# Patient Record
Sex: FEMALE | Race: WHITE | NOT HISPANIC OR LATINO | Employment: OTHER | URBAN - METROPOLITAN AREA
[De-identification: names, ages, dates, MRNs, and addresses within clinical notes are randomized per-mention and may not be internally consistent; named-entity substitution may affect disease eponyms.]

---

## 2017-02-15 ENCOUNTER — HOSPITAL ENCOUNTER (OUTPATIENT)
Dept: RADIOLOGY | Facility: HOSPITAL | Age: 62
Discharge: HOME | End: 2017-02-15
Attending: NURSE PRACTITIONER
Payer: MEDICARE

## 2017-02-15 ENCOUNTER — OFFICE VISIT (OUTPATIENT)
Dept: SURGERY | Facility: CLINIC | Age: 62
End: 2017-02-15
Payer: MEDICARE

## 2017-02-15 VITALS
DIASTOLIC BLOOD PRESSURE: 78 MMHG | TEMPERATURE: 98 F | WEIGHT: 172 LBS | HEART RATE: 85 BPM | HEIGHT: 60 IN | SYSTOLIC BLOOD PRESSURE: 134 MMHG | BODY MASS INDEX: 33.77 KG/M2

## 2017-02-15 DIAGNOSIS — Z85.3 PERSONAL HISTORY OF BREAST CANCER: Primary | ICD-10-CM

## 2017-02-15 DIAGNOSIS — Z85.3 PERSONAL HISTORY OF BREAST CANCER: ICD-10-CM

## 2017-02-15 PROCEDURE — 99213 OFFICE O/P EST LOW 20 MIN: CPT | Mod: S$GLB,,, | Performed by: NURSE PRACTITIONER

## 2017-02-15 PROCEDURE — 99999 PR PBB SHADOW E&M-EST. PATIENT-LVL III: CPT | Mod: PBBFAC,,, | Performed by: NURSE PRACTITIONER

## 2017-02-15 PROCEDURE — 77062 BREAST TOMOSYNTHESIS BI: CPT | Mod: 26,,, | Performed by: RADIOLOGY

## 2017-02-15 PROCEDURE — 99213 OFFICE O/P EST LOW 20 MIN: CPT | Mod: PBBFAC,PO | Performed by: NURSE PRACTITIONER

## 2017-02-15 PROCEDURE — 77066 DX MAMMO INCL CAD BI: CPT | Mod: 26,,, | Performed by: RADIOLOGY

## 2017-02-15 RX ORDER — LEVOCETIRIZINE DIHYDROCHLORIDE 5 MG/1
5 TABLET, FILM COATED ORAL NIGHTLY
Refills: 3 | COMMUNITY
Start: 2017-01-30 | End: 2018-08-07 | Stop reason: SDUPTHER

## 2017-02-15 NOTE — PROGRESS NOTES
Subjective:      Patient ID: Jeanne Rodgers is a 62 y.o. female.    Chief Complaint: Breast Cancer Screening (CBE/Hx of Right Breast Cancer)      HPI: (PF, EPF - 1-3) (Detailed, Comp, - 4) returning patient presents for breast cancer surveillance.  Denies breast pain, nipple discharge, redness, increased warmth, unexplained weight loss, new onset bone pain, cough, SOB. She reports vaginal dryness, irritation and urinary incontinence, she is planning on seeing a provider for vaginal laser treatment.         Right lumpectomy at [facility] June 24, 2014 with 2.4cm IDC and DCIS, negative margins, 0/6 SN, Stage II, ER/ME+, HER-2 negative. Adjuvant XRT and continues with hormonal therapy with arimidex.       Review of Systems   Constitutional: Negative for appetite change and fatigue.   Respiratory: Negative for cough and shortness of breath.    Cardiovascular: Negative for chest pain.   Musculoskeletal: Negative for back pain.     Objective:   Physical Exam   Pulmonary/Chest: Right breast exhibits tenderness. Right breast exhibits no inverted nipple and no mass. Left breast exhibits no inverted nipple, no mass, no nipple discharge, no skin change and no tenderness. There is no breast swelling.   Lumpectomy scar right breast at 10 o'clock near areolar border with associated tenderness, no mass noted. Generalized skin thickening from previous XRT. No upper extremity lymphedema. Breathing non-labored    Lymphadenopathy:     She has no cervical adenopathy.     She has no axillary adenopathy.        Right: No supraclavicular adenopathy present.        Left: No supraclavicular adenopathy present.     Assessment:       1. Personal history of breast cancer        Plan:       mmg today, no changes or abnormality reported, clinically ADONIS  Return in one year with bilat diag mmg  She will continue with hormonal therapy and will see medical oncology during the interval  Call for any interval palpable breast mass, pain, nipple  discharge, skin changes or other breast related concerns

## 2017-12-20 ENCOUNTER — LAB VISIT (OUTPATIENT)
Dept: LAB | Facility: OTHER | Age: 62
End: 2017-12-20
Payer: MEDICARE

## 2017-12-20 DIAGNOSIS — G93.32 CHRONIC FATIGUE SYNDROME: ICD-10-CM

## 2017-12-20 DIAGNOSIS — M79.641 RIGHT HAND PAIN: Primary | ICD-10-CM

## 2017-12-20 DIAGNOSIS — M79.642 LEFT HAND PAIN: ICD-10-CM

## 2017-12-20 LAB
BASOPHILS # BLD AUTO: 0.01 K/UL
BASOPHILS NFR BLD: 0.2 %
CCP AB SER IA-ACNC: 1.1 U/ML
CRP SERPL-MCNC: 5.3 MG/L
DIFFERENTIAL METHOD: ABNORMAL
EOSINOPHIL # BLD AUTO: 0.1 K/UL
EOSINOPHIL NFR BLD: 2 %
ERYTHROCYTE [DISTWIDTH] IN BLOOD BY AUTOMATED COUNT: 12.4 %
ERYTHROCYTE [SEDIMENTATION RATE] IN BLOOD BY WESTERGREN METHOD: 19 MM/HR
HCT VFR BLD AUTO: 40.8 %
HGB BLD-MCNC: 13.6 G/DL
LYMPHOCYTES # BLD AUTO: 1.7 K/UL
LYMPHOCYTES NFR BLD: 31.2 %
MCH RBC QN AUTO: 31.6 PG
MCHC RBC AUTO-ENTMCNC: 33.3 G/DL
MCV RBC AUTO: 95 FL
MONOCYTES # BLD AUTO: 0.4 K/UL
MONOCYTES NFR BLD: 7.7 %
NEUTROPHILS # BLD AUTO: 3.3 K/UL
NEUTROPHILS NFR BLD: 58.7 %
PLATELET # BLD AUTO: 254 K/UL
PMV BLD AUTO: 9.3 FL
RBC # BLD AUTO: 4.3 M/UL
RHEUMATOID FACT SERPL-ACNC: <10 IU/ML
WBC # BLD AUTO: 5.57 K/UL

## 2017-12-20 PROCEDURE — 86140 C-REACTIVE PROTEIN: CPT

## 2017-12-20 PROCEDURE — 86235 NUCLEAR ANTIGEN ANTIBODY: CPT | Mod: 59

## 2017-12-20 PROCEDURE — 36415 COLL VENOUS BLD VENIPUNCTURE: CPT

## 2017-12-20 PROCEDURE — 85025 COMPLETE CBC W/AUTO DIFF WBC: CPT

## 2017-12-20 PROCEDURE — 85651 RBC SED RATE NONAUTOMATED: CPT

## 2017-12-20 PROCEDURE — 86039 ANTINUCLEAR ANTIBODIES (ANA): CPT

## 2017-12-20 PROCEDURE — 86200 CCP ANTIBODY: CPT

## 2017-12-20 PROCEDURE — 86431 RHEUMATOID FACTOR QUANT: CPT

## 2017-12-20 PROCEDURE — 86038 ANTINUCLEAR ANTIBODIES: CPT

## 2017-12-21 LAB
ANA SER QL IF: POSITIVE
ANA TITR SER IF: NORMAL {TITER}

## 2017-12-26 ENCOUNTER — LAB VISIT (OUTPATIENT)
Dept: LAB | Facility: HOSPITAL | Age: 62
End: 2017-12-26
Attending: FAMILY MEDICINE
Payer: MEDICARE

## 2017-12-26 ENCOUNTER — OFFICE VISIT (OUTPATIENT)
Dept: FAMILY MEDICINE | Facility: CLINIC | Age: 62
End: 2017-12-26
Payer: MEDICARE

## 2017-12-26 VITALS
HEIGHT: 60 IN | SYSTOLIC BLOOD PRESSURE: 138 MMHG | WEIGHT: 178.81 LBS | RESPIRATION RATE: 14 BRPM | OXYGEN SATURATION: 97 % | HEART RATE: 97 BPM | TEMPERATURE: 98 F | BODY MASS INDEX: 35.11 KG/M2 | DIASTOLIC BLOOD PRESSURE: 84 MMHG

## 2017-12-26 DIAGNOSIS — M25.40 JOINT SWELLING: ICD-10-CM

## 2017-12-26 DIAGNOSIS — Z13.6 SCREENING FOR CARDIOVASCULAR CONDITION: ICD-10-CM

## 2017-12-26 DIAGNOSIS — M51.36 DDD (DEGENERATIVE DISC DISEASE), LUMBAR: ICD-10-CM

## 2017-12-26 DIAGNOSIS — M50.30 DDD (DEGENERATIVE DISC DISEASE), CERVICAL: ICD-10-CM

## 2017-12-26 DIAGNOSIS — Z23 NEEDS FLU SHOT: Primary | ICD-10-CM

## 2017-12-26 DIAGNOSIS — G47.00 INSOMNIA, UNSPECIFIED TYPE: ICD-10-CM

## 2017-12-26 DIAGNOSIS — Z11.59 NEED FOR HEPATITIS C SCREENING TEST: ICD-10-CM

## 2017-12-26 DIAGNOSIS — R53.83 FATIGUE, UNSPECIFIED TYPE: ICD-10-CM

## 2017-12-26 DIAGNOSIS — E04.9 ENLARGED THYROID: ICD-10-CM

## 2017-12-26 DIAGNOSIS — F32.A DEPRESSION, UNSPECIFIED DEPRESSION TYPE: ICD-10-CM

## 2017-12-26 LAB
ALBUMIN SERPL BCP-MCNC: 3.8 G/DL
ALP SERPL-CCNC: 79 U/L
ALT SERPL W/O P-5'-P-CCNC: 28 U/L
ANION GAP SERPL CALC-SCNC: 9 MMOL/L
ANTI SM ANTIBODY: 15.21 EU
ANTI SM/RNP ANTIBODY: 0.14 EU
ANTI-SM INTERPRETATION: NEGATIVE
ANTI-SM/RNP INTERPRETATION: NEGATIVE
ANTI-SSA ANTIBODY: 4.6 EU
ANTI-SSA INTERPRETATION: NEGATIVE
ANTI-SSB ANTIBODY: 0.22 EU
ANTI-SSB INTERPRETATION: NEGATIVE
AST SERPL-CCNC: 25 U/L
BILIRUB SERPL-MCNC: 0.3 MG/DL
BUN SERPL-MCNC: 13 MG/DL
CALCIUM SERPL-MCNC: 9.5 MG/DL
CHLORIDE SERPL-SCNC: 106 MMOL/L
CHOLEST SERPL-MCNC: 214 MG/DL
CHOLEST/HDLC SERPL: 5 {RATIO}
CO2 SERPL-SCNC: 27 MMOL/L
CREAT SERPL-MCNC: 0.7 MG/DL
DSDNA AB SER-ACNC: NORMAL [IU]/ML
EST. GFR  (AFRICAN AMERICAN): >60 ML/MIN/1.73 M^2
EST. GFR  (NON AFRICAN AMERICAN): >60 ML/MIN/1.73 M^2
GLUCOSE SERPL-MCNC: 83 MG/DL
HDLC SERPL-MCNC: 43 MG/DL
HDLC SERPL: 20.1 %
LDLC SERPL CALC-MCNC: 99.8 MG/DL
NONHDLC SERPL-MCNC: 171 MG/DL
POTASSIUM SERPL-SCNC: 4.4 MMOL/L
PROT SERPL-MCNC: 7.2 G/DL
SODIUM SERPL-SCNC: 142 MMOL/L
T4 FREE SERPL-MCNC: 0.75 NG/DL
THYROPEROXIDASE IGG SERPL-ACNC: <6 IU/ML
TRIGL SERPL-MCNC: 356 MG/DL
TSH SERPL DL<=0.005 MIU/L-ACNC: 0.98 UIU/ML
URATE SERPL-MCNC: 5.4 MG/DL

## 2017-12-26 PROCEDURE — G0008 ADMIN INFLUENZA VIRUS VAC: HCPCS | Mod: PBBFAC,PO

## 2017-12-26 PROCEDURE — 36415 COLL VENOUS BLD VENIPUNCTURE: CPT | Mod: PO

## 2017-12-26 PROCEDURE — 99999 PR PBB SHADOW E&M-EST. PATIENT-LVL IV: CPT | Mod: PBBFAC,,, | Performed by: FAMILY MEDICINE

## 2017-12-26 PROCEDURE — 84439 ASSAY OF FREE THYROXINE: CPT

## 2017-12-26 PROCEDURE — 86376 MICROSOMAL ANTIBODY EACH: CPT

## 2017-12-26 PROCEDURE — 84443 ASSAY THYROID STIM HORMONE: CPT

## 2017-12-26 PROCEDURE — 84432 ASSAY OF THYROGLOBULIN: CPT

## 2017-12-26 PROCEDURE — 86803 HEPATITIS C AB TEST: CPT

## 2017-12-26 PROCEDURE — 80061 LIPID PANEL: CPT

## 2017-12-26 PROCEDURE — 84550 ASSAY OF BLOOD/URIC ACID: CPT

## 2017-12-26 PROCEDURE — 99205 OFFICE O/P NEW HI 60 MIN: CPT | Mod: S$PBB,,, | Performed by: FAMILY MEDICINE

## 2017-12-26 PROCEDURE — 99214 OFFICE O/P EST MOD 30 MIN: CPT | Mod: PBBFAC,PO | Performed by: FAMILY MEDICINE

## 2017-12-26 PROCEDURE — 80053 COMPREHEN METABOLIC PANEL: CPT

## 2017-12-26 RX ORDER — PREDNISONE 10 MG/1
TABLET ORAL
Qty: 6 TABLET | Refills: 1 | Status: SHIPPED | OUTPATIENT
Start: 2017-12-26 | End: 2018-01-25 | Stop reason: ALTCHOICE

## 2017-12-26 RX ORDER — CITALOPRAM 20 MG/1
20 TABLET, FILM COATED ORAL DAILY
Qty: 30 TABLET | Refills: 11 | Status: SHIPPED | OUTPATIENT
Start: 2017-12-26 | End: 2018-06-14

## 2017-12-26 RX ORDER — ALPRAZOLAM 0.25 MG/1
0.25 TABLET ORAL NIGHTLY PRN
Qty: 30 TABLET | Refills: 0 | Status: SHIPPED | OUTPATIENT
Start: 2017-12-26 | End: 2018-08-07 | Stop reason: SDUPTHER

## 2017-12-26 NOTE — PROGRESS NOTES
Chief Complaint   Patient presents with    Establish Care     tingling and sweating       HPI  Jeanne Rodgers is a 62 y.o. female with medical diagnoses as listed in the medical history and problem list that presents to establish care.      Breast CA - 2014 s/p xrt/procedures, states now with L hand pain, seen by Ortho hand.    Hx of global pruritis prior to breast CA diagnosis. Dx with hives by PCP and Allergy, currently on xyzal for relief.      Pt states multiple referrals placed, has question about DM2 dx, most recent labs show low glucose.     Joint pain global - states 2 year hx, B hand swelling.     Insomnia - states 2/2 pain.     Dental procedure - states +edema after procedure this year, caused edema, given steroid therapy/obs.     Fam hx - DM2, pancreatic CA multiple family members,     Diet - no special diet at this time. +seafood, chicken baked.     Increased stressors - increased stressors 2/2 mother dx with dementia and multiple other med diagnoses.     Cervical/Lumbar DDD - multiple year hx,     PAST MEDICAL HISTORY:  Past Medical History:   Diagnosis Date    Cancer        PAST SURGICAL HISTORY:  Past Surgical History:   Procedure Laterality Date    BREAST SURGERY Right 2014    lumpectomy        SOCIAL HISTORY:  Social History     Social History    Marital status:      Spouse name: N/A    Number of children: N/A    Years of education: N/A     Occupational History    Not on file.     Social History Main Topics    Smoking status: Never Smoker    Smokeless tobacco: Never Used    Alcohol use Not on file    Drug use: Unknown    Sexual activity: Not on file     Other Topics Concern    Not on file     Social History Narrative    No narrative on file       FAMILY HISTORY:  Family History   Problem Relation Age of Onset    Cancer Mother 85     pancreatic cancer    Cancer Father 85     pancreatic    Cancer Maternal Aunt 60     pancreatic    Breast cancer Neg Hx     Ovarian  cancer Neg Hx        ALLERGIES AND MEDICATIONS: updated and reviewed.  Review of patient's allergies indicates:  No Known Allergies  Current Outpatient Prescriptions   Medication Sig Dispense Refill    anastrozole (ARIMIDEX) 1 mg Tab Take 1 mg by mouth once daily.      levocetirizine (XYZAL) 5 MG tablet Take 5 mg by mouth every evening.  3    ALPRAZolam (XANAX) 0.25 MG tablet Take 1 tablet (0.25 mg total) by mouth nightly as needed for Anxiety. 30 tablet 0    citalopram (CELEXA) 20 MG tablet Take 1 tablet (20 mg total) by mouth once daily. 30 tablet 11    predniSONE (DELTASONE) 10 mg tablet pack 2 tablets for 2 days, then 1 tablet for 2 days. 6 tablet 1     No current facility-administered medications for this visit.        ROS  Review of Systems   Constitutional: Positive for fatigue. Negative for activity change, appetite change and fever.   HENT: Negative for congestion and sore throat.    Eyes: Negative for visual disturbance.   Respiratory: Negative for cough and shortness of breath.    Cardiovascular: Negative for chest pain.   Gastrointestinal: Negative for abdominal pain, diarrhea, nausea and vomiting.   Endocrine: Negative.    Genitourinary: Negative for dysuria.   Musculoskeletal: Positive for arthralgias, back pain and myalgias.   Skin: Negative for rash.   Allergic/Immunologic: Negative.    Neurological: Negative for dizziness, weakness and headaches.   Hematological: Negative.    Psychiatric/Behavioral: Positive for dysphoric mood. Negative for agitation and confusion. The patient is nervous/anxious.        Physical Exam  Vitals:    12/26/17 1038   BP: 138/84   Pulse: 97   Resp: 14   Temp: 98.2 °F (36.8 °C)    Body mass index is 34.92 kg/m².  Weight: 81.1 kg (178 lb 12.7 oz)   Height: 5' (152.4 cm)     Physical Exam   Constitutional: She is oriented to person, place, and time. She appears well-developed and well-nourished.   HENT:   Head: Normocephalic and atraumatic.   Eyes: Conjunctivae and EOM  are normal. Pupils are equal, round, and reactive to light.   Neck: Normal range of motion. Neck supple.   Cardiovascular: Normal rate, regular rhythm and normal heart sounds.    Pulmonary/Chest: Effort normal and breath sounds normal.   Abdominal: Soft. Bowel sounds are normal.   Musculoskeletal: Normal range of motion.   Multiple joints - global TTP, dec ROM mild  B hands - +non pitting edema, dec ROM global  B LE - 1+ non pitting edema, TTP   Neurological: She is alert and oriented to person, place, and time. She has normal reflexes.   Skin: Skin is warm and dry.   Psychiatric: She has a normal mood and affect. Her behavior is normal. Judgment and thought content normal.       Health Maintenance       Date Due Completion Date    Hepatitis C Screening 1955 ---    Lipid Panel 1955 ---    TETANUS VACCINE 01/27/1971 ---    Pap Smear with HPV Cotest 01/27/1974 ---    Colonoscopy 01/27/2003 ---    Zoster Vaccine 01/27/2013 ---    Influenza Vaccine 08/01/2017 ---    Mammogram 02/15/2019 2/15/2017          Assessment & Plan    Needs flu shot  -     Influenza - Quadrivalent (3 years & older) (PF)    Need for hepatitis C screening test  -     Hepatitis C antibody; Future; Expected date: 12/26/2017    Screening for cardiovascular condition  -     Lipid panel; Future; Expected date: 12/26/2017  - Assess lipid panel    DDD (degenerative disc disease), lumbar  -     Ambulatory referral to Pain Clinic  -     predniSONE (DELTASONE) 10 mg tablet pack; 2 tablets for 2 days, then 1 tablet for 2 days.  Dispense: 6 tablet; Refill: 1  - Will provide therapy at this time, explore treatment options with Pain Management.     DDD (degenerative disc disease), cervical  -     Ambulatory referral to Pain Clinic  -     predniSONE (DELTASONE) 10 mg tablet pack; 2 tablets for 2 days, then 1 tablet for 2 days.  Dispense: 6 tablet; Refill: 1    Fatigue, unspecified type  -     TSH; Future; Expected date: 12/26/2017  -     T4, free;  Future; Expected date: 12/26/2017  -     Comprehensive metabolic panel; Future; Expected date: 12/26/2017  -     Thyroglobulin; Future; Expected date: 12/26/2017  -     THYROID PEROXIDASE ANTIBODY; Future; Expected date: 12/26/2017  - Assess full labs, strong family hx    Joint swelling  -     Uric acid; Future; Expected date: 12/26/2017  -     predniSONE (DELTASONE) 10 mg tablet pack; 2 tablets for 2 days, then 1 tablet for 2 days.  Dispense: 6 tablet; Refill: 1  - Assess full labs at this time    Enlarged thyroid  -     US Thyroid; Future; Expected date: 12/26/2017    Depression, unspecified depression type  -     citalopram (CELEXA) 20 MG tablet; Take 1 tablet (20 mg total) by mouth once daily.  Dispense: 30 tablet; Refill: 11  -     ALPRAZolam (XANAX) 0.25 MG tablet; Take 1 tablet (0.25 mg total) by mouth nightly as needed for Anxiety.  Dispense: 30 tablet; Refill: 0  - PRN benzo only, low dose at this time, discussed SE profile and addiction potential.     Insomnia, unspecified type  -     ALPRAZolam (XANAX) 0.25 MG tablet; Take 1 tablet (0.25 mg total) by mouth nightly as needed for Anxiety.  Dispense: 30 tablet; Refill: 0        Return in about 4 weeks (around 1/23/2018), or if symptoms worsen or fail to improve.

## 2017-12-27 LAB — HCV AB SERPL QL IA: NEGATIVE

## 2017-12-28 LAB
THRYOGLOBULIN INTERPRETATION: ABNORMAL
THYROGLOB AB SERPL-ACNC: <1.8 IU/ML
THYROGLOB SERPL-MCNC: 5 NG/ML

## 2018-01-04 ENCOUNTER — HOSPITAL ENCOUNTER (OUTPATIENT)
Dept: RADIOLOGY | Facility: HOSPITAL | Age: 63
Discharge: HOME | End: 2018-01-04
Attending: FAMILY MEDICINE
Payer: MEDICARE

## 2018-01-04 DIAGNOSIS — E04.9 ENLARGED THYROID: ICD-10-CM

## 2018-01-04 PROCEDURE — 76536 US EXAM OF HEAD AND NECK: CPT | Mod: TC

## 2018-01-04 PROCEDURE — 76536 US EXAM OF HEAD AND NECK: CPT | Mod: 26,,, | Performed by: RADIOLOGY

## 2018-01-12 ENCOUNTER — TELEPHONE (OUTPATIENT)
Dept: ADMINISTRATIVE | Facility: HOSPITAL | Age: 63
End: 2018-01-12

## 2018-01-12 NOTE — TELEPHONE ENCOUNTER
Patient states she wants to see Rheumatology first. She is also doing hand therapy with Dr Walker. If she feels she needs the referral after, she will call to schedule.

## 2018-01-25 ENCOUNTER — OFFICE VISIT (OUTPATIENT)
Dept: RHEUMATOLOGY | Facility: CLINIC | Age: 63
End: 2018-01-25
Payer: MEDICARE

## 2018-01-25 VITALS
HEART RATE: 68 BPM | SYSTOLIC BLOOD PRESSURE: 129 MMHG | WEIGHT: 176 LBS | BODY MASS INDEX: 34.37 KG/M2 | DIASTOLIC BLOOD PRESSURE: 76 MMHG

## 2018-01-25 DIAGNOSIS — Z85.3 HX OF BREAST CANCER: ICD-10-CM

## 2018-01-25 DIAGNOSIS — M15.9 PRIMARY OSTEOARTHRITIS INVOLVING MULTIPLE JOINTS: Primary | ICD-10-CM

## 2018-01-25 DIAGNOSIS — R76.8 POSITIVE ANA (ANTINUCLEAR ANTIBODY): ICD-10-CM

## 2018-01-25 DIAGNOSIS — L50.9 URTICARIA: ICD-10-CM

## 2018-01-25 DIAGNOSIS — Z79.811 LONG TERM (CURRENT) USE OF AROMATASE INHIBITORS: ICD-10-CM

## 2018-01-25 PROCEDURE — 99213 OFFICE O/P EST LOW 20 MIN: CPT | Mod: PBBFAC | Performed by: INTERNAL MEDICINE

## 2018-01-25 PROCEDURE — 99999 PR PBB SHADOW E&M-EST. PATIENT-LVL III: CPT | Mod: PBBFAC,,, | Performed by: INTERNAL MEDICINE

## 2018-01-25 PROCEDURE — 99205 OFFICE O/P NEW HI 60 MIN: CPT | Mod: S$PBB,,, | Performed by: INTERNAL MEDICINE

## 2018-01-25 RX ORDER — BETAMETHASONE DIPROPIONATE 0.5 MG/G
OINTMENT TOPICAL DAILY
COMMUNITY
Start: 2017-11-06 | End: 2019-02-13

## 2018-01-25 RX ORDER — GABAPENTIN 600 MG/1
600 TABLET ORAL 3 TIMES DAILY
Qty: 90 TABLET | Refills: 11 | Status: SHIPPED | OUTPATIENT
Start: 2018-01-25 | End: 2018-05-03

## 2018-01-25 NOTE — LETTER
January 29, 2018      Dinorah Ferrell MD  [address]  [address]  [facility]  [facility] [address]           [facility] - Rheumatology  [address]  [place] [address]  Phone: [phone]  Fax: [phone]          Patient: Jeanne Rodgers   MR Number: [id number]   YOB: 1955   Date of Visit: 1/25/2018       Dear Dr. Dinorah Ferrell:    Thank you for referring Jeanne Rodgers to me for evaluation. Attached you will find relevant portions of my assessment and plan of care.    If you have questions, please do not hesitate to call me. I look forward to following Jeanne Rodgers along with you.    Sincerely,    Rasheed Farooq MD    Enclosure  CC:  No Recipients    If you would like to receive this communication electronically, please contact [email or web address] or [phone] to request more information on [facility] [facility] access.    For providers and/or their staff who would like to refer a patient to [facility], please contact us through our one-stop-shop provider referral line, [facility], at [phone].    If you feel you have received this communication in error or would no longer like to receive these types of communications, please e-mail [email or web address]

## 2018-01-29 NOTE — PROGRESS NOTES
History of present illness: 63-year-old female has a history of breast cancer diagnosed in 2014.  She was treated with mastectomy and radiation therapy.  She has been on Arimidex since then.  In about the same time she developed pain in her hands, feet, and knees.  The pain was of gradual onset.  The pain involves the entire hand.  The pain is bad in the morning.  She has some problems with using her hands.  It does wake her up at night.  She complains of swelling in the entire finger, not just the joints.  She has had no problems with the wrist.  She has had occasional pain in the elbow.  Shoulders have been unremarkable.  She has had some pain in the neck and lower back.  She has had no problems with the hips.  The knees have been bothering her for the past year.  She had had some swelling in the knee in the past.  Her feet bother her when she first gets up in the morning.    She had had prior carpal tunnel surgery for paresthesias in her hands.  She had an EMG which only showed carpal tunnel, no peripheral neuropathy.  She has been taking ibuprofen 800 mg twice daily with some relief.  Physical therapy helped.  Heat helps.  Topical medications did not help.  She was placed on prednisone for urticaria but this did not help her pain.  She had previously been evaluated by rheumatology at [facility].  Immunologic studies including FANNY were negative.  They diagnosed her as having osteoarthritis and fibromyalgia.  She had been on gabapentin previously for the paresthesias.  She had been on 300 mg 3 times daily without any response.  She was able to tolerate the medication.    She has had no unexplained fevers.  She complains of bitemporal headaches.  She has a history of urticaria.  She had been evaluated by ALLERGY in the past.  She has been taking Xyzal with some relief.  She has no conjunctivitis, oral ulcers, dry eye or mouth, Raynaud's phenomena, pleurisy, vaginal discharge or ulcers, chronic or bloody diarrhea.  Her  mother had some type of arthritis.    Systems review:  Gen.: Weight has been stable  GI: Has a history of gastro-para cyst.  Has been told she has a cyst in the liver.  : No kidney or bladder problems.  She does complain of chronic pelvic pain.    Physical examination:  Skin: She has several areas of urticaria  ENT: Decreased tears and saliva.  No conjunctival injection or oral ulcers.  Musculoskeletal: She has decreased range of motion of the cervical spine.  She has pain on range of motion the shoulders bilaterally, worse on the left than on the right.  Elbows and wrist are unremarkable.  She has bony hypertrophy of the first CMC, PIPs, and DIPs.  She has negative Tinel sign at the wrist.  She has no synovitis.  She has pain on her oral bending of the lumbar spine.  Straight leg raising is negative.  She has no tender area to palpation.  Have good range of motion without pain on range of motion.  Knees are tender to palpation but there is no effusion.  She has good range of motion of the knees.  She has tenderness of the ankles and top of the foot.  She has no tenderness on the plantar aspect.  She has full range of motion the ankles.  She has no soft tissue swelling.  Laboratory: FANNY is +1:160, speckled pattern with a negative FANNY panel.  She has a negative rheumatoid factor and CCP antibody.  She has normal sedimentation rate and CRP.    Assessment:  1.  Clinically she only has evidence of osteoarthritis.  She has no evidence to suggest an inflammatory arthritis.  Aromatase inhibitors are known to increase osteoarthritic pain.  2.  Positive FANNY, low titer.  It had been -2 years ago.  I do not think the positive FANNY is related to her joint problems.  It could be related to her dry eye and dry mouth.  It could be related to the urticaria.  It could be a false positive test.    Plans:  1.  Increased ibuprofen to 3 times daily  2.  Resume gabapentin but increased the dose to 600 mg 3 times daily  3.  Return to see  me in 4 months

## 2018-02-19 ENCOUNTER — TELEPHONE (OUTPATIENT)
Dept: OBSTETRICS AND GYNECOLOGY | Facility: CLINIC | Age: 63
End: 2018-02-19

## 2018-02-19 ENCOUNTER — OFFICE VISIT (OUTPATIENT)
Dept: SURGERY | Facility: CLINIC | Age: 63
End: 2018-02-19
Payer: MEDICARE

## 2018-02-19 ENCOUNTER — HOSPITAL ENCOUNTER (OUTPATIENT)
Dept: RADIOLOGY | Facility: HOSPITAL | Age: 63
Discharge: HOME | End: 2018-02-19
Attending: NURSE PRACTITIONER
Payer: MEDICARE

## 2018-02-19 VITALS
TEMPERATURE: 98 F | BODY MASS INDEX: 35.34 KG/M2 | SYSTOLIC BLOOD PRESSURE: 141 MMHG | DIASTOLIC BLOOD PRESSURE: 76 MMHG | HEIGHT: 60 IN | WEIGHT: 180 LBS | HEART RATE: 91 BPM

## 2018-02-19 DIAGNOSIS — Z85.3 PERSONAL HISTORY OF BREAST CANCER: Primary | ICD-10-CM

## 2018-02-19 DIAGNOSIS — Z85.3 HISTORY OF BREAST CANCER: Primary | ICD-10-CM

## 2018-02-19 DIAGNOSIS — Z85.3 PERSONAL HISTORY OF BREAST CANCER: ICD-10-CM

## 2018-02-19 PROCEDURE — 99999 PR PBB SHADOW E&M-EST. PATIENT-LVL III: CPT | Mod: PBBFAC,,, | Performed by: NURSE PRACTITIONER

## 2018-02-19 PROCEDURE — 77062 BREAST TOMOSYNTHESIS BI: CPT | Mod: TC,PO

## 2018-02-19 PROCEDURE — 77066 DX MAMMO INCL CAD BI: CPT | Mod: 26,,, | Performed by: RADIOLOGY

## 2018-02-19 PROCEDURE — 99213 OFFICE O/P EST LOW 20 MIN: CPT | Mod: S$PBB,,, | Performed by: NURSE PRACTITIONER

## 2018-02-19 PROCEDURE — 99213 OFFICE O/P EST LOW 20 MIN: CPT | Mod: PBBFAC,PO | Performed by: NURSE PRACTITIONER

## 2018-02-19 PROCEDURE — 77062 BREAST TOMOSYNTHESIS BI: CPT | Mod: 26,,, | Performed by: RADIOLOGY

## 2018-02-19 RX ORDER — KETOROLAC TROMETHAMINE 10 MG/1
10 TABLET, FILM COATED ORAL
COMMUNITY
Start: 2018-02-09 | End: 2018-05-03

## 2018-02-19 RX ORDER — IBUPROFEN 800 MG/1
800 TABLET ORAL 2 TIMES DAILY PRN
COMMUNITY
Start: 2018-02-02 | End: 2018-05-08 | Stop reason: ALTCHOICE

## 2018-02-19 RX ORDER — CYCLOBENZAPRINE HCL 5 MG
TABLET ORAL
Refills: 0 | COMMUNITY
Start: 2018-02-09 | End: 2018-05-08 | Stop reason: ALTCHOICE

## 2018-02-19 NOTE — TELEPHONE ENCOUNTER
----- Message from Milena Brown RN sent at 2/19/2018  2:33 PM CST -----  Regarding: referral for survivorship  Carlos morales,     This patient is a referral from Sameera Gustafson NP for survivorship s/p breast cancer treatment. Thanks!

## 2018-02-19 NOTE — TELEPHONE ENCOUNTER
Called Jeanne Rodgers  to schedule an initial survivorship consult / evaluation with Dr. Kam, as suggested by DAMIÁN Gustafson NP at her recent appointment.     Explained to Jeanne that Dr. Kam now specializes in the delicate issues that often accompany cancer treatment & survivorship.  This does not take the place of her current physician /treatment, but in fact compliments it.      Patient is agreeable to an appointment on March 8th at 2pm.   Verbal directions to the office given, reminder letter printed, and sent via mail.   Pt verbalized understanding.

## 2018-02-19 NOTE — PROGRESS NOTES
"Subjective:      Patient ID: Jeanne Rodgers is a 63 y.o. female.    Chief Complaint: Breast Cancer Screening (CBE/Hx of Right Breast Cancer)      HPI: (PF, EPF - 1-3) (Detailed, Comp, - 4) returning patient presents for breast cancer surveillance.  Denies breast pain, nipple discharge, redness, increased warmth, unexplained weight loss,  cough, SOB.  Patient is extremely upset regarding "crippling pain in my hands" as well as joint pain in her feet and knees, she reports this was with onset of AI and states "my oncologist wont take me off and said its not because of arimidex". She reports being seen by rheumatology and "they said I do not have an autoimmune disorder". She also reports vaginal pain, dryness, and "atrophy"     9-2-2017 left diag mmg and US was ordered by another provider with what appears to be hematoma with reports of overlying bruise  2- last bilat mmg with no abnormality reported      Right lumpectomy at [facility] June 24, 2014 with 2.4cm IDC and DCIS, negative margins, 0/6 SN, Stage II, ER/SD+, HER-2 negative. Adjuvant XRT and continues with hormonal therapy with arimidex.       Review of Systems  Objective:   Physical Exam   Pulmonary/Chest: Right breast exhibits no inverted nipple, no mass, no nipple discharge, no skin change and no tenderness. Left breast exhibits no inverted nipple, no mass, no nipple discharge, no skin change and no tenderness. There is no breast swelling.   Lymphadenopathy:     She has no cervical adenopathy.     She has no axillary adenopathy.        Right: No supraclavicular adenopathy present.        Left: No supraclavicular adenopathy present.     Assessment:       1. Personal history of breast cancer        Plan:       mmg today, no changes or abnormality reported, clinically ADONIS  Return in one year with diag mmg    Refer to Dr Kam, reports vaginal dryness and atrophy    Refer to medical oncology, patient desires second opinion secondary to sever joint pain " "with AI and reports "my oncologist says its not because of arimidex"     Call for any interval palpable breast mass, pain, nipple discharge, skin changes or other breast related concerns       "

## 2018-02-20 ENCOUNTER — TELEPHONE (OUTPATIENT)
Dept: HEMATOLOGY/ONCOLOGY | Facility: CLINIC | Age: 63
End: 2018-02-20

## 2018-02-23 ENCOUNTER — TELEPHONE (OUTPATIENT)
Dept: HEMATOLOGY/ONCOLOGY | Facility: CLINIC | Age: 63
End: 2018-02-23

## 2018-03-07 ENCOUNTER — TELEPHONE (OUTPATIENT)
Dept: HEMATOLOGY/ONCOLOGY | Facility: CLINIC | Age: 63
End: 2018-03-07

## 2018-03-08 ENCOUNTER — OFFICE VISIT (OUTPATIENT)
Dept: OBSTETRICS AND GYNECOLOGY | Facility: CLINIC | Age: 63
End: 2018-03-08
Attending: OBSTETRICS & GYNECOLOGY
Payer: MEDICARE

## 2018-03-08 ENCOUNTER — PATIENT MESSAGE (OUTPATIENT)
Dept: OBSTETRICS AND GYNECOLOGY | Facility: CLINIC | Age: 63
End: 2018-03-08

## 2018-03-08 ENCOUNTER — LAB VISIT (OUTPATIENT)
Dept: LAB | Facility: OTHER | Age: 63
End: 2018-03-08
Attending: OBSTETRICS & GYNECOLOGY
Payer: MEDICARE

## 2018-03-08 VITALS
SYSTOLIC BLOOD PRESSURE: 138 MMHG | BODY MASS INDEX: 35.56 KG/M2 | DIASTOLIC BLOOD PRESSURE: 80 MMHG | WEIGHT: 182.13 LBS

## 2018-03-08 DIAGNOSIS — L90.0 LICHEN SCLEROSUS: ICD-10-CM

## 2018-03-08 DIAGNOSIS — Z17.0 MALIGNANT NEOPLASM OF BREAST IN FEMALE, ESTROGEN RECEPTOR POSITIVE, UNSPECIFIED LATERALITY, UNSPECIFIED SITE OF BREAST: ICD-10-CM

## 2018-03-08 DIAGNOSIS — L50.9 URTICARIA: Primary | ICD-10-CM

## 2018-03-08 DIAGNOSIS — L50.9 URTICARIA: ICD-10-CM

## 2018-03-08 DIAGNOSIS — N95.2 VAGINAL ATROPHY: ICD-10-CM

## 2018-03-08 DIAGNOSIS — C50.919 MALIGNANT NEOPLASM OF BREAST IN FEMALE, ESTROGEN RECEPTOR POSITIVE, UNSPECIFIED LATERALITY, UNSPECIFIED SITE OF BREAST: ICD-10-CM

## 2018-03-08 DIAGNOSIS — M25.50 ARTHRALGIA, UNSPECIFIED JOINT: ICD-10-CM

## 2018-03-08 PROCEDURE — 36415 COLL VENOUS BLD VENIPUNCTURE: CPT

## 2018-03-08 PROCEDURE — 99203 OFFICE O/P NEW LOW 30 MIN: CPT | Mod: S$GLB,,, | Performed by: OBSTETRICS & GYNECOLOGY

## 2018-03-08 PROCEDURE — 86038 ANTINUCLEAR ANTIBODIES: CPT

## 2018-03-08 PROCEDURE — 86039 ANTINUCLEAR ANTIBODIES (ANA): CPT

## 2018-03-08 PROCEDURE — 86235 NUCLEAR ANTIGEN ANTIBODY: CPT | Mod: 59

## 2018-03-08 RX ORDER — CLOBETASOL PROPIONATE 0.5 MG/G
OINTMENT TOPICAL
Qty: 60 G | Refills: 2 | Status: SHIPPED | OUTPATIENT
Start: 2018-03-08 | End: 2018-11-13 | Stop reason: SDUPTHER

## 2018-03-08 NOTE — LETTER
March 8, 2018      Sameera Gustafson, TIARA-ANGIE  [address]  [facility] [address]           [facility]  [address], [address]  [facility] [address]  Phone: [phone]  Fax: [phone]          Patient: Jeanne Rodgers   MR Number: [id number]   YOB: 1955   Date of Visit: 3/8/2018       Dear Sameera Gustafson:    Thank you for referring Jeanne Rodgers to me for evaluation. Attached you will find relevant portions of my assessment and plan of care.    If you have questions, please do not hesitate to call me. I look forward to following Jeanne Rodgers along with you.    Sincerely,    Jaqueline Garcia RN    Enclosure  CC:  No Recipients    If you would like to receive this communication electronically, please contact [email or web address] or [phone] to request more information on [facility] [facility] access.    For providers and/or their staff who would like to refer a patient to [facility], please contact us through our one-stop-shop provider referral line, [facility], at [phone].    If you feel you have received this communication in error or would no longer like to receive these types of communications, please e-mail [email or web address]

## 2018-03-09 LAB
ANA SER QL IF: POSITIVE
ANA TITR SER IF: NORMAL {TITER}

## 2018-03-09 NOTE — PROGRESS NOTES
"SUBJECTIVE:   63 y.o. female No obstetric history on file presents today for survivorship and to discuss her vaginal dryness and other issues.  No LMP recorded (lmp unknown). Patient is postmenopausal..   Breast cancer history- Right lumpectomy at [facility] June 24, 2014 with 2.4cm IDC and DCIS, negative margins, 0/6 SN, Stage II, ER/KS+, HER-2 negative. Adjuvant XRT and continues with hormonal therapy with arimidex.    She reports having "severe vaginal atrophy"  She also reports burning, itching and "feeling  Like vaginal area is on fire." She reports being seen by [facility] secondary to bladder leakage and was put on steroid cream for "vaginal dryness." .She has not been using the steroid cream. She also reports living in constant pain- she reports that she has told her Oncologist that her hands are "crippled" She reports being told that this is unrelated to Arimidex.. Se reports having pain in her hands and having shooting pains in her legs when she gets up.  Patient also reports "itching all over my body." She reports this started in 2014 prior to her cancer diagnosis. She complains of itching on her palms, head, chest, stomach and feet- she reports not sleeping well because of the itching.. She reports Xyzal helps.  She has +FANNY and was seen by Rheumatology- told she does not have autoimmune condition. 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          Past Medical History:   Diagnosis Date    Breast cancer     Cancer     Mental disorder      Past Surgical History:   Procedure Laterality Date    APPENDECTOMY      BREAST BIOPSY      BREAST LUMPECTOMY Right 2014    CARPAL TUNNEL RELEASE      CHOLECYSTECTOMY      TUBAL LIGATION       Social History "     Social History    Marital status: Single     Spouse name: N/A    Number of children: N/A    Years of education: N/A     Occupational History    Not on file.     Social History Main Topics    Smoking status: Never Smoker    Smokeless tobacco: Never Used    Alcohol use Not on file    Drug use: Unknown    Sexual activity: Not on file     Other Topics Concern    Not on file     Social History Narrative    No narrative on file     Family History   Problem Relation Age of Onset    Cancer Mother 85     pancreatic cancer    Cancer Father 85     pancreatic    Cancer Maternal Aunt 60     pancreatic    Breast cancer Neg Hx     Ovarian cancer Neg Hx      OB History    Para Term  AB Living       0         SAB TAB Ectopic Multiple Live Births                             Current Outpatient Prescriptions   Medication Sig Dispense Refill    anastrozole (ARIMIDEX) 1 mg Tab Take 1 mg by mouth once daily.      betamethasone dipropionate (DIPROLENE) 0.05 % ointment       cyclobenzaprine (FLEXERIL) 5 MG tablet TAKE 1 TABLET BY MOUTH EVERY 8 HOURS AS NEEDED FOR spasms  0    ibuprofen (ADVIL,MOTRIN) 800 MG tablet       levocetirizine (XYZAL) 5 MG tablet Take 5 mg by mouth every evening.  3    ALPRAZolam (XANAX) 0.25 MG tablet Take 1 tablet (0.25 mg total) by mouth nightly as needed for Anxiety. 30 tablet 0    citalopram (CELEXA) 20 MG tablet Take 1 tablet (20 mg total) by mouth once daily. 30 tablet 11    clobetasol 0.05% (TEMOVATE) 0.05 % Oint Use twice daily for 1 month, then daily for 1 month, then 3 times per week 60 g 2    gabapentin (NEURONTIN) 600 MG tablet Take 1 tablet (600 mg total) by mouth 3 (three) times daily. 90 tablet 11    ketorolac (TORADOL) 10 mg tablet        No current facility-administered medications for this visit.      Allergies: Patient has no known allergies.           ROS:  Constitutional: no weight loss, weight gain, fever,+ fatigue  Eyes:  No vision changes,  glasses/contacts  ENT/Mouth: No ulcers, sinus problems, ears ringing, headache  Cardiovascular: No inability to lie flat, chest pain, exercise intolerance, swelling, heart palpitations  Respiratory: No wheezing, coughing blood, shortness of breath, or cough  Gastrointestinal: No diarrhea, bloody stool, nausea/vomiting, constipation, gas, hemorrhoids  Genitourinary: No blood in urine, painful urination, urgency of urination, frequency of urination, incomplete emptying,+ incontinence, abnormal bleeding, painful periods, heavy periods, vaginal discharge, vaginal odor, painful intercourse, sexual problems, bleeding after intercourse, +vaginal dryness, +vaginal itching  Musculoskeletal: No muscle weakness  Skin/Breast: No painful breasts, nipple discharge, masses, rash, ulcers, +itching  Neurological: No passing out, seizures, numbness, headache  Endocrine: No diabetes, hypothyroid, hyperthyroid, hot flashes, hair loss, abnormal hair growth, acne  Psychiatric: No depression, crying  Hematologic: No bruises, bleeding, swollen lymph nodes, anemia.      Physical Exam  General- well developed, well nourished  Vulva- no masses, no lesions, +white discoloration consistent with lichen sclerosus  Vagina-  no masses, no lesions, +extreme atrophy  Cervix- no Cervical motion tenderness, no lesions  Uterus- normal size, nontender  Adnexa- nontender, no masses      ASSESSMENT:   History of breast cancer  Vaginal atrophy  Lichen sclerosus  Urticaria  Joint pain    PLAN:  Face to face time with aptient 1 hour- 90% spent in counseling and arranging follow up  Patient to establish care with oncologist at [facility]  Counseled her on risks/benefits of Intrarosa- gave her 14 day sample- will need to discuss further with her Oncologist when she is established  Counseled patient on her untreated Lichen sclerosus- recommend twice daily Clobetasole for 1 month, daily for 1 month and then 3 times per week  Repeat FANNY  Referral to [facility]-  community nutrition classes  Follow up in 2-3 months

## 2018-03-12 LAB
ANTI SM ANTIBODY: 16.5 EU
ANTI SM/RNP ANTIBODY: 0.98 EU
ANTI-SM INTERPRETATION: NEGATIVE
ANTI-SM/RNP INTERPRETATION: NEGATIVE
ANTI-SSA ANTIBODY: 2.76 EU
ANTI-SSA INTERPRETATION: NEGATIVE
ANTI-SSB ANTIBODY: 1.3 EU
ANTI-SSB INTERPRETATION: NEGATIVE
DSDNA AB SER-ACNC: NORMAL [IU]/ML

## 2018-04-18 ENCOUNTER — TELEPHONE (OUTPATIENT)
Dept: OBSTETRICS AND GYNECOLOGY | Facility: CLINIC | Age: 63
End: 2018-04-18

## 2018-04-18 DIAGNOSIS — Z85.3 PERSONAL HISTORY OF BREAST CANCER: Primary | ICD-10-CM

## 2018-04-18 NOTE — TELEPHONE ENCOUNTER
----- Message from Jayla Bañuelos sent at 4/18/2018  3:29 PM CDT -----  Contact: self  Pt needing a call back, regarding the oncologist she referred to this pt, pt states she spoke to them and she needs a referral to Dr Nano Ford.  She can be reached at [phone].

## 2018-04-19 ENCOUNTER — TELEPHONE (OUTPATIENT)
Dept: HEMATOLOGY/ONCOLOGY | Facility: CLINIC | Age: 63
End: 2018-04-19

## 2018-04-25 ENCOUNTER — HOSPITAL ENCOUNTER (OUTPATIENT)
Dept: RADIOLOGY | Facility: HOSPITAL | Age: 63
Discharge: HOME | End: 2018-04-25
Attending: FAMILY MEDICINE
Payer: MEDICARE

## 2018-04-25 ENCOUNTER — OFFICE VISIT (OUTPATIENT)
Dept: FAMILY MEDICINE | Facility: CLINIC | Age: 63
End: 2018-04-25
Payer: MEDICARE

## 2018-04-25 VITALS
TEMPERATURE: 98 F | WEIGHT: 177.5 LBS | DIASTOLIC BLOOD PRESSURE: 90 MMHG | OXYGEN SATURATION: 98 % | RESPIRATION RATE: 16 BRPM | BODY MASS INDEX: 34.85 KG/M2 | HEIGHT: 60 IN | HEART RATE: 89 BPM | SYSTOLIC BLOOD PRESSURE: 144 MMHG

## 2018-04-25 DIAGNOSIS — G89.29 CHRONIC MIDLINE LOW BACK PAIN WITHOUT SCIATICA: ICD-10-CM

## 2018-04-25 DIAGNOSIS — N39.0 URINARY TRACT INFECTION WITHOUT HEMATURIA, SITE UNSPECIFIED: ICD-10-CM

## 2018-04-25 DIAGNOSIS — M54.50 CHRONIC MIDLINE LOW BACK PAIN WITHOUT SCIATICA: ICD-10-CM

## 2018-04-25 DIAGNOSIS — M25.559 CHRONIC HIP PAIN, UNSPECIFIED LATERALITY: ICD-10-CM

## 2018-04-25 DIAGNOSIS — G89.29 CHRONIC HIP PAIN, UNSPECIFIED LATERALITY: ICD-10-CM

## 2018-04-25 DIAGNOSIS — M25.559 CHRONIC HIP PAIN, UNSPECIFIED LATERALITY: Primary | ICD-10-CM

## 2018-04-25 DIAGNOSIS — G89.29 CHRONIC HIP PAIN, UNSPECIFIED LATERALITY: Primary | ICD-10-CM

## 2018-04-25 LAB
BILIRUB SERPL-MCNC: NEGATIVE MG/DL
BLOOD URINE, POC: NEGATIVE
COLOR, POC UA: YELLOW
GLUCOSE UR QL STRIP: NEGATIVE
KETONES UR QL STRIP: NEGATIVE
LEUKOCYTE ESTERASE URINE, POC: NORMAL
NITRITE, POC UA: NEGATIVE
PH, POC UA: 5
PROTEIN, POC: NORMAL
SPECIFIC GRAVITY, POC UA: 1.02
UROBILINOGEN, POC UA: NEGATIVE

## 2018-04-25 PROCEDURE — 99214 OFFICE O/P EST MOD 30 MIN: CPT | Mod: PBBFAC,25,PO | Performed by: FAMILY MEDICINE

## 2018-04-25 PROCEDURE — 99999 PR PBB SHADOW E&M-EST. PATIENT-LVL IV: CPT | Mod: PBBFAC,,, | Performed by: FAMILY MEDICINE

## 2018-04-25 PROCEDURE — 81001 URINALYSIS AUTO W/SCOPE: CPT | Mod: PBBFAC,PO | Performed by: FAMILY MEDICINE

## 2018-04-25 PROCEDURE — 72100 X-RAY EXAM L-S SPINE 2/3 VWS: CPT | Mod: TC,FY,PO

## 2018-04-25 PROCEDURE — 99214 OFFICE O/P EST MOD 30 MIN: CPT | Mod: S$PBB,,, | Performed by: FAMILY MEDICINE

## 2018-04-25 PROCEDURE — 72100 X-RAY EXAM L-S SPINE 2/3 VWS: CPT | Mod: 26,,, | Performed by: RADIOLOGY

## 2018-04-25 PROCEDURE — 87086 URINE CULTURE/COLONY COUNT: CPT

## 2018-04-25 PROCEDURE — 73521 X-RAY EXAM HIPS BI 2 VIEWS: CPT | Mod: 26,,, | Performed by: RADIOLOGY

## 2018-04-25 PROCEDURE — 73521 X-RAY EXAM HIPS BI 2 VIEWS: CPT | Mod: TC,FY,PO

## 2018-04-25 RX ORDER — FAMOTIDINE 20 MG/1
20 TABLET, FILM COATED ORAL DAILY
COMMUNITY
Start: 2018-03-24 | End: 2018-04-25 | Stop reason: SDUPTHER

## 2018-04-25 RX ORDER — CIPROFLOXACIN 500 MG/1
500 TABLET ORAL 2 TIMES DAILY
Qty: 14 TABLET | Refills: 0 | Status: SHIPPED | OUTPATIENT
Start: 2018-04-25 | End: 2018-05-02

## 2018-04-25 RX ORDER — BUPROPION HYDROCHLORIDE 75 MG/1
75 TABLET ORAL 2 TIMES DAILY
Qty: 60 TABLET | Refills: 11 | Status: SHIPPED | OUTPATIENT
Start: 2018-04-25 | End: 2019-02-13

## 2018-04-25 RX ORDER — FAMOTIDINE 20 MG/1
20 TABLET, FILM COATED ORAL DAILY
Qty: 90 TABLET | Refills: 1 | Status: SHIPPED | OUTPATIENT
Start: 2018-04-25 | End: 2018-07-24 | Stop reason: SDUPTHER

## 2018-04-25 NOTE — PROGRESS NOTES
"Chief Complaint   Patient presents with    Back Pain     lower back pain for past month off and on     Abdominal Pain     lower abdominal radiate to groin area right side - constant pain ; dx with lichen sclerosus by jared [facility]  Jeanne Rodgers is a 63 y.o. female with multiple medical diagnoses as listed in the medical history and problem list that presents for follow up.    Chronic back pain - lower back pain, states rad to pelvic bilaterally. Pt states overall feels like pain is "in her bones." Lumbar MRI 2015, no spinal stenosis present. Also completed CT abd/pelvis showing no abnormalities.     R knee pain - also states fall, with further medial knee pain, states eval completed with Ortho, dx with knee sprain.     Chronic neck pain - associated with numbness, paresthesias and weakness, dx with C spine stenosis and had     Pt is known to me and was last seen by me on 12/26/2017.    PAST MEDICAL HISTORY:  Past Medical History:   Diagnosis Date    Breast cancer     Cancer     Mental disorder        PAST SURGICAL HISTORY:  Past Surgical History:   Procedure Laterality Date    APPENDECTOMY      BREAST BIOPSY      BREAST LUMPECTOMY Right 2014    CARPAL TUNNEL RELEASE      CHOLECYSTECTOMY      TUBAL LIGATION         SOCIAL HISTORY:  Social History     Social History    Marital status: Single     Spouse name: N/A    Number of children: N/A    Years of education: N/A     Occupational History    Not on file.     Social History Main Topics    Smoking status: Never Smoker    Smokeless tobacco: Never Used    Alcohol use Not on file    Drug use: Unknown    Sexual activity: Not on file     Other Topics Concern    Not on file     Social History Narrative    No narrative on file       FAMILY HISTORY:  Family History   Problem Relation Age of Onset    Cancer Mother 85     pancreatic cancer    Cancer Father 85     pancreatic    Cancer Maternal Aunt 60     pancreatic    Breast cancer Neg Hx  "    Ovarian cancer Neg Hx        ALLERGIES AND MEDICATIONS: updated and reviewed.  Review of patient's allergies indicates:  No Known Allergies  Current Outpatient Prescriptions   Medication Sig Dispense Refill    ALPRAZolam (XANAX) 0.25 MG tablet Take 1 tablet (0.25 mg total) by mouth nightly as needed for Anxiety. 30 tablet 0    anastrozole (ARIMIDEX) 1 mg Tab Take 1 mg by mouth once daily.      clobetasol 0.05% (TEMOVATE) 0.05 % Oint Use twice daily for 1 month, then daily for 1 month, then 3 times per week 60 g 2    cyclobenzaprine (FLEXERIL) 5 MG tablet TAKE 1 TABLET BY MOUTH EVERY 8 HOURS AS NEEDED FOR spasms  0    famotidine (PEPCID) 20 MG tablet Take 1 tablet (20 mg total) by mouth once daily. 90 tablet 1    ibuprofen (ADVIL,MOTRIN) 800 MG tablet Take 800 mg by mouth 2 (two) times daily as needed.       levocetirizine (XYZAL) 5 MG tablet Take 5 mg by mouth every evening.  3    betamethasone dipropionate (DIPROLENE) 0.05 % ointment Apply topically once daily.       buPROPion (WELLBUTRIN) 75 MG tablet Take 1 tablet (75 mg total) by mouth 2 (two) times daily. 60 tablet 11    ciprofloxacin HCl (CIPRO) 500 MG tablet Take 1 tablet (500 mg total) by mouth 2 (two) times daily. 14 tablet 0    citalopram (CELEXA) 20 MG tablet Take 1 tablet (20 mg total) by mouth once daily. 30 tablet 11    gabapentin (NEURONTIN) 600 MG tablet Take 1 tablet (600 mg total) by mouth 3 (three) times daily. 90 tablet 11    ketorolac (TORADOL) 10 mg tablet Take 10 mg by mouth.        No current facility-administered medications for this visit.        ROS  Review of Systems   Constitutional: Negative for activity change, appetite change and fever.   HENT: Negative for congestion and sore throat.    Eyes: Negative for visual disturbance.   Respiratory: Negative for cough and shortness of breath.    Cardiovascular: Negative for chest pain.   Gastrointestinal: Negative for abdominal pain, diarrhea, nausea and vomiting.   Endocrine:  Negative.    Genitourinary: Negative for dysuria.   Musculoskeletal: Positive for myalgias. Negative for arthralgias and back pain.   Skin: Negative for rash.   Allergic/Immunologic: Negative.    Neurological: Negative for dizziness, weakness and headaches.   Hematological: Negative.    Psychiatric/Behavioral: Positive for dysphoric mood. Negative for agitation and confusion. The patient is nervous/anxious.        Physical Exam  Vitals:    04/25/18 1320   BP: (!) 144/90   Pulse: 89   Resp: 16   Temp: 97.8 °F (36.6 °C)    Body mass index is 34.66 kg/m².  Weight: 80.5 kg (177 lb 7.5 oz)   Height: 5' (152.4 cm)     Physical Exam   Constitutional: She is oriented to person, place, and time. She appears well-developed and well-nourished.   HENT:   Head: Normocephalic and atraumatic.   Eyes: Conjunctivae and EOM are normal. Pupils are equal, round, and reactive to light.   Neck: Normal range of motion. Neck supple.   Cardiovascular: Normal rate, regular rhythm and normal heart sounds.    Pulmonary/Chest: Effort normal and breath sounds normal.   Abdominal: Soft. Bowel sounds are normal.   Musculoskeletal: Normal range of motion.   Multiple joints - global TTP, dec ROM mild  B hips - mild dec ROM, pain with full int/ext rotation   Neurological: She is alert and oriented to person, place, and time. She has normal reflexes.   Skin: Skin is warm and dry.   Psychiatric: She has a normal mood and affect. Her behavior is normal. Judgment and thought content normal.       Health Maintenance       Date Due Completion Date    DEXA SCAN 01/27/1993 ---    Colonoscopy 01/27/2003 ---    Zoster Vaccine 01/27/2013 ---    Pneumococcal (65+) (1 of 2 - PCV13) 01/27/2018 9/18/2015    Mammogram 02/19/2020 2/19/2018    Lipid Panel 12/26/2022 12/26/2017    TETANUS VACCINE 09/18/2025 9/18/2015          Assessment & Plan    Chronic hip pain, unspecified laterality  -     X-Ray Hips Bilateral 2 View Incl AP Pelvis; Future; Expected date:  04/25/2018  -     famotidine (PEPCID) 20 MG tablet; Take 1 tablet (20 mg total) by mouth once daily.  Dispense: 90 tablet; Refill: 1  - Assess imaging today    Chronic midline low back pain without sciatica  -     POCT urinalysis, dipstick or tablet reag  -     X-Ray Lumbar Spine AP And Lateral; Future; Expected date: 04/25/2018  -     X-Ray Hips Bilateral 2 View Incl AP Pelvis; Future; Expected date: 04/25/2018  -     MRI Lumbar Spine Without Contrast; Future; Expected date: 04/25/2018  -     Ambulatory Referral to [facility]  -     famotidine (PEPCID) 20 MG tablet; Take 1 tablet (20 mg total) by mouth once daily.  Dispense: 90 tablet; Refill: 1    Urinary tract infection without hematuria, site unspecified  -     ciprofloxacin HCl (CIPRO) 500 MG tablet; Take 1 tablet (500 mg total) by mouth 2 (two) times daily.  Dispense: 14 tablet; Refill: 0  -     Urine culture  - Treat at this time    BMI 34.0-34.9,adult  -     buPROPion (WELLBUTRIN) 75 MG tablet; Take 1 tablet (75 mg total) by mouth 2 (two) times daily.  Dispense: 60 tablet; Refill: 11        Follow-up in about 4 weeks (around 5/23/2018), or if symptoms worsen or fail to improve.

## 2018-04-26 ENCOUNTER — HOSPITAL ENCOUNTER (OUTPATIENT)
Dept: RADIOLOGY | Facility: OTHER | Age: 63
Discharge: HOME | End: 2018-04-26
Attending: FAMILY MEDICINE
Payer: MEDICARE

## 2018-04-26 DIAGNOSIS — M54.50 CHRONIC MIDLINE LOW BACK PAIN WITHOUT SCIATICA: ICD-10-CM

## 2018-04-26 DIAGNOSIS — G89.29 CHRONIC MIDLINE LOW BACK PAIN WITHOUT SCIATICA: ICD-10-CM

## 2018-04-26 LAB — BACTERIA UR CULT: NO GROWTH

## 2018-04-26 PROCEDURE — 72148 MRI LUMBAR SPINE W/O DYE: CPT | Mod: 26,,, | Performed by: RADIOLOGY

## 2018-04-26 PROCEDURE — 72148 MRI LUMBAR SPINE W/O DYE: CPT | Mod: TC

## 2018-04-27 ENCOUNTER — TELEPHONE (OUTPATIENT)
Dept: FAMILY MEDICINE | Facility: CLINIC | Age: 63
End: 2018-04-27

## 2018-04-27 NOTE — TELEPHONE ENCOUNTER
----- Message from Irais Galaviz sent at 4/27/2018 10:49 AM CDT -----  Contact: Self   Patient need her MRI results. Please call patient at [phone]

## 2018-05-03 ENCOUNTER — OFFICE VISIT (OUTPATIENT)
Dept: FAMILY MEDICINE | Facility: CLINIC | Age: 63
End: 2018-05-03
Payer: MEDICARE

## 2018-05-03 VITALS
DIASTOLIC BLOOD PRESSURE: 76 MMHG | HEART RATE: 85 BPM | RESPIRATION RATE: 20 BRPM | SYSTOLIC BLOOD PRESSURE: 120 MMHG | HEIGHT: 60 IN | TEMPERATURE: 98 F | OXYGEN SATURATION: 97 % | BODY MASS INDEX: 34.8 KG/M2 | WEIGHT: 177.25 LBS

## 2018-05-03 DIAGNOSIS — R10.84 GENERALIZED ABDOMINAL PAIN: ICD-10-CM

## 2018-05-03 DIAGNOSIS — F41.9 ANXIETY: ICD-10-CM

## 2018-05-03 DIAGNOSIS — G47.00 INSOMNIA, UNSPECIFIED TYPE: Primary | ICD-10-CM

## 2018-05-03 PROCEDURE — 99214 OFFICE O/P EST MOD 30 MIN: CPT | Mod: S$PBB,,, | Performed by: FAMILY MEDICINE

## 2018-05-03 PROCEDURE — 99999 PR PBB SHADOW E&M-EST. PATIENT-LVL III: CPT | Mod: PBBFAC,,, | Performed by: FAMILY MEDICINE

## 2018-05-03 PROCEDURE — 99213 OFFICE O/P EST LOW 20 MIN: CPT | Mod: PBBFAC,PO | Performed by: FAMILY MEDICINE

## 2018-05-03 NOTE — PROGRESS NOTES
Chief Complaint   Patient presents with    Back Pain    Follow-up       HPI  Jeanne Rodgers is a 63 y.o. female with multiple medical diagnoses as listed in the medical history and problem list that presents for follow up.    Lower back pain - recently dx with UTI, treated and improved.     RUQ rad to R groin, down to BLE - few month hx, prog worsening. Colonoscopy completed 2016. +dx of liver cyst, +fam hx of nephrolithiasis, diff sleeping, no changes with appetite or activity.  6/10 constant.     Pt is known to me and was last seen by me on 4/25/2018.    PAST MEDICAL HISTORY:  Past Medical History:   Diagnosis Date    Breast cancer     Cancer     Mental disorder        PAST SURGICAL HISTORY:  Past Surgical History:   Procedure Laterality Date    APPENDECTOMY      BREAST BIOPSY      BREAST LUMPECTOMY Right 2014    CARPAL TUNNEL RELEASE      CHOLECYSTECTOMY      TUBAL LIGATION         SOCIAL HISTORY:  Social History     Social History    Marital status: Single     Spouse name: N/A    Number of children: N/A    Years of education: N/A     Occupational History    Not on file.     Social History Main Topics    Smoking status: Never Smoker    Smokeless tobacco: Never Used    Alcohol use Not on file    Drug use: Unknown    Sexual activity: Not on file     Other Topics Concern    Not on file     Social History Narrative    No narrative on file       FAMILY HISTORY:  Family History   Problem Relation Age of Onset    Cancer Mother 85        pancreatic cancer    Cancer Father 85        pancreatic    Cancer Maternal Aunt 60        pancreatic    Breast cancer Neg Hx     Ovarian cancer Neg Hx        ALLERGIES AND MEDICATIONS: updated and reviewed.  Review of patient's allergies indicates:  No Known Allergies  Current Outpatient Prescriptions   Medication Sig Dispense Refill    anastrozole (ARIMIDEX) 1 mg Tab Take 1 mg by mouth once daily.      betamethasone dipropionate (DIPROLENE) 0.05 %  ointment Apply topically once daily.       buPROPion (WELLBUTRIN) 75 MG tablet Take 1 tablet (75 mg total) by mouth 2 (two) times daily. 60 tablet 11    clobetasol 0.05% (TEMOVATE) 0.05 % Oint Use twice daily for 1 month, then daily for 1 month, then 3 times per week 60 g 2    famotidine (PEPCID) 20 MG tablet Take 1 tablet (20 mg total) by mouth once daily. 90 tablet 1    levocetirizine (XYZAL) 5 MG tablet Take 5 mg by mouth every evening.  3    ALPRAZolam (XANAX) 0.25 MG tablet Take 1 tablet (0.25 mg total) by mouth nightly as needed for Anxiety. 30 tablet 0    citalopram (CELEXA) 20 MG tablet Take 1 tablet (20 mg total) by mouth once daily. 30 tablet 11    diclofenac (VOLTAREN) 75 MG EC tablet Take 1 tablet (75 mg total) by mouth 2 (two) times daily as needed. 60 tablet 2    tiZANidine (ZANAFLEX) 4 MG tablet Take 1 tablet (4 mg total) by mouth every 8 (eight) hours as needed. 90 tablet 1     No current facility-administered medications for this visit.        ROS  Review of Systems   Constitutional: Negative for activity change, appetite change and fever.   HENT: Negative for congestion and sore throat.    Eyes: Negative for visual disturbance.   Respiratory: Negative for cough and shortness of breath.    Cardiovascular: Negative for chest pain.   Gastrointestinal: Negative for abdominal pain, diarrhea, nausea and vomiting.   Endocrine: Negative.    Genitourinary: Negative for dysuria.   Musculoskeletal: Positive for myalgias. Negative for arthralgias and back pain.   Skin: Negative for rash.   Allergic/Immunologic: Negative.    Neurological: Negative for dizziness, weakness and headaches.   Hematological: Negative.    Psychiatric/Behavioral: Positive for dysphoric mood. Negative for agitation and confusion. The patient is nervous/anxious.        Physical Exam  Vitals:    05/03/18 1427   BP: 120/76   Pulse: 85   Resp: 20   Temp: 98 °F (36.7 °C)    Body mass index is 34.62 kg/m².  Weight: 80.4 kg (177 lb 4  oz)   Height: 5' (152.4 cm)     Physical Exam   Constitutional: She is oriented to person, place, and time. She appears well-developed and well-nourished.   HENT:   Head: Normocephalic and atraumatic.   Eyes: Conjunctivae and EOM are normal. Pupils are equal, round, and reactive to light.   Neck: Normal range of motion. Neck supple.   Cardiovascular: Normal rate, regular rhythm and normal heart sounds.    Pulmonary/Chest: Effort normal and breath sounds normal.   Abdominal: Soft. Bowel sounds are normal.   RUQ - TTP, no masses   Musculoskeletal: Normal range of motion.   Multiple joints - global TTP, dec ROM mild     Neurological: She is alert and oriented to person, place, and time. She has normal reflexes.   Skin: Skin is warm and dry.   Psychiatric: She has a normal mood and affect. Her behavior is normal. Judgment and thought content normal.       Health Maintenance       Date Due Completion Date    DEXA SCAN 01/27/1993 ---    Zoster Vaccine 01/27/2013 ---    Pneumococcal (65+) (1 of 2 - PCV13) 01/27/2018 9/18/2015    Influenza Vaccine 08/01/2018 12/26/2017    Mammogram 02/19/2020 2/19/2018    Lipid Panel 12/26/2022 12/26/2017    TETANUS VACCINE 09/18/2025 9/18/2015    Colonoscopy 01/15/2026 1/15/2016 (Done)    Override on 1/15/2016: Done ([facility] records sent to scanning)          Assessment & Plan    Insomnia, unspecified type  - Continue current medication regimen as prescribed    Generalized abdominal pain  -     CT Abdomen Pelvis  Without Contrast; Future; Expected date: 05/03/2018  -     Comprehensive metabolic panel; Future; Expected date: 05/03/2018  -     Sedimentation rate, manual; Future; Expected date: 05/03/2018  - Will assess labs and imaging today    Anxiety  - Continue current medication regimen as prescribed    Back and spine referral pending also.       Follow-up in about 4 weeks (around 5/31/2018).

## 2018-05-07 ENCOUNTER — TELEPHONE (OUTPATIENT)
Dept: SPINE | Facility: CLINIC | Age: 63
End: 2018-05-07

## 2018-05-07 DIAGNOSIS — M54.5 LOW BACK PAIN, UNSPECIFIED BACK PAIN LATERALITY, UNSPECIFIED CHRONICITY, WITH SCIATICA PRESENCE UNSPECIFIED: Primary | ICD-10-CM

## 2018-05-08 ENCOUNTER — OFFICE VISIT (OUTPATIENT)
Dept: SPINE | Facility: CLINIC | Age: 63
End: 2018-05-08
Payer: MEDICARE

## 2018-05-08 ENCOUNTER — HOSPITAL ENCOUNTER (OUTPATIENT)
Dept: RADIOLOGY | Facility: OTHER | Age: 63
Discharge: HOME | End: 2018-05-08
Attending: PHYSICIAN ASSISTANT
Payer: MEDICARE

## 2018-05-08 VITALS
SYSTOLIC BLOOD PRESSURE: 118 MMHG | WEIGHT: 177 LBS | HEART RATE: 84 BPM | BODY MASS INDEX: 34.75 KG/M2 | HEIGHT: 60 IN | DIASTOLIC BLOOD PRESSURE: 76 MMHG

## 2018-05-08 DIAGNOSIS — M47.819 SPONDYLOSIS WITHOUT MYELOPATHY: ICD-10-CM

## 2018-05-08 DIAGNOSIS — M54.14 THORACIC AND LUMBOSACRAL NEURITIS: ICD-10-CM

## 2018-05-08 DIAGNOSIS — M54.5 LOW BACK PAIN, UNSPECIFIED BACK PAIN LATERALITY, UNSPECIFIED CHRONICITY, WITH SCIATICA PRESENCE UNSPECIFIED: ICD-10-CM

## 2018-05-08 DIAGNOSIS — M54.17 THORACIC AND LUMBOSACRAL NEURITIS: ICD-10-CM

## 2018-05-08 DIAGNOSIS — M54.41 ACUTE BILATERAL LOW BACK PAIN WITH RIGHT-SIDED SCIATICA: ICD-10-CM

## 2018-05-08 DIAGNOSIS — M51.37 DDD (DEGENERATIVE DISC DISEASE), LUMBOSACRAL: ICD-10-CM

## 2018-05-08 PROCEDURE — 72100 X-RAY EXAM L-S SPINE 2/3 VWS: CPT | Mod: 26,,, | Performed by: RADIOLOGY

## 2018-05-08 PROCEDURE — 99999 PR PBB SHADOW E&M-EST. PATIENT-LVL IV: CPT | Mod: PBBFAC,,, | Performed by: PHYSICIAN ASSISTANT

## 2018-05-08 PROCEDURE — 99214 OFFICE O/P EST MOD 30 MIN: CPT | Mod: PBBFAC,25 | Performed by: PHYSICIAN ASSISTANT

## 2018-05-08 PROCEDURE — 72100 X-RAY EXAM L-S SPINE 2/3 VWS: CPT | Mod: TC,FY

## 2018-05-08 PROCEDURE — 99204 OFFICE O/P NEW MOD 45 MIN: CPT | Mod: S$PBB,,, | Performed by: PHYSICIAN ASSISTANT

## 2018-05-08 RX ORDER — TIZANIDINE 4 MG/1
4 TABLET ORAL EVERY 8 HOURS PRN
Qty: 90 TABLET | Refills: 1 | Status: SHIPPED | OUTPATIENT
Start: 2018-05-08 | End: 2018-06-14

## 2018-05-08 RX ORDER — DICLOFENAC SODIUM 75 MG/1
75 TABLET, DELAYED RELEASE ORAL 2 TIMES DAILY PRN
Qty: 60 TABLET | Refills: 2 | Status: SHIPPED | OUTPATIENT
Start: 2018-05-08 | End: 2018-06-14

## 2018-05-08 NOTE — PROGRESS NOTES
Subjective:     Patient ID:  Jeanne Rodgers is a 63 y.o. female.    Patient referred by Dr. Andersen    Chief Complaint:  Back and right going and leg pain    HPI    Jeanne Rodgers is a 63 y.o. female who presents with the above CC.  Symptoms started three months ago.  Back pain is across the low back rated 9/10 and is constant and worse when going from sitting to standing and better with walking.  Pain radiates to the right groin and the whole private area with pain and numbness in the right anterior thigh to the knee and pain in the right anterior shin to the ankle.  Pain in the right leg rated 8/10 and comes and goes mainly more with standing.  Right groin and private area pain comes and goes.  She also has feet spasms.  No leg groin or leg pain.    She had a recent UTI which helped the back pain some.  She also is now having pain on urination again.    The back and right groin/privated area pain bother her the most at this time.    Patient has not had PT.  No ESIs for the lumbar spine.  No spine surgery.  Patient is currently taking ibuprofen which doesn't help.  She stopped taking neurontin because it was making her nauseated.    Patient denies any recent accidents or trauma, no saddle anesthesias, and no bowel or bladder incontinence.      Review of Systems:  Please refer to page three of the spine center intake form for a complete review of systems.    Past Medical History:   Diagnosis Date    Breast cancer     Cancer     Mental disorder      Past Surgical History:   Procedure Laterality Date    APPENDECTOMY      BREAST BIOPSY      BREAST LUMPECTOMY Right 2014    CARPAL TUNNEL RELEASE      CHOLECYSTECTOMY      TUBAL LIGATION       Current Outpatient Prescriptions on File Prior to Visit   Medication Sig Dispense Refill    ALPRAZolam (XANAX) 0.25 MG tablet Take 1 tablet (0.25 mg total) by mouth nightly as needed for Anxiety. 30 tablet 0    anastrozole (ARIMIDEX) 1 mg Tab Take 1 mg by  mouth once daily.      betamethasone dipropionate (DIPROLENE) 0.05 % ointment Apply topically once daily.       buPROPion (WELLBUTRIN) 75 MG tablet Take 1 tablet (75 mg total) by mouth 2 (two) times daily. 60 tablet 11    clobetasol 0.05% (TEMOVATE) 0.05 % Oint Use twice daily for 1 month, then daily for 1 month, then 3 times per week 60 g 2    famotidine (PEPCID) 20 MG tablet Take 1 tablet (20 mg total) by mouth once daily. 90 tablet 1    levocetirizine (XYZAL) 5 MG tablet Take 5 mg by mouth every evening.  3    [DISCONTINUED] ibuprofen (ADVIL,MOTRIN) 800 MG tablet Take 800 mg by mouth 2 (two) times daily as needed.       citalopram (CELEXA) 20 MG tablet Take 1 tablet (20 mg total) by mouth once daily. 30 tablet 11    [DISCONTINUED] cyclobenzaprine (FLEXERIL) 5 MG tablet TAKE 1 TABLET BY MOUTH EVERY 8 HOURS AS NEEDED FOR spasms  0     No current facility-administered medications on file prior to visit.      Review of patient's allergies indicates:  No Known Allergies  Social History     Social History    Marital status: Single     Spouse name: N/A    Number of children: N/A    Years of education: N/A     Occupational History    Not on file.     Social History Main Topics    Smoking status: Never Smoker    Smokeless tobacco: Never Used    Alcohol use Not on file    Drug use: Unknown    Sexual activity: Not on file     Other Topics Concern    Not on file     Social History Narrative    No narrative on file     Family History   Problem Relation Age of Onset    Cancer Mother 85        pancreatic cancer    Cancer Father 85        pancreatic    Cancer Maternal Aunt 60        pancreatic    Breast cancer Neg Hx     Ovarian cancer Neg Hx        Objective:      Vitals:    05/08/18 1352   BP: 118/76   Pulse: 84   Weight: 80.3 kg (177 lb)   Height: 5' (1.524 m)   PainSc:   6   PainLoc: Back         Physical Exam:    General:  Jeanne Rodgers is well-developed, well-nourished, appears stated age,  in no acute distress, alert and oriented to person, place, and time.    Pulmonary/Chest:  Respiratory effort normal  Abdominal: Exhibits no distension  Psychiatric:  Normal mood and affect.  Behavior is normal.  Judgement and thought content normal    Musculoskeletal:    Patient arises from a sitting to standing position without difficulty.  Patient walks to the door without evidence of limp, pain, or abnormality of gait. Patient is able to walk on heels and toes without difficulty.    Lumbar ROM:   Pain in lumbar flexion, extension, right lateral bending, and left lateral bending.  Worse in right lateral bending.    Lumbar Spine Inspection:  Normal with no surgical scars and no visible rashes.    Lumbar Spine Palpation:  No tenderness to low back palpation.    SI Joint Palpation:  No tenderness to SI Joint palpation.    Straight Leg Raise:  Positive right and negative left SLR.    Positive right CVA tenderness.    No pain on adduction or abduction of the left hip.  Pain on internal rotation.  No pain on external rotation.    Neurological: Alert and oriented to person, place, and time    Muscle strength against resistance:     Right Left   Hip flexion  5 / 5 5 / 5   Hip extension 5 / 5 5 / 5   Hip abduction 5 / 5 5 / 5   Hip adduction  5 / 5 5 / 5   Knee extension  5 / 5 5 / 5   Knee flexion 5 / 5 5 / 5   Dorsiflexion  5 / 5 5 / 5   EHL  5 / 5 5 / 5   Plantar flexion  5 / 5 5 / 5   Inversion of the feet 5 / 5 5 / 5   Eversion of the feet  5 / 5 5 / 5     Reflexes:     Right Left   Patellar 1+ 2+   Achilles 2+ 2+     Clonus:  Negative bilaterally    On gross examination of the bilateral upper extremities, patient has full painfree ROM with no signs of clubbing, cyanosis, edema, or weakness.     XRAY/MRI Interpretation:     Lumbar spine ap/lateral/flexion/extension xrays were personally reviewed today.  No fractures.  No movement on flexion and extension.    Lumbar spine MRI was personally reviewed today.  Lumbarlized  sacrum.  Small disc bulge at L5-S1 and moderate right lateral recess stenosis.  Small right L4-5 facet synovial cyst.  No HNP or foraminal stenosis at L1-2 from the sagittal images.      Assessment:          1. Spondylosis without myelopathy    2. DDD (degenerative disc disease), lumbosacral    3. Thoracic and lumbosacral neuritis    4. Acute bilateral low back pain with right-sided sciatica            Plan:          Orders Placed This Encounter    Ambulatory referral to Physical Therapy - Lumbar    diclofenac (VOLTAREN) 75 MG EC tablet    tiZANidine (ZANAFLEX) 4 MG tablet       L5-S1 small HNP and right lateral recess stenosis.  Small right L4-5 facet synovial cyst    -PT through [facility]  -DC ibuprofen  -Diclofenac PRN with food  -Zanaflex PRN  -I do not think her right groin and private area pain is coming from her back.  I will review the results of the CT abdomen when it is done.  -Could consider EMG/NCS if needed  -She was instructed to call her PCP about her burning on urination and she verbalized understanding    Follow-Up:  Follow-up in 3 months (on 8/8/2018). If there are any questions prior to this, the patient was instructed to contact the office.       MILY Kingston, PAKaciC  Neurosurgery  [facility]  [facility]    Addendum:  05/11/18    I reviewed CT abdomen/pelvis and the radiologist mentions a fatty liver.    May need to fu with her OBGYN for the groin and private area pain.    Do PT and meds for the back.  May need EMG/NCS in the future.    MILY Kingston, PA-C  Neurosurgery  [facility]  [facility]

## 2018-05-08 NOTE — LETTER
May 8, 2018      Jac Andersen MD  [address] [place] [address]           [facility]  [address], [address]  [facility] [address]  Phone: [phone]  Fax: [phone]          Patient: Jeanne Rodgers   MR Number: [id number]   YOB: 1955   Date of Visit: 5/8/2018       Dear Dr. Jac Andersen:    Thank you for referring Jeanne Rodgers to me for evaluation. Attached you will find relevant portions of my assessment and plan of care.    If you have questions, please do not hesitate to call me. I look forward to following Jeanne Rodgers along with you.    Sincerely,    Migdalia Baugh PA-C    Enclosure  CC:  No Recipients    If you would like to receive this communication electronically, please contact [email or web address] or [phone] to request more information on [employer] Link access.    For providers and/or their staff who would like to refer a patient to [facility], please contact us through our one-stop-shop provider referral line, [facility], at [phone].    If you feel you have received this communication in error or would no longer like to receive these types of communications, please e-mail [email or web address]

## 2018-05-10 ENCOUNTER — HOSPITAL ENCOUNTER (OUTPATIENT)
Dept: RADIOLOGY | Facility: OTHER | Age: 63
Discharge: HOME | End: 2018-05-10
Attending: FAMILY MEDICINE
Payer: MEDICARE

## 2018-05-10 ENCOUNTER — TELEPHONE (OUTPATIENT)
Dept: FAMILY MEDICINE | Facility: CLINIC | Age: 63
End: 2018-05-10

## 2018-05-10 DIAGNOSIS — R10.84 GENERALIZED ABDOMINAL PAIN: ICD-10-CM

## 2018-05-10 PROCEDURE — 74176 CT ABD & PELVIS W/O CONTRAST: CPT | Mod: TC

## 2018-05-10 PROCEDURE — 74176 CT ABD & PELVIS W/O CONTRAST: CPT | Mod: 26,,, | Performed by: RADIOLOGY

## 2018-05-10 NOTE — TELEPHONE ENCOUNTER
Patient requesting results of CT completed today, patient notified results are not ready yet, will send to provider

## 2018-05-10 NOTE — TELEPHONE ENCOUNTER
----- Message from Barbara Jean Baptiste sent at 5/10/2018  2:00 PM CDT -----  Contact: Self  Called to notify  that she had ultrasound done today.Pt can be reached @ [phone].

## 2018-05-11 NOTE — TELEPHONE ENCOUNTER
----- Message from Victor Manuel Wing sent at 5/11/2018 11:58 AM CDT -----  Contact: [phone]/FJ  Returning call to Jaqueline regarding results

## 2018-05-11 NOTE — TELEPHONE ENCOUNTER
----- Message from Maryam Jerome sent at 5/11/2018  9:35 AM CDT -----  Contact: [phone]  Pt is requesting a test results. Test was done on 5-10-18. Pls call pt [phone]. Thanks.......Katie

## 2018-05-15 ENCOUNTER — TELEPHONE (OUTPATIENT)
Dept: SPINE | Facility: CLINIC | Age: 63
End: 2018-05-15

## 2018-05-15 NOTE — TELEPHONE ENCOUNTER
Please call patient and tell her that we received a letter stating that tizanidine and pepcid are not safe together.    She should stop the tizanidine if taking pepcid.    MILY Kingston, PA-C  Neurosurgery  [facility]  [facility]

## 2018-05-21 ENCOUNTER — CLINICAL SUPPORT (OUTPATIENT)
Dept: REHABILITATION | Facility: HOSPITAL | Age: 63
End: 2018-05-21
Attending: PHYSICIAN ASSISTANT
Payer: MEDICARE

## 2018-05-21 DIAGNOSIS — Z78.9 ALTERATION IN MOBILITY ASSOCIATED WITH PAIN: ICD-10-CM

## 2018-05-21 DIAGNOSIS — R52 ALTERATION IN MOBILITY ASSOCIATED WITH PAIN: ICD-10-CM

## 2018-05-21 DIAGNOSIS — R29.898 WEAKNESS OF BACK: ICD-10-CM

## 2018-05-21 DIAGNOSIS — M47.819 SPONDYLOSIS WITHOUT MYELOPATHY: Primary | ICD-10-CM

## 2018-05-21 DIAGNOSIS — M53.80 BACK TIGHTNESS: ICD-10-CM

## 2018-05-21 DIAGNOSIS — M62.9 HAMSTRING TIGHTNESS OF BOTH LOWER EXTREMITIES: ICD-10-CM

## 2018-05-21 DIAGNOSIS — M99.03 SEGMENTAL AND SOMATIC DYSFUNCTION OF LUMBAR REGION: ICD-10-CM

## 2018-05-21 PROCEDURE — G8979 MOBILITY GOAL STATUS: HCPCS | Mod: CK,PN | Performed by: PHYSICAL THERAPIST

## 2018-05-21 PROCEDURE — G8978 MOBILITY CURRENT STATUS: HCPCS | Mod: CL,PN | Performed by: PHYSICAL THERAPIST

## 2018-05-21 PROCEDURE — 97161 PT EVAL LOW COMPLEX 20 MIN: CPT | Mod: PN | Performed by: PHYSICAL THERAPIST

## 2018-05-21 NOTE — PLAN OF CARE
Physical Therapy Initial Evaluation     Name: Jeanne Rodgers  Clinic Number: [id number]    Diagnosis:   Encounter Diagnosis   Name Primary?    Spondylosis without myelopathy Yes     Physician: Migdalia Baugh, *  Treatment Orders: PT Eval and Treat  Past Medical History:   Diagnosis Date    Breast cancer     Cancer     Mental disorder      Current Outpatient Prescriptions   Medication Sig    ALPRAZolam (XANAX) 0.25 MG tablet Take 1 tablet (0.25 mg total) by mouth nightly as needed for Anxiety.    anastrozole (ARIMIDEX) 1 mg Tab Take 1 mg by mouth once daily.    betamethasone dipropionate (DIPROLENE) 0.05 % ointment Apply topically once daily.     buPROPion (WELLBUTRIN) 75 MG tablet Take 1 tablet (75 mg total) by mouth 2 (two) times daily.    citalopram (CELEXA) 20 MG tablet Take 1 tablet (20 mg total) by mouth once daily.    clobetasol 0.05% (TEMOVATE) 0.05 % Oint Use twice daily for 1 month, then daily for 1 month, then 3 times per week    diclofenac (VOLTAREN) 75 MG EC tablet Take 1 tablet (75 mg total) by mouth 2 (two) times daily as needed.    famotidine (PEPCID) 20 MG tablet Take 1 tablet (20 mg total) by mouth once daily.    levocetirizine (XYZAL) 5 MG tablet Take 5 mg by mouth every evening.    tiZANidine (ZANAFLEX) 4 MG tablet Take 1 tablet (4 mg total) by mouth every 8 (eight) hours as needed.     No current facility-administered medications for this visit.      Review of patient's allergies indicates:  No Known Allergies    Start Time:  1515  Stop Time:  1615  Total Timed Minutes:  60          Subjective     Patient states:  Low back pain that is constant and  located on both sides of the back and radiates into the groin on the right and occasionally on the left, and down the front of the right thigh to the medial side of the knee and into the front of the leg. The right leg pain is intermittent   Onset: gradual about six months ago.  Currently unchanged.   Radicular symptoms:  Yes as described.   Aggravating factors:   Rising to stand from sitting, laying in bed and trying to get out of the bed, out of a car. Walking   Easing factors:  sitting  Pain Scale: Patient rates pain on a scale of 0-10 to be  6 currently   9 at worst ;   5 at best .  Sleep - disturbed  Coughing, sneezing, straining - denies pain   Prior Therapy: no   Home Environment (Steps/Adaptations): no stairs indoors and about three outdoors  Functional Deficits Leading to Referral:   Personal  - bending over to try to dress and tie her shoes  Domestic - no limitations   Community - no limitations   Occupation - NA  Recreational / Fitness / Sports - no limitations   Prior functional status: she says prior li six months ago she says she still had difficulty with personal events.   DME owned/used: no   Occupation:  Disabled      Social: single and lives in a single family home by herself.                    Pts goals:  To alleviate the pain   Past History: Says ankles are stiff and swollen in the AM, righspt knee is painful. She says she fell two months ago and struck the right knee. Evaluated by MD and diagnosed with a sprain. Improved. Degenerative spondylosis of the Cervical spine  Imaging :X-Rays - lumbar -FINDINGS:  No subluxation with extension or flexion views.  Exam otherwise unchanged.  X-Rays - hips - FINDINGS:  Right: No fracture or dislocation.  Mild loss of hip cartilage space noted.  Left: No fracture or dislocation.  Mild loss of hip cartilage space noted.  Pelvis: No lytic or blastic lesion.  Surgical clip seen in the right lower quadrant    MRI  - L2-L3: There is no focal disc herniation. No significant central canal or neural foraminal narrowing.Slight hypertrophic changes of facets  L3-L4: There is no focal disc herniation. No significant central canal or neural foraminal narrowing.Mild broad-based bulging of disc material combined with hypertrophic changes of  facets result in mild narrowing of central canal dimensions.  Minimal LEFT neural foraminal encroachment..  L4-L5: There is no focal disc herniation. Broad-based bulging of disc material with hypertrophic changes of facets result in mild narrowing of central canal dimensions.  Minimal bilateral neural foraminal encroachment..  L5-S1: There is no focal disc herniation. No significant central canal or neural foraminal narrowing.Mild RIGHT-sided facet edema.      Objective     Posture Alignment: forward head, increased Dowagers, increased thoracic kyphosis and reduced lumbar lordosis.   Sensation: Light Touch: Intact  Palpation:  Bilateral tightness in the lumbar PSM, pain L4, L5 spinous processes and the inter-discal spaces of L4-5, L5-S1 as well as bilateral sacral sulcus, iliolumbar areas, superior gluteal region and over the top of the iliac crest, all bilaterally.       Lumbar/Thoracic AROM: Pain/Dysfunction with Movement:   Flexion          55/30 = 25 DP - bilateral legs    Extension                20/10= 10 DP - low abdomen    Right side bending  30 P- low abdomen / lateral ilium right    Left side bending  20 P - low abdomen    Right rotation DN   Left rotation DN       LOWER EXTREMITY STRENGTH:   Left  5/5 Right  5/5         DTR's:   Left Right   Patella Tendon 2+ 2+   Achilles Tendon 2+ 2+           Selective Functional Movement Assessment:  FN: functional, non-painful  FP: functional, painful  DP: dysfunctional, painful  DN: dysfunctional, non-painful    Multi-Segmental Flexion: see above   Multi-Segmental Extension: see above   Multi-Segmental Rotation:   Right:see above   Left:see above     FUNCTIONAL MOVEMENT BREAKOUTS:  Long sitting  DP groin and low abdomen   Knees to chest DN  Hip rotation R- ER=DP, IR=FN ; L FN ER / IR = passive   Press up DP low back and low abd  Hip extension DN A/P       FLEXIBILITY  Hamstrings R  60/70p - LB   L  60/65p - LB   Hip flexors ( psoas)   / quads (rectus femoris) R =   moderate quads  L =  Moderate= quads ; minimal to moderate bilat hip flexors  IT band = major bilaterally   Back extensors = major       SEGMENTAL MOBILITY: hypomobility lumbar segments   Special Tests   Left Right   SLR Pain low back @70 Pain post thigh @ 70      Lasegue Pain low back @60 Pain post thigh and leg R @ 70   Linnette Pain hip groin L Pain groin R        GAIT: ambulates with no assistive device with independently.     GAIT DEVIATIONS: no significant deviations identified.     Pt/family was provided educational information, including: role of PT, goals for PT, scheduling - pt verbalized understanding.     TREATMENT     PT Evaluation Completed? Yes  Discussed Plan of Care with patient: Yes      Assessment     Patient is referred with low back and leg pain R>L. Clinical findings demonstrate limited trunk mobility and right hip external rotation mobility. There is limited trunk mobility that is likely a  joint mobility and tissue extensibility dysfunction of the lumbar spine in flexion and extension along with a hip flexion tissue extensibility dysfunction bilaterally and a hip external rotation joint mobility or tissue extensibility dysfunction seated.   Pt presents with signs and symptoms consistent with referring diagnosis. Evaluation has determined a decrease in functional status and subjective and objective deficits that can be addressed by physical therapy intervention. Pt demonstrates pain limiting functional activities. Decreased flexibility and strength limiting normal movement patterns. Decreased segmental motion of the lumbar spine.  Decreased postural strength and awareness. Positive special testing for mobility limitations associated with intra-articular mobility and extra-articular tissue restrictions  Subjective and objective measures are addressed by goals in the plan of care. Patient/family are involved in the development of these goals. Patient/family are educated about current injury and  treatment.    Pt prognosis is Good.  Pt will benefit from skilled outpatient physical therapy to address the above stated deficits, provide pt/family education and to maximize pt's level of independence. Pt's spiritual, cultural and educational needs considered and pt agreeable to plan of care and goals as stated below and currently has no barriers to learning              History  Co-morbidities and personal factors that may impact the plan of care Examination  Body Structures and Functions, activity limitations and participation restrictions that may impact the plan of care Clinical Presentation   Decision Making/ Complexity Score   Co-morbidities:  overweight, right knee pain, right hip pain.              Personal Factors:   None identified.  Body Regions: back / hips     Body Systems: musculoskeletal          Activity limitations: Rising to stand from sitting, laying in bed and trying to get out of the bed, out of a car. Walking       Participation Restrictions:   Personal  - bending over to try to dress and tie her shoes  Domestic - no limitations   Community - no limitations   Occupation - NA  Recreational / Fitness / Sports - no limitations   Prior functional status: she says prior li six months ago she says she still had difficulty with personal events.            Stable and uncomplicated        Pain  6 currently   9 at worst ;   5 at best .     Complexity: Low                 Short Term GOALS: 6 weeks. Pt agrees with goals set.  1. Decreased pain 20-40%   2. Demonstrate spinal stability with core activation during transitional movements.   3. Improved sleeping with 50% reduced interruption  4. Patient demonstrates independence with HEP.   5. Patient demonstrates independence with Postural Awareness.   6. Patient demonstrates improved LE passive mobility by 5 to 10 degrees  with increased hamstring /IT-band / quad flexibility     Long Term GOALS: 12 weeks. Pt agrees with goals set.  1. Patient demonstrates  increased LE flexibility to improve tolerance to functional activities.   2. Patient demonstrates increased standing and walking tolerance with 50% reduced pain or greater to improve tolerance to functional activities.   3. Patient demonstrates improved overall function and return demonstration to functional ADL and recreational lifestyle      CMS Impairment/Limitation/Restriction for F[employer] Lumbar Spine Survey  Status Limitation G-Code CMS Severity Modifier  Intake 32% 68% Current Status CL - At least 60 percent but less than 80 percent  Predicted 49% 51% Goal Status+ CK - At least 40 percent but less than 60 percent  D/C Status CL **only report if this is a one time visit  +Based on FOTO predicted change score  * Mean, Risk Adjusted, Intake Composite FS measures from FOTO aggregate database.  ** As indicated by the ICF assignments to the survey items in the FOTO survey used.  [facility] [place]  INTAKE FUNCTIONAL STATUS SUMMARY (5/21/2018)  Patient: YON MCKEON ([id number]) Primary Body Part: Lumbar Spine Initial DOS: 5/21/2018  Produced and © [id number] by  [employer]    PLAN     Certification Period: 5/21/2018 to 8/21/2018  Recommended Treatment Plan: 2 times per week for 12 weeks: Manual Therapy, Patient Education and Therapeutic Exercise  Other Recommendations:   . Pt may be seen by PTA as part of the rehabilitation team.       Therapist: Nigel Skinner, PT

## 2018-05-21 NOTE — PROGRESS NOTES
Physical Therapy Initial Evaluation     Name: Jeanne Rodgers  Clinic Number: [id number]    Diagnosis:   Encounter Diagnosis   Name Primary?    Spondylosis without myelopathy Yes     Physician: Migdalia Baugh, *  Treatment Orders: PT Eval and Treat  Past Medical History:   Diagnosis Date    Breast cancer     Cancer     Mental disorder      Current Outpatient Prescriptions   Medication Sig    ALPRAZolam (XANAX) 0.25 MG tablet Take 1 tablet (0.25 mg total) by mouth nightly as needed for Anxiety.    anastrozole (ARIMIDEX) 1 mg Tab Take 1 mg by mouth once daily.    betamethasone dipropionate (DIPROLENE) 0.05 % ointment Apply topically once daily.     buPROPion (WELLBUTRIN) 75 MG tablet Take 1 tablet (75 mg total) by mouth 2 (two) times daily.    citalopram (CELEXA) 20 MG tablet Take 1 tablet (20 mg total) by mouth once daily.    clobetasol 0.05% (TEMOVATE) 0.05 % Oint Use twice daily for 1 month, then daily for 1 month, then 3 times per week    diclofenac (VOLTAREN) 75 MG EC tablet Take 1 tablet (75 mg total) by mouth 2 (two) times daily as needed.    famotidine (PEPCID) 20 MG tablet Take 1 tablet (20 mg total) by mouth once daily.    levocetirizine (XYZAL) 5 MG tablet Take 5 mg by mouth every evening.    tiZANidine (ZANAFLEX) 4 MG tablet Take 1 tablet (4 mg total) by mouth every 8 (eight) hours as needed.     No current facility-administered medications for this visit.      Review of patient's allergies indicates:  No Known Allergies    Start Time:  1515  Stop Time:  1615  Total Timed Minutes:  60          Subjective     Patient states:  Low back pain that is constant and  located on both sides of the back and radiates into the groin on the right and occasionally on the left, and down the front of the right thigh to the medial side of the knee and into the front of the leg. The right leg pain is intermittent   Onset: gradual about six months ago.  Currently unchanged.   Radicular symptoms:  Yes as described.   Aggravating factors:   Rising to stand from sitting, laying in bed and trying to get out of the bed, out of a car. Walking   Easing factors:  sitting  Pain Scale: Patient rates pain on a scale of 0-10 to be  6 currently   9 at worst ;   5 at best .  Sleep - disturbed  Coughing, sneezing, straining - denies pain   Prior Therapy: no   Home Environment (Steps/Adaptations): no stairs indoors and about three outdoors  Functional Deficits Leading to Referral:   Personal  - bending over to try to dress and tie her shoes  Domestic - no limitations   Community - no limitations   Occupation - NA  Recreational / Fitness / Sports - no limitations   Prior functional status: she says prior li six months ago she says she still had difficulty with personal events.   DME owned/used: no   Occupation:  Disabled      Social: single and lives in a single family home by herself.                    Pts goals:  To alleviate the pain   Past History: Says ankles are stiff and swollen in the AM, righspt knee is painful. She says she fell two months ago and struck the right knee. Evaluated by MD and diagnosed with a sprain. Improved. Degenerative spondylosis of the Cervical spine  Imaging :X-Rays - lumbar -FINDINGS:  No subluxation with extension or flexion views.  Exam otherwise unchanged.  X-Rays - hips - FINDINGS:  Right: No fracture or dislocation.  Mild loss of hip cartilage space noted.  Left: No fracture or dislocation.  Mild loss of hip cartilage space noted.  Pelvis: No lytic or blastic lesion.  Surgical clip seen in the right lower quadrant    MRI  - L2-L3: There is no focal disc herniation. No significant central canal or neural foraminal narrowing.Slight hypertrophic changes of facets  L3-L4: There is no focal disc herniation. No significant central canal or neural foraminal narrowing.Mild broad-based bulging of disc material combined with hypertrophic changes of  facets result in mild narrowing of central canal dimensions.  Minimal LEFT neural foraminal encroachment..  L4-L5: There is no focal disc herniation. Broad-based bulging of disc material with hypertrophic changes of facets result in mild narrowing of central canal dimensions.  Minimal bilateral neural foraminal encroachment..  L5-S1: There is no focal disc herniation. No significant central canal or neural foraminal narrowing.Mild RIGHT-sided facet edema.      Objective     Posture Alignment: forward head, increased Dowagers, increased thoracic kyphosis and reduced lumbar lordosis.   Sensation: Light Touch: Intact  Palpation:  Bilateral tightness in the lumbar PSM, pain L4, L5 spinous processes and the inter-discal spaces of L4-5, L5-S1 as well as bilateral sacral sulcus, iliolumbar areas, superior gluteal region and over the top of the iliac crest, all bilaterally.       Lumbar/Thoracic AROM: Pain/Dysfunction with Movement:   Flexion          55/30 = 25 DP - bilateral legs    Extension                20/10= 10 DP - low abdomen    Right side bending  30 P- low abdomen / lateral ilium right    Left side bending  20 P - low abdomen    Right rotation DN   Left rotation DN       LOWER EXTREMITY STRENGTH:   Left  5/5 Right  5/5         DTR's:   Left Right   Patella Tendon 2+ 2+   Achilles Tendon 2+ 2+           Selective Functional Movement Assessment:  FN: functional, non-painful  FP: functional, painful  DP: dysfunctional, painful  DN: dysfunctional, non-painful    Multi-Segmental Flexion: see above   Multi-Segmental Extension: see above   Multi-Segmental Rotation:   Right:see above   Left:see above     FUNCTIONAL MOVEMENT BREAKOUTS:  Long sitting  DP groin and low abdomen   Knees to chest DN  Hip rotation R- ER=DP, IR=FN ; L FN ER / IR = passive   Press up DP low back and low abd  Hip extension DN A/P       FLEXIBILITY  Hamstrings R  60/70p - LB   L  60/65p - LB   Hip flexors ( psoas)   / quads (rectus femoris) R =   moderate quads  L =  Moderate= quads ; minimal to moderate bilat hip flexors  IT band = major bilaterally   Back extensors = major       SEGMENTAL MOBILITY: hypomobility lumbar segments   Special Tests   Left Right   SLR Pain low back @70 Pain post thigh @ 70      Lasegue Pain low back @60 Pain post thigh and leg R @ 70   Linnette Pain hip groin L Pain groin R        GAIT: ambulates with no assistive device with independently.     GAIT DEVIATIONS: no significant deviations identified.     Pt/family was provided educational information, including: role of PT, goals for PT, scheduling - pt verbalized understanding.     TREATMENT     PT Evaluation Completed? Yes  Discussed Plan of Care with patient: Yes      Assessment     Patient is referred with low back and leg pain R>L. Clinical findings demonstrate limited trunk mobility and right hip external rotation mobility. There is limited trunk mobility that is likely a  joint mobility and tissue extensibility dysfunction of the lumbar spine in flexion and extension along with a hip flexion tissue extensibility dysfunction bilaterally and a hip external rotation joint mobility or tissue extensibility dysfunction seated.   Pt presents with signs and symptoms consistent with referring diagnosis. Evaluation has determined a decrease in functional status and subjective and objective deficits that can be addressed by physical therapy intervention. Pt demonstrates pain limiting functional activities. Decreased flexibility and strength limiting normal movement patterns. Decreased segmental motion of the lumbar spine.  Decreased postural strength and awareness. Positive special testing for mobility limitations associated with intra-articular mobility and extra-articular tissue restrictions  Subjective and objective measures are addressed by goals in the plan of care. Patient/family are involved in the development of these goals. Patient/family are educated about current injury and  treatment.    Pt prognosis is Good.  Pt will benefit from skilled outpatient physical therapy to address the above stated deficits, provide pt/family education and to maximize pt's level of independence. Pt's spiritual, cultural and educational needs considered and pt agreeable to plan of care and goals as stated below and currently has no barriers to learning              History  Co-morbidities and personal factors that may impact the plan of care Examination  Body Structures and Functions, activity limitations and participation restrictions that may impact the plan of care Clinical Presentation   Decision Making/ Complexity Score   Co-morbidities:  overweight, right knee pain, right hip pain.              Personal Factors:   None identified.  Body Regions: back / hips     Body Systems: musculoskeletal          Activity limitations: Rising to stand from sitting, laying in bed and trying to get out of the bed, out of a car. Walking       Participation Restrictions:   Personal  - bending over to try to dress and tie her shoes  Domestic - no limitations   Community - no limitations   Occupation - NA  Recreational / Fitness / Sports - no limitations   Prior functional status: she says prior li six months ago she says she still had difficulty with personal events.            Stable and uncomplicated        Pain  6 currently   9 at worst ;   5 at best .     Complexity: Low                 Short Term GOALS: 6 weeks. Pt agrees with goals set.  1. Decreased pain 20-40%   2. Demonstrate spinal stability with core activation during transitional movements.   3. Improved sleeping with 50% reduced interruption  4. Patient demonstrates independence with HEP.   5. Patient demonstrates independence with Postural Awareness.   6. Patient demonstrates improved LE passive mobility by 5 to 10 degrees  with increased hamstring /IT-band / quad flexibility     Long Term GOALS: 12 weeks. Pt agrees with goals set.  1. Patient demonstrates  increased LE flexibility to improve tolerance to functional activities.   2. Patient demonstrates increased standing and walking tolerance with 50% reduced pain or greater to improve tolerance to functional activities.   3. Patient demonstrates improved overall function and return demonstration to functional ADL and recreational lifestyle      CMS Impairment/Limitation/Restriction for FOTO Lumbar Spine Survey  Status Limitation G-Code CMS Severity Modifier  Intake 32% 68% Current Status CL - At least 60 percent but less than 80 percent  Predicted 49% 51% Goal Status+ CK - At least 40 percent but less than 60 percent  D/C Status CL **only report if this is a one time visit  +Based on FOTO predicted change score  * Mean, Risk Adjusted, Intake Composite FS measures from FOTO aggregate database.  ** As indicated by the ICF assignments to the survey items in the FOTO survey used.  [facility] [place]  INTAKE FUNCTIONAL STATUS SUMMARY (5/21/2018)  Patient: YON MCKENO ([id number]) Primary Body Part: Lumbar Spine Initial DOS: 5/21/2018  Produced and © [id number] by  [employer]    PLAN     Certification Period: 5/21/2018 to 8/21/2018  Recommended Treatment Plan: 2 times per week for 12 weeks: Manual Therapy, Patient Education and Therapeutic Exercise  Other Recommendations:   . Pt may be seen by PTA as part of the rehabilitation team.       Therapist: Nigel Skinner, PT

## 2018-05-25 ENCOUNTER — CLINICAL SUPPORT (OUTPATIENT)
Dept: REHABILITATION | Facility: HOSPITAL | Age: 63
End: 2018-05-25
Attending: PHYSICIAN ASSISTANT
Payer: MEDICARE

## 2018-05-25 DIAGNOSIS — Z78.9 ALTERATION IN MOBILITY ASSOCIATED WITH PAIN: ICD-10-CM

## 2018-05-25 DIAGNOSIS — M53.80 BACK TIGHTNESS: ICD-10-CM

## 2018-05-25 DIAGNOSIS — M99.03 SEGMENTAL AND SOMATIC DYSFUNCTION OF LUMBAR REGION: ICD-10-CM

## 2018-05-25 DIAGNOSIS — M47.819 SPONDYLOSIS WITHOUT MYELOPATHY: Primary | ICD-10-CM

## 2018-05-25 DIAGNOSIS — M62.9 HAMSTRING TIGHTNESS OF BOTH LOWER EXTREMITIES: ICD-10-CM

## 2018-05-25 DIAGNOSIS — R29.898 WEAKNESS OF BACK: ICD-10-CM

## 2018-05-25 DIAGNOSIS — R52 ALTERATION IN MOBILITY ASSOCIATED WITH PAIN: ICD-10-CM

## 2018-05-25 PROCEDURE — 97110 THERAPEUTIC EXERCISES: CPT | Mod: PN | Performed by: PHYSICAL THERAPIST

## 2018-05-25 NOTE — PROGRESS NOTES
"                                          Physical Therapy Daily Note           Name: Jeanne Rodgers  Clinic Number: [id number]  Date of Treatment: 05/25/2018   Diagnosis:   Encounter Diagnoses   Name Primary?    Spondylosis without myelopathy Yes    Back tightness     Hamstring tightness of both lower extremities     Weakness of back     Segmental and somatic dysfunction of lumbar region     Alteration in mobility associated with pain        Time in: 1210  Time Out: 1220  Total Treatment Time: 70    VISITS / HERRING: 2/20 - 12/31/2018  BOLD=INDICATES ACTIVITIES PERFORMED.      Subjective  Patient states "the back is doing ok. There is some pain today. Walking at nite for 20 min walking slow and progressing.   Pain level - 6/10 in the groin area.     Objective    Treatment: Patient  was instructed in and performed therapeutic exercises to develop strength, ROM, flexibility and core stabilization. Patient performed therapeutic exercises consisting of the following      Leg press   Recumbent bike  Upright bike   UE ergometer  Treadmill   Elliptical       THERAPEUTIC EXERCISE  SUPINE  -HS stretches 30" x 4   -hip flexor stretches 1' x 2   -T ball knees to chest   -t ball LTR  x20 ea.  -bridges   -TA activation 5" x 12   -Knee flops x12   -adductor stretch 30" x 4        SIDELYING  -open book    PRONE  -sustained on elbows 3'  -multifidus activation hold 5" x 12  -bilat arm and leg lifts x6     STANDING.    SITTING  -back stretches   -HS stretches        MANUAL THERAPY: SL ABISAI of the IT bands BLE   MODALITY  DIRECT EDUCATION:  Core activation and role it plays in back / spine stabilization. Reviewed.  Direct education 1-1 with PT spent with the patient  15 min  minutes as to her groin pain  with emphasis on  importance of core activation  and management of her condition.    Pt demo good understanding of the education provided. Patient demonstrated good return demonstration of activities      Assessment    The " patient completed the routine without exacerbation of the pain. She maintained pain with hip flexion performing DKC and SKC. Noted significant tightness in the hip flexors and hamstrings. She also demonstrated the ability to initiate multifidus activation. Good response to adductor stretching and activation of the TA.   Medical Necessity is demonstrated by: challenged/ difficulty  to participate in daily activities, Pain limits function of effected part for some activities, Requires skilled supervision to complete and progress treatment interventions and HEP.  Pt demo good understanding of the education provided. Patient demonstrated good return demonstration of activities.Patient's tolerance to treatment was good. Patient will continue to benefit from skilled PTintervention.Patient is making progress towards established goals.  New/Revised goals: no  Continue with established Plan of Care towards PT goals.       PLAN     Certification Period: 5/21/2018 to 8/21/2018  Recommended Treatment Plan: 2 times per week for 12 weeks: Manual Therapy, Patient Education and Therapeutic Exercise  Other Recommendations:   . Pt may be seen by PTA as part of the rehabilitation team.            Therapist: Nigel Skinner, PT

## 2018-05-29 ENCOUNTER — CLINICAL SUPPORT (OUTPATIENT)
Dept: REHABILITATION | Facility: HOSPITAL | Age: 63
End: 2018-05-29
Attending: PHYSICIAN ASSISTANT
Payer: MEDICARE

## 2018-05-29 DIAGNOSIS — R29.898 WEAKNESS OF BACK: ICD-10-CM

## 2018-05-29 DIAGNOSIS — Z78.9 ALTERATION IN MOBILITY ASSOCIATED WITH PAIN: ICD-10-CM

## 2018-05-29 DIAGNOSIS — M47.819 SPONDYLOSIS WITHOUT MYELOPATHY: Primary | ICD-10-CM

## 2018-05-29 DIAGNOSIS — M99.03 SEGMENTAL AND SOMATIC DYSFUNCTION OF LUMBAR REGION: ICD-10-CM

## 2018-05-29 DIAGNOSIS — M62.9 HAMSTRING TIGHTNESS OF BOTH LOWER EXTREMITIES: ICD-10-CM

## 2018-05-29 DIAGNOSIS — R52 ALTERATION IN MOBILITY ASSOCIATED WITH PAIN: ICD-10-CM

## 2018-05-29 DIAGNOSIS — M53.80 BACK TIGHTNESS: ICD-10-CM

## 2018-05-29 PROCEDURE — 97110 THERAPEUTIC EXERCISES: CPT | Mod: PN | Performed by: PHYSICAL THERAPIST

## 2018-05-29 NOTE — PROGRESS NOTES
"                                          Physical Therapy Daily Note           Name: Jeanne Rodgers  Clinic Number: [id number]  Date of Treatment: 05/29/2018   Diagnosis:   Encounter Diagnoses   Name Primary?    Spondylosis without myelopathy Yes    Back tightness     Hamstring tightness of both lower extremities     Weakness of back     Segmental and somatic dysfunction of lumbar region     Alteration in mobility associated with pain        Time in: 1610  Time Out: 1710  Total Treatment Time: 60    VISITS / HERRING: 3/20 - 12/31/2018  BOLD=INDICATES ACTIVITIES PERFORMED.      Subjective  Patient states she is hurting today. Says she walked last nite and experienced discomfort in both hips    Pain level - 6/10 in the lower back at present     Objective    Treatment: Patient  was instructed in and performed therapeutic exercises to develop strength, ROM, flexibility and core stabilization. Patient performed therapeutic exercises consisting of the following      Leg press   Recumbent bike  Upright bike   UE ergometer  Treadmill   Elliptical       THERAPEUTIC EXERCISE  1-1 PT = 30'  SUPINE  -HS stretches 30" x 4   -hip flexor stretches 1' x 2   -T ball knees to chest x20  -t ball LTR  x20 ea.  -bridges   -TA activation 5" x 12   -Knee flops x12   -adductor stretch 30" x 4        SIDELYING  -open book    PRONE  -sustained on elbows 3'  -multifidus activation hold 5" x 12  -bilat arm and leg lifts x6     STANDING.    SITTING  -back stretches   -HS stretches        MANUAL THERAPY: SL ABISAI of the IT bands BLE   MODALITY  DIRECT EDUCATION:      Assessment    The patient completed her routine w/o exacerbation. Did not report low back pain at the end of the session. She was not limited with any exercises performed. Will continue to progress with activities for strengthening.   Medical Necessity is demonstrated by: challenged/ difficulty  to participate in daily activities, Pain limits function of effected part for some " activities, Requires skilled supervision to complete and progress treatment interventions and HEP.  Pt demo good understanding of the education provided. Patient demonstrated good return demonstration of activities.Patient's tolerance to treatment was good. Patient will continue to benefit from skilled PTintervention.Patient is making progress towards established goals.  New/Revised goals: no  Continue with established Plan of Care towards PT goals.       PLAN     Certification Period: 5/21/2018 to 8/21/2018  Recommended Treatment Plan: 2 times per week for 12 weeks: Manual Therapy, Patient Education and Therapeutic Exercise  Other Recommendations:   . Pt may be seen by PTA as part of the rehabilitation team.            Therapist: Nigel Skinner, PT

## 2018-06-01 ENCOUNTER — CLINICAL SUPPORT (OUTPATIENT)
Dept: REHABILITATION | Facility: HOSPITAL | Age: 63
End: 2018-06-01
Attending: PHYSICIAN ASSISTANT
Payer: MEDICARE

## 2018-06-01 DIAGNOSIS — R29.898 WEAKNESS OF BACK: ICD-10-CM

## 2018-06-01 DIAGNOSIS — M62.9 HAMSTRING TIGHTNESS OF BOTH LOWER EXTREMITIES: ICD-10-CM

## 2018-06-01 DIAGNOSIS — Z78.9 ALTERATION IN MOBILITY ASSOCIATED WITH PAIN: ICD-10-CM

## 2018-06-01 DIAGNOSIS — M53.80 BACK TIGHTNESS: ICD-10-CM

## 2018-06-01 DIAGNOSIS — M47.819 SPONDYLOSIS WITHOUT MYELOPATHY: Primary | ICD-10-CM

## 2018-06-01 DIAGNOSIS — M99.03 SEGMENTAL AND SOMATIC DYSFUNCTION OF LUMBAR REGION: ICD-10-CM

## 2018-06-01 DIAGNOSIS — R52 ALTERATION IN MOBILITY ASSOCIATED WITH PAIN: ICD-10-CM

## 2018-06-01 PROCEDURE — 97110 THERAPEUTIC EXERCISES: CPT | Mod: PN | Performed by: PHYSICAL THERAPIST

## 2018-06-01 NOTE — PROGRESS NOTES
"                                          Physical Therapy Daily Note           Name: Jeanne Rodgers  Clinic Number: [id number]  Date of Treatment: 06/01/2018   Diagnosis:   Encounter Diagnoses   Name Primary?    Spondylosis without myelopathy Yes    Hamstring tightness of both lower extremities     Back tightness     Segmental and somatic dysfunction of lumbar region     Weakness of back     Alteration in mobility associated with pain        Time in: 1210  Time Out::1310  Total Treatment Time: 60    VISITS / HERRING: 4/20 - 12/31/2018  BOLD=INDICATES ACTIVITIES PERFORMED.      Subjective  Patient states she was hurting on the right side and into the groin, but seems worse today and began when I got out of the car. Visited mom in [place], [place] and had trouble getting out of the car one week ago. Felt pretty good after the last session.   Pain level - 6/10     Objective    Treatment: Patient  was instructed in and performed therapeutic exercises to develop strength, ROM, flexibility and core stabilization. Patient performed therapeutic exercises consisting of the following      Leg press   Recumbent bike  Upright bike   UE ergometer  Treadmill   Elliptical       THERAPEUTIC EXERCISE  1-1 PT = 30'  SUPINE  -HS stretches 30" x 4   -hip flexor stretches 1' x 2   -T ball knees to chest x20  -t ball LTR  x20 ea.  -bridges   -TA activation 5" x 12   -Knee flops x12   -adductor stretch 30" x 4        SIDELYING  -open book    PRONE  -sustained on elbows 3'  -multifidus activation hold 5" x 12  -bilat arm and leg lifts x6     STANDING.    SITTING  -back stretches   -HS stretches        MANUAL THERAPY: SL ABISAI of the psoas and multifidus along with gapping. Prone for cephalad and caudal oscillations as well as left sidelying.   MODALITY  DIRECT EDUCATION:      Assessment    The patient reported no pain following mobs. She performed all activities without difficulty or limitations. Progress with trunk strengthening. "   Medical Necessity is demonstrated by: challenged/ difficulty  to participate in daily activities, Pain limits function of effected part for some activities, Requires skilled supervision to complete and progress treatment interventions and HEP.  Pt demo good understanding of the education provided. Patient demonstrated good return demonstration of activities.Patient's tolerance to treatment was good. Patient will continue to benefit from skilled PTintervention.Patient is making progress towards established goals.  New/Revised goals: no  Continue with established Plan of Care towards PT goals.       PLAN     Certification Period: 5/21/2018 to 8/21/2018  Recommended Treatment Plan: 2 times per week for 12 weeks: Manual Therapy, Patient Education and Therapeutic Exercise  Other Recommendations:   . Pt may be seen by PTA as part of the rehabilitation team.            Therapist: Nigel Skinner, PT

## 2018-06-05 ENCOUNTER — CLINICAL SUPPORT (OUTPATIENT)
Dept: REHABILITATION | Facility: HOSPITAL | Age: 63
End: 2018-06-05
Attending: PHYSICIAN ASSISTANT
Payer: MEDICARE

## 2018-06-05 DIAGNOSIS — M47.819 SPONDYLOSIS WITHOUT MYELOPATHY: Primary | ICD-10-CM

## 2018-06-05 DIAGNOSIS — M99.03 SEGMENTAL AND SOMATIC DYSFUNCTION OF LUMBAR REGION: ICD-10-CM

## 2018-06-05 DIAGNOSIS — M53.80 BACK TIGHTNESS: ICD-10-CM

## 2018-06-05 DIAGNOSIS — R29.898 WEAKNESS OF BACK: ICD-10-CM

## 2018-06-05 DIAGNOSIS — R52 ALTERATION IN MOBILITY ASSOCIATED WITH PAIN: ICD-10-CM

## 2018-06-05 DIAGNOSIS — Z78.9 ALTERATION IN MOBILITY ASSOCIATED WITH PAIN: ICD-10-CM

## 2018-06-05 DIAGNOSIS — M62.9 HAMSTRING TIGHTNESS OF BOTH LOWER EXTREMITIES: ICD-10-CM

## 2018-06-05 PROCEDURE — 97110 THERAPEUTIC EXERCISES: CPT | Mod: PN | Performed by: PHYSICAL THERAPIST

## 2018-06-05 NOTE — PROGRESS NOTES
"                                          Physical Therapy Daily Note           Name: Jeanne Rodgers  Clinic Number: [id number]  Date of Treatment: 06/05/2018   Diagnosis:   Encounter Diagnoses   Name Primary?    Spondylosis without myelopathy Yes    Hamstring tightness of both lower extremities     Back tightness     Segmental and somatic dysfunction of lumbar region     Weakness of back     Alteration in mobility associated with pain        Time in: 1410  Time Out::1310  Total Treatment Time: 60    VISITS / HERRING: 5/20 - 12/31/2018  BOLD=INDICATES ACTIVITIES PERFORMED.      Subjective  Patient states the back is feeling all right today but still with the pelvic pain. . She says there is a shooting pain that is intermittent in the pelvis area. It is not any less intense or frequent.   Pain level - 4/10     Objective    Treatment: Patient  was instructed in and performed therapeutic exercises to develop strength, ROM, flexibility and core stabilization. Patient performed therapeutic exercises consisting of the following      Leg press   Recumbent bike  Upright bike   UE ergometer  Treadmill   Elliptical       THERAPEUTIC EXERCISE  1-1 PT = 30'  SUPINE  -HS stretches 30" x 4   -hip flexor stretches 1' x 2   -knees to chest x20  - ball LTR  x20 ea.  -bridges   -TA activation 5" x 12   -Knee flops x12   -adductor stretch 30" x 4        SIDELYING  -open book x15    PRONE  -sustained on elbows 3'  -multifidus activation hold 5" x 12  -leg lifts x15  -donkey kicks x15   -trunk raises x15      STANDING.  +wall squats facing wall - arms OH     SITTING  -back stretches   -HS stretches   +trunk rotation        MANUAL THERAPY: SL ABISAI of the psoas and multifidus along with gapping. Prone for cephalad and caudal oscillations as well as left sidelying. NP   MODALITY  DIRECT EDUCATION:      Assessment  Completed all activities as noted. She responded with no pain to report at the end of the session. There is minimal " degenerative spondylosis in the lumbar spine. Activities designed to facilitate mobility in the segments and to enhance core activation and trunk muscle strength.   Medical Necessity is demonstrated by: challenged/ difficulty  to participate in daily activities, Pain limits function of effected part for some activities, Requires skilled supervision to complete and progress treatment interventions and HEP.  Pt demo good understanding of the education provided. Patient demonstrated good return demonstration of activities.Patient's tolerance to treatment was good. Patient will continue to benefit from skilled PTintervention.Patient is making progress towards established goals.  New/Revised goals: no  Continue with established Plan of Care towards PT goals.     FOTO VISIT #  ** next session     PLAN     Certification Period: 5/21/2018 to 8/21/2018  Recommended Treatment Plan: 2 times per week for 12 weeks: Manual Therapy, Patient Education and Therapeutic Exercise  Other Recommendations:   . Pt may be seen by PTA as part of the rehabilitation team.            Therapist: Nigel Skinner, PT

## 2018-06-07 ENCOUNTER — CLINICAL SUPPORT (OUTPATIENT)
Dept: REHABILITATION | Facility: HOSPITAL | Age: 63
End: 2018-06-07
Attending: PHYSICIAN ASSISTANT
Payer: MEDICARE

## 2018-06-07 DIAGNOSIS — M47.819 SPONDYLOSIS WITHOUT MYELOPATHY: Primary | ICD-10-CM

## 2018-06-07 DIAGNOSIS — M53.80 BACK TIGHTNESS: ICD-10-CM

## 2018-06-07 DIAGNOSIS — M62.9 HAMSTRING TIGHTNESS OF BOTH LOWER EXTREMITIES: ICD-10-CM

## 2018-06-07 DIAGNOSIS — Z78.9 ALTERATION IN MOBILITY ASSOCIATED WITH PAIN: ICD-10-CM

## 2018-06-07 DIAGNOSIS — R29.898 WEAKNESS OF BACK: ICD-10-CM

## 2018-06-07 DIAGNOSIS — M99.03 SEGMENTAL AND SOMATIC DYSFUNCTION OF LUMBAR REGION: ICD-10-CM

## 2018-06-07 DIAGNOSIS — R52 ALTERATION IN MOBILITY ASSOCIATED WITH PAIN: ICD-10-CM

## 2018-06-07 PROCEDURE — G8979 MOBILITY GOAL STATUS: HCPCS | Mod: CK,PN | Performed by: PHYSICAL THERAPIST

## 2018-06-07 PROCEDURE — G8978 MOBILITY CURRENT STATUS: HCPCS | Mod: CK,PN | Performed by: PHYSICAL THERAPIST

## 2018-06-07 PROCEDURE — 97110 THERAPEUTIC EXERCISES: CPT | Mod: PN | Performed by: PHYSICAL THERAPIST

## 2018-06-07 NOTE — PROGRESS NOTES
"                                          Physical Therapy Daily Note           Name: Jeanne Rodgers  Clinic Number: [id number]  Date of Treatment: 06/07/2018   Diagnosis:   Encounter Diagnoses   Name Primary?    Spondylosis without myelopathy Yes    Hamstring tightness of both lower extremities     Back tightness     Weakness of back     Alteration in mobility associated with pain     Segmental and somatic dysfunction of lumbar region        Time in: 1410  Time Out::1510  Total Treatment Time: 60    VISITS / HERRING: 6/20 - 12/31/2018  BOLD=INDICATES ACTIVITIES PERFORMED.      Subjective  Patient states the back is feeling fine today and she is not having any pain. There is no groin pain to report as well.   Pain level - 4/10     Objective    Treatment: Patient  was instructed in and performed therapeutic exercises to develop strength, ROM, flexibility and core stabilization. Patient performed therapeutic exercises consisting of the following      Leg press   Recumbent bike  Upright bike   UE ergometer  Treadmill   Elliptical       THERAPEUTIC EXERCISE  1-1 PT = 60'  SUPINE  -HS stretches 30" x 4   -hip flexor stretches 1' x 2   -knees to chest x20  - LTR  x20 ea.  -bridges   -TA activation 5" x 12   -Knee flops x12   -adductor stretch 30" x 4        SIDELYING  -open book x15    PRONE  -sustained on elbows 3'  -multifidus activation hold 5" x 12  -leg lifts x15  -donkey kicks x15   -trunk raises x15      STANDING.  +wall squats facing wall - arms OH     SITTING  -back stretches   -HS stretches   +trunk rotation        MANUAL THERAPY: SL ABISAI of the psoas and multifidus along with gapping. Prone for cephalad and caudal oscillations as well as left sidelying oscillations.    MODALITY  DIRECT EDUCATION:   Provide patient with HEP at next session as per highlighted activities.     Assessment  Routine completed w/o return of groin pain. She responded to the activities well w/o incorporating manual interventions. " Provide with HEP at next session.   Medical Necessity is demonstrated by: challenged/ difficulty  to participate in daily activities, Pain limits function of effected part for some activities, Requires skilled supervision to complete and progress treatment interventions and HEP.  Pt demo good understanding of the education provided. Patient demonstrated good return demonstration of activities.Patient's tolerance to treatment was good. Patient will continue to benefit from skilled PTintervention.Patient is making progress towards established goals.  New/Revised goals: no  Continue with established Plan of Care towards PT goals.     FOTO VISIT # 6    CMS Impairment/Limitation/Restriction for FOTO Lumbar Spine Survey  Status Limitation G-Code CMS Severity Modifier  Intake 32% 68%  Predicted 49% 51% Goal Status+ CK - At least 40 percent but less than 60 percent  6/7/2018 60% 40% Current Status CK - At least 40 percent but less than 60 percent  D/C Status CK **only report if this is discharge survey        PLAN     Certification Period: 5/21/2018 to 8/21/2018  Recommended Treatment Plan: 2 times per week for 12 weeks: Manual Therapy, Patient Education and Therapeutic Exercise  Other Recommendations:   . Pt may be seen by PTA as part of the rehabilitation team.            Therapist: Nigel Skinner, PT

## 2018-06-14 ENCOUNTER — INITIAL CONSULT (OUTPATIENT)
Dept: HEMATOLOGY/ONCOLOGY | Facility: CLINIC | Age: 63
End: 2018-06-14
Payer: MEDICARE

## 2018-06-14 ENCOUNTER — CLINICAL SUPPORT (OUTPATIENT)
Dept: REHABILITATION | Facility: HOSPITAL | Age: 63
End: 2018-06-14
Attending: PHYSICIAN ASSISTANT
Payer: MEDICARE

## 2018-06-14 VITALS
OXYGEN SATURATION: 93 % | BODY MASS INDEX: 34.54 KG/M2 | HEIGHT: 60 IN | WEIGHT: 175.94 LBS | TEMPERATURE: 98 F | SYSTOLIC BLOOD PRESSURE: 120 MMHG | RESPIRATION RATE: 20 BRPM | HEART RATE: 80 BPM | DIASTOLIC BLOOD PRESSURE: 68 MMHG

## 2018-06-14 DIAGNOSIS — M99.03 SEGMENTAL AND SOMATIC DYSFUNCTION OF LUMBAR REGION: ICD-10-CM

## 2018-06-14 DIAGNOSIS — M47.819 SPONDYLOSIS WITHOUT MYELOPATHY: Primary | ICD-10-CM

## 2018-06-14 DIAGNOSIS — R52 ALTERATION IN MOBILITY ASSOCIATED WITH PAIN: ICD-10-CM

## 2018-06-14 DIAGNOSIS — M94.9 DISORDER OF CARTILAGE: ICD-10-CM

## 2018-06-14 DIAGNOSIS — R29.898 WEAKNESS OF BACK: ICD-10-CM

## 2018-06-14 DIAGNOSIS — M62.9 HAMSTRING TIGHTNESS OF BOTH LOWER EXTREMITIES: ICD-10-CM

## 2018-06-14 DIAGNOSIS — Z78.9 ALTERATION IN MOBILITY ASSOCIATED WITH PAIN: ICD-10-CM

## 2018-06-14 DIAGNOSIS — M53.80 BACK TIGHTNESS: ICD-10-CM

## 2018-06-14 DIAGNOSIS — Z85.3 HX OF BREAST CANCER: Primary | ICD-10-CM

## 2018-06-14 PROCEDURE — 99999 PR PBB SHADOW E&M-EST. PATIENT-LVL III: CPT | Mod: PBBFAC,,, | Performed by: INTERNAL MEDICINE

## 2018-06-14 PROCEDURE — 99204 OFFICE O/P NEW MOD 45 MIN: CPT | Mod: S$PBB,,, | Performed by: INTERNAL MEDICINE

## 2018-06-14 PROCEDURE — 99213 OFFICE O/P EST LOW 20 MIN: CPT | Mod: PBBFAC | Performed by: INTERNAL MEDICINE

## 2018-06-14 PROCEDURE — 97110 THERAPEUTIC EXERCISES: CPT | Mod: PN

## 2018-06-14 NOTE — PROGRESS NOTES
Subjective:       Patient ID: Jeanne Rodgers is a 63 y.o. female.    Chief Complaint: Consult and Breast Cancer    HPI     This is a 62 yo female with a history of right breast cancer  - Had been getting her mammograms at [facility] as she was uninsured at the time  - reports small mass seen in 2007 and sent to [facility]- where she was told nothing was seen on review  - Repeat situation of above the next year in 2008  - she noted right nipple inversion 2012- went to [facility]- told not likely cancer, no further testing done  - In 2014 it was recommended that [facility] see her for imaging  - Abnormal mammogram and biopsy recommended  Reports biopsy experience was awful and so she researched other sites to be cared  - She sought out Dr. 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 Keller at [facility]  Oncotype done ? 9- chemotherapy not recommended  She was started on Arimidex    Reports Arimidex has been a nightmare-   Joint pains and reported swelling and loss of function, weight gain, vaginal atrophy. Hot flashes  Notes terrible pain in pelvic area  Has had below imaging to assess    MRI 4/26/2018:  FINDINGS:  Lumbar spine alignment is within normal limits. The vertebral body heights are well maintained, with no fracture.  No marrow signal abnormality suspicious for an infiltrative process.  The conus is normal in appearance, and terminates at the  level.  The adjacent soft tissue structures show no significant abnormalities.  There are findings of multi-level  lumbar spondylosis, as below.  L1-L2: There is no focal disc herniation. No significant central canal or neural foraminal narrowing.  L2-L3: There is no focal disc herniation. No significant central canal or neural foraminal narrowing.Slight  hypertrophic changes of facets  L3-L4: There is no focal disc herniation. No significant central canal or neural foraminal narrowing.Mild broad-based bulging of disc material combined with hypertrophic changes of facets result in mild narrowing of central canal dimensions.  Minimal LEFT neural foraminal encroachment..  L4-L5: There is no focal disc herniation. Broad-based bulging of disc material with hypertrophic changes of facets result in mild narrowing of central canal dimensions.  Minimal bilateral neural foraminal encroachment..  L5-S1: There is no focal disc herniation. No significant central canal or neural foraminal narrowing.Mild RIGHT-sided facet edema.    CT scans A/P 5/10/18:  FINDINGS:  The visualized portion of the base of the lungs, visualized portion of the heart, stomach, spleen, pancreas, and adrenal glands are unremarkable.  The gallbladder is absent.  There is diffuse fatty infiltration of the liver which is enlarged.  The kidneys are unremarkable.  There is no hydronephrosis or nephrolithiasis.  The bladder is unremarkable.  The uterus has a normal contour.  The bowel has a normal appearance.  The appendix is not visualized.  The osseous structures demonstrate mild degenerative change.    She has been seen by Rheumatology for the joint issues +FANNY found ? of autoimmune   Also has a history of hives- no cause found despite multtiple dermatologic evaluations    Has had genetic testing  - 18 benign mammogram    Prior BMD 2017- osteopenia (done at [facility])    SH:  Worked at the [employer] as a  > 30 years    FH:  Father  of pancreatic cancer at age 85  Mother living with pancreatic cancer- currently age 94  Maternal uncle- lung cancer  Maternal aunt  of pancreatic cancer at age 62      Review of Systems   Constitutional: Positive for fatigue. Negative for activity change, appetite change, chills, fever and unexpected weight change.   HENT: Negative for congestion, dental problem,  ear pain, rhinorrhea, tinnitus and trouble swallowing.    Eyes: Negative for visual disturbance.   Respiratory: Negative for cough, chest tightness, shortness of breath and wheezing.    Cardiovascular: Negative for chest pain, palpitations and leg swelling.   Gastrointestinal: Negative for abdominal distention, abdominal pain, blood in stool, constipation, diarrhea, nausea and vomiting.   Genitourinary: Negative for decreased urine volume, difficulty urinating, dysuria, frequency and hematuria.   Musculoskeletal: Positive for arthralgias, back pain and joint swelling. Negative for gait problem and neck pain.   Skin: Negative for pallor and rash.   Neurological: Negative for dizziness, weakness, light-headedness, numbness and headaches.   Hematological: Negative for adenopathy. Does not bruise/bleed easily.   Psychiatric/Behavioral: Negative for confusion, dysphoric mood and sleep disturbance.       Objective:      Physical Exam   Constitutional: She is oriented to person, place, and time. She appears well-developed and well-nourished. No distress.   Presents alone   HENT:   Head: Normocephalic and atraumatic.   Mouth/Throat: Oropharynx is clear and moist. No oropharyngeal exudate.   Eyes: Conjunctivae and EOM are normal. Pupils are equal, round, and reactive to light. Right eye exhibits no discharge. Left eye exhibits no discharge. No scleral icterus. Right pupil is round and reactive. Left pupil is round and reactive.   Neck: Normal range of motion. Neck supple. No tracheal deviation present. No thyromegaly present.   Cardiovascular: Normal rate, regular rhythm, normal heart sounds and intact distal pulses.  Exam reveals no gallop and no friction rub.    No murmur heard.  Pulmonary/Chest: Effort normal and breath sounds normal. No respiratory distress. She has no wheezes. She has no rales. She exhibits no tenderness.   Abdominal: Soft. Bowel sounds are normal. She exhibits no distension and no mass. There is no  hepatosplenomegaly. There is no tenderness. There is no rebound and no guarding.   Musculoskeletal: Normal range of motion. She exhibits edema, tenderness and deformity.   Lymphadenopathy:        Head (right side): No submandibular adenopathy present.        Head (left side): No submandibular adenopathy present.     She has no cervical adenopathy.        Right cervical: No superficial cervical, no deep cervical and no posterior cervical adenopathy present.       Left cervical: No superficial cervical, no deep cervical and no posterior cervical adenopathy present.        Right: No inguinal and no supraclavicular adenopathy present.        Left: No inguinal and no supraclavicular adenopathy present.   Neurological: She is alert and oriented to person, place, and time. She has normal strength and normal reflexes. No cranial nerve deficit or sensory deficit. Coordination normal.   Skin: Skin is warm and dry. No bruising, no lesion, no petechiae and no rash noted. She is not diaphoretic. No erythema. No pallor.   Psychiatric: She has a normal mood and affect. Her behavior is normal. Judgment and thought content normal. Her mood appears not anxious. She does not exhibit a depressed mood.   Nursing note and vitals reviewed.    Labs- reviewed  Assessment:       1. Hx of breast cancer        Plan:     1. We discussed stopping Arimidex with symptoms and reassessing off  She is due for a BMD and Vit D level  RTC 8-10 weeks post to re-assess  We discussed Uro Gyn referral

## 2018-06-14 NOTE — PROGRESS NOTES
"                                          Physical Therapy Daily Note           Name: Jeanne Rodgers  Clinic Number: [id number]  Date of Treatment: 06/14/2018   Diagnosis:   Encounter Diagnoses   Name Primary?    Spondylosis without myelopathy Yes    Hamstring tightness of both lower extremities     Back tightness     Weakness of back     Alteration in mobility associated with pain     Segmental and somatic dysfunction of lumbar region        Time in: 1430  Time Out: 1517  Total Treatment Time: 47 mins (1:1 with PTA for 40 mins)    VISITS / HERRING: 7 of 20 - 12/31/2018  BOLD=INDICATES ACTIVITIES PERFORMED.      Subjective    Patient states that she feels ok today. No new complaints.  Pain level - 4 out of 10, currently.     Objective    Treatment: Patient  was instructed in and performed therapeutic exercises to develop strength, ROM, flexibility and core stabilization. Patient performed therapeutic exercises consisting of the following:      Leg press   Recumbent Bike: 7 minutes  Upright bike   UE ergometer  Treadmill   Elliptical       THERAPEUTIC EXERCISE    SUPINE  HS stretches 30" x 4 ea.  Hip flexor stretches 1' x 2   Knees to chest x 20  LTR x 20   Bridges x 20  TA activation 5" x 12   Knee flops x 12   Adductor stretch 30" x 4      SIDELYING  Open book x 15    PRONE  Sustained on elbows x 3 minutes  Multifidus activation hold 5" x 12  Leg lifts x 15 ea.  Donkey kicks x 15 ea. - reports of spasms in back with exercise  Trunk raises x 15      STANDING.  Wall squats facing wall - arms OH     SITTING  Back stretches   HS stretches   Trunk rotation     MANUAL THERAPY: SL ABISAI of the psoas and multifidus along with gapping. Prone for cephalad and caudal oscillations as well as left sidelying oscillations.    MODALITY  DIRECT EDUCATION:   Provide patient with HEP at next session as per highlighted activities.     Assessment    Patient tolerated treatment session fairly well today. Good tolerance to exercises " performed with no exacerbation of pain. Patient reported muscle spasms in her back with prone donkey kicks, however was able to perform prone hip extension with no difficulty.   Medical Necessity is demonstrated by: challenged/ difficulty  to participate in daily activities, Pain limits function of effected part for some activities, Requires skilled supervision to complete and progress treatment interventions and HEP.  Pt demo good understanding of the education provided. Patient demonstrated good return demonstration of activities.Patient's tolerance to treatment was good. Patient will continue to benefit from skilled PTintervention.Patient is making progress towards established goals.  New/Revised goals: no  Continue with established Plan of Care towards PT goals.     FOTO VISIT # 6    CMS Impairment/Limitation/Restriction for FOTO Lumbar Spine Survey  Status Limitation G-Code CMS Severity Modifier  Intake 32% 68%  Predicted 49% 51% Goal Status+ CK - At least 40 percent but less than 60 percent  6/7/2018 60% 40% Current Status CK - At least 40 percent but less than 60 percent  D/C Status CK **only report if this is discharge survey      PLAN     Certification Period: 5/21/2018 to 8/21/2018  Recommended Treatment Plan: 2 times per week for 12 weeks: Manual Therapy, Patient Education and Therapeutic Exercise  Other Recommendations: Pt may be seen by PTA as part of the rehabilitation team.        Therapist: Latisha Hansen PTA  06/14/2018

## 2018-06-19 ENCOUNTER — CLINICAL SUPPORT (OUTPATIENT)
Dept: REHABILITATION | Facility: HOSPITAL | Age: 63
End: 2018-06-19
Attending: PHYSICIAN ASSISTANT
Payer: MEDICARE

## 2018-06-19 DIAGNOSIS — M53.80 BACK TIGHTNESS: ICD-10-CM

## 2018-06-19 DIAGNOSIS — Z78.9 ALTERATION IN MOBILITY ASSOCIATED WITH PAIN: ICD-10-CM

## 2018-06-19 DIAGNOSIS — M62.9 HAMSTRING TIGHTNESS OF BOTH LOWER EXTREMITIES: ICD-10-CM

## 2018-06-19 DIAGNOSIS — M47.819 SPONDYLOSIS WITHOUT MYELOPATHY: Primary | ICD-10-CM

## 2018-06-19 DIAGNOSIS — R29.898 WEAKNESS OF BACK: ICD-10-CM

## 2018-06-19 DIAGNOSIS — M99.03 SEGMENTAL AND SOMATIC DYSFUNCTION OF LUMBAR REGION: ICD-10-CM

## 2018-06-19 DIAGNOSIS — R52 ALTERATION IN MOBILITY ASSOCIATED WITH PAIN: ICD-10-CM

## 2018-06-19 PROCEDURE — 97110 THERAPEUTIC EXERCISES: CPT | Mod: PN | Performed by: PHYSICAL THERAPIST

## 2018-06-19 NOTE — PROGRESS NOTES
"                                          Physical Therapy Daily Note           Name: Jeanne Rodgers  Clinic Number: [id number]  Date of Treatment: 06/19/2018   Diagnosis:   Encounter Diagnoses   Name Primary?    Spondylosis without myelopathy Yes    Hamstring tightness of both lower extremities     Back tightness     Weakness of back     Alteration in mobility associated with pain     Segmental and somatic dysfunction of lumbar region        Time in: 1435  Time Out:: 1505  Total Treatment Time: 30'    VISITS / HERRING: 8/20 - 12/31/2018  BOLD=INDICATES ACTIVITIES PERFORMED.      Subjective  Patient states she is having pain in the groin on both sides.     Pain level - 3/10     Objective    Treatment: Patient  was instructed in and performed therapeutic exercises to develop strength, ROM, flexibility and core stabilization. Patient performed therapeutic exercises consisting of the following      Leg press   Recumbent bike  Upright bike   UE ergometer  Treadmill   Elliptical       THERAPEUTIC EXERCISE  1-1 PT = 25'  SUPINE  -HS stretches 30" x 4   -hip flexor stretches 1' x 2   -knees to chest x20  - LTR  x20 ea.  -bridges x15   -TA / multifidus activation 5" x 12   -Knee flops x12   -adductor stretch 30" x 4        SIDELYING  -open book x15    PRONE  -sustained on elbows 3'  -multifidus activation hold 5" x 12  -leg lifts x15  -donkey kicks x15   -trunk raises x15      STANDING.  +wall squats facing wall - arms OH     SITTING  -back stretches   -HS stretches   +trunk rotation        MANUAL THERAPY: SL ABISAI of the psoas and multifidus along with gapping. Prone for cephalad and caudal oscillations as well as left sidelying oscillations.    MODALITY  DIRECT EDUCATION:   Provide patient with HEP at next session as per highlighted activities.     Assessment  Performed activities as noted. . Due to time of arrival session was abbreviated. She was especially painful to palpation along the sides of the back due to " chemo drug. Did manage to perform activities w/o limitation and she reported no pain at the end of the session.   Medical Necessity is demonstrated by: challenged/ difficulty  to participate in daily activities, Pain limits function of effected part for some activities, Requires skilled supervision to complete and progress treatment interventions and HEP.  Pt demo good understanding of the education provided. Patient demonstrated good return demonstration of activities.Patient's tolerance to treatment was good. Patient will continue to benefit from skilled PTintervention.Patient is making progress towards established goals.  New/Revised goals: no  Continue with established Plan of Care towards PT goals.     FOTO VISIT # 6  CMS Impairment/Limitation/Restriction for FOTO Lumbar Spine Survey  Status Limitation G-Code CMS Severity Modifier  Intake 32% 68%  Predicted 49% 51% Goal Status+ CK - At least 40 percent but less than 60 percent  6/7/2018 60% 40% Current Status CK - At least 40 percent but less than 60 percent  D/C Status CK **only report if this is discharge survey      PLAN     Certification Period: 5/21/2018 to 8/21/2018  Recommended Treatment Plan: 2 times per week for 12 weeks: Manual Therapy, Patient Education and Therapeutic Exercise  Other Recommendations:   . Pt may be seen by PTA as part of the rehabilitation team.            Therapist: Nigel Skinner, PT

## 2018-06-21 ENCOUNTER — CLINICAL SUPPORT (OUTPATIENT)
Dept: REHABILITATION | Facility: HOSPITAL | Age: 63
End: 2018-06-21
Attending: PHYSICIAN ASSISTANT
Payer: MEDICARE

## 2018-06-21 ENCOUNTER — OFFICE VISIT (OUTPATIENT)
Dept: OBSTETRICS AND GYNECOLOGY | Facility: CLINIC | Age: 63
End: 2018-06-21
Attending: OBSTETRICS & GYNECOLOGY
Payer: MEDICARE

## 2018-06-21 VITALS
DIASTOLIC BLOOD PRESSURE: 72 MMHG | BODY MASS INDEX: 34.49 KG/M2 | HEIGHT: 60 IN | SYSTOLIC BLOOD PRESSURE: 132 MMHG | WEIGHT: 175.69 LBS

## 2018-06-21 DIAGNOSIS — M47.819 SPONDYLOSIS WITHOUT MYELOPATHY: Primary | ICD-10-CM

## 2018-06-21 DIAGNOSIS — M62.9 HAMSTRING TIGHTNESS OF BOTH LOWER EXTREMITIES: ICD-10-CM

## 2018-06-21 DIAGNOSIS — Z12.4 SCREENING FOR MALIGNANT NEOPLASM OF CERVIX: Primary | ICD-10-CM

## 2018-06-21 DIAGNOSIS — R29.898 WEAKNESS OF BACK: ICD-10-CM

## 2018-06-21 DIAGNOSIS — M99.03 SEGMENTAL AND SOMATIC DYSFUNCTION OF LUMBAR REGION: ICD-10-CM

## 2018-06-21 DIAGNOSIS — Z01.419 ENCOUNTER FOR GYNECOLOGICAL EXAMINATION WITHOUT ABNORMAL FINDING: ICD-10-CM

## 2018-06-21 DIAGNOSIS — Z78.9 ALTERATION IN MOBILITY ASSOCIATED WITH PAIN: ICD-10-CM

## 2018-06-21 DIAGNOSIS — R52 ALTERATION IN MOBILITY ASSOCIATED WITH PAIN: ICD-10-CM

## 2018-06-21 DIAGNOSIS — M53.80 BACK TIGHTNESS: ICD-10-CM

## 2018-06-21 PROCEDURE — 97110 THERAPEUTIC EXERCISES: CPT | Mod: PN | Performed by: PHYSICAL THERAPIST

## 2018-06-21 PROCEDURE — G0101 CA SCREEN;PELVIC/BREAST EXAM: HCPCS | Mod: ,,, | Performed by: OBSTETRICS & GYNECOLOGY

## 2018-06-21 NOTE — PROGRESS NOTES
"SUBJECTIVE:   63 y.o. female presents for follow up for cancer survivorship.  . No LMP recorded (lmp unknown). Patient is postmenopausal..  She reports that she saw Dr. Ford and that she is taking her off of Arimidex for 1 month. She reports her LFTS and her glucose are elevated. She reports that she did the [employer] class and "liked it" . She reports having pain in her inner thighs.    She reports vaginal symptoms are improved on Clobetasol . She had a UTI since she last saw me and took Cipro. She stopped Arimidex last week. She reports noticing inner thigh and groin pain - she is doing PT to help with this. She gets a shooting pain when she tried to get up    Past Medical History:   Diagnosis Date    Allergy     Breast cancer     Cancer     Mental disorder      Past Surgical History:   Procedure Laterality Date    APPENDECTOMY      BREAST BIOPSY Right 2014    BREAST LUMPECTOMY Right 2014    CARPAL TUNNEL RELEASE      CHOLECYSTECTOMY      TUBAL LIGATION       Social History     Social History    Marital status: Single     Spouse name: N/A    Number of children: N/A    Years of education: N/A     Occupational History    Not on file.     Social History Main Topics    Smoking status: Never Smoker    Smokeless tobacco: Never Used    Alcohol use No    Drug use: No    Sexual activity: No     Other Topics Concern    Not on file     Social History Narrative    No narrative on file     Family History   Problem Relation Age of Onset    Cancer Mother 85        pancreatic cancer    Hypertension Mother     Arthritis Mother     Cancer Father 85        pancreatic    Cancer Maternal Aunt 60        pancreatic    No Known Problems Sister     Hypertension Brother     Lung cancer Paternal Aunt     Colon cancer Paternal Uncle     Heart attack Paternal Grandmother     Heart attack Paternal Grandfather     Hypertension Brother     Hashimoto's thyroiditis Sister     Breast cancer Neg Hx     " Ovarian cancer Neg Hx      OB History    Para Term  AB Living       0         SAB TAB Ectopic Multiple Live Births                             Current Outpatient Prescriptions   Medication Sig Dispense Refill    ALPRAZolam (XANAX) 0.25 MG tablet Take 1 tablet (0.25 mg total) by mouth nightly as needed for Anxiety. 30 tablet 0    anastrozole (ARIMIDEX) 1 mg Tab Take 1 mg by mouth once daily.      betamethasone dipropionate (DIPROLENE) 0.05 % ointment Apply topically once daily.       buPROPion (WELLBUTRIN) 75 MG tablet Take 1 tablet (75 mg total) by mouth 2 (two) times daily. 60 tablet 11    clobetasol 0.05% (TEMOVATE) 0.05 % Oint Use twice daily for 1 month, then daily for 1 month, then 3 times per week 60 g 2    famotidine (PEPCID) 20 MG tablet Take 1 tablet (20 mg total) by mouth once daily. 90 tablet 1    levocetirizine (XYZAL) 5 MG tablet Take 5 mg by mouth every evening.  3     No current facility-administered medications for this visit.      Allergies: Patient has no known allergies.     The 10-year ASCVD risk score (Luis MONIE Jr., et al., 2013) is: 6.8%    Values used to calculate the score:      Age: 63 years      Sex: Female      Is Non- : No      Diabetic: No      Tobacco smoker: No      Systolic Blood Pressure: 132 mmHg      Is BP treated: No      HDL Cholesterol: 43 mg/dL      Total Cholesterol: 214 mg/dL      ROS:  Constitutional: no weight loss, weight gain, fever, fatigue  Eyes:  No vision changes, glasses/contacts  ENT/Mouth: No ulcers, sinus problems, ears ringing, headache  Cardiovascular: No inability to lie flat, chest pain, exercise intolerance, swelling, heart palpitations  Respiratory: No wheezing, coughing blood, shortness of breath, or cough  Gastrointestinal: No diarrhea, bloody stool, nausea/vomiting, constipation, gas, hemorrhoids  Genitourinary: No blood in urine, painful urination, urgency of urination, frequency of urination, incomplete  emptying, incontinence, abnormal bleeding, painful periods, heavy periods, vaginal discharge, vaginal odor, painful intercourse, sexual problems, bleeding after intercourse.  Musculoskeletal: No muscle , +groin pain  Skin/Breast: No painful breasts, nipple discharge, masses, rash, ulcers, right breast cancer  Neurological: No passing out, seizures, numbness, headache  Endocrine: No diabetes, hypothyroid, hyperthyroid, hot flashes, hair loss, abnormal hair growth, acne  Psychiatric: No depression, crying  Hematologic: No bruises, bleeding, swollen lymph nodes, anemia.      Physical Exam:   Constitutional: She is oriented to person, place, and time. She appears well-developed and well-nourished.      Neck: Normal range of motion. No tracheal deviation present. No thyromegaly present.    Cardiovascular: Exam reveals no edema.     Pulmonary/Chest: Effort normal. She exhibits no mass, no tenderness, no deformity and no retraction. Right breast exhibits no inverted nipple, no mass, no nipple discharge, no skin change, no tenderness, presence, no bleeding and no swelling. Left breast exhibits no inverted nipple, no mass, no nipple discharge, no skin change, no tenderness, presence, no bleeding and no swelling. Breasts are symmetrical.        Abdominal: Soft. She exhibits no distension and no mass. There is no tenderness. There is no rebound and no guarding. No hernia. Hernia confirmed negative in the left inguinal area.     Genitourinary: Vagina normal and uterus normal. Rectal exam shows no external hemorrhoid. There is no rash, tenderness or lesion on the right labia. There is no rash, tenderness or lesion on the left labia. Uterus is not deviated. Cervix is normal. No no adexnal prolapse. Right adnexum displays no mass, no tenderness and no fullness. Left adnexum displays no mass, no tenderness and no fullness. No tenderness, bleeding, rectocele, cystocele or unspecified prolapse of vaginal walls in the vagina. No  vaginal discharge found. Cervix exhibits no motion tenderness, no discharge and no friability.           Musculoskeletal: Normal range of motion and moves all extremeties. She exhibits no edema.      Lymphadenopathy:        Right: No inguinal adenopathy present.        Left: No inguinal adenopathy present.    Neurological: She is alert and oriented to person, place, and time.    Skin: No rash noted. No erythema. No pallor.    Psychiatric: She has a normal mood and affect. Her behavior is normal. Judgment and thought content normal.     Right lumpectomy    ASSESSMENT:   JAG  Breast cancer    PLAN:   pap smear  return annually or prn

## 2018-06-21 NOTE — PROGRESS NOTES
"                                          Physical Therapy Daily Note           Name: Jeanne Rodgers  Clinic Number: [id number]  Date of Treatment: 06/21/2018   Diagnosis:   Encounter Diagnoses   Name Primary?    Spondylosis without myelopathy Yes    Hamstring tightness of both lower extremities     Back tightness     Segmental and somatic dysfunction of lumbar region     Alteration in mobility associated with pain     Weakness of back        Time in: 1110  Time Out:: 1200  Total Treatment Time: 50  VISITS / HERRING: 9/20 - 12/31/2018  BOLD=INDICATES ACTIVITIES PERFORMED.      Subjective  Patient states she is feeling better and the pain is less than last session. The pain is  Located in the groin area bilaterally R>L.   Pain level - 3/10     Objective    Treatment: Patient  was instructed in and performed therapeutic exercises to develop strength, ROM, flexibility and core stabilization. Patient performed therapeutic exercises consisting of the following      Leg press   Recumbent bike  Upright bike   UE ergometer  Treadmill   Elliptical       THERAPEUTIC EXERCISE  1-1 PT = 30'  SUPINE  -HS stretches 30" x 4   -hip flexor stretches 1' x 2   -knees to chest x20  - LTR  x20 ea.  -bridges x15   -TA / multifidus activation 5" x 12   -Knee flops x12   -adductor stretch 30" x 4        SIDELYING  -open book x15    PRONE  -sustained on elbows 3'  -multifidus activation hold 5" x 12  -leg lifts x15  -donkey kicks x15   -trunk raises x15      STANDING.  +wall squats facing wall - arms OH     SITTING  -back stretches   -HS stretches   +trunk rotation        MANUAL THERAPY: SL ABISAI of the psoas and multifidus along with gapping. Prone for cephalad and caudal oscillations as well as left sidelying oscillations.    MODALITY  DIRECT EDUCATION:   Provide patient with HEP at next session as per highlighted activities.     Assessment  FOTO NEXT SESSION  The patient completed all activities as noted. She did not encounter " difficulty. Demonstrated good mobility. There were no signs of intensifying pain.   Medical Necessity is demonstrated by: challenged/ difficulty  to participate in daily activities, Pain limits function of effected part for some activities, Requires skilled supervision to complete and progress treatment interventions and HEP.  Pt demo good understanding of the education provided. Patient demonstrated good return demonstration of activities.Patient's tolerance to treatment was good. Patient will continue to benefit from skilled PTintervention.Patient is making progress towards established goals.  New/Revised goals: no  Continue with established Plan of Care towards PT goals.     FOTO VISIT # 6  CMS Impairment/Limitation/Restriction for FOTO Lumbar Spine Survey  Status Limitation G-Code CMS Severity Modifier  Intake 32% 68%  Predicted 49% 51% Goal Status+ CK - At least 40 percent but less than 60 percent  6/7/2018 60% 40% Current Status CK - At least 40 percent but less than 60 percent  D/C Status CK **only report if this is discharge survey      PLAN     Certification Period: 5/21/2018 to 8/21/2018  Recommended Treatment Plan: 2 times per week for 12 weeks: Manual Therapy, Patient Education and Therapeutic Exercise  Other Recommendations:   . Pt may be seen by PTA as part of the rehabilitation team.            Therapist: Nigel Skinner, PT

## 2018-06-22 PROCEDURE — 88175 CYTOPATH C/V AUTO FLUID REDO: CPT

## 2018-06-26 ENCOUNTER — CLINICAL SUPPORT (OUTPATIENT)
Dept: REHABILITATION | Facility: HOSPITAL | Age: 63
End: 2018-06-26
Attending: PHYSICIAN ASSISTANT
Payer: MEDICARE

## 2018-06-26 DIAGNOSIS — R52 ALTERATION IN MOBILITY ASSOCIATED WITH PAIN: ICD-10-CM

## 2018-06-26 DIAGNOSIS — M62.9 HAMSTRING TIGHTNESS OF BOTH LOWER EXTREMITIES: ICD-10-CM

## 2018-06-26 DIAGNOSIS — M53.80 BACK TIGHTNESS: ICD-10-CM

## 2018-06-26 DIAGNOSIS — R29.898 WEAKNESS OF BACK: ICD-10-CM

## 2018-06-26 DIAGNOSIS — Z78.9 ALTERATION IN MOBILITY ASSOCIATED WITH PAIN: ICD-10-CM

## 2018-06-26 DIAGNOSIS — M99.03 SEGMENTAL AND SOMATIC DYSFUNCTION OF LUMBAR REGION: ICD-10-CM

## 2018-06-26 DIAGNOSIS — M47.819 SPONDYLOSIS WITHOUT MYELOPATHY: Primary | ICD-10-CM

## 2018-06-26 PROCEDURE — G8979 MOBILITY GOAL STATUS: HCPCS | Mod: CK,PN | Performed by: PHYSICAL THERAPIST

## 2018-06-26 PROCEDURE — G8978 MOBILITY CURRENT STATUS: HCPCS | Mod: CI,PN | Performed by: PHYSICAL THERAPIST

## 2018-06-26 PROCEDURE — 97110 THERAPEUTIC EXERCISES: CPT | Mod: PN | Performed by: PHYSICAL THERAPIST

## 2018-06-26 NOTE — PROGRESS NOTES
"                                          Physical Therapy Daily Note           Name: Jeanne Rodgers  Clinic Number: [id number]  Date of Treatment: 06/26/2018   Diagnosis:   Encounter Diagnoses   Name Primary?    Spondylosis without myelopathy Yes    Hamstring tightness of both lower extremities     Back tightness     Weakness of back     Alteration in mobility associated with pain     Segmental and somatic dysfunction of lumbar region        Time in: 1415  Time Out:: 1525  Total Treatment Time: 70  VISITS / HERRING: 10/20 - 12/31/2018  BOLD=INDICATES ACTIVITIES PERFORMED.      Subjective  Patient states she is not having any pain at this time. She says she experience a pain episode over the weekend that was about a 3/10. Awoke this am with no pain.   Pain level - 0/10     Objective    Treatment: Patient  was instructed in and performed therapeutic exercises to develop strength, ROM, flexibility and core stabilization. Patient performed therapeutic exercises consisting of the following    Nu Step 8'   Leg press   Recumbent bike  Upright bike   UE ergometer  Treadmill   Elliptical       THERAPEUTIC EXERCISE  1-1 PT = 70'  SUPINE  -HS stretches thigh on chest 5" x 20   -hip flexor stretches 1' x 2   -knees to chest x20 SNAGS   - LTR  x20 ea.  -bridges x15   -TA / multifidus activation 5" x 12   -Knee flops x12   -adductor stretch 30" x 4        SIDELYING  -open book x15    PRONE  -sustained on elbows 3'  -multifidus activation hold 5" x 12  -leg lifts x15  -donkey kicks x15   -trunk raises x15      STANDING.  +wall squats facing wall - arms OH     SITTING  -back stretches   -HS stretches   +trunk rotation        MANUAL THERAPY: SL ABISAI of the psoas and multifidus along with gapping. Prone for cephalad and caudal oscillations as well as left sidelying oscillations. NP   MODALITY  DIRECT EDUCATION:   Provide patient with HEP at next session as per highlighted activities.      Assessment    The patient completed all " activities as noted above. She did not encounter any groin pain or was limited with her exercises. She appears to be progressing well at this time. Progress with trunk mobilizer strengthening.   Medical Necessity is demonstrated by: challenged/ difficulty  to participate in daily activities, Pain limits function of effected part for some activities, Requires skilled supervision to complete and progress treatment interventions and HEP.  Pt demo good understanding of the education provided. Patient demonstrated good return demonstration of activities.Patient's tolerance to treatment was good. Patient will continue to benefit from skilled PTintervention.Patient is making progress towards established goals.  New/Revised goals: no  Continue with established Plan of Care towards PT goals.     FOTO VISIT # 10  CMS Impairment/Limitation/Restriction for FOTO Lumbar Spine Survey  Status Limitation G-Code CMS Severity Modifier  Intake 32% 68%  Predicted 49% 51% Goal Status+ CK - At least 40 percent but less than 60 percent  6/7/2018 60% 40%  6/26/2018 94% 6% Current Status CI - At least 1 percent but less than 20 percent  D/C Status CI **only report if this is discharge survey  +Based on FOTO predicted change score    PLAN     Certification Period: 5/21/2018 to 8/21/2018  Recommended Treatment Plan: 2 times per week for 12 weeks: Manual Therapy, Patient Education and Therapeutic Exercise  Other Recommendations:   . Pt may be seen by PTA as part of the rehabilitation team.            Therapist: Nigel Skinner, PT

## 2018-06-28 ENCOUNTER — CLINICAL SUPPORT (OUTPATIENT)
Dept: REHABILITATION | Facility: HOSPITAL | Age: 63
End: 2018-06-28
Attending: PHYSICIAN ASSISTANT
Payer: MEDICARE

## 2018-06-28 DIAGNOSIS — R52 ALTERATION IN MOBILITY ASSOCIATED WITH PAIN: ICD-10-CM

## 2018-06-28 DIAGNOSIS — M47.819 SPONDYLOSIS WITHOUT MYELOPATHY: Primary | ICD-10-CM

## 2018-06-28 DIAGNOSIS — M99.03 SEGMENTAL AND SOMATIC DYSFUNCTION OF LUMBAR REGION: ICD-10-CM

## 2018-06-28 DIAGNOSIS — Z78.9 ALTERATION IN MOBILITY ASSOCIATED WITH PAIN: ICD-10-CM

## 2018-06-28 DIAGNOSIS — R29.898 WEAKNESS OF BACK: ICD-10-CM

## 2018-06-28 DIAGNOSIS — M62.9 HAMSTRING TIGHTNESS OF BOTH LOWER EXTREMITIES: ICD-10-CM

## 2018-06-28 DIAGNOSIS — M53.80 BACK TIGHTNESS: ICD-10-CM

## 2018-06-28 PROCEDURE — 97110 THERAPEUTIC EXERCISES: CPT | Mod: PN | Performed by: PHYSICAL THERAPIST

## 2018-06-28 NOTE — PROGRESS NOTES
"                                          Physical Therapy Daily Note           Name: Jeanne Rodgers  Clinic Number: [id number]  Date of Treatment: 06/28/2018   Diagnosis:   Encounter Diagnoses   Name Primary?    Spondylosis without myelopathy Yes    Hamstring tightness of both lower extremities     Back tightness     Weakness of back     Alteration in mobility associated with pain     Segmental and somatic dysfunction of lumbar region        Time in: 1410  Time Out:: 1510  Total Treatment Time: 60  VISITS / HERRING: 11/20 - 12/31/2018  BOLD=INDICATES ACTIVITIES PERFORMED.      Subjective  Patient states she is doing well and is not having any pain. There is no groin pain.   Pain level - 0/10     Objective    Treatment: Patient  was instructed in and performed therapeutic exercises to develop strength, ROM, flexibility and core stabilization. Patient performed therapeutic exercises consisting of the following    Nu Step 8'   Leg press   Recumbent bike  Upright bike   UE ergometer  Treadmill 12'   Elliptical       THERAPEUTIC EXERCISE  1-1 PT = 30'  SUPINE  -HS stretches thigh on chest 5" x 20   -hip flexor stretches 1' x 2   -knees to chest x20 SNAGS   - LTR  x20 ea.  -bridges x15   -TA / multifidus activation 5" x 12   -Knee flops x12   -adductor stretch 30" x 4        SIDELYING  -open book x15    PRONE  -sustained on elbows 3'  -multifidus activation hold 5" x 12  -leg lifts x15  -donkey kicks x15   -trunk raises x15      STANDING.  +wall squats facing wall - arms OH     SITTING  -back stretches   -HS stretches   +trunk rotation        MANUAL THERAPY: SL ABISAI of the psoas and multifidus along with gapping. Prone for cephalad and caudal oscillations as well as left sidelying oscillations. NP   MODALITY  DIRECT EDUCATION:   Provide patient with HEP at next session as per highlighted activities.      Assessment  Routine completed and performed w/o limitations. She is not encountering pain or is limited. Exhibits " good recall of activities.   Medical Necessity is demonstrated by: challenged/ difficulty  to participate in daily activities, Pain limits function of effected part for some activities, Requires skilled supervision to complete and progress treatment interventions and HEP.  Pt demo good understanding of the education provided. Patient demonstrated good return demonstration of activities.Patient's tolerance to treatment was good. Patient will continue to benefit from skilled PTintervention.Patient is making progress towards established goals.  New/Revised goals: no  Continue with established Plan of Care towards PT goals.     FOTO VISIT # 10  CMS Impairment/Limitation/Restriction for FOTO Lumbar Spine Survey  Status Limitation G-Code CMS Severity Modifier  Intake 32% 68%  Predicted 49% 51% Goal Status+ CK - At least 40 percent but less than 60 percent  6/7/2018 60% 40%  6/26/2018 94% 6% Current Status CI - At least 1 percent but less than 20 percent  D/C Status CI **only report if this is discharge survey  +Based on FOTO predicted change score    PLAN     Certification Period: 5/21/2018 to 8/21/2018  Recommended Treatment Plan: 2 times per week for 12 weeks: Manual Therapy, Patient Education and Therapeutic Exercise  Other Recommendations:   . Pt may be seen by PTA as part of the rehabilitation team.            Therapist: Nigel Skinner, PT

## 2018-07-24 DIAGNOSIS — G89.29 CHRONIC MIDLINE LOW BACK PAIN WITHOUT SCIATICA: ICD-10-CM

## 2018-07-24 DIAGNOSIS — M54.50 CHRONIC MIDLINE LOW BACK PAIN WITHOUT SCIATICA: ICD-10-CM

## 2018-07-24 DIAGNOSIS — M25.559 CHRONIC HIP PAIN, UNSPECIFIED LATERALITY: ICD-10-CM

## 2018-07-24 DIAGNOSIS — G89.29 CHRONIC HIP PAIN, UNSPECIFIED LATERALITY: ICD-10-CM

## 2018-07-25 RX ORDER — FAMOTIDINE 20 MG/1
20 TABLET, FILM COATED ORAL DAILY
Qty: 90 TABLET | Refills: 0 | Status: SHIPPED | OUTPATIENT
Start: 2018-07-25 | End: 2018-11-07

## 2018-08-07 ENCOUNTER — OFFICE VISIT (OUTPATIENT)
Dept: FAMILY MEDICINE | Facility: CLINIC | Age: 63
End: 2018-08-07
Payer: MEDICARE

## 2018-08-07 ENCOUNTER — TELEPHONE (OUTPATIENT)
Dept: FAMILY MEDICINE | Facility: CLINIC | Age: 63
End: 2018-08-07

## 2018-08-07 VITALS
TEMPERATURE: 98 F | WEIGHT: 174.19 LBS | SYSTOLIC BLOOD PRESSURE: 124 MMHG | DIASTOLIC BLOOD PRESSURE: 78 MMHG | BODY MASS INDEX: 34.2 KG/M2 | RESPIRATION RATE: 12 BRPM | HEIGHT: 60 IN | OXYGEN SATURATION: 97 % | HEART RATE: 71 BPM

## 2018-08-07 DIAGNOSIS — J30.9 ALLERGIC RHINITIS, UNSPECIFIED SEASONALITY, UNSPECIFIED TRIGGER: Primary | ICD-10-CM

## 2018-08-07 DIAGNOSIS — H92.02 EAR PAIN, LEFT: ICD-10-CM

## 2018-08-07 DIAGNOSIS — F32.A DEPRESSION, UNSPECIFIED DEPRESSION TYPE: ICD-10-CM

## 2018-08-07 DIAGNOSIS — M53.80 BACK TIGHTNESS: ICD-10-CM

## 2018-08-07 DIAGNOSIS — N39.0 URINARY TRACT INFECTION WITHOUT HEMATURIA, SITE UNSPECIFIED: ICD-10-CM

## 2018-08-07 DIAGNOSIS — R10.32 LEFT INGUINAL PAIN: ICD-10-CM

## 2018-08-07 DIAGNOSIS — R10.30 INGUINAL PAIN, UNSPECIFIED LATERALITY: ICD-10-CM

## 2018-08-07 DIAGNOSIS — M99.03 SEGMENTAL AND SOMATIC DYSFUNCTION OF LUMBAR REGION: ICD-10-CM

## 2018-08-07 DIAGNOSIS — G47.00 INSOMNIA, UNSPECIFIED TYPE: ICD-10-CM

## 2018-08-07 LAB
BILIRUB SERPL-MCNC: ABNORMAL MG/DL
BLOOD URINE, POC: ABNORMAL
COLOR, POC UA: YELLOW
GLUCOSE UR QL STRIP: ABNORMAL
KETONES UR QL STRIP: ABNORMAL
LEUKOCYTE ESTERASE URINE, POC: ABNORMAL
NITRITE, POC UA: ABNORMAL
PH, POC UA: 7
PROTEIN, POC: ABNORMAL
SPECIFIC GRAVITY, POC UA: 1.01
UROBILINOGEN, POC UA: ABNORMAL

## 2018-08-07 PROCEDURE — 99214 OFFICE O/P EST MOD 30 MIN: CPT | Mod: PBBFAC,PO | Performed by: FAMILY MEDICINE

## 2018-08-07 PROCEDURE — 99999 PR PBB SHADOW E&M-EST. PATIENT-LVL IV: CPT | Mod: PBBFAC,,, | Performed by: FAMILY MEDICINE

## 2018-08-07 PROCEDURE — 81001 URINALYSIS AUTO W/SCOPE: CPT | Mod: PBBFAC,PO | Performed by: FAMILY MEDICINE

## 2018-08-07 PROCEDURE — 99214 OFFICE O/P EST MOD 30 MIN: CPT | Mod: S$PBB,,, | Performed by: FAMILY MEDICINE

## 2018-08-07 PROCEDURE — 87086 URINE CULTURE/COLONY COUNT: CPT

## 2018-08-07 RX ORDER — LEVOCETIRIZINE DIHYDROCHLORIDE 5 MG/1
5 TABLET, FILM COATED ORAL NIGHTLY
Qty: 30 TABLET | Refills: 3 | Status: SHIPPED | OUTPATIENT
Start: 2018-08-07 | End: 2019-07-30 | Stop reason: SDUPTHER

## 2018-08-07 RX ORDER — ALPRAZOLAM 0.25 MG/1
0.25 TABLET ORAL NIGHTLY PRN
Qty: 30 TABLET | Refills: 0 | Status: SHIPPED | OUTPATIENT
Start: 2018-08-07 | End: 2019-04-23

## 2018-08-07 RX ORDER — AMOXICILLIN AND CLAVULANATE POTASSIUM 500; 125 MG/1; MG/1
1 TABLET, FILM COATED ORAL 3 TIMES DAILY
Qty: 14 TABLET | Refills: 0 | Status: SHIPPED | OUTPATIENT
Start: 2018-08-07 | End: 2018-11-07

## 2018-08-07 RX ORDER — CIPROFLOXACIN AND DEXAMETHASONE 3; 1 MG/ML; MG/ML
4 SUSPENSION/ DROPS AURICULAR (OTIC) 2 TIMES DAILY
Qty: 7.5 ML | Refills: 0 | Status: SHIPPED | OUTPATIENT
Start: 2018-08-07 | End: 2018-11-07

## 2018-08-07 NOTE — TELEPHONE ENCOUNTER
----- Message from [facility] sent at 8/7/2018  1:30 PM CDT -----  Contact: Self/[phone]  Patient called to get her lab results. Patient also stated she missing her prescription for Xanax.    Thank you

## 2018-08-07 NOTE — PROGRESS NOTES
"Chief Complaint   Patient presents with    Abdominal Pain     R lower pain no better    Jaw Pain     tender on L side     Otalgia     L ear. feels like something is crawling in her ear       HPI  Jeanne Rodgers is a 63 y.o. female with multiple medical diagnoses as listed in the medical history and problem list that presents for otalgia, abdominal pain.      L otalgia - states feels "crawling sensation", now associated with L jaw pain.    RLQ/inguinal pain - completed pap, currently in PT, hx over 6 months, states worse with movements or RLE or standing up, no change with BM/urinary or meals, during PT undergoing treatment and states no improvement. 5/`0 constant, worsens with movement. R LE numbness/tingling as well.     Hx of Breast CA - followed by Dr. Ford.     Pt is known to me and was last seen by me on 5/3/2018.    PAST MEDICAL HISTORY:  Past Medical History:   Diagnosis Date    Allergy     Breast cancer     Cancer     Mental disorder        PAST SURGICAL HISTORY:  Past Surgical History:   Procedure Laterality Date    APPENDECTOMY      BREAST BIOPSY Right 2014    BREAST LUMPECTOMY Right 2014    CARPAL TUNNEL RELEASE      CHOLECYSTECTOMY      TUBAL LIGATION         SOCIAL HISTORY:  Social History     Social History    Marital status: Single     Spouse name: N/A    Number of children: N/A    Years of education: N/A     Occupational History    Not on file.     Social History Main Topics    Smoking status: Never Smoker    Smokeless tobacco: Never Used    Alcohol use No    Drug use: No    Sexual activity: No     Other Topics Concern    Not on file     Social History Narrative    No narrative on file       FAMILY HISTORY:  Family History   Problem Relation Age of Onset    Cancer Mother 85        pancreatic cancer    Hypertension Mother     Arthritis Mother     Cancer Father 85        pancreatic    Cancer Maternal Aunt 60        pancreatic    No Known Problems Sister     " Hypertension Brother     Lung cancer Paternal Aunt     Colon cancer Paternal Uncle     Heart attack Paternal Grandmother     Heart attack Paternal Grandfather     Hypertension Brother     Hashimoto's thyroiditis Sister     Breast cancer Neg Hx     Ovarian cancer Neg Hx        ALLERGIES AND MEDICATIONS: updated and reviewed.  Review of patient's allergies indicates:  No Known Allergies  Current Outpatient Prescriptions   Medication Sig Dispense Refill    betamethasone dipropionate (DIPROLENE) 0.05 % ointment Apply topically once daily.       clobetasol 0.05% (TEMOVATE) 0.05 % Oint Use twice daily for 1 month, then daily for 1 month, then 3 times per week 60 g 2    levocetirizine (XYZAL) 5 MG tablet Take 1 tablet (5 mg total) by mouth every evening. 30 tablet 3    ALPRAZolam (XANAX) 0.25 MG tablet Take 1 tablet (0.25 mg total) by mouth nightly as needed for Anxiety. 30 tablet 0    amoxicillin-clavulanate 500-125mg (AUGMENTIN) 500-125 mg Tab Take 1 tablet (500 mg total) by mouth 3 (three) times daily. 14 tablet 0    anastrozole (ARIMIDEX) 1 mg Tab Take 1 mg by mouth once daily.      buPROPion (WELLBUTRIN) 75 MG tablet Take 1 tablet (75 mg total) by mouth 2 (two) times daily. 60 tablet 11    ciprofloxacin-dexamethasone 0.3-0.1% (CIPRODEX) 0.3-0.1 % DrpS Place 4 drops into the left ear 2 (two) times daily. 7.5 mL 0    famotidine (PEPCID) 20 MG tablet Take 1 tablet (20 mg total) by mouth once daily. 90 tablet 0     No current facility-administered medications for this visit.        ROS  Review of Systems   Constitutional: Negative for activity change, appetite change and fever.   HENT: Negative for congestion and sore throat.    Eyes: Negative for visual disturbance.   Respiratory: Negative for cough and shortness of breath.    Cardiovascular: Negative for chest pain.   Gastrointestinal: Negative for abdominal pain, diarrhea, nausea and vomiting.   Endocrine: Negative.    Genitourinary: Negative for  dysuria.   Musculoskeletal: Positive for arthralgias and myalgias. Negative for back pain.   Skin: Negative for rash.   Allergic/Immunologic: Negative.    Neurological: Negative for dizziness, weakness and headaches.   Hematological: Negative.    Psychiatric/Behavioral: Positive for dysphoric mood. Negative for agitation and confusion. The patient is nervous/anxious.        Physical Exam  Vitals:    08/07/18 0916   BP: 124/78   Pulse: 71   Resp: 12   Temp: 98 °F (36.7 °C)    Body mass index is 34.01 kg/m².  Weight: 79 kg (174 lb 2.6 oz)   Height: 5' (152.4 cm)     Physical Exam   Constitutional: She is oriented to person, place, and time. She appears well-developed and well-nourished.   HENT:   Head: Normocephalic and atraumatic.   Eyes: Conjunctivae and EOM are normal. Pupils are equal, round, and reactive to light.   Neck: Normal range of motion. Neck supple.   Cardiovascular: Normal rate, regular rhythm and normal heart sounds.    Pulmonary/Chest: Effort normal and breath sounds normal.   Abdominal: Soft. Bowel sounds are normal.   RUQ - TTP, no masses   Musculoskeletal: Normal range of motion.   Multiple joints - global TTP, dec ROM mild     Neurological: She is alert and oriented to person, place, and time. She has normal reflexes.   Skin: Skin is warm and dry.   Psychiatric: She has a normal mood and affect. Her behavior is normal. Judgment and thought content normal.       Health Maintenance       Date Due Completion Date    DEXA SCAN 01/27/1993 ---    Zoster Vaccine 01/27/2013 ---    Pneumococcal (65+) (1 of 2 - PCV13) 01/27/2018 9/18/2015    Influenza Vaccine 08/01/2018 12/26/2017    Mammogram 02/19/2020 2/19/2018    Lipid Panel 12/26/2022 12/26/2017    TETANUS VACCINE 09/18/2025 9/18/2015    Colonoscopy 01/15/2026 1/15/2016 (Done)    Override on 1/15/2016: Done ([facility] records sent to scanning)          Assessment & Plan    Allergic rhinitis, unspecified seasonality, unspecified trigger  -      levocetirizine (XYZAL) 5 MG tablet; Take 1 tablet (5 mg total) by mouth every evening.  Dispense: 30 tablet; Refill: 3    Depression, unspecified depression type  -     ALPRAZolam (XANAX) 0.25 MG tablet; Take 1 tablet (0.25 mg total) by mouth nightly as needed for Anxiety.  Dispense: 30 tablet; Refill: 0  - Continue current medication regimen as prescribed    Insomnia, unspecified type  -     ALPRAZolam (XANAX) 0.25 MG tablet; Take 1 tablet (0.25 mg total) by mouth nightly as needed for Anxiety.  Dispense: 30 tablet; Refill: 0    Inguinal pain, unspecified laterality  -     POCT URINE DIPSTICK WITH MICROSCOPE, AUTOMATED    Back tightness, Segmental and somatic dysfunction of lumbar region, Left inguinal pain  - Will follow up with Back/Spine    Urinary tract infection without hematuria, site unspecified  -     amoxicillin-clavulanate 500-125mg (AUGMENTIN) 500-125 mg Tab; Take 1 tablet (500 mg total) by mouth 3 (three) times daily.  Dispense: 14 tablet; Refill: 0  -     Urine culture  - Will treat at this time    Ear pain, left  -     ciprofloxacin-dexamethasone 0.3-0.1% (CIPRODEX) 0.3-0.1 % DrpS; Place 4 drops into the left ear 2 (two) times daily.  Dispense: 7.5 mL; Refill: 0        Follow-up in about 4 weeks (around 9/4/2018).

## 2018-08-08 LAB — BACTERIA UR CULT: NO GROWTH

## 2018-08-13 ENCOUNTER — HOSPITAL ENCOUNTER (OUTPATIENT)
Dept: RADIOLOGY | Facility: CLINIC | Age: 63
Discharge: HOME | End: 2018-08-13
Attending: INTERNAL MEDICINE
Payer: MEDICARE

## 2018-08-13 DIAGNOSIS — M94.9 DISORDER OF CARTILAGE: ICD-10-CM

## 2018-08-13 DIAGNOSIS — Z85.3 HX OF BREAST CANCER: ICD-10-CM

## 2018-08-13 PROCEDURE — 77080 DXA BONE DENSITY AXIAL: CPT | Mod: 26,,, | Performed by: INTERNAL MEDICINE

## 2018-08-13 PROCEDURE — 77080 DXA BONE DENSITY AXIAL: CPT | Mod: TC

## 2018-08-16 ENCOUNTER — OFFICE VISIT (OUTPATIENT)
Dept: HEMATOLOGY/ONCOLOGY | Facility: CLINIC | Age: 63
End: 2018-08-16
Payer: MEDICARE

## 2018-08-16 VITALS
TEMPERATURE: 98 F | RESPIRATION RATE: 20 BRPM | SYSTOLIC BLOOD PRESSURE: 139 MMHG | WEIGHT: 172.63 LBS | HEIGHT: 60 IN | BODY MASS INDEX: 33.89 KG/M2 | OXYGEN SATURATION: 97 % | DIASTOLIC BLOOD PRESSURE: 69 MMHG | HEART RATE: 77 BPM

## 2018-08-16 DIAGNOSIS — N39.0 RECURRENT UTI: ICD-10-CM

## 2018-08-16 DIAGNOSIS — R10.32 LEFT INGUINAL PAIN: ICD-10-CM

## 2018-08-16 DIAGNOSIS — M85.80 OSTEOPENIA, UNSPECIFIED LOCATION: ICD-10-CM

## 2018-08-16 DIAGNOSIS — Z85.3 HX OF BREAST CANCER: Primary | ICD-10-CM

## 2018-08-16 PROCEDURE — 99214 OFFICE O/P EST MOD 30 MIN: CPT | Mod: S$PBB,,, | Performed by: INTERNAL MEDICINE

## 2018-08-16 PROCEDURE — 99213 OFFICE O/P EST LOW 20 MIN: CPT | Mod: PBBFAC | Performed by: INTERNAL MEDICINE

## 2018-08-16 PROCEDURE — 99999 PR PBB SHADOW E&M-EST. PATIENT-LVL III: CPT | Mod: PBBFAC,,, | Performed by: INTERNAL MEDICINE

## 2018-08-16 NOTE — PROGRESS NOTES
Subjective:       Patient ID: Jeanne Rodgers is a 63 y.o. female.    Chief Complaint: Follow-up    HPI   Returns for follow-up  She had a BMD done which revealed osteopenia at hip  She was on Arimidex for 4 years until stopping (see below)  Reports recurrent UTIs - she has also noted a lower pelvic pain   She had a CT scan done by her PCP without findings  We discussed seeing urology    This is a 62 yo female with a history of right breast cancer  - Had been getting her mammograms at [facility] as she was uninsured at the time  - reports small mass seen in 2007 and sent to [facility]- where she was told nothing was seen on review  - Repeat situation of above the next year in 2008  - she noted right nipple inversion 2012- went to [facility]- told not likely cancer, no further testing done  - In 2014 it was recommended that [facility] see her for imaging  - Abnormal mammogram and biopsy recommended  Reports biopsy experience was awful and so she researched other sites to be cared  - She sought out Dr. Gallo Omalley at [facility] for her breast surgery  She opted for a lumpectomy performed 6/24/14  Pathology revealed 2.4 cm IDC and DCIS, margins negative  Negative LNs (0/6), ER+, WA+, Her 2 michelle negative  - Referred for radiation therapy at [facility]  - Saw Dr. Keller at [facility]  Oncotype done ? 9- chemotherapy not recommended  She was started on Arimidex    Prior imaging:  MRI 4/26/2018:  FINDINGS:  Lumbar spine alignment is within normal limits. The vertebral body heights are well maintained, with no fracture.  No marrow signal abnormality suspicious for an infiltrative process.  The conus is normal in appearance, and terminates at the  level.  The adjacent soft tissue structures show no significant abnormalities.  There are findings of multi-level  lumbar spondylosis, as below.  L1-L2: There is no focal disc herniation. No significant central canal or neural foraminal narrowing.  L2-L3: There is no focal disc  herniation. No significant central canal or neural foraminal narrowing.Slight hypertrophic changes of facets  L3-L4: There is no focal disc herniation. No significant central canal or neural foraminal narrowing.Mild broad-based bulging of disc material combined with hypertrophic changes of facets result in mild narrowing of central canal dimensions.  Minimal LEFT neural foraminal encroachment..  L4-L5: There is no focal disc herniation. Broad-based bulging of disc material with hypertrophic changes of facets result in mild narrowing of central canal dimensions.  Minimal bilateral neural foraminal encroachment..  L5-S1: There is no focal disc herniation. No significant central canal or neural foraminal narrowing.Mild RIGHT-sided facet edema.     CT scans A/P 5/10/18:  FINDINGS:  The visualized portion of the base of the lungs, visualized portion of the heart, stomach, spleen, pancreas, and adrenal glands are unremarkable.  The gallbladder is absent.  There is diffuse fatty infiltration of the liver which is enlarged.  The kidneys are unremarkable.  There is no hydronephrosis or nephrolithiasis.  The bladder is unremarkable.  The uterus has a normal contour.  The bowel has a normal appearance.  The appendix is not visualized.  The osseous structures demonstrate mild degenerative change.     She has been seen by Rheumatology for the joint issues +FANNY found ? of autoimmune   Also has a history of hives- no cause found despite multtiple dermatologic evaluations     Has had genetic testing  - 18 benign mammogram     Prior BMD 2017- osteopenia (done at [facility])     SH:  Worked at the [employer] as a  > 30 years     FH:  Father  of pancreatic cancer at age 85  Mother living with pancreatic cancer- currently age 94  Maternal uncle- lung cancer  Maternal aunt  of pancreatic cancer at age 62    Review of Systems   Constitutional: Positive for fatigue. Negative for activity change, appetite change, chills,  fever and unexpected weight change.   HENT: Negative for congestion, dental problem, ear pain, rhinorrhea, tinnitus and trouble swallowing.    Eyes: Negative for visual disturbance.   Respiratory: Negative for cough, chest tightness, shortness of breath and wheezing.    Cardiovascular: Negative for chest pain, palpitations and leg swelling.   Gastrointestinal: Negative for abdominal distention, abdominal pain, blood in stool, constipation, diarrhea, nausea and vomiting.   Genitourinary: Negative for decreased urine volume, difficulty urinating, dysuria, frequency and hematuria.   Musculoskeletal: Positive for arthralgias, back pain and joint swelling. Negative for gait problem and neck pain.   Skin: Negative for pallor and rash.   Neurological: Negative for dizziness, weakness, light-headedness, numbness and headaches.   Hematological: Negative for adenopathy. Does not bruise/bleed easily.   Psychiatric/Behavioral: Negative for confusion, dysphoric mood and sleep disturbance.       Objective:      Physical Exam   Constitutional: She is oriented to person, place, and time. She appears well-developed and well-nourished. No distress.   Presents alone   HENT:   Head: Normocephalic and atraumatic.   Mouth/Throat: Oropharynx is clear and moist. No oropharyngeal exudate.   Eyes: Conjunctivae and EOM are normal. Pupils are equal, round, and reactive to light. Right eye exhibits no discharge. Left eye exhibits no discharge. No scleral icterus. Right pupil is round and reactive. Left pupil is round and reactive.   Neck: Normal range of motion. Neck supple. No tracheal deviation present. No thyromegaly present.   Cardiovascular: Normal rate, regular rhythm, normal heart sounds and intact distal pulses. Exam reveals no gallop and no friction rub.   No murmur heard.  Pulmonary/Chest: Effort normal and breath sounds normal. No respiratory distress. She has no wheezes. She has no rales. She exhibits no tenderness.   Abdominal:  Soft. Bowel sounds are normal. She exhibits no distension and no mass. There is no hepatosplenomegaly. There is no tenderness. There is no rebound and no guarding.   Musculoskeletal: Normal range of motion. She exhibits edema, tenderness and deformity.   Lymphadenopathy:        Head (right side): No submandibular adenopathy present.        Head (left side): No submandibular adenopathy present.     She has no cervical adenopathy.        Right cervical: No superficial cervical, no deep cervical and no posterior cervical adenopathy present.       Left cervical: No superficial cervical, no deep cervical and no posterior cervical adenopathy present.        Right: No inguinal and no supraclavicular adenopathy present.        Left: No inguinal and no supraclavicular adenopathy present.   Neurological: She is alert and oriented to person, place, and time. She has normal strength and normal reflexes. No cranial nerve deficit or sensory deficit. Coordination normal.   Skin: Skin is warm and dry. No bruising, no lesion, no petechiae and no rash noted. She is not diaphoretic. No erythema. No pallor.   Psychiatric: She has a normal mood and affect. Her behavior is normal. Judgment and thought content normal. Her mood appears not anxious. She does not exhibit a depressed mood.   Nursing note and vitals reviewed.      Assessment:       1. Hx of breast cancer    2. Left inguinal pain    3. Recurrent UTI    4. Osteopenia, unspecified location        Plan:     1. Discussed any role for further endocrine therapy   Her option would be Tamoxifen which could be helpful with bones but not clear would add much benefit at this point  mammo due in 2/19  2-3. UroGyn referral placed  4. See above text  Monitor again in 1 year  RTC 6 months

## 2018-08-21 ENCOUNTER — LAB VISIT (OUTPATIENT)
Dept: LAB | Facility: HOSPITAL | Age: 63
End: 2018-08-21
Attending: INTERNAL MEDICINE
Payer: MEDICARE

## 2018-08-21 DIAGNOSIS — Z85.3 HX OF BREAST CANCER: ICD-10-CM

## 2018-08-21 DIAGNOSIS — M85.80 OSTEOPENIA, UNSPECIFIED LOCATION: ICD-10-CM

## 2018-08-21 LAB
25(OH)D3+25(OH)D2 SERPL-MCNC: 35 NG/ML
ALBUMIN SERPL BCP-MCNC: 3.9 G/DL
ALP SERPL-CCNC: 102 U/L
ALT SERPL W/O P-5'-P-CCNC: 38 U/L
ANION GAP SERPL CALC-SCNC: 8 MMOL/L
AST SERPL-CCNC: 42 U/L
BASOPHILS # BLD AUTO: 0.03 K/UL
BASOPHILS NFR BLD: 0.5 %
BILIRUB SERPL-MCNC: 0.5 MG/DL
BUN SERPL-MCNC: 10 MG/DL
CALCIUM SERPL-MCNC: 9.5 MG/DL
CHLORIDE SERPL-SCNC: 107 MMOL/L
CO2 SERPL-SCNC: 26 MMOL/L
CREAT SERPL-MCNC: 0.8 MG/DL
DIFFERENTIAL METHOD: ABNORMAL
EOSINOPHIL # BLD AUTO: 0.2 K/UL
EOSINOPHIL NFR BLD: 2.6 %
ERYTHROCYTE [DISTWIDTH] IN BLOOD BY AUTOMATED COUNT: 12 %
EST. GFR  (AFRICAN AMERICAN): >60 ML/MIN/1.73 M^2
EST. GFR  (NON AFRICAN AMERICAN): >60 ML/MIN/1.73 M^2
GLUCOSE SERPL-MCNC: 93 MG/DL
HCT VFR BLD AUTO: 41.8 %
HGB BLD-MCNC: 13.8 G/DL
IMM GRANULOCYTES # BLD AUTO: 0.02 K/UL
IMM GRANULOCYTES NFR BLD AUTO: 0.3 %
LYMPHOCYTES # BLD AUTO: 1.9 K/UL
LYMPHOCYTES NFR BLD: 29.1 %
MCH RBC QN AUTO: 32.4 PG
MCHC RBC AUTO-ENTMCNC: 33 G/DL
MCV RBC AUTO: 98 FL
MONOCYTES # BLD AUTO: 0.6 K/UL
MONOCYTES NFR BLD: 8.4 %
NEUTROPHILS # BLD AUTO: 3.9 K/UL
NEUTROPHILS NFR BLD: 59.1 %
NRBC BLD-RTO: 0 /100 WBC
PLATELET # BLD AUTO: 232 K/UL
PMV BLD AUTO: 9.7 FL
POTASSIUM SERPL-SCNC: 4.4 MMOL/L
PROT SERPL-MCNC: 7.1 G/DL
RBC # BLD AUTO: 4.26 M/UL
SODIUM SERPL-SCNC: 141 MMOL/L
WBC # BLD AUTO: 6.57 K/UL

## 2018-08-21 PROCEDURE — 85025 COMPLETE CBC W/AUTO DIFF WBC: CPT

## 2018-08-21 PROCEDURE — 36415 COLL VENOUS BLD VENIPUNCTURE: CPT | Mod: PO

## 2018-08-21 PROCEDURE — 82306 VITAMIN D 25 HYDROXY: CPT

## 2018-08-21 PROCEDURE — 80053 COMPREHEN METABOLIC PANEL: CPT

## 2018-08-24 ENCOUNTER — TELEPHONE (OUTPATIENT)
Dept: SPINE | Facility: CLINIC | Age: 63
End: 2018-08-24

## 2018-09-04 ENCOUNTER — OFFICE VISIT (OUTPATIENT)
Dept: SPINE | Facility: CLINIC | Age: 63
End: 2018-09-04
Payer: MEDICARE

## 2018-09-04 VITALS
HEIGHT: 60 IN | SYSTOLIC BLOOD PRESSURE: 132 MMHG | HEART RATE: 88 BPM | DIASTOLIC BLOOD PRESSURE: 74 MMHG | WEIGHT: 172 LBS | BODY MASS INDEX: 33.77 KG/M2

## 2018-09-04 DIAGNOSIS — M47.26 OTHER SPONDYLOSIS WITH RADICULOPATHY, LUMBAR REGION: Primary | ICD-10-CM

## 2018-09-04 DIAGNOSIS — G89.29 CHRONIC BILATERAL LOW BACK PAIN WITH RIGHT-SIDED SCIATICA: ICD-10-CM

## 2018-09-04 DIAGNOSIS — M54.41 CHRONIC BILATERAL LOW BACK PAIN WITH RIGHT-SIDED SCIATICA: ICD-10-CM

## 2018-09-04 DIAGNOSIS — M51.36 DDD (DEGENERATIVE DISC DISEASE), LUMBAR: ICD-10-CM

## 2018-09-04 PROCEDURE — 99214 OFFICE O/P EST MOD 30 MIN: CPT | Mod: S$PBB,,, | Performed by: PHYSICIAN ASSISTANT

## 2018-09-04 PROCEDURE — 99999 PR PBB SHADOW E&M-EST. PATIENT-LVL III: CPT | Mod: PBBFAC,,, | Performed by: PHYSICIAN ASSISTANT

## 2018-09-04 PROCEDURE — 99213 OFFICE O/P EST LOW 20 MIN: CPT | Mod: PBBFAC | Performed by: PHYSICIAN ASSISTANT

## 2018-09-04 NOTE — PROGRESS NOTES
Subjective:     Patient ID:  Jeanne Rodgers is a 63 y.o. female.    [facility]    Chief Complaint: Low back and right leg pain    HPI    Jeanne Rodgers is a 63 y.o. female who presents for follow up.  Physical therapy did not help.  Diclofenac and zanaflex has not helped.  She continues to have pain in the right low back with radiation to the right groin an dlower abdomen region and in the pelvic area.  She also gets pain and numbness in the bilateral anterior and lateral legs to the knees mainly when standing.      She is scheduled to see UroGyn this Thursday.    Patient denies any recent accidents or trauma, no saddle anesthesias, and no bowel or bladder incontinence.      Review of Systems:  Constitution: Negative for chills, fever, night sweats and weight loss.   Musculoskeletal: Negative for falls.   Gastrointestinal: Negative for bowel incontinence, nausea and vomiting.   Genitourinary: Negative for bladder incontinence.   Neurological: Negative for disturbances in coordination and loss of balance.      Objective:      Vitals:    09/04/18 1320   BP: 132/74   Pulse: 88   Weight: 78 kg (172 lb)   Height: 5' (1.524 m)   PainSc:   6   PainLoc: Back         Physical Exam:    General:  Jeanne Rodgers is well-developed, well-nourished, appears stated age, in no acute distress, alert and oriented to person, place, and time.    Patient sits comfortably in the exam room and answers questions appropriately. Grossly patient is able to move bilateral lower extremities without difficulty.     XRAY/MRI Interpretation:      Lumbar spine ap/lateral/flexion/extension xrays were personally reviewed today.  No fractures.  No movement on flexion and extension.     Lumbar spine MRI was personally reviewed today.  Lumbarlized sacrum.  Small disc bulge at L5-S1 and moderate right lateral recess stenosis.  Small right L4-5 facet synovial cyst.  L4-5 small central hnp.  No HNP or foraminal stenosis at L1-2 from the  sagittal images.    Assessment:          1. Other spondylosis with radiculopathy, lumbar region    2. DDD (degenerative disc disease), lumbar    3. Chronic bilateral low back pain with right-sided sciatica            Plan:             Small central disc bulge L4-5 with small right facet cyst on the right at L4-5.  Bilateral facet joint degeneration at L4-5 and L5-S1    -This can be a source of back pain and bilateral leg pain and numbness  -Recommend L5-S1 IL BRYCE through pain clinic and continue with Neurontin    Groin, abdomen, and pelvic pain not coming from the spine    She will [facility] message back if she decides to do the spine injection      Follow-Up:  Follow-up if symptoms worsen or fail to improve. If there are any questions prior to this, the patient was instructed to contact the office.       MILY Kingston, PA-C  Neurosurgery  [facility]  [facility]

## 2018-09-06 ENCOUNTER — TELEPHONE (OUTPATIENT)
Dept: OBSTETRICS AND GYNECOLOGY | Facility: CLINIC | Age: 63
End: 2018-09-06

## 2018-09-06 ENCOUNTER — OFFICE VISIT (OUTPATIENT)
Dept: OBSTETRICS AND GYNECOLOGY | Facility: CLINIC | Age: 63
End: 2018-09-06
Payer: MEDICARE

## 2018-09-06 ENCOUNTER — TELEPHONE (OUTPATIENT)
Dept: INTERNAL MEDICINE | Facility: CLINIC | Age: 63
End: 2018-09-06

## 2018-09-06 ENCOUNTER — TELEPHONE (OUTPATIENT)
Dept: HEMATOLOGY/ONCOLOGY | Facility: CLINIC | Age: 63
End: 2018-09-06

## 2018-09-06 ENCOUNTER — INITIAL CONSULT (OUTPATIENT)
Dept: UROGYNECOLOGY | Facility: CLINIC | Age: 63
End: 2018-09-06
Payer: MEDICARE

## 2018-09-06 ENCOUNTER — TELEPHONE (OUTPATIENT)
Dept: UROGYNECOLOGY | Facility: CLINIC | Age: 63
End: 2018-09-06

## 2018-09-06 VITALS
HEIGHT: 60 IN | WEIGHT: 176.13 LBS | DIASTOLIC BLOOD PRESSURE: 82 MMHG | SYSTOLIC BLOOD PRESSURE: 124 MMHG | BODY MASS INDEX: 34.58 KG/M2

## 2018-09-06 VITALS
SYSTOLIC BLOOD PRESSURE: 118 MMHG | WEIGHT: 176.81 LBS | BODY MASS INDEX: 34.71 KG/M2 | HEIGHT: 60 IN | DIASTOLIC BLOOD PRESSURE: 74 MMHG

## 2018-09-06 DIAGNOSIS — M79.18 MYALGIA OF PELVIC FLOOR: ICD-10-CM

## 2018-09-06 DIAGNOSIS — L90.0 LICHEN SCLEROSUS ET ATROPHICUS: ICD-10-CM

## 2018-09-06 DIAGNOSIS — R10.2 PELVIC PAIN IN FEMALE: ICD-10-CM

## 2018-09-06 DIAGNOSIS — R31.9 HEMATURIA, UNSPECIFIED TYPE: Primary | ICD-10-CM

## 2018-09-06 DIAGNOSIS — R19.5 LOOSE STOOLS: ICD-10-CM

## 2018-09-06 DIAGNOSIS — R35.1 NOCTURIA MORE THAN TWICE PER NIGHT: ICD-10-CM

## 2018-09-06 DIAGNOSIS — R19.8 IRREGULAR BOWEL HABITS: ICD-10-CM

## 2018-09-06 DIAGNOSIS — R35.0 INCREASED FREQUENCY OF URINATION: Primary | ICD-10-CM

## 2018-09-06 LAB
BACTERIA #/AREA URNS AUTO: ABNORMAL /HPF
BILIRUB UR QL STRIP: NEGATIVE
CLARITY UR REFRACT.AUTO: ABNORMAL
COLOR UR AUTO: YELLOW
GLUCOSE UR QL STRIP: NEGATIVE
HGB UR QL STRIP: ABNORMAL
KETONES UR QL STRIP: NEGATIVE
LEUKOCYTE ESTERASE UR QL STRIP: ABNORMAL
MICROSCOPIC COMMENT: ABNORMAL
NITRITE UR QL STRIP: NEGATIVE
PH UR STRIP: 5 [PH] (ref 5–8)
PROT UR QL STRIP: NEGATIVE
RBC #/AREA URNS AUTO: 8 /HPF (ref 0–4)
SP GR UR STRIP: 1.02 (ref 1–1.03)
SQUAMOUS #/AREA URNS AUTO: 21 /HPF
URN SPEC COLLECT METH UR: ABNORMAL
UROBILINOGEN UR STRIP-ACNC: NEGATIVE EU/DL
WBC #/AREA URNS AUTO: >100 /HPF (ref 0–5)
WBC CLUMPS UR QL AUTO: ABNORMAL

## 2018-09-06 PROCEDURE — 87077 CULTURE AEROBIC IDENTIFY: CPT | Mod: 59

## 2018-09-06 PROCEDURE — 87186 SC STD MICRODIL/AGAR DIL: CPT | Mod: 59

## 2018-09-06 PROCEDURE — 87088 URINE BACTERIA CULTURE: CPT | Mod: 59

## 2018-09-06 PROCEDURE — 87086 URINE CULTURE/COLONY COUNT: CPT | Mod: 59

## 2018-09-06 PROCEDURE — 87086 URINE CULTURE/COLONY COUNT: CPT

## 2018-09-06 PROCEDURE — 87184 SC STD DISK METHOD PER PLATE: CPT

## 2018-09-06 PROCEDURE — 99999 PR PBB SHADOW E&M-EST. PATIENT-LVL III: CPT | Mod: PBBFAC,,, | Performed by: OBSTETRICS & GYNECOLOGY

## 2018-09-06 PROCEDURE — 99213 OFFICE O/P EST LOW 20 MIN: CPT | Mod: PBBFAC,27,25 | Performed by: OBSTETRICS & GYNECOLOGY

## 2018-09-06 PROCEDURE — 99213 OFFICE O/P EST LOW 20 MIN: CPT | Mod: PBBFAC,25 | Performed by: NURSE PRACTITIONER

## 2018-09-06 PROCEDURE — 87077 CULTURE AEROBIC IDENTIFY: CPT

## 2018-09-06 PROCEDURE — 87088 URINE BACTERIA CULTURE: CPT

## 2018-09-06 PROCEDURE — 51701 INSERT BLADDER CATHETER: CPT | Mod: S$PBB,,, | Performed by: OBSTETRICS & GYNECOLOGY

## 2018-09-06 PROCEDURE — 81001 URINALYSIS AUTO W/SCOPE: CPT

## 2018-09-06 PROCEDURE — 99215 OFFICE O/P EST HI 40 MIN: CPT | Mod: 25,S$PBB,, | Performed by: OBSTETRICS & GYNECOLOGY

## 2018-09-06 PROCEDURE — 87186 SC STD MICRODIL/AGAR DIL: CPT

## 2018-09-06 PROCEDURE — 99999 PR PBB SHADOW E&M-EST. PATIENT-LVL III: CPT | Mod: PBBFAC,,, | Performed by: NURSE PRACTITIONER

## 2018-09-06 PROCEDURE — 51701 INSERT BLADDER CATHETER: CPT | Mod: PBBFAC | Performed by: OBSTETRICS & GYNECOLOGY

## 2018-09-06 PROCEDURE — 99499 UNLISTED E&M SERVICE: CPT | Mod: S$PBB,,, | Performed by: NURSE PRACTITIONER

## 2018-09-06 RX ORDER — OXYBUTYNIN CHLORIDE 10 MG/1
10 TABLET, EXTENDED RELEASE ORAL DAILY
Qty: 30 TABLET | Refills: 12 | Status: SHIPPED | OUTPATIENT
Start: 2018-09-06 | End: 2018-10-06

## 2018-09-06 NOTE — PATIENT INSTRUCTIONS
Bladder Irritants  Certain foods and drinks have been associated with worsening symptoms of urinary frequency, urgency, urge incontinence, or bladder pain. If you suffer from any of these conditions, you may wish to try eliminating one or more of these foods from your diet and see if your symptoms improve. If bladder symptoms are related to dietary factors, strict adherence to a diet thateliminates the food should bring marked relief in 10 days. Once you are feeling better, you can begin to add foods back into your diet, one at a time. If symptoms return, you will be able to identify the irritant. As you add foods back to your diet it is very important that you drink significant amounts of water.    -----------------------------------------------------------------------------------------------  List of Common Bladder Irritants*  Alcoholic beverages  Apples and apple juice  Cantaloupe  Carbonated beverages  Chili and spicy foods  Chocolate  Citrus fruit  Coffee (including decaffeinated)  Cranberries and cranberry juice  Grapes  Guava  Milk Products: milk, cheese, cottage cheese, yogurt, ice cream  Peaches  Pineapple  Plums  Strawberries  Sugar especially artificial sweeteners, saccharin, aspartame, corn sweeteners, honey, fructose, sucrose, lactose  Tea  Tomatoes and tomato juice  Vitamin B complex  Vinegar  *Most people are not sensitive to ALL of these products; your goal is to find the foods that make YOUR symptoms worse.  ---------------------------------------------------------------------------------------------------    Low-acid fruit substitutions include apricots, papaya, pears and watermelon. Coffee drinkers can drink Kava or other lowacid instant drinks. Tea drinkers can substitute non-citrus herbal and sun brewed teas. Calcium carbonate co-buffered with calcium ascorbate can be substituted for Vitamin C. Prelief is a dietary supplement that works as an acid blocker for the bladder.    Where to get more  information:        Overcoming Bladder Disorders by Nae Wolf and Ramana Rodas, 1990        You Dont Have to Live with Cystitis! By Zandra Tejeda, 1988  · http://www.urologymanagement.org/oab    ----------------------------------------------------------------------------    1)  R-sided, suprapubic, pelvic pain:    A)  Is this related to levator/obturator muscle pain?  --on exam, pain triggered by this  --start pelvic floor PT.  GOAL to learn to relax sore muscles and then strengthen PF/core/low back muscles to better support.  Call in a few days to make appt. Erika Mast ([employer]): (p) [phone].  (f) [phone].  If not covered, let me know ASA so can send you elsewhere.     B)  Is this related to bladder spasms?    --urine C&S  --Empty bladder every 3 hours.  Empty well: wait a minute, lean forward on toilet.    --Avoid dietary irritants (see sheet).  Keep diary x 3-5 days to determine your irritants.  --start pelvic floor PT  --URGE: start oxybutynin 10 xl daily.  Takes 2-4 weeks to see if will have effect.  For dry mouth: get sour, sugar free lozenge or gum.      --Nocturia (nighttime urination): stop fluids 2 hours before bed/no water by bed.  If have leg swelling:  Elevate feet above chest x 1 hour before bed to get excess fluid off.  Can also use support hose (knee highs).      C)  ? Uterine tenderness on exam:  --recent pap 2018 normal  --no h/o STI  --get pelvic US    D)  Treat lichen sclerosis:  --EVERY PM apply steroid cream.  Apply dime-sized amount with finger to vaginal opening, inner lips, and all external areas (including around anus) that are irritated. Also apply small amount inside vagina with finger (insert to knuckle).    E)  Work on improving loose stool/erratic BMs:  --look at diet for irritants and avoid if noted  --work on bulking stool.  Start daily fiber.  Take 1 tsp of fiber powder (psyllium or other  sugar-free powder).  Mix in 8 oz of water.  Take x 3-5 days.  Then, increase fiber by 1 tsp every 3-5 days until stool is easy to pass, more well-formed, and less diarrhea.  Stop and continue at that dose.   Do not exceed 6 tsps/day.    2)  RTC 2 months.

## 2018-09-06 NOTE — TELEPHONE ENCOUNTER
"Returned call to pt.   Pt stated that she saw Dr. Ford on 8/16 and Dr. Ford was going to refer to uro/gyn and had recommended two doctors.   Pt stated that she doesn't know "how scheduling got screwed up but she saw a gynecologist and was the wrong doctor."   Informed pt that referral was correct- amb consult to uro/gyn but would check with MD and .  Pt stated she saw Dr. Rose and they did a urine sample which showed blood in the urine and she is in "pain" and "waited awhile for this appt and it was the wrong doctor."   Apologized to pt and informed would check into.   Pt verbalized understanding.       Message fwd.         ----- Message from Teresa Mendoza sent at 9/6/2018  9:16 AM CDT -----  Contact: pt  Pt called to speak with nurse has some questions  Callback#[phone]  Thank You  RONDA Mendoza    "

## 2018-09-06 NOTE — PROGRESS NOTES
"Pt incorrectly put on my schedule today with c/o severe RLQ pain, UI, blood in urine for "weeks" with negative Urine Culture and NL CT. States "supposed to see UroGynecology". Unable to get pt in to see either Urology or Uro-Gyn today, pt refuses ED visit. Appt in  across the street given. Email sent to Dr Ford. Riya Rose NP  "

## 2018-09-06 NOTE — TELEPHONE ENCOUNTER
----- Message from Nano Ford MD sent at 9/6/2018 11:54 AM CDT -----  I just reviewed my order and it was correctly placed so I will look into asap  THanks  Nano    ----- Message -----  From: LUZ Rose NP  Sent: 9/6/2018   9:27 AM  To: Nano Ford MD    This pt was scheduled for me instead of Uro-gynecology or Urology. I was unable to get pt in to see Urologist or Urogynecologist today and pt reports severe RLQ pain, blood in urine and refuses ED visit. UC appointment schedule for across the street. I know this is not what she really needs,but she is asking for something for pain. Maybe you can get her in with Urology or Uro-Gynecology? Thanks, Riya Rose NP

## 2018-09-06 NOTE — LETTER
September 6, 2018      Nano Ford MD  [address]  [facility] [address]           [facility] 5th Floor  [address]  [facility] [address]  Phone: [phone]          Patient: Jeanne Rodgers   MR Number: [id number]   YOB: 1955   Date of Visit: 9/6/2018       Dear Dr. Nano Ford:    Thank you for referring Jeanne Rodgers to me for evaluation. Attached you will find relevant portions of my assessment and plan of care.    If you have questions, please do not hesitate to call me. I look forward to following Jeanne Rodgers along with you.    Sincerely,    LUZ Rose NP    Enclosure  CC:  No Recipients    If you would like to receive this communication electronically, please contact [email or web address] or [phone] to request more information on [employer] access.    For providers and/or their staff who would like to refer a patient to [facility], please contact us through our one-stop-shop provider referral line, [facility], at [phone].    If you feel you have received this communication in error or would no longer like to receive these types of communications, please e-mail [email or web address]

## 2018-09-06 NOTE — TELEPHONE ENCOUNTER
"Called pt to inform her that she can be seen sooner than scheduled appt after reviewing her chart. Pt stated that she was on her way to the OB/GYN clinic on [address] for her 11:30AM appt, informed pt that based on her chart she is to come here at [facility] on [address]. Pt stated that she was "confused and will contact 's office to fix everything because she does not want to be seen here in the wellness building only OB/GYN, we will only give pain meds"    Pt requested to keep scheduled appt with NP at [facility] for now, she will call back   "

## 2018-09-06 NOTE — TELEPHONE ENCOUNTER
Returned call to pt.   Reiterated that message was sent high alert to  and will try our best to get her seen by uro-gyn today is possible.   Pt verbalized understanding.             ----- Message from Alcira Gayle sent at 9/6/2018  9:57 AM CDT -----  Contact: Pt  Pt says she need to speak with nurse regarding a oncology visit says she need to get in today    Pt is requesting a callback at [phone]    Thanks

## 2018-09-06 NOTE — TELEPHONE ENCOUNTER
Spoke to pt, pt called to get scheduled for consult, first available was offered with Dr. Casarez on 10/31 or Dr. Nj at 10/5. Pt refused stating those appointment days/times are to far out and that she is in a lot of pain and wants to be seen today, she also states that it's not fair to her that her appointments got screwed and she should be seen today in UROGYN, as she already waited a month to be seen in OB/GYN and it was the wrong dept. Pt requested to speak to the Manager of our dept.    Dr. Casarez spoke to pt and offered her an appointment today at 2p at the [facility] [address], pt consents to appointment.

## 2018-09-06 NOTE — TELEPHONE ENCOUNTER
----- Message from Julissa Quintanilla sent at 9/6/2018  9:18 AM CDT -----  Contact: Jeanne   Name of Who is Calling: Jeanne       What is the request in detail: Patient is requesting to schedule a appointment she was referred CYDNEY Man pt is having frequent UTI and lower right pelvic pain there were no appointments available       Can the clinic reply by [facility]: yes      What Number to Call Back [facility]: [phone]

## 2018-09-06 NOTE — PROGRESS NOTES
"[facility] [address] [place] [address]    eJanne Rodgers  [id number]  1955 September 8, 2018    Consulting Physician: Self, Aaareferral   GYN: none  Primary M.D.: Jac Andersen MD    Chief Complaint   Patient presents with    Consult     Recurrent UTI's    Pelvic Pain       HPI:     1)  UI:  (+) NIDIA (while bending with full bladder) = (+) UUI,, since pain started.   (+) pads (cotton liners):4/day, usually minimum wetness.  None at night--wears panties at night to "air out."  Does have to change panties 1st AM.  Daytime frequency: Q 2 hours. Increased urgency.  Nocturia: Yes: 4/night.   (+) dysuria,  (--) hematuria,  (--) frequent UTIs.  (--) complete bladder emptying. DV with small 2nd volume.      2)  POP:  Absent.  (--) vaginal bleeding. (--) vaginal discharge. (--) sexually active.  (+) h/o dyspareunia. Only with 1 partner, started in 50s.  Had some vaginal dryness.   (+)  Vaginal dryness.  (--) vaginal estrogen use. +h/o BRCA.     3)  BM:  (--) constipation/straining.  (+) chronic diarrhea.  Has some fecal urgency.  (--) hematochezia.  (--) fecal incontinence.  (+) fecal smearing/urgency.  (--) complete evacuation.  Does not splint.     4)  R flank/upper back/pelvic pain:  --started a few months ago, gradual onset; sharp; starts R SP area; was intermittent in beginning, now more constant; is associated with urge to urinate at times  --has had increase in daytime frequency and nocturia since pain started  --constant but worse with movement  --makes rising from chair difficult  --can't lay on R side/bend, makes more painful  --saw GYN summer 2018: was Dx with lichen sclerosis; using clobetasol 1-2 times/week--burning/discomfort resolved  --had UTI recently--was Tx with ABX; had recurrent symptoms a few weeks later; typical symptoms: vaginal burning/itching/feels like the RLQ is "hot" when she has a UTI  --was seen by neuro--didn't think pain was back/spine " related  --has tried ibuprofen/Aleve w/o relief  --before was Dx with BRCA started having itching throughout body    --5/18 CT A/P: NORMAL.  The visualized portion of the base of the lungs, visualized portion of the heart, stomach, spleen, pancreas, and adrenal glands are unremarkable.  The gallbladder is absent.  There is diffuse fatty infiltration of the liver which is enlarged.  The kidneys are unremarkable.  There is no hydronephrosis or nephrolithiasis.  The bladder is unremarkable.  The uterus has a normal contour.  The bowel has a normal appearance.  The appendix is not visualized.  The osseous structures demonstrate mild degenerative change.  --4/2018 MRI: Degenerative changes as above.  No focal protrusion or extrusion of disc material. 5/18 MRI reviewed by back/spine PA:  Lumbarlized sacrum.  Small disc bulge at L5-S1 and moderate right lateral recess stenosis.  Small right L4-5 facet synovial cyst.  No HNP or foraminal stenosis at L1-2 from the sagittal images.    --1/2018 rheumatology consult ([facility]):  1.  Clinically she only has evidence of osteoarthritis.  She has no evidence to suggest an inflammatory arthritis.  Aromatase inhibitors are known to increase osteoarthritic pain.  2.  Positive FANNY, low titer.  It had been -2 years ago.  I do not think the positive FANNY is related to her joint problems.  It could be related to her dry eye and dry mouth.  It could be related to the urticaria.  It could be a false positive test.   Plans:  1.  Increased ibuprofen to 3 times daily  2.  Resume gabapentin but increased the dose to 600 mg 3 times daily  3.  Return to see me in 4 months    Past Medical History  Past Medical History:   Diagnosis Date    Allergy     Anxiety     Breast cancer 2014    Chronic back pain     Lichen sclerosus     Mental disorder    Anxiety/depression: No SI/HI. Feels controlled currently.  No current meds.  Has been on meds in past.      Past Surgical History  Past Surgical History:    Procedure Laterality Date    APPENDECTOMY      BREAST BIOPSY Right 2014    BREAST LUMPECTOMY Right 2014    [facility] & DCIS, neg margins, 0/6 SN, Stage II, ER/DE+, HER-2 neg Adjuvant XRT and hormonal therapy     CARPAL TUNNEL RELEASE      CHOLECYSTECTOMY      TUBAL LIGATION     RLQ incision appy  LSC niki  LSC BTL    Hysterectomy: No    Past Ob History  G0    Gynecologic History  LMP: No LMP recorded (lmp unknown). Patient is postmenopausal.  Age of menarche: 9 yo  Age of menopause: early 50s.   Menstrual history: h/o normal  Pap test: 6/18, normal.  History of abnormal paps: No.  History of STIs:  No  Mammogram: Date of last: 2/18.  Result: Normal.  S/p BRCA.  S/p arimidex x 3.5 years.  Followed by Delfina.   Col[facility]: [facility]: [facility]  Result:  Benign polyps.  Repeat due:  Due 2018-19.    DEXA:  Date of last: 8/18.  Osteopenia.  FRAX calculation does not support treatment.  RECOMMENDATIONS of [facility]:  1.  taking Ca/D  2.  Recommended therapy No additional pharmacologic therapy recommended at this time.  3.  Repeat BMD in 2 - 4 years    Family History  Family History   Problem Relation Age of Onset    Cancer Mother 85        pancreatic cancer    Hypertension Mother     Arthritis Mother     Cancer Father 85        pancreatic    Cancer Maternal Aunt 60        pancreatic    No Known Problems Sister     Hypertension Brother     Lung cancer Paternal Aunt     Colon cancer Paternal Uncle     Heart attack Paternal Grandmother     Heart attack Paternal Grandfather     Hypertension Brother     Hashimoto's thyroiditis Sister     Breast cancer Neg Hx     Ovarian cancer Neg Hx       Colon CA: Yes - PU  Breast CA: No  GYN CA: No   CA: No    Social History  Social History     Tobacco Use   Smoking Status Never Smoker   Smokeless Tobacco Never Used     Social History     Substance and Sexual Activity   Alcohol Use No     Alcohol/week: 0.0 oz   .    Social History     Substance and Sexual Activity   Drug Use No     The patient is single.  Resides in [place] [address].  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     Allergies  Review of patient's allergies indicates:  No Known Allergies    Medications  Current Outpatient Medications on File Prior to Visit   Medication Sig Dispense Refill    ALPRAZolam (XANAX) 0.25 MG tablet Take 1 tablet (0.25 mg total) by mouth nightly as needed for Anxiety. 30 tablet 0    amoxicillin-clavulanate 500-125mg (AUGMENTIN) 500-125 mg Tab Take 1 tablet (500 mg total) by mouth 3 (three) times daily. 14 tablet 0    betamethasone dipropionate (DIPROLENE) 0.05 % ointment Apply topically once daily.       buPROPion (WELLBUTRIN) 75 MG tablet Take 1 tablet (75 mg total) by mouth 2 (two) times daily. 60 tablet 11    ciprofloxacin-dexamethasone 0.3-0.1% (CIPRODEX) 0.3-0.1 % DrpS Place 4 drops into the left ear 2 (two) times daily. 7.5 mL 0    clobetasol 0.05% (TEMOVATE) 0.05 % Oint Use twice daily for 1 month, then daily for 1 month, then 3 times per week 60 g 2    famotidine (PEPCID) 20 MG tablet Take 1 tablet (20 mg total) by mouth once daily. 90 tablet 0    levocetirizine (XYZAL) 5 MG tablet Take 1 tablet (5 mg total) by mouth every evening. 30 tablet 3     No current facility-administered medications on file prior to visit.        Review of Systems A 14 point ROS was reviewed with pertinent positives as noted above in the history of present illness.      Constitutional: negative  Eyes: negative  Endocrine: negative  Gastrointestinal: negative  Cardiovascular: negative  Respiratory: negative  Allergic/Immunologic: negative  Integumentary: negative  Psychiatric: negative  Musculoskeletal: negative   Ear/Nose/Throat: negative  Neurologic: negative  Genitourinary: SEE HPI  Hematologic/Lymphatic: negative   Breast: negative    Urogynecologic Exam  /74 (BP Location: Right arm,  Patient Position: Sitting, BP Method: Large (Manual))   Ht 5' (1.524 m)   Wt 80.2 kg (176 lb 12.9 oz)   LMP  (LMP Unknown) Comment: age 53  BMI 34.53 kg/m²     GENERAL APPEARANCE:  The patient is well-developed, well-nourished.  Neck:  Supple with no thyromegaly, no carotid bruits.  Heart:  Regular rate and rhythm, no murmurs, rubs or gallops.  Lungs:  Clear.  No CVA tenderness.  Abdomen:  Soft, nontender, nondistended, no hepatosplenomegaly.  Incisions:  LSC and sup vertical midline (from niki) well-healed    PELVIC:    External genitalia:  Normal Bartholins, Skenes and labia bilaterally.  +lichen changes at introitus, regression of minora, inner labia beefy/red with hypopigmentation c/w lichen process.   Urethra:  No caruncle, diverticulum or masses.  (+) hypermobility.    Vagina:  Atrophy (+) , no discharge.  +mild TTP At bladder.  Most of TTP LV/OI, R>L--this feels like pain.    Cervix:  normal appearance  Uterus: normal size, contour, position, consistency, mobility, non-tender.  Exam inhibited by habitus--? TTP vs LV/OI vs bladder.   Adnexa: Not palpable.    POP-Q:    Deferred.  No obvious POP present with valsalva.     NEUROLOGIC:  Cranial nerves 2 through 12 intact.  Strength 5/5.  DTRs 2+ lower extremities.  S2 through 4 normal.  Sacral reflexes intact.    EXT: MARTIN, 2+ pulses bilaterally, no C/C/E    COUGH STRESS TEST:  negative  KEGEL: 1 /5    RECTAL:    External:  Normal, (--) hemorrhoids, (--) dovetailing.   Internal:  deferred    PVR: 20 mL    Impression    1. Increased frequency of urination    2. Pelvic pain in female    3. Myalgia of pelvic floor    4. Nocturia more than twice per night    5. Lichen sclerosus et atrophicus    6. Irregular bowel habits    7. Loose stools        Initial Plan  The patient was counseled regarding these issues. The patient was given a summary sheet containing each of these issues with possible options for evaluation and management. When appropriate, we also reviewed  computer-generated diagrams specific to their diagnoses..  All questions were addressed to the patient's satisfaction.    1)  R-sided, suprapubic, pelvic pain:    A)  Is this related to levator/obturator muscle pain?  --on exam, pain triggered by this  --start pelvic floor PT.  GOAL to learn to relax sore muscles and then strengthen PF/core/low back muscles to better support.  Call in a few days to make appt. Erika Mast ([employer]): (p) [phone].  (f) [phone].  If not covered, let me know ASAP so can send you elsewhere.     B)  Is this related to bladder spasms?    --urine C&S  --Empty bladder every 3 hours.  Empty well: wait a minute, lean forward on toilet.    --Avoid dietary irritants (see sheet).  Keep diary x 3-5 days to determine your irritants.  --start pelvic floor PT  --URGE: start oxybutynin 10 xl daily.  Takes 2-4 weeks to see if will have effect.  For dry mouth: get sour, sugar free lozenge or gum.      --Nocturia (nighttime urination): stop fluids 2 hours before bed/no water by bed.  If have leg swelling:  Elevate feet above chest x 1 hour before bed to get excess fluid off.  Can also use support hose (knee highs).      C)  ? Uterine tenderness on exam:  --recent pap 2018 normal  --no h/o STI  --get pelvic US    D)  Treat lichen sclerosis:  --EVERY PM apply steroid cream.  Apply dime-sized amount with finger to vaginal opening, inner lips, and all external areas (including around anus) that are irritated. Also apply small amount inside vagina with finger (insert to knuckle).    E)  Work on improving loose stool/erratic BMs:  --look at diet for irritants and avoid if noted  --work on bulking stool.  Start daily fiber.  Take 1 tsp of fiber powder (psyllium or other sugar-free powder).  Mix in 8 oz of water.  Take x 3-5 days.  Then, increase fiber by 1 tsp every 3-5 days until stool is easy to pass, more well-formed, and less diarrhea.  Stop and continue at that dose.    Do not exceed 6 tsps/day.    2)  RTC 2 months.     Approximately 60 min were spent in consult, 90 % in discussion.     Thank you for requesting consultation of your patient.  I look forward to participating in their care.    oCrwin Casarez  Female Pelvic Medicine and Reconstructive Surgery  [facility] [place], [place]

## 2018-09-06 NOTE — LETTER
September 8, 2018        DULCE STEWART STAFF             [facility] [address] [place] [address]  Phone: [phone]   Patient: Jeanne Rodgers   MR Number: [id number]   YOB: 1955   Date of Visit: 9/6/2018       Dear  Staff:    Thank you for referring Jeanne Rodgers to me for evaluation. Below are the relevant portions of my assessment and plan of care.            If you have questions, please do not hesitate to call me. I look forward to following Jeanne along with you.    Sincerely,      Corwin Casarez MD           CC  No Recipients

## 2018-09-08 PROBLEM — R35.1 NOCTURIA MORE THAN TWICE PER NIGHT: Status: ACTIVE | Noted: 2018-09-08

## 2018-09-08 PROBLEM — R35.0 INCREASED FREQUENCY OF URINATION: Status: ACTIVE | Noted: 2018-09-08

## 2018-09-08 PROBLEM — R19.8 IRREGULAR BOWEL HABITS: Status: ACTIVE | Noted: 2018-09-08

## 2018-09-08 PROBLEM — R10.2 PELVIC PAIN IN FEMALE: Status: ACTIVE | Noted: 2018-09-08

## 2018-09-08 PROBLEM — L90.0 LICHEN SCLEROSUS ET ATROPHICUS: Status: ACTIVE | Noted: 2018-09-08

## 2018-09-08 PROBLEM — M79.18 MYALGIA OF PELVIC FLOOR: Status: ACTIVE | Noted: 2018-09-08

## 2018-09-08 PROBLEM — R19.5 LOOSE STOOLS: Status: ACTIVE | Noted: 2018-09-08

## 2018-09-10 ENCOUNTER — TELEPHONE (OUTPATIENT)
Dept: UROGYNECOLOGY | Facility: HOSPITAL | Age: 63
End: 2018-09-10

## 2018-09-10 LAB — BACTERIA UR CULT: NORMAL

## 2018-09-10 RX ORDER — CIPROFLOXACIN 500 MG/1
500 TABLET ORAL 2 TIMES DAILY
Qty: 20 TABLET | Refills: 0 | Status: SHIPPED | OUTPATIENT
Start: 2018-09-10 | End: 2018-09-20

## 2018-09-10 RX ORDER — PHENAZOPYRIDINE HYDROCHLORIDE 200 MG/1
200 TABLET, FILM COATED ORAL 3 TIMES DAILY PRN
Qty: 20 TABLET | Refills: 0 | Status: SHIPPED | OUTPATIENT
Start: 2018-09-10 | End: 2019-02-13

## 2018-09-10 NOTE — TELEPHONE ENCOUNTER
Called patient to let her know +UTI.  Left VM.    Can you please call her to let her know, too?  I can't eRx to her pharmacy.  Can you please verify pharmacy, and call in the following:    Bactrim DS  #10  Si tab PO BID x 5 days  Refills: 0    This may improve some of her bladder symptoms, but I still need her to follow everything we discussed at her last visit. Thanks!

## 2018-09-11 LAB — BACTERIA UR CULT: NORMAL

## 2018-09-11 NOTE — TELEPHONE ENCOUNTER
Left voice message for pt to give the office a call back at [phone]. Called to relay positive UTI results and to verify pt pharmacy.

## 2018-09-12 ENCOUNTER — HOSPITAL ENCOUNTER (OUTPATIENT)
Dept: RADIOLOGY | Facility: OTHER | Age: 63
Discharge: HOME | End: 2018-09-12
Attending: OBSTETRICS & GYNECOLOGY
Payer: MEDICARE

## 2018-09-12 DIAGNOSIS — R10.2 PELVIC PAIN IN FEMALE: ICD-10-CM

## 2018-09-12 PROCEDURE — 76856 US EXAM PELVIC COMPLETE: CPT | Mod: TC

## 2018-09-12 PROCEDURE — 76830 TRANSVAGINAL US NON-OB: CPT | Mod: 26,,, | Performed by: RADIOLOGY

## 2018-09-12 PROCEDURE — 76856 US EXAM PELVIC COMPLETE: CPT | Mod: 26,,, | Performed by: RADIOLOGY

## 2018-09-13 ENCOUNTER — TELEPHONE (OUTPATIENT)
Dept: UROGYNECOLOGY | Facility: CLINIC | Age: 63
End: 2018-09-13

## 2018-09-13 NOTE — TELEPHONE ENCOUNTER
----- Message from Corwin Casarez MD sent at 9/12/2018  9:24 PM CDT -----  Please let patient know that her pelvic US was normal. Thanks!

## 2018-09-13 NOTE — TELEPHONE ENCOUNTER
Spoke to pt, she was informed that her US was normal. Pt verbalizes understanding.    FYI: pt states she starts PT on Monday 9/16/2018

## 2018-11-07 ENCOUNTER — OFFICE VISIT (OUTPATIENT)
Dept: UROGYNECOLOGY | Facility: CLINIC | Age: 63
End: 2018-11-07
Payer: MEDICARE

## 2018-11-07 VITALS
HEIGHT: 60 IN | SYSTOLIC BLOOD PRESSURE: 100 MMHG | DIASTOLIC BLOOD PRESSURE: 60 MMHG | WEIGHT: 175.5 LBS | BODY MASS INDEX: 34.46 KG/M2

## 2018-11-07 DIAGNOSIS — R10.2 PELVIC PAIN IN FEMALE: ICD-10-CM

## 2018-11-07 DIAGNOSIS — L90.0 LICHEN SCLEROSUS ET ATROPHICUS: ICD-10-CM

## 2018-11-07 DIAGNOSIS — Z87.440 HISTORY OF UTI: Primary | ICD-10-CM

## 2018-11-07 DIAGNOSIS — R35.1 NOCTURIA MORE THAN TWICE PER NIGHT: ICD-10-CM

## 2018-11-07 DIAGNOSIS — M79.18 MYALGIA OF PELVIC FLOOR: ICD-10-CM

## 2018-11-07 DIAGNOSIS — R10.32 LEFT INGUINAL PAIN: ICD-10-CM

## 2018-11-07 DIAGNOSIS — R35.0 INCREASED FREQUENCY OF URINATION: ICD-10-CM

## 2018-11-07 DIAGNOSIS — R52 PAIN: ICD-10-CM

## 2018-11-07 DIAGNOSIS — N39.0 RECURRENT UTI: ICD-10-CM

## 2018-11-07 DIAGNOSIS — R19.8 IRREGULAR BOWEL HABITS: ICD-10-CM

## 2018-11-07 PROCEDURE — 87086 URINE CULTURE/COLONY COUNT: CPT

## 2018-11-07 PROCEDURE — 99213 OFFICE O/P EST LOW 20 MIN: CPT | Mod: PBBFAC | Performed by: OBSTETRICS & GYNECOLOGY

## 2018-11-07 PROCEDURE — 99213 OFFICE O/P EST LOW 20 MIN: CPT | Mod: S$PBB,,, | Performed by: OBSTETRICS & GYNECOLOGY

## 2018-11-07 PROCEDURE — 99999 PR PBB SHADOW E&M-EST. PATIENT-LVL III: CPT | Mod: PBBFAC,,, | Performed by: OBSTETRICS & GYNECOLOGY

## 2018-11-07 RX ORDER — OXYBUTYNIN CHLORIDE 10 MG/1
TABLET, EXTENDED RELEASE ORAL
COMMUNITY
Start: 2018-11-05 | End: 2019-02-13

## 2018-11-07 NOTE — PATIENT INSTRUCTIONS
1)  R-sided, suprapubic, pelvic pain:  --improved    A)  Is this related to levator/obturator muscle pain?  --PT thinks hip/adductor/quad  --on exam, pain triggered by this  --continue PT with Erika    B)  Is this related to bladder spasms?    --urine C&S for EUGENE  --Empty bladder every 3 hours.  Empty well: wait a minute, lean forward on toilet.    --Avoid dietary irritants (see sheet).  Keep diary x 3-5 days to determine your irritants.  --start pelvic floor PT  --URGE: stop oxybutynin 10 xl daily.  If symptoms worsen, can restart.     --Nocturia (nighttime urination): stop fluids 2 hours before bed/no water by bed.  If have leg swelling:  Elevate feet above chest x 1 hour before bed to get excess fluid off.  Can also use support hose (knee highs).      C)  ? Uterine tenderness on exam:  --recent pap 2018 normal  --no h/o STI  --pelvic US 9/18 normal    D)  Treat lichen sclerosis:  --continue organic cotton pads, ivory and drift  --TWICE A WEEK apply steroid cream.  Apply dime-sized amount with finger to vaginal opening, inner lips, and all external areas (including around anus) that are irritated. Also apply small amount inside vagina with finger (insert to knuckle).    E)  Work on improving loose stool/erratic BMs:  --look at diet for irritants and avoid if noted  --work on bulking stool.  Continue daily fiber.   --continue high fiber diet    2)  RTC 3-4 months.

## 2018-11-07 NOTE — PROGRESS NOTES
"Urogyn follow up  11/07/2018    [facility] [address] [place] [address]    Jeanne Rodgers  [id number]  1955    Jeanne Rodgers is a 63 y.o.  here for a urogyn follow up.    1)  UI:  (+) NIDIA (while bending with full bladder) = (+) UUI,, since pain started.   (+) pads (cotton liners):4/day, usually minimum wetness.  None at night--wears panties at night to "air out."  Does have to change panties 1st AM.  Daytime frequency: Q 2 hours. Increased urgency.  Nocturia: Yes: 4/night.   (+) dysuria,  (--) hematuria,  (--) frequent UTIs.  (--) complete bladder emptying. DV with small 2nd volume.      2)  POP:  Absent.  (--) vaginal bleeding. (--) vaginal discharge. (--) sexually active.  (+) h/o dyspareunia. Only with 1 partner, started in 50s.  Had some vaginal dryness.   (+)  Vaginal dryness.  (--) vaginal estrogen use. +h/o BRCA.     3)  BM:  (--) constipation/straining.  (+) chronic diarrhea.  Has some fecal urgency.  (--) hematochezia.  (--) fecal incontinence.  (+) fecal smearing/urgency.  (--) complete evacuation.  Does not splint.     4)  R flank/upper back/pelvic pain:  --started a few months ago, gradual onset; sharp; starts R SP area; was intermittent in beginning, now more constant; is associated with urge to urinate at times  --has had increase in daytime frequency and nocturia since pain started  --constant but worse with movement  --makes rising from chair difficult  --can't lay on R side/bend, makes more painful  --saw GYN summer 2018: was Dx with lichen sclerosis; using clobetasol 1-2 times/week--burning/discomfort resolved  --had UTI recently--was Tx with ABX; had recurrent symptoms a few weeks later; typical symptoms: vaginal burning/itching/feels like the RLQ is "hot" when she has a UTI  --was seen by neuro--didn't think pain was back/spine related  --has tried ibuprofen/Aleve w/o relief  --before was Dx with BRCA started having itching throughout " body    --5/18 CT A/P: NORMAL.  The visualized portion of the base of the lungs, visualized portion of the heart, stomach, spleen, pancreas, and adrenal glands are unremarkable.  The gallbladder is absent.  There is diffuse fatty infiltration of the liver which is enlarged.  The kidneys are unremarkable.  There is no hydronephrosis or nephrolithiasis.  The bladder is unremarkable.  The uterus has a normal contour.  The bowel has a normal appearance.  The appendix is not visualized.  The osseous structures demonstrate mild degenerative change.  --4/2018 MRI: Degenerative changes as above.  No focal protrusion or extrusion of disc material. 5/18 MRI reviewed by back/spine PA:  Lumbarlized sacrum.  Small disc bulge at L5-S1 and moderate right lateral recess stenosis.  Small right L4-5 facet synovial cyst.  No HNP or foraminal stenosis at L1-2 from the sagittal images.    --1/2018 rheumatology consult ([facility]):  1.  Clinically she only has evidence of osteoarthritis.  She has no evidence to suggest an inflammatory arthritis.  Aromatase inhibitors are known to increase osteoarthritic pain.  2.  Positive FANNY, low titer.  It had been -2 years ago.  I do not think the positive FANNY is related to her joint problems.  It could be related to her dry eye and dry mouth.  It could be related to the urticaria.  It could be a false positive test.   Plans:  1.  Increased ibuprofen to 3 times daily  2.  Resume gabapentin but increased the dose to 600 mg 3 times daily  3.  Return to see me in 4 months    Initial exam:  PELVIC:    External genitalia:  Normal Bartholins, Skenes and labia bilaterally.  +lichen changes at introitus, regression of minora, inner labia beefy/red with hypopigmentation c/w lichen process.   Urethra:  No caruncle, diverticulum or masses.  (+) hypermobility.    Vagina:  Atrophy (+) , no discharge.  +mild TTP At bladder.  Most of TTP LV/OI, R>L--this feels like pain.    Cervix:  normal appearance  Uterus: normal  size, contour, position, consistency, mobility, non-tender.  Exam inhibited by habitus--? TTP vs LV/OI vs bladder.   Adnexa: Not palpable.  POP-Q:    Deferred.  No obvious POP present with valsalva.     Past Medical History  Past Medical History:   Diagnosis Date    Allergy     Anxiety     Breast cancer 2014    Chronic back pain     Lichen sclerosus     Mental disorder    Anxiety/depression: No SI/HI. Feels controlled currently.  No current meds.  Has been on meds in past.      Past Surgical History  Past Surgical History:   Procedure Laterality Date    APPENDECTOMY      BREAST BIOPSY Right 2014    BREAST LUMPECTOMY Right 2014    R lumpectomy [facility] w 2.4cm IDC & DCIS, neg margins, 0/6 SN, Stage II, ER/VA+, HER-2 neg Adjuvant XRT and hormonal therapy     CARPAL TUNNEL RELEASE      CHOLECYSTECTOMY      TUBAL LIGATION     RLQ incision appy  LSC niki  LSC BTL    Hysterectomy: No    Past Ob History  G0    Gynecologic History  LMP: No LMP recorded (lmp unknown). Patient is postmenopausal.  Age of menarche: 9 yo  Age of menopause: early 50s.   Menstrual history: h/o normal  Pap test: 6/18, normal.  History of abnormal paps: No.  History of STIs:  No  Mammogram: Date of last: 2/18.  Result: Normal.  S/p BRCA.  S/p arimidex x 3.5 years.  Followed by Delfina.   Colonoscopy: Date of last: ~2015 ([facility]).  Result:  Benign polyps.  Repeat due:  Due 2018-19.    DEXA:  Date of last: 8/18.  Osteopenia.  FRAX calculation does not support treatment.  RECOMMENDATIONS of [facility]:  1.  taking Ca/D  2.  Recommended therapy No additional pharmacologic therapy recommended at this time.  3.  Repeat BMD in 2 - 4 years    Issues include:  Patient Active Problem List   Diagnosis    Hx of breast cancer    Spondylosis without myelopathy    Hamstring tightness of both lower extremities    Segmental and somatic dysfunction of lumbar region    Alteration in mobility associated with pain     Left inguinal pain    Recurrent UTI    Increased frequency of urination    Pelvic pain in female    Myalgia of pelvic floor    Nocturia more than twice per night    Lichen sclerosus et atrophicus    Irregular bowel habits    Loose stools       History since last visit:     1)  R-sided, suprapubic, pelvic pain:    A)  Is this related to levator/obturator muscle pain?  --has been seeing PT--working on muscle strengthening--things more hip/adductor/quads  --feels like this is much better    B)  Is this related to bladder spasms?    --urine C&S last visit +; s/p Tx  --organic, cotton pads help  --switched to ivory soap and drift  --added fiber to diet; avoiding irritants  --URGE: started oxybutynin 10 xl daily--not sure helping    --Nocturia (nighttime urination): improved; only 1-2 times    C)  ? Uterine tenderness on exam:  --recent pap 2018 normal  --no h/o STI  --pelvic US 9/18 normal    D)  Treat lichen sclerosis:  --much improved  --organic, cotton pads help  --switched to ivory soap and drift  --applying steroid cream.  Apply dime-sized amount with finger to vaginal opening, inner lips, and all external areas (including around anus) that are irritated. Also apply small amount inside vagina with finger (insert to knuckle).      E)  Work on improving loose stool/erratic BMs:  --improved  --is eating high fiber diet  --taking 1 scoop metamucil a day    Medications:    Current Outpatient Medications:     betamethasone dipropionate (DIPROLENE) 0.05 % ointment, Apply topically once daily. , Disp: , Rfl:     buPROPion (WELLBUTRIN) 75 MG tablet, Take 1 tablet (75 mg total) by mouth 2 (two) times daily., Disp: 60 tablet, Rfl: 11    clobetasol 0.05% (TEMOVATE) 0.05 % Oint, Use twice daily for 1 month, then daily for 1 month, then 3 times per week, Disp: 60 g, Rfl: 2    levocetirizine (XYZAL) 5 MG tablet, Take 1 tablet (5 mg total) by mouth every evening., Disp: 30 tablet, Rfl: 3    oxybutynin (DITROPAN-XL)  10 MG 24 hr tablet, , Disp: , Rfl:     phenazopyridine (PYRIDIUM) 200 MG tablet, Take 1 tablet (200 mg total) by mouth 3 (three) times daily as needed for Pain., Disp: 20 tablet, Rfl: 0    ALPRAZolam (XANAX) 0.25 MG tablet, Take 1 tablet (0.25 mg total) by mouth nightly as needed for Anxiety., Disp: 30 tablet, Rfl: 0    ROS:  As per HPI.      Exam  /60   Ht 5' (1.524 m)   Wt 79.6 kg (175 lb 7.8 oz)   LMP  (LMP Unknown) Comment: age 53  BMI 34.27 kg/m²   General: alert and oriented, no acute distress  Respiratory: normal respiratory effort  Abd: soft, non-tender, non-distended    PELVIC:    External genitalia:  Normal Bartholins, Skenes and labia bilaterally.  +lichen changes at introitus, regression of minora, inner labia beefy/red with hypopigmentation c/w lichen process. Much improved since last visit.   Urethra:  No caruncle, diverticulum or masses.  (+) hypermobility.    Internal deferred.   POP-Q:    Deferred.  No obvious POP present with valsalva.     Impression  1. History of UTI  Urine culture   2. Pain   Urine culture   3. Lichen sclerosus et atrophicus     4. Increased frequency of urination     5. Myalgia of pelvic floor     6. Nocturia more than twice per night     7. Recurrent UTI     8. Irregular bowel habits     9. Left inguinal pain     10. Pelvic pain in female       We reviewed the above issues and discussed options for short-term versus long-term management of her problems.   Plan:   1)  R-sided, suprapubic, pelvic pain:  --improved    A)  Is this related to levator/obturator muscle pain?  --PT thinks hip/adductor/quad  --on exam, pain triggered by this  --continue PT with Erika    B)  Is this related to bladder spasms?    --urine C&S for EUGENE  --Empty bladder every 3 hours.  Empty well: wait a minute, lean forward on toilet.    --Avoid dietary irritants (see sheet).  Keep diary x 3-5 days to determine your irritants.  --start pelvic floor PT  --URGE: stop oxybutynin 10 xl daily.  If  symptoms worsen, can restart.     --Nocturia (nighttime urination): stop fluids 2 hours before bed/no water by bed.  If have leg swelling:  Elevate feet above chest x 1 hour before bed to get excess fluid off.  Can also use support hose (knee highs).      C)  ? Uterine tenderness on exam:  --recent pap 2018 normal  --no h/o STI  --pelvic US 9/18 normal    D)  Treat lichen sclerosis:  --continue organic cotton pads, ivory and drift  --TWICE A WEEK apply steroid cream.  Apply dime-sized amount with finger to vaginal opening, inner lips, and all external areas (including around anus) that are irritated. Also apply small amount inside vagina with finger (insert to knuckle).    E)  Work on improving loose stool/erratic BMs:  --look at diet for irritants and avoid if noted  --work on bulking stool.  Continue daily fiber.   --continue high fiber diet    2)  RTC 3-4 months.     30 minutes were spent in face to face time with this patient  90 % of this time was spent in counseling and/or coordination of care     Corwin Casarez MD  [facility]  Division of Female Pelvic Medicine and Reconstructive Surgery  Department of Obstetrics & Gynecology

## 2018-11-10 LAB — BACTERIA UR CULT: NORMAL

## 2018-11-14 RX ORDER — CLOBETASOL PROPIONATE 0.5 MG/G
OINTMENT TOPICAL
Qty: 60 G | Refills: 2 | Status: SHIPPED | OUTPATIENT
Start: 2018-11-14 | End: 2020-04-08 | Stop reason: SDUPTHER

## 2019-01-09 ENCOUNTER — PATIENT MESSAGE (OUTPATIENT)
Dept: HEMATOLOGY/ONCOLOGY | Facility: CLINIC | Age: 64
End: 2019-01-09

## 2019-01-09 ENCOUNTER — TELEPHONE (OUTPATIENT)
Dept: HEMATOLOGY/ONCOLOGY | Facility: CLINIC | Age: 64
End: 2019-01-09

## 2019-01-09 DIAGNOSIS — Z85.3 HX OF BREAST CANCER: Primary | ICD-10-CM

## 2019-01-09 NOTE — TELEPHONE ENCOUNTER
Returned call to pt, no answer. Voicemail left to let her know we are putting in labs and the  should be reaching out to her to schedule her 6 month f/u appointment.    ----- Message from Roberto Guillory sent at 1/9/2019  9:34 AM CST -----  Contact: Pt  Scheduling an appt    Who called: Pt  Message: Calling to schedule a 6 month f/u, pt would also like to know if she needs labs done for the appt due to her last blood work being in August.    Contact information: [phone]  Additional Information: N/A

## 2019-02-12 ENCOUNTER — TELEPHONE (OUTPATIENT)
Dept: ADMINISTRATIVE | Facility: HOSPITAL | Age: 64
End: 2019-02-12

## 2019-02-12 NOTE — TELEPHONE ENCOUNTER
Sadaf Murcia,     This patient is scheduled for an upcoming appointment in a specialty clinic participating in the [facility]. Upon reviewing the screening/pathology report for colon cancer screening, there is a finding that requires a review of the current modifier. Please review with the patients PCP if the modifier for colon cancer screening should be changed based on that finding.    Thank you,     Jessi Roberts  Panel Coordinator   [facility]

## 2019-02-13 ENCOUNTER — OFFICE VISIT (OUTPATIENT)
Dept: UROGYNECOLOGY | Facility: CLINIC | Age: 64
End: 2019-02-13
Payer: MEDICARE

## 2019-02-13 VITALS
BODY MASS INDEX: 32.34 KG/M2 | WEIGHT: 171.31 LBS | DIASTOLIC BLOOD PRESSURE: 70 MMHG | HEIGHT: 61 IN | SYSTOLIC BLOOD PRESSURE: 110 MMHG

## 2019-02-13 DIAGNOSIS — L90.0 LICHEN SCLEROSUS ET ATROPHICUS: ICD-10-CM

## 2019-02-13 DIAGNOSIS — L90.0 LICHEN SCLEROSUS: ICD-10-CM

## 2019-02-13 DIAGNOSIS — R10.2 PELVIC PAIN IN FEMALE: ICD-10-CM

## 2019-02-13 DIAGNOSIS — R19.8 IRREGULAR BOWEL HABITS: ICD-10-CM

## 2019-02-13 DIAGNOSIS — Z85.3 HX OF BREAST CANCER: ICD-10-CM

## 2019-02-13 DIAGNOSIS — M25.551 HIP PAIN, RIGHT: Primary | ICD-10-CM

## 2019-02-13 DIAGNOSIS — R19.5 LOOSE STOOLS: ICD-10-CM

## 2019-02-13 DIAGNOSIS — R35.0 INCREASED FREQUENCY OF URINATION: ICD-10-CM

## 2019-02-13 DIAGNOSIS — M79.18 MYALGIA OF PELVIC FLOOR: ICD-10-CM

## 2019-02-13 DIAGNOSIS — N39.0 RECURRENT UTI: ICD-10-CM

## 2019-02-13 DIAGNOSIS — R35.1 NOCTURIA MORE THAN TWICE PER NIGHT: ICD-10-CM

## 2019-02-13 PROBLEM — R10.32 LEFT INGUINAL PAIN: Status: RESOLVED | Noted: 2018-08-07 | Resolved: 2019-02-13

## 2019-02-13 PROCEDURE — 99215 OFFICE O/P EST HI 40 MIN: CPT | Mod: S$PBB,,, | Performed by: OBSTETRICS & GYNECOLOGY

## 2019-02-13 PROCEDURE — 99213 OFFICE O/P EST LOW 20 MIN: CPT | Mod: PBBFAC | Performed by: OBSTETRICS & GYNECOLOGY

## 2019-02-13 PROCEDURE — 99999 PR PBB SHADOW E&M-EST. PATIENT-LVL III: ICD-10-PCS | Mod: PBBFAC,,, | Performed by: OBSTETRICS & GYNECOLOGY

## 2019-02-13 PROCEDURE — 99215 PR OFFICE/OUTPT VISIT, EST, LEVL V, 40-54 MIN: ICD-10-PCS | Mod: S$PBB,,, | Performed by: OBSTETRICS & GYNECOLOGY

## 2019-02-13 PROCEDURE — 99999 PR PBB SHADOW E&M-EST. PATIENT-LVL III: CPT | Mod: PBBFAC,,, | Performed by: OBSTETRICS & GYNECOLOGY

## 2019-02-13 RX ORDER — DICLOFENAC SODIUM 75 MG/1
75 TABLET, DELAYED RELEASE ORAL 2 TIMES DAILY PRN
Refills: 2 | COMMUNITY
Start: 2018-11-13 | End: 2019-02-13

## 2019-02-13 RX ORDER — TIZANIDINE 4 MG/1
4 TABLET ORAL EVERY 8 HOURS PRN
Refills: 1 | COMMUNITY
Start: 2018-11-13 | End: 2019-02-13

## 2019-02-13 RX ORDER — SILVER SULFADIAZINE 10 G/1000G
CREAM TOPICAL EVERY MORNING
Qty: 50 G | Refills: 11 | Status: SHIPPED | OUTPATIENT
Start: 2019-02-13 | End: 2021-08-26

## 2019-02-13 RX ORDER — FAMOTIDINE 20 MG/1
20 TABLET, FILM COATED ORAL DAILY
Refills: 1 | COMMUNITY
Start: 2018-11-13 | End: 2019-07-23 | Stop reason: ALTCHOICE

## 2019-02-13 NOTE — PATIENT INSTRUCTIONS
1)  R-sided, suprapubic, pelvic pain:  --not improved with pelvic floor PT  --seems related to hip/groin  --ortho consult    A)  Is this related to levator/obturator muscle pain?  --PT thinks hip/adductor/quad  --on exam, pain triggered by this  --continue home PT exercises    B)  Is this related to bladder spasms?    --urine C&S for EUGENE  --Empty bladder every 3 hours.  Empty well: wait a minute, lean forward on toilet.    --Avoid dietary irritants (see sheet).  Keep diary x 3-5 days to determine your irritants.  -- oxybutynin 10 xl daily didn't help    --Nocturia (nighttime urination): stop fluids 2 hours before bed/no water by bed.  If have leg swelling:  Elevate feet above chest x 1 hour before bed to get excess fluid off.  Can also use support hose (knee highs).      C)  ? Uterine tenderness on exam:  --resolved  --recent pap 2018 normal  --no h/o STI  --pelvic US 9/18 normal    D)  Treat lichen sclerosis:  --this is not fungal  --continue organic cotton pads, ivory and drift  --EVERY NIGHT apply steroid cream.  Apply dime-sized amount with finger inside vagina to knuckle, around vaginal opening/inner lips, and all external areas (including around anus) that are irritated and red/pink or have white outline.  Also apply small amount inside vagina with finger (insert to knuckle).  --EVERY AM and up to 2x/day:  Apply silvadene cream to same areas.   --areas may be contributing to vaginal spotting    E)  Work on improving loose stool/erratic BMs:  --look at diet for irritants and avoid if noted  --work on bulking stool.  Continue daily fiber.   --continue high fiber diet    2)  PCP: See Ayala, Siva Menezes ([place]), Mike ([place]), Carlota Mast ([place]).  RTC 3-4 months.     3)  Vaginal spotting:  --seems related to lichen--increase treatment as above  --if still present at next visit, consider repeat pelvic US (no VB on SSE today), UA micro, rectal    4)  RTC 2 months.

## 2019-02-13 NOTE — PROGRESS NOTES
"Urogyn follow up  02/13/2019    [facility]  [address] [address]    Jeanne Rodgers  [id number]  1955    Jeanne Rodgers is a 64 y.o.  here for a urogyn follow up.    1)  UI:  (+) NIDIA (while bending with full bladder) = (+) UUI,, since pain started.   (+) pads (cotton liners):4/day, usually minimum wetness.  None at night--wears panties at night to "air out."  Does have to change panties 1st AM.  Daytime frequency: Q 2 hours. Increased urgency.  Nocturia: Yes: 4/night.   (+) dysuria,  (--) hematuria,  (--) frequent UTIs.  (--) complete bladder emptying. DV with small 2nd volume.      2)  POP:  Absent.  (--) vaginal bleeding. (--) vaginal discharge. (--) sexually active.  (+) h/o dyspareunia. Only with 1 partner, started in 50s.  Had some vaginal dryness.   (+)  Vaginal dryness.  (--) vaginal estrogen use. +h/o BRCA.     3)  BM:  (--) constipation/straining.  (+) chronic diarrhea.  Has some fecal urgency.  (--) hematochezia.  (--) fecal incontinence.  (+) fecal smearing/urgency.  (--) complete evacuation.  Does not splint.     4)  R flank/upper back/pelvic pain:  --started a few months ago, gradual onset; sharp; starts R SP area; was intermittent in beginning, now more constant; is associated with urge to urinate at times  --has had increase in daytime frequency and nocturia since pain started  --constant but worse with movement  --makes rising from chair difficult  --can't lay on R side/bend, makes more painful  --saw GYN summer 2018: was Dx with lichen sclerosis; using clobetasol 1-2 times/week--burning/discomfort resolved  --had UTI recently--was Tx with ABX; had recurrent symptoms a few weeks later; typical symptoms: vaginal burning/itching/feels like the RLQ is "hot" when she has a UTI  --was seen by neuro--didn't think pain was back/spine related  --has tried ibuprofen/Aleve w/o relief  --before was Dx with BRCA started having itching " throughout body    --5/18 CT A/P: NORMAL.  The visualized portion of the base of the lungs, visualized portion of the heart, stomach, spleen, pancreas, and adrenal glands are unremarkable.  The gallbladder is absent.  There is diffuse fatty infiltration of the liver which is enlarged.  The kidneys are unremarkable.  There is no hydronephrosis or nephrolithiasis.  The bladder is unremarkable.  The uterus has a normal contour.  The bowel has a normal appearance.  The appendix is not visualized.  The osseous structures demonstrate mild degenerative change.  --4/2018 MRI: Degenerative changes as above.  No focal protrusion or extrusion of disc material. 5/18 MRI reviewed by back/spine PA:  Lumbarlized sacrum.  Small disc bulge at L5-S1 and moderate right lateral recess stenosis.  Small right L4-5 facet synovial cyst.  No HNP or foraminal stenosis at L1-2 from the sagittal images.    --1/2018 rheumatology consult ([facility]):  1.  Clinically she only has evidence of osteoarthritis.  She has no evidence to suggest an inflammatory arthritis.  Aromatase inhibitors are known to increase osteoarthritic pain.  2.  Positive FANNY, low titer.  It had been -2 years ago.  I do not think the positive FANNY is related to her joint problems.  It could be related to her dry eye and dry mouth.  It could be related to the urticaria.  It could be a false positive test.   Plans:  1.  Increased ibuprofen to 3 times daily  2.  Resume gabapentin but increased the dose to 600 mg 3 times daily  3.  Return to see me in 4 months    Initial exam:  PELVIC:    External genitalia:  Normal Bartholins, Skenes and labia bilaterally.  +lichen changes at introitus, regression of minora, inner labia beefy/red with hypopigmentation c/w lichen process.   Urethra:  No caruncle, diverticulum or masses.  (+) hypermobility.    Vagina:  Atrophy (+) , no discharge.  +mild TTP At bladder.  Most of TTP LV/OI, R>L--this feels like pain.    Cervix:  normal  appearance  Uterus: normal size, contour, position, consistency, mobility, non-tender.  Exam inhibited by habitus--? TTP vs LV/OI vs bladder.   Adnexa: Not palpable.  POP-Q:    Deferred.  No obvious POP present with valsalva.     Past Medical History  Past Medical History:   Diagnosis Date    Allergy     Anxiety     Breast cancer 2014    Chronic back pain     Lichen sclerosus     Mental disorder    Anxiety/depression: No SI/HI. Feels controlled currently.  No current meds.  Has been on meds in past.      Past Surgical History  Past Surgical History:   Procedure Laterality Date    APPENDECTOMY      BREAST BIOPSY Right 2014    BREAST LUMPECTOMY Right 2014    R lumpectomy [facility] w 2.4cm IDC & DCIS, neg margins, 0/6 SN, Stage II, ER/RI+, HER-2 neg Adjuvant XRT and hormonal therapy     CARPAL TUNNEL RELEASE      CHOLECYSTECTOMY      TUBAL LIGATION     RLQ incision appy  LSC niki  LSC BTL    Hysterectomy: No    Past Ob History  G0    Gynecologic History  LMP: No LMP recorded (lmp unknown). Patient is postmenopausal.  Age of menarche: 11 yo  Age of menopause: early 50s.   Menstrual history: h/o normal  Pap test: 6/18, normal.  History of abnormal paps: No.  History of STIs:  No  Mammogram: Date of last: 2/18.  Result: Normal.  S/p BRCA.  S/p arimidex x 3.5 years.  Followed by Delfina.   Colonoscopy: Date of last: ~2015 ([facility]).  Result:  Benign polyps.  Repeat due:  Due 2018-19.    DEXA:  Date of last: 8/18.  Osteopenia.  FRAX calculation does not support treatment.  RECOMMENDATIONS of [facility]:  1.  taking Ca/D  2.  Recommended therapy No additional pharmacologic therapy recommended at this time.  3.  Repeat BMD in 2 - 4 years    Issues include:  Patient Active Problem List   Diagnosis    Hx of breast cancer    Spondylosis without myelopathy    Hamstring tightness of both lower extremities    Segmental and somatic dysfunction of lumbar region    Alteration in  mobility associated with pain    Recurrent UTI    Increased frequency of urination    Pelvic pain in female    Myalgia of pelvic floor    Nocturia more than twice per night    Lichen sclerosus et atrophicus    Irregular bowel habits    Loose stools    Hip pain, right       History since last visit:     1)  R-sided, suprapubic, pelvic pain:    A)  Is this related to levator/obturator muscle pain?  --s/p PT--worked on muscle strengthening--thinks more hip/adductor/quads  --still having some mild groin pain R; has some trouble walking; like a sharp/nagging pain in bones  --hasn't seen ortho recently; saw rebeccadennis (rheum) in past     B)  Is this related to bladder spasms?    --urine C&S last visit +; s/p Tx  --organic, cotton pads help  --switched to ivory soap and drift  --added fiber to diet; avoiding irritants  --URGE: stopped oxybutynin 10 xl daily--not sure helping    --Nocturia (nighttime urination): improved; only 1-2 times    C)  ? Uterine tenderness on exam:  --recent pap 2018 normal  --no h/o STI  --pelvic US 9/18 normal    D)  Treat lichen sclerosis:  --much improved; has occasional flares  --organic, cotton pads help  --switched to ivory soap and drift  --applying steroid cream 2x/week.     E)  Work on improving loose stool/erratic BMs:  --improved  --is eating high fiber diet  --taking 1 scoop metamucil a day    2)  Overall body itching:  --taking ibuprofen    3)  Brown streaks on pad:  --3-4 times/week x 2 weeks  --in same area that urine would be  --recent pap 2018 normal  --no h/o STI  --pelvic US 9/18 normal    Medications:    Current Outpatient Medications:     clobetasol 0.05% (TEMOVATE) 0.05 % Oint, APPLY TO AFFECTED AREA(S) TWICE A DAY FOR 1 MONTH(S) then EVERY DAY FOR 1 MONTH(S) then 3 TIMES A WEEK, Disp: 60 g, Rfl: 2    famotidine (PEPCID) 20 MG tablet, Take 20 mg by mouth once daily., Disp: , Rfl: 1    levocetirizine (XYZAL) 5 MG tablet, Take 1 tablet (5 mg total) by mouth every  "evening., Disp: 30 tablet, Rfl: 3    ALPRAZolam (XANAX) 0.25 MG tablet, Take 1 tablet (0.25 mg total) by mouth nightly as needed for Anxiety., Disp: 30 tablet, Rfl: 0    silver sulfADIAZINE 1% (SILVADENE) 1 % cream, Apply topically every morning., Disp: 50 g, Rfl: 11    ROS:  As per HPI.      Exam  /70   Ht 5' 1" (1.549 m)   Wt 77.7 kg (171 lb 4.8 oz)   LMP  (LMP Unknown) Comment: age 53  BMI 32.37 kg/m²   General: alert and oriented, no acute distress  Respiratory: normal respiratory effort  Abd: soft, non-tender, non-distended  TTP at R hip/groin, trevor with movement    PELVIC:    External genitalia:  Normal Bartholins, Skenes and labia bilaterally.  +lichen changes at introitus, regression of minora, inner labia beefy/red with hypopigmentation c/w lichen process. Looks worse than last visit. --this area was shown to patient with mirror.   Urethra:  No caruncle, diverticulum or masses.  (+) hypermobility.    Internal deferred.   POP-Q:    Deferred.  No obvious POP present with valsalva.     Impression  1. Hip pain, right  Ambulatory consult to Orthopedics   2. Lichen sclerosus  silver sulfADIAZINE 1% (SILVADENE) 1 % cream   3. Lichen sclerosus et atrophicus     4. Increased frequency of urination     5. Myalgia of pelvic floor     6. Nocturia more than twice per night     7. Recurrent UTI     8. Hx of breast cancer     9. Irregular bowel habits     10. Loose stools     11. Pelvic pain in female       We reviewed the above issues and discussed options for short-term versus long-term management of her problems.   Plan:   1)  R-sided, suprapubic, pelvic pain:  --not improved with pelvic floor PT  --seems related to hip/groin  --ortho consult    A)  Is this related to levator/obturator muscle pain?  --PT thinks hip/adductor/quad  --on exam, pain triggered by this  --continue home PT exercises    B)  Is this related to bladder spasms?    --urine C&S for EUGENE  --Empty bladder every 3 hours.  Empty well: wait a " minute, lean forward on toilet.    --Avoid dietary irritants (see sheet).  Keep diary x 3-5 days to determine your irritants.  -- oxybutynin 10 xl daily didn't help    --Nocturia (nighttime urination): stop fluids 2 hours before bed/no water by bed.  If have leg swelling:  Elevate feet above chest x 1 hour before bed to get excess fluid off.  Can also use support hose (knee highs).      C)  ? Uterine tenderness on exam:  --resolved  --recent pap 2018 normal  --no h/o STI  --pelvic US 9/18 normal    D)  Treat lichen sclerosis:  --this is not fungal  --continue organic cotton pads, ivory and drift  --EVERY NIGHT apply steroid cream.  Apply dime-sized amount with finger inside vagina to knuckle, around vaginal opening/inner lips, and all external areas (including around anus) that are irritated and red/pink or have white outline.  Also apply small amount inside vagina with finger (insert to knuckle).  --EVERY AM and up to 2x/day:  Apply silvadene cream to same areas.   --areas may be contributing to vaginal spotting    E)  Work on improving loose stool/erratic BMs:  --look at diet for irritants and avoid if noted  --work on bulking stool.  Continue daily fiber.   --continue high fiber diet    2)  PCP: Iris Gupta Mollere ([place]), Mike ([place]), Carlota Mast ([place]).  RTC 3-4 months.     3)  Vaginal spotting:  --seems related to lichen--increase treatment as above  --if still present at next visit, consider repeat pelvic US (no VB on SSE today), UA micro, rectal    4)  RTC 2 months.     45 minutes were spent in face to face time with this patient  90 % of this time was spent in counseling and/or coordination of care     Corwin Casarez MD  [facility]  Division of Female Pelvic Medicine and Reconstructive Surgery  Department of Obstetrics & Gynecology

## 2019-02-14 ENCOUNTER — HOSPITAL ENCOUNTER (OUTPATIENT)
Dept: RADIOLOGY | Facility: HOSPITAL | Age: 64
Discharge: HOME | End: 2019-02-14
Attending: PHYSICIAN ASSISTANT
Payer: MEDICARE

## 2019-02-14 ENCOUNTER — OFFICE VISIT (OUTPATIENT)
Dept: ORTHOPEDICS | Facility: CLINIC | Age: 64
End: 2019-02-14
Payer: MEDICARE

## 2019-02-14 VITALS
DIASTOLIC BLOOD PRESSURE: 82 MMHG | WEIGHT: 169.88 LBS | BODY MASS INDEX: 33.35 KG/M2 | SYSTOLIC BLOOD PRESSURE: 127 MMHG | HEART RATE: 78 BPM | HEIGHT: 60 IN

## 2019-02-14 DIAGNOSIS — R52 PAIN: ICD-10-CM

## 2019-02-14 DIAGNOSIS — R10.31 BILATERAL GROIN PAIN: Primary | ICD-10-CM

## 2019-02-14 DIAGNOSIS — R10.32 BILATERAL GROIN PAIN: Primary | ICD-10-CM

## 2019-02-14 PROCEDURE — 99213 OFFICE O/P EST LOW 20 MIN: CPT | Mod: S$PBB,,, | Performed by: PHYSICIAN ASSISTANT

## 2019-02-14 PROCEDURE — 99999 PR PBB SHADOW E&M-EST. PATIENT-LVL III: ICD-10-PCS | Mod: PBBFAC,,, | Performed by: PHYSICIAN ASSISTANT

## 2019-02-14 PROCEDURE — 99213 PR OFFICE/OUTPT VISIT, EST, LEVL III, 20-29 MIN: ICD-10-PCS | Mod: S$PBB,,, | Performed by: PHYSICIAN ASSISTANT

## 2019-02-14 PROCEDURE — 73521 X-RAY EXAM HIPS BI 2 VIEWS: CPT | Mod: 26,,, | Performed by: RADIOLOGY

## 2019-02-14 PROCEDURE — 73521 XR HIPS BILATERAL 2 VIEW INCL AP PELVIS: ICD-10-PCS | Mod: 26,,, | Performed by: RADIOLOGY

## 2019-02-14 PROCEDURE — 99999 PR PBB SHADOW E&M-EST. PATIENT-LVL III: CPT | Mod: PBBFAC,,, | Performed by: PHYSICIAN ASSISTANT

## 2019-02-14 PROCEDURE — 73521 X-RAY EXAM HIPS BI 2 VIEWS: CPT | Mod: TC

## 2019-02-14 PROCEDURE — 99213 OFFICE O/P EST LOW 20 MIN: CPT | Mod: PBBFAC,25 | Performed by: PHYSICIAN ASSISTANT

## 2019-02-14 NOTE — LETTER
February 14, 2019      Corwin Casarez MD  [address]  [facility] [address]           [facility] - Orthopedics  [address]  [facility] [address]  Phone: [phone]          Patient: Jeanne Rodgers   MR Number: [id number]   YOB: 1955   Date of Visit: 2/14/2019       Dear Dr. Corwin Casarez:    Thank you for referring Jeanne Rodgers to me for evaluation. Attached you will find relevant portions of my assessment and plan of care.    If you have questions, please do not hesitate to call me. I look forward to following Jeanne Rodgers along with you.    Sincerely,    Rima Mike PA-C    Enclosure  CC:  No Recipients    If you would like to receive this communication electronically, please contact [email or web address] or [phone] to request more information on [employer] Link access.    For providers and/or their staff who would like to refer a patient to [facility], please contact us through our one-stop-shop provider referral line, [facility] [place], at [phone].    If you feel you have received this communication in error or would no longer like to receive these types of communications, please e-mail [email or web address]

## 2019-02-15 NOTE — PROGRESS NOTES
Subjective:      Patient ID: Jeanne Rodgers is a 64 y.o. female.    Chief Complaint: Pain of the Right Hip and Pain of the Left Hip    HPI  64 year old female presents with chief complaint of bilateral groin pain R>L x 8 months. She denies trauma. When she gets up from driving or sitting, she reports a shooting pain at the right groin that goes across her pelvis to the left groin. She also has some pain with walking. Ibuprofen gives some relief. She denies mechanical symptoms at the groin/hip. She did 2 rounds of PT with no improvement. She reports LBP. She has seen back/spine clinic who did not feel her groin pain was related to her back. She has also seen urogyn who did not find the pain to be related to urogyn in nature.   Review of Systems   Constitution: Negative for chills, fever and night sweats.   Cardiovascular: Negative for chest pain.   Respiratory: Negative for cough and shortness of breath.    Hematologic/Lymphatic: Does not bruise/bleed easily.   Skin: Negative for color change.   Gastrointestinal: Negative for heartburn.   Genitourinary: Negative for dysuria.   Neurological: Negative for numbness and paresthesias.   Psychiatric/Behavioral: Negative for altered mental status.   Allergic/Immunologic: Negative for persistent infections.         Objective:            General    Vitals reviewed.  Constitutional: She is oriented to person, place, and time. She appears well-developed and well-nourished.   Cardiovascular: Normal rate.    Neurological: She is alert and oriented to person, place, and time.             Right Hip Exam     Range of Motion   The patient has normal right hip ROM.    Tests   Pain w/ forced internal rotation (DOROTHEA): present  Pain w/ forced external rotation (FADIR): absent  Stinchfield test: negative  Log Roll: negative    Other   Sensation: normal  Left Hip Exam     Range of Motion   The patient has normal left hip ROM.    Tests   Pain w/ forced internal rotation (DOROTHEA):  absent  Pain w/ forced external rotation (FADIR): absent  Stinchfield test: negative  Log Roll: negative    Other   Sensation: normal          Vascular Exam     Right Pulses  Dorsalis Pedis:      2+          Left Pulses  Dorsalis Pedis:      2+              X-ray: ordered and reviewed by myself. Increased sclerosis about the symphysis.  Small osteophyte superolateral aspect of the acetabulum.  SI joints are fairly well maintained.  No fracture.        Assessment:       Encounter Diagnosis   Name Primary?    Bilateral groin pain Yes          Plan:       Discussed treatment options with patient. Hip exam was pretty unremarkable. Recommend trying diagnostic/therapeutic hip joint cortisone injection to see if this helps her groin pain. She wants to see Dr. Yu first and discuss options with him. Consult was scheduled. RTC prn.

## 2019-02-19 NOTE — PROGRESS NOTES
"Subjective:       Patient ID: Jeanne Rodgers is a 64 y.o. female.    Chief Complaint: Follow-up    HPI   Returns for follow-up  She was on Arimidex for 4 years until stopping (see below).    Reported recurrent UTIs - she has also noted a lower pelvic pain. She had a CT scan done by her PCP without findings. She is now seeing urogyn- see previous note. PT    Today, she has many complaints and concerns.   Pain in right breast yesterday- sudden onset, sharp/shooting. No unusual activity/fall.  Pain in abdomen, mainly right groin when sitting in certain positions and when stands.   Low back pain as usual- no worse. Seeing Dr. Yu tomorrow.   Couldn't control bladder again yesterday. Underwent PT for pelvic muscle strengthening, didn't help. Seeing UroGyn.   Numbness and tingling in feet x years.   Feels like there is something going on with her body. Feels she has inflammation everywhere.   Concerned about "Osteitis pubis" from recent hip xray.   Saw Rheumatology in past but would like reeval and second opinion.   No CP/SOB/palpitations/nausea/vomiting/diarrhea/constipation.   No fever/chills.   No urinary burning or difficulty.   No there complaints except rare headache.         BMD done 8/13/18 which revealed osteopenia at hip.     This is a 65 yo female with a history of right breast cancer  - Had been getting her mammograms at [facility] as she was uninsured at the time  - reports small mass seen in 2007 and sent to [facility]- where she was told nothing was seen on review  - Repeat situation of above the next year in 2008  - she noted right nipple inversion 2012- went to [facility]- told not likely cancer, no further testing done  - In 2014 it was recommended that [facility] see her for imaging  - Abnormal mammogram and biopsy recommended  Reports biopsy experience was awful and so she researched other sites to be cared  - She sought out Dr. 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 Keller at [facility]  Oncotype done ? 9- chemotherapy not recommended  She was started on Arimidex    Prior imaging:  MRI 4/26/2018:  FINDINGS:  Lumbar spine alignment is within normal limits. The vertebral body heights are well maintained, with no fracture.  No marrow signal abnormality suspicious for an infiltrative process.  The conus is normal in appearance, and terminates at the  level.  The adjacent soft tissue structures show no significant abnormalities.  There are findings of multi-level  lumbar spondylosis, as below.  L1-L2: There is no focal disc herniation. No significant central canal or neural foraminal narrowing.  L2-L3: There is no focal disc herniation. No significant central canal or neural foraminal narrowing.Slight hypertrophic changes of facets  L3-L4: There is no focal disc herniation. No significant central canal or neural foraminal narrowing.Mild broad-based bulging of disc material combined with hypertrophic changes of facets result in mild narrowing of central canal dimensions.  Minimal LEFT neural foraminal encroachment..  L4-L5: There is no focal disc herniation. Broad-based bulging of disc material with hypertrophic changes of facets result in mild narrowing of central canal dimensions.  Minimal bilateral neural foraminal encroachment..  L5-S1: There is no focal disc herniation. No significant central canal or neural foraminal narrowing.Mild RIGHT-sided facet edema.     CT scans A/P 5/10/18:  FINDINGS:  The visualized portion of the base of the lungs, visualized portion of the heart, stomach, spleen, pancreas, and adrenal glands are unremarkable.  The gallbladder is absent.  There is diffuse fatty infiltration of the liver which is enlarged.  The kidneys are unremarkable.  There is no hydronephrosis or  nephrolithiasis.  The bladder is unremarkable.  The uterus has a normal contour.  The bowel has a normal appearance.  The appendix is not visualized.  The osseous structures demonstrate mild degenerative change.     She has been seen by Rheumatology for the joint issues +FANNY found ? of autoimmune   Also has a history of hives- no cause found despite multtiple dermatologic evaluations     Has had genetic testing  - 18 benign mammogram     Prior BMD 2017- osteopenia (done at [facility])     SH:  Worked at the [employer] as a  > 30 years     FH:  Father  of pancreatic cancer at age 85  Mother living with pancreatic cancer- currently age 94  Maternal uncle- lung cancer  Maternal aunt  of pancreatic cancer at age 62    Review of Systems   Constitutional: Positive for fatigue. Negative for activity change, appetite change, chills, fever and unexpected weight change.   HENT: Negative for congestion, dental problem, ear pain, rhinorrhea, tinnitus and trouble swallowing.    Eyes: Negative for visual disturbance.   Respiratory: Negative for cough, chest tightness, shortness of breath and wheezing.    Cardiovascular: Negative for chest pain, palpitations and leg swelling.   Gastrointestinal: Positive for abdominal pain. Negative for abdominal distention, blood in stool, constipation, diarrhea, nausea and vomiting.   Genitourinary: Negative for decreased urine volume, difficulty urinating, dysuria, frequency and hematuria.   Musculoskeletal: Positive for arthralgias, back pain and joint swelling. Negative for gait problem and neck pain.   Skin: Negative for pallor and rash.   Neurological: Negative for dizziness, weakness, light-headedness, numbness and headaches.   Hematological: Negative for adenopathy. Does not bruise/bleed easily.   Psychiatric/Behavioral: Negative for confusion, dysphoric mood and sleep disturbance.       Objective:      Physical Exam   Constitutional: She is oriented to person, place, and  time. She appears well-developed and well-nourished. No distress.   Presents alone.    HENT:   Head: Normocephalic and atraumatic.   Mouth/Throat: Oropharynx is clear and moist. No oropharyngeal exudate.   Eyes: Conjunctivae and EOM are normal. Pupils are equal, round, and reactive to light. Right eye exhibits no discharge. Left eye exhibits no discharge. No scleral icterus. Right pupil is round and reactive. Left pupil is round and reactive.   Neck: Normal range of motion. Neck supple. No tracheal deviation present. No thyromegaly present.   Cardiovascular: Normal rate, regular rhythm, normal heart sounds and intact distal pulses. Exam reveals no gallop and no friction rub.   No murmur heard.  Pulmonary/Chest: Effort normal and breath sounds normal. No respiratory distress. She has no wheezes. She has no rales. She exhibits no tenderness.   Abdominal: Soft. Bowel sounds are normal. She exhibits no distension and no mass. There is no hepatosplenomegaly. There is no tenderness. There is no rebound and no guarding.   Musculoskeletal: Normal range of motion. She exhibits no edema, tenderness or deformity.   Lymphadenopathy:        Head (right side): No submandibular adenopathy present.        Head (left side): No submandibular adenopathy present.     She has no cervical adenopathy.        Right cervical: No superficial cervical, no deep cervical and no posterior cervical adenopathy present.       Left cervical: No superficial cervical, no deep cervical and no posterior cervical adenopathy present.        Right: No inguinal and no supraclavicular adenopathy present.        Left: No inguinal and no supraclavicular adenopathy present.   Neurological: She is alert and oriented to person, place, and time. She has normal strength and normal reflexes. No cranial nerve deficit or sensory deficit. Coordination normal.   Skin: Skin is warm and dry. No bruising, no lesion, no petechiae and no rash noted. She is not diaphoretic.  No erythema. No pallor.   Psychiatric: She has a normal mood and affect. Her behavior is normal. Judgment and thought content normal. Her mood appears not anxious. She does not exhibit a depressed mood.   Nursing note and vitals reviewed.      WBC   Date Value Ref Range Status   02/20/2019 6.04 3.90 - 12.70 K/uL Final     Hemoglobin   Date Value Ref Range Status   02/20/2019 13.9 12.0 - 16.0 g/dL Final     Hematocrit   Date Value Ref Range Status   02/20/2019 41.9 37.0 - 48.5 % Final     Platelets   Date Value Ref Range Status   02/20/2019 220 150 - 350 K/uL Final     Gran # (ANC)   Date Value Ref Range Status   02/20/2019 3.4 1.8 - 7.7 K/uL Final     Comment:     The ANC is based on a white cell differential from an   automated cell counter. It has not been microscopically   reviewed for the presence of abnormal cells. Clinical   correlation is required.         Chemistry        Component Value Date/Time     02/20/2019 0826    K 4.0 02/20/2019 0826     02/20/2019 0826    CO2 24 02/20/2019 0826    BUN 11 02/20/2019 0826    CREATININE 0.8 02/20/2019 0826     (H) 02/20/2019 0826        Component Value Date/Time    CALCIUM 9.4 02/20/2019 0826    ALKPHOS 87 02/20/2019 0826    AST 25 02/20/2019 0826    ALT 25 02/20/2019 0826    BILITOT 0.5 02/20/2019 0826    ESTGFRAFRICA >60.0 02/20/2019 0826    EGFRNONAA >60.0 02/20/2019 0826            Assessment:       1. Hx of breast cancer    2. Breast pain, right    3. Chronic low back pain with sciatica, sciatica laterality unspecified, unspecified back pain laterality    4. Urinary incontinence, unspecified type    5. Lower abdominal pain    6. Positive FANNY (antinuclear antibody)    7. Generalized pain    8. Osteopenia, unspecified location    9. Hyperglycemia    10. History of colon polyps        Plan:       1. Clinically  ADONIS.   2. Episode yesterday- No pain today. Mammogram due now and is scheduled for tomorrow.   3. Keep follow up with Dr. Yu  tomorrow. Will discuss injections.   4. Urinalysis today. Follow up with UroGyn as scheduled.   5. Mostly in RLQ/right inguinal area. ?referred pain from back. CT abd/pelvis from 5/2018 done for abdominal pain revealed fatty liver.  Will refer to GI for evaluation and colonoscopy. Of note, she has a family h/o pancreatic cancer. She also has h/o colon polyps - last colonoscopy 3 years ago.   6,7. Refer to Rheumatology - patient saw Dr. Hunt in past but is requesting a second opinion.   8. Reviewed BMD 8/2018 encouraged adequate Calcium and Vit D intake. Next Vit D level 8/2019.   9. Glucose elevated - encouraged to make an appt with her PCP for Diabetes workup.   10. Due for colonoscopy      Discussed PET but she does not meet criteria at this time.     RTC 2 months to see Dr. Ford with CBC, CMP.         Addendum: urinalysis normal.     Patient is in agreement with the proposed treatment plan. All questions were answered to the patient's satisfaction. Pt knows to call clinic for any new or worsening symptoms and if anything is needed before the next clinic visit. Discussed case with Dr. Ford who agrees with above.       ANGIE Dior-ALEXANDRA  Hematology & Oncology  [facility] [place] [address]  ph. [phone]  Fax. [phone]     I spent 45 minutes (face to face) with the patient, more than 50% was in counseling and coordination of care as detailed above.       Distress Screening Results: Psychosocial Distress screening score of Distress Score: 0 noted and reviewed. No intervention indicated.

## 2019-02-20 ENCOUNTER — TELEPHONE (OUTPATIENT)
Dept: PAIN MEDICINE | Facility: CLINIC | Age: 64
End: 2019-02-20

## 2019-02-20 ENCOUNTER — OFFICE VISIT (OUTPATIENT)
Dept: HEMATOLOGY/ONCOLOGY | Facility: CLINIC | Age: 64
End: 2019-02-20
Payer: MEDICARE

## 2019-02-20 ENCOUNTER — LAB VISIT (OUTPATIENT)
Dept: LAB | Facility: HOSPITAL | Age: 64
End: 2019-02-20
Payer: MEDICARE

## 2019-02-20 VITALS
HEIGHT: 60 IN | TEMPERATURE: 98 F | DIASTOLIC BLOOD PRESSURE: 68 MMHG | SYSTOLIC BLOOD PRESSURE: 153 MMHG | HEART RATE: 73 BPM | WEIGHT: 172.63 LBS | RESPIRATION RATE: 20 BRPM | OXYGEN SATURATION: 98 % | BODY MASS INDEX: 33.89 KG/M2

## 2019-02-20 DIAGNOSIS — G89.29 CHRONIC LOW BACK PAIN WITH SCIATICA, SCIATICA LATERALITY UNSPECIFIED, UNSPECIFIED BACK PAIN LATERALITY: ICD-10-CM

## 2019-02-20 DIAGNOSIS — R73.9 HYPERGLYCEMIA: ICD-10-CM

## 2019-02-20 DIAGNOSIS — R52 GENERALIZED PAIN: ICD-10-CM

## 2019-02-20 DIAGNOSIS — M54.40 CHRONIC LOW BACK PAIN WITH SCIATICA, SCIATICA LATERALITY UNSPECIFIED, UNSPECIFIED BACK PAIN LATERALITY: ICD-10-CM

## 2019-02-20 DIAGNOSIS — R32 URINARY INCONTINENCE, UNSPECIFIED TYPE: ICD-10-CM

## 2019-02-20 DIAGNOSIS — N64.4 BREAST PAIN, RIGHT: ICD-10-CM

## 2019-02-20 DIAGNOSIS — Z85.3 HX OF BREAST CANCER: Primary | ICD-10-CM

## 2019-02-20 DIAGNOSIS — Z85.3 HX OF BREAST CANCER: ICD-10-CM

## 2019-02-20 DIAGNOSIS — Z86.010 HISTORY OF COLON POLYPS: ICD-10-CM

## 2019-02-20 DIAGNOSIS — R10.30 LOWER ABDOMINAL PAIN: ICD-10-CM

## 2019-02-20 DIAGNOSIS — M85.80 OSTEOPENIA, UNSPECIFIED LOCATION: ICD-10-CM

## 2019-02-20 DIAGNOSIS — R76.8 POSITIVE ANA (ANTINUCLEAR ANTIBODY): ICD-10-CM

## 2019-02-20 PROBLEM — M54.50 CHRONIC LOWER BACK PAIN: Status: ACTIVE | Noted: 2019-02-20

## 2019-02-20 LAB
ALBUMIN SERPL BCP-MCNC: 3.6 G/DL
ALP SERPL-CCNC: 87 U/L
ALT SERPL W/O P-5'-P-CCNC: 25 U/L
ANION GAP SERPL CALC-SCNC: 9 MMOL/L
AST SERPL-CCNC: 25 U/L
BILIRUB SERPL-MCNC: 0.5 MG/DL
BUN SERPL-MCNC: 11 MG/DL
CALCIUM SERPL-MCNC: 9.4 MG/DL
CHLORIDE SERPL-SCNC: 107 MMOL/L
CO2 SERPL-SCNC: 24 MMOL/L
CREAT SERPL-MCNC: 0.8 MG/DL
ERYTHROCYTE [DISTWIDTH] IN BLOOD BY AUTOMATED COUNT: 11.8 %
EST. GFR  (AFRICAN AMERICAN): >60 ML/MIN/1.73 M^2
EST. GFR  (NON AFRICAN AMERICAN): >60 ML/MIN/1.73 M^2
GLUCOSE SERPL-MCNC: 146 MG/DL
HCT VFR BLD AUTO: 41.9 %
HGB BLD-MCNC: 13.9 G/DL
IMM GRANULOCYTES # BLD AUTO: 0.02 K/UL
MCH RBC QN AUTO: 32.6 PG
MCHC RBC AUTO-ENTMCNC: 33.2 G/DL
MCV RBC AUTO: 98 FL
NEUTROPHILS # BLD AUTO: 3.4 K/UL
PLATELET # BLD AUTO: 220 K/UL
PMV BLD AUTO: 9 FL
POTASSIUM SERPL-SCNC: 4 MMOL/L
PROT SERPL-MCNC: 6.5 G/DL
RBC # BLD AUTO: 4.26 M/UL
SODIUM SERPL-SCNC: 140 MMOL/L
WBC # BLD AUTO: 6.04 K/UL

## 2019-02-20 PROCEDURE — 99215 OFFICE O/P EST HI 40 MIN: CPT | Mod: S$PBB,,, | Performed by: NURSE PRACTITIONER

## 2019-02-20 PROCEDURE — 80053 COMPREHEN METABOLIC PANEL: CPT

## 2019-02-20 PROCEDURE — 85027 COMPLETE CBC AUTOMATED: CPT

## 2019-02-20 PROCEDURE — 99999 PR PBB SHADOW E&M-EST. PATIENT-LVL IV: ICD-10-PCS | Mod: PBBFAC,,, | Performed by: NURSE PRACTITIONER

## 2019-02-20 PROCEDURE — 99214 OFFICE O/P EST MOD 30 MIN: CPT | Mod: PBBFAC | Performed by: NURSE PRACTITIONER

## 2019-02-20 PROCEDURE — 36415 COLL VENOUS BLD VENIPUNCTURE: CPT

## 2019-02-20 PROCEDURE — 99215 PR OFFICE/OUTPT VISIT, EST, LEVL V, 40-54 MIN: ICD-10-PCS | Mod: S$PBB,,, | Performed by: NURSE PRACTITIONER

## 2019-02-20 PROCEDURE — 99999 PR PBB SHADOW E&M-EST. PATIENT-LVL IV: CPT | Mod: PBBFAC,,, | Performed by: NURSE PRACTITIONER

## 2019-02-20 NOTE — Clinical Note
1. Refer to Rheumatology - patient saw Dr. Hunt in past but is requesting a second opinion- positive FANNY, generalized pain.2. Refer to Gastroenterology- abdominal pain, h/o polyps. 3. RTC 2 months to see Dr. Ford (needs MD) with CBC, CMP.

## 2019-02-20 NOTE — TELEPHONE ENCOUNTER
Reminded patient of Pain Management appointment scheduled for tomorrow at 9.15a with Dr. Yu- verbal confirmation received.  Location information also provided.

## 2019-02-21 ENCOUNTER — OFFICE VISIT (OUTPATIENT)
Dept: PAIN MEDICINE | Facility: CLINIC | Age: 64
End: 2019-02-21
Payer: MEDICARE

## 2019-02-21 ENCOUNTER — TELEPHONE (OUTPATIENT)
Dept: HEMATOLOGY/ONCOLOGY | Facility: CLINIC | Age: 64
End: 2019-02-21

## 2019-02-21 ENCOUNTER — HOSPITAL ENCOUNTER (OUTPATIENT)
Dept: RADIOLOGY | Facility: HOSPITAL | Age: 64
Discharge: HOME | End: 2019-02-21
Attending: NURSE PRACTITIONER
Payer: MEDICARE

## 2019-02-21 VITALS
OXYGEN SATURATION: 95 % | RESPIRATION RATE: 18 BRPM | SYSTOLIC BLOOD PRESSURE: 122 MMHG | WEIGHT: 170.88 LBS | BODY MASS INDEX: 33.55 KG/M2 | DIASTOLIC BLOOD PRESSURE: 66 MMHG | HEIGHT: 60 IN | HEART RATE: 78 BPM

## 2019-02-21 DIAGNOSIS — Z85.3 PERSONAL HISTORY OF BREAST CANCER: ICD-10-CM

## 2019-02-21 DIAGNOSIS — M79.2 NEUROPATHIC PAIN: Primary | ICD-10-CM

## 2019-02-21 DIAGNOSIS — M47.819 SPONDYLOSIS WITHOUT MYELOPATHY: ICD-10-CM

## 2019-02-21 PROCEDURE — 77062 MAMMO DIGITAL DIAGNOSTIC BILAT WITH TOMOSYNTHESIS_CAD: ICD-10-PCS | Mod: 26,,, | Performed by: RADIOLOGY

## 2019-02-21 PROCEDURE — 77066 DX MAMMO INCL CAD BI: CPT | Mod: 26,,, | Performed by: RADIOLOGY

## 2019-02-21 PROCEDURE — 77062 BREAST TOMOSYNTHESIS BI: CPT | Mod: 26,,, | Performed by: RADIOLOGY

## 2019-02-21 PROCEDURE — 77066 MAMMO DIGITAL DIAGNOSTIC BILAT WITH TOMOSYNTHESIS_CAD: ICD-10-PCS | Mod: 26,,, | Performed by: RADIOLOGY

## 2019-02-21 PROCEDURE — 99999 PR PBB SHADOW E&M-EST. PATIENT-LVL III: ICD-10-PCS | Mod: PBBFAC,,, | Performed by: PAIN MEDICINE

## 2019-02-21 PROCEDURE — 99999 PR PBB SHADOW E&M-EST. PATIENT-LVL III: CPT | Mod: PBBFAC,,, | Performed by: PAIN MEDICINE

## 2019-02-21 PROCEDURE — 77062 BREAST TOMOSYNTHESIS BI: CPT | Mod: TC,PO

## 2019-02-21 PROCEDURE — 99213 OFFICE O/P EST LOW 20 MIN: CPT | Mod: PBBFAC,PN | Performed by: PAIN MEDICINE

## 2019-02-21 PROCEDURE — 99204 PR OFFICE/OUTPT VISIT, NEW, LEVL IV, 45-59 MIN: ICD-10-PCS | Mod: S$PBB,,, | Performed by: PAIN MEDICINE

## 2019-02-21 PROCEDURE — 99204 OFFICE O/P NEW MOD 45 MIN: CPT | Mod: S$PBB,,, | Performed by: PAIN MEDICINE

## 2019-02-21 RX ORDER — GABAPENTIN 300 MG/1
600 CAPSULE ORAL 3 TIMES DAILY
Qty: 180 CAPSULE | Refills: 11 | Status: SHIPPED | OUTPATIENT
Start: 2019-02-21 | End: 2019-06-26

## 2019-02-21 NOTE — PROGRESS NOTES
Subjective:     Patient ID: Jeanne Rodgers is a 64 y.o. female    Chief Complaint: Hip Pain (patient experiences bilateral hip and groin pain- patient experiences sharp and shooting pain radiates to lower extremities-patient has a previous hx of breast cancer w/ chemo treatment that she believes started the continued pain -treatment w/ medication, PT ( not effective))      Referred by: Rima Mike PA-C      HPI:    Initial Encounter (2/21/19):  Jeanne Rodgers is a 64 y.o. female who presents today with chronic bilateral abdominal/hip/thigh pain. This pain has been present for over a year.  No specific inciting event or injury noted. Pain originates in the bilateral flank regions radiates to the bilateral groins to the medial thighs.  She describes the pain as a sharp stabbing pain that is felt deep.  She denies any associated numbness, tingling.  She does have some history of low back pain but does not feel as though these 2 complaints are related.  The pain is worst after sitting for prolonged periods of time and standing.  The pain improves/subsides after continued standing or walking.  She also experiences intermittent shooting/shocking pains throughout her body.  She does have a history of breast cancer treated with Arimidex.  This medication did cause a lot of bone pain and patient is no longer taking it.  She has also been noted to have positive FANNY in the past.  She does not follow with rheumatology but is planning to see him soon.  This pain is described in detail below.    Physical Therapy:  Yes.    Non-pharmacologic Treatment:  Nothing really helps         · TENS?  No    Pain Medications:         · Currently taking:  Nothing    · Has tried in the past:  NSAIDs    · Has not tried:  Opioids, Tylenol, Muscle relaxants, TCAs, SNRIs, anticonvulsants, topical creams    Blood thinners:  None    Interventional Therapies:  None    Relevant Surgeries:  None    Affecting sleep?  Yes    Affecting  daily activities? yes    Depressive symptoms? yes          · SI/HI? No    Work status: Retired    Pain Scores:    Best:       4/10  Worst:     8/10  Usually:   5/10  Today:    5/10    Review of Systems   Constitutional: Negative for activity change, appetite change, chills, fatigue, fever and unexpected weight change.   HENT: Negative for hearing loss.    Eyes: Negative for visual disturbance.   Respiratory: Negative for chest tightness and shortness of breath.    Cardiovascular: Negative for chest pain.   Gastrointestinal: Negative for abdominal pain, constipation, diarrhea, nausea and vomiting.   Genitourinary: Negative for difficulty urinating.   Musculoskeletal: Positive for arthralgias, back pain, gait problem and myalgias. Negative for neck pain.   Skin: Negative for rash.   Neurological: Positive for weakness. Negative for dizziness, light-headedness, numbness and headaches.   Psychiatric/Behavioral: Positive for sleep disturbance. Negative for hallucinations and suicidal ideas. The patient is not nervous/anxious.        Past Medical History:   Diagnosis Date    Allergy     Anxiety     Breast cancer 2014    Chronic back pain     Lichen sclerosus     Mental disorder        Past Surgical History:   Procedure Laterality Date    APPENDECTOMY      BREAST BIOPSY Right 2014    BREAST LUMPECTOMY Right 2014    R lumpectomy [facility] w 2.4cm IDC & DCIS, neg margins, 0/6 SN, Stage II, ER/TN+, HER-2 neg Adjuvant XRT and hormonal therapy     CARPAL TUNNEL RELEASE      CHOLECYSTECTOMY      TUBAL LIGATION         Social History     Socioeconomic History    Marital status: Single     Spouse name: Not on file    Number of children: Not on file    Years of education: Not on file    Highest education level: Not on file   Social Needs    Financial resource strain: Not on file    Food insecurity - worry: Not on file    Food insecurity - inability: Not on file    Transportation needs - medical: Not on file     Transportation needs - non-medical: Not on file   Occupational History    Not on file   Tobacco Use    Smoking status: Never Smoker    Smokeless tobacco: Never Used   Substance and Sexual Activity    Alcohol use: No     Alcohol/week: 0.0 oz    Drug use: No    Sexual activity: No   Other Topics Concern    Not on file   Social History Narrative    Not on file       Review of patient's allergies indicates:  No Known Allergies    Current Outpatient Medications on File Prior to Visit   Medication Sig Dispense Refill    clobetasol 0.05% (TEMOVATE) 0.05 % Oint APPLY TO AFFECTED AREA(S) TWICE A DAY FOR 1 MONTH(S) then EVERY DAY FOR 1 MONTH(S) then 3 TIMES A WEEK 60 g 2    famotidine (PEPCID) 20 MG tablet Take 20 mg by mouth once daily.  1    levocetirizine (XYZAL) 5 MG tablet Take 1 tablet (5 mg total) by mouth every evening. 30 tablet 3    silver sulfADIAZINE 1% (SILVADENE) 1 % cream Apply topically every morning. 50 g 11    ALPRAZolam (XANAX) 0.25 MG tablet Take 1 tablet (0.25 mg total) by mouth nightly as needed for Anxiety. 30 tablet 0     No current facility-administered medications on file prior to visit.        Objective:      /66   Pulse 78   Resp 18   Ht 5' (1.524 m)   Wt 77.5 kg (170 lb 14.4 oz)   LMP  (LMP Unknown) Comment: age 53  SpO2 95%   BMI 33.38 kg/m²     Exam:  GEN:  Well developed, well nourished.  No acute distress.  Normal pain behavior.  HEENT:  No trauma.  Mucous membranes moist.  Nares patent bilaterally.  PSYCH: Normal affect. Thought content appropriate.  CHEST:  Breathing symmetric.  No audible wheezing.  ABD: Soft, non-distended.  SKIN:  Warm, pink, dry.  No rash on exposed areas.    EXT:  No cyanosis, clubbing, or edema.  No color change or changes in nail or hair growth.  NEURO/MUSCULOSKELETAL:  Fully alert, oriented, and appropriate. Speech normal rodriguez. No cranial nerve deficits.   Gait:  Slightly antalgic.  No trendelenburg sign bilaterally.   Motor Strength: 5/5  motor strength throughout lower extremities.   Sensory:  No sensory deficit in the lower extremities.   Reflexes:  2 + and symmetric throughout.  Downgoing Babinski's bilaterally.  No clonus or spasticity.  L-Spine:  Limited ROM with pain on flexion.  Negative facet loading bilaterally.  Negative SLR bilaterally.    SI Joint/Hip:  Negative DOROTHEA bilaterally.  Negative FADIR bilaterally.  Normal internal and external rotation of bilateral hips without pain. No pain with active bilateral hip flexion against resistance.  No TTP over lumbar paraspinals, bilateral SI joints, hips, piriformis muscles, or GTB.          Imaging:  Narrative     EXAMINATION:  MRI LUMBAR SPINE WITHOUT CONTRAST    CLINICAL HISTORY:  Low back pain, >6wks conservative tx, persistent-progressive sx, surgical candidate; Low back pain    TECHNIQUE:  Multiplanar, multisequence MR images were acquired from the thoracolumbar junction to the sacrum without the administration of contrast.    COMPARISON:  None.    FINDINGS:  Lumbar spine alignment is within normal limits. The vertebral body heights are well maintained, with no fracture.  No marrow signal abnormality suspicious for an infiltrative process.    The conus is normal in appearance, and terminates at the  level.  The adjacent soft tissue structures show no significant abnormalities.    There are findings of multi-level  lumbar spondylosis, as below.    L1-L2: There is no focal disc herniation. No significant central canal or neural foraminal narrowing.    L2-L3: There is no focal disc herniation. No significant central canal or neural foraminal narrowing.Slight hypertrophic changes of facets    L3-L4: There is no focal disc herniation. No significant central canal or neural foraminal narrowing.Mild broad-based bulging of disc material combined with hypertrophic changes of facets result in mild narrowing of central canal dimensions.  Minimal LEFT neural foraminal encroachment..    L4-L5: There is  no focal disc herniation. Broad-based bulging of disc material with hypertrophic changes of facets result in mild narrowing of central canal dimensions.  Minimal bilateral neural foraminal encroachment..    L5-S1: There is no focal disc herniation. No significant central canal or neural foraminal narrowing.Mild RIGHT-sided facet edema.   Impression       Degenerative changes as above.  No focal protrusion or extrusion of disc material.      Electronically signed by: Delta Erickson MD  Date: 04/26/2018  Time: 17:36         Assessment:       Encounter Diagnoses   Name Primary?    Neuropathic pain Yes    Spondylosis without myelopathy          Plan:       Jeanne was seen today for hip pain.    Diagnoses and all orders for this visit:    Neuropathic pain  -     gabapentin (NEURONTIN) 300 MG capsule; Take 2 capsules (600 mg total) by mouth 3 (three) times daily.    Spondylosis without myelopathy  -     gabapentin (NEURONTIN) 300 MG capsule; Take 2 capsules (600 mg total) by mouth 3 (three) times daily.        Jeanne Rodgers is a 64 y.o. female with chronic pain of the bilateral flanks/hips/thighs.  Exact etiology is elusive at this time. Possibly related to Arimidex. May also be related to.  Possible underlying rheumatologic condition. Some suspicion for tendinopathy given osteitis pubis noted on x-rays and improvement of pain with activity.  May also have some degree of neuropathic pain.    1.  Pertinent imaging studies reviewed by me. Imaging results were discussed with patient.  2.  Start gabapentin 300 mg q.h.s..  Gradually increase to 600 mg t.i.d. as needed/tolerated.  This medication will hopefully help with her symptoms but may also give us some idea how much of her pain is neurogenic in etiology.  3.  I agree that rheumatologic workup is warranted.  4.  Return to clinic in 6 weeks.  At that time we will discuss efficacy of gabapentin.  We may consider further titration of gabapentin or the addition of  Amitriptyline or an NSAID.

## 2019-02-21 NOTE — TELEPHONE ENCOUNTER
Called patient to schedule gastro appt. They had appointment available tomorrow she can not do tomorrow. She will call back to schedule.  Scheduled rheumatology for 4/23 they added her to the wait list also. Mailed appt slip.      ----- Message from Parris Jansen NP sent at 2/20/2019  4:36 PM CST -----  1. Refer to Rheumatology - patient saw Dr. Hunt in past but is requesting a second opinion- positive FANNY, generalized pain.  2. Refer to Gastroenterology- abdominal pain, h/o polyps.   3. RTC 2 months to see Dr. Ford (michael YOUNGER) with CBC, CMP.

## 2019-02-21 NOTE — LETTER
February 21, 2019      Rima Mike PA-C  [address]  [facility] [address]           [facility] [id number] [facility]  [place] [address]  Phone: [phone]  Fax: [phone]          Patient: Jeanne Rodgers   MR Number: [id number]   YOB: 1955   Date of Visit: 2/21/2019       Dear Rima Mike:    Thank you for referring Jeanne Rodgers to me for evaluation. Attached you will find relevant portions of my assessment and plan of care.    If you have questions, please do not hesitate to call me. I look forward to following Jeanne Rodgers along with you.    Sincerely,    Hussein Yu Jr., MD    Enclosure  CC:  No Recipients    If you would like to receive this communication electronically, please contact [email or web address] or [phone] to request more information on [employer] access.    For providers and/or their staff who would like to refer a patient to [facility], please contact us through our one-stop-shop provider referral line, [facility], at [phone].    If you feel you have received this communication in error or would no longer like to receive these types of communications, please e-mail [email or web address]

## 2019-02-22 ENCOUNTER — TELEPHONE (OUTPATIENT)
Dept: SURGERY | Facility: CLINIC | Age: 64
End: 2019-02-22

## 2019-02-22 NOTE — TELEPHONE ENCOUNTER
----- Message from ANGIE Melissa, AGN sent at 2/21/2019  5:36 PM CST -----  Patient needs to be seen by another provider for f/u at the breast center

## 2019-04-23 ENCOUNTER — OFFICE VISIT (OUTPATIENT)
Dept: RHEUMATOLOGY | Facility: CLINIC | Age: 64
End: 2019-04-23
Payer: MEDICARE

## 2019-04-23 VITALS
HEIGHT: 60 IN | BODY MASS INDEX: 35.31 KG/M2 | HEART RATE: 90 BPM | SYSTOLIC BLOOD PRESSURE: 146 MMHG | WEIGHT: 179.88 LBS | DIASTOLIC BLOOD PRESSURE: 84 MMHG

## 2019-04-23 DIAGNOSIS — R10.11 RIGHT UPPER QUADRANT ABDOMINAL PAIN: Primary | ICD-10-CM

## 2019-04-23 DIAGNOSIS — M79.7 FIBROMYALGIA: ICD-10-CM

## 2019-04-23 PROCEDURE — 99214 OFFICE O/P EST MOD 30 MIN: CPT | Mod: S$PBB,,, | Performed by: INTERNAL MEDICINE

## 2019-04-23 PROCEDURE — 99999 PR PBB SHADOW E&M-EST. PATIENT-LVL III: ICD-10-PCS | Mod: PBBFAC,,, | Performed by: INTERNAL MEDICINE

## 2019-04-23 PROCEDURE — 99214 PR OFFICE/OUTPT VISIT, EST, LEVL IV, 30-39 MIN: ICD-10-PCS | Mod: S$PBB,,, | Performed by: INTERNAL MEDICINE

## 2019-04-23 PROCEDURE — 99999 PR PBB SHADOW E&M-EST. PATIENT-LVL III: CPT | Mod: PBBFAC,,, | Performed by: INTERNAL MEDICINE

## 2019-04-23 PROCEDURE — 99213 OFFICE O/P EST LOW 20 MIN: CPT | Mod: PBBFAC | Performed by: INTERNAL MEDICINE

## 2019-04-23 RX ORDER — IBUPROFEN 800 MG/1
800 TABLET ORAL 3 TIMES DAILY
Status: ON HOLD | COMMUNITY
End: 2019-08-15 | Stop reason: HOSPADM

## 2019-04-23 ASSESSMENT — ROUTINE ASSESSMENT OF PATIENT INDEX DATA (RAPID3)
FATIGUE SCORE: 10
TOTAL RAPID3 SCORE: 4.67
PATIENT GLOBAL ASSESSMENT SCORE: 6.5
PAIN SCORE: 5.5
PSYCHOLOGICAL DISTRESS SCORE: 4.4
MDHAQ FUNCTION SCORE: .6
AM STIFFNESS SCORE: 1, YES

## 2019-04-23 NOTE — PROGRESS NOTES
Chief Complaint   Patient presents with    Follow-up     osteoarthritis follow up       Patient of  with osteoarthritis for a follow up    History of presenting illness    64 year old female with breast cancer 2014 s/p mastectomy and radiation therapy  She has been on Arimidex since then. In about the same time she developed pain in her hands, feet, and knees.  The pain was of gradual onset.  The pain involves the entire hand.  The pain is bad in the morning.  She has some problems with using her hands.  It does wake her up at night.  She complains of swelling in the entire finger, not just the joints.  She has had no problems with the wrist.  She has had occasional pain in the elbow.  Shoulders have been unremarkable.  She has had some pain in the neck and lower back.  She has had no problems with the hips.  The knees have been bothering her for the past year.  She had had some swelling in the knee in the past.  Her feet bother her when she first gets up in the morning.    She had had prior carpal tunnel surgery for paresthesias in her hands.  She had an EMG which only showed carpal tunnel, no peripheral neuropathy.  She has been taking ibuprofen 800 mg twice to thrice daily with some relief.  Physical therapy helped.  Heat helps.  Topical medications did not help.  She was placed on prednisone for urticaria but this did not help her pain.  She had previously been evaluated by [facility]ology [facility].  Immunologic studies including FANNY were negative.  They diagnosed her as having osteoarthritis and fibromyalgia.     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  She complains of bitemporal headaches.  She has a history of urticaria.  She had been evaluated by [facility] in the past.  She has been taking Xyzal with some relief.  She has no conjunctivitis, oral ulcers, dry eye or mouth, Raynaud's phenomena, pleurisy, vaginal discharge or ulcers, chronic or bloody diarrhea.  Her mother had some type of arthritis.    Laboratory: FANNY is +1:160, speckled pattern with a negative FANNY panel.  She has a negative rheumatoid factor and CCP antibody.  She has normal sedimentation rate and CRP.      Past history    Breast cancer  Lichen  Sclerosis  LS spine arthritis /spinal stenosis      Family history  Sister has hashimotos    Social history    Not a smoker or alcoholic      Review of Systems   Constitutional: Negative for activity change, appetite change, chills, diaphoresis, fatigue, fever and unexpected weight change.   HENT: Negative for congestion, dental problem, drooling, ear discharge, ear pain, facial swelling, hearing loss, mouth sores, nosebleeds, postnasal drip, rhinorrhea, sinus pressure, sinus pain, sneezing, sore throat, tinnitus, trouble swallowing and voice change.    Eyes: Negative for photophobia, pain, discharge, redness, itching and visual disturbance.   Respiratory: Negative for apnea, cough, choking, chest tightness, shortness of breath, wheezing and stridor.    Cardiovascular: Negative for chest pain, palpitations and leg swelling.   Gastrointestinal: Negative for abdominal distention, abdominal pain, anal bleeding, blood in stool, constipation, diarrhea, nausea, rectal pain and vomiting.   Endocrine: Negative for cold intolerance, heat intolerance, polydipsia, polyphagia and polyuria.   Genitourinary: Negative for decreased urine volume, difficulty  urinating, dysuria, enuresis, flank pain, frequency, genital sores, hematuria and urgency.   Musculoskeletal: Positive for arthralgias. Negative for back pain, gait problem, joint swelling, myalgias, neck pain and neck stiffness.   Skin: Negative for color change, pallor, rash and wound.   Allergic/Immunologic: Negative for environmental allergies, food allergies and immunocompromised state.   Neurological: Negative for dizziness, tremors, seizures, syncope, facial asymmetry, speech difficulty, weakness, light-headedness, numbness and headaches.   Hematological: Negative for adenopathy. Does not bruise/bleed easily.   Psychiatric/Behavioral: Negative for agitation, behavioral problems, confusion, decreased concentration, dysphoric mood, hallucinations, self-injury, sleep disturbance and suicidal ideas. The patient is not nervous/anxious and is not hyperactive.         Physical Exam     Musculoskeletal: She has decreased range of motion of the cervical spine.  She has pain on range of motion the shoulders bilaterally, worse on the left than on the right.  Elbows and wrist are unremarkable.  She has bony hypertrophy of the first CMC, PIPs, and DIPs.  She has negative Tinel sign at the wrist.  She has no synovitis.  She has pain on her oral bending of the lumbar spine.  Straight leg raising is negative.  She has no tender area to palpation.  Have good range of motion without pain on range of motion.  Knees are tender to palpation but there is no effusion.  She has good range of motion of the knees.  She has tenderness of the ankles and top of the foot.  She has no tenderness on the plantar aspect.  She has full range of motion the ankles.  She has no soft tissue swelling.    Physical Exam   Constitutional: She is oriented to person, place, and time and well-developed, well-nourished, and in no distress. No distress.   HENT:   Head: Normocephalic.   Mouth/Throat: Oropharynx is clear and moist.   Eyes: Conjunctivae are  normal. Pupils are equal, round, and reactive to light. Right eye exhibits no discharge. Left eye exhibits no discharge. No scleral icterus.   Neck: Normal range of motion. No thyromegaly present.   Cardiovascular: Normal rate, regular rhythm, normal heart sounds and intact distal pulses.    Pulmonary/Chest: Effort normal and breath sounds normal. No stridor.   Abdominal: Soft. Bowel sounds are normal.   Lymphadenopathy:     She has no cervical adenopathy.   Neurological: She is alert and oriented to person, place, and time.   Skin: Skin is warm. No rash noted. She is not diaphoretic.     Psychiatric: Affect and judgment normal.   Musculoskeletal: Normal range of motion.          Severe right lower rib margin tenderness  Tenderness right upper abdominal region      Assessment       64 year old female we are seeing for :     FANNY pos  Profile neg  But no symptoms of systemic lupus    Fibromyalgia  Fatigue  Osteoarthritis    1. Right upper quadrant abdominal pain    2. Fibromyalgia          Pelvic pain is the main concern today  Diffuse right sided abdominal pain with no abnormalities in the ultrasound and CT scans    F/u    Plan    asap CT chest abdomen and pelvis with and without contrast    See  and find out if there is anything in the abdomen imaging    Ask Gyn and urology and find out about prolapse being a cause of her symptoms    Continue gabapentin 300 mg tid and ibuprofen tid   Cymbalta doesn't work for her    Jeanne was seen today for follow-up.    Diagnoses and all orders for this visit:    Right upper quadrant abdominal pain    Fibromyalgia

## 2019-04-30 ENCOUNTER — HOSPITAL ENCOUNTER (OUTPATIENT)
Dept: RADIOLOGY | Facility: HOSPITAL | Age: 64
Discharge: HOME | End: 2019-04-30
Attending: INTERNAL MEDICINE
Payer: MEDICARE

## 2019-04-30 DIAGNOSIS — R10.11 RIGHT UPPER QUADRANT ABDOMINAL PAIN: ICD-10-CM

## 2019-04-30 PROCEDURE — 71260 CT CHEST ABDOMEN PELVIS WITH CONTRAST (XPD): ICD-10-PCS | Mod: 26,,, | Performed by: RADIOLOGY

## 2019-04-30 PROCEDURE — 74177 CT CHEST ABDOMEN PELVIS WITH CONTRAST (XPD): ICD-10-PCS | Mod: 26,,, | Performed by: RADIOLOGY

## 2019-04-30 PROCEDURE — 74177 CT ABD & PELVIS W/CONTRAST: CPT | Mod: TC

## 2019-04-30 PROCEDURE — 71260 CT THORAX DX C+: CPT | Mod: 26,,, | Performed by: RADIOLOGY

## 2019-04-30 PROCEDURE — 25500020 PHARM REV CODE 255: Performed by: INTERNAL MEDICINE

## 2019-04-30 PROCEDURE — 74177 CT ABD & PELVIS W/CONTRAST: CPT | Mod: 26,,, | Performed by: RADIOLOGY

## 2019-04-30 RX ADMIN — IOHEXOL 15 ML: 300 INJECTION, SOLUTION INTRAVENOUS at 02:04

## 2019-04-30 RX ADMIN — IOHEXOL 80 ML: 350 INJECTION, SOLUTION INTRAVENOUS at 02:04

## 2019-05-03 ENCOUNTER — TELEPHONE (OUTPATIENT)
Dept: PAIN MEDICINE | Facility: CLINIC | Age: 64
End: 2019-05-03

## 2019-05-03 NOTE — TELEPHONE ENCOUNTER
Informed Ms. Jeanne that the appointment scheduled on 5/15/19 will have to be rescheduled as Dr. Yu will be in procedures that day.  She said she will call back or reschedule via the portal.

## 2019-05-06 ENCOUNTER — TELEPHONE (OUTPATIENT)
Dept: HEMATOLOGY/ONCOLOGY | Facility: CLINIC | Age: 64
End: 2019-05-06

## 2019-05-06 NOTE — TELEPHONE ENCOUNTER
Called and spoke with patient to see if she would mind changing her apt time on 5/9 to accommodate another patient, she agreed to the change and to come at 830 for labs and  at 930

## 2019-05-09 ENCOUNTER — OFFICE VISIT (OUTPATIENT)
Dept: HEMATOLOGY/ONCOLOGY | Facility: CLINIC | Age: 64
End: 2019-05-09
Attending: INTERNAL MEDICINE
Payer: MEDICARE

## 2019-05-09 ENCOUNTER — LAB VISIT (OUTPATIENT)
Dept: LAB | Facility: HOSPITAL | Age: 64
End: 2019-05-09
Payer: MEDICARE

## 2019-05-09 VITALS
TEMPERATURE: 98 F | WEIGHT: 176.56 LBS | BODY MASS INDEX: 34.66 KG/M2 | RESPIRATION RATE: 20 BRPM | OXYGEN SATURATION: 97 % | HEIGHT: 60 IN | SYSTOLIC BLOOD PRESSURE: 140 MMHG | DIASTOLIC BLOOD PRESSURE: 58 MMHG | HEART RATE: 79 BPM

## 2019-05-09 DIAGNOSIS — Z85.3 HX OF BREAST CANCER: Primary | ICD-10-CM

## 2019-05-09 DIAGNOSIS — M54.40 CHRONIC LOW BACK PAIN WITH SCIATICA, SCIATICA LATERALITY UNSPECIFIED, UNSPECIFIED BACK PAIN LATERALITY: ICD-10-CM

## 2019-05-09 DIAGNOSIS — R10.11 RIGHT UPPER QUADRANT ABDOMINAL PAIN: ICD-10-CM

## 2019-05-09 DIAGNOSIS — Z85.3 HX OF BREAST CANCER: ICD-10-CM

## 2019-05-09 DIAGNOSIS — G89.29 CHRONIC LOW BACK PAIN WITH SCIATICA, SCIATICA LATERALITY UNSPECIFIED, UNSPECIFIED BACK PAIN LATERALITY: ICD-10-CM

## 2019-05-09 DIAGNOSIS — N64.4 BREAST PAIN, RIGHT: ICD-10-CM

## 2019-05-09 PROBLEM — R10.9 ABDOMINAL PAIN: Status: ACTIVE | Noted: 2019-05-09

## 2019-05-09 LAB
ALBUMIN SERPL BCP-MCNC: 3.5 G/DL (ref 3.5–5.2)
ALP SERPL-CCNC: 82 U/L (ref 55–135)
ALT SERPL W/O P-5'-P-CCNC: 24 U/L (ref 10–44)
ANION GAP SERPL CALC-SCNC: 8 MMOL/L (ref 8–16)
AST SERPL-CCNC: 30 U/L (ref 10–40)
BILIRUB SERPL-MCNC: 0.7 MG/DL (ref 0.1–1)
BUN SERPL-MCNC: 8 MG/DL (ref 8–23)
CALCIUM SERPL-MCNC: 9 MG/DL (ref 8.7–10.5)
CHLORIDE SERPL-SCNC: 107 MMOL/L (ref 95–110)
CO2 SERPL-SCNC: 26 MMOL/L (ref 23–29)
CREAT SERPL-MCNC: 0.7 MG/DL (ref 0.5–1.4)
ERYTHROCYTE [DISTWIDTH] IN BLOOD BY AUTOMATED COUNT: 12.3 % (ref 11.5–14.5)
EST. GFR  (AFRICAN AMERICAN): >60 ML/MIN/1.73 M^2
EST. GFR  (NON AFRICAN AMERICAN): >60 ML/MIN/1.73 M^2
GLUCOSE SERPL-MCNC: 108 MG/DL (ref 70–110)
HCT VFR BLD AUTO: 39.8 % (ref 37–48.5)
HGB BLD-MCNC: 13.1 G/DL (ref 12–16)
IMM GRANULOCYTES # BLD AUTO: 0.02 K/UL (ref 0–0.04)
MCH RBC QN AUTO: 31.7 PG (ref 27–31)
MCHC RBC AUTO-ENTMCNC: 32.9 G/DL (ref 32–36)
MCV RBC AUTO: 96 FL (ref 82–98)
NEUTROPHILS # BLD AUTO: 3.4 K/UL (ref 1.8–7.7)
PLATELET # BLD AUTO: 224 K/UL (ref 150–350)
PMV BLD AUTO: 9.2 FL (ref 9.2–12.9)
POTASSIUM SERPL-SCNC: 4.3 MMOL/L (ref 3.5–5.1)
PROT SERPL-MCNC: 6.4 G/DL (ref 6–8.4)
RBC # BLD AUTO: 4.13 M/UL (ref 4–5.4)
SODIUM SERPL-SCNC: 141 MMOL/L (ref 136–145)
WBC # BLD AUTO: 5.55 K/UL (ref 3.9–12.7)

## 2019-05-09 PROCEDURE — 80053 COMPREHEN METABOLIC PANEL: CPT

## 2019-05-09 PROCEDURE — 99213 PR OFFICE/OUTPT VISIT, EST, LEVL III, 20-29 MIN: ICD-10-PCS | Mod: S$PBB,,, | Performed by: INTERNAL MEDICINE

## 2019-05-09 PROCEDURE — 99999 PR PBB SHADOW E&M-EST. PATIENT-LVL III: CPT | Mod: PBBFAC,,, | Performed by: INTERNAL MEDICINE

## 2019-05-09 PROCEDURE — 99213 OFFICE O/P EST LOW 20 MIN: CPT | Mod: S$PBB,,, | Performed by: INTERNAL MEDICINE

## 2019-05-09 PROCEDURE — 36415 COLL VENOUS BLD VENIPUNCTURE: CPT

## 2019-05-09 PROCEDURE — 99213 OFFICE O/P EST LOW 20 MIN: CPT | Mod: PBBFAC | Performed by: INTERNAL MEDICINE

## 2019-05-09 PROCEDURE — 99999 PR PBB SHADOW E&M-EST. PATIENT-LVL III: ICD-10-PCS | Mod: PBBFAC,,, | Performed by: INTERNAL MEDICINE

## 2019-05-09 PROCEDURE — 85027 COMPLETE CBC AUTOMATED: CPT

## 2019-05-09 RX ORDER — PENICILLIN V POTASSIUM 500 MG/1
500 TABLET, FILM COATED ORAL
COMMUNITY
Start: 2019-05-01 | End: 2019-05-11

## 2019-05-09 NOTE — PROGRESS NOTES
Subjective:       Patient ID: Jeanne Rodgers is a 64 y.o. female.    Chief Complaint: Results (labs)    HPI     Returns for follow-up after CT scans ordered by rheumatology  Reports completed pelvic floor PT   Urine control better  She is still having right sided abdominal pain that is off and on    Rheumatology ordered  CT scan - results below:  4/30/19 CT scans  FINDINGS:  CT chest: The thyroid gland is grossly unremarkable.  No mediastinal, hilar, or axillary lymphadenopathy.  Thoracic aorta is normal caliber.  No pericardial or pleural effusion.  Mild biapical pleuroparenchymal scarring/thickening.  The tracheobronchial tree is clear.  There is a calcified nodule along the right minor fissure measuring 4 mm.  No concerning lung nodules identified.  No atelectasis.  No lung consolidation.  CT abdomen pelvis: No fluid collections.  The uterus, adnexa, and bladder are grossly unremarkable.  No pericolonic fat stranding/inflammatory changes.  Terminal ileum is unremarkable.  Appendix is not identified.  No dilated loops of bowel.  No mesenteric or periaortic lymphadenopathy.  Mild scattered calcified atherosclerotic disease.  Mild hepatomegaly with fatty change.  Prior cholecystectomy.  There is a small left hepatic lobe cyst.  Main portal veins are patent.  Mild pancreatic atrophy.  No pancreatic or biliary ductal dilatation.  Splenic size within normal limits.  Adrenal glands are unremarkable.  No renal masses or hydronephrosis.  No marrow replacement process.  Lower lumbar facet arthropathy noted.  Impression:  Prominent fatty change in the liver.  No rib fractures.  Prior cholecystectomy.    * patient concerned about pancreatic finding on CT in light of FH    Reports pre- diabetic  concerned though as fasting labs always elevated  Was previously on metformin    Current stressors- Mom is currently in Hospice    Oncology History:  This is a 63 yo female with a history of right breast cancer  - Had been  getting her mammograms at [facility] as she was uninsured at the time  - reports small mass seen in 2007 and sent to [facility]- where she was told nothing was seen on review  - Repeat situation of above the next year in 2008  - she noted right nipple inversion 2012- went to [facility]- told not likely cancer, no further testing done  - In 2014 it was recommended that [facility] see her for imaging  - Abnormal mammogram and biopsy recommended  Reports biopsy experience was awful and so she researched other sites to be cared  - She sought out Dr. 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 Keller at [facility]  Oncotype done ? 9- chemotherapy not recommended  She was started on Arimidex  -She was on Arimidex for 4 years until stopping (see below).         BMD done 8/13/18 which revealed osteopenia at hip.     Prior imaging:  MRI 4/26/2018:  FINDINGS:  Lumbar spine alignment is within normal limits. The vertebral body heights are well maintained, with no fracture.  No marrow signal abnormality suspicious for an infiltrative process.  The conus is normal in appearance, and terminates at the  level.  The adjacent soft tissue structures show no significant abnormalities.  There are findings of multi-level  lumbar spondylosis, as below.  L1-L2: There is no focal disc herniation. No significant central canal or neural foraminal narrowing.  L2-L3: There is no focal disc herniation. No significant central canal or neural foraminal narrowing.Slight hypertrophic changes of facets  L3-L4: There is no focal disc herniation. No significant central canal or neural foraminal narrowing.Mild broad-based bulging of disc material combined with hypertrophic changes of facets result in mild narrowing of central canal dimensions.   Minimal LEFT neural foraminal encroachment..  L4-L5: There is no focal disc herniation. Broad-based bulging of disc material with hypertrophic changes of facets result in mild narrowing of central canal dimensions.  Minimal bilateral neural foraminal encroachment..  L5-S1: There is no focal disc herniation. No significant central canal or neural foraminal narrowing.Mild RIGHT-sided facet edema.     CT scans A/P 5/10/18:  FINDINGS:  The visualized portion of the base of the lungs, visualized portion of the heart, stomach, spleen, pancreas, and adrenal glands are unremarkable.  The gallbladder is absent.  There is diffuse fatty infiltration of the liver which is enlarged.  The kidneys are unremarkable.  There is no hydronephrosis or nephrolithiasis.  The bladder is unremarkable.  The uterus has a normal contour.  The bowel has a normal appearance.  The appendix is not visualized.  The osseous structures demonstrate mild degenerative change.     She has been seen by Rheumatology for the joint issues +FANNY found ? of autoimmune   Also has a history of hives- no cause found despite multtiple dermatologic evaluations     Has had genetic testing- negative     SH:  Worked at the [employer] as a  > 30 years     FH:  Father  of pancreatic cancer at age 85  Mother living with pancreatic cancer- currently age 94  Maternal uncle- lung cancer  Maternal aunt  of pancreatic cancer at age 62    Review of Systems   Constitutional: Positive for fatigue. Negative for activity change, appetite change, chills, fever and unexpected weight change.   HENT: Negative for congestion, dental problem, ear pain, rhinorrhea, tinnitus and trouble swallowing.    Eyes: Negative for visual disturbance.   Respiratory: Negative for cough, chest tightness, shortness of breath and wheezing.    Cardiovascular: Negative for chest pain, palpitations and leg swelling.   Gastrointestinal: Positive for abdominal pain. Negative for abdominal  distention, blood in stool, constipation, diarrhea, nausea and vomiting.   Genitourinary: Positive for frequency. Negative for decreased urine volume, difficulty urinating, dysuria and hematuria.   Musculoskeletal: Positive for arthralgias, back pain and joint swelling. Negative for gait problem and neck pain.   Skin: Negative for pallor and rash.   Neurological: Negative for dizziness, weakness, light-headedness, numbness and headaches.   Hematological: Negative for adenopathy. Does not bruise/bleed easily.   Psychiatric/Behavioral: Negative for dysphoric mood and sleep disturbance.       Objective:      Physical Exam   Abdominal: Soft. Bowel sounds are normal. She exhibits no distension and no mass. There is no tenderness. There is no rebound and no guarding.   No organomegaly       Assessment:       1. Hx of breast cancer    2. Right upper quadrant abdominal pain        Plan:     1. ADONIS- see above  Mammogram due 2/20  2. We discussed prior scans including most recent  She does have a fatty liver and pancreatic atrophy which may relate to obesity  Chronic pancreatitis can also cause  With pain and findings will have her see GI    15 minute total discussion

## 2019-05-15 ENCOUNTER — TELEPHONE (OUTPATIENT)
Dept: HEPATOLOGY | Facility: CLINIC | Age: 64
End: 2019-05-15

## 2019-05-15 NOTE — TELEPHONE ENCOUNTER
----- Message from Hue Murphy sent at 5/15/2019  9:58 AM CDT -----  Contact: Patient  Needs Advice    Reason for call: Called to  her appt time on 6/5 back. She's afraid she wont make it in time with being scheduled at [facility] 50 minutes prior.         Communication Preference: [phone]    Additional Information: n/a

## 2019-06-05 ENCOUNTER — LAB VISIT (OUTPATIENT)
Dept: LAB | Facility: OTHER | Age: 64
End: 2019-06-05
Attending: OBSTETRICS & GYNECOLOGY
Payer: MEDICARE

## 2019-06-05 ENCOUNTER — OFFICE VISIT (OUTPATIENT)
Dept: UROGYNECOLOGY | Facility: CLINIC | Age: 64
End: 2019-06-05
Payer: MEDICARE

## 2019-06-05 VITALS
HEIGHT: 60 IN | BODY MASS INDEX: 34.75 KG/M2 | WEIGHT: 177 LBS | DIASTOLIC BLOOD PRESSURE: 70 MMHG | SYSTOLIC BLOOD PRESSURE: 122 MMHG

## 2019-06-05 DIAGNOSIS — R73.03 PRE-DIABETES: ICD-10-CM

## 2019-06-05 DIAGNOSIS — R10.30 LOWER ABDOMINAL PAIN: ICD-10-CM

## 2019-06-05 DIAGNOSIS — N89.8 VAGINAL IRRITATION: ICD-10-CM

## 2019-06-05 DIAGNOSIS — N39.0 RECURRENT UTI: ICD-10-CM

## 2019-06-05 DIAGNOSIS — R19.8 IRREGULAR BOWEL HABITS: ICD-10-CM

## 2019-06-05 DIAGNOSIS — R35.1 NOCTURIA MORE THAN TWICE PER NIGHT: ICD-10-CM

## 2019-06-05 DIAGNOSIS — R10.2 PELVIC PAIN IN FEMALE: ICD-10-CM

## 2019-06-05 DIAGNOSIS — R19.5 LOOSE STOOLS: ICD-10-CM

## 2019-06-05 DIAGNOSIS — M79.18 MYALGIA OF PELVIC FLOOR: ICD-10-CM

## 2019-06-05 DIAGNOSIS — R35.0 INCREASED FREQUENCY OF URINATION: ICD-10-CM

## 2019-06-05 DIAGNOSIS — N39.41 URINARY INCONTINENCE, URGE: Primary | ICD-10-CM

## 2019-06-05 DIAGNOSIS — L90.0 LICHEN SCLEROSUS ET ATROPHICUS: ICD-10-CM

## 2019-06-05 LAB
ESTIMATED AVG GLUCOSE: 114 MG/DL (ref 68–131)
HBA1C MFR BLD HPLC: 5.6 % (ref 4–5.6)

## 2019-06-05 PROCEDURE — 87086 URINE CULTURE/COLONY COUNT: CPT

## 2019-06-05 PROCEDURE — 99214 OFFICE O/P EST MOD 30 MIN: CPT | Mod: S$PBB,,, | Performed by: OBSTETRICS & GYNECOLOGY

## 2019-06-05 PROCEDURE — 99999 PR PBB SHADOW E&M-EST. PATIENT-LVL III: ICD-10-PCS | Mod: PBBFAC,,, | Performed by: OBSTETRICS & GYNECOLOGY

## 2019-06-05 PROCEDURE — 83036 HEMOGLOBIN GLYCOSYLATED A1C: CPT

## 2019-06-05 PROCEDURE — 87510 GARDNER VAG DNA DIR PROBE: CPT

## 2019-06-05 PROCEDURE — 99214 PR OFFICE/OUTPT VISIT, EST, LEVL IV, 30-39 MIN: ICD-10-PCS | Mod: S$PBB,,, | Performed by: OBSTETRICS & GYNECOLOGY

## 2019-06-05 PROCEDURE — 36415 COLL VENOUS BLD VENIPUNCTURE: CPT

## 2019-06-05 PROCEDURE — 87480 CANDIDA DNA DIR PROBE: CPT

## 2019-06-05 PROCEDURE — 99999 PR PBB SHADOW E&M-EST. PATIENT-LVL III: CPT | Mod: PBBFAC,,, | Performed by: OBSTETRICS & GYNECOLOGY

## 2019-06-05 PROCEDURE — 99213 OFFICE O/P EST LOW 20 MIN: CPT | Mod: PBBFAC | Performed by: OBSTETRICS & GYNECOLOGY

## 2019-06-05 RX ORDER — ACETAMINOPHEN AND CODEINE PHOSPHATE 300; 15 MG/1; MG/1
1 TABLET ORAL EVERY 4 HOURS PRN
COMMUNITY
End: 2019-07-23 | Stop reason: ALTCHOICE

## 2019-06-05 RX ORDER — CHLORHEXIDINE GLUCONATE ORAL RINSE 1.2 MG/ML
SOLUTION DENTAL
Refills: 0 | COMMUNITY
Start: 2019-05-29 | End: 2019-09-16

## 2019-06-05 NOTE — PATIENT INSTRUCTIONS
1)  R-sided, suprapubic, pelvic pain:  --recent Dx with fibromyalgia  --not improved with pelvic floor PT  --seems related to hip/groin  --ortho consult 2/19: mai oliver    A)  Is this related to levator/obturator muscle pain?  --PT thinks hip/adductor/quad  --on exam, pain triggered by this  --continue home PT exercises    B)  Is this related to bladder spasms?    --urine C&S for EUGENE  --Empty bladder every 3 hours.  Empty well: wait a minute, lean forward on toilet.    --Avoid dietary irritants (see sheet).  Keep diary x 3-5 days to determine your irritants.  -- oxybutynin 10 xl daily didn't help    --Nocturia (nighttime urination): stop fluids 2 hours before bed/no water by bed.  If have leg swelling:  Elevate feet above chest x 1 hour before bed to get excess fluid off.  Can also use support hose (knee highs).      C)  ? Uterine tenderness on exam:  --resolved  --recent pap 2018 normal  --no h/o STI  --pelvic US 9/18 normal    D)  Treat lichen sclerosis:  --this is not fungal  --continue organic cotton pads, ivory and drift  --2x/week apply steroid cream (clobetasol).  Apply dime-sized amount with finger inside vagina to knuckle, around vaginal opening/inner lips, and all external areas (including around anus) that are irritated and red/pink or have white outline.  Also apply small amount inside vagina with finger (insert to knuckle).  --EVERY AM and up to 2x/day:  Apply silvadene cream to same areas. USE silvadene when irritated, too.   --use replens/rephresh as needed  **KEEP AREAS MOISTURIZED!!!  --areas may be contributing to vaginal spotting    --affirm collected: if +, consider intermittent nystatin cream with steroid  --check HbA1c (was preDM and has strong FH)    E)  Work on improving loose stool/erratic BMs:  --look at diet for irritants and avoid if noted  --work on bulking stool.  Continue daily fiber.   --continue high fiber diet    2)  PCP: Iris Gupta Mollere ([place]), Mike  ([place]), Carlota Mast ([place]).  RTC 3-4 months.     3)  Vaginal spotting:  --seems related to lichen--increase treatment as above  --if returns, consider repeat pelvic US (no VB on SSE today), UA micro, rectal  --use barrier wipes    4) New UUI:  --urine C&S    5)  RTC 6 months.

## 2019-06-05 NOTE — PROGRESS NOTES
"Urogyn follow up  06/08/2019    [facility]   [address] [id number]    Jeanne Rodgers  [id number]  1955    Jeanne Rodgers is a 64 y.o.  here for a urogyn follow up.    1)  UI:  (+) NIDIA (while bending with full bladder) = (+) UUI,, since pain started.   (+) pads (cotton liners):4/day, usually minimum wetness.  None at night--wears panties at night to "air out."  Does have to change panties 1st AM.  Daytime frequency: Q 2 hours. Increased urgency.  Nocturia: Yes: 4/night.   (+) dysuria,  (--) hematuria,  (--) frequent UTIs.  (--) complete bladder emptying. DV with small 2nd volume.      2)  POP:  Absent.  (--) vaginal bleeding. (--) vaginal discharge. (--) sexually active.  (+) h/o dyspareunia. Only with 1 partner, started in 50s.  Had some vaginal dryness.   (+)  Vaginal dryness.  (--) vaginal estrogen use. +h/o BRCA.     3)  BM:  (--) constipation/straining.  (+) chronic diarrhea.  Has some fecal urgency.  (--) hematochezia.  (--) fecal incontinence.  (+) fecal smearing/urgency.  (--) complete evacuation.  Does not splint.     4)  R flank/upper back/pelvic pain:  --started a few months ago, gradual onset; sharp; starts R SP area; was intermittent in beginning, now more constant; is associated with urge to urinate at times  --has had increase in daytime frequency and nocturia since pain started  --constant but worse with movement  --makes rising from chair difficult  --can't lay on R side/bend, makes more painful  --saw GYN summer 2018: was Dx with lichen sclerosis; using clobetasol 1-2 times/week--burning/discomfort resolved  --had UTI recently--was Tx with ABX; had recurrent symptoms a few weeks later; typical symptoms: vaginal burning/itching/feels like the RLQ is "hot" when she has a UTI  --was seen by neuro--didn't think pain was back/spine related  --has tried ibuprofen/Aleve w/o relief  --before was Dx with BRCA started having itching throughout " body    --5/18 CT A/P: NORMAL.  The visualized portion of the base of the lungs, visualized portion of the heart, stomach, spleen, pancreas, and adrenal glands are unremarkable.  The gallbladder is absent.  There is diffuse fatty infiltration of the liver which is enlarged.  The kidneys are unremarkable.  There is no hydronephrosis or nephrolithiasis.  The bladder is unremarkable.  The uterus has a normal contour.  The bowel has a normal appearance.  The appendix is not visualized.  The osseous structures demonstrate mild degenerative change.  --4/2018 MRI: Degenerative changes as above.  No focal protrusion or extrusion of disc material. 5/18 MRI reviewed by back/spine PA:  Lumbarlized sacrum.  Small disc bulge at L5-S1 and moderate right lateral recess stenosis.  Small right L4-5 facet synovial cyst.  No HNP or foraminal stenosis at L1-2 from the sagittal images.    --1/2018 rheumatology consult ([facility]):  1.  Clinically she only has evidence of osteoarthritis.  She has no evidence to suggest an inflammatory arthritis.  Aromatase inhibitors are known to increase osteoarthritic pain.  2.  Positive FANNY, low titer.  It had been -2 years ago.  I do not think the positive FANNY is related to her joint problems.  It could be related to her dry eye and dry mouth.  It could be related to the urticaria.  It could be a false positive test.   Plans:  1.  Increased ibuprofen to 3 times daily  2.  Resume gabapentin but increased the dose to 600 mg 3 times daily  3.  Return to see me in 4 months    Initial exam:  PELVIC:    External genitalia:  Normal Bartholins, Skenes and labia bilaterally.  +lichen changes at introitus, regression of minora, inner labia beefy/red with hypopigmentation c/w lichen process.   Urethra:  No caruncle, diverticulum or masses.  (+) hypermobility.    Vagina:  Atrophy (+) , no discharge.  +mild TTP At bladder.  Most of TTP LV/OI, R>L--this feels like pain.    Cervix:  normal appearance  Uterus: normal  size, contour, position, consistency, mobility, non-tender.  Exam inhibited by habitus--? TTP vs LV/OI vs bladder.   Adnexa: Not palpable.  POP-Q:    Deferred.  No obvious POP present with valsalva.     Past Medical History  Past Medical History:   Diagnosis Date    Allergy     Anxiety     Breast cancer 2014    Chronic back pain     Lichen sclerosus     Mental disorder    Anxiety/depression: No SI/HI. Feels controlled currently.  No current meds.  Has been on meds in past.      Past Surgical History  Past Surgical History:   Procedure Laterality Date    APPENDECTOMY      BREAST BIOPSY Right 2014    BREAST LUMPECTOMY Right 2014    R lumpectomy [facility] w 2.4cm IDC & DCIS, neg margins, 0/6 SN, Stage II, ER/MI+, HER-2 neg Adjuvant XRT and hormonal therapy     CARPAL TUNNEL RELEASE      CHOLECYSTECTOMY      TUBAL LIGATION     RLQ incision appy  LSC niki  LSC BTL    Hysterectomy: No    Past Ob History  G0    Gynecologic History  LMP: No LMP recorded (lmp unknown). Patient is postmenopausal.  Age of menarche: 9 yo  Age of menopause: early 50s.   Menstrual history: h/o normal  Pap test: 6/18, normal.  History of abnormal paps: No.  History of STIs:  No  Mammogram: Date of last: 2/18.  Result: Normal.  S/p BRCA.  S/p arimidex x 3.5 years.  Followed by Delfina.   Colonoscopy: Date of last: ~2015 ([facility]).  Result:  Benign polyps.  Repeat due:  Due 2018-19.    DEXA:  Date of last: 8/18.  Osteopenia.  FRAX calculation does not support treatment.  RECOMMENDATIONS of [facility]:  1.  taking Ca/D  2.  Recommended therapy No additional pharmacologic therapy recommended at this time.  3.  Repeat BMD in 2 - 4 years    Issues include:  Patient Active Problem List   Diagnosis    Hx of breast cancer    Spondylosis without myelopathy    Hamstring tightness of both lower extremities    Segmental and somatic dysfunction of lumbar region    Alteration in mobility associated with pain     Recurrent UTI    Increased frequency of urination    Pelvic pain in female    Myalgia of pelvic floor    Nocturia more than twice per night    Lichen sclerosus et atrophicus    Irregular bowel habits    Loose stools    Hip pain, right    Chronic lower back pain    Lower abdominal pain    Abdominal pain    Urinary incontinence, urge    Pre-diabetes    Vaginal irritation       History since last visit:     1)  R-sided, suprapubic, pelvic pain:  --intermittent  --was Dx with fibromyalgia recently--started gabapentin    A)  Is this related to levator/obturator muscle pain?  --s/p PT--worked on muscle strengthening--thinks more hip/adductor/quads  --still having some mild groin pain R; has some trouble walking; like a sharp/nagging pain in bones  --hasn't seen ortho recently; saw oumar (rheum) in past   --ortho consult 2/19: neg melody    B)  Is this related to bladder spasms?    --urine C&S last visit +; s/p Tx  --organic, cotton pads help  --switched to ivory soap and drift  --added fiber to diet; avoiding irritants  --URGE: stopped oxybutynin 10 xl daily--not sure helping    --Nocturia (nighttime urination): improved; 0x/PM    C)  ? Uterine tenderness on exam:  --recent pap 2018 normal  --no h/o STI  --pelvic US 9/18 normal    D)  Treat lichen sclerosis:  --using clobetasol daily + silvadene 2x/week  --much improved; has occasional flares; doesn't think it will ever go away  --organic, cotton pads help  --switched to ivory soap and drift  --applying steroid cream 2x/week.   --using barrier wipes for cleansing    E)  Work on improving loose stool/erratic BMs:  --improved  --is eating high fiber diet  --taking 1 scoop metamucil a day    2)  Overall body itching:  --taking ibuprofen  --has appt to eval liver    3)  Brown streaks on pad:  --resolved with Shield cleansing pads (barrier cream + cleanser)  --3-4 times/week x 2 weeks  --in same area that urine would be  --recent pap 2018 normal  --no h/o  STI  --pelvic US 9/18 normal    4)  New UUI:  --x 2 months; occasional leakage    Medications:    Current Outpatient Medications:     acetaminophen-codeine 300-15mg (TYLENOL #2) 300-15 mg per tablet, Take 1 tablet by mouth every 4 (four) hours as needed for Pain., Disp: , Rfl:     chlorhexidine (PERIDEX) 0.12 % solution, SWISH AND SPIT 15MLS BY MOUTH 2 TIMES DAILY FOR 7 DAYS., Disp: , Rfl: 0    clobetasol 0.05% (TEMOVATE) 0.05 % Oint, APPLY TO AFFECTED AREA(S) TWICE A DAY FOR 1 MONTH(S) then EVERY DAY FOR 1 MONTH(S) then 3 TIMES A WEEK, Disp: 60 g, Rfl: 2    famotidine (PEPCID) 20 MG tablet, Take 20 mg by mouth once daily., Disp: , Rfl: 1    gabapentin (NEURONTIN) 300 MG capsule, Take 2 capsules (600 mg total) by mouth 3 (three) times daily., Disp: 180 capsule, Rfl: 11    ibuprofen (ADVIL,MOTRIN) 800 MG tablet, Take 800 mg by mouth 3 (three) times daily., Disp: , Rfl:     levocetirizine (XYZAL) 5 MG tablet, Take 1 tablet (5 mg total) by mouth every evening., Disp: 30 tablet, Rfl: 3    nystatin (MYCOSTATIN) cream, Apply topically every Mon, Wed, Fri., Disp: 30 g, Rfl: 11    silver sulfADIAZINE 1% (SILVADENE) 1 % cream, Apply topically every morning., Disp: 50 g, Rfl: 11    ROS:  As per HPI.      Exam  /70 (BP Location: Left arm, Patient Position: Sitting, BP Method: Large (Manual))   Ht 5' (1.524 m)   Wt 80.3 kg (177 lb 0.5 oz)   LMP  (LMP Unknown) Comment: age 53  BMI 34.57 kg/m²   General: alert and oriented, no acute distress  Respiratory: normal respiratory effort  Abd: soft, non-tender, non-distended  TTP at R hip/groin, trevor with movement    PELVIC:    External genitalia:  Normal Bartholins, Skenes and labia bilaterally.  +lichen changes at introitus, regression of minora, outer labia pink--improved; hypopigmentation--better;  c/w lichen process. Looks improved vs  last visit.   Urethra:  No caruncle, diverticulum or masses.  (+) hypermobility.    Affirm collected.  POP-Q:    Deferred.  No  obvious POP present with valsalva.     Impression  1. Urinary incontinence, urge  Urine culture   2. Vaginal irritation  Vaginosis Screen by DNA Probe   3. Pre-diabetes  Hemoglobin A1c   4. Lichen sclerosus et atrophicus     5. Increased frequency of urination     6. Myalgia of pelvic floor     7. Nocturia more than twice per night     8. Recurrent UTI     9. Irregular bowel habits     10. Loose stools     11. Lower abdominal pain     12. Pelvic pain in female       We reviewed the above issues and discussed options for short-term versus long-term management of her problems.   Plan:   1)  R-sided, suprapubic, pelvic pain:  --recent Dx with fibromyalgia  --not improved with pelvic floor PT  --seems related to hip/groin  --ortho consult 2/19: neg melody    A)  Is this related to levator/obturator muscle pain?  --PT thinks hip/adductor/quad  --on exam, pain triggered by this  --continue home PT exercises    B)  Is this related to bladder spasms?    --urine C&S for EUGENE  --Empty bladder every 3 hours.  Empty well: wait a minute, lean forward on toilet.    --Avoid dietary irritants (see sheet).  Keep diary x 3-5 days to determine your irritants.  -- oxybutynin 10 xl daily didn't help    --Nocturia (nighttime urination): stop fluids 2 hours before bed/no water by bed.  If have leg swelling:  Elevate feet above chest x 1 hour before bed to get excess fluid off.  Can also use support hose (knee highs).      C)  ? Uterine tenderness on exam:  --resolved  --recent pap 2018 normal  --no h/o STI  --pelvic US 9/18 normal    D)  Treat lichen sclerosis:  --this is not fungal  --continue organic cotton pads, ivory and drift  --2x/week apply steroid cream (clobetasol).  Apply dime-sized amount with finger inside vagina to knuckle, around vaginal opening/inner lips, and all external areas (including around anus) that are irritated and red/pink or have white outline.  Also apply small amount inside vagina with finger (insert to  eneida).  --EVERY AM and up to 2x/day:  Apply silvadene cream to same areas. USE silvadene when irritated, too.   --use replens/rephresh as needed  **KEEP AREAS MOISTURIZED!!!  --areas may be contributing to vaginal spotting    --affirm collected: if +, consider intermittent nystatin cream with steroid  --check HbA1c (was preDM and has strong FH)    E)  Work on improving loose stool/erratic BMs:  --look at diet for irritants and avoid if noted  --work on bulking stool.  Continue daily fiber.   --continue high fiber diet    2)  PCP: See Ayala, Iris Walls, Siva ([facility] Salazar), Mike ([facility] Godinez), Carlota Mast ([place]).  RTC 3-4 months.     3)  Vaginal spotting:  --seems related to lichen--increase treatment as above  --if returns, consider repeat pelvic US (no VB on SSE today), UA micro, rectal  --use barrier wipes    4) New UUI:  --urine C&S    5)  RTC 6 months.     45 minutes were spent in face to face time with this patient  90 % of this time was spent in counseling and/or coordination of care     Corwin Casarez MD  [facility]  Division of Female Pelvic Medicine and Reconstructive Surgery  Department of Obstetrics & Gynecology

## 2019-06-06 ENCOUNTER — TELEPHONE (OUTPATIENT)
Dept: UROGYNECOLOGY | Facility: CLINIC | Age: 64
End: 2019-06-06

## 2019-06-06 DIAGNOSIS — B37.31 VAGINAL YEAST INFECTION: Primary | ICD-10-CM

## 2019-06-06 LAB
BACTERIAL VAGINOSIS DNA: NEGATIVE
CANDIDA GLABRATA DNA: NEGATIVE
CANDIDA KRUSEI DNA: NEGATIVE
CANDIDA RRNA VAG QL PROBE: POSITIVE
T VAGINALIS RRNA GENITAL QL PROBE: NEGATIVE

## 2019-06-06 RX ORDER — NYSTATIN 100000 U/G
CREAM TOPICAL
Qty: 30 G | Refills: 11 | Status: SHIPPED | OUTPATIENT
Start: 2019-06-07 | End: 2020-01-20 | Stop reason: SDUPTHER

## 2019-06-06 NOTE — TELEPHONE ENCOUNTER
Please let patient know she has a yeast infection.  I think this might be causing some of the irritation.  So, I sent in nystatin (yeast cream) to her [facility].  Please let her know to complete the following regimen to keep the  Yeast a bay:    --Mon, Wed, Fri (3x/week) apply nystatin cream at night:   Apply dime-sized amount with finger inside vagina to knuckle, around vaginal opening/inner lips, and all external areas (including around anus) that are irritated and red/pink or have white outline.  Also apply small amount inside vagina with finger (insert to knuckle).    --Tues and Thurs (2x/week) apply steroid ointment (clobetasol) at night.  Apply dime-sized amount with finger inside vagina to knuckle, around vaginal opening/inner lips, and all external areas (including around anus) that are irritated and red/pink or have white outline.  Also apply small amount inside vagina with finger (insert to knuckle).    --use silvadene on sensitive areas as needed.     --RTC 6 months.     Thanks!

## 2019-06-07 ENCOUNTER — TELEPHONE (OUTPATIENT)
Dept: UROGYNECOLOGY | Facility: CLINIC | Age: 64
End: 2019-06-07

## 2019-06-07 LAB — BACTERIA UR CULT: NO GROWTH

## 2019-06-07 NOTE — TELEPHONE ENCOUNTER
----- Message from Maddy Moya sent at 6/7/2019 10:07 AM CDT -----  Contact: Pt  Type:  Patient Returning Call    Who Called: YON MCKEON [[id number]]  Who Left Message for Patient: Catalina    Does the patient know what this is regarding?: Test results    Best Call Back Number: [phone]    Additional Information: No

## 2019-06-08 ENCOUNTER — PATIENT MESSAGE (OUTPATIENT)
Dept: UROGYNECOLOGY | Facility: CLINIC | Age: 64
End: 2019-06-08

## 2019-06-08 PROBLEM — N39.41 URINARY INCONTINENCE, URGE: Status: ACTIVE | Noted: 2019-06-08

## 2019-06-08 PROBLEM — R73.03 PRE-DIABETES: Status: ACTIVE | Noted: 2019-06-08

## 2019-06-08 PROBLEM — N89.8 VAGINAL IRRITATION: Status: ACTIVE | Noted: 2019-06-08

## 2019-06-10 ENCOUNTER — TELEPHONE (OUTPATIENT)
Dept: UROGYNECOLOGY | Facility: CLINIC | Age: 64
End: 2019-06-10

## 2019-06-10 NOTE — TELEPHONE ENCOUNTER
----- Message from Corwin Casarez MD sent at 6/8/2019  5:11 PM CDT -----  Regarding: please call patient and give her the following instructions  I also sent to patient through [employer]--can you please make sure she got the following message?  Thanks!  ------------------------------------  Please let patient know she has a yeast infection.  I think this might be causing some of the irritation.  So, I sent in nystatin (yeast cream) to her [facility].  Please let her know to complete the following regimen to keep the  Yeast a bay:     --Mon, Wed, Fri (3x/week) apply nystatin cream at night:   Apply dime-sized amount with finger inside vagina to knuckle, around vaginal opening/inner lips, and all external areas (including around anus) that are irritated and red/pink or have white outline.  Also apply small amount inside vagina with finger (insert to knuckle).     --Tues and Thurs (2x/week) apply steroid ointment (clobetasol) at night.  Apply dime-sized amount with finger inside vagina to knuckle, around vaginal opening/inner lips, and all external areas (including around anus) that are irritated and red/pink or have white outline.  Also apply small amount inside vagina with finger (insert to knuckle).     --use silvadene on sensitive areas as needed.      --RTC 6 months.      Thanks!

## 2019-06-10 NOTE — TELEPHONE ENCOUNTER
Left voice message for pt to give the office a call back at [phone]. Called to relay the following: Please let patient know she has a yeast infection.  I think this might be causing some of the irritation.  So, I sent in nystatin (yeast cream) to her [facility].  Please let her know to complete the following regimen to keep the  Yeast a bay:       --Mon, Wed, Fri (3x/week) apply nystatin cream at night:   Apply dime-sized amount with finger inside vagina to knuckle, around vaginal opening/inner lips, and all external areas (including around anus) that are irritated and red/pink or have white outline.  Also apply small amount inside vagina with finger (insert to knuckle).       --Tues and Thurs (2x/week) apply steroid ointment (clobetasol) at night.  Apply dime-sized amount with finger inside vagina to knuckle, around vaginal opening/inner lips, and all external areas (including around anus) that are irritated and red/pink or have white outline.  Also apply small amount inside vagina with finger (insert to knuckle).       --use silvadene on sensitive areas as needed.       --RTC 6 months.       Thanks!

## 2019-06-10 NOTE — TELEPHONE ENCOUNTER
Spoke to pt, she was informed that  she has a yeast infection. That may be causing some of the irritation. Dr. Casarez sent nystatin (yeast cream) to her [facility].  Pt was informed of the following regimen to keep the  Yeast a bay:       --Mon, Wed, Fri (3x/week) apply nystatin cream at night:   Apply dime-sized amount with finger inside vagina to knuckle, around vaginal opening/inner lips, and all external areas (including around anus) that are irritated and red/pink or have white outline.  Also apply small amount inside vagina with finger (insert to knuckle).       --Tues and Thurs (2x/week) apply steroid ointment (clobetasol) at night.  Apply dime-sized amount with finger inside vagina to knuckle, around vaginal opening/inner lips, and all external areas (including around anus) that are irritated and red/pink or have white outline.  Also apply small amount inside vagina with finger (insert to knuckle).       --use silvadene on sensitive areas as needed.       --RTC 6 months.   Pt was also informed this message was sent via [facility], pt is aware and verbalizes understanding.

## 2019-06-10 NOTE — TELEPHONE ENCOUNTER
----- Message from Corwin Casarez MD sent at 6/8/2019  5:11 PM CDT -----  Regarding: please call patient and give her the following instructions  I also sent to patient through [employer]--can you please make sure she got the following message?  Thanks!  ------------------------------------  Please let patient know she has a yeast infection.  I think this might be causing some of the irritation.  So, I sent in nystatin (yeast cream) to her [facility].  Please let her know to complete the following regimen to keep the  Yeast a bay:     --Mon, Wed, Fri (3x/week) apply nystatin cream at night:   Apply dime-sized amount with finger inside vagina to knuckle, around vaginal opening/inner lips, and all external areas (including around anus) that are irritated and red/pink or have white outline.  Also apply small amount inside vagina with finger (insert to knuckle).     --Tues and Thurs (2x/week) apply steroid ointment (clobetasol) at night.  Apply dime-sized amount with finger inside vagina to knuckle, around vaginal opening/inner lips, and all external areas (including around anus) that are irritated and red/pink or have white outline.  Also apply small amount inside vagina with finger (insert to knuckle).     --use silvadene on sensitive areas as needed.      --RTC 6 months.      Thanks!

## 2019-06-25 NOTE — PROGRESS NOTES
HEPATOLOGY CLINIC VISIT NOTE     REFERRING PROVIDER: Dr. Nano Ford    REASON FOR VISIT: fatty liver     HISTORY: This is a 64 y.o. White female here for evaluation fatty liver, referred by hematology. Recent history of RUQ abdominal pain, noted on CT. H/o fatty liver on multiple imaging studies. H/o hepatic cyst noted on CT in 2010 at [facility], not noted on recent studies. No h/o fibrosis staging. No FH of liver disease. Denies drug or alcohol use.     Review of most recent labs shows normal liver enzymes and synthetic liver function. Negative HCV screening    H/o stage 2 breast cancer, followed by oncology.     Abdominal pain is RUQ pain radiating to RLQ. She has a h/o colon polyps reports she is due for colonoscopy. Pt hasn't had EGD.     Pt denies signs of hepatic decompensation including: jaundice, dark urine, abdominal distention, hematemesis, melena, slowed mentation.    Liver staging:  No formal staging     Past Medical History:   Diagnosis Date    Allergy     Anxiety     Breast cancer 2014    Chronic back pain     Fibromyalgia     Lichen sclerosus     Mental disorder      Past Surgical History:   Procedure Laterality Date    APPENDECTOMY      BREAST BIOPSY Right 2014    BREAST LUMPECTOMY Right 2014    R lumpectomy [facility] w 2.4cm IDC & DCIS, neg margins, 0/6 SN, Stage II, ER/VA+, HER-2 neg Adjuvant XRT and hormonal therapy     CARPAL TUNNEL RELEASE      CHOLECYSTECTOMY      TUBAL LIGATION       FAMILY HISTORY: Negative for liver disease    SOCIAL HISTORY:   Retired, [employer]    Social History     Tobacco Use   Smoking Status Never Smoker   Smokeless Tobacco Never Used     Social History     Substance and Sexual Activity   Alcohol Use No    Alcohol/week: 0.0 oz   denies     Social History     Substance and Sexual Activity   Drug Use No     ROS:   No fever, chills  No chest pain, dyspnea, cough  (+) abdominal pain,  nausea, vomiting  No headaches, visual changes  No lower extremity  edema  No depression or anxiety      PHYSICAL EXAM:  Friendly White female, in no acute distress; alert and oriented to person, place and time  VITALS: reviewed  HEENT: Sclerae anicteric.   NECK: Supple  CVS: Regular rate and rhythm. No murmurs  LUNGS: Normal respiratory effort. Clear bilaterally  ABDOMEN: Flat, soft, nontender. No organomegaly or masses. No ascites or hernias  SKIN: Warm and dry. No jaundice, No obvious rashes.   EXTREMITIES: No lower extremity edema  NEURO/PSYCH: Normal gate. Memory intact. Thought and speech pattern appropriate. Behavior normal. No depression or anxiety noted.    RECENT LABS:  Lab Results   Component Value Date    WBC 5.55 05/09/2019    HGB 13.1 05/09/2019     05/09/2019     No results found for: INR  Lab Results   Component Value Date    AST 30 05/09/2019    ALT 24 05/09/2019    BILITOT 0.7 05/09/2019    ALBUMIN 3.5 05/09/2019    ALKPHOS 82 05/09/2019    CREATININE 0.7 05/09/2019    BUN 8 05/09/2019     05/09/2019    K 4.3 05/09/2019     RECENT IMAGING:  CT Chest Abdoment Pelvis With Contrast   Order: [id number]   Status:  Final result   Visible to patient:  Yes (Patient Portal) Next appt:  08/05/2019 at 01:40 PM in Rheumatology (Joshua Franco MD) Dx:  Right upper quadrant abdominal pain   Details     Reading Physician Reading Date Result Priority   Luiz Benedict MD 4/30/2019       Narrative     EXAMINATION:  CT CHEST ABDOMEN PELVIS WITH CONTRAST (XPD)    CLINICAL HISTORY:  severe tenderness low right rib margin and right upper quadrant abdomen; Right upper quadrant pain    TECHNIQUE:  Low dose axial images, sagittal and coronal reformations were obtained from the thoracic inlet to the pubic symphysis following the IV administration of 80 mL of Omnipaque 350    COMPARISON:  05/10/2018    FINDINGS:  CT chest: The thyroid gland is grossly unremarkable.  No mediastinal, hilar, or axillary lymphadenopathy.  Thoracic aorta is normal caliber.  No pericardial  or pleural effusion.  Mild biapical pleuroparenchymal scarring/thickening.  The tracheobronchial tree is clear.  There is a calcified nodule along the right minor fissure measuring 4 mm.  No concerning lung nodules identified.  No atelectasis.  No lung consolidation.    CT abdomen pelvis: No fluid collections.  The uterus, adnexa, and bladder are grossly unremarkable.  No pericolonic fat stranding/inflammatory changes.  Terminal ileum is unremarkable.  Appendix is not identified.  No dilated loops of bowel.  No mesenteric or periaortic lymphadenopathy.  Mild scattered calcified atherosclerotic disease.    Mild hepatomegaly with fatty change.  Prior cholecystectomy.  There is a small left hepatic lobe cyst.  Main portal veins are patent.  Mild pancreatic atrophy.  No pancreatic or biliary ductal dilatation.  Splenic size within normal limits.  Adrenal glands are unremarkable.  No renal masses or hydronephrosis.    No marrow replacement process.  Lower lumbar facet arthropathy noted.      Impression       Prominent fatty change in the liver.    No rib fractures.    Prior cholecystectomy.             ASSESSMENT  64 y.o. White female with:  1. HEPATIC STEATOSIS  -- suspect NAFLD/PORTER  -- stage fibrosis with fibroscan  -- discussed diet, exercise and weight loss    2. RUQ PAIN  -- etiology unclear, does have mild hepatomegaly  -- will refer for GI to rule out other etiologies as has radiation to RLQ  -- if from hepatomegaly due to steatosis, will only be improved with weight loss     EDUCATION:  We discussed the manifestations of NAFLD. At this time there is no FDA approved therapy for NAFLD.   The patient and I discussed the importance of diet, exercise, and weight loss for management of NAFLD. We discussed a low fat, low carb/low sugar, high fiber diet, and a goal of 30 minutes of exercise 5 times per week.   Pt is aware that fatty liver can progress to steatohepatitis and possibly to cirrhosis, putting one at  increased risk for liver cancer, liver failure, and death.       PLAN:  1. Fibroscan today  2. F/u TBD pending fibroscan    Thank you for allowing me to participate in the care of Jeanne Rodriguez PA-C

## 2019-06-26 ENCOUNTER — OFFICE VISIT (OUTPATIENT)
Dept: HEPATOLOGY | Facility: CLINIC | Age: 64
End: 2019-06-26
Attending: INTERNAL MEDICINE
Payer: MEDICARE

## 2019-06-26 ENCOUNTER — PROCEDURE VISIT (OUTPATIENT)
Dept: HEPATOLOGY | Facility: CLINIC | Age: 64
End: 2019-06-26
Attending: PHYSICIAN ASSISTANT
Payer: MEDICARE

## 2019-06-26 VITALS
HEIGHT: 60 IN | SYSTOLIC BLOOD PRESSURE: 142 MMHG | WEIGHT: 176.56 LBS | OXYGEN SATURATION: 97 % | BODY MASS INDEX: 34.66 KG/M2 | HEART RATE: 77 BPM | DIASTOLIC BLOOD PRESSURE: 63 MMHG

## 2019-06-26 DIAGNOSIS — K76.0 HEPATIC STEATOSIS: Primary | ICD-10-CM

## 2019-06-26 DIAGNOSIS — R10.11 RUQ PAIN: ICD-10-CM

## 2019-06-26 DIAGNOSIS — K76.0 HEPATIC STEATOSIS: ICD-10-CM

## 2019-06-26 PROCEDURE — 91200 PR LIVER ELASTOGRAPHY W/OUT IMAG W/INTERP & REPORT: ICD-10-PCS | Mod: 26,S$PBB,, | Performed by: PHYSICIAN ASSISTANT

## 2019-06-26 PROCEDURE — 99214 OFFICE O/P EST MOD 30 MIN: CPT | Mod: S$PBB,,, | Performed by: PHYSICIAN ASSISTANT

## 2019-06-26 PROCEDURE — 99999 PR PBB SHADOW E&M-EST. PATIENT-LVL IV: ICD-10-PCS | Mod: PBBFAC,,, | Performed by: PHYSICIAN ASSISTANT

## 2019-06-26 PROCEDURE — 91200 LIVER ELASTOGRAPHY: CPT | Mod: PBBFAC | Performed by: PHYSICIAN ASSISTANT

## 2019-06-26 PROCEDURE — 99214 PR OFFICE/OUTPT VISIT, EST, LEVL IV, 30-39 MIN: ICD-10-PCS | Mod: S$PBB,,, | Performed by: PHYSICIAN ASSISTANT

## 2019-06-26 PROCEDURE — 99999 PR PBB SHADOW E&M-EST. PATIENT-LVL IV: CPT | Mod: PBBFAC,,, | Performed by: PHYSICIAN ASSISTANT

## 2019-06-26 PROCEDURE — 99214 OFFICE O/P EST MOD 30 MIN: CPT | Mod: PBBFAC,25 | Performed by: PHYSICIAN ASSISTANT

## 2019-06-26 PROCEDURE — 91200 LIVER ELASTOGRAPHY: CPT | Mod: 26,S$PBB,, | Performed by: PHYSICIAN ASSISTANT

## 2019-06-26 NOTE — LETTER
June 26, 2019      Nano Ford MD  [address]  [facility] [address]           [facility]  [address]  [facility] [address]  Phone: [phone]  Fax: [phone]          Patient: Jeanne Rodgers   MR Number: [id number]   YOB: 1955   Date of Visit: 6/26/2019       Dear Dr. Nano Ford:    Thank you for referring Jeanne Rodgers to me for evaluation. Attached you will find relevant portions of my assessment and plan of care.    If you have questions, please do not hesitate to call me. I look forward to following Jeanne Rodgers along with you.    Sincerely,    Iam Rodriguez PA-C    Enclosure  CC:  No Recipients    If you would like to receive this communication electronically, please contact [email or web address] or [phone] to request more information on [facility] access.    For providers and/or their staff who would like to refer a patient to [facility], please contact us through our one-stop-shop provider referral line, [facility], at [phone].    If you feel you have received this communication in error or would no longer like to receive these types of communications, please e-mail [email or web address]

## 2019-06-26 NOTE — PROGRESS NOTES
I have personally performed a face to face diagnostic evaluation on this patient. I have reviewed and agree with today's findings and the care plan outlined by Iam Rodriguez PA-C with following comments:     Jeanne Rodgers is a pleasant 64 y.o. female who was referred for sonographic finding of hepatic steatosis.     The patient's risk factors for NAFLD include:    · Obesity/overweight                     Yes Body mass index is 34.49 kg/m².  · Dyslipidemia                                yes  · Insulin resistance                        Yes   Hemoglobin A1C   Date Value Ref Range Status   06/05/2019 5.6 4.0 - 5.6 % Final     Comment:     ADA Screening Guidelines:  5.7-6.4%  Consistent with prediabetes  >or=6.5%  Consistent with diabetes  High levels of fetal hemoglobin interfere with the HbA1C  assay. Heterozygous hemoglobin variants (HbS, HgC, etc)do  not significantly interfere with this assay.   However, presence of multiple variants may affect accuracy.     ·   · Family history of diabetes           no    Agree with obtaining VCTE for hepatic fibrosis/steatosis assessment.    We have counseled the patient regarding the life-style modifications: weight loss, low calorie/low fat diet and exercise.

## 2019-06-27 ENCOUNTER — PATIENT MESSAGE (OUTPATIENT)
Dept: HEPATOLOGY | Facility: CLINIC | Age: 64
End: 2019-06-27

## 2019-06-27 DIAGNOSIS — K74.00 LIVER FIBROSIS: Primary | ICD-10-CM

## 2019-06-28 NOTE — PROCEDURES
Procedures   Vibration-controlled Transient Elastography Procedure (Fibroscan)    Name: Jeanne Rodgers  Date of Procedure : 2019   :: Iam Rodriguez PA-C  Diagnosis: NAFLD    Probe: XL    Findings  Median liver stiffness score: 15.5 KPa  CAP readin dB/m    IQR/med: 364 %    Interpretation  Fibrosis interpretation is based on medial liver stiffness - Kilopascal (kPa).     Fibrosis stage: F4     Steatosis interpretation is based on controlled attenuation parameter - (dB/m).    Steatosis grade: >S3       Iam Rodriguez PA-C  Hepatology/HCV  [facility]

## 2019-07-01 ENCOUNTER — TELEPHONE (OUTPATIENT)
Dept: UROGYNECOLOGY | Facility: CLINIC | Age: 64
End: 2019-07-01

## 2019-07-01 NOTE — TELEPHONE ENCOUNTER
----- Message from Eleanor Navarro sent at 7/1/2019 10:13 AM CDT -----  Contact: Patient   Patient is experiencing shooting pain in her right side and irregular bleeding the color of a orange-etelvina red . The patient would like to be seen for this and can be reached at  [phone].

## 2019-07-01 NOTE — TELEPHONE ENCOUNTER
Spoke to pt, she states she started bleeding on Thursday 6/27/2019. She reports the bleeding is moderate in flow, intermittent and was orange-red in color on Thursday. Friday until today the blood is red in color, still intermittent and moderate in flow she reports pain on the RLQ since Thursday. Please advise

## 2019-07-02 ENCOUNTER — TELEPHONE (OUTPATIENT)
Dept: UROGYNECOLOGY | Facility: CLINIC | Age: 64
End: 2019-07-02

## 2019-07-02 ENCOUNTER — PATIENT MESSAGE (OUTPATIENT)
Dept: UROGYNECOLOGY | Facility: CLINIC | Age: 64
End: 2019-07-02

## 2019-07-02 NOTE — TELEPHONE ENCOUNTER
----- Message from Ruiz Hsu sent at 7/2/2019  9:51 AM CDT -----  PLEASE CALL PT SHE IS VERY UPSET THAT NO ONE CALL HER BACK [phone]

## 2019-07-02 NOTE — TELEPHONE ENCOUNTER
Spoke with patient.  Has been having intermittent bleeding x 1 week.  Was orangey, then BRB.  Has a h/o R sided pain, which these feels like.  PT thought it was hip related.  She denies dysuria, hematuria, increased UI/U/F.  She is worried about renal stone.  She denies gross hematuria but has had blood in her pad intermittently.  No N/V/F/C.  She has appt to see [facility] 7/11 advised to keep that. Patient was given ED precautions for sooner, acute issues.  Otherwise, advised to keep appt as scheduled. She is ok with this plan.

## 2019-07-02 NOTE — TELEPHONE ENCOUNTER
Spoke to pt, appointment scheduled 7/11/2019 at 3:30p with Rose at the [facility] location. Pt is aware and verbalizes understanding.

## 2019-07-03 ENCOUNTER — PATIENT MESSAGE (OUTPATIENT)
Dept: HEPATOLOGY | Facility: CLINIC | Age: 64
End: 2019-07-03

## 2019-07-08 ENCOUNTER — HOSPITAL ENCOUNTER (OUTPATIENT)
Dept: RADIOLOGY | Facility: HOSPITAL | Age: 64
Discharge: HOME | End: 2019-07-08
Attending: PHYSICIAN ASSISTANT
Payer: MEDICARE

## 2019-07-08 DIAGNOSIS — K74.00 LIVER FIBROSIS: ICD-10-CM

## 2019-07-08 PROCEDURE — 76391 MR ELASTOGRAPHY: CPT | Mod: 26,,, | Performed by: RADIOLOGY

## 2019-07-08 PROCEDURE — 76391 MR ELASTOGRAPHY: CPT | Mod: TC

## 2019-07-08 PROCEDURE — 76391 MRI ABDOMEN ELASTOGRAPHY: ICD-10-PCS | Mod: 26,,, | Performed by: RADIOLOGY

## 2019-07-11 ENCOUNTER — TELEPHONE (OUTPATIENT)
Dept: HEPATOLOGY | Facility: CLINIC | Age: 64
End: 2019-07-11

## 2019-07-11 NOTE — TELEPHONE ENCOUNTER
----- Message from Iam Rodriguez PA-C sent at 7/11/2019  8:28 AM CDT -----  F0 fibrosis on MRI but severe steatosis. Please recall for f/u in 1 year

## 2019-07-12 ENCOUNTER — OFFICE VISIT (OUTPATIENT)
Dept: UROGYNECOLOGY | Facility: CLINIC | Age: 64
End: 2019-07-12
Payer: MEDICARE

## 2019-07-12 VITALS
WEIGHT: 177 LBS | BODY MASS INDEX: 34.75 KG/M2 | HEIGHT: 60 IN | SYSTOLIC BLOOD PRESSURE: 120 MMHG | DIASTOLIC BLOOD PRESSURE: 80 MMHG

## 2019-07-12 DIAGNOSIS — N95.0 POST-MENOPAUSAL BLEEDING: Primary | ICD-10-CM

## 2019-07-12 DIAGNOSIS — N39.41 URINARY INCONTINENCE, URGE: ICD-10-CM

## 2019-07-12 DIAGNOSIS — N39.41 URGE INCONTINENCE OF URINE: ICD-10-CM

## 2019-07-12 DIAGNOSIS — L90.0 LICHEN SCLEROSUS ET ATROPHICUS: ICD-10-CM

## 2019-07-12 DIAGNOSIS — N95.2 VAGINAL ATROPHY: ICD-10-CM

## 2019-07-12 LAB
BILIRUB SERPL-MCNC: NORMAL MG/DL
BLOOD URINE, POC: NORMAL
COLOR, POC UA: NORMAL
GLUCOSE UR QL STRIP: NORMAL
KETONES UR QL STRIP: NORMAL
LEUKOCYTE ESTERASE URINE, POC: NORMAL
NITRITE, POC UA: NORMAL
PH, POC UA: 5
PROTEIN, POC: NORMAL
SPECIFIC GRAVITY, POC UA: 1
UROBILINOGEN, POC UA: NORMAL

## 2019-07-12 PROCEDURE — 99214 OFFICE O/P EST MOD 30 MIN: CPT | Mod: S$PBB,,, | Performed by: NURSE PRACTITIONER

## 2019-07-12 PROCEDURE — 99999 PR PBB SHADOW E&M-EST. PATIENT-LVL III: CPT | Mod: PBBFAC,,, | Performed by: NURSE PRACTITIONER

## 2019-07-12 PROCEDURE — 99213 OFFICE O/P EST LOW 20 MIN: CPT | Mod: PBBFAC | Performed by: NURSE PRACTITIONER

## 2019-07-12 PROCEDURE — 99999 PR PBB SHADOW E&M-EST. PATIENT-LVL III: ICD-10-PCS | Mod: PBBFAC,,, | Performed by: NURSE PRACTITIONER

## 2019-07-12 PROCEDURE — 81002 URINALYSIS NONAUTO W/O SCOPE: CPT | Mod: PBBFAC | Performed by: NURSE PRACTITIONER

## 2019-07-12 PROCEDURE — 99214 PR OFFICE/OUTPT VISIT, EST, LEVL IV, 30-39 MIN: ICD-10-PCS | Mod: S$PBB,,, | Performed by: NURSE PRACTITIONER

## 2019-07-12 NOTE — PATIENT INSTRUCTIONS
1)  R-sided, suprapubic, pelvic pain:  --recent Dx with fibromyalgia  --not improved with pelvic floor PT  --seems related to hip/groin  --ortho consult 2/19: mai oliver     2.Treat lichen sclerosis:  --this is not fungal  --continue organic cotton pads, ivory and drift  --clobetasol twice daily x 2 weeks then twice weekly.Use a mirror to apply.    --use A&D ointment 15 minutes after applying betamethasone to protect skin.     3.  Work on improving loose stool/erratic BMs:  --look at diet for irritants and avoid if noted  --work on bulking stool.  Continue daily fiber.   --continue high fiber diet     4.  Vaginal spotting/uterine tenderness  --pelvic ultrasound ordered    5. Mixed urinary incontinence, urge > stress:    --Empty bladder every 3 hours.  Empty well: wait a minute, lean forward on toilet.    --Avoid dietary irritants (see sheet).  Keep diary x 3-5 days to determine your irritants.  --KEGELS: do 10 in AM and 10 in PM, holding each x 10 seconds.  When you feel urge to go, STOP, KEGEL, and when urge has passed, then go to bathroom.  Consider PT in future.    --URGE: consider anticholinergic.  Takes 2-4 weeks to see if will have effect.  For dry mouth: get sour, sugar free lozenge or gum.    --STRESS:  Pessary vs. Sling.      6.RTC 2 weeks.

## 2019-07-12 NOTE — PROGRESS NOTES
"Urogyn follow up  07/12/2019    [facility]   [address] [address]    Jeanne Rodgers  [id number]  1955      Jeanne Rodgers is a 64 y.o. here for a urogyn follow up.    Initial HPI from 09/06/2019  1)  UI:  (+) NIDIA (while bending with full bladder) = (+) UUI,, since pain started.   (+) pads (cotton liners):4/day, usually minimum wetness.  None at night--wears panties at night to "air out."  Does have to change panties 1st AM.  Daytime frequency: Q 2 hours. Increased urgency.  Nocturia: Yes: 4/night.   (+) dysuria,  (--) hematuria,  (--) frequent UTIs.  (--) complete bladder emptying. DV with small 2nd volume.       2)  POP:  Absent.  (--) vaginal bleeding. (--) vaginal discharge. (--) sexually active.  (+) h/o dyspareunia. Only with 1 partner, started in 50s.  Had some vaginal dryness.   (+)  Vaginal dryness.  (--) vaginal estrogen use. +h/o BRCA.      3)  BM:  (--) constipation/straining.  (+) chronic diarrhea.  Has some fecal urgency.  (--) hematochezia.  (--) fecal incontinence.  (+) fecal smearing/urgency.  (--) complete evacuation.  Does not splint.      4)  R flank/upper back/pelvic pain:  --started a few months ago, gradual onset; sharp; starts R SP area; was intermittent in beginning, now more constant; is associated with urge to urinate at times  --has had increase in daytime frequency and nocturia since pain started  --constant but worse with movement  --makes rising from chair difficult  --can't lay on R side/bend, makes more painful  --saw GYN summer 2018: was Dx with lichen sclerosis; using clobetasol 1-2 times/week--burning/discomfort resolved  --had UTI recently--was Tx with ABX; had recurrent symptoms a few weeks later; typical symptoms: vaginal burning/itching/feels like the RLQ is "hot" when she has a UTI  --was seen by neuro--didn't think pain was back/spine related  --has tried ibuprofen/Aleve w/o relief  --before was Dx with BRCA " started having itching throughout body     --5/18 CT A/P: NORMAL.  The visualized portion of the base of the lungs, visualized portion of the heart, stomach, spleen, pancreas, and adrenal glands are unremarkable.  The gallbladder is absent.  There is diffuse fatty infiltration of the liver which is enlarged.  The kidneys are unremarkable.  There is no hydronephrosis or nephrolithiasis.  The bladder is unremarkable.  The uterus has a normal contour.  The bowel has a normal appearance.  The appendix is not visualized.  The osseous structures demonstrate mild degenerative change.  --4/2018 MRI: Degenerative changes as above.  No focal protrusion or extrusion of disc material. 5/18 MRI reviewed by back/spine PA:  Lumbarlized sacrum.  Small disc bulge at L5-S1 and moderate right lateral recess stenosis.  Small right L4-5 facet synovial cyst.  No HNP or foraminal stenosis at L1-2 from the sagittal images.     --1/2018 rheumatology consult ([facility]):  1.  Clinically she only has evidence of osteoarthritis.  She has no evidence to suggest an inflammatory arthritis.  Aromatase inhibitors are known to increase osteoarthritic pain.  2.  Positive FANNY, low titer.  It had been -2 years ago.  I do not think the positive FANNY is related to her joint problems.  It could be related to her dry eye and dry mouth.  It could be related to the urticaria.  It could be a false positive test.   Plans:  1.  Increased ibuprofen to 3 times daily  2.  Resume gabapentin but increased the dose to 600 mg 3 times daily  3.  Return to see me in 4 months     Initial exam:  PELVIC:    External genitalia:  Normal Bartholins, Skenes and labia bilaterally.  +lichen changes at introitus, regression of minora, inner labia beefy/red with hypopigmentation c/w lichen process.   Urethra:  No caruncle, diverticulum or masses.  (+) hypermobility.    Vagina:  Atrophy (+) , no discharge.  +mild TTP At bladder.  Most of TTP LV/OI, R>L--this feels like pain.    Cervix:   normal appearance  Uterus: normal size, contour, position, consistency, mobility, non-tender.  Exam inhibited by habitus--? TTP vs LV/OI vs bladder.   Adnexa: Not palpable.  POP-Q:    Deferred.  No obvious POP present with valsalva.   Changes from last visit:  1. R-sided, suprapubic, pelvic pain:  --still has it on and off.   --not improved with pelvic floor PT  --seems related to hip/groin  --ortho consult 2/19: neg eval     2. Vaginal bleeding  --one day large amount of blood followed by orange tinged spotting for 2 weeks.     3. Treat lichen sclerosis:  --silvadene twice weekly  --clobetasol twice weekly      4. Irregular bowel habits  --one scoop daily  --has some fecal urgency  --denies fecal smearing     5. urge incontinence  --failed oxybutynin xl 10 mg.    --using 3 pad/ day.  Minimun-moderate leakage.   --voiding every 4-5 hours  --denies nocturia         Past Medical History:   Diagnosis Date    Allergy     Anxiety     Breast cancer 2014    Chronic back pain     Fibromyalgia     Lichen sclerosus     Mental disorder        Past Surgical History:   Procedure Laterality Date    APPENDECTOMY      BREAST BIOPSY Right 2014    BREAST LUMPECTOMY Right 2014    R lumpectomy [facility] w 2.4cm IDC & DCIS, neg margins, 0/6 SN, Stage II, ER/MT+, HER-2 neg Adjuvant XRT and hormonal therapy     CARPAL TUNNEL RELEASE      CHOLECYSTECTOMY      TUBAL LIGATION         Current Outpatient Medications   Medication Sig    acetaminophen-codeine 300-15mg (TYLENOL #2) 300-15 mg per tablet Take 1 tablet by mouth every 4 (four) hours as needed for Pain.    chlorhexidine (PERIDEX) 0.12 % solution SWISH AND SPIT 15MLS BY MOUTH 2 TIMES DAILY FOR 7 DAYS.    clobetasol 0.05% (TEMOVATE) 0.05 % Oint APPLY TO AFFECTED AREA(S) TWICE A DAY FOR 1 MONTH(S) then EVERY DAY FOR 1 MONTH(S) then 3 TIMES A WEEK    famotidine (PEPCID) 20 MG tablet Take 20 mg by mouth once daily.    ibuprofen (ADVIL,MOTRIN) 800 MG tablet Take 800 mg by mouth  3 (three) times daily.    levocetirizine (XYZAL) 5 MG tablet Take 1 tablet (5 mg total) by mouth every evening.    nystatin (MYCOSTATIN) cream Apply topically every Mon, Wed, Fri.    silver sulfADIAZINE 1% (SILVADENE) 1 % cream Apply topically every morning.     No current facility-administered medications for this visit.        Well woman:  Pap test: 6/18, normal.  History of abnormal paps: No.  History of STIs:  No  Mammogram: Date of last: 2/18.  Result: Normal.  S/p BRCA.  S/p arimidex x 3.5 years.  Followed by Delfina.   Colonoscopy: Date of last: ~2015 ([facility]).  Result:  Benign polyps.  Repeat due:  Due 2018-19.    DEXA:  Date of last: 8/18.  Osteopenia.  FRAX calculation does not support treatment.  RECOMMENDATIONS of [facility]:  1.  taking Ca/D  2.  Recommended therapy No additional pharmacologic therapy recommended at this time.  3.  Repeat BMD in 2 - 4 years    ROS:  As per HPI.      Exam  /80 (BP Location: Left arm, Patient Position: Sitting, BP Method: Large (Manual))   Ht 5' (1.524 m)   Wt 80.3 kg (177 lb 0.5 oz)   LMP  (LMP Unknown) Comment: age 53  BMI 34.57 kg/m²   General: alert and oriented, no acute distress  Respiratory: normal respiratory effort  Abd: soft, non-tender, non-distended    Pelvic  Ext. Genitalia: labia minor excoriated and irritated  Vagina: + atrophy. Normal vaginal mucosa without lesions. No discharge noted.   Non-tender bladder base without palpable mass.  Cervix: no lesions  Uterus:  uterus is normal size, shape, consistency and nontender   Urethra: no masses or tenderness  Urethral meatus: no lesions, caruncle or prolapse.    Showed patient with mirror where to apply clobetasol    Impression  1. Post-menopausal bleeding  US Pelvis Comp with Transvag NON-OB (xpd   2. Urge incontinence of urine  POCT URINE DIPSTICK WITHOUT MICROSCOPE   3. Urinary incontinence, urge     4. Lichen sclerosus et atrophicus     5. Vaginal atrophy        We reviewed the above issues and discussed options for short-term versus long-term management of her problems.   Plan:     1)  R-sided, suprapubic, pelvic pain:  --recent Dx with fibromyalgia  --not improved with pelvic floor PT  --seems related to hip/groin  --ortho consult 2/19: mai oliver     2.Treat lichen sclerosis:  --this is not fungal  --continue organic cotton pads, ivory and drift  --clobetasol twice daily x 2 weeks then twice weekly.Use a mirror to apply.    --use A&D ointment 15 minutes after applying betamethasone to protect skin.     3.  Work on improving loose stool/erratic BMs:  --look at diet for irritants and avoid if noted  --work on bulking stool.  Continue daily fiber.   --continue high fiber diet     4.  Vaginal spotting/uterine tenderness  --pelvic ultrasound ordered    5. Mixed urinary incontinence, urge > stress:    --Empty bladder every 3 hours.  Empty well: wait a minute, lean forward on toilet.    --Avoid dietary irritants (see sheet).  Keep diary x 3-5 days to determine your irritants.  --KEGELS: do 10 in AM and 10 in PM, holding each x 10 seconds.  When you feel urge to go, STOP, KEGEL, and when urge has passed, then go to bathroom.  Consider PT in future.    --URGE: consider anticholinergic.  Takes 2-4 weeks to see if will have effect.  For dry mouth: get sour, sugar free lozenge or gum.    --STRESS:  Pessary vs. Sling.      6.RTC 2 weeks.  30 minutes were spent in face to face time with this patient  90 % of this time was spent in counseling and/or coordination of care      Rose Gray, [facility]  Division of Female Pelvic Medicine and Reconstructive Surgery  Department of Obstetrics & Gynecology

## 2019-07-15 ENCOUNTER — TELEPHONE (OUTPATIENT)
Dept: RADIOLOGY | Facility: HOSPITAL | Age: 64
End: 2019-07-15

## 2019-07-15 ENCOUNTER — HOSPITAL ENCOUNTER (OUTPATIENT)
Dept: RADIOLOGY | Facility: HOSPITAL | Age: 64
Discharge: HOME | End: 2019-07-15
Attending: NURSE PRACTITIONER
Payer: MEDICARE

## 2019-07-15 DIAGNOSIS — N95.0 POST-MENOPAUSAL BLEEDING: ICD-10-CM

## 2019-07-15 PROCEDURE — 76830 TRANSVAGINAL US NON-OB: CPT | Mod: TC

## 2019-07-15 PROCEDURE — 76830 US PELVIS COMP WITH TRANSVAG NON-OB (XPD): ICD-10-PCS | Mod: 26,,, | Performed by: RADIOLOGY

## 2019-07-15 PROCEDURE — 76830 TRANSVAGINAL US NON-OB: CPT | Mod: 26,,, | Performed by: RADIOLOGY

## 2019-07-15 PROCEDURE — 76856 US EXAM PELVIC COMPLETE: CPT | Mod: 26,,, | Performed by: RADIOLOGY

## 2019-07-15 PROCEDURE — 76856 US PELVIS COMP WITH TRANSVAG NON-OB (XPD): ICD-10-PCS | Mod: 26,,, | Performed by: RADIOLOGY

## 2019-07-18 ENCOUNTER — TELEPHONE (OUTPATIENT)
Dept: UROGYNECOLOGY | Facility: CLINIC | Age: 64
End: 2019-07-18

## 2019-07-18 NOTE — TELEPHONE ENCOUNTER
Pt is requesting a call back to discuss US results. Informed pt I'll relay this message to CHHAYA Gray, pt voiced understanding and call ended.

## 2019-07-18 NOTE — TELEPHONE ENCOUNTER
Returned pt call no answer, Left voice message for pt to give the office a call back at [phone].

## 2019-07-18 NOTE — TELEPHONE ENCOUNTER
Reviewed ultrasound results with kemal.  She does not think she will be able to tolerate embx in office.  She will establish care with new pcp as her former one is not longer practicing here.  Will plan on hysteroscopy D&C.  ALISHA Nuno

## 2019-07-19 ENCOUNTER — TELEPHONE (OUTPATIENT)
Dept: UROGYNECOLOGY | Facility: CLINIC | Age: 64
End: 2019-07-19

## 2019-07-19 NOTE — TELEPHONE ENCOUNTER
Pt stated she's scheduled with her PCP Dr Iris Walls [phone]  on Tuesday 7/23/19 at 2 pm. Informed pt I'll relay this message to CHHAYA Gray, pt voiced understanding and call ended.

## 2019-07-22 ENCOUNTER — TELEPHONE (OUTPATIENT)
Dept: FAMILY MEDICINE | Facility: CLINIC | Age: 64
End: 2019-07-22

## 2019-07-22 ENCOUNTER — TELEPHONE (OUTPATIENT)
Dept: OBSTETRICS AND GYNECOLOGY | Facility: CLINIC | Age: 64
End: 2019-07-22

## 2019-07-22 ENCOUNTER — OFFICE VISIT (OUTPATIENT)
Dept: OBSTETRICS AND GYNECOLOGY | Facility: CLINIC | Age: 64
End: 2019-07-22
Attending: OBSTETRICS & GYNECOLOGY
Payer: MEDICARE

## 2019-07-22 VITALS
WEIGHT: 173.06 LBS | SYSTOLIC BLOOD PRESSURE: 132 MMHG | DIASTOLIC BLOOD PRESSURE: 78 MMHG | BODY MASS INDEX: 33.98 KG/M2 | HEIGHT: 60 IN

## 2019-07-22 DIAGNOSIS — Z85.3 HISTORY OF BREAST CANCER: ICD-10-CM

## 2019-07-22 DIAGNOSIS — R93.89 THICKENED ENDOMETRIUM: ICD-10-CM

## 2019-07-22 DIAGNOSIS — N95.0 POSTMENOPAUSAL BLEEDING: Primary | ICD-10-CM

## 2019-07-22 DIAGNOSIS — N95.0 PMB (POSTMENOPAUSAL BLEEDING): Primary | ICD-10-CM

## 2019-07-22 PROCEDURE — 99499 NO LOS: ICD-10-PCS | Mod: S$GLB,,, | Performed by: OBSTETRICS & GYNECOLOGY

## 2019-07-22 PROCEDURE — 99499 UNLISTED E&M SERVICE: CPT | Mod: S$GLB,,, | Performed by: OBSTETRICS & GYNECOLOGY

## 2019-07-22 RX ORDER — ACETAMINOPHEN AND CODEINE PHOSPHATE 300; 30 MG/1; MG/1
1 TABLET ORAL
Status: DISCONTINUED | OUTPATIENT
Start: 2019-07-22 | End: 2019-08-02

## 2019-07-22 RX ORDER — ACETAMINOPHEN AND CODEINE PHOSPHATE 300; 30 MG/1; MG/1
1 TABLET ORAL 2 TIMES DAILY
Qty: 16 TABLET | Refills: 0 | Status: SHIPPED | OUTPATIENT
Start: 2019-07-22 | End: 2019-07-30

## 2019-07-22 NOTE — TELEPHONE ENCOUNTER
----- Message from Елена Kam MD sent at 7/22/2019  2:25 PM CDT -----  Hi! This is a breast cancer patient who has a thickened endometrial lining and postmenopausal bleeding. She needs surgical clearance for a D&C hysteroscopy which I am hoping to do on Wednesday.   Can you see her earlier tomorrow so that she can be cleared for surgery?  Thank you!  Tori Kam MD  GYN [facility]

## 2019-07-22 NOTE — TELEPHONE ENCOUNTER
Patient is having emergency surgery on Wednesday (7/24/2019).Patient called to schedule a surgery clearance appointment with Dr. Walls for earlier on 7/22/2019.  Patient stated that current appointment is too late, per surgery provider.  Per Dr. Walls, patient has to schedule with another provider because her schedule is booked.  Patient scheduled appointment for 7/23/2019 with AYLIN Tamayo

## 2019-07-22 NOTE — H&P (VIEW-ONLY)
History & Physical            OBGYN    C.C Pre-op Exam      HPI : Jeanne Rodgers is a 64 y.o. female  for evaluation and discussion of treatment options for Pre-op Exam   .She has been having bleeding for about 4 weeks. She reports that started off as streaking and then she had heavier bleeding. She reports having occasional sharp shooting pain for approximately 2 months.  Patient reports that she cannot tolerate endometrial biopsy.  She desires a D&C hysteroscopy     CLINICAL HISTORY:  Postmenopausal bleeding    TECHNIQUE:  Transabdominal sonography of the pelvis was performed, followed by transvaginal sonography to better evaluate the uterus and ovaries.    COMPARISON:  None.    FINDINGS:  The uterus measures 8.4 by 3.9 x 3.3 cm.  There is fluid seen within the endometrial canal.  The endometrial stripe measures 2.8 cm.  The right ovary measures 1.9 x 1.5 x 1.4 cm.  The left ovary measures 1.3 x 2.2 by 1.3 cm.      Impression       There is substantial endometrial thickening with the endometrial stripe measuring 2.8 cm.  In addition there is fluid seen within the endometrial canal.  These findings are concerning for possible malignancy.  If not already performed endometrial biopsy is recommended for further evaluation.         Past Medical History:   Diagnosis Date    Allergy     Anxiety     Breast cancer 2014    Chronic back pain     Fibromyalgia     Lichen sclerosus     Mental disorder      Past Surgical History:   Procedure Laterality Date    APPENDECTOMY      BREAST BIOPSY Right     BREAST LUMPECTOMY Right     R lumpectomy [facility] w 2.4cm IDC & DCIS, neg margins, 0/6 SN, Stage II, ER/MD+, HER-2 neg Adjuvant XRT and hormonal therapy     CARPAL TUNNEL RELEASE      CHOLECYSTECTOMY      TUBAL LIGATION       Family History   Problem Relation Age of Onset    Cancer Mother 85        pancreatic cancer    Hypertension Mother     Arthritis Mother     Cancer Father 85         pancreatic    Cancer Maternal Aunt 60        pancreatic    No Known Problems Sister     Hypertension Brother     Lung cancer Paternal Aunt     Colon cancer Paternal Uncle     Heart attack Paternal Grandmother     Heart attack Paternal Grandfather     Hypertension Brother     Hashimoto's thyroiditis Sister     Breast cancer Neg Hx     Ovarian cancer Neg Hx      Social History     Tobacco Use    Smoking status: Never Smoker    Smokeless tobacco: Never Used   Substance Use Topics    Alcohol use: No     Alcohol/week: 0.0 oz    Drug use: No     OB History    Para Term  AB Living   0 0 0 0 0 0   SAB TAB Ectopic Multiple Live Births   0 0 0 0 0       /78   Ht 5' (1.524 m)   Wt 78.5 kg (173 lb 1 oz)   LMP  (LMP Unknown) Comment: age 53  BMI 33.80 kg/m²     ROS:    GENERAL: Denies weight gain or weight loss. Feeling well overall.   SKIN: Denies rash or lesions.   HEAD: Denies head injury or headache.   NODES: Denies enlarged lymph nodes.   CHEST: Denies chest pain or shortness of breath.   CARDIOVASCULAR: Denies palpitations or left sided chest pain.   ABDOMEN: No abdominal pain, constipation, diarrhea, nausea, vomiting or rectal bleeding.   URINARY: No frequency, dysuria, hematuria, or burning on urination.  REPRODUCTIVE: See HPI.   BREASTS: The patient performs breast self-examination and denies pain, lumps, or nipple discharge.   HEMATOLOGIC: No easy bruisability or excessive bleeding with the exception of menstrual cycles.  MUSCULOSKELETAL: Denies joint pain or swelling.   NEUROLOGIC: Denies syncope or weakness.   PSYCHIATRIC: Denies depression, anxiety or mood swings.    PHYSICAL EXAM:    APPEARANCE: Well nourished, well developed, in no acute distress.  AFFECT: WNL, alert and oriented x 3  SKIN: No acne or hirsutism  NECK: Neck symmetric without masses or thyromegaly  NODES: No inguinal, cervical, axillary, or femoral lymph node enlargement  CHEST: Good respiratory  effect  ABDOMEN: Soft.  No tenderness or masses.  No hepatosplenomegaly.  No hernias.  PELVIC: Normal external genitalia without lesions.  Normal hair distribution.  Adequate perineal body, normal urethral meatus.  Vagina atrophic- cannot tolerate speculum exam.  Cervix pink, without lesions, discharge or tenderness.  No significant cystocele or rectocele.  Bimanual exam shows uterus to be normal size, regular, mobile and nontender.  Adnexa without masses or tenderness.    EXTREMITIES: No edema.    ASSESSMENT & PLAN:    Postmenopausal bleeding  History of breast cancer   lichen sclerosis  Patient has preop clearance with primary care tomorrow    I have discussed the risks, benefits, indications, and alternatives of the procedure in detail.  The patient verbalizes her understanding.  All questions answered.  Consents signed.  The patient agrees to proceed to proceed as planned.

## 2019-07-22 NOTE — PROGRESS NOTES
History & Physical            OBGYN    C.C Pre-op Exam      HPI : Jeanne Rodgers is a 64 y.o. female  for evaluation and discussion of treatment options for Pre-op Exam   .She has been having bleeding for about 4 weeks. She reports that started off as streaking and then she had heavier bleeding. She reports having occasional sharp shooting pain for approximately 2 months.  Patient reports that she cannot tolerate endometrial biopsy.  She desires a D&C hysteroscopy     CLINICAL HISTORY:  Postmenopausal bleeding    TECHNIQUE:  Transabdominal sonography of the pelvis was performed, followed by transvaginal sonography to better evaluate the uterus and ovaries.    COMPARISON:  None.    FINDINGS:  The uterus measures 8.4 by 3.9 x 3.3 cm.  There is fluid seen within the endometrial canal.  The endometrial stripe measures 2.8 cm.  The right ovary measures 1.9 x 1.5 x 1.4 cm.  The left ovary measures 1.3 x 2.2 by 1.3 cm.      Impression       There is substantial endometrial thickening with the endometrial stripe measuring 2.8 cm.  In addition there is fluid seen within the endometrial canal.  These findings are concerning for possible malignancy.  If not already performed endometrial biopsy is recommended for further evaluation.         Past Medical History:   Diagnosis Date    Allergy     Anxiety     Breast cancer 2014    Chronic back pain     Fibromyalgia     Lichen sclerosus     Mental disorder      Past Surgical History:   Procedure Laterality Date    APPENDECTOMY      BREAST BIOPSY Right     BREAST LUMPECTOMY Right     R lumpectomy [facility] w 2.4cm IDC & DCIS, neg margins, 0/6 SN, Stage II, ER/PA+, HER-2 neg Adjuvant XRT and hormonal therapy     CARPAL TUNNEL RELEASE      CHOLECYSTECTOMY      TUBAL LIGATION       Family History   Problem Relation Age of Onset    Cancer Mother 85        pancreatic cancer    Hypertension Mother     Arthritis Mother     Cancer Father 85         pancreatic    Cancer Maternal Aunt 60        pancreatic    No Known Problems Sister     Hypertension Brother     Lung cancer Paternal Aunt     Colon cancer Paternal Uncle     Heart attack Paternal Grandmother     Heart attack Paternal Grandfather     Hypertension Brother     Hashimoto's thyroiditis Sister     Breast cancer Neg Hx     Ovarian cancer Neg Hx      Social History     Tobacco Use    Smoking status: Never Smoker    Smokeless tobacco: Never Used   Substance Use Topics    Alcohol use: No     Alcohol/week: 0.0 oz    Drug use: No     OB History    Para Term  AB Living   0 0 0 0 0 0   SAB TAB Ectopic Multiple Live Births   0 0 0 0 0       /78   Ht 5' (1.524 m)   Wt 78.5 kg (173 lb 1 oz)   LMP  (LMP Unknown) Comment: age 53  BMI 33.80 kg/m²     ROS:    GENERAL: Denies weight gain or weight loss. Feeling well overall.   SKIN: Denies rash or lesions.   HEAD: Denies head injury or headache.   NODES: Denies enlarged lymph nodes.   CHEST: Denies chest pain or shortness of breath.   CARDIOVASCULAR: Denies palpitations or left sided chest pain.   ABDOMEN: No abdominal pain, constipation, diarrhea, nausea, vomiting or rectal bleeding.   URINARY: No frequency, dysuria, hematuria, or burning on urination.  REPRODUCTIVE: See HPI.   BREASTS: The patient performs breast self-examination and denies pain, lumps, or nipple discharge.   HEMATOLOGIC: No easy bruisability or excessive bleeding with the exception of menstrual cycles.  MUSCULOSKELETAL: Denies joint pain or swelling.   NEUROLOGIC: Denies syncope or weakness.   PSYCHIATRIC: Denies depression, anxiety or mood swings.    PHYSICAL EXAM:    APPEARANCE: Well nourished, well developed, in no acute distress.  AFFECT: WNL, alert and oriented x 3  SKIN: No acne or hirsutism  NECK: Neck symmetric without masses or thyromegaly  NODES: No inguinal, cervical, axillary, or femoral lymph node enlargement  CHEST: Good respiratory  effect  ABDOMEN: Soft.  No tenderness or masses.  No hepatosplenomegaly.  No hernias.  PELVIC: Normal external genitalia without lesions.  Normal hair distribution.  Adequate perineal body, normal urethral meatus.  Vagina atrophic- cannot tolerate speculum exam.  Cervix pink, without lesions, discharge or tenderness.  No significant cystocele or rectocele.  Bimanual exam shows uterus to be normal size, regular, mobile and nontender.  Adnexa without masses or tenderness.    EXTREMITIES: No edema.    ASSESSMENT & PLAN:    Postmenopausal bleeding  History of breast cancer   lichen sclerosis  Patient has preop clearance with primary care tomorrow    I have discussed the risks, benefits, indications, and alternatives of the procedure in detail.  The patient verbalizes her understanding.  All questions answered.  Consents signed.  The patient agrees to proceed to proceed as planned.

## 2019-07-22 NOTE — TELEPHONE ENCOUNTER
Dr Kam spoke with pt about ultrasound results. Pt scheduled for D&C hyst   Please sign case request

## 2019-07-22 NOTE — TELEPHONE ENCOUNTER
----- Message from Lars Thompson sent at 7/22/2019 11:44 AM CDT -----  Contact: YON MCKEON [[id number]]  Name of Who is Calling: YON MCKEON [[id number]]      What is the request in detail: Would like to speak with staff in regards to vaginal ultrasound results showing malignancy. Please advise       Can the clinic reply by [facility]: yes      What Number to Call Back if not in [facility]: [phone]

## 2019-07-23 ENCOUNTER — ANESTHESIA EVENT (OUTPATIENT)
Dept: SURGERY | Facility: OTHER | Age: 64
End: 2019-07-23
Payer: MEDICARE

## 2019-07-23 ENCOUNTER — HOSPITAL ENCOUNTER (OUTPATIENT)
Dept: PREADMISSION TESTING | Facility: OTHER | Age: 64
Discharge: HOME | End: 2019-07-23
Attending: OBSTETRICS & GYNECOLOGY
Payer: MEDICARE

## 2019-07-23 ENCOUNTER — HOSPITAL ENCOUNTER (OUTPATIENT)
Dept: RADIOLOGY | Facility: HOSPITAL | Age: 64
Discharge: HOME | End: 2019-07-23
Attending: PHYSICIAN ASSISTANT
Payer: MEDICARE

## 2019-07-23 ENCOUNTER — OFFICE VISIT (OUTPATIENT)
Dept: FAMILY MEDICINE | Facility: CLINIC | Age: 64
End: 2019-07-23
Payer: MEDICARE

## 2019-07-23 VITALS
WEIGHT: 173 LBS | HEIGHT: 60 IN | BODY MASS INDEX: 33.96 KG/M2 | HEART RATE: 80 BPM | TEMPERATURE: 98 F | SYSTOLIC BLOOD PRESSURE: 123 MMHG | DIASTOLIC BLOOD PRESSURE: 80 MMHG | OXYGEN SATURATION: 98 %

## 2019-07-23 VITALS
RESPIRATION RATE: 16 BRPM | HEART RATE: 71 BPM | OXYGEN SATURATION: 97 % | BODY MASS INDEX: 34.15 KG/M2 | SYSTOLIC BLOOD PRESSURE: 118 MMHG | DIASTOLIC BLOOD PRESSURE: 78 MMHG | TEMPERATURE: 98 F | HEIGHT: 60 IN | WEIGHT: 173.94 LBS

## 2019-07-23 DIAGNOSIS — Z01.818 PRE-OP EVALUATION: ICD-10-CM

## 2019-07-23 DIAGNOSIS — Z01.818 PRE-OP EVALUATION: Primary | ICD-10-CM

## 2019-07-23 DIAGNOSIS — Z01.818 PRE-OP EXAM: ICD-10-CM

## 2019-07-23 PROCEDURE — 93010 EKG 12-LEAD: ICD-10-PCS | Mod: S$PBB,,, | Performed by: INTERNAL MEDICINE

## 2019-07-23 PROCEDURE — 71046 XR CHEST PA AND LATERAL: ICD-10-PCS | Mod: 26,,, | Performed by: RADIOLOGY

## 2019-07-23 PROCEDURE — 71046 X-RAY EXAM CHEST 2 VIEWS: CPT | Mod: 26,,, | Performed by: RADIOLOGY

## 2019-07-23 PROCEDURE — 99214 OFFICE O/P EST MOD 30 MIN: CPT | Mod: S$PBB,,, | Performed by: PHYSICIAN ASSISTANT

## 2019-07-23 PROCEDURE — 93010 ELECTROCARDIOGRAM REPORT: CPT | Mod: S$PBB,,, | Performed by: INTERNAL MEDICINE

## 2019-07-23 PROCEDURE — 99999 PR PBB SHADOW E&M-EST. PATIENT-LVL IV: CPT | Mod: PBBFAC,,, | Performed by: PHYSICIAN ASSISTANT

## 2019-07-23 PROCEDURE — 93005 ELECTROCARDIOGRAM TRACING: CPT | Mod: PBBFAC,PO | Performed by: INTERNAL MEDICINE

## 2019-07-23 PROCEDURE — 99999 PR PBB SHADOW E&M-EST. PATIENT-LVL IV: ICD-10-PCS | Mod: PBBFAC,,, | Performed by: PHYSICIAN ASSISTANT

## 2019-07-23 PROCEDURE — 99214 PR OFFICE/OUTPT VISIT, EST, LEVL IV, 30-39 MIN: ICD-10-PCS | Mod: S$PBB,,, | Performed by: PHYSICIAN ASSISTANT

## 2019-07-23 PROCEDURE — 99214 OFFICE O/P EST MOD 30 MIN: CPT | Mod: PBBFAC,PO,25 | Performed by: PHYSICIAN ASSISTANT

## 2019-07-23 PROCEDURE — 71046 X-RAY EXAM CHEST 2 VIEWS: CPT | Mod: TC,FY

## 2019-07-23 RX ORDER — ACETAMINOPHEN 500 MG
1000 TABLET ORAL
Status: CANCELLED | OUTPATIENT
Start: 2019-07-23 | End: 2019-07-23

## 2019-07-23 RX ORDER — SODIUM CHLORIDE, SODIUM LACTATE, POTASSIUM CHLORIDE, CALCIUM CHLORIDE 600; 310; 30; 20 MG/100ML; MG/100ML; MG/100ML; MG/100ML
INJECTION, SOLUTION INTRAVENOUS CONTINUOUS
Status: CANCELLED | OUTPATIENT
Start: 2019-07-23

## 2019-07-23 NOTE — PROGRESS NOTES
Subjective:      Jeanne Rodgers is a 64 y.o. female who presents to the office today for a preoperative consultation at the request of surgeon Елена Kam MD who plans on performing HYSTEROSCOPY, WITH DILATION AND CURETTAGE OF UTERUS on July 24. This consultation is requested for the specific conditions prompting preoperative evaluation (i.e. because of potential affect on operative risk): history of breast cancer and hx of prediabetes. Planned anesthesia: general. The patient has the following known anesthesia issues: blood pressure dropped with colonscopy . Patients bleeding risk: no recent abnormal bleeding, no remote history of abnormal bleeding and no use of Ca-channel blockers. Patient does not have objections to receiving blood products if needed.    The following portions of the patient's history were reviewed and updated as appropriate: allergies, current medications, past family history, past medical history, past social history, past surgical history and problem list.    Review of Systems  stomach pain, dysuria       Objective:        /78   Pulse 71   Temp 97.8 °F (36.6 °C) (Oral)   Resp 16   Ht 5' (1.524 m)   Wt 78.9 kg (173 lb 15.1 oz)   LMP  (LMP Unknown) Comment: age 53  SpO2 97%   BMI 33.97 kg/m²     General Appearance:    Alert, cooperative, no distress, appears stated age   Head:    Normocephalic, without obvious abnormality, atraumatic   Eyes:    PERRL, conjunctiva/corneas clear, EOM's intact, both eyes   Ears:    Normal TM's and external ear canals, both ears   Nose:   Nares normal, septum midline, mucosa normal, no drainage    or sinus tenderness   Throat:   Lips, mucosa, and tongue normal; teeth and gums normal   Neck:   Supple, symmetrical, trachea midline, no adenopathy;     thyroid:  no enlargement/tenderness/nodules; no carotid    bruit        Lungs:     Clear to auscultation bilaterally, respirations unlabored        Heart:    Regular rate and rhythm, S1 and  S2 normal, no murmur, rub   or gallop       Abdomen:     Soft, non-tender, bowel sounds active all four quadrants,     no masses, no organomegaly           Extremities:   Extremities normal, atraumatic, no cyanosis or edema       Skin:   Skin color, texture, turgor normal, no rashes or lesions               Predictors of intubation difficulty:   Morbid obesity? no   Anatomically abnormal facies? no   Prominent incisors? no   Receding mandible? no   Short, thick neck? no   Neck range of motion: normal   Mallampati score: I (soft palate, uvula, fauces, and tonsillar pillars visible)   Dentition: left bottom 1 tooth     Cardiographics  ECG: normal sinus rhythm, no blocks or conduction defects, no ischemic changes      Imaging  Chest x-ray: ordered     Lab Review   ordered       Assessment:        64 y.o. female with planned surgery as above.    Known risk factors for perioperative complications: None    Difficulty with intubation is not anticipated.    Cardiac Risk Estimation: low    Current medications which may produce withdrawal symptoms if withheld perioperatively: none       Plan:      1. Preoperative workup as follows chest x-ray, ECG, hemoglobin, hematocrit, electrolytes, creatinine, glucose, liver function studies, urinalysis (urinary tract instrumentation planned).  2. Change in medication regimen before surgery: advised no NSAIDs or ASA.  3. Prophylaxis for cardiac events with perioperative beta-blockers: should be considered, specific regimen per anesthesia.  4. Invasive hemodynamic monitoring perioperatively: at the discretion of anesthesiologist.  5. Deep vein thrombosis prophylaxis postoperatively:regimen to be chosen by surgical team.  6. Surveillance for postoperative MI with ECG immediately postoperatively and on postoperative days 1 and 2 AND troponin levels 24 hours postoperatively and on day 4 or hospital discharge (whichever comes first): at the discretion of anesthesiologist.  7. Other measures:  none      If labs and x-ray normal, cleared.

## 2019-07-23 NOTE — DISCHARGE INSTRUCTIONS
PRE-ADMIT TESTING -  [phone]    [address]  [place] [facility]          Your surgery has been scheduled at [facility]. We are pleased to have the opportunity to serve you. For Further Information please call [phone].    On the day of surgery please report to the Information Desk on the 1st floor.    · CONTACT YOUR PHYSICIAN'S OFFICE THE DAY PRIOR TO YOUR SURGERY TO OBTAIN YOUR ARRIVAL TIME.     · The evening before surgery do not eat anything after 9 p.m. ( this includes hard candy, chewing gum and mints).  You may only have GATORADE, POWERADE AND WATER  from 9 p.m. until you leave your home.   DO NOT DRINK ANY LIQUIDS ON THE WAY TO THE HOSPITAL.      SPECIAL MEDICATION INSTRUCTIONS: TAKE medications checked off by the Anesthesiologist on your Medication List.    Angiogram Patients: Take medications as instructed by your physician, including aspirin.     Surgery Patients:    If you take ASPIRIN - Your PHYSICIAN/SURGEON will need to inform you IF/OR when you need to stop taking aspirin prior to your surgery.     Do Not take any medications containing IBUPROFEN.  Do Not Wear any make-up or dark nail polish   (especially eye make-up) to surgery. If you come to surgery with makeup on you will be required to remove the makeup or nail polish.    Do not shave your surgical area at least 5 days prior to your surgery. The surgical prep will be performed at the hospital according to Infection Control regulations.    Leave all valuables at home.   Do Not wear any jewelry or watches, including any metal in body piercings. Jewelry must be removed prior to coming to the hospital.  There is a possibility that rings that are unable to be removed may be cut off if they are on the surgical extremity.    Contact Lens must be removed before surgery. Either do not wear the contact lens or bring a case and solution for storage.  Please bring a container for eyeglasses or dentures as required.  Bring  any paperwork your physician has provided, such as consent forms,  history and physicals, doctor's orders, etc.   Bring comfortable clothes that are loose fitting to wear upon discharge. Take into consideration the type of surgery being performed.  Maintain your diet as advised per your physician the day prior to surgery.      Adequate rest the night before surgery is advised.   Park in the Parking lot behind the hospital or in the [facility] across the street from the parking lot. Parking is complimentary.  If you will be discharged the same day as your procedure, please arrange for a responsible adult to drive you home or to accompany you if traveling by taxi.   YOU WILL NOT BE PERMITTED TO DRIVE OR TO LEAVE THE HOSPITAL ALONE AFTER SURGERY.   It is strongly recommended that you arrange for someone to remain with you for the first 24 hrs following your surgery.    The Surgeon will speak to your family/visitor after your surgery regarding the outcome of your surgery and post op care.  The Surgeon may speak to you after your surgery, but there is a possibility you may not remember the details.  Please check with your family members regarding the conversation with the Surgeon.      Thank you for your cooperation.  The Staff of [facility].                Bathing Instructions with Hibiclens     Shower the evening before and morning of your procedure with Hibiclens:   Wash your face with water and your regular face wash/soap   Apply Hibiclens directly on your skin or on a wet washcloth and wash gently. When showering: Move away from the shower stream when applying Hibiclens to avoid rinsing off too soon.   Rinse thoroughly with warm water   Do not dilute Hibiclens         Dry off as usual, do not use any deodorant, powder, body lotions, perfume, after shave or cologne.

## 2019-07-23 NOTE — ANESTHESIA PREPROCEDURE EVALUATION
07/23/2019  Jeanne Rodgers is a 64 y.o., female.    Anesthesia Evaluation    I have reviewed the Patient Summary Reports.    I have reviewed the Nursing Notes.   I have reviewed the Medications.     Review of Systems  Anesthesia Hx:  No problems with previous Anesthesia  Denies Family Hx of Anesthesia complications.   Denies Personal Hx of Anesthesia complications.   Social:  Non-Smoker    Hematology/Oncology:  Hematology Normal       -- Cancer in past history:  Breast right no axillary node dissection no lymphedema   EENT/Dental:EENT/Dental Normal   Cardiovascular:  Cardiovascular Normal Exercise tolerance: good     Pulmonary:  Pulmonary Normal    Renal/:  Renal/ Normal     Musculoskeletal:   Pelvic pain Spine Disorders: lumbar Chronic Pain and Degenerative disease    Neurological:  Neurology Normal    Endocrine:  Endocrine Normal    Dermatological:  Skin Normal    Psych:  Psychiatric Normal           Physical Exam  General:  Obesity    Airway/Jaw/Neck:  Airway Findings: Mouth Opening: Normal Tongue: Normal  General Airway Assessment: Adult  Mallampati: I  TM Distance: Normal, at least 6 cm  Jaw/Neck Findings:  Neck ROM: Normal ROM      Dental:  Dental Findings: In tact              Anesthesia Plan  Type of Anesthesia, risks & benefits discussed:  Anesthesia Type:  general  Patient's Preference:   Intra-op Monitoring Plan:   Intra-op Monitoring Plan Comments:   Post Op Pain Control Plan: multimodal analgesia  Post Op Pain Control Plan Comments:   Induction:   IV  Beta Blocker:         Informed Consent: Patient understands risks and agrees with Anesthesia plan.  Questions answered. Anesthesia consent signed with patient.  ASA Score: 2     Day of Surgery Review of History & Physical:    H&P update referred to the surgeon.     Anesthesia Plan Notes: Labs and EKG in [facility]        Ready For Surgery From  Anesthesia Perspective.

## 2019-07-24 ENCOUNTER — ANESTHESIA (OUTPATIENT)
Dept: SURGERY | Facility: OTHER | Age: 64
End: 2019-07-24
Payer: MEDICARE

## 2019-07-24 ENCOUNTER — HOSPITAL ENCOUNTER (OUTPATIENT)
Facility: OTHER | Age: 64
Discharge: HOME | End: 2019-07-24
Attending: OBSTETRICS & GYNECOLOGY | Admitting: OBSTETRICS & GYNECOLOGY
Payer: MEDICARE

## 2019-07-24 VITALS
HEIGHT: 60 IN | OXYGEN SATURATION: 98 % | BODY MASS INDEX: 33.96 KG/M2 | TEMPERATURE: 97 F | RESPIRATION RATE: 16 BRPM | HEART RATE: 52 BPM | SYSTOLIC BLOOD PRESSURE: 136 MMHG | WEIGHT: 173 LBS | DIASTOLIC BLOOD PRESSURE: 72 MMHG

## 2019-07-24 DIAGNOSIS — N95.0 POSTMENOPAUSAL BLEEDING: ICD-10-CM

## 2019-07-24 DIAGNOSIS — Z85.3 HISTORY OF BREAST CANCER: ICD-10-CM

## 2019-07-24 PROCEDURE — 58558 PR HYSTEROSCOPY,W/ENDO BX: ICD-10-PCS | Mod: ,,, | Performed by: OBSTETRICS & GYNECOLOGY

## 2019-07-24 PROCEDURE — 88305 TISSUE EXAM BY PATHOLOGIST: CPT | Mod: 26,,, | Performed by: PATHOLOGY

## 2019-07-24 PROCEDURE — 88342 TISSUE SPECIMEN TO PATHOLOGY - SURGERY: ICD-10-PCS | Mod: 26,,, | Performed by: PATHOLOGY

## 2019-07-24 PROCEDURE — 27201423 OPTIME MED/SURG SUP & DEVICES STERILE SUPPLY: Performed by: OBSTETRICS & GYNECOLOGY

## 2019-07-24 PROCEDURE — 63600175 PHARM REV CODE 636 W HCPCS: Performed by: ANESTHESIOLOGY

## 2019-07-24 PROCEDURE — 25000003 PHARM REV CODE 250: Performed by: ANESTHESIOLOGY

## 2019-07-24 PROCEDURE — 63600175 PHARM REV CODE 636 W HCPCS: Performed by: OBSTETRICS & GYNECOLOGY

## 2019-07-24 PROCEDURE — 88342 IMHCHEM/IMCYTCHM 1ST ANTB: CPT | Mod: 26,,, | Performed by: PATHOLOGY

## 2019-07-24 PROCEDURE — 71000033 HC RECOVERY, INTIAL HOUR: Performed by: OBSTETRICS & GYNECOLOGY

## 2019-07-24 PROCEDURE — 25000003 PHARM REV CODE 250: Performed by: NURSE ANESTHETIST, CERTIFIED REGISTERED

## 2019-07-24 PROCEDURE — 58558 HYSTEROSCOPY BIOPSY: CPT | Mod: ,,, | Performed by: OBSTETRICS & GYNECOLOGY

## 2019-07-24 PROCEDURE — 88341 PR IHC OR ICC EACH ADD'L SINGLE ANTIBODY  STAINPR: ICD-10-PCS | Mod: 26,,, | Performed by: PATHOLOGY

## 2019-07-24 PROCEDURE — 37000009 HC ANESTHESIA EA ADD 15 MINS: Performed by: OBSTETRICS & GYNECOLOGY

## 2019-07-24 PROCEDURE — 36000707: Performed by: OBSTETRICS & GYNECOLOGY

## 2019-07-24 PROCEDURE — 36000706: Performed by: OBSTETRICS & GYNECOLOGY

## 2019-07-24 PROCEDURE — 88305 TISSUE SPECIMEN TO PATHOLOGY - SURGERY: ICD-10-PCS | Mod: 26,,, | Performed by: PATHOLOGY

## 2019-07-24 PROCEDURE — 88341 IMHCHEM/IMCYTCHM EA ADD ANTB: CPT | Mod: 26,,, | Performed by: PATHOLOGY

## 2019-07-24 PROCEDURE — 71000016 HC POSTOP RECOV ADDL HR: Performed by: OBSTETRICS & GYNECOLOGY

## 2019-07-24 PROCEDURE — 37000008 HC ANESTHESIA 1ST 15 MINUTES: Performed by: OBSTETRICS & GYNECOLOGY

## 2019-07-24 PROCEDURE — 88305 TISSUE EXAM BY PATHOLOGIST: CPT | Performed by: PATHOLOGY

## 2019-07-24 PROCEDURE — 71000015 HC POSTOP RECOV 1ST HR: Performed by: OBSTETRICS & GYNECOLOGY

## 2019-07-24 PROCEDURE — 71000039 HC RECOVERY, EACH ADD'L HOUR: Performed by: OBSTETRICS & GYNECOLOGY

## 2019-07-24 PROCEDURE — 63600175 PHARM REV CODE 636 W HCPCS: Performed by: NURSE ANESTHETIST, CERTIFIED REGISTERED

## 2019-07-24 RX ORDER — ENOXAPARIN SODIUM 100 MG/ML
120 INJECTION SUBCUTANEOUS
Status: DISCONTINUED | OUTPATIENT
Start: 2019-07-24 | End: 2019-07-24

## 2019-07-24 RX ORDER — PROPOFOL 10 MG/ML
INJECTION, EMULSION INTRAVENOUS
Status: DISCONTINUED | OUTPATIENT
Start: 2019-07-24 | End: 2019-07-24

## 2019-07-24 RX ORDER — ACETAMINOPHEN 500 MG
1000 TABLET ORAL
Status: COMPLETED | OUTPATIENT
Start: 2019-07-24 | End: 2019-07-24

## 2019-07-24 RX ORDER — IBUPROFEN 600 MG/1
600 TABLET ORAL EVERY 6 HOURS PRN
Status: CANCELLED | OUTPATIENT
Start: 2019-07-24

## 2019-07-24 RX ORDER — HYDROCODONE BITARTRATE AND ACETAMINOPHEN 10; 325 MG/1; MG/1
1 TABLET ORAL EVERY 4 HOURS PRN
Status: CANCELLED | OUTPATIENT
Start: 2019-07-24

## 2019-07-24 RX ORDER — OXYCODONE HYDROCHLORIDE 5 MG/1
5 TABLET ORAL
Status: DISCONTINUED | OUTPATIENT
Start: 2019-07-24 | End: 2019-07-24 | Stop reason: HOSPADM

## 2019-07-24 RX ORDER — SODIUM CHLORIDE, SODIUM LACTATE, POTASSIUM CHLORIDE, CALCIUM CHLORIDE 600; 310; 30; 20 MG/100ML; MG/100ML; MG/100ML; MG/100ML
INJECTION, SOLUTION INTRAVENOUS CONTINUOUS
Status: DISCONTINUED | OUTPATIENT
Start: 2019-07-24 | End: 2019-07-24 | Stop reason: HOSPADM

## 2019-07-24 RX ORDER — ONDANSETRON 2 MG/ML
4 INJECTION INTRAMUSCULAR; INTRAVENOUS DAILY PRN
Status: DISCONTINUED | OUTPATIENT
Start: 2019-07-24 | End: 2019-07-24 | Stop reason: HOSPADM

## 2019-07-24 RX ORDER — HYDROCODONE BITARTRATE AND ACETAMINOPHEN 5; 325 MG/1; MG/1
1 TABLET ORAL EVERY 6 HOURS PRN
Qty: 10 TABLET | Refills: 0 | Status: SHIPPED | OUTPATIENT
Start: 2019-07-24 | End: 2019-07-30

## 2019-07-24 RX ORDER — ONDANSETRON 2 MG/ML
INJECTION INTRAMUSCULAR; INTRAVENOUS
Status: DISCONTINUED | OUTPATIENT
Start: 2019-07-24 | End: 2019-07-24

## 2019-07-24 RX ORDER — DIPHENHYDRAMINE HYDROCHLORIDE 50 MG/ML
12.5 INJECTION INTRAMUSCULAR; INTRAVENOUS EVERY 30 MIN PRN
Status: DISCONTINUED | OUTPATIENT
Start: 2019-07-24 | End: 2019-07-24 | Stop reason: HOSPADM

## 2019-07-24 RX ORDER — FENTANYL CITRATE 50 UG/ML
INJECTION, SOLUTION INTRAMUSCULAR; INTRAVENOUS
Status: DISCONTINUED | OUTPATIENT
Start: 2019-07-24 | End: 2019-07-24

## 2019-07-24 RX ORDER — MEPERIDINE HYDROCHLORIDE 25 MG/ML
12.5 INJECTION INTRAMUSCULAR; INTRAVENOUS; SUBCUTANEOUS ONCE AS NEEDED
Status: DISCONTINUED | OUTPATIENT
Start: 2019-07-24 | End: 2019-07-24 | Stop reason: HOSPADM

## 2019-07-24 RX ORDER — SCOLOPAMINE TRANSDERMAL SYSTEM 1 MG/1
1 PATCH, EXTENDED RELEASE TRANSDERMAL
Status: DISCONTINUED | OUTPATIENT
Start: 2019-07-24 | End: 2019-07-24 | Stop reason: HOSPADM

## 2019-07-24 RX ORDER — HYDROCODONE BITARTRATE AND ACETAMINOPHEN 5; 325 MG/1; MG/1
1 TABLET ORAL EVERY 4 HOURS PRN
Status: CANCELLED | OUTPATIENT
Start: 2019-07-24

## 2019-07-24 RX ORDER — IBUPROFEN 600 MG/1
600 TABLET ORAL 3 TIMES DAILY
Qty: 30 TABLET | Refills: 1 | Status: SHIPPED | OUTPATIENT
Start: 2019-07-24 | End: 2019-07-30

## 2019-07-24 RX ORDER — HYDROMORPHONE HYDROCHLORIDE 2 MG/ML
0.4 INJECTION, SOLUTION INTRAMUSCULAR; INTRAVENOUS; SUBCUTANEOUS EVERY 5 MIN PRN
Status: DISCONTINUED | OUTPATIENT
Start: 2019-07-24 | End: 2019-07-24 | Stop reason: HOSPADM

## 2019-07-24 RX ORDER — KETOROLAC TROMETHAMINE 30 MG/ML
15 INJECTION, SOLUTION INTRAMUSCULAR; INTRAVENOUS ONCE
Status: COMPLETED | OUTPATIENT
Start: 2019-07-24 | End: 2019-07-24

## 2019-07-24 RX ORDER — SODIUM CHLORIDE 0.9 % (FLUSH) 0.9 %
3 SYRINGE (ML) INJECTION
Status: DISCONTINUED | OUTPATIENT
Start: 2019-07-24 | End: 2019-07-24 | Stop reason: HOSPADM

## 2019-07-24 RX ORDER — LIDOCAINE HCL/PF 100 MG/5ML
SYRINGE (ML) INTRAVENOUS
Status: DISCONTINUED | OUTPATIENT
Start: 2019-07-24 | End: 2019-07-24

## 2019-07-24 RX ORDER — ONDANSETRON 8 MG/1
8 TABLET, ORALLY DISINTEGRATING ORAL EVERY 8 HOURS PRN
Status: DISCONTINUED | OUTPATIENT
Start: 2019-07-24 | End: 2019-07-24 | Stop reason: HOSPADM

## 2019-07-24 RX ADMIN — FENTANYL CITRATE 50 MCG: 50 INJECTION, SOLUTION INTRAMUSCULAR; INTRAVENOUS at 12:07

## 2019-07-24 RX ADMIN — HYDROMORPHONE HYDROCHLORIDE 0.4 MG: 2 INJECTION, SOLUTION INTRAMUSCULAR; INTRAVENOUS; SUBCUTANEOUS at 01:07

## 2019-07-24 RX ADMIN — ACETAMINOPHEN 1000 MG: 500 TABLET, FILM COATED ORAL at 10:07

## 2019-07-24 RX ADMIN — PROPOFOL 30 MG: 10 INJECTION, EMULSION INTRAVENOUS at 12:07

## 2019-07-24 RX ADMIN — KETOROLAC TROMETHAMINE 15 MG: 30 INJECTION, SOLUTION INTRAMUSCULAR at 01:07

## 2019-07-24 RX ADMIN — LIDOCAINE HYDROCHLORIDE 50 MG: 20 INJECTION, SOLUTION INTRAVENOUS at 12:07

## 2019-07-24 RX ADMIN — PROMETHAZINE HYDROCHLORIDE 6.25 MG: 25 INJECTION INTRAMUSCULAR; INTRAVENOUS at 03:07

## 2019-07-24 RX ADMIN — CARBOXYMETHYLCELLULOSE SODIUM 2 DROP: 2.5 SOLUTION/ DROPS OPHTHALMIC at 12:07

## 2019-07-24 RX ADMIN — SODIUM CHLORIDE, SODIUM LACTATE, POTASSIUM CHLORIDE, AND CALCIUM CHLORIDE: 600; 310; 30; 20 INJECTION, SOLUTION INTRAVENOUS at 11:07

## 2019-07-24 RX ADMIN — ONDANSETRON 4 MG: 2 INJECTION INTRAMUSCULAR; INTRAVENOUS at 12:07

## 2019-07-24 RX ADMIN — OXYCODONE HYDROCHLORIDE 5 MG: 5 TABLET ORAL at 01:07

## 2019-07-24 RX ADMIN — PROPOFOL 200 MG: 10 INJECTION, EMULSION INTRAVENOUS at 12:07

## 2019-07-24 RX ADMIN — SCOPALAMINE 1 PATCH: 1 PATCH, EXTENDED RELEASE TRANSDERMAL at 11:07

## 2019-07-24 NOTE — BRIEF OP NOTE
[facility]  Brief Operative Note     SUMMARY     Surgery Date: 7/24/2019     Surgeon(s) and Role:     * Елена Kam MD - Primary     * Shelley Bhandari MD - Resident - Assisting    Pre-op Diagnosis:  PMB (postmenopausal bleeding) [N95.0]  Thickened endometrium [R93.89]  History of breast cancer [Z85.3]    Post-op Diagnosis:  Post-Op Diagnosis Codes:     * PMB (postmenopausal bleeding) [N95.0]     * Thickened endometrium [R93.89]     * History of breast cancer [Z85.3]    Procedure(s) (LRB):  HYSTEROSCOPY, WITH DILATION AND CURETTAGE OF UTERUS (N/A)    Anesthesia: General    Description of the findings of the procedure:   EUA shows loss of vulvar architecture consistent with atrophic or lichen pathology. No lesions or ulcers present. Normal sized uterus 6-8 weeks. Cervix smooth and normal in appearance. Noted to be stenotic during dilation, but once in the endocervical canal, easily dilated to accommodate a 6 mm hysteroscope. Hysteroscopically, normal appearing cavity contour. Fluffy endometrium noted in patches with possible polypoid tissue. Bilateral tubal ostia were easily visualized. Sharp curettage performed. Specimen sent to pathology for review. Mild cervical trauma from single toothed tenaculum was repaired with a single interrupted 2-0 chromic stitch.Excellent hemostasis at the conclusion of the procedure.    Findings/Key Components: as above  Fluid in: 1330 cc  Fluid deficit: 300 cc  Estimated Blood Loss: minimal, < 10 cc         Specimens:   Specimen (12h ago, onward)    Start     Ordered    07/24/19 1242  Specimen to Pathology - Surgery  Once     Comments:  Pre-op Diagnosis: PMB (postmenopausal bleeding) [N95.0]Thickened endometrium [R93.89]History of breast cancer [Z85.3]Procedure(s):HYSTEROSCOPY, WITH DILATION AND CURETTAGE OF UTERUS Number of specimens: 1Name of specimens:1/ endometrial currettage     Start Status     07/24/19 1242 Collected (07/24/19 1243) Order  ID: [id number]       07/24/19 1243          Discharge Note    SUMMARY     Admit Date: 7/24/2019    Discharge Date and Time:  07/24/2019 2:48 PM    Hospital Course (synopsis of major diagnoses, care, treatment, and services provided during the course of the hospital stay): Patient presented for scheduled procedure. Patient was passed back to OR for planned procedure. Please see OP note for further details. Tolerated procedure well and patient was taken to recovery in a stable condition. Prior to discharge patient was able to void, ambulate, tolerate PO and pain was well controlled with PO meds. Patient was given routine post-op instructions for which patient voiced understanding. Patient was subsequently discharged home.      Final Diagnosis: Post-Op Diagnosis Codes:     * PMB (postmenopausal bleeding) [N95.0]     * Thickened endometrium [R93.89]     * History of breast cancer [Z85.3]    Disposition: Home or Self Care    Follow Up/Patient Instructions:     Medications:  Reconciled Home Medications:      Medication List      START taking these medications    HYDROcodone-acetaminophen 5-325 mg per tablet  Commonly known as:  NORCO  Take 1 tablet by mouth every 6 (six) hours as needed.        CHANGE how you take these medications    * ibuprofen 800 MG tablet  Commonly known as:  ADVIL,MOTRIN  Take 800 mg by mouth 3 (three) times daily.  What changed:  Another medication with the same name was added. Make sure you understand how and when to take each.     * ibuprofen 600 MG tablet  Commonly known as:  ADVIL,MOTRIN  Take 1 tablet (600 mg total) by mouth 3 (three) times daily.  What changed:  You were already taking a medication with the same name, and this prescription was added. Make sure you understand how and when to take each.         * This list has 2 medication(s) that are the same as other medications prescribed for you. Read the directions carefully, and ask your doctor or other care provider to review them with  you.            CONTINUE taking these medications    acetaminophen-codeine 300-30mg 300-30 mg Tab  Commonly known as:  TYLENOL #3  Take 1 tablet by mouth 2 (two) times daily. for 10 days     chlorhexidine 0.12 % solution  Commonly known as:  PERIDEX  SWISH AND SPIT 15MLS BY MOUTH 2 TIMES DAILY FOR 7 DAYS.     clobetasol 0.05% 0.05 % Oint  Commonly known as:  TEMOVATE  APPLY TO AFFECTED AREA(S) TWICE A DAY FOR 1 MONTH(S) then EVERY DAY FOR 1 MONTH(S) then 3 TIMES A WEEK     levocetirizine 5 MG tablet  Commonly known as:  XYZAL  Take 1 tablet (5 mg total) by mouth every evening.     nystatin cream  Commonly known as:  MYCOSTATIN  Apply topically every Mon, Wed, Fri.     silver sulfADIAZINE 1% 1 % cream  Commonly known as:  SILVADENE  Apply topically every morning.          Discharge Procedure Orders   Diet general     Other restrictions (specify):   Order Comments: Pelvic rest     Call MD for:  extreme fatigue     Call MD for:  persistent dizziness or light-headedness     Call MD for:  hives     Call MD for:  redness, tenderness, or signs of infection (pain, swelling, redness, odor or green/yellow discharge around incision site)     Call MD for:  difficulty breathing, headache or visual disturbances     Call MD for:  severe uncontrolled pain     Call MD for:  persistent nausea and vomiting     Call MD for:  temperature >100.4     Shelley Bhandari MD, PhD  OBGYN, PGY-4

## 2019-07-24 NOTE — OR NURSING
Patient c/o nausea, offered antiemetic. Patient refuses anti-emetic states she already has a patch on and she would rather just wait to see if it gets better. Patient states it feels like something is stuck in her throat. Patient offered a cepacol lozenge, patient refused.

## 2019-07-24 NOTE — TRANSFER OF CARE
Anesthesia Transfer of Care Note    Patient: Jeanne Rogders    Procedure(s) Performed: Procedure(s) (LRB):  HYSTEROSCOPY, WITH DILATION AND CURETTAGE OF UTERUS (N/A)    Patient location: PACU    Anesthesia Type: general    Transport from OR: Transported from OR on 2-3 L/min O2 by NC with adequate spontaneous ventilation    Post pain: adequate analgesia    Post assessment: no apparent anesthetic complications    Post vital signs: stable    Level of consciousness: awake    Nausea/Vomiting: no nausea/vomiting    Complications: none    Transfer of care protocol was followed      Last vitals:   Visit Vitals  /60   Pulse 80   Temp 36.8 °C (98.2 °F)   Resp 16   Ht 5' (1.524 m)   Wt 78.5 kg (173 lb)   LMP  (LMP Unknown)   SpO2 96%   Breastfeeding? No   BMI 33.79 kg/m²

## 2019-07-24 NOTE — ANESTHESIA POSTPROCEDURE EVALUATION
Anesthesia Post Evaluation    Patient: Jeanne Rodgers    Procedure(s) Performed: Procedure(s) (LRB):  HYSTEROSCOPY, WITH DILATION AND CURETTAGE OF UTERUS (N/A)    Final Anesthesia Type: general  Patient location during evaluation: PACU  Patient participation: Yes- Able to Participate  Level of consciousness: awake and alert  Post-procedure vital signs: reviewed and stable  Pain management: adequate  Airway patency: patent  PONV status at discharge: No PONV  Anesthetic complications: no      Cardiovascular status: blood pressure returned to baseline  Respiratory status: unassisted, spontaneous ventilation and room air  Hydration status: euvolemic  Follow-up not needed.          Vitals Value Taken Time   /78 7/24/2019  2:30 PM   Temp 36.3 °C (97.4 °F) 7/24/2019 12:52 PM   Pulse 60 7/24/2019  2:30 PM   Resp 16 7/24/2019  2:30 PM   SpO2 97 % 7/24/2019  2:30 PM         Event Time     Out of Recovery 13:58:19          Pain/Brandin Score: Pain Rating Prior to Med Admin: 7 (7/24/2019  1:28 PM)  Pain Rating Post Med Admin: 0 (7/24/2019  2:00 PM)  Brandin Score: 10 (7/24/2019  2:00 PM)

## 2019-07-24 NOTE — INTERVAL H&P NOTE
The patient has been examined and the H&P has been reviewed:    I concur with the findings and no changes have occurred since H&P was written.    Anesthesia/Surgery risks, benefits and alternative options discussed and understood by patient/family.    Active Hospital Problems    Diagnosis  POA    Postmenopausal bleeding [N95.0]  Yes      Resolved Hospital Problems   No resolved problems to display.     Consents verified  Patient took no medications this AM  Discussed planned procedures.   She has no questions or concerns and wishes to proceed as planned.   To OR for Hscope/D&C     Shelley Bhandari MD, PhD  OBGYN, PGY-4

## 2019-07-24 NOTE — OP NOTE
OPERATIVE NOTE    DATE OF PROCEDURE: 07/24/2019     SURGEON: RONDA Kam MD     ASSISTANT: RONDA Bhandari MD, PhD    PREOPERATIVE DIAGNOSIS: AUB, PMB    POSTOPERATIVE DIAGNOSIS: s/p hysteroscopy, D&C     PROCEDURE: Hysteroscopy, dilation & curettage     ANESTHESIA: GETA    FINDINGS:   EUA shows loss of vulvar architecture consistent with atrophic or lichen pathology. No lesions or ulcers present. Normal sized uterus 6-8 weeks. Cervix smooth and normal in appearance. Noted to be stenotic during dilation, but once in the endocervical canal, easily dilated to accommodate a 6 mm hysteroscope. Hysteroscopically, normal appearing cavity contour. Fluffy endometrium noted in patches with possible polypoid tissue. Bilateral tubal ostia were easily visualized. Sharp curettage performed. Specimen sent to pathology for review. Mild cervical trauma from single toothed tenaculum was repaired with a single interrupted 2-0 chromic stitch.Excellent hemostasis at the conclusion of the procedure.     ESTIMATED BLOOD LOSS: minimal, < 10 cc    URINE OUTPUT: 15 cc    IV FLUIDS: 700 cc  Fluid In: 1330 cc   Fluid Deficit: 300 cc    INDICATIONS: PMB     PROCEDURE IN DETAIL: After proper consents were explained and obtained, the patient was taken to the operating room where anesthesia was obtained without difficulty. She was then placed in dorsal lithotomy position in Cal stirrups. The patient was then prepped and draped in normal sterile fashion. Right angle retractors were placed into the vagina to visualize the cervix, which was grasped at the anterior lip with the single tooth tenaculum. The cervix was noted to be stenotic initially, but once access was gained to the endometrial canal, sounding was unchallenging. The uterus sounded to 9 cm. The cervix was serially dilated to easily accommodate a 6 mm hysteroscope with Mauro dilators. The above hysteroscopic findings were noted. A #1 curette was applied to each surface of the uterine  cavity until they felt uniformly gritty in texture. Polyp forceps were gently inserted to retrieve some additional loose tissue for analysis. The curette was removed and the scrapings were collected and sent to pathology. The tenaculum was removed from the cervix and mild trauma to the anterior lip was repaired with a single interrupted 2-0 chromic stitch. Pressure was held at the puncture sites with a sponge stick until good hemostasis was noted. The patient tolerated the procedure well. All counts were correct x2. The patient was taken to Recovery area in stable condition.    Shelley Bhandari MD, PhD  OBGYN, PGY-4

## 2019-07-24 NOTE — PLAN OF CARE
Jeanne Rodgers has met all discharge criteria from Phase II. Vital Signs are stable, ambulating  without difficulty. Discharge instructions given, patient verbalized understanding. Discharged from facility via wheelchair in stable condition.

## 2019-07-24 NOTE — DISCHARGE INSTRUCTIONS
Discharge Instructions: After Your Surgery  Youve just had surgery. During surgery, you were given medicine called anesthesia to keep you relaxed and free of pain. After surgery, you may have some pain or nausea. This is common. Here are some tips for feeling better and getting well after surgery.  Going home  Your healthcare provider will show you how to take care of yourself when you go home. He or she will also answer your questions. Have an adult family member or friend drive you home. For the first 24 hours after your surgery:  · Do not drive or use heavy equipment.  · Do not make important decisions or sign legal papers.  · Do not drink alcohol.  · Have someone stay with you, if needed. He or she can watch for problems and help keep you safe.  Be sure to go to all follow-up visits with your healthcare provider. And rest after your surgery for as long as your healthcare provider tells you to.  Coping with pain  If you have pain after surgery, pain medicine will help you feel better. Take it as told, before pain becomes severe. Also, ask your healthcare provider or pharmacist about other ways to control pain. This might be with heat, ice, or relaxation. And follow any other instructions your surgeon or nurse gives you.  Tips for taking pain medicine  To get the best relief possible, remember these points:  · Pain medicines can upset your stomach. Taking them with a little food may help.  · Most pain relievers taken by mouth need at least 20 to 30 minutes to start to work.  · Taking medicine on a schedule can help you remember to take it. Try to time your medicine so that you can take it before starting an activity. This might be before you get dressed, go for a walk, or sit down for dinner.  · Constipation is a common side effect of pain medicines. Call your healthcare provider before taking any medicines such as laxatives or stool softeners to help ease constipation. Also ask if you should skip any foods.  Drinking lots of fluids and eating foods such as fruits and vegetables that are high in fiber can also help. Remember, do not take laxatives unless your surgeon has prescribed them.  · Drinking alcohol and taking pain medicine can cause dizziness and slow your breathing. It can even be deadly. Do not drink alcohol while taking pain medicine.  · Pain medicine can make you react more slowly to things. Do not drive or run machinery while taking pain medicine.  Your healthcare provider may tell you to take acetaminophen to help ease your pain. Ask him or her how much you are supposed to take each day. Acetaminophen or other pain relievers may interact with your prescription medicines or other over-the-counter (OTC) medicines. Some prescription medicines have acetaminophen and other ingredients. Using both prescription and OTC acetaminophen for pain can cause you to overdose. Read the labels on your OTC medicines with care. This will help you to clearly know the list of ingredients, how much to take, and any warnings. It may also help you not take too much acetaminophen. If you have questions or do not understand the information, ask your pharmacist or healthcare provider to explain it to you before you take the OTC medicine.  Managing nausea  Some people have an upset stomach after surgery. This is often because of anesthesia, pain, or pain medicine, or the stress of surgery. These tips will help you handle nausea and eat healthy foods as you get better. If you were on a special food plan before surgery, ask your healthcare provider if you should follow it while you get better. These tips may help:  · Do not push yourself to eat. Your body will tell you when to eat and how much.  · Start off with clear liquids and soup. They are easier to digest.  · Next try semi-solid foods, such as mashed potatoes, applesauce, and gelatin, as you feel ready.  · Slowly move to solid foods. Dont eat fatty, rich, or spicy foods at  first.  · Do not force yourself to have 3 large meals a day. Instead eat smaller amounts more often.  · Take pain medicines with a small amount of solid food, such as crackers or toast, to avoid nausea.     Call your surgeon if  · You still have pain an hour after taking medicine. The medicine may not be strong enough.  · You feel too sleepy, dizzy, or groggy. The medicine may be too strong.  · You have side effects like nausea, vomiting, or skin changes, such as rash, itching, or hives.       If you have obstructive sleep apnea  You were given anesthesia medicine during surgery to keep you comfortable and free of pain. After surgery, you may have more apnea spells because of this medicine and other medicines you were given. The spells may last longer than usual.   At home:  · Keep using the continuous positive airway pressure (CPAP) device when you sleep. Unless your healthcare provider tells you not to, use it when you sleep, day or night. CPAP is a common device used to treat obstructive sleep apnea.  · Talk with your provider before taking any pain medicine, muscle relaxants, or sedatives. Your provider will tell you about the possible dangers of taking these medicines.  Date Last Reviewed: 12/1/2016  © 0107-7847 [employer]. [address] [place] [address], [place] [address]. All rights reserved. This information is not intended as a substitute for professional medical care. Always follow your healthcare professional's instructions.      Home Care Instruction D&C Hysteroscopy             ACTIVITY LEVEL:  If you received sedation and/or an anesthetic, you may feel sleepy for several hours. Rest until you feel more  awake. Gradually resume your normal activities.    DIET:  At home, continue with liquids. If there is no nausea, you may eat a soft diet and gradually resume a regular diet.    BATHING:  You may shower  as desired in one day.  You should avoid tub baths, hot tubs and swimming pools until seen by  your physician for a post-op follow up.    CARE:  You can expect watery or bloody vaginal discharge for several days. Do not use tampons, please only use pads. Sexual activity is restricted as advised by your doctor.    MEDICATIONS:  You will receive instructions for any pain and/or antibiotic prescriptions. Pain medication should be taken only if needed and as directed. Antibiotics, if ordered, should be taken as directed until the entire prescription is completed.    ADDITIONAL INFORMATION:  __________________________________________________________________________________________  WHEN TO CALL THE DOCTOR:   For any heavy vaginal bleeding   Fever over 101°F (38.4°C)   Any lower abdominal pain not relieved by the pain mediation  RETURN APPOINTMENT:  __________________________________________________________________________________________  FOR EMERGENCIES:  If any unusual problems or difficulties occur, contact  __________________ or the resident at [phone] (page ) or the Clinic office, [phone].

## 2019-07-27 ENCOUNTER — PATIENT MESSAGE (OUTPATIENT)
Dept: RESEARCH | Facility: HOSPITAL | Age: 64
End: 2019-07-27

## 2019-07-30 ENCOUNTER — OFFICE VISIT (OUTPATIENT)
Dept: FAMILY MEDICINE | Facility: CLINIC | Age: 64
End: 2019-07-30
Payer: MEDICARE

## 2019-07-30 VITALS
HEIGHT: 60 IN | BODY MASS INDEX: 34.02 KG/M2 | RESPIRATION RATE: 16 BRPM | OXYGEN SATURATION: 99 % | DIASTOLIC BLOOD PRESSURE: 88 MMHG | SYSTOLIC BLOOD PRESSURE: 138 MMHG | WEIGHT: 173.31 LBS | TEMPERATURE: 99 F | HEART RATE: 80 BPM

## 2019-07-30 DIAGNOSIS — R07.0 THROAT PAIN: Primary | ICD-10-CM

## 2019-07-30 DIAGNOSIS — J30.9 ALLERGIC RHINITIS, UNSPECIFIED SEASONALITY, UNSPECIFIED TRIGGER: ICD-10-CM

## 2019-07-30 DIAGNOSIS — R35.0 FREQUENCY OF URINATION: ICD-10-CM

## 2019-07-30 DIAGNOSIS — N30.01 ACUTE CYSTITIS WITH HEMATURIA: ICD-10-CM

## 2019-07-30 LAB
AMORPH CRY URNS QL MICRO: ABNORMAL
BACTERIA #/AREA URNS HPF: ABNORMAL /HPF
BILIRUB SERPL-MCNC: NEGATIVE MG/DL
BILIRUB UR QL STRIP: NEGATIVE
BLOOD URINE, POC: 250
CLARITY UR: ABNORMAL
COLOR UR: YELLOW
COLOR, POC UA: YELLOW
GLUCOSE UR QL STRIP: NEGATIVE
GLUCOSE UR QL STRIP: NEGATIVE
HGB UR QL STRIP: ABNORMAL
KETONES UR QL STRIP: NEGATIVE
KETONES UR QL STRIP: NEGATIVE
LEUKOCYTE ESTERASE UR QL STRIP: ABNORMAL
LEUKOCYTE ESTERASE URINE, POC: ABNORMAL
MICROSCOPIC COMMENT: ABNORMAL
NITRITE UR QL STRIP: NEGATIVE
NITRITE, POC UA: POSITIVE
PH UR STRIP: 5 [PH] (ref 5–8)
PH, POC UA: 7
PROT UR QL STRIP: NEGATIVE
PROTEIN, POC: ABNORMAL
RBC #/AREA URNS HPF: 8 /HPF (ref 0–4)
SP GR UR STRIP: 1.01 (ref 1–1.03)
SPECIFIC GRAVITY, POC UA: 1.01
SQUAMOUS #/AREA URNS HPF: 18 /HPF
URN SPEC COLLECT METH UR: ABNORMAL
UROBILINOGEN UR STRIP-ACNC: NEGATIVE EU/DL
UROBILINOGEN, POC UA: NEGATIVE
WBC #/AREA URNS HPF: 11 /HPF (ref 0–5)

## 2019-07-30 PROCEDURE — 99214 PR OFFICE/OUTPT VISIT, EST, LEVL IV, 30-39 MIN: ICD-10-PCS | Mod: S$PBB,,, | Performed by: PHYSICIAN ASSISTANT

## 2019-07-30 PROCEDURE — 99999 PR PBB SHADOW E&M-EST. PATIENT-LVL IV: CPT | Mod: PBBFAC,,, | Performed by: PHYSICIAN ASSISTANT

## 2019-07-30 PROCEDURE — 81001 URINALYSIS AUTO W/SCOPE: CPT | Mod: PBBFAC,PO | Performed by: PHYSICIAN ASSISTANT

## 2019-07-30 PROCEDURE — 81000 URINALYSIS NONAUTO W/SCOPE: CPT

## 2019-07-30 PROCEDURE — 99214 OFFICE O/P EST MOD 30 MIN: CPT | Mod: S$PBB,,, | Performed by: PHYSICIAN ASSISTANT

## 2019-07-30 PROCEDURE — 99214 OFFICE O/P EST MOD 30 MIN: CPT | Mod: PBBFAC,PO | Performed by: PHYSICIAN ASSISTANT

## 2019-07-30 PROCEDURE — 87086 URINE CULTURE/COLONY COUNT: CPT

## 2019-07-30 PROCEDURE — 99999 PR PBB SHADOW E&M-EST. PATIENT-LVL IV: ICD-10-PCS | Mod: PBBFAC,,, | Performed by: PHYSICIAN ASSISTANT

## 2019-07-30 RX ORDER — LEVOCETIRIZINE DIHYDROCHLORIDE 5 MG/1
5 TABLET, FILM COATED ORAL NIGHTLY
Qty: 30 TABLET | Refills: 3 | Status: SHIPPED | OUTPATIENT
Start: 2019-07-30 | End: 2020-10-22

## 2019-07-30 RX ORDER — FLUTICASONE PROPIONATE 50 MCG
1 SPRAY, SUSPENSION (ML) NASAL 2 TIMES DAILY PRN
Qty: 16 G | Refills: 0 | Status: SHIPPED | OUTPATIENT
Start: 2019-07-30 | End: 2019-08-29

## 2019-07-30 RX ORDER — NITROFURANTOIN 25; 75 MG/1; MG/1
100 CAPSULE ORAL 2 TIMES DAILY
Qty: 6 CAPSULE | Refills: 0 | Status: SHIPPED | OUTPATIENT
Start: 2019-07-30 | End: 2019-08-02

## 2019-07-30 NOTE — PROGRESS NOTES
Subjective:       Patient ID: Jeanne Rodgers is a 64 y.o. female with multiple medical diagnoses as listed in the medical history and problem list that presents for Urinary Tract Infection (since wednesday she had D & C and Cervical biopsies) and Sore Throat (since surgery)  .    Chief Complaint: Urinary Tract Infection (since wednesday she had D & C and Cervical biopsies) and Sore Throat (since surgery)      Urinary Tract Infection    This is a new problem. The current episode started in the past 7 days (wednesday after surgery ). The pain is at a severity of 0/10. The patient is experiencing no pain. The maximum temperature recorded prior to her arrival was 101 - 101.9 F. The fever has been present for less than 1 day. Associated symptoms include frequency, nausea and urgency. Pertinent negatives include no chills, discharge, flank pain, hematuria, vomiting, bubble bath use or constipation. She has tried nothing for the symptoms.   Sore Throat    This is a new problem. The current episode started in the past 7 days (wednesday after surgery ). The problem has been gradually improving. The pain is worse on the right side. The maximum temperature recorded prior to her arrival was 101 - 101.9 F (2 days ago --none since then ). The pain is at a severity of 6/10. Associated symptoms include swollen glands and trouble swallowing. Pertinent negatives include no coughing, diarrhea, ear pain (right ), hoarse voice, shortness of breath or vomiting. She has had no exposure to strep or mono. She has tried NSAIDs for the symptoms. The treatment provided mild relief.     Review of Systems   Constitutional: Negative for chills.   HENT: Positive for sore throat and trouble swallowing. Negative for ear pain (right ) and hoarse voice.    Respiratory: Negative for cough, choking, chest tightness, shortness of breath and wheezing.    Gastrointestinal: Positive for nausea. Negative for constipation, diarrhea and vomiting.    Genitourinary: Positive for frequency and urgency. Negative for flank pain and hematuria.         PAST MEDICAL HISTORY:  Past Medical History:   Diagnosis Date    Allergy     Anxiety     Breast cancer 2014    Chronic back pain     Fibromyalgia     Lichen sclerosus     Mental disorder        SOCIAL HISTORY:  Social History     Socioeconomic History    Marital status: Single     Spouse name: Not on file    Number of children: Not on file    Years of education: Not on file    Highest education level: Not on file   Occupational History    Not on file   Social Needs    Financial resource strain: Not on file    Food insecurity:     Worry: Not on file     Inability: Not on file    Transportation needs:     Medical: Not on file     Non-medical: Not on file   Tobacco Use    Smoking status: Never Smoker    Smokeless tobacco: Never Used   Substance and Sexual Activity    Alcohol use: No     Alcohol/week: 0.0 oz    Drug use: No    Sexual activity: Never   Lifestyle    Physical activity:     Days per week: Not on file     Minutes per session: Not on file    Stress: Patient refused   Relationships    Social connections:     Talks on phone: Not on file     Gets together: Not on file     Attends Hinduism service: Not on file     Active member of club or organization: Not on file     Attends meetings of clubs or organizations: Not on file     Relationship status: Not on file   Other Topics Concern    Not on file   Social History Narrative    Not on file       ALLERGIES AND MEDICATIONS: updated and reviewed.  Review of patient's allergies indicates:  No Known Allergies  Current Outpatient Medications   Medication Sig Dispense Refill    chlorhexidine (PERIDEX) 0.12 % solution SWISH AND SPIT 15MLS BY MOUTH 2 TIMES DAILY FOR 7 DAYS.  0    clobetasol 0.05% (TEMOVATE) 0.05 % Oint APPLY TO AFFECTED AREA(S) TWICE A DAY FOR 1 MONTH(S) then EVERY DAY FOR 1 MONTH(S) then 3 TIMES A WEEK 60 g 2    ibuprofen  (ADVIL,MOTRIN) 800 MG tablet Take 800 mg by mouth 3 (three) times daily.      levocetirizine (XYZAL) 5 MG tablet Take 1 tablet (5 mg total) by mouth every evening. 30 tablet 3    nystatin (MYCOSTATIN) cream Apply topically every Mon, Wed, Fri. 30 g 11    silver sulfADIAZINE 1% (SILVADENE) 1 % cream Apply topically every morning. 50 g 11    diphenhydrAMINE-aluminum-magnesium hydroxide-simethicone-lidocaine HCl 2% Swish and spit 5 mLs every 4 (four) hours as needed. 300 mL 0    fluticasone propionate (FLONASE) 50 mcg/actuation nasal spray 1 spray (50 mcg total) by Each Nostril route 2 (two) times daily as needed for Rhinitis. 16 g 0    nitrofurantoin, macrocrystal-monohydrate, (MACROBID) 100 MG capsule Take 1 capsule (100 mg total) by mouth 2 (two) times daily. for 3 days 6 capsule 0     Current Facility-Administered Medications   Medication Dose Route Frequency Provider Last Rate Last Dose    acetaminophen-codeine 300-30mg per tablet 1 tablet  1 tablet Oral 1 time in Clinic/HOD Елена Kam MD             Objective:   /88   Pulse 80   Temp 99 °F (37.2 °C) (Oral)   Resp 16   Ht 5' (1.524 m)   Wt 78.6 kg (173 lb 4.5 oz)   LMP  (LMP Unknown) Comment: age 53  SpO2 99%   BMI 33.84 kg/m²      Physical Exam   Constitutional: She is oriented to person, place, and time. She appears well-developed and well-nourished. No distress.   HENT:   Head: Normocephalic and atraumatic.   Right Ear: External ear and ear canal normal. Tympanic membrane is injected.   Left Ear: External ear and ear canal normal. Tympanic membrane is injected.   Nose: Mucosal edema present. No rhinorrhea. Right sinus exhibits no maxillary sinus tenderness and no frontal sinus tenderness. Left sinus exhibits no maxillary sinus tenderness and no frontal sinus tenderness.   Mouth/Throat: Uvula is midline and mucous membranes are normal. No oropharyngeal exudate or posterior oropharyngeal erythema.   Right side with air fluid levels     Eyes: Conjunctivae and EOM are normal.   Cardiovascular: Normal rate and normal heart sounds.   Pulmonary/Chest: Effort normal and breath sounds normal.   Abdominal: Soft. There is no tenderness. There is no CVA tenderness.   Lymphadenopathy:     She has no cervical adenopathy.   Neurological: She is alert and oriented to person, place, and time.   Skin: Skin is warm and dry. No rash noted.   Psychiatric: She has a normal mood and affect. Her behavior is normal.           Assessment:       1. Throat pain    2. Frequency of urination    3. Acute cystitis with hematuria    4. Allergic rhinitis, unspecified seasonality, unspecified trigger        Plan:       Throat pain  -     diphenhydrAMINE-aluminum-magnesium hydroxide-simethicone-lidocaine HCl 2%; Swish and spit 5 mLs every 4 (four) hours as needed.  Dispense: 300 mL; Refill: 0  -     Ambulatory referral to ENT    Frequency of urination  -     POCT urinalysis, dipstick or tablet reag  -     Urinalysis; Future; Expected date: 07/31/2019  -     Urine culture; Future; Expected date: 07/30/2019    Acute cystitis with hematuria  -     nitrofurantoin, macrocrystal-monohydrate, (MACROBID) 100 MG capsule; Take 1 capsule (100 mg total) by mouth 2 (two) times daily. for 3 days  Dispense: 6 capsule; Refill: 0  -will contact with results     Allergic rhinitis, unspecified seasonality, unspecified trigger  -     fluticasone propionate (FLONASE) 50 mcg/actuation nasal spray; 1 spray (50 mcg total) by Each Nostril route 2 (two) times daily as needed for Rhinitis.  Dispense: 16 g; Refill: 0  -     levocetirizine (XYZAL) 5 MG tablet; Take 1 tablet (5 mg total) by mouth every evening.  Dispense: 30 tablet; Refill: 3            No follow-ups on file.

## 2019-07-31 ENCOUNTER — TELEPHONE (OUTPATIENT)
Dept: OBSTETRICS AND GYNECOLOGY | Facility: CLINIC | Age: 64
End: 2019-07-31

## 2019-07-31 ENCOUNTER — PATIENT MESSAGE (OUTPATIENT)
Dept: OBSTETRICS AND GYNECOLOGY | Facility: CLINIC | Age: 64
End: 2019-07-31

## 2019-07-31 ENCOUNTER — TELEPHONE (OUTPATIENT)
Dept: GYNECOLOGIC ONCOLOGY | Facility: CLINIC | Age: 64
End: 2019-07-31

## 2019-07-31 DIAGNOSIS — C54.1 ENDOMETRIAL ADENOCARCINOMA: Primary | ICD-10-CM

## 2019-07-31 NOTE — TELEPHONE ENCOUNTER
----- Message from Darius Regalado MD sent at 7/31/2019 12:56 PM CDT -----  Happy to.  Thanks! Mal, please get her on the schedule.  Thanks!  ----- Message -----  From: Елена Kam MD  Sent: 7/30/2019   4:08 PM  To: Darius Regalado MD    This is a very sweet 64 year old with a history of breast cancer. I did a D&C/hysteroscopy last week- she has high grade adenocarcinoma of the endometrium.  Can you see her for a consult?  Thank you!  Tori

## 2019-07-31 NOTE — TELEPHONE ENCOUNTER
Dr. Kam conferred with RN Navigator regarding patient's recent D&C/Hysteroscopy results as depicted below:    FINAL PATHOLOGIC DIAGNOSIS  ENDOMETRIUM: HIGH-GRADE ADENOCARCINOMA.  Note: The tumor cells are strongly positive for p53 and focally WT1 positive. ER stain is negative. Positive and  negative controls reacted appropriately. 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  The case was reviewed independently by a second pathologist who concurs with the diagnosis.  ER  Diagnosed by: Emir Ferrell M.D.  (Electronically Signed: 2019-07-30 10:33:54)    MD orders received for referral to GYN Onc, Dr. Darius Regalado for further evaluation and treatment.     RN Navigator phoned patient to provide education and support pertaining to the above pathology as well discuss plan of care, GYN Onc referral to Dr. Regalado (the role of a specialist in surgical gynecologic oncology) and resources. RN explained pathologic process of endometrial cancer, staging and treatment based upon the appropriate staging. RN provided resource education to patient per patient NCCN guidelines. RN discussed genetic testing and recommendations for expanded panel testing. All questions and concerns addressed.     Patient recently lost her mother 2 months ago, to pancreatic cancer. Her significant other, Les, has head and neck cancer, battling to maintain weight at this time. She has her sister Geni who lives in [facility] for additional support.     Patient has a hx of breast cancer, Arimidex therapy. Patient believes she had Genetic Testing via BRCA 1/2 testing only. Patient has not been tested for Martínez, MMR/MSI.     RN communicated referral need to Jessie Arias RN Navigator of GYN Oncology. Patient prefers appt next week as she is recovering from a UTI.     RN Navigator provided her contact information should additional support be needed. EDUARDO Aguirre.

## 2019-08-01 LAB — BACTERIA UR CULT: NORMAL

## 2019-08-02 ENCOUNTER — TELEPHONE (OUTPATIENT)
Dept: GYNECOLOGIC ONCOLOGY | Facility: CLINIC | Age: 64
End: 2019-08-02

## 2019-08-02 ENCOUNTER — INITIAL CONSULT (OUTPATIENT)
Dept: GYNECOLOGIC ONCOLOGY | Facility: CLINIC | Age: 64
End: 2019-08-02
Attending: OBSTETRICS & GYNECOLOGY
Payer: MEDICARE

## 2019-08-02 VITALS
BODY MASS INDEX: 33.98 KG/M2 | DIASTOLIC BLOOD PRESSURE: 66 MMHG | SYSTOLIC BLOOD PRESSURE: 139 MMHG | HEART RATE: 87 BPM | WEIGHT: 174 LBS

## 2019-08-02 DIAGNOSIS — C54.1 ENDOMETRIAL CANCER: Primary | ICD-10-CM

## 2019-08-02 DIAGNOSIS — D49.59 ENDOMETRIAL NEOPLASM: ICD-10-CM

## 2019-08-02 PROCEDURE — 99999 PR PBB SHADOW E&M-EST. PATIENT-LVL III: ICD-10-PCS | Mod: PBBFAC,,, | Performed by: OBSTETRICS & GYNECOLOGY

## 2019-08-02 PROCEDURE — 99213 OFFICE O/P EST LOW 20 MIN: CPT | Mod: PBBFAC | Performed by: OBSTETRICS & GYNECOLOGY

## 2019-08-02 PROCEDURE — 99205 OFFICE O/P NEW HI 60 MIN: CPT | Mod: S$PBB,,, | Performed by: OBSTETRICS & GYNECOLOGY

## 2019-08-02 PROCEDURE — 99999 PR PBB SHADOW E&M-EST. PATIENT-LVL III: CPT | Mod: PBBFAC,,, | Performed by: OBSTETRICS & GYNECOLOGY

## 2019-08-02 PROCEDURE — 99205 PR OFFICE/OUTPT VISIT, NEW, LEVL V, 60-74 MIN: ICD-10-PCS | Mod: S$PBB,,, | Performed by: OBSTETRICS & GYNECOLOGY

## 2019-08-02 NOTE — H&P (VIEW-ONLY)
Subjective:      Patient ID: Jeanne Rodgers is a 64 y.o. female.    Chief Complaint: Endometrial Cancer (Dr. Kam)      HPI  Here today at the request of Dr. Kam due to recently discovered UPSC.  Was seen by her in July with 4 week h/o PMB.  TVUS was done and revealed 8 cm uterus with normal ovaries and 2.9 cm stripe.  HSC D&C on 7/24/19 confirmed uterine size and showed high grade cancer.  She did have a CT done in April that didn't show metastatic disease.  PSH is lap niki, BTL, and open appy at age 10.  LMP in her 50's.  FH significant for father with pancreatic cancer.  She has a PH of breast cancer and has had genetic testing and is negative.  Review of Systems   Constitutional: Negative for activity change, appetite change, chills, fatigue and fever.   HENT: Negative for hearing loss, mouth sores, nosebleeds, sore throat and tinnitus.    Eyes: Negative for visual disturbance.   Respiratory: Negative for cough, chest tightness, shortness of breath and wheezing.    Cardiovascular: Negative for chest pain and leg swelling.   Gastrointestinal: Negative for abdominal distention, abdominal pain, blood in stool, constipation, diarrhea, nausea and vomiting.   Genitourinary: Negative for dysuria, flank pain, frequency, hematuria, pelvic pain, vaginal bleeding, vaginal discharge and vaginal pain.   Musculoskeletal: Negative for arthralgias and back pain.   Skin: Negative for rash.   Neurological: Negative for dizziness, seizures, syncope, weakness and numbness.   Hematological: Does not bruise/bleed easily.   Psychiatric/Behavioral: Negative for confusion and sleep disturbance. The patient is not nervous/anxious.        Past Medical History:   Diagnosis Date    Allergy     Anxiety     Breast cancer 2014    Chronic back pain     Endometrial cancer 07/30/2019    Fibromyalgia     Lichen sclerosus     Mental disorder      Past Surgical History:   Procedure Laterality Date    APPENDECTOMY      BREAST  BIOPSY Right 2014    BREAST LUMPECTOMY Right 2014    R lumpectomy [facility] w 2.4cm IDC & DCIS, neg margins, 0/6 SN, Stage II, ER/AZ+, HER-2 neg Adjuvant XRT and hormonal therapy     CARPAL TUNNEL RELEASE      CHOLECYSTECTOMY      COLONOSCOPY      HYSTEROSCOPY, WITH DILATION AND CURETTAGE OF UTERUS N/A 7/24/2019    [facility]лен[facility] OR    TONSILLECTOMY      TUBAL LIGATION       Family History   Problem Relation Age of Onset    Cancer Mother 85        pancreatic cancer, passed at 95    Hypertension Mother     Arthritis Mother     Cancer Father 84        pancreatic, passed at 85    Cancer Maternal Aunt 60        pancreatic, passed at 62    No Known Problems Sister     Hypertension Brother     Lung cancer Paternal Aunt     Colon cancer Paternal Uncle     Heart attack Paternal Grandmother     Heart attack Paternal Grandfather     Hypertension Brother     Hashimoto's thyroiditis Sister     Breast cancer Neg Hx     Ovarian cancer Neg Hx      Social History     Socioeconomic History    Marital status: Single     Spouse name: Not on file    Number of children: Not on file    Years of education: Not on file    Highest education level: Not on file   Occupational History    Not on file   Social Needs    Financial resource strain: Not on file    Food insecurity:     Worry: Not on file     Inability: Not on file    Transportation needs:     Medical: Not on file     Non-medical: Not on file   Tobacco Use    Smoking status: Never Smoker    Smokeless tobacco: Never Used   Substance and Sexual Activity    Alcohol use: No     Alcohol/week: 0.0 oz    Drug use: No    Sexual activity: Never   Lifestyle    Physical activity:     Days per week: Not on file     Minutes per session: Not on file    Stress: Patient refused   Relationships    Social connections:     Talks on phone: Not on file     Gets together: Not on file     Attends Yarsani service: Not on file     Active member  of club or organization: Not on file     Attends meetings of clubs or organizations: Not on file     Relationship status: Not on file   Other Topics Concern    Not on file   Social History Narrative    Not on file     Current Outpatient Medications   Medication Sig    chlorhexidine (PERIDEX) 0.12 % solution SWISH AND SPIT 15MLS BY MOUTH 2 TIMES DAILY FOR 7 DAYS.    clobetasol 0.05% (TEMOVATE) 0.05 % Oint APPLY TO AFFECTED AREA(S) TWICE A DAY FOR 1 MONTH(S) then EVERY DAY FOR 1 MONTH(S) then 3 TIMES A WEEK    diphenhydrAMINE-aluminum-magnesium hydroxide-simethicone-lidocaine HCl 2% Swish and spit 5 mLs every 4 (four) hours as needed.    fluticasone propionate (FLONASE) 50 mcg/actuation nasal spray 1 spray (50 mcg total) by Each Nostril route 2 (two) times daily as needed for Rhinitis.    ibuprofen (ADVIL,MOTRIN) 800 MG tablet Take 800 mg by mouth 3 (three) times daily.    levocetirizine (XYZAL) 5 MG tablet Take 1 tablet (5 mg total) by mouth every evening.    nitrofurantoin, macrocrystal-monohydrate, (MACROBID) 100 MG capsule Take 1 capsule (100 mg total) by mouth 2 (two) times daily. for 3 days    nystatin (MYCOSTATIN) cream Apply topically every Mon, Wed, Fri.    silver sulfADIAZINE 1% (SILVADENE) 1 % cream Apply topically every morning.     No current facility-administered medications for this visit.      Review of patient's allergies indicates:  No Known Allergies    Objective:   Physical Exam:   Constitutional: She is oriented to person, place, and time. She appears well-developed and well-nourished. No distress.    HENT:   Head: Normocephalic and atraumatic.    Eyes: No scleral icterus.    Neck: Normal range of motion. Neck supple.    Cardiovascular: Normal rate and intact distal pulses.  Exam reveals no cyanosis and no edema.     Pulmonary/Chest: Effort normal. No respiratory distress. She exhibits no tenderness.        Abdominal: Soft. Normal appearance. She exhibits no distension, no fluid wave,  no ascites and no mass. There is no tenderness. There is no rigidity, no rebound and no guarding. No hernia.     Genitourinary: Rectum normal, vagina normal and uterus normal. Pelvic exam was performed with patient supine. There is no rash, tenderness or lesion on the right labia. There is no rash, tenderness or lesion on the left labia. Uterus is not deviated, not fixed, not tender, not hosting fibroids and not experiencing uterine prolapse. Cervix is normal. Right adnexum displays no mass, no tenderness and no fullness. Left adnexum displays no mass, no tenderness and no fullness. No bleeding in the vagina. No vaginal discharge found. Labial bartholins normal.Cervix exhibits no motion tenderness, no discharge and no friability.        Uterus Size: 8 cm   Musculoskeletal: Normal range of motion and moves all extremeties. She exhibits no edema.      Lymphadenopathy:     She has no cervical adenopathy.        Right: No inguinal adenopathy present.        Left: No inguinal adenopathy present.    Neurological: She is alert and oriented to person, place, and time.    Skin: Skin is warm. No rash noted. No cyanosis or erythema.    Psychiatric: She has a normal mood and affect. Thought content normal.       Assessment:     1. Endometrial cancer    2. Endometrial neoplasm        Plan:       Comprehensive discussion with the patient and her family regarding her path.  Will stage with CT initially, then proceed with RALH/BSO/Nodes.  The risks, benefits, and indications of the procedure were discussed with the patient and her family members.  These included bleeding, infection, damage to surrounding tissues, conversion to laparotomy, and the possibility of major complications including death.  She voiced understanding, all questions were answered and consents were signed.  Plan on 8/15.

## 2019-08-02 NOTE — LETTER
August 2, 2019      Елена Kam MD  [address]  [address]  [place] [facility] [address]           [facility] [address] [facility] [address]  [address], [address]  [place] [address]  Phone: [phone]  Fax: [phone]          Patient: Jeanne Rodgers   MR Number: [id number]   YOB: 1955   Date of Visit: 8/2/2019       Dear Dr. Елена Kam:    Thank you for referring Jeanne Rodgers to me for evaluation. Attached you will find relevant portions of my assessment and plan of care.    If you have questions, please do not hesitate to call me. I look forward to following Jeanne Rodgers along with you.    Sincerely,    Darius Regalado MD    Enclosure  CC:  No Recipients    If you would like to receive this communication electronically, please contact [email or web address] or [phone] to request more information on [email or web address] Link access.    For providers and/or their staff who would like to refer a patient to [facility], please contact us through our one-stop-shop provider referral line, [facility], at [phone].    If you feel you have received this communication in error or would no longer like to receive these types of communications, please e-mail [email or web address]

## 2019-08-02 NOTE — PROGRESS NOTES
Subjective:      Patient ID: Jeanne Rodgers is a 64 y.o. female.    Chief Complaint: Endometrial Cancer (Dr. Kam)      HPI  Here today at the request of Dr. Kam due to recently discovered UPSC.  Was seen by her in July with 4 week h/o PMB.  TVUS was done and revealed 8 cm uterus with normal ovaries and 2.9 cm stripe.  HSC D&C on 7/24/19 confirmed uterine size and showed high grade cancer.  She did have a CT done in April that didn't show metastatic disease.  PSH is lap niki, BTL, and open appy at age 10.  LMP in her 50's.  FH significant for father with pancreatic cancer.  She has a PH of breast cancer and has had genetic testing and is negative.  Review of Systems   Constitutional: Negative for activity change, appetite change, chills, fatigue and fever.   HENT: Negative for hearing loss, mouth sores, nosebleeds, sore throat and tinnitus.    Eyes: Negative for visual disturbance.   Respiratory: Negative for cough, chest tightness, shortness of breath and wheezing.    Cardiovascular: Negative for chest pain and leg swelling.   Gastrointestinal: Negative for abdominal distention, abdominal pain, blood in stool, constipation, diarrhea, nausea and vomiting.   Genitourinary: Negative for dysuria, flank pain, frequency, hematuria, pelvic pain, vaginal bleeding, vaginal discharge and vaginal pain.   Musculoskeletal: Negative for arthralgias and back pain.   Skin: Negative for rash.   Neurological: Negative for dizziness, seizures, syncope, weakness and numbness.   Hematological: Does not bruise/bleed easily.   Psychiatric/Behavioral: Negative for confusion and sleep disturbance. The patient is not nervous/anxious.        Past Medical History:   Diagnosis Date    Allergy     Anxiety     Breast cancer 2014    Chronic back pain     Endometrial cancer 07/30/2019    Fibromyalgia     Lichen sclerosus     Mental disorder      Past Surgical History:   Procedure Laterality Date    APPENDECTOMY      BREAST  BIOPSY Right 2014    BREAST LUMPECTOMY Right 2014    R lumpectomy [facility] w 2.4cm IDC & DCIS, neg margins, 0/6 SN, Stage II, ER/MD+, HER-2 neg Adjuvant XRT and hormonal therapy     CARPAL TUNNEL RELEASE      CHOLECYSTECTOMY      COLONOSCOPY      HYSTEROSCOPY, WITH DILATION AND CURETTAGE OF UTERUS N/A 7/24/2019    Performed by [facility]лена Kam [facility] at [facility] OR    TONSILLECTOMY      TUBAL LIGATION       Family History   Problem Relation Age of Onset    Cancer Mother 85        pancreatic cancer, passed at 95    Hypertension Mother     Arthritis Mother     Cancer Father 84        pancreatic, passed at 85    Cancer Maternal Aunt 60        pancreatic, passed at 62    No Known Problems Sister     Hypertension Brother     Lung cancer Paternal Aunt     Colon cancer Paternal Uncle     Heart attack Paternal Grandmother     Heart attack Paternal Grandfather     Hypertension Brother     Hashimoto's thyroiditis Sister     Breast cancer Neg Hx     Ovarian cancer Neg Hx      Social History     Socioeconomic History    Marital status: Single     Spouse name: Not on file    Number of children: Not on file    Years of education: Not on file    Highest education level: Not on file   Occupational History    Not on file   Social Needs    Financial resource strain: Not on file    Food insecurity:     Worry: Not on file     Inability: Not on file    Transportation needs:     Medical: Not on file     Non-medical: Not on file   Tobacco Use    Smoking status: Never Smoker    Smokeless tobacco: Never Used   Substance and Sexual Activity    Alcohol use: No     Alcohol/week: 0.0 oz    Drug use: No    Sexual activity: Never   Lifestyle    Physical activity:     Days per week: Not on file     Minutes per session: Not on file    Stress: Patient refused   Relationships    Social connections:     Talks on phone: Not on file     Gets together: Not on file     Attends Restorationist service: Not on file     Active member  of club or organization: Not on file     Attends meetings of clubs or organizations: Not on file     Relationship status: Not on file   Other Topics Concern    Not on file   Social History Narrative    Not on file     Current Outpatient Medications   Medication Sig    chlorhexidine (PERIDEX) 0.12 % solution SWISH AND SPIT 15MLS BY MOUTH 2 TIMES DAILY FOR 7 DAYS.    clobetasol 0.05% (TEMOVATE) 0.05 % Oint APPLY TO AFFECTED AREA(S) TWICE A DAY FOR 1 MONTH(S) then EVERY DAY FOR 1 MONTH(S) then 3 TIMES A WEEK    diphenhydrAMINE-aluminum-magnesium hydroxide-simethicone-lidocaine HCl 2% Swish and spit 5 mLs every 4 (four) hours as needed.    fluticasone propionate (FLONASE) 50 mcg/actuation nasal spray 1 spray (50 mcg total) by Each Nostril route 2 (two) times daily as needed for Rhinitis.    ibuprofen (ADVIL,MOTRIN) 800 MG tablet Take 800 mg by mouth 3 (three) times daily.    levocetirizine (XYZAL) 5 MG tablet Take 1 tablet (5 mg total) by mouth every evening.    nitrofurantoin, macrocrystal-monohydrate, (MACROBID) 100 MG capsule Take 1 capsule (100 mg total) by mouth 2 (two) times daily. for 3 days    nystatin (MYCOSTATIN) cream Apply topically every Mon, Wed, Fri.    silver sulfADIAZINE 1% (SILVADENE) 1 % cream Apply topically every morning.     No current facility-administered medications for this visit.      Review of patient's allergies indicates:  No Known Allergies    Objective:   Physical Exam:   Constitutional: She is oriented to person, place, and time. She appears well-developed and well-nourished. No distress.    HENT:   Head: Normocephalic and atraumatic.    Eyes: No scleral icterus.    Neck: Normal range of motion. Neck supple.    Cardiovascular: Normal rate and intact distal pulses.  Exam reveals no cyanosis and no edema.     Pulmonary/Chest: Effort normal. No respiratory distress. She exhibits no tenderness.        Abdominal: Soft. Normal appearance. She exhibits no distension, no fluid wave,  no ascites and no mass. There is no tenderness. There is no rigidity, no rebound and no guarding. No hernia.     Genitourinary: Rectum normal, vagina normal and uterus normal. Pelvic exam was performed with patient supine. There is no rash, tenderness or lesion on the right labia. There is no rash, tenderness or lesion on the left labia. Uterus is not deviated, not fixed, not tender, not hosting fibroids and not experiencing uterine prolapse. Cervix is normal. Right adnexum displays no mass, no tenderness and no fullness. Left adnexum displays no mass, no tenderness and no fullness. No bleeding in the vagina. No vaginal discharge found. Labial bartholins normal.Cervix exhibits no motion tenderness, no discharge and no friability.        Uterus Size: 8 cm   Musculoskeletal: Normal range of motion and moves all extremeties. She exhibits no edema.      Lymphadenopathy:     She has no cervical adenopathy.        Right: No inguinal adenopathy present.        Left: No inguinal adenopathy present.    Neurological: She is alert and oriented to person, place, and time.    Skin: Skin is warm. No rash noted. No cyanosis or erythema.    Psychiatric: She has a normal mood and affect. Thought content normal.       Assessment:     1. Endometrial cancer    2. Endometrial neoplasm        Plan:       Comprehensive discussion with the patient and her family regarding her path.  Will stage with CT initially, then proceed with RALH/BSO/Nodes.  The risks, benefits, and indications of the procedure were discussed with the patient and her family members.  These included bleeding, infection, damage to surrounding tissues, conversion to laparotomy, and the possibility of major complications including death.  She voiced understanding, all questions were answered and consents were signed.  Plan on 8/15.

## 2019-08-06 ENCOUNTER — HOSPITAL ENCOUNTER (OUTPATIENT)
Dept: RADIOLOGY | Facility: HOSPITAL | Age: 64
Discharge: HOME | End: 2019-08-06
Attending: OBSTETRICS & GYNECOLOGY
Payer: MEDICARE

## 2019-08-06 ENCOUNTER — PATIENT MESSAGE (OUTPATIENT)
Dept: HEMATOLOGY/ONCOLOGY | Facility: CLINIC | Age: 64
End: 2019-08-06

## 2019-08-06 DIAGNOSIS — D49.59 ENDOMETRIAL NEOPLASM: ICD-10-CM

## 2019-08-06 PROCEDURE — 25500020 PHARM REV CODE 255: Performed by: OBSTETRICS & GYNECOLOGY

## 2019-08-06 PROCEDURE — 74177 CT ABD & PELVIS W/CONTRAST: CPT | Mod: TC

## 2019-08-06 PROCEDURE — 71260 CT THORAX DX C+: CPT | Mod: 26,,, | Performed by: RADIOLOGY

## 2019-08-06 PROCEDURE — 71260 CT THORAX DX C+: CPT | Mod: TC

## 2019-08-06 PROCEDURE — 74177 CT ABD & PELVIS W/CONTRAST: CPT | Mod: 26,,, | Performed by: RADIOLOGY

## 2019-08-06 PROCEDURE — 74177 CT ABDOMEN PELVIS WITH CONTRAST: ICD-10-PCS | Mod: 26,,, | Performed by: RADIOLOGY

## 2019-08-06 PROCEDURE — 71260 CT CHEST WITH CONTRAST: ICD-10-PCS | Mod: 26,,, | Performed by: RADIOLOGY

## 2019-08-06 RX ADMIN — IOHEXOL 15 ML: 300 INJECTION, SOLUTION INTRAVENOUS at 03:08

## 2019-08-06 RX ADMIN — IOHEXOL 75 ML: 350 INJECTION, SOLUTION INTRAVENOUS at 03:08

## 2019-08-07 ENCOUNTER — PATIENT MESSAGE (OUTPATIENT)
Dept: SURGERY | Facility: OTHER | Age: 64
End: 2019-08-07

## 2019-08-08 ENCOUNTER — HOSPITAL ENCOUNTER (OUTPATIENT)
Dept: PREADMISSION TESTING | Facility: OTHER | Age: 64
Discharge: HOME | End: 2019-08-08
Attending: OBSTETRICS & GYNECOLOGY
Payer: MEDICARE

## 2019-08-08 ENCOUNTER — ANESTHESIA EVENT (OUTPATIENT)
Dept: SURGERY | Facility: OTHER | Age: 64
DRG: 740 | End: 2019-08-08
Payer: MEDICARE

## 2019-08-08 ENCOUNTER — TELEPHONE (OUTPATIENT)
Dept: GYNECOLOGIC ONCOLOGY | Facility: CLINIC | Age: 64
End: 2019-08-08

## 2019-08-08 VITALS
SYSTOLIC BLOOD PRESSURE: 124 MMHG | WEIGHT: 171 LBS | HEART RATE: 75 BPM | RESPIRATION RATE: 16 BRPM | HEIGHT: 60 IN | TEMPERATURE: 99 F | DIASTOLIC BLOOD PRESSURE: 64 MMHG | OXYGEN SATURATION: 97 % | BODY MASS INDEX: 33.57 KG/M2

## 2019-08-08 DIAGNOSIS — C41.4 MALIGNANT NEOPLASM OF PELVIC BONES, SACRUM AND COCCYX: ICD-10-CM

## 2019-08-08 DIAGNOSIS — D49.59 UTERINE NEOPLASM: ICD-10-CM

## 2019-08-08 RX ORDER — PREGABALIN 75 MG/1
75 CAPSULE ORAL
Status: CANCELLED | OUTPATIENT
Start: 2019-08-08 | End: 2019-08-08

## 2019-08-08 RX ORDER — SODIUM CHLORIDE, SODIUM LACTATE, POTASSIUM CHLORIDE, CALCIUM CHLORIDE 600; 310; 30; 20 MG/100ML; MG/100ML; MG/100ML; MG/100ML
INJECTION, SOLUTION INTRAVENOUS CONTINUOUS
Status: CANCELLED | OUTPATIENT
Start: 2019-08-08

## 2019-08-08 RX ORDER — SCOLOPAMINE TRANSDERMAL SYSTEM 1 MG/1
1 PATCH, EXTENDED RELEASE TRANSDERMAL
Status: CANCELLED | OUTPATIENT
Start: 2019-08-08

## 2019-08-08 RX ORDER — ACETAMINOPHEN 500 MG
1000 TABLET ORAL
Status: CANCELLED | OUTPATIENT
Start: 2019-08-08 | End: 2019-08-08

## 2019-08-08 NOTE — TELEPHONE ENCOUNTER
Reviewed CT with patient in detail. I have placed an order for PET for further evaluation of metastatic disease given CT findings. Will route message to Dr. Regalado staff for scheduling.     Aware surgery is scheduled for next week and we will review with Dr. Regalado to make the best personalized treatment plan for her. Answered all of her questions.

## 2019-08-08 NOTE — DISCHARGE INSTRUCTIONS
PRE-ADMIT TESTING -  [phone]    [address]  [place] [facility]          Your surgery has been scheduled at [facility]. We are pleased to have the opportunity to serve you. For Further Information please call [phone].    On the day of surgery please report to the Information Desk on the 1st floor.    · CONTACT YOUR PHYSICIAN'S OFFICE THE DAY PRIOR TO YOUR SURGERY TO OBTAIN YOUR ARRIVAL TIME.     · The evening before surgery do not eat anything after 9 p.m. ( this includes hard candy, chewing gum and mints).  You may only have GATORADE, POWERADE AND WATER  from 9 p.m. until you leave your home.   DO NOT DRINK ANY LIQUIDS ON THE WAY TO THE HOSPITAL.      SPECIAL MEDICATION INSTRUCTIONS: TAKE medications checked off by the Anesthesiologist on your Medication List.    Angiogram Patients: Take medications as instructed by your physician, including aspirin.     Surgery Patients:    If you take ASPIRIN - Your PHYSICIAN/SURGEON will need to inform you IF/OR when you need to stop taking aspirin prior to your surgery.     Do Not take any medications containing IBUPROFEN.  Do Not Wear any make-up or dark nail polish   (especially eye make-up) to surgery. If you come to surgery with makeup on you will be required to remove the makeup or nail polish.    Do not shave your surgical area at least 5 days prior to your surgery. The surgical prep will be performed at the hospital according to Infection Control regulations.    Leave all valuables at home.   Do Not wear any jewelry or watches, including any metal in body piercings. Jewelry must be removed prior to coming to the hospital.  There is a possibility that rings that are unable to be removed may be cut off if they are on the surgical extremity.    Contact Lens must be removed before surgery. Either do not wear the contact lens or bring a case and solution for storage.  Please bring a container for eyeglasses or dentures as required.  Bring  any paperwork your physician has provided, such as consent forms,  history and physicals, doctor's orders, etc.   Bring comfortable clothes that are loose fitting to wear upon discharge. Take into consideration the type of surgery being performed.  Maintain your diet as advised per your physician the day prior to surgery.      Adequate rest the night before surgery is advised.   Park in the Parking lot behind the hospital or in the [facility] across the street from the parking lot. Parking is complimentary.  If you will be discharged the same day as your procedure, please arrange for a responsible adult to drive you home or to accompany you if traveling by taxi.   YOU WILL NOT BE PERMITTED TO DRIVE OR TO LEAVE THE HOSPITAL ALONE AFTER SURGERY.   It is strongly recommended that you arrange for someone to remain with you for the first 24 hrs following your surgery.    The Surgeon will speak to your family/visitor after your surgery regarding the outcome of your surgery and post op care.  The Surgeon may speak to you after your surgery, but there is a possibility you may not remember the details.  Please check with your family members regarding the conversation with the Surgeon.      Thank you for your cooperation.  The Staff of [facility].                Bathing Instructions with Hibiclens     Shower the evening before and morning of your procedure with Hibiclens:   Wash your face with water and your regular face wash/soap   Apply Hibiclens directly on your skin or on a wet washcloth and wash gently. When showering: Move away from the shower stream when applying Hibiclens to avoid rinsing off too soon.   Rinse thoroughly with warm water   Do not dilute Hibiclens         Dry off as usual, do not use any deodorant, powder, body lotions, perfume, after shave or cologne.

## 2019-08-08 NOTE — ANESTHESIA PREPROCEDURE EVALUATION
08/08/2019  Jeanne Rodgers is a 64 y.o., female.    Pre-op Assessment    I have reviewed the Patient Summary Reports.     I have reviewed the Nursing Notes.   I have reviewed the Medications.     Review of Systems  Anesthesia Hx:  No problems with previous Anesthesia  Denies Family Hx of Anesthesia complications.   Denies Personal Hx of Anesthesia complications.   Social:  Non-Smoker    Hematology/Oncology:  Hematology Normal       -- Cancer in past history:  Breast right no axillary node dissection no lymphedema   EENT/Dental:EENT/Dental Normal   Cardiovascular:  Cardiovascular Normal Exercise tolerance: good     Pulmonary:  Pulmonary Normal    Renal/:  Renal/ Normal     Musculoskeletal:   Pelvic pain Spine Disorders: lumbar Chronic Pain and Degenerative disease    Neurological:  Neurology Normal    Endocrine:  Endocrine Normal    Dermatological:  Skin Normal    Psych:  Psychiatric Normal           Physical Exam  General:  Obesity    Airway/Jaw/Neck:  Airway Findings: Mouth Opening: Normal Tongue: Normal  General Airway Assessment: Adult  Mallampati: I  TM Distance: Normal, at least 6 cm  Jaw/Neck Findings:  Neck ROM: Normal ROM      Dental:  Dental Findings: In tact              Anesthesia Plan  Type of Anesthesia, risks & benefits discussed:  Anesthesia Type:  general  Patient's Preference:   Intra-op Monitoring Plan:   Intra-op Monitoring Plan Comments:   Post Op Pain Control Plan: multimodal analgesia  Post Op Pain Control Plan Comments:   Induction:   IV  Beta Blocker:         Informed Consent: Patient understands risks and agrees with Anesthesia plan.  Questions answered. Anesthesia consent signed with patient.  ASA Score: 2     Day of Surgery Review of History & Physical:    H&P update referred to the surgeon.     Anesthesia Plan Notes: D and C in July. Used LMA # 4 and patient said had  severe sore throat after.        Ready For Surgery From Anesthesia Perspective.

## 2019-08-09 ENCOUNTER — TELEPHONE (OUTPATIENT)
Dept: GYNECOLOGIC ONCOLOGY | Facility: CLINIC | Age: 64
End: 2019-08-09

## 2019-08-09 NOTE — TELEPHONE ENCOUNTER
----- Message from Eric Charles MA sent at 8/9/2019 10:06 AM CDT -----  Please gerardo pt a PET scan next avail.    Eric

## 2019-08-12 ENCOUNTER — TELEPHONE (OUTPATIENT)
Dept: GYNECOLOGIC ONCOLOGY | Facility: CLINIC | Age: 64
End: 2019-08-12

## 2019-08-12 NOTE — TELEPHONE ENCOUNTER
Pt requesting to know how soon she will receive PET scan results after yesterday. Pt informed due to scan time it will probably be Wednesday before provider receives results. Pt expressed verbal understanding to follow any instructions provided by anesthesia.     ----- Message from Olivia Linares sent at 8/12/2019  3:44 PM CDT -----  Contact: YON MCKEON [[id number]]    Name of Who is Calling: YON MCKEON [[id number]]       What is the request in detail: Patient is requesting a call from staff in regards to how soon will she get her results from her test  on tomorrow.....Please contact to further discuss and advise.     Can the clinic reply by [facility]: No     What [facility] to [facility] Back [facility]  [phone]

## 2019-08-13 ENCOUNTER — HOSPITAL ENCOUNTER (OUTPATIENT)
Dept: RADIOLOGY | Facility: HOSPITAL | Age: 64
Discharge: HOME | DRG: 740 | End: 2019-08-13
Attending: OBSTETRICS & GYNECOLOGY
Payer: MEDICARE

## 2019-08-13 DIAGNOSIS — D49.59 UTERINE NEOPLASM: ICD-10-CM

## 2019-08-13 DIAGNOSIS — C41.4 MALIGNANT NEOPLASM OF PELVIC BONES, SACRUM AND COCCYX: ICD-10-CM

## 2019-08-13 LAB — POCT GLUCOSE: 70 MG/DL (ref 70–110)

## 2019-08-13 PROCEDURE — 78815 PET IMAGE W/CT SKULL-THIGH: CPT | Mod: 26,PS,, | Performed by: RADIOLOGY

## 2019-08-13 PROCEDURE — 78815 NM PET CT ROUTINE: ICD-10-PCS | Mod: 26,PS,, | Performed by: RADIOLOGY

## 2019-08-13 PROCEDURE — A9552 F18 FDG: HCPCS

## 2019-08-13 PROCEDURE — 78815 PET IMAGE W/CT SKULL-THIGH: CPT | Mod: TC

## 2019-08-14 ENCOUNTER — TELEPHONE (OUTPATIENT)
Dept: ADMINISTRATIVE | Facility: HOSPITAL | Age: 64
End: 2019-08-14

## 2019-08-14 ENCOUNTER — TELEPHONE (OUTPATIENT)
Dept: GYNECOLOGIC ONCOLOGY | Facility: CLINIC | Age: 64
End: 2019-08-14

## 2019-08-14 NOTE — TELEPHONE ENCOUNTER
----- Message from Latisha Lind sent at 8/14/2019  9:42 AM CDT -----  Contact: YON MCKEON   Name of Who is Calling: YON MCKEON       What is the request in detail: Patient is requesting a call back. She states that she is suppose to have a procedure tomorrow and is not sure if it will happen. She states that she had an PET scan on yesterday and has not received a call regarding the results.        Can the clinic reply by [facility]: no      What Number to [facility] Back [facility]:  [phone]

## 2019-08-14 NOTE — TELEPHONE ENCOUNTER
Patient does not want referral at this time. She found out yesterday she has been diagnosed with uterine cancer that has spread to her bones.

## 2019-08-15 ENCOUNTER — HOSPITAL ENCOUNTER (INPATIENT)
Facility: OTHER | Age: 64
LOS: 1 days | Discharge: HOME | DRG: 740 | End: 2019-08-16
Attending: OBSTETRICS & GYNECOLOGY | Admitting: OBSTETRICS & GYNECOLOGY
Payer: MEDICARE

## 2019-08-15 ENCOUNTER — ANESTHESIA (OUTPATIENT)
Dept: SURGERY | Facility: OTHER | Age: 64
DRG: 740 | End: 2019-08-15
Payer: MEDICARE

## 2019-08-15 DIAGNOSIS — Z90.710 HISTORY OF ROBOT-ASSISTED LAPAROSCOPIC HYSTERECTOMY: Primary | ICD-10-CM

## 2019-08-15 DIAGNOSIS — C54.1 ENDOMETRIAL CANCER: ICD-10-CM

## 2019-08-15 LAB — POCT GLUCOSE: 130 MG/DL (ref 70–110)

## 2019-08-15 PROCEDURE — 37000009 HC ANESTHESIA EA ADD 15 MINS: Performed by: OBSTETRICS & GYNECOLOGY

## 2019-08-15 PROCEDURE — 88341 IMHCHEM/IMCYTCHM EA ADD ANTB: CPT | Mod: 26,59,, | Performed by: PATHOLOGY

## 2019-08-15 PROCEDURE — 88309 TISSUE EXAM BY PATHOLOGIST: CPT | Mod: 26,,, | Performed by: PATHOLOGY

## 2019-08-15 PROCEDURE — 63600175 PHARM REV CODE 636 W HCPCS: Performed by: STUDENT IN AN ORGANIZED HEALTH CARE EDUCATION/TRAINING PROGRAM

## 2019-08-15 PROCEDURE — 88341 IMHCHEM/IMCYTCHM EA ADD ANTB: CPT | Performed by: PATHOLOGY

## 2019-08-15 PROCEDURE — 38571 LAPAROSCOPY LYMPHADENECTOMY: CPT | Mod: AS,22,52,51 | Performed by: NURSE PRACTITIONER

## 2019-08-15 PROCEDURE — 88360 TUMOR IMMUNOHISTOCHEM/MANUAL: CPT | Mod: 26,,, | Performed by: PATHOLOGY

## 2019-08-15 PROCEDURE — 71000033 HC RECOVERY, INTIAL HOUR: Performed by: OBSTETRICS & GYNECOLOGY

## 2019-08-15 PROCEDURE — 38571 PR LAP,PELVIC LYMPHADENECTOMY: ICD-10-PCS | Mod: 22,52,51,GC | Performed by: OBSTETRICS & GYNECOLOGY

## 2019-08-15 PROCEDURE — 88341 TISSUE SPECIMEN TO PATHOLOGY - SURGERY: ICD-10-PCS | Mod: 26,59,, | Performed by: PATHOLOGY

## 2019-08-15 PROCEDURE — 25000003 PHARM REV CODE 250: Performed by: ANESTHESIOLOGY

## 2019-08-15 PROCEDURE — 63600175 PHARM REV CODE 636 W HCPCS: Performed by: ANESTHESIOLOGY

## 2019-08-15 PROCEDURE — 88342 IMHCHEM/IMCYTCHM 1ST ANTB: CPT | Mod: 26,59,, | Performed by: PATHOLOGY

## 2019-08-15 PROCEDURE — 71000039 HC RECOVERY, EACH ADD'L HOUR: Performed by: OBSTETRICS & GYNECOLOGY

## 2019-08-15 PROCEDURE — 25000003 PHARM REV CODE 250: Performed by: NURSE ANESTHETIST, CERTIFIED REGISTERED

## 2019-08-15 PROCEDURE — 11000001 HC ACUTE MED/SURG PRIVATE ROOM

## 2019-08-15 PROCEDURE — 27201423 OPTIME MED/SURG SUP & DEVICES STERILE SUPPLY: Performed by: OBSTETRICS & GYNECOLOGY

## 2019-08-15 PROCEDURE — 88309 TISSUE SPECIMEN TO PATHOLOGY - SURGERY: ICD-10-PCS | Mod: 26,,, | Performed by: PATHOLOGY

## 2019-08-15 PROCEDURE — 82962 GLUCOSE BLOOD TEST: CPT | Performed by: OBSTETRICS & GYNECOLOGY

## 2019-08-15 PROCEDURE — 58571 PR LAPAROSCOPY W TOT HYSTERECTUTERUS <=250 GRAM  W TUBE/OVARY: ICD-10-PCS | Mod: GC,,, | Performed by: OBSTETRICS & GYNECOLOGY

## 2019-08-15 PROCEDURE — P9045 ALBUMIN (HUMAN), 5%, 250 ML: HCPCS | Mod: JG | Performed by: NURSE ANESTHETIST, CERTIFIED REGISTERED

## 2019-08-15 PROCEDURE — 58571 TLH W/T/O 250 G OR LESS: CPT | Mod: GC,,, | Performed by: OBSTETRICS & GYNECOLOGY

## 2019-08-15 PROCEDURE — 63600175 PHARM REV CODE 636 W HCPCS: Performed by: NURSE ANESTHETIST, CERTIFIED REGISTERED

## 2019-08-15 PROCEDURE — 88342 TISSUE SPECIMEN TO PATHOLOGY - SURGERY: ICD-10-PCS | Mod: 26,59,, | Performed by: PATHOLOGY

## 2019-08-15 PROCEDURE — 38571 PR LAP,PELVIC LYMPHADENECTOMY: ICD-10-PCS | Mod: AS,22,52,51 | Performed by: NURSE PRACTITIONER

## 2019-08-15 PROCEDURE — 37000008 HC ANESTHESIA 1ST 15 MINUTES: Performed by: OBSTETRICS & GYNECOLOGY

## 2019-08-15 PROCEDURE — 36000712 HC OR TIME LEV V 1ST 15 MIN: Performed by: OBSTETRICS & GYNECOLOGY

## 2019-08-15 PROCEDURE — 36000713 HC OR TIME LEV V EA ADD 15 MIN: Performed by: OBSTETRICS & GYNECOLOGY

## 2019-08-15 PROCEDURE — 25000003 PHARM REV CODE 250: Performed by: STUDENT IN AN ORGANIZED HEALTH CARE EDUCATION/TRAINING PROGRAM

## 2019-08-15 PROCEDURE — 38571 LAPAROSCOPY LYMPHADENECTOMY: CPT | Mod: 22,52,51,GC | Performed by: OBSTETRICS & GYNECOLOGY

## 2019-08-15 PROCEDURE — 88360 TISSUE SPECIMEN TO PATHOLOGY - SURGERY: ICD-10-PCS | Mod: 26,,, | Performed by: PATHOLOGY

## 2019-08-15 PROCEDURE — 88342 IMHCHEM/IMCYTCHM 1ST ANTB: CPT | Performed by: PATHOLOGY

## 2019-08-15 PROCEDURE — 88307 TISSUE EXAM BY PATHOLOGIST: CPT | Mod: 26,,, | Performed by: PATHOLOGY

## 2019-08-15 PROCEDURE — 58571 TLH W/T/O 250 G OR LESS: CPT | Mod: AS,GC,, | Performed by: NURSE PRACTITIONER

## 2019-08-15 PROCEDURE — 88307 TISSUE SPECIMEN TO PATHOLOGY - SURGERY: ICD-10-PCS | Mod: 26,,, | Performed by: PATHOLOGY

## 2019-08-15 PROCEDURE — 58571 PR LAPAROSCOPY W TOT HYSTERECTUTERUS <=250 GRAM  W TUBE/OVARY: ICD-10-PCS | Mod: AS,GC,, | Performed by: NURSE PRACTITIONER

## 2019-08-15 RX ORDER — OXYCODONE HYDROCHLORIDE 5 MG/1
5 TABLET ORAL
Qty: 20 TABLET | Refills: 0 | Status: SHIPPED | OUTPATIENT
Start: 2019-08-15 | End: 2019-08-16

## 2019-08-15 RX ORDER — OXYCODONE HYDROCHLORIDE 5 MG/1
5 TABLET ORAL
Status: DISCONTINUED | OUTPATIENT
Start: 2019-08-15 | End: 2019-08-15 | Stop reason: HOSPADM

## 2019-08-15 RX ORDER — CEFAZOLIN SODIUM 1 G/3ML
2 INJECTION, POWDER, FOR SOLUTION INTRAMUSCULAR; INTRAVENOUS
Status: COMPLETED | OUTPATIENT
Start: 2019-08-15 | End: 2019-08-15

## 2019-08-15 RX ORDER — AMOXICILLIN 250 MG
1 CAPSULE ORAL 2 TIMES DAILY
Status: DISCONTINUED | OUTPATIENT
Start: 2019-08-15 | End: 2019-08-16 | Stop reason: HOSPADM

## 2019-08-15 RX ORDER — SODIUM CHLORIDE, SODIUM LACTATE, POTASSIUM CHLORIDE, CALCIUM CHLORIDE 600; 310; 30; 20 MG/100ML; MG/100ML; MG/100ML; MG/100ML
INJECTION, SOLUTION INTRAVENOUS CONTINUOUS
Status: DISCONTINUED | OUTPATIENT
Start: 2019-08-15 | End: 2019-08-15

## 2019-08-15 RX ORDER — IBUPROFEN 600 MG/1
600 TABLET ORAL EVERY 6 HOURS PRN
Qty: 60 TABLET | Refills: 0 | Status: ON HOLD | OUTPATIENT
Start: 2019-08-15 | End: 2020-06-12 | Stop reason: HOSPADM

## 2019-08-15 RX ORDER — LABETALOL HYDROCHLORIDE 5 MG/ML
INJECTION, SOLUTION INTRAVENOUS
Status: DISCONTINUED | OUTPATIENT
Start: 2019-08-15 | End: 2019-08-15

## 2019-08-15 RX ORDER — BISACODYL 10 MG
10 SUPPOSITORY, RECTAL RECTAL DAILY PRN
Status: DISCONTINUED | OUTPATIENT
Start: 2019-08-15 | End: 2019-08-16 | Stop reason: HOSPADM

## 2019-08-15 RX ORDER — DIPHENHYDRAMINE HYDROCHLORIDE 50 MG/ML
25 INJECTION INTRAMUSCULAR; INTRAVENOUS EVERY 6 HOURS PRN
Status: DISCONTINUED | OUTPATIENT
Start: 2019-08-15 | End: 2019-08-15 | Stop reason: HOSPADM

## 2019-08-15 RX ORDER — SODIUM CHLORIDE 0.9 % (FLUSH) 0.9 %
3 SYRINGE (ML) INJECTION
Status: DISCONTINUED | OUTPATIENT
Start: 2019-08-15 | End: 2019-08-15

## 2019-08-15 RX ORDER — LIDOCAINE HCL/PF 100 MG/5ML
SYRINGE (ML) INTRAVENOUS
Status: DISCONTINUED | OUTPATIENT
Start: 2019-08-15 | End: 2019-08-15

## 2019-08-15 RX ORDER — PREGABALIN 75 MG/1
75 CAPSULE ORAL
Status: COMPLETED | OUTPATIENT
Start: 2019-08-15 | End: 2019-08-15

## 2019-08-15 RX ORDER — SODIUM CHLORIDE, SODIUM LACTATE, POTASSIUM CHLORIDE, CALCIUM CHLORIDE 600; 310; 30; 20 MG/100ML; MG/100ML; MG/100ML; MG/100ML
INJECTION, SOLUTION INTRAVENOUS CONTINUOUS
Status: DISCONTINUED | OUTPATIENT
Start: 2019-08-15 | End: 2019-08-16 | Stop reason: HOSPADM

## 2019-08-15 RX ORDER — DIPHENHYDRAMINE HYDROCHLORIDE 50 MG/ML
25 INJECTION INTRAMUSCULAR; INTRAVENOUS EVERY 4 HOURS PRN
Status: DISCONTINUED | OUTPATIENT
Start: 2019-08-15 | End: 2019-08-16 | Stop reason: HOSPADM

## 2019-08-15 RX ORDER — ONDANSETRON HYDROCHLORIDE 2 MG/ML
INJECTION, SOLUTION INTRAMUSCULAR; INTRAVENOUS
Status: DISCONTINUED | OUTPATIENT
Start: 2019-08-15 | End: 2019-08-15

## 2019-08-15 RX ORDER — OXYCODONE AND ACETAMINOPHEN 5; 325 MG/1; MG/1
1 TABLET ORAL EVERY 4 HOURS PRN
Status: DISCONTINUED | OUTPATIENT
Start: 2019-08-15 | End: 2019-08-16 | Stop reason: HOSPADM

## 2019-08-15 RX ORDER — SCOLOPAMINE TRANSDERMAL SYSTEM 1 MG/1
1 PATCH, EXTENDED RELEASE TRANSDERMAL
Status: DISCONTINUED | OUTPATIENT
Start: 2019-08-15 | End: 2019-08-15 | Stop reason: HOSPADM

## 2019-08-15 RX ORDER — ROCURONIUM BROMIDE 10 MG/ML
INJECTION, SOLUTION INTRAVENOUS
Status: DISCONTINUED | OUTPATIENT
Start: 2019-08-15 | End: 2019-08-15

## 2019-08-15 RX ORDER — ACETAMINOPHEN 500 MG
1000 TABLET ORAL
Status: COMPLETED | OUTPATIENT
Start: 2019-08-15 | End: 2019-08-15

## 2019-08-15 RX ORDER — GLYCOPYRROLATE 0.2 MG/ML
INJECTION INTRAMUSCULAR; INTRAVENOUS
Status: DISCONTINUED | OUTPATIENT
Start: 2019-08-15 | End: 2019-08-15

## 2019-08-15 RX ORDER — FENTANYL CITRATE 50 UG/ML
INJECTION, SOLUTION INTRAMUSCULAR; INTRAVENOUS
Status: DISCONTINUED | OUTPATIENT
Start: 2019-08-15 | End: 2019-08-15

## 2019-08-15 RX ORDER — MUPIROCIN 20 MG/G
1 OINTMENT TOPICAL 2 TIMES DAILY
Status: DISCONTINUED | OUTPATIENT
Start: 2019-08-15 | End: 2019-08-16 | Stop reason: HOSPADM

## 2019-08-15 RX ORDER — MEPERIDINE HYDROCHLORIDE 25 MG/ML
12.5 INJECTION INTRAMUSCULAR; INTRAVENOUS; SUBCUTANEOUS ONCE AS NEEDED
Status: DISCONTINUED | OUTPATIENT
Start: 2019-08-15 | End: 2019-08-15 | Stop reason: HOSPADM

## 2019-08-15 RX ORDER — ONDANSETRON 2 MG/ML
4 INJECTION INTRAMUSCULAR; INTRAVENOUS DAILY PRN
Status: DISCONTINUED | OUTPATIENT
Start: 2019-08-15 | End: 2019-08-15 | Stop reason: HOSPADM

## 2019-08-15 RX ORDER — KETOROLAC TROMETHAMINE 30 MG/ML
INJECTION, SOLUTION INTRAMUSCULAR; INTRAVENOUS
Status: DISCONTINUED | OUTPATIENT
Start: 2019-08-15 | End: 2019-08-15

## 2019-08-15 RX ORDER — ALBUMIN HUMAN 50 G/1000ML
SOLUTION INTRAVENOUS CONTINUOUS PRN
Status: DISCONTINUED | OUTPATIENT
Start: 2019-08-15 | End: 2019-08-15

## 2019-08-15 RX ORDER — MIDAZOLAM HYDROCHLORIDE 1 MG/ML
INJECTION, SOLUTION INTRAMUSCULAR; INTRAVENOUS
Status: DISCONTINUED | OUTPATIENT
Start: 2019-08-15 | End: 2019-08-15

## 2019-08-15 RX ORDER — DIPHENHYDRAMINE HCL 25 MG
25 CAPSULE ORAL EVERY 4 HOURS PRN
Status: DISCONTINUED | OUTPATIENT
Start: 2019-08-15 | End: 2019-08-16 | Stop reason: HOSPADM

## 2019-08-15 RX ORDER — HYDROCODONE BITARTRATE AND ACETAMINOPHEN 7.5; 325 MG/1; MG/1
1 TABLET ORAL EVERY 4 HOURS PRN
Status: DISCONTINUED | OUTPATIENT
Start: 2019-08-15 | End: 2019-08-16 | Stop reason: HOSPADM

## 2019-08-15 RX ORDER — DEXAMETHASONE SODIUM PHOSPHATE 4 MG/ML
INJECTION, SOLUTION INTRA-ARTICULAR; INTRALESIONAL; INTRAMUSCULAR; INTRAVENOUS; SOFT TISSUE
Status: DISCONTINUED | OUTPATIENT
Start: 2019-08-15 | End: 2019-08-15

## 2019-08-15 RX ORDER — ONDANSETRON 8 MG/1
8 TABLET, ORALLY DISINTEGRATING ORAL EVERY 8 HOURS PRN
Status: DISCONTINUED | OUTPATIENT
Start: 2019-08-15 | End: 2019-08-16 | Stop reason: HOSPADM

## 2019-08-15 RX ORDER — IBUPROFEN 600 MG/1
600 TABLET ORAL EVERY 6 HOURS
Status: DISCONTINUED | OUTPATIENT
Start: 2019-08-16 | End: 2019-08-16 | Stop reason: HOSPADM

## 2019-08-15 RX ORDER — NEOSTIGMINE METHYLSULFATE 1 MG/ML
INJECTION, SOLUTION INTRAVENOUS
Status: DISCONTINUED | OUTPATIENT
Start: 2019-08-15 | End: 2019-08-15

## 2019-08-15 RX ORDER — HYDROMORPHONE HYDROCHLORIDE 2 MG/ML
0.4 INJECTION, SOLUTION INTRAMUSCULAR; INTRAVENOUS; SUBCUTANEOUS EVERY 5 MIN PRN
Status: DISCONTINUED | OUTPATIENT
Start: 2019-08-15 | End: 2019-08-15 | Stop reason: HOSPADM

## 2019-08-15 RX ORDER — PROPOFOL 10 MG/ML
VIAL (ML) INTRAVENOUS
Status: DISCONTINUED | OUTPATIENT
Start: 2019-08-15 | End: 2019-08-15

## 2019-08-15 RX ADMIN — KETOROLAC TROMETHAMINE 30 MG: 30 INJECTION, SOLUTION INTRAMUSCULAR; INTRAVENOUS at 03:08

## 2019-08-15 RX ADMIN — SODIUM CHLORIDE, SODIUM LACTATE, POTASSIUM CHLORIDE, AND CALCIUM CHLORIDE: 600; 310; 30; 20 INJECTION, SOLUTION INTRAVENOUS at 05:08

## 2019-08-15 RX ADMIN — ONDANSETRON 4 MG: 2 INJECTION, SOLUTION INTRAMUSCULAR; INTRAVENOUS at 01:08

## 2019-08-15 RX ADMIN — MUPIROCIN 1 G: 20 OINTMENT TOPICAL at 08:08

## 2019-08-15 RX ADMIN — ROCURONIUM BROMIDE 40 MG: 10 INJECTION INTRAVENOUS at 01:08

## 2019-08-15 RX ADMIN — PROPOFOL 50 MG: 10 INJECTION, EMULSION INTRAVENOUS at 01:08

## 2019-08-15 RX ADMIN — SENNOSIDES,DOCUSATE SODIUM 1 TABLET: 8.6; 5 TABLET, FILM COATED ORAL at 08:08

## 2019-08-15 RX ADMIN — ONDANSETRON 4 MG: 2 INJECTION, SOLUTION INTRAMUSCULAR; INTRAVENOUS at 03:08

## 2019-08-15 RX ADMIN — ACETAMINOPHEN 1000 MG: 500 TABLET, FILM COATED ORAL at 10:08

## 2019-08-15 RX ADMIN — HYDROMORPHONE HYDROCHLORIDE 0.4 MG: 2 INJECTION, SOLUTION INTRAMUSCULAR; INTRAVENOUS; SUBCUTANEOUS at 04:08

## 2019-08-15 RX ADMIN — FENTANYL CITRATE 50 MCG: 50 INJECTION, SOLUTION INTRAMUSCULAR; INTRAVENOUS at 02:08

## 2019-08-15 RX ADMIN — SCOPALAMINE 1 PATCH: 1 PATCH, EXTENDED RELEASE TRANSDERMAL at 10:08

## 2019-08-15 RX ADMIN — OXYCODONE HYDROCHLORIDE AND ACETAMINOPHEN 1 TABLET: 5; 325 TABLET ORAL at 08:08

## 2019-08-15 RX ADMIN — SODIUM CHLORIDE, SODIUM LACTATE, POTASSIUM CHLORIDE, AND CALCIUM CHLORIDE: 600; 310; 30; 20 INJECTION, SOLUTION INTRAVENOUS at 12:08

## 2019-08-15 RX ADMIN — NEOSTIGMINE METHYLSULFATE 5 MG: 1 INJECTION INTRAVENOUS at 03:08

## 2019-08-15 RX ADMIN — LIDOCAINE HYDROCHLORIDE 50 MG: 20 INJECTION, SOLUTION INTRAVENOUS at 01:08

## 2019-08-15 RX ADMIN — ALBUMIN (HUMAN): 2.5 SOLUTION INTRAVENOUS at 01:08

## 2019-08-15 RX ADMIN — DEXAMETHASONE SODIUM PHOSPHATE 8 MG: 4 INJECTION, SOLUTION INTRAMUSCULAR; INTRAVENOUS at 01:08

## 2019-08-15 RX ADMIN — PROPOFOL 150 MG: 10 INJECTION, EMULSION INTRAVENOUS at 01:08

## 2019-08-15 RX ADMIN — CEFAZOLIN 2 G: 330 INJECTION, POWDER, FOR SOLUTION INTRAMUSCULAR; INTRAVENOUS at 01:08

## 2019-08-15 RX ADMIN — PREGABALIN 75 MG: 75 CAPSULE ORAL at 10:08

## 2019-08-15 RX ADMIN — FENTANYL CITRATE 50 MCG: 50 INJECTION, SOLUTION INTRAMUSCULAR; INTRAVENOUS at 03:08

## 2019-08-15 RX ADMIN — FENTANYL CITRATE 50 MCG: 50 INJECTION, SOLUTION INTRAMUSCULAR; INTRAVENOUS at 01:08

## 2019-08-15 RX ADMIN — FENTANYL CITRATE 100 MCG: 50 INJECTION, SOLUTION INTRAMUSCULAR; INTRAVENOUS at 01:08

## 2019-08-15 RX ADMIN — ROCURONIUM BROMIDE 10 MG: 10 INJECTION INTRAVENOUS at 01:08

## 2019-08-15 RX ADMIN — SODIUM CHLORIDE, SODIUM LACTATE, POTASSIUM CHLORIDE, AND CALCIUM CHLORIDE: 600; 310; 30; 20 INJECTION, SOLUTION INTRAVENOUS at 02:08

## 2019-08-15 RX ADMIN — LABETALOL HYDROCHLORIDE 15 MG: 5 INJECTION, SOLUTION INTRAVENOUS at 01:08

## 2019-08-15 RX ADMIN — GLYCOPYRROLATE 0.8 MG: 0.2 INJECTION, SOLUTION INTRAMUSCULAR; INTRAVENOUS at 03:08

## 2019-08-15 RX ADMIN — MIDAZOLAM 2 MG: 1 INJECTION INTRAMUSCULAR; INTRAVENOUS at 01:08

## 2019-08-15 RX ADMIN — ROCURONIUM BROMIDE 10 MG: 10 INJECTION INTRAVENOUS at 02:08

## 2019-08-15 NOTE — INTERVAL H&P NOTE
The patient has been examined and the H&P has been reviewed:    I concur with the findings and no changes have occurred since H&P was written.    Anesthesia/Surgery risks, benefits and alternative options discussed and understood by patient/family.    Surgical plan reviewed  Questions and concerns addressed  To OR for planned procedures.      Active Hospital Problems    Diagnosis  POA    Endometrial cancer [C54.1]  Yes      Resolved Hospital Problems   No resolved problems to display.     Shelley Bhandari MD, PhD  OBGYN, PGY-4

## 2019-08-15 NOTE — NURSING
Pt arrived to floor via stretcher with EDUARDO Almonte and transferred to bed. IVF started, SCDs applied, oriented to room, call light placed within reach, bed low and locked, and family at bedside. Pt complains of pain 3/10. Holliday noted draining  yellow urine to gravity. Incisions noted to abd with scant drainage. No acute distress noted at this time. Will continue to monitor.

## 2019-08-15 NOTE — OPERATIVE NOTE ADDENDUM
Certification of Assistant at Surgery       Surgery Date: 8/15/2019     Participating Surgeons:  Surgeon(s) and Role:     * Darius Regalado MD - Primary    Procedures:  Procedure(s) (LRB):  XI ROBOTIC HYSTERECTOMY (N/A)  XI ROBOTIC SALPINGO-OOPHORECTOMY (Bilateral)  ROBOTIC LYMPHADENECTOMY (Left)    Assistant Surgeon's Certification of Necessity:  I understand that section 1842 (b) (6) (d) of the Social Security Act generally prohibits Medicare Part B reasonable charge payment for the services of assistants at surgery in teaching hospitals when qualified residents are available to furnish such services. I certify that the services for which payment is claimed were medically necessary, and that no qualified resident was available to perform the services. I further understand that these services are subject to post-payment review by the Medicare carrier.      Aydee Chakraborty NP    08/15/2019  4:03 PM

## 2019-08-15 NOTE — TRANSFER OF CARE
Anesthesia Transfer of Care Note    Patient: Jeanne Rodgers    Procedure(s) Performed: Procedure(s) (LRB):  XI ROBOTIC HYSTERECTOMY (N/A)  XI ROBOTIC SALPINGO-OOPHORECTOMY (Bilateral)  ROBOTIC LYMPHADENECTOMY (Left)    Patient location: PACU    Anesthesia Type: general    Transport from OR: Transported from OR on 2-3 L/min O2 by NC with adequate spontaneous ventilation    Post pain: adequate analgesia    Post assessment: no apparent anesthetic complications    Post vital signs: stable    Level of consciousness: awake, alert and oriented    Nausea/Vomiting: no nausea/vomiting    Complications: none    Transfer of care protocol was followed      Last vitals:   Visit Vitals  /67 (BP Location: Left arm, Patient Position: Lying)   Pulse 76   Temp 36.4 °C (97.6 °F) (Oral)   Resp 16   Ht 5' (1.524 m)   Wt 77.6 kg (171 lb)   LMP  (LMP Unknown)   SpO2 98%   Breastfeeding? No   BMI 33.40 kg/m²

## 2019-08-15 NOTE — ANESTHESIA POSTPROCEDURE EVALUATION
Anesthesia Post Evaluation    Patient: Jeanne oRdgers    Procedure(s) Performed: Procedure(s) (LRB):  XI ROBOTIC HYSTERECTOMY (N/A)  XI ROBOTIC SALPINGO-OOPHORECTOMY (Bilateral)  ROBOTIC LYMPHADENECTOMY (Left)    Final Anesthesia Type: general  Patient location during evaluation: PACU  Patient participation: Yes- Able to Participate  Level of consciousness: awake and alert  Post-procedure vital signs: reviewed and stable  Pain management: adequate  Airway patency: patent  PONV status at discharge: No PONV  Anesthetic complications: no      Cardiovascular status: hemodynamically stable  Respiratory status: unassisted  Hydration status: euvolemic  Follow-up not needed.          Vitals Value Taken Time   /57 8/15/2019  4:30 PM   Temp 36.4 °C (97.6 °F) 8/15/2019 10:45 AM   Pulse 71 8/15/2019  4:39 PM   Resp 16 8/15/2019  4:30 PM   SpO2 95 % 8/15/2019  4:39 PM   Vitals shown include unvalidated device data.      No case tracking events are documented in the log.      Pain/Brandin Score: Pain Rating Prior to Med Admin: 8 (8/15/2019  4:31 PM)  Brandin Score: 8 (8/15/2019  3:55 PM)

## 2019-08-16 VITALS
RESPIRATION RATE: 16 BRPM | SYSTOLIC BLOOD PRESSURE: 113 MMHG | TEMPERATURE: 98 F | BODY MASS INDEX: 33.58 KG/M2 | HEART RATE: 55 BPM | HEIGHT: 60 IN | DIASTOLIC BLOOD PRESSURE: 61 MMHG | OXYGEN SATURATION: 92 % | WEIGHT: 171.06 LBS

## 2019-08-16 DIAGNOSIS — R11.2 NAUSEA AND VOMITING, INTRACTABILITY OF VOMITING NOT SPECIFIED, UNSPECIFIED VOMITING TYPE: Primary | ICD-10-CM

## 2019-08-16 PROCEDURE — 25000003 PHARM REV CODE 250: Performed by: STUDENT IN AN ORGANIZED HEALTH CARE EDUCATION/TRAINING PROGRAM

## 2019-08-16 PROCEDURE — 63600175 PHARM REV CODE 636 W HCPCS: Performed by: STUDENT IN AN ORGANIZED HEALTH CARE EDUCATION/TRAINING PROGRAM

## 2019-08-16 RX ORDER — ONDANSETRON HYDROCHLORIDE 8 MG/1
8 TABLET, FILM COATED ORAL EVERY 12 HOURS PRN
Qty: 30 TABLET | Refills: 2 | Status: SHIPPED | OUTPATIENT
Start: 2019-08-16 | End: 2020-04-08 | Stop reason: SDUPTHER

## 2019-08-16 RX ORDER — OXYCODONE HYDROCHLORIDE 5 MG/1
5 TABLET ORAL EVERY 4 HOURS PRN
Qty: 30 TABLET | Refills: 0 | Status: SHIPPED | OUTPATIENT
Start: 2019-08-16 | End: 2019-09-05

## 2019-08-16 RX ADMIN — MUPIROCIN 1 G: 20 OINTMENT TOPICAL at 08:08

## 2019-08-16 RX ADMIN — OXYCODONE HYDROCHLORIDE AND ACETAMINOPHEN 1 TABLET: 5; 325 TABLET ORAL at 08:08

## 2019-08-16 RX ADMIN — SODIUM CHLORIDE, SODIUM LACTATE, POTASSIUM CHLORIDE, AND CALCIUM CHLORIDE 500 ML: .6; .31; .03; .02 INJECTION, SOLUTION INTRAVENOUS at 04:08

## 2019-08-16 RX ADMIN — SENNOSIDES,DOCUSATE SODIUM 1 TABLET: 8.6; 5 TABLET, FILM COATED ORAL at 08:08

## 2019-08-16 RX ADMIN — OXYCODONE HYDROCHLORIDE AND ACETAMINOPHEN 1 TABLET: 5; 325 TABLET ORAL at 12:08

## 2019-08-16 NOTE — TELEPHONE ENCOUNTER
----- Message from Dread Suazo sent at 8/16/2019  3:20 PM CDT -----  Contact: YON MCKEON [[id number]]  Can the clinic reply in [facility]:Y    Please refill the medication(s) listed below.     Please call the patient when the prescription(s) is ready for  at the phone number   [phone]    Medication #1:oxyCODONE (ROXICODONE) 5 MG immediate release tablet    Medication #2:    Preferred Pharmacy:  [facility]

## 2019-08-16 NOTE — NURSING
New orders for discharge noted, patient is agreeable. PIV removed, catheter tip intact, dressing applied. Discharge teaching done at bedside, verbalized understanding. Medications reviewed, appointments given. Will d/c home

## 2019-08-16 NOTE — DISCHARGE SUMMARY
[facility]  Obstetrics & Gynecology  Discharge Summary    Patient Name: Jeanne Rodgers  MRN: [id number]  Admission Date: 8/15/2019  Hospital Length of Stay: 1 days  Discharge Date and Time:  08/16/2019 9:26 AM  Attending Physician: Darius Regalado MD   Discharging Provider: Dori Badillo MD  Primary Care Provider: Iris Walls MD      Hospital Course: Patient presented for scheduled procedure. Patient was passed back to OR for RATLH/BSO/Staging. Please see OP note for further details. Tolerated procedure well and patient was taken to recovery in a stable condition. Prior to discharge patient was able to void, ambulate, tolerate PO and pain was well controlled with PO meds. Patient was given routine post-op instructions for which patient voiced understanding. Patient was subsequently discharged home.      Procedure(s) (LRB):  XI ROBOTIC HYSTERECTOMY (N/A)  XI ROBOTIC SALPINGO-OOPHORECTOMY (Bilateral)  ROBOTIC LYMPHADENECTOMY (Left)         Pending Diagnostic Studies:     None        Final Active Diagnoses:    Diagnosis Date Noted POA    PRINCIPAL PROBLEM:  History of robot-assisted laparoscopic hysterectomy [Z90.710] 08/15/2019 Not Applicable    Endometrial cancer [C54.1] 08/02/2019 Yes      Problems Resolved During this Admission:        Discharged Condition: good    Disposition: Home or Self Care    Follow Up:  Follow-up Information     Darius Regalado MD. Go in 6 weeks.    Specialties:  Gynecologic Oncology, Gynecology, Oncology  Why:  Post-Op  Contact information:  [address] [place] [id number]  [phone]                 Patient Instructions:      Lifting restrictions   Scheduling Instructions: Do not lift anything heavier than a gallon of milk for 2 weeks.  No heavy lifting until your post-operative visit in 4-6 weeks.     No driving until:   Scheduling Instructions: Pain is well controlled without narcotic medication.     Notify your health care provider if you  experience any of the following:  temperature >100.4     Notify your health care provider if you experience any of the following:  severe uncontrolled pain     Notify your health care provider if you experience any of the following:  redness, tenderness, or signs of infection (pain, swelling, redness, odor or green/yellow discharge around incision site)     Notify your health care provider if you experience any of the following:   Scheduling Instructions: Heavy vaginal bleeding, like a period, soaking a pad an hour.     No dressing needed   Scheduling Instructions: Wash with warm soapy water in the shower and pat dry daily.   Leave white surgical strips in place until 5 days postoperatively.  Then take them off.  Do not soak in a tub for 2 weeks after surgery.     Activity as tolerated   Scheduling Instructions: Nothing in the vagina for 6 weeks - No douching, tampons, or sex.     Medications:  Reconciled Home Medications:      Medication List      START taking these medications    oxyCODONE 5 MG immediate release tablet  Commonly known as:  ROXICODONE  Take 1 tablet (5 mg total) by mouth every 3 (three) hours as needed.        CHANGE how you take these medications    ibuprofen 600 MG tablet  Commonly known as:  ADVIL,MOTRIN  Take 1 tablet (600 mg total) by mouth every 6 (six) hours as needed for Pain.  What changed:    · medication strength  · how much to take  · when to take this  · reasons to take this        CONTINUE taking these medications    chlorhexidine 0.12 % solution  Commonly known as:  PERIDEX  SWISH AND SPIT 15MLS BY MOUTH 2 TIMES DAILY FOR 7 DAYS.     clobetasol 0.05% 0.05 % Oint  Commonly known as:  TEMOVATE  APPLY TO AFFECTED AREA(S) TWICE A DAY FOR 1 MONTH(S) then EVERY DAY FOR 1 MONTH(S) then 3 TIMES A WEEK     diphenhydrAMINE-aluminum-magnesium hydroxide-simethicone-lidocaine HCl 2%  Swish and spit 5 mLs every 4 (four) hours as needed.     fluticasone propionate 50 mcg/actuation nasal  spray  Commonly known as:  FLONASE  1 spray (50 mcg total) by Each Nostril route 2 (two) times daily as needed for Rhinitis.     levocetirizine 5 MG tablet  Commonly known as:  XYZAL  Take 1 tablet (5 mg total) by mouth every evening.     nystatin cream  Commonly known as:  MYCOSTATIN  Apply topically every Mon, Wed, Fri.     silver sulfADIAZINE 1% 1 % cream  Commonly known as:  SILVADENE  Apply topically every morning.            Dori Badillo MD  Obstetrics & Gynecology  [facility]

## 2019-08-16 NOTE — PROGRESS NOTES
[facility]  Gynecology Oncology  Progress Note    Patient Name: Jeanne Rodgers  MRN: [id number]  Admission Date: 8/15/2019  Primary Care Provider: Iris Walls MD  Principal Problem: History of robot-assisted laparoscopic hysterectomy    Subjective:     Interval History: POD#1 s/p RA-TLH/BSO/LND.   Patient is doing well this morning. She reports mild abdominal pain that is relieved by scheduled PO pain medications. She denies vaginal bleeding. She has not ambulated yet. She voided via Holliday catheter overnight. Catheter was just removed, and she is awaiting spontaneous void. She has not passed flatus, and has not had BM. She is tolerating clear liquids without nausea or vomiting and has an appetite for breakfast this morning.     Scheduled Meds:   ibuprofen  600 mg Oral Q6H    lactated ringers  500 mL Intravenous Once    mupirocin  1 g Nasal BID    senna-docusate 8.6-50 mg  1 tablet Oral BID     Continuous Infusions:   lactated ringers 125 mL/hr at 08/15/19 1700     PRN Meds:bisacodyl, diphenhydrAMINE, diphenhydrAMINE, HYDROcodone-acetaminophen, ondansetron, oxyCODONE-acetaminophen, promethazine (PHENERGAN) IVPB    Review of patient's allergies indicates:  No Known Allergies    Objective:     Vital Signs (Most Recent):  Temp: 97.7 °F (36.5 °C) (08/16/19 0330)  Pulse: (!) 54 (08/16/19 0330)  Resp: 16 (08/16/19 0330)  BP: 110/67 (08/16/19 0347)  SpO2: (!) 94 % (08/16/19 0347) Vital Signs (24h Range):  Temp:  [97.3 °F (36.3 °C)-97.8 °F (36.6 °C)] 97.7 °F (36.5 °C)  Pulse:  [54-76] 54  Resp:  [16] 16  SpO2:  [92 %-99 %] 94 %  BP: ()/(52-67) 110/67     Weight: 77.6 kg (171 lb)  Body mass index is 33.4 kg/m².  No LMP recorded (lmp unknown). Patient is postmenopausal.    I&O (Last 24H):    Intake/Output Summary (Last 24 hours) at 8/16/2019 0623  Last data filed at 8/16/2019 0500  Gross per 24 hour   Intake 3200 ml   Output 905 ml   Net 2295 ml     Physical Exam:   Constitutional: She  is oriented to person, place, and time. She appears well-developed.    Cardiovascular: Normal rate, regular rhythm, normal heart sounds.  Pulmonary/Chest: Effort normal and breath sounds normal. No respiratory distress. No wheezes.      Abdominal: Laparoscopic incisions clean, dry, and intact, with steri-strips in place. Abdomen soft. Non-distended. There is mild/appripriate post-op tenderness  Genitourinary: Exam deferred  Neurological: She is alert and oriented to person, place, and time.    Skin: Nails show no clubbing.    Psychiatric: She has a normal mood and affect.     Laboratory:  None    Assessment/Plan:     Active Diagnoses:    Diagnosis Date Noted POA    PRINCIPAL PROBLEM:  History of robot-assisted laparoscopic hysterectomy [Z90.710] 08/15/2019 Not Applicable    Endometrial cancer [C54.1] 08/02/2019 Yes      Problems Resolved During this Admission:     POD#1 s/p RA TLH/BSO/LND  - routine post op advanced  - Advance diet to regular  - Continue PO hydration, d/c IVF  - Continue IS  - Removed Holliday catheter this morning. (UOP adequate overnight at 0.94 mL/Kg/hr)  - Encourage ambulation  - SCDs for DVT PPX  - Continue PO pain medication    Anticipate discharge later today after void trial.    Dane Colbert MD PGY3  Gynecology Oncology  [facility]

## 2019-08-16 NOTE — DISCHARGE SUMMARY
Discharge Summary  Gynecology      Admit Date: 8/15/2019    Discharge Date and Time: 2019     Attending Physician: Darius Regalado MD    Principal Diagnoses:   History of robot-assisted laparoscopic hysterectomy    Active Hospital Problems    Diagnosis  POA    *History of robot-assisted laparoscopic hysterectomy [Z90.710]  Not Applicable    Endometrial cancer [C54.1]  Yes      Resolved Hospital Problems   No resolved problems to display.     Procedures:   Procedure(s) (LRB):  XI ROBOTIC HYSTERECTOMY (N/A)  XI ROBOTIC SALPINGO-OOPHORECTOMY (Bilateral)  ROBOTIC LYMPHADENECTOMY (Left)    Discharged Condition: good    Hospital Course:   Jeanne Rodgers is a 64 y.o. y.o.  female who presented on 8/15/2019 for the above-listed procedures for the treatment of uterine papillary serous carcinoma. PMH is significant for h/o breast cancer, fibromyalgia, chronic back pain, and lichen sclerosis. Patient tolerated the procedure well and was admitted for post-operative care. Post-operative course was uncomplicated.  On day of discharge (POD#1), patient was in stable condition, having met all post-operative milestones. She was urinating spontaneously, ambulating, and tolerating a regular diet without nausea/vomiting. Pain was well-controlled on oral medication. She was discharged with medications and follow up as listed below.     Consults: None    Significant Diagnostic Studies:  No results for input(s): WBC, HGB, HCT, MCV, PLT in the last 168 hours.     Treatments:   1. Surgery as above    Disposition: Home or Self Care    Patient Instructions:   Current Discharge Medication List      START taking these medications    Details   oxyCODONE (ROXICODONE) 5 MG immediate release tablet Take 1 tablet (5 mg total) by mouth every 3 (three) hours as needed.  Qty: 20 tablet, Refills: 0         CONTINUE these medications which have CHANGED    Details   ibuprofen (ADVIL,MOTRIN) 600 MG tablet Take 1 tablet (600 mg total) by  mouth every 6 (six) hours as needed for Pain.  Qty: 60 tablet, Refills: 0         CONTINUE these medications which have NOT CHANGED    Details   chlorhexidine (PERIDEX) 0.12 % solution SWISH AND SPIT 15MLS BY MOUTH 2 TIMES DAILY FOR 7 DAYS.  Refills: 0      clobetasol 0.05% (TEMOVATE) 0.05 % Oint APPLY TO AFFECTED AREA(S) TWICE A DAY FOR 1 MONTH(S) then EVERY DAY FOR 1 MONTH(S) then 3 TIMES A WEEK  Qty: 60 g, Refills: 2      diphenhydrAMINE-aluminum-magnesium hydroxide-simethicone-lidocaine HCl 2% Swish and spit 5 mLs every 4 (four) hours as needed.  Qty: 300 mL, Refills: 0    Associated Diagnoses: Throat pain      fluticasone propionate (FLONASE) 50 mcg/actuation nasal spray 1 spray (50 mcg total) by Each Nostril route 2 (two) times daily as needed for Rhinitis.  Qty: 16 g, Refills: 0    Associated Diagnoses: Allergic rhinitis, unspecified seasonality, unspecified trigger      levocetirizine (XYZAL) 5 MG tablet Take 1 tablet (5 mg total) by mouth every evening.  Qty: 30 tablet, Refills: 3    Associated Diagnoses: Allergic rhinitis, unspecified seasonality, unspecified trigger      nystatin (MYCOSTATIN) cream Apply topically every Mon, Wed, Fri.  Qty: 30 g, Refills: 11    Associated Diagnoses: Vaginal yeast infection      silver sulfADIAZINE 1% (SILVADENE) 1 % cream Apply topically every morning.  Qty: 50 g, Refills: 11    Associated Diagnoses: Lichen sclerosus             Discharge Procedure Orders   Lifting restrictions   Scheduling Instructions: Do not lift anything heavier than a gallon of milk for 2 weeks.  No heavy lifting until your post-operative visit in 4-6 weeks.     No driving until:   Scheduling Instructions: Pain is well controlled without narcotic medication.     Notify your health care provider if you experience any of the following:  temperature >100.4     Notify your health care provider if you experience any of the following:  severe uncontrolled pain     Notify your health care provider if you  experience any of the following:  redness, tenderness, or signs of infection (pain, swelling, redness, odor or green/yellow discharge around incision site)     Notify your health care provider if you experience any of the following:   Scheduling Instructions: Heavy vaginal bleeding, like a period, soaking a pad an hour.     No dressing needed   Scheduling Instructions: Wash with warm soapy water in the shower and pat dry daily.   Leave white surgical strips in place until 5 days postoperatively.  Then take them off.  Do not soak in a tub for 2 weeks after surgery.     Activity as tolerated   Scheduling Instructions: Nothing in the vagina for 6 weeks - No douching, tampons, or sex.       Follow-up Information     Darius Regalado MD. Go in 6 weeks.    Specialties:  Gynecologic Oncology, Gynecology, Oncology  Why:  Post-Op  Contact information:  [address]  [facility] [id number] [phone]               Dane Colbert M.D.  Obstetrics & Gynecology  PGY-3

## 2019-08-16 NOTE — PLAN OF CARE
Discharge Planning:  Patient admitted on 8-15-19  LOS-day 1  Chart reviewed  Discussed care plan with treatment team,  attending Dr Regalado  Consults following are: case mgt.,  PCP updated in Epic: yes  Pharmacy: [facility] in [place]  Insurance: medicare a/b  DME at home: n/a  Current dispo:  Home today   Ms Newton reports no needs upon discharge   Transportation: sister will be here to transport home  Case management to follow as needed       08/16/19 1023   Discharge Assessment   Assessment Type Discharge Planning Assessment   Confirmed/corrected address and phone number on facesheet? Yes   Assessment information obtained from? Patient;Caregiver;Medical Record   Communicated expected length of stay with patient/caregiver yes   Prior to hospitilization cognitive status: Alert/Oriented   Prior to hospitalization functional status: Independent   Current cognitive status: Alert/Oriented   Current Functional Status: Independent   Able to Return to Prior Arrangements yes   Is patient able to care for self after discharge? Yes   Patient currently being followed by outpatient case management? No   Equipment Currently Used at Home none   Do you have any problems affording any of your prescribed medications? No   Is the patient taking medications as prescribed? yes   Does the patient have transportation home? Yes   Transportation Anticipated family or friend will provide   Discharge Plan A Home   Patient/Family in Agreement with Plan yes

## 2019-08-16 NOTE — NURSING
Spontaneous voiding trial done. Patient voided 300cc yellow urine. Bladder scan showed less than 5cc. Will page Gyn resident per orders.

## 2019-08-16 NOTE — PLAN OF CARE
Problem: Adult Inpatient Plan of Care  Goal: Plan of Care Review  Outcome: Outcome(s) achieved Date Met: 08/15/19  AAOx4.  NAD noted.  Pt remained free from injury or falls.  Pt remains free from skin breakdowns. Vitals signs stable throughout shift on RA.  Positions self independently.  Voiding inadequate amount of magi urine (120 ML), MD notified.  LR bolus initiated per MD orders. Pain managed with PO medication.  Pt remained afebrile this shift. Lap sites x5, dressed with steristrips, CDI.  Pt able to voice needs and concerns.  Purposeful rounding done.  All needs met.  Bed locked in lowest position, call bell in reach.  Will continue to monitor.

## 2019-08-16 NOTE — PLAN OF CARE
08/16/19 1026   Final Note   Assessment Type Final Discharge Note   Anticipated Discharge Disposition Home   Hospital Follow Up  Appt(s) scheduled? Yes   Discharge plans and expectations educations in teach back method with documentation complete? Yes   Right Care Referral Info   Post Acute Recommendation Other   Referral Type see AVS

## 2019-08-16 NOTE — NURSING
Spoke to Dr West regarding the pt only voiding 120 ML of magi colored urine from [id number].  MD verbalized understanding and stated to administer a 500 ML bolus of LR.  MD order initiated.

## 2019-08-16 NOTE — TELEPHONE ENCOUNTER
rec'd pc from pt stating [facility] do no accept escript for controlled meds. Rx needs to be a hard copy. Dr. Bhandari ( GYN/ONC resident) will walk  a rx down to [facility] pharm for pt. Pt advised to call pharm in a little while to check status. EDMUND/MA

## 2019-08-16 NOTE — NURSING
Spoke to Dr. West regarding pt's pulse rate at 54 and urine output of 300cc of magi colored urine [id number]. Dr. West stated to administer another 500ML bolus of LR. Order initiated.

## 2019-08-16 NOTE — TELEPHONE ENCOUNTER
----- Message from Latisha Lind sent at 8/16/2019 10:27 AM CDT -----  Contact: YON MCKEON   Name of Who is Calling: YON MCKEON       What is the request in detail: Patient is requesting a call back. She states that she was given a few prescriptions and called [facility] and they were not sent over. She would like to speak with someone regarding her medication.       Can the clinic reply by [facility]: No      What Number to Call Back [facility]:  [phone]

## 2019-08-19 NOTE — OP NOTE
DATE OF PROCEDURE:  8/15/19     SURGEON: Darius Regalado        ASSISTANTS: Dane Colbert MD; Aydee Chakraborty NP who served as first assist     PREOPERATIVE DIAGNOSES: Endometrial cancer  PET positive left pelvic and paraaortic lymphadenopathy     POSTOPERATIVE DIAGNOSES: Same     PROCEDURES:  Robotic-assisted laparoscopic hysterectomy and bilateral   salpingo-oophorectomy, debulking of left pelvic lymphadenopathy     COMPLICATIONS: None     ESTIMATED BLOOD LOSS: 50 cc     ANESTHESIA: GETA     INTRAOPERATIVE FINDINGS:  8 cm uterus with no apparent spread of disease outside of the uterus.  She had normal bilateral tubes and ovaries.       PROCEDURE IN DETAIL: Informed consent was obtained and the patient was taken to   the Operating Suite.  General anesthesia was administered.  Once felt to be   adequate, she was placed in dorsal lithotomy position with her arms tucked.  The   abdomen and pelvis were prepped and draped in the usual fashion and a speculum   was placed in the vagina.  The cervix was visualized, grasped with a   single-tooth tenaculum and the uterus sounded to approximately 8 cm.    Serial dilation of cervix was performed and a small VCare manipulator was   placed without difficulty.  Holliday catheter was placed to gravity drainage and   attention was turned to the abdominal portion of the procedure. A Veress needle was placed through the umbilicus an opening pressure was 1.  Pneumoperitoneum was obtained with carbon dioxide and once this was felt to be adequate, an 8 mm   incision was made above the umbilicus and a robotic trocar was placed through this.  Intraperitoneal  placement was confirmed with the camera.  Lateral robotic trocars x 3 were   placed through 8 mm incisions.  A right upper quadrant 8 mm AirSeal accessory   port was placed.  The patient was placed in steep Trendelenburg.  The robot was   docked and instruments were passed in the operative field.  Attention was first turned to the left  side   where the left round ligament was transected after sacrificing with bipolar   cautery.  The anterior and posterior leaflets of the broad ligament were opened.  The pararectal space was developed.  The ureter was identified and a window   was made beneath the infundibulopelvic ligament.  This was sacrificed with   bipolar cautery and transected.  The medial fold of the peritoneum was then   taken to the uterosacrals.  Attention was turned to the right side, which was   taken down in an identical manner.  Anteriorly, the vesicouterine peritoneum was   incised and the bladder was mobilized inferiorly over the ring.  A 10 o'clock   to 2 o'clock colpotomy was performed.  Posteriorly, a 4 o'clock to 8 o'clock   colpotomy was performed and bilateral cardinal ligaments and uterine vessels   skeletonized and their peritoneal attachments sacrificed with bipolar cautery   and transected.  Circumferential colpotomy was completed and the uterus, cervix,   bilateral tubes and ovaries were removed.   Attention was then turned to the left side, where the pararectal and paravesical spaces were difficult to develop due to this large node overlying the iliac vein.  Ultimately, the obturator nerve was seen.  Careful dissection laterally over the psoas muscle allowed the iliac artery to be identified.  The node was then elevated, and a combination of sharp an monopolar dissection was used to further free the node from her vital structures.  Approximately 90% of the node could be removed.  Hemoblast was applied. The right diaz packet could not be removed due to her body habitus and pelvic adhesive disease. The cuff was then closed with a 0 PDS suture tied in the midline.  The pelvis was irrigated and noted to be   hemostatic.  Once hemostasis was confirmed, the instruments were removed, the robot was undocked and the pneumoperitoneum was evacuated.  The patient was flattened.  All ports were removed and port sites   were  inspected and made hemostatic with electrocautery and closed with   subcuticular 4-0 Monocryl suture.  Steri-Strips and pressure dressing were   applied and the patient was awoken and taken to Recovery Room in stable   condition.  Of note, I was present for and performed all key aspects of   procedure.  Aydee Charkaborty's expertise was needed as there was no qualified   resident available.

## 2019-08-23 ENCOUNTER — TELEPHONE (OUTPATIENT)
Dept: GYNECOLOGIC ONCOLOGY | Facility: CLINIC | Age: 64
End: 2019-08-23

## 2019-08-23 ENCOUNTER — OFFICE VISIT (OUTPATIENT)
Dept: FAMILY MEDICINE | Facility: CLINIC | Age: 64
End: 2019-08-23
Payer: MEDICARE

## 2019-08-23 VITALS
TEMPERATURE: 98 F | RESPIRATION RATE: 20 BRPM | WEIGHT: 171.75 LBS | HEART RATE: 97 BPM | DIASTOLIC BLOOD PRESSURE: 78 MMHG | OXYGEN SATURATION: 96 % | HEIGHT: 60 IN | SYSTOLIC BLOOD PRESSURE: 130 MMHG | BODY MASS INDEX: 33.72 KG/M2

## 2019-08-23 DIAGNOSIS — R33.9 URINARY RETENTION: ICD-10-CM

## 2019-08-23 DIAGNOSIS — S39.012A BACK STRAIN, INITIAL ENCOUNTER: ICD-10-CM

## 2019-08-23 DIAGNOSIS — N30.01 ACUTE CYSTITIS WITH HEMATURIA: Primary | ICD-10-CM

## 2019-08-23 LAB
BILIRUB SERPL-MCNC: ABNORMAL MG/DL
BLOOD URINE, POC: ABNORMAL
COLOR, POC UA: ABNORMAL
GLUCOSE UR QL STRIP: NORMAL
KETONES UR QL STRIP: ABNORMAL
LEUKOCYTE ESTERASE URINE, POC: ABNORMAL
NITRITE, POC UA: ABNORMAL
PH, POC UA: 5
PROTEIN, POC: ABNORMAL
SPECIFIC GRAVITY, POC UA: 1.02
UROBILINOGEN, POC UA: NORMAL

## 2019-08-23 PROCEDURE — 99999 PR PBB SHADOW E&M-EST. PATIENT-LVL IV: ICD-10-PCS | Mod: PBBFAC,,, | Performed by: PHYSICIAN ASSISTANT

## 2019-08-23 PROCEDURE — 99214 OFFICE O/P EST MOD 30 MIN: CPT | Mod: PBBFAC,PO | Performed by: PHYSICIAN ASSISTANT

## 2019-08-23 PROCEDURE — 99999 PR PBB SHADOW E&M-EST. PATIENT-LVL IV: CPT | Mod: PBBFAC,,, | Performed by: PHYSICIAN ASSISTANT

## 2019-08-23 PROCEDURE — 99214 PR OFFICE/OUTPT VISIT, EST, LEVL IV, 30-39 MIN: ICD-10-PCS | Mod: S$PBB,,, | Performed by: PHYSICIAN ASSISTANT

## 2019-08-23 PROCEDURE — 81002 URINALYSIS NONAUTO W/O SCOPE: CPT | Mod: PBBFAC,PO | Performed by: PHYSICIAN ASSISTANT

## 2019-08-23 PROCEDURE — 99214 OFFICE O/P EST MOD 30 MIN: CPT | Mod: S$PBB,,, | Performed by: PHYSICIAN ASSISTANT

## 2019-08-23 PROCEDURE — 87086 URINE CULTURE/COLONY COUNT: CPT

## 2019-08-23 RX ORDER — NITROFURANTOIN 25; 75 MG/1; MG/1
100 CAPSULE ORAL 2 TIMES DAILY
Qty: 14 CAPSULE | Refills: 0 | Status: SHIPPED | OUTPATIENT
Start: 2019-08-23 | End: 2019-08-30

## 2019-08-23 RX ORDER — TIZANIDINE 4 MG/1
4 TABLET ORAL 2 TIMES DAILY PRN
Qty: 30 TABLET | Refills: 0 | Status: SHIPPED | OUTPATIENT
Start: 2019-08-23 | End: 2019-09-02

## 2019-08-23 NOTE — TELEPHONE ENCOUNTER
----- Message from Julissa Quintanilla sent at 8/23/2019  2:39 PM CDT -----  Contact: YON MCKEON   Name of Who is Calling: no      What is the request in detail: patient states she had lower back spasms and blood in her urine she was given medication for a possible uti and no fever she wanted to advise the doctor of what happened today       Can the clinic reply by [facility]: no      What Number to Call Back if not in [facility]: [phone]

## 2019-08-23 NOTE — PROGRESS NOTES
Subjective:       Patient ID: Jeanne Rodgers is a 64 y.o. female.    Chief Complaint: Back Pain    Back Pain   This is a new problem. The current episode started in the past 7 days. The problem occurs intermittently. The pain is present in the lumbar spine. The quality of the pain is described as aching. The pain does not radiate. The pain is moderate. The symptoms are aggravated by bending and twisting. Pertinent negatives include no bladder incontinence, bowel incontinence, dysuria or paresthesias. Risk factors include history of cancer. She has tried analgesics and NSAIDs for the symptoms. The treatment provided mild relief.     Social History     Socioeconomic History    Marital status: Single     Spouse name: Not on file    Number of children: Not on file    Years of education: Not on file    Highest education level: Not on file   Occupational History    Not on file   Social Needs    Financial resource strain: Not on file    Food insecurity:     Worry: Not on file     Inability: Not on file    Transportation needs:     Medical: Not on file     Non-medical: Not on file   Tobacco Use    Smoking status: Never Smoker    Smokeless tobacco: Never Used   Substance and Sexual Activity    Alcohol use: No     Alcohol/week: 0.0 oz    Drug use: No    Sexual activity: Never   Lifestyle    Physical activity:     Days per week: Not on file     Minutes per session: Not on file    Stress: Patient refused   Relationships    Social connections:     Talks on phone: Not on file     Gets together: Not on file     Attends Church service: Not on file     Active member of club or organization: Not on file     Attends meetings of clubs or organizations: Not on file     Relationship status: Not on file   Other Topics Concern    Not on file   Social History Narrative    Not on file       Review of Systems   Gastrointestinal: Negative for bowel incontinence.   Genitourinary: Positive for difficulty urinating.  Negative for bladder incontinence, dysuria and frequency.   Musculoskeletal: Positive for back pain.   Neurological: Negative for paresthesias.       Objective:      Physical Exam   Constitutional: She appears well-developed and well-nourished.   HENT:   Head: Normocephalic.   Abdominal: Soft. Bowel sounds are normal. She exhibits no distension. There is no tenderness. There is no CVA tenderness.   Musculoskeletal:        Back:    Vitals reviewed.      Assessment:       1. Acute cystitis with hematuria    2. Back strain, initial encounter    3. Urinary retention        Plan:         Jeanne was seen today for back pain.    Diagnoses and all orders for this visit:    Acute cystitis with hematuria  -     Urine culture  -     nitrofurantoin, macrocrystal-monohydrate, (MACROBID) 100 MG capsule; Take 1 capsule (100 mg total) by mouth 2 (two) times daily. for 7 days  -     Increase fluids, if retention worsens advised ED    Back strain, initial encounter  -     tiZANidine (ZANAFLEX) 4 MG tablet; Take 1 tablet (4 mg total) by mouth 2 (two) times daily as needed (muscle pain).  -     Pt already taking NSAIDs, advised to continue; will monitor closely; likely due to having to sleep on her back s/p surgery which pt isnt used to    Urinary retention  -     POCT URINE DIPSTICK WITHOUT MICROSCOPE  -     Urine culture

## 2019-08-23 NOTE — TELEPHONE ENCOUNTER
"rec'd pc from pt with sever "muscle spasms and pelvic pain" Pt states she was given medicine earlier today for a UTI. Advised pt proceed with taking the meds. If s/sx worsen throughout the night go to ER. Pt verbalized understanding. EDMUND/MA   "

## 2019-08-25 LAB — BACTERIA UR CULT: NORMAL

## 2019-08-26 ENCOUNTER — TELEPHONE (OUTPATIENT)
Dept: FAMILY MEDICINE | Facility: CLINIC | Age: 64
End: 2019-08-26

## 2019-08-26 NOTE — TELEPHONE ENCOUNTER
----- Message from Nicci Langley PA-C sent at 8/26/2019  7:59 AM CDT -----  Culture was negative for a UTI, verify if pt is still experiencing symptoms, if so recommend she f/u with her OBGYN

## 2019-08-27 ENCOUNTER — TELEPHONE (OUTPATIENT)
Dept: GYNECOLOGIC ONCOLOGY | Facility: CLINIC | Age: 64
End: 2019-08-27

## 2019-08-27 ENCOUNTER — PATIENT MESSAGE (OUTPATIENT)
Dept: GYNECOLOGIC ONCOLOGY | Facility: CLINIC | Age: 64
End: 2019-08-27

## 2019-08-27 NOTE — TELEPHONE ENCOUNTER
----- Message from Jessie Arias RN sent at 8/27/2019  2:50 PM CDT -----  She never had post op appt scheduled. Please get her in 4 wks from surgery to see Dr. Regalado please.    Jessie

## 2019-08-28 NOTE — PHYSICIAN QUERY
PT Name: Jeanne Rodgers  MR #: [id number]    Physician Query Form - Pathology Findings Clarification     CDS/: JENNIE Hanna,RNC-MNN         Contact information:[email or web address]  This form is a permanent document in the medical record.     Query Date: August 28, 2019      By submitting this query, we are merely seeking further clarification of documentation.  Please utilize your independent clinical judgment when addressing the question(s) below.      The medical record contains the following:     Findings Supporting Clinical Information Location in Medical Record   FINAL PATHOLOGIC DIAGNOSIS  1. UTERUS, CERVIX, BILATERAL FALLOPIAN TUBES AND OVARIES:  Uterine serous adenocarcinoma  Tumor involving cervical stromal tissue  Margins all negative  Adenomyosis present  Bilateral fallopian tubes negative for malignancy  Bilateral ovaries tubes negative for malignancy  2. LYMPH NODES, LEFT EXTRENAL ILIAC, EXCISION:  Six of eight lymph nodes POSITIVE for metastatic adenocarcinoma (6/8)                             INTRAOPERATIVE FINDINGS:  8 cm uterus with no apparent spread of disease outside of the uterus.  She had normal bilateral tubes and ovaries.    PREOPERATIVE DIAGNOSES: Endometrial cancer  PET positive left pelvic and paraaortic lymphadenopathy     POSTOPERATIVE DIAGNOSES: Same     PROCEDURES:  Robotic-assisted laparoscopic hysterectomy and bilateral   salpingo-oophorectomy, debulking of left pelvic lymphadenopathy Pathology report 8/15                          Op note 8/16     Please document the clinical significance of the Pathologists findings of  1. UTERUS, CERVIX, BILATERAL FALLOPIAN TUBES AND OVARIES:  Uterine serous adenocarcinoma  Tumor involving cervical stromal tissue  Margins all negative  Adenomyosis present  Bilateral fallopian tubes negative for malignancy  Bilateral ovaries tubes negative for malignancy  2. LYMPH NODES, LEFT EXTRENAL ILIAC, EXCISION:  Six of eight lymph  nodes POSITIVE for metastatic adenocarcinoma (6/8) .    [ x  ] I agree with the Pathology Findings   [   ] I do not agree with the Pathology Findings   [   ] Other/Clarification of Findings:   [   ] Clinically Insignificant   [  ] Clinically Undetermined       Please document in your progress notes daily for the duration of treatment until resolved and include in your discharge summary.

## 2019-08-30 ENCOUNTER — PATIENT MESSAGE (OUTPATIENT)
Dept: SURGERY | Facility: OTHER | Age: 64
End: 2019-08-30

## 2019-08-30 ENCOUNTER — TELEPHONE (OUTPATIENT)
Dept: GYNECOLOGIC ONCOLOGY | Facility: CLINIC | Age: 64
End: 2019-08-30

## 2019-08-30 ENCOUNTER — PATIENT MESSAGE (OUTPATIENT)
Dept: GYNECOLOGIC ONCOLOGY | Facility: CLINIC | Age: 64
End: 2019-08-30

## 2019-08-30 ENCOUNTER — PATIENT MESSAGE (OUTPATIENT)
Dept: OBSTETRICS AND GYNECOLOGY | Facility: CLINIC | Age: 64
End: 2019-08-30

## 2019-08-30 NOTE — TELEPHONE ENCOUNTER
Called to talk to her about her path.  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  I spoke with her about combination sandwich therapy with chemo and radiation.  She voiced understanding and was in agreement.  Healing well and no other complaints.

## 2019-09-05 ENCOUNTER — TELEPHONE (OUTPATIENT)
Dept: ENDOSCOPY | Facility: HOSPITAL | Age: 64
End: 2019-09-05

## 2019-09-05 ENCOUNTER — OFFICE VISIT (OUTPATIENT)
Dept: GASTROENTEROLOGY | Facility: CLINIC | Age: 64
End: 2019-09-05
Payer: MEDICARE

## 2019-09-05 VITALS
BODY MASS INDEX: 33.33 KG/M2 | HEIGHT: 60 IN | WEIGHT: 169.75 LBS | HEART RATE: 67 BPM | SYSTOLIC BLOOD PRESSURE: 126 MMHG | DIASTOLIC BLOOD PRESSURE: 76 MMHG

## 2019-09-05 DIAGNOSIS — Z86.010 HX OF ADENOMATOUS POLYP OF COLON: ICD-10-CM

## 2019-09-05 DIAGNOSIS — Z12.11 SPECIAL SCREENING FOR MALIGNANT NEOPLASMS, COLON: Primary | ICD-10-CM

## 2019-09-05 DIAGNOSIS — R10.11 RUQ ABDOMINAL PAIN: Primary | ICD-10-CM

## 2019-09-05 PROCEDURE — 99214 PR OFFICE/OUTPT VISIT, EST, LEVL IV, 30-39 MIN: ICD-10-PCS | Mod: S$PBB,ICN,, | Performed by: NURSE PRACTITIONER

## 2019-09-05 PROCEDURE — 99213 OFFICE O/P EST LOW 20 MIN: CPT | Mod: PBBFAC | Performed by: NURSE PRACTITIONER

## 2019-09-05 PROCEDURE — 99214 OFFICE O/P EST MOD 30 MIN: CPT | Mod: S$PBB,ICN,, | Performed by: NURSE PRACTITIONER

## 2019-09-05 PROCEDURE — 99999 PR PBB SHADOW E&M-EST. PATIENT-LVL III: ICD-10-PCS | Mod: PBBFAC,,, | Performed by: NURSE PRACTITIONER

## 2019-09-05 PROCEDURE — 99999 PR PBB SHADOW E&M-EST. PATIENT-LVL III: CPT | Mod: PBBFAC,,, | Performed by: NURSE PRACTITIONER

## 2019-09-05 RX ORDER — MULTIVIT WITH MINERALS/HERBS
1 TABLET ORAL DAILY
COMMUNITY
End: 2020-08-21

## 2019-09-05 RX ORDER — POLYETHYLENE GLYCOL 3350, SODIUM SULFATE ANHYDROUS, SODIUM BICARBONATE, SODIUM CHLORIDE, POTASSIUM CHLORIDE 236; 22.74; 6.74; 5.86; 2.97 G/4L; G/4L; G/4L; G/4L; G/4L
4 POWDER, FOR SOLUTION ORAL ONCE
Qty: 4000 ML | Refills: 0 | Status: SHIPPED | OUTPATIENT
Start: 2019-09-05 | End: 2019-09-05

## 2019-09-05 NOTE — LETTER
September 6, 2019      Iam Rodriguez PA-C  [address] [facility] [address]           [facility]astroenter[facility]  [address]  [facility] [address]  Phone: [phone]  Fax: [phone]          Patient: Jeanne Rodgers   MR Number: [id number]   YOB: 1955   Date of Visit: 9/5/2019       Dear Iam Rodriguez:    Thank you for referring Jeanne Rodgers to me for evaluation. Attached you will find relevant portions of my assessment and plan of care.    If you have questions, please do not hesitate to call me. I look forward to following Jeanne Rodgers along with you.    Sincerely,    Leatha Ayon, NP    Enclosure  CC:  No Recipients    If you would like to receive this communication electronically, please contact [email or web address] or [phone] to request more information on [employer] Link access.    For providers and/or their staff who would like to refer a patient to [facility], please contact us through our one-stop-shop provider referral line, [facility], at [phone].    If you feel you have received this communication in error or would no longer like to receive these types of communications, please e-mail [email or web address]

## 2019-09-05 NOTE — PATIENT INSTRUCTIONS
Women need 25 grams of fiber per day, and men need 38 grams per day, according to the [place] [employer].    If you're not meeting your recommended daily dose of fiber via food, fiber supplementation is beneficial in helping meet those daily recommendations. Be sure to drink plenty of water while taking fiber supplementation. Make sure to read fiber supplementation drug label directions regarding when and how to take the supplementation.    Dietary fiber content of frequently consumed foods  Food Fiber, g/serving   Fruits   Apple (with skin) 3.5/1 medium-sized apple   Apricot (fresh) 1.8/3 apricots   Banana 2.5/1 banana   Cantaloupe 2.7/half edible portion   Dates 13.5/1 cup (chopped)   Grapefruit 1.6/half edible portion   Grapes 2.6/10 grapes   Oranges 2.6/1 orange   Peach (with skin) 2.1/1 peach   Pear (with skin) 4.6/1 pear   Pineapple 2.2/1 cup (diced)   Prunes 11.9/11 dried prunes   Raisins 2.2/packet   Strawberries 3.0/1 cup   Juices   Apple 0.74/1 cup   Grapefruit 1.0/1 cup   Grape 1.3/1 cup   Orange 1.0/1 cup   Vegetables   Cooked   Asparagus 1.5/7 young   Beans, string, green 3.4/1 cup   Broccoli 5.0/1 stalk   [employer] sprouts 4.6/7-8 sprouts   Cabbage 2.9/1 cup (cooked)   Carrots 4.6/1 cup   Cauliflower 2.1/1 cup   Peas 7.2/1 cup (cooked)   Potato (with skin) 2.3/1 boiled   Spinach 4.1/1 cup (raw)   Squash, summer 3.4/1 cup (cooked, diced)   Sweet potatoes 2.7/1 baked   Zucchini 4.2/1 cup (cooked, diced)   Raw   Cucumber 0.2/6-8 slices with skin   Lettuce 2.0/1 wedge iceberg   Mushrooms 0.8/half cup (sliced)   Onions 1.3/1 cup   Peppers, green 1.0/1 pod   Tomato 1.8/1 tomato   Spinach 8.0/1 cup (chopped)   Legumes   Baked beans 18.6/1 cup   Dried peas 4.7/half cup (cooked)   Kidney beans 7.4/half cup (cooked)   Lima beans 2.6/half cup (cooked)   Lentils 1.9/half cup (cooked)   Breads, pastas, and flours   Bagels 1.1/half bagel   Bran muffins 6.3/muffin   Cracked wheat 4.1/slice   Oatmeal 5.3/1 cup    Pumpernickel bread 1.0/slice   White bread 0.55/slice   Whole-wheat bread 1.66/slice   Pasta and rice cooked   Macaroni 1.0/1 cup (cooked)   Rice, brown 2.4/1 cup (cooked)   Rice, polished 0.6/1 cup (cooked)   Spaghetti (regular) 1.0/1 cup (cooked)   Flours and grains   Bran, oat 8.3/oz   Bran, wheat 12.4/oz   Rolled oats 13.7/1 cup (cooked)   Nuts   Almonds 3.6/half cup (slivered)   Peanuts 11.7/1 cup

## 2019-09-07 NOTE — PROGRESS NOTES
[facility] Consultation Note    Reason for Consult:  The primary encounter diagnosis was RUQ abdominal pain. A diagnosis of Hx of adenomatous polyp of colon was also pertinent to this visit.    PCP:   Iris Walls   [address] [facility] [address]    Referring MD:  Iam Rodriguez Pa-c  [address] [place] [address]    HPI:  This is a 64 y.o. female here for evaluation of right upper quadrant abdominal pain. She is a new patient.    She reports her pain started in April.  It is not worse with meals.  Is not every day.  She reports it is worse when she is laying on her right side.  It lasts a couple minutes.  She denies nausea or vomiting.  Denies reflux and dysphagia.  She has diarrhea but that has been for many years she has not tried a fiber supplement she reports when she eats a lot of being is helps from her stool.  She denies overt signs of GI bleeding.  She takes ibuprofen about once a week.  She is concerned today because she states she recently had her uterus removed for endometrial cancer in August.  She states this surgeon told her she had diverticulitis.  There is no mention of this in his note.  She was not given antibiotics for treatment of diverticulitis.      Martínez screening- Family Hx of:  Colon cancer-  Uterine cancer-  Small bowel cancer-  Ureter cancer-  Renal pelvis cancer-    ROS:  Constitutional: No fevers, chills, No weight loss  ENT: No allergies  CV: No chest pain  Pulm: No cough, No shortness of breath  Ophtho: No vision changes  GI: see HPI  Derm: No rash  Heme: No lymphadenopathy, No bruising  MSK: + arthritis  : No dysuria, No hematuria  Psych: + anxiety, No depression    Medical History:  has a past medical history of Allergy, Anxiety, Breast cancer (2014), Chronic back pain, Endometrial cancer (07/30/2019), Fibromyalgia, Hives, radiation therapy, Itching, Lichen sclerosus, Mental disorder, PONV (postoperative nausea and vomiting),  Sore throat, and UTI (urinary tract infection).    Surgical History:  has a past surgical history that includes Appendectomy; Tubal ligation; Carpal tunnel release; Cholecystectomy; Breast biopsy (Right, 2014); Tonsillectomy; Colonoscopy; Hysteroscopy with dilation and curettage of uterus (N/A, 7/24/2019); Mastectomy (Right); Breast lumpectomy (Right, 2014); Robot-assisted laparoscopic abdominal hysterectomy using da Catrina Xi (N/A, 8/15/2019); Robot-assisted laparoscopic salpingo-oophorectomy using da Catrina Xi (Bilateral, 8/15/2019); Robot-assisted lymphadenectomy (Left, 8/15/2019); and anal fissure surgery.    Family History: family history includes Arthritis in her mother; Cancer (age of onset: 60) in her maternal aunt; Cancer (age of onset: 84) in her father; Cancer (age of onset: 85) in her mother; Colon cancer in her paternal uncle; Hashimoto's thyroiditis in her sister; Heart attack in her paternal grandfather and paternal grandmother; Hypertension in her brother, brother, and mother; Lung cancer in her paternal aunt; No Known Problems in her sister..     Social History:  reports that she has never smoked. She has never used smokeless tobacco. She reports that she does not drink alcohol or use drugs.    Review of patient's allergies indicates:  No Known Allergies    Current Outpatient Medications on File Prior to Visit   Medication Sig Dispense Refill    b complex vitamins tablet Take 1 tablet by mouth once daily.      chlorhexidine (PERIDEX) 0.12 % solution SWISH AND SPIT 15MLS BY MOUTH 2 TIMES DAILY FOR 7 DAYS.  0    clobetasol 0.05% (TEMOVATE) 0.05 % Oint APPLY TO AFFECTED AREA(S) TWICE A DAY FOR 1 MONTH(S) then EVERY DAY FOR 1 MONTH(S) then 3 TIMES A WEEK 60 g 2    diphenhydrAMINE-aluminum-magnesium hydroxide-simethicone-lidocaine HCl 2% Swish and spit 5 mLs every 4 (four) hours as needed. 300 mL 0    ibuprofen (ADVIL,MOTRIN) 600 MG tablet Take 1 tablet (600 mg total) by mouth every 6 (six) hours as  needed for Pain. 60 tablet 0    levocetirizine (XYZAL) 5 MG tablet Take 1 tablet (5 mg total) by mouth every evening. 30 tablet 3    MILK THISTLE ORAL Take by mouth.      nystatin (MYCOSTATIN) cream Apply topically every Mon, Wed, Fri. 30 g 11    ondansetron (ZOFRAN) 8 MG tablet Take 1 tablet (8 mg total) by mouth every 12 (twelve) hours as needed for Nausea. 30 tablet 2    silver sulfADIAZINE 1% (SILVADENE) 1 % cream Apply topically every morning. 50 g 11     No current facility-administered medications on file prior to visit.          Objective Findings:    Vital Signs:  /76   Pulse 67   Ht 5' (1.524 m)   Wt 77 kg (169 lb 12.1 oz)   LMP  (LMP Unknown) Comment: age 53  BMI 33.15 kg/m²   Body mass index is 33.15 kg/m².    Physical Exam:  General Appearance: Well appearing in no acute distress  Head:   Normocephalic, without obvious abnormality  Eyes:    No scleral icterus  ENT: Neck supple  Lungs: CTA bilaterally in anterior and posterior fields, no wheezes, no crackles.  Heart:  Regular rate and rhythm, S1, S2 normal, no murmurs heard  Extremities: No edema  Skin: No rash  Neurologic: AAO x 3      Labs Reviewed:  Lab Results   Component Value Date    WBC 6.12 07/23/2019    HGB 14.0 07/23/2019    HCT 43.0 07/23/2019     07/23/2019    CRP 5.3 12/20/2017    CHOL 214 (H) 12/26/2017    TRIG 356 (H) 12/26/2017    HDL 43 12/26/2017    ALT 29 07/23/2019    AST 33 07/23/2019     07/23/2019    K 4.6 07/23/2019     07/23/2019    CREATININE 0.8 07/23/2019    BUN 11 07/23/2019    CO2 27 07/23/2019    TSH 0.976 12/26/2017    HGBA1C 5.6 06/05/2019       Imaging:  CT abd pelvis 8/2019 reviewed. Unremarkable    Endoscopy:     Colonoscopy at [facility]. Fair Prep. Polyps removed. Was told repeat in 3 years.    Assessment:    Ms. Rodgers is a 64 y.o. WF with:    1. RUQ abdominal pain    2. Hx of adenomatous polyp of colon      CT unremarkable for sources of RUQ pain.  Will r/o H. Pylori. She is due for  screening colonoscopy, will order EGD to evaluate for PUD or other sources of abd pain.     Recommendations:  1. Labs  2. EGD and colonsocopy.  3. Fiber diet as discussed and printed in AVS    No follow-ups on file.      Order summary:  Orders Placed This Encounter    H.Pylori Antibody IgG    Case request GI: EGD (ESOPHAGOGASTRODUODENOSCOPY), COLONOSCOPY         Thank you so much for allowing me to participate in the care of Jeanne Ayon, ALINAP-C

## 2019-09-10 ENCOUNTER — OFFICE VISIT (OUTPATIENT)
Dept: FAMILY MEDICINE | Facility: CLINIC | Age: 64
End: 2019-09-10
Payer: MEDICARE

## 2019-09-10 ENCOUNTER — HOSPITAL ENCOUNTER (OUTPATIENT)
Dept: RADIOLOGY | Facility: HOSPITAL | Age: 64
Discharge: HOME | End: 2019-09-10
Attending: PHYSICIAN ASSISTANT
Payer: MEDICARE

## 2019-09-10 VITALS
OXYGEN SATURATION: 98 % | WEIGHT: 166.44 LBS | BODY MASS INDEX: 32.68 KG/M2 | HEIGHT: 60 IN | TEMPERATURE: 98 F | SYSTOLIC BLOOD PRESSURE: 126 MMHG | RESPIRATION RATE: 16 BRPM | DIASTOLIC BLOOD PRESSURE: 72 MMHG | HEART RATE: 70 BPM

## 2019-09-10 DIAGNOSIS — K59.00 CONSTIPATION, UNSPECIFIED CONSTIPATION TYPE: Primary | ICD-10-CM

## 2019-09-10 DIAGNOSIS — C54.1 ENDOMETRIAL CANCER: ICD-10-CM

## 2019-09-10 DIAGNOSIS — Z23 NEED FOR VACCINATION WITH 13-POLYVALENT PNEUMOCOCCAL CONJUGATE VACCINE: ICD-10-CM

## 2019-09-10 DIAGNOSIS — K59.00 CONSTIPATION, UNSPECIFIED CONSTIPATION TYPE: ICD-10-CM

## 2019-09-10 DIAGNOSIS — K64.9 HEMORRHOIDS, UNSPECIFIED HEMORRHOID TYPE: ICD-10-CM

## 2019-09-10 PROCEDURE — 99214 PR OFFICE/OUTPT VISIT, EST, LEVL IV, 30-39 MIN: ICD-10-PCS | Mod: S$PBB,,, | Performed by: PHYSICIAN ASSISTANT

## 2019-09-10 PROCEDURE — 74019 XR ABDOMEN FLAT AND ERECT: ICD-10-PCS | Mod: 26,,, | Performed by: RADIOLOGY

## 2019-09-10 PROCEDURE — 99215 OFFICE O/P EST HI 40 MIN: CPT | Mod: PBBFAC,25,PO | Performed by: PHYSICIAN ASSISTANT

## 2019-09-10 PROCEDURE — G0009 ADMIN PNEUMOCOCCAL VACCINE: HCPCS | Mod: PBBFAC,PO

## 2019-09-10 PROCEDURE — 74019 RADEX ABDOMEN 2 VIEWS: CPT | Mod: TC,FY

## 2019-09-10 PROCEDURE — 74019 RADEX ABDOMEN 2 VIEWS: CPT | Mod: 26,,, | Performed by: RADIOLOGY

## 2019-09-10 PROCEDURE — 99214 OFFICE O/P EST MOD 30 MIN: CPT | Mod: S$PBB,,, | Performed by: PHYSICIAN ASSISTANT

## 2019-09-10 PROCEDURE — 99999 PR PBB SHADOW E&M-EST. PATIENT-LVL V: CPT | Mod: PBBFAC,,, | Performed by: PHYSICIAN ASSISTANT

## 2019-09-10 PROCEDURE — 99999 PR PBB SHADOW E&M-EST. PATIENT-LVL V: ICD-10-PCS | Mod: PBBFAC,,, | Performed by: PHYSICIAN ASSISTANT

## 2019-09-10 RX ORDER — POLYETHYLENE GLYCOL 3350, SODIUM SULFATE ANHYDROUS, SODIUM BICARBONATE, SODIUM CHLORIDE, POTASSIUM CHLORIDE 236; 22.74; 6.74; 5.86; 2.97 G/4L; G/4L; G/4L; G/4L; G/4L
POWDER, FOR SOLUTION ORAL
COMMUNITY
Start: 2019-09-05 | End: 2020-11-17 | Stop reason: CLARIF

## 2019-09-10 RX ORDER — HYDROCORTISONE 25 MG/G
CREAM TOPICAL 2 TIMES DAILY
Qty: 30 G | Refills: 1 | Status: SHIPPED | OUTPATIENT
Start: 2019-09-10 | End: 2020-11-17 | Stop reason: CLARIF

## 2019-09-10 NOTE — PROGRESS NOTES
Subjective:       Patient ID: Jeanne Rodgers is a 64 y.o. female with multiple medical diagnoses as listed in the medical history and problem list that presents for Constipation; Abdominal Pain (Since friday, ); and Hemorrhoids  .    Chief Complaint: Constipation; Abdominal Pain (Since friday, ); and Hemorrhoids      Constipation   This is a new problem. The current episode started in the past 7 days (still having BMs but small pellets and fulls bloated and uncomfortable ). The problem has been gradually worsening since onset. The stool is described as pellet like. The patient is not on a high fiber diet (was drinking fiber but stopped ). She does not exercise regularly. There has been adequate water intake (32 oz she states ). Associated symptoms include abdominal pain, bloating and nausea. Pertinent negatives include no anorexia, diarrhea, difficulty urinating, fecal incontinence, fever, flatus, hemorrhoids, melena or vomiting. Risk factors include obesity and stress (recently abdomnial surgery ). She has tried laxatives, enemas, fiber, mineral oil and stool softeners for the symptoms. The treatment provided no relief.   Abdominal Pain   Associated symptoms include constipation and nausea. Pertinent negatives include no anorexia, diarrhea, fever, flatus, melena or vomiting.        Review of Systems   Constitutional: Negative for fever.   Gastrointestinal: Positive for abdominal pain, bloating, constipation and nausea. Negative for anorexia, diarrhea, flatus, hemorrhoids, melena and vomiting.   Genitourinary: Negative for difficulty urinating.         PAST MEDICAL HISTORY:  Past Medical History:   Diagnosis Date    Allergy     Anxiety     Breast cancer 2014    Chronic back pain     Endometrial cancer 07/30/2019    Fibromyalgia     Hives     Hx of radiation therapy     Itching     Lichen sclerosus     Mental disorder     PONV (postoperative nausea and vomiting)     Sore throat     Uterine cancer  08/05/2019    UTI (urinary tract infection)        SOCIAL HISTORY:  Social History     Socioeconomic History    Marital status: Single     Spouse name: Not on file    Number of children: Not on file    Years of education: Not on file    Highest education level: Not on file   Occupational History    Not on file   Social Needs    Financial resource strain: Not on file    Food insecurity:     Worry: Not on file     Inability: Not on file    Transportation needs:     Medical: Not on file     Non-medical: Not on file   Tobacco Use    Smoking status: Never Smoker    Smokeless tobacco: Never Used   Substance and Sexual Activity    Alcohol use: No     Alcohol/week: 0.0 oz    Drug use: No    Sexual activity: Never   Lifestyle    Physical activity:     Days per week: Not on file     Minutes per session: Not on file    Stress: Patient refused   Relationships    Social connections:     Talks on phone: Not on file     Gets together: Not on file     Attends [employer] service: Not on file     Active member of club or organization: Not on file     Attends meetings of clubs or organizations: Not on file     Relationship status: Not on file   Other Topics Concern    Not on file   Social History Narrative    Not on file       ALLERGIES AND MEDICATIONS: updated and reviewed.  Review of patient's allergies indicates:  No Known Allergies  Current Outpatient Medications   Medication Sig Dispense Refill    b complex vitamins tablet Take 1 tablet by mouth once daily.      chlorhexidine (PERIDEX) 0.12 % solution SWISH AND SPIT 15MLS BY MOUTH 2 TIMES DAILY FOR 7 DAYS.  0    clobetasol 0.05% (TEMOVATE) 0.05 % Oint APPLY TO AFFECTED AREA(S) TWICE A DAY FOR 1 MONTH(S) then EVERY DAY FOR 1 MONTH(S) then 3 TIMES A WEEK 60 g 2    ibuprofen (ADVIL,MOTRIN) 600 MG tablet Take 1 tablet (600 mg total) by mouth every 6 (six) hours as needed for Pain. 60 tablet 0    levocetirizine (XYZAL) 5 MG tablet Take 1 tablet (5 mg total) by  mouth every evening. 30 tablet 3    MILK THISTLE ORAL Take by mouth.      nystatin (MYCOSTATIN) cream Apply topically every Mon, Wed, Fri. 30 g 11    silver sulfADIAZINE 1% (SILVADENE) 1 % cream Apply topically every morning. 50 g 11    diphenhydrAMINE-aluminum-magnesium hydroxide-simethicone-lidocaine HCl 2% Swish and spit 5 mLs every 4 (four) hours as needed. 300 mL 0    hydrocortisone (ANUSOL-HC) 2.5 % rectal cream Place rectally 2 (two) times daily. 30 g 1    linaCLOtide (LINZESS) 145 mcg Cap capsule Take 1 capsule (145 mcg total) by mouth once daily. 30 capsule 2    ondansetron (ZOFRAN) 8 MG tablet Take 1 tablet (8 mg total) by mouth every 12 (twelve) hours as needed for Nausea. 30 tablet 2    polyethylene glycol (GOLYTELY,NULYTELY) 236-22.74-6.74 -5.86 gram suspension        No current facility-administered medications for this visit.          Objective:   /72   Pulse 70   Temp 97.6 °F (36.4 °C) (Oral)   Resp 16   Ht 5' (1.524 m)   Wt 75.5 kg (166 lb 7.2 oz)   LMP  (LMP Unknown) Comment: age 53  SpO2 98%   BMI 32.51 kg/m²      Physical Exam   Constitutional: She is oriented to person, place, and time. No distress.   HENT:   Head: Normocephalic and atraumatic.   Eyes: Conjunctivae and EOM are normal.   Cardiovascular: Normal rate and regular rhythm.   Pulmonary/Chest: Effort normal and breath sounds normal.   Abdominal: Normal appearance. She exhibits no distension and no mass. Bowel sounds are increased. There is no tenderness. No hernia.   Scars healing well    Neurological: She is alert and oriented to person, place, and time.   Skin: Skin is warm. No rash noted. No erythema.           Assessment:       1. Constipation, unspecified constipation type    2. Need for 23-polyvalent pneumococcal polysaccharide vaccine    3. Hemorrhoids, unspecified hemorrhoid type    4. Need for vaccination with 13-polyvalent pneumococcal conjugate vaccine    5. Endometrial cancer        Plan:        Constipation, unspecified constipation type  -     linaCLOtide (LINZESS) 145 mcg Cap capsule; Take 1 capsule (145 mcg total) by mouth once daily.  Dispense: 30 capsule; Refill: 2  -     X-Ray Abdomen Flat And Erect; Future; Expected date: 09/10/2019  Milk of magnesia.   Increase to 6 cups of water and add fiber mix back in.   Wait for me to call to r/u obstruction.   -will contact with results     Hemorrhoids, unspecified hemorrhoid type  -     hydrocortisone (ANUSOL-HC) 2.5 % rectal cream; Place rectally 2 (two) times daily.  Dispense: 30 g; Refill: 1    Need for vaccination with 13-polyvalent pneumococcal conjugate vaccine  -     (In Office Administered) Pneumococcal Conjugate Vaccine (13 Valent) (IM)    Endometrial cancer  Continue to follow up with specialist            No follow-ups on file.

## 2019-09-11 ENCOUNTER — LAB VISIT (OUTPATIENT)
Dept: LAB | Facility: HOSPITAL | Age: 64
End: 2019-09-11
Attending: NURSE PRACTITIONER
Payer: MEDICARE

## 2019-09-11 DIAGNOSIS — R10.11 RUQ ABDOMINAL PAIN: ICD-10-CM

## 2019-09-11 PROCEDURE — 86677 HELICOBACTER PYLORI ANTIBODY: CPT

## 2019-09-11 PROCEDURE — 36415 COLL VENOUS BLD VENIPUNCTURE: CPT | Mod: PO

## 2019-09-12 ENCOUNTER — TELEPHONE (OUTPATIENT)
Dept: GASTROENTEROLOGY | Facility: CLINIC | Age: 64
End: 2019-09-12

## 2019-09-12 LAB — H PYLORI IGG SERPL QL IA: NEGATIVE

## 2019-09-12 NOTE — TELEPHONE ENCOUNTER
MA contacted pt to give test results per Leatha. Pt verbalized understanding .         ----- Message from Leatha Ayon NP sent at 9/12/2019  1:04 PM CDT -----  Patient does not have H pylori.

## 2019-09-13 ENCOUNTER — TELEPHONE (OUTPATIENT)
Dept: GYNECOLOGIC ONCOLOGY | Facility: CLINIC | Age: 64
End: 2019-09-13

## 2019-09-16 ENCOUNTER — OFFICE VISIT (OUTPATIENT)
Dept: GYNECOLOGIC ONCOLOGY | Facility: CLINIC | Age: 64
End: 2019-09-16
Payer: MEDICARE

## 2019-09-16 ENCOUNTER — LAB VISIT (OUTPATIENT)
Dept: LAB | Facility: OTHER | Age: 64
End: 2019-09-16
Payer: MEDICARE

## 2019-09-16 VITALS
HEART RATE: 72 BPM | WEIGHT: 164.88 LBS | HEIGHT: 60 IN | SYSTOLIC BLOOD PRESSURE: 136 MMHG | DIASTOLIC BLOOD PRESSURE: 63 MMHG | BODY MASS INDEX: 32.37 KG/M2

## 2019-09-16 DIAGNOSIS — C54.1 ENDOMETRIAL CANCER: ICD-10-CM

## 2019-09-16 DIAGNOSIS — C54.1 ENDOMETRIAL CANCER: Primary | ICD-10-CM

## 2019-09-16 DIAGNOSIS — Z51.11 ENCOUNTER FOR ANTINEOPLASTIC CHEMOTHERAPY: ICD-10-CM

## 2019-09-16 LAB
ALBUMIN SERPL BCP-MCNC: 4.3 G/DL (ref 3.5–5.2)
ALP SERPL-CCNC: 80 U/L (ref 55–135)
ALT SERPL W/O P-5'-P-CCNC: 33 U/L (ref 10–44)
ANION GAP SERPL CALC-SCNC: 10 MMOL/L (ref 8–16)
AST SERPL-CCNC: 40 U/L (ref 10–40)
BASOPHILS # BLD AUTO: 0.03 K/UL (ref 0–0.2)
BASOPHILS NFR BLD: 0.5 % (ref 0–1.9)
BILIRUB SERPL-MCNC: 0.4 MG/DL (ref 0.1–1)
BUN SERPL-MCNC: 9 MG/DL (ref 8–23)
CALCIUM SERPL-MCNC: 10 MG/DL (ref 8.7–10.5)
CANCER AG125 SERPL-ACNC: 17 U/ML (ref 0–30)
CHLORIDE SERPL-SCNC: 107 MMOL/L (ref 95–110)
CO2 SERPL-SCNC: 26 MMOL/L (ref 23–29)
CREAT SERPL-MCNC: 0.8 MG/DL (ref 0.5–1.4)
DIFFERENTIAL METHOD: ABNORMAL
EOSINOPHIL # BLD AUTO: 0.2 K/UL (ref 0–0.5)
EOSINOPHIL NFR BLD: 3.7 % (ref 0–8)
ERYTHROCYTE [DISTWIDTH] IN BLOOD BY AUTOMATED COUNT: 11.9 % (ref 11.5–14.5)
EST. GFR  (AFRICAN AMERICAN): >60 ML/MIN/1.73 M^2
EST. GFR  (NON AFRICAN AMERICAN): >60 ML/MIN/1.73 M^2
GLUCOSE SERPL-MCNC: 88 MG/DL (ref 70–110)
HCT VFR BLD AUTO: 42.5 % (ref 37–48.5)
HGB BLD-MCNC: 14.3 G/DL (ref 12–16)
IMM GRANULOCYTES # BLD AUTO: 0.01 K/UL (ref 0–0.04)
IMM GRANULOCYTES NFR BLD AUTO: 0.2 % (ref 0–0.5)
LYMPHOCYTES # BLD AUTO: 1.7 K/UL (ref 1–4.8)
LYMPHOCYTES NFR BLD: 27.9 % (ref 18–48)
MCH RBC QN AUTO: 31.5 PG (ref 27–31)
MCHC RBC AUTO-ENTMCNC: 33.6 G/DL (ref 32–36)
MCV RBC AUTO: 94 FL (ref 82–98)
MONOCYTES # BLD AUTO: 0.4 K/UL (ref 0.3–1)
MONOCYTES NFR BLD: 7.1 % (ref 4–15)
NEUTROPHILS # BLD AUTO: 3.7 K/UL (ref 1.8–7.7)
NEUTROPHILS NFR BLD: 60.6 % (ref 38–73)
NRBC BLD-RTO: 0 /100 WBC
PLATELET # BLD AUTO: 208 K/UL (ref 150–350)
PMV BLD AUTO: 10 FL (ref 9.2–12.9)
POTASSIUM SERPL-SCNC: 4.8 MMOL/L (ref 3.5–5.1)
PROT SERPL-MCNC: 7.5 G/DL (ref 6–8.4)
RBC # BLD AUTO: 4.54 M/UL (ref 4–5.4)
SODIUM SERPL-SCNC: 143 MMOL/L (ref 136–145)
WBC # BLD AUTO: 6.17 K/UL (ref 3.9–12.7)

## 2019-09-16 PROCEDURE — 85025 COMPLETE CBC W/AUTO DIFF WBC: CPT

## 2019-09-16 PROCEDURE — 99999 PR PBB SHADOW E&M-EST. PATIENT-LVL III: CPT | Mod: PBBFAC,,, | Performed by: OBSTETRICS & GYNECOLOGY

## 2019-09-16 PROCEDURE — 99214 PR OFFICE/OUTPT VISIT, EST, LEVL IV, 30-39 MIN: ICD-10-PCS | Mod: 24,S$PBB,, | Performed by: OBSTETRICS & GYNECOLOGY

## 2019-09-16 PROCEDURE — 86304 IMMUNOASSAY TUMOR CA 125: CPT

## 2019-09-16 PROCEDURE — 99214 OFFICE O/P EST MOD 30 MIN: CPT | Mod: 24,S$PBB,, | Performed by: OBSTETRICS & GYNECOLOGY

## 2019-09-16 PROCEDURE — 99213 OFFICE O/P EST LOW 20 MIN: CPT | Mod: PBBFAC | Performed by: OBSTETRICS & GYNECOLOGY

## 2019-09-16 PROCEDURE — 99999 PR PBB SHADOW E&M-EST. PATIENT-LVL III: ICD-10-PCS | Mod: PBBFAC,,, | Performed by: OBSTETRICS & GYNECOLOGY

## 2019-09-16 PROCEDURE — 80053 COMPREHEN METABOLIC PANEL: CPT

## 2019-09-16 PROCEDURE — 36415 COLL VENOUS BLD VENIPUNCTURE: CPT

## 2019-09-16 NOTE — PROGRESS NOTES
Subjective:      Patient ID: Jeanne Rodgers is a 64 y.o. female.    Chief Complaint: Endometrial Cancer    Treatment History  Stage IIIC2 UPSC  Preop PET revealing positive pelvic and PA nodes  RALH/BSO/Debulking of left pelvic nodes (6/8 positive) on 8/15/19    HPI  Here today for post-op check and treatment planning.  Underwent the above procedure and tolerated well.  Denies VB, F/C, N/V.  Has had normal return of bladder and bowel function.  Sister is with her today and they brought in multiple questions.  Review of Systems   Constitutional: Negative for activity change, appetite change, chills, fatigue and fever.   HENT: Negative for hearing loss, mouth sores, nosebleeds, sore throat and tinnitus.    Eyes: Negative for visual disturbance.   Respiratory: Negative for cough, chest tightness, shortness of breath and wheezing.    Cardiovascular: Negative for chest pain and leg swelling.   Gastrointestinal: Negative for abdominal distention, abdominal pain, blood in stool, constipation, diarrhea, nausea and vomiting.   Genitourinary: Negative for dysuria, flank pain, frequency, hematuria, pelvic pain, vaginal bleeding, vaginal discharge and vaginal pain.   Musculoskeletal: Negative for arthralgias and back pain.   Skin: Negative for rash.   Neurological: Negative for dizziness, seizures, syncope, weakness and numbness.   Hematological: Does not bruise/bleed easily.   Psychiatric/Behavioral: Negative for confusion and sleep disturbance. The patient is not nervous/anxious.        Objective:   Physical Exam:   Constitutional: She appears well-developed and well-nourished. No distress.    HENT:   Head: Normocephalic and atraumatic.    Eyes: No scleral icterus.    Neck: Normal range of motion. Neck supple.    Cardiovascular: Normal rate and intact distal pulses.  Exam reveals no cyanosis and no edema.     Pulmonary/Chest: Effort normal. No respiratory distress. She exhibits no tenderness.        Abdominal: Soft.  She exhibits no distension (incisions healing well), no fluid wave, no ascites and no mass. There is no tenderness. There is no rebound and no guarding. No hernia.     Genitourinary: Rectum normal and vagina normal. Pelvic exam was performed with patient supine. There is no rash, tenderness or lesion on the right labia. There is no rash, tenderness or lesion on the left labia. Uterus is absent. There is an absent adnexa. Right adnexum displays no mass, no tenderness and no fullness. Left adnexum displays no mass, no tenderness and no fullness. No bleeding (cuff healing well) or unspecified prolapse of vaginal walls in the vagina. No vaginal discharge found. Vaginal cuff normal.Labial bartholins normal.Cervix exhibits absence.              Lymphadenopathy:     She has no cervical adenopathy.        Right: No inguinal adenopathy present.        Left: No inguinal adenopathy present.     Skin: No cyanosis.        Assessment:     1. Endometrial cancer    2. Encounter for antineoplastic chemotherapy        Plan:       Has healed well post-op.  Went through her histology, preop PET findings, and final pathology.  I have recommended we proceed with combination treatment with chemotherapy and radiation in a sandwich fashion.  Counseled her on Taxol/Carbo. The R/B/I of chemotherapy were explained to the patient including alopecia, N/V, BM suppression, fatigue, need for transfusion and peripheral neuropathy.  The patient voiced understanding, all questions were answered and consents were signed.  [facility] forms were provided.  Will also refer to cancer dietician and draw labs.  Start next week.

## 2019-09-18 ENCOUNTER — TELEPHONE (OUTPATIENT)
Dept: GYNECOLOGIC ONCOLOGY | Facility: CLINIC | Age: 64
End: 2019-09-18

## 2019-09-18 NOTE — TELEPHONE ENCOUNTER
----- Message from Sabas Ramirez sent at 9/18/2019  2:13 PM CDT -----  Contact: YON MCKEON   Name of Who is Calling: YON MCKEON       What is the request in detail: Patient is requesting a call back for her chemo therapy appointment information. Patient states she has a port. Please contact to further advise.      Can the clinic reply by [facility]:NO       What [facility]: [phone]

## 2019-09-19 ENCOUNTER — PATIENT MESSAGE (OUTPATIENT)
Dept: GYNECOLOGIC ONCOLOGY | Facility: CLINIC | Age: 64
End: 2019-09-19

## 2019-09-20 ENCOUNTER — CLINICAL SUPPORT (OUTPATIENT)
Dept: HEMATOLOGY/ONCOLOGY | Facility: CLINIC | Age: 64
End: 2019-09-20
Payer: MEDICARE

## 2019-09-20 ENCOUNTER — TELEPHONE (OUTPATIENT)
Dept: GYNECOLOGIC ONCOLOGY | Facility: CLINIC | Age: 64
End: 2019-09-20

## 2019-09-20 DIAGNOSIS — C54.1 ENDOMETRIAL CANCER: Primary | ICD-10-CM

## 2019-09-20 NOTE — TELEPHONE ENCOUNTER
----- Message from Sabas Ramirez sent at 9/20/2019  9:07 AM CDT -----  Contact: YON MCKEON   Name of Who is Calling: YON MCKEON       What is the request in detail: Patient is requesting a call back regarding her port. Please contact to further advise.      Can the clinic reply by [facility]: NO      What Number to Call Back if not in [facility]: [phone]

## 2019-09-20 NOTE — PROGRESS NOTES
Pt arrived to chemotherapy class with sister. Pt allowed opportunity to ask specific questions regarding her treatment plan after class. Pt aware that we are waiting for IR to contact her regarding port placement scheduling.

## 2019-09-23 ENCOUNTER — TELEPHONE (OUTPATIENT)
Dept: GYNECOLOGIC ONCOLOGY | Facility: CLINIC | Age: 64
End: 2019-09-23

## 2019-09-24 ENCOUNTER — CLINICAL SUPPORT (OUTPATIENT)
Dept: FAMILY MEDICINE | Facility: CLINIC | Age: 64
End: 2019-09-24
Payer: MEDICARE

## 2019-09-24 DIAGNOSIS — Z23 NEED FOR INFLUENZA VACCINATION: Primary | ICD-10-CM

## 2019-09-24 PROCEDURE — 90662 IIV NO PRSV INCREASED AG IM: CPT | Mod: PBBFAC,PO

## 2019-09-25 ENCOUNTER — LAB VISIT (OUTPATIENT)
Dept: LAB | Facility: HOSPITAL | Age: 64
End: 2019-09-25
Attending: OBSTETRICS & GYNECOLOGY
Payer: MEDICARE

## 2019-09-25 DIAGNOSIS — C54.1 ENDOMETRIAL CANCER: ICD-10-CM

## 2019-09-25 LAB
ALBUMIN SERPL BCP-MCNC: 4 G/DL (ref 3.5–5.2)
ALP SERPL-CCNC: 79 U/L (ref 55–135)
ALT SERPL W/O P-5'-P-CCNC: 24 U/L (ref 10–44)
ANION GAP SERPL CALC-SCNC: 9 MMOL/L (ref 8–16)
AST SERPL-CCNC: 26 U/L (ref 10–40)
BASOPHILS # BLD AUTO: 0.03 K/UL (ref 0–0.2)
BASOPHILS NFR BLD: 0.7 % (ref 0–1.9)
BILIRUB SERPL-MCNC: 0.7 MG/DL (ref 0.1–1)
BUN SERPL-MCNC: 7 MG/DL (ref 8–23)
CALCIUM SERPL-MCNC: 9.1 MG/DL (ref 8.7–10.5)
CANCER AG125 SERPL-ACNC: 14 U/ML (ref 0–30)
CHLORIDE SERPL-SCNC: 108 MMOL/L (ref 95–110)
CO2 SERPL-SCNC: 23 MMOL/L (ref 23–29)
CREAT SERPL-MCNC: 0.8 MG/DL (ref 0.5–1.4)
DIFFERENTIAL METHOD: ABNORMAL
EOSINOPHIL # BLD AUTO: 0.2 K/UL (ref 0–0.5)
EOSINOPHIL NFR BLD: 5.3 % (ref 0–8)
ERYTHROCYTE [DISTWIDTH] IN BLOOD BY AUTOMATED COUNT: 11.9 % (ref 11.5–14.5)
EST. GFR  (AFRICAN AMERICAN): >60 ML/MIN/1.73 M^2
EST. GFR  (NON AFRICAN AMERICAN): >60 ML/MIN/1.73 M^2
GLUCOSE SERPL-MCNC: 148 MG/DL (ref 70–110)
HCT VFR BLD AUTO: 43 % (ref 37–48.5)
HGB BLD-MCNC: 13.7 G/DL (ref 12–16)
IMM GRANULOCYTES # BLD AUTO: 0.01 K/UL (ref 0–0.04)
IMM GRANULOCYTES NFR BLD AUTO: 0.2 % (ref 0–0.5)
LYMPHOCYTES # BLD AUTO: 0.6 K/UL (ref 1–4.8)
LYMPHOCYTES NFR BLD: 12.8 % (ref 18–48)
MCH RBC QN AUTO: 31.4 PG (ref 27–31)
MCHC RBC AUTO-ENTMCNC: 31.9 G/DL (ref 32–36)
MCV RBC AUTO: 98 FL (ref 82–98)
MONOCYTES # BLD AUTO: 0.3 K/UL (ref 0.3–1)
MONOCYTES NFR BLD: 6.7 % (ref 4–15)
NEUTROPHILS # BLD AUTO: 3.2 K/UL (ref 1.8–7.7)
NEUTROPHILS NFR BLD: 74.3 % (ref 38–73)
NRBC BLD-RTO: 0 /100 WBC
PLATELET # BLD AUTO: 207 K/UL (ref 150–350)
PMV BLD AUTO: 10.3 FL (ref 9.2–12.9)
POTASSIUM SERPL-SCNC: 3.9 MMOL/L (ref 3.5–5.1)
PROT SERPL-MCNC: 7.2 G/DL (ref 6–8.4)
RBC # BLD AUTO: 4.37 M/UL (ref 4–5.4)
SODIUM SERPL-SCNC: 140 MMOL/L (ref 136–145)
WBC # BLD AUTO: 4.31 K/UL (ref 3.9–12.7)

## 2019-09-25 PROCEDURE — 36415 COLL VENOUS BLD VENIPUNCTURE: CPT | Mod: PO

## 2019-09-25 PROCEDURE — 80053 COMPREHEN METABOLIC PANEL: CPT

## 2019-09-25 PROCEDURE — 86304 IMMUNOASSAY TUMOR CA 125: CPT

## 2019-09-25 PROCEDURE — 85025 COMPLETE CBC W/AUTO DIFF WBC: CPT

## 2019-09-25 RX ORDER — SODIUM CHLORIDE 0.9 % (FLUSH) 0.9 %
10 SYRINGE (ML) INJECTION
Status: CANCELLED | OUTPATIENT
Start: 2019-09-25

## 2019-09-25 RX ORDER — FAMOTIDINE 10 MG/ML
20 INJECTION INTRAVENOUS
Status: CANCELLED | OUTPATIENT
Start: 2019-09-25

## 2019-09-25 RX ORDER — DIPHENHYDRAMINE HYDROCHLORIDE 50 MG/ML
50 INJECTION INTRAMUSCULAR; INTRAVENOUS ONCE AS NEEDED
Status: CANCELLED | OUTPATIENT
Start: 2019-09-25

## 2019-09-25 RX ORDER — EPINEPHRINE 0.3 MG/.3ML
0.3 INJECTION SUBCUTANEOUS ONCE AS NEEDED
Status: CANCELLED | OUTPATIENT
Start: 2019-09-25

## 2019-09-25 RX ORDER — HEPARIN 100 UNIT/ML
500 SYRINGE INTRAVENOUS
Status: CANCELLED | OUTPATIENT
Start: 2019-09-25

## 2019-09-26 ENCOUNTER — HOSPITAL ENCOUNTER (OUTPATIENT)
Facility: OTHER | Age: 64
Discharge: HOME | End: 2019-09-26
Attending: RADIOLOGY | Admitting: RADIOLOGY
Payer: MEDICARE

## 2019-09-26 VITALS
HEART RATE: 70 BPM | TEMPERATURE: 98 F | OXYGEN SATURATION: 98 % | SYSTOLIC BLOOD PRESSURE: 132 MMHG | HEIGHT: 60 IN | WEIGHT: 165 LBS | DIASTOLIC BLOOD PRESSURE: 64 MMHG | BODY MASS INDEX: 32.39 KG/M2 | RESPIRATION RATE: 18 BRPM

## 2019-09-26 DIAGNOSIS — C54.1 ENDOMETRIAL CANCER: ICD-10-CM

## 2019-09-26 LAB
APTT BLDCRRT: 21.2 SEC (ref 21–32)
INR PPP: 0.9 (ref 0.8–1.2)
PROTHROMBIN TIME: 10.5 SEC (ref 9–12.5)

## 2019-09-26 PROCEDURE — C1894 INTRO/SHEATH, NON-LASER: HCPCS | Performed by: RADIOLOGY

## 2019-09-26 PROCEDURE — 85730 THROMBOPLASTIN TIME PARTIAL: CPT

## 2019-09-26 PROCEDURE — C1788 PORT, INDWELLING, IMP: HCPCS | Performed by: RADIOLOGY

## 2019-09-26 PROCEDURE — 36415 COLL VENOUS BLD VENIPUNCTURE: CPT

## 2019-09-26 PROCEDURE — 99152 MOD SED SAME PHYS/QHP 5/>YRS: CPT | Performed by: RADIOLOGY

## 2019-09-26 PROCEDURE — 99153 MOD SED SAME PHYS/QHP EA: CPT | Performed by: RADIOLOGY

## 2019-09-26 PROCEDURE — 25000003 PHARM REV CODE 250: Performed by: RADIOLOGY

## 2019-09-26 PROCEDURE — 85610 PROTHROMBIN TIME: CPT

## 2019-09-26 PROCEDURE — 25500020 PHARM REV CODE 255: Performed by: RADIOLOGY

## 2019-09-26 PROCEDURE — C1769 GUIDE WIRE: HCPCS | Performed by: RADIOLOGY

## 2019-09-26 PROCEDURE — 63600175 PHARM REV CODE 636 W HCPCS: Performed by: RADIOLOGY

## 2019-09-26 DEVICE — POWERPORT CLEARVUE IMPLANTABLE PORT WITH ATTACHABLE 8F POLYURETHANE OPEN-ENDED SINGLE-LUMEN VENOUS CATHETER INTERMEDIATE KIT
Type: IMPLANTABLE DEVICE | Site: CHEST | Status: FUNCTIONAL
Brand: POWERPORT CLEARVUE

## 2019-09-26 RX ORDER — HEPARIN SODIUM 1000 [USP'U]/ML
INJECTION, SOLUTION INTRAVENOUS; SUBCUTANEOUS
Status: DISCONTINUED | OUTPATIENT
Start: 2019-09-26 | End: 2019-09-26 | Stop reason: HOSPADM

## 2019-09-26 RX ORDER — CEFAZOLIN SODIUM 1 G/50ML
SOLUTION INTRAVENOUS
Status: DISCONTINUED | OUTPATIENT
Start: 2019-09-26 | End: 2019-09-26 | Stop reason: HOSPADM

## 2019-09-26 RX ORDER — HYDROCODONE BITARTRATE AND ACETAMINOPHEN 5; 325 MG/1; MG/1
1 TABLET ORAL EVERY 4 HOURS PRN
Status: DISCONTINUED | OUTPATIENT
Start: 2019-09-26 | End: 2019-09-26 | Stop reason: HOSPADM

## 2019-09-26 RX ORDER — FENTANYL CITRATE 50 UG/ML
INJECTION, SOLUTION INTRAMUSCULAR; INTRAVENOUS
Status: DISCONTINUED | OUTPATIENT
Start: 2019-09-26 | End: 2019-09-26 | Stop reason: HOSPADM

## 2019-09-26 RX ORDER — LIDOCAINE HYDROCHLORIDE 10 MG/ML
INJECTION INFILTRATION; PERINEURAL
Status: DISCONTINUED | OUTPATIENT
Start: 2019-09-26 | End: 2019-09-26 | Stop reason: HOSPADM

## 2019-09-26 RX ORDER — MIDAZOLAM HYDROCHLORIDE 1 MG/ML
INJECTION, SOLUTION INTRAMUSCULAR; INTRAVENOUS
Status: DISCONTINUED | OUTPATIENT
Start: 2019-09-26 | End: 2019-09-26 | Stop reason: HOSPADM

## 2019-09-26 RX ORDER — ONDANSETRON 8 MG/1
8 TABLET, ORALLY DISINTEGRATING ORAL ONCE
Status: COMPLETED | OUTPATIENT
Start: 2019-09-26 | End: 2019-09-26

## 2019-09-26 RX ADMIN — ONDANSETRON 8 MG: 8 TABLET, ORALLY DISINTEGRATING ORAL at 03:09

## 2019-09-26 NOTE — H&P
[facility] Cath Lab [facility] 1  History & Physical - Short Stay  Interventional Radiology    SUBJECTIVE:     Chief Complaint/Reason for Admission: Endometrial Carcinoma, h/o right breast CA    Informant(s):  self and Electronic Health Record    History of Present Illness:  Jeanne Rodgers is a 64 y.o. female with a history of endometrial carcinoma.      Patient presents for port-a-cath placement.    Scheduled Meds:   Continuous Infusions:   PRN Meds:     Review of patient's allergies indicates:  No Known Allergies    Past Medical History:   Diagnosis Date    Allergy     Anxiety     Breast cancer 2014    Chronic back pain     Endometrial cancer 07/30/2019    Fibromyalgia     Hives     Hx of radiation therapy     Itching     Lichen sclerosus     Mental disorder     PONV (postoperative nausea and vomiting)     Sore throat     Uterine cancer 08/05/2019    UTI (urinary tract infection)      Past Surgical History:   Procedure Laterality Date    anal fissure surgery      APPENDECTOMY      BREAST BIOPSY Right 2014    BREAST LUMPECTOMY Right 2014    R lumpectomy [facility] w 2.4cm IDC & DCIS, neg margins, 0/6 SN, Stage II, ER/DC+, HER-2 neg Adjuvant XRT and hormonal therapy     CARPAL TUNNEL RELEASE      CHOLECYSTECTOMY      COLONOSCOPY      HYSTERECTOMY  08/15/2019    HYSTEROSCOPY WITH DILATION AND CURETTAGE OF UTERUS N/A 7/24/2019    Procedure: HYSTEROSCOPY, WITH DILATION AND CURETTAGE OF UTERUS;  Surgeon: Елена Kam MD;  Location: [facility];  Service: OB/GYN;  Laterality: N/A;    MASTECTOMY Right     2014    ROBOT-ASSISTED LAPAROSCOPIC ABDOMINAL HYSTERECTOMY USING DA RAMU XI N/A 8/15/2019    Procedure: XI ROBOTIC HYSTERECTOMY;  Surgeon: Darius Regalado MD;  Location: [facility];  Service: OB/GYN;  Laterality: N/A;    ROBOT-ASSISTED LAPAROSCOPIC SALPINGO-OOPHORECTOMY USING DA RAMU XI Bilateral 8/15/2019    Procedure: XI ROBOTIC SALPINGO-OOPHORECTOMY;  Surgeon: Darius Regalado MD;  Location:  [facility] OR;  Service: OB/GYN;  Laterality: Bilateral;    ROBOT-ASSISTED LYMPHADENECTOMY Left 8/15/2019    Procedure: ROBOTIC LYMPHADENECTOMY;  Surgeon: Darius Regalado MD;  Location: [facility] OR;  Service: OB/GYN;  Laterality: Left;    SALPINGOOPHORECTOMY Bilateral 08/15/2019    TONSILLECTOMY      TUBAL LIGATION       Family History   Problem Relation Age of Onset    Cancer Mother 85        pancreatic cancer, passed at 95    Hypertension Mother     Arthritis Mother     Cancer Father 84        pancreatic, passed at 85    Cancer Maternal Aunt 60        pancreatic, passed at 62    No Known Problems Sister     Hypertension Brother     Lung cancer Paternal Aunt     Colon cancer Paternal Uncle     Heart attack Paternal Grandmother     Heart attack Paternal Grandfather     Hypertension Brother     Hashimoto's thyroiditis Sister     Breast cancer Neg Hx     Ovarian cancer Neg Hx      Social History     Tobacco Use    Smoking status: Never Smoker    Smokeless tobacco: Never Used   Substance Use Topics    Alcohol use: No     Alcohol/week: 0.0 standard drinks    Drug use: No        Review of Systems:  ROS not obtained.    OBJECTIVE:     Vital Signs (Most Recent):  Temp: (P) 98 °F (36.7 °C) (09/26/19 1207)  Pulse: (P) 65 (09/26/19 1207)  Resp: (P) 16 (09/26/19 1207)  BP: (P) 127/74 (09/26/19 1207)  SpO2: (P) 96 % (09/26/19 1207)    Physical Exam:  Lungs: No respiratory distress  Cardiac: regular rate and rhythm  alert and oriented    Laboratory  CBC:   Lab Results   Component Value Date/Time    WBC 4.31 09/25/2019 09:32 AM    RBC 4.37 09/25/2019 09:32 AM    HGB 13.7 09/25/2019 09:32 AM    HCT 43.0 09/25/2019 09:32 AM     09/25/2019 09:32 AM    MCV 98 09/25/2019 09:32 AM    MCH 31.4 (H) 09/25/2019 09:32 AM    MCHC 31.9 (L) 09/25/2019 09:32 AM     Coagulation:   Lab Results   Component Value Date/Time    INR 0.9 09/26/2019 11:39 AM    APTT 21.2 09/26/2019 11:39 AM           ASSESSMENT/PLAN:     Endometrial  carcinoma.    Patient will undergo port-a-cath placement.    Sedation/Anesthesia Assessment:  ASA Classification: III = Severe systemic disease limiting activity  Mallampati Score: III (soft and hard palate and base of uvula visible)    Sedation History: No problems    Sedation Plan: Conscious sedation

## 2019-09-26 NOTE — OP NOTE
[facility] Cath Lab [facility] 1  Interventional Radiology  High Risk Procedure - Inpatient    Date: 09/26/2019 Time: 2:29 PM    Pre-Op Diagnosis: Endometrial Carcinoma    Post-Op Diagnosis: same    Procedure Performed by: Moris Varghese MD    Assistant: none    Procedure: Placement of left chest port-a-cath    Specimen/Tissue Removed: No    Estimated Blood Loss: Less than 20 mL    Procedure Note/Findings: 8 [place] [employer] Powerport placed via left IJ approach. Catheter in good position although occasionally difficult to aspirate likely due to close approximation to wall of SVC. Contrast injection demonstrates catheter in good position. Port pocket closed with absorbable suture and glue. No immediate post-procedure complications noted.            Please refer to dictated report for additional details.

## 2019-09-26 NOTE — DISCHARGE SUMMARY
Radiology Discharge Summary      Admit date: 9/26/2019 10:48 AM  Discharge date: September 26, 2019    Instructions Given to patient: YesVerbal    Diet: Regular    Activity:Restriction as listed: No strenuous activities for 48 hours.    Medications on discharge (List): Refer to Discharge Medication List    Hospital Course: Port placement left IJ/chest    Description of Condition on Discharge: stable    Discharge Disposition: Home    Discharge Diagnosis: Endometrial Carcinoma    Follow up with Dr. Regalado as scheduled.

## 2019-09-26 NOTE — DISCHARGE INSTRUCTIONS
Anesthesia: Monitored Anesthesia Care (MAC)    Anesthesia Safety  · Have an adult family member or friend drive you home after the procedure.  · For the first 24 hours after your surgery:  ¨ Do not drive or use heavy equipment.  ¨ Do not make important decisions or sign documents.  ¨ Avoid alcohol.  ¨ Have someone stay with you, if possible. They can watch for problems and help keep you safe.    PLEASE FOLLOW ANY OTHER INSTRUCTIONS PROVIDED TO YOU BY DR. WHITTEN!

## 2019-09-27 ENCOUNTER — TELEPHONE (OUTPATIENT)
Dept: GYNECOLOGIC ONCOLOGY | Facility: CLINIC | Age: 64
End: 2019-09-27

## 2019-09-27 ENCOUNTER — INFUSION (OUTPATIENT)
Dept: INFUSION THERAPY | Facility: OTHER | Age: 64
End: 2019-09-27
Attending: INTERNAL MEDICINE
Payer: MEDICARE

## 2019-09-27 VITALS
TEMPERATURE: 98 F | BODY MASS INDEX: 32.28 KG/M2 | WEIGHT: 164.44 LBS | RESPIRATION RATE: 20 BRPM | HEART RATE: 64 BPM | OXYGEN SATURATION: 97 % | HEIGHT: 60 IN | SYSTOLIC BLOOD PRESSURE: 129 MMHG | DIASTOLIC BLOOD PRESSURE: 63 MMHG

## 2019-09-27 DIAGNOSIS — C54.1 ENDOMETRIAL CANCER: Primary | ICD-10-CM

## 2019-09-27 PROCEDURE — 96413 CHEMO IV INFUSION 1 HR: CPT

## 2019-09-27 PROCEDURE — S0028 INJECTION, FAMOTIDINE, 20 MG: HCPCS | Performed by: OBSTETRICS & GYNECOLOGY

## 2019-09-27 PROCEDURE — 25000003 PHARM REV CODE 250: Performed by: OBSTETRICS & GYNECOLOGY

## 2019-09-27 PROCEDURE — 96375 TX/PRO/DX INJ NEW DRUG ADDON: CPT

## 2019-09-27 PROCEDURE — 63600175 PHARM REV CODE 636 W HCPCS: Performed by: OBSTETRICS & GYNECOLOGY

## 2019-09-27 PROCEDURE — 96361 HYDRATE IV INFUSION ADD-ON: CPT

## 2019-09-27 PROCEDURE — 96415 CHEMO IV INFUSION ADDL HR: CPT

## 2019-09-27 PROCEDURE — 96367 TX/PROPH/DG ADDL SEQ IV INF: CPT

## 2019-09-27 PROCEDURE — 96417 CHEMO IV INFUS EACH ADDL SEQ: CPT

## 2019-09-27 RX ORDER — DIPHENHYDRAMINE HYDROCHLORIDE 50 MG/ML
50 INJECTION INTRAMUSCULAR; INTRAVENOUS ONCE AS NEEDED
Status: DISCONTINUED | OUTPATIENT
Start: 2019-09-27 | End: 2019-09-27 | Stop reason: HOSPADM

## 2019-09-27 RX ORDER — SODIUM CHLORIDE 0.9 % (FLUSH) 0.9 %
10 SYRINGE (ML) INJECTION
Status: DISCONTINUED | OUTPATIENT
Start: 2019-09-27 | End: 2019-09-27 | Stop reason: HOSPADM

## 2019-09-27 RX ORDER — HEPARIN 100 UNIT/ML
500 SYRINGE INTRAVENOUS
Status: DISCONTINUED | OUTPATIENT
Start: 2019-09-27 | End: 2019-09-27 | Stop reason: HOSPADM

## 2019-09-27 RX ORDER — HEPARIN 100 UNIT/ML
500 SYRINGE INTRAVENOUS
Status: CANCELLED | OUTPATIENT
Start: 2019-10-17

## 2019-09-27 RX ORDER — SODIUM CHLORIDE 0.9 % (FLUSH) 0.9 %
10 SYRINGE (ML) INJECTION
Status: CANCELLED | OUTPATIENT
Start: 2019-10-17

## 2019-09-27 RX ORDER — EPINEPHRINE 0.3 MG/.3ML
0.3 INJECTION SUBCUTANEOUS ONCE AS NEEDED
Status: DISCONTINUED | OUTPATIENT
Start: 2019-09-27 | End: 2019-09-27 | Stop reason: HOSPADM

## 2019-09-27 RX ORDER — FAMOTIDINE 10 MG/ML
20 INJECTION INTRAVENOUS
Status: COMPLETED | OUTPATIENT
Start: 2019-09-27 | End: 2019-09-27

## 2019-09-27 RX ADMIN — SODIUM CHLORIDE 500 ML: 0.9 INJECTION, SOLUTION INTRAVENOUS at 02:09

## 2019-09-27 RX ADMIN — FAMOTIDINE 20 MG: 10 INJECTION, SOLUTION INTRAVENOUS at 10:09

## 2019-09-27 RX ADMIN — HEPARIN 500 UNITS: 100 SYRINGE at 10:09

## 2019-09-27 RX ADMIN — Medication 50 MG: at 10:09

## 2019-09-27 RX ADMIN — PACLITAXEL 312 MG: 6 INJECTION, SOLUTION INTRAVENOUS at 11:09

## 2019-09-27 RX ADMIN — CARBOPLATIN 640 MG: 10 INJECTION, SOLUTION INTRAVENOUS at 02:09

## 2019-09-27 RX ADMIN — PALONOSETRON HYDROCHLORIDE: 0.25 INJECTION INTRAVENOUS at 10:09

## 2019-09-27 NOTE — TELEPHONE ENCOUNTER
----- Message from Carey Fernandez RN sent at 9/27/2019 11:27 AM CDT -----  Pt is requesting nausea medication and Emla cream for her port throughout chemo. Would Dr Regalado be able to send these to her pharmacy ([facility]- [place]).  Thanks!

## 2019-09-27 NOTE — PLAN OF CARE
New start taxol/carboplatin administered, patient tolerated well. VSS, NAD. Education provided. D/C'd home with self.

## 2019-09-30 ENCOUNTER — TELEPHONE (OUTPATIENT)
Dept: GYNECOLOGIC ONCOLOGY | Facility: CLINIC | Age: 64
End: 2019-09-30

## 2019-09-30 NOTE — TELEPHONE ENCOUNTER
----- Message from Iwona Cano sent at 9/30/2019 10:21 AM CDT -----  Contact: YON MCKEON [[id number]]  Name of Who is Calling: YON MCKEON [[id number]]      What is the request in detail: Pt is requesting to speak with clinical staff.Pt states she have a lot of bone pain from Chemo. Please contact to further discuss and advise.       Can the clinic reply by [facility]: Yes       What Number to Call Back if not in [facility]: [phone]

## 2019-09-30 NOTE — TELEPHONE ENCOUNTER
Pt requested labs be changed to lab appt vs labs from port in chemo at same time/date. Pt reports joint aches after treatment and pt encouraged to continue Ibupforen and add Tylenol. Pt expressed verbal understanding to contact office for additional questions/concerns.

## 2019-10-01 ENCOUNTER — PATIENT MESSAGE (OUTPATIENT)
Dept: GYNECOLOGIC ONCOLOGY | Facility: CLINIC | Age: 64
End: 2019-10-01

## 2019-10-01 ENCOUNTER — NURSE TRIAGE (OUTPATIENT)
Dept: ADMINISTRATIVE | Facility: CLINIC | Age: 64
End: 2019-10-01

## 2019-10-01 ENCOUNTER — TELEPHONE (OUTPATIENT)
Dept: GYNECOLOGIC ONCOLOGY | Facility: CLINIC | Age: 64
End: 2019-10-01

## 2019-10-01 NOTE — TELEPHONE ENCOUNTER
"    Reason for Disposition   [1] Small red area or streak AND [2] no fever    Additional Information   Negative: [1] Major abdominal surgical incision AND [2] wound gaping open AND [3] visible internal organs   Negative: Sounds like a life-threatening emergency to the triager   Negative: [1] Bleeding from incision AND [2] won't stop after 10 minutes of direct pressure   Negative: [1] Widespread rash AND [2] bright red, sunburn-like   Negative: Severe pain in the incision   Negative: [1] Suture came out early AND [2] wound gaping AND [3] < 48 hours since sutures placed   Negative: [1] Incision gaping open AND [2] length of opening > 2 inches (5 cm)   Negative: Patient sounds very sick or weak to the triager   Negative: Sounds like a serious complication to the triager   Negative: Fever > 100.5 F (38.1 C)   Negative: [1] Incision looks infected (spreading redness, pain) AND [2] fever > 99.5 F (37.5 C)   Negative: [1] Incision looks infected (spreading redness, pain) AND [2] large red area (> 2 in. or 5 cm)   Negative: [1] Incision looks infected (spreading redness, pain) AND [2] face wound   Negative: [1] Red streak runs from the incision AND [2] longer than 1 inch (2.5 cm)   Negative: [1] Pus or bad-smelling fluid draining from incision AND [2] no fever   Negative: [1] Post-op pain AND [2] not controlled with pain medications   Negative: Dressing soaked with blood or body fluid (e.g., drainage)   Negative: [1] Caller has URGENT question AND [2] triager unable to answer question    Protocols used: POST-OP INCISION SYMPTOMS-A-AH    This patient would like to schedule to be seen tomorrow regarding her port incision. She states it is gaping about 1/4 inch". She is concerned that there doesn't appear to be any glue under the steri strips as she was told.  "

## 2019-10-01 NOTE — TELEPHONE ENCOUNTER
"Pt reported "1/4 inch area near port, opened up." Pt denies exposed port, bleeding, drainage, foul smell, chills or aches. Pt stated she is seeing Dr. Van tomorrow to be evaluated for port and requested pt provide update after appt tomorrow. Pt expressed understanding to try Claritin for bone/joint pain. Pt reports she already began taking Neurontin that she previously had.  "

## 2019-10-02 ENCOUNTER — TELEPHONE (OUTPATIENT)
Dept: GYNECOLOGIC ONCOLOGY | Facility: CLINIC | Age: 64
End: 2019-10-02

## 2019-10-02 NOTE — TELEPHONE ENCOUNTER
RN returned patient call in regards to concern of wound healing around port site.  Patient denies redness, swelling ,drainage, fevers.  RN advised to keep area clean and dry. Okay to place mupirocin around site but patient aware that it is not necessary.  Patient verbalized understanding.

## 2019-10-03 DIAGNOSIS — C54.1 ENDOMETRIAL CANCER: Primary | ICD-10-CM

## 2019-10-15 ENCOUNTER — OFFICE VISIT (OUTPATIENT)
Dept: FAMILY MEDICINE | Facility: CLINIC | Age: 64
End: 2019-10-15
Payer: MEDICARE

## 2019-10-15 VITALS
DIASTOLIC BLOOD PRESSURE: 72 MMHG | TEMPERATURE: 98 F | WEIGHT: 165.69 LBS | HEART RATE: 80 BPM | OXYGEN SATURATION: 97 % | SYSTOLIC BLOOD PRESSURE: 126 MMHG | BODY MASS INDEX: 32.53 KG/M2 | HEIGHT: 60 IN

## 2019-10-15 DIAGNOSIS — C54.1 ENDOMETRIAL CANCER: ICD-10-CM

## 2019-10-15 DIAGNOSIS — L03.313 CELLULITIS OF CHEST WALL: Primary | ICD-10-CM

## 2019-10-15 PROCEDURE — 99999 PR PBB SHADOW E&M-EST. PATIENT-LVL III: CPT | Mod: PBBFAC,,, | Performed by: INTERNAL MEDICINE

## 2019-10-15 PROCEDURE — 99213 OFFICE O/P EST LOW 20 MIN: CPT | Mod: PBBFAC,PO | Performed by: INTERNAL MEDICINE

## 2019-10-15 PROCEDURE — 99214 PR OFFICE/OUTPT VISIT, EST, LEVL IV, 30-39 MIN: ICD-10-PCS | Mod: S$PBB,,, | Performed by: INTERNAL MEDICINE

## 2019-10-15 PROCEDURE — 99999 PR PBB SHADOW E&M-EST. PATIENT-LVL III: ICD-10-PCS | Mod: PBBFAC,,, | Performed by: INTERNAL MEDICINE

## 2019-10-15 PROCEDURE — 99214 OFFICE O/P EST MOD 30 MIN: CPT | Mod: S$PBB,,, | Performed by: INTERNAL MEDICINE

## 2019-10-15 RX ORDER — DOXYCYCLINE HYCLATE 100 MG
100 TABLET ORAL 2 TIMES DAILY
Qty: 14 TABLET | Refills: 0 | Status: SHIPPED | OUTPATIENT
Start: 2019-10-15 | End: 2019-10-22

## 2019-10-15 NOTE — PATIENT INSTRUCTIONS
It was a pleasure meeting you today. I will contact Dr Regalado regarding your visit today. Take DOXYCYCLINE twice daily for 7 days as prescribed.    Cellulitis    Cellulitis is an infection of the deep layers of skin. A break in the skin, such as a cut or scratch, can let bacteria under the skin. If the bacteria get to deep layers of the skin, it can be serious. If not treated, cellulitis can get into the bloodstream and lymph nodes. The infection can then spread throughout the body. This causes serious illness.  Cellulitis causes the affected skin to become red, swollen, warm, and sore. The reddened areas have a visible border. An open sore may leak fluid (pus). You may have a fever, chills, and pain.  Cellulitis is treated with antibiotics taken for 7 to 10 days. An open sore may be cleaned and covered with cool wet gauze. Symptoms should get better 1 to 2 days after treatment is started. Make sure to take all the antibiotics for the full number of days until they are gone. Keep taking the medicine even if your symptoms go away.    Home care  Follow these tips:  · Limit the use of the part of your body with cellulitis.   · If the infection is on your leg, keep your leg raised while sitting. This will help to reduce swelling.  · Take all of the antibiotic medicine exactly as directed until it is gone. Do not miss any doses, especially during the first 7 days. Dont stop taking the medicine when your symptoms get better.  · Keep the affected area clean and dry.  · Wash your hands with soap and warm water before and after touching your skin. Anyone else who touches your skin should also wash his or her hands. Don't share towels.  ·   Follow-up care  Follow up with your healthcare provider, or as advised. If your infection does not go away on the first antibiotic, your healthcare provider will prescribe a different one.    When to seek medical advice  Call your healthcare provider right away if any of these  occur:  · Red areas that spread  · Swelling or pain that gets worse  · Fluid leaking from the skin (pus)  · Fever higher of 100.4º F (38.0º C) or higher after 2 days on antibiotics  ·   Date Last Reviewed: 9/1/2016  © 1330-1367 [employer]. [address] [place] [address]. All rights reserved. This information is not intended as a substitute for professional medical care. Always follow your healthcare professional's instructions.

## 2019-10-15 NOTE — PROGRESS NOTES
Subjective:     Chief Complaint  Chief Complaint   Patient presents with    infected port       HPI  Jeanne Rodgers is a 64 y.o. female with medical diagnoses as listed in the medical history and problem list that presents for the above complaint. Also establishing care with new PCP today    Patient with a history of endometrial cancer currently undergoing chemo through a left Port-a-Cath taht was placed on 9/26/19. She is concerned about her port site draining fluid, causing pain and 'not healing properly.' Last received chemo less than 3 weeks prior through the port - she notes it needed to be flushed frequently before it was used. She was utilizing mupirocin ointment for a few days as well as Dial antibacterial soap to help with redness and drainage. She is experiencing alopecia in the setting of chemotherapy and some mucosal irritation. No nausea/fevers/chills/sweats    She is scheduled for follow-up Oncology appointment with Dr Regalado in a few days      Patient Care Team:  Iris Walls MD as PCP - General (Family Medicine)  Iris Walls MD as PCP - MSSP Attributed      PAST MEDICAL HISTORY:  Past Medical History:   Diagnosis Date    Allergy     Anxiety     Breast cancer 2014    Chronic back pain     Endometrial cancer 07/30/2019    Fibromyalgia     Hives     Hx of radiation therapy     Itching     Lichen sclerosus     Mental disorder     PONV (postoperative nausea and vomiting)     Sore throat     Uterine cancer 08/05/2019    UTI (urinary tract infection)        PAST SURGICAL HISTORY:  Past Surgical History:   Procedure Laterality Date    anal fissure surgery      APPENDECTOMY      BREAST BIOPSY Right 2014    BREAST LUMPECTOMY Right 2014    [place] [facility] [facility] [place] & DCIS, 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, 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, Stage II, ER/PA+, HER-2 neg Adjuvant XRT and hormonal therapy     CARPAL TUNNEL RELEASE      CHOLECYSTECTOMY      COLONOSCOPY      HYSTERECTOMY  08/15/2019    HYSTEROSCOPY  WITH DILATION AND CURETTAGE OF UTERUS N/A 7/24/2019    Procedure: HYSTEROSCOPY, WITH DILATION AND CURETTAGE OF UTERUS;  Surgeon: Елена Kam MD;  Location: [facility] OR;  Service: OB/GYN;  Laterality: N/A;    INSERTION OF TUNNELED CENTRAL VENOUS CATHETER (CVC) WITH SUBCUTANEOUS PORT N/A 9/26/2019    Procedure: INSERTION, PORT-A-CATH;  Surgeon: Moris Varghese MD;  Location: [facility] CATH LAB;  Service: Radiology;  Laterality: N/A;    MASTECTOMY Right     2014    ROBOT-ASSISTED LAPAROSCOPIC ABDOMINAL HYSTERECTOMY USING DA RAMU XI N/A 8/15/2019    Procedure: XI ROBOTIC HYSTERECTOMY;  Surgeon: Darius Regalado MD;  Location: [facility] OR;  Service: OB/GYN;  Laterality: N/A;    ROBOT-ASSISTED LAPAROSCOPIC SALPINGO-OOPHORECTOMY USING DA RAMU XI Bilateral 8/15/2019    Procedure: XI ROBOTIC SALPINGO-OOPHORECTOMY;  Surgeon: Darius Regalado MD;  Location: [facility] OR;  Service: OB/GYN;  Laterality: Bilateral;    ROBOT-ASSISTED LYMPHADENECTOMY Left 8/15/2019    Procedure: ROBOTIC LYMPHADENECTOMY;  Surgeon: Darius Regalado MD;  Location: [facility] OR;  Service: OB/GYN;  Laterality: Left;    SALPINGOOPHORECTOMY Bilateral 08/15/2019    TONSILLECTOMY      TUBAL LIGATION         SOCIAL HISTORY:  Social History     Socioeconomic History    Marital status: Single     Spouse name: Not on file    Number of children: Not on file    Years of education: Not on file    Highest education level: Not on file   Occupational History    Not on file   Social Needs    Financial resource strain: Not on file    Food insecurity:     Worry: Not on file     Inability: Not on file    Transportation needs:     Medical: Not on file     Non-medical: Not on file   Tobacco Use    Smoking status: Never Smoker    Smokeless tobacco: Never Used   Substance and Sexual Activity    Alcohol use: No     Alcohol/week: 0.0 standard drinks    Drug use: No    Sexual activity: Never   Lifestyle    Physical activity:     Days per week: Not on file     Minutes per  session: Not on file    Stress: Patient refused   Relationships    Social connections:     Talks on phone: Not on file     Gets together: Not on file     Attends [employer] service: Not on file     Active member of club or organization: Not on file     Attends meetings of clubs or organizations: Not on file     Relationship status: Not on file   Other Topics Concern    Not on file   Social History Narrative    Not on file       FAMILY HISTORY:  Family History   Problem Relation Age of Onset    Cancer Mother 85        pancreatic cancer, passed at 95    Hypertension Mother     Arthritis Mother     Cancer Father 84        pancreatic, passed at 85    Cancer Maternal Aunt 60        pancreatic, passed at 62    No Known Problems Sister     Hypertension Brother     Lung cancer Paternal Aunt     Colon cancer Paternal Uncle     Heart attack Paternal Grandmother     Heart attack Paternal Grandfather     Hypertension Brother     Hashimoto's thyroiditis Sister     Breast cancer Neg Hx     Ovarian cancer Neg Hx        ALLERGIES AND MEDICATIONS: updated and reviewed.  Review of patient's allergies indicates:  No Known Allergies  Current Outpatient Medications   Medication Sig Dispense Refill    b complex vitamins tablet Take 1 tablet by mouth once daily.      clobetasol 0.05% (TEMOVATE) 0.05 % Oint APPLY TO AFFECTED AREA(S) TWICE A DAY FOR 1 MONTH(S) then EVERY DAY FOR 1 MONTH(S) then 3 TIMES A WEEK 60 g 2    hydrocortisone (ANUSOL-HC) 2.5 % rectal cream Place rectally 2 (two) times daily. 30 g 1    ibuprofen (ADVIL,MOTRIN) 600 MG tablet Take 1 tablet (600 mg total) by mouth every 6 (six) hours as needed for Pain. 60 tablet 0    levocetirizine (XYZAL) 5 MG tablet Take 1 tablet (5 mg total) by mouth every evening. 30 tablet 3    MILK THISTLE ORAL Take by mouth.      nystatin (MYCOSTATIN) cream Apply topically every Mon, Wed, Fri. 30 g 11    ondansetron (ZOFRAN) 8 MG tablet Take 1 tablet (8 mg total) by  mouth every 12 (twelve) hours as needed for Nausea. 30 tablet 2    polyethylene glycol (GOLYTELY,NULYTELY) 236-22.74-6.74 -5.86 gram suspension       silver sulfADIAZINE 1% (SILVADENE) 1 % cream Apply topically every morning. 50 g 11     No current facility-administered medications for this visit.          ROS:  Review of Systems   Constitutional: Negative for appetite change, chills, fever and unexpected weight change.   Respiratory: Negative for cough and shortness of breath.    Cardiovascular: Negative for chest pain.   Gastrointestinal: Negative for abdominal pain, diarrhea and vomiting.   Musculoskeletal: Negative.    Skin: Positive for rash.   Neurological: Negative for dizziness, light-headedness and headaches.   Psychiatric/Behavioral: Negative for dysphoric mood. The patient is not nervous/anxious.      Objective:       Physical Exam  Vitals:    10/15/19 1110   BP: 126/72   BP Location: Left arm   Patient Position: Sitting   BP Method: Medium (Manual)   Pulse: 80   SpO2: 97%   Weight: 75.1 kg (165 lb 10.8 oz)   Height: 5' (1.524 m)    Body mass index is 32.36 kg/m².  Weight: 75.1 kg (165 lb 10.8 oz)   Height: 5' (152.4 cm)   Physical Exam   Constitutional: She appears well-developed. No distress.   HENT:   Head: Normocephalic and atraumatic.   Mouth/Throat: Oropharynx is clear and moist.   Eyes: Pupils are equal, round, and reactive to light. Conjunctivae and EOM are normal.   Neck: Normal range of motion. Neck supple. No thyromegaly present.   Cardiovascular: Normal rate, normal heart sounds and intact distal pulses.   No murmur heard.  Pulmonary/Chest: Effort normal and breath sounds normal.   Musculoskeletal: She exhibits no edema or deformity.   Lymphadenopathy:     She has no cervical adenopathy.   Neurological: She is alert. No cranial nerve deficit.   Skin: Skin is warm and dry.   Significant erythema/tenderness with crusted yellow drainage present at left Port-a-cath site   Psychiatric: She has a  normal mood and affect. Her behavior is normal.   Vitals reviewed.          Assessment:     1. Cellulitis of chest wall    2. Endometrial cancer      Plan:     Jeanne was seen today for infected port.    Diagnoses and all orders for this visit:    Cellulitis of chest wall  Discussed etiology of skin/soft tissue infection.will trial doxy  -     doxycycline (VIBRA-TABS) 100 MG tablet; Take 1 tablet (100 mg total) by mouth 2 (two) times daily. for 7 days    Endometrial cancer  On carboplatin/taxol  Following with Gyn/Onc (Dr Regalado)         Health Maintenance       Date Due Completion Date    Shingles Vaccine (1 of 2) 01/27/2003 ---    Mammogram 02/21/2020 2/21/2019    Pneumococcal Vaccine (65+ High/Highest Risk) (2 of 2 - PPSV23) 09/18/2020 9/10/2019    DEXA SCAN 08/13/2021 8/13/2018    Lipid Panel 12/26/2022 12/26/2017    TETANUS VACCINE 09/18/2025 9/18/2015    Colonoscopy 01/15/2026 1/15/2016            Health Maintenance reviewed and addressed as per orders    No follow-ups on file.    The patient expressed understanding and no barriers to adherence were identified.     1. The patient indicates understanding of these issues and agrees with the plan. Brief care plan is updated and reviewed with the patient as applicable.     2. The patient is given an After Visit Summary that lists all medications with directions, allergies, orders placed during this encounter and follow-up instructions.     3. I have reviewed the patient's medical information including past medical, family, and social history sections including the medications and allergies.     4. We discussed the patient's current medications. I reconciled the patient's medication list and prepared and supplied needed refills.       Gary Gonzalez MD  Internal Medicine-Pediatrics

## 2019-10-16 ENCOUNTER — PATIENT MESSAGE (OUTPATIENT)
Dept: GYNECOLOGIC ONCOLOGY | Facility: CLINIC | Age: 64
End: 2019-10-16

## 2019-10-18 ENCOUNTER — OFFICE VISIT (OUTPATIENT)
Dept: GYNECOLOGIC ONCOLOGY | Facility: CLINIC | Age: 64
End: 2019-10-18
Payer: MEDICARE

## 2019-10-18 VITALS
SYSTOLIC BLOOD PRESSURE: 142 MMHG | BODY MASS INDEX: 32.22 KG/M2 | HEART RATE: 78 BPM | DIASTOLIC BLOOD PRESSURE: 62 MMHG | WEIGHT: 165 LBS

## 2019-10-18 DIAGNOSIS — Z51.11 ENCOUNTER FOR ANTINEOPLASTIC CHEMOTHERAPY: ICD-10-CM

## 2019-10-18 DIAGNOSIS — N39.0 RECURRENT UTI: ICD-10-CM

## 2019-10-18 DIAGNOSIS — R73.03 PRE-DIABETES: ICD-10-CM

## 2019-10-18 DIAGNOSIS — C54.1 ENDOMETRIAL CANCER: Primary | ICD-10-CM

## 2019-10-18 PROCEDURE — 99213 OFFICE O/P EST LOW 20 MIN: CPT | Mod: PBBFAC | Performed by: OBSTETRICS & GYNECOLOGY

## 2019-10-18 PROCEDURE — 99214 PR OFFICE/OUTPT VISIT, EST, LEVL IV, 30-39 MIN: ICD-10-PCS | Mod: 24,S$PBB,, | Performed by: OBSTETRICS & GYNECOLOGY

## 2019-10-18 PROCEDURE — 99999 PR PBB SHADOW E&M-EST. PATIENT-LVL III: CPT | Mod: PBBFAC,,, | Performed by: OBSTETRICS & GYNECOLOGY

## 2019-10-18 PROCEDURE — 99999 PR PBB SHADOW E&M-EST. PATIENT-LVL III: ICD-10-PCS | Mod: PBBFAC,,, | Performed by: OBSTETRICS & GYNECOLOGY

## 2019-10-18 PROCEDURE — 99214 OFFICE O/P EST MOD 30 MIN: CPT | Mod: 24,S$PBB,, | Performed by: OBSTETRICS & GYNECOLOGY

## 2019-10-18 NOTE — PROGRESS NOTES
Subjective:      Patient ID: Jeanne Rodgers is a 64 y.o. female.    Chief Complaint: Chemotherapy (carbo/Taxol cycle 2)    Treatment History  Stage IIIC2 UPSC  Preop PET revealing positive pelvic and PA nodes  RALH/BSO/Debulking of left pelvic nodes (6/8 positive) on 8/15/19  Port placed 9/26/19  T/C started 9/27/19    HPI  Here today for consideration of C2 of chemotherapy.  Labs reviewed and ok for treatment.  Was seen by her primary MD for what was felt to be port associated cellulitis.  Is on Doxy.  Review of Systems   Constitutional: Negative for activity change, appetite change, chills, fatigue and fever.   HENT: Negative for hearing loss, mouth sores, nosebleeds, sore throat and tinnitus.    Eyes: Negative for visual disturbance.   Respiratory: Negative for cough, chest tightness, shortness of breath and wheezing.    Cardiovascular: Negative for chest pain and leg swelling.   Gastrointestinal: Negative for abdominal distention, abdominal pain, blood in stool, constipation, diarrhea, nausea and vomiting.   Genitourinary: Negative for dysuria, flank pain, frequency, hematuria, pelvic pain, vaginal bleeding, vaginal discharge and vaginal pain.   Musculoskeletal: Negative for arthralgias and back pain.   Skin: Negative for rash.   Neurological: Negative for dizziness, seizures, syncope, weakness and numbness.   Hematological: Does not bruise/bleed easily.   Psychiatric/Behavioral: Negative for confusion and sleep disturbance. The patient is not nervous/anxious.        Objective:   Physical Exam:   Constitutional: She appears well-developed and well-nourished. No distress.    HENT:   Head: Normocephalic and atraumatic.    Eyes: No scleral icterus.    Neck: Normal range of motion. Neck supple.    Cardiovascular: Normal rate and intact distal pulses.  Exam reveals no cyanosis and no edema.     Pulmonary/Chest: Effort normal. No respiratory distress. She exhibits no tenderness.        Abdominal: Soft. She  exhibits no distension (incisions healing well), no fluid wave, no ascites and no mass. There is no tenderness. There is no rebound and no guarding. No hernia.                Lymphadenopathy:     She has no cervical adenopathy.     Skin: No cyanosis.   Port sie with incision separation and scab.  No obvious cellultis, no pus.        Assessment:     1. Endometrial cancer    2. Encounter for antineoplastic chemotherapy    3. Pre-diabetes    4. Recurrent UTI        Plan:       Wound separation at port site.  No obvious cellulitis but they say looks better since abx.  Will delay 1 week as she does not tolerate PIV stick. RTC 1 week

## 2019-10-24 ENCOUNTER — LAB VISIT (OUTPATIENT)
Dept: LAB | Facility: HOSPITAL | Age: 64
End: 2019-10-24
Attending: OBSTETRICS & GYNECOLOGY
Payer: MEDICARE

## 2019-10-24 DIAGNOSIS — C54.1 ENDOMETRIAL CANCER: ICD-10-CM

## 2019-10-24 LAB
BASOPHILS # BLD AUTO: 0.01 K/UL (ref 0–0.2)
BASOPHILS NFR BLD: 0.3 % (ref 0–1.9)
DIFFERENTIAL METHOD: ABNORMAL
EOSINOPHIL # BLD AUTO: 0.1 K/UL (ref 0–0.5)
EOSINOPHIL NFR BLD: 3.1 % (ref 0–8)
ERYTHROCYTE [DISTWIDTH] IN BLOOD BY AUTOMATED COUNT: 14 % (ref 11.5–14.5)
HCT VFR BLD AUTO: 37.1 % (ref 37–48.5)
HGB BLD-MCNC: 11.9 G/DL (ref 12–16)
IMM GRANULOCYTES # BLD AUTO: 0.01 K/UL (ref 0–0.04)
IMM GRANULOCYTES NFR BLD AUTO: 0.3 % (ref 0–0.5)
LYMPHOCYTES # BLD AUTO: 1.3 K/UL (ref 1–4.8)
LYMPHOCYTES NFR BLD: 34.2 % (ref 18–48)
MCH RBC QN AUTO: 31.7 PG (ref 27–31)
MCHC RBC AUTO-ENTMCNC: 32.1 G/DL (ref 32–36)
MCV RBC AUTO: 99 FL (ref 82–98)
MONOCYTES # BLD AUTO: 0.4 K/UL (ref 0.3–1)
MONOCYTES NFR BLD: 9 % (ref 4–15)
NEUTROPHILS # BLD AUTO: 2.1 K/UL (ref 1.8–7.7)
NEUTROPHILS NFR BLD: 53.1 % (ref 38–73)
NRBC BLD-RTO: 0 /100 WBC
PLATELET # BLD AUTO: 279 K/UL (ref 150–350)
PMV BLD AUTO: 9.6 FL (ref 9.2–12.9)
RBC # BLD AUTO: 3.75 M/UL (ref 4–5.4)
WBC # BLD AUTO: 3.89 K/UL (ref 3.9–12.7)

## 2019-10-24 PROCEDURE — 85025 COMPLETE CBC W/AUTO DIFF WBC: CPT

## 2019-10-24 PROCEDURE — 36415 COLL VENOUS BLD VENIPUNCTURE: CPT | Mod: PO

## 2019-10-25 ENCOUNTER — TELEPHONE (OUTPATIENT)
Dept: GYNECOLOGIC ONCOLOGY | Facility: CLINIC | Age: 64
End: 2019-10-25

## 2019-10-25 ENCOUNTER — INFUSION (OUTPATIENT)
Dept: INFUSION THERAPY | Facility: OTHER | Age: 64
End: 2019-10-25
Attending: INTERNAL MEDICINE
Payer: MEDICARE

## 2019-10-25 VITALS
TEMPERATURE: 99 F | DIASTOLIC BLOOD PRESSURE: 69 MMHG | RESPIRATION RATE: 18 BRPM | OXYGEN SATURATION: 95 % | HEART RATE: 71 BPM | HEIGHT: 60 IN | BODY MASS INDEX: 32.2 KG/M2 | SYSTOLIC BLOOD PRESSURE: 132 MMHG | WEIGHT: 164 LBS

## 2019-10-25 DIAGNOSIS — C54.1 ENDOMETRIAL CANCER: Primary | ICD-10-CM

## 2019-10-25 DIAGNOSIS — Z51.11 ENCOUNTER FOR ANTINEOPLASTIC CHEMOTHERAPY: Primary | ICD-10-CM

## 2019-10-25 PROCEDURE — 96361 HYDRATE IV INFUSION ADD-ON: CPT

## 2019-10-25 PROCEDURE — 96417 CHEMO IV INFUS EACH ADDL SEQ: CPT

## 2019-10-25 PROCEDURE — 96375 TX/PRO/DX INJ NEW DRUG ADDON: CPT

## 2019-10-25 PROCEDURE — 63600175 PHARM REV CODE 636 W HCPCS: Performed by: OBSTETRICS & GYNECOLOGY

## 2019-10-25 PROCEDURE — 96415 CHEMO IV INFUSION ADDL HR: CPT

## 2019-10-25 PROCEDURE — 96413 CHEMO IV INFUSION 1 HR: CPT

## 2019-10-25 PROCEDURE — 96367 TX/PROPH/DG ADDL SEQ IV INF: CPT

## 2019-10-25 PROCEDURE — S0028 INJECTION, FAMOTIDINE, 20 MG: HCPCS | Performed by: OBSTETRICS & GYNECOLOGY

## 2019-10-25 PROCEDURE — 25000003 PHARM REV CODE 250: Performed by: OBSTETRICS & GYNECOLOGY

## 2019-10-25 RX ORDER — SODIUM CHLORIDE 0.9 % (FLUSH) 0.9 %
10 SYRINGE (ML) INJECTION
Status: DISCONTINUED | OUTPATIENT
Start: 2019-10-25 | End: 2019-10-25 | Stop reason: HOSPADM

## 2019-10-25 RX ORDER — EPINEPHRINE 0.3 MG/.3ML
0.3 INJECTION SUBCUTANEOUS ONCE AS NEEDED
Status: CANCELLED | OUTPATIENT
Start: 2019-10-25

## 2019-10-25 RX ORDER — EPINEPHRINE 0.3 MG/.3ML
0.3 INJECTION SUBCUTANEOUS ONCE AS NEEDED
Status: DISCONTINUED | OUTPATIENT
Start: 2019-10-25 | End: 2019-10-25 | Stop reason: HOSPADM

## 2019-10-25 RX ORDER — FAMOTIDINE 10 MG/ML
20 INJECTION INTRAVENOUS
Status: COMPLETED | OUTPATIENT
Start: 2019-10-25 | End: 2019-10-25

## 2019-10-25 RX ORDER — DIPHENHYDRAMINE HYDROCHLORIDE 50 MG/ML
50 INJECTION INTRAMUSCULAR; INTRAVENOUS ONCE AS NEEDED
Status: CANCELLED | OUTPATIENT
Start: 2019-10-25

## 2019-10-25 RX ORDER — FAMOTIDINE 10 MG/ML
20 INJECTION INTRAVENOUS
Status: CANCELLED | OUTPATIENT
Start: 2019-10-25

## 2019-10-25 RX ORDER — DIPHENHYDRAMINE HYDROCHLORIDE 50 MG/ML
50 INJECTION INTRAMUSCULAR; INTRAVENOUS ONCE AS NEEDED
Status: DISCONTINUED | OUTPATIENT
Start: 2019-10-25 | End: 2019-10-25 | Stop reason: HOSPADM

## 2019-10-25 RX ORDER — HEPARIN 100 UNIT/ML
500 SYRINGE INTRAVENOUS
Status: DISCONTINUED | OUTPATIENT
Start: 2019-10-25 | End: 2019-10-25 | Stop reason: HOSPADM

## 2019-10-25 RX ORDER — SODIUM CHLORIDE 0.9 % (FLUSH) 0.9 %
10 SYRINGE (ML) INJECTION
Status: CANCELLED | OUTPATIENT
Start: 2019-10-25

## 2019-10-25 RX ORDER — HEPARIN 100 UNIT/ML
500 SYRINGE INTRAVENOUS
Status: CANCELLED | OUTPATIENT
Start: 2019-10-25

## 2019-10-25 RX ADMIN — DIPHENHYDRAMINE HYDROCHLORIDE 50 MG: 50 INJECTION, SOLUTION INTRAMUSCULAR; INTRAVENOUS at 12:10

## 2019-10-25 RX ADMIN — PACLITAXEL 312 MG: 6 INJECTION, SOLUTION INTRAVENOUS at 12:10

## 2019-10-25 RX ADMIN — SODIUM CHLORIDE 500 ML: 0.9 INJECTION, SOLUTION INTRAVENOUS at 03:10

## 2019-10-25 RX ADMIN — CARBOPLATIN 710 MG: 10 INJECTION, SOLUTION INTRAVENOUS at 03:10

## 2019-10-25 RX ADMIN — FAMOTIDINE 20 MG: 10 INJECTION, SOLUTION INTRAVENOUS at 11:10

## 2019-10-25 RX ADMIN — HYDROCORTISONE SODIUM SUCCINATE 100 MG: 100 INJECTION, POWDER, FOR SOLUTION INTRAMUSCULAR; INTRAVENOUS at 12:10

## 2019-10-25 RX ADMIN — DEXAMETHASONE SODIUM PHOSPHATE: 4 INJECTION, SOLUTION INTRA-ARTICULAR; INTRALESIONAL; INTRAMUSCULAR; INTRAVENOUS; SOFT TISSUE at 11:10

## 2019-10-25 NOTE — PLAN OF CARE
C2 Taxol/Carboplatin administered, infusion reaction noted but tolerated re-trial (please see nursing note). VSS, NAD. D/C'd home with self.

## 2019-10-25 NOTE — NURSING
13 minutes into Taxol infusion, patient reported facial flushing, sweating, and 7/10 lowe back pain. Taxol immediately stopped, NS opened to bolus. VSS (see flowsheet). Solumedrol given. IVF administered, for 15 minutes. Resolution of all symptoms. Restarted Taxol at 100ml/hr for 15 minutes, patient tolerated well. Taxol continued as ordered and patient finished without incident.

## 2019-10-31 ENCOUNTER — NUTRITION (OUTPATIENT)
Dept: NUTRITION | Facility: CLINIC | Age: 64
End: 2019-10-31
Payer: MEDICARE

## 2019-10-31 VITALS — HEIGHT: 60 IN | WEIGHT: 165.56 LBS | BODY MASS INDEX: 32.51 KG/M2

## 2019-10-31 DIAGNOSIS — E66.9 OBESITY (BMI 30-39.9): ICD-10-CM

## 2019-10-31 DIAGNOSIS — C54.1 ENDOMETRIAL CANCER: Primary | ICD-10-CM

## 2019-10-31 DIAGNOSIS — Z71.3 DIETARY COUNSELING: ICD-10-CM

## 2019-10-31 PROCEDURE — 99213 OFFICE O/P EST LOW 20 MIN: CPT | Mod: PBBFAC

## 2019-10-31 PROCEDURE — 99999 PR PBB SHADOW E&M-EST. PATIENT-LVL III: ICD-10-PCS | Mod: PBBFAC,,,

## 2019-10-31 PROCEDURE — 99999 PR PBB SHADOW E&M-EST. PATIENT-LVL III: CPT | Mod: PBBFAC,,,

## 2019-10-31 PROCEDURE — 97802 MEDICAL NUTRITION INDIV IN: CPT | Mod: PBBFAC | Performed by: DIETITIAN, REGISTERED

## 2019-10-31 NOTE — PROGRESS NOTES
Referring Physician:Darius Regalado MD     Reason for visit:  Chief Complaint   Patient presents with    Endometrial Cancer    Obesity    Nutrition Counseling      Initial Visit    :1955     Allergies Reviewed  Meds Reviewed    Anthropometrics  Weight:75.1 kg (165 lb 9.1 oz)  Height:5' (1.524 m)  BMI:Body mass index is 32.33 kg/m².   IBW:   45.5 kg  +/-10%    Meds:  Outpatient Medications Prior to Visit   Medication Sig Dispense Refill    b complex vitamins tablet Take 1 tablet by mouth once daily.      clobetasol 0.05% (TEMOVATE) 0.05 % Oint APPLY TO AFFECTED AREA(S) TWICE A DAY FOR 1 MONTH(S) then EVERY DAY FOR 1 MONTH(S) then 3 TIMES A WEEK 60 g 2    hydrocortisone (ANUSOL-HC) 2.5 % rectal cream Place rectally 2 (two) times daily. 30 g 1    ibuprofen (ADVIL,MOTRIN) 600 MG tablet Take 1 tablet (600 mg total) by mouth every 6 (six) hours as needed for Pain. 60 tablet 0    levocetirizine (XYZAL) 5 MG tablet Take 1 tablet (5 mg total) by mouth every evening. 30 tablet 3    MILK THISTLE ORAL Take by mouth.      nystatin (MYCOSTATIN) cream Apply topically every Mon, Wed, Fri. 30 g 11    ondansetron (ZOFRAN) 8 MG tablet Take 1 tablet (8 mg total) by mouth every 12 (twelve) hours as needed for Nausea. 30 tablet 2    polyethylene glycol (GOLYTELY,NULYTELY) 236-22.74-6.74 -5.86 gram suspension       silver sulfADIAZINE 1% (SILVADENE) 1 % cream Apply topically every morning. 50 g 11    TURMERIC ORAL Take by mouth.       No facility-administered medications prior to visit.        Food/Drug Interactions Noted:  n/a    Vitamins/Supplements/Herbs:    b-complex; milk thistle; turmeric occasionally    Labs:   10/25/19 CMP wnl     Nutrition Prescription:   1875 Kcals/day( 25 kcal/kg),  75 g protein( 1.0 g/kg)     Support System:    Pt lives alone.  States she has strong family/friend support system for encouragement.  Family/friends (one of whom is a ) bring food over.    Diet Hx:   Pt states she feels  nauseated today; is undergoing chemo.  States she has no desire; also repeated throughout the visit that she is often unsure of what she wants to eat.  States family/friends keep giving her advice ie avoid all sugar as it feeds cancer cells and pt voiced frustration with everything she is dealing with at this time.  States she tries to follow healthy diet regimen:  [employer] soups, water, water with lemon, no soft drinks, high protein foods, organic foods.  Pt reports her weight is stable at this time.    Breakfast:   Egg, toast, coffee, cup of fruit, fat free greek yogurt  Lunch:   Caesar salad with walnuts, parmesan cheese, lemon juice and small amount of caesar dressing.  Dinner:   Last night had a small pizza (which she stated contributed to her nausea-as most bread items do at present).  Sometimes will oven roast tomatoes with olive oil and mix with pasta and parmesan cheese.  Baked chicken.  Has black-eyed peas, black beans and red beans in her freezer.  Or will eat a [employer] meal    Current activity level and/or physical limitations:    Undergoing chemo; normal activity    Motivation to make changes/anticipated barriers and/or expected adherence:    Pt seems conflicted about including any sugars/sweets in her diet regimen because of pressure from her friends/family and repeated several times she just doesn't know what to eat (when probed, pt seems to be overwhelmed with current situation)    Nutrition-Focus Physical Findings:    Pt wearing surgical mask; became a little tearful at times; seems well nourished      Assessment:   Pt attentive and asked relevant questions about foods/beverages recommended & to avoid while undergoing chemo/radiation (although she seems familiar with restrictions from chemo class information and prior experience with breast cancer).  Very focused on discussing her intent to reduce sugars in her diet while also voicing concern with what she can eat.  When asked if  "information provided today was helpful, pt stated "not really".  Suggestion was made to list her favorite foods when she is feeling food/having a good day so she can have favorite go-to foods available for those times when she doesn't know what she feels like eating.      Nutrition Diagnosis:   Food- and nutrition-related knowledge deficit RT lack of prior exposure to information AEB dx endometrial cancer      Recommendations:   Heart healthy diet as tolerated  Handouts provided & reviewed:  General, Healthy Mediterranean Diet Nutrition Therapy; Food Safety Nutrition Therapy    Strategies Implemented:    Encouraged po intake of preferred foods/beverages    Consultation Time:40 minutes.    Communicated with referring healthcare provider:  Consult note available in pt's Epic chart per MD discretion    Follow Up:  Pt provided with dietitian contact number and encouraged to call with questions or make future appointment if further intervention needed.        "

## 2019-11-01 ENCOUNTER — TELEPHONE (OUTPATIENT)
Dept: GYNECOLOGIC ONCOLOGY | Facility: CLINIC | Age: 64
End: 2019-11-01

## 2019-11-01 NOTE — TELEPHONE ENCOUNTER
----- Message from Julissa Quintanilla sent at 11/1/2019  2:14 PM CDT -----  Contact: YON MCKEON   Name of Who is Calling: YON MCKEON       What is the request in detail: Patient is requesting her appointment on 11/13/2019 with Dr.Estes alatorre at the [facility] location on [facility]       Can the clinic reply by [facility]: no    What Number to Call Back if not in [facility]: [phone]

## 2019-11-12 ENCOUNTER — LAB VISIT (OUTPATIENT)
Dept: LAB | Facility: HOSPITAL | Age: 64
End: 2019-11-12
Attending: OBSTETRICS & GYNECOLOGY
Payer: MEDICARE

## 2019-11-12 DIAGNOSIS — C54.1 ENDOMETRIAL CANCER: ICD-10-CM

## 2019-11-12 LAB
ALBUMIN SERPL BCP-MCNC: 3.7 G/DL (ref 3.5–5.2)
ALP SERPL-CCNC: 77 U/L (ref 55–135)
ALT SERPL W/O P-5'-P-CCNC: 30 U/L (ref 10–44)
ANION GAP SERPL CALC-SCNC: 10 MMOL/L (ref 8–16)
AST SERPL-CCNC: 35 U/L (ref 10–40)
BASOPHILS # BLD AUTO: 0.02 K/UL (ref 0–0.2)
BASOPHILS NFR BLD: 0.6 % (ref 0–1.9)
BILIRUB SERPL-MCNC: 0.4 MG/DL (ref 0.1–1)
BUN SERPL-MCNC: 8 MG/DL (ref 8–23)
CALCIUM SERPL-MCNC: 9.1 MG/DL (ref 8.7–10.5)
CANCER AG125 SERPL-ACNC: 13 U/ML (ref 0–30)
CHLORIDE SERPL-SCNC: 107 MMOL/L (ref 95–110)
CO2 SERPL-SCNC: 23 MMOL/L (ref 23–29)
CREAT SERPL-MCNC: 0.8 MG/DL (ref 0.5–1.4)
DIFFERENTIAL METHOD: ABNORMAL
EOSINOPHIL # BLD AUTO: 0 K/UL (ref 0–0.5)
EOSINOPHIL NFR BLD: 0.9 % (ref 0–8)
ERYTHROCYTE [DISTWIDTH] IN BLOOD BY AUTOMATED COUNT: 13.9 % (ref 11.5–14.5)
EST. GFR  (AFRICAN AMERICAN): >60 ML/MIN/1.73 M^2
EST. GFR  (NON AFRICAN AMERICAN): >60 ML/MIN/1.73 M^2
GLUCOSE SERPL-MCNC: 153 MG/DL (ref 70–110)
HCT VFR BLD AUTO: 34.4 % (ref 37–48.5)
HGB BLD-MCNC: 11.7 G/DL (ref 12–16)
IMM GRANULOCYTES # BLD AUTO: 0.03 K/UL (ref 0–0.04)
IMM GRANULOCYTES NFR BLD AUTO: 0.9 % (ref 0–0.5)
LYMPHOCYTES # BLD AUTO: 1.1 K/UL (ref 1–4.8)
LYMPHOCYTES NFR BLD: 33.9 % (ref 18–48)
MCH RBC QN AUTO: 33.1 PG (ref 27–31)
MCHC RBC AUTO-ENTMCNC: 34 G/DL (ref 32–36)
MCV RBC AUTO: 97 FL (ref 82–98)
MONOCYTES # BLD AUTO: 0.4 K/UL (ref 0.3–1)
MONOCYTES NFR BLD: 11.9 % (ref 4–15)
NEUTROPHILS # BLD AUTO: 1.7 K/UL (ref 1.8–7.7)
NEUTROPHILS NFR BLD: 51.8 % (ref 38–73)
NRBC BLD-RTO: 0 /100 WBC
PLATELET # BLD AUTO: 89 K/UL (ref 150–350)
PLATELET BLD QL SMEAR: ABNORMAL
PMV BLD AUTO: 10.7 FL (ref 9.2–12.9)
POTASSIUM SERPL-SCNC: 4.1 MMOL/L (ref 3.5–5.1)
PROT SERPL-MCNC: 6.9 G/DL (ref 6–8.4)
RBC # BLD AUTO: 3.54 M/UL (ref 4–5.4)
SODIUM SERPL-SCNC: 140 MMOL/L (ref 136–145)
WBC # BLD AUTO: 3.19 K/UL (ref 3.9–12.7)

## 2019-11-12 PROCEDURE — 80053 COMPREHEN METABOLIC PANEL: CPT

## 2019-11-12 PROCEDURE — 86304 IMMUNOASSAY TUMOR CA 125: CPT

## 2019-11-12 PROCEDURE — 85025 COMPLETE CBC W/AUTO DIFF WBC: CPT

## 2019-11-12 PROCEDURE — 36415 COLL VENOUS BLD VENIPUNCTURE: CPT | Mod: PO

## 2019-11-13 ENCOUNTER — OFFICE VISIT (OUTPATIENT)
Dept: GYNECOLOGIC ONCOLOGY | Facility: CLINIC | Age: 64
End: 2019-11-13
Payer: MEDICARE

## 2019-11-13 VITALS
HEART RATE: 91 BPM | SYSTOLIC BLOOD PRESSURE: 121 MMHG | DIASTOLIC BLOOD PRESSURE: 59 MMHG | BODY MASS INDEX: 32.22 KG/M2 | WEIGHT: 165 LBS

## 2019-11-13 DIAGNOSIS — C54.1 ENDOMETRIAL CANCER: Primary | ICD-10-CM

## 2019-11-13 DIAGNOSIS — Z51.11 ENCOUNTER FOR ANTINEOPLASTIC CHEMOTHERAPY: ICD-10-CM

## 2019-11-13 PROCEDURE — 99213 OFFICE O/P EST LOW 20 MIN: CPT | Mod: PBBFAC | Performed by: OBSTETRICS & GYNECOLOGY

## 2019-11-13 PROCEDURE — 99214 PR OFFICE/OUTPT VISIT, EST, LEVL IV, 30-39 MIN: ICD-10-PCS | Mod: 24,S$PBB,, | Performed by: OBSTETRICS & GYNECOLOGY

## 2019-11-13 PROCEDURE — 99999 PR PBB SHADOW E&M-EST. PATIENT-LVL III: CPT | Mod: PBBFAC,,, | Performed by: OBSTETRICS & GYNECOLOGY

## 2019-11-13 PROCEDURE — 99999 PR PBB SHADOW E&M-EST. PATIENT-LVL III: ICD-10-PCS | Mod: PBBFAC,,, | Performed by: OBSTETRICS & GYNECOLOGY

## 2019-11-13 PROCEDURE — 99214 OFFICE O/P EST MOD 30 MIN: CPT | Mod: 24,S$PBB,, | Performed by: OBSTETRICS & GYNECOLOGY

## 2019-11-13 RX ORDER — HEPARIN 100 UNIT/ML
500 SYRINGE INTRAVENOUS
Status: CANCELLED | OUTPATIENT
Start: 2019-11-13

## 2019-11-13 RX ORDER — FAMOTIDINE 10 MG/ML
20 INJECTION INTRAVENOUS
Status: CANCELLED | OUTPATIENT
Start: 2019-11-13

## 2019-11-13 RX ORDER — SODIUM CHLORIDE 0.9 % (FLUSH) 0.9 %
10 SYRINGE (ML) INJECTION
Status: CANCELLED | OUTPATIENT
Start: 2019-11-13

## 2019-11-13 RX ORDER — EPINEPHRINE 0.3 MG/.3ML
0.3 INJECTION SUBCUTANEOUS ONCE AS NEEDED
Status: CANCELLED | OUTPATIENT
Start: 2019-11-13

## 2019-11-13 RX ORDER — DIPHENHYDRAMINE HYDROCHLORIDE 50 MG/ML
50 INJECTION INTRAMUSCULAR; INTRAVENOUS ONCE AS NEEDED
Status: CANCELLED | OUTPATIENT
Start: 2019-11-13

## 2019-11-13 NOTE — PROGRESS NOTES
Subjective:      Patient ID: Jeanne Rodgers is a 64 y.o. female.    Chief Complaint: Chemotherapy (c3 carbo/taxol)    Treatment History  Stage IIIC2 UPSC  Preop PET revealing positive pelvic and PA nodes  RALH/BSO/Debulking of left pelvic nodes (6/8 positive) on 8/15/19  Port placed 9/26/19  T/C started 9/27/19    HPI  Here today for consideration of C3 of chemotherapy.  Labs reviewed and ok for treatment.  Had mild Taxol reaction last treatment.  Review of Systems   Constitutional: Negative for activity change, appetite change, chills, fatigue and fever.   HENT: Negative for hearing loss, mouth sores, nosebleeds, sore throat and tinnitus.    Eyes: Negative for visual disturbance.   Respiratory: Negative for cough, chest tightness, shortness of breath and wheezing.    Cardiovascular: Negative for chest pain and leg swelling.   Gastrointestinal: Negative for abdominal distention, abdominal pain, blood in stool, constipation, diarrhea, nausea and vomiting.   Genitourinary: Negative for dysuria, flank pain, frequency, hematuria, pelvic pain, vaginal bleeding, vaginal discharge and vaginal pain.   Musculoskeletal: Negative for arthralgias and back pain.   Skin: Negative for rash.   Neurological: Negative for dizziness, seizures, syncope, weakness and numbness.   Hematological: Does not bruise/bleed easily.   Psychiatric/Behavioral: Negative for confusion and sleep disturbance. The patient is not nervous/anxious.        Objective:   Physical Exam:   Constitutional: She appears well-developed and well-nourished. No distress.    HENT:   Head: Normocephalic and atraumatic.    Eyes: No scleral icterus.    Neck: Normal range of motion. Neck supple.    Cardiovascular: Normal rate and intact distal pulses.  Exam reveals no cyanosis and no edema.     Pulmonary/Chest: Effort normal. No respiratory distress. She exhibits no tenderness.        Abdominal: Soft. She exhibits no distension (incisions healing well), no fluid  wave, no ascites and no mass. There is no tenderness. There is no rebound and no guarding. No hernia.                Lymphadenopathy:     She has no cervical adenopathy.     Skin: No cyanosis.   Port sie with incision separation and scab.  No obvious cellultis, no pus.        Assessment:     1. Endometrial cancer    2. Encounter for antineoplastic chemotherapy        Plan:       Port site looks better.  I am ok with using it as there is no active infection.  Will give C3, then send to radiation for WPRT.  RTC after radiation.

## 2019-11-13 NOTE — Clinical Note
Mini:Can you please see her for WPRT?  Has had 3 cycles of chemo and would like to sandwich her.  Thanks.  ARASH

## 2019-11-15 ENCOUNTER — INFUSION (OUTPATIENT)
Dept: INFUSION THERAPY | Facility: OTHER | Age: 64
End: 2019-11-15
Attending: OBSTETRICS & GYNECOLOGY
Payer: MEDICARE

## 2019-11-15 VITALS
RESPIRATION RATE: 18 BRPM | HEIGHT: 60 IN | OXYGEN SATURATION: 99 % | DIASTOLIC BLOOD PRESSURE: 70 MMHG | TEMPERATURE: 98 F | BODY MASS INDEX: 32.73 KG/M2 | WEIGHT: 166.69 LBS | HEART RATE: 73 BPM | SYSTOLIC BLOOD PRESSURE: 128 MMHG

## 2019-11-15 DIAGNOSIS — C54.1 ENDOMETRIAL CANCER: Primary | ICD-10-CM

## 2019-11-15 PROCEDURE — 63600175 PHARM REV CODE 636 W HCPCS: Performed by: OBSTETRICS & GYNECOLOGY

## 2019-11-15 PROCEDURE — 96415 CHEMO IV INFUSION ADDL HR: CPT

## 2019-11-15 PROCEDURE — 96361 HYDRATE IV INFUSION ADD-ON: CPT

## 2019-11-15 PROCEDURE — 96413 CHEMO IV INFUSION 1 HR: CPT

## 2019-11-15 PROCEDURE — 96417 CHEMO IV INFUS EACH ADDL SEQ: CPT

## 2019-11-15 PROCEDURE — S0028 INJECTION, FAMOTIDINE, 20 MG: HCPCS | Performed by: OBSTETRICS & GYNECOLOGY

## 2019-11-15 PROCEDURE — 96375 TX/PRO/DX INJ NEW DRUG ADDON: CPT

## 2019-11-15 PROCEDURE — 96367 TX/PROPH/DG ADDL SEQ IV INF: CPT

## 2019-11-15 PROCEDURE — 25000003 PHARM REV CODE 250: Performed by: OBSTETRICS & GYNECOLOGY

## 2019-11-15 RX ORDER — FAMOTIDINE 10 MG/ML
20 INJECTION INTRAVENOUS
Status: COMPLETED | OUTPATIENT
Start: 2019-11-15 | End: 2019-11-15

## 2019-11-15 RX ORDER — SODIUM CHLORIDE 0.9 % (FLUSH) 0.9 %
10 SYRINGE (ML) INJECTION
Status: DISCONTINUED | OUTPATIENT
Start: 2019-11-15 | End: 2019-11-15 | Stop reason: HOSPADM

## 2019-11-15 RX ORDER — HEPARIN 100 UNIT/ML
500 SYRINGE INTRAVENOUS
Status: DISCONTINUED | OUTPATIENT
Start: 2019-11-15 | End: 2019-11-15 | Stop reason: HOSPADM

## 2019-11-15 RX ORDER — EPINEPHRINE 0.3 MG/.3ML
0.3 INJECTION SUBCUTANEOUS ONCE AS NEEDED
Status: DISCONTINUED | OUTPATIENT
Start: 2019-11-15 | End: 2019-11-15 | Stop reason: HOSPADM

## 2019-11-15 RX ORDER — DIPHENHYDRAMINE HYDROCHLORIDE 50 MG/ML
50 INJECTION INTRAMUSCULAR; INTRAVENOUS ONCE AS NEEDED
Status: DISCONTINUED | OUTPATIENT
Start: 2019-11-15 | End: 2019-11-15 | Stop reason: HOSPADM

## 2019-11-15 RX ADMIN — CARBOPLATIN 640 MG: 10 INJECTION, SOLUTION INTRAVENOUS at 12:11

## 2019-11-15 RX ADMIN — FAMOTIDINE 20 MG: 10 INJECTION, SOLUTION INTRAVENOUS at 09:11

## 2019-11-15 RX ADMIN — PACLITAXEL 312 MG: 6 INJECTION, SOLUTION INTRAVENOUS at 10:11

## 2019-11-15 RX ADMIN — PALONOSETRON HYDROCHLORIDE: 0.25 INJECTION, SOLUTION INTRAVENOUS at 09:11

## 2019-11-15 RX ADMIN — SODIUM CHLORIDE 500 ML: 0.9 INJECTION, SOLUTION INTRAVENOUS at 01:11

## 2019-11-15 RX ADMIN — DIPHENHYDRAMINE HYDROCHLORIDE 50 MG: 50 INJECTION, SOLUTION INTRAMUSCULAR; INTRAVENOUS at 09:11

## 2019-11-15 RX ADMIN — HEPARIN SODIUM (PORCINE) LOCK FLUSH IV SOLN 100 UNIT/ML 500 UNITS: 100 SOLUTION at 02:11

## 2019-11-22 ENCOUNTER — INITIAL CONSULT (OUTPATIENT)
Dept: RADIATION ONCOLOGY | Facility: CLINIC | Age: 64
End: 2019-11-22
Payer: MEDICARE

## 2019-11-22 VITALS
HEIGHT: 60 IN | DIASTOLIC BLOOD PRESSURE: 72 MMHG | HEART RATE: 91 BPM | SYSTOLIC BLOOD PRESSURE: 119 MMHG | BODY MASS INDEX: 32.24 KG/M2 | RESPIRATION RATE: 18 BRPM | WEIGHT: 164.19 LBS | TEMPERATURE: 98 F

## 2019-11-22 DIAGNOSIS — C54.1 MALIGNANT NEOPLASM OF ENDOMETRIUM: ICD-10-CM

## 2019-11-22 DIAGNOSIS — D49.59 ENDOMETRIAL NEOPLASM: ICD-10-CM

## 2019-11-22 DIAGNOSIS — C54.1 ENDOMETRIAL CANCER: Primary | ICD-10-CM

## 2019-11-22 PROCEDURE — 1125F PR PAIN SEVERITY QUANTIFIED, PAIN PRESENT: ICD-10-PCS | Mod: ,,, | Performed by: RADIOLOGY

## 2019-11-22 PROCEDURE — 99204 OFFICE O/P NEW MOD 45 MIN: CPT | Mod: S$PBB,,, | Performed by: RADIOLOGY

## 2019-11-22 PROCEDURE — 1159F PR MEDICATION LIST DOCUMENTED IN MEDICAL RECORD: ICD-10-PCS | Mod: ,,, | Performed by: RADIOLOGY

## 2019-11-22 PROCEDURE — 99214 OFFICE O/P EST MOD 30 MIN: CPT | Mod: PBBFAC | Performed by: RADIOLOGY

## 2019-11-22 PROCEDURE — 1125F AMNT PAIN NOTED PAIN PRSNT: CPT | Mod: ,,, | Performed by: RADIOLOGY

## 2019-11-22 PROCEDURE — 1159F MED LIST DOCD IN RCRD: CPT | Mod: ,,, | Performed by: RADIOLOGY

## 2019-11-22 PROCEDURE — 99999 PR PBB SHADOW E&M-EST. PATIENT-LVL IV: ICD-10-PCS | Mod: PBBFAC,,, | Performed by: RADIOLOGY

## 2019-11-22 PROCEDURE — 99204 PR OFFICE/OUTPT VISIT, NEW, LEVL IV, 45-59 MIN: ICD-10-PCS | Mod: S$PBB,,, | Performed by: RADIOLOGY

## 2019-11-22 PROCEDURE — 99999 PR PBB SHADOW E&M-EST. PATIENT-LVL IV: CPT | Mod: PBBFAC,,, | Performed by: RADIOLOGY

## 2019-11-22 NOTE — LETTER
November 22, 2019      Darius Regalado MD  [address]  [facility] [address]           [facility]  [address]  [facility] [address]  Phone: [phone]          Patient: Jeanne Rodgers   MR Number: [id number]   YOB: 1955   Date of Visit: 11/22/2019       Dear Dr. Darius Regalado:    Thank you for referring Jeanne Rodgers to me for evaluation. Attached you will find relevant portions of my assessment and plan of care.    If you have questions, please do not hesitate to call me. I look forward to following Jeanne Rodgers along with you.    Sincerely,    Marley Younger MD    Enclosure  CC:  No Recipients    If you would like to receive this communication electronically, please contact [email or web address] or [phone] to request more information on [employer] Link access.    For providers and/or their staff who would like to refer a patient to [facility], please contact us through our one-stop-shop provider referral line, [facility], at [phone].    If you feel you have received this communication in error or would no longer like to receive these types of communications, please e-mail [email or web address]

## 2019-11-22 NOTE — PATIENT INSTRUCTIONS
Return for followup and ct/sim after PET scan.    Radiation Therapy Treatment  Radiation therapy can help you in your fight against cancer. It begins with a session to discuss treatment with your doctor. If you and your doctor decide on radiation, you will return for a simulation. The simulation is a planning session that helps the doctor target your cancer. He or she will design a radiation plan to protect normal tissues. When the simulation and plan are completed, you will begin your daily treatments. Treatment is usually once daily Monday to Friday. It takes less than a half an hour. Sometimes you may need radiation twice a day, with usually 6 hours between treatments. After the course of radiation is complete, you will be scheduled for follow-up appointments. This is to make sure the cancer is under control. The follow-ups will also make sure that any side effects from the treatment are taken care of.  Radiation therapy uses high-energy X-rays to kill cancer cells.   Your treatment planning visit: The simulation  Your radiation therapy team uses a special machine called a simulator to map out your treatment. The simulator is usually an X-ray machine (fluoroscopy), CT scanner, MRI scanner, or PET-CT scanner machine. Laser lights act as guides to help position your body accurately. During this visit:  · The team figures out the best position for your body. They make notes in your chart so youll be placed the same way each time.  · You may use special devices to keep your body correctly positioned and still during treatment. These may include molds, masks, rests, and blocks.  · The team makes ink marks on your skin. These will help you get in the same position for each treatment. Tiny permanent tattoos may also be used.   · Markers such as metal balls or wires may be put on or in your body. Sometime these are taped to the skin to help with the imaging process. These work with the X-rays to position your body.  The markers are removed when the visit is over.  After the team has the imaging and data, the information is sent into the computer planning system. Your doctor and the team of physicists and dosimetrists design a treatment field. The field will best target your cancer and how it might spread. It will also help limit radiation to nearby normal tissues.  Your treatments  Each treatment usually takes 10 to 30 minutes. You may need to change into a hospital gown. The radiation therapist puts you in the correct position on the treatment table, then leaves the room. Sometimes you may need more imaging before each treatment. The machine may take digital X-rays or a CT scan to help make sure you are lined up correctly. During treatment, lie as still as you can and breathe normally. You will hear noises coming from the machine. You can talk to the radiation therapist, who watches you from the control room on a TV monitor. After treatment, the therapist will help you off the table. You can then get dressed and go back to your normal activities.  Date Last Reviewed: 1/14/2016 © 2000-2017 [employer]. [address] [place] [address]. All rights reserved. This information is not intended as a substitute for professional medical care. Always follow your healthcare professional's instructions.        Discharge Instructions for Radiation Therapy  Radiation therapy uses high-energy X-rays to kill cancer cells and help you in your fight against cancer. Radiation destroys cancer cells gradually, over time. The goal of therapy is to focus on and kill as many cancer cells as possible. Radiation can also damage or kill some of the normal cells that are closest to the tumor. Damaged normal cells can repair themselves, often within a few days.  Caring for your skin  You should ask your healthcare provider for specific products that he or she recommends for washing and bathing. In general, use a mild nondetergent  "soap and warm (not hot) water to clean the area receiving radiation. Pat the region dry rather than rubbing.  Your healthcare provider may give you products to moisturize the skin and prevent infection. The goal is to prevent cracks or breaks in the skin that may be sensitive from treatment:   · Dont be surprised if your treatment causes skin redness, and a type of "sunburn" over time. Some radiation treatments can cause this.   · Ask your therapy team what lotion to use. Also ask for directions about when and how to apply it.  · Avoid prolonged or direct sunlight on the treated area. Ask your therapy team about using a sunscreen. You do not have to avoid going outside altogether, but must take appropriate precautions.  · Dont remove ink marks unless your radiation therapist says its OK. Dont scrub or use soap on the marks when you wash. Let water run over them and pat them dry.  · Protect your skin from heat or cold. Avoid hot tubs, saunas, heating pads, or ice packs.  · Avoid clothing that causes friction or rubbing on the skin.  Fighting fatigue  Radiation therapy may cause you to feel tired. Your body is working hard to heal and repair itself. To feel better, try these things:  · Do light exercise each day. Take short walks.  · Plan tasks for the times when you tend to have the most energy. Ask for help when you need it.  · Relax before you go to bed. This will help you sleep better. Try reading or listening to soothing music.  Coping with appetite changes  Here are ways to cope:  · Tell your therapy team if you find it hard to eat or you have no appetite. You may be referred to a nutritionist to help you with meal planning.  · Radiation to certain internal sites can cause nausea, depending on the location of treatment. This can affect your appetite. Think of healthy eating as part of your treatment. Try these tips:  ¨ Eat slowly.  ¨ Eat small meals several times a day.  ¨ Eat more food when youre feeling " better.  ¨ Ask others to keep you company when you eat.  ¨ Stock up on easy-to-prepare foods.  ¨ Eat foods high in protein and calories. Your healthcare provider may recommend liquid meal supplements.  ¨ Drink plenty of water and other fluids.  ¨ Ask your healthcare provider before taking any vitamins or over-the-counter supplements. Such products are not regulated by the FDA and can sometimes interfere with your treatments.   Dealing with other side effects  Here are suggestions to deal with other side effects:   · Be prepared for hair loss in the area being treated. The hair loss may be permanent. Be sure to discuss this with your healthcare provider.  · Sip cool water if your mouth or throat becomes dry or sore. Ice chips may also help.  · Tell your healthcare provider if you have diarrhea or constipation. You may be given a special diet.  · If you have trouble swallowing liquids, tell your healthcare provider.  Follow-up  Make a follow-up appointment as directed by your healthcare provider.     When to call your healthcare provider  Call your healthcare provider right away if you have any of the following:  · Unexpected headaches  · Trouble concentrating  · Ongoing fatigue  · Wheezing, shortness of breath, or trouble breathing  · Pain that doesnt go away, especially if its always in the same place  · New or unusual lumps, bumps, or swelling  · Dizziness or lightheadedness  · Unusual rashes, bruises, or bleeding  · Fever of 100.4°F (38°C) or higher, or chills  · Nausea and vomiting  · Diarrhea that doesnt improve with time  · Skin breakdown; significant pain due to skin irritation   Date Last Reviewed: 1/13/2016  © 1477-3886 [employer]. [address] [place] [address], [place] [address]. All rights reserved. This information is not intended as a substitute for professional medical care. Always follow your healthcare professional's instructions.        Radiation Therapy: Managing Short-Term Side Effects      Take short walks daily.   Radiation therapy uses high-energy X-rays or particles to kill cancer cells. Some normal cells can also be affected. This causes side effects such as dry skin, tiredness (fatigue), or changes in your appetite. Most side effects go away when your radiation therapy is over.  Having side effects of radiation therapy does not mean that your cancer is getting worse or that therapy isnt working.   Caring for your skin  Skin problems may happen where your body gets radiation. Your skin may become dry, itchy, red, and peeling. It may darken in that spot, like a tan. To care for your skin:  · Dont scrub on the treatment area. Clean that area of the skin every day. Use warm water and mild soap, or as your healthcare provider advises. Pat the skin afterward or let it air dry.  · Ask your therapy team what lotion to use and when to use it.  · Keep the treated area out of the sun. Ask your team about using a sunscreen.  · Don't remove ink marks unless your radiation therapist says you can. Dont scrub the marks when you wash. Let water run over them and pat them dry.  · Protect your skin from heat or cold. Avoid hot tubs, saunas, hot pads, and ice packs.  · Wear soft, loose clothing to avoid rubbing skin.  Fighting tiredness  The cancer itself or the radiation therapy may cause you to feel tired. Your body is working hard to heal and repair itself. To feel better:  · Try light exercise each day. Take short walks.  · Plan tasks for the times when you tend to have the most energy. Ask for help when you need it.  · Relax before you go to bed to sleep better. Try reading or listening to soothing music.  · Be sure to let your cancer care team know if you continue to have fatigue that is not getting better. They may be able to offer ways to help.   Coping with appetite changes  Tell your therapy team if you find it hard to eat or have no appetite. You may need to see a nutritionist. This is a healthcare  provider with special training in meal planning. To keep your strength up, you need to eat well and maintain your weight. Think of healthy eating as part of your treatment. Try these tips:  · Eat slowly.  · Eat small meals several times a day.  · Eat more food when youre feeling better, even if it is not mealtime.  · Ask others to keep you company when you eat.  · Stock up on easy-to-prepare foods.  · Eat foods high in protein and calories.  · Drink plenty of water and other fluids.  · Ask your healthcare provider before taking any vitamins.  Site-specific side effects  These side effects include the following:   · You may lose hair in the area being treated. The hair often grows back after treatment.  · Your mouth or throat can become dry or sore if your head or neck is being treated. Sip cool water to help ease discomfort.  · Nausea and bowel changes can happen with radiation to the pelvic region. Tell your healthcare provider if you have nausea, diarrhea, or constipation. You may need to take medicine or follow a special diet.  Talk with your healthcare team  Radiation therapy can also have other side effects, including some that might not show up until years later. Be sure to talk with your healthcare team about what to expect with the type of radiation therapy you are getting, including when you should call them with concerns.   Date Last Reviewed: 5/1/2016  © 5272-4206 [employer]. [address] [place] [address], [place] [address]. All rights reserved. This information is not intended as a substitute for professional medical care. Always follow your healthcare professional's instructions.

## 2019-11-22 NOTE — PROGRESS NOTES
REFERRING PHYSICIAN:   Darius Regalado M.D.    DIAGNOSIS:  pT2 N2a Mx, grade 3, FIGO stage III C2 Endometrial cancer    HISTORY OF PRESENT ILLNESS:   Ms. Rodgers is a 64-year-old female with history of right breast cancer in 2014 status post lumpectomy radiation and endocrine therapy who was recently diagnosed with endometrial cancer after evaluation for postmenopausal bleeding.  An endometrial biopsy on July 24, 2019 revealed high-grade adenocarcinoma.  A CT of the chest, abdomen, and pelvis on August 6, 2019 revealed an ill-defined blastic osseous lesion in the left greater than right symphysis pubis highly concerning for hematogenous spread of known high grade endometrial carcinoma.  There was a 5 mm right upper lobe pulmonary nodule and a 1.4 cm hypoattenuating hepatic segment 4A lesion noted.   A PET scan on August 13, 2019 revealed hypermetabolic area within the endometrial cavity with multiple normal size bilateral periaortic and pelvic nodes with hypermetabolic activity consistent with metastatic disease.  There is uptake noted in the left pubic symphysis with SUV max of 3.9 corresponding to the osseous lesion in the CT scan.  On August 15, 2019, she underwent TAHBSO.  Pathology revealed the uterus with serous adenocarcinoma, grade 3, measuring 4 cm, with invasion into 1 cm of myometrium out of a total depth of 1.3 cm.  There was cervical stromal involvement.  No lymphovascular invasion is identified.  6/8 external iliac lymph nodes were positive for metastatic adenocarcinoma.  Adjuvantly, she received 3 cycles of Taxol and carboplatin with the last cycle on November 15, 2019.  She is recommended to undergo adjuvant radiation followed by 3 more cycles of chemotherapy.  She is here today for discussion regarding radiation.    At present, patient reports diffuse abdominal and pelvic pain since her last chemotherapy approximately a week ago.  She also reports epigastric discomfort and constipation as well as  nausea controlled with medication.  She denies vaginal bleeding or discharge.  She has neuropathy secondary to chemotherapy.  She has history of lichen sclerosis involving the vagina being treated with topical steroid.  Of note, her partner was recently diagnosed with lung cancer and is currently undergoing workup.    REVIEW OF SYSTEMS:  As above.  In addition, patient denies headaches, visual problems, dizziness, chest pain, shortness of breath, cough, vomiting, diarrhea, or any new bony pains. Patient also denies easy bruising, skin rashes, or numbness or tingling.    ECO-1    PAST MEDICAL HISTORY:  Past Medical History:   Diagnosis Date    Allergy     Anxiety     Breast cancer     Chronic back pain     Endometrial cancer 2019    Fibromyalgia     Hives     Hx of radiation therapy     Itching     Lichen sclerosus     Mental disorder     PONV (postoperative nausea and vomiting)     Sore throat     Uterine cancer 2019    UTI (urinary tract infection)        PAST SURGICAL HISTORY:  Past Surgical History:   Procedure Laterality Date    anal fissure surgery      APPENDECTOMY      BREAST BIOPSY Right     BREAST LUMPECTOMY Right     R lumpectomy [facility] w 2.4cm IDC & DCIS, neg margins, 0/6 SN, Stage II, ER/LA+, HER-2 neg Adjuvant XRT and hormonal therapy     CARPAL TUNNEL RELEASE      CHOLECYSTECTOMY      COLONOSCOPY      HYSTERECTOMY  08/15/2019    HYSTEROSCOPY WITH DILATION AND CURETTAGE OF UTERUS N/A 2019    Procedure: HYSTEROSCOPY, WITH DILATION AND CURETTAGE OF UTERUS;  Surgeon: Елена Kam MD;  Location: [facility] OR;  Service: OB/GYN;  Laterality: N/A;    INSERTION OF TUNNELED CENTRAL VENOUS CATHETER (CVC) WITH SUBCUTANEOUS PORT N/A 2019    Procedure: INSERTION, PORT-A-CATH;  Surgeon: Moris Varghese MD;  Location: [facility] CATH LAB;  Service: Radiology;  Laterality: N/A;    MASTECTOMY Right         ROBOT-ASSISTED LAPAROSCOPIC ABDOMINAL  HYSTERECTOMY USING DA RAMU XI N/A 8/15/2019    Procedure: XI ROBOTIC HYSTERECTOMY;  Surgeon: Darius Regalado MD;  Location: [facility] OR;  Service: OB/GYN;  Laterality: N/A;    ROBOT-ASSISTED LAPAROSCOPIC SALPINGO-OOPHORECTOMY USING DA RAMU XI Bilateral 8/15/2019    Procedure: XI ROBOTIC SALPINGO-OOPHORECTOMY;  Surgeon: Darius Regalado MD;  Location: [facility] OR;  Service: OB/GYN;  Laterality: Bilateral;    ROBOT-ASSISTED LYMPHADENECTOMY Left 8/15/2019    Procedure: ROBOTIC LYMPHADENECTOMY;  Surgeon: Darius Regalado MD;  Location: [facility] OR;  Service: OB/GYN;  Laterality: Left;    SALPINGOOPHORECTOMY Bilateral 08/15/2019    TONSILLECTOMY      TUBAL LIGATION         ALLERGIES:   Review of patient's allergies indicates:  No Known Allergies    MEDICATIONS:  Current Outpatient Medications   Medication Sig    b complex vitamins tablet Take 1 tablet by mouth once daily.    clobetasol 0.05% (TEMOVATE) 0.05 % Oint APPLY TO AFFECTED AREA(S) TWICE A DAY FOR 1 MONTH(S) then EVERY DAY FOR 1 MONTH(S) then 3 TIMES A WEEK    hydrocortisone (ANUSOL-HC) 2.5 % rectal cream Place rectally 2 (two) times daily.    ibuprofen (ADVIL,MOTRIN) 600 MG tablet Take 1 tablet (600 mg total) by mouth every 6 (six) hours as needed for Pain.    levocetirizine (XYZAL) 5 MG tablet Take 1 tablet (5 mg total) by mouth every evening.    MILK THISTLE ORAL Take by mouth.    nystatin (MYCOSTATIN) cream Apply topically every Mon, Wed, Fri.    ondansetron (ZOFRAN) 8 MG tablet Take 1 tablet (8 mg total) by mouth every 12 (twelve) hours as needed for Nausea.    TURMERIC ORAL Take by mouth.    polyethylene glycol (GOLYTELY,NULYTELY) 236-22.74-6.74 -5.86 gram suspension     silver sulfADIAZINE 1% (SILVADENE) 1 % cream Apply topically every morning. (Patient not taking: Reported on 11/22/2019)     No current facility-administered medications for this visit.        SOCIAL HISTORY:  Social History     Socioeconomic History    Marital status: Single     Spouse  name: Not on file    Number of children: Not on file    Years of education: Not on file    Highest education level: Not on file   Occupational History    Not on file   Social Needs    Financial resource strain: Not on file    Food insecurity:     Worry: Not on file     Inability: Not on file    Transportation needs:     Medical: Not on file     Non-medical: Not on file   Tobacco Use    Smoking status: Never Smoker    Smokeless tobacco: Never Used   Substance and Sexual Activity    Alcohol use: No     Alcohol/week: 0.0 standard drinks    Drug use: No    Sexual activity: Never   Lifestyle    Physical activity:     Days per week: Not on file     Minutes per session: Not on file    Stress: Patient refused   Relationships    Social connections:     Talks on phone: Not on file     Gets together: Not on file     Attends Protestant service: Not on file     Active member of club or organization: Not on file     Attends meetings of clubs or organizations: Not on file     Relationship status: Not on file   Other Topics Concern    Not on file   Social History Narrative    Not on file       FAMILY HISTORY:  Family History   Problem Relation Age of Onset    Cancer Mother 85        pancreatic cancer, passed at 95    Hypertension Mother     Arthritis Mother     Cancer Father 84        pancreatic, passed at 85    Cancer Maternal Aunt 60        pancreatic, passed at 62    No Known Problems Sister     Hypertension Brother     Lung cancer Paternal Aunt     Colon cancer Paternal Uncle     Heart attack Paternal Grandmother     Heart attack Paternal Grandfather     Hypertension Brother     Hashimoto's thyroiditis Sister     Breast cancer Neg Hx     Ovarian cancer Neg Hx          PHYSICAL EXAMINATION:  Vitals:    11/22/19 0907   BP: 119/72   Pulse: 91   Resp: 18   Temp: 98.1 °F (36.7 °C)   TempSrc: Oral   Weight: 74.5 kg (164 lb 3.2 oz)   Height: 5' (1.524 m)   Body mass index is 32.07 kg/m².  GENERAL:  Patient is alert and oriented, in no acute distress.  HEENT:Extraocular muscles are intact.  Oropharynx is clear without lesions.  There is no cervical or supraclavicular lymphadenopathy palpated.  No thyromegaly noted.  HEART: Regular rate and rhythm.  LUNGS: Clear to auscultation bilaterally.  BREAST EXAM:   Deferred  ABDOMEN:Soft, nontender, nondistended, without hepatosplenomegaly.  Normoactive bowel sounds.  PELVIC EXAM:   There is tenderness to palpation of the pubic symphysis. A speculum examination reveals intact vaginal cuff with no suspicious lesions.  EXTREMITIES: No clubbing, cyanosis, or edema.  NEUROLOGICAL: Cranial nerve II through XII grossly intact.  Sensation is intact.  Strength is 5 out of 5 in the upper and lower extremities bilaterally.     ASSESSMENT:   This is a 64-year-old female with FIGO stage IIIC 2, grade 3, serous endometrial cancer with involvement of the pelvic and periaortic lymph nodes, who underwent TAHBSO and debulking of the left internal iliac lymphadenopathy on August 15, 2019 with invasion into greater than 50% of the myometrium and cervical stromal involvement as well as 6/8 left external iliac pelvic lymph node positive for involvement.    PLAN:   After review of the pathology and images of the radiological studies, Ms. Rodgers is noted to have locoregionally advanced endometrial cancer with involvement of the periaortic and pelvic lymph nodes.  There was also uptake noted in the pubic symphysis based on PET scan prior to start of treatment which is suspicious, especially given the tenderness to palpation in that area.   Therefore, I recommend a repeat PET scan to assess status of her disease after 3 cycles of chemotherapy thus far.  This is ordered today.  If this reveals good response to chemotherapy thus far, I recommend adjuvant radiation to the pelvis and periaortic region with vaginal cuff brachytherapy.  I plan to follow up with the results of the PET scan prior to  making a final recommendation regarding the dose of radiation.  Most likely, she will need 4500 cGy to the pelvis and periaortic region with or without a boost to hypermetabolic lymph diaz area as well as high-dose rate vaginal cuff brachytherapy delivering 1500 cGy in 3 fractions.  After that, she will be evaluated for further chemotherapy per Dr. Regalado.  She is hesitant to undergo further chemotherapy.  This was discussed in detail with the patient and all of her questions were answered.   I plan to see her back after the PET scan results are available for follow-up and treatment planning CT.    Psychosocial Distress screening score of Distress Score: 4 noted and reviewed. A referral to  Oncology Social Worker initiated.    I spent approximately 60 minutes reviewing the available records and evaluating the patient, out of which over 50% of the time was spent face to face with the patient in counseling and coordinating this patient's care.

## 2019-11-26 ENCOUNTER — PATIENT MESSAGE (OUTPATIENT)
Dept: GYNECOLOGIC ONCOLOGY | Facility: CLINIC | Age: 64
End: 2019-11-26

## 2019-11-27 ENCOUNTER — TELEPHONE (OUTPATIENT)
Dept: RADIATION ONCOLOGY | Facility: CLINIC | Age: 64
End: 2019-11-27

## 2019-11-27 ENCOUNTER — HOSPITAL ENCOUNTER (OUTPATIENT)
Dept: RADIOLOGY | Facility: HOSPITAL | Age: 64
Discharge: HOME | End: 2019-11-27
Attending: RADIOLOGY
Payer: MEDICARE

## 2019-11-27 DIAGNOSIS — C54.1 MALIGNANT NEOPLASM OF ENDOMETRIUM: ICD-10-CM

## 2019-11-27 DIAGNOSIS — D49.59 ENDOMETRIAL NEOPLASM: ICD-10-CM

## 2019-11-27 LAB — POCT GLUCOSE: 100 MG/DL (ref 70–110)

## 2019-11-27 PROCEDURE — A9552 F18 FDG: HCPCS

## 2019-11-27 PROCEDURE — 78815 PET IMAGE W/CT SKULL-THIGH: CPT | Mod: 26,PS,, | Performed by: RADIOLOGY

## 2019-11-27 PROCEDURE — 78815 NM PET CT ROUTINE: ICD-10-PCS | Mod: 26,PS,, | Performed by: RADIOLOGY

## 2019-11-27 PROCEDURE — 78815 PET IMAGE W/CT SKULL-THIGH: CPT | Mod: TC

## 2019-11-27 NOTE — TELEPHONE ENCOUNTER
"Pt. Informed of MD message "Pet looks good"  Will see you at f/u appt. Next week.  Pt. Agreed to same.  "

## 2019-12-01 ENCOUNTER — PATIENT MESSAGE (OUTPATIENT)
Dept: GYNECOLOGIC ONCOLOGY | Facility: CLINIC | Age: 64
End: 2019-12-01

## 2019-12-01 ENCOUNTER — PATIENT MESSAGE (OUTPATIENT)
Dept: RADIATION ONCOLOGY | Facility: CLINIC | Age: 64
End: 2019-12-01

## 2019-12-03 ENCOUNTER — LAB VISIT (OUTPATIENT)
Dept: LAB | Facility: HOSPITAL | Age: 64
End: 2019-12-03
Attending: OBSTETRICS & GYNECOLOGY
Payer: MEDICARE

## 2019-12-03 ENCOUNTER — TELEPHONE (OUTPATIENT)
Dept: GYNECOLOGIC ONCOLOGY | Facility: CLINIC | Age: 64
End: 2019-12-03

## 2019-12-03 DIAGNOSIS — C54.1 ENDOMETRIAL CANCER: ICD-10-CM

## 2019-12-03 LAB
ALBUMIN SERPL BCP-MCNC: 3.7 G/DL (ref 3.5–5.2)
ALP SERPL-CCNC: 79 U/L (ref 55–135)
ALT SERPL W/O P-5'-P-CCNC: 27 U/L (ref 10–44)
ANION GAP SERPL CALC-SCNC: 9 MMOL/L (ref 8–16)
AST SERPL-CCNC: 31 U/L (ref 10–40)
BASOPHILS # BLD AUTO: 0.01 K/UL (ref 0–0.2)
BASOPHILS NFR BLD: 0.4 % (ref 0–1.9)
BILIRUB SERPL-MCNC: 0.4 MG/DL (ref 0.1–1)
BUN SERPL-MCNC: 7 MG/DL (ref 8–23)
CALCIUM SERPL-MCNC: 9.2 MG/DL (ref 8.7–10.5)
CHLORIDE SERPL-SCNC: 108 MMOL/L (ref 95–110)
CO2 SERPL-SCNC: 22 MMOL/L (ref 23–29)
CREAT SERPL-MCNC: 0.7 MG/DL (ref 0.5–1.4)
DIFFERENTIAL METHOD: ABNORMAL
EOSINOPHIL # BLD AUTO: 0 K/UL (ref 0–0.5)
EOSINOPHIL NFR BLD: 0.8 % (ref 0–8)
ERYTHROCYTE [DISTWIDTH] IN BLOOD BY AUTOMATED COUNT: 16.6 % (ref 11.5–14.5)
EST. GFR  (AFRICAN AMERICAN): >60 ML/MIN/1.73 M^2
EST. GFR  (NON AFRICAN AMERICAN): >60 ML/MIN/1.73 M^2
GLUCOSE SERPL-MCNC: 110 MG/DL (ref 70–110)
HCT VFR BLD AUTO: 28.8 % (ref 37–48.5)
HGB BLD-MCNC: 9.4 G/DL (ref 12–16)
IMM GRANULOCYTES # BLD AUTO: 0.01 K/UL (ref 0–0.04)
IMM GRANULOCYTES NFR BLD AUTO: 0.4 % (ref 0–0.5)
LYMPHOCYTES # BLD AUTO: 1.1 K/UL (ref 1–4.8)
LYMPHOCYTES NFR BLD: 41.8 % (ref 18–48)
MCH RBC QN AUTO: 33.1 PG (ref 27–31)
MCHC RBC AUTO-ENTMCNC: 32.6 G/DL (ref 32–36)
MCV RBC AUTO: 101 FL (ref 82–98)
MONOCYTES # BLD AUTO: 0.4 K/UL (ref 0.3–1)
MONOCYTES NFR BLD: 15.9 % (ref 4–15)
NEUTROPHILS # BLD AUTO: 1 K/UL (ref 1.8–7.7)
NEUTROPHILS NFR BLD: 40.7 % (ref 38–73)
NRBC BLD-RTO: 0 /100 WBC
PLATELET # BLD AUTO: 105 K/UL (ref 150–350)
PMV BLD AUTO: 9.9 FL (ref 9.2–12.9)
POTASSIUM SERPL-SCNC: 4 MMOL/L (ref 3.5–5.1)
PROT SERPL-MCNC: 6.8 G/DL (ref 6–8.4)
RBC # BLD AUTO: 2.84 M/UL (ref 4–5.4)
SODIUM SERPL-SCNC: 139 MMOL/L (ref 136–145)
WBC # BLD AUTO: 2.51 K/UL (ref 3.9–12.7)

## 2019-12-03 PROCEDURE — 85025 COMPLETE CBC W/AUTO DIFF WBC: CPT

## 2019-12-03 PROCEDURE — 36415 COLL VENOUS BLD VENIPUNCTURE: CPT | Mod: PO

## 2019-12-03 PROCEDURE — 80053 COMPREHEN METABOLIC PANEL: CPT

## 2019-12-04 ENCOUNTER — OFFICE VISIT (OUTPATIENT)
Dept: RADIATION ONCOLOGY | Facility: CLINIC | Age: 64
End: 2019-12-04
Payer: MEDICARE

## 2019-12-04 ENCOUNTER — HOSPITAL ENCOUNTER (OUTPATIENT)
Dept: RADIATION THERAPY | Facility: HOSPITAL | Age: 64
Discharge: HOME | End: 2019-12-04
Attending: RADIOLOGY
Payer: MEDICARE

## 2019-12-04 ENCOUNTER — PATIENT MESSAGE (OUTPATIENT)
Dept: GYNECOLOGIC ONCOLOGY | Facility: CLINIC | Age: 64
End: 2019-12-04

## 2019-12-04 VITALS
HEART RATE: 89 BPM | RESPIRATION RATE: 18 BRPM | BODY MASS INDEX: 32.59 KG/M2 | TEMPERATURE: 98 F | DIASTOLIC BLOOD PRESSURE: 63 MMHG | HEIGHT: 60 IN | SYSTOLIC BLOOD PRESSURE: 132 MMHG | WEIGHT: 166 LBS

## 2019-12-04 DIAGNOSIS — C54.1 ENDOMETRIAL CANCER: Primary | ICD-10-CM

## 2019-12-04 PROCEDURE — 77263 PR  RADIATION THERAPY PLAN COMPLEX: ICD-10-PCS | Mod: ,,, | Performed by: RADIOLOGY

## 2019-12-04 PROCEDURE — 1126F AMNT PAIN NOTED NONE PRSNT: CPT | Mod: ,,, | Performed by: RADIOLOGY

## 2019-12-04 PROCEDURE — 77334 RADIATION TREATMENT AID(S): CPT | Mod: TC | Performed by: RADIOLOGY

## 2019-12-04 PROCEDURE — 77334 PR  RADN TREATMENT AID(S) COMPLX: ICD-10-PCS | Mod: 26,,, | Performed by: RADIOLOGY

## 2019-12-04 PROCEDURE — 99999 PR PBB SHADOW E&M-EST. PATIENT-LVL IV: CPT | Mod: PBBFAC,,, | Performed by: RADIOLOGY

## 2019-12-04 PROCEDURE — 99214 OFFICE O/P EST MOD 30 MIN: CPT | Mod: S$PBB,25,, | Performed by: RADIOLOGY

## 2019-12-04 PROCEDURE — 77263 THER RADIOLOGY TX PLNG CPLX: CPT | Mod: ,,, | Performed by: RADIOLOGY

## 2019-12-04 PROCEDURE — 1159F MED LIST DOCD IN RCRD: CPT | Mod: ,,, | Performed by: RADIOLOGY

## 2019-12-04 PROCEDURE — 77290 THER RAD SIMULAJ FIELD CPLX: CPT | Mod: TC | Performed by: RADIOLOGY

## 2019-12-04 PROCEDURE — 77334 RADIATION TREATMENT AID(S): CPT | Mod: 26,,, | Performed by: RADIOLOGY

## 2019-12-04 PROCEDURE — 1159F PR MEDICATION LIST DOCUMENTED IN MEDICAL RECORD: ICD-10-PCS | Mod: ,,, | Performed by: RADIOLOGY

## 2019-12-04 PROCEDURE — 1126F PR PAIN SEVERITY QUANTIFIED, NO PAIN PRESENT: ICD-10-PCS | Mod: ,,, | Performed by: RADIOLOGY

## 2019-12-04 PROCEDURE — 77290 PR  SET RADN THERAPY FIELD COMPLEX: ICD-10-PCS | Mod: 26,,, | Performed by: RADIOLOGY

## 2019-12-04 PROCEDURE — 99999 PR PBB SHADOW E&M-EST. PATIENT-LVL IV: ICD-10-PCS | Mod: PBBFAC,,, | Performed by: RADIOLOGY

## 2019-12-04 PROCEDURE — 77014 HC CT GUIDANCE RADIATION THERAPY FLDS PLACEMENT: CPT | Mod: TC | Performed by: RADIOLOGY

## 2019-12-04 PROCEDURE — 77290 THER RAD SIMULAJ FIELD CPLX: CPT | Mod: 26,,, | Performed by: RADIOLOGY

## 2019-12-04 PROCEDURE — 99214 OFFICE O/P EST MOD 30 MIN: CPT | Mod: PBBFAC,25 | Performed by: RADIOLOGY

## 2019-12-04 PROCEDURE — 99214 PR OFFICE/OUTPT VISIT, EST, LEVL IV, 30-39 MIN: ICD-10-PCS | Mod: S$PBB,25,, | Performed by: RADIOLOGY

## 2019-12-04 NOTE — PROGRESS NOTES
REFERRING PHYSICIAN: Darius Regalado M.D.    DIAGNOSIS: pT2 N2a Mx, grade 3, FIGO stage III C2 Endometrial cancer    INTERVAL HISTORY:  Ms. Rodgers is a 64-year-old female with history of right breast cancer in 2014 status post lumpectomy radiation and endocrine therapy who was recently diagnosed with endometrial cancer after evaluation for postmenopausal bleeding. An endometrial biopsy on July 24, 2019 revealed high-grade adenocarcinoma. A CT of the chest, abdomen, and pelvis on August 6, 2019 revealed an ill-defined blastic osseous lesion in the left greater than right symphysis pubis highly concerning for hematogenous spread of known high grade endometrial carcinoma. There was a 5 mm right upper lobe pulmonary nodule and a 1.4 cm hypoattenuating hepatic segment 4A lesion noted. A PET scan on August 13, 2019 revealed hypermetabolic area within the endometrial cavity with multiple normal size bilateral periaortic and pelvic nodes with hypermetabolic activity consistent with metastatic disease. There is uptake noted in the left pubic symphysis with SUV max of 3.9 corresponding to the osseous lesion in the CT scan. On August 15, 2019, she underwent TAHBSO. Pathology revealed the uterus with serous adenocarcinoma, grade 3, measuring 4 cm, with invasion into 1 cm of myometrium out of a total depth of 1.3 cm. There was cervical stromal involvement. No lymphovascular invasion is identified. 6/8 external iliac lymph nodes were positive for metastatic adenocarcinoma. Adjuvantly, she received 3 cycles of Taxol and carboplatin with the last cycle on November 15, 2019. She is recommended to undergo adjuvant radiation followed by 3 more cycles of chemotherapy.     Since I saw her last, she underwent a repeat PET scan on November 27, 2019 which reveals resolution of the uptake noted in the left iliac lymph node as well as the right periaortic lymph node.  No new areas of abnormal activity is identified.  There is no uptake  noted in the pubic symphysis as before.    At present, she reports intermittent shortness of breath since her last chemotherapy.  She also reports epigastric discomfort and constipation as well as nausea controlled with medical marijuana. She denies vaginal bleeding or discharge. She has neuropathy secondary to chemotherapy. She has history of lichen sclerosis involving the vagina being treated with topical steroid. Of note, her partner was recently diagnosed with lung cancer and is currently undergoing workup.      PHYSICAL EXAMINATION:  VITAL SINGS: /63   Pulse 89   Temp 98 °F (36.7 °C) (Oral)   Resp 18   Ht 5' (1.524 m)   Wt 75.3 kg (166 lb)   LMP  (LMP Unknown) Comment: age 53  BMI 32.42 kg/m²   GENERAL: Patient is alert and oriented, in no acute distress.  HEENT: Extraocular muscles are intact.  Oropharynx is clear without lesions.  There is no cervical or supraclavicular adenopathy palpated.    CHEST: Breath sounds clear bilaterally.  No rales.  No rhonchi.  Unlabored respirations.  CARDIOVASCULAR: Normal S1, S2.  Normal rate.  Regular rhythm.  ABDOMEN: Bowel sounds normal.  No tenderness.  No abdominal distention.  No hepatomegaly.  No splenomegaly.  PELVIC EXAM:  Deferred  EXTREMITIES: No clubbing, cyanosis, edema  NEUROLOGIC: Cranial nerves II through XII are grossly intact.  Sensation is intact.  Strength is 5 out of 5 in the upper and lower extremities bilaterally.    ASSESSMENT:   This is a 64-year-old female with FIGO stage IIIC 2, grade 3, serous endometrial cancer with involvement of the pelvic and periaortic lymph nodes, who underwent TAHBSO and debulking of the left internal iliac lymphadenopathy on August 15, 2019 with invasion into greater than 50% of the myometrium and cervical stromal involvement as well as 6/8 left external iliac pelvic lymph node positive for involvement.    PLAN:   Ms. Rodgers is noted to have good response to treatment thus far based on the PET scan images.   The previously noted uptake involving the left iliac lymph node, right periaortic lymph node, and the left pubic symphysis have resolved after review of the images of the PET scan with Radiology.  Therefore, she is recommended to undergo adjuvant radiation to the pelvis and periaortic regions (involved regions) followed by high-dose rate vaginal cuff brachytherapy.  I plan to deliver approximately 4500 cGy using external beam followed by 1500 cGy in 3 fractions using brachytherapy.  The risks, benefits, and side effects of treatment were again explained in detail to the patient.  All of her questions were answered an informed consent was signed.  I plan to obtain a treatment planning CT today in anticipation of start of radiation soon after.  She is recommended to monitor shortness of breath and contact our office or Dr. Regalado if it worsens.

## 2019-12-12 PROCEDURE — 77301 RADIOTHERAPY DOSE PLAN IMRT: CPT | Mod: TC | Performed by: RADIOLOGY

## 2019-12-12 PROCEDURE — 77301 RADIOTHERAPY DOSE PLAN IMRT: CPT | Mod: 26,,, | Performed by: RADIOLOGY

## 2019-12-12 PROCEDURE — 77301 PR  INTEN MOD RADIOTHER PLAN W/DOSE VOL HIST: ICD-10-PCS | Mod: 26,,, | Performed by: RADIOLOGY

## 2019-12-13 PROCEDURE — 77300 PR RADIATION THERAPY,DOSIMETRY PLAN: ICD-10-PCS | Mod: 26,,, | Performed by: RADIOLOGY

## 2019-12-13 PROCEDURE — 77338 PR  MLC IMRT DESIGN & CONSTRUCTION PER IMRT PLAN: ICD-10-PCS | Mod: 26,,, | Performed by: RADIOLOGY

## 2019-12-13 PROCEDURE — 77338 DESIGN MLC DEVICE FOR IMRT: CPT | Mod: TC | Performed by: RADIOLOGY

## 2019-12-13 PROCEDURE — 77300 RADIATION THERAPY DOSE PLAN: CPT | Mod: TC | Performed by: RADIOLOGY

## 2019-12-13 PROCEDURE — 77338 DESIGN MLC DEVICE FOR IMRT: CPT | Mod: 26,,, | Performed by: RADIOLOGY

## 2019-12-13 PROCEDURE — 77300 RADIATION THERAPY DOSE PLAN: CPT | Mod: 26,,, | Performed by: RADIOLOGY

## 2019-12-23 ENCOUNTER — DOCUMENTATION ONLY (OUTPATIENT)
Dept: RADIATION ONCOLOGY | Facility: CLINIC | Age: 64
End: 2019-12-23

## 2019-12-23 DIAGNOSIS — C54.1 ENDOMETRIAL CANCER: Primary | ICD-10-CM

## 2019-12-23 RX ORDER — SCOLOPAMINE TRANSDERMAL SYSTEM 1 MG/1
1 PATCH, EXTENDED RELEASE TRANSDERMAL
Qty: 10 PATCH | Refills: 2 | Status: SHIPPED | OUTPATIENT
Start: 2019-12-23 | End: 2020-11-17 | Stop reason: CLARIF

## 2019-12-23 NOTE — PROGRESS NOTES
Spoke to the patient on request from Kyara Melgar RN in Radiation Oncology: The patient inquired about a support group. Provided her with information about the [facility] group. Also provided her with contact information for the [employer] for possible other general support groups. She informed that her long time partner was also going through cancer treatment at present. He was very supportive, but they were  having difficulty coping emotionally with this. Discussed a referral to Onc Psychology services with her for ongoing counseling and she was in agreement. Message sent to Dr Younger and she agreed to make a referral. Also provided the patient with contact information for me and she agreed to call as needed.

## 2019-12-23 NOTE — PLAN OF CARE
Pt. On day 5 of outpt. Xrt to uterus/periaortics.  Pt. C/o nausea. No vomiting.  Using zofran with some relief/of.  Given scopolomine patch for relief of nausea.  Using perineal cream with relief of irritation.

## 2019-12-24 PROCEDURE — 77014 HC CT GUIDANCE RADIATION THERAPY FLDS PLACEMENT: CPT | Mod: TC | Performed by: RADIOLOGY

## 2019-12-24 PROCEDURE — 77386 HC IMRT, COMPLEX: CPT | Performed by: RADIOLOGY

## 2019-12-30 DIAGNOSIS — R11.0 NAUSEA: ICD-10-CM

## 2019-12-30 DIAGNOSIS — C54.1 ENDOMETRIAL CANCER: Primary | ICD-10-CM

## 2019-12-30 RX ORDER — PREDNISONE 5 MG/1
TABLET ORAL
Qty: 30 TABLET | Refills: 0 | Status: SHIPPED | OUTPATIENT
Start: 2019-12-30 | End: 2020-11-17 | Stop reason: CLARIF

## 2019-12-31 PROCEDURE — 77014 HC CT GUIDANCE RADIATION THERAPY FLDS PLACEMENT: CPT | Mod: TC | Performed by: RADIOLOGY

## 2019-12-31 PROCEDURE — 77386 HC IMRT, COMPLEX: CPT | Performed by: RADIOLOGY

## 2020-01-02 ENCOUNTER — APPOINTMENT (OUTPATIENT)
Dept: RADIATION THERAPY | Facility: OTHER | Age: 65
End: 2020-01-02
Attending: RADIOLOGY
Payer: MEDICARE

## 2020-01-02 DIAGNOSIS — R11.0 NAUSEA: ICD-10-CM

## 2020-01-02 DIAGNOSIS — R30.0 DYSURIA: Primary | ICD-10-CM

## 2020-01-02 PROCEDURE — 77014 HC CT GUIDANCE RADIATION THERAPY FLDS PLACEMENT: CPT | Mod: TC | Performed by: RADIOLOGY

## 2020-01-02 PROCEDURE — 77386 HC IMRT, COMPLEX: CPT | Performed by: RADIOLOGY

## 2020-01-02 RX ORDER — PROMETHAZINE HYDROCHLORIDE 25 MG/1
25 TABLET ORAL EVERY 6 HOURS PRN
Qty: 30 TABLET | Refills: 2 | Status: SHIPPED | OUTPATIENT
Start: 2020-01-02 | End: 2020-02-04

## 2020-01-03 ENCOUNTER — PATIENT MESSAGE (OUTPATIENT)
Dept: GYNECOLOGIC ONCOLOGY | Facility: CLINIC | Age: 65
End: 2020-01-03

## 2020-01-03 PROCEDURE — 77014 HC CT GUIDANCE RADIATION THERAPY FLDS PLACEMENT: CPT | Mod: TC | Performed by: RADIOLOGY

## 2020-01-03 PROCEDURE — 77386 HC IMRT, COMPLEX: CPT | Performed by: RADIOLOGY

## 2020-01-06 DIAGNOSIS — C54.1 ENDOMETRIAL CANCER: Primary | ICD-10-CM

## 2020-01-06 PROCEDURE — 77014 HC CT GUIDANCE RADIATION THERAPY FLDS PLACEMENT: CPT | Mod: TC | Performed by: RADIOLOGY

## 2020-01-06 PROCEDURE — 77386 HC IMRT, COMPLEX: CPT | Performed by: RADIOLOGY

## 2020-01-06 RX ORDER — PHENAZOPYRIDINE HYDROCHLORIDE 100 MG/1
100 TABLET, FILM COATED ORAL 3 TIMES DAILY PRN
Qty: 30 TABLET | Refills: 3 | Status: SHIPPED | OUTPATIENT
Start: 2020-01-06 | End: 2020-01-16

## 2020-01-07 ENCOUNTER — TELEPHONE (OUTPATIENT)
Dept: ADMINISTRATIVE | Facility: OTHER | Age: 65
End: 2020-01-07

## 2020-01-07 PROCEDURE — 77386 HC IMRT, COMPLEX: CPT | Performed by: RADIOLOGY

## 2020-01-07 PROCEDURE — 77014 HC CT GUIDANCE RADIATION THERAPY FLDS PLACEMENT: CPT | Mod: TC | Performed by: RADIOLOGY

## 2020-01-08 ENCOUNTER — OFFICE VISIT (OUTPATIENT)
Dept: GYNECOLOGIC ONCOLOGY | Facility: CLINIC | Age: 65
End: 2020-01-08
Payer: MEDICARE

## 2020-01-08 VITALS
SYSTOLIC BLOOD PRESSURE: 111 MMHG | HEART RATE: 91 BPM | BODY MASS INDEX: 31.26 KG/M2 | WEIGHT: 160.06 LBS | DIASTOLIC BLOOD PRESSURE: 57 MMHG

## 2020-01-08 DIAGNOSIS — C54.1 ENDOMETRIAL CANCER: Primary | ICD-10-CM

## 2020-01-08 DIAGNOSIS — Z51.11 ENCOUNTER FOR ANTINEOPLASTIC CHEMOTHERAPY: ICD-10-CM

## 2020-01-08 DIAGNOSIS — R10.30 LOWER ABDOMINAL PAIN: ICD-10-CM

## 2020-01-08 PROCEDURE — 77386 HC IMRT, COMPLEX: CPT | Performed by: RADIOLOGY

## 2020-01-08 PROCEDURE — 1159F MED LIST DOCD IN RCRD: CPT | Mod: ,,, | Performed by: OBSTETRICS & GYNECOLOGY

## 2020-01-08 PROCEDURE — 99999 PR PBB SHADOW E&M-EST. PATIENT-LVL III: CPT | Mod: PBBFAC,,, | Performed by: OBSTETRICS & GYNECOLOGY

## 2020-01-08 PROCEDURE — 99214 OFFICE O/P EST MOD 30 MIN: CPT | Mod: S$PBB,,, | Performed by: OBSTETRICS & GYNECOLOGY

## 2020-01-08 PROCEDURE — 99214 PR OFFICE/OUTPT VISIT, EST, LEVL IV, 30-39 MIN: ICD-10-PCS | Mod: S$PBB,,, | Performed by: OBSTETRICS & GYNECOLOGY

## 2020-01-08 PROCEDURE — 1159F PR MEDICATION LIST DOCUMENTED IN MEDICAL RECORD: ICD-10-PCS | Mod: ,,, | Performed by: OBSTETRICS & GYNECOLOGY

## 2020-01-08 PROCEDURE — 99213 OFFICE O/P EST LOW 20 MIN: CPT | Mod: PBBFAC,25 | Performed by: OBSTETRICS & GYNECOLOGY

## 2020-01-08 PROCEDURE — 1126F PR PAIN SEVERITY QUANTIFIED, NO PAIN PRESENT: ICD-10-PCS | Mod: ,,, | Performed by: OBSTETRICS & GYNECOLOGY

## 2020-01-08 PROCEDURE — 99999 PR PBB SHADOW E&M-EST. PATIENT-LVL III: ICD-10-PCS | Mod: PBBFAC,,, | Performed by: OBSTETRICS & GYNECOLOGY

## 2020-01-08 PROCEDURE — 1126F AMNT PAIN NOTED NONE PRSNT: CPT | Mod: ,,, | Performed by: OBSTETRICS & GYNECOLOGY

## 2020-01-08 PROCEDURE — 77014 HC CT GUIDANCE RADIATION THERAPY FLDS PLACEMENT: CPT | Mod: TC | Performed by: RADIOLOGY

## 2020-01-08 PROCEDURE — 77336 RADIATION PHYSICS CONSULT: CPT | Performed by: RADIOLOGY

## 2020-01-08 RX ORDER — TERCONAZOLE 4 MG/G
1 CREAM VAGINAL NIGHTLY
Qty: 1 TUBE | Refills: 0 | Status: SHIPPED | OUTPATIENT
Start: 2020-01-08 | End: 2020-01-15

## 2020-01-08 RX ORDER — FLUCONAZOLE 150 MG
150 TABLET ORAL ONCE
Qty: 2 TABLET | Refills: 0 | Status: SHIPPED | OUTPATIENT
Start: 2020-01-08 | End: 2020-01-08

## 2020-01-08 NOTE — PROGRESS NOTES
Subjective:      Patient ID: Jeanne Rodgers is a 64 y.o. female.    Chief Complaint: Follow-up    Treatment History  Stage IIIC2 UPSC  Preop PET revealing positive pelvic and PA nodes  RALH/BSO/Debulking of left pelvic nodes (6/8 positive) on 8/15/19  Port placed 9/26/19  T/C started 9/27/19    Follow-up   Pertinent negatives include no abdominal pain, arthralgias, chest pain, chills, coughing, fatigue, fever, nausea, numbness, rash, sore throat, vomiting or weakness.     Here today for f/u.  Has received 14/25 fractions of her radiation.   Having issues with N/V but that is relatively controlled on Phenergan.  Having vaginal irritation despite clobetasol and other topicals.  Review of Systems   Constitutional: Negative for activity change, appetite change, chills, fatigue and fever.   HENT: Negative for hearing loss, mouth sores, nosebleeds, sore throat and tinnitus.    Eyes: Negative for visual disturbance.   Respiratory: Negative for cough, chest tightness, shortness of breath and wheezing.    Cardiovascular: Negative for chest pain and leg swelling.   Gastrointestinal: Negative for abdominal distention, abdominal pain, blood in stool, constipation, diarrhea, nausea and vomiting.   Genitourinary: Negative for dysuria, flank pain, frequency, hematuria, pelvic pain, vaginal bleeding, vaginal discharge and vaginal pain.   Musculoskeletal: Negative for arthralgias and back pain.   Skin: Negative for rash.   Neurological: Negative for dizziness, seizures, syncope, weakness and numbness.   Hematological: Does not bruise/bleed easily.   Psychiatric/Behavioral: Negative for confusion and sleep disturbance. The patient is not nervous/anxious.        Objective:   Physical Exam:   Constitutional: She appears well-developed and well-nourished. No distress.    HENT:   Head: Normocephalic and atraumatic.    Eyes: No scleral icterus.    Neck: Normal range of motion. Neck supple.    Cardiovascular: Normal rate and intact  distal pulses.  Exam reveals no cyanosis and no edema.     Pulmonary/Chest: Effort normal. No respiratory distress. She exhibits no tenderness.        Abdominal: Soft. She exhibits no distension (incisions healing well), no fluid wave, no ascites and no mass. There is no tenderness. There is no rebound and no guarding. No hernia.                Lymphadenopathy:     She has no cervical adenopathy.     Skin: No cyanosis.   Port sie with incision separation and scab.  No obvious cellultis, no pus.        Assessment:     1. Endometrial cancer    2. Encounter for antineoplastic chemotherapy    3. Lower abdominal pain        Plan:       Will treat yeast infection and encouraged use of barrier cream.  RTC as scheduled.  Offered her weekly IVF but she refused at this time.

## 2020-01-09 PROCEDURE — 77386 HC IMRT, COMPLEX: CPT | Performed by: RADIOLOGY

## 2020-01-09 PROCEDURE — 77014 HC CT GUIDANCE RADIATION THERAPY FLDS PLACEMENT: CPT | Mod: TC | Performed by: RADIOLOGY

## 2020-01-10 PROCEDURE — 77386 HC IMRT, COMPLEX: CPT | Performed by: RADIOLOGY

## 2020-01-10 PROCEDURE — 77014 HC CT GUIDANCE RADIATION THERAPY FLDS PLACEMENT: CPT | Mod: TC | Performed by: RADIOLOGY

## 2020-01-13 ENCOUNTER — DOCUMENTATION ONLY (OUTPATIENT)
Dept: RADIATION ONCOLOGY | Facility: CLINIC | Age: 65
End: 2020-01-13

## 2020-01-13 DIAGNOSIS — C54.1 ENDOMETRIAL CANCER: Primary | ICD-10-CM

## 2020-01-13 PROCEDURE — 77014 HC CT GUIDANCE RADIATION THERAPY FLDS PLACEMENT: CPT | Mod: TC | Performed by: RADIOLOGY

## 2020-01-13 PROCEDURE — 77386 HC IMRT, COMPLEX: CPT | Performed by: RADIOLOGY

## 2020-01-13 RX ORDER — DIPHENOXYLATE HYDROCHLORIDE AND ATROPINE SULFATE 2.5; .025 MG/1; MG/1
1 TABLET ORAL 4 TIMES DAILY PRN
Qty: 40 TABLET | Refills: 2 | Status: SHIPPED | OUTPATIENT
Start: 2020-01-13 | End: 2020-01-23

## 2020-01-13 NOTE — PLAN OF CARE
Pt. On day 18 of outpt. Xrt to pelvis.  Pt. C/o diarrhea, script for lomotil given per md.  Still has N/V.

## 2020-01-14 PROCEDURE — 77336 RADIATION PHYSICS CONSULT: CPT | Performed by: RADIOLOGY

## 2020-01-14 PROCEDURE — 77386 HC IMRT, COMPLEX: CPT | Performed by: RADIOLOGY

## 2020-01-14 PROCEDURE — 77014 HC CT GUIDANCE RADIATION THERAPY FLDS PLACEMENT: CPT | Mod: TC | Performed by: RADIOLOGY

## 2020-01-15 PROCEDURE — 77386 HC IMRT, COMPLEX: CPT | Performed by: RADIOLOGY

## 2020-01-15 PROCEDURE — 77014 HC CT GUIDANCE RADIATION THERAPY FLDS PLACEMENT: CPT | Mod: TC | Performed by: RADIOLOGY

## 2020-01-16 PROCEDURE — 77386 HC IMRT, COMPLEX: CPT | Performed by: RADIOLOGY

## 2020-01-16 PROCEDURE — 77014 HC CT GUIDANCE RADIATION THERAPY FLDS PLACEMENT: CPT | Mod: TC | Performed by: RADIOLOGY

## 2020-01-17 PROCEDURE — 77386 HC IMRT, COMPLEX: CPT | Performed by: RADIOLOGY

## 2020-01-17 PROCEDURE — 77014 HC CT GUIDANCE RADIATION THERAPY FLDS PLACEMENT: CPT | Mod: TC | Performed by: RADIOLOGY

## 2020-01-20 DIAGNOSIS — C54.1 ENDOMETRIAL CANCER: Primary | ICD-10-CM

## 2020-01-20 DIAGNOSIS — B37.31 VAGINAL YEAST INFECTION: ICD-10-CM

## 2020-01-20 PROCEDURE — 77014 HC CT GUIDANCE RADIATION THERAPY FLDS PLACEMENT: CPT | Mod: TC | Performed by: RADIOLOGY

## 2020-01-20 PROCEDURE — 77386 HC IMRT, COMPLEX: CPT | Performed by: RADIOLOGY

## 2020-01-20 RX ORDER — OXYCODONE AND ACETAMINOPHEN 10; 325 MG/1; MG/1
1 TABLET ORAL EVERY 4 HOURS PRN
Qty: 30 TABLET | Refills: 0 | Status: SHIPPED | OUTPATIENT
Start: 2020-01-20 | End: 2020-08-21

## 2020-01-20 RX ORDER — NYSTATIN 100000 U/G
CREAM TOPICAL
Qty: 30 G | Refills: 11 | Status: ON HOLD | OUTPATIENT
Start: 2020-01-20 | End: 2023-01-01 | Stop reason: HOSPADM

## 2020-01-20 RX ORDER — NYSTATIN 100000 [USP'U]/G
POWDER TOPICAL 4 TIMES DAILY
Qty: 30 G | Refills: 1 | Status: ON HOLD | OUTPATIENT
Start: 2020-01-20 | End: 2023-01-01 | Stop reason: HOSPADM

## 2020-01-21 PROCEDURE — 77014 HC CT GUIDANCE RADIATION THERAPY FLDS PLACEMENT: CPT | Mod: TC | Performed by: RADIOLOGY

## 2020-01-21 PROCEDURE — 77386 HC IMRT, COMPLEX: CPT | Performed by: RADIOLOGY

## 2020-01-22 PROCEDURE — 77336 RADIATION PHYSICS CONSULT: CPT | Performed by: RADIOLOGY

## 2020-01-22 PROCEDURE — 77386 HC IMRT, COMPLEX: CPT | Performed by: RADIOLOGY

## 2020-01-22 PROCEDURE — 77014 HC CT GUIDANCE RADIATION THERAPY FLDS PLACEMENT: CPT | Mod: TC | Performed by: RADIOLOGY

## 2020-01-30 ENCOUNTER — OFFICE VISIT (OUTPATIENT)
Dept: UROLOGY | Facility: CLINIC | Age: 65
End: 2020-01-30
Payer: MEDICARE

## 2020-01-30 VITALS
WEIGHT: 154 LBS | HEIGHT: 60 IN | BODY MASS INDEX: 30.23 KG/M2 | SYSTOLIC BLOOD PRESSURE: 110 MMHG | DIASTOLIC BLOOD PRESSURE: 70 MMHG

## 2020-01-30 DIAGNOSIS — N39.46 MIXED INCONTINENCE URGE AND STRESS: ICD-10-CM

## 2020-01-30 DIAGNOSIS — R30.0 DYSURIA: Primary | ICD-10-CM

## 2020-01-30 DIAGNOSIS — N30.40 RADIATION CYSTITIS: ICD-10-CM

## 2020-01-30 LAB
BILIRUB SERPL-MCNC: NORMAL MG/DL
BLOOD URINE, POC: POSITIVE
COLOR, POC UA: YELLOW
GLUCOSE UR QL STRIP: NORMAL
KETONES UR QL STRIP: NORMAL
LEUKOCYTE ESTERASE URINE, POC: POSITIVE
NITRITE, POC UA: POSITIVE
PH, POC UA: 6
POC RESIDUAL URINE VOLUME: 51 ML (ref 0–100)
PROTEIN, POC: NORMAL
SPECIFIC GRAVITY, POC UA: 1030
UROBILINOGEN, POC UA: NORMAL

## 2020-01-30 PROCEDURE — 87086 URINE CULTURE/COLONY COUNT: CPT

## 2020-01-30 PROCEDURE — 99204 OFFICE O/P NEW MOD 45 MIN: CPT | Mod: S$PBB,,, | Performed by: UROLOGY

## 2020-01-30 PROCEDURE — 99204 PR OFFICE/OUTPT VISIT, NEW, LEVL IV, 45-59 MIN: ICD-10-PCS | Mod: S$PBB,,, | Performed by: UROLOGY

## 2020-01-30 PROCEDURE — 99213 OFFICE O/P EST LOW 20 MIN: CPT | Mod: PBBFAC,25 | Performed by: UROLOGY

## 2020-01-30 PROCEDURE — 99999 PR PBB SHADOW E&M-EST. PATIENT-LVL III: ICD-10-PCS | Mod: PBBFAC,,, | Performed by: UROLOGY

## 2020-01-30 PROCEDURE — 81002 URINALYSIS NONAUTO W/O SCOPE: CPT | Mod: PBBFAC | Performed by: UROLOGY

## 2020-01-30 PROCEDURE — 51798 US URINE CAPACITY MEASURE: CPT | Mod: PBBFAC | Performed by: UROLOGY

## 2020-01-30 PROCEDURE — 99999 PR PBB SHADOW E&M-EST. PATIENT-LVL III: CPT | Mod: PBBFAC,,, | Performed by: UROLOGY

## 2020-01-30 RX ORDER — ACETAMINOPHEN 500 MG
500 TABLET ORAL
COMMUNITY
End: 2020-11-17 | Stop reason: CLARIF

## 2020-01-30 RX ORDER — FLUCONAZOLE 150 MG/1
TABLET ORAL
COMMUNITY
Start: 2020-01-08 | End: 2020-11-17 | Stop reason: CLARIF

## 2020-01-30 NOTE — PROGRESS NOTES
Subjective:       Jeanne Rodgers is a 65 y.o. female who is a new patient who was self-referred for evaluation of vaginal pain.      She is currently undergoing XRT and chemo for uterine cancer. She reports pain in vaginal area when she voids as well as frequency and low volume urine. +dysuria. +UUI/NIDIA. She has been recommended to use Azo by rad onc - states she was unable to tolerate this (vomiting). She has been treated for yeast infection and lichen sclerosis by gyn onc.     Multiple UCx in [facility] are negative.     PVR (bladder scan) today - 51cc (not true PVR)      The following portions of the patient's history were reviewed and updated as appropriate: allergies, current medications, past family history, past medical history, past social history, past surgical history and problem list.    Review of Systems  Constitutional: no fever or chills  ENT: no nasal congestion or sore throat  Respiratory: no cough or shortness of breath  Cardiovascular: no chest pain or palpitations  Gastrointestinal: no nausea or vomiting, tolerating diet  Genitourinary: as per HPI  Hematologic/Lymphatic: no easy bruising or lymphadenopathy  Musculoskeletal: no arthralgias or myalgias  Skin: no rashes or lesions  Neurological: no seizures or tremors  Behavioral/Psych: no auditory or visual hallucinations        Objective:    Vitals: /70   Ht 5' (1.524 m)   Wt 69.9 kg (154 lb)   LMP  (LMP Unknown) Comment: age 53  BMI 30.08 kg/m²     Physical Exam   General: well developed, well nourished in no acute distress  Head: normocephalic, atraumatic  Neck: supple, trachea midline, no obvious enlargement of thyroid  HEENT: EOMI, mucus membranes moist, sclera anicteric, no hearing impairment  Lungs: symmetric expansion, non-labored breathing  Cardiovascular: regular rate and rhythm, normal pulses  Abdomen: soft, non tender, non distended, no palpable masses, no hepatosplenomegaly, no hernias, no CVA tenderness  Musculoskeletal:  no peripheral edema, normal ROM in bilateral upper and lower extremities  Lymphatics: no cervical or inguinal lymphadenopathy  Skin: no rashes or lesions  Neuro: alert and oriented x 3, no gross deficits  Psych: normal judgment and insight, normal mood/affect and non-anxious  Genitourinary:   patient declined exam      Lab Review   Urine analysis today in clinic shows - pending     Lab Results   Component Value Date    WBC 2.51 (L) 12/03/2019    HGB 9.4 (L) 12/03/2019    HCT 28.8 (L) 12/03/2019     (H) 12/03/2019     (L) 12/03/2019     Lab Results   Component Value Date    CREATININE 0.7 12/03/2019    BUN 7 (L) 12/03/2019         Imaging  NA         Assessment/Plan:      1. Dysuria    - Suspect related to XRT   - No gross hematuria   - Uribel PRN   - Consider ACh/Myrbetriq    - UCx again to ensure no UTI     2. Radiation cystitis    - Uribel     3. Mixed incontinence urge and stress    - UUI worsened since XRT   - Uribel. Consider stronger antispasm medication         Follow up in 6 weeks

## 2020-02-01 LAB — BACTERIA UR CULT: NORMAL

## 2020-02-03 ENCOUNTER — TELEPHONE (OUTPATIENT)
Dept: UROLOGY | Facility: CLINIC | Age: 65
End: 2020-02-03

## 2020-02-03 ENCOUNTER — HOSPITAL ENCOUNTER (OUTPATIENT)
Dept: RADIATION THERAPY | Facility: HOSPITAL | Age: 65
Discharge: HOME | End: 2020-02-03
Attending: RADIOLOGY
Payer: MEDICARE

## 2020-02-04 DIAGNOSIS — R11.0 NAUSEA: ICD-10-CM

## 2020-02-04 RX ORDER — PROMETHAZINE HYDROCHLORIDE 25 MG/1
TABLET ORAL
Qty: 30 TABLET | Refills: 2 | Status: SHIPPED | OUTPATIENT
Start: 2020-02-04 | End: 2020-08-21

## 2020-02-05 ENCOUNTER — PROCEDURE VISIT (OUTPATIENT)
Dept: RADIATION ONCOLOGY | Facility: CLINIC | Age: 65
End: 2020-02-05
Payer: MEDICARE

## 2020-02-05 DIAGNOSIS — C54.1 ENDOMETRIAL CANCER: Primary | ICD-10-CM

## 2020-02-05 PROCEDURE — 77290 THER RAD SIMULAJ FIELD CPLX: CPT | Mod: 26,,, | Performed by: RADIOLOGY

## 2020-02-05 PROCEDURE — 77290 PR  SET RADN THERAPY FIELD COMPLEX: ICD-10-PCS | Mod: 26,,, | Performed by: RADIOLOGY

## 2020-02-05 PROCEDURE — 77014 HC CT GUIDANCE RADIATION THERAPY FLDS PLACEMENT: CPT | Mod: TC | Performed by: RADIOLOGY

## 2020-02-05 PROCEDURE — 77290 THER RAD SIMULAJ FIELD CPLX: CPT | Mod: TC | Performed by: RADIOLOGY

## 2020-02-05 PROCEDURE — 77334 RADIATION TREATMENT AID(S): CPT | Mod: TC | Performed by: RADIOLOGY

## 2020-02-05 PROCEDURE — 77334 PR  RADN TREATMENT AID(S) COMPLX: ICD-10-PCS | Mod: 26,,, | Performed by: RADIOLOGY

## 2020-02-05 PROCEDURE — 77334 RADIATION TREATMENT AID(S): CPT | Mod: 26,,, | Performed by: RADIOLOGY

## 2020-02-05 NOTE — PROCEDURES
Procedures     Ms. Rodgers returns for a treatment planning CT prior to high-dose rate vaginal cuff brachytherapy.  She is placed supine on the CT table.  A 2.5 cm vaginal cylinder was inserted into the vagina and stabilize.  CT  Images were obtained and verified.  The vaginal cylinder was removed without complications.  She will return as scheduled to initiate treatment.

## 2020-02-10 ENCOUNTER — PATIENT MESSAGE (OUTPATIENT)
Dept: GYNECOLOGIC ONCOLOGY | Facility: CLINIC | Age: 65
End: 2020-02-10

## 2020-02-11 ENCOUNTER — TELEPHONE (OUTPATIENT)
Dept: ADMINISTRATIVE | Facility: OTHER | Age: 65
End: 2020-02-11

## 2020-02-11 NOTE — TELEPHONE ENCOUNTER
----- Message from Frederick Frias RN sent at 2/11/2020  8:06 AM CST -----  Contact: pt sent message via portal  Would you see if she'd like to come in tomorrow?  ----- Message -----  From: Soraya Justo  Sent: 2/11/2020   6:59 AM CST  To: Sabine Mccoy Staff    Appointment Request From: Jeanne Rodgers    With Provider: Dairus Regalado MD [[facility] [address]]    Preferred Date Range: Any    Preferred Times: Any time    Reason for visit: severe pain in my port it has not closed up i need to get it removed I can't take it anymore it appears the tissue opening is still black    Comments:  I want to have my port removed severe pain and it hasn't healed

## 2020-02-12 ENCOUNTER — OFFICE VISIT (OUTPATIENT)
Dept: GYNECOLOGIC ONCOLOGY | Facility: CLINIC | Age: 65
End: 2020-02-12
Payer: MEDICARE

## 2020-02-12 VITALS
WEIGHT: 156.31 LBS | HEART RATE: 80 BPM | DIASTOLIC BLOOD PRESSURE: 79 MMHG | SYSTOLIC BLOOD PRESSURE: 124 MMHG | BODY MASS INDEX: 30.53 KG/M2

## 2020-02-12 DIAGNOSIS — C54.1 ENDOMETRIAL CANCER: Primary | ICD-10-CM

## 2020-02-12 DIAGNOSIS — Z51.11 ENCOUNTER FOR ANTINEOPLASTIC CHEMOTHERAPY: ICD-10-CM

## 2020-02-12 PROCEDURE — 99213 PR OFFICE/OUTPT VISIT, EST, LEVL III, 20-29 MIN: ICD-10-PCS | Mod: S$PBB,,, | Performed by: OBSTETRICS & GYNECOLOGY

## 2020-02-12 PROCEDURE — 99999 PR PBB SHADOW E&M-EST. PATIENT-LVL III: ICD-10-PCS | Mod: PBBFAC,,, | Performed by: OBSTETRICS & GYNECOLOGY

## 2020-02-12 PROCEDURE — 99213 OFFICE O/P EST LOW 20 MIN: CPT | Mod: PBBFAC | Performed by: OBSTETRICS & GYNECOLOGY

## 2020-02-12 PROCEDURE — 99999 PR PBB SHADOW E&M-EST. PATIENT-LVL III: CPT | Mod: PBBFAC,,, | Performed by: OBSTETRICS & GYNECOLOGY

## 2020-02-12 PROCEDURE — 99213 OFFICE O/P EST LOW 20 MIN: CPT | Mod: S$PBB,,, | Performed by: OBSTETRICS & GYNECOLOGY

## 2020-02-12 NOTE — PROGRESS NOTES
Subjective:      Patient ID: Jeanne Rodgers is a 65 y.o. female.    Chief Complaint: Follow-up    Treatment History  Stage IIIC2 UPSC  Preop PET revealing positive pelvic and PA nodes  RALH/BSO/Debulking of left pelvic nodes (6/8 positive) on 8/15/19  Port placed 9/26/19  T/C started 9/27/19    Follow-up   Pertinent negatives include no abdominal pain, arthralgias, chest pain, chills, coughing, fatigue, fever, nausea, numbness, rash, sore throat, vomiting or weakness.     Here today for f/u.  Has completed her WPRT portion of treatment and was seen on 2/5 for vaginal cylinder placement and treatment planning.  Was seen by Urology and started on antispasmodic.  Port bothering her badly and wants it out  Review of Systems   Constitutional: Negative for activity change, appetite change, chills, fatigue and fever.   HENT: Negative for hearing loss, mouth sores, nosebleeds, sore throat and tinnitus.    Eyes: Negative for visual disturbance.   Respiratory: Negative for cough, chest tightness, shortness of breath and wheezing.    Cardiovascular: Negative for chest pain and leg swelling.   Gastrointestinal: Negative for abdominal distention, abdominal pain, blood in stool, constipation, diarrhea, nausea and vomiting.   Genitourinary: Negative for dysuria, flank pain, frequency, hematuria, pelvic pain, vaginal bleeding, vaginal discharge and vaginal pain.   Musculoskeletal: Negative for arthralgias and back pain.   Skin: Negative for rash.   Neurological: Negative for dizziness, seizures, syncope, weakness and numbness.   Hematological: Does not bruise/bleed easily.   Psychiatric/Behavioral: Negative for confusion and sleep disturbance. The patient is not nervous/anxious.        Objective:   Physical Exam:   Constitutional: She appears well-developed and well-nourished. No distress.    HENT:   Head: Normocephalic and atraumatic.    Eyes: No scleral icterus.    Neck: Normal range of motion. Neck supple.     Cardiovascular: Normal rate and intact distal pulses.  Exam reveals no cyanosis and no edema.     Pulmonary/Chest: Effort normal. No respiratory distress. She exhibits no tenderness.        Abdominal: Soft. She exhibits no distension, no fluid wave, no ascites and no mass. There is no tenderness. There is no rebound and no guarding. No hernia.                Lymphadenopathy:     She has no cervical adenopathy.     Skin: No cyanosis.        Assessment:     1. Endometrial cancer    2. Encounter for antineoplastic chemotherapy        Plan:       Feeling better.  Will see her back after brachytherapy.  Ok to d/c port.

## 2020-02-13 PROCEDURE — 77470 SPECIAL RADIATION TREATMENT: CPT | Mod: 59,TC | Performed by: RADIOLOGY

## 2020-02-13 PROCEDURE — 77370 RADIATION PHYSICS CONSULT: CPT | Performed by: RADIOLOGY

## 2020-02-13 PROCEDURE — 77295 3-D RADIOTHERAPY PLAN: CPT | Mod: TC | Performed by: RADIOLOGY

## 2020-02-13 PROCEDURE — 77295 PR 3D RADIOTHERAPY PLAN: ICD-10-PCS | Mod: 26,,, | Performed by: RADIOLOGY

## 2020-02-13 PROCEDURE — 77295 3-D RADIOTHERAPY PLAN: CPT | Mod: 26,,, | Performed by: RADIOLOGY

## 2020-02-13 PROCEDURE — 77470 SPECIAL RADIATION TREATMENT: CPT | Mod: 26,59,, | Performed by: RADIOLOGY

## 2020-02-13 PROCEDURE — 77300 RADIATION THERAPY DOSE PLAN: CPT | Mod: 26,,, | Performed by: RADIOLOGY

## 2020-02-13 PROCEDURE — 77470 PR  SPECIAL RADIATION TREATMENT: ICD-10-PCS | Mod: 26,59,, | Performed by: RADIOLOGY

## 2020-02-13 PROCEDURE — 77300 PR RADIATION THERAPY,DOSIMETRY PLAN: ICD-10-PCS | Mod: 26,,, | Performed by: RADIOLOGY

## 2020-02-13 PROCEDURE — 77300 RADIATION THERAPY DOSE PLAN: CPT | Mod: TC | Performed by: RADIOLOGY

## 2020-02-18 ENCOUNTER — PROCEDURE VISIT (OUTPATIENT)
Dept: RADIATION ONCOLOGY | Facility: CLINIC | Age: 65
End: 2020-02-18
Payer: MEDICARE

## 2020-02-18 DIAGNOSIS — C54.1 ENDOMETRIAL CANCER: Primary | ICD-10-CM

## 2020-02-18 PROCEDURE — 77770 HDR RDNCL NTRSTL/ICAV BRCHTX: CPT | Mod: TC | Performed by: RADIOLOGY

## 2020-02-18 PROCEDURE — C1717 BRACHYTX, NON-STR,HDR IR-192: HCPCS | Performed by: RADIOLOGY

## 2020-02-18 PROCEDURE — 77770 PR HDR RDNCL NTRSTL/ICAV BRCHTX 1 CH: ICD-10-PCS | Mod: 26,,, | Performed by: RADIOLOGY

## 2020-02-18 PROCEDURE — 77300 RADIATION THERAPY DOSE PLAN: CPT | Mod: TC | Performed by: RADIOLOGY

## 2020-02-18 PROCEDURE — 57156 PR INSERT VAGINAL RADIATION DEVICE: ICD-10-PCS | Mod: ,,, | Performed by: RADIOLOGY

## 2020-02-18 PROCEDURE — 57156 INS VAG BRACHYTX DEVICE: CPT | Mod: TC | Performed by: RADIOLOGY

## 2020-02-18 PROCEDURE — 57156 INS VAG BRACHYTX DEVICE: CPT | Mod: ,,, | Performed by: RADIOLOGY

## 2020-02-18 PROCEDURE — 77770 HDR RDNCL NTRSTL/ICAV BRCHTX: CPT | Mod: 26,,, | Performed by: RADIOLOGY

## 2020-02-18 NOTE — NURSING
02/18/2020  Nurse: Kyara Melgar    Procedure: Vaginal Cylinder    1025: Patient arrived from Home.  Time out performed.    1028: Positioned on Stretcher in the Frog Leg position. Positioned with Knee Immobilizer by myself.    1029:2.5cm vaginal cylinder inserted.  HDR treatment given with full bladder.  Pt. Tolerated well.

## 2020-02-18 NOTE — PROCEDURES
PROCEDURE NOTE:    REFERRING PHYSICIAN: Darius Regalado M.D.    DIAGNOSIS: pT2 N2a Mx, grade 3, FIGO stage III C2 Endometrial cancer    Ms. Rodgers is a 64-year-old female with history of right breast cancer in 2014 status post lumpectomy radiation and endocrine therapy who was recently diagnosed with endometrial cancer after evaluation for postmenopausal bleeding. An endometrial biopsy on July 24, 2019 revealed high-grade adenocarcinoma. A CT of the chest, abdomen, and pelvis on August 6, 2019 revealed an ill-defined blastic osseous lesion in the left greater than right symphysis pubis highly concerning for hematogenous spread of known high grade endometrial carcinoma. There was a 5 mm right upper lobe pulmonary nodule and a 1.4 cm hypoattenuating hepatic segment 4A lesion noted. A PET scan on August 13, 2019 revealed hypermetabolic area within the endometrial cavity with multiple normal size bilateral periaortic and pelvic nodes with hypermetabolic activity consistent with metastatic disease. There is uptake noted in the left pubic symphysis with SUV max of 3.9 corresponding to the osseous lesion in the CT scan. On August 15, 2019, she underwent TAHBSO. Pathology revealed the uterus with serous adenocarcinoma, grade 3, measuring 4 cm, with invasion into 1 cm of myometrium out of a total depth of 1.3 cm. There was cervical stromal involvement. No lymphovascular invasion is identified. 6/8 external iliac lymph nodes were positive for metastatic adenocarcinoma. Adjuvantly, she received 3 cycles of Taxol and carboplatin with the last cycle on November 15, 2019. A repeat PET scan on November 27, 2019 revealed resolution of the uptake noted in the left iliac lymph node as well as the right periaortic lymph node. No new areas of abnormal activity is identified. There is no uptake noted in the pubic symphysis as before.  She then completed 4500 cGy to the pelvis and periaortic lymph nodes.     She is recommended to undergo  vaginal cuff brachytherapy to reduce her chance of recurrence. She is here today for her first treatment.      DATE OF PROCEDURE: 02/18/2020      PROCEDURE: Intracavitary vaginal HDR #1.      AREA TREATED: Vaginal cuff and upper 5 cm of the vagina      TREATMENT METHOD: Insertion of 2.5 cm vaginal cylinder into vagina      RADIATION: Iridium 192, high-dose-rate      DEPTH OF CALCULATION:  vaginal (cylinder) surface      DOSE:  5.0 gray      Time Out:   Performed by Kyara Melgar RN  Identifiers used: Name and date of birth      She was brought to the HDR delivery room and a 2.5 cm vaginal cylinder was placed into the vagina. The brachytherapy catheter was connected to the cylinder and the treatment was delivered. She received the 1st out of 3 fractions of HDR vaginal brachytherapy as above. She tolerated the treatment without any unexpected side effects. She will return in 1 week to continue her treatment. I was present during the entire procedure.    Of note, she is planned undergo additional 3 more cycles of chemotherapy upon completion of radiation.

## 2020-02-20 ENCOUNTER — PATIENT MESSAGE (OUTPATIENT)
Dept: GYNECOLOGIC ONCOLOGY | Facility: CLINIC | Age: 65
End: 2020-02-20

## 2020-02-26 ENCOUNTER — PROCEDURE VISIT (OUTPATIENT)
Dept: RADIATION ONCOLOGY | Facility: CLINIC | Age: 65
End: 2020-02-26
Payer: MEDICARE

## 2020-02-26 DIAGNOSIS — C54.1 ENDOMETRIAL CANCER: Primary | ICD-10-CM

## 2020-02-26 PROCEDURE — 77770 PR HDR RDNCL NTRSTL/ICAV BRCHTX 1 CH: ICD-10-PCS | Mod: 26,,, | Performed by: RADIOLOGY

## 2020-02-26 PROCEDURE — 57156 PR INSERT VAGINAL RADIATION DEVICE: ICD-10-PCS | Mod: ,,, | Performed by: RADIOLOGY

## 2020-02-26 PROCEDURE — 57156 INS VAG BRACHYTX DEVICE: CPT | Mod: ,,, | Performed by: RADIOLOGY

## 2020-02-26 PROCEDURE — 57156 INS VAG BRACHYTX DEVICE: CPT | Mod: TC | Performed by: RADIOLOGY

## 2020-02-26 PROCEDURE — 77300 RADIATION THERAPY DOSE PLAN: CPT | Mod: TC | Performed by: RADIOLOGY

## 2020-02-26 PROCEDURE — 77770 HDR RDNCL NTRSTL/ICAV BRCHTX: CPT | Mod: TC | Performed by: RADIOLOGY

## 2020-02-26 PROCEDURE — 77770 HDR RDNCL NTRSTL/ICAV BRCHTX: CPT | Mod: 26,,, | Performed by: RADIOLOGY

## 2020-02-26 PROCEDURE — C1717 BRACHYTX, NON-STR,HDR IR-192: HCPCS | Performed by: RADIOLOGY

## 2020-02-26 NOTE — NURSING
02/26/2020  Nurse: Kyara Melgar    Procedure: Vaginal Cylinder    1315: Patient arrived from Home.  Time out performed.    1316: Positioned on Stretcher in the Lithotomy position. Positioned with Knee Immobilizer by myself.    1317: 2.5cm vaginal cylinder inserted.  HDR treatment given with full bladder.  Pt. Tolerated well.

## 2020-02-26 NOTE — PROCEDURES
Procedures     REFERRING PHYSICIAN: Darius Regalado M.D.     DIAGNOSIS: pT2 N2a Mx, grade 3, FIGO stage III C2 Endometrial cancer     Ms. oRdgers is a 64-year-old female with history of right breast cancer in 2014 status post lumpectomy radiation and endocrine therapy who was recently diagnosed with endometrial cancer after evaluation for postmenopausal bleeding. An endometrial biopsy on July 24, 2019 revealed high-grade adenocarcinoma. A CT of the chest, abdomen, and pelvis on August 6, 2019 revealed an ill-defined blastic osseous lesion in the left greater than right symphysis pubis highly concerning for hematogenous spread of known high grade endometrial carcinoma. There was a 5 mm right upper lobe pulmonary nodule and a 1.4 cm hypoattenuating hepatic segment 4A lesion noted. A PET scan on August 13, 2019 revealed hypermetabolic area within the endometrial cavity with multiple normal size bilateral periaortic and pelvic nodes with hypermetabolic activity consistent with metastatic disease. There is uptake noted in the left pubic symphysis with SUV max of 3.9 corresponding to the osseous lesion in the CT scan. On August 15, 2019, she underwent TAHBSO. Pathology revealed the uterus with serous adenocarcinoma, grade 3, measuring 4 cm, with invasion into 1 cm of myometrium out of a total depth of 1.3 cm. There was cervical stromal involvement. No lymphovascular invasion is identified. 6/8 external iliac lymph nodes were positive for metastatic adenocarcinoma. Adjuvantly, she received 3 cycles of Taxol and carboplatin with the last cycle on November 15, 2019. A repeat PET scan on November 27, 2019 revealed resolution of the uptake noted in the left iliac lymph node as well as the right periaortic lymph node. No new areas of abnormal activity is identified. There is no uptake noted in the pubic symphysis as before. She then completed 4500 cGy to the pelvis and periaortic lymph nodes.      She is recommended to undergo  vaginal cuff brachytherapy to reduce her chance of recurrence. She is here today for her 2nd treatment.      DATE OF PROCEDURE: 02/26/2020      PROCEDURE: Intracavitary vaginal HDR #2      AREA TREATED: Vaginal cuff and upper 5 cm of the vagina      TREATMENT METHOD: Insertion of 2.5 cm vaginal cylinder into vagina      RADIATION: Iridium 192, high-dose-rate      DEPTH OF CALCULATION:  vaginal (cylinder) surface      DOSE:  5.0 gray      Time Out:   Performed by Kyara Melgar RN  Identifiers used: Name and date of birth      She was brought to the HDR delivery room and a 2.5 cm vaginal cylinder was placed into the vagina. The brachytherapy catheter was connected to the cylinder and the treatment was delivered. She received the 2nd out of 3 fractions of HDR vaginal brachytherapy as above. She tolerated the treatment without any unexpected side effects. She will return in 1 week to continue her treatment. I was present during the entire procedure.     Of note, she is planned undergo additional 3 more cycles of chemotherapy upon completion of radiation.

## 2020-03-02 ENCOUNTER — HOSPITAL ENCOUNTER (OUTPATIENT)
Dept: RADIATION THERAPY | Facility: HOSPITAL | Age: 65
Discharge: HOME | End: 2020-03-02
Attending: RADIOLOGY
Payer: MEDICARE

## 2020-03-02 ENCOUNTER — PATIENT MESSAGE (OUTPATIENT)
Dept: GYNECOLOGIC ONCOLOGY | Facility: CLINIC | Age: 65
End: 2020-03-02

## 2020-03-03 ENCOUNTER — DOCUMENTATION ONLY (OUTPATIENT)
Dept: RADIATION ONCOLOGY | Facility: CLINIC | Age: 65
End: 2020-03-03

## 2020-03-03 ENCOUNTER — TELEPHONE (OUTPATIENT)
Dept: GYNECOLOGIC ONCOLOGY | Facility: CLINIC | Age: 65
End: 2020-03-03

## 2020-03-03 ENCOUNTER — PROCEDURE VISIT (OUTPATIENT)
Dept: RADIATION ONCOLOGY | Facility: CLINIC | Age: 65
End: 2020-03-03
Payer: MEDICARE

## 2020-03-03 DIAGNOSIS — C54.1 ENDOMETRIAL CANCER: Primary | ICD-10-CM

## 2020-03-03 PROCEDURE — C1717 BRACHYTX, NON-STR,HDR IR-192: HCPCS | Performed by: RADIOLOGY

## 2020-03-03 PROCEDURE — 57156 INS VAG BRACHYTX DEVICE: CPT | Mod: ,,, | Performed by: RADIOLOGY

## 2020-03-03 PROCEDURE — 57156 INS VAG BRACHYTX DEVICE: CPT | Mod: TC | Performed by: RADIOLOGY

## 2020-03-03 PROCEDURE — 77300 RADIATION THERAPY DOSE PLAN: CPT | Mod: TC | Performed by: RADIOLOGY

## 2020-03-03 PROCEDURE — 77770 PR HDR RDNCL NTRSTL/ICAV BRCHTX 1 CH: ICD-10-PCS | Mod: 26,,, | Performed by: RADIOLOGY

## 2020-03-03 PROCEDURE — 77770 HDR RDNCL NTRSTL/ICAV BRCHTX: CPT | Mod: TC | Performed by: RADIOLOGY

## 2020-03-03 PROCEDURE — 77770 HDR RDNCL NTRSTL/ICAV BRCHTX: CPT | Mod: 26,,, | Performed by: RADIOLOGY

## 2020-03-03 PROCEDURE — 57156 PR INSERT VAGINAL RADIATION DEVICE: ICD-10-PCS | Mod: ,,, | Performed by: RADIOLOGY

## 2020-03-03 NOTE — TELEPHONE ENCOUNTER
----- Message from Frederick Frias, RN sent at 3/3/2020 10:40 AM CST -----  Can we get her in next week for a f/u to see Dr. Regalado?  ----- Message -----  From: Darius Regalado MD  Sent: 3/3/2020  10:28 AM CST  To: Marley Younger MD, Kyara Melgar, RN, #    Thanks so much.  J'me can we get her in?  ----- Message -----  From: Marley Younger MD  Sent: 3/3/2020  10:09 AM CST  To: Kyara Melgar, RN, MD Sadaf Corona,    She has completed radiation to the pelvis and periaortic lymph nodes as well as vaginal cuff brachytherapy today.  She is planned for further chemotherapy and needs to follow-up with you.    Thank you,    Marley

## 2020-03-03 NOTE — NURSING
03/03/2020  Nurse: Kyara Melgar    Procedure: Vaginal Cylinder    1005: Patient arrived from Home.  Time out performed.    1006: Positioned on Stretcher in the Frog Leg position. Positioned with Knee Immobilizer by myself.    1009:  2.5 cm vaginal cylinder inserted.  HDR treatment given with full bladder.  Pt. Tolerated well..

## 2020-03-03 NOTE — PROCEDURES
Procedures     REFERRING PHYSICIAN: Darius Regalado M.D.     DIAGNOSIS: pT2 N2a Mx, grade 3, FIGO stage III C2 Endometrial cancer     Ms. Rodgers is a 64-year-old female with history of right breast cancer in 2014 status post lumpectomy radiation and endocrine therapy who was recently diagnosed with endometrial cancer after evaluation for postmenopausal bleeding. An endometrial biopsy on July 24, 2019 revealed high-grade adenocarcinoma. A CT of the chest, abdomen, and pelvis on August 6, 2019 revealed an ill-defined blastic osseous lesion in the left greater than right symphysis pubis highly concerning for hematogenous spread of known high grade endometrial carcinoma. There was a 5 mm right upper lobe pulmonary nodule and a 1.4 cm hypoattenuating hepatic segment 4A lesion noted. A PET scan on August 13, 2019 revealed hypermetabolic area within the endometrial cavity with multiple normal size bilateral periaortic and pelvic nodes with hypermetabolic activity consistent with metastatic disease. There is uptake noted in the left pubic symphysis with SUV max of 3.9 corresponding to the osseous lesion in the CT scan. On August 15, 2019, she underwent TAHBSO. Pathology revealed the uterus with serous adenocarcinoma, grade 3, measuring 4 cm, with invasion into 1 cm of myometrium out of a total depth of 1.3 cm. There was cervical stromal involvement. No lymphovascular invasion is identified. 6/8 external iliac lymph nodes were positive for metastatic adenocarcinoma. Adjuvantly, she received 3 cycles of Taxol and carboplatin with the last cycle on November 15, 2019. A repeat PET scan on November 27, 2019 revealed resolution of the uptake noted in the left iliac lymph node as well as the right periaortic lymph node. No new areas of abnormal activity is identified. There is no uptake noted in the pubic symphysis as before. She then completed 4500 cGy to the pelvis and periaortic lymph nodes.      She is recommended to undergo  vaginal cuff brachytherapy to reduce her chance of recurrence. She is here today for her 3rd treatment.      DATE OF PROCEDURE: 03/03/2020      PROCEDURE: Intracavitary vaginal HDR #3      AREA TREATED: Vaginal cuff and upper 5 cm of the vagina      TREATMENT METHOD: Insertion of 2.5 cm vaginal cylinder into vagina      RADIATION: Iridium 192, high-dose-rate      DEPTH OF CALCULATION:  vaginal (cylinder) surface      DOSE:  5.0 gray      Time Out:   Performed by Kyara Melgar RN  Identifiers used: Name and date of birth      She was brought to the HDR delivery room and a 2.5 cm vaginal cylinder was placed into the vagina. The brachytherapy catheter was connected to the cylinder and the treatment was delivered. She received the 3rd and final fraction of HDR vaginal brachytherapy as above. She tolerated the treatment without any unexpected side effects. I was present during the entire procedure.     Of note, she is planned undergo additional 3 more cycles of chemotherapy upon completion of radiation.    HDR COMPLETION SUMMARY:  DOSE:  1500 cGy in 3 fractions  AREA TREATED:Vaginal cuff and upper 5 cm of the vagina  DURATION:  February 18, 2020 through March 3, 2020    Overall, she tolerated the radiation without any unexpected side effects.  Discharge instructions were given to the patient upon completion.  I plan to see her back in approximately 6 weeks, unless symptoms warrant otherwise.  She will also follow up with Dr. Regalado as planned regarding further chemotherapy.

## 2020-03-05 ENCOUNTER — HOSPITAL ENCOUNTER (OUTPATIENT)
Facility: OTHER | Age: 65
Discharge: HOME | End: 2020-03-05
Attending: RADIOLOGY | Admitting: RADIOLOGY
Payer: MEDICARE

## 2020-03-05 VITALS
DIASTOLIC BLOOD PRESSURE: 65 MMHG | HEART RATE: 58 BPM | HEIGHT: 60 IN | BODY MASS INDEX: 29.64 KG/M2 | WEIGHT: 151 LBS | SYSTOLIC BLOOD PRESSURE: 113 MMHG | TEMPERATURE: 98 F | RESPIRATION RATE: 16 BRPM | OXYGEN SATURATION: 100 %

## 2020-03-05 DIAGNOSIS — T80.212A LOCAL INFECTION ASSOCIATED WITH CENTRAL VENOUS CATHETER, INITIAL ENCOUNTER: Primary | ICD-10-CM

## 2020-03-05 DIAGNOSIS — C54.1 ENDOMETRIAL CANCER: ICD-10-CM

## 2020-03-05 PROCEDURE — 99152 MOD SED SAME PHYS/QHP 5/>YRS: CPT | Performed by: RADIOLOGY

## 2020-03-05 PROCEDURE — 88300 SURGICAL PATH GROSS: CPT | Mod: 26,,, | Performed by: PATHOLOGY

## 2020-03-05 PROCEDURE — 25000003 PHARM REV CODE 250: Performed by: RADIOLOGY

## 2020-03-05 PROCEDURE — 63600175 PHARM REV CODE 636 W HCPCS: Performed by: RADIOLOGY

## 2020-03-05 PROCEDURE — 88300 PR  SURG PATH,GROSS,LEVEL I: ICD-10-PCS | Mod: 26,,, | Performed by: PATHOLOGY

## 2020-03-05 PROCEDURE — 99153 MOD SED SAME PHYS/QHP EA: CPT | Performed by: RADIOLOGY

## 2020-03-05 PROCEDURE — 88300 SURGICAL PATH GROSS: CPT | Performed by: PATHOLOGY

## 2020-03-05 RX ORDER — ONDANSETRON 8 MG/1
8 TABLET, ORALLY DISINTEGRATING ORAL ONCE
Status: COMPLETED | OUTPATIENT
Start: 2020-03-05 | End: 2020-03-05

## 2020-03-05 RX ORDER — MIDAZOLAM HYDROCHLORIDE 1 MG/ML
INJECTION, SOLUTION INTRAMUSCULAR; INTRAVENOUS
Status: DISCONTINUED | OUTPATIENT
Start: 2020-03-05 | End: 2020-03-05 | Stop reason: HOSPADM

## 2020-03-05 RX ORDER — FENTANYL CITRATE 50 UG/ML
INJECTION, SOLUTION INTRAMUSCULAR; INTRAVENOUS
Status: DISCONTINUED | OUTPATIENT
Start: 2020-03-05 | End: 2020-03-05 | Stop reason: HOSPADM

## 2020-03-05 RX ORDER — PREGABALIN 50 MG/1
50 CAPSULE ORAL
Status: COMPLETED | OUTPATIENT
Start: 2020-03-05 | End: 2020-03-05

## 2020-03-05 RX ORDER — LIDOCAINE HYDROCHLORIDE 10 MG/ML
INJECTION, SOLUTION EPIDURAL; INFILTRATION; INTRACAUDAL; PERINEURAL
Status: DISCONTINUED | OUTPATIENT
Start: 2020-03-05 | End: 2020-03-05 | Stop reason: HOSPADM

## 2020-03-05 RX ADMIN — ONDANSETRON 8 MG: 8 TABLET, ORALLY DISINTEGRATING ORAL at 12:03

## 2020-03-05 RX ADMIN — PREGABALIN 50 MG: 50 CAPSULE ORAL at 12:03

## 2020-03-05 NOTE — H&P
[facility], [place], operated by [facility] Cath Lab [facility] 1  History & Physical - Short Stay  Interventional Radiology    SUBJECTIVE:     Chief Complaint/Reason for Admission: Infected Port-a-cath    Informant(s):  self and Electronic Health Record    History of Present Illness:  Jeanne Rodgers is a 65 y.o. female with a history of infected chest port.    Patient presents for port removal.    Scheduled Meds:   Continuous Infusions:   PRN Meds:     Review of patient's allergies indicates:  No Known Allergies    Past Medical History:   Diagnosis Date    Allergy     Anxiety     Breast cancer 2014    Chronic back pain     Endometrial cancer 07/30/2019    Fibromyalgia     Hives     Hx of radiation therapy     Itching     Lichen sclerosus     Mental disorder     PONV (postoperative nausea and vomiting)     Sore throat     Uterine cancer 08/05/2019    UTI (urinary tract infection)      Past Surgical History:   Procedure Laterality Date    anal fissure surgery      APPENDECTOMY      BREAST BIOPSY Right 2014    BRE[facility]Y Right 2014    [facility] & DCIS, neg margins, 0/6 SN, Stage II, ER/RI+, HER-2 neg Adjuvant XRT and hormonal therapy     CARPAL TUNNEL RELEASE      CHOLECYSTECTOMY      COLONOSCOPY      HYSTERECTOMY  08/15/2019    HYSTEROSCOPY WITH DILATION AND CURETTAGE OF UTERUS N/A 7/24/2019    Procedure: HYSTEROSCOPY, WITH DILATION AND CURETTAGE OF UTERUS;  Surgeon: Елена Kam MD;  Location: [facility];  Service: OB/GYN;  Laterality: N/A;    INSERTION OF TUNNELED CENTRAL VENOUS CATHETER (CVC) WITH SUBCUTANEOUS PORT N/A 9/26/2019    Procedure: INSERTION, PORT-A-CATH;  Surgeon: Moris Varghese MD;  Location: [facility] CATH LAB;  Service: Radiology;  Laterality: N/A;    MASTECTOMY Right     2014    ROBOT-ASSISTED LAPAROSCOPIC ABDOMINAL HYSTERECTOMY USING DA RAMU XI N/A 8/15/2019    Procedure: XI ROBOTIC HYSTERECTOMY;  Surgeon: Darius Regalado MD;  Location: [facility] OR;  Service: OB/GYN;   Laterality: N/A;    ROBOT-ASSISTED LAPAROSCOPIC SALPINGO-OOPHORECTOMY USING DA RAMU XI Bilateral 8/15/2019    Procedure: XI ROBOTIC SALPINGO-OOPHORECTOMY;  Surgeon: Darius Regalado MD;  Location: [facility] OR;  Service: OB/GYN;  Laterality: Bilateral;    ROBOT-ASSISTED LYMPHADENECTOMY Left 8/15/2019    Procedure: ROBOTIC LYMPHADENECTOMY;  Surgeon: Darius Regalado MD;  Location: [facility] OR;  Service: OB/GYN;  Laterality: Left;    SALPINGOOPHORECTOMY Bilateral 08/15/2019    TONSILLECTOMY      TUBAL LIGATION       Family History   Problem Relation Age of Onset    Cancer Mother 85        pancreatic cancer, passed at 95    Hypertension Mother     Arthritis Mother     Cancer Father 84        pancreatic, passed at 85    Cancer Maternal Aunt 60        pancreatic, passed at 62    No Known Problems Sister     Hypertension Brother     Lung cancer Paternal Aunt     Colon cancer Paternal Uncle     Heart attack Paternal Grandmother     Heart attack Paternal Grandfather     Hypertension Brother     Hashimoto's thyroiditis Sister     Breast cancer Neg Hx     Ovarian cancer Neg Hx      Social History     Tobacco Use    Smoking status: Never Smoker    Smokeless tobacco: Never Used   Substance Use Topics    Alcohol use: No     Alcohol/week: 0.0 standard drinks    Drug use: No        Review of Systems:  ROS not obtained    OBJECTIVE:     Vital Signs (Most Recent):  Temp: 98.3 °F (36.8 °C) (03/05/20 1216)  Pulse: 78 (03/05/20 1216)  Resp: 16 (03/05/20 1216)  BP: (!) 119/56 (03/05/20 1216)  SpO2: 100 % (03/05/20 1216)    Physical Exam:  Lungs: No respiratory distress  Cardiac: regular rate and rhythm  Alert and oriented    Laboratory  CBC:   Lab Results   Component Value Date/Time    WBC 2.51 (L) 12/03/2019 01:08 PM    RBC 2.84 (L) 12/03/2019 01:08 PM    HGB 9.4 (L) 12/03/2019 01:08 PM    HCT 28.8 (L) 12/03/2019 01:08 PM     (L) 12/03/2019 01:08 PM     (H) 12/03/2019 01:08 PM    MCH 33.1 (H) 12/03/2019 01:08  PM    [facility] 32.6 12/03/2019 01:08 PM     Coagulation:   Lab Results   Component Value Date/Time    INR 0.9 09/26/2019 11:39 AM    APTT 21.2 09/26/2019 11:39 AM         ASSESSMENT/PLAN:     Infected chest port.    Patient will undergo port removal.    Sedation/Anesthesia Assessment:  ASA Classification: II = Mild systemic disease  Mallampati Score: II (hard and soft palate, upper portion of tonsils anduvula visible)    Sedation History: No problems    Sedation Plan: Conscious sedation

## 2020-03-05 NOTE — OP NOTE
[facility] Cath Lab [facility] 1  Interventional Radiology  High Risk Procedure - Outpatient    Date: 03/05/2020 Time: 2:52 PM    Pre-Op Diagnosis: Infected left chest port    Post-Op Diagnosis: same    Procedure Performed by: Moris Varghese MD    Assistant: none    Procedure: Removal of left chest port    Specimen/Tissue Removed: Left chest port    Estimated Blood Loss: Less than 20 mL    Procedure Note/Findings: Left chest port removed due to infection. No immediate post-procedure complications noted.            Please refer to dictated report for additional details.

## 2020-03-05 NOTE — DISCHARGE SUMMARY
Radiology Discharge Summary      Admit date: 3/5/2020 11:34 AM  Discharge date: March 5, 2020    Instructions Given to patient: YesVerbal    Diet: Regular    Activity:NO Restrictions    Medications on discharge (List): Refer to Discharge Medication List    Hospital Course: Removal of left chest port    Description of Condition on Discharge: stable    Discharge Disposition: Home    Discharge Diagnosis: infected left chest port    Follow up with Dr. Regalado as scheduled.

## 2020-03-06 ENCOUNTER — TELEPHONE (OUTPATIENT)
Dept: SURGERY | Facility: OTHER | Age: 65
End: 2020-03-06

## 2020-03-09 ENCOUNTER — OFFICE VISIT (OUTPATIENT)
Dept: GYNECOLOGIC ONCOLOGY | Facility: CLINIC | Age: 65
End: 2020-03-09
Payer: MEDICARE

## 2020-03-09 VITALS — DIASTOLIC BLOOD PRESSURE: 60 MMHG | SYSTOLIC BLOOD PRESSURE: 131 MMHG

## 2020-03-09 DIAGNOSIS — Z51.11 ENCOUNTER FOR ANTINEOPLASTIC CHEMOTHERAPY: ICD-10-CM

## 2020-03-09 DIAGNOSIS — C54.1 ENDOMETRIAL CANCER: Primary | ICD-10-CM

## 2020-03-09 PROCEDURE — 99214 OFFICE O/P EST MOD 30 MIN: CPT | Mod: S$PBB,,, | Performed by: OBSTETRICS & GYNECOLOGY

## 2020-03-09 PROCEDURE — 99999 PR PBB SHADOW E&M-EST. PATIENT-LVL III: CPT | Mod: PBBFAC,,, | Performed by: OBSTETRICS & GYNECOLOGY

## 2020-03-09 PROCEDURE — 99214 PR OFFICE/OUTPT VISIT, EST, LEVL IV, 30-39 MIN: ICD-10-PCS | Mod: S$PBB,,, | Performed by: OBSTETRICS & GYNECOLOGY

## 2020-03-09 PROCEDURE — 99999 PR PBB SHADOW E&M-EST. PATIENT-LVL III: ICD-10-PCS | Mod: PBBFAC,,, | Performed by: OBSTETRICS & GYNECOLOGY

## 2020-03-09 PROCEDURE — 99213 OFFICE O/P EST LOW 20 MIN: CPT | Mod: PBBFAC | Performed by: OBSTETRICS & GYNECOLOGY

## 2020-03-09 NOTE — PROGRESS NOTES
Subjective:      Patient ID: Jeanne Rodgers is a 65 y.o. female.    Chief Complaint: No chief complaint on file.    Treatment History  Stage IIIC2 UPSC  Preop PET revealing positive pelvic and PA nodes  RALH/BSO/Debulking of left pelvic nodes (6/8 positive) on 8/15/19  Port placed 9/26/19  T/C started 9/27/19  WPRT + PA nodes to 45 Gy completed 1/22/20  HDR to 15 Gy in 3 fractions completed 3/3/20  Follow-up   Pertinent negatives include no abdominal pain, arthralgias, chest pain, chills, coughing, fatigue, fever, nausea, numbness, rash, sore throat, vomiting or weakness.     Here today for f/u.  Has completed her radiation therapy.  Recently had port removed as it continued to bother her.  Overall feels ok except for nausea.  Denies VB.  Review of Systems   Constitutional: Negative for activity change, appetite change, chills, fatigue and fever.   HENT: Negative for hearing loss, mouth sores, nosebleeds, sore throat and tinnitus.    Eyes: Negative for visual disturbance.   Respiratory: Negative for cough, chest tightness, shortness of breath and wheezing.    Cardiovascular: Negative for chest pain and leg swelling.   Gastrointestinal: Negative for abdominal distention, abdominal pain, blood in stool, constipation, diarrhea, nausea and vomiting.   Genitourinary: Negative for dysuria, flank pain, frequency, hematuria, pelvic pain, vaginal bleeding, vaginal discharge and vaginal pain.   Musculoskeletal: Negative for arthralgias and back pain.   Skin: Negative for rash.   Neurological: Negative for dizziness, seizures, syncope, weakness and numbness.   Hematological: Does not bruise/bleed easily.   Psychiatric/Behavioral: Negative for confusion and sleep disturbance. The patient is not nervous/anxious.        Objective:   Physical Exam:   Constitutional: She appears well-developed and well-nourished. No distress.    HENT:   Head: Normocephalic and atraumatic.    Eyes: No scleral icterus.    Neck: Normal range of  motion. Neck supple.    Cardiovascular: Normal rate and intact distal pulses.  Exam reveals no cyanosis and no edema.     Pulmonary/Chest: Effort normal. No respiratory distress. She exhibits no tenderness.        Abdominal: Soft. She exhibits no distension, no fluid wave, no ascites and no mass. There is no tenderness. There is no rebound and no guarding. No hernia.                Lymphadenopathy:     She has no cervical adenopathy.     Skin: No cyanosis.        Assessment:     1. Endometrial cancer    2. Encounter for antineoplastic chemotherapy        Plan:       Has recovered from radiation therapy.  Ok to resume chemo.  Will give additional 3 cycles at reduced dose.  Labs and treat next week.

## 2020-03-10 LAB
FINAL PATHOLOGIC DIAGNOSIS: NORMAL
GROSS: NORMAL

## 2020-03-17 ENCOUNTER — PATIENT MESSAGE (OUTPATIENT)
Dept: GYNECOLOGIC ONCOLOGY | Facility: CLINIC | Age: 65
End: 2020-03-17

## 2020-03-19 ENCOUNTER — LAB VISIT (OUTPATIENT)
Dept: LAB | Facility: HOSPITAL | Age: 65
End: 2020-03-19
Attending: OBSTETRICS & GYNECOLOGY
Payer: MEDICARE

## 2020-03-19 DIAGNOSIS — C54.1 ENDOMETRIAL CANCER: ICD-10-CM

## 2020-03-19 LAB
ALBUMIN SERPL BCP-MCNC: 3.9 G/DL (ref 3.5–5.2)
ALP SERPL-CCNC: 74 U/L (ref 55–135)
ALT SERPL W/O P-5'-P-CCNC: 38 U/L (ref 10–44)
ANION GAP SERPL CALC-SCNC: 11 MMOL/L (ref 8–16)
AST SERPL-CCNC: 62 U/L (ref 10–40)
BASOPHILS # BLD AUTO: 0.01 K/UL (ref 0–0.2)
BASOPHILS NFR BLD: 0.4 % (ref 0–1.9)
BILIRUB SERPL-MCNC: 0.6 MG/DL (ref 0.1–1)
BUN SERPL-MCNC: 8 MG/DL (ref 8–23)
CALCIUM SERPL-MCNC: 9.2 MG/DL (ref 8.7–10.5)
CANCER AG125 SERPL-ACNC: 16 U/ML (ref 0–30)
CHLORIDE SERPL-SCNC: 108 MMOL/L (ref 95–110)
CO2 SERPL-SCNC: 23 MMOL/L (ref 23–29)
CREAT SERPL-MCNC: 0.8 MG/DL (ref 0.5–1.4)
DIFFERENTIAL METHOD: ABNORMAL
EOSINOPHIL # BLD AUTO: 0.1 K/UL (ref 0–0.5)
EOSINOPHIL NFR BLD: 3.1 % (ref 0–8)
ERYTHROCYTE [DISTWIDTH] IN BLOOD BY AUTOMATED COUNT: 13.6 % (ref 11.5–14.5)
EST. GFR  (AFRICAN AMERICAN): >60 ML/MIN/1.73 M^2
EST. GFR  (NON AFRICAN AMERICAN): >60 ML/MIN/1.73 M^2
GLUCOSE SERPL-MCNC: 108 MG/DL (ref 70–110)
HCT VFR BLD AUTO: 28.5 % (ref 37–48.5)
HGB BLD-MCNC: 9.1 G/DL (ref 12–16)
IMM GRANULOCYTES # BLD AUTO: 0.01 K/UL (ref 0–0.04)
IMM GRANULOCYTES NFR BLD AUTO: 0.4 % (ref 0–0.5)
LYMPHOCYTES # BLD AUTO: 0.3 K/UL (ref 1–4.8)
LYMPHOCYTES NFR BLD: 13.9 % (ref 18–48)
MCH RBC QN AUTO: 36.7 PG (ref 27–31)
MCHC RBC AUTO-ENTMCNC: 31.9 G/DL (ref 32–36)
MCV RBC AUTO: 115 FL (ref 82–98)
MONOCYTES # BLD AUTO: 0.2 K/UL (ref 0.3–1)
MONOCYTES NFR BLD: 9.9 % (ref 4–15)
NEUTROPHILS # BLD AUTO: 1.6 K/UL (ref 1.8–7.7)
NEUTROPHILS NFR BLD: 72.3 % (ref 38–73)
NRBC BLD-RTO: 0 /100 WBC
PLATELET # BLD AUTO: 116 K/UL (ref 150–350)
PMV BLD AUTO: 10.5 FL (ref 9.2–12.9)
POTASSIUM SERPL-SCNC: 3.8 MMOL/L (ref 3.5–5.1)
PROT SERPL-MCNC: 7.1 G/DL (ref 6–8.4)
RBC # BLD AUTO: 2.48 M/UL (ref 4–5.4)
SODIUM SERPL-SCNC: 142 MMOL/L (ref 136–145)
WBC # BLD AUTO: 2.23 K/UL (ref 3.9–12.7)

## 2020-03-19 PROCEDURE — 85025 COMPLETE CBC W/AUTO DIFF WBC: CPT

## 2020-03-19 PROCEDURE — 86304 IMMUNOASSAY TUMOR CA 125: CPT

## 2020-03-19 PROCEDURE — 36415 COLL VENOUS BLD VENIPUNCTURE: CPT | Mod: PO

## 2020-03-19 PROCEDURE — 80053 COMPREHEN METABOLIC PANEL: CPT

## 2020-03-20 ENCOUNTER — INFUSION (OUTPATIENT)
Dept: INFUSION THERAPY | Facility: OTHER | Age: 65
End: 2020-03-20
Attending: INTERNAL MEDICINE
Payer: MEDICARE

## 2020-03-20 VITALS
BODY MASS INDEX: 29.64 KG/M2 | OXYGEN SATURATION: 100 % | HEART RATE: 64 BPM | DIASTOLIC BLOOD PRESSURE: 58 MMHG | HEIGHT: 60 IN | WEIGHT: 151 LBS | RESPIRATION RATE: 17 BRPM | SYSTOLIC BLOOD PRESSURE: 110 MMHG | TEMPERATURE: 99 F

## 2020-03-20 DIAGNOSIS — C54.1 ENDOMETRIAL CANCER: Primary | ICD-10-CM

## 2020-03-20 PROCEDURE — 96417 CHEMO IV INFUS EACH ADDL SEQ: CPT

## 2020-03-20 PROCEDURE — S0028 INJECTION, FAMOTIDINE, 20 MG: HCPCS | Performed by: OBSTETRICS & GYNECOLOGY

## 2020-03-20 PROCEDURE — 96367 TX/PROPH/DG ADDL SEQ IV INF: CPT

## 2020-03-20 PROCEDURE — 96415 CHEMO IV INFUSION ADDL HR: CPT

## 2020-03-20 PROCEDURE — 25000003 PHARM REV CODE 250: Performed by: OBSTETRICS & GYNECOLOGY

## 2020-03-20 PROCEDURE — 63600175 PHARM REV CODE 636 W HCPCS: Performed by: OBSTETRICS & GYNECOLOGY

## 2020-03-20 PROCEDURE — 96413 CHEMO IV INFUSION 1 HR: CPT

## 2020-03-20 PROCEDURE — 96376 TX/PRO/DX INJ SAME DRUG ADON: CPT

## 2020-03-20 RX ORDER — HEPARIN 100 UNIT/ML
500 SYRINGE INTRAVENOUS
Status: CANCELLED | OUTPATIENT
Start: 2020-03-20

## 2020-03-20 RX ORDER — EPINEPHRINE 0.3 MG/.3ML
0.3 INJECTION SUBCUTANEOUS ONCE AS NEEDED
Status: DISCONTINUED | OUTPATIENT
Start: 2020-03-20 | End: 2020-03-20 | Stop reason: HOSPADM

## 2020-03-20 RX ORDER — SODIUM CHLORIDE 0.9 % (FLUSH) 0.9 %
10 SYRINGE (ML) INJECTION
Status: DISCONTINUED | OUTPATIENT
Start: 2020-03-20 | End: 2020-03-20 | Stop reason: HOSPADM

## 2020-03-20 RX ORDER — FAMOTIDINE 10 MG/ML
20 INJECTION INTRAVENOUS
Status: COMPLETED | OUTPATIENT
Start: 2020-03-20 | End: 2020-03-20

## 2020-03-20 RX ORDER — FAMOTIDINE 10 MG/ML
20 INJECTION INTRAVENOUS
Status: CANCELLED | OUTPATIENT
Start: 2020-03-20

## 2020-03-20 RX ORDER — DIPHENHYDRAMINE HYDROCHLORIDE 50 MG/ML
50 INJECTION INTRAMUSCULAR; INTRAVENOUS ONCE AS NEEDED
Status: CANCELLED | OUTPATIENT
Start: 2020-03-20

## 2020-03-20 RX ORDER — HEPARIN 100 UNIT/ML
500 SYRINGE INTRAVENOUS
Status: DISCONTINUED | OUTPATIENT
Start: 2020-03-20 | End: 2020-03-20 | Stop reason: HOSPADM

## 2020-03-20 RX ORDER — EPINEPHRINE 0.3 MG/.3ML
0.3 INJECTION SUBCUTANEOUS ONCE AS NEEDED
Status: CANCELLED | OUTPATIENT
Start: 2020-03-20

## 2020-03-20 RX ORDER — DIPHENHYDRAMINE HYDROCHLORIDE 50 MG/ML
50 INJECTION INTRAMUSCULAR; INTRAVENOUS ONCE AS NEEDED
Status: DISCONTINUED | OUTPATIENT
Start: 2020-03-20 | End: 2020-03-20 | Stop reason: HOSPADM

## 2020-03-20 RX ORDER — SODIUM CHLORIDE 0.9 % (FLUSH) 0.9 %
10 SYRINGE (ML) INJECTION
Status: CANCELLED | OUTPATIENT
Start: 2020-03-20

## 2020-03-20 RX ADMIN — DIPHENHYDRAMINE HYDROCHLORIDE 50 MG: 50 INJECTION, SOLUTION INTRAMUSCULAR; INTRAVENOUS at 09:03

## 2020-03-20 RX ADMIN — PALONOSETRON HYDROCHLORIDE: 0.25 INJECTION, SOLUTION INTRAVENOUS at 10:03

## 2020-03-20 RX ADMIN — CARBOPLATIN 420 MG: 10 INJECTION, SOLUTION INTRAVENOUS at 01:03

## 2020-03-20 RX ADMIN — SODIUM CHLORIDE 500 ML: 0.9 INJECTION, SOLUTION INTRAVENOUS at 02:03

## 2020-03-20 RX ADMIN — PACLITAXEL 144 MG: 6 INJECTION, SOLUTION INTRAVENOUS at 10:03

## 2020-03-20 RX ADMIN — FAMOTIDINE 20 MG: 10 INJECTION INTRAVENOUS at 09:03

## 2020-03-20 NOTE — PLAN OF CARE
Taxol and Carbo administered, no reaction. Patient tolerated well.  24 gauge IV removed, catheter intact. No apparent distress noted. Discharge instructions given to patient. Patient understands instructions. Follow up appointment scheduled.

## 2020-03-30 ENCOUNTER — PATIENT MESSAGE (OUTPATIENT)
Dept: GYNECOLOGIC ONCOLOGY | Facility: CLINIC | Age: 65
End: 2020-03-30

## 2020-04-01 ENCOUNTER — PATIENT MESSAGE (OUTPATIENT)
Dept: HEMATOLOGY/ONCOLOGY | Facility: CLINIC | Age: 65
End: 2020-04-01

## 2020-04-03 ENCOUNTER — PATIENT MESSAGE (OUTPATIENT)
Dept: UROLOGY | Facility: CLINIC | Age: 65
End: 2020-04-03

## 2020-04-07 ENCOUNTER — DOCUMENTATION ONLY (OUTPATIENT)
Dept: GYNECOLOGIC ONCOLOGY | Facility: CLINIC | Age: 65
End: 2020-04-07

## 2020-04-07 ENCOUNTER — TELEPHONE (OUTPATIENT)
Dept: GYNECOLOGIC ONCOLOGY | Facility: CLINIC | Age: 65
End: 2020-04-07

## 2020-04-07 ENCOUNTER — LAB VISIT (OUTPATIENT)
Dept: LAB | Facility: OTHER | Age: 65
End: 2020-04-07
Attending: OBSTETRICS & GYNECOLOGY
Payer: MEDICARE

## 2020-04-07 DIAGNOSIS — C54.1 ENDOMETRIAL CANCER: ICD-10-CM

## 2020-04-07 LAB
ALBUMIN SERPL BCP-MCNC: 3.5 G/DL (ref 3.5–5.2)
ALP SERPL-CCNC: 62 U/L (ref 55–135)
ALT SERPL W/O P-5'-P-CCNC: 21 U/L (ref 10–44)
ANION GAP SERPL CALC-SCNC: 9 MMOL/L (ref 8–16)
ANISOCYTOSIS BLD QL SMEAR: SLIGHT
AST SERPL-CCNC: 34 U/L (ref 10–40)
BASOPHILS NFR BLD: 1 % (ref 0–1.9)
BILIRUB SERPL-MCNC: 0.4 MG/DL (ref 0.1–1)
BUN SERPL-MCNC: 9 MG/DL (ref 8–23)
CALCIUM SERPL-MCNC: 9 MG/DL (ref 8.7–10.5)
CANCER AG125 SERPL-ACNC: 15 U/ML (ref 0–30)
CHLORIDE SERPL-SCNC: 108 MMOL/L (ref 95–110)
CO2 SERPL-SCNC: 25 MMOL/L (ref 23–29)
CREAT SERPL-MCNC: 0.8 MG/DL (ref 0.5–1.4)
DACRYOCYTES BLD QL SMEAR: ABNORMAL
DIFFERENTIAL METHOD: ABNORMAL
EOSINOPHIL NFR BLD: 1 % (ref 0–8)
ERYTHROCYTE [DISTWIDTH] IN BLOOD BY AUTOMATED COUNT: 13.3 % (ref 11.5–14.5)
EST. GFR  (AFRICAN AMERICAN): >60 ML/MIN/1.73 M^2
EST. GFR  (NON AFRICAN AMERICAN): >60 ML/MIN/1.73 M^2
GLUCOSE SERPL-MCNC: 109 MG/DL (ref 70–110)
HCT VFR BLD AUTO: 21.5 % (ref 37–48.5)
HGB BLD-MCNC: 6.8 G/DL (ref 12–16)
IMM GRANULOCYTES # BLD AUTO: ABNORMAL K/UL (ref 0–0.04)
IMM GRANULOCYTES NFR BLD AUTO: ABNORMAL % (ref 0–0.5)
LYMPHOCYTES NFR BLD: 12 % (ref 18–48)
MCH RBC QN AUTO: 35.1 PG (ref 27–31)
MCHC RBC AUTO-ENTMCNC: 31.6 G/DL (ref 32–36)
MCV RBC AUTO: 111 FL (ref 82–98)
MONOCYTES NFR BLD: 12 % (ref 4–15)
MYELOCYTES NFR BLD MANUAL: 1 %
NEUTROPHILS NFR BLD: 69 % (ref 38–73)
NEUTS BAND NFR BLD MANUAL: 4 %
NRBC BLD-RTO: 0 /100 WBC
PLATELET # BLD AUTO: 54 K/UL (ref 150–350)
PLATELET BLD QL SMEAR: ABNORMAL
PMV BLD AUTO: 10.6 FL (ref 9.2–12.9)
POIKILOCYTOSIS BLD QL SMEAR: SLIGHT
POTASSIUM SERPL-SCNC: 3.7 MMOL/L (ref 3.5–5.1)
PROT SERPL-MCNC: 6.4 G/DL (ref 6–8.4)
RBC # BLD AUTO: 1.94 M/UL (ref 4–5.4)
SODIUM SERPL-SCNC: 142 MMOL/L (ref 136–145)
WBC # BLD AUTO: 1.6 K/UL (ref 3.9–12.7)

## 2020-04-07 PROCEDURE — 86304 IMMUNOASSAY TUMOR CA 125: CPT

## 2020-04-07 PROCEDURE — 36415 COLL VENOUS BLD VENIPUNCTURE: CPT

## 2020-04-07 PROCEDURE — 85027 COMPLETE CBC AUTOMATED: CPT

## 2020-04-07 PROCEDURE — 85007 BL SMEAR W/DIFF WBC COUNT: CPT

## 2020-04-07 PROCEDURE — 80053 COMPREHEN METABOLIC PANEL: CPT

## 2020-04-07 RX ORDER — HYDROCODONE BITARTRATE AND ACETAMINOPHEN 500; 5 MG/1; MG/1
TABLET ORAL ONCE
Status: CANCELLED | OUTPATIENT
Start: 2020-04-07 | End: 2020-04-07

## 2020-04-08 ENCOUNTER — OFFICE VISIT (OUTPATIENT)
Dept: GYNECOLOGIC ONCOLOGY | Facility: CLINIC | Age: 65
End: 2020-04-08
Payer: MEDICARE

## 2020-04-08 ENCOUNTER — LAB VISIT (OUTPATIENT)
Dept: LAB | Facility: OTHER | Age: 65
End: 2020-04-08
Attending: OBSTETRICS & GYNECOLOGY
Payer: MEDICARE

## 2020-04-08 DIAGNOSIS — Z51.11 ENCOUNTER FOR ANTINEOPLASTIC CHEMOTHERAPY: ICD-10-CM

## 2020-04-08 DIAGNOSIS — D69.59 THROMBOCYTOPENIA DUE TO DRUGS: ICD-10-CM

## 2020-04-08 DIAGNOSIS — D70.2 NEUTROPENIA, DRUG-INDUCED: ICD-10-CM

## 2020-04-08 DIAGNOSIS — C54.1 ENDOMETRIAL CANCER: ICD-10-CM

## 2020-04-08 DIAGNOSIS — R11.2 NAUSEA AND VOMITING, INTRACTABILITY OF VOMITING NOT SPECIFIED, UNSPECIFIED VOMITING TYPE: ICD-10-CM

## 2020-04-08 DIAGNOSIS — C54.1 ENDOMETRIAL CANCER: Primary | ICD-10-CM

## 2020-04-08 DIAGNOSIS — T50.905A THROMBOCYTOPENIA DUE TO DRUGS: ICD-10-CM

## 2020-04-08 LAB
ABO + RH BLD: NORMAL
BLD GP AB SCN CELLS X3 SERPL QL: NORMAL

## 2020-04-08 PROCEDURE — 27201040 HC RC 50 FILTER

## 2020-04-08 PROCEDURE — 86901 BLOOD TYPING SEROLOGIC RH(D): CPT

## 2020-04-08 PROCEDURE — 99214 OFFICE O/P EST MOD 30 MIN: CPT | Mod: 95,,, | Performed by: OBSTETRICS & GYNECOLOGY

## 2020-04-08 PROCEDURE — 36415 COLL VENOUS BLD VENIPUNCTURE: CPT

## 2020-04-08 PROCEDURE — 86920 COMPATIBILITY TEST SPIN: CPT | Mod: 59

## 2020-04-08 PROCEDURE — 99214 PR OFFICE/OUTPT VISIT, EST, LEVL IV, 30-39 MIN: ICD-10-PCS | Mod: 95,,, | Performed by: OBSTETRICS & GYNECOLOGY

## 2020-04-08 RX ORDER — ONDANSETRON HYDROCHLORIDE 8 MG/1
8 TABLET, FILM COATED ORAL EVERY 12 HOURS PRN
Qty: 30 TABLET | Refills: 2 | Status: SHIPPED | OUTPATIENT
Start: 2020-04-08 | End: 2020-05-04

## 2020-04-08 RX ORDER — CLOBETASOL PROPIONATE 0.5 MG/G
OINTMENT TOPICAL
Qty: 60 G | Refills: 2 | Status: SHIPPED | OUTPATIENT
Start: 2020-04-08 | End: 2020-09-19 | Stop reason: SDUPTHER

## 2020-04-08 NOTE — PROGRESS NOTES
Subjective:      Patient ID: Jeanne Rodgers is a 65 y.o. female.    Chief Complaint: Endometrial Cancer    The patient location is: home  The chief complaint leading to consultation is: endometrial cancer  Visit type: Virtual visit with synchronous audio and video  Total time spent with patient: 15 min  Each patient to whom he or she provides medical services by telemedicine is:  (1) informed of the relationship between the physician and patient and the respective role of any other health care provider with respect to management of the patient; and (2) notified that he or she may decline to receive medical services by telemedicine and may withdraw from such care at any time.    Notes: see below    Treatment History  Stage IIIC2 UPSC  Preop PET revealing positive pelvic and PA nodes  RALH/BSO/Debulking of left pelvic nodes (6/8 positive) on 8/15/19  Port placed 9/26/19  T/C started 9/27/19  WPRT + PA nodes to 45 Gy completed 1/22/20  HDR to 15 Gy in 3 fractions completed 3/3/20  Restarted T/C on 3/20/2020  Follow-up   Pertinent negatives include no abdominal pain, arthralgias, chest pain, chills, coughing, fatigue, fever, nausea, numbness, rash, sore throat, vomiting or weakness.     Here today for consideration of C5 of treatment.  Denies VB, F/C, N/V.  Having some right sided mild pain and head tenderness.  Labs reviewed and she is pancytopenic.  Saw her HCT yesterday was 21 so she is scheduled to get 2 U Thursday.  Will delay chemo  Review of Systems   Constitutional: Negative for activity change, appetite change, chills, fatigue and fever.   HENT: Negative for hearing loss, mouth sores, nosebleeds, sore throat and tinnitus.    Eyes: Negative for visual disturbance.   Respiratory: Negative for cough, chest tightness, shortness of breath and wheezing.    Cardiovascular: Negative for chest pain and leg swelling.   Gastrointestinal: Negative for abdominal distention, abdominal pain, blood in stool,  constipation, diarrhea, nausea and vomiting.   Genitourinary: Negative for dysuria, flank pain, frequency, hematuria, pelvic pain, vaginal bleeding, vaginal discharge and vaginal pain.   Musculoskeletal: Negative for arthralgias and back pain.   Skin: Negative for rash.   Neurological: Negative for dizziness, seizures, syncope, weakness and numbness.   Hematological: Does not bruise/bleed easily.   Psychiatric/Behavioral: Negative for confusion and sleep disturbance. The patient is not nervous/anxious.        Objective:   Physical Exam:   Constitutional: She appears well-developed and well-nourished. No distress.    HENT:   Head: Normocephalic and atraumatic.    Eyes: No scleral icterus.    Neck: Normal range of motion. Neck supple.    Cardiovascular: Normal rate and intact distal pulses.  Exam reveals no cyanosis and no edema.     Pulmonary/Chest: Effort normal. No respiratory distress. She exhibits no tenderness.        Abdominal: Soft. She exhibits no distension, no fluid wave, no ascites and no mass. There is no tenderness. There is no rebound and no guarding. No hernia.                Lymphadenopathy:     She has no cervical adenopathy.     Skin: No cyanosis.        Assessment:     1. Endometrial cancer    2. Encounter for antineoplastic chemotherapy    3. Thrombocytopenia due to drugs    4. Neutropenia, drug-induced    5. Nausea and vomiting, intractability of vomiting not specified, unspecified vomiting type        Plan:       Delay C5 due to pancytopenia.  Plan transfusion 2U PRBC Thursday.  Repeat labs next week.  RTC as scheduled.  Refilled clobetasol and Zofran

## 2020-04-09 ENCOUNTER — INFUSION (OUTPATIENT)
Dept: INFUSION THERAPY | Facility: OTHER | Age: 65
End: 2020-04-09
Attending: INTERNAL MEDICINE
Payer: MEDICARE

## 2020-04-09 VITALS
OXYGEN SATURATION: 100 % | HEART RATE: 66 BPM | RESPIRATION RATE: 16 BRPM | DIASTOLIC BLOOD PRESSURE: 73 MMHG | TEMPERATURE: 98 F | SYSTOLIC BLOOD PRESSURE: 115 MMHG

## 2020-04-09 DIAGNOSIS — C54.1 ENDOMETRIAL CANCER: ICD-10-CM

## 2020-04-09 LAB
BLD PROD TYP BPU: NORMAL
BLD PROD TYP BPU: NORMAL
BLOOD UNIT EXPIRATION DATE: NORMAL
BLOOD UNIT EXPIRATION DATE: NORMAL
BLOOD UNIT TYPE CODE: 5100
BLOOD UNIT TYPE CODE: 9500
BLOOD UNIT TYPE: NORMAL
BLOOD UNIT TYPE: NORMAL
CODING SYSTEM: NORMAL
CODING SYSTEM: NORMAL
DISPENSE STATUS: NORMAL
DISPENSE STATUS: NORMAL
NUM UNITS TRANS PACKED RBC: NORMAL
NUM UNITS TRANS PACKED RBC: NORMAL

## 2020-04-09 PROCEDURE — P9040 RBC LEUKOREDUCED IRRADIATED: HCPCS

## 2020-04-09 PROCEDURE — 36430 TRANSFUSION BLD/BLD COMPNT: CPT

## 2020-04-09 PROCEDURE — P9011 BLOOD SPLIT UNIT: HCPCS

## 2020-04-09 PROCEDURE — 86985 SPLIT BLOOD OR PRODUCTS: CPT

## 2020-04-09 RX ORDER — HYDROCODONE BITARTRATE AND ACETAMINOPHEN 500; 5 MG/1; MG/1
TABLET ORAL ONCE
Status: DISCONTINUED | OUTPATIENT
Start: 2020-04-09 | End: 2020-04-09 | Stop reason: HOSPADM

## 2020-04-09 NOTE — PLAN OF CARE
2 units PRBC transfusion complete. Pt tolerated well. VSS. NAD. IV to left AC DC'd.  Pt verbalized understanding of discharge instructions before leaving with self.

## 2020-04-16 ENCOUNTER — TELEPHONE (OUTPATIENT)
Dept: GYNECOLOGIC ONCOLOGY | Facility: CLINIC | Age: 65
End: 2020-04-16

## 2020-04-16 ENCOUNTER — LAB VISIT (OUTPATIENT)
Dept: LAB | Facility: OTHER | Age: 65
End: 2020-04-16
Attending: OBSTETRICS & GYNECOLOGY
Payer: MEDICARE

## 2020-04-16 DIAGNOSIS — C54.1 ENDOMETRIAL CANCER: ICD-10-CM

## 2020-04-16 LAB
ALBUMIN SERPL BCP-MCNC: 3.7 G/DL (ref 3.5–5.2)
ALP SERPL-CCNC: 69 U/L (ref 55–135)
ALT SERPL W/O P-5'-P-CCNC: 23 U/L (ref 10–44)
ANION GAP SERPL CALC-SCNC: 9 MMOL/L (ref 8–16)
ANISOCYTOSIS BLD QL SMEAR: SLIGHT
AST SERPL-CCNC: 37 U/L (ref 10–40)
BASOPHILS NFR BLD: 1 % (ref 0–1.9)
BILIRUB SERPL-MCNC: 0.5 MG/DL (ref 0.1–1)
BUN SERPL-MCNC: 10 MG/DL (ref 8–23)
CALCIUM SERPL-MCNC: 9 MG/DL (ref 8.7–10.5)
CHLORIDE SERPL-SCNC: 108 MMOL/L (ref 95–110)
CO2 SERPL-SCNC: 24 MMOL/L (ref 23–29)
CREAT SERPL-MCNC: 0.8 MG/DL (ref 0.5–1.4)
DIFFERENTIAL METHOD: ABNORMAL
EOSINOPHIL NFR BLD: 3 % (ref 0–8)
ERYTHROCYTE [DISTWIDTH] IN BLOOD BY AUTOMATED COUNT: 15.1 % (ref 11.5–14.5)
EST. GFR  (AFRICAN AMERICAN): >60 ML/MIN/1.73 M^2
EST. GFR  (NON AFRICAN AMERICAN): >60 ML/MIN/1.73 M^2
GLUCOSE SERPL-MCNC: 132 MG/DL (ref 70–110)
HCT VFR BLD AUTO: 30.4 % (ref 37–48.5)
HGB BLD-MCNC: 10.4 G/DL (ref 12–16)
IMM GRANULOCYTES # BLD AUTO: ABNORMAL K/UL (ref 0–0.04)
IMM GRANULOCYTES NFR BLD AUTO: ABNORMAL % (ref 0–0.5)
LYMPHOCYTES NFR BLD: 9 % (ref 18–48)
MCH RBC QN AUTO: 34.4 PG (ref 27–31)
MCHC RBC AUTO-ENTMCNC: 34.2 G/DL (ref 32–36)
MCV RBC AUTO: 101 FL (ref 82–98)
METAMYELOCYTES NFR BLD MANUAL: 4 %
MONOCYTES NFR BLD: 5 % (ref 4–15)
NEUTROPHILS NFR BLD: 77 % (ref 38–73)
NEUTS BAND NFR BLD MANUAL: 1 %
NRBC BLD-RTO: 0 /100 WBC
PLATELET # BLD AUTO: 50 K/UL (ref 150–350)
PLATELET BLD QL SMEAR: ABNORMAL
PMV BLD AUTO: 9.9 FL (ref 9.2–12.9)
POLYCHROMASIA BLD QL SMEAR: ABNORMAL
POTASSIUM SERPL-SCNC: 3.9 MMOL/L (ref 3.5–5.1)
PROT SERPL-MCNC: 6.7 G/DL (ref 6–8.4)
RBC # BLD AUTO: 3.02 M/UL (ref 4–5.4)
SODIUM SERPL-SCNC: 141 MMOL/L (ref 136–145)
WBC # BLD AUTO: 1.73 K/UL (ref 3.9–12.7)

## 2020-04-16 PROCEDURE — 80053 COMPREHEN METABOLIC PANEL: CPT

## 2020-04-16 PROCEDURE — 36415 COLL VENOUS BLD VENIPUNCTURE: CPT

## 2020-04-16 PROCEDURE — 85027 COMPLETE CBC AUTOMATED: CPT

## 2020-04-16 PROCEDURE — 85007 BL SMEAR W/DIFF WBC COUNT: CPT

## 2020-04-16 NOTE — TELEPHONE ENCOUNTER
Advised pt of critical WBC. Will delay chemo x 1 week and recheck labs on next Thursday. If labs are within range will plan for chemo on next Friday 4/24. Pt verbalized understanding and given neutropenic precautions. EDMUND/MA

## 2020-04-20 DIAGNOSIS — Z13.9 SCREENING FOR CONDITION: Primary | ICD-10-CM

## 2020-04-22 ENCOUNTER — PATIENT MESSAGE (OUTPATIENT)
Dept: GYNECOLOGIC ONCOLOGY | Facility: CLINIC | Age: 65
End: 2020-04-22

## 2020-04-23 ENCOUNTER — TELEPHONE (OUTPATIENT)
Dept: GYNECOLOGIC ONCOLOGY | Facility: CLINIC | Age: 65
End: 2020-04-23

## 2020-04-23 ENCOUNTER — LAB VISIT (OUTPATIENT)
Dept: INTERNAL MEDICINE | Facility: CLINIC | Age: 65
End: 2020-04-23
Payer: MEDICARE

## 2020-04-23 ENCOUNTER — LAB VISIT (OUTPATIENT)
Dept: LAB | Facility: HOSPITAL | Age: 65
End: 2020-04-23
Attending: OBSTETRICS & GYNECOLOGY
Payer: MEDICARE

## 2020-04-23 DIAGNOSIS — C54.1 ENDOMETRIAL CANCER: ICD-10-CM

## 2020-04-23 DIAGNOSIS — Z13.9 SCREENING FOR CONDITION: ICD-10-CM

## 2020-04-23 LAB
ALBUMIN SERPL BCP-MCNC: 3.6 G/DL (ref 3.5–5.2)
ALP SERPL-CCNC: 75 U/L (ref 55–135)
ALT SERPL W/O P-5'-P-CCNC: 26 U/L (ref 10–44)
ANION GAP SERPL CALC-SCNC: 6 MMOL/L (ref 8–16)
ANISOCYTOSIS BLD QL SMEAR: SLIGHT
AST SERPL-CCNC: 41 U/L (ref 10–40)
BASOPHILS # BLD AUTO: 0.01 K/UL (ref 0–0.2)
BASOPHILS NFR BLD: 0.5 % (ref 0–1.9)
BILIRUB SERPL-MCNC: 0.5 MG/DL (ref 0.1–1)
BUN SERPL-MCNC: 11 MG/DL (ref 8–23)
CALCIUM SERPL-MCNC: 9.3 MG/DL (ref 8.7–10.5)
CHLORIDE SERPL-SCNC: 106 MMOL/L (ref 95–110)
CO2 SERPL-SCNC: 27 MMOL/L (ref 23–29)
CREAT SERPL-MCNC: 0.8 MG/DL (ref 0.5–1.4)
DACRYOCYTES BLD QL SMEAR: ABNORMAL
EOSINOPHIL # BLD AUTO: 0 K/UL (ref 0–0.5)
EOSINOPHIL NFR BLD: 2.1 % (ref 0–8)
ERYTHROCYTE [DISTWIDTH] IN BLOOD BY AUTOMATED COUNT: 16.1 % (ref 11.5–14.5)
EST. GFR  (AFRICAN AMERICAN): >60 ML/MIN/1.73 M^2
EST. GFR  (NON AFRICAN AMERICAN): >60 ML/MIN/1.73 M^2
GLUCOSE SERPL-MCNC: 147 MG/DL (ref 70–110)
HCT VFR BLD AUTO: 28.9 % (ref 37–48.5)
HGB BLD-MCNC: 9.4 G/DL (ref 12–16)
IMM GRANULOCYTES # BLD AUTO: 0.02 K/UL (ref 0–0.04)
IMM GRANULOCYTES NFR BLD AUTO: 1 % (ref 0–0.5)
LYMPHOCYTES # BLD AUTO: 0.3 K/UL (ref 1–4.8)
LYMPHOCYTES NFR BLD: 12.9 % (ref 18–48)
MCH RBC QN AUTO: 34.1 PG (ref 27–31)
MCHC RBC AUTO-ENTMCNC: 32.5 G/DL (ref 32–36)
MCV RBC AUTO: 105 FL (ref 82–98)
MONOCYTES # BLD AUTO: 0.2 K/UL (ref 0.3–1)
MONOCYTES NFR BLD: 11.3 % (ref 4–15)
NEUTROPHILS # BLD AUTO: 1.4 K/UL (ref 1.8–7.7)
NEUTROPHILS NFR BLD: 72.2 % (ref 38–73)
NRBC BLD-RTO: 0 /100 WBC
OVALOCYTES BLD QL SMEAR: ABNORMAL
PLATELET # BLD AUTO: 76 K/UL (ref 150–350)
PLATELET BLD QL SMEAR: ABNORMAL
PMV BLD AUTO: 10.3 FL (ref 9.2–12.9)
POIKILOCYTOSIS BLD QL SMEAR: SLIGHT
POLYCHROMASIA BLD QL SMEAR: ABNORMAL
POTASSIUM SERPL-SCNC: 3.9 MMOL/L (ref 3.5–5.1)
PROT SERPL-MCNC: 6.7 G/DL (ref 6–8.4)
RBC # BLD AUTO: 2.76 M/UL (ref 4–5.4)
SARS-COV-2 RNA RESP QL NAA+PROBE: NOT DETECTED
SODIUM SERPL-SCNC: 139 MMOL/L (ref 136–145)
WBC # BLD AUTO: 1.94 K/UL (ref 3.9–12.7)

## 2020-04-23 PROCEDURE — 36415 COLL VENOUS BLD VENIPUNCTURE: CPT

## 2020-04-23 PROCEDURE — 85007 BL SMEAR W/DIFF WBC COUNT: CPT

## 2020-04-23 PROCEDURE — 85027 COMPLETE CBC AUTOMATED: CPT

## 2020-04-23 PROCEDURE — U0002 COVID-19 LAB TEST NON-CDC: HCPCS

## 2020-04-23 PROCEDURE — 80053 COMPREHEN METABOLIC PANEL: CPT

## 2020-04-23 NOTE — TELEPHONE ENCOUNTER
Pt notified of treatment delay due to labs and need for Neupogen by Eric.  Informed pt Neupogen was approved and she will have appt tomorrow(4/24-9:00) at [facility]. Informed pt Eric will reach out to her tomorrow regarding scheduling 2 additional injections. Pt counseled on neutropenia secondary to antineoplastic agents and educated that dietary supplements are not necessary/warranted. Pt expressed verbal understanding.

## 2020-04-23 NOTE — PROGRESS NOTES
To:  Staff of Dr. Regalado:    Please phone this patient to let her know that her COVID-19 test came back negative and that, based on that result, she can proceed with treatment in the infusion/injection and/or radiation center as indicated by her treatment provider.  If my assistance is needed, don't hesitate to reach out.      Thanks,  Kenton Yoder, DNP, APRN, FNP-BC, AOCNP  [facility], 3rd Floor [facility] [address], [place]  [address]  [phone]

## 2020-04-24 ENCOUNTER — INFUSION (OUTPATIENT)
Dept: INFUSION THERAPY | Facility: OTHER | Age: 65
End: 2020-04-24
Attending: INTERNAL MEDICINE
Payer: MEDICARE

## 2020-04-24 VITALS
SYSTOLIC BLOOD PRESSURE: 120 MMHG | HEIGHT: 60 IN | DIASTOLIC BLOOD PRESSURE: 60 MMHG | TEMPERATURE: 98 F | RESPIRATION RATE: 17 BRPM | OXYGEN SATURATION: 99 % | BODY MASS INDEX: 29.49 KG/M2 | HEART RATE: 83 BPM

## 2020-04-24 DIAGNOSIS — D70.2 NEUTROPENIA, DRUG-INDUCED: Primary | ICD-10-CM

## 2020-04-24 PROCEDURE — 63600175 PHARM REV CODE 636 W HCPCS: Mod: JG | Performed by: OBSTETRICS & GYNECOLOGY

## 2020-04-24 PROCEDURE — 96372 THER/PROPH/DIAG INJ SC/IM: CPT

## 2020-04-24 RX ADMIN — FILGRASTIM 300 MCG: 300 INJECTION, SOLUTION INTRAVENOUS; SUBCUTANEOUS at 09:04

## 2020-04-24 NOTE — PLAN OF CARE
Neupogen subcutaneous injection administered, no reaction. Patient tolerated well. No apparent distress noted. Discharge instructions given to patient. Patient understands instructions. Follow up appointment scheduled.

## 2020-04-25 ENCOUNTER — INFUSION (OUTPATIENT)
Dept: INFUSION THERAPY | Facility: HOSPITAL | Age: 65
End: 2020-04-25
Attending: INTERNAL MEDICINE
Payer: MEDICARE

## 2020-04-25 DIAGNOSIS — D70.2 NEUTROPENIA, DRUG-INDUCED: Primary | ICD-10-CM

## 2020-04-25 PROCEDURE — 96372 THER/PROPH/DIAG INJ SC/IM: CPT

## 2020-04-25 PROCEDURE — 63600175 PHARM REV CODE 636 W HCPCS: Mod: JG | Performed by: OBSTETRICS & GYNECOLOGY

## 2020-04-25 RX ADMIN — FILGRASTIM 300 MCG: 300 INJECTION, SOLUTION INTRAVENOUS; SUBCUTANEOUS at 09:04

## 2020-04-27 ENCOUNTER — INFUSION (OUTPATIENT)
Dept: INFUSION THERAPY | Facility: OTHER | Age: 65
End: 2020-04-27
Attending: INTERNAL MEDICINE
Payer: MEDICARE

## 2020-04-27 VITALS
DIASTOLIC BLOOD PRESSURE: 62 MMHG | TEMPERATURE: 98 F | RESPIRATION RATE: 16 BRPM | OXYGEN SATURATION: 100 % | SYSTOLIC BLOOD PRESSURE: 141 MMHG | HEART RATE: 75 BPM

## 2020-04-27 DIAGNOSIS — D70.2 NEUTROPENIA, DRUG-INDUCED: Primary | ICD-10-CM

## 2020-04-27 PROCEDURE — 63600175 PHARM REV CODE 636 W HCPCS: Mod: JG | Performed by: OBSTETRICS & GYNECOLOGY

## 2020-04-27 PROCEDURE — 96372 THER/PROPH/DIAG INJ SC/IM: CPT

## 2020-04-27 RX ADMIN — FILGRASTIM 300 MCG: 300 INJECTION, SOLUTION INTRAVENOUS; SUBCUTANEOUS at 10:04

## 2020-04-30 ENCOUNTER — PATIENT MESSAGE (OUTPATIENT)
Dept: GYNECOLOGIC ONCOLOGY | Facility: CLINIC | Age: 65
End: 2020-04-30

## 2020-04-30 ENCOUNTER — LAB VISIT (OUTPATIENT)
Dept: LAB | Facility: OTHER | Age: 65
End: 2020-04-30
Attending: OBSTETRICS & GYNECOLOGY
Payer: MEDICARE

## 2020-04-30 DIAGNOSIS — C54.1 ENDOMETRIAL CANCER: ICD-10-CM

## 2020-04-30 LAB
ALBUMIN SERPL BCP-MCNC: 3.7 G/DL (ref 3.5–5.2)
ALP SERPL-CCNC: 78 U/L (ref 55–135)
ALT SERPL W/O P-5'-P-CCNC: 29 U/L (ref 10–44)
ANION GAP SERPL CALC-SCNC: 8 MMOL/L (ref 8–16)
ANISOCYTOSIS BLD QL SMEAR: SLIGHT
AST SERPL-CCNC: 43 U/L (ref 10–40)
BASOPHILS # BLD AUTO: ABNORMAL K/UL (ref 0–0.2)
BASOPHILS NFR BLD: 1 % (ref 0–1.9)
BILIRUB SERPL-MCNC: 0.3 MG/DL (ref 0.1–1)
BUN SERPL-MCNC: 8 MG/DL (ref 8–23)
CALCIUM SERPL-MCNC: 9.2 MG/DL (ref 8.7–10.5)
CHLORIDE SERPL-SCNC: 109 MMOL/L (ref 95–110)
CO2 SERPL-SCNC: 25 MMOL/L (ref 23–29)
CREAT SERPL-MCNC: 0.7 MG/DL (ref 0.5–1.4)
DACRYOCYTES BLD QL SMEAR: ABNORMAL
DIFFERENTIAL METHOD: ABNORMAL
DOHLE BOD BLD QL SMEAR: PRESENT
EOSINOPHIL # BLD AUTO: ABNORMAL K/UL (ref 0–0.5)
EOSINOPHIL NFR BLD: 4 % (ref 0–8)
ERYTHROCYTE [DISTWIDTH] IN BLOOD BY AUTOMATED COUNT: 17 % (ref 11.5–14.5)
EST. GFR  (AFRICAN AMERICAN): >60 ML/MIN/1.73 M^2
EST. GFR  (NON AFRICAN AMERICAN): >60 ML/MIN/1.73 M^2
GLUCOSE SERPL-MCNC: 162 MG/DL (ref 70–110)
HCT VFR BLD AUTO: 30.2 % (ref 37–48.5)
HGB BLD-MCNC: 9.8 G/DL (ref 12–16)
IMM GRANULOCYTES # BLD AUTO: ABNORMAL K/UL (ref 0–0.04)
IMM GRANULOCYTES NFR BLD AUTO: ABNORMAL % (ref 0–0.5)
LYMPHOCYTES # BLD AUTO: ABNORMAL K/UL (ref 1–4.8)
LYMPHOCYTES NFR BLD: 16 % (ref 18–48)
MCH RBC QN AUTO: 33.6 PG (ref 27–31)
MCHC RBC AUTO-ENTMCNC: 32.5 G/DL (ref 32–36)
MCV RBC AUTO: 103 FL (ref 82–98)
METAMYELOCYTES NFR BLD MANUAL: 1 %
MONOCYTES # BLD AUTO: ABNORMAL K/UL (ref 0.3–1)
MONOCYTES NFR BLD: 5 % (ref 4–15)
NEUTROPHILS NFR BLD: 66 % (ref 38–73)
NEUTS BAND NFR BLD MANUAL: 7 %
NRBC BLD-RTO: 0 /100 WBC
OVALOCYTES BLD QL SMEAR: ABNORMAL
PLATELET # BLD AUTO: 83 K/UL (ref 150–350)
PLATELET BLD QL SMEAR: ABNORMAL
PMV BLD AUTO: 9.4 FL (ref 9.2–12.9)
POIKILOCYTOSIS BLD QL SMEAR: SLIGHT
POLYCHROMASIA BLD QL SMEAR: ABNORMAL
POTASSIUM SERPL-SCNC: 4 MMOL/L (ref 3.5–5.1)
PROT SERPL-MCNC: 6.6 G/DL (ref 6–8.4)
RBC # BLD AUTO: 2.92 M/UL (ref 4–5.4)
SODIUM SERPL-SCNC: 142 MMOL/L (ref 136–145)
TOXIC GRANULES BLD QL SMEAR: PRESENT
WBC # BLD AUTO: 2.72 K/UL (ref 3.9–12.7)

## 2020-04-30 PROCEDURE — 85027 COMPLETE CBC AUTOMATED: CPT

## 2020-04-30 PROCEDURE — 36415 COLL VENOUS BLD VENIPUNCTURE: CPT

## 2020-04-30 PROCEDURE — 80053 COMPREHEN METABOLIC PANEL: CPT

## 2020-04-30 PROCEDURE — 85007 BL SMEAR W/DIFF WBC COUNT: CPT

## 2020-05-01 DIAGNOSIS — Z13.9 SCREENING FOR CONDITION: Primary | ICD-10-CM

## 2020-05-01 NOTE — PROGRESS NOTES
05/01/2020      Phoned the patient.      Discussed the following with the patient:  In an effort to protect our immunocompromised patients from potential exposure to COVID-19, [facility] will now require all patients receiving an infusion, an injection, and/or radiation therapy to be tested for COVID-19 prior to their appointment.  All patients currently under treatment will be tested immediately, and patients initiating new treatment cycles or with one-time appointments (injections, transfusions, etc.) must be tested within 72 hours of their appointment.      Symptom Patient's Response   Fever YES/NO: no   Cough YES/NO: no   Shortness of breath  YES/NO: no   Difficulty breathing YES/NO: no   GI symptoms such as diarrhea or nausea YES/NO: no   Loss of taste YES/NO: no   Loss of smell YES/NO: no     Other Screening Patient's Response   Has the patient previously undergone COVID-19 testing? YES/NO: yes     If yes to the question directly above, what was the result? negative   Has the patient been in close contact with someone who has undergone COVID-19 testing? YES/NO: no     If yes to the question directly above, what was the result? not applicable      The patient's 24-hour COVID-19 test was ordered and scheduled.  Reviewed the appointment date, time, and location with the patient.      Advised the patient that she can expect the results to take approximately 24 hours.  Advised the patient that someone will reach out to her regarding her results if she tests positive, as her treatment appointment will be rescheduled if she tests positive for COVID-19.  Also advised the patient that if she does not hear back from our office or if she is told by our office she has tested negative for COVID-19, she can proceed with treatment as originally planned.     Questions were answered to the patient's satisfaction, and the patient verbalized understanding of information and agreement with the plan.       The above was completed  in accordance with instructions and guidelines set forth by [facility].     Signed,        Nita Suresh RN     Date:  05/01/2020

## 2020-05-04 DIAGNOSIS — R11.2 NAUSEA AND VOMITING, INTRACTABILITY OF VOMITING NOT SPECIFIED, UNSPECIFIED VOMITING TYPE: ICD-10-CM

## 2020-05-04 RX ORDER — ONDANSETRON HYDROCHLORIDE 8 MG/1
TABLET, FILM COATED ORAL
Qty: 30 TABLET | Refills: 2 | Status: SHIPPED | OUTPATIENT
Start: 2020-05-04 | End: 2020-11-17 | Stop reason: CLARIF

## 2020-05-07 ENCOUNTER — LAB VISIT (OUTPATIENT)
Dept: LAB | Facility: HOSPITAL | Age: 65
End: 2020-05-07
Attending: OBSTETRICS & GYNECOLOGY
Payer: MEDICARE

## 2020-05-07 ENCOUNTER — LAB VISIT (OUTPATIENT)
Dept: INTERNAL MEDICINE | Facility: CLINIC | Age: 65
End: 2020-05-07
Payer: MEDICARE

## 2020-05-07 ENCOUNTER — TELEPHONE (OUTPATIENT)
Dept: GYNECOLOGIC ONCOLOGY | Facility: CLINIC | Age: 65
End: 2020-05-07

## 2020-05-07 DIAGNOSIS — C54.1 ENDOMETRIAL CANCER: ICD-10-CM

## 2020-05-07 DIAGNOSIS — Z13.9 SCREENING FOR CONDITION: ICD-10-CM

## 2020-05-07 LAB
ALBUMIN SERPL BCP-MCNC: 3.4 G/DL (ref 3.5–5.2)
ALP SERPL-CCNC: 75 U/L (ref 55–135)
ALT SERPL W/O P-5'-P-CCNC: 32 U/L (ref 10–44)
ANION GAP SERPL CALC-SCNC: 7 MMOL/L (ref 8–16)
AST SERPL-CCNC: 46 U/L (ref 10–40)
BASOPHILS # BLD AUTO: 0.01 K/UL (ref 0–0.2)
BASOPHILS NFR BLD: 0.5 % (ref 0–1.9)
BILIRUB SERPL-MCNC: 0.5 MG/DL (ref 0.1–1)
BUN SERPL-MCNC: 10 MG/DL (ref 8–23)
CALCIUM SERPL-MCNC: 8.8 MG/DL (ref 8.7–10.5)
CHLORIDE SERPL-SCNC: 109 MMOL/L (ref 95–110)
CO2 SERPL-SCNC: 25 MMOL/L (ref 23–29)
CREAT SERPL-MCNC: 0.7 MG/DL (ref 0.5–1.4)
DIFFERENTIAL METHOD: ABNORMAL
EOSINOPHIL # BLD AUTO: 0.1 K/UL (ref 0–0.5)
EOSINOPHIL NFR BLD: 3.3 % (ref 0–8)
ERYTHROCYTE [DISTWIDTH] IN BLOOD BY AUTOMATED COUNT: 17 % (ref 11.5–14.5)
EST. GFR  (AFRICAN AMERICAN): >60 ML/MIN/1.73 M^2
EST. GFR  (NON AFRICAN AMERICAN): >60 ML/MIN/1.73 M^2
GLUCOSE SERPL-MCNC: 129 MG/DL (ref 70–110)
HCT VFR BLD AUTO: 26.8 % (ref 37–48.5)
HGB BLD-MCNC: 8.8 G/DL (ref 12–16)
IMM GRANULOCYTES # BLD AUTO: 0.01 K/UL (ref 0–0.04)
IMM GRANULOCYTES NFR BLD AUTO: 0.5 % (ref 0–0.5)
LYMPHOCYTES # BLD AUTO: 0.4 K/UL (ref 1–4.8)
LYMPHOCYTES NFR BLD: 17.1 % (ref 18–48)
MCH RBC QN AUTO: 34.6 PG (ref 27–31)
MCHC RBC AUTO-ENTMCNC: 32.8 G/DL (ref 32–36)
MCV RBC AUTO: 106 FL (ref 82–98)
MONOCYTES # BLD AUTO: 0.2 K/UL (ref 0.3–1)
MONOCYTES NFR BLD: 8.5 % (ref 4–15)
NEUTROPHILS # BLD AUTO: 1.5 K/UL (ref 1.8–7.7)
NEUTROPHILS NFR BLD: 70.1 % (ref 38–73)
NRBC BLD-RTO: 0 /100 WBC
PLATELET # BLD AUTO: 91 K/UL (ref 150–350)
PMV BLD AUTO: 10.5 FL (ref 9.2–12.9)
POTASSIUM SERPL-SCNC: 4.3 MMOL/L (ref 3.5–5.1)
PROT SERPL-MCNC: 6.6 G/DL (ref 6–8.4)
RBC # BLD AUTO: 2.54 M/UL (ref 4–5.4)
SARS-COV-2 RNA RESP QL NAA+PROBE: NOT DETECTED
SODIUM SERPL-SCNC: 141 MMOL/L (ref 136–145)
WBC # BLD AUTO: 2.11 K/UL (ref 3.9–12.7)

## 2020-05-07 PROCEDURE — 80053 COMPREHEN METABOLIC PANEL: CPT

## 2020-05-07 PROCEDURE — 85025 COMPLETE CBC W/AUTO DIFF WBC: CPT

## 2020-05-07 PROCEDURE — 36415 COLL VENOUS BLD VENIPUNCTURE: CPT

## 2020-05-07 PROCEDURE — U0002 COVID-19 LAB TEST NON-CDC: HCPCS

## 2020-05-07 NOTE — TELEPHONE ENCOUNTER
rec'd pc from pt with questions and concerns regarding tx being delayed. Pt schd to see Dr. Regalado tomorrow. TA/MA

## 2020-05-07 NOTE — TELEPHONE ENCOUNTER
----- Message from Corin Fletcher sent at 5/7/2020  3:25 PM CDT -----  Contact: YON MCKEON [[id number]]  Name of Who is Calling: YON MCKEON [[id number]]      What is the request in detail: patient would like to speak with staff regarding test results and appt tomorrow. Please call      Can the clinic reply by [facility]: no      What Number to [facility] Back [facility]: [phone]

## 2020-05-08 ENCOUNTER — INFUSION (OUTPATIENT)
Dept: INFUSION THERAPY | Facility: OTHER | Age: 65
End: 2020-05-08
Attending: INTERNAL MEDICINE
Payer: MEDICARE

## 2020-05-08 ENCOUNTER — OFFICE VISIT (OUTPATIENT)
Dept: GYNECOLOGIC ONCOLOGY | Facility: CLINIC | Age: 65
End: 2020-05-08
Payer: MEDICARE

## 2020-05-08 VITALS
DIASTOLIC BLOOD PRESSURE: 53 MMHG | OXYGEN SATURATION: 100 % | RESPIRATION RATE: 16 BRPM | SYSTOLIC BLOOD PRESSURE: 105 MMHG | HEIGHT: 60 IN | TEMPERATURE: 98 F | BODY MASS INDEX: 29.9 KG/M2 | HEART RATE: 69 BPM | WEIGHT: 152.31 LBS

## 2020-05-08 VITALS
SYSTOLIC BLOOD PRESSURE: 109 MMHG | DIASTOLIC BLOOD PRESSURE: 56 MMHG | WEIGHT: 152.31 LBS | HEART RATE: 80 BPM | BODY MASS INDEX: 29.75 KG/M2

## 2020-05-08 DIAGNOSIS — D70.2 NEUTROPENIA, DRUG-INDUCED: ICD-10-CM

## 2020-05-08 DIAGNOSIS — C57.8 MALIGNANT NEOPLASM OF OVERLAPPING SITES OF FEMALE GENITAL ORGANS: ICD-10-CM

## 2020-05-08 DIAGNOSIS — C54.1 ENDOMETRIAL CANCER: Primary | ICD-10-CM

## 2020-05-08 DIAGNOSIS — D49.59 ENDOMETRIAL NEOPLASM: ICD-10-CM

## 2020-05-08 DIAGNOSIS — T50.905A THROMBOCYTOPENIA DUE TO DRUGS: ICD-10-CM

## 2020-05-08 DIAGNOSIS — Z51.11 ENCOUNTER FOR ANTINEOPLASTIC CHEMOTHERAPY: ICD-10-CM

## 2020-05-08 DIAGNOSIS — D69.59 THROMBOCYTOPENIA DUE TO DRUGS: ICD-10-CM

## 2020-05-08 PROCEDURE — 99214 OFFICE O/P EST MOD 30 MIN: CPT | Mod: S$PBB,,, | Performed by: OBSTETRICS & GYNECOLOGY

## 2020-05-08 PROCEDURE — 99213 OFFICE O/P EST LOW 20 MIN: CPT | Mod: PBBFAC | Performed by: OBSTETRICS & GYNECOLOGY

## 2020-05-08 PROCEDURE — 99999 PR PBB SHADOW E&M-EST. PATIENT-LVL III: ICD-10-PCS | Mod: PBBFAC,,, | Performed by: OBSTETRICS & GYNECOLOGY

## 2020-05-08 PROCEDURE — 96367 TX/PROPH/DG ADDL SEQ IV INF: CPT

## 2020-05-08 PROCEDURE — 96413 CHEMO IV INFUSION 1 HR: CPT

## 2020-05-08 PROCEDURE — 63600175 PHARM REV CODE 636 W HCPCS: Performed by: OBSTETRICS & GYNECOLOGY

## 2020-05-08 PROCEDURE — 96377 APPLICATON ON-BODY INJECTOR: CPT | Mod: 59

## 2020-05-08 PROCEDURE — 96361 HYDRATE IV INFUSION ADD-ON: CPT

## 2020-05-08 PROCEDURE — 25000003 PHARM REV CODE 250: Performed by: OBSTETRICS & GYNECOLOGY

## 2020-05-08 PROCEDURE — 99999 PR PBB SHADOW E&M-EST. PATIENT-LVL III: CPT | Mod: PBBFAC,,, | Performed by: OBSTETRICS & GYNECOLOGY

## 2020-05-08 PROCEDURE — 99214 PR OFFICE/OUTPT VISIT, EST, LEVL IV, 30-39 MIN: ICD-10-PCS | Mod: S$PBB,,, | Performed by: OBSTETRICS & GYNECOLOGY

## 2020-05-08 PROCEDURE — S0028 INJECTION, FAMOTIDINE, 20 MG: HCPCS | Performed by: OBSTETRICS & GYNECOLOGY

## 2020-05-08 PROCEDURE — 96415 CHEMO IV INFUSION ADDL HR: CPT

## 2020-05-08 PROCEDURE — 96375 TX/PRO/DX INJ NEW DRUG ADDON: CPT

## 2020-05-08 PROCEDURE — 96417 CHEMO IV INFUS EACH ADDL SEQ: CPT

## 2020-05-08 RX ORDER — FAMOTIDINE 10 MG/ML
20 INJECTION INTRAVENOUS
Status: CANCELLED | OUTPATIENT
Start: 2020-05-08

## 2020-05-08 RX ORDER — SODIUM CHLORIDE 0.9 % (FLUSH) 0.9 %
10 SYRINGE (ML) INJECTION
Status: CANCELLED | OUTPATIENT
Start: 2020-05-08

## 2020-05-08 RX ORDER — DIPHENHYDRAMINE HYDROCHLORIDE 50 MG/ML
50 INJECTION INTRAMUSCULAR; INTRAVENOUS ONCE AS NEEDED
Status: CANCELLED | OUTPATIENT
Start: 2020-05-08

## 2020-05-08 RX ORDER — DIPHENHYDRAMINE HYDROCHLORIDE 50 MG/ML
50 INJECTION INTRAMUSCULAR; INTRAVENOUS ONCE AS NEEDED
Status: DISCONTINUED | OUTPATIENT
Start: 2020-05-08 | End: 2020-05-08 | Stop reason: HOSPADM

## 2020-05-08 RX ORDER — EPINEPHRINE 0.3 MG/.3ML
0.3 INJECTION SUBCUTANEOUS ONCE AS NEEDED
Status: DISCONTINUED | OUTPATIENT
Start: 2020-05-08 | End: 2020-05-08 | Stop reason: HOSPADM

## 2020-05-08 RX ORDER — SODIUM CHLORIDE 0.9 % (FLUSH) 0.9 %
10 SYRINGE (ML) INJECTION
Status: DISCONTINUED | OUTPATIENT
Start: 2020-05-08 | End: 2020-05-08 | Stop reason: HOSPADM

## 2020-05-08 RX ORDER — EPINEPHRINE 0.3 MG/.3ML
0.3 INJECTION SUBCUTANEOUS ONCE AS NEEDED
Status: CANCELLED | OUTPATIENT
Start: 2020-05-08

## 2020-05-08 RX ORDER — HEPARIN 100 UNIT/ML
500 SYRINGE INTRAVENOUS
Status: DISCONTINUED | OUTPATIENT
Start: 2020-05-08 | End: 2020-05-08 | Stop reason: HOSPADM

## 2020-05-08 RX ORDER — FAMOTIDINE 10 MG/ML
20 INJECTION INTRAVENOUS
Status: COMPLETED | OUTPATIENT
Start: 2020-05-08 | End: 2020-05-08

## 2020-05-08 RX ORDER — HEPARIN 100 UNIT/ML
500 SYRINGE INTRAVENOUS
Status: CANCELLED | OUTPATIENT
Start: 2020-05-08

## 2020-05-08 RX ADMIN — PACLITAXEL 144 MG: 6 INJECTION, SOLUTION INTRAVENOUS at 10:05

## 2020-05-08 RX ADMIN — CARBOPLATIN 470 MG: 10 INJECTION, SOLUTION INTRAVENOUS at 01:05

## 2020-05-08 RX ADMIN — SODIUM CHLORIDE 500 ML: 0.9 INJECTION, SOLUTION INTRAVENOUS at 02:05

## 2020-05-08 RX ADMIN — DEXAMETHASONE SODIUM PHOSPHATE: 4 INJECTION, SOLUTION INTRA-ARTICULAR; INTRALESIONAL; INTRAMUSCULAR; INTRAVENOUS; SOFT TISSUE at 09:05

## 2020-05-08 RX ADMIN — PEGFILGRASTIM 6 MG: KIT SUBCUTANEOUS at 01:05

## 2020-05-08 RX ADMIN — DIPHENHYDRAMINE HYDROCHLORIDE 50 MG: 50 INJECTION, SOLUTION INTRAMUSCULAR; INTRAVENOUS at 09:05

## 2020-05-08 RX ADMIN — FAMOTIDINE 20 MG: 10 INJECTION, SOLUTION INTRAVENOUS at 09:05

## 2020-05-08 NOTE — PLAN OF CARE
C5 Taxol/Carbo administered, tolerated well. VSS, NAD. OBI education provided. D/C'd home with self and OBI in place.

## 2020-05-08 NOTE — PROGRESS NOTES
Subjective:      Patient ID: Jeanne Rodgers is a 65 y.o. female.    Chief Complaint: No chief complaint on file.      Treatment History  Stage IIIC2 UPSC  Preop PET revealing positive pelvic and PA nodes  RALH/BSO/Debulking of left pelvic nodes (6/8 positive) on 8/15/19  Port placed 9/26/19  T/C started 9/27/19  WPRT + PA nodes to 45 Gy completed 1/22/20  HDR to 15 Gy in 3 fractions completed 3/3/20  Restarted T/C on 3/20/2020    HPI  Here today for consideration of C5 of treatment.  Denies VB, F/C, N/V.  We have had to delay due to neutropenia, but responded to neupogen.  Also received 2U PRBC.  Review of Systems   Constitutional: Negative for activity change, appetite change, chills, fatigue and fever.   HENT: Negative for hearing loss, mouth sores, nosebleeds, sore throat and tinnitus.    Eyes: Negative for visual disturbance.   Respiratory: Negative for cough, chest tightness, shortness of breath and wheezing.    Cardiovascular: Negative for chest pain and leg swelling.   Gastrointestinal: Negative for abdominal distention, abdominal pain, blood in stool, constipation, diarrhea, nausea and vomiting.   Genitourinary: Negative for dysuria, flank pain, frequency, hematuria, pelvic pain, vaginal bleeding, vaginal discharge and vaginal pain.   Musculoskeletal: Negative for arthralgias and back pain.   Skin: Negative for rash.   Neurological: Negative for dizziness, seizures, syncope, weakness and numbness.   Hematological: Does not bruise/bleed easily.   Psychiatric/Behavioral: Negative for confusion and sleep disturbance. The patient is not nervous/anxious.        Objective:   Physical Exam:   Constitutional: She appears well-developed and well-nourished. No distress.    HENT:   Head: Normocephalic and atraumatic.    Eyes: No scleral icterus.    Neck: Normal range of motion. Neck supple.    Cardiovascular: Normal rate and intact distal pulses.  Exam reveals no cyanosis and no edema.     Pulmonary/Chest:  Effort normal. No respiratory distress. She exhibits no tenderness.        Abdominal: Soft. She exhibits no distension, no fluid wave, no ascites and no mass. There is no tenderness. There is no rebound and no guarding. No hernia.                Lymphadenopathy:     She has no cervical adenopathy.     Skin: No cyanosis.        Assessment:     1. Endometrial cancer    2. Encounter for antineoplastic chemotherapy    3. Neutropenia, drug-induced    4. Thrombocytopenia due to drugs        Plan:       Will treat with C5 with Neulasta support.  Dose reduce both Taxol and Carbo.  Will stop after 5 and scan.  RTC 4 weeks with scans.

## 2020-05-18 ENCOUNTER — TELEPHONE (OUTPATIENT)
Dept: GYNECOLOGIC ONCOLOGY | Facility: CLINIC | Age: 65
End: 2020-05-18

## 2020-05-18 NOTE — TELEPHONE ENCOUNTER
Return call at number given no answer.   ----- Message from Frederick Frias RN sent at 5/18/2020  2:41 PM CDT -----  Contact: YON MCKEON [[id number]]      ----- Message -----  From: Christina Piña  Sent: 5/18/2020   2:21 PM CDT  To: Sabine Mccoy Staff    Name of Who is Calling: YON MCKEON [[id number]]      What is the request in detail: Pt is calling to speak to staff in regards to rescheduling an felix.... Please call to further assist .       Can the clinic reply by [facility]: N      What Number to [facility] Back [facility] not [facility]: [phone]

## 2020-05-19 ENCOUNTER — PATIENT MESSAGE (OUTPATIENT)
Dept: GYNECOLOGIC ONCOLOGY | Facility: CLINIC | Age: 65
End: 2020-05-19

## 2020-05-20 ENCOUNTER — LAB VISIT (OUTPATIENT)
Dept: LAB | Facility: HOSPITAL | Age: 65
End: 2020-05-20
Attending: INTERNAL MEDICINE
Payer: MEDICARE

## 2020-05-20 DIAGNOSIS — C54.1 ENDOMETRIAL CANCER: ICD-10-CM

## 2020-05-20 LAB
BASOPHILS # BLD AUTO: 0.01 K/UL (ref 0–0.2)
BASOPHILS NFR BLD: 0.3 % (ref 0–1.9)
DIFFERENTIAL METHOD: ABNORMAL
EOSINOPHIL # BLD AUTO: 0.1 K/UL (ref 0–0.5)
EOSINOPHIL NFR BLD: 1.3 % (ref 0–8)
ERYTHROCYTE [DISTWIDTH] IN BLOOD BY AUTOMATED COUNT: 18.6 % (ref 11.5–14.5)
HCT VFR BLD AUTO: 21.6 % (ref 37–48.5)
HGB BLD-MCNC: 7 G/DL (ref 12–16)
IMM GRANULOCYTES # BLD AUTO: 0.04 K/UL (ref 0–0.04)
IMM GRANULOCYTES NFR BLD AUTO: 1.1 % (ref 0–0.5)
LYMPHOCYTES # BLD AUTO: 0.3 K/UL (ref 1–4.8)
LYMPHOCYTES NFR BLD: 8.8 % (ref 18–48)
MCH RBC QN AUTO: 35.2 PG (ref 27–31)
MCHC RBC AUTO-ENTMCNC: 32.4 G/DL (ref 32–36)
MCV RBC AUTO: 109 FL (ref 82–98)
MONOCYTES # BLD AUTO: 0.4 K/UL (ref 0.3–1)
MONOCYTES NFR BLD: 10.1 % (ref 4–15)
NEUTROPHILS # BLD AUTO: 3 K/UL (ref 1.8–7.7)
NEUTROPHILS NFR BLD: 78.4 % (ref 38–73)
NRBC BLD-RTO: 0 /100 WBC
PLATELET # BLD AUTO: 44 K/UL (ref 150–350)
PMV BLD AUTO: 10.9 FL (ref 9.2–12.9)
RBC # BLD AUTO: 1.99 M/UL (ref 4–5.4)
WBC # BLD AUTO: 3.77 K/UL (ref 3.9–12.7)

## 2020-05-20 PROCEDURE — 85025 COMPLETE CBC W/AUTO DIFF WBC: CPT

## 2020-05-20 PROCEDURE — 36415 COLL VENOUS BLD VENIPUNCTURE: CPT | Mod: PO

## 2020-05-21 ENCOUNTER — TELEPHONE (OUTPATIENT)
Dept: GYNECOLOGIC ONCOLOGY | Facility: CLINIC | Age: 65
End: 2020-05-21

## 2020-05-21 ENCOUNTER — LAB VISIT (OUTPATIENT)
Dept: LAB | Facility: OTHER | Age: 65
End: 2020-05-21
Payer: MEDICARE

## 2020-05-21 DIAGNOSIS — Z51.11 ENCOUNTER FOR ANTINEOPLASTIC CHEMOTHERAPY: ICD-10-CM

## 2020-05-21 DIAGNOSIS — C54.1 ENDOMETRIAL CANCER: ICD-10-CM

## 2020-05-21 LAB
ABO + RH BLD: NORMAL
BLD GP AB SCN CELLS X3 SERPL QL: NORMAL

## 2020-05-21 PROCEDURE — 36415 COLL VENOUS BLD VENIPUNCTURE: CPT

## 2020-05-21 PROCEDURE — 86850 RBC ANTIBODY SCREEN: CPT

## 2020-05-21 PROCEDURE — 27201040 HC RC 50 FILTER

## 2020-05-21 RX ORDER — HYDROCODONE BITARTRATE AND ACETAMINOPHEN 500; 5 MG/1; MG/1
TABLET ORAL ONCE
Status: CANCELLED | OUTPATIENT
Start: 2020-05-21 | End: 2020-05-21

## 2020-05-21 NOTE — TELEPHONE ENCOUNTER
----- Message from Merle Lindsey sent at 5/21/2020  9:47 AM CDT -----  Contact: YON MCKEON  Name of Who is Calling: YON MCKEON      What is the request in detail: Would like to speak to staff in regards to wanting to know her lab results that she had done yesterday. Please advise.       Can the clinic reply by [facility]: Yes      What Number to Call Back if not in [facility]: [phone]

## 2020-05-22 ENCOUNTER — INFUSION (OUTPATIENT)
Dept: INFUSION THERAPY | Facility: OTHER | Age: 65
End: 2020-05-22
Attending: OBSTETRICS & GYNECOLOGY
Payer: MEDICARE

## 2020-05-22 VITALS
TEMPERATURE: 98 F | WEIGHT: 151.69 LBS | HEIGHT: 60 IN | DIASTOLIC BLOOD PRESSURE: 51 MMHG | RESPIRATION RATE: 17 BRPM | BODY MASS INDEX: 29.78 KG/M2 | HEART RATE: 74 BPM | OXYGEN SATURATION: 100 % | SYSTOLIC BLOOD PRESSURE: 96 MMHG

## 2020-05-22 DIAGNOSIS — C54.1 ENDOMETRIAL CANCER: ICD-10-CM

## 2020-05-22 DIAGNOSIS — Z51.11 ENCOUNTER FOR ANTINEOPLASTIC CHEMOTHERAPY: ICD-10-CM

## 2020-05-22 LAB
BLD PROD TYP BPU: NORMAL
BLOOD UNIT EXPIRATION DATE: NORMAL
BLOOD UNIT TYPE CODE: 5100
BLOOD UNIT TYPE: NORMAL
CODING SYSTEM: NORMAL
DISPENSE STATUS: NORMAL
NUM UNITS TRANS PACKED RBC: NORMAL

## 2020-05-22 PROCEDURE — 25000003 PHARM REV CODE 250: Performed by: OBSTETRICS & GYNECOLOGY

## 2020-05-22 PROCEDURE — 36430 TRANSFUSION BLD/BLD COMPNT: CPT

## 2020-05-22 PROCEDURE — 86920 COMPATIBILITY TEST SPIN: CPT

## 2020-05-22 PROCEDURE — P9038 RBC IRRADIATED: HCPCS

## 2020-05-22 RX ORDER — HYDROCODONE BITARTRATE AND ACETAMINOPHEN 500; 5 MG/1; MG/1
TABLET ORAL ONCE
Status: COMPLETED | OUTPATIENT
Start: 2020-05-22 | End: 2020-05-22

## 2020-05-22 RX ADMIN — SODIUM CHLORIDE: 0.9 INJECTION, SOLUTION INTRAVENOUS at 09:05

## 2020-06-02 ENCOUNTER — HOSPITAL ENCOUNTER (OUTPATIENT)
Dept: RADIOLOGY | Facility: HOSPITAL | Age: 65
Discharge: HOME | End: 2020-06-02
Attending: OBSTETRICS & GYNECOLOGY
Payer: MEDICARE

## 2020-06-02 ENCOUNTER — TELEPHONE (OUTPATIENT)
Dept: GYNECOLOGIC ONCOLOGY | Facility: CLINIC | Age: 65
End: 2020-06-02

## 2020-06-02 DIAGNOSIS — C57.8 MALIGNANT NEOPLASM OF OVERLAPPING SITES OF FEMALE GENITAL ORGANS: ICD-10-CM

## 2020-06-02 DIAGNOSIS — T50.905A THROMBOCYTOPENIA DUE TO DRUGS: Primary | ICD-10-CM

## 2020-06-02 DIAGNOSIS — D69.59 THROMBOCYTOPENIA DUE TO DRUGS: Primary | ICD-10-CM

## 2020-06-02 DIAGNOSIS — D49.59 ENDOMETRIAL NEOPLASM: ICD-10-CM

## 2020-06-02 LAB — POCT GLUCOSE: 102 MG/DL (ref 70–110)

## 2020-06-02 PROCEDURE — A9552 F18 FDG: HCPCS

## 2020-06-02 PROCEDURE — 78815 PET IMAGE W/CT SKULL-THIGH: CPT | Mod: TC,PS

## 2020-06-02 PROCEDURE — 78815 NM PET CT ROUTINE: ICD-10-PCS | Mod: 26,PS,, | Performed by: RADIOLOGY

## 2020-06-02 PROCEDURE — 78815 PET IMAGE W/CT SKULL-THIGH: CPT | Mod: 26,PS,, | Performed by: RADIOLOGY

## 2020-06-02 NOTE — TELEPHONE ENCOUNTER
----- Message from Darius Regalado MD sent at 6/2/2020 12:54 PM CDT -----  Scans are normal.  Please let her know.

## 2020-06-03 ENCOUNTER — LAB VISIT (OUTPATIENT)
Dept: LAB | Facility: OTHER | Age: 65
End: 2020-06-03
Attending: INTERNAL MEDICINE
Payer: MEDICARE

## 2020-06-03 ENCOUNTER — TELEPHONE (OUTPATIENT)
Dept: GYNECOLOGIC ONCOLOGY | Facility: CLINIC | Age: 65
End: 2020-06-03

## 2020-06-03 DIAGNOSIS — D69.59 THROMBOCYTOPENIA DUE TO DRUGS: ICD-10-CM

## 2020-06-03 DIAGNOSIS — T50.905A THROMBOCYTOPENIA DUE TO DRUGS: ICD-10-CM

## 2020-06-03 DIAGNOSIS — C54.1 ENDOMETRIAL CANCER: ICD-10-CM

## 2020-06-03 LAB
ABO + RH BLD: NORMAL
ANISOCYTOSIS BLD QL SMEAR: ABNORMAL
BASOPHILS # BLD AUTO: 0 K/UL (ref 0–0.2)
BASOPHILS NFR BLD: 0 % (ref 0–1.9)
BLD GP AB SCN CELLS X3 SERPL QL: NORMAL
BURR CELLS BLD QL SMEAR: ABNORMAL
DACRYOCYTES BLD QL SMEAR: ABNORMAL
DIFFERENTIAL METHOD: ABNORMAL
EOSINOPHIL # BLD AUTO: 0.1 K/UL (ref 0–0.5)
EOSINOPHIL NFR BLD: 4.4 % (ref 0–8)
ERYTHROCYTE [DISTWIDTH] IN BLOOD BY AUTOMATED COUNT: 19.8 % (ref 11.5–14.5)
HCT VFR BLD AUTO: 21.2 % (ref 37–48.5)
HGB BLD-MCNC: 7.2 G/DL (ref 12–16)
HYPOCHROMIA BLD QL SMEAR: ABNORMAL
IMM GRANULOCYTES # BLD AUTO: 0.02 K/UL (ref 0–0.04)
IMM GRANULOCYTES NFR BLD AUTO: 0.7 % (ref 0–0.5)
LYMPHOCYTES # BLD AUTO: 0.3 K/UL (ref 1–4.8)
LYMPHOCYTES NFR BLD: 12 % (ref 18–48)
MCH RBC QN AUTO: 35.1 PG (ref 27–31)
MCHC RBC AUTO-ENTMCNC: 34 G/DL (ref 32–36)
MCV RBC AUTO: 103 FL (ref 82–98)
MONOCYTES # BLD AUTO: 0.3 K/UL (ref 0.3–1)
MONOCYTES NFR BLD: 9.1 % (ref 4–15)
NEUTROPHILS # BLD AUTO: 2 K/UL (ref 1.8–7.7)
NEUTROPHILS NFR BLD: 73.8 % (ref 38–73)
NRBC BLD-RTO: 0 /100 WBC
PLATELET # BLD AUTO: 67 K/UL (ref 150–350)
PLATELET BLD QL SMEAR: ABNORMAL
PMV BLD AUTO: 10.3 FL (ref 9.2–12.9)
POIKILOCYTOSIS BLD QL SMEAR: SLIGHT
RBC # BLD AUTO: 2.05 M/UL (ref 4–5.4)
WBC # BLD AUTO: 2.6 K/UL (ref 3.9–12.7)

## 2020-06-03 PROCEDURE — 36415 COLL VENOUS BLD VENIPUNCTURE: CPT

## 2020-06-03 PROCEDURE — 86901 BLOOD TYPING SEROLOGIC RH(D): CPT

## 2020-06-03 PROCEDURE — 85025 COMPLETE CBC W/AUTO DIFF WBC: CPT

## 2020-06-03 RX ORDER — HYDROCODONE BITARTRATE AND ACETAMINOPHEN 500; 5 MG/1; MG/1
TABLET ORAL ONCE
Status: CANCELLED | OUTPATIENT
Start: 2020-06-03 | End: 2020-06-03

## 2020-06-04 ENCOUNTER — INFUSION (OUTPATIENT)
Dept: INFUSION THERAPY | Facility: OTHER | Age: 65
End: 2020-06-04
Attending: INTERNAL MEDICINE
Payer: MEDICARE

## 2020-06-04 ENCOUNTER — CLINICAL SUPPORT (OUTPATIENT)
Dept: HEMATOLOGY/ONCOLOGY | Facility: CLINIC | Age: 65
End: 2020-06-04
Payer: MEDICARE

## 2020-06-04 VITALS
WEIGHT: 150.81 LBS | SYSTOLIC BLOOD PRESSURE: 111 MMHG | DIASTOLIC BLOOD PRESSURE: 56 MMHG | HEART RATE: 72 BPM | OXYGEN SATURATION: 100 % | BODY MASS INDEX: 29.61 KG/M2 | RESPIRATION RATE: 18 BRPM | TEMPERATURE: 98 F | HEIGHT: 60 IN

## 2020-06-04 DIAGNOSIS — C54.1 ENDOMETRIAL CANCER: ICD-10-CM

## 2020-06-04 DIAGNOSIS — Z13.9 SCREENING FOR CONDITION: Primary | ICD-10-CM

## 2020-06-04 LAB
BLD PROD TYP BPU: NORMAL
BLOOD UNIT EXPIRATION DATE: NORMAL
BLOOD UNIT TYPE CODE: 5100
BLOOD UNIT TYPE: NORMAL
CODING SYSTEM: NORMAL
DISPENSE STATUS: NORMAL
NUM UNITS TRANS PACKED RBC: NORMAL
SARS-COV-2 RDRP RESP QL NAA+PROBE: NEGATIVE

## 2020-06-04 PROCEDURE — P9038 RBC IRRADIATED: HCPCS

## 2020-06-04 PROCEDURE — 36430 TRANSFUSION BLD/BLD COMPNT: CPT

## 2020-06-04 PROCEDURE — 27201040 HC RC 50 FILTER

## 2020-06-04 PROCEDURE — 86920 COMPATIBILITY TEST SPIN: CPT

## 2020-06-04 PROCEDURE — 25000003 PHARM REV CODE 250: Performed by: OBSTETRICS & GYNECOLOGY

## 2020-06-04 PROCEDURE — U0002 COVID-19 LAB TEST NON-CDC: HCPCS

## 2020-06-04 RX ORDER — HYDROCODONE BITARTRATE AND ACETAMINOPHEN 500; 5 MG/1; MG/1
TABLET ORAL ONCE
Status: COMPLETED | OUTPATIENT
Start: 2020-06-04 | End: 2020-06-04

## 2020-06-04 RX ADMIN — SODIUM CHLORIDE: 0.9 INJECTION, SOLUTION INTRAVENOUS at 11:06

## 2020-06-04 NOTE — PLAN OF CARE
1 unit PRBC transfusion complete. Pt tolerated well. VSS. NAD. IV to left wrist DC'd.  Pt verbalized understanding of discharge instructions before leaving with self.

## 2020-06-09 ENCOUNTER — LAB VISIT (OUTPATIENT)
Dept: LAB | Facility: OTHER | Age: 65
End: 2020-06-09
Attending: OBSTETRICS & GYNECOLOGY
Payer: MEDICARE

## 2020-06-09 DIAGNOSIS — D69.59 THROMBOCYTOPENIA DUE TO DRUGS: Primary | ICD-10-CM

## 2020-06-09 DIAGNOSIS — T50.905A THROMBOCYTOPENIA DUE TO DRUGS: Primary | ICD-10-CM

## 2020-06-09 DIAGNOSIS — T50.905A THROMBOCYTOPENIA DUE TO DRUGS: ICD-10-CM

## 2020-06-09 DIAGNOSIS — C54.1 ENDOMETRIAL CANCER: ICD-10-CM

## 2020-06-09 DIAGNOSIS — D69.59 THROMBOCYTOPENIA DUE TO DRUGS: ICD-10-CM

## 2020-06-09 LAB
ABO + RH BLD: NORMAL
BASOPHILS # BLD AUTO: 0.01 K/UL (ref 0–0.2)
BASOPHILS NFR BLD: 0.3 % (ref 0–1.9)
BLD GP AB SCN CELLS X3 SERPL QL: NORMAL
DIFFERENTIAL METHOD: ABNORMAL
EOSINOPHIL # BLD AUTO: 0.1 K/UL (ref 0–0.5)
EOSINOPHIL NFR BLD: 3.6 % (ref 0–8)
ERYTHROCYTE [DISTWIDTH] IN BLOOD BY AUTOMATED COUNT: 21.3 % (ref 11.5–14.5)
HCT VFR BLD AUTO: 21 % (ref 37–48.5)
HGB BLD-MCNC: 7.2 G/DL (ref 12–16)
IMM GRANULOCYTES # BLD AUTO: 0.01 K/UL (ref 0–0.04)
IMM GRANULOCYTES NFR BLD AUTO: 0.3 % (ref 0–0.5)
LYMPHOCYTES # BLD AUTO: 0.4 K/UL (ref 1–4.8)
LYMPHOCYTES NFR BLD: 12.2 % (ref 18–48)
MCH RBC QN AUTO: 34.6 PG (ref 27–31)
MCHC RBC AUTO-ENTMCNC: 34.3 G/DL (ref 32–36)
MCV RBC AUTO: 101 FL (ref 82–98)
MONOCYTES # BLD AUTO: 0.3 K/UL (ref 0.3–1)
MONOCYTES NFR BLD: 8 % (ref 4–15)
NEUTROPHILS # BLD AUTO: 2.6 K/UL (ref 1.8–7.7)
NEUTROPHILS NFR BLD: 75.6 % (ref 38–73)
NRBC BLD-RTO: 0 /100 WBC
PLATELET # BLD AUTO: 92 K/UL (ref 150–350)
PLATELET BLD QL SMEAR: ABNORMAL
PMV BLD AUTO: 10.7 FL (ref 9.2–12.9)
RBC # BLD AUTO: 2.08 M/UL (ref 4–5.4)
WBC # BLD AUTO: 3.37 K/UL (ref 3.9–12.7)

## 2020-06-09 PROCEDURE — 85025 COMPLETE CBC W/AUTO DIFF WBC: CPT

## 2020-06-09 PROCEDURE — 36415 COLL VENOUS BLD VENIPUNCTURE: CPT

## 2020-06-09 PROCEDURE — 86850 RBC ANTIBODY SCREEN: CPT

## 2020-06-10 ENCOUNTER — HOSPITAL ENCOUNTER (OUTPATIENT)
Facility: HOSPITAL | Age: 65
LOS: 1 days | Discharge: HOME | End: 2020-06-12
Attending: OBSTETRICS & GYNECOLOGY | Admitting: OBSTETRICS & GYNECOLOGY
Payer: MEDICARE

## 2020-06-10 ENCOUNTER — ANESTHESIA EVENT (OUTPATIENT)
Dept: ENDOSCOPY | Facility: HOSPITAL | Age: 65
End: 2020-06-10
Payer: MEDICARE

## 2020-06-10 ENCOUNTER — OFFICE VISIT (OUTPATIENT)
Dept: GYNECOLOGIC ONCOLOGY | Facility: CLINIC | Age: 65
End: 2020-06-10
Payer: MEDICARE

## 2020-06-10 VITALS
HEART RATE: 97 BPM | SYSTOLIC BLOOD PRESSURE: 123 MMHG | HEIGHT: 60 IN | BODY MASS INDEX: 28.87 KG/M2 | WEIGHT: 147.06 LBS | DIASTOLIC BLOOD PRESSURE: 63 MMHG

## 2020-06-10 DIAGNOSIS — T66.XXXA: ICD-10-CM

## 2020-06-10 DIAGNOSIS — K92.1 MELENA: ICD-10-CM

## 2020-06-10 DIAGNOSIS — D69.59 THROMBOCYTOPENIA DUE TO DRUGS: ICD-10-CM

## 2020-06-10 DIAGNOSIS — D50.0 ANEMIA DUE TO CHRONIC BLOOD LOSS: Primary | ICD-10-CM

## 2020-06-10 DIAGNOSIS — R11.2 NAUSEA AND VOMITING, INTRACTABILITY OF VOMITING NOT SPECIFIED, UNSPECIFIED VOMITING TYPE: ICD-10-CM

## 2020-06-10 DIAGNOSIS — T50.905A THROMBOCYTOPENIA DUE TO DRUGS: ICD-10-CM

## 2020-06-10 DIAGNOSIS — D50.0 ANEMIA DUE TO CHRONIC BLOOD LOSS: ICD-10-CM

## 2020-06-10 DIAGNOSIS — C54.1 ENDOMETRIAL CANCER: Primary | ICD-10-CM

## 2020-06-10 DIAGNOSIS — R19.5 LOOSE STOOLS: ICD-10-CM

## 2020-06-10 LAB
ABO + RH BLD: NORMAL
BLD GP AB SCN CELLS X3 SERPL QL: NORMAL
BLD PROD TYP BPU: NORMAL
BLOOD UNIT EXPIRATION DATE: NORMAL
BLOOD UNIT TYPE CODE: 5100
BLOOD UNIT TYPE: NORMAL
CODING SYSTEM: NORMAL
DISPENSE STATUS: NORMAL
FERRITIN SERPL-MCNC: 1170 NG/ML (ref 20–300)
FOLATE SERPL-MCNC: 10 NG/ML (ref 4–24)
IRON SERPL-MCNC: 114 UG/DL (ref 30–160)
NUM UNITS TRANS PACKED RBC: NORMAL
SARS-COV-2 RDRP RESP QL NAA+PROBE: NEGATIVE
SATURATED IRON: 37 % (ref 20–50)
TOTAL IRON BINDING CAPACITY: 311 UG/DL (ref 250–450)
TRANSFERRIN SERPL-MCNC: 210 MG/DL (ref 200–375)
VIT B12 SERPL-MCNC: 1493 PG/ML (ref 210–950)

## 2020-06-10 PROCEDURE — 96375 TX/PRO/DX INJ NEW DRUG ADDON: CPT

## 2020-06-10 PROCEDURE — 82728 ASSAY OF FERRITIN: CPT

## 2020-06-10 PROCEDURE — 25000003 PHARM REV CODE 250

## 2020-06-10 PROCEDURE — P9040 RBC LEUKOREDUCED IRRADIATED: HCPCS

## 2020-06-10 PROCEDURE — 99213 OFFICE O/P EST LOW 20 MIN: CPT | Mod: PBBFAC,25 | Performed by: OBSTETRICS & GYNECOLOGY

## 2020-06-10 PROCEDURE — 96361 HYDRATE IV INFUSION ADD-ON: CPT

## 2020-06-10 PROCEDURE — 82607 VITAMIN B-12: CPT

## 2020-06-10 PROCEDURE — G0378 HOSPITAL OBSERVATION PER HR: HCPCS

## 2020-06-10 PROCEDURE — C9113 INJ PANTOPRAZOLE SODIUM, VIA: HCPCS | Performed by: STUDENT IN AN ORGANIZED HEALTH CARE EDUCATION/TRAINING PROGRAM

## 2020-06-10 PROCEDURE — 86920 COMPATIBILITY TEST SPIN: CPT

## 2020-06-10 PROCEDURE — 63600175 PHARM REV CODE 636 W HCPCS: Performed by: STUDENT IN AN ORGANIZED HEALTH CARE EDUCATION/TRAINING PROGRAM

## 2020-06-10 PROCEDURE — G0379 DIRECT REFER HOSPITAL OBSERV: HCPCS

## 2020-06-10 PROCEDURE — 99215 OFFICE O/P EST HI 40 MIN: CPT | Mod: GC,,, | Performed by: INTERNAL MEDICINE

## 2020-06-10 PROCEDURE — 99214 OFFICE O/P EST MOD 30 MIN: CPT | Mod: S$PBB,,, | Performed by: OBSTETRICS & GYNECOLOGY

## 2020-06-10 PROCEDURE — 99214 PR OFFICE/OUTPT VISIT, EST, LEVL IV, 30-39 MIN: ICD-10-PCS | Mod: S$PBB,,, | Performed by: OBSTETRICS & GYNECOLOGY

## 2020-06-10 PROCEDURE — U0002 COVID-19 LAB TEST NON-CDC: HCPCS

## 2020-06-10 PROCEDURE — 99999 PR PBB SHADOW E&M-EST. PATIENT-LVL III: ICD-10-PCS | Mod: PBBFAC,,, | Performed by: OBSTETRICS & GYNECOLOGY

## 2020-06-10 PROCEDURE — 25000003 PHARM REV CODE 250: Performed by: STUDENT IN AN ORGANIZED HEALTH CARE EDUCATION/TRAINING PROGRAM

## 2020-06-10 PROCEDURE — 96374 THER/PROPH/DIAG INJ IV PUSH: CPT

## 2020-06-10 PROCEDURE — 99999 PR PBB SHADOW E&M-EST. PATIENT-LVL III: CPT | Mod: PBBFAC,,, | Performed by: OBSTETRICS & GYNECOLOGY

## 2020-06-10 PROCEDURE — 86850 RBC ANTIBODY SCREEN: CPT

## 2020-06-10 PROCEDURE — 36430 TRANSFUSION BLD/BLD COMPNT: CPT

## 2020-06-10 PROCEDURE — 83540 ASSAY OF IRON: CPT

## 2020-06-10 PROCEDURE — 82746 ASSAY OF FOLIC ACID SERUM: CPT

## 2020-06-10 PROCEDURE — 99215 PR OFFICE/OUTPT VISIT, EST, LEVL V, 40-54 MIN: ICD-10-PCS | Mod: GC,,, | Performed by: INTERNAL MEDICINE

## 2020-06-10 RX ORDER — ONDANSETRON 8 MG/1
8 TABLET, ORALLY DISINTEGRATING ORAL EVERY 8 HOURS PRN
Status: DISCONTINUED | OUTPATIENT
Start: 2020-06-10 | End: 2020-06-12 | Stop reason: HOSPADM

## 2020-06-10 RX ORDER — SODIUM CHLORIDE 0.9 % (FLUSH) 0.9 %
10 SYRINGE (ML) INJECTION
Status: DISCONTINUED | OUTPATIENT
Start: 2020-06-10 | End: 2020-06-12 | Stop reason: HOSPADM

## 2020-06-10 RX ORDER — POLYETHYLENE GLYCOL 3350, SODIUM SULFATE ANHYDROUS, SODIUM BICARBONATE, SODIUM CHLORIDE, POTASSIUM CHLORIDE 236; 22.74; 6.74; 5.86; 2.97 G/4L; G/4L; G/4L; G/4L; G/4L
4000 POWDER, FOR SOLUTION ORAL ONCE
Status: COMPLETED | OUTPATIENT
Start: 2020-06-10 | End: 2020-06-10

## 2020-06-10 RX ORDER — DEXTROSE MONOHYDRATE AND SODIUM CHLORIDE 5; .9 G/100ML; G/100ML
INJECTION, SOLUTION INTRAVENOUS CONTINUOUS
Status: DISCONTINUED | OUTPATIENT
Start: 2020-06-10 | End: 2020-06-12 | Stop reason: HOSPADM

## 2020-06-10 RX ORDER — PANTOPRAZOLE SODIUM 40 MG/10ML
80 INJECTION, POWDER, LYOPHILIZED, FOR SOLUTION INTRAVENOUS ONCE
Status: COMPLETED | OUTPATIENT
Start: 2020-06-10 | End: 2020-06-10

## 2020-06-10 RX ORDER — PANTOPRAZOLE SODIUM 40 MG/10ML
40 INJECTION, POWDER, LYOPHILIZED, FOR SOLUTION INTRAVENOUS 2 TIMES DAILY
Status: DISCONTINUED | OUTPATIENT
Start: 2020-06-11 | End: 2020-06-12 | Stop reason: HOSPADM

## 2020-06-10 RX ORDER — OXYCODONE AND ACETAMINOPHEN 10; 325 MG/1; MG/1
1 TABLET ORAL EVERY 4 HOURS PRN
Status: DISCONTINUED | OUTPATIENT
Start: 2020-06-10 | End: 2020-06-12 | Stop reason: HOSPADM

## 2020-06-10 RX ORDER — IBUPROFEN 600 MG/1
600 TABLET ORAL EVERY 6 HOURS PRN
Status: DISCONTINUED | OUTPATIENT
Start: 2020-06-10 | End: 2020-06-10

## 2020-06-10 RX ORDER — HYDROCODONE BITARTRATE AND ACETAMINOPHEN 500; 5 MG/1; MG/1
TABLET ORAL
Status: DISCONTINUED | OUTPATIENT
Start: 2020-06-10 | End: 2020-06-12 | Stop reason: HOSPADM

## 2020-06-10 RX ORDER — SCOLOPAMINE TRANSDERMAL SYSTEM 1 MG/1
1 PATCH, EXTENDED RELEASE TRANSDERMAL ONCE
Status: DISCONTINUED | OUTPATIENT
Start: 2020-06-11 | End: 2020-06-12 | Stop reason: HOSPADM

## 2020-06-10 RX ORDER — OXYCODONE AND ACETAMINOPHEN 5; 325 MG/1; MG/1
1 TABLET ORAL EVERY 4 HOURS PRN
Status: DISCONTINUED | OUTPATIENT
Start: 2020-06-10 | End: 2020-06-12 | Stop reason: HOSPADM

## 2020-06-10 RX ORDER — PREGABALIN 75 MG/1
75 CAPSULE ORAL
Status: CANCELLED | OUTPATIENT
Start: 2020-06-11 | End: 2020-06-11

## 2020-06-10 RX ORDER — PANTOPRAZOLE SODIUM 40 MG/10ML
40 INJECTION, POWDER, LYOPHILIZED, FOR SOLUTION INTRAVENOUS EVERY 8 HOURS
Status: DISCONTINUED | OUTPATIENT
Start: 2020-06-11 | End: 2020-06-10

## 2020-06-10 RX ADMIN — OXYCODONE HYDROCHLORIDE AND ACETAMINOPHEN 1 TABLET: 10; 325 TABLET ORAL at 03:06

## 2020-06-10 RX ADMIN — POLYETHYLENE GLYCOL 3350, SODIUM SULFATE ANHYDROUS, SODIUM BICARBONATE, SODIUM CHLORIDE, POTASSIUM CHLORIDE 4000 ML: 236; 22.74; 6.74; 5.86; 2.97 POWDER, FOR SOLUTION ORAL at 06:06

## 2020-06-10 RX ADMIN — PANTOPRAZOLE SODIUM 80 MG: 40 INJECTION, POWDER, LYOPHILIZED, FOR SOLUTION INTRAVENOUS at 09:06

## 2020-06-10 RX ADMIN — PROMETHAZINE HYDROCHLORIDE 12.5 MG: 25 INJECTION INTRAMUSCULAR; INTRAVENOUS at 08:06

## 2020-06-10 RX ADMIN — ONDANSETRON 8 MG: 8 TABLET, ORALLY DISINTEGRATING ORAL at 03:06

## 2020-06-10 NOTE — H&P
[facility]  Gynecologic Oncology  H&P    Patient Name: Jeanne Rodgers  MRN: [id number]  Admission Date: 6/10/2020  Primary Care Provider: Gary Gonzalez MD   Principal Problem: Radiation therapy complication    Subjective:     Chief Complaint/Reason for Admission: Anemia    History of Present Illness:  Jeanne Rodgers is a 65 y.o. C F with history of papillary serous cancer of the uterus, who is now s/p RALH/BSO as well as chemotherapy and radiation. Treatment history below:     Treatment History  Stage IIIC2 UPSC  Preop PET revealing positive pelvic and PA nodes  RALH/BSO/Debulking of left pelvic nodes (6/8 positive) on 8/15/19  Port placed 9/26/19  T/C started 9/27/19  WPRT + PA nodes to 45 Gy completed 1/22/20  HDR to 15 Gy in 3 fractions completed 3/3/20  Restarted T/C on 3/20/2020  PET on 6/2/2020 did not show any abnormalities.      Today, she presented to Dr. Regalado' clinic for follow up, as she has been treated on an outpatient basis for anemia. She is s/p 3 u pRBC transfused in the outpatient setting. Most recent blood work shows persistence of significant anemia, with H/H 7/21. Patient also reporting dark tarry stools, as well as nausea and vomiting. 2.   She is admitted from clinic for blood transfusion, IVF, ang GI consult due to suspected radiation-induced enteritis or proctitis.    Hospital Course:  06/10/2020 - Admitted from clinic with melenotic stool and anemia. GI has seen the patient and plans for endoscopy and colonoscopy tomorrow. Discussed with the patient, who is on board. To have bowel prep tonight. Initial iron studies drawn per GI. Awaiting peripheral IV placement then will transfuse 1 u pRBC.     Oncology Treatment Plan:   OP GYN PACLITAXEL CARBOPLATIN (AUC 6) Q3W      Past Medical History:   Diagnosis Date    Allergy     Anxiety     Breast cancer 2014    Chronic back pain     Encounter for blood transfusion     Endometrial cancer 07/30/2019     Fibromyalgia     Hives     Hx of radiation therapy     Itching     Lichen sclerosus     Mental disorder     PONV (postoperative nausea and vomiting)     Sore throat     Uterine cancer 08/05/2019    UTI (urinary tract infection)      Past Surgical History:   Procedure Laterality Date    anal fissure surgery      APPENDECTOMY      BREAST BIOPSY Right 2014    BREAST LUMPECTOMY Right 2014    R lumpectomy [facility] w 2.4cm IDC & DCIS, neg margins, 0/6 SN, Stage II, ER/UT+, HER-2 neg Adjuvant XRT and hormonal therapy     CARPAL TUNNEL RELEASE      CHOLECYSTECTOMY      COLONOSCOPY      HYSTERECTOMY  08/15/2019    HYSTEROSCOPY WITH DILATION AND CURETTAGE OF UTERUS N/A 7/24/2019    Procedure: HYSTEROSCOPY, WITH DILATION AND CURETTAGE OF UTERUS;  Surgeon: Елена Kam MD;  Location: [facility];  Service: OB/GYN;  Laterality: N/A;    INSERTION OF TUNNELED CENTRAL VENOUS CATHETER (CVC) WITH SUBCUTANEOUS PORT N/A 9/26/2019    Procedure: INSERTION, PORT-A-CATH;  Surgeon: Moris Varghese MD;  Location: [facility] CATH LAB;  Service: Radiology;  Laterality: N/A;    MASTECTOMY Right     2014    MEDIPORT REMOVAL N/A 3/5/2020    Procedure: REMOVAL, CATHETER, CENTRAL VENOUS, TUNNELED, WITH PORT;  Surgeon: Moris Varghese MD;  Location: [facility] CATH LAB;  Service: Radiology;  Laterality: N/A;    ROBOT-ASSISTED LAPAROSCOPIC ABDOMINAL HYSTERECTOMY USING DA RAMU XI N/A 8/15/2019    Procedure: XI ROBOTIC HYSTERECTOMY;  Surgeon: Darius Regalado MD;  Location: [facility];  Service: OB/GYN;  Laterality: N/A;    ROBOT-ASSISTED LAPAROSCOPIC SALPINGO-OOPHORECTOMY USING DA RAMU XI Bilateral 8/15/2019    Procedure: XI ROBOTIC SALPINGO-OOPHORECTOMY;  Surgeon: Darius Regalado MD;  Location: [facility];  Service: OB/GYN;  Laterality: Bilateral;    ROBOT-ASSISTED LYMPHADENECTOMY Left 8/15/2019    Procedure: ROBOTIC LYMPHADENECTOMY;  Surgeon: Darius Regalado MD;  Location: [facility];  Service: OB/GYN;  Laterality: Left;    SALPINGOOPHORECTOMY  Bilateral 08/15/2019    TONSILLECTOMY      TUBAL LIGATION       Family History     Problem Relation (Age of Onset)    Arthritis Mother    Cancer Mother (85), Father (84), Maternal Aunt (60)    Colon cancer Paternal Uncle    Hashimoto's thyroiditis Sister    Heart attack Paternal Grandmother, Paternal Grandfather    Hypertension Mother, Brother, Brother    Lung cancer Paternal Aunt    No Known Problems Sister        Tobacco Use    Smoking status: Never Smoker    Smokeless tobacco: Never Used   Substance and Sexual Activity    Alcohol use: No     Alcohol/week: 0.0 standard drinks    Drug use: No    Sexual activity: Never       PTA Medications   Medication Sig    levocetirizine (XYZAL) 5 MG tablet Take 1 tablet (5 mg total) by mouth every evening.    nystatin (MYCOSTATIN) cream Apply topically every Mon, Wed, Fri.    nystatin (MYCOSTATIN) powder Apply topically 4 (four) times daily.    acetaminophen (TYLENOL) 500 MG tablet Take 500 mg by mouth.    b complex vitamins tablet Take 1 tablet by mouth once daily.    clobetasol 0.05% (TEMOVATE) 0.05 % Oint APPLY TO AFFECTED AREA(S) TWICE A DAY FOR 1 MONTH(S) then EVERY DAY FOR 1 MONTH(S) then 3 TIMES A WEEK    fluconazole (DIFLUCAN) 150 MG Tab     hydrocortisone (ANUSOL-HC) 2.5 % rectal cream Place rectally 2 (two) times daily.    ibuprofen (ADVIL,MOTRIN) 600 MG tablet Take 1 tablet (600 mg total) by mouth every 6 (six) hours as needed for Pain.    fredrick-m.blue-s.phos-phsal-hyo (URIBEL) 118-10-40.8-36 mg Cap Take 1 capsule by mouth 3 (three) times daily as needed (bladder pain).    MILK THISTLE ORAL Take by mouth.    ondansetron (ZOFRAN) 8 MG tablet TAKE 1 TABLET BY MOUTH EVERY 12 HOURS AS NEEDED FOR NAUSEA (Patient not taking: Reported on 6/10/2020)    oxyCODONE-acetaminophen (PERCOCET)  mg per tablet Take 1 tablet by mouth every 4 (four) hours as needed for Pain.    polyethylene glycol (GOLYTELY,NULYTELY) 236-22.74-6.74 -5.86 gram suspension      predniSONE (DELTASONE) 5 MG tablet Take 3 each morning on odd number days (Patient not taking: Reported on 3/9/2020)    promethazine (PHENERGAN) 25 MG tablet TAKE 1 TABLET BY MOUTH EVERY 6 HOURS AS NEEDED FOR NAUSEA (Patient not taking: Reported on 5/8/2020)    scopolamine (TRANSDERM-SCOP) 1.3-1.5 mg (1 mg over 3 days) Place 1 patch onto the skin every 72 hours. (Patient not taking: Reported on 5/8/2020)    silver sulfADIAZINE 1% (SILVADENE) 1 % cream Apply topically every morning. (Patient not taking: Reported on 3/9/2020)    TURMERIC ORAL Take by mouth.       Review of patient's allergies indicates:  No Known Allergies    Review of Systems   Constitutional: Positive for appetite change and fatigue. Negative for activity change.   HENT: Negative for mouth sores.    Respiratory: Negative for cough and shortness of breath.    Cardiovascular: Negative for chest pain.   Gastrointestinal: Positive for blood in stool (melana), diarrhea, nausea and vomiting. Negative for abdominal pain and constipation.   Genitourinary: Negative for hematuria and vaginal bleeding.   Neurological: Negative for syncope.   Psychiatric/Behavioral: Negative for depression.      Objective:     Vital Signs (Most Recent):  Temp: 98.4 °F (36.9 °C) (06/10/20 1249)  Pulse: 85 (06/10/20 1249)  Resp: 16 (06/10/20 1249)  BP: 127/60 (06/10/20 1249)  SpO2: 100 % (06/10/20 1249) Vital Signs (24h Range):  Temp:  [98.4 °F (36.9 °C)] 98.4 °F (36.9 °C)  Pulse:  [85-97] 85  Resp:  [16] 16  SpO2:  [100 %] 100 %  BP: (123-127)/(60-63) 127/60     Weight: 66.3 kg (146 lb 2.6 oz)  Body mass index is 28.55 kg/m².    Physical Exam:   Constitutional: She is oriented to person, place, and time. She appears well-developed and well-nourished. No distress.    HENT:   Head: Normocephalic and atraumatic.   Mouth/Throat: Oropharynx is clear and moist.    Eyes: Conjunctivae and EOM are normal.    Neck: Normal range of motion.    Cardiovascular: Normal rate, regular rhythm  and intact distal pulses.     Pulmonary/Chest: Effort normal. No respiratory distress.        Abdominal: Soft. Bowel sounds are normal. She exhibits no distension. There is no tenderness.             Musculoskeletal: Normal range of motion and moves all extremeties.       Neurological: She is alert and oriented to person, place, and time.    Skin: Skin is warm and dry. She is not diaphoretic.    Psychiatric: She has a normal mood and affect. Her behavior is normal.       Laboratory:  CBC:   Recent Labs   Lab 06/09/20  1348   WBC 3.37*   HGB 7.2*   HCT 21.0*   PLT 92*    and All pertinent labs from the last 24 hours have been reviewed.    Diagnostic Results:  None    Assessment/Plan:     Anemia due to chronic blood loss  - Suspect radiation-induced proctitis or eneritis due to continued anemia despite multiple transfusions and melanotic stool  - GI consult place  - Iron studies ordered  - Likely plan for endoscopy and colonoscopy tomorrow per GI  - Clear liquid diet for now  - IVF at 125 mL/hour  - 1u pRBC ordered to transfuse now  - Antiemetics and pain medication prn      Dane Colbert MD  Gynecologic Oncology  [facility]

## 2020-06-10 NOTE — PLAN OF CARE
Side rails up x2; call bell in place; bed in lowest, locked position; skid proof socks on; no evidence of skin breakdown; care plan explained to patient; pt remains free of injury.  Pt tolerated clears, ambulates in room, voids, pt with c/o pain and nausea. Percocet and zofran given . Pt with with 1 unit PRBCs infusing VSS, pt instructed to call with any concerns or changes in assessment. Pt to be NPO after MN for EGD and colonoscopy. VSS and febrile pt instructed to call with any concerns or needed.

## 2020-06-10 NOTE — ASSESSMENT & PLAN NOTE
- Suspect radiation-induced proctitis or eneritis due to continued anemia despite multiple transfusions and melanotic stool  - GI consult place  - Iron studies ordered  - Likely plan for endoscopy and colonoscopy tomorrow per GI  - Clear liquid diet for now  - IVF at 125 mL/hour  - 1u pRBC ordered to transfuse now  - Antiemetics and pain medication prn

## 2020-06-10 NOTE — CONSULTS
[facility]  Gastroenterology  Consult Note    Patient Name: Jeanne Rodgers  MRN: [id number]  Admission Date: (Not on file)  Hospital Length of Stay: 0 days  Code Status: Prior   Attending Provider: Darius Regalado MD   Consulting Provider: Alberto Winters MD  Primary Care Physician: Gary Gonzalez MD  Principal Problem:Radiation therapy complication    Inpatient consult to Gastroenterology  Consult performed by: Alberto Winters MD  Consult ordered by: Dane Colbert MD        Subjective:     HPI: Jeanne Rodgers is a 65 y.o. female with history of  Endometrial adenocarcinoma stage III s/p debulking, and numerous cycles of radiation since January 2020 was sent from the Gyn onc clinic to the ED for evaluation of a GI bleed for which we are consulted for.     For the past 7 days patient has noticed black tarry stools, last episode this morning, denies bright red bloody bowel movements, hematemesis, dizziness or syncope. Patient has had chronic nausea since radiation and chemo but for the past 3 days has not been able to keep PO intake down. She has been vomiting everything she eats (bile at times, no blood). She also has right sided and epigastric abdominal pain since her radiation treatment that's worse with eating, relieved with NPO. She reports 1 episode of BRBPR in April but never after that. She has never had EGD before. Colonoscopy performed on 1/15/2016, showed two polyps and internal hemorrhoids, reached TI. She denies NSAID use at home. She also reports strong family history of pancreatic cancer in her father and mother's sister who both passed away. She is s/p cholecystectomy.       She is vitally stable,  hb 7.2, required blood transfusions with Prbc on 4/9, 5/22, and 6/4. She takes Anusol two times daily, prednisone 15 mg every other day.         Past Medical History:   Diagnosis Date    Allergy     Anxiety     Breast cancer 2014    Chronic back pain      Endometrial cancer 07/30/2019    Fibromyalgia     Hives     Hx of radiation therapy     Itching     Lichen sclerosus     Mental disorder     PONV (postoperative nausea and vomiting)     Sore throat     Uterine cancer 08/05/2019    UTI (urinary tract infection)        Past Surgical History:   Procedure Laterality Date    anal fissure surgery      APPENDECTOMY      BREAST BIOPSY Right 2014    BREAST LUMPECTOMY Right 2014    R lumpectomy [facility] w 2.4cm IDC & DCIS, neg margins, 0/6 SN, Stage II, ER/LA+, HER-2 neg Adjuvant XRT and hormonal therapy     CARPAL TUNNEL RELEASE      CHOLECYSTECTOMY      COLONOSCOPY      HYSTERECTOMY  08/15/2019    HYSTEROSCOPY WITH DILATION AND CURETTAGE OF UTERUS N/A 7/24/2019    Procedure: HYSTEROSCOPY, WITH DILATION AND CURETTAGE OF UTERUS;  Surgeon: Елена Kam MD;  Location: [facility];  Service: OB/GYN;  Laterality: N/A;    INSERTION OF TUNNELED CENTRAL VENOUS CATHETER (CVC) WITH SUBCUTANEOUS PORT N/A 9/26/2019    Procedure: INSERTION, PORT-A-CATH;  Surgeon: Moris Varghese MD;  Location: [facility] CATH LAB;  Service: Radiology;  Laterality: N/A;    MASTECTOMY Right     2014    MEDIPORT REMOVAL N/A 3/5/2020    Procedure: REMOVAL, CATHETER, CENTRAL VENOUS, TUNNELED, WITH PORT;  Surgeon: Moris Varghese MD;  Location: [facility] CATH LAB;  Service: Radiology;  Laterality: N/A;    ROBOT-ASSISTED LAPAROSCOPIC ABDOMINAL HYSTERECTOMY USING DA RAMU XI N/A 8/15/2019    Procedure: XI ROBOTIC HYSTERECTOMY;  Surgeon: Darius Regalado MD;  Location: [facility];  Service: OB/GYN;  Laterality: N/A;    ROBOT-ASSISTED LAPAROSCOPIC SALPINGO-OOPHORECTOMY USING DA RAMU XI Bilateral 8/15/2019    Procedure: XI ROBOTIC SALPINGO-OOPHORECTOMY;  Surgeon: Darius Regalado MD;  Location: [facility] OR;  Service: OB/GYN;  Laterality: Bilateral;    ROBOT-ASSISTED LYMPHADENECTOMY Left 8/15/2019    Procedure: ROBOTIC LYMPHADENECTOMY;  Surgeon: Darius Regalado MD;  Location: [facility];  Service: OB/GYN;   Laterality: Left;    SALPINGOOPHORECTOMY Bilateral 08/15/2019    TONSILLECTOMY      TUBAL LIGATION         Family History   Problem Relation Age of Onset    Cancer Mother 85        pancreatic cancer, passed at 95    Hypertension Mother     Arthritis Mother     Cancer Father 84        pancreatic, passed at 85    Cancer Maternal Aunt 60        pancreatic, passed at 62    No Known Problems Sister     Hypertension Brother     Lung cancer Paternal Aunt     Colon cancer Paternal Uncle     Heart attack Paternal Grandmother     Heart attack Paternal Grandfather     Hypertension Brother     Hashimoto's thyroiditis Sister     Breast cancer Neg Hx     Ovarian cancer Neg Hx        Social History     Socioeconomic History    Marital status: Single     Spouse name: Not on file    Number of children: Not on file    Years of education: Not on file    Highest education level: Not on file   Occupational History    Not on file   Social Needs    Financial resource strain: Not on file    Food insecurity:     Worry: Not on file     Inability: Not on file    Transportation needs:     Medical: Not on file     Non-medical: Not on file   Tobacco Use    Smoking status: Never Smoker    Smokeless tobacco: Never Used   Substance and Sexual Activity    Alcohol use: No     Alcohol/week: 0.0 standard drinks    Drug use: No    Sexual activity: Never   Lifestyle    Physical activity:     Days per week: Not on file     Minutes per session: Not on file    Stress: Patient refused   Relationships    Social connections:     Talks on phone: Not on file     Gets together: Not on file     Attends [employer] service: Not on file     Active member of club or organization: Not on file     Attends meetings of clubs or organizations: Not on file     Relationship status: Not on file   Other Topics Concern    Not on file   Social History Narrative    Not on file       No current facility-administered medications on file prior to  encounter.      Current Outpatient Medications on File Prior to Encounter   Medication Sig Dispense Refill    acetaminophen (TYLENOL) 500 MG tablet Take 500 mg by mouth.      b complex vitamins tablet Take 1 tablet by mouth once daily.      clobetasol 0.05% (TEMOVATE) 0.05 % Oint APPLY TO AFFECTED AREA(S) TWICE A DAY FOR 1 MONTH(S) then EVERY DAY FOR 1 MONTH(S) then 3 TIMES A WEEK 60 g 2    fluconazole (DIFLUCAN) 150 MG Tab       hydrocortisone (ANUSOL-HC) 2.5 % rectal cream Place rectally 2 (two) times daily. 30 g 1    ibuprofen (ADVIL,MOTRIN) 600 MG tablet Take 1 tablet (600 mg total) by mouth every 6 (six) hours as needed for Pain. 60 tablet 0    levocetirizine (XYZAL) 5 MG tablet Take 1 tablet (5 mg total) by mouth every evening. 30 tablet 3    methabner-rosalesblue-s.phos-phsal-hyo (URIBEL) 118-10-40.8-36 mg Cap Take 1 capsule by mouth 3 (three) times daily as needed (bladder pain). 40 capsule 11    MILK THISTLE ORAL Take by mouth.      nystatin (MYCOSTATIN) cream Apply topically every Mon, Wed, Fri. 30 g 11    nystatin (MYCOSTATIN) powder Apply topically 4 (four) times daily. 30 g 1    ondansetron (ZOFRAN) 8 MG tablet TAKE 1 TABLET BY MOUTH EVERY 12 HOURS AS NEEDED FOR NAUSEA (Patient not taking: Reported on 6/10/2020) 30 tablet 2    oxyCODONE-acetaminophen (PERCOCET)  mg per tablet Take 1 tablet by mouth every 4 (four) hours as needed for Pain. 30 tablet 0    polyethylene glycol (GOLYTELY,NULYTELY) 236-22.74-6.74 -5.86 gram suspension       predniSONE (DELTASONE) 5 MG tablet Take 3 each morning on odd number days (Patient not taking: Reported on 3/9/2020) 30 tablet 0    promethazine (PHENERGAN) 25 MG tablet TAKE 1 TABLET BY MOUTH EVERY 6 HOURS AS NEEDED FOR NAUSEA (Patient not taking: Reported on 5/8/2020) 30 tablet 2    scopolamine (TRANSDERM-SCOP) 1.3-1.5 mg (1 mg over 3 days) Place 1 patch onto the skin every 72 hours. (Patient not taking: Reported on 5/8/2020) 10 patch 2    silver  sulfADIAZINE 1% (SILVADENE) 1 % cream Apply topically every morning. (Patient not taking: Reported on 3/9/2020) 50 g 11    TURMERIC ORAL Take by mouth.         Review of patient's allergies indicates:  No Known Allergies    Review of Systems   Constitutional: Negative for chills, fever and weight loss.   HENT: Negative for hearing loss and sore throat.    Eyes: Negative for blurred vision and double vision.   Respiratory: Negative for cough and shortness of breath.    Cardiovascular: Negative for chest pain and palpitations.   Gastrointestinal: Positive for abdominal pain, melena, nausea and vomiting.   Genitourinary: Negative for dysuria and urgency.   Musculoskeletal: Negative for back pain, myalgias and neck pain.   Skin: Negative for itching and rash.   Neurological: Negative for dizziness and loss of consciousness.        Objective:     There were no vitals filed for this visit.      Physical Exam   Constitutional: She is oriented to person, place, and time. No distress.   HENT:   Head: Normocephalic and atraumatic.   Mouth/Throat: Oropharynx is clear and moist.   Eyes: Conjunctivae are normal. No scleral icterus.   Neck: Neck supple.   Cardiovascular: Normal rate and regular rhythm.   Pulmonary/Chest: Effort normal.   Abdominal: Soft. Normal appearance and bowel sounds are normal. She exhibits no distension. There is tenderness in the epigastric area. There is no rigidity, no rebound and no guarding.   Musculoskeletal: She exhibits no edema or tenderness.   Lymphadenopathy:     She has no cervical adenopathy.   Neurological: She is alert and oriented to person, place, and time.   Skin: Skin is warm and dry. Capillary refill takes less than 2 seconds. She is not diaphoretic.   Psychiatric: She has a normal mood and affect.   Nursing note and vitals reviewed.       Significant Labs:  Recent Labs   Lab 06/09/20  1348   HGB 7.2*       Lab Results   Component Value Date    WBC 3.37 (L) 06/09/2020    HGB 7.2 (L)  06/09/2020    HCT 21.0 (L) 06/09/2020     (H) 06/09/2020    PLT 92 (L) 06/09/2020       Lab Results   Component Value Date     05/07/2020    K 4.3 05/07/2020     05/07/2020    CO2 25 05/07/2020    BUN 10 05/07/2020    CREATININE 0.7 05/07/2020    CALCIUM 8.8 05/07/2020    ANIONGAP 7 (L) 05/07/2020    ESTGFRAFRICA >60.0 05/07/2020    EGFRNONAA >60.0 05/07/2020       Lab Results   Component Value Date    ALT 32 05/07/2020    AST 46 (H) 05/07/2020    ALKPHOS 75 05/07/2020    BILITOT 0.5 05/07/2020       Lab Results   Component Value Date    INR 0.9 09/26/2019       Significant Imaging:  Reviewed pertinent radiology findings.       Assessment/Plan:     Jeanne Rodgers is a 65 y.o. female with history of Endometrial adenocarcinoma stage III s/p debulking, and numerous cycles of radiation since January 2020 was sent from the Gyn onc clinic to the ED for evaluation of a GI bleed for which we are consulted for.     For the past 7 days patient has noticed black tarry stools, last episode this morning, denies bright red bloody bowel movements, hematemesis, dizziness or syncope. Patient has had chronic nausea since radiation and chemo but for the past 3 days has not been able to keep PO intake down. She has been vomiting everything she eats (bile at times, no blood). She also has right sided and epigastric abdominal pain since her radiation treatment that's worse with eating, relieved with NPO. She reports 1 episode of BRBPR in April but never after that. She has never had EGD before. Colonoscopy performed on 1/15/2016, showed two polyps and internal hemorrhoids, reached TI. She denies NSAID use at home. She also reports strong family history of pancreatic cancer in her father and mother's sister who both passed away. She is s/p cholecystectomy.       She is vitally stable,  hb 7.2, required blood transfusions with Prbc on 4/9, 5/22, and 6/4. She takes Anusol two times daily, prednisone 15 mg every  other day.     Problem List:  1. Melena  2. Hx of radiation to the abdominopelvic region  3. Acute blood loss anemia    Suspect radiation induced GI ulceration, will plan for EGD/colonoscopy tomorrow.     Plan:  -Place patient NPO from midnight, CLD today.   -Place 2 large bore IVs, volume resuscitation per primary  -Transfuse pRBC for Hb < 7 g/dL (Consider a higher Hb target if there is clinical evidence of intravascular volume depletion or comorbidities, such as CAD or if high suspicion of vigorous active ongoing bleeding or an uncorrected coagulopathy exists.).  -Correct coagulopathy (goal plt >50, INR <1.5) if present in patients without absolute contraindications.  -PPI 80 mg IV bolus once, then 8 mg/hour infusion or IV PPI 40 BID  -Avoid nonsteroidal agents, antiplatelet agents and anticoagulants if possible in patients without absolute contraindications. (consult managing provider if required for cardiovascular protection).  -Will plan for Endoscopy/Colonoscopy 6/11. CLD today, colon prep ordered, NPO from midnight.   -recommend iron studies, b 12, folate.   -Please call with any additional questions, concerns or changes in the patient's clinical status.    High-quality evidence per [employer]          Thank you for involving us in the care of Jeanne Rodgers. Please call with any additional questions, concerns or changes in the patient's clinical status.    Alberto Winters MD  Gastroenterology Fellow PGY IV   [facility]

## 2020-06-10 NOTE — SUBJECTIVE & OBJECTIVE
Oncology Treatment Plan:   OP GYN PACLITAXEL CARBOPLATIN (AUC 6) Q3W      Past Medical History:   Diagnosis Date    Allergy     Anxiety     Breast cancer 2014    Chronic back pain     Encounter for blood transfusion     Endometrial cancer 07/30/2019    Fibromyalgia     Hives     Hx of radiation therapy     Itching     Lichen sclerosus     Mental disorder     PONV (postoperative nausea and vomiting)     Sore throat     Uterine cancer 08/05/2019    UTI (urinary tract infection)      Past Surgical History:   Procedure Laterality Date    anal fissure surgery      APPENDECTOMY      BREAST BIOPSY Right 2014    BREAST LUMPECTOMY Right 2014    R lumpectomy [facility] w 2.4cm IDC & DCIS, neg margins, 0/6 SN, Stage II, ER/IA+, HER-2 neg Adjuvant XRT and hormonal therapy     CARPAL TUNNEL RELEASE      CHOLECYSTECTOMY      COLONOSCOPY      HYSTERECTOMY  08/15/2019    HYSTEROSCOPY WITH DILATION AND CURETTAGE OF UTERUS N/A 7/24/2019    Procedure: HYSTEROSCOPY, WITH DILATION AND CURETTAGE OF UTERUS;  Surgeon: Елена Kam MD;  Location: [facility] OR;  Service: OB/GYN;  Laterality: N/A;    INSERTION OF TUNNELED CENTRAL VENOUS CATHETER (CVC) WITH SUBCUTANEOUS PORT N/A 9/26/2019    Procedure: INSERTION, PORT-A-CATH;  Surgeon: Moris Varghese MD;  Location: [facility] CATH LAB;  Service: Radiology;  Laterality: N/A;    MASTECTOMY Right     2014    MEDIPORT REMOVAL N/A 3/5/2020    Procedure: REMOVAL, CATHETER, CENTRAL VENOUS, TUNNELED, WITH PORT;  Surgeon: Moris Varghese MD;  Location: [facility] CATH LAB;  Service: Radiology;  Laterality: N/A;    ROBOT-ASSISTED LAPAROSCOPIC ABDOMINAL HYSTERECTOMY USING DA RAMU XI N/A 8/15/2019    Procedure: XI ROBOTIC HYSTERECTOMY;  Surgeon: Darius Regalado MD;  Location: [facility] OR;  Service: OB/GYN;  Laterality: N/A;    ROBOT-ASSISTED LAPAROSCOPIC SALPINGO-OOPHORECTOMY USING DA RAMU XI Bilateral 8/15/2019    Procedure: XI ROBOTIC SALPINGO-OOPHORECTOMY;  Surgeon: Darius Regalado MD;   Location: [facility] OR;  Service: OB/GYN;  Laterality: Bilateral;    ROBOT-ASSISTED LYMPHADENECTOMY Left 8/15/2019    Procedure: ROBOTIC LYMPHADENECTOMY;  Surgeon: Darius Regalado MD;  Location: [facility] OR;  Service: OB/GYN;  Laterality: Left;    SALPINGOOPHORECTOMY Bilateral 08/15/2019    TONSILLECTOMY      TUBAL LIGATION       Family History     Problem Relation (Age of Onset)    Arthritis Mother    Cancer Mother (85), Father (84), Maternal Aunt (60)    Colon cancer Paternal Uncle    Hashimoto's thyroiditis Sister    Heart attack Paternal Grandmother, Paternal Grandfather    Hypertension Mother, Brother, Brother    Lung cancer Paternal Aunt    No Known Problems Sister        Tobacco Use    Smoking status: Never Smoker    Smokeless tobacco: Never Used   Substance and Sexual Activity    Alcohol use: No     Alcohol/week: 0.0 standard drinks    Drug use: No    Sexual activity: Never       PTA Medications   Medication Sig    levocetirizine (XYZAL) 5 MG tablet Take 1 tablet (5 mg total) by mouth every evening.    nystatin (MYCOSTATIN) cream Apply topically every Mon, Wed, Fri.    nystatin (MYCOSTATIN) powder Apply topically 4 (four) times daily.    acetaminophen (TYLENOL) 500 MG tablet Take 500 mg by mouth.    b complex vitamins tablet Take 1 tablet by mouth once daily.    clobetasol 0.05% (TEMOVATE) 0.05 % Oint APPLY TO AFFECTED AREA(S) TWICE A DAY FOR 1 MONTH(S) then EVERY DAY FOR 1 MONTH(S) then 3 TIMES A WEEK    fluconazole (DIFLUCAN) 150 MG Tab     hydrocortisone (ANUSOL-HC) 2.5 % rectal cream Place rectally 2 (two) times daily.    ibuprofen (ADVIL,MOTRIN) 600 MG tablet Take 1 tablet (600 mg total) by mouth every 6 (six) hours as needed for Pain.    methen-m.blue-s.phos-phsal-hyo (URIBEL) 118-10-40.8-36 mg Cap Take 1 capsule by mouth 3 (three) times daily as needed (bladder pain).    MILK THISTLE ORAL Take by mouth.    ondansetron (ZOFRAN) 8 MG tablet TAKE 1 TABLET BY MOUTH EVERY 12 HOURS AS NEEDED FOR  NAUSEA (Patient not taking: Reported on 6/10/2020)    oxyCODONE-acetaminophen (PERCOCET)  mg per tablet Take 1 tablet by mouth every 4 (four) hours as needed for Pain.    polyethylene glycol (GOLYTELY,NULYTELY) 236-22.74-6.74 -5.86 gram suspension     predniSONE (DELTASONE) 5 MG tablet Take 3 each morning on odd number days (Patient not taking: Reported on 3/9/2020)    promethazine (PHENERGAN) 25 MG tablet TAKE 1 TABLET BY MOUTH EVERY 6 HOURS AS NEEDED FOR NAUSEA (Patient not taking: Reported on 5/8/2020)    scopolamine (TRANSDERM-SCOP) 1.3-1.5 mg (1 mg over 3 days) Place 1 patch onto the skin every 72 hours. (Patient not taking: Reported on 5/8/2020)    silver sulfADIAZINE 1% (SILVADENE) 1 % cream Apply topically every morning. (Patient not taking: Reported on 3/9/2020)    TURMERIC ORAL Take by mouth.       Review of patient's allergies indicates:  No Known Allergies    Review of Systems   Constitutional: Positive for appetite change and fatigue. Negative for activity change.   HENT: Negative for mouth sores.    Respiratory: Negative for cough and shortness of breath.    Cardiovascular: Negative for chest pain.   Gastrointestinal: Positive for blood in stool (melana), diarrhea, nausea and vomiting. Negative for abdominal pain and constipation.   Genitourinary: Negative for hematuria and vaginal bleeding.   Neurological: Negative for syncope.   Psychiatric/Behavioral: Negative for depression.      Objective:     Vital Signs (Most Recent):  Temp: 98.4 °F (36.9 °C) (06/10/20 1249)  Pulse: 85 (06/10/20 1249)  Resp: 16 (06/10/20 1249)  BP: 127/60 (06/10/20 1249)  SpO2: 100 % (06/10/20 1249) Vital Signs (24h Range):  Temp:  [98.4 °F (36.9 °C)] 98.4 °F (36.9 °C)  Pulse:  [85-97] 85  Resp:  [16] 16  SpO2:  [100 %] 100 %  BP: (123-127)/(60-63) 127/60     Weight: 66.3 kg (146 lb 2.6 oz)  Body mass index is 28.55 kg/m².    Physical Exam:   Constitutional: She is oriented to person, place, and time. She appears  well-developed and well-nourished. No distress.    HENT:   Head: Normocephalic and atraumatic.   Mouth/Throat: Oropharynx is clear and moist.    Eyes: Conjunctivae and EOM are normal.    Neck: Normal range of motion.    Cardiovascular: Normal rate, regular rhythm and intact distal pulses.     Pulmonary/Chest: Effort normal. No respiratory distress.        Abdominal: Soft. Bowel sounds are normal. She exhibits no distension. There is no tenderness.             Musculoskeletal: Normal range of motion and moves all extremeties.       Neurological: She is alert and oriented to person, place, and time.    Skin: Skin is warm and dry. She is not diaphoretic.    Psychiatric: She has a normal mood and affect. Her behavior is normal.       Laboratory:  CBC:   Recent Labs   Lab 06/09/20  1348   WBC 3.37*   HGB 7.2*   HCT 21.0*   PLT 92*    and All pertinent labs from the last 24 hours have been reviewed.    Diagnostic Results:  None

## 2020-06-10 NOTE — HOSPITAL COURSE
06/10/2020 - Admitted from clinic with melenotic stool and anemia. GI has seen the patient and plans for endoscopy and colonoscopy tomorrow. Discussed with the patient, who is on board. To have bowel prep tonight. Initial iron studies drawn per GI. Awaiting peripheral IV placement then will transfuse 1 u pRBC.   06/11/2020 - Patient looks well this morning, and VS were stabel and WNL throughout the night. Now s/p transfusion of 1 u pRBCs. Has been able to get through the GoLytely prep and has intermittently clear stools, sometimes still with green/black component. No NSAIDs and added IV PPI per GI. Iron studies but for ferritin are WNL. B12 elevated, folate WNL.   PM of 06/11/2020 - Patient returned from EGD/colonoscopy.  She reports feeling better, and requests crackers and liquids by mouth.  [place] diet ordered Per GI notes, the endoscopy and colonoscopy were within normal limits.  Recommendation for next staff is CT enterography, which is now ordered for tomorrow.  Labs today show H/H 6.8/20.6.  2 units PRBC ordered for transfusion tonight.  Corrected calcium also low, at 8.3.  Will replace by mouth tomorrow morning and patient tolerating diet.   06/12/2020 - Patient slept well, but feeling tired this morning. Reports she ate some mashed potatoes and a little chicken last night without n/v. Was given 2u pRBC overnight; AM CBC pending. Has been NPO since MN for CT enterography this morning.

## 2020-06-10 NOTE — ANESTHESIA PREPROCEDURE EVALUATION
[facility]  Anesthesia Pre-Operative Evaluation         Patient Name: Jeanne Rodgers  YOB: 1955  MRN: [id number]    SUBJECTIVE:     Pre-operative evaluation for Procedure(s) (LRB):  EGD (ESOPHAGOGASTRODUODENOSCOPY) (N/A)  COLONOSCOPY (N/A)     06/10/2020    Jeanne Rodgers is a 65 y.o. female w/ a significant PMHx of Endometrial adenocarcinoma stage III s/p debulking, and numerous cycles of radiation since January 2020 who presents with active GI bleed.    Patient now presents for the above procedure(s).      LDA:        PowerPort A Cath Single Lumen 09/26/19 1356 left internal jugular (Active)   Number of days: 258       Prev airway: Placement Date: 08/15/19; Placement Time: 1055; Method of Intubation: Direct laryngoscopy; Inserted by: CRNA; Airway Device: Endotracheal Tube; Intubated: Postinduction; Style: Cuffed; Cuff Inflation: Minimal occlusive pressure; Placement Verified By: Auscultation, Capnometry; Intubation Findings: Positive EtCO2, Bilateral breath sounds, Atraumatic/Condition of teeth unchanged; Breath Sounds: Equal Bilateral; Removal Date: 08/15/19;  Removal Time: 1546    Drips:    dextrose 5 % and 0.9 % NaCl         Patient Active Problem List   Diagnosis    Hx of breast cancer    Spondylosis without myelopathy    Hamstring tightness of both lower extremities    Segmental and somatic dysfunction of lumbar region    Alteration in mobility associated with pain    Recurrent UTI    Increased frequency of urination    Pelvic pain in female    Myalgia of pelvic floor    Nocturia more than twice per night    Lichen sclerosus et atrophicus    Irregular bowel habits    Loose stools    Hip pain, right    Chronic lower back pain    Lower abdominal pain    Abdominal pain    Urinary incontinence, urge    Pre-diabetes    Vaginal irritation    Postmenopausal bleeding    Endometrial cancer    History of robot-assisted laparoscopic hysterectomy     Encounter for antineoplastic chemotherapy    Thrombocytopenia due to drugs    Neutropenia, drug-induced    Anemia due to chronic blood loss    Radiation therapy complication       Review of patient's allergies indicates:  No Known Allergies    Current Inpatient Medications:   polyethylene glycol  4,000 mL Oral Once       No current facility-administered medications on file prior to encounter.      Current Outpatient Medications on File Prior to Encounter   Medication Sig Dispense Refill    levocetirizine (XYZAL) 5 MG tablet Take 1 tablet (5 mg total) by mouth every evening. 30 tablet 3    nystatin (MYCOSTATIN) cream Apply topically every Mon, Wed, Fri. 30 g 11    nystatin (MYCOSTATIN) powder Apply topically 4 (four) times daily. 30 g 1    acetaminophen (TYLENOL) 500 MG tablet Take 500 mg by mouth.      b complex vitamins tablet Take 1 tablet by mouth once daily.      clobetasol 0.05% (TEMOVATE) 0.05 % Oint APPLY TO AFFECTED AREA(S) TWICE A DAY FOR 1 MONTH(S) then EVERY DAY FOR 1 MONTH(S) then 3 TIMES A WEEK 60 g 2    fluconazole (DIFLUCAN) 150 MG Tab       hydrocortisone (ANUSOL-HC) 2.5 % rectal cream Place rectally 2 (two) times daily. 30 g 1    ibuprofen (ADVIL,MOTRIN) 600 MG tablet Take 1 tablet (600 mg total) by mouth every 6 (six) hours as needed for Pain. 60 tablet 0    methen-m.blue-s.phos-phsal-hyo (URIBEL) 118-10-40.8-36 mg Cap Take 1 capsule by mouth 3 (three) times daily as needed (bladder pain). 40 capsule 11    MILK THISTLE ORAL Take by mouth.      ondansetron (ZOFRAN) 8 MG tablet TAKE 1 TABLET BY MOUTH EVERY 12 HOURS AS NEEDED FOR NAUSEA (Patient not taking: Reported on 6/10/2020) 30 tablet 2    oxyCODONE-acetaminophen (PERCOCET)  mg per tablet Take 1 tablet by mouth every 4 (four) hours as needed for Pain. 30 tablet 0    polyethylene glycol (GOLYTELY,NULYTELY) 236-22.74-6.74 -5.86 gram suspension       predniSONE (DELTASONE) 5 MG tablet Take 3 each morning on odd number days  (Patient not taking: Reported on 3/9/2020) 30 tablet 0    promethazine (PHENERGAN) 25 MG tablet TAKE 1 TABLET BY MOUTH EVERY 6 HOURS AS NEEDED FOR NAUSEA (Patient not taking: Reported on 5/8/2020) 30 tablet 2    scopolamine (TRANSDERM-SCOP) 1.3-1.5 mg (1 mg over 3 days) Place 1 patch onto the skin every 72 hours. (Patient not taking: Reported on 5/8/2020) 10 patch 2    silver sulfADIAZINE 1% (SILVADENE) 1 % cream Apply topically every morning. (Patient not taking: Reported on 3/9/2020) 50 g 11    TURMERIC ORAL Take by mouth.         Past Surgical History:   Procedure Laterality Date    anal fissure surgery      APPENDECTOMY      BREAST BIOPSY Right 2014    BREAST LUMPECTOMY Right 2014    R lumpectomy [facility] w 2.4cm IDC & DCIS, neg margins, 0/6 SN, Stage II, ER/[place]+, HER-2 neg Adjuvant XRT and hormonal therapy     CARPAL TUNNEL RELEASE      CHOLECYSTECTOMY      COLONOSCOPY      HYSTERECTOMY  08/15/2019    HYSTEROSCOPY WITH DILATION AND CURETTAGE OF UTERUS N/A 7/24/2019    Procedure: HYSTEROSCOPY, WITH DILATION AND CURETTAGE OF UTERUS;  Surgeon: Елена Kam MD;  Location: [facility] OR;  Service: OB/GYN;  Laterality: N/A;    INSERTION OF TUNNELED CENTRAL VENOUS CATHETER (CVC) WITH SUBCUTANEOUS PORT N/A 9/26/2019    Procedure: INSERTION, PORT-A-CATH;  Surgeon: Moris Varghese MD;  Location: [facility] CATH LAB;  Service: Radiology;  Laterality: N/A;    MASTECTOMY Right     2014    MEDIPORT REMOVAL N/A 3/5/2020    Procedure: REMOVAL, CATHETER, CENTRAL VENOUS, TUNNELED, WITH PORT;  Surgeon: Moris Varghese MD;  Location: [facility] CATH LAB;  Service: Radiology;  Laterality: N/A;    ROBOT-ASSISTED LAPAROSCOPIC ABDOMINAL HYSTERECTOMY USING DA RAMU XI N/A 8/15/2019    Procedure: XI ROBOTIC HYSTERECTOMY;  Surgeon: Darius Regalado MD;  Location: [facility] OR;  Service: OB/GYN;  Laterality: N/A;    ROBOT-ASSISTED LAPAROSCOPIC SALPINGO-OOPHORECTOMY USING DA RAMU XI Bilateral 8/15/2019    Procedure: XI ROBOTIC  SALPINGO-OOPHORECTOMY;  Surgeon: Darius Regalado MD;  Location: [facility];  Service: OB/GYN;  Laterality: Bilateral;    ROBOT-ASSISTED LYMPHADENECTOMY Left 8/15/2019    Procedure: ROBOTIC LYMPHADENECTOMY;  Surgeon: Darius Regalado MD;  Location: [facility];  Service: OB/GYN;  Laterality: Left;    SALPINGOOPHORECTOMY Bilateral 08/15/2019    TONSILLECTOMY      TUBAL LIGATION         Social History     Socioeconomic History    Marital status: Single     Spouse name: Not on file    Number of children: Not on file    Years of education: Not on file    Highest education level: Not on file   Occupational History    Not on file   Social Needs    Financial resource strain: Not on file    Food insecurity:     Worry: Not on file     Inability: Not on file    Transportation needs:     Medical: Not on file     Non-medical: Not on file   Tobacco Use    Smoking status: Never Smoker    Smokeless tobacco: Never Used   Substance and Sexual Activity    Alcohol use: No     Alcohol/week: 0.0 standard drinks    Drug use: No    Sexual activity: Never   Lifestyle    Physical activity:     Days per week: Not on file     Minutes per session: Not on file    Stress: Patient refused   Relationships    Social connections:     Talks on phone: Not on file     Gets together: Not on file     Attends Christianity service: Not on file     Active member of club or organization: Not on file     Attends meetings of clubs or organizations: Not on file     Relationship status: Not on file   Other Topics Concern    Not on file   Social History Narrative    Not on file       OBJECTIVE:     Vital Signs Range (Last 24H):  Temp:  [36.9 °C (98.4 °F)]   Pulse:  [85-97]   Resp:  [16]   BP: (123-127)/(60-63)   SpO2:  [100 %]       Significant Labs:  Lab Results   Component Value Date    WBC 3.37 (L) 06/09/2020    HGB 7.2 (L) 06/09/2020    HCT 21.0 (L) 06/09/2020    PLT 92 (L) 06/09/2020    CHOL 214 (H) 12/26/2017    TRIG 356 (H) 12/26/2017    HDL 43  12/26/2017    ALT 32 05/07/2020    AST 46 (H) 05/07/2020     05/07/2020    K 4.3 05/07/2020     05/07/2020    CREATININE 0.7 05/07/2020    BUN 10 05/07/2020    CO2 25 05/07/2020    TSH 0.976 12/26/2017    INR 0.9 09/26/2019    HGBA1C 5.6 06/05/2019       Diagnostic Studies: No relevant studies.    EKG:   Results for orders placed or performed in visit on 07/23/19   IN OFFICE EKG 12-LEAD (to [place])    Collection Time: 07/23/19  8:57 AM    Narrative    Test Reason : Z01.818,Z01.818,    Vent. Rate : 072 BPM     Atrial Rate : 072 BPM     P-R Int : 170 ms          QRS Dur : 068 ms      QT Int : 422 ms       P-R-T Axes : 036 038 049 degrees     QTc Int : 462 ms    Normal sinus rhythm  Low voltage QRS  Borderline Abnormal ECG  No previous ECGs available    Referred By: ALEXANDRO ALVA           Confirmed By:        2D ECHO:  TTE:  No results found for this or any previous visit.    PATTI:  No results found for this or any previous visit.    ASSESSMENT/PLAN:                                                                                                                  06/10/2020  Jeanne Rodgers is a 65 y.o., female.    Anesthesia Evaluation    I have reviewed the Patient Summary Reports.    I have reviewed the Nursing Notes.    I have reviewed the Medications.     Review of Systems  Anesthesia Hx:  Hx of Anesthetic complications (PONV)  Denies Family Hx of Anesthesia complications.  Personal Hx of Anesthesia complications, Post-Operative Nausea/Vomiting   Social:  Non-Smoker    Hematology/Oncology:  Hematology Normal       -- Cancer in past history:  Breast right no axillary node dissection no lymphedema   EENT/Dental:EENT/Dental Normal   Cardiovascular:  Cardiovascular Normal Exercise tolerance: good     Pulmonary:  Pulmonary Normal    Renal/:  Renal/ Normal     Musculoskeletal:   Pelvic pain Spine Disorders: lumbar Chronic Pain and Degenerative disease    Neurological:  Neurology Normal     Endocrine:  Endocrine Normal    Dermatological:  Skin Normal    Psych:  Psychiatric Normal           Physical Exam  General:  Obesity    Airway/Jaw/Neck:  Airway Findings: Mouth Opening: Normal Tongue: Normal  General Airway Assessment: Adult  Mallampati: I  TM Distance: Normal, at least 6 cm  Jaw/Neck Findings:  Neck ROM: Normal ROM     Eyes/Ears/Nose:  Eyes/Ears/Nose Findings:    Dental:  Dental Findings: In tact    Chest/Lungs:  Chest/Lungs Findings: Clear to auscultation, Normal Respiratory Rate     Heart/Vascular:  Heart Findings: Rate: Normal  Rhythm: Regular Rhythm  Sounds: Normal  Heart Murmur  Vascular Findings:        Mental Status:  Mental Status Findings:  Cooperative, Alert and Oriented         Anesthesia Plan  Type of Anesthesia, risks & benefits discussed:  Anesthesia Type:  general, MAC  Patient's Preference: General/MAC  Intra-op Monitoring Plan: standard ASA monitors  Intra-op Monitoring Plan Comments:   Post Op Pain Control Plan: multimodal analgesia, IV/PO Opioids PRN and per primary service following discharge from PACU  Post Op Pain Control Plan Comments: IV meds as needed  Induction:   IV  Beta Blocker:  Patient is not currently on a Beta-Blocker (No further documentation required).       Informed Consent: Patient understands risks and agrees with Anesthesia plan.  Questions answered. Anesthesia consent signed with patient.  ASA Score: 3     Day of Surgery Review of History & Physical:    H&P update referred to the surgeon.     Anesthesia Plan Notes: Pt with hx of PONV. Scopolamine patch ordered as well as lyrica 75mg once in pre-op. Pt states the lyrica pre-op last time made the biggest difference in her PONV.        Ready For Surgery From Anesthesia Perspective.

## 2020-06-10 NOTE — HPI
Jeanne Rodgers is a 65 y.o. C F with history of papillary serous cancer of the uterus, who is now s/p RALH/BSO as well as chemotherapy and radiation. Treatment history below:     Treatment History  Stage IIIC2 UPSC  Preop PET revealing positive pelvic and PA nodes  RALH/BSO/Debulking of left pelvic nodes (6/8 positive) on 8/15/19  Port placed 9/26/19  T/C started 9/27/19  WPRT + PA nodes to 45 Gy completed 1/22/20  HDR to 15 Gy in 3 fractions completed 3/3/20  Restarted T/C on 3/20/2020  PET on 6/2/2020 did not show any abnormalities.      Today, she presented to Dr. Regalado' clinic for follow up, as she has been treated on an outpatient basis for anemia. She is s/p 3 u pRBC transfused in the outpatient setting. Most recent blood work shows persistence of significant anemia, with H/H 7/21. Patient also reporting dark tarry stools, as well as nausea and vomiting. 2.   She is admitted from clinic for blood transfusion, IVF, ang GI consult due to suspected radiation-induced enteritis or proctitis.

## 2020-06-11 ENCOUNTER — TELEPHONE (OUTPATIENT)
Dept: GASTROENTEROLOGY | Facility: CLINIC | Age: 65
End: 2020-06-11

## 2020-06-11 ENCOUNTER — ANESTHESIA (OUTPATIENT)
Dept: ENDOSCOPY | Facility: HOSPITAL | Age: 65
End: 2020-06-11
Payer: MEDICARE

## 2020-06-11 DIAGNOSIS — R79.89 LFT ELEVATION: Primary | ICD-10-CM

## 2020-06-11 LAB
ALBUMIN SERPL BCP-MCNC: 2.9 G/DL (ref 3.5–5.2)
ALP SERPL-CCNC: 59 U/L (ref 55–135)
ALT SERPL W/O P-5'-P-CCNC: 24 U/L (ref 10–44)
ANION GAP SERPL CALC-SCNC: 5 MMOL/L (ref 8–16)
ANISOCYTOSIS BLD QL SMEAR: SLIGHT
AST SERPL-CCNC: 43 U/L (ref 10–40)
BASO STIPL BLD QL SMEAR: ABNORMAL
BASOPHILS NFR BLD: 0 % (ref 0–1.9)
BILIRUB SERPL-MCNC: 0.4 MG/DL (ref 0.1–1)
BUN SERPL-MCNC: 7 MG/DL (ref 8–23)
CALCIUM SERPL-MCNC: 7.4 MG/DL (ref 8.7–10.5)
CHLORIDE SERPL-SCNC: 111 MMOL/L (ref 95–110)
CO2 SERPL-SCNC: 23 MMOL/L (ref 23–29)
CREAT SERPL-MCNC: 0.7 MG/DL (ref 0.5–1.4)
DACRYOCYTES BLD QL SMEAR: ABNORMAL
DIFFERENTIAL METHOD: ABNORMAL
EOSINOPHIL NFR BLD: 2 % (ref 0–8)
ERYTHROCYTE [DISTWIDTH] IN BLOOD BY AUTOMATED COUNT: 20.9 % (ref 11.5–14.5)
EST. GFR  (AFRICAN AMERICAN): >60 ML/MIN/1.73 M^2
EST. GFR  (NON AFRICAN AMERICAN): >60 ML/MIN/1.73 M^2
GLUCOSE SERPL-MCNC: 93 MG/DL (ref 70–110)
HCT VFR BLD AUTO: 20.6 % (ref 37–48.5)
HGB BLD-MCNC: 6.8 G/DL (ref 12–16)
IMM GRANULOCYTES # BLD AUTO: ABNORMAL K/UL (ref 0–0.04)
IMM GRANULOCYTES NFR BLD AUTO: ABNORMAL % (ref 0–0.5)
INR PPP: 1.1 (ref 0.8–1.2)
LYMPHOCYTES NFR BLD: 8 % (ref 18–48)
MCH RBC QN AUTO: 34 PG (ref 27–31)
MCHC RBC AUTO-ENTMCNC: 33 G/DL (ref 32–36)
MCV RBC AUTO: 103 FL (ref 82–98)
MONOCYTES NFR BLD: 5 % (ref 4–15)
NEUTROPHILS NFR BLD: 84 % (ref 38–73)
NEUTS BAND NFR BLD MANUAL: 1 %
NRBC BLD-RTO: 0 /100 WBC
OVALOCYTES BLD QL SMEAR: ABNORMAL
PLATELET # BLD AUTO: 61 K/UL (ref 150–350)
PMV BLD AUTO: 10.5 FL (ref 9.2–12.9)
POIKILOCYTOSIS BLD QL SMEAR: SLIGHT
POLYCHROMASIA BLD QL SMEAR: ABNORMAL
POTASSIUM SERPL-SCNC: 3.7 MMOL/L (ref 3.5–5.1)
PROT SERPL-MCNC: 5 G/DL (ref 6–8.4)
PROTHROMBIN TIME: 11.7 SEC (ref 9–12.5)
RBC # BLD AUTO: 2 M/UL (ref 4–5.4)
SODIUM SERPL-SCNC: 139 MMOL/L (ref 136–145)
WBC # BLD AUTO: 1.85 K/UL (ref 3.9–12.7)

## 2020-06-11 PROCEDURE — 25000003 PHARM REV CODE 250: Performed by: NURSE ANESTHETIST, CERTIFIED REGISTERED

## 2020-06-11 PROCEDURE — P9040 RBC LEUKOREDUCED IRRADIATED: HCPCS

## 2020-06-11 PROCEDURE — 43235 EGD DIAGNOSTIC BRUSH WASH: CPT | Performed by: INTERNAL MEDICINE

## 2020-06-11 PROCEDURE — 85007 BL SMEAR W/DIFF WBC COUNT: CPT

## 2020-06-11 PROCEDURE — 63600175 PHARM REV CODE 636 W HCPCS: Performed by: NURSE ANESTHETIST, CERTIFIED REGISTERED

## 2020-06-11 PROCEDURE — 36415 COLL VENOUS BLD VENIPUNCTURE: CPT

## 2020-06-11 PROCEDURE — 25000003 PHARM REV CODE 250: Performed by: STUDENT IN AN ORGANIZED HEALTH CARE EDUCATION/TRAINING PROGRAM

## 2020-06-11 PROCEDURE — D9220A PRA ANESTHESIA: Mod: ANES,,, | Performed by: ANESTHESIOLOGY

## 2020-06-11 PROCEDURE — 85027 COMPLETE CBC AUTOMATED: CPT

## 2020-06-11 PROCEDURE — 85610 PROTHROMBIN TIME: CPT

## 2020-06-11 PROCEDURE — 80053 COMPREHEN METABOLIC PANEL: CPT

## 2020-06-11 PROCEDURE — 43235 PR EGD, FLEX, DIAGNOSTIC: ICD-10-PCS | Mod: 51,,, | Performed by: INTERNAL MEDICINE

## 2020-06-11 PROCEDURE — 94761 N-INVAS EAR/PLS OXIMETRY MLT: CPT

## 2020-06-11 PROCEDURE — 37000008 HC ANESTHESIA 1ST 15 MINUTES: Performed by: INTERNAL MEDICINE

## 2020-06-11 PROCEDURE — G0378 HOSPITAL OBSERVATION PER HR: HCPCS

## 2020-06-11 PROCEDURE — 96361 HYDRATE IV INFUSION ADD-ON: CPT

## 2020-06-11 PROCEDURE — D9220A PRA ANESTHESIA: ICD-10-PCS | Mod: CRNA,,, | Performed by: NURSE ANESTHETIST, CERTIFIED REGISTERED

## 2020-06-11 PROCEDURE — 36430 TRANSFUSION BLD/BLD COMPNT: CPT | Mod: 59

## 2020-06-11 PROCEDURE — 43235 EGD DIAGNOSTIC BRUSH WASH: CPT | Mod: 51,,, | Performed by: INTERNAL MEDICINE

## 2020-06-11 PROCEDURE — D9220A PRA ANESTHESIA: ICD-10-PCS | Mod: ANES,,, | Performed by: ANESTHESIOLOGY

## 2020-06-11 PROCEDURE — 45378 PR COLONOSCOPY,DIAGNOSTIC: ICD-10-PCS | Mod: GC,,, | Performed by: INTERNAL MEDICINE

## 2020-06-11 PROCEDURE — G0378 HOSPITAL OBSERVATION PER HR: HCPCS | Mod: CS

## 2020-06-11 PROCEDURE — D9220A PRA ANESTHESIA: Mod: CRNA,,, | Performed by: NURSE ANESTHETIST, CERTIFIED REGISTERED

## 2020-06-11 PROCEDURE — 45378 DIAGNOSTIC COLONOSCOPY: CPT | Performed by: INTERNAL MEDICINE

## 2020-06-11 PROCEDURE — 37000009 HC ANESTHESIA EA ADD 15 MINS: Performed by: INTERNAL MEDICINE

## 2020-06-11 PROCEDURE — 45378 DIAGNOSTIC COLONOSCOPY: CPT | Mod: GC,,, | Performed by: INTERNAL MEDICINE

## 2020-06-11 PROCEDURE — C9113 INJ PANTOPRAZOLE SODIUM, VIA: HCPCS | Performed by: STUDENT IN AN ORGANIZED HEALTH CARE EDUCATION/TRAINING PROGRAM

## 2020-06-11 PROCEDURE — 63600175 PHARM REV CODE 636 W HCPCS: Performed by: STUDENT IN AN ORGANIZED HEALTH CARE EDUCATION/TRAINING PROGRAM

## 2020-06-11 RX ORDER — PREGABALIN 150 MG/1
150 CAPSULE ORAL
Status: DISCONTINUED | OUTPATIENT
Start: 2020-06-11 | End: 2020-06-11 | Stop reason: HOSPADM

## 2020-06-11 RX ORDER — PROPOFOL 10 MG/ML
VIAL (ML) INTRAVENOUS CONTINUOUS PRN
Status: DISCONTINUED | OUTPATIENT
Start: 2020-06-11 | End: 2020-06-11

## 2020-06-11 RX ORDER — SODIUM CHLORIDE 9 MG/ML
INJECTION, SOLUTION INTRAVENOUS CONTINUOUS PRN
Status: DISCONTINUED | OUTPATIENT
Start: 2020-06-11 | End: 2020-06-11

## 2020-06-11 RX ORDER — GLYCOPYRROLATE 0.2 MG/ML
INJECTION INTRAMUSCULAR; INTRAVENOUS
Status: DISCONTINUED | OUTPATIENT
Start: 2020-06-11 | End: 2020-06-11

## 2020-06-11 RX ORDER — PHENYLEPHRINE HYDROCHLORIDE 10 MG/ML
INJECTION INTRAVENOUS
Status: DISCONTINUED | OUTPATIENT
Start: 2020-06-11 | End: 2020-06-11

## 2020-06-11 RX ORDER — LIDOCAINE HYDROCHLORIDE 20 MG/ML
INJECTION INTRAVENOUS
Status: DISCONTINUED | OUTPATIENT
Start: 2020-06-11 | End: 2020-06-11

## 2020-06-11 RX ORDER — HYDROCODONE BITARTRATE AND ACETAMINOPHEN 500; 5 MG/1; MG/1
TABLET ORAL
Status: DISCONTINUED | OUTPATIENT
Start: 2020-06-11 | End: 2020-06-12 | Stop reason: HOSPADM

## 2020-06-11 RX ORDER — ONDANSETRON 2 MG/ML
4 INJECTION INTRAMUSCULAR; INTRAVENOUS DAILY PRN
Status: DISCONTINUED | OUTPATIENT
Start: 2020-06-11 | End: 2020-06-11 | Stop reason: HOSPADM

## 2020-06-11 RX ORDER — PROPOFOL 10 MG/ML
VIAL (ML) INTRAVENOUS
Status: DISCONTINUED | OUTPATIENT
Start: 2020-06-11 | End: 2020-06-11

## 2020-06-11 RX ADMIN — PANTOPRAZOLE SODIUM 40 MG: 40 INJECTION, POWDER, LYOPHILIZED, FOR SOLUTION INTRAVENOUS at 08:06

## 2020-06-11 RX ADMIN — ONDANSETRON 8 MG: 8 TABLET, ORALLY DISINTEGRATING ORAL at 11:06

## 2020-06-11 RX ADMIN — SODIUM CHLORIDE: 0.9 INJECTION, SOLUTION INTRAVENOUS at 11:06

## 2020-06-11 RX ADMIN — LIDOCAINE HYDROCHLORIDE 100 MG: 20 INJECTION, SOLUTION INTRAVENOUS at 11:06

## 2020-06-11 RX ADMIN — PANTOPRAZOLE SODIUM 40 MG: 40 INJECTION, POWDER, LYOPHILIZED, FOR SOLUTION INTRAVENOUS at 11:06

## 2020-06-11 RX ADMIN — PHENYLEPHRINE HYDROCHLORIDE 100 MCG: 10 INJECTION INTRAVENOUS at 11:06

## 2020-06-11 RX ADMIN — GLYCOPYRROLATE 0.2 MG: 0.2 INJECTION, SOLUTION INTRAMUSCULAR; INTRAVENOUS at 11:06

## 2020-06-11 RX ADMIN — PROPOFOL 100 MG: 10 INJECTION, EMULSION INTRAVENOUS at 11:06

## 2020-06-11 RX ADMIN — DEXTROSE AND SODIUM CHLORIDE: 5; .9 INJECTION, SOLUTION INTRAVENOUS at 04:06

## 2020-06-11 RX ADMIN — ONDANSETRON 8 MG: 8 TABLET, ORALLY DISINTEGRATING ORAL at 12:06

## 2020-06-11 RX ADMIN — SCOPALAMINE 1 PATCH: 1 PATCH, EXTENDED RELEASE TRANSDERMAL at 03:06

## 2020-06-11 RX ADMIN — PROPOFOL 100 MCG/KG/MIN: 10 INJECTION, EMULSION INTRAVENOUS at 11:06

## 2020-06-11 RX ADMIN — PROPOFOL 20 MG: 10 INJECTION, EMULSION INTRAVENOUS at 11:06

## 2020-06-11 NOTE — PROGRESS NOTES
[facility]  Gynecologic Oncology  Progress Note      Patient Name: Jeanne Rodgers  MRN: [id number]  Admission Date: 6/10/2020  Hospital Length of Stay: 0 days  Attending Provider: Darius Regalado MD  Primary Care Provider: Gary Gonzalez MD  Principal Problem: Radiation therapy complication    Follow-up For: Procedure(s) (LRB):  EGD (ESOPHAGOGASTRODUODENOSCOPY) (N/A)  COLONOSCOPY (N/A)  Post-Operative Day: Day of Surgery     Subjective:      History of Present Illness:  Jeanne Rodgers is a 65 y.o. C F with history of papillary serous cancer of the uterus, who is now s/p RALH/BSO as well as chemotherapy and radiation. Treatment history below:     Treatment History  Stage IIIC2 UPSC  Preop PET revealing positive pelvic and PA nodes  RALH/BSO/Debulking of left pelvic nodes (6/8 positive) on 8/15/19  Port placed 9/26/19  T/C started 9/27/19  WPRT + PA nodes to 45 Gy completed 1/22/20  HDR to 15 Gy in 3 fractions completed 3/3/20  Restarted T/C on 3/20/2020  PET on 6/2/2020 did not show any abnormalities.      Today, she presented to Dr. Regalado' clinic for follow up, as she has been treated on an outpatient basis for anemia. She is s/p 3 u pRBC transfused in the outpatient setting. Most recent blood work shows persistence of significant anemia, with H/H 7/21. Patient also reporting dark tarry stools, as well as nausea and vomiting. 2.   She is admitted from clinic for blood transfusion, IVF, ang GI consult due to suspected radiation-induced enteritis or proctitis.    Hospital Course:  06/10/2020 - Admitted from clinic with melenotic stool and anemia. GI has seen the patient and plans for endoscopy and colonoscopy tomorrow. Discussed with the patient, who is on board. To have bowel prep tonight. Initial iron studies drawn per GI. Awaiting peripheral IV placement then will transfuse 1 u pRBC.   06/11/2020 - Patient looks well this morning, and VS were stabel and WNL throughout the night. Now  s/p transfusion of 1 u pRBCs. Has been able to get through the GoLytely prep and has intermittently clear stools, sometimes still with green/black component. No NSAIDs and added IV PPI per GI. Iron studies but for ferritin are WNL. B12 elevated, folate WNL.     Oncology Treatment Plan:   OP GYN PACLITAXEL CARBOPLATIN (AUC 6) Q3W    Past Medical History:   Diagnosis Date    Allergy     Anxiety     Breast cancer 2014    Chronic back pain     Encounter for blood transfusion     Endometrial cancer 07/30/2019    Fibromyalgia     Hives     Hx of radiation therapy     Itching     Lichen sclerosus     Mental disorder     PONV (postoperative nausea and vomiting)     Sore throat     Uterine cancer 08/05/2019    UTI (urinary tract infection)      Past Surgical History:   Procedure Laterality Date    anal fissure surgery      APPENDECTOMY      BREAST BIOPSY Right 2014    BREAST LUMPECTOMY Right 2014    R lumpectomy [facility] w 2.4cm IDC & DCIS, neg margins, 0/6 SN, Stage II, ER/[place]+, HER-2 neg Adjuvant XRT and hormonal therapy     CARPAL TUNNEL RELEASE      CHOLECYSTECTOMY      COLONOSCOPY      HYSTERECTOMY  08/15/2019    HYSTEROSCOPY WITH DILATION AND CURETTAGE OF UTERUS N/A 7/24/2019    Procedure: HYSTEROSCOPY, WITH DILATION AND CURETTAGE OF UTERUS;  Surgeon: Елена Kam MD;  Location: [facility] OR;  Service: OB/GYN;  Laterality: N/A;    INSERTION OF TUNNELED CENTRAL VENOUS CATHETER (CVC) WITH SUBCUTANEOUS PORT N/A 9/26/2019    Procedure: INSERTION, PORT-A-CATH;  Surgeon: Moris Varghese MD;  Location: [facility] CATH LAB;  Service: Radiology;  Laterality: N/A;    MASTECTOMY Right     2014    MEDIPORT REMOVAL N/A 3/5/2020    Procedure: REMOVAL, CATHETER, CENTRAL VENOUS, TUNNELED, WITH PORT;  Surgeon: Moris Varghese MD;  Location: [facility] CATH LAB;  Service: Radiology;  Laterality: N/A;    ROBOT-ASSISTED LAPAROSCOPIC ABDOMINAL HYSTERECTOMY USING DA RAMU XI N/A 8/15/2019    Procedure: XI ROBOTIC  HYSTERECTOMY;  Surgeon: Darius Regalado MD;  Location: [facility];  Service: OB/GYN;  Laterality: N/A;    ROBOT-ASSISTED LAPAROSCOPIC SALPINGO-OOPHORECTOMY USING DA RAMU XI Bilateral 8/15/2019    Procedure: XI ROBOTIC SALPINGO-OOPHORECTOMY;  Surgeon: Darius Regalado MD;  Location: [facility] OR;  Service: OB/GYN;  Laterality: Bilateral;    ROBOT-ASSISTED LYMPHADENECTOMY Left 8/15/2019    Procedure: ROBOTIC LYMPHADENECTOMY;  Surgeon: Darius Regalado MD;  Location: [facility] OR;  Service: OB/GYN;  Laterality: Left;    SALPINGOOPHORECTOMY Bilateral 08/15/2019    TONSILLECTOMY      TUBAL LIGATION       Family History     Problem Relation (Age of Onset)    Arthritis Mother    Cancer Mother (85), Father (84), Maternal Aunt (60)    Colon cancer Paternal Uncle    Hashimoto's thyroiditis Sister    Heart attack Paternal Grandmother, Paternal Grandfather    Hypertension Mother, Brother, Brother    Lung cancer Paternal Aunt    No Known Problems Sister        Tobacco Use    Smoking status: Never Smoker    Smokeless tobacco: Never Used   Substance and Sexual Activity    Alcohol use: No     Alcohol/week: 0.0 standard drinks    Drug use: No    Sexual activity: Never       PTA Medications   Medication Sig    levocetirizine (XYZAL) 5 MG tablet Take 1 tablet (5 mg total) by mouth every evening.    nystatin (MYCOSTATIN) cream Apply topically every Mon, Wed, Fri.    nystatin (MYCOSTATIN) powder Apply topically 4 (four) times daily.    acetaminophen (TYLENOL) 500 MG tablet Take 500 mg by mouth.    b complex vitamins tablet Take 1 tablet by mouth once daily.    clobetasol 0.05% (TEMOVATE) 0.05 % Oint APPLY TO AFFECTED AREA(S) TWICE A DAY FOR 1 MONTH(S) then EVERY DAY FOR 1 MONTH(S) then 3 TIMES A WEEK    fluconazole (DIFLUCAN) 150 MG Tab     hydrocortisone (ANUSOL-HC) 2.5 % rectal cream Place rectally 2 (two) times daily.    ibuprofen (ADVIL,MOTRIN) 600 MG tablet Take 1 tablet (600 mg total) by mouth every 6 (six) hours as needed for  Pain.    methen-m.blueKacis.phos-phsal-hyo (URIBEL) 118-10-40.8-36 mg Cap Take 1 capsule by mouth 3 (three) times daily as needed (bladder pain).    MILK THISTLE ORAL Take by mouth.    ondansetron (ZOFRAN) 8 MG tablet TAKE 1 TABLET BY MOUTH EVERY 12 HOURS AS NEEDED FOR NAUSEA (Patient not taking: Reported on 6/10/2020)    oxyCODONE-acetaminophen (PERCOCET)  mg per tablet Take 1 tablet by mouth every 4 (four) hours as needed for Pain.    polyethylene glycol (GOLYTELY,NULYTELY) 236-22.74-6.74 -5.86 gram suspension     predniSONE (DELTASONE) 5 MG tablet Take 3 each morning on odd number days (Patient not taking: Reported on 3/9/2020)    promethazine (PHENERGAN) 25 MG tablet TAKE 1 TABLET BY MOUTH EVERY 6 HOURS AS NEEDED FOR NAUSEA (Patient not taking: Reported on 5/8/2020)    scopolamine (TRANSDERM-SCOP) 1.3-1.5 mg (1 mg over 3 days) Place 1 patch onto the skin every 72 hours. (Patient not taking: Reported on 5/8/2020)    silver sulfADIAZINE 1% (SILVADENE) 1 % cream Apply topically every morning. (Patient not taking: Reported on 3/9/2020)    TURMERIC ORAL Take by mouth.       Review of patient's allergies indicates:  No Known Allergies    Review of Systems   Constitutional: Positive for appetite change and fatigue. Negative for activity change.   HENT: Negative for mouth sores.    Respiratory: Negative for cough and shortness of breath.    Cardiovascular: Negative for chest pain.   Gastrointestinal: Positive for blood in stool (melana), diarrhea, nausea and vomiting. Negative for abdominal pain and constipation.   Genitourinary: Negative for hematuria and vaginal bleeding.   Neurological: Negative for syncope.   Psychiatric/Behavioral: Negative for depression.      Objective:     Vital Signs (Most Recent):  Temp: 99.1 °F (37.3 °C) (06/11/20 0708)  Pulse: 75 (06/11/20 0708)  Resp: 17 (06/11/20 0708)  BP: (!) 100/50 (06/11/20 0708)  SpO2: 97 % (06/11/20 0708) Vital Signs (24h Range):  Temp:  [97.5 °F (36.4  °C)-99.2 °F (37.3 °C)] 99.1 °F (37.3 °C)  Pulse:  [71-97] 75  Resp:  [14-20] 17  SpO2:  [96 %-100 %] 97 %  BP: ()/(50-69) 100/50     Weight: 66.3 kg (146 lb 2.6 oz)  Body mass index is 28.55 kg/m².    Physical Exam:   Constitutional: She is oriented to person, place, and time. She appears well-developed and well-nourished. No distress.    HENT:   Head: Normocephalic and atraumatic.   Mouth/Throat: Oropharynx is clear and moist.    Eyes: Conjunctivae and EOM are normal.    Neck: Normal range of motion.    Cardiovascular: Normal rate, regular rhythm and intact distal pulses.     Pulmonary/Chest: Effort normal. No respiratory distress.        Abdominal: Soft. Bowel sounds are normal. She exhibits no distension. There is no tenderness.             Musculoskeletal: Normal range of motion and moves all extremeties.       Neurological: She is alert and oriented to person, place, and time.    Skin: Skin is warm and dry. She is not diaphoretic.    Psychiatric: She has a normal mood and affect. Her behavior is normal.       Laboratory:  CBC:   Recent Labs   Lab 06/09/20  1348   WBC 3.37*   HGB 7.2*   HCT 21.0*   PLT 92*    and All pertinent labs from the last 24 hours have been reviewed.    Diagnostic Results:  None    Assessment/Plan:     Anemia due to chronic blood loss  - Suspect radiation-induced proctitis or eneritis due to continued anemia despite multiple transfusions and melanotic stool  - GI consult placed - No NSAIDs, add IV PPI  - Iron studies ordered and WNL but for ferritin which is elevated (3 transfusions in recent past)  - Likely plan for endoscopy and colonoscopy today per GI; patient has now completed colon prep  - NPO until procedure  - IVF at 125 mL/hour  - Antiemetics and pain medication prn      VTE Risk Mitigation (From admission, onward)         Ordered     IP VTE LOW RISK PATIENT  Once      06/10/20 1248     Place sequential compression device  Until discontinued      06/10/20 1248             Dane Colbert MD  Gynecologic Oncology  [facility]

## 2020-06-11 NOTE — PLAN OF CARE
Side rails up x2; call bell in place; bed in lowest, locked position; skid proof socks on; no evidence of skin breakdown; care plan explained to patient; pt remains free of injury.  Pt tolerated clears, ambulates in room, voids, small BM x 1. EGD and colonoscopy completed. New order for CT of abdomen and pelvis. Pt instructed to call with any concerns or changes in assessment. IVF infusing VSS and febrile. Dr Colbert aware Hgb is 6.8.

## 2020-06-11 NOTE — PLAN OF CARE
POC reviewed with patient; understanding verbalized. Pt had no complaints. NPO since midnight for EGD today. GoLytley in process. Pt had a few episodes of emesis after GoLytley prep started. Phenergan given and Dr Sharma notified. Ordered to continue with prep and give more from another bottle if tolerable. Pt had 5 dark tarry liquid BMS so far. Pt with nonskid footwear on, bed in lowest position, and locked with bed rails up x2. Pt instructed to call prior to getting OOB. Pt has call light and personal items within reach. Patient ambulates in room independently. VSS and afebrile this shift. All questions and concerns addressed at this time. Will continue to monitor.

## 2020-06-11 NOTE — TRANSFER OF CARE
Anesthesia Transfer of Care Note    Patient: Jeanne Rodgers    Procedure(s) Performed: Procedure(s) (LRB):  EGD (ESOPHAGOGASTRODUODENOSCOPY) (N/A)  COLONOSCOPY (N/A)    Patient location: PACU    Anesthesia Type: general    Transport from OR: Transported from OR on room air with adequate spontaneous ventilation    Post pain: adequate analgesia    Post assessment: no apparent anesthetic complications and tolerated procedure well    Post vital signs: stable    Level of consciousness: awake and lethargic    Nausea/Vomiting: no nausea/vomiting    Complications: none    Transfer of care protocol was followed      Last vitals:   Visit Vitals  BP (!) 108/49 (BP Location: Left arm, Patient Position: Lying)   Pulse 83   Temp 36.7 °C (98.1 °F) (Temporal)   Resp 17   Ht 5' (1.524 m)   Wt 66.3 kg (146 lb 2.6 oz)   LMP  (LMP Unknown)   SpO2 98%   Breastfeeding? No   BMI 28.55 kg/m²

## 2020-06-11 NOTE — SUBJECTIVE & OBJECTIVE
Oncology Treatment Plan:   OP GYN PACLITAXEL CARBOPLATIN (AUC 6) Q3W    Past Medical History:   Diagnosis Date    Allergy     Anxiety     Breast cancer 2014    Chronic back pain     Encounter for blood transfusion     Endometrial cancer 07/30/2019    Fibromyalgia     Hives     Hx of radiation therapy     Itching     Lichen sclerosus     Mental disorder     PONV (postoperative nausea and vomiting)     Sore throat     Uterine cancer 08/05/2019    UTI (urinary tract infection)      Past Surgical History:   Procedure Laterality Date    anal fissure surgery      APPENDECTOMY      BREAST BIOPSY Right 2014    BREAST LUMPECTOMY Right 2014    R lumpectomy [facility] w 2.4cm IDC & DCIS, neg margins, 0/6 SN, Stage II, ER/MN+, HER-2 neg Adjuvant XRT and hormonal therapy     CARPAL TUNNEL RELEASE      CHOLECYSTECTOMY      COLONOSCOPY      HYSTERECTOMY  08/15/2019    HYSTEROSCOPY WITH DILATION AND CURETTAGE OF UTERUS N/A 7/24/2019    Procedure: HYSTEROSCOPY, WITH DILATION AND CURETTAGE OF UTERUS;  Surgeon: Елена Kam MD;  Location: [facility] OR;  Service: OB/GYN;  Laterality: N/A;    INSERTION OF TUNNELED CENTRAL VENOUS CATHETER (CVC) WITH SUBCUTANEOUS PORT N/A 9/26/2019    Procedure: INSERTION, PORT-A-CATH;  Surgeon: Moris Varghese MD;  Location: [facility] CATH LAB;  Service: Radiology;  Laterality: N/A;    MASTECTOMY Right     2014    MEDIPORT REMOVAL N/A 3/5/2020    Procedure: REMOVAL, CATHETER, CENTRAL VENOUS, TUNNELED, WITH PORT;  Surgeon: Moris Varghese MD;  Location: [facility] CATH LAB;  Service: Radiology;  Laterality: N/A;    ROBOT-ASSISTED LAPAROSCOPIC ABDOMINAL HYSTERECTOMY USING DA RAMU XI N/A 8/15/2019    Procedure: XI ROBOTIC HYSTERECTOMY;  Surgeon: Darius Regalado MD;  Location: [facility] OR;  Service: OB/GYN;  Laterality: N/A;    ROBOT-ASSISTED LAPAROSCOPIC SALPINGO-OOPHORECTOMY USING DA RAMU XI Bilateral 8/15/2019    Procedure: XI ROBOTIC SALPINGO-OOPHORECTOMY;  Surgeon: Darius Regalado MD;   Location: [facility] OR;  Service: OB/GYN;  Laterality: Bilateral;    ROBOT-ASSISTED LYMPHADENECTOMY Left 8/15/2019    Procedure: ROBOTIC LYMPHADENECTOMY;  Surgeon: Darius Regalado MD;  Location: [facility] OR;  Service: OB/GYN;  Laterality: Left;    SALPINGOOPHORECTOMY Bilateral 08/15/2019    TONSILLECTOMY      TUBAL LIGATION       Family History     Problem Relation (Age of Onset)    Arthritis Mother    Cancer Mother (85), Father (84), Maternal Aunt (60)    Colon cancer Paternal Uncle    Hashimoto's thyroiditis Sister    Heart attack Paternal Grandmother, Paternal Grandfather    Hypertension Mother, Brother, Brother    Lung cancer Paternal Aunt    No Known Problems Sister        Tobacco Use    Smoking status: Never Smoker    Smokeless tobacco: Never Used   Substance and Sexual Activity    Alcohol use: No     Alcohol/week: 0.0 standard drinks    Drug use: No    Sexual activity: Never       PTA Medications   Medication Sig    levocetirizine (XYZAL) 5 MG tablet Take 1 tablet (5 mg total) by mouth every evening.    nystatin (MYCOSTATIN) cream Apply topically every Mon, Wed, Fri.    nystatin (MYCOSTATIN) powder Apply topically 4 (four) times daily.    acetaminophen (TYLENOL) 500 MG tablet Take 500 mg by mouth.    b complex vitamins tablet Take 1 tablet by mouth once daily.    clobetasol 0.05% (TEMOVATE) 0.05 % Oint APPLY TO AFFECTED AREA(S) TWICE A DAY FOR 1 MONTH(S) then EVERY DAY FOR 1 MONTH(S) then 3 TIMES A WEEK    fluconazole (DIFLUCAN) 150 MG Tab     hydrocortisone (ANUSOL-HC) 2.5 % rectal cream Place rectally 2 (two) times daily.    ibuprofen (ADVIL,MOTRIN) 600 MG tablet Take 1 tablet (600 mg total) by mouth every 6 (six) hours as needed for Pain.    methen-m.blue-s.phos-phsal-hyo (URIBEL) 118-10-40.8-36 mg Cap Take 1 capsule by mouth 3 (three) times daily as needed (bladder pain).    MILK THISTLE ORAL Take by mouth.    ondansetron (ZOFRAN) 8 MG tablet TAKE 1 TABLET BY MOUTH EVERY 12 HOURS AS NEEDED FOR  NAUSEA (Patient not taking: Reported on 6/10/2020)    oxyCODONE-acetaminophen (PERCOCET)  mg per tablet Take 1 tablet by mouth every 4 (four) hours as needed for Pain.    polyethylene glycol (GOLYTELY,NULYTELY) 236-22.74-6.74 -5.86 gram suspension     predniSONE (DELTASONE) 5 MG tablet Take 3 each morning on odd number days (Patient not taking: Reported on 3/9/2020)    promethazine (PHENERGAN) 25 MG tablet TAKE 1 TABLET BY MOUTH EVERY 6 HOURS AS NEEDED FOR NAUSEA (Patient not taking: Reported on 5/8/2020)    scopolamine (TRANSDERM-SCOP) 1.3-1.5 mg (1 mg over 3 days) Place 1 patch onto the skin every 72 hours. (Patient not taking: Reported on 5/8/2020)    silver sulfADIAZINE 1% (SILVADENE) 1 % cream Apply topically every morning. (Patient not taking: Reported on 3/9/2020)    TURMERIC ORAL Take by mouth.       Review of patient's allergies indicates:  No Known Allergies    Review of Systems   Constitutional: Positive for appetite change and fatigue. Negative for activity change.   HENT: Negative for mouth sores.    Respiratory: Negative for cough and shortness of breath.    Cardiovascular: Negative for chest pain.   Gastrointestinal: Positive for blood in stool (melana), diarrhea, nausea and vomiting. Negative for abdominal pain and constipation.   Genitourinary: Negative for hematuria and vaginal bleeding.   Neurological: Negative for syncope.   Psychiatric/Behavioral: Negative for depression.      Objective:     Vital Signs (Most Recent):  Temp: 99.1 °F (37.3 °C) (06/11/20 0708)  Pulse: 75 (06/11/20 0708)  Resp: 17 (06/11/20 0708)  BP: (!) 100/50 (06/11/20 0708)  SpO2: 97 % (06/11/20 0708) Vital Signs (24h Range):  Temp:  [97.5 °F (36.4 °C)-99.2 °F (37.3 °C)] 99.1 °F (37.3 °C)  Pulse:  [71-97] 75  Resp:  [14-20] 17  SpO2:  [96 %-100 %] 97 %  BP: ()/(50-69) 100/50     Weight: 66.3 kg (146 lb 2.6 oz)  Body mass index is 28.55 kg/m².    Physical Exam:   Constitutional: She is oriented to person,  place, and time. She appears well-developed and well-nourished. No distress.    HENT:   Head: Normocephalic and atraumatic.   Mouth/Throat: Oropharynx is clear and moist.    Eyes: Conjunctivae and EOM are normal.    Neck: Normal range of motion.    Cardiovascular: Normal rate, regular rhythm and intact distal pulses.     Pulmonary/Chest: Effort normal. No respiratory distress.        Abdominal: Soft. Bowel sounds are normal. She exhibits no distension. There is no tenderness.             Musculoskeletal: Normal range of motion and moves all extremeties.       Neurological: She is alert and oriented to person, place, and time.    Skin: Skin is warm and dry. She is not diaphoretic.    Psychiatric: She has a normal mood and affect. Her behavior is normal.       Laboratory:  CBC:   Recent Labs   Lab 06/09/20  1348   WBC 3.37*   HGB 7.2*   HCT 21.0*   PLT 92*    and All pertinent labs from the last 24 hours have been reviewed.    Diagnostic Results:  None

## 2020-06-11 NOTE — ASSESSMENT & PLAN NOTE
- Suspect radiation-induced proctitis or eneritis due to continued anemia despite multiple transfusions and melanotic stool  - GI consult placed - No NSAIDs, add IV PPI  - Iron studies ordered and WNL but for ferritin which is elevated (3 transfusions in recent past)  - Likely plan for endoscopy and colonoscopy today per GI; patient has now completed colon prep  - NPO until procedure  - IVF at 125 mL/hour  - Antiemetics and pain medication prn

## 2020-06-11 NOTE — [employer] PATIENT INSTRUCTIONS
Discharge Summary/Instructions after an Endoscopic Procedure  Patient Name: Jeanne Rodgers  Patient MRN: [id number]  Patient YOB: 1955 Thursday, June 11, 2020  Bobby Marino MD  RESTRICTIONS:  During your procedure today, you received medications for sedation.  These   medications may affect your judgment, balance and coordination.  Therefore,   for 24 hours, you have the following restrictions:   - DO NOT drive a car, operate machinery, make legal/financial decisions,   sign important papers or drink alcohol.    ACTIVITY:  Today: no heavy lifting, straining or running due to procedural   sedation/anesthesia.  The following day: return to full activity including work.  DIET:  Eat and drink normally unless instructed otherwise.     TREATMENT FOR COMMON SIDE EFFECTS:  - Mild abdominal pain, nausea, belching, bloating or excessive gas:  rest,   eat lightly and use a heating pad.  - Sore Throat: treat with throat lozenges and/or gargle with warm salt   water.  - Because air was used during the procedure, expelling large amounts of air   from your rectum or belching is normal.  - If a bowel prep was taken, you may not have a bowel movement for 1-3 days.    This is normal.  SYMPTOMS TO WATCH FOR AND REPORT TO YOUR PHYSICIAN:  1. Abdominal pain or bloating, other than gas cramps.  2. Chest pain.  3. Back pain.  4. Signs of infection such as: chills or fever occurring within 24 hours   after the procedure.  5. Rectal bleeding, which would show as bright red, maroon, or black stools.   (A tablespoon of blood from the rectum is not serious, especially if   hemorrhoids are present.)  6. Vomiting.  7. Weakness or dizziness.  GO DIRECTLY TO THE NEAREST EMERGENCY ROOM IF YOU HAVE ANY OF THE FOLLOWING:      Difficulty breathing              Chills and/or fever over 101 F   Persistent vomiting and/or vomiting blood   Severe abdominal pain   Severe chest pain   Black, tarry stools   Bleeding- more than one  tablespoon   Any other symptom or condition that you feel may need urgent attention  Your doctor recommends these additional instructions:  If any biopsies were taken, your doctors clinic will contact you in 1 to 2   weeks with any results.  - [facility] to [facility] for [facility] care.   - The findings and recommendations were discussed with the patient's primary   physician.   - The findings and recommendations were discussed with the patient.  For questions, problems or results please call your physician - Bobby Marino MD at Work:  [phone].  [facility], EMERGENCY ROOM PHONE NUMBER: [phone]  IF A COMPLICATION OR EMERGENCY SITUATION ARISES AND YOU ARE UNABLE TO REACH   YOUR PHYSICIAN - GO DIRECTLY TO THE EMERGENCY ROOM.  Bobby Marino MD  6/11/2020 12:13:21 PM  This report has been verified and signed electronically.  [employer]

## 2020-06-11 NOTE — TREATMENT PLAN
[facility]  Gastroenterology Treatment Plan        EGD done  Impression:           - Erythematous duodenopathy.                        - Normal stomach.                        - Normal esophagus.                        - Z-line regular, 35 cm from the incisors.                        - No specimens collected.  Recommendation:       - Return patient to [facility] for ongoing care.                        - The findings and recommendations were discussed                         with the patient's primary physician.                        - The findings and recommendations were discussed                         with the patient.  Impression:           - The examined portion of the ileum was normal.                        - The entire examined colon is normal.                        - The distal rectum and anal verge are normal on                         retroflexion view.                        - No specimens collected.                        - No bleeding seen and no old blood seen.  Recommendation:       - Return patient to [facility] for ongoing care.                        - Resume previous diet.                        - Follow up with                        - Continue present medications.                        - Perform a computed tomographic (CT scan)                         enterography at the next available appointment.                        - Repeat colonoscopy in 5 years for surveillance.                        - Return to GI clinic on Thursday June 18th, 2020                         to discuss small bowel patetency capule prior to                         considering real small bowel video caspule after GI                         team has time to rule CT Enterography to exclude                         small bowel lesions or strictures.                        - The findings and recommendations were discussed                         with the patient's primary physician.                         - The findings and recommendations were discussed                         with the patient.  Thank you for involving us in the care of Jeanne Rodgers. Please call with any additional questions, concerns or changes in the patient's clinical status.    Alberto Winters MD  Gastroenterology Fellow PGY IV   [facility]

## 2020-06-11 NOTE — NURSING TRANSFER
Nursing Transfer Note      6/11/2020     Transfer from PACU to 801    Transfer via stretcher    Transfer with n/a    Transported by escort    Medicines sent: n/a    Chart send with patient: yes    Notified: Carlota lopez nurse    Patient reassessed at:   Upon arrival to floor:

## 2020-06-11 NOTE — ANESTHESIA POSTPROCEDURE EVALUATION
Anesthesia Post Evaluation    Patient: Jeanne Rodgers    Procedure(s) Performed: Procedure(s) (LRB):  EGD (ESOPHAGOGASTRODUODENOSCOPY) (N/A)  COLONOSCOPY (N/A)    Final Anesthesia Type: general    Patient location during evaluation: PACU  Patient participation: Yes- Able to Participate  Level of consciousness: awake and alert  Post-procedure vital signs: reviewed and stable  Pain management: adequate  Airway patency: patent    PONV status at discharge: No PONV  Anesthetic complications: no      Cardiovascular status: blood pressure returned to baseline  Respiratory status: unassisted  Hydration status: euvolemic  Follow-up not needed.          Vitals Value Taken Time   BP 96/57 6/11/2020 12:30 PM   Temp 36.4 °C (97.5 °F) 6/11/2020 12:30 PM   Pulse 83 6/11/2020 12:30 PM   Resp 16 6/11/2020 12:30 PM   SpO2 97 % 6/11/2020 12:30 PM         No case tracking events are documented in the log.      Pain/Brandin Score: Pain Rating Prior to Med Admin: 7 (6/10/2020  3:29 PM)  Pain Rating Post Med Admin: 2 (6/10/2020  4:29 PM)  Brandin Score: 10 (6/11/2020 12:14 PM)

## 2020-06-11 NOTE — PROGRESS NOTES
Admit Assessment    Patient Identification  Jeanne Rodgers   :  1955  Admit Date:  6/10/2020  Attending Provider:  Darius Regalado MD              Referral:   Pt was admitted to  with a diagnosis of Radiation therapy complication, and was admitted this hospital stay due to Nausea with vomiting, unspecified [R11.2]  Blood in stool [K92.1]  Radiation therapy complication [T66.XXXA]  Radiation therapy complication [T66.XXXA]  Radiation therapy complication [T66.XXXA]  Radiation therapy complication [T66.XXXA]  Radiation therapy complication [T66.XXXA].   is involved was referred to the Social Work Department via routine referral.  Patient presents as a 65 y.o. year old single female.    Persons interviewed: patient    Living Situation:  Pt. States that she resides at home with her life partner (Les [phone]). Pt. States that she has been fairly independent with all adl's prior to admission. She states that her partner is able to help with needs if necessary.    Resides at [address] [place] [address], phone: [phone] (home).           Current or Past Agencies and Description of Services/Supplies    DME  Agency Name: none  Agency Phone Number: n/a  Equipment Currently Used at Home: none    Home Health  Agency Name: none  Agency Phone Number: n/a  Services: n/a    IV Infusion  Agency Name: none  Agency Phone Number: n/a    Nutrition: oral    Outpatient Pharmacy:     [facility] [address]  [address] [place] [address]  Phone: [phone] Fax: [phone]      Patient Preference of agencies include: none noted    Patient/Caregiver informed of right to choose providers or agencies.  Patient provides permission to release any necessary information to [facility] and to [facility] agencies as needed to facilitate patient care, treatment planning, and patient discharge planning.  Written and verbal resources  provided.      Coping: pt. States that she is doing OK. Feels that she has adequate support from partner and other family.          Adjustment to Diagnosis and Treatment: appropriate      Emotional/Behavioral/Cognitive Issues: none noted            History/Current Symptoms of Anxiety/Depression: No:   History/Current Substance Use:   Social History     Tobacco Use    Smoking status: Never Smoker    Smokeless tobacco: Never Used   Substance and Sexual Activity    Alcohol use: No     Alcohol/week: 0.0 standard drinks    Drug use: Yes     Types: Marijuana     Comment: medical marijuana- 2 dayys ago     Sexual activity: Never       Indications of Abuse/Neglect: No:   Abuse Screen (yes response referral indicated)  Feels Unsafe at Home or Work/School: no    Financial:  Payor/Plan Subscr  Sex Relation Sub. Ins. ID Effective Group Num   1. MEDICARE - [place]* YON MCKEON* 1955 Female  [id number] 16                                    [address]   2. [facility]* YON MCKEON* 1955 Female  [id number]                                    [address]                            Other identified concerns/needs: none noted    Plan: to return home with help from partner/family    Interventions/Referrals: none noted  Patient/caregiver engaged in treatment planning process.     providing psychosocial and supportive counseling, resources, education, assistance and discharge planning as appropriate.  Patient/caregiver state understanding of  available resources,  following, remains available.provided pt. With dao'er phone # and encouraged her to call if needed. Will follow.

## 2020-06-12 ENCOUNTER — TELEPHONE (OUTPATIENT)
Dept: GASTROENTEROLOGY | Facility: HOSPITAL | Age: 65
End: 2020-06-12

## 2020-06-12 VITALS
SYSTOLIC BLOOD PRESSURE: 122 MMHG | OXYGEN SATURATION: 94 % | BODY MASS INDEX: 28.7 KG/M2 | RESPIRATION RATE: 16 BRPM | HEART RATE: 76 BPM | HEIGHT: 60 IN | DIASTOLIC BLOOD PRESSURE: 68 MMHG | TEMPERATURE: 99 F | WEIGHT: 146.19 LBS

## 2020-06-12 DIAGNOSIS — K52.0 RADIATION ENTERITIS: Primary | ICD-10-CM

## 2020-06-12 LAB
BASOPHILS # BLD AUTO: 0 K/UL (ref 0–0.2)
BASOPHILS NFR BLD: 0 % (ref 0–1.9)
BLD PROD TYP BPU: NORMAL
BLD PROD TYP BPU: NORMAL
BLOOD UNIT EXPIRATION DATE: NORMAL
BLOOD UNIT EXPIRATION DATE: NORMAL
BLOOD UNIT TYPE CODE: 5100
BLOOD UNIT TYPE CODE: 5100
BLOOD UNIT TYPE: NORMAL
BLOOD UNIT TYPE: NORMAL
CODING SYSTEM: NORMAL
CODING SYSTEM: NORMAL
DIFFERENTIAL METHOD: ABNORMAL
DISPENSE STATUS: NORMAL
DISPENSE STATUS: NORMAL
EOSINOPHIL # BLD AUTO: 0.1 K/UL (ref 0–0.5)
EOSINOPHIL NFR BLD: 3.4 % (ref 0–8)
ERYTHROCYTE [DISTWIDTH] IN BLOOD BY AUTOMATED COUNT: 18.7 % (ref 11.5–14.5)
HCT VFR BLD AUTO: 32.4 % (ref 37–48.5)
HGB BLD-MCNC: 10.5 G/DL (ref 12–16)
IMM GRANULOCYTES # BLD AUTO: 0.02 K/UL (ref 0–0.04)
IMM GRANULOCYTES NFR BLD AUTO: 0.9 % (ref 0–0.5)
LYMPHOCYTES # BLD AUTO: 0.4 K/UL (ref 1–4.8)
LYMPHOCYTES NFR BLD: 16.7 % (ref 18–48)
MCH RBC QN AUTO: 32.5 PG (ref 27–31)
MCHC RBC AUTO-ENTMCNC: 32.4 G/DL (ref 32–36)
MCV RBC AUTO: 100 FL (ref 82–98)
MONOCYTES # BLD AUTO: 0.2 K/UL (ref 0.3–1)
MONOCYTES NFR BLD: 9 % (ref 4–15)
NEUTROPHILS # BLD AUTO: 1.6 K/UL (ref 1.8–7.7)
NEUTROPHILS NFR BLD: 70 % (ref 38–73)
NRBC BLD-RTO: 0 /100 WBC
NUM UNITS TRANS PACKED RBC: NORMAL
NUM UNITS TRANS PACKED RBC: NORMAL
PLATELET # BLD AUTO: 57 K/UL (ref 150–350)
PMV BLD AUTO: 9.6 FL (ref 9.2–12.9)
RBC # BLD AUTO: 3.23 M/UL (ref 4–5.4)
WBC # BLD AUTO: 2.34 K/UL (ref 3.9–12.7)

## 2020-06-12 PROCEDURE — 36415 COLL VENOUS BLD VENIPUNCTURE: CPT

## 2020-06-12 PROCEDURE — 36430 TRANSFUSION BLD/BLD COMPNT: CPT

## 2020-06-12 PROCEDURE — G0378 HOSPITAL OBSERVATION PER HR: HCPCS | Mod: CS

## 2020-06-12 PROCEDURE — P9040 RBC LEUKOREDUCED IRRADIATED: HCPCS

## 2020-06-12 PROCEDURE — 25500020 PHARM REV CODE 255: Performed by: OBSTETRICS & GYNECOLOGY

## 2020-06-12 PROCEDURE — 25000003 PHARM REV CODE 250: Performed by: STUDENT IN AN ORGANIZED HEALTH CARE EDUCATION/TRAINING PROGRAM

## 2020-06-12 PROCEDURE — 85025 COMPLETE CBC W/AUTO DIFF WBC: CPT

## 2020-06-12 PROCEDURE — C9113 INJ PANTOPRAZOLE SODIUM, VIA: HCPCS | Performed by: STUDENT IN AN ORGANIZED HEALTH CARE EDUCATION/TRAINING PROGRAM

## 2020-06-12 PROCEDURE — A9698 NON-RAD CONTRAST MATERIALNOC: HCPCS | Performed by: OBSTETRICS & GYNECOLOGY

## 2020-06-12 PROCEDURE — 96376 TX/PRO/DX INJ SAME DRUG ADON: CPT

## 2020-06-12 PROCEDURE — 63600175 PHARM REV CODE 636 W HCPCS: Performed by: STUDENT IN AN ORGANIZED HEALTH CARE EDUCATION/TRAINING PROGRAM

## 2020-06-12 RX ORDER — OXYCODONE AND ACETAMINOPHEN 5; 325 MG/1; MG/1
1 TABLET ORAL EVERY 4 HOURS PRN
Qty: 30 TABLET | Refills: 0 | Status: SHIPPED | OUTPATIENT
Start: 2020-06-12 | End: 2020-08-21

## 2020-06-12 RX ORDER — PANTOPRAZOLE SODIUM 20 MG/1
20 TABLET, DELAYED RELEASE ORAL
Qty: 60 TABLET | Refills: 11 | Status: SHIPPED | OUTPATIENT
Start: 2020-06-12 | End: 2020-08-21

## 2020-06-12 RX ADMIN — BARIUM SULFATE 450 ML: 1 SUSPENSION ORAL at 11:06

## 2020-06-12 RX ADMIN — BARIUM SULFATE 225 ML: 1 SUSPENSION ORAL at 11:06

## 2020-06-12 RX ADMIN — IOHEXOL 120 ML: 350 INJECTION, SOLUTION INTRAVENOUS at 11:06

## 2020-06-12 RX ADMIN — ONDANSETRON 8 MG: 8 TABLET, ORALLY DISINTEGRATING ORAL at 08:06

## 2020-06-12 RX ADMIN — PANTOPRAZOLE SODIUM 40 MG: 40 INJECTION, POWDER, LYOPHILIZED, FOR SOLUTION INTRAVENOUS at 08:06

## 2020-06-12 RX ADMIN — OXYCODONE HYDROCHLORIDE AND ACETAMINOPHEN 1 TABLET: 10; 325 TABLET ORAL at 04:06

## 2020-06-12 NOTE — ASSESSMENT & PLAN NOTE
- Melana, nausea, and anemia  - s/p 1u pRBC on admission, and 2 more last night; AM CBC pending  - GI consult placed - No NSAIDs, add IV PPI  - Iron studies ordered and WNL but for ferritin which is elevated (3 transfusions in recent past)  - Now s/p endoscopy/colonoscopy with no abnormal findings  - Recommended CT enterography - ordered. Patient NPO since midnight for imaging  - will restart bland diet after imaging  - IVF at 125 mL/hour until eating again  - Antiemetics and pain medication prn

## 2020-06-12 NOTE — PLAN OF CARE
Side rails up x2; call bell in place; bed in lowest, locked position; skid proof socks on; no evidence of skin breakdown; care plan explained to patient; pt remains free of injury.  Pt tolerated po in small amount, voids, BM x 2, ambulates. Pt with c/o pain pain medication given. Pt with d/c home instructions given. Reviewed appts, meds, instructions for home care. Iv d/cd dressing applied. VSS and afebrile, escort ordered for transportation.

## 2020-06-12 NOTE — SUBJECTIVE & OBJECTIVE
Oncology Treatment Plan:   OP GYN PACLITAXEL CARBOPLATIN (AUC 6) Q3W    Past Medical History:   Diagnosis Date    Allergy     Anxiety     Breast cancer 2014    Chronic back pain     Encounter for blood transfusion     Endometrial cancer 07/30/2019    Fibromyalgia     Hives     Hx of radiation therapy     Itching     Lichen sclerosus     Mental disorder     PONV (postoperative nausea and vomiting)     Sore throat     Uterine cancer 08/05/2019    UTI (urinary tract infection)      Past Surgical History:   Procedure Laterality Date    anal fissure surgery      APPENDECTOMY      BREAST BIOPSY Right 2014    BREAST LUMPECTOMY Right 2014    R lumpectomy [facility] w 2.4cm IDC & DCIS, neg margins, 0/6 SN, Stage II, ER/CT+, HER-2 neg Adjuvant XRT and hormonal therapy     CARPAL TUNNEL RELEASE      CHOLECYSTECTOMY      COLONOSCOPY      HYSTERECTOMY  08/15/2019    HYSTEROSCOPY WITH DILATION AND CURETTAGE OF UTERUS N/A 7/24/2019    Procedure: HYSTEROSCOPY, WITH DILATION AND CURETTAGE OF UTERUS;  Surgeon: Елена Kam MD;  Location: [facility];  Service: OB/GYN;  Laterality: N/A;    INSERTION OF TUNNELED CENTRAL VENOUS CATHETER (CVC) WITH SUBCUTANEOUS PORT N/A 9/26/2019    Procedure: INSERTION, PORT-A-CATH;  Surgeon: Moris Varghese MD;  Location: [facility];  Service: Radiology;  Laterality: N/A;    MASTECTOMY Right     2014    MEDIPORT REMOVAL N/A 3/5/2020    Procedure: REMOVAL, CATHETER, CENTRAL VENOUS, TUNNELED, WITH PORT;  Surgeon: Moris Varghese MD;  Location: [facility];  Service: Radiology;  Laterality: N/A;    ROBOT-ASSISTED LAPAROSCOPIC ABDOMINAL HYSTERECTOMY USING DA RAMU XI N/A 8/15/2019    Procedure: XI ROBOTIC HYSTERECTOMY;  Surgeon: Darius Regalado MD;  Location: [facility];  Service: OB/GYN;  Laterality: N/A;    ROBOT-ASSISTED LAPAROSCOPIC SALPINGO-OOPHORECTOMY USING DA RAMU XI Bilateral 8/15/2019    Procedure: XI ROBOTIC SALPINGO-OOPHORECTOMY;  Surgeon: Darius Regalado MD;   Location: [facility] OR;  Service: OB/GYN;  Laterality: Bilateral;    ROBOT-ASSISTED LYMPHADENECTOMY Left 8/15/2019    Procedure: ROBOTIC LYMPHADENECTOMY;  Surgeon: Darius Regalado MD;  Location: [facility] OR;  Service: OB/GYN;  Laterality: Left;    SALPINGOOPHORECTOMY Bilateral 08/15/2019    TONSILLECTOMY      TUBAL LIGATION       Family History     Problem Relation (Age of Onset)    Arthritis Mother    Cancer Mother (85), Father (84), Maternal Aunt (60)    Colon cancer Paternal Uncle    Hashimoto's thyroiditis Sister    Heart attack Paternal Grandmother, Paternal Grandfather    Hypertension Mother, Brother, Brother    Lung cancer Paternal Aunt    No Known Problems Sister        Tobacco Use    Smoking status: Never Smoker    Smokeless tobacco: Never Used   Substance and Sexual Activity    Alcohol use: No     Alcohol/week: 0.0 standard drinks    Drug use: Yes     Types: Marijuana     Comment: medical marijuana- 2 dayys ago     Sexual activity: Never       PTA Medications   Medication Sig    levocetirizine (XYZAL) 5 MG tablet Take 1 tablet (5 mg total) by mouth every evening.    nystatin (MYCOSTATIN) cream Apply topically every Mon, Wed, Fri.    nystatin (MYCOSTATIN) powder Apply topically 4 (four) times daily.    acetaminophen (TYLENOL) 500 MG tablet Take 500 mg by mouth.    b complex vitamins tablet Take 1 tablet by mouth once daily.    clobetasol 0.05% (TEMOVATE) 0.05 % Oint APPLY TO AFFECTED AREA(S) TWICE A DAY FOR 1 MONTH(S) then EVERY DAY FOR 1 MONTH(S) then 3 TIMES A WEEK    fluconazole (DIFLUCAN) 150 MG Tab     hydrocortisone (ANUSOL-HC) 2.5 % rectal cream Place rectally 2 (two) times daily.    ibuprofen (ADVIL,MOTRIN) 600 MG tablet Take 1 tablet (600 mg total) by mouth every 6 (six) hours as needed for Pain.    methen-m.blue-s.phos-phsal-hyo (URIBEL) 118-10-40.8-36 mg Cap Take 1 capsule by mouth 3 (three) times daily as needed (bladder pain).    MILK THISTLE ORAL Take by mouth.    ondansetron  (ZOFRAN) 8 MG tablet TAKE 1 TABLET BY MOUTH EVERY 12 HOURS AS NEEDED FOR NAUSEA (Patient not taking: Reported on 6/10/2020)    oxyCODONE-acetaminophen (PERCOCET)  mg per tablet Take 1 tablet by mouth every 4 (four) hours as needed for Pain.    polyethylene glycol (GOLYTELY,NULYTELY) 236-22.74-6.74 -5.86 gram suspension     predniSONE (DELTASONE) 5 MG tablet Take 3 each morning on odd number days (Patient not taking: Reported on 3/9/2020)    promethazine (PHENERGAN) 25 MG tablet TAKE 1 TABLET BY MOUTH EVERY 6 HOURS AS NEEDED FOR NAUSEA (Patient not taking: Reported on 5/8/2020)    scopolamine (TRANSDERM-SCOP) 1.3-1.5 mg (1 mg over 3 days) Place 1 patch onto the skin every 72 hours. (Patient not taking: Reported on 5/8/2020)    silver sulfADIAZINE 1% (SILVADENE) 1 % cream Apply topically every morning. (Patient not taking: Reported on 3/9/2020)    TURMERIC ORAL Take by mouth.       Review of patient's allergies indicates:  No Known Allergies    Review of Systems   Constitutional: Positive for appetite change and fatigue. Negative for activity change.   HENT: Negative for mouth sores.    Respiratory: Negative for cough and shortness of breath.    Cardiovascular: Negative for chest pain.   Gastrointestinal: Positive for blood in stool (melana), diarrhea, nausea and vomiting. Negative for abdominal pain and constipation.   Genitourinary: Negative for hematuria and vaginal bleeding.   Neurological: Negative for syncope.   Psychiatric/Behavioral: Negative for depression.      Objective:     Vital Signs (Most Recent):  Temp: 98.6 °F (37 °C) (06/12/20 0400)  Pulse: 71 (06/12/20 0400)  Resp: 16 (06/12/20 0400)  BP: (!) 100/58 (06/12/20 0400)  SpO2: 96 % (06/12/20 0400) Vital Signs (24h Range):  Temp:  [97.5 °F (36.4 °C)-99.1 °F (37.3 °C)] 98.6 °F (37 °C)  Pulse:  [71-94] 71  Resp:  [15-17] 16  SpO2:  [96 %-100 %] 96 %  BP: ()/(49-58) 100/58     Weight: 66.3 kg (146 lb 2.6 oz)  Body mass index is 28.55  kg/m².    Physical Exam:   Constitutional: She is oriented to person, place, and time. She appears well-developed and well-nourished. No distress.    HENT:   Head: Normocephalic and atraumatic.   Mouth/Throat: Oropharynx is clear and moist.    Eyes: Conjunctivae and EOM are normal.    Neck: Normal range of motion.    Cardiovascular: Normal rate, regular rhythm and intact distal pulses.     Pulmonary/Chest: Effort normal. No respiratory distress.        Abdominal: Soft. Bowel sounds are normal. She exhibits no distension. There is no tenderness.             Musculoskeletal: Normal range of motion and moves all extremeties.       Neurological: She is alert and oriented to person, place, and time.    Skin: Skin is warm and dry. She is not diaphoretic.    Psychiatric: She has a normal mood and affect. Her behavior is normal.       Laboratory:  CBC: Pending target organ damage   and All pertinent labs from the last 24 hours have been reviewed.    Diagnostic Results:  None

## 2020-06-12 NOTE — DISCHARGE SUMMARY
[facility]  Gynecologic Oncology  Discharge Summary    Patient Name: Jeanne Rodgers  MRN: [id number]  Admission Date: 6/10/2020  Hospital Length of Stay: 1 days  Discharge Date and Time:  06/12/2020 6:00 PM  Attending Physician: Darius Regalado MD   Discharging Provider: Dane Colbert MD  Primary Care Provider: Gary Gonzalez MD    HPI:   Jeanne Rodgers is a 65 y.o. C F with history of papillary serous cancer of the uterus, who is now s/p RALH/BSO as well as chemotherapy and radiation. Treatment history below:     Treatment History  Stage IIIC2 UPSC  Preop PET revealing positive pelvic and PA nodes  RALH/BSO/Debulking of left pelvic nodes (6/8 positive) on 8/15/19  Port placed 9/26/19  T/C started 9/27/19  WPRT + PA nodes to 45 Gy completed 1/22/20  HDR to 15 Gy in 3 fractions completed 3/3/20  Restarted T/C on 3/20/2020  PET on 6/2/2020 did not show any abnormalities.      Today, she presented to Dr. Regalado' clinic for follow up, as she has been treated on an outpatient basis for anemia. She is s/p 3 u pRBC transfused in the outpatient setting. Most recent blood work shows persistence of significant anemia, with H/H 7/21. Patient also reporting dark tarry stools, as well as nausea and vomiting. 2.   She is admitted from clinic for blood transfusion, IVF, ang GI consult due to suspected radiation-induced enteritis or proctitis.    Hospital Course:  06/10/2020 - Admitted from clinic with melenotic stool and anemia. GI has seen the patient and plans for endoscopy and colonoscopy tomorrow. Discussed with the patient, who is on board. To have bowel prep tonight. Initial iron studies drawn per GI. Awaiting peripheral IV placement then will transfuse 1 u pRBC.   06/11/2020 - Patient looks well this morning, and VS were stabel and WNL throughout the night. Now s/p transfusion of 1 u pRBCs. Has been able to get through the GoLytely prep and has intermittently clear stools, sometimes still  with green/black component. No NSAIDs and added IV PPI per GI. Iron studies but for ferritin are WNL. B12 elevated, folate WNL.   PM of 06/11/2020 - Patient returned from EGD/colonoscopy.  She reports feeling better, and requests crackers and liquids by mouth.  Prentiss diet ordered Per GI notes, the endoscopy and colonoscopy were within normal limits.  Recommendation for next staff is CT enterography, which is now ordered for tomorrow.  Labs today show H/H 6.8/20.6.  2 units PRBC ordered for transfusion tonight.  Corrected calcium also low, at 8.3.  Will replace by mouth tomorrow morning and patient tolerating diet.   06/12/2020 - Patient slept well, but feeling tired this morning. Reports she ate some mashed potatoes and a little chicken last night without n/v. Was given 2u pRBC overnight; AM CBC pending. Has been NPO since MN for CT enterography this morning.   06/12/2020 6:01 PM - CBC with appropriate rise in H/H. CT enterography unrevealing for source of GI bleed. GI plans for outpatient follow up with capsule endoscopy as outpatient. Patient given discharge precautions and to have follow up with Dr. Ghotra and GI in the coming weeks.     Procedure(s) (LRB):  EGD (ESOPHAGOGASTRODUODENOSCOPY) (N/A)  COLONOSCOPY (N/A)     Consults (From admission, onward)        Status Ordering Provider     Inpatient consult to Gastroenterology  Once     Provider:  (Not yet assigned)    FRED Corral     Inpatient consult to Midline team  Once     Provider:  (Not yet assigned)    Completed MACIE GHOTRA          Significant Diagnostic Studies: Labs:   CMP   Recent Labs   Lab 06/11/20  1300      K 3.7   *   CO2 23   GLU 93   BUN 7*   CREATININE 0.7   CALCIUM 7.4*   PROT 5.0*   ALBUMIN 2.9*   BILITOT 0.4   ALKPHOS 59   AST 43*   ALT 24   ANIONGAP 5*   ESTGFRAFRICA >60.0   EGFRNONAA >60.0    and All labs within the past 24 hours have been reviewed    Pending Diagnostic Studies:     None        Final Active  Diagnoses:    Diagnosis Date Noted POA    PRINCIPAL PROBLEM:  Radiation therapy complication [T66.XXXA] 06/10/2020 Unknown    Anemia due to chronic blood loss [D50.0] 06/10/2020 Yes    Endometrial cancer [C54.1] 08/02/2019 Yes      Problems Resolved During this Admission:      Discharged Condition: stable    Disposition: Home or Self Care    Follow Up:  Follow-up Information     Darius Regalado MD. Schedule an appointment as soon as possible for a visit in 2 weeks.    Specialties:  Gynecologic Oncology, Gynecology, Oncology  Why:  for hospital follow up   Contact information:  [address]  [facility] [address]  [phone]             Bobby Marino MD. Schedule an appointment as soon as possible for a visit in 1 week.    Specialty:  Gastroenterology  Why:  for hospital follow up/capsule endoscopy.   Contact information:  [address]  [facility] [address]  [phone]                 Patient Instructions:      No driving until:   Scheduling Instructions: No driving while taking narcotic medications     Notify your health care provider if you experience any of the following:  temperature >100.4     Notify your health care provider if you experience any of the following:  severe uncontrolled pain     Notify your health care provider if you experience any of the following:   Scheduling Instructions: /brisk bright red bleeding per rectum.     Notify your health care provider if you experience any of the following:  persistent dizziness, light-headedness, or visual disturbances     Activity as tolerated     Medications:  Reconciled Home Medications:      Medication List      START taking these medications    pantoprazole 20 MG tablet  Commonly known as:  PROTONIX  Take 1 tablet (20 mg total) by mouth 2 (two) times daily before meals.        CHANGE how you take these medications    * oxyCODONE-acetaminophen  mg per tablet  Commonly known as:  PERCOCET  Take 1 tablet by mouth every 4 (four) hours  as needed for Pain.  What changed:  Another medication with the same name was added. Make sure you understand how and when to take each.     * oxyCODONE-acetaminophen 5-325 mg per tablet  Commonly known as:  PERCOCET  Take 1 tablet by mouth every 4 (four) hours as needed.  What changed:  You were already taking a medication with the same name, and this prescription was added. Make sure you understand how and when to take each.         * This list has 2 medication(s) that are the same as other medications prescribed for you. Read the directions carefully, and ask your doctor or other care provider to review them with you.            CONTINUE taking these medications    acetaminophen 500 MG tablet  Commonly known as:  TYLENOL  Take 500 mg by mouth.     b complex vitamins tablet  Take 1 tablet by mouth once daily.     clobetasol 0.05% 0.05 % Oint  Commonly known as:  TEMOVATE  APPLY TO AFFECTED AREA(S) TWICE A DAY FOR 1 MONTH(S) then EVERY DAY FOR 1 MONTH(S) then 3 TIMES A WEEK     fluconazole 150 MG Tab  Commonly known as:  DIFLUCAN     hydrocortisone 2.5 % rectal cream  Commonly known as:  ANUSOL-HC  Place rectally 2 (two) times daily.     levocetirizine 5 MG tablet  Commonly known as:  XYZAL  Take 1 tablet (5 mg total) by mouth every evening.     methen-m.blue-s.phos-phsal-hyo 118-10-40.8-36 mg Cap  Commonly known as:  URIBEL  Take 1 capsule by mouth 3 (three) times daily as needed (bladder pain).     MILK THISTLE ORAL  Take by mouth.     * nystatin powder  Commonly known as:  MYCOSTATIN  Apply topically 4 (four) times daily.     * nystatin cream  Commonly known as:  MYCOSTATIN  Apply topically every Mon, Wed, Fri.     ondansetron 8 MG tablet  Commonly known as:  ZOFRAN  TAKE 1 TABLET BY MOUTH EVERY 12 HOURS AS NEEDED FOR NAUSEA     polyethylene glycol 236-22.74-6.74 -5.86 gram suspension  Commonly known as:  GoLYTELY,NuLYTELY     predniSONE 5 MG tablet  Commonly known as:  DELTASONE  Take 3 each morning on odd  number days     promethazine 25 MG tablet  Commonly known as:  PHENERGAN  TAKE 1 TABLET BY MOUTH EVERY 6 HOURS AS NEEDED FOR NAUSEA     scopolamine 1.3-1.5 mg (1 mg over 3 days)  Commonly known as:  TRANSDERM-SCOP  Place 1 patch onto the skin every 72 hours.     silver sulfADIAZINE 1% 1 % cream  Commonly known as:  SILVADENE  Apply topically every morning.     TURMERIC ORAL  Take by mouth.         * This list has 2 medication(s) that are the same as other medications prescribed for you. Read the directions carefully, and ask your doctor or other care provider to review them with you.            STOP taking these medications    ibuprofen 600 MG tablet  Commonly known as:  FEDERICA VELÁZQUEZ MD  Gynecologic Oncology  [facility]

## 2020-06-12 NOTE — PLAN OF CARE
Plan of care discussed with patient at start of shift. Free from falls and injuries. Resting with eyes closed. Respirations even, unlabored. Skin warm and dry. Denies pain. Nausea managed with PRN Zofran. 2 units PRBC's transfused this shift. NPO for CT enteroscopy this a.m. Frequent checks for pain and safety maintained. Bed in lowest position, wheels locked, side rails up x's 2. Call light in reach. Instructed to call for assistance as needed, verbalizes understanding. Will continue to monitor.

## 2020-06-12 NOTE — TREATMENT PLAN
[facility]  Gastroenterology Treatment Plan        CTE reviewed. Will schedule clinic visit on 6/18, and plan for outpatient patency capsule for potential video capsule after that.     We will sign off. Thank you for involving us in the care of Jeanne Melchor Ana Maria. Please call with any additional questions, concerns or changes in the patient's clinical status.    Alberto Winetrs MD  Gastroenterology Fellow PGY IV   [facility]

## 2020-06-12 NOTE — PROGRESS NOTES
[facility]  Gynecologic Oncology  Progress Note      Patient Name: Jeanne Rodgers  MRN: [id number]  Admission Date: 6/10/2020  Hospital Length of Stay: 1 days  Attending Provider: Darius Regalado MD  Primary Care Provider: Gary Gonzalez MD  Principal Problem: Radiation therapy complication    Follow-up For: Procedure(s) (LRB):  EGD (ESOPHAGOGASTRODUODENOSCOPY) (N/A)  COLONOSCOPY (N/A)  Post-Operative Day: 1 Day Post-Op  Subjective:      History of Present Illness:  Jeanne Rodgers is a 65 y.o. C F with history of papillary serous cancer of the uterus, who is now s/p RALH/BSO as well as chemotherapy and radiation. Treatment history below:     Treatment History  Stage IIIC2 UPSC  Preop PET revealing positive pelvic and PA nodes  RALH/BSO/Debulking of left pelvic nodes (6/8 positive) on 8/15/19  Port placed 9/26/19  T/C started 9/27/19  WPRT + PA nodes to 45 Gy completed 1/22/20  HDR to 15 Gy in 3 fractions completed 3/3/20  Restarted T/C on 3/20/2020  PET on 6/2/2020 did not show any abnormalities.      Today, she presented to Dr. Regalado' clinic for follow up, as she has been treated on an outpatient basis for anemia. She is s/p 3 u pRBC transfused in the outpatient setting. Most recent blood work shows persistence of significant anemia, with H/H 7/21. Patient also reporting dark tarry stools, as well as nausea and vomiting. 2.   She is admitted from clinic for blood transfusion, IVF, ang GI consult due to suspected radiation-induced enteritis or proctitis.    Hospital Course:  06/10/2020 - Admitted from clinic with melenotic stool and anemia. GI has seen the patient and plans for endoscopy and colonoscopy tomorrow. Discussed with the patient, who is on board. To have bowel prep tonight. Initial iron studies drawn per GI. Awaiting peripheral IV placement then will transfuse 1 u pRBC.   06/11/2020 - Patient looks well this morning, and VS were stabel and WNL throughout the night. Now s/p  transfusion of 1 u pRBCs. Has been able to get through the GoLytely prep and has intermittently clear stools, sometimes still with green/black component. No NSAIDs and added IV PPI per GI. Iron studies but for ferritin are WNL. B12 elevated, folate WNL.   PM of 06/11/2020 - Patient returned from EGD/colonoscopy.  She reports feeling better, and requests crackers and liquids by mouth.  [place] diet ordered Per GI notes, the endoscopy and colonoscopy were within normal limits.  Recommendation for next staff is CT enterography, which is now ordered for tomorrow.  Labs today show H/H 6.8/20.6.  2 units PRBC ordered for transfusion tonight.  Corrected calcium also low, at 8.3.  Will replace by mouth tomorrow morning and patient tolerating diet.   06/12/2020 - Patient slept well, but feeling tired this morning. Reports she ate some mashed potatoes and a little chicken last night without n/v. Was given 2u pRBC overnight; AM CBC pending. Has been NPO since MN for CT enterography this morning.     Oncology Treatment Plan:   OP GYN PACLITAXEL CARBOPLATIN (AUC 6) Q3W    Past Medical History:   Diagnosis Date    Allergy     Anxiety     Breast cancer 2014    Chronic back pain     Encounter for blood transfusion     Endometrial cancer 07/30/2019    Fibromyalgia     Hives     Hx of radiation therapy     Itching     Lichen sclerosus     Mental disorder     PONV (postoperative nausea and vomiting)     Sore throat     Uterine cancer 08/05/2019    UTI (urinary tract infection)      Past Surgical History:   Procedure Laterality Date    anal fissure surgery      APPENDECTOMY      BREAST BIOPSY Right 2014    BREAST LUMPECTOMY Right 2014    R lumpectomy [facility] w 2.4cm IDC & DCIS, neg margins, 0/6 SN, Stage II, ER/MI+, HER-2 neg Adjuvant XRT and hormonal therapy     CARPAL TUNNEL RELEASE      CHOLECYSTECTOMY      COLONOSCOPY      HYSTERECTOMY  08/15/2019    HYSTEROSCOPY WITH DILATION AND CURETTAGE OF UTERUS N/A  7/24/2019    Procedure: HYSTEROSCOPY, WITH DILATION AND CURETTAGE OF UTERUS;  Surgeon: Елена Kam MD;  Location: [facility], [place], operated by [facility] OR;  Service: OB/GYN;  Laterality: N/A;    INSERTION OF TUNNELED CENTRAL VENOUS CATHETER (CVC) WITH SUBCUTANEOUS PORT N/A 9/26/2019    Procedure: INSERTION, PORT-A-CATH;  Surgeon: Moris Varghese MD;  Location: [facility], [place], operated by [facility] CATH LAB;  Service: Radiology;  Laterality: N/A;    MASTECTOMY Right     2014    MEDIPORT REMOVAL N/A 3/5/2020    Procedure: REMOVAL, CATHETER, CENTRAL VENOUS, TUNNELED, WITH PORT;  Surgeon: Moris Varghese MD;  Location: [facility], [place], operated by [facility] CATH LAB;  Service: Radiology;  Laterality: N/A;    ROBOT-ASSISTED LAPAROSCOPIC ABDOMINAL HYSTERECTOMY USING DA RAMU XI N/A 8/15/2019    Procedure: XI ROBOTIC HYSTERECTOMY;  Surgeon: Darius Regalado MD;  Location: [facility];  Service: OB/GYN;  Laterality: N/A;    ROBOT-ASSISTED LAPAROSCOPIC SALPINGO-OOPHORECTOMY USING DA RAMU XI Bilateral 8/15/2019    Procedure: XI ROBOTIC SALPINGO-OOPHORECTOMY;  Surgeon: Darius Regalado MD;  Location: [facility];  Service: OB/GYN;  Laterality: Bilateral;    ROBOT-ASSISTED LYMPHADENECTOMY Left 8/15/2019    Procedure: ROBOTIC LYMPHADENECTOMY;  Surgeon: Darius Regalado MD;  Location: [facility];  Service: OB/GYN;  Laterality: Left;    SALPINGOOPHORECTOMY Bilateral 08/15/2019    TONSILLECTOMY      TUBAL LIGATION       Family History     Problem Relation (Age of Onset)    Arthritis Mother    Cancer Mother (85), Father (84), Maternal Aunt (60)    Colon cancer Paternal Uncle    Hashimoto's thyroiditis Sister    Heart attack Paternal Grandmother, Paternal Grandfather    Hypertension Mother, Brother, Brother    Lung cancer Paternal Aunt    No Known Problems Sister        Tobacco Use    Smoking status: Never Smoker    Smokeless tobacco: Never Used   Substance and Sexual Activity    Alcohol use: No     Alcohol/week: 0.0 standard drinks    Drug use: Yes     Types: Marijuana     Comment: medical marijuana- 2 dayys ago      Sexual activity: Never       PTA Medications   Medication Sig    levocetirizine (XYZAL) 5 MG tablet Take 1 tablet (5 mg total) by mouth every evening.    nystatin (MYCOSTATIN) cream Apply topically every Mon, Wed, Fri.    nystatin (MYCOSTATIN) powder Apply topically 4 (four) times daily.    acetaminophen (TYLENOL) 500 MG tablet Take 500 mg by mouth.    b complex vitamins tablet Take 1 tablet by mouth once daily.    clobetasol 0.05% (TEMOVATE) 0.05 % Oint APPLY TO AFFECTED AREA(S) TWICE A DAY FOR 1 MONTH(S) then EVERY DAY FOR 1 MONTH(S) then 3 TIMES A WEEK    fluconazole (DIFLUCAN) 150 MG Tab     hydrocortisone (ANUSOL-HC) 2.5 % rectal cream Place rectally 2 (two) times daily.    ibuprofen (ADVIL,MOTRIN) 600 MG tablet Take 1 tablet (600 mg total) by mouth every 6 (six) hours as needed for Pain.    methen-m.blue-s.phos-phsal-hyo (URIBEL) 118-10-40.8-36 mg Cap Take 1 capsule by mouth 3 (three) times daily as needed (bladder pain).    MILK THISTLE ORAL Take by mouth.    ondansetron (ZOFRAN) 8 MG tablet TAKE 1 TABLET BY MOUTH EVERY 12 HOURS AS NEEDED FOR NAUSEA (Patient not taking: Reported on 6/10/2020)    oxyCODONE-acetaminophen (PERCOCET)  mg per tablet Take 1 tablet by mouth every 4 (four) hours as needed for Pain.    polyethylene glycol (GOLYTELY,NULYTELY) 236-22.74-6.74 -5.86 gram suspension     predniSONE (DELTASONE) 5 MG tablet Take 3 each morning on odd number days (Patient not taking: Reported on 3/9/2020)    promethazine (PHENERGAN) 25 MG tablet TAKE 1 TABLET BY MOUTH EVERY 6 HOURS AS NEEDED FOR NAUSEA (Patient not taking: Reported on 5/8/2020)    scopolamine (TRANSDERM-SCOP) 1.3-1.5 mg (1 mg over 3 days) Place 1 patch onto the skin every 72 hours. (Patient not taking: Reported on 5/8/2020)    silver sulfADIAZINE 1% (SILVADENE) 1 % cream Apply topically every morning. (Patient not taking: Reported on 3/9/2020)    TURMERIC ORAL Take by mouth.       Review of patient's allergies  indicates:  No Known Allergies    Review of Systems   Constitutional: Positive for appetite change and fatigue. Negative for activity change.   HENT: Negative for mouth sores.    Respiratory: Negative for cough and shortness of breath.    Cardiovascular: Negative for chest pain.   Gastrointestinal: Positive for blood in stool (melana), diarrhea, nausea and vomiting. Negative for abdominal pain and constipation.   Genitourinary: Negative for hematuria and vaginal bleeding.   Neurological: Negative for syncope.   Psychiatric/Behavioral: Negative for depression.      Objective:     Vital Signs (Most Recent):  Temp: 98.6 °F (37 °C) (06/12/20 0400)  Pulse: 71 (06/12/20 0400)  Resp: 16 (06/12/20 0400)  BP: (!) 100/58 (06/12/20 0400)  SpO2: 96 % (06/12/20 0400) Vital Signs (24h Range):  Temp:  [97.5 °F (36.4 °C)-99.1 °F (37.3 °C)] 98.6 °F (37 °C)  Pulse:  [71-94] 71  Resp:  [15-17] 16  SpO2:  [96 %-100 %] 96 %  BP: ()/(49-58) 100/58     Weight: 66.3 kg (146 lb 2.6 oz)  Body mass index is 28.55 kg/m².    Physical Exam:   Constitutional: She is oriented to person, place, and time. She appears well-developed and well-nourished. No distress.    HENT:   Head: Normocephalic and atraumatic.   Mouth/Throat: Oropharynx is clear and moist.    Eyes: Conjunctivae and EOM are normal.    Neck: Normal range of motion.    Cardiovascular: Normal rate, regular rhythm and intact distal pulses.     Pulmonary/Chest: Effort normal. No respiratory distress.        Abdominal: Soft. Bowel sounds are normal. She exhibits no distension. There is no tenderness.             Musculoskeletal: Normal range of motion and moves all extremeties.       Neurological: She is alert and oriented to person, place, and time.    Skin: Skin is warm and dry. She is not diaphoretic.    Psychiatric: She has a normal mood and affect. Her behavior is normal.       Laboratory:  CBC: Pending target organ damage   and All pertinent labs from the last 24 hours have  been reviewed.    Diagnostic Results:  None    Assessment/Plan:     Anemia due to chronic blood loss  - Melana, nausea, and anemia  - s/p 1u pRBC on admission, and 2 more last night; AM CBC pending  - GI consult placed - No NSAIDs, add IV PPI  - Iron studies ordered and WNL but for ferritin which is elevated (3 transfusions in recent past)  - Now s/p endoscopy/colonoscopy with no abnormal findings  - Recommended CT enterography - ordered. Patient NPO since midnight for imaging  - will restart bland diet after imaging  - IVF at 125 mL/hour until eating again  - Antiemetics and pain medication prn      VTE Risk Mitigation (From admission, onward)         Ordered     IP VTE LOW RISK PATIENT  Once      06/10/20 1248     Place sequential compression device  Until discontinued      06/10/20 1248              Dane Colbert MD  Gynecologic Oncology  [facility]

## 2020-06-13 NOTE — PROGRESS NOTES
Pt assisted to stretcher when repositioning to stretcher the pt developed a skin to R posterior elbow area @ 1.5 cm x .3cm. Pt believes she pushed down on the hospital gown where the snaps are located. Area cleaned with chloraprep Bandaid applied. Pt was in no destress.Charge nurse Mack degroot and  Dr Colbert notified latter in evening.

## 2020-06-17 ENCOUNTER — NURSE TRIAGE (OUTPATIENT)
Dept: ADMINISTRATIVE | Facility: CLINIC | Age: 65
End: 2020-06-17

## 2020-06-17 ENCOUNTER — PATIENT OUTREACH (OUTPATIENT)
Dept: ADMINISTRATIVE | Facility: OTHER | Age: 65
End: 2020-06-17

## 2020-06-17 ENCOUNTER — PATIENT MESSAGE (OUTPATIENT)
Dept: ADMINISTRATIVE | Facility: OTHER | Age: 65
End: 2020-06-17

## 2020-06-17 DIAGNOSIS — Z12.31 BREAST CANCER SCREENING BY MAMMOGRAM: Primary | ICD-10-CM

## 2020-06-17 NOTE — TELEPHONE ENCOUNTER
Reason for Disposition   No answer.  First attempt to contact caller.  Follow-up call scheduled within 15 minutes.    Additional Information   Negative: Caller is angry or rude (e.g., hangs up, verbally abusive, yelling)   Negative: Caller hangs up   Negative: Caller has already spoken with the PCP and has no further questions.   Negative: Caller has already spoken with another triager and has no further questions.   Negative: Caller has already spoken with another triager or PCP AND has further questions AND triager able to answer questions.   Negative: Busy signal.  First attempt to contact caller.  Follow-up call scheduled within 15 minutes.    Protocols used: ST NO CONTACT OR DUPLICATE CONTACT CALL-A-AH

## 2020-06-17 NOTE — PROGRESS NOTES
LINKS immunization registry triggered  Care Everywhere updated  Health Maintenance updated  Chart reviewed for overdue [facility] health maintenance testing  Portal message sent to patient with scheduling link for mammogram

## 2020-06-18 ENCOUNTER — OFFICE VISIT (OUTPATIENT)
Dept: GASTROENTEROLOGY | Facility: CLINIC | Age: 65
End: 2020-06-18
Payer: MEDICARE

## 2020-06-18 VITALS
BODY MASS INDEX: 28.09 KG/M2 | HEIGHT: 60 IN | DIASTOLIC BLOOD PRESSURE: 86 MMHG | HEART RATE: 90 BPM | WEIGHT: 143.06 LBS | SYSTOLIC BLOOD PRESSURE: 130 MMHG

## 2020-06-18 DIAGNOSIS — K92.1 MELENA: Primary | ICD-10-CM

## 2020-06-18 DIAGNOSIS — K52.0 RADIATION ENTERITIS: ICD-10-CM

## 2020-06-18 PROCEDURE — 99214 OFFICE O/P EST MOD 30 MIN: CPT | Mod: S$PBB,GC,,

## 2020-06-18 PROCEDURE — 99999 PR PBB SHADOW E&M-EST. PATIENT-LVL IV: ICD-10-PCS | Mod: PBBFAC,GC,,

## 2020-06-18 PROCEDURE — 99214 OFFICE O/P EST MOD 30 MIN: CPT | Mod: PBBFAC

## 2020-06-18 PROCEDURE — 99214 PR OFFICE/OUTPT VISIT, EST, LEVL IV, 30-39 MIN: ICD-10-PCS | Mod: S$PBB,GC,,

## 2020-06-18 PROCEDURE — 99999 PR PBB SHADOW E&M-EST. PATIENT-LVL IV: CPT | Mod: PBBFAC,GC,,

## 2020-06-18 NOTE — TELEPHONE ENCOUNTER
Pt contacted through the Post Procedural Symptom Tracker. No answer. No additional contact today as per post procedure protocol.dy 14    Reason for Disposition   Second attempt to contact family AND no contact made.  Answering service notified.    Protocols used: ST NO CONTACT OR DUPLICATE CONTACT CALL-A-AH

## 2020-06-18 NOTE — PROGRESS NOTES
[facility]    Reason for visit: The primary encounter diagnosis was Melena. 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  Referring Provider/PCP: Gary Gonzalez MD    History of Present Illness:  Jeanne Rodgers is a 65 y.o. female with a history of Endometrial adenocarcinoma stage III s/p debulking, and numerous cycles of radiation since January 2020  who is presenting for post-hospitalization follow-up of Melena.     Patient was seen by me during recent hospitalization on 6/20/2020. Patient presented with melena, and chronic abdominal pain, and anemia requiring periodic transfusions. EGD/colonoscopy was unrevealing, CTE showed no small bowel stricture or inflammation. We had asked patient to come today for evaluation of SB with patency capsule first, and then video capsule afterwards.     She denies further episodes of melena or hematochezia. She has chronic nausea.       PEndoHx:  Colonoscopy 1/15/2016, showed two polyps and internal hemorrhoids, reached TI.  EGD 6/11/20: erythematous duodenostomy, otherwise unremarkable  Colonoscopy 6/11/20: GPTI: unremarkable.       Review of Systems:   Constitutional: no fever, chills or change in weight   Eyes: no visual changes   ENT: no sore throat or dysphagia  Respiratory: no cough or shortness of breath   Cardiovascular: no chest pain or palpitations   Gastrointestinal: as per HPI  Hematologic/Lymphatic: no easy bruising or lymphadenopathy   Musculoskeletal: no arthralgias or myalgias   Neurological: no change in mental status  Behavioral/Psych: no change in mood    Medical History:  Past Medical History:   Diagnosis Date    Allergy     Anxiety     Breast cancer 2014    Chronic back pain     Encounter for blood transfusion     Endometrial cancer 07/30/2019    Fibromyalgia     Hives     Hx of radiation therapy     Itching     Lichen sclerosus     Mental disorder     PONV (postoperative nausea and  vomiting)     Sore throat     Uterine cancer 08/05/2019    UTI (urinary tract infection)        Past Surgical History:   Procedure Laterality Date    anal fissure surgery      APPENDECTOMY      BREAST BIOPSY Right 2014    BREAST LUMPECTOMY Right 2014    R lumpectomy [facility] w 2.4cm IDC & DCIS, neg margins, 0/6 SN, Stage II, ER/RI+, HER-2 neg Adjuvant XRT and hormonal therapy     CARPAL TUNNEL RELEASE      CHOLECYSTECTOMY      COLONOSCOPY      COLONOSCOPY N/A 6/11/2020    Procedure: COLONOSCOPY;  Surgeon: Bobby Marino MD;  [facility]  Service: Endoscopy;  Laterality: N/A;    ESOPHAGOGASTRODUODENOSCOPY N/A 6/11/2020    Procedure: EGD (ESOPHAGOGASTRODUODENOSCOPY);  Surgeon: Bobby Marino MD;  [facility]  Service: Endoscopy;  Laterality: N/A;    HYSTERECTOMY  08/15/2019    HYSTEROSCOPY WITH DILATION AND CURETTAGE OF UTERUS N/A 7/24/2019    Procedure: HYSTEROSCOPY, WITH DILATION AND CURETTAGE OF UTERUS;  Surgeon: Елена Kam MD;  Location: [facility];  Service: OB/GYN;  Laterality: N/A;    INSERTION OF TUNNELED CENTRAL VENOUS CATHETER (CVC) WITH SUBCUTANEOUS PORT N/A 9/26/2019    Procedure: INSERTION, PORT-A-CATH;  Surgeon: Moris Varghese MD;  Location: [facility], [place], operated [facility] CATH LAB;  Service: Radiology;  Laterality: N/A;    MASTECTOMY Right     2014    MEDIPORT REMOVAL N/A 3/5/2020    Procedure: REMOVAL, CATHETER, CENTRAL VENOUS, TUNNELED, WITH PORT;  Surgeon: Moris Varghese MD;  Location: [facility], [place], operated [facility] CATH LAB;  Service: Radiology;  Laterality: N/A;    ROBOT-ASSISTED LAPAROSCOPIC ABDOMINAL HYSTERECTOMY USING DA RAMU XI N/A 8/15/2019    Procedure: XI ROBOTIC HYSTERECTOMY;  Surgeon: Darius Regalado MD;  Location: [facility];  Service: OB/GYN;  Laterality: N/A;    ROBOT-ASSISTED LAPAROSCOPIC SALPINGO-OOPHORECTOMY USING DA RAMU XI Bilateral 8/15/2019    Procedure: XI ROBOTIC SALPINGO-OOPHORECTOMY;  Surgeon: Darius Regalado MD;  Location: [facility];  Service: OB/GYN;   Laterality: Bilateral;    ROBOT-ASSISTED LYMPHADENECTOMY Left 8/15/2019    Procedure: ROBOTIC LYMPHADENECTOMY;  Surgeon: Darius Regalado MD;  Location: [facility];  Service: OB/GYN;  Laterality: Left;    SALPINGOOPHORECTOMY Bilateral 08/15/2019    TONSILLECTOMY      TUBAL LIGATION         Family History   Problem Relation Age of Onset    Cancer Mother 85        pancreatic cancer, passed at 95    Hypertension Mother     Arthritis Mother     Cancer Father 84        pancreatic, passed at 85    Cancer Maternal Aunt 60        pancreatic, passed at 62    No Known Problems Sister     Hypertension Brother     Lung cancer Paternal Aunt     Colon cancer Paternal Uncle     Heart attack Paternal Grandmother     Heart attack Paternal Grandfather     Hypertension Brother     Hashimoto's thyroiditis Sister     Breast cancer Neg Hx     Ovarian cancer Neg Hx        Social History     Socioeconomic History    Marital status: Single     Spouse name: Not on file    Number of children: Not on file    Years of education: Not on file    Highest education level: Not on file   Occupational History    Not on file   Social Needs    Financial resource strain: Not on file    Food insecurity     Worry: Not on file     Inability: Not on file    Transportation needs     Medical: Not on file     Non-medical: Not on file   Tobacco Use    Smoking status: Never Smoker    Smokeless tobacco: Never Used   Substance and Sexual Activity    Alcohol use: No     Alcohol/week: 0.0 standard drinks    Drug use: Yes     Types: Marijuana     Comment: medical marijuana- 2 dayys ago     Sexual activity: Never   Lifestyle    Physical activity     Days per week: Not on file     Minutes per session: Not on file    Stress: Patient refused   Relationships    Social connections     Talks on phone: Not on file     Gets together: Not on file     Attends Rastafari service: Not on file     Active member of club or organization: Not on file      Attends meetings of clubs or organizations: Not on file     Relationship status: Not on file   Other Topics Concern    Not on file   Social History Narrative    Not on file       Current Outpatient Medications on File Prior to Visit   Medication Sig Dispense Refill    acetaminophen (TYLENOL) 500 MG tablet Take 500 mg by mouth.      b complex vitamins tablet Take 1 tablet by mouth once daily.      clobetasol 0.05% (TEMOVATE) 0.05 % Oint APPLY TO AFFECTED AREA(S) TWICE A DAY FOR 1 MONTH(S) then EVERY DAY FOR 1 MONTH(S) then 3 TIMES A WEEK 60 g 2    fluconazole (DIFLUCAN) 150 MG Tab       hydrocortisone (ANUSOL-HC) 2.5 % rectal cream Place rectally 2 (two) times daily. 30 g 1    levocetirizine (XYZAL) 5 MG tablet Take 1 tablet (5 mg total) by mouth every evening. 30 tablet 3    methen-m.blue-s.phos-phsal-hyo (URIBEL) 118-10-40.8-36 mg Cap Take 1 capsule by mouth 3 (three) times daily as needed (bladder pain). 40 capsule 11    MILK THISTLE ORAL Take by mouth.      nystatin (MYCOSTATIN) cream Apply topically every Mon, Wed, Fri. 30 g 11    nystatin (MYCOSTATIN) powder Apply topically 4 (four) times daily. 30 g 1    ondansetron (ZOFRAN) 8 MG tablet TAKE 1 TABLET BY MOUTH EVERY 12 HOURS AS NEEDED FOR NAUSEA 30 tablet 2    oxyCODONE-acetaminophen (PERCOCET)  mg per tablet Take 1 tablet by mouth every 4 (four) hours as needed for Pain. 30 tablet 0    oxyCODONE-acetaminophen (PERCOCET) 5-325 mg per tablet Take 1 tablet by mouth every 4 (four) hours as needed. 30 tablet 0    pantoprazole (PROTONIX) 20 MG tablet Take 1 tablet (20 mg total) by mouth 2 (two) times daily before meals. 60 tablet 11    polyethylene glycol (GOLYTELY,NULYTELY) 236-22.74-6.74 -5.86 gram suspension       predniSONE (DELTASONE) 5 MG tablet Take 3 each morning on odd number days 30 tablet 0    promethazine (PHENERGAN) 25 MG tablet TAKE 1 TABLET BY MOUTH EVERY 6 HOURS AS NEEDED FOR NAUSEA 30 tablet 2    scopolamine  (TRANSDERM-SCOP) 1.3-1.5 mg (1 mg over 3 days) Place 1 patch onto the skin every 72 hours. 10 patch 2    silver sulfADIAZINE 1% (SILVADENE) 1 % cream Apply topically every morning. 50 g 11    TURMERIC ORAL Take by mouth.       No current facility-administered medications on file prior to visit.        Review of patient's allergies indicates:  No Known Allergies    Physical Exam:  General: Alert and Oriented x3, no distress   Vitals:    06/18/20 1222   BP: 130/86   Pulse: 90     HEENT: Normocephalic, Atraumatic. No scleral icterus.  Lymph: No cervical lymphadenopathy  Resp: Good air entry bilaterally, no adventitious sounds.  Cardiac: S1 and S2 normal.  Abdomen: Normoactive bowel sounds. Non-distended. Normal tympany. Soft. Non-tender. No peritoneal signs.  Extremities: No peripheral edema. Normal bilateral pedal and radial pulses.  Neurologic: No gross neurological Deficits  Psych: Calm, cooperative. Normal mood and affect.    Laboratory:  Lab Results   Component Value Date     06/11/2020    K 3.7 06/11/2020     (H) 06/11/2020    CO2 23 06/11/2020    BUN 7 (L) 06/11/2020    CREATININE 0.7 06/11/2020    CALCIUM 7.4 (L) 06/11/2020    ANIONGAP 5 (L) 06/11/2020    ESTGFRAFRICA >60.0 06/11/2020    EGFRNONAA >60.0 06/11/2020       Lab Results   Component Value Date    ALT 24 06/11/2020    AST 43 (H) 06/11/2020    ALKPHOS 59 06/11/2020    BILITOT 0.4 06/11/2020       Lab Results   Component Value Date    WBC 2.34 (L) 06/12/2020    HGB 10.5 (L) 06/12/2020    HCT 32.4 (L) 06/12/2020     (H) 06/12/2020    PLT 57 (L) 06/12/2020       Microbiology:  -Reviewed    Imaging:  -Reviewed    Assessment/Plan:  65 y.o. female with a history of Endometrial adenocarcinoma stage III s/p debulking, and numerous cycles of radiation since January 2020  who is presenting for post-hospitalization follow-up of Melena, anemia. Recent EGD/Colonoscopy is unrevealing. 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 Will plan for patency video capsule at 8 AM ion 6/22, I have ordered and scheduled abdominal and pelvic Xray on 4/23 at 4PM and 4/24 and 8AM. If cleared will plan for VCE afterwards.         Follow up in about 3 months (around 9/18/2020).    Alberto Winters MD  Gastroenterology Fellow  Phone: [phone]    No orders of the defined types were placed in this encounter.

## 2020-06-18 NOTE — PROGRESS NOTES
I was present with Alberto Winters MD GI fellow during the above evaluation, including history and exam.  I discussed the case with the fellow and agree with the findings and plan as documented in the Fellow's note.

## 2020-06-23 ENCOUNTER — HOSPITAL ENCOUNTER (OUTPATIENT)
Dept: RADIOLOGY | Facility: HOSPITAL | Age: 65
Discharge: HOME | End: 2020-06-23
Payer: MEDICARE

## 2020-06-23 DIAGNOSIS — K52.0 RADIATION ENTERITIS: ICD-10-CM

## 2020-06-23 PROCEDURE — 74018 XR ABDOMEN AP 1 VIEW: ICD-10-PCS | Mod: 26,,, | Performed by: RADIOLOGY

## 2020-06-23 PROCEDURE — 74018 RADEX ABDOMEN 1 VIEW: CPT | Mod: 26,,, | Performed by: RADIOLOGY

## 2020-06-23 PROCEDURE — 74018 RADEX ABDOMEN 1 VIEW: CPT | Mod: TC

## 2020-06-24 ENCOUNTER — NURSE TRIAGE (OUTPATIENT)
Dept: ADMINISTRATIVE | Facility: CLINIC | Age: 65
End: 2020-06-24

## 2020-06-24 ENCOUNTER — HOSPITAL ENCOUNTER (OUTPATIENT)
Dept: RADIOLOGY | Facility: HOSPITAL | Age: 65
Discharge: HOME | End: 2020-06-24
Payer: MEDICARE

## 2020-06-24 DIAGNOSIS — K52.0 RADIATION ENTERITIS: ICD-10-CM

## 2020-06-24 PROCEDURE — 74018 XR ABDOMEN AP 1 VIEW: ICD-10-PCS | Mod: 26,,, | Performed by: RADIOLOGY

## 2020-06-24 PROCEDURE — 74018 RADEX ABDOMEN 1 VIEW: CPT | Mod: 26,,, | Performed by: RADIOLOGY

## 2020-06-24 PROCEDURE — 74018 RADEX ABDOMEN 1 VIEW: CPT | Mod: TC

## 2020-06-24 NOTE — TELEPHONE ENCOUNTER
RN contacted pt through the Post Procedural Symptom Tracker for Day 13.  Pt stated that she is asymptomatic and doing well.  No additional action needed at this time per protocol.        Additional Information   Negative: [1] Caller is not with the adult (patient) AND [2] reporting urgent symptoms   Negative: Lab result questions   Negative: Medication questions   Negative: Caller can't be reached by phone   Negative: Caller has already spoken to PCP or another triager   Negative: RN needs further essential information from caller in order to complete triage   Negative: Requesting regular office appointment   Negative: [1] Caller requesting NON-URGENT health information AND [2] PCP's office is the best resource   Negative: Health Information question, no triage required and triager able to answer question   Negative: General information question, no triage required and triager able to answer question   Negative: Question about upcoming scheduled test, no triage required and triager able to answer question   Negative: [1] Caller is not with the adult (patient) AND [2] probable NON-URGENT symptoms   Negative: [1] Follow-up call to recent contact AND [2] information only call, no triage required    Protocols used: INFORMATION ONLY CALL-A-

## 2020-06-25 ENCOUNTER — TELEPHONE (OUTPATIENT)
Dept: GASTROENTEROLOGY | Facility: HOSPITAL | Age: 65
End: 2020-06-25

## 2020-06-25 ENCOUNTER — TELEPHONE (OUTPATIENT)
Dept: GASTROENTEROLOGY | Facility: CLINIC | Age: 65
End: 2020-06-25

## 2020-06-25 DIAGNOSIS — D64.9 ANEMIA, UNSPECIFIED TYPE: Primary | ICD-10-CM

## 2020-06-25 DIAGNOSIS — R19.5 OCCULT GI BLEEDING: ICD-10-CM

## 2020-06-25 RX ORDER — POLYETHYLENE GLYCOL 3350, SODIUM SULFATE ANHYDROUS, SODIUM BICARBONATE, SODIUM CHLORIDE, POTASSIUM CHLORIDE 236; 22.74; 6.74; 5.86; 2.97 G/4L; G/4L; G/4L; G/4L; G/4L
2000 POWDER, FOR SOLUTION ORAL ONCE
Qty: 2000 ML | Refills: 0 | Status: SHIPPED | OUTPATIENT
Start: 2020-06-25 | End: 2020-06-25

## 2020-06-25 NOTE — TELEPHONE ENCOUNTER
----- Message from Alberto Winters MD sent at 6/25/2020  2:31 PM CDT -----  Ok ordered. Thanks  ----- Message -----  From: Cuco Fang MA  Sent: 6/25/2020  10:31 AM CDT  To: Alberto Winters MD    I also need the order to schedule.  ----- Message -----  From: Alberto Winters MD  Sent: 6/25/2020  10:25 AM CDT  To: Cuco Fang MA    Suprep or golytely is fine, half gallon the night before.   ----- Message -----  From: Cuco Fang MA  Sent: 6/25/2020   8:36 AM CDT  To: Alberto Winters MD    Sure. Which prep should she use or are you sending one in?  ----- Message -----  From: Alberto Winters MD  Sent: 6/24/2020   9:08 AM CDT  To: Cuco Fang MA    Can we schedule the patient for outpatient VCE please. Thanks.

## 2020-06-25 NOTE — TELEPHONE ENCOUNTER
Pt was scheduled for VCE procedure on 07/02/2020. Pt was given instructions and verbalized understanding. I also sent instructions via portal per pt request.

## 2020-06-29 ENCOUNTER — DOCUMENTATION ONLY (OUTPATIENT)
Dept: TRANSPLANT | Facility: CLINIC | Age: 65
End: 2020-06-29

## 2020-06-29 NOTE — PROGRESS NOTES
Pt records reviewed.   Pt will be referred to Hepatology.PT IS [facility]. LAST SEEN  6/26/2019   Initial referral received  from the workque.   Referring Provider/diagnosis  Bobby Marino MD      Message to Hepatology Staff.

## 2020-06-29 NOTE — LETTER
June 29, 2020    Jeanne Rodgers  [address] [place] [id number]      Dear Jeanne Rodgers:    Your doctor has referred you to the [facility]. We are sending this letter to remind you to make an appointment with us to complete the referral process.     Please call us at [phone] to schedule an appointment. We look forward to seeing you soon.     If you received a call and have been scheduled, please disregard this letter.       Sincerely,        [facility]   [facility] [place], [place] [id number] [phone]

## 2020-07-01 ENCOUNTER — TELEPHONE (OUTPATIENT)
Dept: GASTROENTEROLOGY | Facility: CLINIC | Age: 65
End: 2020-07-01

## 2020-07-01 NOTE — TELEPHONE ENCOUNTER
Pt had questions regarding the prep for tomorrow and when to take it. Pt was informed to start taking prep at 6PM. Instructions were also sent to pt via portal last week. Pt verbalized understanding.

## 2020-07-01 NOTE — TELEPHONE ENCOUNTER
----- Message from Junior Garcia sent at 7/1/2020 10:00 AM CDT -----  Contact: Patient  Pt called and would like to have a nurse her back    She needs to know when to start her prep    Pt can be reached at [phone]

## 2020-07-02 ENCOUNTER — CLINICAL SUPPORT (OUTPATIENT)
Dept: GASTROENTEROLOGY | Facility: CLINIC | Age: 65
End: 2020-07-02
Payer: MEDICARE

## 2020-07-02 ENCOUNTER — TELEPHONE (OUTPATIENT)
Dept: GASTROENTEROLOGY | Facility: CLINIC | Age: 65
End: 2020-07-02

## 2020-07-02 DIAGNOSIS — R19.5 OCCULT GI BLEEDING: ICD-10-CM

## 2020-07-02 PROCEDURE — 91110 PR GI TRACT CAPSULE ENDOSCOPY: ICD-10-PCS | Mod: 26,S$PBB,GC, | Performed by: INTERNAL MEDICINE

## 2020-07-02 PROCEDURE — 91110 GI TRC IMG INTRAL ESOPH-ILE: CPT | Mod: 26,S$PBB,GC, | Performed by: INTERNAL MEDICINE

## 2020-07-02 NOTE — TELEPHONE ENCOUNTER
VCE swallowed performed at 8:53 am. All instructions review with patient. All question answer to satisfaction and capsule swallowed without accident. Patient stated some nausea, passed small amount of stool today and last meal was at 12 noon on 7/1/2020.     Routed to Dr. Marino and Dr. Winters

## 2020-07-06 ENCOUNTER — OFFICE VISIT (OUTPATIENT)
Dept: GYNECOLOGIC ONCOLOGY | Facility: CLINIC | Age: 65
End: 2020-07-06
Payer: MEDICARE

## 2020-07-06 ENCOUNTER — LAB VISIT (OUTPATIENT)
Dept: LAB | Facility: OTHER | Age: 65
End: 2020-07-06
Payer: MEDICARE

## 2020-07-06 VITALS
WEIGHT: 141.31 LBS | BODY MASS INDEX: 27.6 KG/M2 | SYSTOLIC BLOOD PRESSURE: 113 MMHG | HEART RATE: 85 BPM | DIASTOLIC BLOOD PRESSURE: 69 MMHG

## 2020-07-06 DIAGNOSIS — T66.XXXS RADIATION THERAPY COMPLICATION, SEQUELA: ICD-10-CM

## 2020-07-06 DIAGNOSIS — C54.1 ENDOMETRIAL CANCER: ICD-10-CM

## 2020-07-06 DIAGNOSIS — D70.2 NEUTROPENIA, DRUG-INDUCED: ICD-10-CM

## 2020-07-06 DIAGNOSIS — D50.0 ANEMIA DUE TO CHRONIC BLOOD LOSS: ICD-10-CM

## 2020-07-06 DIAGNOSIS — C54.1 ENDOMETRIAL CANCER: Primary | ICD-10-CM

## 2020-07-06 LAB
BASOPHILS # BLD AUTO: 0.01 K/UL (ref 0–0.2)
BASOPHILS NFR BLD: 0.4 % (ref 0–1.9)
DIFFERENTIAL METHOD: ABNORMAL
EOSINOPHIL # BLD AUTO: 0.3 K/UL (ref 0–0.5)
EOSINOPHIL NFR BLD: 13 % (ref 0–8)
ERYTHROCYTE [DISTWIDTH] IN BLOOD BY AUTOMATED COUNT: 19.1 % (ref 11.5–14.5)
HCT VFR BLD AUTO: 32.1 % (ref 37–48.5)
HGB BLD-MCNC: 10.9 G/DL (ref 12–16)
IMM GRANULOCYTES # BLD AUTO: 0 K/UL (ref 0–0.04)
IMM GRANULOCYTES NFR BLD AUTO: 0 % (ref 0–0.5)
LYMPHOCYTES # BLD AUTO: 0.4 K/UL (ref 1–4.8)
LYMPHOCYTES NFR BLD: 17.8 % (ref 18–48)
MCH RBC QN AUTO: 32.9 PG (ref 27–31)
MCHC RBC AUTO-ENTMCNC: 34 G/DL (ref 32–36)
MCV RBC AUTO: 97 FL (ref 82–98)
MONOCYTES # BLD AUTO: 0.2 K/UL (ref 0.3–1)
MONOCYTES NFR BLD: 8.9 % (ref 4–15)
NEUTROPHILS # BLD AUTO: 1.5 K/UL (ref 1.8–7.7)
NEUTROPHILS NFR BLD: 59.9 % (ref 38–73)
NRBC BLD-RTO: 0 /100 WBC
PLATELET # BLD AUTO: 118 K/UL (ref 150–350)
PMV BLD AUTO: 9.8 FL (ref 9.2–12.9)
RBC # BLD AUTO: 3.31 M/UL (ref 4–5.4)
WBC # BLD AUTO: 2.47 K/UL (ref 3.9–12.7)

## 2020-07-06 PROCEDURE — 99999 PR PBB SHADOW E&M-EST. PATIENT-LVL III: CPT | Mod: PBBFAC,,, | Performed by: OBSTETRICS & GYNECOLOGY

## 2020-07-06 PROCEDURE — 99213 OFFICE O/P EST LOW 20 MIN: CPT | Mod: PBBFAC | Performed by: OBSTETRICS & GYNECOLOGY

## 2020-07-06 PROCEDURE — 85025 COMPLETE CBC W/AUTO DIFF WBC: CPT

## 2020-07-06 PROCEDURE — 99999 PR PBB SHADOW E&M-EST. PATIENT-LVL III: ICD-10-PCS | Mod: PBBFAC,,, | Performed by: OBSTETRICS & GYNECOLOGY

## 2020-07-06 PROCEDURE — 99214 PR OFFICE/OUTPT VISIT, EST, LEVL IV, 30-39 MIN: ICD-10-PCS | Mod: S$PBB,,, | Performed by: OBSTETRICS & GYNECOLOGY

## 2020-07-06 PROCEDURE — 99214 OFFICE O/P EST MOD 30 MIN: CPT | Mod: S$PBB,,, | Performed by: OBSTETRICS & GYNECOLOGY

## 2020-07-06 PROCEDURE — 36415 COLL VENOUS BLD VENIPUNCTURE: CPT

## 2020-07-06 RX ORDER — METOCLOPRAMIDE 10 MG/1
10 TABLET ORAL
Qty: 90 TABLET | Refills: 1 | Status: SHIPPED | OUTPATIENT
Start: 2020-07-06 | End: 2020-08-21

## 2020-07-07 NOTE — TELEPHONE ENCOUNTER
Patient notified of VCe results. Verbalized understanding. She states that is good news but what can I do for the constant nausea. She reports zofran and phenergan cause constipation. She has tried jose, peppermint, etc with no relief. She would like to know if GI provider has any recommendations for what she can do and their thoughts on Reglan and if that might help her.

## 2020-07-07 NOTE — [employer]ATION PATIENT INSTRUCTIONS
Discharge Summary/Instructions for after Video Capsule Endoscopy  Patient Name: Jeanne Rodgers  Patient MRN: [id number]  Patient YOB: 1955 Thursday, July 2, 2020  Bobby Marino MD  This is a 65 year old female.  Refer to note in patient chart for   documentation of history and physical.  1.  Do Not eat or drink anything for 1 hour.  Try sips of water first.  If   tolerated, resume your regular diet or one recommended by your physician.  2.  Do not drive, operate machinery, make critical decisions, or do   activities that require coordination or balance for 24 hours.  3.   You may experience a sore throat for 24 to 48 hours.  You may use   throat lozenges or gargle with warm salt water to relieve the discomfort.  4.  Because air was put into your stomach during the procedure, you may   experience some belching.  5.  Do not use any medication containing aspirin for 10 days.  6.  Go directly to the emergency room if you notice any of the following:   Chills and/or fever over 101 F   Persistent vomiting or vomiting with blood/nasal regurgitation   Severe abdominal pain, other than gas cramps   Severe chest pain   Black, tarry stools     Your doctor recommends these additional instructions:  You are being discharged to home.   You have a contact number available for emergencies.  The signs and symptoms   of potential delayed complications were discussed with you.  You may return   to normal activities tomorrow.  Written discharge instructions were   provided to you.   Resume your regular diet.   Take Protonix (pantoprazole) 40 mg by mouth once a day.   Return to your GI office at appointment to be scheduled.   The findings and recommendations have been discussed with you.  If you have any questions or problems, please call your physician.  EMERGENCY PHONE NUMBER: [phone]  LAB RESULTS: [phone]  Bobby Marino MD  7/6/2020 7:14:06 PM  This report has been verified and signed  electronically.  [employer]

## 2020-07-10 ENCOUNTER — OFFICE VISIT (OUTPATIENT)
Dept: HEMATOLOGY/ONCOLOGY | Facility: CLINIC | Age: 65
End: 2020-07-10
Payer: MEDICARE

## 2020-07-10 VITALS
HEIGHT: 60 IN | DIASTOLIC BLOOD PRESSURE: 58 MMHG | HEART RATE: 78 BPM | TEMPERATURE: 98 F | WEIGHT: 141.13 LBS | RESPIRATION RATE: 18 BRPM | SYSTOLIC BLOOD PRESSURE: 118 MMHG | BODY MASS INDEX: 27.71 KG/M2 | OXYGEN SATURATION: 100 %

## 2020-07-10 DIAGNOSIS — Z85.3 HISTORY OF INVASIVE BREAST CANCER: ICD-10-CM

## 2020-07-10 DIAGNOSIS — T66.XXXS RADIATION THERAPY COMPLICATION, SEQUELA: ICD-10-CM

## 2020-07-10 DIAGNOSIS — D50.0 ANEMIA DUE TO CHRONIC BLOOD LOSS: ICD-10-CM

## 2020-07-10 DIAGNOSIS — Z12.31 ENCOUNTER FOR SCREENING MAMMOGRAM FOR MALIGNANT NEOPLASM OF BREAST: ICD-10-CM

## 2020-07-10 DIAGNOSIS — C54.1 ENDOMETRIAL CANCER: Primary | ICD-10-CM

## 2020-07-10 DIAGNOSIS — R11.2 NAUSEA AND VOMITING, INTRACTABILITY OF VOMITING NOT SPECIFIED, UNSPECIFIED VOMITING TYPE: ICD-10-CM

## 2020-07-10 PROCEDURE — 99999 PR PBB SHADOW E&M-EST. PATIENT-LVL V: ICD-10-PCS | Mod: PBBFAC,,, | Performed by: INTERNAL MEDICINE

## 2020-07-10 PROCEDURE — 99214 OFFICE O/P EST MOD 30 MIN: CPT | Mod: S$PBB,,, | Performed by: INTERNAL MEDICINE

## 2020-07-10 PROCEDURE — 99214 PR OFFICE/OUTPT VISIT, EST, LEVL IV, 30-39 MIN: ICD-10-PCS | Mod: S$PBB,,, | Performed by: INTERNAL MEDICINE

## 2020-07-10 PROCEDURE — 99999 PR PBB SHADOW E&M-EST. PATIENT-LVL V: CPT | Mod: PBBFAC,,, | Performed by: INTERNAL MEDICINE

## 2020-07-10 PROCEDURE — 99215 OFFICE O/P EST HI 40 MIN: CPT | Mod: PBBFAC | Performed by: INTERNAL MEDICINE

## 2020-07-10 RX ORDER — ALPRAZOLAM 0.25 MG/1
0.25 TABLET ORAL 3 TIMES DAILY PRN
Qty: 90 TABLET | Refills: 1 | Status: SHIPPED | OUTPATIENT
Start: 2020-07-10 | End: 2020-09-18

## 2020-07-10 NOTE — PROGRESS NOTES
Subjective:       Patient ID: Jeanne Rodgers is a 65 y.o. female.    Chief Complaint: No chief complaint on file.    HPI     Returns for follow-up  In the interval she was diagnosed with endometrial cancer after presenting with vaginal bleeding  She has completed surgical debulking, chemotherapy and XRT but states that she has developed nausea and vomiting since starting XRT  She was also admitted in June of this year with melena and had a complete GI work up (see below)  Of note during chemotherapy she required several transfusions for symptomatic anemia    Biggest complaint today is N/V  Mild weight loss (initially significant weight loss)     GI Procedures:  7/2/2020 Video Capsule:  Impression:  - mild hypervascularization characterized by                         increase vascular mucosa pattern pre-pyloric antrum.                         - mild to moderate hypervascularization                         characterized by increase vascular mucosa pattern                         duodenum.                         - hypervascularization characterized by increase                         vascular mucosa patternsmall bowel.                         - mild to moderate hypervascularization                         characterized by increase vascular mucosa pattern                         mucosa in the ileum.                         - No active bleeding these above findings are                         consistent with mild/moderate radiation enteritis.     6/11/2020 CT scans:  Findings:        The terminal ileum appeared normal.        The colon (entire examined portion) appeared normal.        The retroflexed view of the distal rectum and anal verge was normal        and showed no anal or rectal abnormalities.        The perianal and digital rectal examinations were normal.   Impression:   - The examined portion of the ileum was normal.                         - The entire examined colon is normal.                          - The distal rectum and anal verge are normal on                         retroflexion view.                         - No specimens collected.                         - No bleeding seen and no old blood seen.   EGD:  Findings:        Patchy mildly erythematous mucosa without active bleeding and with        no stigmata of bleeding was found in the entire duodenum. Appears to        be c/w radiation enteritis or portal HTN duodenopathy. No varices        seen.        The entire examined stomach was normal.        The cardia and gastric fundus were normal on retroflexion.        The examined esophagus was normal. Z-line was sharp. No esophagitis        and no columnar esophagus and no lesions seen with NBI or white        light.        The Z-line was regular and was found 35 cm from the incisors. No        esophageal varices seen and no gastric varices seen.   Impression:           - Erythematous duodenopathy.                         - Normal stomach.                         - Normal esophagus.                         - Z-line regular, 35 cm from the incisors.                         - No specimens collected.    To control nausea she has tried multiple medications (phenergan, zofran, reglan)  Uses medical marijuana   Helped with chemo nausea but not this nausea she associates with XRT  This AM it was all bile  Normally tends to alternate between food products and bile  She also has known PORTER and has follow up with liver specialist per GI; appt pending    Referred to hepatology for PORTER after below CT scan  4/30/19 CT scans  FINDINGS:  CT chest: The thyroid gland is grossly unremarkable.  No mediastinal, hilar, or axillary lymphadenopathy.  Thoracic aorta is normal caliber.  No pericardial or pleural effusion.  Mild biapical pleuroparenchymal scarring/thickening.  The tracheobronchial tree is clear.  There is a calcified nodule along the right minor fissure measuring 4 mm.  No concerning lung nodules identified.  No  atelectasis.  No lung consolidation.  CT abdomen pelvis: No fluid collections.  The uterus, adnexa, and bladder are grossly unremarkable.  No pericolonic fat stranding/inflammatory changes.  Terminal ileum is unremarkable.  Appendix is not identified.  No dilated loops of bowel.  No mesenteric or periaortic lymphadenopathy.  Mild scattered calcified atherosclerotic disease.  Mild hepatomegaly with fatty change.  Prior cholecystectomy.  There is a small left hepatic lobe cyst.  Main portal veins are patent.  Mild pancreatic atrophy.  No pancreatic or biliary ductal dilatation.  Splenic size within normal limits.  Adrenal glands are unremarkable.  No renal masses or hydronephrosis.  No marrow replacement process.  Lower lumbar facet arthropathy noted.  Impression:  Prominent fatty change in the liver.  No rib fractures.  Prior cholecystectomy.     Most recent Imagin2020 PET scan:  COMPARISON:  [facility] PET-CT: 2019 and 2019.  FINDINGS:  Quality of the study: Adequate.  In the head and neck, there are no hypermetabolic lesions worrisome for malignancy. There are no hypermetabolic mucosal lesions, and there are no pathologically enlarged or hypermetabolic lymph nodes.  In the chest, there are no hypermetabolic lesions worrisome for malignancy.  There are no concerning pulmonary nodules or masses, and there are no pathologically enlarged or hypermetabolic lymph nodes.  In the abdomen and pelvis, there is a 1.7 cm hypodense lesion in the left hepatic lobe, most likely represents a simple hepatic cyst (axial series 3, image 77).  Postsurgical changes of cholecystectomy.  There is physiologic tracer distribution within the abdominal organs and excretion into the genitourinary system.  In the bones, there are no hypermetabolic lesions worrisome for malignancy.  Miscellaneous: Postsurgical changes of hysterectomy and bilateral salpingo-oophorectomies in this patient with endometrial adenocarcinoma.  Previously  identified index lesions at the left iliac and right periaortic lymph nodes on exam dated back to 08/13/2019 are no longer seen on today's exam.  Impression:  1. No evidence of hypermetabolic tumor, unchanged since prior study.  2. Other findings as above.    6/11/2020 CT Enterography:  COMPARISON:  [facility] PET-CT 06/02/2020.  FINDINGS:  The heart size within normal limits.  No significant pericardial effusion.  No significant chronic calcifications.  - Lung bases: Minimal bibasilar atelectatic changes and trace bilateral pleural fluid.  - Liver: Normal in size and attenuation.  Left hepatic lobe simple cyst.  No focal enhancing lesions.  Status post cholecystectomy.  No intrahepatic or extrahepatic biliary ductal dilatation.  The pancreas, spleen, kidneys, adrenal glands appear unremarkable.  The bladder is partially distended without focal thickening.  -Retroperitoneum:  No significant adenopathy.  - Vascular: No significant atherosclerosis or aneurysm.  - Abdominal wall:  Unremarkable.  Bowel/Mesentery:  no evidence of bowel obstruction or inflammation.  The ascending colon is decompressed.  Small volume of scattered abdominopelvic free fluid.  Postoperative changes of appendectomy.  Postoperative changes of hysterectomy and bilateral oophorectomy.  No evidence of adnexal masses.  Bones:  No acute osseous abnormality and no suspicious lytic or blastic lesion.  Heterogeneous appearance of the pubic rami likely related to postradiation change.  Impression:  Postoperative changes of hysterectomy and bilateral salpingo-oophorectomy.  Small volume of scattered abdominopelvic free fluid.  No evidence of bowel stricture or inflammation.  Trace bilateral pleural fluid    Breast Oncology History:  This is a 64 yo female with a history of right breast cancer  - Had been getting her mammograms at [facility] as she was uninsured at the time  - reports small mass seen in 2007 and sent to [facility]- where she was told  nothing was seen on review  - Repeat situation of above the next year in   - she noted right nipple inversion - went to [facility]- told not likely cancer, no further testing done  - In  it was recommended that [facility] see her for imaging  - Abnormal mammogram and biopsy recommended  Reports biopsy experience was awful and so she researched other sites to be cared  - She sought out Dr. 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 Keller at [facility]  Oncotype done ? 9- chemotherapy not recommended  She was started on Arimidex  -She was on Arimidex for 4 years until stopping (see below).    Genetic testing negative     BMD done 18 which revealed osteopenia at hip.      She has been seen by Rheumatology for the joint issues +FANNY found ? of autoimmune   Also has a history of hives- no cause found despite multtiple dermatologic evaluations     SH:  Worked at the [employer] as a  > 30 years     FH:  Father  of pancreatic cancer at age 85  Mother  pancreatic cancer at 94  Maternal uncle- lung cancer  Maternal aunt  of pancreatic cancer at age 62    Review of Systems   Constitutional: Positive for activity change, appetite change, fatigue and unexpected weight change. Negative for chills and fever.   HENT: Negative for dental problem, sore throat and trouble swallowing.    Eyes: Negative for visual disturbance.   Respiratory: Negative for cough, shortness of breath and wheezing.    Cardiovascular: Negative for chest pain, palpitations, leg swelling and claudication.   Gastrointestinal: Positive for nausea and vomiting. Negative for abdominal distention, abdominal pain, blood in stool, change in bowel habit, constipation, diarrhea, reflux and change in bowel habit.   Genitourinary:  Negative for decreased urine volume, difficulty urinating, dysuria, frequency and urgency.   Musculoskeletal: Negative for arthralgias, back pain, gait problem and joint deformity.   Neurological: Negative for light-headedness and headaches.   Hematological: Negative for adenopathy. Does not bruise/bleed easily.   Psychiatric/Behavioral: Negative for dysphoric mood. The patient is not nervous/anxious.          Objective:      Physical Exam  Vitals signs and nursing note reviewed.   Constitutional:       General: She is not in acute distress.     Appearance: Normal appearance. She is not ill-appearing or diaphoretic.   HENT:      Head: Normocephalic and atraumatic.   Neck:      Musculoskeletal: Normal range of motion and neck supple.   Cardiovascular:      Rate and Rhythm: Regular rhythm. Tachycardia present.      Heart sounds: Normal heart sounds. No murmur. No friction rub. No gallop.    Pulmonary:      Effort: Pulmonary effort is normal. No respiratory distress.      Breath sounds: Normal breath sounds. No wheezing, rhonchi or rales.   Abdominal:      General: Bowel sounds are normal. There is no distension.      Palpations: Abdomen is soft. There is no mass.      Tenderness: There is no abdominal tenderness. There is no guarding or rebound.      Comments: No organomegaly   Lymphadenopathy:      Cervical: No cervical adenopathy.   Skin:     General: Skin is warm and dry.      Coloration: Skin is not jaundiced or pale.      Findings: No rash.   Neurological:      General: No focal deficit present.      Mental Status: She is alert and oriented to person, place, and time.   Psychiatric:         Mood and Affect: Mood normal.         Behavior: Behavior normal.         Thought Content: Thought content normal.         Judgment: Judgment normal.       Labs- reviewed  Procedures- reviewed  Imaging- reviewed  Assessment:       1. Endometrial cancer    2. History of invasive breast cancer    3. Nausea and vomiting,  intractability of vomiting not specified, unspecified vomiting type    4. Radiation therapy complication, sequela    5. Anemia due to chronic blood loss        Plan:     1. Treated by Dr. Regalado and now on surveillance  2. ADONIS clinically  Mammogram will need to wait until at least 1 month out from video capsule- we will set up  3. Discussed trying Xanax to see if this helps  4. Still healing from enteritis  5. H/H up  Check iron stores after mammogram and RTC to see PRIMO

## 2020-07-13 ENCOUNTER — TELEPHONE (OUTPATIENT)
Dept: HEMATOLOGY/ONCOLOGY | Facility: CLINIC | Age: 65
End: 2020-07-13

## 2020-07-13 NOTE — TELEPHONE ENCOUNTER
----- Message from Kenton Yoder DNP sent at 7/13/2020  1:33 PM CDT -----  Scheduling team-  Please set patient up to see me for genetics consult. Referring provider is Dr. Ford. Visit should be 1 hour long and can be in person or virtual.  Thanks!   ----- Message -----  From: Kenton Yoder DNP  Sent: 7/13/2020   1:31 PM CDT  To: Nano Ford MD, Kenton Yoder DNP    The only info I could locate in her chart about what test she had done indicates that the patient believes she only had BRCA1/2 testing (I wasn't able to locate any results), so in light of that I would recommend additional testing. Will ask the schedulers to call her. Thanks!  ----- Message -----  From: Nano Ford MD  Sent: 7/10/2020   4:13 PM CDT  To: Kenton Yoder DNP    She had genetic testing but has new diagnosis of endometrial cancer and 2 parents with pancreatic cancer  Was her's extensive enough?

## 2020-07-15 ENCOUNTER — TELEPHONE (OUTPATIENT)
Dept: INFUSION THERAPY | Facility: HOSPITAL | Age: 65
End: 2020-07-15

## 2020-07-15 ENCOUNTER — LAB VISIT (OUTPATIENT)
Dept: LAB | Facility: HOSPITAL | Age: 65
End: 2020-07-15
Payer: MEDICARE

## 2020-07-15 ENCOUNTER — OFFICE VISIT (OUTPATIENT)
Dept: HEMATOLOGY/ONCOLOGY | Facility: CLINIC | Age: 65
End: 2020-07-15
Payer: MEDICARE

## 2020-07-15 DIAGNOSIS — Z80.0 FAMILY HISTORY OF PANCREATIC CANCER: ICD-10-CM

## 2020-07-15 DIAGNOSIS — Z85.3 PERSONAL HISTORY OF BREAST CANCER: Primary | ICD-10-CM

## 2020-07-15 DIAGNOSIS — Z85.42 HISTORY OF ENDOMETRIAL CANCER: ICD-10-CM

## 2020-07-15 DIAGNOSIS — Z85.3 PERSONAL HISTORY OF BREAST CANCER: ICD-10-CM

## 2020-07-15 PROCEDURE — 99215 OFFICE O/P EST HI 40 MIN: CPT | Mod: S$PBB,,, | Performed by: NURSE PRACTITIONER

## 2020-07-15 PROCEDURE — 99999 PR PBB SHADOW E&M-EST. PATIENT-LVL IV: CPT | Mod: PBBFAC,,, | Performed by: NURSE PRACTITIONER

## 2020-07-15 PROCEDURE — 99215 PR OFFICE/OUTPT VISIT, EST, LEVL V, 40-54 MIN: ICD-10-PCS | Mod: S$PBB,,, | Performed by: NURSE PRACTITIONER

## 2020-07-15 PROCEDURE — 99214 OFFICE O/P EST MOD 30 MIN: CPT | Mod: PBBFAC | Performed by: NURSE PRACTITIONER

## 2020-07-15 PROCEDURE — 36415 COLL VENOUS BLD VENIPUNCTURE: CPT

## 2020-07-15 PROCEDURE — 99999 PR PBB SHADOW E&M-EST. PATIENT-LVL IV: ICD-10-PCS | Mod: PBBFAC,,, | Performed by: NURSE PRACTITIONER

## 2020-07-15 NOTE — PROGRESS NOTES
Hereditary and High-Risk Clinic  Department of Hematology and Oncology  [facility]  [facility]  [address] [place]  [address]    07/16/2020  Provider:  Kenton Yoder DNP    Patient ID: Jeanne Rodgers is a 65 y.o. female.    Chief Complaint: Genetic Evaluation      Referring Provider:  Nano Ford MD  [address] [place] [address]    SUBJECTIVE:       History of Present Illness (HPI):  Established patient of the Department of Hematology and Oncology presents today for an evaluation as it pertains to hereditary cancer syndromes.    Pedigree      Review of Systems   - See HPI.    - Patient's Distress Score today was 5/10 (with 10 being the worst).  Patient attributes this to chronic nausea that began with XRT; she states that she is being managed for this.  Patient denies experiencing suicidal or homicidal ideations (SI/HI).  Instructed patient to contact 911 immediately with any SI/HI.  Offered patient a referral to Social Work and Psychology, and patient accepted the Psychology referral; placed referral.  - Patient reports chronic whole-body internal and external itching, for which she is taking Xyzal; she reports a family history of diabetes. The history of itching is well documented in the patient's chart and appears to be chronic.     OBJECTIVE:     Physical Exam   Constitutional: She appears well developed and well nourished. No distress.   Pulmonary/Chest: Effort normal.   Neurological: She is alert.   Psychiatric: She has a normal mood and affect. Her speech is normal and behavior is normal. Her thought content is normal.     Personal Genetic Testing Results  [facility] Genetic Test Custom Panel, which was ordered by CHHAYA Gustafson and the sample for which was collected on 02/02/2016 with results reported on 02/26/2016, revealed no mutations.  This was an 8-gene panel.    COUNSELING:     Based on the information provided  "to me by the patient, Jeanne Rodgers, she meets criteria for genetic testing for Breast and Ovarian Cancer Susceptibility Genes and Pancreatic Cancer Susceptibility Genes based on current National Comprehensive Cancer Network (NCCN) Guidelines, and she also meets clinical recommendations for genetic testing based on the American Society of Breast Surgeons' (ASBS's) recent Consensus Guideline on Genetic Testing for Hereditary Breast Cancer.    Germline genetic oncology panel testing consists of testing multiple genes known to be related to hereditary cancer syndromes.  These genes are known as "tumor suppressor genes."  A key role of tumor suppressor genes is to prevent cancer.  When a tumor suppressor gene has a clinically significant mutation, it affects the functioning of the gene, and the carrier may be more likely to develop cancers in certain organs.      Only some cancers are hereditary.  When a gene mutation is not identified, it does not completely rule out the possibility of hereditary cancers but does make them less likely.  Additionally, it is possible to see familial clustering of related cancer amongst family members, and these are sometimes caused by environmental factors and/or lifestyle factors that may be shared amongst family members.  Finally, cancers can also be sporadic.     Results of genetic testing include positive, negative, and variant of uncertain significance (VUS) (i.e. unclear) results.  If positive, the patient's specific cancer risks vary depending upon the tumor suppressor gene(s) in which there is/are a mutation(s).  In some cases, depending upon the result and the patient's clinical history, modified management may be recommended, including measures for risk reduction and/or surveillance, though modified management is not always an option.  Modified management may also be recommended, even with a negative result, based upon risk assessment that incorporates the family " history.  A VUS indicates that the amino acids within a gene are lining up in a way different from usual, but there is not presently enough data for the laboratory to make a determination as to whether the variant is benign or pathogenic; VUSs are not typically acted upon clinically.       If Jeanne tests positive for a mutation inherited in an autosomal-dominant manner, her first-degree relatives would each have a 50% chance of having the same mutation, and other blood-relatives would also be at (a lesser) risk of having the same mutation.       The Genetic Information Nondiscrimination Act (ROGELIO) prohibits most health insurance agencies and employers with 16 or more employees from discriminating against an individual based on the results of genetic testing; however, ROGELIO does not protect individuals from discrimination by other types of policies/entities, including but not limited to life insurance, disability, long-term care insurance,  benefits or insurance, and [facility] benefits).     An outside laboratory would perform the testing after a blood sample is collected at an [facility] laboratory or a saliva sample is collected by the patient at home.  With genetic testing, there is a potential for the patient to incur out-of-pocket costs.  Results can take several weeks.       Offered Jeanne germline genetic oncology testing today, versus deferring testing at this time or declining testing altogether.  Jeanne desires to proceed with germline genetic oncology testing today and has provided informed consent to proceed with the test(s) indicated in the plan as below, after a discussion regarding various genetic testing panels that could be performed as well as associated risks.     Post-test genetic counseling will be conducted once the genetic testing results are available.     Questions were encouraged and answered to the patient's satisfaction, and she verbalized understanding of information and  agreement with the plan.       ASSESSMENT/PLAN:       Based on the information provided to me by the patient, Jeanne Rodgers, she meets criteria for genetic testing for Breast and Ovarian Cancer Susceptibility Genes and Pancreatic Cancer Susceptibility Genes based on current National Comprehensive Cancer Network (NCCN) Guidelines, and she also meets clinical recommendations for genetic testing based on the American Society of Breast Surgeons' (ASBS's) recent Consensus Guideline on Genetic Testing for Hereditary Breast Cancer.  · A blood sample for the [facility] Common Hereditary Cancers Panel was collected by [facility] today, with results expected in approximately 2-3 weeks, at which point post-test genetic counseling is to be conducted either in person or via audiovisual virtual visit.    Personal history of breast cancer  -     Genetic Misc Sendout Test, Blood; Future; Expected date: 07/15/2020  -     Ambulatory referral/consult to Hematology/Oncology/Psychology; Future; Expected date: 07/22/2020  -     Ambulatory referral/consult to Hematology/Oncology/Nutrition; Future; Expected date: 07/22/2020    Family history of pancreatic cancer  -     Genetic Misc Sendout Test, Blood; Future; Expected date: 07/15/2020    History of endometrial cancer  -     Ambulatory referral/consult to Hematology/Oncology/Psychology; Future; Expected date: 07/22/2020  -     Ambulatory referral/consult to Hematology/Oncology/Nutrition; Future; Expected date: 07/22/2020           Follow up in about 18 days (around 8/2/2020) for post-test genetics visit.        During this encounter, I spent approximately 45 minutes face-to-face with this patient, more than half of which was spent counseling the patient and/or coordinating her care.    Kenton Cabezas, DNP, APRN, FNP-BC, AOCNP  Nurse Practitioner, Hereditary and High-Risk Clinic  Department of Hematology and Oncology  [facility]  floor [facility] [address], [place]  [address]  office phone: [phone]    office fax: [phone]     07/16/2020

## 2020-07-15 NOTE — Clinical Note
Hem/Onc schedulers- Please schedule post-test visit with me for 2-3 weeks out, and please get patient scheduled to see Hem/Onc Psychology and Hem/Onc dietitian.

## 2020-07-15 NOTE — LETTER
July 16, 2020      Nano Ford MD  [address]  [facility] [address]           [facility] - Hereditary and High Risk  [address]  [facility] [address]  Phone: [phone]  Fax: [phone]          Patient: Jeanne Rodgers   MR Number: [id number]   YOB: 1955   Date of Visit: 7/15/2020       Dear Dr. Nano Ford:    Thank you for referring Jeanne Rodgers to me for evaluation. Attached you will find relevant portions of my assessment and plan of care.    If you have questions, please do not hesitate to call me. I look forward to following Jeanne Rodgers along with you.    Sincerely,    Kenton Yoder, DNP    Enclosure  CC:  No Recipients    If you would like to receive this communication electronically, please contact [email or web address] or [phone] to request more information on [email or web address] Link access.    For providers and/or their staff who would like to refer a patient to [facility], please contact us through our one-stop-shop provider referral line, [facility], at [phone].    If you feel you have received this communication in error or would no longer like to receive these types of communications, please e-mail [email or web address]

## 2020-07-17 ENCOUNTER — OFFICE VISIT (OUTPATIENT)
Dept: PSYCHIATRY | Facility: CLINIC | Age: 65
End: 2020-07-17
Payer: MEDICARE

## 2020-07-17 DIAGNOSIS — Z71.89 COMPLEX CARE COORDINATION: ICD-10-CM

## 2020-07-17 DIAGNOSIS — F33.1 MAJOR DEPRESSIVE DISORDER, RECURRENT EPISODE, MODERATE WITH ANXIOUS DISTRESS: Primary | ICD-10-CM

## 2020-07-17 DIAGNOSIS — Z85.3 PERSONAL HISTORY OF BREAST CANCER: ICD-10-CM

## 2020-07-17 DIAGNOSIS — Z85.42 HISTORY OF ENDOMETRIAL CANCER: ICD-10-CM

## 2020-07-17 PROCEDURE — 90791 PR PSYCHIATRIC DIAGNOSTIC EVALUATION: ICD-10-PCS | Mod: ,,, | Performed by: PSYCHOLOGIST

## 2020-07-17 PROCEDURE — 99212 OFFICE O/P EST SF 10 MIN: CPT | Mod: PBBFAC | Performed by: PSYCHOLOGIST

## 2020-07-17 PROCEDURE — 99999 PR PBB SHADOW E&M-EST. PATIENT-LVL II: ICD-10-PCS | Mod: PBBFAC,,, | Performed by: PSYCHOLOGIST

## 2020-07-17 PROCEDURE — 90791 PSYCH DIAGNOSTIC EVALUATION: CPT | Mod: ,,, | Performed by: PSYCHOLOGIST

## 2020-07-17 PROCEDURE — 99999 PR PBB SHADOW E&M-EST. PATIENT-LVL II: CPT | Mod: PBBFAC,,, | Performed by: PSYCHOLOGIST
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
## 2020-07-20 ENCOUNTER — PATIENT OUTREACH (OUTPATIENT)
Dept: ADMINISTRATIVE | Facility: OTHER | Age: 65
End: 2020-07-20

## 2020-07-20 NOTE — PROGRESS NOTES
Requested updates within Care Everywhere.  Patient's chart was reviewed for overdue PANCHO topics.  Immunizations reconciled.

## 2020-07-21 ENCOUNTER — OFFICE VISIT (OUTPATIENT)
Dept: HEPATOLOGY | Facility: CLINIC | Age: 65
End: 2020-07-21
Payer: MEDICARE

## 2020-07-21 VITALS
WEIGHT: 140.19 LBS | SYSTOLIC BLOOD PRESSURE: 116 MMHG | BODY MASS INDEX: 27.52 KG/M2 | OXYGEN SATURATION: 100 % | DIASTOLIC BLOOD PRESSURE: 58 MMHG | HEIGHT: 60 IN | HEART RATE: 80 BPM

## 2020-07-21 DIAGNOSIS — K76.0 HEPATIC STEATOSIS: Primary | ICD-10-CM

## 2020-07-21 PROCEDURE — 99999 PR PBB SHADOW E&M-EST. PATIENT-LVL V: ICD-10-PCS | Mod: PBBFAC,,, | Performed by: PHYSICIAN ASSISTANT

## 2020-07-21 PROCEDURE — 99214 PR OFFICE/OUTPT VISIT, EST, LEVL IV, 30-39 MIN: ICD-10-PCS | Mod: S$PBB,,, | Performed by: PHYSICIAN ASSISTANT

## 2020-07-21 PROCEDURE — 99215 OFFICE O/P EST HI 40 MIN: CPT | Mod: PBBFAC | Performed by: PHYSICIAN ASSISTANT

## 2020-07-21 PROCEDURE — 99999 PR PBB SHADOW E&M-EST. PATIENT-LVL V: CPT | Mod: PBBFAC,,, | Performed by: PHYSICIAN ASSISTANT

## 2020-07-21 PROCEDURE — 99214 OFFICE O/P EST MOD 30 MIN: CPT | Mod: S$PBB,,, | Performed by: PHYSICIAN ASSISTANT

## 2020-07-21 NOTE — PROGRESS NOTES
[facility] - Established Patient     Last Clinic Visit: 6/26/19 with Iam Rodriguez PA-C    CHIEF COMPLAINT: Fatty liver     HPI: This is a 65 y.o. White female here for continued evaluation of fatty liver. The patient was previously followed by Iam Rodriguez PA-C; is new to me.     Since last visit, patient was diagnosed with stage III endometrial cancer. The patient has undergone debulking, with radiation and chemotherapy (filgrastim and paclitaxel). Since initiating medications, has had thrombocytopenia. She has had mild elevations in her AST to the 40s since April.     The patient has lost a significant amount of weight; she is 141 lbs down from 176 lbs (-35 lbs).      Lab Results   Component Value Date    ALT 24 06/11/2020    AST 43 (H) 06/11/2020    ALKPHOS 59 06/11/2020    BILITOT 0.4 06/11/2020    ALBUMIN 2.9 (L) 06/11/2020    INR 1.1 06/11/2020     (L) 07/06/2020       Interval history:   She is here today alone. The patient feels well, denies symptoms of hepatic decompensation including jaundice, ascites, cognitive problems to suggest hepatic encephalopathy, or GI bleeding.     Reports chronic itching since 2014; has seen dermatology and allergy for this states it is chronic urticaria of unknown etiology. She also reports nausea since she started radiation. Her last infusion ws 5/8/20 and last radiation prior to this. She has been eating better, however, her diet consists of  potatoes, eggs, salads.     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     Denies ETOH drinking, smoking  Social: lives at home; sister visits; couple of cats     Review of patient's allergies indicates:  No Known Allergies     Current Outpatient Medications on File Prior to Visit   Medication Sig Dispense Refill    acetaminophen (TYLENOL) 500 MG tablet Take 500 mg by mouth.      ALPRAZolam (XANAX) 0.25 MG tablet Take 1 tablet (0.25 mg total) by mouth 3 (three) times daily as needed for  Anxiety. 90 tablet 1    b complex vitamins tablet Take 1 tablet by mouth once daily.      clobetasol 0.05% (TEMOVATE) 0.05 % Oint APPLY TO AFFECTED AREA(S) TWICE A DAY FOR 1 MONTH(S) then EVERY DAY FOR 1 MONTH(S) then 3 TIMES A WEEK 60 g 2    fluconazole (DIFLUCAN) 150 MG Tab       hydrocortisone (ANUSOL-HC) 2.5 % rectal cream Place rectally 2 (two) times daily. 30 g 1    levocetirizine (XYZAL) 5 MG tablet Take 1 tablet (5 mg total) by mouth every evening. 30 tablet 3    fredrick-rosalesblue-s.phos-phsal-hyo (URIBEL) 118-10-40.8-36 mg Cap Take 1 capsule by mouth 3 (three) times daily as needed (bladder pain). 40 capsule 11    metoclopramide HCl (REGLAN) 10 MG tablet Take 1 tablet (10 mg total) by mouth 3 (three) times daily with meals. 90 tablet 1    MILK THISTLE ORAL Take by mouth.      nystatin (MYCOSTATIN) cream Apply topically every Mon, Wed, Fri. 30 g 11    nystatin (MYCOSTATIN) powder Apply topically 4 (four) times daily. 30 g 1    ondansetron (ZOFRAN) 8 MG tablet TAKE 1 TABLET BY MOUTH EVERY 12 HOURS AS NEEDED FOR NAUSEA 30 tablet 2    oxyCODONE-acetaminophen (PERCOCET)  mg per tablet Take 1 tablet by mouth every 4 (four) hours as needed for Pain. 30 tablet 0    oxyCODONE-acetaminophen (PERCOCET) 5-325 mg per tablet Take 1 tablet by mouth every 4 (four) hours as needed. 30 tablet 0    pantoprazole (PROTONIX) 20 MG tablet Take 1 tablet (20 mg total) by mouth 2 (two) times daily before meals. 60 tablet 11    polyethylene glycol (GOLYTELY,NULYTELY) 236-22.74-6.74 -5.86 gram suspension       predniSONE (DELTASONE) 5 MG tablet Take 3 each morning on odd number days 30 tablet 0    promethazine (PHENERGAN) 25 MG tablet TAKE 1 TABLET BY MOUTH EVERY 6 HOURS AS NEEDED FOR NAUSEA 30 tablet 2    scopolamine (TRANSDERM-SCOP) 1.3-1.5 mg (1 mg over 3 days) Place 1 patch onto the skin every 72 hours. 10 patch 2    silver sulfADIAZINE 1% (SILVADENE) 1 % cream Apply topically every morning. 50 g 11     TURMERIC ORAL Take by mouth.       No current facility-administered medications on file prior to visit.        PMHX:  has a past medical history of Allergy, Anxiety, Breast cancer (2014), Chronic back pain, Depression, Encounter for blood transfusion, Endometrial cancer (07/30/2019), Fibromyalgia, Hives, psychiatric care, radiation therapy, Itching, Lichen sclerosus, Mental disorder, PONV (postoperative nausea and vomiting), Psychiatric problem, PTSD (post-traumatic stress disorder), Sleep difficulties, Sore throat, Therapy, Uterine cancer (08/05/2019), and UTI (urinary tract infection).    PSHX:  has a past surgical history that includes Appendectomy; Tubal ligation; Carpal tunnel release; Cholecystectomy; Breast biopsy (Right, 2014); Tonsillectomy; Colonoscopy; Hysteroscopy with dilation and curettage of uterus (N/A, 7/24/2019); Mastectomy (Right); Breast lumpectomy (Right, 2014); Robot-assisted laparoscopic abdominal hysterectomy using da Catrina Xi (N/A, 8/15/2019); Robot-assisted laparoscopic salpingo-oophorectomy using da Catrina Xi (Bilateral, 8/15/2019); Robot-assisted lymphadenectomy (Left, 8/15/2019); anal fissure surgery; Salpingoophorectomy (Bilateral, 08/15/2019); Hysterectomy (08/15/2019); Insertion of tunneled central venous catheter (CVC) with subcutaneous port (N/A, 9/26/2019); Mediport removal (N/A, 3/5/2020); Esophagogastroduodenoscopy (N/A, 6/11/2020); and Colonoscopy (N/A, 6/11/2020).    FAMILY HISTORY:   Family History   Problem Relation Age of Onset    Hypertension Mother     Arthritis Mother     Other Mother 85        pancreatic tumor felt by pt's mother's HCP to be malignant though she survived for 8 yrs after dx    Pancreatic cancer Father 84    Pancreatic cancer Maternal Aunt 62    No Known Problems Sister     Hypertension Brother     Leukemia Paternal Uncle 70    Heart attack Paternal Grandmother     Heart attack Paternal Grandfather     Hypertension Brother     Hashimoto's  thyroiditis Sister     Diabetes Other         strong family history per patient    Lung cancer Maternal Uncle         smoker    Cancer Paternal Aunt         unknown origin    Suicide Cousin     Alcohol abuse Cousin     Breast cancer Neg Hx     Ovarian cancer Neg Hx        SOCIAL HISTORY:   Social History     Tobacco Use   Smoking Status Never Smoker   Smokeless Tobacco Never Used       Social History     Substance and Sexual Activity   Alcohol Use No    Alcohol/week: 0.0 standard drinks       Social History     Substance and Sexual Activity   Drug Use Yes    Types: Marijuana    Comment: medical marijuana- 2 dayys ago        Review of Systems   Constitutional: Negative for appetite change, chills, fatigue, fever and unexpected weight change.   Eyes: Negative for visual disturbance.   Respiratory: Negative for cough and shortness of breath.    Cardiovascular: Negative for leg swelling.   Gastrointestinal: Negative for abdominal distention, abdominal pain, blood in stool, constipation, diarrhea, nausea and vomiting.   Skin: Negative for color change, rash and wound. Denies itching.   Neurological: Negative for dizziness, tremors, speech difficulty, and headaches.   Hematological: Does not bruise/bleed easily.       DATA     Physical Exam   Constitutional: Well-nourished. No distress. Alert and oriented to person, place, and time.  Eyes: No scleral icterus.   Cardiovascular: Normal rate, regular rhythm and normal heart sounds. No murmur heard.  Pulmonary/Chest: Respiratory effort and breath sounds normal. No respiratory distress.   Abdominal: Soft, non-tender.  No distension; no ascites appreciated. There is no palpable hepatosplenomegaly. No hernia or mass.   Musculoskeletal: No edema.   Neurological: No tremor. Coordination and gait normal. No asterixis.    Skin: Skin is warm and dry.  No jaundice. No telangiectasias or palmar erythema noted.  Psychiatric: Normal mood and affect. Speech, behavior, and thought  content normal.     Vitals:    07/21/20 0922   BP: (!) 116/58   Pulse: 80   SpO2: 100%   Weight: 63.6 kg (140 lb 3.4 oz)   Height: 5' (1.524 m)         LMP  (LMP Unknown) Comment: age 53    LABS:  Lab Results   Component Value Date    WBC 2.47 (L) 07/06/2020    HGB 10.9 (L) 07/06/2020    HCT 32.1 (L) 07/06/2020     (L) 07/06/2020     06/11/2020    K 3.7 06/11/2020    CREATININE 0.7 06/11/2020    ALT 24 06/11/2020    AST 43 (H) 06/11/2020    ALKPHOS 59 06/11/2020    BILITOT 0.4 06/11/2020    ALBUMIN 2.9 (L) 06/11/2020    INR 1.1 06/11/2020    ANASCREEN Positive (A) 03/08/2018    FERRITIN 1,170 (H) 06/10/2020    FESATURATED 37 06/10/2020    CHOL 214 (H) 12/26/2017    TRIG 356 (H) 12/26/2017    LDLCALC 99.8 12/26/2017    HGBA1C 5.6 06/05/2019       No results found for: HEPAIGG    No results found for: HEPBSAG  No results found for: HEPBCAB  No results found for: HEPBSAB  Hepatitis C Ab   Date Value Ref Range Status   12/26/2017 Negative  Final     Immunization History   Administered Date(s) Administered    Influenza 12/01/2008, 12/31/2013    Influenza - High Dose - PF (65 years and older) 09/24/2019    Influenza - Quadrivalent - PF (6 months and older) 12/26/2017    Influenza - Trivalent (ADULT) 12/01/2008, 12/31/2013    Pneumococcal Conjugate - 13 Valent 09/10/2019    Pneumococcal Polysaccharide - 23 Valent 09/18/2015    Tdap 09/18/2015          DIAGNOSTIC STUDIES:    MRI - EXAMINATION:  MRI ABDOMEN ELASTOGRAPHY     CLINICAL HISTORY:  fatty liver fibrosis staging;  Hepatic fibrosis     TECHNIQUE:  MRI elastrography and quantitative evaluation performed per routine protocol.     COMPARISON:  None     FINDINGS:  There is a 1.5 cm T2 hyperintense lesion within the left hepatic lobe, incompletely characterized due to the lack of intravenous contrast but favored to represent a cyst.  Gallbladder is surgically absent.  Common bile duct is normal in caliber.  Stomach, duodenum, spleen, pancreas and  adrenal glands are normal.  Kidneys are normal in size and location.  No contour deforming renal masses.  No hydronephrosis or proximal hydroureter.  No ascites.  No pleural or pericardial effusions.  Visualized bowel is normal in caliber.     Liver fat fraction measures 22.4%, consistent with moderate or severe steatosis.     Liver elasticity measures 2.60 kPa consistent with Normal or F0 fibrosis.     Impression:     1. Liver fat fraction measures 22.4%, consistent with moderate or severe steatosis.  2. Liver elasticity measures 2.60 kPa consistent with normal or F0 fibrosis.  REFERENCE RANGES:     Fat fraction analysis:     <5%: Normal.     5 to 15%: Mild steatosis.     >15%: Moderate or severe steatosis.     Elastography:     <2.93 kPa: Normal or F0 fibrosis.     2.93 to 4.15 kPa: F1 or F2 fibrosis.     >4.15 kPa: F3 or F4 fibrosis.     * If steatosis is present, elevated liver stiffness (>2.74 kPa) may be secondary to inflammation (PORTER).        Electronically signed by: Awais Neal MD  Date:                                            07/09/2019  Time:                                           06:26      ASSESSMENT & PLAN     65 y.o. White female with:    1. FATTY LIVER WITHOUT FIBROSIS ON MRI ELASTOGRAPHY  -- Discussed healthy diet, low carb, increase exercise/movement  -- Discussed her weight loss may have actually improved her fatty liver  -- AST elevations are mild, albumin slightly low however s/p chemo and radiation; will continue to monitor with HFP in August  -- Thrombocytopenia likely 2/2 to chemotherapy and not liver disease  -- Recent EGD without evidence of portal HTN; melena reportedly due to radiation enteritis  -- F/u in 1 year with MRI elastography prior     2. PRIOR LIVER CYST SEEN ON CT  -- Obtain ultrasound   -- Reassurance      Orders Placed This Encounter   Procedures    US Abdomen Limited    Hepatic function panel       Obtain ultrasound, will monitor hepatic function panel    Continue healthy diet, low carb and high protein   RTC in 1 year with MRI Elastography prior      Thank you for allowing me to participate in the care of Jeanne Barton PA-C  Hepatology

## 2020-07-21 NOTE — PATIENT INSTRUCTIONS
1. Ultrasound of the liver soon, I will follow up with results   2. Liver labs have been attached to your August 10th labs, will follow up with results   3. Follow up with us in 1 year with an MRI Elastography prior   4. Healthy diet and exercise

## 2020-07-27 NOTE — PROGRESS NOTES
Subjective:       Patient ID: Jeanne Rodgers is a 65 y.o. female.    Chief Complaint: Endometrial Cancer    HPI  Returns for follow-up  She completed her last chemotherapy in May 8, 2020.   Today she still notes residual nausea after treatment for uterine cancer.  She still has some fatigue and now with an increase in hot flashes.   Very anxious about cancer reoccurring, long discussion with patient about healthy habits. She will continue to follow with Dr. Ballesteros.       In the interval she was diagnosed with endometrial cancer after presenting with vaginal bleeding  She has completed surgical debulking, chemotherapy and XRT but states that she has developed nausea and vomiting since starting XRT  She was also admitted in June of this year with melena and had a complete GI work up (see below)  Of note during chemotherapy she required several transfusions for symptomatic anemia           Breast Oncology History:  This is a 64 yo female with a history of right breast cancer  - Had been getting her mammograms at [facility] as she was uninsured at the time  - reports small mass seen in 2007 and sent to [facility]- where she was told nothing was seen on review  - Repeat situation of above the next year in 2008  - she noted right nipple inversion 2012- went to [facility]- told not likely cancer, no further testing done  - In 2014 it was recommended that [facility] see her for imaging  - Abnormal mammogram and biopsy recommended  Reports biopsy experience was awful and so she researched other sites to be cared  - She sought out Dr. 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 Keller at [facility]  Oncotype done ? 9- chemotherapy not recommended  She was started on Arimidex  -She was on Arimidex for 4 years until  stopping (see below).    Genetic testing negative     BMD done 8/13/18 which revealed osteopenia at hip.      She has been seen by Rheumatology for the joint issues +FANNY found ? of autoimmune   Also has a history of hives- no cause found despite multtiple dermatologic evaluations       Review of Systems   Constitutional: Positive for fatigue. Negative for activity change, appetite change, chills, fever and unexpected weight change.   HENT: Negative for dental problem, sore throat and trouble swallowing.    Eyes: Negative for visual disturbance.   Respiratory: Negative for cough, shortness of breath and wheezing.    Cardiovascular: Negative for chest pain, palpitations, leg swelling and claudication.   Gastrointestinal: Positive for nausea. Negative for abdominal distention, abdominal pain, blood in stool, change in bowel habit, constipation, diarrhea, vomiting, reflux and change in bowel habit.   Genitourinary: Negative for decreased urine volume, difficulty urinating, dysuria, frequency and urgency.   Musculoskeletal: Negative for arthralgias, back pain, gait problem and joint deformity.   Neurological: Negative for light-headedness and headaches.   Hematological: Negative for adenopathy. Does not bruise/bleed easily.   Psychiatric/Behavioral: Negative for dysphoric mood. The patient is not nervous/anxious.          Objective:      Physical Exam  Vitals signs and nursing note reviewed.   Constitutional:       General: She is not in acute distress.     Appearance: Normal appearance. She is not ill-appearing or diaphoretic.   HENT:      Head: Normocephalic and atraumatic.   Neck:      Musculoskeletal: Normal range of motion and neck supple.   Cardiovascular:      Rate and Rhythm: Normal rate and regular rhythm.      Heart sounds: Normal heart sounds. No murmur. No friction rub. No gallop.    Pulmonary:      Effort: Pulmonary effort is normal. No respiratory distress.      Breath sounds: Normal breath sounds. No  wheezing, rhonchi or rales.   Abdominal:      General: Bowel sounds are normal. There is no distension.      Palpations: Abdomen is soft. There is no mass.      Tenderness: There is no abdominal tenderness. There is no guarding or rebound.      Comments: No organomegaly   Lymphadenopathy:      Cervical: No cervical adenopathy.   Skin:     General: Skin is warm and dry.      Coloration: Skin is not jaundiced or pale.      Findings: No rash.   Neurological:      General: No focal deficit present.      Mental Status: She is alert and oriented to person, place, and time.   Psychiatric:         Mood and Affect: Mood normal.         Behavior: Behavior normal.         Thought Content: Thought content normal.         Judgment: Judgment normal.       Labs- reviewed  Imaging- reviewed  Assessment:       1. Hx of breast cancer    2. History of invasive breast cancer        Plan:     1. Treated by Dr. Regalado and now on surveillance  2. ADONIS clinically  Mammogram pending      Return to clinic in 4 weeks with PRIMO appointment and labs.     Patient is in agreement with the proposed treatment plan. All questions were answered to the patient's satisfaction. Patient knows to call clinic for any new or worsening symptoms and if anything is needed before the next clinic visit.          Hue Hatch, FNP-C  Hematology & Medical Oncology   [address] [place] [address]  ph. [phone]  Fax. [phone]    Patient dicussed with collaborating physician, Dr. Ford.

## 2020-07-31 ENCOUNTER — CLINICAL SUPPORT (OUTPATIENT)
Dept: HEMATOLOGY/ONCOLOGY | Facility: CLINIC | Age: 65
End: 2020-07-31
Payer: MEDICARE

## 2020-07-31 ENCOUNTER — OFFICE VISIT (OUTPATIENT)
Dept: PSYCHIATRY | Facility: CLINIC | Age: 65
End: 2020-07-31
Payer: MEDICARE

## 2020-07-31 VITALS — BODY MASS INDEX: 27.79 KG/M2 | HEIGHT: 60 IN | WEIGHT: 141.56 LBS

## 2020-07-31 DIAGNOSIS — R63.4 WEIGHT LOSS, UNINTENTIONAL: ICD-10-CM

## 2020-07-31 DIAGNOSIS — Z85.3 PERSONAL HISTORY OF BREAST CANCER: ICD-10-CM

## 2020-07-31 DIAGNOSIS — Z71.3 NUTRITIONAL COUNSELING: Primary | ICD-10-CM

## 2020-07-31 DIAGNOSIS — C54.1 ENDOMETRIAL CANCER: ICD-10-CM

## 2020-07-31 DIAGNOSIS — K52.0 RADIATION ENTERITIS: ICD-10-CM

## 2020-07-31 DIAGNOSIS — Z85.42 HISTORY OF ENDOMETRIAL CANCER: ICD-10-CM

## 2020-07-31 DIAGNOSIS — F33.1 MAJOR DEPRESSIVE DISORDER, RECURRENT EPISODE, MODERATE WITH ANXIOUS DISTRESS: Primary | ICD-10-CM

## 2020-07-31 PROCEDURE — 99211 OFF/OP EST MAY X REQ PHY/QHP: CPT | Mod: PBBFAC,27 | Performed by: PSYCHOLOGIST

## 2020-07-31 PROCEDURE — 97802 MEDICAL NUTRITION INDIV IN: CPT | Mod: PBBFAC | Performed by: DIETITIAN, REGISTERED

## 2020-07-31 PROCEDURE — 90834 PSYTX W PT 45 MINUTES: CPT | Mod: ,,, | Performed by: PSYCHOLOGIST

## 2020-07-31 PROCEDURE — 99211 OFF/OP EST MAY X REQ PHY/QHP: CPT | Mod: PBBFAC | Performed by: DIETITIAN, REGISTERED

## 2020-07-31 PROCEDURE — 90834 PR PSYCHOTHERAPY W/PATIENT, 45 MIN: ICD-10-PCS | Mod: ,,, | Performed by: PSYCHOLOGIST

## 2020-07-31 PROCEDURE — 99999 PR PBB SHADOW E&M-EST. PATIENT-LVL I: CPT | Mod: PBBFAC,,, | Performed by: PSYCHOLOGIST

## 2020-07-31 PROCEDURE — 99999 PR PBB SHADOW E&M-EST. PATIENT-LVL I: CPT | Mod: PBBFAC,,, | Performed by: DIETITIAN, REGISTERED

## 2020-07-31 PROCEDURE — 99999 PR PBB SHADOW E&M-EST. PATIENT-LVL I: ICD-10-PCS | Mod: PBBFAC,,, | Performed by: DIETITIAN, REGISTERED

## 2020-07-31 PROCEDURE — 99999 PR PBB SHADOW E&M-EST. PATIENT-LVL I: ICD-10-PCS | Mod: PBBFAC,,, | Performed by: PSYCHOLOGIST

## 2020-07-31 NOTE — PROGRESS NOTES
INFORMED CONSENT: Jeanne Rodgers   is known to this provider and identity was confirmed via NAME and .  The patient has been informed of the risks and benefits associated with engaging in psychotherapy, the handling of protected health information, the rights of privacy and the limits of confidentiality. The patient has also been informed of the importance of reporting any suicidal or homicidal ideation to this or any provider to ensure safety of all parties, and the Jeanne Rodgers expressed understanding. The patient was agreeable to these terms and freely participates in individual psychotherapy.    PSYCHO-ONCOLOGY NOTE/ Individual Psychotherapy     Date: 2020   [facility]        Therapeutic Intervention: Met with patient.  Outpatient - Behavior modifying psychotherapy 45 min - CPT code 50577      Patient was last seen by me on 2020    Problem list  Patient Active Problem List   Diagnosis    Hx of breast cancer    Spondylosis without myelopathy    Hamstring tightness of both lower extremities    Segmental and somatic dysfunction of lumbar region    Alteration in mobility associated with pain    Recurrent UTI    Increased frequency of urination    Pelvic pain in female    Myalgia of pelvic floor    Nocturia more than twice per night    Lichen sclerosus et atrophicus    Irregular bowel habits    Loose stools    Hip pain, right    Chronic lower back pain    Lower abdominal pain    Abdominal pain    Urinary incontinence, urge    Pre-diabetes    Vaginal irritation    Postmenopausal bleeding    Endometrial cancer    History of robot-assisted laparoscopic hysterectomy    Encounter for antineoplastic chemotherapy    Thrombocytopenia due to drugs    Neutropenia, drug-induced    Anemia due to chronic blood loss    Radiation therapy complication    History of invasive breast cancer    Nausea & vomiting    Major depressive disorder, recurrent episode,  moderate with anxious distress       Chief complaint/reason for encounter: depression and anxiety   Met with patient to evaluate psychosocial adaptation to diagnosis/treatment/survivorship of Breast and Endometrial Cancer    Current Medications  Current Outpatient Medications   Medication    acetaminophen (TYLENOL) 500 MG tablet    ALPRAZolam (XANAX) 0.25 MG tablet    b complex vitamins tablet    clobetasol 0.05% (TEMOVATE) 0.05 % Oint    fluconazole (DIFLUCAN) 150 MG Tab    hydrocortisone (ANUSOL-HC) 2.5 % rectal cream    levocetirizine (XYZAL) 5 MG tablet    methen-mAshleyblue-s.phos-phsal-hyo (URIBEL) 118-10-40.8-36 mg Cap    metoclopramide HCl (REGLAN) 10 MG tablet    MILK THISTLE ORAL    nystatin (MYCOSTATIN) cream    nystatin (MYCOSTATIN) powder    ondansetron (ZOFRAN) 8 MG tablet    oxyCODONE-acetaminophen (PERCOCET)  mg per tablet    oxyCODONE-acetaminophen (PERCOCET) 5-325 mg per tablet    pantoprazole (PROTONIX) 20 MG tablet    polyethylene glycol (GOLYTELY,NULYTELY) 236-22.74-6.74 -5.86 gram suspension    predniSONE (DELTASONE) 5 MG tablet    promethazine (PHENERGAN) 25 MG tablet    scopolamine (TRANSDERM-SCOP) 1.3-1.5 mg (1 mg over 3 days)    silver sulfADIAZINE 1% (SILVADENE) 1 % cream    TURMERIC ORAL     No current facility-administered medications for this visit.        Objective:  Jeanne Rodgers arrived promptly for the session.    Ms. Rodgers was independently ambulatory at the time of session. The patient was fully cooperative throughout the session.  Appearance: age appropriate, appropriately  dressed, adequately  groomed  Behavior/Cooperation: friendly and cooperative  Speech: normal in rate, volume, and tone and appropriate quality, quantity and organization of sentences  Mood: dysthymic  Affect: dysphoric  Thought Process: goal-directed, logical  Thought Content: normal,  No delusions or paranoia; did not appear to be responding to internal stimuli during the  session  Orientation: grossly intact  Memory: Grossly intact  Attention Span/Concentration: Attends to session without distraction; reports no difficulty  Fund of Knowledge: average  Estimate of Intelligence: average from verbal skills and history  Cognition: grossly intact  Insight: patient has awareness of illness; good insight into own behavior and behavior of others  Judgment: the patient's behavior is adequate to circumstances    Interval history and content of current session: Patient reported increased fatigue, will let Onc teams know. Pain on right side of neck impacting sleep.   Discussed impact of anxiety on physical symptoms. Introduced to breathing retraining (Belly and 4-2-4 rbeathing). Reports to be coping with moderate difficulty. Evaluated cognitive response, paying particular attention to negative intrusive thoughts of a persistent and detrimental nature. Thoughts of this type are in evidence with moderate distress. Provided cognitive behavioral therapy to address negative cognitions. Identified and evaluated psychosocial and environmental stressors secondary to diagnosis and treatment.  Examined proactive behaviors that may be implemented to minimize or ameliorate psychosocial stressors secondary to diagnosis and treatment.     Risk parameters:   Patient reports no suicidal ideation  Patient reports no homicidal ideation  Patient reports no self-injurious behavior  Patient reports no violent behavior   Safety needs:  None at this time      Verbal deficits: None     Patient's response to intervention:The patient's response to intervention is accepting.     Progress toward goals and other mental status changes:  The patient's progress toward goals is good.      Progress to date:Progress as Expected      Goals from last visit: Met     Patient reported outcomes:    Distress Thermometer: 5   PHQ-9= 12, no SI   SHEKHAR-7= 11    SHEKHAR-7 Answers       Client Strengths: verbal, intelligent, successful, good  social support, good insight, commitment to wellness, strong santana, strong cultural traditions     Diagnosis:     ICD-10-CM ICD-9-CM   1. Major depressive disorder, recurrent episode, moderate with anxious distress  F33.1 296.32   2. Personal history of breast cancer  Z85.3 V10.3   3. History of endometrial cancer  Z85.42 V10.42       Treatment Plan:individual psychotherapy and consult psychiatrist for medication evaluation  · Target symptoms: depression, anxiety   · Why chosen therapy is appropriate versus another modality: relevant to diagnosis, evidence based practice  · Outcome monitoring methods: self-report, observation, checklist/rating scale  · Therapeutic intervention type: behavior modifying psychotherapy  · Prognosis: Good      Behavioral goals:    Exercise:   Stress management: Practice belly breathing x2 daily   Social engagement:   Nutrition:   Smoking Cessation:   Therapy:    Return to clinic: 2 weeks    Next Session:   Anxious symptoms  PMR     Length of Service (minutes direct face-to-face contact): 60    Lenore Ballesteros, PhD  [place] License #[id number]

## 2020-08-04 ENCOUNTER — TELEPHONE (OUTPATIENT)
Dept: OBSTETRICS AND GYNECOLOGY | Facility: CLINIC | Age: 65
End: 2020-08-04

## 2020-08-04 NOTE — TELEPHONE ENCOUNTER
Patient c/o moderate fatigue, deconditioning, lower extremity neuropathy, bladder cystitis, radiation-induced proctitis and persistent nausea despite multiple anti-emetic regimens. Patient wishes to relieve many of these side effects and regain her ability to thrive in life. Patient is scheduled to see AYLIN Rosa virtually 08/06/20 at 1300 and will follow-up with Dr. Kam in 6 weeks. EDUARDO Aguirre.

## 2020-08-05 ENCOUNTER — PATIENT OUTREACH (OUTPATIENT)
Dept: ADMINISTRATIVE | Facility: OTHER | Age: 65
End: 2020-08-05

## 2020-08-06 ENCOUNTER — OFFICE VISIT (OUTPATIENT)
Dept: OBSTETRICS AND GYNECOLOGY | Facility: CLINIC | Age: 65
End: 2020-08-06
Payer: MEDICARE

## 2020-08-06 DIAGNOSIS — G62.0 PERIPHERAL NEUROPATHY DUE TO CHEMOTHERAPY: Primary | ICD-10-CM

## 2020-08-06 DIAGNOSIS — M54.50 CHRONIC LOW BACK PAIN WITHOUT SCIATICA, UNSPECIFIED BACK PAIN LATERALITY: ICD-10-CM

## 2020-08-06 DIAGNOSIS — T45.1X5A PERIPHERAL NEUROPATHY DUE TO CHEMOTHERAPY: Primary | ICD-10-CM

## 2020-08-06 DIAGNOSIS — R11.0 NAUSEA: ICD-10-CM

## 2020-08-06 DIAGNOSIS — G89.29 CHRONIC LOW BACK PAIN WITHOUT SCIATICA, UNSPECIFIED BACK PAIN LATERALITY: ICD-10-CM

## 2020-08-06 PROCEDURE — 99215 OFFICE O/P EST HI 40 MIN: CPT | Mod: 95,,, | Performed by: PHYSICIAN ASSISTANT

## 2020-08-06 PROCEDURE — 99215 PR OFFICE/OUTPT VISIT, EST, LEVL V, 40-54 MIN: ICD-10-PCS | Mod: 95,,, | Performed by: PHYSICIAN ASSISTANT

## 2020-08-06 NOTE — PROGRESS NOTES
Care Everywhere: updated  Immunization: updated (delay in links system)  Health Maintenance: updated  Media Review:   Legacy Review:   Order placed:   Upcoming appts:mammogram 8/10/2020

## 2020-08-06 NOTE — PROGRESS NOTES
The patient location is: Home  The chief complaint leading to consultation is: Survivorship    Visit type: audiovisual    Face to Face time with patient: 30 minutes  45 minutes of total time spent on the encounter, which includes face to face time and non-face to face time preparing to see the patient (eg, review of tests), Obtaining and/or reviewing separately obtained history, Documenting clinical information in the electronic or other health record, Independently interpreting results (not separately reported) and communicating results to the patient/family/caregiver, or Care coordination (not separately reported).         Each patient to whom he or she provides medical services by telemedicine is:  (1) informed of the relationship between the physician and patient and the respective role of any other health care provider with respect to management of the patient; and (2) notified that he or she may decline to receive medical services by telemedicine and may withdraw from such care at any time.    Notes:     PATIENT: Jeanne Rodgers  MRN: [id number]  DATE: 8/6/2020        Chief Complaint: No chief complaint on file.         Subjective:   Oncology History: Ms. Rodgers is a 65 y.o. female with history of endometrial cancer presents to discuss side effects from treatment. Prior history of right breast cancer in 2014.  Diagnosed with endometrial cancer. She has now completed surgical debulking, chemotherapy and XRT. Did well throughout treatment but developed severe nausea on day one of radiation that has persisted. Saw nutritionist and discussed gluten and dairy free diet. Eating mostly potatoes, eggs and fruit. Getting medical marijuana from outside physician. Helps with joint pain, but not nausea. Neuropathy in her feet since chemo. Also having right sided neck pain that is impacting sleep. Mood is ok and feels well supported. Sister lives next door. Working with Dr. Meneses and working on breathing techniques  with her. Staying active throughout the day, helps with her energy levels. Has had idiopathic pruritis since 2012. Takes Xyzal at night which helps but makes her very tired.       Past Medical History:   Past Medical History:   Diagnosis Date    Allergy     Anxiety     Breast cancer 2014    Chronic back pain     Depression     Encounter for blood transfusion     Endometrial cancer 07/30/2019    Fibromyalgia     Hives     Hx of psychiatric care     Hx of radiation therapy     Itching     Lichen sclerosus     Mental disorder     PONV (postoperative nausea and vomiting)     Psychiatric problem     PTSD (post-traumatic stress disorder)     Sleep difficulties     Sore throat     Therapy     Uterine cancer 08/05/2019    UTI (urinary tract infection)        Past Surgical HIstory:   Past Surgical History:   Procedure Laterality Date    anal fissure surgery      APPENDECTOMY      BREAST BIOPSY Right 2014    BREAST LUMPECTOMY Right 2014    R lumpectomy [facility] w 2.4cm IDC & DCIS, neg margins, 0/6 SN, Stage II, ER/HI+, HER-2 neg Adjuvant XRT and hormonal therapy     CARPAL TUNNEL RELEASE      CHOLECYSTECTOMY      COLONOSCOPY      COLONOSCOPY N/A 6/11/2020    Procedure: COLONOSCOPY;  Surgeon: Bobby Marino MD;  Location: [facility] ([address] [place] [address]);  Service: Endoscopy;  Laterality: N/A;    ESOPHAGOGASTRODUODENOSCOPY N/A 6/11/2020    Procedure: EGD (ESOPHAGOGASTRODUODENOSCOPY);  Surgeon: Bobby Marino MD;  Location: [address]);  Service: Endoscopy;  Laterality: N/A;    HYSTERECTOMY  08/15/2019    HYSTEROSCOPY WITH DILATION AND CURETTAGE OF UTERUS N/A 7/24/2019    Procedure: HYSTEROSCOPY, WITH DILATION AND CURETTAGE OF UTERUS;  Surgeon: Елена Kam MD;  Location: [facility];  Service: OB/GYN;  Laterality: N/A;    INSERTION OF TUNNELED CENTRAL VENOUS CATHETER (CVC) WITH SUBCUTANEOUS PORT N/A 9/26/2019    Procedure: INSERTION, PORT-A-CATH;  Surgeon: Moris Varghese MD;  Location:  [facility] CATH LAB;  Service: Radiology;  Laterality: N/A;    MASTECTOMY Right     2014    MEDIPORT REMOVAL N/A 3/5/2020    Procedure: REMOVAL, CATHETER, CENTRAL VENOUS, TUNNELED, WITH PORT;  Surgeon: Moris Varghese MD;  Location: [facility] CATH LAB;  Service: Radiology;  Laterality: N/A;    ROBOT-ASSISTED LAPAROSCOPIC ABDOMINAL HYSTERECTOMY USING DA RAMU XI N/A 8/15/2019    Procedure: XI ROBOTIC HYSTERECTOMY;  Surgeon: Darius Regalado MD;  Location: [facility] OR;  Service: OB/GYN;  Laterality: N/A;    ROBOT-ASSISTED LAPAROSCOPIC SALPINGO-OOPHORECTOMY USING DA RAMU XI Bilateral 8/15/2019    Procedure: XI ROBOTIC SALPINGO-OOPHORECTOMY;  Surgeon: Darius Regalado MD;  Location: [facility] OR;  Service: OB/GYN;  Laterality: Bilateral;    ROBOT-ASSISTED LYMPHADENECTOMY Left 8/15/2019    Procedure: ROBOTIC LYMPHADENECTOMY;  Surgeon: Darius Regalado MD;  Location: [facility] OR;  Service: OB/GYN;  Laterality: Left;    SALPINGOOPHORECTOMY Bilateral 08/15/2019    TONSILLECTOMY      TUBAL LIGATION         Family History:   Family History   Problem Relation Age of Onset    Hypertension Mother     Arthritis Mother     Other Mother 85        pancreatic tumor felt by pt's mother's HCP to be malignant though she survived for 8 yrs after dx    Pancreatic cancer Father 84    Pancreatic cancer Maternal Aunt 62    No Known Problems Sister     Hypertension Brother     Leukemia Paternal Uncle 70    Heart attack Paternal Grandmother     Heart attack Paternal Grandfather     Hypertension Brother     Hashimoto's thyroiditis Sister     Diabetes Other         strong family history per patient    Lung cancer Maternal Uncle         smoker    Cancer Paternal Aunt         unknown origin    Suicide Cousin     Alcohol abuse Cousin     Breast cancer Neg Hx     Ovarian cancer Neg Hx     Cirrhosis Neg Hx        Social History:  reports that she has never smoked. She has never used smokeless tobacco. She reports current drug use. Drug: Marijuana.  She reports that she does not drink alcohol.    Allergies:  Review of patient's allergies indicates:  No Known Allergies    Medications:  Current Outpatient Medications   Medication Sig Dispense Refill    acetaminophen (TYLENOL) 500 MG tablet Take 500 mg by mouth.      ALPRAZolam (XANAX) 0.25 MG tablet Take 1 tablet (0.25 mg total) by mouth 3 (three) times daily as needed for Anxiety. 90 tablet 1    b complex vitamins tablet Take 1 tablet by mouth once daily.      clobetasol 0.05% (TEMOVATE) 0.05 % Oint APPLY TO AFFECTED AREA(S) TWICE A DAY FOR 1 MONTH(S) then EVERY DAY FOR 1 MONTH(S) then 3 TIMES A WEEK 60 g 2    fluconazole (DIFLUCAN) 150 MG Tab       hydrocortisone (ANUSOL-HC) 2.5 % rectal cream Place rectally 2 (two) times daily. 30 g 1    levocetirizine (XYZAL) 5 MG tablet Take 1 tablet (5 mg total) by mouth every evening. 30 tablet 3    methen-rosalesblue-s.phos-phsal-hyo (URIBEL) 118-10-40.8-36 mg Cap Take 1 capsule by mouth 3 (three) times daily as needed (bladder pain). 40 capsule 11    metoclopramide HCl (REGLAN) 10 MG tablet Take 1 tablet (10 mg total) by mouth 3 (three) times daily with meals. 90 tablet 1    MILK THISTLE ORAL Take by mouth.      nystatin (MYCOSTATIN) cream Apply topically every Mon, Wed, Fri. 30 g 11    nystatin (MYCOSTATIN) powder Apply topically 4 (four) times daily. 30 g 1    ondansetron (ZOFRAN) 8 MG tablet TAKE 1 TABLET BY MOUTH EVERY 12 HOURS AS NEEDED FOR NAUSEA 30 tablet 2    oxyCODONE-acetaminophen (PERCOCET)  mg per tablet Take 1 tablet by mouth every 4 (four) hours as needed for Pain. 30 tablet 0    oxyCODONE-acetaminophen (PERCOCET) 5-325 mg per tablet Take 1 tablet by mouth every 4 (four) hours as needed. 30 tablet 0    pantoprazole (PROTONIX) 20 MG tablet Take 1 tablet (20 mg total) by mouth 2 (two) times daily before meals. 60 tablet 11    polyethylene glycol (GOLYTELY,NULYTELY) 236-22.74-6.74 -5.86 gram suspension       predniSONE (DELTASONE) 5 MG  tablet Take 3 each morning on odd number days 30 tablet 0    promethazine (PHENERGAN) 25 MG tablet TAKE 1 TABLET BY MOUTH EVERY 6 HOURS AS NEEDED FOR NAUSEA 30 tablet 2    scopolamine (TRANSDERM-SCOP) 1.3-1.5 mg (1 mg over 3 days) Place 1 patch onto the skin every 72 hours. 10 patch 2    silver sulfADIAZINE 1% (SILVADENE) 1 % cream Apply topically every morning. 50 g 11    TURMERIC ORAL Take by mouth.       No current facility-administered medications for this visit.        Review of Systems:  General: No fever, chills, or weight loss.  Chest: No chest pain, shortness of breath, or palpitations.  Breast: No pain, masses, or nipple discharge.  Vulva: No pain, lesions, or itching.  Vagina: No relaxation, itching, discharge, or lesions.  Abdomen: No pain, diarrhea, or constipation. +nausea  Urinary: No incontinence, nocturia, frequency, or dysuria.  Extremities:  No leg cramps, edema, or calf pain.  Neurologic: No headaches, dizziness, or visual changes.  Psychiatric: No anxiety, depression, or sleep issues.      Objective:      Vitals: There were no vitals filed for this visit.  BMI: There is no height or weight on file to calculate BMI.    Physical Exam: Deferred exam      Assessment:     1. Peripheral neuropathy due to chemotherapy    2. Chronic low back pain without sciatica, unspecified back pain laterality    3. Nausea           Plan:   Referral to acupuncture for neck/back pain, nausea and peripheral neuropathy.  Encouraged small, frequent meals.  Diet high in wide variety of fruits and vegetables.  Exercise daily as can tolerate, for fatigue. Encouraged at home yoga and will send links to options.  Will send information on [employer] and [employer].   Set up for follow up with Dr. Kam in 6 weeks.

## 2020-08-10 ENCOUNTER — TELEPHONE (OUTPATIENT)
Dept: OBSTETRICS AND GYNECOLOGY | Facility: CLINIC | Age: 65
End: 2020-08-10

## 2020-08-10 ENCOUNTER — TELEPHONE (OUTPATIENT)
Dept: SURGERY | Facility: CLINIC | Age: 65
End: 2020-08-10

## 2020-08-10 ENCOUNTER — DOCUMENTATION ONLY (OUTPATIENT)
Dept: HEMATOLOGY/ONCOLOGY | Facility: CLINIC | Age: 65
End: 2020-08-10

## 2020-08-10 ENCOUNTER — HOSPITAL ENCOUNTER (OUTPATIENT)
Dept: RADIOLOGY | Facility: HOSPITAL | Age: 65
Discharge: HOME | End: 2020-08-10
Attending: INTERNAL MEDICINE
Payer: MEDICARE

## 2020-08-10 ENCOUNTER — OFFICE VISIT (OUTPATIENT)
Dept: HEMATOLOGY/ONCOLOGY | Facility: CLINIC | Age: 65
End: 2020-08-10
Payer: MEDICARE

## 2020-08-10 VITALS
RESPIRATION RATE: 18 BRPM | WEIGHT: 140.88 LBS | OXYGEN SATURATION: 100 % | BODY MASS INDEX: 27.66 KG/M2 | HEIGHT: 60 IN | HEART RATE: 79 BPM | TEMPERATURE: 98 F | SYSTOLIC BLOOD PRESSURE: 119 MMHG | DIASTOLIC BLOOD PRESSURE: 66 MMHG

## 2020-08-10 DIAGNOSIS — D50.0 ANEMIA DUE TO CHRONIC BLOOD LOSS: ICD-10-CM

## 2020-08-10 DIAGNOSIS — Z12.31 ENCOUNTER FOR SCREENING MAMMOGRAM FOR MALIGNANT NEOPLASM OF BREAST: ICD-10-CM

## 2020-08-10 DIAGNOSIS — Z85.3 HX OF BREAST CANCER: Primary | ICD-10-CM

## 2020-08-10 DIAGNOSIS — Z85.3 HISTORY OF INVASIVE BREAST CANCER: ICD-10-CM

## 2020-08-10 PROCEDURE — 99214 OFFICE O/P EST MOD 30 MIN: CPT | Mod: S$PBB,,, | Performed by: NURSE PRACTITIONER

## 2020-08-10 PROCEDURE — 77067 SCR MAMMO BI INCL CAD: CPT | Mod: 26,,, | Performed by: RADIOLOGY

## 2020-08-10 PROCEDURE — 99999 PR PBB SHADOW E&M-EST. PATIENT-LVL V: ICD-10-PCS | Mod: PBBFAC,,, | Performed by: NURSE PRACTITIONER

## 2020-08-10 PROCEDURE — 77063 MAMMO DIGITAL SCREENING BILAT WITH TOMOSYNTHESIS_CAD: ICD-10-PCS | Mod: 26,,, | Performed by: RADIOLOGY

## 2020-08-10 PROCEDURE — 77063 BREAST TOMOSYNTHESIS BI: CPT | Mod: 26,,, | Performed by: RADIOLOGY

## 2020-08-10 PROCEDURE — 77067 MAMMO DIGITAL SCREENING BILAT WITH TOMOSYNTHESIS_CAD: ICD-10-PCS | Mod: 26,,, | Performed by: RADIOLOGY

## 2020-08-10 PROCEDURE — 77067 SCR MAMMO BI INCL CAD: CPT | Mod: TC

## 2020-08-10 PROCEDURE — 99999 PR PBB SHADOW E&M-EST. PATIENT-LVL V: CPT | Mod: PBBFAC,,, | Performed by: NURSE PRACTITIONER

## 2020-08-10 PROCEDURE — 99214 PR OFFICE/OUTPT VISIT, EST, LEVL IV, 30-39 MIN: ICD-10-PCS | Mod: S$PBB,,, | Performed by: NURSE PRACTITIONER

## 2020-08-10 PROCEDURE — 99215 OFFICE O/P EST HI 40 MIN: CPT | Mod: PBBFAC | Performed by: NURSE PRACTITIONER

## 2020-08-10 NOTE — TELEPHONE ENCOUNTER
----- Message from Shelley Maynard PA-C sent at 8/6/2020  3:55 PM CDT -----  She needs a 6 week follow up with DR Kam for survivorship. Thank you!

## 2020-08-10 NOTE — PROGRESS NOTES
Discussed with pt that Melodymoriah has advised me as of this morning that her cancer-genetic test results will not be ready for another 3-4 days. Pt is very understanding. My office will contact her to reschedule.  Distress Score of 4/10 reported to me for pt by MA. Pt states this is her baseline, denies SI/HI, and is established with Oncology Psychology.

## 2020-08-10 NOTE — TELEPHONE ENCOUNTER
Emailed patient wig providers and a few other resources. Patient verbalized understanding.    ----- Message from Shelley Maynard PA-C sent at 8/6/2020  4:15 PM CDT -----  Hi Corinne!    Thanks so much for the help. Jeanne is just finishing up treatment for her endometrial cancer and asking about support groups/help with wigs. Hope you are doing well!    Shelley

## 2020-08-17 ENCOUNTER — HOSPITAL ENCOUNTER (OUTPATIENT)
Dept: RADIOLOGY | Facility: HOSPITAL | Age: 65
Discharge: HOME | End: 2020-08-17
Attending: PHYSICIAN ASSISTANT
Payer: MEDICARE

## 2020-08-17 DIAGNOSIS — K76.0 HEPATIC STEATOSIS: ICD-10-CM

## 2020-08-17 PROCEDURE — 76705 ECHO EXAM OF ABDOMEN: CPT | Mod: 26,,, | Performed by: RADIOLOGY

## 2020-08-17 PROCEDURE — 76705 US ABDOMEN LIMITED: ICD-10-PCS | Mod: 26,,, | Performed by: RADIOLOGY

## 2020-08-17 PROCEDURE — 76705 ECHO EXAM OF ABDOMEN: CPT | Mod: TC

## 2020-08-20 ENCOUNTER — TELEPHONE (OUTPATIENT)
Dept: HEMATOLOGY/ONCOLOGY | Facility: CLINIC | Age: 65
End: 2020-08-20

## 2020-08-20 NOTE — TELEPHONE ENCOUNTER
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 Asked the pt to return my call; left my contact info.

## 2020-08-21 ENCOUNTER — OFFICE VISIT (OUTPATIENT)
Dept: PSYCHIATRY | Facility: CLINIC | Age: 65
End: 2020-08-21
Payer: MEDICARE

## 2020-08-21 VITALS
WEIGHT: 140.19 LBS | HEART RATE: 76 BPM | SYSTOLIC BLOOD PRESSURE: 108 MMHG | DIASTOLIC BLOOD PRESSURE: 67 MMHG | BODY MASS INDEX: 27.38 KG/M2

## 2020-08-21 DIAGNOSIS — F06.4 ANXIETY DISORDER DUE TO MEDICAL CONDITION: Primary | ICD-10-CM

## 2020-08-21 PROCEDURE — 90792 PSYCH DIAG EVAL W/MED SRVCS: CPT | Mod: ,,, | Performed by: SPECIALIST

## 2020-08-21 PROCEDURE — 90792 PR PSYCHIATRIC DIAGNOSTIC EVALUATION W/MEDICAL SERVICES: ICD-10-PCS | Mod: ,,, | Performed by: SPECIALIST

## 2020-08-21 PROCEDURE — 99999 PR PBB SHADOW E&M-EST. PATIENT-LVL II: CPT | Mod: PBBFAC,,, | Performed by: SPECIALIST

## 2020-08-21 PROCEDURE — 99212 OFFICE O/P EST SF 10 MIN: CPT | Mod: PBBFAC | Performed by: SPECIALIST

## 2020-08-21 PROCEDURE — 99999 PR PBB SHADOW E&M-EST. PATIENT-LVL II: ICD-10-PCS | Mod: PBBFAC,,, | Performed by: SPECIALIST
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
## 2020-08-24 ENCOUNTER — TELEPHONE (OUTPATIENT)
Dept: HEMATOLOGY/ONCOLOGY | Facility: CLINIC | Age: 65
End: 2020-08-24

## 2020-08-24 NOTE — TELEPHONE ENCOUNTER
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 Asked the pt to return my call or respond to my [facility] message; left my phone number.    ---    I can see the pt on 8/28 at 10am, just before another appt she has scheduled here. Messaged pt.

## 2020-08-24 NOTE — TELEPHONE ENCOUNTER
Pt returned my call. We discussed her TP53 mutation-positive cancer-genetic test result and potential next steps. Appt scheduled for 8/28 at 10am, in person. Appt date, time, and location reviewed with pt. Pt verbalized understanding of information and agreement with the plan.

## 2020-08-25 PROBLEM — D50.0 ANEMIA DUE TO CHRONIC BLOOD LOSS: Status: RESOLVED | Noted: 2020-06-10 | Resolved: 2020-08-25

## 2020-08-25 NOTE — PROGRESS NOTES
Subjective:       Patient ID: Jeanne Rodgers is a 65 y.o. female.    Chief Complaint: Hx of breast cancer    HPI  Returns for follow-up  She completed her last chemotherapy in May 8, 2020.     Today: She notes that her bones have been hurting her all over. She does have a bone scan in September.  She notes excruciating cramps and then needing to have a bowel movement and the cramps resolve.   Eating and drinking well. Hot flashes better with acupuncture.    Very anxious about cancer reoccurring, long discussion with patient about healthy habits. She will continue to follow with Dr. Marcum.       In the interval she was diagnosed with endometrial cancer after presenting with vaginal bleeding. She has completed surgical debulking, chemotherapy and XRT but states that she has developed nausea and vomiting since starting XRT  She was also admitted in June of this year with melena and had a complete GI work up (see below)Of note during chemotherapy she required several transfusions for symptomatic anemia           Breast Oncology History:  This is a 64 yo female with a history of right breast cancer  - Had been getting her mammograms at [facility] as she was uninsured at the time  - reports small mass seen in 2007 and sent to [facility]- where she was told nothing was seen on review  - Repeat situation of above the next year in 2008  - she noted right nipple inversion 2012- went to [facility]- told not likely cancer, no further testing done  - In 2014 it was recommended that [facility] see her for imaging  - Abnormal mammogram and biopsy recommended  Reports biopsy experience was awful and so she researched other sites to be cared  - She sought out Dr. 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 Keller at   [facility]  Oncotype done ? 9- chemotherapy not recommended  She was started on Arimidex  -She was on Arimidex for 4 years until stopping (see below).    Genetic testing negative     BMD done 8/13/18 which revealed osteopenia at hip.      She has been seen by Rheumatology for the joint issues +FANNY found ? of autoimmune   Also has a history of hives- no cause found despite multtiple dermatologic evaluations       Review of Systems   Constitutional: Positive for fatigue. Negative for activity change, appetite change, chills, fever and unexpected weight change.   HENT: Negative for dental problem, sore throat and trouble swallowing.    Eyes: Negative for visual disturbance.   Respiratory: Negative for cough, shortness of breath and wheezing.    Cardiovascular: Negative for chest pain, palpitations, leg swelling and claudication.   Gastrointestinal: Positive for abdominal pain (cramping prior to bowel movements) and nausea. Negative for abdominal distention, blood in stool, change in bowel habit, constipation, diarrhea, vomiting, reflux and change in bowel habit.   Genitourinary: Negative for decreased urine volume, difficulty urinating, dysuria, frequency and urgency.   Musculoskeletal: Negative for arthralgias, back pain, gait problem and joint deformity.   Neurological: Negative for light-headedness and headaches.   Hematological: Negative for adenopathy. Does not bruise/bleed easily.   Psychiatric/Behavioral: Negative for dysphoric mood. The patient is not nervous/anxious.          Objective:      Physical Exam  Vitals signs and nursing note reviewed.   Constitutional:       General: She is not in acute distress.     Appearance: Normal appearance. She is not ill-appearing or diaphoretic.   HENT:      Head: Normocephalic and atraumatic.   Neck:      Musculoskeletal: Normal range of motion and neck supple.   Cardiovascular:      Rate and Rhythm: Normal rate and regular rhythm.      Heart sounds: Normal heart sounds. No  murmur. No friction rub. No gallop.    Pulmonary:      Effort: Pulmonary effort is normal. No respiratory distress.      Breath sounds: Normal breath sounds. No wheezing, rhonchi or rales.   Abdominal:      General: Bowel sounds are normal. There is no distension.      Palpations: Abdomen is soft. There is no mass.      Tenderness: There is no abdominal tenderness. There is no guarding or rebound.      Comments: No organomegaly   Lymphadenopathy:      Cervical: No cervical adenopathy.   Skin:     General: Skin is warm and dry.      Coloration: Skin is not jaundiced or pale.      Findings: No rash.   Neurological:      General: No focal deficit present.      Mental Status: She is alert and oriented to person, place, and time.   Psychiatric:         Mood and Affect: Mood normal.         Behavior: Behavior normal.         Thought Content: Thought content normal.         Judgment: Judgment normal.       Labs- reviewed  Imaging- reviewed  Assessment:       1. History of invasive breast cancer    2. Endometrial cancer    3. Pancytopenia due to antineoplastic chemotherapy    4. Bone pain        Plan:     1. ADONIS clinically  Mammogram negative for malignancy will repeat in 1 year. Done in August 2020  2. Treated by Dr. Regalado and now on surveillance. Will follow up this month  3. Pancytopenia  4. Will do a bone scan in 3 weeks and follow up with Dr. Ford         Return to clinic in 3 weeks with PRIMO appointment and labs.     Patient is in agreement with the proposed treatment plan. All questions were answered to the patient's satisfaction. Patient knows to call clinic for any new or worsening symptoms and if anything is needed before the next clinic visit.          Hue Hatch, FNP-C  Hematology & Medical Oncology   [facility] [place] [address]  ph. [phone]  Fax. [phone]    Patient dicussed with collaborating physician, Dr. Ford.

## 2020-08-26 LAB
GENETIC COUNSELING?: YES
GENSO SPECIMEN TYPE: NORMAL
MISCELLANEOUS GENETIC TEST NAME: NORMAL
PARTENTAL OR SIBLING TESTING?: NO
REFERENCE LAB: NORMAL
TEST RESULT: NORMAL
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
## 2020-08-28 ENCOUNTER — OFFICE VISIT (OUTPATIENT)
Dept: HEMATOLOGY/ONCOLOGY | Facility: CLINIC | Age: 65
End: 2020-08-28
Payer: MEDICARE

## 2020-08-28 ENCOUNTER — CLINICAL SUPPORT (OUTPATIENT)
Dept: HEMATOLOGY/ONCOLOGY | Facility: CLINIC | Age: 65
End: 2020-08-28
Payer: MEDICARE

## 2020-08-28 DIAGNOSIS — Z15.01 MUTATION IN TP53 GENE: ICD-10-CM

## 2020-08-28 DIAGNOSIS — G62.0 PERIPHERAL NEUROPATHY DUE TO CHEMOTHERAPY: ICD-10-CM

## 2020-08-28 DIAGNOSIS — Z71.83 ENCOUNTER FOR NONPROCREATIVE GENETIC COUNSELING: Primary | ICD-10-CM

## 2020-08-28 DIAGNOSIS — T45.1X5A PERIPHERAL NEUROPATHY DUE TO CHEMOTHERAPY: ICD-10-CM

## 2020-08-28 DIAGNOSIS — Z15.89 MUTATION IN TP53 GENE: ICD-10-CM

## 2020-08-28 DIAGNOSIS — G89.29 CHRONIC LOW BACK PAIN WITHOUT SCIATICA, UNSPECIFIED BACK PAIN LATERALITY: ICD-10-CM

## 2020-08-28 DIAGNOSIS — M54.50 CHRONIC LOW BACK PAIN WITHOUT SCIATICA, UNSPECIFIED BACK PAIN LATERALITY: ICD-10-CM

## 2020-08-28 DIAGNOSIS — Z15.09 MUTATION IN TP53 GENE: ICD-10-CM

## 2020-08-28 DIAGNOSIS — R11.0 NAUSEA: ICD-10-CM

## 2020-08-28 PROCEDURE — 99999 PR PBB SHADOW E&M-EST. PATIENT-LVL II: CPT | Mod: PBBFAC,,, | Performed by: NURSE PRACTITIONER

## 2020-08-28 PROCEDURE — 97814 ACUP 1/> W/ESTIM EA ADDL 15: CPT | Mod: ,,, | Performed by: ACUPUNCTURIST

## 2020-08-28 PROCEDURE — 99999 PR PBB SHADOW E&M-EST. PATIENT-LVL I: CPT | Mod: PBBFAC,,, | Performed by: ACUPUNCTURIST

## 2020-08-28 PROCEDURE — 99211 OFF/OP EST MAY X REQ PHY/QHP: CPT | Mod: PBBFAC | Performed by: ACUPUNCTURIST

## 2020-08-28 PROCEDURE — 99214 PR OFFICE/OUTPT VISIT, EST, LEVL IV, 30-39 MIN: ICD-10-PCS | Mod: S$PBB,,, | Performed by: NURSE PRACTITIONER

## 2020-08-28 PROCEDURE — 97813 ACUP 1/> W/ESTIM 1ST 15 MIN: CPT | Mod: ,,, | Performed by: ACUPUNCTURIST

## 2020-08-28 PROCEDURE — 97813 PR ACUPUNCT W/ ELEC STIMUL 15 MIN: ICD-10-PCS | Mod: ,,, | Performed by: ACUPUNCTURIST

## 2020-08-28 PROCEDURE — 99999 PR PBB SHADOW E&M-EST. PATIENT-LVL I: ICD-10-PCS | Mod: PBBFAC,,, | Performed by: ACUPUNCTURIST

## 2020-08-28 PROCEDURE — 97814 PR ACUPUNCT W/ ELEC STIMUL ADDL 15M: ICD-10-PCS | Mod: ,,, | Performed by: ACUPUNCTURIST

## 2020-08-28 PROCEDURE — 99214 OFFICE O/P EST MOD 30 MIN: CPT | Mod: S$PBB,,, | Performed by: NURSE PRACTITIONER

## 2020-08-28 PROCEDURE — 99999 PR PBB SHADOW E&M-EST. PATIENT-LVL II: ICD-10-PCS | Mod: PBBFAC,,, | Performed by: NURSE PRACTITIONER

## 2020-08-28 PROCEDURE — 99212 OFFICE O/P EST SF 10 MIN: CPT | Mod: PBBFAC,27 | Performed by: NURSE PRACTITIONER

## 2020-08-28 NOTE — LETTER
August 31, 2020      Nano Ford MD  [address]  [facility] [address]           [facility] - Hereditary and High Risk  [address]  [facility] [address]  Phone: [phone]  Fax: [phone]          Patient: Jeanne Rodgers   MR Number: [id number]   YOB: 1955   Date of Visit: 8/28/2020       Dear Dr. Nano Ford:    Thank you for referring Jeanne Rodgers to me for evaluation. Attached you will find relevant portions of my assessment and plan of care.    If you have questions, please do not hesitate to call me. I look forward to following Jeanne Rodgers along with you.    Sincerely,    Kenton Yoder, DNP    Enclosure  CC:  No Recipients    If you would like to receive this communication electronically, please contact [email or web address] or [phone] to request more information on [employer] Link access.    For providers and/or their staff who would like to refer a patient to [facility], please contact us through our one-stop-shop provider referral line, [facility], at [phone].    If you feel you have received this communication in error or would no longer like to receive these types of communications, please e-mail [email or web address]

## 2020-08-28 NOTE — PROGRESS NOTES
Subjective:       Patient ID: Jeanne Rodgers is a 65 y.o. female.    Chief Complaint: Pain (cLBP) and Nausea (since May )    Patient is here to treat chronic low back pain. She also has issues of nausea, hot flashes and bilateral foot neuropathy. Chronic low back pain is in lumbar region and worse in the morning / stiffness.     Review of Systems   Gastrointestinal: Positive for nausea.   Genitourinary: Positive for hot flashes.   Musculoskeletal: Positive for back pain.         Objective:          Assessment:       1. Chronic low back pain without sciatica, unspecified back pain laterality    2. Peripheral neuropathy due to chemotherapy    3. Nausea        Plan:       1x a week for 4-6 weeks then re-evaluate*       NAUSEA:  Pre-Symptom Score: 7  Post-Symptom Score: 7     Pain (cLBP) and Nausea (since May )       Encounter Diagnoses   Name Primary?    Chronic low back pain without sciatica, unspecified back pain laterality     Peripheral neuropathy due to chemotherapy     Nausea        TCM Diagnosis: Toxic Heat / Stomach Heat    Physical Exam   Constitutional:            Acupuncture points used:  Li11, Pc6, St36, St25, Sp6, Ki6, Gb34, REN4, REN6, REN12, ER JEREMIE, FERNANDO MEN, YIN CEVALLOS and 4 CONTRERAS    ADD STIM (PC6/St36)      NEEDLES IN: 21  NEEDLES OUT: 21    NEEDLES AND STIM INSERTED: 11:15 AM  NEEDLES AND STIM REMOVED: 11:45 AM
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
## 2020-08-28 NOTE — Clinical Note
Carlos Wallace, I saw Ms. 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 I'd be happy to forward along any information you want me to pass along to her.  Thanks! -Kenton
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
## 2020-09-02 ENCOUNTER — TELEPHONE (OUTPATIENT)
Dept: HEMATOLOGY/ONCOLOGY | Facility: CLINIC | Age: 65
End: 2020-09-02

## 2020-09-02 NOTE — TELEPHONE ENCOUNTER
Phoned pt  Pain in neck and legs both come and go, not improving though  Also pain now in left thumb and 2nd finger, started today, of note she does report ipsilateral carpal tunnel  Pt has not been experiencing any SOB or chest pain; advised her that those symptoms would warrant ER   Provided pt with 24/7 [facility] On[facility] nurse line, and advised her of its benefit  Pt did state established with Dr. Ballesteros and Dr. Patel and does not desire Social Work referral; denies SI/HI, and knows to call 911 with any SI/HI\  Scheduled pt with Dr. Ford for this Friday 9/4 at 1pm  Pt voiced gratitude and understanding of and agreement with plan

## 2020-09-04 ENCOUNTER — OFFICE VISIT (OUTPATIENT)
Dept: HEMATOLOGY/ONCOLOGY | Facility: CLINIC | Age: 65
End: 2020-09-04
Payer: MEDICARE

## 2020-09-04 ENCOUNTER — CLINICAL SUPPORT (OUTPATIENT)
Dept: HEMATOLOGY/ONCOLOGY | Facility: CLINIC | Age: 65
End: 2020-09-04
Payer: MEDICARE

## 2020-09-04 VITALS
HEIGHT: 60 IN | BODY MASS INDEX: 26.66 KG/M2 | OXYGEN SATURATION: 97 % | RESPIRATION RATE: 16 BRPM | SYSTOLIC BLOOD PRESSURE: 113 MMHG | TEMPERATURE: 98 F | WEIGHT: 135.81 LBS | DIASTOLIC BLOOD PRESSURE: 61 MMHG | HEART RATE: 70 BPM

## 2020-09-04 DIAGNOSIS — R11.0 NAUSEA: ICD-10-CM

## 2020-09-04 DIAGNOSIS — C54.1 ENDOMETRIAL CANCER: ICD-10-CM

## 2020-09-04 DIAGNOSIS — M89.8X9 BONE PAIN: ICD-10-CM

## 2020-09-04 DIAGNOSIS — Z85.3 HX OF BREAST CANCER: Primary | ICD-10-CM

## 2020-09-04 DIAGNOSIS — D61.818 PANCYTOPENIA: ICD-10-CM

## 2020-09-04 DIAGNOSIS — L29.9 ITCH: ICD-10-CM

## 2020-09-04 DIAGNOSIS — M54.50 LOW BACK PAIN OF OVER 3 MONTHS DURATION: Primary | ICD-10-CM

## 2020-09-04 PROCEDURE — 99215 OFFICE O/P EST HI 40 MIN: CPT | Mod: PBBFAC,27 | Performed by: INTERNAL MEDICINE

## 2020-09-04 PROCEDURE — 97813 PR ACUPUNCT W/ ELEC STIMUL 15 MIN: ICD-10-PCS | Mod: ,,, | Performed by: ACUPUNCTURIST

## 2020-09-04 PROCEDURE — 99999 PR PBB SHADOW E&M-EST. PATIENT-LVL I: CPT | Mod: PBBFAC,,, | Performed by: ACUPUNCTURIST

## 2020-09-04 PROCEDURE — 99211 OFF/OP EST MAY X REQ PHY/QHP: CPT | Mod: PBBFAC | Performed by: ACUPUNCTURIST

## 2020-09-04 PROCEDURE — 97813 ACUP 1/> W/ESTIM 1ST 15 MIN: CPT | Mod: ,,, | Performed by: ACUPUNCTURIST

## 2020-09-04 PROCEDURE — 99999 PR PBB SHADOW E&M-EST. PATIENT-LVL V: ICD-10-PCS | Mod: PBBFAC,,, | Performed by: INTERNAL MEDICINE

## 2020-09-04 PROCEDURE — 99214 PR OFFICE/OUTPT VISIT, EST, LEVL IV, 30-39 MIN: ICD-10-PCS | Mod: S$PBB,,, | Performed by: INTERNAL MEDICINE

## 2020-09-04 PROCEDURE — 97814 PR ACUPUNCT W/ ELEC STIMUL ADDL 15M: ICD-10-PCS | Mod: ,,, | Performed by: ACUPUNCTURIST

## 2020-09-04 PROCEDURE — 99999 PR PBB SHADOW E&M-EST. PATIENT-LVL I: ICD-10-PCS | Mod: PBBFAC,,, | Performed by: ACUPUNCTURIST

## 2020-09-04 PROCEDURE — 97814 ACUP 1/> W/ESTIM EA ADDL 15: CPT | Mod: ,,, | Performed by: ACUPUNCTURIST

## 2020-09-04 PROCEDURE — 99999 PR PBB SHADOW E&M-EST. PATIENT-LVL V: CPT | Mod: PBBFAC,,, | Performed by: INTERNAL MEDICINE

## 2020-09-04 PROCEDURE — 99214 OFFICE O/P EST MOD 30 MIN: CPT | Mod: S$PBB,,, | Performed by: INTERNAL MEDICINE

## 2020-09-04 NOTE — PROGRESS NOTES
Subjective:       Patient ID: Jeanne Rodgers is a 65 y.o. female.    Chief Complaint: Nausea (CINV), Cancer (hot flashes), and Pain (cLBP)    Patient did well with first treatment. Nausea symptoms reduced and hot flashes doing better. Score down to a 5 after first treatment. Patient is complaining of itchiness then hives/inflammation following directly after itching - looks similar to dermatographia (presented in clinic with it today). Patient is sent home with ear seeds to treat the hot flashes and low back pain is being treated distally.     Review of Systems      Objective:      Physical Exam  Constitutional:              Assessment:       1. Low back pain of over 3 months duration    2. Nausea        Plan:       1x a week for 5 more weeks then re-evaluate*         Pre-Symptom Score: 7  Post-Symptom Score: 5     Nausea (CINV), Cancer (hot flashes), and Pain (cLBP)       Encounter Diagnoses   Name Primary?    Low back pain of over 3 months duration Yes    Nausea        TCM Diagnosis: Qi and Blood Stagnation w/ Toxic Heat    Physical Exam   Constitutional:            Acupuncture points used:  Li11, St36, St25, Sp9, Sp6, Ki3, Ki6, Gb34, REN4, REN6, REN12, ER JEREMIE, FERNANDO MEN, YIN CEVALLOS and 4 CONTRERAS    STIM USED AT SP6/ST36      NEEDLES IN: 27  NEEDLES OUT: 27    NEEDLES AND STIM STARTED: 11:00 AM  NEEDLES AND STIM REMOVED: 11:30 AM

## 2020-09-04 NOTE — PROGRESS NOTES
Subjective:       Patient ID: Jeanne Rodgers is a 65 y.o. female.    Chief Complaint: No chief complaint on file.    HPI     Presents for follow up to discuss pain that developed in last 2-3 weeks  She mentioned it to Kenton Yoder NP and we asked her to come in today to discuss.    In the interval she also had updated genetic testing  2020 Germline Cancer-Genetic Testing  · Test(s) performed:  [employer] Common Hereditary Cancers Panel  Number of genes analyzed:  47  Laboratory:  [facility]  Ordering provider:  ANGIE Blackman  Sample collection date:  07/15/2020  Results report date:  08/14/2020              Full report to be scanned into Epic under the Labs and/or Media tab(s).  RESULTS:  POSITIVE (as detailed below)  Gene Variant Zygosity Variant Classification   TP53 c.524G>A ([id number]) POSSIBLY MOSAIC POSITIVE   No other variant/mutation reported.            COUNSELING/MEDICAL DECISION-MAKING:        Note below discussion and recommendations provided from genetic counseling visit (copied forward):  TP53 Mutation Implications  Li-Fraumeni Syndrome (LFS) is a hereditary (germline) cancer syndrome diagnosed with the identification of a germline TP53 gene mutation, which is passed from parent to child and is present for the patient's lifespan in essentially every bodily cell (GHR, 2020).  Its associated risks, including breast cancer, brain cancer, sarcoma, adrenocortical carcinoma, and colorectal cancer ([employer], 2020; NCCN, 1.2020).  It is important to note that the patient does not at this time have a diagnosis of LFS, as we do not know yet whether the patient's TP53 mutation is germline.   Somatic (acquired) TP53 mutations do not occur in the germline and occur at some other point in the lifespan (not at conception) (GHR, 2020) in the blood due to any of several factors, including advancing age ([employer], 2020), history of chemotherapy (which applies to patient), hematologic malignancy (NCCN, 1.2020),  environmental factors including ultraviolet (UV) exposure (GHR, 2020), and DNA replication errors during cell division (GHR, 2020).  Somatic mutations that develop in only a single cell early in the embryonic phase can lead to mosaicism (GHR, 2020).  With mosaicism, the gene mutation is not present in the affected individual's egg or sperm cells or in the fertilized egg but rather develops later during the embryonic phase, and, per the process of cell division, cells arising from the cell carrying the mutation will also have the mutation, but other cells will not (GHR, 2020).  Mosaicism's causing health problems is a function of both the specific mutation and the number of cells affected by the mutation (GHR, 2020).  Up to 20% of LFS cases are de fadi ([employer], 2020).  De fadi (i.e. new) mutations can be germline or somatic (GHR, 2020).  Sometimes, the de fadi mutation develops in the individual's germline (egg or sperm cells) but is absent in all of the individual's other cells; other times, the de fadi mutation develops in the fertilized egg shortly post-conception, and, as the fertilized egg divides, each resultant cell also has the mutation (GHR, 2020).    It is important to distinguish germline origin versus somatic origin as the former has the potential to impact the patient's family while the latter does not.    Recommendations  Appropriate next steps include:  1. A complete blood count (CBC) and a peripheral blood smear is warranted to evaluate for hematologic neoplasia (NCCN, 1.2020).  The patient is amenable to this.  I do not suspect hematologic neoplasia as etiology of her TP53 mutation.  2. Testing of parents for identification of the patient's TP53 mutation in either of them; however, the patient's parents are unfortunately unavailable for testing, and testing even all of the patient's siblings, if they all came back negative, would not confirm that the patient's TP53 mutation is not germline in  origin.  Therefore, a fibroblast skin culture is recommended, which breast surgeon Dr. Nicci García has agreed to perform.  The patient is amenable to this.  We (Kenton Yoder NP) will have a post-test genetic counseling session once the above two steps are completed so that we can have an understanding of the nature of Jeanne's TP53 mutation.      Endometrial Cancer History:  Diagnosis of endometrial cancer after presenting with vaginal bleeding  She has completed surgical debulking, chemotherapy and XRT but states that she has developed nausea and vomiting since starting XRT  She was also admitted in June of this year with melena and had a complete GI work up (see below)  Of note during chemotherapy she required several transfusions for symptomatic anemia    Breast Oncology History:  This is a 66 yo female with a history of right breast cancer  - Had been getting her mammograms at [facility] as she was uninsured at the time  - reports small mass seen in 2007 and sent to [facility]- where she was told nothing was seen on review  - Repeat situation of above the next year in 2008  - she noted right nipple inversion 2012- went to [facility]- told not likely cancer, no further testing done  - In 2014 it was recommended that [facility] see her for imaging  - Abnormal mammogram and biopsy recommended  Reports biopsy experience was awful and so she researched other sites to be cared  - She sought out Dr. 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 Keller at [facility]  Oncotype done ? 9- chemotherapy not recommended  She was started on Arimidex  -She was on Arimidex for 4 years until stopping (see below).    Genetic testing negative     BMD done 8/13/18 which revealed osteopenia at hip.      She has been seen by  Rheumatology for the joint issues +FANNY found ? of autoimmune   Also has a history of hives- no cause found despite multtiple dermatologic evaluations    Notes the development of pain x 2-3 weeks  Notes in bilateral shins- states worse at night   states less intense in day, feels like a dull aching pain- gets to 7/10 at its worst  Notes a neck pain she was attributing to known DJD and worsening anxiety- states present all the time  Notes no alleviating or other exacerbating factors  Pulling pain at night in abdominal pain   Neuropathy unchanged  Easy bruising- states noted x 1 week     Notes hives since 2014 aggravated by itching  Work up unrevealing- placed on medications which helps  Normal histamine test  Mostly was evaluated by dermatology    Review of Systems   Constitutional: Positive for fatigue. Negative for activity change, appetite change, chills, fever and unexpected weight change.   HENT: Negative for dental problem.    Eyes: Negative for visual disturbance.   Respiratory: Negative for cough, shortness of breath and wheezing.    Cardiovascular: Negative for chest pain, palpitations and leg swelling.   Gastrointestinal: Negative for abdominal distention, abdominal pain, blood in stool, change in bowel habit, constipation, diarrhea, nausea (reports better), vomiting and change in bowel habit.   Genitourinary: Negative for decreased urine volume, difficulty urinating, dysuria, frequency and urgency.   Musculoskeletal: Positive for arthralgias and neck pain. Negative for back pain, gait problem and joint deformity.   Integumentary:  Positive for rash (itching).   Neurological: Negative for light-headedness and headaches.   Hematological: Negative for adenopathy. Bruises/bleeds easily (easy bruising).   Psychiatric/Behavioral: Negative for dysphoric mood. The patient is not nervous/anxious.      Labs- reviewed from previous visits      Objective:      Physical Exam  Constitutional:       Comments: Presents  with sister  Very pleasant       see recent note from me  Assessment:       1. Hx of breast cancer    2. Endometrial cancer    3. Pancytopenia    4. Itch    5. Bone pain        Plan:     1. ADONIS  2. Under surveillance  3. Post recent chemotherapy  We will recheck and review smear next week  Bmbx if needed  4. Has seen dermatology prior  Will also refer to allergy  I will research any association with recent genetic finding as well  5. Bone scan discussed    25 minutes total

## 2020-09-08 ENCOUNTER — LAB VISIT (OUTPATIENT)
Dept: LAB | Facility: HOSPITAL | Age: 65
End: 2020-09-08
Attending: INTERNAL MEDICINE
Payer: MEDICARE

## 2020-09-08 ENCOUNTER — OFFICE VISIT (OUTPATIENT)
Dept: HEMATOLOGY/ONCOLOGY | Facility: CLINIC | Age: 65
End: 2020-09-08
Payer: MEDICARE

## 2020-09-08 VITALS
SYSTOLIC BLOOD PRESSURE: 114 MMHG | RESPIRATION RATE: 18 BRPM | HEART RATE: 67 BPM | BODY MASS INDEX: 27.09 KG/M2 | HEIGHT: 60 IN | WEIGHT: 138 LBS | DIASTOLIC BLOOD PRESSURE: 56 MMHG | TEMPERATURE: 98 F | OXYGEN SATURATION: 99 %

## 2020-09-08 DIAGNOSIS — T45.1X5A PANCYTOPENIA DUE TO ANTINEOPLASTIC CHEMOTHERAPY: ICD-10-CM

## 2020-09-08 DIAGNOSIS — Z85.3 HX OF BREAST CANCER: ICD-10-CM

## 2020-09-08 DIAGNOSIS — Z85.3 HISTORY OF INVASIVE BREAST CANCER: Primary | ICD-10-CM

## 2020-09-08 DIAGNOSIS — M89.8X9 BONE PAIN: ICD-10-CM

## 2020-09-08 DIAGNOSIS — C54.1 ENDOMETRIAL CANCER: ICD-10-CM

## 2020-09-08 DIAGNOSIS — D61.810 PANCYTOPENIA DUE TO ANTINEOPLASTIC CHEMOTHERAPY: ICD-10-CM

## 2020-09-08 PROCEDURE — 85025 COMPLETE CBC W/AUTO DIFF WBC: CPT

## 2020-09-08 PROCEDURE — 99999 PR PBB SHADOW E&M-EST. PATIENT-LVL IV: ICD-10-PCS | Mod: PBBFAC,,, | Performed by: NURSE PRACTITIONER

## 2020-09-08 PROCEDURE — 99214 PR OFFICE/OUTPT VISIT, EST, LEVL IV, 30-39 MIN: ICD-10-PCS | Mod: S$PBB,,, | Performed by: NURSE PRACTITIONER

## 2020-09-08 PROCEDURE — 36415 COLL VENOUS BLD VENIPUNCTURE: CPT

## 2020-09-08 PROCEDURE — 99214 OFFICE O/P EST MOD 30 MIN: CPT | Mod: PBBFAC | Performed by: NURSE PRACTITIONER

## 2020-09-08 PROCEDURE — 85060 BLOOD SMEAR INTERPRETATION: CPT | Mod: ,,, | Performed by: PATHOLOGY

## 2020-09-08 PROCEDURE — 99214 OFFICE O/P EST MOD 30 MIN: CPT | Mod: S$PBB,,, | Performed by: NURSE PRACTITIONER

## 2020-09-08 PROCEDURE — 99999 PR PBB SHADOW E&M-EST. PATIENT-LVL IV: CPT | Mod: PBBFAC,,, | Performed by: NURSE PRACTITIONER

## 2020-09-08 PROCEDURE — 85027 COMPLETE CBC AUTOMATED: CPT

## 2020-09-08 PROCEDURE — 85060 PATHOLOGIST REVIEW: ICD-10-PCS | Mod: ,,, | Performed by: PATHOLOGY

## 2020-09-11 ENCOUNTER — CLINICAL SUPPORT (OUTPATIENT)
Dept: HEMATOLOGY/ONCOLOGY | Facility: CLINIC | Age: 65
End: 2020-09-11
Payer: MEDICARE

## 2020-09-11 ENCOUNTER — OFFICE VISIT (OUTPATIENT)
Dept: GYNECOLOGIC ONCOLOGY | Facility: CLINIC | Age: 65
End: 2020-09-11
Payer: MEDICARE

## 2020-09-11 ENCOUNTER — LAB VISIT (OUTPATIENT)
Dept: LAB | Facility: OTHER | Age: 65
End: 2020-09-11
Attending: OBSTETRICS & GYNECOLOGY
Payer: MEDICARE

## 2020-09-11 VITALS
BODY MASS INDEX: 26.56 KG/M2 | DIASTOLIC BLOOD PRESSURE: 59 MMHG | WEIGHT: 136 LBS | SYSTOLIC BLOOD PRESSURE: 121 MMHG | HEART RATE: 61 BPM

## 2020-09-11 DIAGNOSIS — N89.8 VAGINAL IRRITATION: ICD-10-CM

## 2020-09-11 DIAGNOSIS — Z85.3 HX OF BREAST CANCER: ICD-10-CM

## 2020-09-11 DIAGNOSIS — C54.1 ENDOMETRIAL CANCER: Primary | ICD-10-CM

## 2020-09-11 DIAGNOSIS — R10.2 PELVIC PAIN IN FEMALE: ICD-10-CM

## 2020-09-11 DIAGNOSIS — M54.50 LOW BACK PAIN OF OVER 3 MONTHS DURATION: Primary | ICD-10-CM

## 2020-09-11 DIAGNOSIS — R11.0 NAUSEA: ICD-10-CM

## 2020-09-11 DIAGNOSIS — D61.818 PANCYTOPENIA: ICD-10-CM

## 2020-09-11 DIAGNOSIS — C54.1 ENDOMETRIAL CANCER: ICD-10-CM

## 2020-09-11 DIAGNOSIS — M89.8X9 BONE PAIN: ICD-10-CM

## 2020-09-11 LAB — CANCER AG125 SERPL-ACNC: 17 U/ML (ref 0–30)

## 2020-09-11 PROCEDURE — 97814 ACUP 1/> W/ESTIM EA ADDL 15: CPT | Mod: ,,, | Performed by: ACUPUNCTURIST

## 2020-09-11 PROCEDURE — 99214 OFFICE O/P EST MOD 30 MIN: CPT | Mod: S$PBB,,, | Performed by: OBSTETRICS & GYNECOLOGY

## 2020-09-11 PROCEDURE — 99999 PR PBB SHADOW E&M-EST. PATIENT-LVL IV: ICD-10-PCS | Mod: PBBFAC,,, | Performed by: OBSTETRICS & GYNECOLOGY

## 2020-09-11 PROCEDURE — 99214 PR OFFICE/OUTPT VISIT, EST, LEVL IV, 30-39 MIN: ICD-10-PCS | Mod: S$PBB,,, | Performed by: OBSTETRICS & GYNECOLOGY

## 2020-09-11 PROCEDURE — 97813 ACUP 1/> W/ESTIM 1ST 15 MIN: CPT | Mod: ,,, | Performed by: ACUPUNCTURIST

## 2020-09-11 PROCEDURE — 86304 IMMUNOASSAY TUMOR CA 125: CPT

## 2020-09-11 PROCEDURE — 99999 PR PBB SHADOW E&M-EST. PATIENT-LVL IV: CPT | Mod: PBBFAC,,, | Performed by: OBSTETRICS & GYNECOLOGY

## 2020-09-11 PROCEDURE — 97813 PR ACUPUNCT W/ ELEC STIMUL 15 MIN: ICD-10-PCS | Mod: ,,, | Performed by: ACUPUNCTURIST

## 2020-09-11 PROCEDURE — 36415 COLL VENOUS BLD VENIPUNCTURE: CPT

## 2020-09-11 PROCEDURE — 97814 PR ACUPUNCT W/ ELEC STIMUL ADDL 15M: ICD-10-PCS | Mod: ,,, | Performed by: ACUPUNCTURIST

## 2020-09-11 PROCEDURE — 99214 OFFICE O/P EST MOD 30 MIN: CPT | Mod: PBBFAC | Performed by: OBSTETRICS & GYNECOLOGY

## 2020-09-11 NOTE — PROGRESS NOTES
Subjective:       Patient ID: Jeanne Rodgers is a 65 y.o. female.    Chief Complaint: Pain (cLBP), Nausea (CINV), and Cancer (Hot Flashes)    Patient is having a reduction in symptoms across the board. Hot flashes and nausea reduced. She is having leg pain (specifically feeling aching and pressure in lower legs/shins). Suggested trying to wear compression socks because she has slight swelling with pitting in legs. Said Dr Ford ordered bone scan to rule out any other issues.     Review of Systems   Gastrointestinal: Positive for nausea.   Genitourinary: Positive for hot flashes.   Musculoskeletal: Positive for leg pain.   All other systems reviewed and are negative.        Objective:          Assessment:       1. Low back pain of over 3 months duration    2. Bone pain    3. Nausea        Plan:       1x a week for 4 more weeks then re-evaluate*         Pre-Symptom Score: 5  Post-Symptom Score: 5     Pain (cLBP), Nausea (CINV), and Cancer (Hot Flashes)       Encounter Diagnoses   Name Primary?    Low back pain of over 3 months duration Yes    Bone pain     Nausea        TCM Diagnosis: Qi and Blood Stag w/ Toxic Heat (possible damp)    Physical Exam   Constitutional:            Acupuncture points used:  Li11, Li4, St36, Sp9, Sp6, Gb34, REN4, REN6, REN12, ER JEREMIE, FERNANDO MEN, YIN CEVALLOS and 4 CONTRERAS    STIM USED @ ST36/SP6      NEEDLES IN: 24  NEEDLES OUT: 24    NEEDLES AND STIM STARTED: 8:50 AM  NEEDLES AND STIM REMOVED: 9:20 AM

## 2020-09-11 NOTE — PROGRESS NOTES
Subjective:      Patient ID: Jeanne Rodgers is a 65 y.o. female.    Chief Complaint: Endometrial Cancer (2mth f/u)      Treatment History  Stage IIIC2 UPSC  Preop PET revealing positive pelvic and PA nodes  RALH/BSO/Debulking of left pelvic nodes (6/8 positive) on 8/15/19  Port placed 9/26/19  T/C started 9/27/19  WPRT + PA nodes to 45 Gy completed 1/22/20  HDR to 15 Gy in 3 fractions completed 3/3/20  Restarted T/C on 3/20/2020    HPI  Here today for f/u.  In interim has seen Dr. Ford and Kenton with genetics.  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  Also being evaluated for back pain.  CA-125 pending.  Due for PET.  Review of Systems   Constitutional: Negative for activity change, appetite change, chills, fatigue and fever.   HENT: Negative for hearing loss, mouth sores, nosebleeds, sore throat and tinnitus.    Eyes: Negative for visual disturbance.   Respiratory: Negative for cough, chest tightness, shortness of breath and wheezing.    Cardiovascular: Negative for chest pain and leg swelling.   Gastrointestinal: Negative for abdominal distention, abdominal pain, blood in stool, constipation, diarrhea, nausea and vomiting.   Genitourinary: Negative for dysuria, flank pain, frequency, hematuria, pelvic pain, vaginal bleeding, vaginal discharge and vaginal pain.   Musculoskeletal: Negative for arthralgias and back pain.   Skin: Negative for rash.   Neurological: Negative for dizziness, seizures, syncope, weakness and numbness.   Hematological: Does not bruise/bleed easily.   Psychiatric/Behavioral: Negative for confusion and sleep disturbance. The patient is not nervous/anxious.        Objective:   Physical Exam:   Constitutional: She appears well-developed and well-nourished. No distress.    HENT:   Head: Normocephalic and atraumatic.    Eyes: No scleral icterus.    Neck: Normal range of motion. Neck supple.    Cardiovascular: Normal rate and intact distal pulses.  Exam reveals no cyanosis and no edema.      Pulmonary/Chest: Effort normal. No respiratory distress. She exhibits no tenderness.        Abdominal: Soft. She exhibits no distension, no fluid wave and no mass. There is no abdominal tenderness. There is no rebound and no guarding. No hernia.                Lymphadenopathy:     She has no cervical adenopathy.     Skin: No cyanosis.        Assessment:     1. Endometrial cancer    2. Hx of breast cancer    3. Vaginal irritation    4. Pancytopenia    5. Pelvic pain in female        Plan:       ADONIS on exam.  Will repeat PET and get CA-125.  Plan to see her back in 2 months.

## 2020-09-14 ENCOUNTER — TELEPHONE (OUTPATIENT)
Dept: GYNECOLOGIC ONCOLOGY | Facility: CLINIC | Age: 65
End: 2020-09-14

## 2020-09-16 ENCOUNTER — PATIENT OUTREACH (OUTPATIENT)
Dept: ADMINISTRATIVE | Facility: OTHER | Age: 65
End: 2020-09-16

## 2020-09-17 ENCOUNTER — OFFICE VISIT (OUTPATIENT)
Dept: OBSTETRICS AND GYNECOLOGY | Facility: CLINIC | Age: 65
End: 2020-09-17
Attending: OBSTETRICS & GYNECOLOGY
Payer: MEDICARE

## 2020-09-17 VITALS
BODY MASS INDEX: 27.01 KG/M2 | SYSTOLIC BLOOD PRESSURE: 112 MMHG | DIASTOLIC BLOOD PRESSURE: 64 MMHG | WEIGHT: 137.56 LBS | HEIGHT: 60 IN

## 2020-09-17 DIAGNOSIS — C54.1 ENDOMETRIAL CANCER: ICD-10-CM

## 2020-09-17 DIAGNOSIS — Z85.3 HISTORY OF BREAST CANCER: Primary | ICD-10-CM

## 2020-09-17 DIAGNOSIS — N95.1 MENOPAUSAL SYMPTOM: ICD-10-CM

## 2020-09-17 DIAGNOSIS — N89.8 VAGINAL IRRITATION: ICD-10-CM

## 2020-09-17 DIAGNOSIS — Z51.11 ENCOUNTER FOR ANTINEOPLASTIC CHEMOTHERAPY: ICD-10-CM

## 2020-09-17 PROCEDURE — 99213 PR OFFICE/OUTPT VISIT, EST, LEVL III, 20-29 MIN: ICD-10-PCS | Mod: S$GLB,,, | Performed by: OBSTETRICS & GYNECOLOGY

## 2020-09-17 PROCEDURE — 99213 OFFICE O/P EST LOW 20 MIN: CPT | Mod: S$GLB,,, | Performed by: OBSTETRICS & GYNECOLOGY

## 2020-09-17 NOTE — PROGRESS NOTES
SUBJECTIVE:   65 y.o. female   Presents today to for follow up for survivorship.  No LMP recorded (lmp unknown). Patient has had a hysterectomy..  She reports that she has had nausea and vomiting since day 1 of radiation.  She also reports low back pain and bone pain in her legs.  She does have neuropathy primarily in her feet and toes.  She is using medical marijuana which she gets from an outside physician at night-it helped with the chemo nausea.   It does help with her pain  She does report having itching over her entire body and occasionally gets hives-she takes Xyzal for this which helps but does make her tired  She has been doing acupuncture regularly and this is helping with, her hot flashes neuropathy, and nausea  She and her sister will be starting to bike regularly.   She uses Clobetasole twice weekly and needs refills on this medication.  Oncology history  Patient had stage IIIC endometrial cancer and is status post robotic assisted laparoscopic hysterectomy and BSO with debulking August 15, 2019- she did have 6 of 8 positive pelvic lymph nodes.  She was treated with carboplatin and paclitaxel from [id number].  She also underwent radiation to the pelvis plus HDR treatment.    She also has a history of breast cancer- ADONIS. Most recent MMG was normal.     Past Medical History:   Diagnosis Date    Allergy     Anxiety     Breast cancer     Chronic back pain     Depression     Encounter for blood transfusion     Endometrial cancer 2019    Fibromyalgia     Hives     Hx of psychiatric care     Hx of radiation therapy     Itching     Lichen sclerosus     Mental disorder     PONV (postoperative nausea and vomiting)     Psychiatric problem     PTSD (post-traumatic stress disorder)     Sleep difficulties     Sore throat     Therapy     Uterine cancer 2019    UTI (urinary tract infection)      Past Surgical History:   Procedure Laterality Date    anal fissure surgery       APPENDECTOMY      BREAST BIOPSY Right 2014    BREAST LUMPECTOMY Right 2014    R lumpectomy [facility] w 2.4cm IDC & DCIS, neg margins, 0/6 SN, Stage II, ER/ME+, HER-2 neg Adjuvant XRT and hormonal therapy     CARPAL TUNNEL RELEASE      CHOLECYSTECTOMY      COLONOSCOPY      COLONOSCOPY N/A 6/11/2020    Procedure: COLONOSCOPY;  Surgeon: Bobby Marino MD;  Location: [facility] (2ND FLR);  Service: Endoscopy;  Laterality: N/A;    ESOPHAGOGASTRODUODENOSCOPY N/A 6/11/2020    Procedure: EGD (ESOPHAGOGASTRODUODENOSCOPY);  Surgeon: Bobby Marino MD;  Location: [facility] ENDO (2ND FLR);  Service: Endoscopy;  Laterality: N/A;    HYSTERECTOMY  08/15/2019    HYSTEROSCOPY WITH DILATION AND CURETTAGE OF UTERUS N/A 7/24/2019    Procedure: HYSTEROSCOPY, WITH DILATION AND CURETTAGE OF UTERUS;  Surgeon: Елена Kam MD;  Location: [facility];  Service: OB/GYN;  Laterality: N/A;    INSERTION OF TUNNELED CENTRAL VENOUS CATHETER (CVC) WITH SUBCUTANEOUS PORT N/A 9/26/2019    Procedure: INSERTION, PORT-A-CATH;  Surgeon: Moris Varghese MD;  Location: [facility] CATH LAB;  Service: Radiology;  Laterality: N/A;    MASTECTOMY Right     2014    MEDIPORT REMOVAL N/A 3/5/2020    Procedure: REMOVAL, CATHETER, CENTRAL VENOUS, TUNNELED, WITH PORT;  Surgeon: Moris Varghese MD;  Location: [facility] CATH LAB;  Service: Radiology;  Laterality: N/A;    ROBOT-ASSISTED LAPAROSCOPIC ABDOMINAL HYSTERECTOMY USING DA RAMU XI N/A 8/15/2019    Procedure: XI ROBOTIC HYSTERECTOMY;  Surgeon: Darius Regalado MD;  Location: [facility];  Service: OB/GYN;  Laterality: N/A;    ROBOT-ASSISTED LAPAROSCOPIC SALPINGO-OOPHORECTOMY USING DA RAMU XI Bilateral 8/15/2019    Procedure: XI ROBOTIC SALPINGO-OOPHORECTOMY;  Surgeon: Darius Regalado MD;  Location: [facility];  Service: OB/GYN;  Laterality: Bilateral;    ROBOT-ASSISTED LYMPHADENECTOMY Left 8/15/2019    Procedure: ROBOTIC LYMPHADENECTOMY;  Surgeon: Darius Regalado MD;  Location: [facility];  Service: OB/GYN;   Laterality: Left;    SALPINGOOPHORECTOMY Bilateral 08/15/2019    TONSILLECTOMY      TUBAL LIGATION       Social History     Socioeconomic History    Marital status: Single     Spouse name: Not on file    Number of children: 0    Years of education: Not on file    Highest education level: Not on file   Occupational History    Not on file   Social Needs    Financial resource strain: Not on file    Food insecurity     Worry: Not on file     Inability: Not on file    Transportation needs     Medical: Not on file     Non-medical: Not on file   Tobacco Use    Smoking status: Never Smoker    Smokeless tobacco: Never Used   Substance and Sexual Activity    Alcohol use: No     Alcohol/week: 0.0 standard drinks    Drug use: Yes     Types: Marijuana     Comment: medical marijuana- 2 dayys ago     Sexual activity: Not Currently     Partners: Male   Lifestyle    Physical activity     Days per week: Not on file     Minutes per session: Not on file    Stress: Patient refused   Relationships    Social connections     Talks on phone: Not on file     Gets together: Not on file     Attends Religion service: Not on file     Active member of club or organization: Not on file     Attends meetings of clubs or organizations: Not on file     Relationship status: Not on file   Other Topics Concern    Patient feels they ought to cut down on drinking/drug use Not Asked    Patient annoyed by others criticizing their drinking/drug use Not Asked    Patient has felt bad or guilty about drinking/drug use Not Asked    Patient has had a drink/used drugs as an eye opener in the AM Not Asked   Social History Narrative    Not on file     Family History   Problem Relation Age of Onset    Hypertension Mother     Arthritis Mother     Other Mother 85        pancreatic tumor felt by pt's mother's HCP to be malignant though she survived for 8 yrs after dx    Pancreatic cancer Father 84    Pancreatic cancer Maternal Aunt 62    No  Known Problems Sister     Hypertension Brother     Leukemia Paternal Uncle 70    Heart attack Paternal Grandmother     Heart attack Paternal Grandfather     Hypertension Brother     Hashimoto's thyroiditis Sister     Diabetes Other         strong family history per patient    Lung cancer Maternal Uncle         smoker    Cancer Paternal Aunt         unknown origin    Suicide Cousin     Alcohol abuse Cousin     Breast cancer Neg Hx     Ovarian cancer Neg Hx     Cirrhosis Neg Hx     Colon cancer Neg Hx      OB History    Para Term  AB Living   0 0 0 0 0 0   SAB TAB Ectopic Multiple Live Births   0 0 0 0 0           Current Outpatient Medications   Medication Sig Dispense Refill    acetaminophen (TYLENOL) 500 MG tablet Take 500 mg by mouth.      clobetasol 0.05% (TEMOVATE) 0.05 % Oint APPLY TO AFFECTED AREA(S) TWICE A DAY FOR 1 MONTH(S) then EVERY DAY FOR 1 MONTH(S) then 3 TIMES A WEEK 60 g 2    levocetirizine (XYZAL) 5 MG tablet Take 1 tablet (5 mg total) by mouth every evening. 30 tablet 3    MILK THISTLE ORAL Take by mouth.      nystatin (MYCOSTATIN) cream Apply topically every Mon, Wed, Fri. 30 g 11    nystatin (MYCOSTATIN) powder Apply topically 4 (four) times daily. 30 g 1    ALPRAZolam (XANAX) 0.25 MG tablet Take 1 tablet (0.25 mg total) by mouth 3 (three) times daily as needed for Anxiety. (Patient not taking: Reported on 2020) 90 tablet 1    fluconazole (DIFLUCAN) 150 MG Tab       hydrocortisone (ANUSOL-HC) 2.5 % rectal cream Place rectally 2 (two) times daily. 30 g 1    methen-m.blue-s.phos-phsal-hyo (URIBEL) 118-10-40.8-36 mg Cap Take 1 capsule by mouth 3 (three) times daily as needed (bladder pain). 40 capsule 11    ondansetron (ZOFRAN) 8 MG tablet TAKE 1 TABLET BY MOUTH EVERY 12 HOURS AS NEEDED FOR NAUSEA 30 tablet 2    polyethylene glycol (GOLYTELY,NULYTELY) 236-22.74-6.74 -5.86 gram suspension       predniSONE (DELTASONE) 5 MG tablet Take 3 each morning on  odd number days 30 tablet 0    scopolamine (TRANSDERM-SCOP) 1.3-1.5 mg (1 mg over 3 days) Place 1 patch onto the skin every 72 hours. 10 patch 2    silver sulfADIAZINE 1% (SILVADENE) 1 % cream Apply topically every morning. 50 g 11    TURMERIC ORAL Take by mouth.       No current facility-administered medications for this visit.      Allergies: Patient has no known allergies.     The 10-year ASCVD risk score (Luis MONIE Jr., et al., 2013) is: 6%    Values used to calculate the score:      Age: 65 years      Sex: Female      Is Non- : No      Diabetic: No      Tobacco smoker: No      Systolic Blood Pressure: 112 mmHg      Is BP treated: No      HDL Cholesterol: 43 mg/dL      Total Cholesterol: 214 mg/dL      ROS:  Constitutional: + weight loss, weight gain, fever, fatigue  Eyes:  No vision changes, glasses/contacts  ENT/Mouth: No ulcers, sinus problems, ears ringing, headache  Cardiovascular: No inability to lie flat, chest pain, exercise intolerance, swelling, heart palpitations  Respiratory: No wheezing, coughing blood, shortness of breath, or cough  Gastrointestinal: No diarrhea, bloody stool, +nausea/vomiting, constipation, gas, hemorrhoids  Genitourinary: No blood in urine, painful urination, urgency of urination, frequency of urination, incomplete emptying, incontinence, abnormal bleeding, painful periods, heavy periods, vaginal discharge, vaginal odor, painful intercourse, sexual problems, bleeding after intercourse.  Musculoskeletal: No muscle weakness  Skin/Breast: No painful breasts, nipple discharge, masses, rash, ulcers  Neurological: No passing out, seizures, numbness, headache  Endocrine: No diabetes, hypothyroid, hyperthyroid, +hot flashes, hair loss, abnormal hair growth, acne  Psychiatric: No depression, crying  Hematologic: No bruises, bleeding, swollen lymph nodes, anemia.      Physical Exam  deferred    ASSESSMENT:   Menopausal symptoms  Endometrial cancer  History of  breast cancer  Vaginal irritation    PLAN:  Face-to-face time 25 min majority spent in counseling and arranging follow-up  Continue acupuncture  Workup for bone pain per Dr. Ford  Continue clobetasol  Supplements reviewed with patient she can continue these as there are no interactions  Counseled patient on diet and exercise

## 2020-09-18 ENCOUNTER — OFFICE VISIT (OUTPATIENT)
Dept: PSYCHIATRY | Facility: CLINIC | Age: 65
End: 2020-09-18
Payer: MEDICARE

## 2020-09-18 ENCOUNTER — TELEPHONE (OUTPATIENT)
Dept: HEMATOLOGY/ONCOLOGY | Facility: CLINIC | Age: 65
End: 2020-09-18

## 2020-09-18 ENCOUNTER — DOCUMENTATION ONLY (OUTPATIENT)
Dept: HEMATOLOGY/ONCOLOGY | Facility: CLINIC | Age: 65
End: 2020-09-18

## 2020-09-18 ENCOUNTER — CLINICAL SUPPORT (OUTPATIENT)
Dept: HEMATOLOGY/ONCOLOGY | Facility: CLINIC | Age: 65
End: 2020-09-18
Payer: MEDICARE

## 2020-09-18 ENCOUNTER — HOSPITAL ENCOUNTER (OUTPATIENT)
Dept: RADIOLOGY | Facility: HOSPITAL | Age: 65
Discharge: HOME | End: 2020-09-18
Attending: INTERNAL MEDICINE
Payer: MEDICARE

## 2020-09-18 ENCOUNTER — PATIENT MESSAGE (OUTPATIENT)
Dept: HEMATOLOGY/ONCOLOGY | Facility: CLINIC | Age: 65
End: 2020-09-18

## 2020-09-18 VITALS
HEART RATE: 66 BPM | DIASTOLIC BLOOD PRESSURE: 55 MMHG | BODY MASS INDEX: 27.04 KG/M2 | WEIGHT: 138.44 LBS | SYSTOLIC BLOOD PRESSURE: 127 MMHG

## 2020-09-18 DIAGNOSIS — L29.9 ITCH: ICD-10-CM

## 2020-09-18 DIAGNOSIS — R11.0 NAUSEA: ICD-10-CM

## 2020-09-18 DIAGNOSIS — Z85.3 HX OF BREAST CANCER: ICD-10-CM

## 2020-09-18 DIAGNOSIS — F06.4 ANXIETY DISORDER DUE TO MEDICAL CONDITION: Primary | ICD-10-CM

## 2020-09-18 DIAGNOSIS — M54.50 LOW BACK PAIN OF OVER 3 MONTHS DURATION: Primary | ICD-10-CM

## 2020-09-18 PROCEDURE — 78306 NM BONE SCAN WHOLE BODY: ICD-10-PCS | Mod: 26,,, | Performed by: RADIOLOGY

## 2020-09-18 PROCEDURE — A9503 TC99M MEDRONATE: HCPCS

## 2020-09-18 PROCEDURE — 99214 PR OFFICE/OUTPT VISIT, EST, LEVL IV, 30-39 MIN: ICD-10-PCS | Mod: S$PBB,,, | Performed by: SPECIALIST

## 2020-09-18 PROCEDURE — 97814 PR ACUPUNCT W/ ELEC STIMUL ADDL 15M: ICD-10-PCS | Mod: ,,, | Performed by: ACUPUNCTURIST

## 2020-09-18 PROCEDURE — 99999 PR PBB SHADOW E&M-EST. PATIENT-LVL II: ICD-10-PCS | Mod: PBBFAC,,, | Performed by: SPECIALIST

## 2020-09-18 PROCEDURE — 99212 OFFICE O/P EST SF 10 MIN: CPT | Mod: PBBFAC,25 | Performed by: SPECIALIST

## 2020-09-18 PROCEDURE — 97813 PR ACUPUNCT W/ ELEC STIMUL 15 MIN: ICD-10-PCS | Mod: ,,, | Performed by: ACUPUNCTURIST

## 2020-09-18 PROCEDURE — 78306 BONE IMAGING WHOLE BODY: CPT | Mod: 26,,, | Performed by: RADIOLOGY

## 2020-09-18 PROCEDURE — 99999 PR PBB SHADOW E&M-EST. PATIENT-LVL II: CPT | Mod: PBBFAC,,, | Performed by: SPECIALIST

## 2020-09-18 PROCEDURE — 97813 ACUP 1/> W/ESTIM 1ST 15 MIN: CPT | Mod: ,,, | Performed by: ACUPUNCTURIST

## 2020-09-18 PROCEDURE — 97814 ACUP 1/> W/ESTIM EA ADDL 15: CPT | Mod: ,,, | Performed by: ACUPUNCTURIST

## 2020-09-18 PROCEDURE — 99214 OFFICE O/P EST MOD 30 MIN: CPT | Mod: S$PBB,,, | Performed by: SPECIALIST

## 2020-09-18 NOTE — TELEPHONE ENCOUNTER
----- Message from Nano Ford MD sent at 9/18/2020  2:29 PM CDT -----  Called and let her know bone scan negative

## 2020-09-18 NOTE — PROGRESS NOTES
Outpatient Psychiatry Follow-Up Visit (MD/NP)    9/18/2020    Clinical Status of Patient:  Outpatient (Ambulatory)    Chief Complaint:  Jeanne Rodgers is a 65 y.o. female who presents today for follow-up of anxiety secondary to medical condition (breast CA ADONIS and uterine CA, s/p CTX with persistent nausea)..  Met with patient.      Interval History and Content of Current Session:  Interim Events/Subjective Report/Content of Current Session: The pt is a 65 year old female, who initially presented with c/o persistent nausea following CTX (uterine CA), she was unable to eat, vominting constantly. She ended up using xanax 0.25-05. Mg and THC to help, but she noticed the real improvement in N/V happened after she began acupuncture treatment. She has been doing acupuncture for 2 months and resports she can now eat, nasuea is much improved, and she no longer uses the xanax.   She has been continuing with the THC (oral, , HS) and THC cream for her bilater knee and lower leg pain three times a day. She has a PET scan to evaluate her pain coming up.   Her sister, who lives next door and has been the patient's primary support and caregiver, is going on a trip with her , and upon her return, will start biking with the mac. The paeitn fels more positive, has more energy and a better out looks now that she feels better.   Other problems ROS  neurophaty in both feel, numbness and pain (d/w patient duloxetine but nausea risk too high at this point. She has tried gabapentin but made her too sleepy).   Sleep- fine  Mood- improved, euthymic  Anxiety- improved, no panic  Pain- bilateral knee pain and lower leg pain, GI cramping from time to time  Motivation - improve  General outlook= more positive  Suicidal thoughts- none  Psychotic thoughts- none  No c/o cognitive difficulies\\    Medicxarions revewed with the pateitn - see chart  She is on no psychotropics    Psychotherapy:  · Target symptoms: anxiety   · Why  chosen therapy is appropriate versus another modality: relevant to diagnosis  · Outcome monitoring methods: self-report  · Therapeutic intervention type: supportive psychotherapy  · Topics discussed/themes: stress related to medical comorbidities  · The patient's response to the intervention is accepting. The patient's progress toward treatment goals is good.   · Duration of intervention: 5 minutes.    Review of Systems   · PSYCHIATRIC: Pertinant items are noted in the narrative.    Past Medical, Family and Social History: The patient's past medical, family and social history have been reviewed and updated as appropriate within the electronic medical record - see encounter notes.    Compliance: yes    Side effects: None    Risk Parameters:  Patient reports no suicidal ideation  Patient reports no homicidal ideation  Patient reports no self-injurious behavior  Patient reports no violent behavior    Exam (detailed: at least 9 elements; comprehensive: all 15 elements)   Constitutional  Vitals:  Most recent vital signs, dated less than 90 days prior to this appointment, were reviewed.   Vitals:    09/18/20 1134   BP: (!) 127/55   Pulse: 66   Weight: 62.8 kg (138 lb 7.2 oz)            MSE  Awake, alert, oriented to person, place, sutation, time, year, day and date  Casually drssed, neatly groomed, wearing headscarf  Behavior- cooperative, good eye contact, no psychomotor agitation or slowing  Affect- euthymic  Mood- euthymic  Speech- clear, coherent, normal in rate/tone/volume  Thoughts content- negative for suicidal , homicidal ideations; auditory visual halluicnations, delusional thinking or confusion  Thought process- linear, goal oriented  Cognitive- immediate, short and long term memory grossly intact, no MMSE or MoCA done today  Intelligence- average  Insight- intact Judgment - intact and clinically appropriate    Assessment and Diagnosis   Status/Progress: Based on the examination today, the patient's problem(s)  is/are improved.  New problems have not been presented today.   Co-morbidities are not complicating management of the primary condition.  There are no active rule-out diagnoses for this patient at this time.     General Impression: 65 year ld female with h/o anxiety due to GMC (euterine CA, ctx and anxiety causing persistent nausea), now resolved with alprazolam, THC and acupuncture. She is off the xanax. Continuing with THC and acupuncture.   She has new leg pain- for ARZATE by ocnology, medical service  She has persistent neuropahty (feet)- THC  Breast ca and unterine CA - monitoring by oncoloyg    Anxiety secondarya to GMC, with nausea, resolved.   Follow up as needed.   Will schedule a 3 month FU in case her medical course has changed.   She is safe for OP traemtent.     Intervention/Counseling/Treatment Plan   · no treatment from me at t his time. FU 3 months.       Return to Clinic: 3 months

## 2020-09-18 NOTE — PROGRESS NOTES
"CLAU Loredo DNP             HI Kenton,   There is nothing for that specific gene mutation. In general, plant-based diets are recommended for the prevention of cancer. The book "Anti-Cancer Living" has a a lot of great information about diet as well as other lifestyle factors that can contribute to cancer and survival.   I usually recommend avoiding processed foods and eating 75-80% plant foods. The rest should be poultry, fish/shellfish, organic eggs, and yogurt.     Hope that helps!    Previous Messages    ----- Message -----   From: Kenton Yoder DNP   Sent: 9/2/2020   3:56 PM CDT   To: CLAU Loredo, I saw Ms. 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 I'd be happy to forward along any information you want me to pass along to her.  Thanks! -Kenton"

## 2020-09-18 NOTE — PROGRESS NOTES
Subjective:       Patient ID: Jeanne Rodgers is a 65 y.o. female.    Chief Complaint: No chief complaint on file.    HPI      Returns for follow up of bone pain  Reviewed negative bone scan  She has a PET pending later today for Gyn Onc    2020:  Bone scan:  Impression:  There is no scintigraphic evidence of metastatic disease.    Oncology History:  Endometrial Cancer History:  Diagnosis of endometrial cancer after presenting with vaginal bleeding  She has completed surgical debulking, chemotherapy and XRT but states that she has developed nausea and vomiting since starting XRT  She was also admitted in  of this year with melena and had a complete GI work up (see below)  Of note during chemotherapy she required several transfusions for symptomatic anemia     Breast Oncology History:  This is a 64 yo female with a history of right breast cancer  - Had been getting her mammograms at [facility] as she was uninsured at the time  - reports small mass seen in  and sent to [facility]- where she was told nothing was seen on review  - Repeat situation of above the next year in   - she noted right nipple inversion - went to [facility]- told not likely cancer, no further testing done  - In  it was recommended that [facility] see her for imaging  - Abnormal mammogram and biopsy recommended  Reports biopsy experience was awful and so she researched other sites to be cared  - She sought out Dr. Gallo Omalley at 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  - Saw Dr. Keller at [facility]  Oncotype done ? 9- chemotherapy not recommended  She was started on Arimidex  -She was on Arimidex for 4 years until stopping (see below).    Genetic testin Germline Cancer-Genetic Testing  · Test(s) performed:  [facility] Common  Hereditary Cancers Panel  Number of genes analyzed:  47  Laboratory:  [facility]  Ordering provider:  ANGIE Blackman  Sample collection date:  07/15/2020  Results report date:  08/14/2020              Full report to be scanned into Epic under the Labs and/or Media tab(s).  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            COUNSELING/MEDICAL DECISION-MAKING:         Note below discussion and recommendations provided from genetic counseling visit (copied forward):  TP53 Mutation Implications  Li-Fraumeni Syndrome (LFS) is a hereditary (germline) cancer syndrome diagnosed with the identification of a germline TP53 gene mutation, which is passed from parent to child and is present for the patient's lifespan in essentially every bodily cell (GHR, 2020).  Its associated risks, including breast cancer, brain cancer, sarcoma, adrenocortical carcinoma, and colorectal cancer ([facility], 2020; NCCN, 1.2020).  It is important to note that the patient does not at this time have a diagnosis of LFS, as we do not know yet whether the patient's TP53 mutation is germline.   Somatic (acquired) TP53 mutations do not occur in the germline and occur at some other point in the lifespan (not at conception) (GHR, 2020) in the blood due to any of several factors, including advancing age ([facility], 2020), history of chemotherapy (which applies to patient), hematologic malignancy (NCCN, 1.2020), environmental factors including ultraviolet (UV) exposure (GHR, 2020), and DNA replication errors during cell division (GHR, 2020).  Somatic mutations that develop in only a single cell early in the embryonic phase can lead to mosaicism (GHR, 2020).  With mosaicism, the gene mutation is not present in the affected individual's egg or sperm cells or in the fertilized egg but rather develops later during the embryonic  phase, and, per the process of cell division, cells arising from the cell carrying the mutation will also have the mutation, but other cells will not (GHR, 2020).  Mosaicism's causing health problems is a function of both the specific mutation and the number of cells affected by the mutation (GHR, 2020).  Up to 20% of LFS cases are de fadi ([facility], 2020).  De fadi (i.e. new) mutations can be germline or somatic (GHR, 2020).  Sometimes, the de fadi mutation develops in the individual's germline (egg or sperm cells) but is absent in all of the individual's other cells; other times, the de fadi mutation develops in the fertilized egg shortly post-conception, and, as the fertilized egg divides, each resultant cell also has the mutation (GHR, 2020).    It is important to distinguish germline origin versus somatic origin as the former has the potential to impact the patient's family while the latter does not.    Recommendations  Appropriate next steps include:  1. A complete blood count (CBC) and a peripheral blood smear is warranted to evaluate for hematologic neoplasia (NCCN, 1.2020).  The patient is amenable to this.  I do not suspect hematologic neoplasia as etiology of her TP53 mutation.  2. Testing of parents for identification of the patient's TP53 mutation in either of them; however, the patient's parents are unfortunately unavailable for testing, and testing even all of the patient's siblings, if they all came back negative, would not confirm that the patient's TP53 mutation is not germline in origin.  Therefore, a fibroblast skin culture is recommended, which breast surgeon Dr. Nicci García has agreed to perform.  The patient is amenable to this.      Review of Systems   Constitutional: Positive for fatigue. Negative for activity change, appetite change, chills, fever and unexpected weight change.   HENT: Negative for dental problem.    Eyes: Negative for visual disturbance.   Respiratory: Negative for cough,  shortness of breath and wheezing.    Cardiovascular: Negative for chest pain, palpitations and leg swelling.   Gastrointestinal: Negative for abdominal distention, abdominal pain, blood in stool, change in bowel habit, constipation, diarrhea, nausea (reports better), vomiting and change in bowel habit.   Genitourinary: Negative for decreased urine volume, difficulty urinating, dysuria, frequency and urgency.   Musculoskeletal: Positive for arthralgias and neck pain. Negative for back pain, gait problem and joint deformity.   Integumentary:  Negative for rash.   Neurological: Negative for light-headedness and headaches.   Hematological: Negative for adenopathy. Bruises/bleeds easily (easy bruising).   Psychiatric/Behavioral: Negative for dysphoric mood. The patient is not nervous/anxious.          Objective:      Physical Exam  Vitals signs and nursing note reviewed.   Constitutional:       Comments: Results visit         Assessment:       1. History of invasive breast cancer    2. Endometrial cancer    3. Bone pain        Plan:     1. ADONIS  Continue mammograms  Next do 8/2021  Has completed endocrine therapy  2. Sees Dr. Regalado  PET today negaive  3. Bone scan and PET scan negative

## 2020-09-19 RX ORDER — CLOBETASOL PROPIONATE 0.5 MG/G
OINTMENT TOPICAL
Qty: 60 G | Refills: 2 | Status: ON HOLD | OUTPATIENT
Start: 2020-09-19 | End: 2022-01-01 | Stop reason: HOSPADM

## 2020-09-21 ENCOUNTER — HOSPITAL ENCOUNTER (OUTPATIENT)
Dept: RADIOLOGY | Facility: HOSPITAL | Age: 65
Discharge: HOME | End: 2020-09-21
Attending: OBSTETRICS & GYNECOLOGY
Payer: MEDICARE

## 2020-09-21 ENCOUNTER — PATIENT MESSAGE (OUTPATIENT)
Dept: HEMATOLOGY/ONCOLOGY | Facility: CLINIC | Age: 65
End: 2020-09-21

## 2020-09-21 ENCOUNTER — OFFICE VISIT (OUTPATIENT)
Dept: HEMATOLOGY/ONCOLOGY | Facility: CLINIC | Age: 65
End: 2020-09-21
Payer: MEDICARE

## 2020-09-21 VITALS
DIASTOLIC BLOOD PRESSURE: 58 MMHG | BODY MASS INDEX: 26.97 KG/M2 | RESPIRATION RATE: 18 BRPM | TEMPERATURE: 98 F | OXYGEN SATURATION: 100 % | HEART RATE: 71 BPM | WEIGHT: 137.38 LBS | SYSTOLIC BLOOD PRESSURE: 129 MMHG | HEIGHT: 60 IN

## 2020-09-21 DIAGNOSIS — C54.1 ENDOMETRIAL CANCER: ICD-10-CM

## 2020-09-21 DIAGNOSIS — Z85.3 HISTORY OF INVASIVE BREAST CANCER: Primary | ICD-10-CM

## 2020-09-21 DIAGNOSIS — M89.8X9 BONE PAIN: ICD-10-CM

## 2020-09-21 LAB — POCT GLUCOSE: 87 MG/DL (ref 70–110)

## 2020-09-21 PROCEDURE — 99213 OFFICE O/P EST LOW 20 MIN: CPT | Mod: S$PBB,,, | Performed by: INTERNAL MEDICINE

## 2020-09-21 PROCEDURE — 78815 PET IMAGE W/CT SKULL-THIGH: CPT | Mod: 26,PS,, | Performed by: RADIOLOGY

## 2020-09-21 PROCEDURE — 99213 PR OFFICE/OUTPT VISIT, EST, LEVL III, 20-29 MIN: ICD-10-PCS | Mod: S$PBB,,, | Performed by: INTERNAL MEDICINE

## 2020-09-21 PROCEDURE — 99999 PR PBB SHADOW E&M-EST. PATIENT-LVL IV: CPT | Mod: PBBFAC,,, | Performed by: INTERNAL MEDICINE

## 2020-09-21 PROCEDURE — A9552 F18 FDG: HCPCS

## 2020-09-21 PROCEDURE — 99999 PR PBB SHADOW E&M-EST. PATIENT-LVL IV: ICD-10-PCS | Mod: PBBFAC,,, | Performed by: INTERNAL MEDICINE

## 2020-09-21 PROCEDURE — 78815 NM PET CT ROUTINE: ICD-10-PCS | Mod: 26,PS,, | Performed by: RADIOLOGY

## 2020-09-21 PROCEDURE — 78815 PET IMAGE W/CT SKULL-THIGH: CPT | Mod: TC,PS

## 2020-09-21 PROCEDURE — 99214 OFFICE O/P EST MOD 30 MIN: CPT | Mod: PBBFAC,25 | Performed by: INTERNAL MEDICINE

## 2020-09-22 ENCOUNTER — TELEPHONE (OUTPATIENT)
Dept: GYNECOLOGIC ONCOLOGY | Facility: CLINIC | Age: 65
End: 2020-09-22

## 2020-09-22 ENCOUNTER — CLINICAL SUPPORT (OUTPATIENT)
Dept: HEMATOLOGY/ONCOLOGY | Facility: CLINIC | Age: 65
End: 2020-09-22
Payer: MEDICARE

## 2020-09-22 DIAGNOSIS — Z15.01 MUTATION IN TP53 GENE: Primary | ICD-10-CM

## 2020-09-22 DIAGNOSIS — M89.8X9 BONE PAIN: ICD-10-CM

## 2020-09-22 DIAGNOSIS — R11.0 NAUSEA: ICD-10-CM

## 2020-09-22 DIAGNOSIS — M54.50 LOW BACK PAIN OF OVER 3 MONTHS DURATION: Primary | ICD-10-CM

## 2020-09-22 DIAGNOSIS — Z15.89 MUTATION IN TP53 GENE: Primary | ICD-10-CM

## 2020-09-22 DIAGNOSIS — Z15.09 MUTATION IN TP53 GENE: Primary | ICD-10-CM

## 2020-09-22 PROCEDURE — 97813 PR ACUPUNCT W/ ELEC STIMUL 15 MIN: ICD-10-PCS | Mod: ,,, | Performed by: ACUPUNCTURIST

## 2020-09-22 PROCEDURE — 97813 ACUP 1/> W/ESTIM 1ST 15 MIN: CPT | Mod: ,,, | Performed by: ACUPUNCTURIST

## 2020-09-22 PROCEDURE — 97814 PR ACUPUNCT W/ ELEC STIMUL ADDL 15M: ICD-10-PCS | Mod: ,,, | Performed by: ACUPUNCTURIST

## 2020-09-22 PROCEDURE — 97814 ACUP 1/> W/ESTIM EA ADDL 15: CPT | Mod: ,,, | Performed by: ACUPUNCTURIST

## 2020-09-22 NOTE — TELEPHONE ENCOUNTER
----- Message from Darius Regalado MD sent at 9/22/2020 12:27 PM CDT -----  Scans are normal.  Please let her know.

## 2020-09-22 NOTE — PROGRESS NOTES
Subjective:       Patient ID: Jeanne Rodgers is a 65 y.o. female.    Chief Complaint: Nausea (CINV) and Cancer (hot flashes)    Overall better symptom management. Patient sitting at an overall score of 4 currently. Will continue with treatments. Patient would like a bigger reduction with both hot flashes and nausea and then we will work on improving her fatigue symptoms.     Review of Systems   Constitutional: Positive for fatigue.   Genitourinary: Positive for hot flashes.   Musculoskeletal: Positive for back pain.         Objective:      Physical Exam  Constitutional:              Assessment:       1. Low back pain of over 3 months duration    2. Nausea    3. Bone pain        Plan:       1x a week for 4 more weeks*         Pre-Symptom Score: 4  Post-Symptom Score: 4     Nausea (CINV) and Cancer (hot flashes)       Encounter Diagnoses   Name Primary?    Low back pain of over 3 months duration Yes    Nausea     Bone pain        TCM Diagnosis: Toxic Heat, Qi and Blood Stagnation    Physical Exam   Constitutional:            Acupuncture points used:  Li11, Pc6, St36, St25, Sp9, Sp6, Gb34, REN4, REN6, REN12, YIN CEVALLOS and 4 CONTRERAS + SEE CHART ABOVE    ADD STIM (Lr3/Sp6)      NEEDLES IN: 23  NEEDLES OUT: 23    NEEDLES AND STIM STARTED: 10:30 AM  NEEDLES AND STIM REMOVED: 11:00 AM

## 2020-09-23 LAB — PATH REV BLD -IMP: NORMAL

## 2020-10-01 ENCOUNTER — TELEPHONE (OUTPATIENT)
Dept: HEMATOLOGY/ONCOLOGY | Facility: CLINIC | Age: 65
End: 2020-10-01

## 2020-10-01 NOTE — TELEPHONE ENCOUNTER
Called Pathology to check on status of peripheral blood smear interpretation from 09/08/2020 [facility]. They stated that they have the specimen on a slide, and Dr. DIANA Casanova advised me that a different order (an add-on) is needed and advised me to call [facility] for assistance with switching it over. Spoke with Client Services, discussed specific need, and [facility] Services advised me that they added on the peripheral blood smear interpretation to the 09/08/2020 [facility] and that no further action is needed on my part. Awaiting results.    Spoke with [facility] genetic specialist Milo, without divulging any PHI, regarding potential plan to have pt's TP53 single-site analysis run via [facility] followed by [facility] reading a fibroblast skin culture for TP53 mutation origin clarification. Milo advised me that, as [facility] reported the pt's TP53 variant allele frequency (VAF) at 15%-25% (per a message sent to me by [facility]), if [facility] also found the VAF to be <30%, [facility] would not process a fibroblast skin culture for TP53 mutation origin clarification as it would not be clinically warranted, as a VAF <30% is not consistent with a germline mutation; rather, it is consistent with a mutation acquired (somatic mutation) secondary to cancer, chemotherapy, age, etc.

## 2020-10-02 ENCOUNTER — CLINICAL SUPPORT (OUTPATIENT)
Dept: HEMATOLOGY/ONCOLOGY | Facility: CLINIC | Age: 65
End: 2020-10-02
Payer: MEDICARE

## 2020-10-02 ENCOUNTER — TELEPHONE (OUTPATIENT)
Dept: HEMATOLOGY/ONCOLOGY | Facility: CLINIC | Age: 65
End: 2020-10-02

## 2020-10-02 ENCOUNTER — PATIENT MESSAGE (OUTPATIENT)
Dept: HEMATOLOGY/ONCOLOGY | Facility: CLINIC | Age: 65
End: 2020-10-02

## 2020-10-02 DIAGNOSIS — M89.8X9 BONE PAIN: ICD-10-CM

## 2020-10-02 DIAGNOSIS — R11.0 NAUSEA: ICD-10-CM

## 2020-10-02 DIAGNOSIS — M54.50 LOW BACK PAIN OF OVER 3 MONTHS DURATION: Primary | ICD-10-CM

## 2020-10-02 LAB
BASOPHILS NFR BLD: 0.4 % (ref 0–1.9)
DIFFERENTIAL METHOD: ABNORMAL
EOSINOPHIL NFR BLD: 3.6 % (ref 0–8)
ERYTHROCYTE [DISTWIDTH] IN BLOOD BY AUTOMATED COUNT: 14.3 % (ref 11.5–14.5)
ERYTHROCYTE [DISTWIDTH] IN BLOOD BY AUTOMATED COUNT: 14.3 % (ref 11.5–14.5)
HCT VFR BLD AUTO: 30.7 % (ref 37–48.5)
HCT VFR BLD AUTO: 30.7 % (ref 37–48.5)
HGB BLD-MCNC: 10.3 G/DL (ref 12–16)
HGB BLD-MCNC: 10.3 G/DL (ref 12–16)
IMM GRANULOCYTES # BLD AUTO: 0.01 K/UL (ref 0–0.04)
IMM GRANULOCYTES # BLD AUTO: 0.01 K/UL (ref 0–0.04)
IMM GRANULOCYTES NFR BLD AUTO: 0.4 % (ref 0–0.5)
LYMPHOCYTES NFR BLD: 14.8 % (ref 18–48)
MCH RBC QN AUTO: 36.7 PG (ref 27–31)
MCH RBC QN AUTO: 36.7 PG (ref 27–31)
MCHC RBC AUTO-ENTMCNC: 33.6 G/DL (ref 32–36)
MCHC RBC AUTO-ENTMCNC: 33.6 G/DL (ref 32–36)
MCV RBC AUTO: 109 FL (ref 82–98)
MCV RBC AUTO: 109 FL (ref 82–98)
MONOCYTES NFR BLD: 9 % (ref 4–15)
NEUTROPHILS # BLD AUTO: 2 K/UL (ref 1.8–7.7)
NEUTROPHILS # BLD AUTO: 2 K/UL (ref 1.8–7.7)
NEUTROPHILS NFR BLD: 71.8 % (ref 38–73)
NRBC BLD-RTO: 0 /100 WBC
PATH REV BLD -IMP: NORMAL
PLATELET # BLD AUTO: 115 K/UL (ref 150–350)
PLATELET # BLD AUTO: 115 K/UL (ref 150–350)
PMV BLD AUTO: 9.4 FL (ref 9.2–12.9)
PMV BLD AUTO: 9.4 FL (ref 9.2–12.9)
RBC # BLD AUTO: 2.81 M/UL (ref 4–5.4)
RBC # BLD AUTO: 2.81 M/UL (ref 4–5.4)
WBC # BLD AUTO: 2.77 K/UL (ref 3.9–12.7)
WBC # BLD AUTO: 2.77 K/UL (ref 3.9–12.7)

## 2020-10-02 PROCEDURE — 97810 ACUP 1/> WO ESTIM 1ST 15 MIN: CPT | Mod: ,,, | Performed by: ACUPUNCTURIST

## 2020-10-02 PROCEDURE — 97811 PR ACUPUNCT W/O ELEC STIMUL ADDL 15M: ICD-10-PCS | Mod: ,,, | Performed by: ACUPUNCTURIST

## 2020-10-02 PROCEDURE — 97811 ACUP 1/> W/O ESTIM EA ADD 15: CPT | Mod: ,,, | Performed by: ACUPUNCTURIST

## 2020-10-02 PROCEDURE — 97810 PR ACUPUNCT W/O ELEC STIMUL 15 MIN: ICD-10-PCS | Mod: ,,, | Performed by: ACUPUNCTURIST

## 2020-10-02 NOTE — TELEPHONE ENCOUNTER
Bro in [facility] (supervisor) advised me that peripheral smear is being interpreted by pathologist now

## 2020-10-02 NOTE — TELEPHONE ENCOUNTER
Phoned Pathology Differential Lab.  Inquired as to status of peripheral blood smear read.  Was advised that add-on order to [facility] 09/08/2020 does not appears to have crossed over to their end.  Pathology staff member is looking into this.  Have asked that she call me back before this afternoon.

## 2020-10-02 NOTE — PROGRESS NOTES
Subjective:       Patient ID: Jeanne Rodgers is a 65 y.o. female.    Chief Complaint: Cancer (CINV, hot flashes) and Pain (bone / joint pain)    Patient is doing well. Digestion and nausea symptoms reduced significantly. Her hot flashes are reduced but bone pain still present. Low back is doing better but not 100% yet.    Review of Systems   Constitutional: Positive for fatigue.   Gastrointestinal: Positive for nausea.   Genitourinary: Positive for hot flashes.   Musculoskeletal: Positive for back pain and leg pain.         Objective:          Assessment:       1. Low back pain of over 3 months duration    2. Nausea    3. Bone pain        Plan:       1x a week for weeks*         Pre-Symptom Score: 4  Post-Symptom Score: 3     Cancer (CINV, hot flashes) and Pain (bone / joint pain)       Encounter Diagnoses   Name Primary?    Low back pain of over 3 months duration Yes    Nausea     Bone pain        TCM Diagnosis: qi and blood stagnation w/ toxic heat / rebellion of ST    Physical Exam   Constitutional:            Acupuncture points used:  Li11, Pc6, St36, St25, Sp6, Gb34, REN4, REN6, REN12, ER JEREMIE, FERNANDO MEN, YIN CEVALLOS and 4 CONTRERAS    NO STIM USED      NEEDLES IN: 21  NEEDLES OUT: 21    NEEDLES STARTED AT: 1:20 PM  NEEDLES REMOVED AT: 1:50 PM

## 2020-10-05 LAB — PATH REV BLD -IMP: NORMAL

## 2020-10-07 ENCOUNTER — TELEPHONE (OUTPATIENT)
Dept: HEMATOLOGY/ONCOLOGY | Facility: CLINIC | Age: 65
End: 2020-10-07

## 2020-10-07 ENCOUNTER — DOCUMENTATION ONLY (OUTPATIENT)
Dept: HEMATOLOGY/ONCOLOGY | Facility: CLINIC | Age: 65
End: 2020-10-07

## 2020-10-07 DIAGNOSIS — Z85.3 HISTORY OF INVASIVE BREAST CANCER: Primary | ICD-10-CM

## 2020-10-07 NOTE — TELEPHONE ENCOUNTER
Message fwd to .       ----- Message from Nano Ford MD sent at 10/7/2020  1:00 PM CDT -----  Yes  See document in chart  I need to see her in 4 weeks with a cbc  Thanks  ----- Message -----  From: Corin Hassan  Sent: 10/6/2020   7:40 AM CDT  To: Nano Ford MD    Reminder to call  ----- Message -----  From: Corin Hassan  Sent: 10/5/2020   3:15 PM CDT  To: MD Corin iWley         Have me call her tomorrow- can you put in your reminders

## 2020-10-07 NOTE — PROGRESS NOTES
Called patient with peripheral smear review by Pathologist  Her cbc has remained low but has had recent chemotherapy  However, she also has a genetic test abnormality making her more at risk for blood disorders  We discussed repeating cbc in 4 weeks and discussing if after further recovery in bone marrow bx felt indicated

## 2020-10-09 ENCOUNTER — CLINICAL SUPPORT (OUTPATIENT)
Dept: HEMATOLOGY/ONCOLOGY | Facility: CLINIC | Age: 65
End: 2020-10-09
Payer: MEDICARE

## 2020-10-09 DIAGNOSIS — M54.50 LOW BACK PAIN OF OVER 3 MONTHS DURATION: ICD-10-CM

## 2020-10-09 DIAGNOSIS — R11.0 NAUSEA: Primary | ICD-10-CM

## 2020-10-09 PROCEDURE — 97810 ACUP 1/> WO ESTIM 1ST 15 MIN: CPT | Mod: ,,, | Performed by: ACUPUNCTURIST

## 2020-10-09 PROCEDURE — 97811 PR ACUPUNCT W/O ELEC STIMUL ADDL 15M: ICD-10-PCS | Mod: ,,, | Performed by: ACUPUNCTURIST

## 2020-10-09 PROCEDURE — 97810 PR ACUPUNCT W/O ELEC STIMUL 15 MIN: ICD-10-PCS | Mod: ,,, | Performed by: ACUPUNCTURIST

## 2020-10-09 PROCEDURE — 97811 ACUP 1/> W/O ESTIM EA ADD 15: CPT | Mod: ,,, | Performed by: ACUPUNCTURIST

## 2020-10-09 NOTE — PROGRESS NOTES
Subjective:       Patient ID: Jeanne Rodgers is a 65 y.o. female.    Chief Complaint: Cancer and Nausea (CINV)    Patient did great with acupuncture and is happy with current results and symptom levels. Graduating from this round of acupuncture and we will see how she does in the next few weeks without treatment.     Review of Systems   All other systems reviewed and are negative.        Objective:          Assessment:       1. Nausea    2. Low back pain of over 3 months duration        Plan:       Graduated acupuncture, follow up as needed*         Pre-Symptom Score: 3  Post-Symptom Score: 3     Cancer and Nausea (CINV)       Encounter Diagnoses   Name Primary?    Nausea Yes    Low back pain of over 3 months duration        TCM Diagnosis: Toxic Heat / ST Rebellion    Physical Exam   Constitutional:            Acupuncture points used:  Li11, Pc6, St36, St25, Sp6, Du20, Gb34, REN12, FERNANDO MEN, YIN CEVALLOS and 4 CONTRERAS    NO STIM USED      NEEDLES IN: 19  NEEDLES OUT: 19    NEEDLES STARTED AT: 9:15 AM  NEEDLES REMOVED AT: 9:45 AM

## 2020-10-21 ENCOUNTER — PATIENT OUTREACH (OUTPATIENT)
Dept: ADMINISTRATIVE | Facility: OTHER | Age: 65
End: 2020-10-21

## 2020-10-21 NOTE — PROGRESS NOTES
Care Everywhere: updated  Immunization: updated(delay in links)   Health Maintenance: updated  Media Review:   Legacy Review:   Order placed:   Upcoming appts:

## 2020-10-22 ENCOUNTER — LAB VISIT (OUTPATIENT)
Dept: LAB | Facility: HOSPITAL | Age: 65
End: 2020-10-22
Attending: STUDENT IN AN ORGANIZED HEALTH CARE EDUCATION/TRAINING PROGRAM
Payer: MEDICARE

## 2020-10-22 ENCOUNTER — OFFICE VISIT (OUTPATIENT)
Dept: ALLERGY | Facility: CLINIC | Age: 65
End: 2020-10-22
Payer: MEDICARE

## 2020-10-22 DIAGNOSIS — D52.1 DRUG-INDUCED FOLATE DEFICIENCY ANEMIA: ICD-10-CM

## 2020-10-22 DIAGNOSIS — Z85.3 HX OF BREAST CANCER: ICD-10-CM

## 2020-10-22 DIAGNOSIS — L50.8 CHRONIC URTICARIA: ICD-10-CM

## 2020-10-22 DIAGNOSIS — L50.8 CHRONIC URTICARIA: Primary | ICD-10-CM

## 2020-10-22 DIAGNOSIS — D75.89 MACROCYTOSIS: ICD-10-CM

## 2020-10-22 DIAGNOSIS — D64.9 ANEMIA, UNSPECIFIED TYPE: ICD-10-CM

## 2020-10-22 DIAGNOSIS — L29.9 ITCH: ICD-10-CM

## 2020-10-22 LAB
BASOPHILS # BLD AUTO: 0.01 K/UL (ref 0–0.2)
BASOPHILS NFR BLD: 0.4 % (ref 0–1.9)
DIFFERENTIAL METHOD: ABNORMAL
EOSINOPHIL # BLD AUTO: 0.1 K/UL (ref 0–0.5)
EOSINOPHIL NFR BLD: 3.2 % (ref 0–8)
ERYTHROCYTE [DISTWIDTH] IN BLOOD BY AUTOMATED COUNT: 12.6 % (ref 11.5–14.5)
HCT VFR BLD AUTO: 32.1 % (ref 37–48.5)
HGB BLD-MCNC: 10.6 G/DL (ref 12–16)
IMM GRANULOCYTES # BLD AUTO: 0 K/UL (ref 0–0.04)
IMM GRANULOCYTES NFR BLD AUTO: 0 % (ref 0–0.5)
LYMPHOCYTES # BLD AUTO: 0.6 K/UL (ref 1–4.8)
LYMPHOCYTES NFR BLD: 20.4 % (ref 18–48)
MCH RBC QN AUTO: 36.3 PG (ref 27–31)
MCHC RBC AUTO-ENTMCNC: 33 G/DL (ref 32–36)
MCV RBC AUTO: 110 FL (ref 82–98)
MONOCYTES # BLD AUTO: 0.3 K/UL (ref 0.3–1)
MONOCYTES NFR BLD: 8.8 % (ref 4–15)
NEUTROPHILS # BLD AUTO: 1.9 K/UL (ref 1.8–7.7)
NEUTROPHILS NFR BLD: 67.2 % (ref 38–73)
NRBC BLD-RTO: 0 /100 WBC
PLATELET # BLD AUTO: 135 K/UL (ref 150–350)
PMV BLD AUTO: 9.7 FL (ref 9.2–12.9)
RBC # BLD AUTO: 2.92 M/UL (ref 4–5.4)
RETICS/RBC NFR AUTO: 2.9 % (ref 0.5–2.5)
WBC # BLD AUTO: 2.84 K/UL (ref 3.9–12.7)

## 2020-10-22 PROCEDURE — 99999 PR PBB SHADOW E&M-EST. PATIENT-LVL III: CPT | Mod: PBBFAC,,, | Performed by: STUDENT IN AN ORGANIZED HEALTH CARE EDUCATION/TRAINING PROGRAM

## 2020-10-22 PROCEDURE — 82746 ASSAY OF FOLIC ACID SERUM: CPT

## 2020-10-22 PROCEDURE — 88185 FLOWCYTOMETRY/TC ADD-ON: CPT

## 2020-10-22 PROCEDURE — 86003 ALLG SPEC IGE CRUDE XTRC EA: CPT | Mod: 59

## 2020-10-22 PROCEDURE — 84443 ASSAY THYROID STIM HORMONE: CPT

## 2020-10-22 PROCEDURE — 85045 AUTOMATED RETICULOCYTE COUNT: CPT

## 2020-10-22 PROCEDURE — 86003 ALLG SPEC IGE CRUDE XTRC EA: CPT

## 2020-10-22 PROCEDURE — 82607 VITAMIN B-12: CPT

## 2020-10-22 PROCEDURE — 99213 OFFICE O/P EST LOW 20 MIN: CPT | Mod: PBBFAC,25 | Performed by: STUDENT IN AN ORGANIZED HEALTH CARE EDUCATION/TRAINING PROGRAM

## 2020-10-22 PROCEDURE — 86800 THYROGLOBULIN ANTIBODY: CPT

## 2020-10-22 PROCEDURE — 84165 PROTEIN E-PHORESIS SERUM: CPT

## 2020-10-22 PROCEDURE — 99204 OFFICE O/P NEW MOD 45 MIN: CPT | Mod: S$PBB,,, | Performed by: STUDENT IN AN ORGANIZED HEALTH CARE EDUCATION/TRAINING PROGRAM

## 2020-10-22 PROCEDURE — 84165 PATHOLOGIST INTERPRETATION SPE: ICD-10-PCS | Mod: 26,,, | Performed by: PATHOLOGY

## 2020-10-22 PROCEDURE — 85025 COMPLETE CBC W/AUTO DIFF WBC: CPT

## 2020-10-22 PROCEDURE — 84165 PROTEIN E-PHORESIS SERUM: CPT | Mod: 26,,, | Performed by: PATHOLOGY

## 2020-10-22 PROCEDURE — 82785 ASSAY OF IGE: CPT

## 2020-10-22 PROCEDURE — 99999 PR PBB SHADOW E&M-EST. PATIENT-LVL III: ICD-10-PCS | Mod: PBBFAC,,, | Performed by: STUDENT IN AN ORGANIZED HEALTH CARE EDUCATION/TRAINING PROGRAM

## 2020-10-22 PROCEDURE — 88184 FLOWCYTOMETRY/ TC 1 MARKER: CPT

## 2020-10-22 PROCEDURE — 80053 COMPREHEN METABOLIC PANEL: CPT

## 2020-10-22 PROCEDURE — 99204 PR OFFICE/OUTPT VISIT, NEW, LEVL IV, 45-59 MIN: ICD-10-PCS | Mod: S$PBB,,, | Performed by: STUDENT IN AN ORGANIZED HEALTH CARE EDUCATION/TRAINING PROGRAM

## 2020-10-22 RX ORDER — HYDROCORTISONE 25 MG/G
CREAM TOPICAL
Qty: 453.6 G | Refills: 1 | Status: ON HOLD | OUTPATIENT
Start: 2020-10-22 | End: 2022-01-01 | Stop reason: HOSPADM

## 2020-10-22 RX ORDER — HYDROXYZINE HYDROCHLORIDE 25 MG/1
TABLET, FILM COATED ORAL
Qty: 240 TABLET | Refills: 3 | Status: SHIPPED | OUTPATIENT
Start: 2020-10-22

## 2020-10-22 RX ORDER — MINERAL OIL
ENEMA (ML) RECTAL
Qty: 240 TABLET | Refills: 5 | Status: SHIPPED | OUTPATIENT
Start: 2020-10-22

## 2020-10-22 NOTE — PATIENT INSTRUCTIONS
Testing  Blood work for allergy and internal disease testing today       Check [facility] in one week for results or call [phone]       Contact me with questions or concerns       I will contact you if anything needs immediate attention.        Treatment    Morning:  Allegra 2 tablets    Bedtime:    Atarax = hydroxizine 1-8 tablets   Causes drowsiness              Start with 1 tablets tonight              Increase or decrease by one tablet nightly until you find the best dose for you.    Extra Allegra 1-2 tablets during the day if needed    HC 2.5% cream on hives as desired.

## 2020-10-22 NOTE — PROGRESS NOTES
Allergy Clinic Note  [facility]    Subjective:      Patient ID: Jeanne Rodgers is a 65 y.o. female.    Chief Complaint: Rash (hive like since 2014 )      Referring Provider: Nano Ford    History of Present Illness: 64 yo female with a history of breast cancer and endometrial cancer referred from oncology for complaints of itching and rash for 6 years.    She has a photograph showing papules and welts on her back.    Patient says that the itching started in 2014, shortly before she was diagnosed with breast cancer.  There was no change in hive frequency, severity, or duration following treatment for her breast cancer.  Her itching is uncontrolled.  It is severely interferes with her sleep and her ability to concentrate.  There are no associated symptoms.  There are no clear precipitants.  At has not been helped by various antihistamines.  She notes that Xyzal causes significant drowsiness.  She has also not been helped by medical marijuana. She she says she has not had any testing for hives.    Additional History:   Past medical history is significant for endometrial cancer and prior history of breast cancer.      Patient Active Problem List   Diagnosis    Hx of breast cancer    Spondylosis without myelopathy    Hamstring tightness of both lower extremities    Segmental and somatic dysfunction of lumbar region    Alteration in mobility associated with pain    Recurrent UTI    Increased frequency of urination    Pelvic pain in female    Myalgia of pelvic floor    Nocturia more than twice per night    Lichen sclerosus et atrophicus    Irregular bowel habits    Loose stools    Hip pain, right    Chronic lower back pain    Lower abdominal pain    Abdominal pain    Urinary incontinence, urge    Pre-diabetes    Vaginal irritation    Postmenopausal bleeding    Endometrial cancer    History of robot-assisted laparoscopic hysterectomy    Encounter for antineoplastic  chemotherapy    Thrombocytopenia due to drugs    Neutropenia, drug-induced    Radiation therapy complication    History of invasive breast cancer    Nausea & vomiting    Major depressive disorder, recurrent episode, moderate with anxious distress    Pancytopenia    Itch    Bone pain     Current Outpatient Medications on File Prior to Visit   Medication Sig Dispense Refill    acetaminophen (TYLENOL) 500 MG tablet Take 500 mg by mouth.      clobetasol 0.05% (TEMOVATE) 0.05 % Oint APPLY TO AFFECTED AREA(S) TWICE A DAY FOR 1 MONTH(S) then EVERY DAY FOR 1 MONTH(S) then 3 TIMES A WEEK 60 g 2    fluconazole (DIFLUCAN) 150 MG Tab       hydrocortisone (ANUSOL-HC) 2.5 % rectal cream Place rectally 2 (two) times daily. 30 g 1    methen-m.blue-s.phos-phsal-hyo (URIBEL) 118-10-40.8-36 mg Cap Take 1 capsule by mouth 3 (three) times daily as needed (bladder pain). 40 capsule 11    MILK THISTLE ORAL Take by mouth.      nystatin (MYCOSTATIN) cream Apply topically every Mon, Wed, Fri. 30 g 11    nystatin (MYCOSTATIN) powder Apply topically 4 (four) times daily. 30 g 1    ondansetron (ZOFRAN) 8 MG tablet TAKE 1 TABLET BY MOUTH EVERY 12 HOURS AS NEEDED FOR NAUSEA 30 tablet 2    polyethylene glycol (GOLYTELY,NULYTELY) 236-22.74-6.74 -5.86 gram suspension       scopolamine (TRANSDERM-SCOP) 1.3-1.5 mg (1 mg over 3 days) Place 1 patch onto the skin every 72 hours. 10 patch 2    silver sulfADIAZINE 1% (SILVADENE) 1 % cream Apply topically every morning. 50 g 11    predniSONE (DELTASONE) 5 MG tablet Take 3 each morning on odd number days 30 tablet 0    TURMERIC ORAL Take by mouth.       No current facility-administered medications on file prior to visit.          Review of Systems   Constitutional: Negative for chills and fever.   HENT: Negative for ear discharge and nosebleeds.    Eyes: Negative for discharge and redness.   Respiratory: Negative for hemoptysis, sputum production, shortness of breath, wheezing and  stridor.    Cardiovascular: Negative for chest pain and palpitations.   Gastrointestinal: Negative for blood in stool, melena and vomiting.   Genitourinary: Negative for dysuria, flank pain and hematuria.   Skin: Positive for itching and rash.   Neurological: Negative for seizures and loss of consciousness.   Endo/Heme/Allergies: Negative for polydipsia. Does not bruise/bleed easily.       Objective:   LMP  (LMP Unknown) Comment: age 53      Physical Exam   Constitutional: She is well-developed, well-nourished, and in no distress.   HENT:   Head: Normocephalic and atraumatic.   Nose: Nose normal.   Eyes: Conjunctivae are normal. Right eye exhibits no discharge. Left eye exhibits no discharge.   Neck: Neck supple.   Cardiovascular: Normal rate, regular rhythm and intact distal pulses.   Pulmonary/Chest: Effort normal. No stridor. No respiratory distress.   Abdominal: She exhibits no distension.   Musculoskeletal:         General: No deformity or edema.   Neurological: She is alert. GCS score is 15.   Skin: No rash noted. No erythema.   Psychiatric: Memory and affect normal.       Data:   Most recent CBC shows mild diminution of all 3 cell lines.  ANC is 1900.    Assessment:     1. Chronic urticaria    2. Hx of breast cancer    3. Itch    4. Anemia, unspecified type    5. Macrocytosis    6. Drug-induced folate deficiency anemia         Plan:     Medical decision making:  Patient is presenting with chronic urticaria of 4 years duration.  Her history is notable for to separate malignancies; however her symptoms have neither worsened nor improved with treatment.  There are no other clues available from the initial history and physical.  We discussed in detail the high likelihood that no cause will be found.  Emphasize that the goal of treatment will be 1st to make sure that her hives are a nuisance rather than a sinus something more serious and 2nd to control her itching.    Jeanne was seen today for rash.    Diagnoses  and all orders for this visit:    Chronic urticaria  -     IgE; Future  -     Dermatophagoides [place]; Future  -     Dermatophagoides Pteronyssinus; Future  -     [place]; Future  -     Jr; Future  -     Leake; Future  -     English Plantain; Future  -     Oak Pecan; Future  -     Ragweed; Future  -     Alternaria; Future  -     Aspergillus; Future  -     Cat; Future  -     Cockroach; Future  -     Dog; Future  -     Milk IgE; Future  -     Egg, white IgE; Future  -     Wheat IgE; Future  -     Soybean IgE; Future  -     Corn IgE; Future  -     Rast Allergen-Latex; Future  -     Sesame seed IgE; Future  -     TSH; Future  -     Anti-thyroglobulin Antibody Level; Future  -     Antimicrosomal Antibody Level; Future  -     Anti-IgE Receptor Antibody Level; Future  -     Serum Tryptase; Future  -     Serum Protein Electrophoresis; Future  -     CMP; Future  -     Allergen, Chick Pea; Future  -     CBC auto differential; Future  -     RETICULOCYTES; Future  -     VITAMIN B12; Future  -     hydrocortisone 2.5 % cream; Apply to affected areas twice a day when eczema is moderate.    Hx of breast cancer  -     Ambulatory referral/consult to Allergy  -     TSH; Future  -     VITAMIN B12; Future    Itch  -     Ambulatory referral/consult to Allergy  -     fexofenadine (ALLEGRA) 180 MG tablet; 2 tablets in the morning.  May take an extra 2 tablets during the day if needed for itching.  -     hydrOXYzine HCL (ATARAX) 25 MG tablet; 1 to 8 tablets at bedtime.  Start with 3 tablets.  Increase or decrease as needed until itching is controlled or a maximum of 8 tablets.    Anemia, with Macrocytosis  -     TSH; Future  -     VITAMIN B12; Future  -     FOLATE; Future                  Patient Instructions   Testing  Blood work for allergy and internal disease testing today       Check [facility] in one week for results or call [phone]       Contact me with questions or concerns       I will contact you if anything needs immediate  attention.        Treatment    Morning:  Allegra 2 tablets    Bedtime:    Atarax = hydroxizine 1-8 tablets   Causes drowsiness              Start with 1 tablets tonight              Increase or decrease by one tablet nightly until you find the best dose for you.    Extra Allegra 1-2 tablets during the day if needed    HC 2.5% cream on hives as desired.          Follow up in about 2 weeks (around 11/5/2020).    Johana Jim MD

## 2020-10-22 NOTE — LETTER
October 23, 2020      Nano Ford MD  [address]  [facility] [address]           [facility]  [address]  [facility] [address]  Phone: [phone]  Fax: [phone]          Patient: Jeanne Rodgers   MR Number: [id number]   YOB: 1955   Date of Visit: 10/22/2020       Dear Dr. Nano Ford:    Thank you for referring Jeanne Rodgers to me for evaluation. Attached you will find relevant portions of my assessment and plan of care.    If you have questions, please do not hesitate to call me. I look forward to following Jeanne Rodgers along with you.    Sincerely,    Johana Jim MD    Enclosure  CC:  No Recipients    If you would like to receive this communication electronically, please contact [email or web address] or [phone] to request more information on [email or web address] access.    For providers and/or their staff who would like to refer a patient to [facility], please contact us through our one-stop-shop provider referral line, [facility], at [phone].    If you feel you have received this communication in error or would no longer like to receive these types of communications, please e-mail [email or web address]

## 2020-10-23 LAB
ALBUMIN SERPL BCP-MCNC: 4.1 G/DL (ref 3.5–5.2)
ALBUMIN SERPL ELPH-MCNC: 4.23 G/DL (ref 3.35–5.55)
ALP SERPL-CCNC: 78 U/L (ref 55–135)
ALPHA1 GLOB SERPL ELPH-MCNC: 0.28 G/DL (ref 0.17–0.41)
ALPHA2 GLOB SERPL ELPH-MCNC: 0.8 G/DL (ref 0.43–0.99)
ALT SERPL W/O P-5'-P-CCNC: 18 U/L (ref 10–44)
ANION GAP SERPL CALC-SCNC: 8 MMOL/L (ref 8–16)
AST SERPL-CCNC: 29 U/L (ref 10–40)
B-GLOBULIN SERPL ELPH-MCNC: 0.7 G/DL (ref 0.5–1.1)
BILIRUB SERPL-MCNC: 0.5 MG/DL (ref 0.1–1)
BUN SERPL-MCNC: 13 MG/DL (ref 8–23)
CALCIUM SERPL-MCNC: 9.3 MG/DL (ref 8.7–10.5)
CHLORIDE SERPL-SCNC: 104 MMOL/L (ref 95–110)
CO2 SERPL-SCNC: 25 MMOL/L (ref 23–29)
CREAT SERPL-MCNC: 0.7 MG/DL (ref 0.5–1.4)
EST. GFR  (AFRICAN AMERICAN): >60 ML/MIN/1.73 M^2
EST. GFR  (NON AFRICAN AMERICAN): >60 ML/MIN/1.73 M^2
FOLATE SERPL-MCNC: 10.1 NG/ML (ref 4–24)
GAMMA GLOB SERPL ELPH-MCNC: 0.89 G/DL (ref 0.67–1.58)
GLUCOSE SERPL-MCNC: 94 MG/DL (ref 70–110)
IGE SERPL-ACNC: 307 IU/ML (ref 0–100)
PATHOLOGIST INTERPRETATION SPE: NORMAL
POTASSIUM SERPL-SCNC: 3.8 MMOL/L (ref 3.5–5.1)
PROT SERPL-MCNC: 6.9 G/DL (ref 6–8.4)
PROT SERPL-MCNC: 7.1 G/DL (ref 6–8.4)
SODIUM SERPL-SCNC: 137 MMOL/L (ref 136–145)
THYROGLOB AB SERPL IA-ACNC: <4 IU/ML (ref 0–3.9)
THYROPEROXIDASE IGG SERPL-ACNC: <6 IU/ML
TSH SERPL DL<=0.005 MIU/L-ACNC: 0.48 UIU/ML (ref 0.4–4)
VIT B12 SERPL-MCNC: 299 PG/ML (ref 210–950)

## 2020-10-27 LAB
A ALTERNATA IGE QN: <0.1 KU/L
A FUMIGATUS IGE QN: 0.12 KU/L
ALLERGEN LATEX IGE: <0.1 KU/L
BERMUDA GRASS IGE QN: 0.13 KU/L
CAT DANDER IGE QN: 2.56 KU/L
CEDAR IGE QN: 0.13 KU/L
CHICKPEA IGE AB [UNITS/VOLUME] IN SERUM: 0.29 KU/L
CORN IGE QN: 0.11 KU/L
COW MILK IGE QN: 0.34 KU/L
D FARINAE IGE QN: 0.74 KU/L
D PTERONYSS IGE QN: 0.88 KU/L
DEPRECATED A ALTERNATA IGE RAST QL: NORMAL
DEPRECATED A FUMIGATUS IGE RAST QL: ABNORMAL
DEPRECATED BERMUDA GRASS IGE RAST QL: ABNORMAL
DEPRECATED CAT DANDER IGE RAST QL: ABNORMAL
DEPRECATED CEDAR IGE RAST QL: ABNORMAL
DEPRECATED CHICK PEA IGE RAST QL: ABNORMAL
DEPRECATED CORN IGE RAST QL: ABNORMAL
DEPRECATED COW MILK IGE RAST QL: ABNORMAL
DEPRECATED D FARINAE IGE RAST QL: ABNORMAL
DEPRECATED D PTERONYSS IGE RAST QL: ABNORMAL
DEPRECATED DOG DANDER IGE RAST QL: ABNORMAL
DEPRECATED EGG WHITE IGE RAST QL: ABNORMAL
DEPRECATED ENGL PLANTAIN IGE RAST QL: ABNORMAL
DEPRECATED ROACH IGE RAST QL: ABNORMAL
DEPRECATED SESAME SEED IGE RAST QL: ABNORMAL
DEPRECATED SOYBEAN IGE RAST QL: ABNORMAL
DEPRECATED TIMOTHY IGE RAST QL: ABNORMAL
DEPRECATED WEST RAGWEED IGE RAST QL: ABNORMAL
DEPRECATED WHEAT IGE RAST QL: ABNORMAL
DEPRECATED WHITE OAK IGE RAST QL: ABNORMAL
DOG DANDER IGE QN: 0.43 KU/L
EGG WHITE IGE QN: 0.84 KU/L
ENGL PLANTAIN IGE QN: 0.14 KU/L
LATEX CLASS: NORMAL
ROACH IGE QN: 0.17 KU/L
SESAME SEED IGE QN: 0.19 KU/L
SOYBEAN IGE QN: 0.2 KU/L
TIMOTHY IGE QN: 0.11 KU/L
TRYPTASE LEVEL: 5.9 NG/ML
WEST RAGWEED IGE QN: 16.3 KU/L
WHEAT IGE QN: 0.15 KU/L
WHITE OAK IGE QN: 0.16 KU/L

## 2020-10-31 ENCOUNTER — EXTERNAL CHRONIC CARE MANAGEMENT (OUTPATIENT)
Dept: PRIMARY CARE CLINIC | Facility: CLINIC | Age: 65
End: 2020-10-31
Payer: MEDICARE

## 2020-10-31 PROCEDURE — 99490 CHRNC CARE MGMT STAFF 1ST 20: CPT | Mod: S$PBB,,, | Performed by: INTERNAL MEDICINE

## 2020-10-31 PROCEDURE — 99490 PR CHRONIC CARE MGMT, 1ST 20 MIN: ICD-10-PCS | Mod: S$PBB,,, | Performed by: INTERNAL MEDICINE

## 2020-10-31 PROCEDURE — 99490 CHRNC CARE MGMT STAFF 1ST 20: CPT | Mod: PBBFAC,PO | Performed by: INTERNAL MEDICINE

## 2020-11-09 LAB
CIU ASSOCIATED BASOPHIL ACTIVATION: 3.8 % (ref 0–12)
IGE RECEPTOR ANTIBODY: NORMAL

## 2020-11-10 ENCOUNTER — OFFICE VISIT (OUTPATIENT)
Dept: HEMATOLOGY/ONCOLOGY | Facility: CLINIC | Age: 65
End: 2020-11-10
Payer: MEDICARE

## 2020-11-10 ENCOUNTER — TELEPHONE (OUTPATIENT)
Dept: HEMATOLOGY/ONCOLOGY | Facility: CLINIC | Age: 65
End: 2020-11-10

## 2020-11-10 ENCOUNTER — IMMUNIZATION (OUTPATIENT)
Dept: INTERNAL MEDICINE | Facility: CLINIC | Age: 65
End: 2020-11-10
Payer: MEDICARE

## 2020-11-10 ENCOUNTER — LAB VISIT (OUTPATIENT)
Dept: LAB | Facility: HOSPITAL | Age: 65
End: 2020-11-10
Payer: MEDICARE

## 2020-11-10 VITALS
BODY MASS INDEX: 27.06 KG/M2 | HEIGHT: 60 IN | DIASTOLIC BLOOD PRESSURE: 57 MMHG | WEIGHT: 137.81 LBS | HEART RATE: 61 BPM | RESPIRATION RATE: 18 BRPM | SYSTOLIC BLOOD PRESSURE: 121 MMHG | OXYGEN SATURATION: 98 % | TEMPERATURE: 98 F

## 2020-11-10 DIAGNOSIS — D61.818 PANCYTOPENIA: ICD-10-CM

## 2020-11-10 DIAGNOSIS — Z85.3 HX OF BREAST CANCER: Primary | ICD-10-CM

## 2020-11-10 DIAGNOSIS — D61.818 PANCYTOPENIA: Primary | ICD-10-CM

## 2020-11-10 DIAGNOSIS — Z85.3 HISTORY OF INVASIVE BREAST CANCER: ICD-10-CM

## 2020-11-10 DIAGNOSIS — E53.8 B12 DEFICIENCY: ICD-10-CM

## 2020-11-10 DIAGNOSIS — C54.1 ENDOMETRIAL CANCER: ICD-10-CM

## 2020-11-10 LAB
BASOPHILS # BLD AUTO: 0.02 K/UL (ref 0–0.2)
BASOPHILS NFR BLD: 0.8 % (ref 0–1.9)
DIFFERENTIAL METHOD: ABNORMAL
EOSINOPHIL # BLD AUTO: 0.1 K/UL (ref 0–0.5)
EOSINOPHIL NFR BLD: 4 % (ref 0–8)
ERYTHROCYTE [DISTWIDTH] IN BLOOD BY AUTOMATED COUNT: 12.2 % (ref 11.5–14.5)
HCT VFR BLD AUTO: 31.6 % (ref 37–48.5)
HGB BLD-MCNC: 10.6 G/DL (ref 12–16)
IMM GRANULOCYTES # BLD AUTO: 0.01 K/UL (ref 0–0.04)
IMM GRANULOCYTES NFR BLD AUTO: 0.4 % (ref 0–0.5)
LYMPHOCYTES # BLD AUTO: 0.5 K/UL (ref 1–4.8)
LYMPHOCYTES NFR BLD: 20.9 % (ref 18–48)
MCH RBC QN AUTO: 36.4 PG (ref 27–31)
MCHC RBC AUTO-ENTMCNC: 33.5 G/DL (ref 32–36)
MCV RBC AUTO: 109 FL (ref 82–98)
MONOCYTES # BLD AUTO: 0.3 K/UL (ref 0.3–1)
MONOCYTES NFR BLD: 11.9 % (ref 4–15)
NEUTROPHILS # BLD AUTO: 1.6 K/UL (ref 1.8–7.7)
NEUTROPHILS NFR BLD: 62 % (ref 38–73)
NRBC BLD-RTO: 0 /100 WBC
PLATELET # BLD AUTO: 107 K/UL (ref 150–350)
PMV BLD AUTO: 9.5 FL (ref 9.2–12.9)
RBC # BLD AUTO: 2.91 M/UL (ref 4–5.4)
WBC # BLD AUTO: 2.53 K/UL (ref 3.9–12.7)

## 2020-11-10 PROCEDURE — 36415 COLL VENOUS BLD VENIPUNCTURE: CPT

## 2020-11-10 PROCEDURE — 99214 OFFICE O/P EST MOD 30 MIN: CPT | Mod: PBBFAC,25 | Performed by: INTERNAL MEDICINE

## 2020-11-10 PROCEDURE — 99999 PR PBB SHADOW E&M-EST. PATIENT-LVL IV: ICD-10-PCS | Mod: PBBFAC,,, | Performed by: INTERNAL MEDICINE

## 2020-11-10 PROCEDURE — 90694 VACC AIIV4 NO PRSRV 0.5ML IM: CPT | Mod: PBBFAC

## 2020-11-10 PROCEDURE — 85025 COMPLETE CBC W/AUTO DIFF WBC: CPT

## 2020-11-10 PROCEDURE — G0008 ADMIN INFLUENZA VIRUS VAC: HCPCS | Mod: PBBFAC

## 2020-11-10 PROCEDURE — 99213 PR OFFICE/OUTPT VISIT, EST, LEVL III, 20-29 MIN: ICD-10-PCS | Mod: S$PBB,,, | Performed by: INTERNAL MEDICINE

## 2020-11-10 PROCEDURE — 99213 OFFICE O/P EST LOW 20 MIN: CPT | Mod: S$PBB,,, | Performed by: INTERNAL MEDICINE

## 2020-11-10 PROCEDURE — 99999 PR PBB SHADOW E&M-EST. PATIENT-LVL IV: CPT | Mod: PBBFAC,,, | Performed by: INTERNAL MEDICINE

## 2020-11-10 RX ORDER — CYANOCOBALAMIN 1000 UG/ML
1000 INJECTION, SOLUTION INTRAMUSCULAR; SUBCUTANEOUS
Qty: 10 ML | Refills: 0 | Status: SHIPPED | OUTPATIENT
Start: 2020-11-10 | End: 2021-08-26

## 2020-11-10 NOTE — PROGRESS NOTES
Subjective:       Patient ID: Jeanne Rodgers is a 65 y.o. female.    Chief Complaint: History of invasive breast cancer    HPI     Returns for follow up of bone pain  Reviewed negative bone scan  She has a PET pending later today for Gyn Onc     9/18/2020:  Bone scan:  Impression:  There is no scintigraphic evidence of metastatic disease.     9/21/2020  PET scan  FINDINGS:  Quality of the study: Adequate.     In the head and neck, there are no hypermetabolic lesions worrisome for malignancy. There are no hypermetabolic mucosal lesions, and there are no pathologically enlarged or hypermetabolic lymph nodes.     In the chest, there are no hypermetabolic lesions worrisome for malignancy.  There are no concerning pulmonary nodules or masses, and there are no pathologically enlarged or hypermetabolic lymph nodes.     In the abdomen and pelvis, there is physiologic tracer distribution within the abdominal organs and excretion into the genitourinary system.     In the bones, remote fracture of posterior aspect of the left 10th rib (axial series 2, image 77).  There are no hypermetabolic lesions worrisome for malignancy.  Bone marrow hyperplasia.     CT findings:     Simple hepatic cyst within the left hepatic lobe, measures 1.1 cm (axial series 2, image 74).  Prior cholecystectomy.  Postoperative changes of hysterectomy with bilateral salpingo-oophorectomies this patient with reported history of endometrial adenocarcinoma.  Degenerative disease of the spine with no evidence of acute fractures or lytic lesions.     Impression:     1. No hypermetabolic lesions are identified in this patient with known case of endometrial adenocarcinoma status post hysterectomy and bilateral salpingo-oophorectomies.  2. Additional CT findings, as above.    Oncology History:  Endometrial Cancer History:  Diagnosis of endometrial cancer after presenting with vaginal bleeding  She has completed surgical debulking, chemotherapy and XRT but  states that she has developed nausea and vomiting since starting XRT  She was also admitted in  of this year with melena and had a complete GI work up (see below)  Of note during chemotherapy she required several transfusions for symptomatic anemia     Breast Oncology History:  This is a 66 yo female with a history of right breast cancer  - Had been getting her mammograms at [facility] as she was uninsured at the time  - reports small mass seen in  and sent to [facility]- where she was told nothing was seen on review  - Repeat situation of above the next year in   - she noted right nipple inversion - went to [facility]- told not likely cancer, no further testing done  - In  it was recommended that [facility] see her for imaging  - Abnormal mammogram and biopsy recommended  Reports biopsy experience was awful and so she researched other sites to be cared  - She sought out Dr. Gallo Omalley at [facility] for her breast surgery  She opted for a lumpectomy performed 14  Pathology revealed 2.4 cm IDC and DCIS, margins negative  Negative LNs (0/6), ER+, OR+, Her 2 michelle negative  - Referred for radiation therapy at [facility]  - Saw Dr. Keller at [facility]  Oncotype done ? 9- chemotherapy not recommended  She was started on Arimidex  -She was on Arimidex for 4 years until stopping (see below).    Genetic testin Germline Cancer-Genetic Testing  · Test(s) performed:  [employer] Common Hereditary Cancers Panel  Number of genes analyzed:  47  Laboratory:  [facility]  Ordering provider:  ANGIE Blackman  Sample collection date:  07/15/2020  Results report date:  2020              Full report to be scanned into Epic under the Labs and/or Media tab(s).  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            COUNSELING/MEDICAL DECISION-MAKING:         Note below discussion and  recommendations provided from genetic counseling visit (copied forward):  TP53 Mutation Implications  Li-Fraumeni Syndrome (LFS) is a hereditary (germline) cancer syndrome diagnosed with the identification of a germline TP53 gene mutation, which is passed from parent to child and is present for the patient's lifespan in essentially every bodily cell (GHR, 2020).  Its associated risks, including breast cancer, brain cancer, sarcoma, adrenocortical carcinoma, and colorectal cancer ([employer], 2020; NCCN, 1.2020).  It is important to note that the patient does not at this time have a diagnosis of LFS, as we do not know yet whether the patient's TP53 mutation is germline.   Somatic (acquired) TP53 mutations do not occur in the germline and occur at some other point in the lifespan (not at conception) (GHR, 2020) in the blood due to any of several factors, including advancing age ([employer], 2020), history of chemotherapy (which applies to patient), hematologic malignancy (NCCN, 1.2020), environmental factors including ultraviolet (UV) exposure (GHR, 2020), and DNA replication errors during cell division (GHR, 2020).  Somatic mutations that develop in only a single cell early in the embryonic phase can lead to mosaicism (GHR, 2020).  With mosaicism, the gene mutation is not present in the affected individual's egg or sperm cells or in the fertilized egg but rather develops later during the embryonic phase, and, per the process of cell division, cells arising from the cell carrying the mutation will also have the mutation, but other cells will not (GHR, 2020).  Mosaicism's causing health problems is a function of both the specific mutation and the number of cells affected by the mutation (GHR, 2020).  Up to 20% of LFS cases are de fadi (Inv[employer]ae, 2020).  De fadi (i.e. new) mutations can be germline or somatic (GHR, 2020).  Sometimes, the de fadi mutation develops in the individual's germline (egg or sperm cells) but is absent  in all of the individual's other cells; other times, the de fadi mutation develops in the fertilized egg shortly post-conception, and, as the fertilized egg divides, each resultant cell also has the mutation (GHR, 2020).    It is important to distinguish germline origin versus somatic origin as the former has the potential to impact the patient's family while the latter does not.    Recommendations  Appropriate next steps include:  1. A complete blood count (CBC) and a peripheral blood smear is warranted to evaluate for hematologic neoplasia (NCCN, 1.2020).  The patient is amenable to this.  I do not suspect hematologic neoplasia as etiology of her TP53 mutation.  2. Testing of parents for identification of the patient's TP53 mutation in either of them; however, the patient's parents are unfortunately unavailable for testing, and testing even all of the patient's siblings, if they all came back negative, would not confirm that the patient's TP53 mutation is not germline in origin.  Therefore, a fibroblast skin culture is recommended, which breast surgeon Dr. Nicci García has agreed to perform.  The patient is amenable to this.      Review of Systems    no changes  Feels well  Objective:      Physical Exam  deferred  Assessment:       1. Hx of breast cancer    2. Endometrial cancer    3. Pancytopenia    4. B12 deficiency        Plan:     1-2. ADONIS  See prior note  3. We discussed parameters   Recommend a bmbx  Discussed procedure  She requests some sedation as for other procedures (breast bx, dental procedures) it failed to work  We will also start weekly b12

## 2020-11-10 NOTE — TELEPHONE ENCOUNTER
TC to IR requesting they schedule pt for Bone Marrow Bx w/ sedation for Dr Nano aleman  They will contact patient and schedule with her directly

## 2020-11-11 ENCOUNTER — PATIENT MESSAGE (OUTPATIENT)
Dept: HEMATOLOGY/ONCOLOGY | Facility: CLINIC | Age: 65
End: 2020-11-11

## 2020-11-12 ENCOUNTER — CLINICAL SUPPORT (OUTPATIENT)
Dept: HEMATOLOGY/ONCOLOGY | Facility: CLINIC | Age: 65
End: 2020-11-12
Payer: MEDICARE

## 2020-11-12 NOTE — PROGRESS NOTES
Pt presented in clinic with her B12 supplies for B12 administration teaching.   Pt provided teaching handouts and was instructed how to administer B12 verbally, hands on, and with readback.   Pt expressed no questions/concerns and administered B12 correctly to self.   Pt has contact information for nurse should she present with any additional questions/concerns.

## 2020-11-13 ENCOUNTER — PATIENT MESSAGE (OUTPATIENT)
Dept: ALLERGY | Facility: CLINIC | Age: 65
End: 2020-11-13

## 2020-11-16 ENCOUNTER — TELEPHONE (OUTPATIENT)
Dept: HEMATOLOGY/ONCOLOGY | Facility: CLINIC | Age: 65
End: 2020-11-16

## 2020-11-16 NOTE — TELEPHONE ENCOUNTER
YON MCKEON   Key: [id number] - PA Case ID: [id number] - Rx #: [id number]  Status: Sent to Plan Today  Drug: Cyanocobalamin 1000MCG/ML solution  Form: [employer] Electronic Prior Authorization Request Form

## 2020-11-17 ENCOUNTER — HOSPITAL ENCOUNTER (OUTPATIENT)
Dept: PREADMISSION TESTING | Facility: HOSPITAL | Age: 65
Discharge: HOME | End: 2020-11-17
Attending: RADIOLOGY
Payer: MEDICARE

## 2020-11-17 VITALS
OXYGEN SATURATION: 100 % | HEIGHT: 60 IN | BODY MASS INDEX: 27.14 KG/M2 | HEART RATE: 64 BPM | RESPIRATION RATE: 18 BRPM | WEIGHT: 138.25 LBS | SYSTOLIC BLOOD PRESSURE: 95 MMHG | DIASTOLIC BLOOD PRESSURE: 54 MMHG

## 2020-11-17 DIAGNOSIS — Z86.2 H/O NEUTROPENIA: ICD-10-CM

## 2020-11-17 DIAGNOSIS — Z01.818 PREOP TESTING: Primary | ICD-10-CM

## 2020-11-17 LAB
ANION GAP SERPL CALC-SCNC: 8 MMOL/L (ref 8–16)
BASOPHILS # BLD AUTO: 0.01 K/UL (ref 0–0.2)
BASOPHILS NFR BLD: 0.3 % (ref 0–1.9)
BUN SERPL-MCNC: 14 MG/DL (ref 8–23)
CALCIUM SERPL-MCNC: 9.4 MG/DL (ref 8.7–10.5)
CHLORIDE SERPL-SCNC: 107 MMOL/L (ref 95–110)
CO2 SERPL-SCNC: 26 MMOL/L (ref 23–29)
CREAT SERPL-MCNC: 0.8 MG/DL (ref 0.5–1.4)
DIFFERENTIAL METHOD: ABNORMAL
EOSINOPHIL # BLD AUTO: 0.1 K/UL (ref 0–0.5)
EOSINOPHIL NFR BLD: 2.8 % (ref 0–8)
ERYTHROCYTE [DISTWIDTH] IN BLOOD BY AUTOMATED COUNT: 12 % (ref 11.5–14.5)
EST. GFR  (AFRICAN AMERICAN): >60 ML/MIN/1.73 M^2
EST. GFR  (NON AFRICAN AMERICAN): >60 ML/MIN/1.73 M^2
GLUCOSE SERPL-MCNC: 94 MG/DL (ref 70–110)
HCT VFR BLD AUTO: 31.1 % (ref 37–48.5)
HGB BLD-MCNC: 10.7 G/DL (ref 12–16)
IMM GRANULOCYTES # BLD AUTO: 0.01 K/UL (ref 0–0.04)
IMM GRANULOCYTES NFR BLD AUTO: 0.3 % (ref 0–0.5)
INR PPP: 1 (ref 0.8–1.2)
LYMPHOCYTES # BLD AUTO: 0.7 K/UL (ref 1–4.8)
LYMPHOCYTES NFR BLD: 25.2 % (ref 18–48)
MCH RBC QN AUTO: 36.4 PG (ref 27–31)
MCHC RBC AUTO-ENTMCNC: 34.4 G/DL (ref 32–36)
MCV RBC AUTO: 106 FL (ref 82–98)
MONOCYTES # BLD AUTO: 0.3 K/UL (ref 0.3–1)
MONOCYTES NFR BLD: 10.5 % (ref 4–15)
NEUTROPHILS # BLD AUTO: 1.7 K/UL (ref 1.8–7.7)
NEUTROPHILS NFR BLD: 60.9 % (ref 38–73)
NRBC BLD-RTO: 0 /100 WBC
PLATELET # BLD AUTO: 112 K/UL (ref 150–350)
PMV BLD AUTO: 9.2 FL (ref 9.2–12.9)
POTASSIUM SERPL-SCNC: 4.6 MMOL/L (ref 3.5–5.1)
PROTHROMBIN TIME: 10.8 SEC (ref 9–12.5)
RBC # BLD AUTO: 2.94 M/UL (ref 4–5.4)
SODIUM SERPL-SCNC: 141 MMOL/L (ref 136–145)
WBC # BLD AUTO: 2.86 K/UL (ref 3.9–12.7)

## 2020-11-17 PROCEDURE — 85610 PROTHROMBIN TIME: CPT

## 2020-11-17 PROCEDURE — 80048 BASIC METABOLIC PNL TOTAL CA: CPT

## 2020-11-17 PROCEDURE — 85025 COMPLETE CBC W/AUTO DIFF WBC: CPT

## 2020-11-17 NOTE — DISCHARGE INSTRUCTIONS
Your procedure  is scheduled for _11/19/2020_, arrive at 8:00 am________.    If your arrival time is earlier than 7:00 am, enter through the Emergency Department on the day of your surgery.    If your arrival time is after 7:00 am, enter at the front entrance of the hospital.  You will be going to the Same Day Surgery Unit on the 2nd floor of the hospital.    Important instructions:   Do not eat or drink after 12 midnight, including water.  It is okay to brush your teeth.  Do not have gum, candy or mints.      STOP taking Aspirin, Ibuprofen,  Advil, Motrin, Mobic(meloxicam), Aleve (naproxen), Fish oil, and Vitamin E for at least 7 days before your surgery.     You may take tylenol If needed which is not a blood thinner.       Please shower the night before and the morning of your surgery.        Use Hibiclens soap as instructed by your pre op nurse.   Please place clean linens on your bed the night before surgery. Please wear fresh clean clothing after each shower.     No shaving of procedural area at least 4-5 days before surgery due to increased risk of skin irritation and/or possible infection.      Contact lenses and removable denture work may not be worn during your procedure.     Do not wear make- up, including mascara.     You may wear deodorant only.      Do not wear powder, body lotion or perfume/cologne.     Do not wear any jewelry or have any metal on your body.     You will be asked to remove any dentures or partials for the procedure.     Please bring any documents given to you by your doctor.     If you are going home on the same day of surgery, you must arrange for a family member or a friend to drive you home.  Public transportation is prohibited.  You will not be able to drive home if you were given anesthesia or sedation.     Wear loose fitting clothes allowing for bandages.     Please leave money and valuables home.       You may bring your cell phone.     Call the doctor if fever  or illness should occur before your surgery.    Call [phone] to contact us here if needed.

## 2020-11-19 ENCOUNTER — HOSPITAL ENCOUNTER (OUTPATIENT)
Dept: INTERVENTIONAL RADIOLOGY/VASCULAR | Facility: HOSPITAL | Age: 65
Discharge: HOME | End: 2020-11-19
Attending: NURSE PRACTITIONER
Payer: MEDICARE

## 2020-11-19 ENCOUNTER — HOSPITAL ENCOUNTER (OUTPATIENT)
Facility: HOSPITAL | Age: 65
Discharge: HOME | End: 2020-11-19
Attending: RADIOLOGY | Admitting: RADIOLOGY
Payer: MEDICARE

## 2020-11-19 VITALS
RESPIRATION RATE: 17 BRPM | SYSTOLIC BLOOD PRESSURE: 109 MMHG | DIASTOLIC BLOOD PRESSURE: 59 MMHG | OXYGEN SATURATION: 100 % | HEART RATE: 68 BPM

## 2020-11-19 VITALS
SYSTOLIC BLOOD PRESSURE: 104 MMHG | TEMPERATURE: 98 F | OXYGEN SATURATION: 100 % | RESPIRATION RATE: 16 BRPM | HEART RATE: 72 BPM | DIASTOLIC BLOOD PRESSURE: 59 MMHG

## 2020-11-19 DIAGNOSIS — M89.8X9 BONE PAIN: ICD-10-CM

## 2020-11-19 DIAGNOSIS — D61.818 PANCYTOPENIA: ICD-10-CM

## 2020-11-19 DIAGNOSIS — D61.818 PANCYTOPENIA: Primary | ICD-10-CM

## 2020-11-19 PROCEDURE — 20225 BONE BIOPSY TROCAR/NDL DEEP: CPT | Mod: ,,, | Performed by: RADIOLOGY

## 2020-11-19 PROCEDURE — 88237 TISSUE CULTURE BONE MARROW: CPT

## 2020-11-19 PROCEDURE — 20225 IR BIOPSY BONE DEEP: ICD-10-PCS | Mod: ,,, | Performed by: RADIOLOGY

## 2020-11-19 PROCEDURE — 88342 IMHCHEM/IMCYTCHM 1ST ANTB: CPT | Performed by: PATHOLOGY

## 2020-11-19 PROCEDURE — 99152 MOD SED SAME PHYS/QHP 5/>YRS: CPT

## 2020-11-19 PROCEDURE — 88185 FLOWCYTOMETRY/TC ADD-ON: CPT | Performed by: PATHOLOGY

## 2020-11-19 PROCEDURE — 88305 TISSUE EXAM BY PATHOLOGIST: CPT | Mod: 59 | Performed by: PATHOLOGY

## 2020-11-19 PROCEDURE — 77012 IR BIOPSY BONE DEEP: ICD-10-PCS | Mod: 26,,, | Performed by: RADIOLOGY

## 2020-11-19 PROCEDURE — 63600175 PHARM REV CODE 636 W HCPCS: Performed by: RADIOLOGY

## 2020-11-19 PROCEDURE — 88264 CHROMOSOME ANALYSIS 20-25: CPT

## 2020-11-19 PROCEDURE — 99152 PR MOD CONSCIOUS SEDATION, SAME PHYS, 5+ YRS, FIRST 15 MIN: ICD-10-PCS | Mod: ,,, | Performed by: RADIOLOGY

## 2020-11-19 PROCEDURE — 88341 IMHCHEM/IMCYTCHM EA ADD ANTB: CPT | Performed by: PATHOLOGY

## 2020-11-19 PROCEDURE — 99152 MOD SED SAME PHYS/QHP 5/>YRS: CPT | Mod: ,,, | Performed by: RADIOLOGY

## 2020-11-19 PROCEDURE — 88311 DECALCIFY TISSUE: CPT | Performed by: PATHOLOGY

## 2020-11-19 PROCEDURE — 99153 MOD SED SAME PHYS/QHP EA: CPT

## 2020-11-19 PROCEDURE — 77012 CT SCAN FOR NEEDLE BIOPSY: CPT | Mod: TC

## 2020-11-19 PROCEDURE — 77012 CT SCAN FOR NEEDLE BIOPSY: CPT | Mod: 26,,, | Performed by: RADIOLOGY

## 2020-11-19 PROCEDURE — 88299 UNLISTED CYTOGENETIC STUDY: CPT

## 2020-11-19 PROCEDURE — 88184 FLOWCYTOMETRY/ TC 1 MARKER: CPT | Performed by: PATHOLOGY

## 2020-11-19 PROCEDURE — 88313 SPECIAL STAINS GROUP 2: CPT | Performed by: PATHOLOGY

## 2020-11-19 RX ORDER — MIDAZOLAM HYDROCHLORIDE 1 MG/ML
INJECTION INTRAMUSCULAR; INTRAVENOUS CODE/TRAUMA/SEDATION MEDICATION
Status: COMPLETED | OUTPATIENT
Start: 2020-11-19 | End: 2020-11-19

## 2020-11-19 RX ORDER — HYDROCODONE BITARTRATE AND ACETAMINOPHEN 5; 325 MG/1; MG/1
1 TABLET ORAL EVERY 4 HOURS PRN
Status: DISCONTINUED | OUTPATIENT
Start: 2020-11-19 | End: 2020-11-19 | Stop reason: HOSPADM

## 2020-11-19 RX ORDER — FENTANYL CITRATE 50 UG/ML
INJECTION, SOLUTION INTRAMUSCULAR; INTRAVENOUS CODE/TRAUMA/SEDATION MEDICATION
Status: COMPLETED | OUTPATIENT
Start: 2020-11-19 | End: 2020-11-19

## 2020-11-19 RX ORDER — HYDROCODONE BITARTRATE AND ACETAMINOPHEN 5; 325 MG/1; MG/1
1 TABLET ORAL EVERY 4 HOURS PRN
Status: CANCELLED | OUTPATIENT
Start: 2020-11-19

## 2020-11-19 RX ORDER — MORPHINE SULFATE 4 MG/ML
2 INJECTION, SOLUTION INTRAMUSCULAR; INTRAVENOUS
Status: DISCONTINUED | OUTPATIENT
Start: 2020-11-19 | End: 2020-11-19 | Stop reason: HOSPADM

## 2020-11-19 RX ORDER — MORPHINE SULFATE 10 MG/ML
2 INJECTION INTRAMUSCULAR; INTRAVENOUS; SUBCUTANEOUS
Status: CANCELLED | OUTPATIENT
Start: 2020-11-19

## 2020-11-19 RX ADMIN — FENTANYL CITRATE 50 MCG: 50 INJECTION, SOLUTION INTRAMUSCULAR; INTRAVENOUS at 09:11

## 2020-11-19 RX ADMIN — MIDAZOLAM HYDROCHLORIDE 1 MG: 1 INJECTION, SOLUTION INTRAMUSCULAR; INTRAVENOUS at 09:11

## 2020-11-19 NOTE — BRIEF OP NOTE
Radiology Post-Procedure Note    Pre Op Diagnosis: Bone pain and pancytopenia  Post Op Diagnosis: Same    Procedure: CT-guided right iliac bone marrow biopsy and aspiration    Procedure performed by: Charli Fisher MD    Written Informed Consent Obtained: Yes  Specimen Removed: YES, 15-cc bone marrow aspirate and 1.0-cc core bone spicule   Estimated Blood Loss: Minimal    Findings:   Successful CT-guided right iliac bone marrow biopsy and aspiration under moderate conscious sedation. Patient tolerated the procedure well. No immediate post-procedural complications noted.     Patient transferred back to [facility] for 2 hours post-op monitoring and bed rest prior to tentative discharge.    Thank you for considering IR for the care of your patient.     Charli Fisher MD  Interventional Radiology

## 2020-11-19 NOTE — H&P
Radiology History & Physical      SUBJECTIVE:     Chief Complaint: Bone pain    History of Present Illness:  Jeanne Rodgers is a 65 y.o. female with pertinent PMHx of right breast cancer and endometrial cancer who complains of 'bone pain' associated with pancytopenia with concerns for hematological neoplasia however, with PET/CT and bone scans negative for suspicious osseous lesions.    Given concerns, a new outpatient IR consult received for CT-guided bone marrow biopsy and aspiration.     Past Medical History:   Diagnosis Date    Allergy     Anxiety     Breast cancer 2014    Chronic back pain     Depression     Encounter for blood transfusion     Endometrial cancer 07/30/2019    Fibromyalgia     Hives     Hx of psychiatric care     Hx of radiation therapy     Itching     Lichen sclerosus     Mental disorder     PONV (postoperative nausea and vomiting)     Psychiatric problem     PTSD (post-traumatic stress disorder)     Sleep difficulties     Sore throat     Therapy     Uterine cancer 08/05/2019    UTI (urinary tract infection)      Past Surgical History:   Procedure Laterality Date    anal fissure surgery      APPENDECTOMY      BREAST BIOPSY Right 2014    BREAST [facility]Y Right 2014    [facility] [place] & DCIS, neg margins, 0/6 SN, Stage II, ER/NE+, HER-2 neg Adjuvant XRT and hormonal therapy     CARPAL TUNNEL RELEASE      CHOLECYSTECTOMY      COLONOSCOPY      COLONOSCOPY N/A 6/11/2020    Procedure: COLONOSCOPY;  Surgeon: Bobby Marino MD;  Location: [facility] ([address] [place] [address]);  Service: Endoscopy;  Laterality: N/A;    ESOPHAGOGASTRODUODENOSCOPY N/A 6/11/2020    Procedure: EGD (ESOPHAGOGASTRODUODENOSCOPY);  Surgeon: Bobby Marino MD;  Location: [facility] ([address] [place] [address]);  Service: Endoscopy;  Laterality: N/A;    HYSTERECTOMY  08/15/2019    HYSTEROSCOPY WITH DILATION AND CURETTAGE OF UTERUS N/A 7/24/2019    Procedure: HYSTEROSCOPY, WITH DILATION AND CURETTAGE OF  UTERUS;  Surgeon: Елена Kam MD;  Location: [facility] OR;  Service: OB/GYN;  Laterality: N/A;    INSERTION OF TUNNELED CENTRAL VENOUS CATHETER (CVC) WITH SUBCUTANEOUS PORT N/A 9/26/2019    Procedure: INSERTION, PORT-A-CATH;  Surgeon: Moris Varghese MD;  Location: [facility] CATH LAB;  Service: Radiology;  Laterality: N/A;    MASTECTOMY Right     2014    MEDIPORT REMOVAL N/A 3/5/2020    Procedure: REMOVAL, CATHETER, CENTRAL VENOUS, TUNNELED, WITH PORT;  Surgeon: Moris Varghese MD;  Location: [facility] CATH LAB;  Service: Radiology;  Laterality: N/A;    ROBOT-ASSISTED LAPAROSCOPIC ABDOMINAL HYSTERECTOMY USING DA RAMU XI N/A 8/15/2019    Procedure: XI ROBOTIC HYSTERECTOMY;  Surgeon: Darius Regalado MD;  Location: [facility];  Service: OB/GYN;  Laterality: N/A;    ROBOT-ASSISTED LAPAROSCOPIC SALPINGO-OOPHORECTOMY USING DA RAMU XI Bilateral 8/15/2019    Procedure: XI ROBOTIC SALPINGO-OOPHORECTOMY;  Surgeon: Darius Regalado MD;  Location: [facility];  Service: OB/GYN;  Laterality: Bilateral;    ROBOT-ASSISTED LYMPHADENECTOMY Left 8/15/2019    Procedure: ROBOTIC LYMPHADENECTOMY;  Surgeon: Darius Regalado MD;  Location: [facility];  Service: OB/GYN;  Laterality: Left;    SALPINGOOPHORECTOMY Bilateral 08/15/2019    TONSILLECTOMY      TUBAL LIGATION       Home Meds:   Prior to Admission medications    Medication Sig Start Date End Date Taking? Authorizing Provider   clobetasol 0.05% (TEMOVATE) 0.05 % Oint APPLY TO AFFECTED AREA(S) TWICE A DAY FOR 1 MONTH(S) then EVERY DAY FOR 1 MONTH(S) then 3 TIMES A WEEK 9/19/20   Елена Kam MD   cyanocobalamin 1,000 mcg/mL injection Inject 1 mL (1,000 mcg total) into the skin every 7 days. 11/10/20   Nano Ford MD   fexofenadine (ALLEGRA) 180 MG tablet 2 tablets in the morning.  May take an extra 2 tablets during the day if needed for itching. 10/22/20   Johana Jim MD   hydrocortisone 2.5 % cream Apply to affected areas twice a day when eczema is moderate. 10/22/20   Johana  "RONDA Jim MD   hydrOXYzine HCL (ATARAX) 25 MG tablet 1 to 8 tablets at bedtime.  Start with 3 tablets.  Increase or decrease as needed until itching is controlled or a maximum of 8 tablets. 10/22/20   MD mary CokerAshleyphos-phsal-hyo (URIBEL) 118-10-40.8-36 mg Cap Take 1 capsule by mouth 3 (three) times daily as needed (bladder pain). 1/30/20   Елена Aguillon MD   MILK THISTLE ORAL Take by mouth.    Historical Provider   needle, disp, 24 gauge 24 gauge x 1 1/4" Ndle 1 application by Misc.(Non-Drug; Combo Route) route every 7 days. 11/10/20   Nano Ford MD   nystatin (MYCOSTATIN) cream Apply topically every Mon, Wed, Fri. 1/20/20   Marley Younger MD   nystatin (MYCOSTATIN) powder Apply topically 4 (four) times daily. 1/20/20   Marley Younger MD   silver sulfADIAZINE 1% (SILVADENE) 1 % cream Apply topically every morning. 2/13/19   Corwin Casarez MD   syringe, disposable, 1 mL Syrg 1 application by Misc.(Non-Drug; Combo Route) route every 7 days. 11/10/20   Nano Ford MD     Anticoagulants/Antiplatelets: no anticoagulation    Allergies: Review of patient's allergies indicates:  No Known Allergies     Sedation History:  no adverse reactions    Review of Systems:   Hematological: no known coagulopathies  Respiratory: no cough, shortness of breath, or wheezing  Cardiovascular: no chest pain or dyspnea on exertion  Gastrointestinal: no abdominal pain, change in bowel habits, or black or bloody stools  Genito-Urinary: no dysuria, trouble voiding, or hematuria  Musculoskeletal: positive for - bone pain  Neurological: no TIA or stroke symptoms      OBJECTIVE:     Vital Signs (Most Recent)     Physical Exam:  ASA: II  Mallampati: III    General: no acute distress  Mental Status: alert and oriented to person, place and time  HEENT: normocephalic, atraumatic  Chest: unlabored breathing  Heart: regular heart rate  Abdomen: nondistended  Extremity: moves all " extremities    Laboratory  Lab Results   Component Value Date    INR 1.0 11/17/2020       Lab Results   Component Value Date    WBC 2.86 (L) 11/17/2020    HGB 10.7 (L) 11/17/2020    HCT 31.1 (L) 11/17/2020     (H) 11/17/2020     (L) 11/17/2020      Lab Results   Component Value Date    GLU 94 11/17/2020     11/17/2020    K 4.6 11/17/2020     11/17/2020    CO2 26 11/17/2020    BUN 14 11/17/2020    CREATININE 0.8 11/17/2020    CALCIUM 9.4 11/17/2020    ALT 18 10/22/2020    AST 29 10/22/2020    ALBUMIN 4.1 10/22/2020    BILITOT 0.5 10/22/2020    BILIDIR 0.2 08/10/2020     ASSESSMENT/PLAN:     65 y.o. female with pertinent PMHx of right breast cancer and endometrial cancer who complains of 'bone pain' associated with pancytopenia with concerns for hematological neoplasia however, with PET/CT and bone scans negative for suspicious osseous lesions.    1. Bone pain - Will attempt CT-guided iliac bone marrow biopsy and aspiration under moderate conscious sedation.    Risks (including, but not limited to, pain, bleeding, infection, damage to nearby structures, failure to obtain sufficient material for a diagnosis, the need for additional procedures, and death), benefits, and alternatives were discussed with the patient. All questions were answered to the best of my abilities. The patient wishes to proceed with the procedure. Written informed consent was obtained.    Thank you for considering IR for the care of your patient.     Charli Fisher MD  Interventional Radiology

## 2020-11-19 NOTE — DISCHARGE SUMMARY
Radiology Discharge Summary      Hospital Course: No complications    Admit Date: 11/19/2020  Discharge Date: 11/19/2020     Instructions Given to Patient: Yes    Diet: Resume prior diet     Activity: activity as tolerated and no driving for today    Description of Condition on Discharge: Stable     Vital Signs (Most Recent): Pulse: 68 (11/19/20 0952)  Resp: 17 (11/19/20 0952)  BP: (!) 109/59 (11/19/20 0952)  SpO2: 100 % (11/19/20 0952)    Discharge Disposition: Home    Discharge Diagnosis:  65 y.o. female with pertinent PMHx of right breast cancer and endometrial cancer who complains of 'bone pain' associated with pancytopenia with concerns for hematological neoplasia s/p successful CT-guided bone marrow biopsy and aspiration under moderate conscious sedation. Patient tolerated the procedure well. No immediate post-procedural complications noted.     Thank you for considering IR for the care of your patient.     Charli Fisher MD  Interventional Radiology

## 2020-11-19 NOTE — SEDATION DOCUMENTATION
Patient transported to CT by IR RN.  Patient states that historically lidocaine is ineffective on her for local anesthesia.  Dr. Fisher made aware and plan to use alternate medication.  Informed consent obtained from patient by Dr. Fisher.  Patient denies problems with narcotics or anesthesia in past surgeries and is a good historian of her medical history.

## 2020-11-19 NOTE — DISCHARGE INSTRUCTIONS
BATHING:  ? You may shower tomorrow.  DRESSING:  ? Remove dressing tomorrow.        ACTIVITY LEVEL: If you have received sedation or an anesthetic, you may feel sleepy for several hours. Rest until you are more awake. Gradually resume your normal activities    Do not drive, drink alcohol, or sign legal documents for 24 hours, or if taking narcotic pain medication.      DIET: You may resume your home diet. If nausea is present, increase your diet gradually with fluids and bland foods.    Medications: Pain medication should be taken only if needed and as directed. If antibiotics are prescribed, the medication should be taken until completed. You will be given an updated list of you medications.  ? No driving, alcoholic beverages or signing legal documents for next 24 hours if you have had sedation, or while taking pain medication    CALL THE DOCTOR:   For any obvious bleeding (some dried blood over the incision is normal).     Redness, swelling, foul smell around incision or fever over 101.  Shortness of breath.  Persistent pain or nausea not relieved by medication.  Call  [phone]     to speak with an Interventional Radiologist    If any unusual problems or difficulties occur contact your doctor. If you cannot contact your doctor but feel your signs and symptoms warrant a physicians attention return to the emergency room.        Bone Marrow Aspiration and Biopsy    Bone marrow is the soft, spongy part inside bones. It makes most of the bodys blood cells. Aspiration and biopsy are procedures done to take a sample of bone marrow out of the body for examination. To perform either procedure, a needle is inserted into one of your bones, usually the back of the hip bone. Then a sample of bone marrow is removed.   If a sample of fluid and cells is taken, it is called bone marrow aspiration. If a solid sample of bone marrow tissue is removed, it is called bone marrow biopsy. In either case, the samples are sent to a lab  and studied. The procedures can be done alone, but are most often done together. This sheet tells you more about what to expect.  Why the procedures are done  The procedures may be done for a number of reasons. They can help diagnose certain blood or bone marrow disorders or infections. They may help find certain cancers, such as leukemia. They can show if cancer in other areas of the body has spread to the bone marrow. They can be used during cancer treatment, such as chemotherapy, to monitor treatment progress. And they may be done before certain treatments, such as a stem cell transplant, which require a bone marrow sample. Your healthcare provider will explain why you need the procedure and answer any questions you have.  Preparing for the procedure  Prepare for the procedure as told. In addition:  · Tell your healthcare provider:  ¨ What medicines you take. This includes blood thinners, such as aspirin. This also includes over-the-counter medicines, herbs, and other supplements. You may need to stop taking some or all of them before the procedure.  ¨ If you are allergic to any medicines. Also mention if you have ever had a reaction to medicines used during other tests or procedures in the past.  ¨ If you have a history of bleeding problems.  ¨ If you are pregnant or may be pregnant.  · Follow any directions youre given for not eating or drinking before the procedure.  The day of the procedure  The procedures can be done at a hospital, clinic, or healthcare providers office. They are performed by a healthcare provider or trained healthcare provider. Whether youre having one or both procedures, plan to be at the facility for 1 to 2 hours. Youll likely go home the same day.  Before the procedure begins  What to expect before the procedure:  · Youll change into a patient gown.  · An IV line may be put into a vein in your arm or hand. This line supplies fluids and medicines.  · Youll be given a sedative to  help you relax, if needed. This medicine is given by pill, injection, or through the IV line.  During the procedure  What to expect during the procedure:  · Youll lie on your side or your stomach.  · The site to be used for the bone marrow samples is marked and cleaned. The most common site is the back of the hip bone. Less common sites include the front of the hip bone or breastbone.  · Numbing medicine (local anesthesia) is injected at the site.  · A small cut is made through the numbed skin.  · One or both procedures are then done. Be sure to lie still for each procedure. It is normal to feel some pressure or pain during each procedure.  ¨ For the bone marrow aspiration, a thin needle is put through the cut and into the bone. A syringe is then attached to the needle and used to remove a sample of bone marrow fluid and cells.  ¨ For the bone marrow biopsy, a different needle is put through the same cut and into the bone. A small amount of bone marrow tissue is then removed.  · The samples are sent to a lab to be evaluated.  · When the procedure is complete, pressure is applied to the site for 10 to 15 minutes to help stop bleeding. The site is then bandaged.  After the procedure  Most people can go home after a short period of observation. If you need it, youll be given medicine to manage pain. If you were given a sedative, you may be taken to a recovery room to rest until the medicine wears off. An adult family member or friend must drive you home afterward.  Recovering at home  Once at home, follow any instructions youre given. Be sure to:  · Take all medicines as directed.  · Care for the procedure site as instructed.  · Check for signs of infection at the procedure site (see below).  · Avoid getting the procedure site wet. Do not bathe or shower until your healthcare providers say it is OK to do so. If you wish, you may wash with a sponge or washcloth.  · Avoid heavy lifting and other strenuous activities  as directed.     Call the healthcare provider  Call your healthcare provider if you have any of the following:  · Fever of 100.4 ºF (38 ºC) or higher, or as directed by your healthcare provider  · Signs of infection at the procedure site, such as increased redness or swelling, warmth, worsening pain, bleeding, or foul-smelling drainage   Follow-up  Your healthcare provider will discuss the results with you when they are ready. This is usually within a week after the procedure.     Risks and possible complications of these procedures  These include:  · Severe bleeding or bruising at the procedure site  · Infection at the procedure site  · Bone fracture  · Bone infection   Date Last Reviewed: 10/8/2015  © 3615-8502 [employer]. [address] [place] [address], [place] [address]. All rights reserved. This information is not intended as a substitute for professional medical care. Always follow your healthcare professional's instructions.      Fall Prevention  Millions of people fall every year and injure themselves. You may have had anesthesia or sedation which may increase your risk of falling. You may have health issues that put you at an increased risk of falling.     Here are ways to reduce your risk of falling.  ·   · Make your home safe by keeping walkways clear of objects you may trip over.  · Use non-slip pads under rugs. Do not use area rugs or small throw rugs.  · Use non-slip mats in bathtubs and showers.  · Install handrails and lights on staircases.  · Do not walk in poorly lit areas.  · Do not stand on chairs or wobbly ladders.  · Use caution when reaching overhead or looking upward. This position can cause a loss of balance.  · Be sure your shoes fit properly, have non-slip bottoms and are in good condition.   · Wear shoes both inside and out. Avoid going barefoot or wearing slippers.  · Be cautious when going up and down stairs, curbs, and when walking on uneven sidewalks.  · If your balance is  poor, consider using a cane or walker.  · If your fall was related to alcohol use, stop or limit alcohol intake.   · If your fall was related to use of sleeping medicines, talk to your doctor about this. You may need to reduce your dosage at bedtime if you awaken during the night to go to the bathroom.    · To reduce the need for nighttime bathroom trips:  ¨ Avoid drinking fluids for several hours before going to bed  ¨ Empty your bladder before going to bed  ¨ Men can keep a urinal at the bedside  · Stay as active as you can. Balance, flexibility, strength, and endurance all come from exercise. They all play a role in preventing falls. Ask your healthcare provider which types of activity are right for you.  · Get your vision checked on a regular basis.  · If you have pets, know where they are before you stand up or walk so you don't trip over them.  · Use night lights.

## 2020-11-23 LAB
DNA/RNA EXTRACT AND HOLD RESULT: NORMAL
DNA/RNA EXTRACTION: NORMAL
EXHR SPECIMEN TYPE: NORMAL

## 2020-11-24 ENCOUNTER — TELEPHONE (OUTPATIENT)
Dept: HEMATOLOGY/ONCOLOGY | Facility: CLINIC | Age: 65
End: 2020-11-24

## 2020-11-24 DIAGNOSIS — Z85.3 HISTORY OF INVASIVE BREAST CANCER: Primary | ICD-10-CM

## 2020-11-24 LAB
BODY SITE - BONE MARROW: NORMAL
CLINICAL DIAGNOSIS - BONE MARROW: NORMAL
FLOW CYTOMETRY ANTIBODIES ANALYZED - BONE MARROW: NORMAL
FLOW CYTOMETRY COMMENT - BONE MARROW: NORMAL
FLOW CYTOMETRY INTERPRETATION - BONE MARROW: NORMAL

## 2020-11-24 NOTE — TELEPHONE ENCOUNTER
Called patient to schedule ct scan she wanted to do it on Friday. Scheduled for 12:15.    ----- Message from Corin Hassan sent at 11/24/2020  2:36 PM CST -----  Please schedule for CT scan  Thanks  ----- Message -----  From: Nano Ford MD  Sent: 11/24/2020   2:33 PM CST  To: Corin Hassan    Called with [facility] results  She has rt sided abdominal pain-- needs CT scan- orders in

## 2020-11-24 NOTE — TELEPHONE ENCOUNTER
Message fwd to .       ----- Message from Nano Ford MD sent at 11/24/2020  2:33 PM CST -----  Called with [facility] results  She has rt sided abdominal pain-- needs CT scan- orders in

## 2020-11-27 ENCOUNTER — TELEPHONE (OUTPATIENT)
Dept: HEMATOLOGY/ONCOLOGY | Facility: CLINIC | Age: 65
End: 2020-11-27

## 2020-11-27 ENCOUNTER — HOSPITAL ENCOUNTER (OUTPATIENT)
Dept: RADIOLOGY | Facility: HOSPITAL | Age: 65
Discharge: HOME | End: 2020-11-27
Attending: INTERNAL MEDICINE
Payer: MEDICARE

## 2020-11-27 DIAGNOSIS — Z85.3 HISTORY OF INVASIVE BREAST CANCER: ICD-10-CM

## 2020-11-27 LAB
CHROM BANDING METHOD: NORMAL
CHROMOSOME ANALYSIS BM ADDITIONAL INFORMATION: NORMAL
CHROMOSOME ANALYSIS BM RELEASED BY: NORMAL
CHROMOSOME ANALYSIS BM RESULT SUMMARY: NORMAL
CLINICAL CYTOGENETICIST REVIEW: NORMAL
KARYOTYP MAR: NORMAL
REASON FOR REFERRAL (NARRATIVE): NORMAL
REF LAB TEST METHOD: NORMAL
SPECIMEN SOURCE: NORMAL
SPECIMEN: NORMAL

## 2020-11-27 PROCEDURE — 25500020 PHARM REV CODE 255: Performed by: INTERNAL MEDICINE

## 2020-11-27 PROCEDURE — 74177 CT ABD & PELVIS W/CONTRAST: CPT | Mod: TC

## 2020-11-27 PROCEDURE — 74177 CT ABD & PELVIS W/CONTRAST: CPT | Mod: 26,,, | Performed by: RADIOLOGY

## 2020-11-27 PROCEDURE — 74177 CT ABDOMEN PELVIS WITH CONTRAST: ICD-10-PCS | Mod: 26,,, | Performed by: RADIOLOGY

## 2020-11-27 RX ADMIN — IOHEXOL 15 ML: 350 INJECTION, SOLUTION INTRAVENOUS at 12:11

## 2020-11-27 RX ADMIN — IOHEXOL 75 ML: 350 INJECTION, SOLUTION INTRAVENOUS at 02:11

## 2020-11-27 RX ADMIN — IOHEXOL 15 ML: 350 INJECTION, SOLUTION INTRAVENOUS at 11:11

## 2020-11-30 ENCOUNTER — EXTERNAL CHRONIC CARE MANAGEMENT (OUTPATIENT)
Dept: PRIMARY CARE CLINIC | Facility: CLINIC | Age: 65
End: 2020-11-30
Payer: MEDICARE

## 2020-11-30 PROBLEM — Z85.3 HISTORY OF INVASIVE BREAST CANCER: Status: RESOLVED | Noted: 2020-07-10 | Resolved: 2020-11-30

## 2020-11-30 LAB
COMMENT: NORMAL
FINAL PATHOLOGIC DIAGNOSIS: NORMAL
GROSS: NORMAL
Lab: NORMAL
MICROSCOPIC EXAM: NORMAL
SUPPLEMENTAL DIAGNOSIS: NORMAL

## 2020-11-30 PROCEDURE — 99490 CHRNC CARE MGMT STAFF 1ST 20: CPT | Mod: S$PBB,,, | Performed by: INTERNAL MEDICINE

## 2020-11-30 PROCEDURE — 99490 PR CHRONIC CARE MGMT, 1ST 20 MIN: ICD-10-PCS | Mod: S$PBB,,, | Performed by: INTERNAL MEDICINE

## 2020-11-30 PROCEDURE — 99490 CHRNC CARE MGMT STAFF 1ST 20: CPT | Mod: PBBFAC,PO | Performed by: INTERNAL MEDICINE

## 2020-11-30 NOTE — PROGRESS NOTES
Subjective:       Patient ID: Jeanne Rodgers is a 65 y.o. female.    Chief Complaint: History of invasive breast cancer    HPI Returns for follow up. Bone marrow biopsy negative for malignancy.  She notes she is feeling well,mentally and physically feeling better.     She has been seen by an allergist for itching and she is on atrax.   Still with some bowel issues from radiation.       Reviewed negative bone scan           Oncology History:  Endometrial Cancer History:  Diagnosis of endometrial cancer after presenting with vaginal bleeding  She has completed surgical debulking, chemotherapy and XRT but states that she has developed nausea and vomiting since starting XRT  She was also admitted in June of this year with melena and had a complete GI work up (see below)  Of note during chemotherapy she required several transfusions for symptomatic anemia     Breast Oncology History:  This is a 64 yo female with a history of right breast cancer  - Had been getting her mammograms at [facility] as she was uninsured at the time  - reports small mass seen in 2007 and sent to [facility]- where she was told nothing was seen on review  - Repeat situation of above the next year in 2008  - she noted right nipple inversion 2012- went to [facility]- told not likely cancer, no further testing done  - In 2014 it was recommended that [facility] see her for imaging  - Abnormal mammogram and biopsy recommended  Reports biopsy experience was awful and so she researched other sites to be cared  - She sought out Dr. Gallo Omalley at 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  - Saw Dr. Keller at [facility]  Oncotype done ? 9- chemotherapy not recommended  She was started on Arimidex  -She was on Arimidex for 4 years until stopping (see below).    Genetic  testin Germline Cancer-Genetic Testing  · Test(s) performed:  [facility] Common Hereditary Cancers Panel  Number of genes analyzed:  47  Laboratory:  [email or web address]  Ordering provider:  ANGIE Blackman  Sample collection date:  07/15/2020  Results report date:  2020              Full report to be scanned into Epic under the Labs and/or Media tab(s).  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            COUNSELING/MEDICAL DECISION-MAKING:         Note below discussion and recommendations provided from genetic counseling visit (copied forward):  TP53 Mutation Implications  Li-Fraumeni Syndrome (LFS) is a hereditary (germline) cancer syndrome diagnosed with the identification of a germline TP53 gene mutation, which is passed from parent to child and is present for the patient's lifespan in essentially every bodily cell (GHR, 2020).  Its associated risks, including breast cancer, brain cancer, sarcoma, adrenocortical carcinoma, and colorectal cancer ([facility], 2020; NCCN, 1.2020).  It is important to note that the patient does not at this time have a diagnosis of LFS, as we do not know yet whether the patient's TP53 mutation is germline.   Somatic (acquired) TP53 mutations do not occur in the germline and occur at some other point in the lifespan (not at conception) (GHR, 2020) in the blood due to any of several factors, including advancing age ([employer], 2020), history of chemotherapy (which applies to patient), hematologic malignancy (NCCN, 1.2020), environmental factors including ultraviolet (UV) exposure (GHR, 2020), and DNA replication errors during cell division (GHR, 2020).  Somatic mutations that develop in only a single cell early in the embryonic phase can lead to mosaicism (GHR, 2020).  With mosaicism, the gene mutation is not present in the affected individual's egg or  sperm cells or in the fertilized egg but rather develops later during the embryonic phase, and, per the process of cell division, cells arising from the cell carrying the mutation will also have the mutation, but other cells will not (GHR, 2020).  Mosaicism's causing health problems is a function of both the specific mutation and the number of cells affected by the mutation (GHR, 2020).  Up to 20% of LFS cases are de fadi ([employer], 2020).  De fadi (i.e. new) mutations can be germline or somatic (GHR, 2020).  Sometimes, the de fadi mutation develops in the individual's germline (egg or sperm cells) but is absent in all of the individual's other cells; other times, the de fadi mutation develops in the fertilized egg shortly post-conception, and, as the fertilized egg divides, each resultant cell also has the mutation (GHR, 2020).    It is important to distinguish germline origin versus somatic origin as the former has the potential to impact the patient's family while the latter does not.    Recommendations  Appropriate next steps include:  1. A complete blood count (CBC) and a peripheral blood smear is warranted to evaluate for hematologic neoplasia (NCCN, 1.2020).  The patient is amenable to this.  I do not suspect hematologic neoplasia as etiology of her TP53 mutation.  2. Testing of parents for identification of the patient's TP53 mutation in either of them; however, the patient's parents are unfortunately unavailable for testing, and testing even all of the patient's siblings, if they all came back negative, would not confirm that the patient's TP53 mutation is not germline in origin.  Therefore, a fibroblast skin culture is recommended, which breast surgeon Dr. Nicci García has agreed to perform.  The patient is amenable to this.      Review of Systems   Constitutional: Negative for activity change, appetite change, chills, diaphoresis, fatigue (on vitamin B 12 shots), fever and unexpected weight change.   HENT:  Negative for mouth sores, nosebleeds, sore throat and trouble swallowing.    Respiratory: Negative for cough and shortness of breath.    Cardiovascular: Negative for chest pain, palpitations and leg swelling.   Gastrointestinal: Positive for diarrhea (from prior radiation) and nausea (mild). Negative for abdominal distention, abdominal pain, blood in stool, constipation and vomiting.   Genitourinary: Negative for hematuria and vaginal bleeding.   Musculoskeletal: Negative for arthralgias, back pain and myalgias.   Integumentary:  Negative for pallor and rash.   Allergic/Immunologic: Negative for immunocompromised state.   Neurological: Negative for dizziness, weakness, light-headedness, numbness and headaches.   Hematological: Negative for adenopathy. Does not bruise/bleed easily.   Psychiatric/Behavioral: Negative for confusion. The patient is not nervous/anxious.          Objective:      Physical Exam  Vitals signs and nursing note reviewed.   Constitutional:       General: She is not in acute distress.     Appearance: Normal appearance. She is well-developed.   HENT:      Head: Normocephalic.      Mouth/Throat:      Pharynx: No oropharyngeal exudate.   Eyes:      Pupils: Pupils are equal, round, and reactive to light.   Neck:      Musculoskeletal: Normal range of motion.   Cardiovascular:      Rate and Rhythm: Normal rate and regular rhythm.      Heart sounds: Normal heart sounds.   Pulmonary:      Effort: Pulmonary effort is normal.      Breath sounds: Normal breath sounds.   Chest:      Breasts:         Right: No mass, nipple discharge or skin change.         Left: No mass, nipple discharge or skin change.   Abdominal:      General: Bowel sounds are normal. There is no distension.      Palpations: Abdomen is soft.      Tenderness: There is no abdominal tenderness.   Lymphadenopathy:      Cervical: No cervical adenopathy.      Upper Body:      Right upper body: No supraclavicular or axillary adenopathy.       Left upper body: No supraclavicular or axillary adenopathy.   Skin:     General: Skin is warm and dry.      Findings: No rash.   Neurological:      Mental Status: She is alert and oriented to person, place, and time.   Psychiatric:         Behavior: Behavior normal.           Assessment:       1. Hx of breast cancer    2. Pancytopenia    3. B12 deficiency        Plan:     1-2. ADONIS  See prior note  3. We discussed parameters     Continue weekly b12 - has noticed a significant difference    Return to clinic in 3 months with MD appointment and labs.     Patient is in agreement with the proposed treatment plan. All questions were answered to the patient's satisfaction. Patient knows to call clinic for any new or worsening symptoms and if anything is needed before the next clinic visit.          Hue Hatch, ALINAP-C  Hematology & Medical Oncology   [facility] [place] [address]  ph. [phone]  Fax. [phone]    Patient dicussed with collaborating physician, Dr. Ford.

## 2020-12-10 ENCOUNTER — DOCUMENTATION ONLY (OUTPATIENT)
Dept: HEMATOLOGY/ONCOLOGY | Facility: CLINIC | Age: 65
End: 2020-12-10

## 2020-12-10 NOTE — Clinical Note
Dr. Ford,  1.  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  Do we need to repeat [facility] because of this?  2.  Since the variant allele frequency (VAF) of Jeanne's TP53 mutation per [facility] genetic testing lab was only 15%-25% which is more consistent with an acquired mutation and not a germline mutation, and [facility] said they would not run a fibroblast skin culture for further workup to determine if it's an acquired vs germline mutation unless [facility] performed germline testing prior and the TP53 VAF was at least 30% on their testing, do you think it's worth repeating the germline testing through [facility], or should we accept [facility]'s reading and presume that the mutation was acquired (and not germline)?

## 2020-12-10 NOTE — PROGRESS NOTES
"Genetics course:    1.  History of breast cancer; endometrial cancer.    2.  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    3.  08/28/2020:  Recommended CBC and peripheral blood smear, which were done as below.  Also at that time recommended punch biopsy for fibroblast skin culture, which has not been done.    4.  09/08/2020:  Abnormal CBC.    5.  10/01/2020:    "Pathologist interpretation of peripheral blood smear:   Red cells appear predominantly normochromic and normocytic with   moderate anisocytosis.  The increased MCV is noted.     White cells show occasional atypical lymphocytes.  Occasional   neutrophils show hypo lobation.   Platelets are mildly decreased in number by automated count with no   significant platelet clumping seen.     If there is no known clinical cause for this patient's pancytopenia,   consider hematology consultation and bone marrow evaluation."    6.  10/05/2020:  [facility] genetic specialist advised me that, as [facility] reported Jeanne's TP53 variant allele frequency (VAF) at only 15%-25%, if [employer] conducted germline testing and also found her TP53 VAF to be <30%, [facility] would not process a fibroblast skin culture for TP53 mutation origin clarification as it would not be clinically warranted, as a VAF <30% is not consistent with a germline mutation; rather, it is consistent with a mutation acquired (somatic mutation) secondary to cancer, chemotherapy, age, etc.    7.  11/19/2020:  Bone marrow biopsy performed; pathology indicated in part:  -- INSUFFICIENT ASPIRATE SMEAR.  -- NORMOCELLULAR MARROW (30%) WITH TRILINEAGE HEMATOPOIESIS AND ATYPICAL  MEGAKARYOCYTES.  -- NO MORPHOLOGIC OR IMMUNOPHENOTYPIC EVIDENCE OF METASTATIC CARCINOMA.  -- ADEQUATE IRON STORAGE.  -- SEE COMMENT.  Comments  ...Overall, the aspirate smear is insufficient for morphologic evaluation " of dysplasia. Biopsy core shows some atypical megakaryocytes. It may represent changes secondary to the underlying disease and treatment. Correlation with other laboratory results is required.  ...  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 No clonal abnormality was apparent.    ---    Consulting with Dr. Ford:  1.  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  Do we need to repeat [facility] because of this?  2.  Since the variant allele frequency (VAF) of Jeanne's TP53 mutation per [facility] genetic testing lab was only 15%-25% which is more consistent with an acquired mutation and not a germline mutation, and [facility] said they would not run a fibroblast skin culture for further workup to determine if it's an acquired vs germline mutation unless [facility] performed germline testing prior and the TP53 VAF was at least 30% on their testing, do you think it's worth repeating the germline testing through [facility], or should we accept [facility]'s reading and presume that the mutation was acquired (and not germline)?    ___  ADDENDUM:    MD Kenton Clark DNP  Thanks Kenton   1. The smear was not adequate but it is the biopsy that was needed and it is adequate and negative   2. I do not think we need to repeat based on the guidelines of > 30%           Previous Messages       ----- Message -----   From: Kenton Yoder DNP   Sent: 12/10/2020   9:38 AM CST   To: MD Dr. Liliana Clark,   1.  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  Do we need to repeat [facility]  because of this?   2.  Since the variant allele frequency (VAF) of Jeanne's TP53 mutation per [facility] genetic testing lab was only 15%-25% which is more consistent with an acquired mutation and not a germline mutation, and [facility] said they would not run a fibroblast skin culture for further workup to determine if it's an acquired vs germline mutation unless [facility] performed germline testing prior and the TP53 VAF was at least 30% on their testing, do you think it's worth repeating the germline testing through [facility], or should we accept [facility]'s reading and presume that the mutation was acquired (and not germline)?

## 2020-12-11 ENCOUNTER — OFFICE VISIT (OUTPATIENT)
Dept: HEMATOLOGY/ONCOLOGY | Facility: CLINIC | Age: 65
End: 2020-12-11
Payer: MEDICARE

## 2020-12-11 VITALS
SYSTOLIC BLOOD PRESSURE: 120 MMHG | TEMPERATURE: 98 F | DIASTOLIC BLOOD PRESSURE: 56 MMHG | RESPIRATION RATE: 18 BRPM | HEART RATE: 67 BPM | HEIGHT: 60 IN | OXYGEN SATURATION: 98 % | WEIGHT: 137.81 LBS | BODY MASS INDEX: 27.06 KG/M2

## 2020-12-11 DIAGNOSIS — Z85.3 HX OF BREAST CANCER: Primary | ICD-10-CM

## 2020-12-11 DIAGNOSIS — E53.8 B12 DEFICIENCY: ICD-10-CM

## 2020-12-11 DIAGNOSIS — D61.818 PANCYTOPENIA: ICD-10-CM

## 2020-12-11 PROCEDURE — 99214 OFFICE O/P EST MOD 30 MIN: CPT | Mod: PBBFAC | Performed by: NURSE PRACTITIONER

## 2020-12-11 PROCEDURE — 99214 PR OFFICE/OUTPT VISIT, EST, LEVL IV, 30-39 MIN: ICD-10-PCS | Mod: S$PBB,,, | Performed by: NURSE PRACTITIONER

## 2020-12-11 PROCEDURE — 99999 PR PBB SHADOW E&M-EST. PATIENT-LVL IV: ICD-10-PCS | Mod: PBBFAC,,, | Performed by: NURSE PRACTITIONER

## 2020-12-11 PROCEDURE — 99999 PR PBB SHADOW E&M-EST. PATIENT-LVL IV: CPT | Mod: PBBFAC,,, | Performed by: NURSE PRACTITIONER

## 2020-12-11 PROCEDURE — 99214 OFFICE O/P EST MOD 30 MIN: CPT | Mod: S$PBB,,, | Performed by: NURSE PRACTITIONER

## 2020-12-31 ENCOUNTER — EXTERNAL CHRONIC CARE MANAGEMENT (OUTPATIENT)
Dept: PRIMARY CARE CLINIC | Facility: CLINIC | Age: 65
End: 2020-12-31
Payer: MEDICARE

## 2020-12-31 PROCEDURE — 99490 CHRNC CARE MGMT STAFF 1ST 20: CPT | Mod: S$PBB,,, | Performed by: INTERNAL MEDICINE

## 2020-12-31 PROCEDURE — 99490 PR CHRONIC CARE MGMT, 1ST 20 MIN: ICD-10-PCS | Mod: S$PBB,,, | Performed by: INTERNAL MEDICINE

## 2020-12-31 PROCEDURE — 99490 CHRNC CARE MGMT STAFF 1ST 20: CPT | Mod: PBBFAC,PO | Performed by: INTERNAL MEDICINE

## 2021-01-04 ENCOUNTER — PATIENT MESSAGE (OUTPATIENT)
Dept: ADMINISTRATIVE | Facility: HOSPITAL | Age: 66
End: 2021-01-04

## 2021-01-31 ENCOUNTER — EXTERNAL CHRONIC CARE MANAGEMENT (OUTPATIENT)
Dept: PRIMARY CARE CLINIC | Facility: CLINIC | Age: 66
End: 2021-01-31
Payer: MEDICARE

## 2021-01-31 PROCEDURE — 99490 PR CHRONIC CARE MGMT, 1ST 20 MIN: ICD-10-PCS | Mod: S$PBB,,, | Performed by: INTERNAL MEDICINE

## 2021-01-31 PROCEDURE — 99490 CHRNC CARE MGMT STAFF 1ST 20: CPT | Mod: S$PBB,,, | Performed by: INTERNAL MEDICINE

## 2021-01-31 PROCEDURE — 99490 CHRNC CARE MGMT STAFF 1ST 20: CPT | Mod: PBBFAC,PO | Performed by: INTERNAL MEDICINE

## 2021-02-28 ENCOUNTER — EXTERNAL CHRONIC CARE MANAGEMENT (OUTPATIENT)
Dept: PRIMARY CARE CLINIC | Facility: CLINIC | Age: 66
End: 2021-02-28
Payer: MEDICARE

## 2021-02-28 PROCEDURE — 99490 CHRNC CARE MGMT STAFF 1ST 20: CPT | Mod: PBBFAC,PO | Performed by: INTERNAL MEDICINE

## 2021-02-28 PROCEDURE — 99490 CHRNC CARE MGMT STAFF 1ST 20: CPT | Mod: S$PBB,,, | Performed by: INTERNAL MEDICINE

## 2021-02-28 PROCEDURE — 99490 PR CHRONIC CARE MGMT, 1ST 20 MIN: ICD-10-PCS | Mod: S$PBB,,, | Performed by: INTERNAL MEDICINE

## 2021-03-01 ENCOUNTER — PATIENT MESSAGE (OUTPATIENT)
Dept: HEMATOLOGY/ONCOLOGY | Facility: CLINIC | Age: 66
End: 2021-03-01

## 2021-03-01 DIAGNOSIS — N39.0 RECURRENT UTI: Primary | ICD-10-CM

## 2021-03-02 ENCOUNTER — LAB VISIT (OUTPATIENT)
Dept: LAB | Facility: HOSPITAL | Age: 66
End: 2021-03-02
Attending: NURSE PRACTITIONER
Payer: MEDICARE

## 2021-03-02 DIAGNOSIS — N39.0 RECURRENT UTI: ICD-10-CM

## 2021-03-02 DIAGNOSIS — N39.0 RECURRENT UTI: Primary | ICD-10-CM

## 2021-03-02 LAB
BILIRUB UR QL STRIP: NEGATIVE
CLARITY UR REFRACT.AUTO: CLEAR
COLOR UR AUTO: ABNORMAL
GLUCOSE UR QL STRIP: NEGATIVE
HGB UR QL STRIP: NEGATIVE
KETONES UR QL STRIP: NEGATIVE
LEUKOCYTE ESTERASE UR QL STRIP: ABNORMAL
MICROSCOPIC COMMENT: NORMAL
NITRITE UR QL STRIP: NEGATIVE
PH UR STRIP: 5 [PH] (ref 5–8)
PROT UR QL STRIP: NEGATIVE
RBC #/AREA URNS AUTO: 0 /HPF (ref 0–4)
SP GR UR STRIP: 1.01 (ref 1–1.03)
SQUAMOUS #/AREA URNS AUTO: 0 /HPF
URN SPEC COLLECT METH UR: ABNORMAL
WBC #/AREA URNS AUTO: 2 /HPF (ref 0–5)

## 2021-03-02 PROCEDURE — 81001 URINALYSIS AUTO W/SCOPE: CPT

## 2021-03-17 ENCOUNTER — LAB VISIT (OUTPATIENT)
Dept: LAB | Facility: HOSPITAL | Age: 66
End: 2021-03-17
Payer: MEDICARE

## 2021-03-17 DIAGNOSIS — E53.8 B12 DEFICIENCY: ICD-10-CM

## 2021-03-17 DIAGNOSIS — Z85.3 HX OF BREAST CANCER: ICD-10-CM

## 2021-03-17 DIAGNOSIS — D61.818 PANCYTOPENIA: ICD-10-CM

## 2021-03-17 LAB
ALBUMIN SERPL BCP-MCNC: 3.7 G/DL (ref 3.5–5.2)
ALP SERPL-CCNC: 86 U/L (ref 55–135)
ALT SERPL W/O P-5'-P-CCNC: 13 U/L (ref 10–44)
ANION GAP SERPL CALC-SCNC: 8 MMOL/L (ref 8–16)
AST SERPL-CCNC: 20 U/L (ref 10–40)
BILIRUB SERPL-MCNC: 0.5 MG/DL (ref 0.1–1)
BUN SERPL-MCNC: 10 MG/DL (ref 8–23)
CALCIUM SERPL-MCNC: 8.7 MG/DL (ref 8.7–10.5)
CHLORIDE SERPL-SCNC: 106 MMOL/L (ref 95–110)
CO2 SERPL-SCNC: 27 MMOL/L (ref 23–29)
CREAT SERPL-MCNC: 0.7 MG/DL (ref 0.5–1.4)
ERYTHROCYTE [DISTWIDTH] IN BLOOD BY AUTOMATED COUNT: 12 % (ref 11.5–14.5)
EST. GFR  (AFRICAN AMERICAN): >60 ML/MIN/1.73 M^2
EST. GFR  (NON AFRICAN AMERICAN): >60 ML/MIN/1.73 M^2
GLUCOSE SERPL-MCNC: 88 MG/DL (ref 70–110)
HCT VFR BLD AUTO: 32.4 % (ref 37–48.5)
HGB BLD-MCNC: 10.9 G/DL (ref 12–16)
IMM GRANULOCYTES # BLD AUTO: 0.01 K/UL (ref 0–0.04)
MCH RBC QN AUTO: 34.7 PG (ref 27–31)
MCHC RBC AUTO-ENTMCNC: 33.6 G/DL (ref 32–36)
MCV RBC AUTO: 103 FL (ref 82–98)
NEUTROPHILS # BLD AUTO: 1.9 K/UL (ref 1.8–7.7)
PLATELET # BLD AUTO: 137 K/UL (ref 150–350)
PMV BLD AUTO: 9.5 FL (ref 9.2–12.9)
POTASSIUM SERPL-SCNC: 4.3 MMOL/L (ref 3.5–5.1)
PROT SERPL-MCNC: 6.6 G/DL (ref 6–8.4)
RBC # BLD AUTO: 3.14 M/UL (ref 4–5.4)
SODIUM SERPL-SCNC: 141 MMOL/L (ref 136–145)
VIT B12 SERPL-MCNC: 337 PG/ML (ref 210–950)
WBC # BLD AUTO: 2.97 K/UL (ref 3.9–12.7)

## 2021-03-17 PROCEDURE — 36415 COLL VENOUS BLD VENIPUNCTURE: CPT | Performed by: NURSE PRACTITIONER

## 2021-03-17 PROCEDURE — 85027 COMPLETE CBC AUTOMATED: CPT | Performed by: NURSE PRACTITIONER

## 2021-03-17 PROCEDURE — 80053 COMPREHEN METABOLIC PANEL: CPT | Performed by: NURSE PRACTITIONER

## 2021-03-17 PROCEDURE — 82607 VITAMIN B-12: CPT | Performed by: NURSE PRACTITIONER

## 2021-03-19 ENCOUNTER — OFFICE VISIT (OUTPATIENT)
Dept: HEMATOLOGY/ONCOLOGY | Facility: CLINIC | Age: 66
End: 2021-03-19
Payer: MEDICARE

## 2021-03-19 VITALS
RESPIRATION RATE: 16 BRPM | BODY MASS INDEX: 26.61 KG/M2 | DIASTOLIC BLOOD PRESSURE: 64 MMHG | HEIGHT: 60 IN | TEMPERATURE: 98 F | HEART RATE: 72 BPM | SYSTOLIC BLOOD PRESSURE: 140 MMHG | WEIGHT: 135.56 LBS | OXYGEN SATURATION: 100 %

## 2021-03-19 DIAGNOSIS — R00.2 PALPITATIONS: Primary | ICD-10-CM

## 2021-03-19 DIAGNOSIS — R00.2 PALPITATIONS: ICD-10-CM

## 2021-03-19 DIAGNOSIS — Z85.3 HX OF BREAST CANCER: Primary | ICD-10-CM

## 2021-03-19 DIAGNOSIS — D61.818 PANCYTOPENIA: ICD-10-CM

## 2021-03-19 DIAGNOSIS — C54.1 ENDOMETRIAL CANCER: ICD-10-CM

## 2021-03-19 PROCEDURE — 99999 PR PBB SHADOW E&M-EST. PATIENT-LVL IV: CPT | Mod: PBBFAC,,, | Performed by: INTERNAL MEDICINE

## 2021-03-19 PROCEDURE — 99999 PR PBB SHADOW E&M-EST. PATIENT-LVL IV: ICD-10-PCS | Mod: PBBFAC,,, | Performed by: INTERNAL MEDICINE

## 2021-03-19 PROCEDURE — 99214 OFFICE O/P EST MOD 30 MIN: CPT | Mod: PBBFAC | Performed by: INTERNAL MEDICINE

## 2021-03-19 PROCEDURE — 99214 OFFICE O/P EST MOD 30 MIN: CPT | Mod: S$PBB,,, | Performed by: INTERNAL MEDICINE

## 2021-03-19 PROCEDURE — 99214 PR OFFICE/OUTPT VISIT, EST, LEVL IV, 30-39 MIN: ICD-10-PCS | Mod: S$PBB,,, | Performed by: INTERNAL MEDICINE

## 2021-03-22 ENCOUNTER — PATIENT MESSAGE (OUTPATIENT)
Dept: GYNECOLOGIC ONCOLOGY | Facility: CLINIC | Age: 66
End: 2021-03-22

## 2021-03-22 ENCOUNTER — TELEPHONE (OUTPATIENT)
Dept: GYNECOLOGIC ONCOLOGY | Facility: CLINIC | Age: 66
End: 2021-03-22

## 2021-03-24 ENCOUNTER — TELEPHONE (OUTPATIENT)
Dept: HEMATOLOGY/ONCOLOGY | Facility: CLINIC | Age: 66
End: 2021-03-24

## 2021-03-31 ENCOUNTER — LAB VISIT (OUTPATIENT)
Dept: LAB | Facility: HOSPITAL | Age: 66
End: 2021-03-31
Payer: MEDICARE

## 2021-03-31 ENCOUNTER — TUMOR BOARD CONFERENCE (OUTPATIENT)
Dept: HEMATOLOGY/ONCOLOGY | Facility: CLINIC | Age: 66
End: 2021-03-31

## 2021-03-31 ENCOUNTER — TELEPHONE (OUTPATIENT)
Dept: GYNECOLOGIC ONCOLOGY | Facility: CLINIC | Age: 66
End: 2021-03-31

## 2021-03-31 ENCOUNTER — EXTERNAL CHRONIC CARE MANAGEMENT (OUTPATIENT)
Dept: PRIMARY CARE CLINIC | Facility: CLINIC | Age: 66
End: 2021-03-31
Payer: MEDICARE

## 2021-03-31 ENCOUNTER — OFFICE VISIT (OUTPATIENT)
Dept: GYNECOLOGIC ONCOLOGY | Facility: CLINIC | Age: 66
End: 2021-03-31
Payer: MEDICARE

## 2021-03-31 VITALS
BODY MASS INDEX: 26.01 KG/M2 | HEART RATE: 69 BPM | DIASTOLIC BLOOD PRESSURE: 59 MMHG | SYSTOLIC BLOOD PRESSURE: 123 MMHG | WEIGHT: 133.19 LBS

## 2021-03-31 DIAGNOSIS — N76.0 RADIATION VAGINITIS: ICD-10-CM

## 2021-03-31 DIAGNOSIS — C54.1 ENDOMETRIAL CANCER: ICD-10-CM

## 2021-03-31 DIAGNOSIS — C54.1 ENDOMETRIAL CANCER: Primary | ICD-10-CM

## 2021-03-31 LAB — CANCER AG125 SERPL-ACNC: 16 U/ML (ref 0–30)

## 2021-03-31 PROCEDURE — 99490 CHRNC CARE MGMT STAFF 1ST 20: CPT | Mod: PBBFAC,PO | Performed by: INTERNAL MEDICINE

## 2021-03-31 PROCEDURE — 99214 PR OFFICE/OUTPT VISIT, EST, LEVL IV, 30-39 MIN: ICD-10-PCS | Mod: S$PBB,,, | Performed by: OBSTETRICS & GYNECOLOGY

## 2021-03-31 PROCEDURE — 99999 PR PBB SHADOW E&M-EST. PATIENT-LVL III: CPT | Mod: PBBFAC,,, | Performed by: OBSTETRICS & GYNECOLOGY

## 2021-03-31 PROCEDURE — 99213 OFFICE O/P EST LOW 20 MIN: CPT | Mod: PBBFAC | Performed by: OBSTETRICS & GYNECOLOGY

## 2021-03-31 PROCEDURE — 99214 OFFICE O/P EST MOD 30 MIN: CPT | Mod: S$PBB,,, | Performed by: OBSTETRICS & GYNECOLOGY

## 2021-03-31 PROCEDURE — 99999 PR PBB SHADOW E&M-EST. PATIENT-LVL III: ICD-10-PCS | Mod: PBBFAC,,, | Performed by: OBSTETRICS & GYNECOLOGY

## 2021-03-31 PROCEDURE — 86304 IMMUNOASSAY TUMOR CA 125: CPT | Performed by: NURSE PRACTITIONER

## 2021-03-31 PROCEDURE — 99490 CHRNC CARE MGMT STAFF 1ST 20: CPT | Mod: S$PBB,,, | Performed by: INTERNAL MEDICINE

## 2021-03-31 PROCEDURE — 99490 PR CHRONIC CARE MGMT, 1ST 20 MIN: ICD-10-PCS | Mod: S$PBB,,, | Performed by: INTERNAL MEDICINE

## 2021-03-31 PROCEDURE — 36415 COLL VENOUS BLD VENIPUNCTURE: CPT | Performed by: NURSE PRACTITIONER

## 2021-04-01 ENCOUNTER — TELEPHONE (OUTPATIENT)
Dept: HEMATOLOGY/ONCOLOGY | Facility: CLINIC | Age: 66
End: 2021-04-01

## 2021-04-05 ENCOUNTER — PATIENT MESSAGE (OUTPATIENT)
Dept: ADMINISTRATIVE | Facility: HOSPITAL | Age: 66
End: 2021-04-05

## 2021-04-13 ENCOUNTER — HOSPITAL ENCOUNTER (OUTPATIENT)
Dept: RADIOLOGY | Facility: HOSPITAL | Age: 66
Discharge: HOME | End: 2021-04-13
Attending: NURSE PRACTITIONER
Payer: MEDICARE

## 2021-04-13 DIAGNOSIS — C54.1 ENDOMETRIAL CANCER: ICD-10-CM

## 2021-04-13 LAB — POCT GLUCOSE: 96 MG/DL (ref 70–110)

## 2021-04-13 PROCEDURE — 78815 PET IMAGE W/CT SKULL-THIGH: CPT | Mod: 26,PS,, | Performed by: RADIOLOGY

## 2021-04-13 PROCEDURE — 78815 NM PET CT ROUTINE: ICD-10-PCS | Mod: 26,PS,, | Performed by: RADIOLOGY

## 2021-04-13 PROCEDURE — 78815 PET IMAGE W/CT SKULL-THIGH: CPT | Mod: TC,PS

## 2021-04-14 ENCOUNTER — PATIENT MESSAGE (OUTPATIENT)
Dept: GYNECOLOGIC ONCOLOGY | Facility: CLINIC | Age: 66
End: 2021-04-14

## 2021-04-15 ENCOUNTER — TELEPHONE (OUTPATIENT)
Dept: GYNECOLOGIC ONCOLOGY | Facility: CLINIC | Age: 66
End: 2021-04-15

## 2021-04-20 ENCOUNTER — HOSPITAL ENCOUNTER (OUTPATIENT)
Dept: RADIOLOGY | Facility: HOSPITAL | Age: 66
Discharge: HOME | End: 2021-04-20
Attending: INTERNAL MEDICINE
Payer: MEDICARE

## 2021-04-20 DIAGNOSIS — R00.2 PALPITATIONS: ICD-10-CM

## 2021-04-20 PROCEDURE — 76536 US EXAM OF HEAD AND NECK: CPT | Mod: 26,,, | Performed by: RADIOLOGY

## 2021-04-20 PROCEDURE — 76536 US SOFT TISSUE HEAD NECK THYROID: ICD-10-PCS | Mod: 26,,, | Performed by: RADIOLOGY

## 2021-04-20 PROCEDURE — 76536 US EXAM OF HEAD AND NECK: CPT | Mod: TC

## 2021-04-30 ENCOUNTER — EXTERNAL CHRONIC CARE MANAGEMENT (OUTPATIENT)
Dept: PRIMARY CARE CLINIC | Facility: CLINIC | Age: 66
End: 2021-04-30
Payer: MEDICARE

## 2021-04-30 PROCEDURE — 99490 PR CHRONIC CARE MGMT, 1ST 20 MIN: ICD-10-PCS | Mod: S$PBB,,, | Performed by: INTERNAL MEDICINE

## 2021-04-30 PROCEDURE — 99490 CHRNC CARE MGMT STAFF 1ST 20: CPT | Mod: S$PBB,,, | Performed by: INTERNAL MEDICINE

## 2021-04-30 PROCEDURE — 99490 CHRNC CARE MGMT STAFF 1ST 20: CPT | Mod: PBBFAC,PO | Performed by: INTERNAL MEDICINE

## 2021-05-25 ENCOUNTER — PATIENT MESSAGE (OUTPATIENT)
Dept: HEMATOLOGY/ONCOLOGY | Facility: CLINIC | Age: 66
End: 2021-05-25

## 2021-05-25 ENCOUNTER — TELEPHONE (OUTPATIENT)
Dept: HEMATOLOGY/ONCOLOGY | Facility: CLINIC | Age: 66
End: 2021-05-25

## 2021-05-25 DIAGNOSIS — Z85.3 HX OF BREAST CANCER: Primary | ICD-10-CM

## 2021-05-25 DIAGNOSIS — N64.4 BREAST PAIN, LEFT: ICD-10-CM

## 2021-05-25 DIAGNOSIS — N64.4 BREAST PAIN, LEFT: Primary | ICD-10-CM

## 2021-05-26 ENCOUNTER — HOSPITAL ENCOUNTER (OUTPATIENT)
Dept: RADIOLOGY | Facility: HOSPITAL | Age: 66
Discharge: HOME | End: 2021-05-26
Attending: NURSE PRACTITIONER
Payer: MEDICARE

## 2021-05-26 VITALS — WEIGHT: 133 LBS | HEIGHT: 60 IN | BODY MASS INDEX: 26.11 KG/M2

## 2021-05-26 DIAGNOSIS — N64.4 BREAST PAIN, LEFT: ICD-10-CM

## 2021-05-26 PROCEDURE — 77065 DX MAMMO INCL CAD UNI: CPT | Mod: 26,LT,, | Performed by: RADIOLOGY

## 2021-05-26 PROCEDURE — 76642 ULTRASOUND BREAST LIMITED: CPT | Mod: TC,LT

## 2021-05-26 PROCEDURE — 77061 BREAST TOMOSYNTHESIS UNI: CPT | Mod: 26,LT,, | Performed by: RADIOLOGY

## 2021-05-26 PROCEDURE — 77061 MAMMO DIGITAL DIAGNOSTIC LEFT WITH TOMO: ICD-10-PCS | Mod: 26,LT,, | Performed by: RADIOLOGY

## 2021-05-26 PROCEDURE — 76642 ULTRASOUND BREAST LIMITED: CPT | Mod: 26,LT,, | Performed by: RADIOLOGY

## 2021-05-26 PROCEDURE — 77061 BREAST TOMOSYNTHESIS UNI: CPT | Mod: TC,LT

## 2021-05-26 PROCEDURE — 76642 US BREAST LEFT LIMITED: ICD-10-PCS | Mod: 26,LT,, | Performed by: RADIOLOGY

## 2021-05-26 PROCEDURE — 77065 MAMMO DIGITAL DIAGNOSTIC LEFT WITH TOMO: ICD-10-PCS | Mod: 26,LT,, | Performed by: RADIOLOGY

## 2021-05-31 ENCOUNTER — PATIENT MESSAGE (OUTPATIENT)
Dept: GYNECOLOGIC ONCOLOGY | Facility: CLINIC | Age: 66
End: 2021-05-31

## 2021-06-01 ENCOUNTER — PES CALL (OUTPATIENT)
Dept: ADMINISTRATIVE | Facility: CLINIC | Age: 66
End: 2021-06-01

## 2021-06-02 ENCOUNTER — PATIENT MESSAGE (OUTPATIENT)
Dept: HEMATOLOGY/ONCOLOGY | Facility: CLINIC | Age: 66
End: 2021-06-02

## 2021-06-02 ENCOUNTER — PATIENT MESSAGE (OUTPATIENT)
Dept: GYNECOLOGIC ONCOLOGY | Facility: CLINIC | Age: 66
End: 2021-06-02

## 2021-06-03 ENCOUNTER — TELEPHONE (OUTPATIENT)
Dept: HEMATOLOGY/ONCOLOGY | Facility: CLINIC | Age: 66
End: 2021-06-03

## 2021-06-03 DIAGNOSIS — N64.4 BREAST PAIN, LEFT: Primary | ICD-10-CM

## 2021-06-03 DIAGNOSIS — Z85.3 HX OF BREAST CANCER: ICD-10-CM

## 2021-06-15 ENCOUNTER — HOSPITAL ENCOUNTER (OUTPATIENT)
Dept: RADIOLOGY | Facility: HOSPITAL | Age: 66
Discharge: HOME | End: 2021-06-15
Attending: INTERNAL MEDICINE
Payer: MEDICARE

## 2021-06-15 DIAGNOSIS — N64.4 BREAST PAIN, LEFT: ICD-10-CM

## 2021-06-15 DIAGNOSIS — Z85.3 HX OF BREAST CANCER: ICD-10-CM

## 2021-06-15 PROCEDURE — 77049 MRI BREAST C-+ W/CAD BI: CPT | Mod: 26,,, | Performed by: RADIOLOGY

## 2021-06-15 PROCEDURE — 25500020 PHARM REV CODE 255: Performed by: INTERNAL MEDICINE

## 2021-06-15 PROCEDURE — C8937 CAD BREAST MRI: HCPCS | Mod: TC

## 2021-06-15 PROCEDURE — 77049 MRI BREAST W/WO CONTRAST, W/CAD, BILATERAL: ICD-10-PCS | Mod: 26,,, | Performed by: RADIOLOGY

## 2021-06-15 PROCEDURE — A9577 INJ MULTIHANCE: HCPCS | Performed by: INTERNAL MEDICINE

## 2021-06-15 RX ADMIN — GADOBENATE DIMEGLUMINE 13 ML: 529 INJECTION, SOLUTION INTRAVENOUS at 04:06

## 2021-06-16 ENCOUNTER — PATIENT MESSAGE (OUTPATIENT)
Dept: HEMATOLOGY/ONCOLOGY | Facility: CLINIC | Age: 66
End: 2021-06-16

## 2021-06-22 ENCOUNTER — LAB VISIT (OUTPATIENT)
Dept: LAB | Facility: HOSPITAL | Age: 66
End: 2021-06-22
Payer: MEDICARE

## 2021-06-22 ENCOUNTER — PATIENT MESSAGE (OUTPATIENT)
Dept: OBSTETRICS AND GYNECOLOGY | Facility: CLINIC | Age: 66
End: 2021-06-22

## 2021-06-22 ENCOUNTER — OFFICE VISIT (OUTPATIENT)
Dept: HEMATOLOGY/ONCOLOGY | Facility: CLINIC | Age: 66
End: 2021-06-22
Payer: MEDICARE

## 2021-06-22 VITALS
HEART RATE: 59 BPM | DIASTOLIC BLOOD PRESSURE: 62 MMHG | TEMPERATURE: 97 F | RESPIRATION RATE: 18 BRPM | HEIGHT: 60 IN | WEIGHT: 132.06 LBS | BODY MASS INDEX: 25.93 KG/M2 | OXYGEN SATURATION: 100 % | SYSTOLIC BLOOD PRESSURE: 129 MMHG

## 2021-06-22 DIAGNOSIS — D70.2 NEUTROPENIA, DRUG-INDUCED: ICD-10-CM

## 2021-06-22 DIAGNOSIS — R00.2 PALPITATIONS: ICD-10-CM

## 2021-06-22 DIAGNOSIS — D61.818 PANCYTOPENIA: ICD-10-CM

## 2021-06-22 DIAGNOSIS — Z85.3 HX OF BREAST CANCER: Primary | ICD-10-CM

## 2021-06-22 DIAGNOSIS — C54.1 ENDOMETRIAL CANCER: ICD-10-CM

## 2021-06-22 DIAGNOSIS — D64.9 ANEMIA, UNSPECIFIED TYPE: ICD-10-CM

## 2021-06-22 DIAGNOSIS — Z85.3 HX OF BREAST CANCER: ICD-10-CM

## 2021-06-22 LAB
BASOPHILS # BLD AUTO: 0.02 K/UL (ref 0–0.2)
BASOPHILS NFR BLD: 0.6 % (ref 0–1.9)
DIFFERENTIAL METHOD: ABNORMAL
EOSINOPHIL # BLD AUTO: 0.1 K/UL (ref 0–0.5)
EOSINOPHIL NFR BLD: 1.8 % (ref 0–8)
ERYTHROCYTE [DISTWIDTH] IN BLOOD BY AUTOMATED COUNT: 12.5 % (ref 11.5–14.5)
HCT VFR BLD AUTO: 32.3 % (ref 37–48.5)
HGB BLD-MCNC: 11 G/DL (ref 12–16)
IMM GRANULOCYTES # BLD AUTO: 0.01 K/UL (ref 0–0.04)
IMM GRANULOCYTES NFR BLD AUTO: 0.3 % (ref 0–0.5)
LYMPHOCYTES # BLD AUTO: 0.8 K/UL (ref 1–4.8)
LYMPHOCYTES NFR BLD: 22.5 % (ref 18–48)
MCH RBC QN AUTO: 35 PG (ref 27–31)
MCHC RBC AUTO-ENTMCNC: 34.1 G/DL (ref 32–36)
MCV RBC AUTO: 103 FL (ref 82–98)
MONOCYTES # BLD AUTO: 0.3 K/UL (ref 0.3–1)
MONOCYTES NFR BLD: 9.6 % (ref 4–15)
NEUTROPHILS # BLD AUTO: 2.2 K/UL (ref 1.8–7.7)
NEUTROPHILS NFR BLD: 65.2 % (ref 38–73)
NRBC BLD-RTO: 0 /100 WBC
PLATELET # BLD AUTO: 151 K/UL (ref 150–450)
PMV BLD AUTO: 9.3 FL (ref 9.2–12.9)
RBC # BLD AUTO: 3.14 M/UL (ref 4–5.4)
WBC # BLD AUTO: 3.33 K/UL (ref 3.9–12.7)

## 2021-06-22 PROCEDURE — 99214 OFFICE O/P EST MOD 30 MIN: CPT | Mod: S$PBB,,, | Performed by: NURSE PRACTITIONER

## 2021-06-22 PROCEDURE — 36415 COLL VENOUS BLD VENIPUNCTURE: CPT | Performed by: INTERNAL MEDICINE

## 2021-06-22 PROCEDURE — 99214 PR OFFICE/OUTPT VISIT, EST, LEVL IV, 30-39 MIN: ICD-10-PCS | Mod: S$PBB,,, | Performed by: NURSE PRACTITIONER

## 2021-06-22 PROCEDURE — 85025 COMPLETE CBC W/AUTO DIFF WBC: CPT | Performed by: INTERNAL MEDICINE

## 2021-06-22 PROCEDURE — 99214 OFFICE O/P EST MOD 30 MIN: CPT | Mod: PBBFAC | Performed by: NURSE PRACTITIONER

## 2021-06-22 PROCEDURE — 99999 PR PBB SHADOW E&M-EST. PATIENT-LVL IV: CPT | Mod: PBBFAC,,, | Performed by: NURSE PRACTITIONER

## 2021-06-22 PROCEDURE — 99999 PR PBB SHADOW E&M-EST. PATIENT-LVL IV: ICD-10-PCS | Mod: PBBFAC,,, | Performed by: NURSE PRACTITIONER

## 2021-06-30 ENCOUNTER — PATIENT OUTREACH (OUTPATIENT)
Dept: ADMINISTRATIVE | Facility: OTHER | Age: 66
End: 2021-06-30

## 2021-07-01 ENCOUNTER — LAB VISIT (OUTPATIENT)
Dept: LAB | Facility: HOSPITAL | Age: 66
End: 2021-07-01
Payer: MEDICARE

## 2021-07-01 ENCOUNTER — OFFICE VISIT (OUTPATIENT)
Dept: OBSTETRICS AND GYNECOLOGY | Facility: CLINIC | Age: 66
End: 2021-07-01
Attending: OBSTETRICS & GYNECOLOGY
Payer: MEDICARE

## 2021-07-01 ENCOUNTER — OFFICE VISIT (OUTPATIENT)
Dept: GYNECOLOGIC ONCOLOGY | Facility: CLINIC | Age: 66
End: 2021-07-01
Payer: MEDICARE

## 2021-07-01 VITALS
DIASTOLIC BLOOD PRESSURE: 51 MMHG | WEIGHT: 132 LBS | HEART RATE: 70 BPM | BODY MASS INDEX: 25.78 KG/M2 | SYSTOLIC BLOOD PRESSURE: 100 MMHG

## 2021-07-01 VITALS
BODY MASS INDEX: 25.95 KG/M2 | HEIGHT: 60 IN | SYSTOLIC BLOOD PRESSURE: 104 MMHG | DIASTOLIC BLOOD PRESSURE: 58 MMHG | WEIGHT: 132.19 LBS

## 2021-07-01 DIAGNOSIS — C54.1 ENDOMETRIAL CANCER: ICD-10-CM

## 2021-07-01 DIAGNOSIS — N89.8 VAGINAL IRRITATION: Primary | ICD-10-CM

## 2021-07-01 DIAGNOSIS — T66.XXXD RADIATION THERAPY COMPLICATION, SUBSEQUENT ENCOUNTER: ICD-10-CM

## 2021-07-01 DIAGNOSIS — C54.1 ENDOMETRIAL CANCER: Primary | ICD-10-CM

## 2021-07-01 LAB — CANCER AG125 SERPL-ACNC: 17 U/ML (ref 0–30)

## 2021-07-01 PROCEDURE — 99999 PR PBB SHADOW E&M-EST. PATIENT-LVL III: CPT | Mod: PBBFAC,,, | Performed by: OBSTETRICS & GYNECOLOGY

## 2021-07-01 PROCEDURE — 99213 OFFICE O/P EST LOW 20 MIN: CPT | Mod: PBBFAC | Performed by: OBSTETRICS & GYNECOLOGY

## 2021-07-01 PROCEDURE — 99214 PR OFFICE/OUTPT VISIT, EST, LEVL IV, 30-39 MIN: ICD-10-PCS | Mod: S$PBB,,, | Performed by: OBSTETRICS & GYNECOLOGY

## 2021-07-01 PROCEDURE — 99214 OFFICE O/P EST MOD 30 MIN: CPT | Mod: S$GLB,,, | Performed by: OBSTETRICS & GYNECOLOGY

## 2021-07-01 PROCEDURE — 99999 PR PBB SHADOW E&M-EST. PATIENT-LVL III: ICD-10-PCS | Mod: PBBFAC,,, | Performed by: OBSTETRICS & GYNECOLOGY

## 2021-07-01 PROCEDURE — 99214 PR OFFICE/OUTPT VISIT, EST, LEVL IV, 30-39 MIN: ICD-10-PCS | Mod: S$GLB,,, | Performed by: OBSTETRICS & GYNECOLOGY

## 2021-07-01 PROCEDURE — 36415 COLL VENOUS BLD VENIPUNCTURE: CPT | Performed by: NURSE PRACTITIONER

## 2021-07-01 PROCEDURE — 86304 IMMUNOASSAY TUMOR CA 125: CPT | Performed by: NURSE PRACTITIONER

## 2021-07-01 PROCEDURE — 99214 OFFICE O/P EST MOD 30 MIN: CPT | Mod: S$PBB,,, | Performed by: OBSTETRICS & GYNECOLOGY

## 2021-07-01 RX ORDER — ESTRADIOL 0.1 MG/G
CREAM VAGINAL
Qty: 42.5 G | Refills: 6 | Status: SHIPPED | OUTPATIENT
Start: 2021-07-01 | End: 2021-08-26

## 2021-07-02 ENCOUNTER — PATIENT MESSAGE (OUTPATIENT)
Dept: GYNECOLOGIC ONCOLOGY | Facility: CLINIC | Age: 66
End: 2021-07-02

## 2021-07-12 ENCOUNTER — TELEPHONE (OUTPATIENT)
Dept: HEPATOLOGY | Facility: CLINIC | Age: 66
End: 2021-07-12

## 2021-07-12 DIAGNOSIS — K76.9 LIVER DISEASE, UNSPECIFIED: ICD-10-CM

## 2021-08-03 ENCOUNTER — PES CALL (OUTPATIENT)
Dept: ADMINISTRATIVE | Facility: CLINIC | Age: 66
End: 2021-08-03

## 2021-08-03 ENCOUNTER — PATIENT MESSAGE (OUTPATIENT)
Dept: ADMINISTRATIVE | Facility: OTHER | Age: 66
End: 2021-08-03

## 2021-08-03 ENCOUNTER — PATIENT OUTREACH (OUTPATIENT)
Dept: ADMINISTRATIVE | Facility: OTHER | Age: 66
End: 2021-08-03

## 2021-08-05 ENCOUNTER — OFFICE VISIT (OUTPATIENT)
Dept: OBSTETRICS AND GYNECOLOGY | Facility: CLINIC | Age: 66
End: 2021-08-05
Attending: OBSTETRICS & GYNECOLOGY
Payer: MEDICARE

## 2021-08-05 VITALS
HEIGHT: 60 IN | DIASTOLIC BLOOD PRESSURE: 62 MMHG | SYSTOLIC BLOOD PRESSURE: 107 MMHG | BODY MASS INDEX: 26.31 KG/M2 | WEIGHT: 134 LBS | HEART RATE: 73 BPM

## 2021-08-05 DIAGNOSIS — N95.2 VAGINAL ATROPHY: ICD-10-CM

## 2021-08-05 DIAGNOSIS — N89.5 VAGINAL STENOSIS: ICD-10-CM

## 2021-08-05 DIAGNOSIS — T66.XXXD RADIATION THERAPY COMPLICATION, SUBSEQUENT ENCOUNTER: Primary | ICD-10-CM

## 2021-08-05 DIAGNOSIS — L90.0 LICHEN SCLEROSUS: ICD-10-CM

## 2021-08-05 PROCEDURE — 99213 PR OFFICE/OUTPT VISIT, EST, LEVL III, 20-29 MIN: ICD-10-PCS | Mod: S$GLB,,, | Performed by: OBSTETRICS & GYNECOLOGY

## 2021-08-05 PROCEDURE — 99213 OFFICE O/P EST LOW 20 MIN: CPT | Mod: S$GLB,,, | Performed by: OBSTETRICS & GYNECOLOGY

## 2021-08-09 ENCOUNTER — TELEPHONE (OUTPATIENT)
Dept: HEPATOLOGY | Facility: CLINIC | Age: 66
End: 2021-08-09

## 2021-08-11 DIAGNOSIS — N76.0 RADIATION VAGINITIS: Primary | ICD-10-CM

## 2021-08-16 ENCOUNTER — HOSPITAL ENCOUNTER (OUTPATIENT)
Dept: RADIOLOGY | Facility: HOSPITAL | Age: 66
Discharge: HOME | End: 2021-08-16
Attending: SURGERY
Payer: MEDICARE

## 2021-08-16 ENCOUNTER — CLINICAL SUPPORT (OUTPATIENT)
Dept: LAB | Facility: HOSPITAL | Age: 66
End: 2021-08-16
Attending: SURGERY
Payer: MEDICARE

## 2021-08-16 ENCOUNTER — HOSPITAL ENCOUNTER (OUTPATIENT)
Dept: WOUND CARE | Facility: HOSPITAL | Age: 66
Discharge: HOME | End: 2021-08-16
Attending: SURGERY
Payer: MEDICARE

## 2021-08-16 ENCOUNTER — PATIENT MESSAGE (OUTPATIENT)
Dept: HEPATOLOGY | Facility: CLINIC | Age: 66
End: 2021-08-16

## 2021-08-16 VITALS
WEIGHT: 134 LBS | DIASTOLIC BLOOD PRESSURE: 52 MMHG | HEIGHT: 60 IN | TEMPERATURE: 98 F | SYSTOLIC BLOOD PRESSURE: 110 MMHG | HEART RATE: 66 BPM | BODY MASS INDEX: 26.31 KG/M2

## 2021-08-16 DIAGNOSIS — T66.XXXA RADIATION THERAPY COMPLICATION, INITIAL ENCOUNTER: ICD-10-CM

## 2021-08-16 DIAGNOSIS — L59.8 SOFT TISSUE RADIONECROSIS: ICD-10-CM

## 2021-08-16 DIAGNOSIS — Z85.42 HISTORY OF ENDOMETRIAL CANCER: ICD-10-CM

## 2021-08-16 DIAGNOSIS — N76.0 RADIATION VAGINITIS: Primary | ICD-10-CM

## 2021-08-16 DIAGNOSIS — N76.0 RADIATION VAGINITIS: ICD-10-CM

## 2021-08-16 DIAGNOSIS — Z85.3 HX OF BREAST CANCER: ICD-10-CM
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
## 2021-08-16 PROCEDURE — 71046 X-RAY EXAM CHEST 2 VIEWS: CPT | Mod: 26,,, | Performed by: RADIOLOGY

## 2021-08-16 PROCEDURE — 93005 ELECTROCARDIOGRAM TRACING: CPT

## 2021-08-16 PROCEDURE — 93010 EKG 12-LEAD: ICD-10-PCS | Mod: ,,, | Performed by: INTERNAL MEDICINE

## 2021-08-16 PROCEDURE — 93010 ELECTROCARDIOGRAM REPORT: CPT | Mod: ,,, | Performed by: INTERNAL MEDICINE

## 2021-08-16 PROCEDURE — 93005 ELECTROCARDIOGRAM TRACING: CPT | Performed by: INTERNAL MEDICINE

## 2021-08-16 PROCEDURE — 99202 OFFICE O/P NEW SF 15 MIN: CPT

## 2021-08-16 PROCEDURE — 71046 XR CHEST PA AND LATERAL: ICD-10-PCS | Mod: 26,,, | Performed by: RADIOLOGY

## 2021-08-16 PROCEDURE — 71046 X-RAY EXAM CHEST 2 VIEWS: CPT | Mod: TC,FY

## 2021-08-17 ENCOUNTER — HOSPITAL ENCOUNTER (OUTPATIENT)
Dept: RADIOLOGY | Facility: HOSPITAL | Age: 66
Discharge: HOME | End: 2021-08-17
Attending: INTERNAL MEDICINE
Payer: MEDICARE

## 2021-08-17 DIAGNOSIS — Z85.3 HX OF BREAST CANCER: ICD-10-CM

## 2021-08-17 DIAGNOSIS — D70.2 NEUTROPENIA, DRUG-INDUCED: ICD-10-CM

## 2021-08-17 PROCEDURE — 77062 BREAST TOMOSYNTHESIS BI: CPT | Mod: 26,,, | Performed by: RADIOLOGY

## 2021-08-17 PROCEDURE — 77066 DX MAMMO INCL CAD BI: CPT | Mod: TC

## 2021-08-17 PROCEDURE — 77066 DX MAMMO INCL CAD BI: CPT | Mod: 26,,, | Performed by: RADIOLOGY

## 2021-08-17 PROCEDURE — 77066 MAMMO DIGITAL DIAGNOSTIC BILAT WITH TOMO: ICD-10-PCS | Mod: 26,,, | Performed by: RADIOLOGY

## 2021-08-17 PROCEDURE — 77062 MAMMO DIGITAL DIAGNOSTIC BILAT WITH TOMO: ICD-10-PCS | Mod: 26,,, | Performed by: RADIOLOGY

## 2021-08-18 ENCOUNTER — TELEPHONE (OUTPATIENT)
Dept: FAMILY MEDICINE | Facility: CLINIC | Age: 66
End: 2021-08-18

## 2021-08-23 ENCOUNTER — TELEPHONE (OUTPATIENT)
Dept: FAMILY MEDICINE | Facility: CLINIC | Age: 66
End: 2021-08-23

## 2021-08-23 ENCOUNTER — HOSPITAL ENCOUNTER (OUTPATIENT)
Dept: RADIOLOGY | Facility: HOSPITAL | Age: 66
Discharge: HOME | End: 2021-08-23
Attending: PHYSICIAN ASSISTANT
Payer: MEDICARE

## 2021-08-23 DIAGNOSIS — R00.1 BRADYCARDIA: ICD-10-CM

## 2021-08-23 DIAGNOSIS — R07.89 INTERMITTENT LEFT-SIDED CHEST PAIN: Primary | ICD-10-CM

## 2021-08-23 DIAGNOSIS — Z79.899 MEDICAL MARIJUANA USE: ICD-10-CM

## 2021-08-23 DIAGNOSIS — K76.9 LIVER DISEASE, UNSPECIFIED: ICD-10-CM

## 2021-08-23 PROCEDURE — 76391 MR ELASTOGRAPHY: CPT | Mod: TC

## 2021-08-23 PROCEDURE — 76391 MR ELASTOGRAPHY: CPT | Mod: 26,,, | Performed by: RADIOLOGY

## 2021-08-23 PROCEDURE — 76391 MR ELASTOGRAPHY: ICD-10-PCS | Mod: 26,,, | Performed by: RADIOLOGY

## 2021-08-24 ENCOUNTER — OFFICE VISIT (OUTPATIENT)
Dept: CARDIOLOGY | Facility: CLINIC | Age: 66
End: 2021-08-24
Payer: MEDICARE

## 2021-08-24 VITALS
WEIGHT: 127.19 LBS | DIASTOLIC BLOOD PRESSURE: 60 MMHG | OXYGEN SATURATION: 99 % | HEIGHT: 60 IN | SYSTOLIC BLOOD PRESSURE: 128 MMHG | BODY MASS INDEX: 24.97 KG/M2 | HEART RATE: 62 BPM

## 2021-08-24 DIAGNOSIS — R00.1 BRADYCARDIA: ICD-10-CM

## 2021-08-24 DIAGNOSIS — R07.89 CHEST PAIN, ATYPICAL: ICD-10-CM

## 2021-08-24 DIAGNOSIS — R06.09 DOE (DYSPNEA ON EXERTION): ICD-10-CM

## 2021-08-24 DIAGNOSIS — Z79.899 MEDICAL MARIJUANA USE: ICD-10-CM

## 2021-08-24 DIAGNOSIS — R07.89 INTERMITTENT LEFT-SIDED CHEST PAIN: ICD-10-CM

## 2021-08-24 PROCEDURE — 99214 OFFICE O/P EST MOD 30 MIN: CPT | Mod: PBBFAC | Performed by: INTERNAL MEDICINE

## 2021-08-24 PROCEDURE — 99204 OFFICE O/P NEW MOD 45 MIN: CPT | Mod: S$PBB,,, | Performed by: INTERNAL MEDICINE

## 2021-08-24 PROCEDURE — 99999 PR PBB SHADOW E&M-EST. PATIENT-LVL IV: ICD-10-PCS | Mod: PBBFAC,,, | Performed by: INTERNAL MEDICINE

## 2021-08-24 PROCEDURE — 99204 PR OFFICE/OUTPT VISIT, NEW, LEVL IV, 45-59 MIN: ICD-10-PCS | Mod: S$PBB,,, | Performed by: INTERNAL MEDICINE

## 2021-08-24 PROCEDURE — 99999 PR PBB SHADOW E&M-EST. PATIENT-LVL IV: CPT | Mod: PBBFAC,,, | Performed by: INTERNAL MEDICINE

## 2021-08-26 ENCOUNTER — OFFICE VISIT (OUTPATIENT)
Dept: HEPATOLOGY | Facility: CLINIC | Age: 66
End: 2021-08-26
Payer: MEDICARE

## 2021-08-26 VITALS
WEIGHT: 131.81 LBS | BODY MASS INDEX: 25.88 KG/M2 | SYSTOLIC BLOOD PRESSURE: 104 MMHG | HEART RATE: 67 BPM | OXYGEN SATURATION: 98 % | TEMPERATURE: 97 F | RESPIRATION RATE: 18 BRPM | DIASTOLIC BLOOD PRESSURE: 52 MMHG | HEIGHT: 60 IN

## 2021-08-26 DIAGNOSIS — D69.59 THROMBOCYTOPENIA DUE TO DRUGS: ICD-10-CM

## 2021-08-26 DIAGNOSIS — T50.905A THROMBOCYTOPENIA DUE TO DRUGS: ICD-10-CM

## 2021-08-26 DIAGNOSIS — K76.0 FATTY LIVER: Primary | ICD-10-CM

## 2021-08-26 DIAGNOSIS — R74.8 ELEVATED LIVER ENZYMES: ICD-10-CM

## 2021-08-26 DIAGNOSIS — K76.89 LIVER CYST: ICD-10-CM

## 2021-08-26 PROCEDURE — 99214 PR OFFICE/OUTPT VISIT, EST, LEVL IV, 30-39 MIN: ICD-10-PCS | Mod: S$PBB,,, | Performed by: PHYSICIAN ASSISTANT

## 2021-08-26 PROCEDURE — 99999 PR PBB SHADOW E&M-EST. PATIENT-LVL IV: ICD-10-PCS | Mod: PBBFAC,,, | Performed by: PHYSICIAN ASSISTANT

## 2021-08-26 PROCEDURE — 99214 OFFICE O/P EST MOD 30 MIN: CPT | Mod: PBBFAC | Performed by: PHYSICIAN ASSISTANT

## 2021-08-26 PROCEDURE — 99999 PR PBB SHADOW E&M-EST. PATIENT-LVL IV: CPT | Mod: PBBFAC,,, | Performed by: PHYSICIAN ASSISTANT

## 2021-08-26 PROCEDURE — 99214 OFFICE O/P EST MOD 30 MIN: CPT | Mod: S$PBB,,, | Performed by: PHYSICIAN ASSISTANT

## 2021-08-27 ENCOUNTER — HOSPITAL ENCOUNTER (OUTPATIENT)
Dept: RADIOLOGY | Facility: HOSPITAL | Age: 66
Discharge: HOME | End: 2021-08-27
Attending: INTERNAL MEDICINE
Payer: MEDICARE

## 2021-08-27 ENCOUNTER — HOSPITAL ENCOUNTER (OUTPATIENT)
Dept: CARDIOLOGY | Facility: HOSPITAL | Age: 66
Discharge: HOME | End: 2021-08-27
Attending: INTERNAL MEDICINE
Payer: MEDICARE

## 2021-08-27 VITALS — HEIGHT: 60 IN | BODY MASS INDEX: 25.72 KG/M2 | WEIGHT: 131 LBS

## 2021-08-27 DIAGNOSIS — R07.89 CHEST PAIN, ATYPICAL: ICD-10-CM

## 2021-08-27 DIAGNOSIS — R06.09 DOE (DYSPNEA ON EXERTION): ICD-10-CM

## 2021-08-27 DIAGNOSIS — Z79.899 MEDICAL MARIJUANA USE: ICD-10-CM

## 2021-08-27 DIAGNOSIS — R07.89 INTERMITTENT LEFT-SIDED CHEST PAIN: ICD-10-CM

## 2021-08-27 DIAGNOSIS — R00.1 BRADYCARDIA: ICD-10-CM

## 2021-08-27 LAB
AORTIC ROOT ANNULUS: 2.64 CM
AORTIC VALVE CUSP SEPERATION: 1.88 CM
ASCENDING AORTA: 2.73 CM
AV INDEX (PROSTH): 1.04
AV MEAN GRADIENT: 4 MMHG
AV PEAK GRADIENT: 7 MMHG
AV VALVE AREA: 3.4 CM2
AV VELOCITY RATIO: 1.13
BSA FOR ECHO PROCEDURE: 1.59 M2
CV ECHO LV RWT: 0.72 CM
CV STRESS BASE HR: 59 BPM
DIASTOLIC BLOOD PRESSURE: 82 MMHG
DOP CALC AO PEAK VEL: 1.29 M/S
DOP CALC AO VTI: 32.21 CM
DOP CALC LVOT AREA: 3.3 CM2
DOP CALC LVOT DIAMETER: 2.04 CM
DOP CALC LVOT PEAK VEL: 1.46 M/S
DOP CALC LVOT STROKE VOLUME: 109.44 CM3
DOP CALCLVOT PEAK VEL VTI: 33.5 CM
E WAVE DECELERATION TIME: 186.34 MSEC
E/A RATIO: 1.19
E/E' RATIO: 12.38 M/S
ECHO LV POSTERIOR WALL: 1.17 CM (ref 0.6–1.1)
EJECTION FRACTION: 60 %
FRACTIONAL SHORTENING: 31 % (ref 28–44)
INTERVENTRICULAR SEPTUM: 1.11 CM (ref 0.6–1.1)
IVRT: 133.79 MSEC
LA MAJOR: 4.81 CM
LA MINOR: 4.5 CM
LA WIDTH: 3.91 CM
LEFT ATRIUM SIZE: 3.01 CM
LEFT ATRIUM VOLUME INDEX: 29.8 ML/M2
LEFT ATRIUM VOLUME: 46.52 CM3
LEFT INTERNAL DIMENSION IN SYSTOLE: 2.27 CM (ref 2.1–4)
LEFT VENTRICLE DIASTOLIC VOLUME INDEX: 27.77 ML/M2
LEFT VENTRICLE DIASTOLIC VOLUME: 43.32 ML
LEFT VENTRICLE MASS INDEX: 73 G/M2
LEFT VENTRICLE SYSTOLIC VOLUME INDEX: 11.3 ML/M2
LEFT VENTRICLE SYSTOLIC VOLUME: 17.62 ML
LEFT VENTRICULAR INTERNAL DIMENSION IN DIASTOLE: 3.27 CM (ref 3.5–6)
LEFT VENTRICULAR MASS: 113.74 G
LV LATERAL E/E' RATIO: 9.9 M/S
LV SEPTAL E/E' RATIO: 16.5 M/S
MV PEAK A VEL: 0.83 M/S
MV PEAK E VEL: 0.99 M/S
NUC STRESS DIASTOLIC VOLUME INDEX: 31
NUC STRESS EJECTION FRACTION: 70 %
NUC STRESS SYSTOLIC VOLUME INDEX: 9
OHS CV CPX 85 PERCENT MAX PREDICTED HEART RATE MALE: 124
OHS CV CPX MAX PREDICTED HEART RATE: 146
OHS CV CPX PATIENT IS FEMALE: 1
OHS CV CPX PATIENT IS MALE: 0
OHS CV CPX PEAK DIASTOLIC BLOOD PRESSURE: 75 MMHG
OHS CV CPX PEAK HEAR RATE: 97 BPM
OHS CV CPX PEAK RATE PRESSURE PRODUCT: NORMAL
OHS CV CPX PEAK SYSTOLIC BLOOD PRESSURE: 127 MMHG
OHS CV CPX PERCENT MAX PREDICTED HEART RATE ACHIEVED: 66
OHS CV CPX RATE PRESSURE PRODUCT PRESENTING: 7257
PISA TR MAX VEL: 2.31 M/S
PULM VEIN S/D RATIO: 1.2
PV PEAK D VEL: 0.46 M/S
PV PEAK S VEL: 0.55 M/S
PV PEAK VELOCITY: 0.83 CM/S
RA MAJOR: 4.33 CM
RA PRESSURE: 3 MMHG
RA WIDTH: 3.16 CM
RIGHT VENTRICULAR END-DIASTOLIC DIMENSION: 3.2 CM
RV TISSUE DOPPLER FREE WALL SYSTOLIC VELOCITY 1 (APICAL 4 CHAMBER VIEW): 13.29 CM/S
SINUS: 2.85 CM
STJ: 2.31 CM
SYSTOLIC BLOOD PRESSURE: 123 MMHG
TDI LATERAL: 0.1 M/S
TDI SEPTAL: 0.06 M/S
TDI: 0.08 M/S
TR MAX PG: 21 MMHG
TRICUSPID ANNULAR PLANE SYSTOLIC EXCURSION: 2.34 CM
TV REST PULMONARY ARTERY PRESSURE: 24 MMHG

## 2021-08-27 PROCEDURE — 93306 TTE W/DOPPLER COMPLETE: CPT | Mod: 26,,, | Performed by: INTERNAL MEDICINE

## 2021-08-27 PROCEDURE — 93306 ECHO (CUPID ONLY): ICD-10-PCS | Mod: 26,,, | Performed by: INTERNAL MEDICINE

## 2021-08-27 PROCEDURE — 78452 STRESS TEST WITH MYOCARDIAL PERFUSION (CUPID ONLY): ICD-10-PCS | Mod: 26,,, | Performed by: INTERNAL MEDICINE

## 2021-08-27 PROCEDURE — 93016 STRESS TEST WITH MYOCARDIAL PERFUSION (CUPID ONLY): ICD-10-PCS | Mod: ,,, | Performed by: INTERNAL MEDICINE

## 2021-08-27 PROCEDURE — 93016 CV STRESS TEST SUPVJ ONLY: CPT | Mod: ,,, | Performed by: INTERNAL MEDICINE

## 2021-08-27 PROCEDURE — 93017 CV STRESS TEST TRACING ONLY: CPT

## 2021-08-27 PROCEDURE — 93306 TTE W/DOPPLER COMPLETE: CPT

## 2021-08-27 PROCEDURE — 93018 STRESS TEST WITH MYOCARDIAL PERFUSION (CUPID ONLY): ICD-10-PCS | Mod: ,,, | Performed by: INTERNAL MEDICINE

## 2021-08-27 PROCEDURE — 78452 HT MUSCLE IMAGE SPECT MULT: CPT

## 2021-08-27 PROCEDURE — 93225 XTRNL ECG REC<48 HRS REC: CPT

## 2021-08-27 PROCEDURE — 93227 XTRNL ECG REC<48 HR R&I: CPT | Mod: ,,, | Performed by: INTERNAL MEDICINE

## 2021-08-27 PROCEDURE — 93018 CV STRESS TEST I&R ONLY: CPT | Mod: ,,, | Performed by: INTERNAL MEDICINE

## 2021-08-27 PROCEDURE — A9502 TC99M TETROFOSMIN: HCPCS

## 2021-08-27 PROCEDURE — 78452 HT MUSCLE IMAGE SPECT MULT: CPT | Mod: 26,,, | Performed by: INTERNAL MEDICINE

## 2021-08-27 PROCEDURE — 93227 HOLTER MONITOR - 24 HOUR (CUPID ONLY): ICD-10-PCS | Mod: ,,, | Performed by: INTERNAL MEDICINE

## 2021-08-27 RX ORDER — REGADENOSON 0.08 MG/ML
0.4 INJECTION, SOLUTION INTRAVENOUS ONCE
Status: DISCONTINUED | OUTPATIENT
Start: 2021-08-27 | End: 2021-08-28 | Stop reason: HOSPADM

## 2021-09-07 LAB
OHS CV EVENT MONITOR DAY: 1
OHS CV HOLTER LENGTH DECIMAL HOURS: 48
OHS CV HOLTER LENGTH HOURS: 24
OHS CV HOLTER LENGTH MINUTES: 0
OHS CV HOLTER SINUS AVERAGE HR: 82
OHS CV HOLTER SINUS MAX HR: 141
OHS CV HOLTER SINUS MIN HR: 46

## 2021-09-13 ENCOUNTER — OFFICE VISIT (OUTPATIENT)
Dept: CARDIOLOGY | Facility: CLINIC | Age: 66
End: 2021-09-13
Payer: MEDICARE

## 2021-09-13 VITALS
SYSTOLIC BLOOD PRESSURE: 124 MMHG | WEIGHT: 125.69 LBS | HEIGHT: 60 IN | BODY MASS INDEX: 24.68 KG/M2 | DIASTOLIC BLOOD PRESSURE: 70 MMHG | HEART RATE: 60 BPM | OXYGEN SATURATION: 99 %

## 2021-09-13 DIAGNOSIS — R07.89 CHEST PAIN, ATYPICAL: ICD-10-CM

## 2021-09-13 DIAGNOSIS — R06.09 DOE (DYSPNEA ON EXERTION): ICD-10-CM

## 2021-09-13 DIAGNOSIS — R00.1 BRADYCARDIA: Primary | ICD-10-CM

## 2021-09-13 PROCEDURE — 99999 PR PBB SHADOW E&M-EST. PATIENT-LVL III: ICD-10-PCS | Mod: PBBFAC,,, | Performed by: INTERNAL MEDICINE

## 2021-09-13 PROCEDURE — 99999 PR PBB SHADOW E&M-EST. PATIENT-LVL III: CPT | Mod: PBBFAC,,, | Performed by: INTERNAL MEDICINE

## 2021-09-13 PROCEDURE — 99213 OFFICE O/P EST LOW 20 MIN: CPT | Mod: PBBFAC | Performed by: INTERNAL MEDICINE

## 2021-09-13 PROCEDURE — 99214 PR OFFICE/OUTPT VISIT, EST, LEVL IV, 30-39 MIN: ICD-10-PCS | Mod: S$PBB,,, | Performed by: INTERNAL MEDICINE

## 2021-09-13 PROCEDURE — 99214 OFFICE O/P EST MOD 30 MIN: CPT | Mod: S$PBB,,, | Performed by: INTERNAL MEDICINE

## 2021-09-16 ENCOUNTER — OFFICE VISIT (OUTPATIENT)
Dept: GYNECOLOGIC ONCOLOGY | Facility: CLINIC | Age: 66
End: 2021-09-16
Payer: MEDICARE

## 2021-09-16 ENCOUNTER — LAB VISIT (OUTPATIENT)
Dept: LAB | Facility: HOSPITAL | Age: 66
End: 2021-09-16
Payer: MEDICARE

## 2021-09-16 VITALS
HEART RATE: 70 BPM | BODY MASS INDEX: 25.62 KG/M2 | DIASTOLIC BLOOD PRESSURE: 60 MMHG | SYSTOLIC BLOOD PRESSURE: 132 MMHG | WEIGHT: 131.19 LBS

## 2021-09-16 DIAGNOSIS — C54.1 ENDOMETRIAL CANCER: ICD-10-CM

## 2021-09-16 DIAGNOSIS — C54.1 ENDOMETRIAL CANCER: Primary | ICD-10-CM

## 2021-09-16 DIAGNOSIS — D70.2 NEUTROPENIA, DRUG-INDUCED: ICD-10-CM

## 2021-09-16 LAB — CANCER AG125 SERPL-ACNC: 15 U/ML (ref 0–30)

## 2021-09-16 PROCEDURE — 86304 IMMUNOASSAY TUMOR CA 125: CPT | Performed by: NURSE PRACTITIONER

## 2021-09-16 PROCEDURE — 99999 PR PBB SHADOW E&M-EST. PATIENT-LVL III: CPT | Mod: PBBFAC,,, | Performed by: OBSTETRICS & GYNECOLOGY

## 2021-09-16 PROCEDURE — 36415 COLL VENOUS BLD VENIPUNCTURE: CPT | Performed by: NURSE PRACTITIONER

## 2021-09-16 PROCEDURE — 99213 OFFICE O/P EST LOW 20 MIN: CPT | Mod: PBBFAC | Performed by: OBSTETRICS & GYNECOLOGY

## 2021-09-16 PROCEDURE — 99999 PR PBB SHADOW E&M-EST. PATIENT-LVL III: ICD-10-PCS | Mod: PBBFAC,,, | Performed by: OBSTETRICS & GYNECOLOGY

## 2021-09-19 ENCOUNTER — HOSPITAL ENCOUNTER (EMERGENCY)
Facility: OTHER | Age: 66
Discharge: HOME | End: 2021-09-19
Attending: EMERGENCY MEDICINE
Payer: MEDICARE

## 2021-09-19 VITALS
BODY MASS INDEX: 25.52 KG/M2 | WEIGHT: 130 LBS | RESPIRATION RATE: 20 BRPM | OXYGEN SATURATION: 98 % | DIASTOLIC BLOOD PRESSURE: 84 MMHG | TEMPERATURE: 98 F | HEIGHT: 60 IN | SYSTOLIC BLOOD PRESSURE: 110 MMHG | HEART RATE: 84 BPM

## 2021-09-19 DIAGNOSIS — N39.0 URINARY TRACT INFECTION WITHOUT HEMATURIA, SITE UNSPECIFIED: Primary | ICD-10-CM

## 2021-09-19 LAB
ALBUMIN SERPL BCP-MCNC: 4.1 G/DL (ref 3.5–5.2)
ALP SERPL-CCNC: 91 U/L (ref 55–135)
ALT SERPL W/O P-5'-P-CCNC: 10 U/L (ref 10–44)
ANION GAP SERPL CALC-SCNC: 11 MMOL/L (ref 8–16)
AST SERPL-CCNC: 19 U/L (ref 10–40)
BACTERIA #/AREA URNS HPF: ABNORMAL /HPF
BASOPHILS # BLD AUTO: 0.01 K/UL (ref 0–0.2)
BASOPHILS NFR BLD: 0.2 % (ref 0–1.9)
BILIRUB SERPL-MCNC: 0.8 MG/DL (ref 0.1–1)
BILIRUB UR QL STRIP: NEGATIVE
BUN SERPL-MCNC: 11 MG/DL (ref 8–23)
CALCIUM SERPL-MCNC: 9.4 MG/DL (ref 8.7–10.5)
CHLORIDE SERPL-SCNC: 103 MMOL/L (ref 95–110)
CLARITY UR: ABNORMAL
CO2 SERPL-SCNC: 22 MMOL/L (ref 23–29)
COLOR UR: ABNORMAL
CREAT SERPL-MCNC: 0.8 MG/DL (ref 0.5–1.4)
DIFFERENTIAL METHOD: ABNORMAL
EOSINOPHIL # BLD AUTO: 0.1 K/UL (ref 0–0.5)
EOSINOPHIL NFR BLD: 0.9 % (ref 0–8)
ERYTHROCYTE [DISTWIDTH] IN BLOOD BY AUTOMATED COUNT: 12.6 % (ref 11.5–14.5)
EST. GFR  (AFRICAN AMERICAN): >60 ML/MIN/1.73 M^2
EST. GFR  (NON AFRICAN AMERICAN): >60 ML/MIN/1.73 M^2
GLUCOSE SERPL-MCNC: 127 MG/DL (ref 70–110)
GLUCOSE UR QL STRIP: NEGATIVE
HCT VFR BLD AUTO: 35.3 % (ref 37–48.5)
HGB BLD-MCNC: 12.1 G/DL (ref 12–16)
HGB UR QL STRIP: ABNORMAL
HYALINE CASTS #/AREA URNS LPF: 0 /LPF
IMM GRANULOCYTES # BLD AUTO: 0.1 K/UL (ref 0–0.04)
IMM GRANULOCYTES NFR BLD AUTO: 1.5 % (ref 0–0.5)
KETONES UR QL STRIP: ABNORMAL
LEUKOCYTE ESTERASE UR QL STRIP: ABNORMAL
LYMPHOCYTES # BLD AUTO: 0.3 K/UL (ref 1–4.8)
LYMPHOCYTES NFR BLD: 3.8 % (ref 18–48)
MCH RBC QN AUTO: 34.5 PG (ref 27–31)
MCHC RBC AUTO-ENTMCNC: 34.3 G/DL (ref 32–36)
MCV RBC AUTO: 101 FL (ref 82–98)
MICROSCOPIC COMMENT: ABNORMAL
MONOCYTES # BLD AUTO: 0.2 K/UL (ref 0.3–1)
MONOCYTES NFR BLD: 2.7 % (ref 4–15)
NEUTROPHILS # BLD AUTO: 6 K/UL (ref 1.8–7.7)
NEUTROPHILS NFR BLD: 90.9 % (ref 38–73)
NITRITE UR QL STRIP: NEGATIVE
NRBC BLD-RTO: 0 /100 WBC
PH UR STRIP: 7 [PH] (ref 5–8)
PLATELET # BLD AUTO: 142 K/UL (ref 150–450)
PMV BLD AUTO: 9.8 FL (ref 9.2–12.9)
POTASSIUM SERPL-SCNC: 3.6 MMOL/L (ref 3.5–5.1)
PROT SERPL-MCNC: 7.6 G/DL (ref 6–8.4)
PROT UR QL STRIP: ABNORMAL
RBC # BLD AUTO: 3.51 M/UL (ref 4–5.4)
RBC #/AREA URNS HPF: 2 /HPF (ref 0–4)
SODIUM SERPL-SCNC: 136 MMOL/L (ref 136–145)
SP GR UR STRIP: >=1.03 (ref 1–1.03)
SQUAMOUS #/AREA URNS HPF: 3 /HPF
URN SPEC COLLECT METH UR: ABNORMAL
UROBILINOGEN UR STRIP-ACNC: NEGATIVE EU/DL
WBC # BLD AUTO: 6.63 K/UL (ref 3.9–12.7)
WBC #/AREA URNS HPF: 19 /HPF (ref 0–5)

## 2021-09-19 PROCEDURE — 99284 EMERGENCY DEPT VISIT MOD MDM: CPT | Mod: 25

## 2021-09-19 PROCEDURE — 80053 COMPREHEN METABOLIC PANEL: CPT | Performed by: EMERGENCY MEDICINE

## 2021-09-19 PROCEDURE — 85025 COMPLETE CBC W/AUTO DIFF WBC: CPT | Performed by: EMERGENCY MEDICINE

## 2021-09-19 PROCEDURE — 96361 HYDRATE IV INFUSION ADD-ON: CPT

## 2021-09-19 PROCEDURE — 87086 URINE CULTURE/COLONY COUNT: CPT | Performed by: EMERGENCY MEDICINE

## 2021-09-19 PROCEDURE — 81000 URINALYSIS NONAUTO W/SCOPE: CPT | Performed by: EMERGENCY MEDICINE

## 2021-09-19 PROCEDURE — 63600175 PHARM REV CODE 636 W HCPCS: Performed by: EMERGENCY MEDICINE

## 2021-09-19 PROCEDURE — 96374 THER/PROPH/DIAG INJ IV PUSH: CPT

## 2021-09-19 RX ORDER — NITROFURANTOIN 25; 75 MG/1; MG/1
100 CAPSULE ORAL 2 TIMES DAILY
Qty: 14 CAPSULE | Refills: 0 | Status: SHIPPED | OUTPATIENT
Start: 2021-09-19 | End: 2021-09-26

## 2021-09-19 RX ORDER — ONDANSETRON 4 MG/1
4 TABLET, FILM COATED ORAL EVERY 6 HOURS PRN
Qty: 12 TABLET | Refills: 0 | Status: SHIPPED | OUTPATIENT
Start: 2021-09-19 | End: 2022-01-01 | Stop reason: SDUPTHER

## 2021-09-19 RX ORDER — CEFTRIAXONE 1 G/1
1 INJECTION, POWDER, FOR SOLUTION INTRAMUSCULAR; INTRAVENOUS
Status: COMPLETED | OUTPATIENT
Start: 2021-09-19 | End: 2021-09-19

## 2021-09-19 RX ADMIN — CEFTRIAXONE 1 G: 1 INJECTION, POWDER, FOR SOLUTION INTRAMUSCULAR; INTRAVENOUS at 04:09

## 2021-09-21 LAB
BACTERIA UR CULT: NORMAL
BACTERIA UR CULT: NORMAL

## 2021-09-24 ENCOUNTER — OFFICE VISIT (OUTPATIENT)
Dept: HEMATOLOGY/ONCOLOGY | Facility: CLINIC | Age: 66
End: 2021-09-24
Payer: MEDICARE

## 2021-09-24 ENCOUNTER — LAB VISIT (OUTPATIENT)
Dept: LAB | Facility: HOSPITAL | Age: 66
End: 2021-09-24
Attending: INTERNAL MEDICINE
Payer: MEDICARE

## 2021-09-24 VITALS
TEMPERATURE: 98 F | HEIGHT: 60 IN | DIASTOLIC BLOOD PRESSURE: 63 MMHG | SYSTOLIC BLOOD PRESSURE: 132 MMHG | RESPIRATION RATE: 16 BRPM | BODY MASS INDEX: 25.14 KG/M2 | OXYGEN SATURATION: 98 % | WEIGHT: 128.06 LBS | HEART RATE: 80 BPM

## 2021-09-24 DIAGNOSIS — Z85.3 HX OF BREAST CANCER: Primary | ICD-10-CM

## 2021-09-24 DIAGNOSIS — Z85.3 HX OF BREAST CANCER: ICD-10-CM

## 2021-09-24 DIAGNOSIS — C54.1 ENDOMETRIAL CANCER: ICD-10-CM

## 2021-09-24 DIAGNOSIS — R31.29 MICROSCOPIC HEMATURIA: ICD-10-CM

## 2021-09-24 DIAGNOSIS — D70.2 NEUTROPENIA, DRUG-INDUCED: ICD-10-CM

## 2021-09-24 LAB
ALBUMIN SERPL BCP-MCNC: 3.9 G/DL (ref 3.5–5.2)
ALP SERPL-CCNC: 77 U/L (ref 55–135)
ALT SERPL W/O P-5'-P-CCNC: 11 U/L (ref 10–44)
ANION GAP SERPL CALC-SCNC: 10 MMOL/L (ref 8–16)
AST SERPL-CCNC: 20 U/L (ref 10–40)
BILIRUB SERPL-MCNC: 0.4 MG/DL (ref 0.1–1)
BUN SERPL-MCNC: 12 MG/DL (ref 8–23)
CALCIUM SERPL-MCNC: 9.3 MG/DL (ref 8.7–10.5)
CHLORIDE SERPL-SCNC: 104 MMOL/L (ref 95–110)
CO2 SERPL-SCNC: 24 MMOL/L (ref 23–29)
CREAT SERPL-MCNC: 0.7 MG/DL (ref 0.5–1.4)
ERYTHROCYTE [DISTWIDTH] IN BLOOD BY AUTOMATED COUNT: 12.6 % (ref 11.5–14.5)
EST. GFR  (AFRICAN AMERICAN): >60 ML/MIN/1.73 M^2
EST. GFR  (NON AFRICAN AMERICAN): >60 ML/MIN/1.73 M^2
GLUCOSE SERPL-MCNC: 144 MG/DL (ref 70–110)
HCT VFR BLD AUTO: 31.7 % (ref 37–48.5)
HGB BLD-MCNC: 10.6 G/DL (ref 12–16)
IMM GRANULOCYTES # BLD AUTO: 0.03 K/UL (ref 0–0.04)
MCH RBC QN AUTO: 34.1 PG (ref 27–31)
MCHC RBC AUTO-ENTMCNC: 33.4 G/DL (ref 32–36)
MCV RBC AUTO: 102 FL (ref 82–98)
NEUTROPHILS # BLD AUTO: 2.3 K/UL (ref 1.8–7.7)
PLATELET # BLD AUTO: 131 K/UL (ref 150–450)
PMV BLD AUTO: 9.8 FL (ref 9.2–12.9)
POTASSIUM SERPL-SCNC: 4 MMOL/L (ref 3.5–5.1)
PROT SERPL-MCNC: 7 G/DL (ref 6–8.4)
RBC # BLD AUTO: 3.11 M/UL (ref 4–5.4)
SODIUM SERPL-SCNC: 138 MMOL/L (ref 136–145)
WBC # BLD AUTO: 3.58 K/UL (ref 3.9–12.7)

## 2021-09-24 PROCEDURE — 99214 PR OFFICE/OUTPT VISIT, EST, LEVL IV, 30-39 MIN: ICD-10-PCS | Mod: S$PBB,,, | Performed by: INTERNAL MEDICINE

## 2021-09-24 PROCEDURE — 99214 OFFICE O/P EST MOD 30 MIN: CPT | Mod: PBBFAC | Performed by: INTERNAL MEDICINE

## 2021-09-24 PROCEDURE — 80053 COMPREHEN METABOLIC PANEL: CPT | Performed by: NURSE PRACTITIONER

## 2021-09-24 PROCEDURE — 99999 PR PBB SHADOW E&M-EST. PATIENT-LVL IV: CPT | Mod: PBBFAC,,, | Performed by: INTERNAL MEDICINE

## 2021-09-24 PROCEDURE — 99999 PR PBB SHADOW E&M-EST. PATIENT-LVL IV: ICD-10-PCS | Mod: PBBFAC,,, | Performed by: INTERNAL MEDICINE

## 2021-09-24 PROCEDURE — 99214 OFFICE O/P EST MOD 30 MIN: CPT | Mod: S$PBB,,, | Performed by: INTERNAL MEDICINE

## 2021-09-24 PROCEDURE — 36415 COLL VENOUS BLD VENIPUNCTURE: CPT | Performed by: NURSE PRACTITIONER

## 2021-09-24 PROCEDURE — 85027 COMPLETE CBC AUTOMATED: CPT | Performed by: NURSE PRACTITIONER

## 2021-10-04 ENCOUNTER — PATIENT MESSAGE (OUTPATIENT)
Dept: GYNECOLOGIC ONCOLOGY | Facility: CLINIC | Age: 66
End: 2021-10-04

## 2021-10-06 ENCOUNTER — PATIENT MESSAGE (OUTPATIENT)
Dept: HEMATOLOGY/ONCOLOGY | Facility: CLINIC | Age: 66
End: 2021-10-06

## 2021-10-06 ENCOUNTER — OFFICE VISIT (OUTPATIENT)
Dept: UROLOGY | Facility: CLINIC | Age: 66
End: 2021-10-06
Payer: MEDICARE

## 2021-10-06 VITALS — HEIGHT: 60 IN | BODY MASS INDEX: 25.32 KG/M2 | WEIGHT: 129 LBS

## 2021-10-06 DIAGNOSIS — R31.9 HEMATURIA OF UNKNOWN CAUSE: ICD-10-CM

## 2021-10-06 DIAGNOSIS — R31.29 MICROSCOPIC HEMATURIA: Primary | ICD-10-CM

## 2021-10-06 PROCEDURE — 99214 OFFICE O/P EST MOD 30 MIN: CPT | Mod: S$PBB,,, | Performed by: STUDENT IN AN ORGANIZED HEALTH CARE EDUCATION/TRAINING PROGRAM

## 2021-10-06 PROCEDURE — 99999 PR PBB SHADOW E&M-EST. PATIENT-LVL IV: ICD-10-PCS | Mod: PBBFAC,,, | Performed by: STUDENT IN AN ORGANIZED HEALTH CARE EDUCATION/TRAINING PROGRAM

## 2021-10-06 PROCEDURE — 99214 PR OFFICE/OUTPT VISIT, EST, LEVL IV, 30-39 MIN: ICD-10-PCS | Mod: S$PBB,,, | Performed by: STUDENT IN AN ORGANIZED HEALTH CARE EDUCATION/TRAINING PROGRAM

## 2021-10-06 PROCEDURE — 99999 PR PBB SHADOW E&M-EST. PATIENT-LVL IV: CPT | Mod: PBBFAC,,, | Performed by: STUDENT IN AN ORGANIZED HEALTH CARE EDUCATION/TRAINING PROGRAM

## 2021-10-06 PROCEDURE — 99214 OFFICE O/P EST MOD 30 MIN: CPT | Mod: PBBFAC | Performed by: STUDENT IN AN ORGANIZED HEALTH CARE EDUCATION/TRAINING PROGRAM

## 2021-10-14 ENCOUNTER — HOSPITAL ENCOUNTER (OUTPATIENT)
Dept: RADIOLOGY | Facility: HOSPITAL | Age: 66
Discharge: HOME | End: 2021-10-14
Attending: STUDENT IN AN ORGANIZED HEALTH CARE EDUCATION/TRAINING PROGRAM
Payer: MEDICARE

## 2021-10-14 DIAGNOSIS — R31.29 MICROSCOPIC HEMATURIA: ICD-10-CM

## 2021-10-14 PROCEDURE — 76770 US EXAM ABDO BACK WALL COMP: CPT | Mod: TC

## 2021-10-14 PROCEDURE — 76770 US EXAM ABDO BACK WALL COMP: CPT | Mod: 26,,, | Performed by: RADIOLOGY

## 2021-10-14 PROCEDURE — 76770 US RETROPERITONEAL COMPLETE: ICD-10-PCS | Mod: 26,,, | Performed by: RADIOLOGY

## 2021-10-18 ENCOUNTER — HOSPITAL ENCOUNTER (OUTPATIENT)
Dept: WOUND CARE | Facility: HOSPITAL | Age: 66
Discharge: HOME | End: 2021-10-18
Attending: SURGERY
Payer: MEDICARE

## 2021-10-18 DIAGNOSIS — L59.9 DISORDER OF THE SKIN, SUBCU RELATED TO RADIATION, UNSP: ICD-10-CM

## 2021-10-18 DIAGNOSIS — N76.0 RADIATION VAGINITIS: ICD-10-CM
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
## 2021-10-18 DIAGNOSIS — L59.8 ALLERGIC CONTACT DERMATITIS DUE TO PHOTOCONTACT OTHER THAN SUNBURN, CURRENT: ICD-10-CM

## 2021-10-18 DIAGNOSIS — T66.XXXA: ICD-10-CM

## 2021-10-18 DIAGNOSIS — L59.8 SOFT TISSUE RADIONECROSIS: Primary | ICD-10-CM

## 2021-10-18 PROCEDURE — G0277 HBOT, FULL BODY CHAMBER, 30M: HCPCS | Mod: CCAT

## 2021-10-19 ENCOUNTER — HOSPITAL ENCOUNTER (OUTPATIENT)
Dept: WOUND CARE | Facility: HOSPITAL | Age: 66
Discharge: HOME | End: 2021-10-19
Attending: SURGERY
Payer: MEDICARE

## 2021-10-19 DIAGNOSIS — L59.8 ALLERGIC CONTACT DERMATITIS DUE TO PHOTOCONTACT OTHER THAN SUNBURN, CURRENT: ICD-10-CM

## 2021-10-19 DIAGNOSIS — L59.9 DISORDER OF THE SKIN, SUBCU RELATED TO RADIATION, UNSP: ICD-10-CM

## 2021-10-19 PROCEDURE — G0277 HBOT, FULL BODY CHAMBER, 30M: HCPCS | Mod: CCAT

## 2021-10-20 ENCOUNTER — HOSPITAL ENCOUNTER (OUTPATIENT)
Dept: WOUND CARE | Facility: HOSPITAL | Age: 66
Discharge: HOME | End: 2021-10-20
Attending: SURGERY
Payer: MEDICARE

## 2021-10-20 DIAGNOSIS — L59.8 ALLERGIC CONTACT DERMATITIS DUE TO PHOTOCONTACT OTHER THAN SUNBURN, CURRENT: ICD-10-CM

## 2021-10-20 DIAGNOSIS — L59.9 DISORDER OF THE SKIN, SUBCU RELATED TO RADIATION, UNSP: ICD-10-CM

## 2021-10-20 PROCEDURE — G0277 HBOT, FULL BODY CHAMBER, 30M: HCPCS | Mod: CCAT

## 2021-10-21 ENCOUNTER — HOSPITAL ENCOUNTER (OUTPATIENT)
Dept: WOUND CARE | Facility: HOSPITAL | Age: 66
Discharge: HOME | End: 2021-10-21
Attending: SURGERY
Payer: MEDICARE
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
## 2021-10-21 DIAGNOSIS — L59.9 DISORDER OF THE SKIN, SUBCU RELATED TO RADIATION, UNSP: ICD-10-CM

## 2021-10-21 DIAGNOSIS — T66.XXXA RADIATION THERAPY COMPLICATION, INITIAL ENCOUNTER: Primary | ICD-10-CM

## 2021-10-21 DIAGNOSIS — L59.8 ALLERGIC CONTACT DERMATITIS DUE TO PHOTOCONTACT OTHER THAN SUNBURN, CURRENT: ICD-10-CM

## 2021-10-21 DIAGNOSIS — L59.8 SOFT TISSUE RADIONECROSIS: ICD-10-CM

## 2021-10-21 DIAGNOSIS — N76.0 RADIATION VAGINITIS: ICD-10-CM

## 2021-10-21 PROCEDURE — G0277 HBOT, FULL BODY CHAMBER, 30M: HCPCS | Mod: CCAT

## 2021-10-22 ENCOUNTER — HOSPITAL ENCOUNTER (OUTPATIENT)
Dept: WOUND CARE | Facility: HOSPITAL | Age: 66
Discharge: HOME | End: 2021-10-22
Attending: PREVENTIVE MEDICINE
Payer: MEDICARE

## 2021-10-22 DIAGNOSIS — L59.8 ALLERGIC CONTACT DERMATITIS DUE TO PHOTOCONTACT OTHER THAN SUNBURN, CURRENT: ICD-10-CM

## 2021-10-22 DIAGNOSIS — L59.8 SOFT TISSUE RADIONECROSIS: Primary | ICD-10-CM
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
## 2021-10-22 DIAGNOSIS — L59.9 DISORDER OF THE SKIN, SUBCU RELATED TO RADIATION, UNSP: ICD-10-CM

## 2021-10-22 PROCEDURE — G0277 HBOT, FULL BODY CHAMBER, 30M: HCPCS | Mod: CCAT

## 2021-10-25 ENCOUNTER — HOSPITAL ENCOUNTER (OUTPATIENT)
Dept: WOUND CARE | Facility: HOSPITAL | Age: 66
Discharge: HOME | End: 2021-10-25
Attending: PREVENTIVE MEDICINE
Payer: MEDICARE

## 2021-10-25 DIAGNOSIS — L59.9 DISORDER OF THE SKIN, SUBCU RELATED TO RADIATION, UNSP: ICD-10-CM

## 2021-10-25 DIAGNOSIS — L59.8 RADIATION NECROSIS OF SKIN AND SUBCUTANEOUS: ICD-10-CM
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
## 2021-10-25 PROCEDURE — G0277 HBOT, FULL BODY CHAMBER, 30M: HCPCS | Mod: CCAT

## 2021-10-26 ENCOUNTER — HOSPITAL ENCOUNTER (OUTPATIENT)
Dept: WOUND CARE | Facility: HOSPITAL | Age: 66
Discharge: HOME | End: 2021-10-26
Attending: SURGERY
Payer: MEDICARE

## 2021-10-26 DIAGNOSIS — L59.9 DISORDER OF THE SKIN, SUBCU RELATED TO RADIATION, UNSP: ICD-10-CM

## 2021-10-26 DIAGNOSIS — L59.8 ALLERGIC CONTACT DERMATITIS DUE TO PHOTOCONTACT OTHER THAN SUNBURN, CURRENT: ICD-10-CM

## 2021-10-26 PROCEDURE — G0277 HBOT, FULL BODY CHAMBER, 30M: HCPCS | Mod: CCAT

## 2021-10-27 ENCOUNTER — HOSPITAL ENCOUNTER (OUTPATIENT)
Dept: WOUND CARE | Facility: HOSPITAL | Age: 66
Discharge: HOME | End: 2021-10-27
Attending: SURGERY
Payer: MEDICARE

## 2021-10-27 DIAGNOSIS — L59.9 DISORDER OF THE SKIN, SUBCU RELATED TO RADIATION, UNSP: ICD-10-CM

## 2021-10-27 DIAGNOSIS — L59.8 ALLERGIC CONTACT DERMATITIS DUE TO PHOTOCONTACT OTHER THAN SUNBURN, CURRENT: ICD-10-CM

## 2021-10-27 PROCEDURE — G0277 HBOT, FULL BODY CHAMBER, 30M: HCPCS | Mod: CCAT

## 2021-10-28 ENCOUNTER — HOSPITAL ENCOUNTER (OUTPATIENT)
Dept: WOUND CARE | Facility: HOSPITAL | Age: 66
Discharge: HOME | End: 2021-10-28
Attending: SURGERY
Payer: MEDICARE

## 2021-10-28 DIAGNOSIS — L59.9 DISORDER OF THE SKIN, SUBCU RELATED TO RADIATION, UNSP: ICD-10-CM

## 2021-10-28 DIAGNOSIS — L59.8 ALLERGIC CONTACT DERMATITIS DUE TO PHOTOCONTACT OTHER THAN SUNBURN, CURRENT: ICD-10-CM

## 2021-10-28 PROCEDURE — G0277 HBOT, FULL BODY CHAMBER, 30M: HCPCS | Mod: CCAT

## 2021-10-29 ENCOUNTER — HOSPITAL ENCOUNTER (OUTPATIENT)
Dept: WOUND CARE | Facility: HOSPITAL | Age: 66
Discharge: HOME | End: 2021-10-29
Attending: SURGERY
Payer: MEDICARE

## 2021-10-29 DIAGNOSIS — L59.8 ALLERGIC CONTACT DERMATITIS DUE TO PHOTOCONTACT OTHER THAN SUNBURN, CURRENT: ICD-10-CM

## 2021-10-29 DIAGNOSIS — L59.9 DISORDER OF THE SKIN, SUBCU RELATED TO RADIATION, UNSP: ICD-10-CM

## 2021-10-29 PROCEDURE — G0277 HBOT, FULL BODY CHAMBER, 30M: HCPCS | Mod: CCAT

## 2021-11-01 ENCOUNTER — HOSPITAL ENCOUNTER (OUTPATIENT)
Dept: WOUND CARE | Facility: HOSPITAL | Age: 66
Discharge: HOME | End: 2021-11-01
Attending: SURGERY
Payer: MEDICARE

## 2021-11-01 DIAGNOSIS — L59.9 DISORDER OF THE SKIN, SUBCU RELATED TO RADIATION, UNSP: ICD-10-CM
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
## 2021-11-01 DIAGNOSIS — L59.8 ALLERGIC CONTACT DERMATITIS DUE TO PHOTOCONTACT OTHER THAN SUNBURN, CURRENT: ICD-10-CM

## 2021-11-01 DIAGNOSIS — N76.0 RADIATION VAGINITIS: ICD-10-CM

## 2021-11-01 DIAGNOSIS — L59.8 SOFT TISSUE RADIONECROSIS: Primary | ICD-10-CM

## 2021-11-01 PROCEDURE — G0277 HBOT, FULL BODY CHAMBER, 30M: HCPCS | Mod: CCAT

## 2021-11-02 ENCOUNTER — HOSPITAL ENCOUNTER (OUTPATIENT)
Dept: WOUND CARE | Facility: HOSPITAL | Age: 66
Discharge: HOME | End: 2021-11-02
Attending: SURGERY
Payer: MEDICARE

## 2021-11-02 DIAGNOSIS — L59.8 ALLERGIC CONTACT DERMATITIS DUE TO PHOTOCONTACT OTHER THAN SUNBURN, CURRENT: ICD-10-CM

## 2021-11-02 DIAGNOSIS — L59.9 DISORDER OF THE SKIN, SUBCU RELATED TO RADIATION, UNSP: ICD-10-CM

## 2021-11-02 PROCEDURE — G0277 HBOT, FULL BODY CHAMBER, 30M: HCPCS | Mod: CCAT

## 2021-11-03 ENCOUNTER — HOSPITAL ENCOUNTER (OUTPATIENT)
Dept: WOUND CARE | Facility: HOSPITAL | Age: 66
Discharge: HOME | End: 2021-11-03
Attending: SURGERY
Payer: MEDICARE

## 2021-11-03 DIAGNOSIS — L59.9 DISORDER OF THE SKIN, SUBCU RELATED TO RADIATION, UNSP: ICD-10-CM

## 2021-11-03 DIAGNOSIS — L59.8 ALLERGIC CONTACT DERMATITIS DUE TO PHOTOCONTACT OTHER THAN SUNBURN, CURRENT: ICD-10-CM

## 2021-11-03 PROCEDURE — G0277 HBOT, FULL BODY CHAMBER, 30M: HCPCS | Mod: CCAT

## 2021-11-04 ENCOUNTER — HOSPITAL ENCOUNTER (OUTPATIENT)
Dept: WOUND CARE | Facility: HOSPITAL | Age: 66
Discharge: HOME | End: 2021-11-04
Attending: SURGERY
Payer: MEDICARE

## 2021-11-04 DIAGNOSIS — L59.9 DISORDER OF THE SKIN, SUBCU RELATED TO RADIATION, UNSP: ICD-10-CM

## 2021-11-04 DIAGNOSIS — L59.8 ALLERGIC CONTACT DERMATITIS DUE TO PHOTOCONTACT OTHER THAN SUNBURN, CURRENT: ICD-10-CM

## 2021-11-04 PROCEDURE — G0277 HBOT, FULL BODY CHAMBER, 30M: HCPCS | Mod: CCAT

## 2021-11-05 ENCOUNTER — HOSPITAL ENCOUNTER (OUTPATIENT)
Dept: WOUND CARE | Facility: HOSPITAL | Age: 66
Discharge: HOME | End: 2021-11-05
Attending: FAMILY MEDICINE
Payer: MEDICARE

## 2021-11-05 VITALS
BODY MASS INDEX: 25.52 KG/M2 | SYSTOLIC BLOOD PRESSURE: 113 MMHG | DIASTOLIC BLOOD PRESSURE: 56 MMHG | HEIGHT: 60 IN | SYSTOLIC BLOOD PRESSURE: 133 MMHG | WEIGHT: 130 LBS | DIASTOLIC BLOOD PRESSURE: 56 MMHG | TEMPERATURE: 98 F

## 2021-11-05 DIAGNOSIS — T66.XXXA RADIATION THERAPY COMPLICATION, INITIAL ENCOUNTER: ICD-10-CM

## 2021-11-05 DIAGNOSIS — T66.XXXA: ICD-10-CM
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
## 2021-11-05 DIAGNOSIS — L59.8 RADIATION NECROSIS OF SKIN AND SUBCUTANEOUS: Primary | ICD-10-CM

## 2021-11-05 DIAGNOSIS — N76.0 RADIATION VAGINITIS: Primary | ICD-10-CM
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
## 2021-11-05 DIAGNOSIS — L59.8 RADIATION NECROSIS OF SKIN AND SUBCUTANEOUS: ICD-10-CM

## 2021-11-05 DIAGNOSIS — L59.9 DISORDER OF THE SKIN AND SUBCUTANEOUS TISSUE RELATED TO RADIATION, UNSPECIFIED: ICD-10-CM

## 2021-11-05 PROCEDURE — 99183 HYPERBARIC OXYGEN THERAPY: CPT | Mod: ,,, | Performed by: FAMILY MEDICINE

## 2021-11-05 PROCEDURE — 99213 PR OFFICE/OUTPT VISIT, EST, LEVL III, 20-29 MIN: ICD-10-PCS | Mod: ,,, | Performed by: FAMILY MEDICINE

## 2021-11-05 PROCEDURE — G0277 HBOT, FULL BODY CHAMBER, 30M: HCPCS | Performed by: FAMILY MEDICINE

## 2021-11-05 PROCEDURE — G0277 HBOT, FULL BODY CHAMBER, 30M: HCPCS

## 2021-11-05 PROCEDURE — 99183 PR HYPERBARIC OXYGEN THERAPY ATTENDANCE/SUPERVISION, PER SESSION: ICD-10-PCS | Mod: ,,, | Performed by: FAMILY MEDICINE

## 2021-11-05 PROCEDURE — 99213 OFFICE O/P EST LOW 20 MIN: CPT | Mod: ,,, | Performed by: FAMILY MEDICINE

## 2021-11-08 ENCOUNTER — HOSPITAL ENCOUNTER (OUTPATIENT)
Dept: WOUND CARE | Facility: HOSPITAL | Age: 66
Discharge: HOME | End: 2021-11-08
Attending: INTERNAL MEDICINE
Payer: MEDICARE

## 2021-11-08 DIAGNOSIS — L59.9 DISORDER OF THE SKIN, SUBCU RELATED TO RADIATION, UNSP: ICD-10-CM

## 2021-11-08 DIAGNOSIS — L59.8 RADIATION NECROSIS OF SKIN AND SUBCUTANEOUS: Primary | ICD-10-CM
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
## 2021-11-08 DIAGNOSIS — L59.8 ALLERGIC CONTACT DERMATITIS DUE TO PHOTOCONTACT OTHER THAN SUNBURN, CURRENT: ICD-10-CM

## 2021-11-08 DIAGNOSIS — L59.9 DISORDER OF THE SKIN AND SUBCUTANEOUS TISSUE RELATED TO RADIATION, UNSPECIFIED: ICD-10-CM

## 2021-11-08 PROCEDURE — 99183 PR HYPERBARIC OXYGEN THERAPY ATTENDANCE/SUPERVISION, PER SESSION: ICD-10-PCS | Mod: ,,, | Performed by: INTERNAL MEDICINE

## 2021-11-08 PROCEDURE — 99183 HYPERBARIC OXYGEN THERAPY: CPT | Mod: ,,, | Performed by: INTERNAL MEDICINE

## 2021-11-08 PROCEDURE — G0277 HBOT, FULL BODY CHAMBER, 30M: HCPCS | Performed by: INTERNAL MEDICINE

## 2021-11-09 ENCOUNTER — HOSPITAL ENCOUNTER (OUTPATIENT)
Dept: WOUND CARE | Facility: HOSPITAL | Age: 66
Discharge: HOME | End: 2021-11-09
Attending: FAMILY MEDICINE
Payer: MEDICARE

## 2021-11-09 DIAGNOSIS — L59.9 DISORDER OF THE SKIN, SUBCU RELATED TO RADIATION, UNSP: ICD-10-CM

## 2021-11-09 DIAGNOSIS — L59.8 RADIATION NECROSIS OF SKIN AND SUBCUTANEOUS: ICD-10-CM

## 2021-11-09 DIAGNOSIS — L59.9 DISORDER OF THE SKIN AND SUBCUTANEOUS TISSUE RELATED TO RADIATION, UNSPECIFIED: Primary | ICD-10-CM
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
## 2021-11-09 DIAGNOSIS — L59.8 ALLERGIC CONTACT DERMATITIS DUE TO PHOTOCONTACT OTHER THAN SUNBURN, CURRENT: ICD-10-CM

## 2021-11-09 PROCEDURE — 99183 HYPERBARIC OXYGEN THERAPY: CPT | Mod: ,,, | Performed by: FAMILY MEDICINE

## 2021-11-09 PROCEDURE — G0277 HBOT, FULL BODY CHAMBER, 30M: HCPCS | Performed by: FAMILY MEDICINE

## 2021-11-09 PROCEDURE — 99183 PR HYPERBARIC OXYGEN THERAPY ATTENDANCE/SUPERVISION, PER SESSION: ICD-10-PCS | Mod: ,,, | Performed by: FAMILY MEDICINE

## 2021-11-10 ENCOUNTER — HOSPITAL ENCOUNTER (OUTPATIENT)
Dept: WOUND CARE | Facility: HOSPITAL | Age: 66
Discharge: HOME | End: 2021-11-10
Attending: FAMILY MEDICINE
Payer: MEDICARE
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
## 2021-11-10 DIAGNOSIS — L59.9 DISORDER OF THE SKIN, SUBCU RELATED TO RADIATION, UNSP: ICD-10-CM

## 2021-11-10 DIAGNOSIS — L59.9 DISORDER OF THE SKIN AND SUBCUTANEOUS TISSUE RELATED TO RADIATION, UNSPECIFIED: Primary | ICD-10-CM

## 2021-11-10 DIAGNOSIS — L59.8 ALLERGIC CONTACT DERMATITIS DUE TO PHOTOCONTACT OTHER THAN SUNBURN, CURRENT: ICD-10-CM

## 2021-11-10 DIAGNOSIS — L59.8 RADIATION NECROSIS OF SKIN AND SUBCUTANEOUS: ICD-10-CM

## 2021-11-10 PROCEDURE — 99183 PR HYPERBARIC OXYGEN THERAPY ATTENDANCE/SUPERVISION, PER SESSION: ICD-10-PCS | Mod: ,,, | Performed by: FAMILY MEDICINE

## 2021-11-10 PROCEDURE — 99183 HYPERBARIC OXYGEN THERAPY: CPT | Mod: ,,, | Performed by: FAMILY MEDICINE

## 2021-11-10 PROCEDURE — G0277 HBOT, FULL BODY CHAMBER, 30M: HCPCS | Performed by: FAMILY MEDICINE

## 2021-11-11 ENCOUNTER — TELEPHONE (OUTPATIENT)
Dept: GYNECOLOGIC ONCOLOGY | Facility: CLINIC | Age: 66
End: 2021-11-11
Payer: MEDICARE

## 2021-11-11 ENCOUNTER — HOSPITAL ENCOUNTER (OUTPATIENT)
Dept: WOUND CARE | Facility: HOSPITAL | Age: 66
Discharge: HOME | End: 2021-11-11
Attending: FAMILY MEDICINE
Payer: MEDICARE

## 2021-11-11 DIAGNOSIS — L59.9 DISORDER OF THE SKIN AND SUBCUTANEOUS TISSUE RELATED TO RADIATION, UNSPECIFIED: ICD-10-CM

## 2021-11-11 DIAGNOSIS — L59.8 RADIATION NECROSIS OF SKIN AND SUBCUTANEOUS: Primary | ICD-10-CM

## 2021-11-11 DIAGNOSIS — L59.8 ALLERGIC CONTACT DERMATITIS DUE TO PHOTOCONTACT OTHER THAN SUNBURN, CURRENT: ICD-10-CM
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
## 2021-11-11 DIAGNOSIS — L59.9 DISORDER OF THE SKIN, SUBCU RELATED TO RADIATION, UNSP: ICD-10-CM

## 2021-11-11 PROCEDURE — 99183 HYPERBARIC OXYGEN THERAPY: CPT | Mod: ,,, | Performed by: FAMILY MEDICINE

## 2021-11-11 PROCEDURE — 99183 PR HYPERBARIC OXYGEN THERAPY ATTENDANCE/SUPERVISION, PER SESSION: ICD-10-PCS | Mod: ,,, | Performed by: FAMILY MEDICINE

## 2021-11-11 PROCEDURE — G0277 HBOT, FULL BODY CHAMBER, 30M: HCPCS | Performed by: FAMILY MEDICINE

## 2021-11-12 ENCOUNTER — TELEPHONE (OUTPATIENT)
Dept: GYNECOLOGIC ONCOLOGY | Facility: CLINIC | Age: 66
End: 2021-11-12
Payer: MEDICARE

## 2021-11-12 ENCOUNTER — HOSPITAL ENCOUNTER (OUTPATIENT)
Dept: WOUND CARE | Facility: HOSPITAL | Age: 66
Discharge: HOME | End: 2021-11-12
Attending: FAMILY MEDICINE
Payer: MEDICARE

## 2021-11-12 DIAGNOSIS — L59.9 DISORDER OF THE SKIN, SUBCU RELATED TO RADIATION, UNSP: ICD-10-CM

## 2021-11-12 DIAGNOSIS — L59.9 DISORDER OF THE SKIN AND SUBCUTANEOUS TISSUE RELATED TO RADIATION, UNSPECIFIED: Primary | ICD-10-CM

## 2021-11-12 DIAGNOSIS — L59.8 ALLERGIC CONTACT DERMATITIS DUE TO PHOTOCONTACT OTHER THAN SUNBURN, CURRENT: ICD-10-CM

## 2021-11-12 DIAGNOSIS — L59.8 RADIATION NECROSIS OF SKIN AND SUBCUTANEOUS: ICD-10-CM
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
## 2021-11-12 PROCEDURE — 99183 HYPERBARIC OXYGEN THERAPY: CPT | Mod: ,,, | Performed by: FAMILY MEDICINE

## 2021-11-12 PROCEDURE — G0277 HBOT, FULL BODY CHAMBER, 30M: HCPCS | Performed by: FAMILY MEDICINE

## 2021-11-12 PROCEDURE — 99183 PR HYPERBARIC OXYGEN THERAPY ATTENDANCE/SUPERVISION, PER SESSION: ICD-10-PCS | Mod: ,,, | Performed by: FAMILY MEDICINE

## 2021-11-15 ENCOUNTER — HOSPITAL ENCOUNTER (OUTPATIENT)
Dept: WOUND CARE | Facility: HOSPITAL | Age: 66
Discharge: HOME | End: 2021-11-15
Attending: INTERNAL MEDICINE
Payer: MEDICARE
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
## 2021-11-15 DIAGNOSIS — L59.8 ALLERGIC CONTACT DERMATITIS DUE TO PHOTOCONTACT OTHER THAN SUNBURN, CURRENT: ICD-10-CM

## 2021-11-15 DIAGNOSIS — L59.8 RADIATION NECROSIS OF SKIN AND SUBCUTANEOUS: ICD-10-CM

## 2021-11-15 DIAGNOSIS — L59.9 DISORDER OF THE SKIN, SUBCU RELATED TO RADIATION, UNSP: ICD-10-CM

## 2021-11-15 DIAGNOSIS — L59.9 DISORDER OF THE SKIN AND SUBCUTANEOUS TISSUE RELATED TO RADIATION, UNSPECIFIED: Primary | ICD-10-CM

## 2021-11-15 PROCEDURE — G0277 HBOT, FULL BODY CHAMBER, 30M: HCPCS | Performed by: INTERNAL MEDICINE

## 2021-11-15 PROCEDURE — 99183 HYPERBARIC OXYGEN THERAPY: CPT | Mod: ,,, | Performed by: INTERNAL MEDICINE

## 2021-11-15 PROCEDURE — 99183 PR HYPERBARIC OXYGEN THERAPY ATTENDANCE/SUPERVISION, PER SESSION: ICD-10-PCS | Mod: ,,, | Performed by: INTERNAL MEDICINE

## 2021-11-16 ENCOUNTER — HOSPITAL ENCOUNTER (OUTPATIENT)
Dept: WOUND CARE | Facility: HOSPITAL | Age: 66
Discharge: HOME | End: 2021-11-16
Attending: FAMILY MEDICINE
Payer: MEDICARE

## 2021-11-16 DIAGNOSIS — L59.8 ALLERGIC CONTACT DERMATITIS DUE TO PHOTOCONTACT OTHER THAN SUNBURN, CURRENT: ICD-10-CM
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
## 2021-11-16 DIAGNOSIS — L59.9 DISORDER OF THE SKIN, SUBCU RELATED TO RADIATION, UNSP: ICD-10-CM

## 2021-11-16 DIAGNOSIS — L59.8 RADIATION NECROSIS OF SKIN AND SUBCUTANEOUS: ICD-10-CM

## 2021-11-16 DIAGNOSIS — L59.9 DISORDER OF THE SKIN AND SUBCUTANEOUS TISSUE RELATED TO RADIATION, UNSPECIFIED: Primary | ICD-10-CM

## 2021-11-16 PROCEDURE — 99183 HYPERBARIC OXYGEN THERAPY: CPT | Mod: ,,, | Performed by: FAMILY MEDICINE

## 2021-11-16 PROCEDURE — 99183 PR HYPERBARIC OXYGEN THERAPY ATTENDANCE/SUPERVISION, PER SESSION: ICD-10-PCS | Mod: ,,, | Performed by: FAMILY MEDICINE

## 2021-11-16 PROCEDURE — G0277 HBOT, FULL BODY CHAMBER, 30M: HCPCS | Performed by: FAMILY MEDICINE

## 2021-11-17 ENCOUNTER — HOSPITAL ENCOUNTER (OUTPATIENT)
Dept: WOUND CARE | Facility: HOSPITAL | Age: 66
Discharge: HOME | End: 2021-11-17
Attending: FAMILY MEDICINE
Payer: MEDICARE

## 2021-11-17 DIAGNOSIS — L59.9 DISORDER OF THE SKIN, SUBCU RELATED TO RADIATION, UNSP: ICD-10-CM
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
## 2021-11-17 DIAGNOSIS — L59.8 ALLERGIC CONTACT DERMATITIS DUE TO PHOTOCONTACT OTHER THAN SUNBURN, CURRENT: ICD-10-CM

## 2021-11-17 DIAGNOSIS — L59.8 RADIATION NECROSIS OF SKIN AND SUBCUTANEOUS: ICD-10-CM

## 2021-11-17 DIAGNOSIS — L59.9 DISORDER OF THE SKIN AND SUBCUTANEOUS TISSUE RELATED TO RADIATION, UNSPECIFIED: Primary | ICD-10-CM

## 2021-11-17 PROCEDURE — 99183 HYPERBARIC OXYGEN THERAPY: CPT | Mod: ,,, | Performed by: FAMILY MEDICINE

## 2021-11-17 PROCEDURE — 99183 PR HYPERBARIC OXYGEN THERAPY ATTENDANCE/SUPERVISION, PER SESSION: ICD-10-PCS | Mod: ,,, | Performed by: FAMILY MEDICINE

## 2021-11-17 PROCEDURE — G0277 HBOT, FULL BODY CHAMBER, 30M: HCPCS | Performed by: FAMILY MEDICINE

## 2021-11-18 ENCOUNTER — HOSPITAL ENCOUNTER (OUTPATIENT)
Dept: WOUND CARE | Facility: HOSPITAL | Age: 66
Discharge: HOME | End: 2021-11-18
Attending: FAMILY MEDICINE
Payer: MEDICARE

## 2021-11-18 DIAGNOSIS — L59.9 DISORDER OF THE SKIN, SUBCU RELATED TO RADIATION, UNSP: ICD-10-CM

## 2021-11-18 DIAGNOSIS — L59.9 DISORDER OF THE SKIN AND SUBCUTANEOUS TISSUE RELATED TO RADIATION, UNSPECIFIED: Primary | ICD-10-CM

## 2021-11-18 DIAGNOSIS — L59.8 ALLERGIC CONTACT DERMATITIS DUE TO PHOTOCONTACT OTHER THAN SUNBURN, CURRENT: ICD-10-CM

## 2021-11-18 DIAGNOSIS — L59.8 RADIATION NECROSIS OF SKIN AND SUBCUTANEOUS: ICD-10-CM
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
## 2021-11-18 PROCEDURE — G0277 HBOT, FULL BODY CHAMBER, 30M: HCPCS | Performed by: FAMILY MEDICINE

## 2021-11-18 PROCEDURE — 99183 PR HYPERBARIC OXYGEN THERAPY ATTENDANCE/SUPERVISION, PER SESSION: ICD-10-PCS | Mod: ,,, | Performed by: FAMILY MEDICINE

## 2021-11-18 PROCEDURE — 99183 HYPERBARIC OXYGEN THERAPY: CPT | Mod: ,,, | Performed by: FAMILY MEDICINE

## 2021-11-19 ENCOUNTER — HOSPITAL ENCOUNTER (OUTPATIENT)
Dept: WOUND CARE | Facility: HOSPITAL | Age: 66
Discharge: HOME | End: 2021-11-19
Attending: INTERNAL MEDICINE
Payer: MEDICARE

## 2021-11-19 DIAGNOSIS — L59.8 RADIATION NECROSIS OF SKIN AND SUBCUTANEOUS: ICD-10-CM

## 2021-11-19 DIAGNOSIS — L59.9 DISORDER OF THE SKIN AND SUBCUTANEOUS TISSUE RELATED TO RADIATION, UNSPECIFIED: Primary | ICD-10-CM
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
## 2021-11-19 DIAGNOSIS — L59.9 DISORDER OF THE SKIN, SUBCU RELATED TO RADIATION, UNSP: ICD-10-CM

## 2021-11-19 DIAGNOSIS — L59.8 ALLERGIC CONTACT DERMATITIS DUE TO PHOTOCONTACT OTHER THAN SUNBURN, CURRENT: ICD-10-CM

## 2021-11-19 PROCEDURE — 99183 HYPERBARIC OXYGEN THERAPY: CPT | Mod: ,,, | Performed by: INTERNAL MEDICINE

## 2021-11-19 PROCEDURE — 99183 PR HYPERBARIC OXYGEN THERAPY ATTENDANCE/SUPERVISION, PER SESSION: ICD-10-PCS | Mod: ,,, | Performed by: INTERNAL MEDICINE

## 2021-11-19 PROCEDURE — G0277 HBOT, FULL BODY CHAMBER, 30M: HCPCS | Performed by: INTERNAL MEDICINE

## 2021-11-22 ENCOUNTER — HOSPITAL ENCOUNTER (OUTPATIENT)
Dept: WOUND CARE | Facility: HOSPITAL | Age: 66
Discharge: HOME | End: 2021-11-22
Attending: INTERNAL MEDICINE
Payer: MEDICARE

## 2021-11-22 DIAGNOSIS — L59.9 DISORDER OF THE SKIN AND SUBCUTANEOUS TISSUE RELATED TO RADIATION, UNSPECIFIED: Primary | ICD-10-CM

## 2021-11-22 DIAGNOSIS — L59.8 ALLERGIC CONTACT DERMATITIS DUE TO PHOTOCONTACT OTHER THAN SUNBURN, CURRENT: ICD-10-CM

## 2021-11-22 DIAGNOSIS — L59.9 DISORDER OF THE SKIN, SUBCU RELATED TO RADIATION, UNSP: ICD-10-CM
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
## 2021-11-22 DIAGNOSIS — L59.8 RADIATION NECROSIS OF SKIN AND SUBCUTANEOUS: ICD-10-CM

## 2021-11-22 PROCEDURE — 99183 PR HYPERBARIC OXYGEN THERAPY ATTENDANCE/SUPERVISION, PER SESSION: ICD-10-PCS | Mod: ,,, | Performed by: INTERNAL MEDICINE

## 2021-11-22 PROCEDURE — G0277 HBOT, FULL BODY CHAMBER, 30M: HCPCS | Performed by: INTERNAL MEDICINE

## 2021-11-22 PROCEDURE — 99183 HYPERBARIC OXYGEN THERAPY: CPT | Mod: ,,, | Performed by: INTERNAL MEDICINE

## 2021-11-23 ENCOUNTER — HOSPITAL ENCOUNTER (OUTPATIENT)
Dept: WOUND CARE | Facility: HOSPITAL | Age: 66
Discharge: HOME | End: 2021-11-23
Attending: FAMILY MEDICINE
Payer: MEDICARE

## 2021-11-23 DIAGNOSIS — L59.9 DISORDER OF THE SKIN AND SUBCUTANEOUS TISSUE RELATED TO RADIATION, UNSPECIFIED: Primary | ICD-10-CM

## 2021-11-23 DIAGNOSIS — L59.8 RADIATION NECROSIS OF SKIN AND SUBCUTANEOUS: ICD-10-CM

## 2021-11-23 DIAGNOSIS — L59.9 DISORDER OF THE SKIN, SUBCU RELATED TO RADIATION, UNSP: ICD-10-CM

## 2021-11-23 DIAGNOSIS — L59.8 ALLERGIC CONTACT DERMATITIS DUE TO PHOTOCONTACT OTHER THAN SUNBURN, CURRENT: ICD-10-CM
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
## 2021-11-23 PROCEDURE — G0277 HBOT, FULL BODY CHAMBER, 30M: HCPCS | Performed by: FAMILY MEDICINE

## 2021-11-23 PROCEDURE — 99183 PR HYPERBARIC OXYGEN THERAPY ATTENDANCE/SUPERVISION, PER SESSION: ICD-10-PCS | Mod: ,,, | Performed by: FAMILY MEDICINE

## 2021-11-23 PROCEDURE — 99183 HYPERBARIC OXYGEN THERAPY: CPT | Mod: ,,, | Performed by: FAMILY MEDICINE

## 2021-11-26 ENCOUNTER — HOSPITAL ENCOUNTER (OUTPATIENT)
Dept: WOUND CARE | Facility: HOSPITAL | Age: 66
Discharge: HOME | End: 2021-11-26
Attending: INTERNAL MEDICINE
Payer: MEDICARE

## 2021-11-26 DIAGNOSIS — L59.8 ALLERGIC CONTACT DERMATITIS DUE TO PHOTOCONTACT OTHER THAN SUNBURN, CURRENT: ICD-10-CM
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
## 2021-11-26 DIAGNOSIS — L59.9 DISORDER OF THE SKIN AND SUBCUTANEOUS TISSUE RELATED TO RADIATION, UNSPECIFIED: Primary | ICD-10-CM

## 2021-11-26 DIAGNOSIS — L59.8 RADIATION NECROSIS OF SKIN AND SUBCUTANEOUS: ICD-10-CM

## 2021-11-26 DIAGNOSIS — L59.9 DISORDER OF THE SKIN, SUBCU RELATED TO RADIATION, UNSP: ICD-10-CM

## 2021-11-26 PROCEDURE — G0277 HBOT, FULL BODY CHAMBER, 30M: HCPCS | Performed by: INTERNAL MEDICINE

## 2021-11-26 PROCEDURE — 99183 HYPERBARIC OXYGEN THERAPY: CPT | Mod: ,,, | Performed by: INTERNAL MEDICINE

## 2021-11-26 PROCEDURE — 99183 PR HYPERBARIC OXYGEN THERAPY ATTENDANCE/SUPERVISION, PER SESSION: ICD-10-PCS | Mod: ,,, | Performed by: INTERNAL MEDICINE

## 2021-11-29 ENCOUNTER — HOSPITAL ENCOUNTER (OUTPATIENT)
Dept: WOUND CARE | Facility: HOSPITAL | Age: 66
Discharge: HOME | End: 2021-11-29
Attending: INTERNAL MEDICINE
Payer: MEDICARE

## 2021-11-29 DIAGNOSIS — L59.8 RADIATION NECROSIS OF SKIN AND SUBCUTANEOUS: ICD-10-CM

## 2021-11-29 DIAGNOSIS — L59.8 ALLERGIC CONTACT DERMATITIS DUE TO PHOTOCONTACT OTHER THAN SUNBURN, CURRENT: ICD-10-CM
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
## 2021-11-29 DIAGNOSIS — L59.9 DISORDER OF THE SKIN, SUBCU RELATED TO RADIATION, UNSP: ICD-10-CM

## 2021-11-29 DIAGNOSIS — L59.9 DISORDER OF THE SKIN AND SUBCUTANEOUS TISSUE RELATED TO RADIATION, UNSPECIFIED: Primary | ICD-10-CM

## 2021-11-29 PROCEDURE — G0277 HBOT, FULL BODY CHAMBER, 30M: HCPCS | Performed by: INTERNAL MEDICINE

## 2021-11-29 PROCEDURE — 99183 PR HYPERBARIC OXYGEN THERAPY ATTENDANCE/SUPERVISION, PER SESSION: ICD-10-PCS | Mod: ,,, | Performed by: INTERNAL MEDICINE

## 2021-11-29 PROCEDURE — 99183 HYPERBARIC OXYGEN THERAPY: CPT | Mod: ,,, | Performed by: INTERNAL MEDICINE

## 2021-11-30 ENCOUNTER — HOSPITAL ENCOUNTER (OUTPATIENT)
Dept: WOUND CARE | Facility: HOSPITAL | Age: 66
Discharge: HOME | End: 2021-11-30
Attending: FAMILY MEDICINE
Payer: MEDICARE

## 2021-11-30 DIAGNOSIS — L59.9 DISORDER OF THE SKIN AND SUBCUTANEOUS TISSUE RELATED TO RADIATION, UNSPECIFIED: Primary | ICD-10-CM

## 2021-11-30 DIAGNOSIS — L59.8 RADIATION NECROSIS OF SKIN AND SUBCUTANEOUS: ICD-10-CM

## 2021-11-30 DIAGNOSIS — L59.9 DISORDER OF THE SKIN, SUBCU RELATED TO RADIATION, UNSP: ICD-10-CM
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
## 2021-11-30 DIAGNOSIS — L59.8 ALLERGIC CONTACT DERMATITIS DUE TO PHOTOCONTACT OTHER THAN SUNBURN, CURRENT: ICD-10-CM

## 2021-11-30 PROCEDURE — G0277 HBOT, FULL BODY CHAMBER, 30M: HCPCS | Performed by: FAMILY MEDICINE

## 2021-11-30 PROCEDURE — 99183 HYPERBARIC OXYGEN THERAPY: CPT | Mod: ,,, | Performed by: FAMILY MEDICINE

## 2021-11-30 PROCEDURE — 99183 PR HYPERBARIC OXYGEN THERAPY ATTENDANCE/SUPERVISION, PER SESSION: ICD-10-PCS | Mod: ,,, | Performed by: FAMILY MEDICINE

## 2021-12-01 ENCOUNTER — TELEPHONE (OUTPATIENT)
Dept: FAMILY MEDICINE | Facility: CLINIC | Age: 66
End: 2021-12-01
Payer: MEDICARE

## 2021-12-01 ENCOUNTER — HOSPITAL ENCOUNTER (OUTPATIENT)
Dept: WOUND CARE | Facility: HOSPITAL | Age: 66
Discharge: HOME | End: 2021-12-01
Attending: FAMILY MEDICINE
Payer: MEDICARE

## 2021-12-01 DIAGNOSIS — L59.9 DISORDER OF THE SKIN, SUBCU RELATED TO RADIATION, UNSP: ICD-10-CM

## 2021-12-01 DIAGNOSIS — L59.8 ALLERGIC CONTACT DERMATITIS DUE TO PHOTOCONTACT OTHER THAN SUNBURN, CURRENT: ICD-10-CM

## 2021-12-01 DIAGNOSIS — L59.9 DISORDER OF THE SKIN AND SUBCUTANEOUS TISSUE RELATED TO RADIATION, UNSPECIFIED: Primary | ICD-10-CM

## 2021-12-01 DIAGNOSIS — L59.8 RADIATION NECROSIS OF SKIN AND SUBCUTANEOUS: ICD-10-CM
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
## 2021-12-01 LAB — POCT GLUCOSE: 174 MG/DL (ref 70–110)

## 2021-12-01 PROCEDURE — 99183 PR HYPERBARIC OXYGEN THERAPY ATTENDANCE/SUPERVISION, PER SESSION: ICD-10-PCS | Mod: ,,, | Performed by: FAMILY MEDICINE

## 2021-12-01 PROCEDURE — G0277 HBOT, FULL BODY CHAMBER, 30M: HCPCS | Performed by: FAMILY MEDICINE

## 2021-12-01 PROCEDURE — 99183 HYPERBARIC OXYGEN THERAPY: CPT | Mod: ,,, | Performed by: FAMILY MEDICINE

## 2021-12-02 ENCOUNTER — HOSPITAL ENCOUNTER (OUTPATIENT)
Dept: WOUND CARE | Facility: HOSPITAL | Age: 66
Discharge: HOME | End: 2021-12-02
Attending: FAMILY MEDICINE
Payer: MEDICARE

## 2021-12-02 DIAGNOSIS — L59.8 ALLERGIC CONTACT DERMATITIS DUE TO PHOTOCONTACT OTHER THAN SUNBURN, CURRENT: ICD-10-CM
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
## 2021-12-02 DIAGNOSIS — L59.8 RADIATION NECROSIS OF SKIN AND SUBCUTANEOUS: ICD-10-CM

## 2021-12-02 DIAGNOSIS — L59.9 DISORDER OF THE SKIN AND SUBCUTANEOUS TISSUE RELATED TO RADIATION, UNSPECIFIED: Primary | ICD-10-CM

## 2021-12-02 DIAGNOSIS — L59.9 DISORDER OF THE SKIN, SUBCU RELATED TO RADIATION, UNSP: ICD-10-CM

## 2021-12-02 PROCEDURE — 99183 HYPERBARIC OXYGEN THERAPY: CPT | Mod: ,,, | Performed by: FAMILY MEDICINE

## 2021-12-02 PROCEDURE — 99183 PR HYPERBARIC OXYGEN THERAPY ATTENDANCE/SUPERVISION, PER SESSION: ICD-10-PCS | Mod: ,,, | Performed by: FAMILY MEDICINE

## 2021-12-02 PROCEDURE — G0277 HBOT, FULL BODY CHAMBER, 30M: HCPCS | Performed by: FAMILY MEDICINE

## 2021-12-03 ENCOUNTER — HOSPITAL ENCOUNTER (OUTPATIENT)
Dept: WOUND CARE | Facility: HOSPITAL | Age: 66
Discharge: HOME | End: 2021-12-03
Attending: FAMILY MEDICINE
Payer: MEDICARE

## 2021-12-03 DIAGNOSIS — L59.8 ALLERGIC CONTACT DERMATITIS DUE TO PHOTOCONTACT OTHER THAN SUNBURN, CURRENT: ICD-10-CM

## 2021-12-03 DIAGNOSIS — L59.8 RADIATION NECROSIS OF SKIN AND SUBCUTANEOUS: ICD-10-CM

## 2021-12-03 DIAGNOSIS — L59.9 DISORDER OF THE SKIN, SUBCU RELATED TO RADIATION, UNSP: ICD-10-CM

## 2021-12-03 DIAGNOSIS — L59.9 DISORDER OF THE SKIN AND SUBCUTANEOUS TISSUE RELATED TO RADIATION, UNSPECIFIED: Primary | ICD-10-CM
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
## 2021-12-03 PROCEDURE — 99183 HYPERBARIC OXYGEN THERAPY: CPT | Mod: ,,, | Performed by: FAMILY MEDICINE

## 2021-12-03 PROCEDURE — 99183 PR HYPERBARIC OXYGEN THERAPY ATTENDANCE/SUPERVISION, PER SESSION: ICD-10-PCS | Mod: ,,, | Performed by: FAMILY MEDICINE

## 2021-12-03 PROCEDURE — G0277 HBOT, FULL BODY CHAMBER, 30M: HCPCS | Performed by: FAMILY MEDICINE

## 2021-12-06 ENCOUNTER — HOSPITAL ENCOUNTER (OUTPATIENT)
Dept: WOUND CARE | Facility: HOSPITAL | Age: 66
Discharge: HOME | End: 2021-12-06
Attending: INTERNAL MEDICINE
Payer: MEDICARE

## 2021-12-06 ENCOUNTER — OFFICE VISIT (OUTPATIENT)
Dept: GYNECOLOGIC ONCOLOGY | Facility: CLINIC | Age: 66
End: 2021-12-06
Payer: MEDICARE

## 2021-12-06 ENCOUNTER — TELEPHONE (OUTPATIENT)
Dept: GYNECOLOGIC ONCOLOGY | Facility: CLINIC | Age: 66
End: 2021-12-06

## 2021-12-06 VITALS
BODY MASS INDEX: 25.95 KG/M2 | WEIGHT: 132.19 LBS | DIASTOLIC BLOOD PRESSURE: 55 MMHG | HEIGHT: 60 IN | HEART RATE: 71 BPM | SYSTOLIC BLOOD PRESSURE: 110 MMHG

## 2021-12-06 DIAGNOSIS — L59.9 DISORDER OF THE SKIN AND SUBCUTANEOUS TISSUE RELATED TO RADIATION, UNSPECIFIED: Primary | ICD-10-CM

## 2021-12-06 DIAGNOSIS — L59.8 RADIATION NECROSIS OF SKIN AND SUBCUTANEOUS: ICD-10-CM
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
## 2021-12-06 DIAGNOSIS — C54.1 ENDOMETRIAL CANCER: Primary | ICD-10-CM

## 2021-12-06 DIAGNOSIS — L59.9 DISORDER OF THE SKIN, SUBCU RELATED TO RADIATION, UNSP: ICD-10-CM

## 2021-12-06 DIAGNOSIS — L59.8 ALLERGIC CONTACT DERMATITIS DUE TO PHOTOCONTACT OTHER THAN SUNBURN, CURRENT: ICD-10-CM

## 2021-12-06 PROCEDURE — 99183 PR HYPERBARIC OXYGEN THERAPY ATTENDANCE/SUPERVISION, PER SESSION: ICD-10-PCS | Mod: ,,, | Performed by: INTERNAL MEDICINE

## 2021-12-06 PROCEDURE — G0277 HBOT, FULL BODY CHAMBER, 30M: HCPCS

## 2021-12-06 PROCEDURE — 99213 OFFICE O/P EST LOW 20 MIN: CPT | Mod: PBBFAC,25 | Performed by: OBSTETRICS & GYNECOLOGY

## 2021-12-06 PROCEDURE — 99999 PR PBB SHADOW E&M-EST. PATIENT-LVL III: CPT | Mod: PBBFAC,,, | Performed by: OBSTETRICS & GYNECOLOGY

## 2021-12-06 PROCEDURE — 99214 OFFICE O/P EST MOD 30 MIN: CPT | Mod: S$PBB,,, | Performed by: OBSTETRICS & GYNECOLOGY

## 2021-12-06 PROCEDURE — 99999 PR PBB SHADOW E&M-EST. PATIENT-LVL III: ICD-10-PCS | Mod: PBBFAC,,, | Performed by: OBSTETRICS & GYNECOLOGY

## 2021-12-06 PROCEDURE — 99183 HYPERBARIC OXYGEN THERAPY: CPT | Mod: ,,, | Performed by: INTERNAL MEDICINE

## 2021-12-06 PROCEDURE — 99214 PR OFFICE/OUTPT VISIT, EST, LEVL IV, 30-39 MIN: ICD-10-PCS | Mod: S$PBB,,, | Performed by: OBSTETRICS & GYNECOLOGY

## 2021-12-07 ENCOUNTER — HOSPITAL ENCOUNTER (OUTPATIENT)
Dept: WOUND CARE | Facility: HOSPITAL | Age: 66
Discharge: HOME | End: 2021-12-07
Attending: FAMILY MEDICINE
Payer: MEDICARE

## 2021-12-07 DIAGNOSIS — L59.8 RADIATION NECROSIS OF SKIN AND SUBCUTANEOUS: ICD-10-CM

## 2021-12-07 DIAGNOSIS — L59.9 DISORDER OF THE SKIN, SUBCU RELATED TO RADIATION, UNSP: ICD-10-CM
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
## 2021-12-07 DIAGNOSIS — L59.9 DISORDER OF THE SKIN AND SUBCUTANEOUS TISSUE RELATED TO RADIATION, UNSPECIFIED: Primary | ICD-10-CM

## 2021-12-07 PROCEDURE — 99183 HYPERBARIC OXYGEN THERAPY: CPT | Mod: ,,, | Performed by: FAMILY MEDICINE

## 2021-12-07 PROCEDURE — 99183 PR HYPERBARIC OXYGEN THERAPY ATTENDANCE/SUPERVISION, PER SESSION: ICD-10-PCS | Mod: ,,, | Performed by: FAMILY MEDICINE

## 2021-12-07 PROCEDURE — G0277 HBOT, FULL BODY CHAMBER, 30M: HCPCS

## 2021-12-08 ENCOUNTER — HOSPITAL ENCOUNTER (OUTPATIENT)
Dept: WOUND CARE | Facility: HOSPITAL | Age: 66
Discharge: HOME | End: 2021-12-08
Attending: FAMILY MEDICINE
Payer: MEDICARE
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
## 2021-12-08 DIAGNOSIS — L59.9 DISORDER OF THE SKIN, SUBCU RELATED TO RADIATION, UNSP: ICD-10-CM

## 2021-12-08 DIAGNOSIS — T66.XXXA: Primary | ICD-10-CM

## 2021-12-08 DIAGNOSIS — L59.8 ALLERGIC CONTACT DERMATITIS DUE TO PHOTOCONTACT OTHER THAN SUNBURN, CURRENT: ICD-10-CM

## 2021-12-08 DIAGNOSIS — L59.9 DISORDER OF THE SKIN AND SUBCUTANEOUS TISSUE RELATED TO RADIATION, UNSPECIFIED: ICD-10-CM

## 2021-12-08 DIAGNOSIS — L59.8 RADIATION NECROSIS OF SKIN AND SUBCUTANEOUS: ICD-10-CM

## 2021-12-08 DIAGNOSIS — T66.XXXA RADIATION THERAPY COMPLICATION, INITIAL ENCOUNTER: ICD-10-CM

## 2021-12-08 PROCEDURE — G0277 HBOT, FULL BODY CHAMBER, 30M: HCPCS | Performed by: FAMILY MEDICINE

## 2021-12-08 PROCEDURE — 99183 PR HYPERBARIC OXYGEN THERAPY ATTENDANCE/SUPERVISION, PER SESSION: ICD-10-PCS | Mod: ,,, | Performed by: FAMILY MEDICINE

## 2021-12-08 PROCEDURE — 99183 HYPERBARIC OXYGEN THERAPY: CPT | Mod: ,,, | Performed by: FAMILY MEDICINE

## 2021-12-09 ENCOUNTER — HOSPITAL ENCOUNTER (OUTPATIENT)
Dept: WOUND CARE | Facility: HOSPITAL | Age: 66
Discharge: HOME | End: 2021-12-09
Attending: FAMILY MEDICINE
Payer: MEDICARE

## 2021-12-09 DIAGNOSIS — L59.9 DISORDER OF THE SKIN, SUBCU RELATED TO RADIATION, UNSP: ICD-10-CM

## 2021-12-09 DIAGNOSIS — L59.9 DISORDER OF THE SKIN AND SUBCUTANEOUS TISSUE RELATED TO RADIATION, UNSPECIFIED: Primary | ICD-10-CM
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
## 2021-12-09 DIAGNOSIS — L59.8 ALLERGIC CONTACT DERMATITIS DUE TO PHOTOCONTACT OTHER THAN SUNBURN, CURRENT: ICD-10-CM

## 2021-12-09 DIAGNOSIS — L59.8 RADIATION NECROSIS OF SKIN AND SUBCUTANEOUS: ICD-10-CM

## 2021-12-09 PROCEDURE — 99183 HYPERBARIC OXYGEN THERAPY: CPT | Mod: ,,, | Performed by: FAMILY MEDICINE

## 2021-12-09 PROCEDURE — G0277 HBOT, FULL BODY CHAMBER, 30M: HCPCS | Performed by: FAMILY MEDICINE

## 2021-12-09 PROCEDURE — 99183 PR HYPERBARIC OXYGEN THERAPY ATTENDANCE/SUPERVISION, PER SESSION: ICD-10-PCS | Mod: ,,, | Performed by: FAMILY MEDICINE

## 2021-12-10 ENCOUNTER — HOSPITAL ENCOUNTER (OUTPATIENT)
Dept: WOUND CARE | Facility: HOSPITAL | Age: 66
Discharge: HOME | End: 2021-12-10
Attending: FAMILY MEDICINE
Payer: MEDICARE
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
## 2021-12-10 DIAGNOSIS — L59.8 ALLERGIC CONTACT DERMATITIS DUE TO PHOTOCONTACT OTHER THAN SUNBURN, CURRENT: ICD-10-CM

## 2021-12-10 DIAGNOSIS — L59.9 DISORDER OF THE SKIN, SUBCU RELATED TO RADIATION, UNSP: ICD-10-CM

## 2021-12-10 DIAGNOSIS — L59.8 RADIATION NECROSIS OF SKIN AND SUBCUTANEOUS: ICD-10-CM

## 2021-12-10 DIAGNOSIS — L59.9 DISORDER OF THE SKIN AND SUBCUTANEOUS TISSUE RELATED TO RADIATION, UNSPECIFIED: Primary | ICD-10-CM

## 2021-12-10 PROCEDURE — 99183 HYPERBARIC OXYGEN THERAPY: CPT | Mod: ,,, | Performed by: FAMILY MEDICINE

## 2021-12-10 PROCEDURE — G0277 HBOT, FULL BODY CHAMBER, 30M: HCPCS | Performed by: FAMILY MEDICINE

## 2021-12-10 PROCEDURE — 99183 PR HYPERBARIC OXYGEN THERAPY ATTENDANCE/SUPERVISION, PER SESSION: ICD-10-PCS | Mod: ,,, | Performed by: FAMILY MEDICINE

## 2021-12-13 ENCOUNTER — HOSPITAL ENCOUNTER (OUTPATIENT)
Dept: WOUND CARE | Facility: HOSPITAL | Age: 66
Discharge: HOME | End: 2021-12-13
Attending: INTERNAL MEDICINE
Payer: MEDICARE

## 2021-12-13 VITALS
SYSTOLIC BLOOD PRESSURE: 129 MMHG | HEART RATE: 88 BPM | DIASTOLIC BLOOD PRESSURE: 62 MMHG | RESPIRATION RATE: 18 BRPM | TEMPERATURE: 98 F

## 2021-12-13 DIAGNOSIS — L59.8 ALLERGIC CONTACT DERMATITIS DUE TO PHOTOCONTACT OTHER THAN SUNBURN, CURRENT: ICD-10-CM
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
## 2021-12-13 DIAGNOSIS — L59.8 RADIATION NECROSIS OF SKIN AND SUBCUTANEOUS: ICD-10-CM

## 2021-12-13 DIAGNOSIS — L59.9 DISORDER OF THE SKIN, SUBCU RELATED TO RADIATION, UNSP: ICD-10-CM

## 2021-12-13 DIAGNOSIS — L59.9 DISORDER OF THE SKIN AND SUBCUTANEOUS TISSUE RELATED TO RADIATION, UNSPECIFIED: Primary | ICD-10-CM

## 2021-12-14 ENCOUNTER — TELEPHONE (OUTPATIENT)
Dept: FAMILY MEDICINE | Facility: CLINIC | Age: 66
End: 2021-12-14
Payer: MEDICARE

## 2021-12-14 ENCOUNTER — PATIENT MESSAGE (OUTPATIENT)
Dept: FAMILY MEDICINE | Facility: CLINIC | Age: 66
End: 2021-12-14
Payer: MEDICARE

## 2021-12-14 ENCOUNTER — PATIENT MESSAGE (OUTPATIENT)
Dept: HEMATOLOGY/ONCOLOGY | Facility: CLINIC | Age: 66
End: 2021-12-14
Payer: MEDICARE

## 2021-12-14 DIAGNOSIS — Z85.3 HX OF BREAST CANCER: Primary | ICD-10-CM

## 2021-12-14 DIAGNOSIS — D64.9 ANEMIA, UNSPECIFIED TYPE: ICD-10-CM

## 2021-12-14 DIAGNOSIS — D64.9 CHRONIC ANEMIA: Primary | ICD-10-CM

## 2021-12-14 DIAGNOSIS — C54.1 ENDOMETRIAL CANCER: ICD-10-CM

## 2021-12-15 ENCOUNTER — HOSPITAL ENCOUNTER (INPATIENT)
Facility: HOSPITAL | Age: 66
LOS: 1 days | Discharge: HOME | DRG: 379 | End: 2021-12-18
Attending: EMERGENCY MEDICINE | Admitting: INTERNAL MEDICINE
Payer: MEDICARE

## 2021-12-15 DIAGNOSIS — Z85.42 HISTORY OF ENDOMETRIAL CANCER: ICD-10-CM

## 2021-12-15 DIAGNOSIS — M47.819 SPONDYLOSIS WITHOUT MYELOPATHY: ICD-10-CM

## 2021-12-15 DIAGNOSIS — D53.9 MACROCYTIC ANEMIA: ICD-10-CM

## 2021-12-15 DIAGNOSIS — R07.9 CHEST PAIN: ICD-10-CM

## 2021-12-15 DIAGNOSIS — I48.91 ATRIAL FIBRILLATION: ICD-10-CM

## 2021-12-15 DIAGNOSIS — Z92.89 HISTORY OF HYPERBARIC OXYGEN THERAPY: ICD-10-CM

## 2021-12-15 DIAGNOSIS — I48.91 ATRIAL FIBRILLATION WITH RVR: ICD-10-CM

## 2021-12-15 DIAGNOSIS — K92.1 HEMATOCHEZIA: Primary | ICD-10-CM

## 2021-12-15 PROBLEM — R19.5 LOOSE STOOLS: Status: RESOLVED | Noted: 2018-09-08 | Resolved: 2021-12-15

## 2021-12-15 PROBLEM — K62.5 BRBPR (BRIGHT RED BLOOD PER RECTUM): Status: ACTIVE | Noted: 2021-12-15

## 2021-12-15 PROBLEM — L29.9 ITCH: Status: RESOLVED | Noted: 2020-09-04 | Resolved: 2021-12-15

## 2021-12-15 PROBLEM — R10.9 ABDOMINAL PAIN: Status: RESOLVED | Noted: 2019-05-09 | Resolved: 2021-12-15

## 2021-12-15 PROBLEM — R10.30 LOWER ABDOMINAL PAIN: Status: RESOLVED | Noted: 2019-02-20 | Resolved: 2021-12-15

## 2021-12-15 PROBLEM — R07.89 CHEST PAIN, ATYPICAL: Status: RESOLVED | Noted: 2021-08-24 | Resolved: 2021-12-15

## 2021-12-15 PROBLEM — R11.2 NAUSEA & VOMITING: Status: RESOLVED | Noted: 2020-07-10 | Resolved: 2021-12-15

## 2021-12-15 PROBLEM — R06.09 DOE (DYSPNEA ON EXERTION): Status: RESOLVED | Noted: 2021-08-24 | Resolved: 2021-12-15

## 2021-12-15 PROBLEM — R00.1 BRADYCARDIA: Status: RESOLVED | Noted: 2021-08-24 | Resolved: 2021-12-15

## 2021-12-15 LAB
ABO + RH BLD: NORMAL
ALBUMIN SERPL BCP-MCNC: 3.5 G/DL (ref 3.5–5.2)
ALP SERPL-CCNC: 81 U/L (ref 55–135)
ALT SERPL W/O P-5'-P-CCNC: 10 U/L (ref 10–44)
ANION GAP SERPL CALC-SCNC: 13 MMOL/L (ref 8–16)
ANISOCYTOSIS BLD QL SMEAR: SLIGHT
APTT BLDCRRT: 22.8 SEC (ref 21–32)
AST SERPL-CCNC: 17 U/L (ref 10–40)
BASOPHILS # BLD AUTO: ABNORMAL K/UL (ref 0–0.2)
BASOPHILS NFR BLD: 1 % (ref 0–1.9)
BILIRUB SERPL-MCNC: 0.6 MG/DL (ref 0.1–1)
BLD GP AB SCN CELLS X3 SERPL QL: NORMAL
BUN SERPL-MCNC: 6 MG/DL (ref 8–23)
CALCIUM SERPL-MCNC: 9.2 MG/DL (ref 8.7–10.5)
CHLORIDE SERPL-SCNC: 101 MMOL/L (ref 95–110)
CO2 SERPL-SCNC: 21 MMOL/L (ref 23–29)
CREAT SERPL-MCNC: 0.8 MG/DL (ref 0.5–1.4)
CTP QC/QA: YES
DACRYOCYTES BLD QL SMEAR: ABNORMAL
DIFFERENTIAL METHOD: ABNORMAL
EOSINOPHIL # BLD AUTO: ABNORMAL K/UL (ref 0–0.5)
EOSINOPHIL NFR BLD: 1 % (ref 0–8)
ERYTHROCYTE [DISTWIDTH] IN BLOOD BY AUTOMATED COUNT: 15.1 % (ref 11.5–14.5)
EST. GFR  (AFRICAN AMERICAN): >60 ML/MIN/1.73 M^2
EST. GFR  (NON AFRICAN AMERICAN): >60 ML/MIN/1.73 M^2
FERRITIN SERPL-MCNC: 802 NG/ML (ref 20–300)
GLUCOSE SERPL-MCNC: 139 MG/DL (ref 70–110)
HCT VFR BLD AUTO: 27.8 % (ref 37–48.5)
HGB BLD-MCNC: 8.9 G/DL (ref 12–16)
HYPOCHROMIA BLD QL SMEAR: ABNORMAL
IMM GRANULOCYTES # BLD AUTO: ABNORMAL K/UL (ref 0–0.04)
IMM GRANULOCYTES NFR BLD AUTO: ABNORMAL % (ref 0–0.5)
INR PPP: 1.1 (ref 0.8–1.2)
IRON SERPL-MCNC: 15 UG/DL (ref 30–160)
LYMPHOCYTES # BLD AUTO: ABNORMAL K/UL (ref 1–4.8)
LYMPHOCYTES NFR BLD: 8 % (ref 18–48)
MCH RBC QN AUTO: 32.6 PG (ref 27–31)
MCHC RBC AUTO-ENTMCNC: 32 G/DL (ref 32–36)
MCV RBC AUTO: 102 FL (ref 82–98)
METAMYELOCYTES NFR BLD MANUAL: 1 %
MONOCYTES # BLD AUTO: ABNORMAL K/UL (ref 0.3–1)
MONOCYTES NFR BLD: 2 % (ref 4–15)
MYELOCYTES NFR BLD MANUAL: 1 %
NEUTROPHILS NFR BLD: 86 % (ref 38–73)
NRBC BLD-RTO: 0 /100 WBC
OVALOCYTES BLD QL SMEAR: ABNORMAL
PLATELET # BLD AUTO: 150 K/UL (ref 150–450)
PLATELET BLD QL SMEAR: ABNORMAL
PMV BLD AUTO: 10 FL (ref 9.2–12.9)
POIKILOCYTOSIS BLD QL SMEAR: SLIGHT
POLYCHROMASIA BLD QL SMEAR: ABNORMAL
POTASSIUM SERPL-SCNC: 3.5 MMOL/L (ref 3.5–5.1)
PROT SERPL-MCNC: 7.4 G/DL (ref 6–8.4)
PROTHROMBIN TIME: 11.7 SEC (ref 9–12.5)
RBC # BLD AUTO: 2.73 M/UL (ref 4–5.4)
SARS-COV-2 RDRP RESP QL NAA+PROBE: NEGATIVE
SATURATED IRON: 5 % (ref 20–50)
SODIUM SERPL-SCNC: 135 MMOL/L (ref 136–145)
TOTAL IRON BINDING CAPACITY: 277 UG/DL (ref 250–450)
TRANSFERRIN SERPL-MCNC: 187 MG/DL (ref 200–375)
TROPONIN I SERPL DL<=0.01 NG/ML-MCNC: 0.03 NG/ML (ref 0–0.03)
WBC # BLD AUTO: 12.07 K/UL (ref 3.9–12.7)

## 2021-12-15 PROCEDURE — 82728 ASSAY OF FERRITIN: CPT | Performed by: PHYSICIAN ASSISTANT

## 2021-12-15 PROCEDURE — 85027 COMPLETE CBC AUTOMATED: CPT | Performed by: PHYSICIAN ASSISTANT

## 2021-12-15 PROCEDURE — 85730 THROMBOPLASTIN TIME PARTIAL: CPT | Performed by: PHYSICIAN ASSISTANT

## 2021-12-15 PROCEDURE — 86900 BLOOD TYPING SEROLOGIC ABO: CPT | Performed by: PHYSICIAN ASSISTANT

## 2021-12-15 PROCEDURE — 93010 ELECTROCARDIOGRAM REPORT: CPT | Mod: ,,, | Performed by: INTERNAL MEDICINE

## 2021-12-15 PROCEDURE — 85007 BL SMEAR W/DIFF WBC COUNT: CPT | Performed by: PHYSICIAN ASSISTANT

## 2021-12-15 PROCEDURE — 99285 PR EMERGENCY DEPT VISIT,LEVEL V: ICD-10-PCS | Mod: CR,CS,, | Performed by: PHYSICIAN ASSISTANT

## 2021-12-15 PROCEDURE — 96375 TX/PRO/DX INJ NEW DRUG ADDON: CPT

## 2021-12-15 PROCEDURE — 99285 EMERGENCY DEPT VISIT HI MDM: CPT | Mod: 25

## 2021-12-15 PROCEDURE — 93010 EKG 12-LEAD: ICD-10-PCS | Mod: ,,, | Performed by: INTERNAL MEDICINE

## 2021-12-15 PROCEDURE — 84484 ASSAY OF TROPONIN QUANT: CPT | Performed by: PHYSICIAN ASSISTANT

## 2021-12-15 PROCEDURE — 80053 COMPREHEN METABOLIC PANEL: CPT | Performed by: PHYSICIAN ASSISTANT

## 2021-12-15 PROCEDURE — G0378 HOSPITAL OBSERVATION PER HR: HCPCS

## 2021-12-15 PROCEDURE — 99220 PR INITIAL OBSERVATION CARE,LEVL III: CPT | Mod: ,,, | Performed by: INTERNAL MEDICINE

## 2021-12-15 PROCEDURE — 63600175 PHARM REV CODE 636 W HCPCS: Performed by: PHYSICIAN ASSISTANT

## 2021-12-15 PROCEDURE — 85610 PROTHROMBIN TIME: CPT | Performed by: PHYSICIAN ASSISTANT

## 2021-12-15 PROCEDURE — 96374 THER/PROPH/DIAG INJ IV PUSH: CPT

## 2021-12-15 PROCEDURE — 99285 EMERGENCY DEPT VISIT HI MDM: CPT | Mod: CR,CS,, | Performed by: PHYSICIAN ASSISTANT

## 2021-12-15 PROCEDURE — 93005 ELECTROCARDIOGRAM TRACING: CPT

## 2021-12-15 PROCEDURE — 84466 ASSAY OF TRANSFERRIN: CPT | Performed by: PHYSICIAN ASSISTANT

## 2021-12-15 PROCEDURE — 25000003 PHARM REV CODE 250: Performed by: PHYSICIAN ASSISTANT

## 2021-12-15 PROCEDURE — 99220 PR INITIAL OBSERVATION CARE,LEVL III: ICD-10-PCS | Mod: ,,, | Performed by: INTERNAL MEDICINE

## 2021-12-15 PROCEDURE — U0002 COVID-19 LAB TEST NON-CDC: HCPCS | Performed by: HOSPITALIST

## 2021-12-15 RX ORDER — NALOXONE HCL 0.4 MG/ML
0.02 VIAL (ML) INJECTION
Status: DISCONTINUED | OUTPATIENT
Start: 2021-12-15 | End: 2021-12-18 | Stop reason: HOSPADM

## 2021-12-15 RX ORDER — TALC
6 POWDER (GRAM) TOPICAL NIGHTLY PRN
Status: DISCONTINUED | OUTPATIENT
Start: 2021-12-15 | End: 2021-12-18 | Stop reason: HOSPADM

## 2021-12-15 RX ORDER — MORPHINE SULFATE 4 MG/ML
4 INJECTION, SOLUTION INTRAMUSCULAR; INTRAVENOUS
Status: COMPLETED | OUTPATIENT
Start: 2021-12-15 | End: 2021-12-15

## 2021-12-15 RX ORDER — LIDOCAINE 50 MG/G
1 PATCH TOPICAL
Status: COMPLETED | OUTPATIENT
Start: 2021-12-15 | End: 2021-12-16

## 2021-12-15 RX ORDER — OXYCODONE HYDROCHLORIDE 5 MG/1
5 TABLET ORAL EVERY 6 HOURS PRN
Status: DISCONTINUED | OUTPATIENT
Start: 2021-12-15 | End: 2021-12-16

## 2021-12-15 RX ORDER — PROCHLORPERAZINE EDISYLATE 5 MG/ML
5 INJECTION INTRAMUSCULAR; INTRAVENOUS EVERY 6 HOURS PRN
Status: DISCONTINUED | OUTPATIENT
Start: 2021-12-15 | End: 2021-12-18 | Stop reason: HOSPADM

## 2021-12-15 RX ORDER — IBUPROFEN 200 MG
16 TABLET ORAL
Status: DISCONTINUED | OUTPATIENT
Start: 2021-12-15 | End: 2021-12-18 | Stop reason: HOSPADM

## 2021-12-15 RX ORDER — SODIUM CHLORIDE 0.9 % (FLUSH) 0.9 %
10 SYRINGE (ML) INJECTION
Status: DISCONTINUED | OUTPATIENT
Start: 2021-12-15 | End: 2021-12-18 | Stop reason: HOSPADM

## 2021-12-15 RX ORDER — ONDANSETRON 2 MG/ML
4 INJECTION INTRAMUSCULAR; INTRAVENOUS EVERY 8 HOURS PRN
Status: DISCONTINUED | OUTPATIENT
Start: 2021-12-15 | End: 2021-12-18 | Stop reason: HOSPADM

## 2021-12-15 RX ORDER — ORPHENADRINE CITRATE 30 MG/ML
30 INJECTION INTRAMUSCULAR; INTRAVENOUS
Status: COMPLETED | OUTPATIENT
Start: 2021-12-15 | End: 2021-12-15

## 2021-12-15 RX ORDER — KETOROLAC TROMETHAMINE 30 MG/ML
10 INJECTION, SOLUTION INTRAMUSCULAR; INTRAVENOUS
Status: COMPLETED | OUTPATIENT
Start: 2021-12-15 | End: 2021-12-15

## 2021-12-15 RX ORDER — ONDANSETRON 2 MG/ML
4 INJECTION INTRAMUSCULAR; INTRAVENOUS
Status: COMPLETED | OUTPATIENT
Start: 2021-12-15 | End: 2021-12-15

## 2021-12-15 RX ORDER — ACETAMINOPHEN 325 MG/1
650 TABLET ORAL EVERY 4 HOURS PRN
Status: DISCONTINUED | OUTPATIENT
Start: 2021-12-15 | End: 2021-12-18 | Stop reason: HOSPADM

## 2021-12-15 RX ORDER — IBUPROFEN 200 MG
24 TABLET ORAL
Status: DISCONTINUED | OUTPATIENT
Start: 2021-12-15 | End: 2021-12-18 | Stop reason: HOSPADM

## 2021-12-15 RX ORDER — IPRATROPIUM BROMIDE AND ALBUTEROL SULFATE 2.5; .5 MG/3ML; MG/3ML
3 SOLUTION RESPIRATORY (INHALATION) EVERY 6 HOURS PRN
Status: DISCONTINUED | OUTPATIENT
Start: 2021-12-15 | End: 2021-12-18 | Stop reason: HOSPADM

## 2021-12-15 RX ADMIN — LIDOCAINE 5% 1 PATCH: 700 PATCH TOPICAL at 06:12

## 2021-12-15 RX ADMIN — SODIUM CHLORIDE 500 ML: 0.9 INJECTION, SOLUTION INTRAVENOUS at 07:12

## 2021-12-15 RX ADMIN — MORPHINE SULFATE 4 MG: 4 INJECTION INTRAVENOUS at 06:12

## 2021-12-15 RX ADMIN — KETOROLAC TROMETHAMINE 10 MG: 30 INJECTION, SOLUTION INTRAMUSCULAR; INTRAVENOUS at 04:12

## 2021-12-15 RX ADMIN — ORPHENADRINE CITRATE 30 MG: 30 INJECTION INTRAMUSCULAR; INTRAVENOUS at 04:12

## 2021-12-15 RX ADMIN — ONDANSETRON 4 MG: 2 INJECTION INTRAMUSCULAR; INTRAVENOUS at 04:12

## 2021-12-16 ENCOUNTER — TELEPHONE (OUTPATIENT)
Dept: NEUROSURGERY | Facility: CLINIC | Age: 66
End: 2021-12-16
Payer: MEDICARE

## 2021-12-16 LAB
ANISOCYTOSIS BLD QL SMEAR: SLIGHT
BASOPHILS NFR BLD: 0 % (ref 0–1.9)
DIFFERENTIAL METHOD: ABNORMAL
EOSINOPHIL NFR BLD: 1 % (ref 0–8)
ERYTHROCYTE [DISTWIDTH] IN BLOOD BY AUTOMATED COUNT: 15.5 % (ref 11.5–14.5)
FOLATE SERPL-MCNC: 8.3 NG/ML (ref 4–24)
HCT VFR BLD AUTO: 25.4 % (ref 37–48.5)
HGB BLD-MCNC: 7.9 G/DL (ref 12–16)
IMM GRANULOCYTES # BLD AUTO: ABNORMAL K/UL (ref 0–0.04)
IMM GRANULOCYTES NFR BLD AUTO: ABNORMAL % (ref 0–0.5)
LYMPHOCYTES NFR BLD: 12 % (ref 18–48)
MCH RBC QN AUTO: 32.4 PG (ref 27–31)
MCHC RBC AUTO-ENTMCNC: 31.1 G/DL (ref 32–36)
MCV RBC AUTO: 104 FL (ref 82–98)
MONOCYTES NFR BLD: 3 % (ref 4–15)
MYELOCYTES NFR BLD MANUAL: 1 %
NEUTROPHILS NFR BLD: 82 % (ref 38–73)
NEUTS BAND NFR BLD MANUAL: 1 %
NRBC BLD-RTO: 0 /100 WBC
PLATELET # BLD AUTO: 132 K/UL (ref 150–450)
PLATELET BLD QL SMEAR: ABNORMAL
PMV BLD AUTO: 10.7 FL (ref 9.2–12.9)
RBC # BLD AUTO: 2.44 M/UL (ref 4–5.4)
TOXIC GRANULES BLD QL SMEAR: PRESENT
VIT B12 SERPL-MCNC: 362 PG/ML (ref 210–950)
WBC # BLD AUTO: 10.22 K/UL (ref 3.9–12.7)

## 2021-12-16 PROCEDURE — 63600175 PHARM REV CODE 636 W HCPCS: Performed by: INTERNAL MEDICINE

## 2021-12-16 PROCEDURE — 82607 VITAMIN B-12: CPT | Performed by: HOSPITALIST

## 2021-12-16 PROCEDURE — 82746 ASSAY OF FOLIC ACID SERUM: CPT | Performed by: HOSPITALIST

## 2021-12-16 PROCEDURE — 85027 COMPLETE CBC AUTOMATED: CPT | Performed by: HOSPITALIST

## 2021-12-16 PROCEDURE — 25000003 PHARM REV CODE 250: Performed by: HOSPITALIST

## 2021-12-16 PROCEDURE — G0378 HOSPITAL OBSERVATION PER HR: HCPCS

## 2021-12-16 PROCEDURE — 99220 PR INITIAL OBSERVATION CARE,LEVL III: CPT | Mod: ,,, | Performed by: INTERNAL MEDICINE

## 2021-12-16 PROCEDURE — 99220 PR INITIAL OBSERVATION CARE,LEVL III: ICD-10-PCS | Mod: ,,, | Performed by: INTERNAL MEDICINE

## 2021-12-16 PROCEDURE — 36415 COLL VENOUS BLD VENIPUNCTURE: CPT | Performed by: HOSPITALIST

## 2021-12-16 PROCEDURE — 85007 BL SMEAR W/DIFF WBC COUNT: CPT | Performed by: HOSPITALIST

## 2021-12-16 RX ORDER — KETOROLAC TROMETHAMINE 15 MG/ML
15 INJECTION, SOLUTION INTRAMUSCULAR; INTRAVENOUS EVERY 8 HOURS
Status: DISCONTINUED | OUTPATIENT
Start: 2021-12-16 | End: 2021-12-17

## 2021-12-16 RX ORDER — MORPHINE SULFATE 2 MG/ML
2 INJECTION, SOLUTION INTRAMUSCULAR; INTRAVENOUS ONCE
Status: COMPLETED | OUTPATIENT
Start: 2021-12-16 | End: 2021-12-16

## 2021-12-16 RX ORDER — HYDROCODONE BITARTRATE AND ACETAMINOPHEN 10; 325 MG/1; MG/1
1 TABLET ORAL EVERY 6 HOURS PRN
Status: DISCONTINUED | OUTPATIENT
Start: 2021-12-16 | End: 2021-12-18 | Stop reason: HOSPADM

## 2021-12-16 RX ORDER — MORPHINE SULFATE 2 MG/ML
2 INJECTION, SOLUTION INTRAMUSCULAR; INTRAVENOUS EVERY 6 HOURS PRN
Status: DISCONTINUED | OUTPATIENT
Start: 2021-12-16 | End: 2021-12-18 | Stop reason: HOSPADM

## 2021-12-16 RX ADMIN — HYDROCODONE BITARTRATE AND ACETAMINOPHEN 1 TABLET: 10; 325 TABLET ORAL at 05:12

## 2021-12-16 RX ADMIN — KETOROLAC TROMETHAMINE 15 MG: 15 INJECTION, SOLUTION INTRAMUSCULAR; INTRAVENOUS at 10:12

## 2021-12-16 RX ADMIN — HYDROCODONE BITARTRATE AND ACETAMINOPHEN 1 TABLET: 10; 325 TABLET ORAL at 12:12

## 2021-12-16 RX ADMIN — MORPHINE SULFATE 2 MG: 2 INJECTION, SOLUTION INTRAMUSCULAR; INTRAVENOUS at 08:12

## 2021-12-16 RX ADMIN — MORPHINE SULFATE 2 MG: 2 INJECTION, SOLUTION INTRAMUSCULAR; INTRAVENOUS at 07:12

## 2021-12-17 PROBLEM — I48.91 ATRIAL FIBRILLATION WITH RVR: Status: ACTIVE | Noted: 2021-12-17

## 2021-12-17 LAB
ANISOCYTOSIS BLD QL SMEAR: SLIGHT
ASCENDING AORTA: 2.7 CM
AV INDEX (PROSTH): 0.8
AV MEAN GRADIENT: 5 MMHG
AV PEAK GRADIENT: 9 MMHG
AV VALVE AREA: 2.51 CM2
AV VELOCITY RATIO: 1
BASO STIPL BLD QL SMEAR: ABNORMAL
BASOPHILS # BLD AUTO: ABNORMAL K/UL (ref 0–0.2)
BASOPHILS NFR BLD: 1 % (ref 0–1.9)
BSA FOR ECHO PROCEDURE: 1.58 M2
CV ECHO LV RWT: 0.34 CM
DACRYOCYTES BLD QL SMEAR: ABNORMAL
DIFFERENTIAL METHOD: ABNORMAL
DOP CALC AO PEAK VEL: 1.47 M/S
DOP CALC AO VTI: 33.69 CM
DOP CALC LVOT AREA: 3.1 CM2
DOP CALC LVOT DIAMETER: 2 CM
DOP CALC LVOT PEAK VEL: 1.47 M/S
DOP CALC LVOT STROKE VOLUME: 84.56 CM3
DOP CALCLVOT PEAK VEL VTI: 26.93 CM
E WAVE DECELERATION TIME: 164 MSEC
E/A RATIO: 1.46
E/E' RATIO: 11.67 M/S
ECHO LV POSTERIOR WALL: 0.71 CM (ref 0.6–1.1)
EJECTION FRACTION: 58 %
EOSINOPHIL # BLD AUTO: ABNORMAL K/UL (ref 0–0.5)
EOSINOPHIL NFR BLD: 3 % (ref 0–8)
ERYTHROCYTE [DISTWIDTH] IN BLOOD BY AUTOMATED COUNT: 15.2 % (ref 11.5–14.5)
FRACTIONAL SHORTENING: 30 % (ref 28–44)
GIANT PLATELETS BLD QL SMEAR: PRESENT
HCT VFR BLD AUTO: 24.9 % (ref 37–48.5)
HGB BLD-MCNC: 8 G/DL (ref 12–16)
IMM GRANULOCYTES # BLD AUTO: ABNORMAL K/UL (ref 0–0.04)
IMM GRANULOCYTES NFR BLD AUTO: ABNORMAL % (ref 0–0.5)
INTERVENTRICULAR SEPTUM: 0.73 CM (ref 0.6–1.1)
LA MAJOR: 5.38 CM
LA MINOR: 5.27 CM
LA WIDTH: 3.88 CM
LEFT ATRIUM SIZE: 2.76 CM
LEFT ATRIUM VOLUME INDEX MOD: 41.8 ML/M2
LEFT ATRIUM VOLUME INDEX: 31.3 ML/M2
LEFT ATRIUM VOLUME MOD: 64.81 CM3
LEFT ATRIUM VOLUME: 48.47 CM3
LEFT INTERNAL DIMENSION IN SYSTOLE: 2.89 CM (ref 2.1–4)
LEFT VENTRICLE DIASTOLIC VOLUME INDEX: 49.27 ML/M2
LEFT VENTRICLE DIASTOLIC VOLUME: 76.37 ML
LEFT VENTRICLE MASS INDEX: 56 G/M2
LEFT VENTRICLE SYSTOLIC VOLUME INDEX: 20.6 ML/M2
LEFT VENTRICLE SYSTOLIC VOLUME: 31.89 ML
LEFT VENTRICULAR INTERNAL DIMENSION IN DIASTOLE: 4.15 CM (ref 3.5–6)
LEFT VENTRICULAR MASS: 86.47 G
LV LATERAL E/E' RATIO: 11.67 M/S
LV SEPTAL E/E' RATIO: 11.67 M/S
LYMPHOCYTES # BLD AUTO: ABNORMAL K/UL (ref 1–4.8)
LYMPHOCYTES NFR BLD: 8 % (ref 18–48)
MCH RBC QN AUTO: 32.7 PG (ref 27–31)
MCHC RBC AUTO-ENTMCNC: 32.1 G/DL (ref 32–36)
MCV RBC AUTO: 102 FL (ref 82–98)
METAMYELOCYTES NFR BLD MANUAL: 4 %
MONOCYTES # BLD AUTO: ABNORMAL K/UL (ref 0.3–1)
MONOCYTES NFR BLD: 5 % (ref 4–15)
MV A" WAVE DURATION": 8.75 MSEC
MV PEAK A VEL: 0.72 M/S
MV PEAK E VEL: 1.05 M/S
MV STENOSIS PRESSURE HALF TIME: 47.56 MS
MV VALVE AREA P 1/2 METHOD: 4.63 CM2
MYELOCYTES NFR BLD MANUAL: 2 %
NEUTROPHILS NFR BLD: 69 % (ref 38–73)
NEUTS BAND NFR BLD MANUAL: 8 %
NRBC BLD-RTO: 0 /100 WBC
OVALOCYTES BLD QL SMEAR: ABNORMAL
PISA MRMAX VEL: 0.05 M/S
PISA TR MAX VEL: 2.29 M/S
PLATELET # BLD AUTO: 136 K/UL (ref 150–450)
PLATELET BLD QL SMEAR: ABNORMAL
PMV BLD AUTO: 10.1 FL (ref 9.2–12.9)
POIKILOCYTOSIS BLD QL SMEAR: SLIGHT
POLYCHROMASIA BLD QL SMEAR: ABNORMAL
PULM VEIN S/D RATIO: 1.11
PV PEAK D VEL: 0.61 M/S
PV PEAK S VEL: 0.68 M/S
RA MAJOR: 4.1 CM
RA PRESSURE: 15 MMHG
RA WIDTH: 2.85 CM
RBC # BLD AUTO: 2.45 M/UL (ref 4–5.4)
RIGHT VENTRICULAR END-DIASTOLIC DIMENSION: 3.05 CM
SINUS: 2.7 CM
STJ: 2.49 CM
TDI LATERAL: 0.09 M/S
TDI SEPTAL: 0.09 M/S
TDI: 0.09 M/S
TR MAX PG: 21 MMHG
TRICUSPID ANNULAR PLANE SYSTOLIC EXCURSION: 2.24 CM
TV REST PULMONARY ARTERY PRESSURE: 36 MMHG
WBC # BLD AUTO: 8.12 K/UL (ref 3.9–12.7)

## 2021-12-17 PROCEDURE — 25000003 PHARM REV CODE 250: Performed by: STUDENT IN AN ORGANIZED HEALTH CARE EDUCATION/TRAINING PROGRAM

## 2021-12-17 PROCEDURE — 85027 COMPLETE CBC AUTOMATED: CPT | Performed by: HOSPITALIST

## 2021-12-17 PROCEDURE — 25000003 PHARM REV CODE 250

## 2021-12-17 PROCEDURE — 36415 COLL VENOUS BLD VENIPUNCTURE: CPT | Performed by: HOSPITALIST

## 2021-12-17 PROCEDURE — 20600001 HC STEP DOWN PRIVATE ROOM

## 2021-12-17 PROCEDURE — 85007 BL SMEAR W/DIFF WBC COUNT: CPT | Performed by: HOSPITALIST

## 2021-12-17 PROCEDURE — 99233 SBSQ HOSP IP/OBS HIGH 50: CPT | Mod: ,,, | Performed by: STUDENT IN AN ORGANIZED HEALTH CARE EDUCATION/TRAINING PROGRAM

## 2021-12-17 PROCEDURE — 25000003 PHARM REV CODE 250: Performed by: HOSPITALIST

## 2021-12-17 PROCEDURE — 99223 1ST HOSP IP/OBS HIGH 75: CPT | Mod: ,,, | Performed by: INTERNAL MEDICINE

## 2021-12-17 PROCEDURE — 99233 PR SUBSEQUENT HOSPITAL CARE,LEVL III: ICD-10-PCS | Mod: ,,, | Performed by: STUDENT IN AN ORGANIZED HEALTH CARE EDUCATION/TRAINING PROGRAM

## 2021-12-17 PROCEDURE — 63600175 PHARM REV CODE 636 W HCPCS: Performed by: HOSPITALIST

## 2021-12-17 PROCEDURE — 99223 PR INITIAL HOSPITAL CARE,LEVL III: ICD-10-PCS | Mod: ,,, | Performed by: INTERNAL MEDICINE

## 2021-12-17 RX ORDER — METOPROLOL TARTRATE 1 MG/ML
5 INJECTION, SOLUTION INTRAVENOUS EVERY 5 MIN PRN
Status: COMPLETED | OUTPATIENT
Start: 2021-12-17 | End: 2021-12-17

## 2021-12-17 RX ORDER — METOPROLOL TARTRATE 25 MG/1
25 TABLET, FILM COATED ORAL 4 TIMES DAILY
Status: DISCONTINUED | OUTPATIENT
Start: 2021-12-17 | End: 2021-12-17

## 2021-12-17 RX ORDER — HYDROXYZINE HYDROCHLORIDE 25 MG/1
25 TABLET, FILM COATED ORAL EVERY 4 HOURS PRN
Status: DISCONTINUED | OUTPATIENT
Start: 2021-12-17 | End: 2021-12-18 | Stop reason: HOSPADM

## 2021-12-17 RX ORDER — METOPROLOL TARTRATE 25 MG/1
12.5 TABLET ORAL 2 TIMES DAILY
Status: DISCONTINUED | OUTPATIENT
Start: 2021-12-17 | End: 2021-12-18

## 2021-12-17 RX ORDER — METOPROLOL TARTRATE 25 MG/1
25 TABLET, FILM COATED ORAL ONCE
Status: DISCONTINUED | OUTPATIENT
Start: 2021-12-17 | End: 2021-12-18 | Stop reason: HOSPADM

## 2021-12-17 RX ADMIN — METOPROLOL TARTRATE 25 MG: 25 TABLET, FILM COATED ORAL at 08:12

## 2021-12-17 RX ADMIN — ONDANSETRON 4 MG: 2 INJECTION INTRAMUSCULAR; INTRAVENOUS at 03:12

## 2021-12-17 RX ADMIN — METOROPROLOL TARTRATE 5 MG: 5 INJECTION, SOLUTION INTRAVENOUS at 03:12

## 2021-12-17 RX ADMIN — METOROPROLOL TARTRATE 5 MG: 5 INJECTION, SOLUTION INTRAVENOUS at 01:12

## 2021-12-17 RX ADMIN — SODIUM CHLORIDE 500 ML: 0.9 INJECTION, SOLUTION INTRAVENOUS at 02:12

## 2021-12-17 RX ADMIN — HYDROXYZINE HYDROCHLORIDE 25 MG: 25 TABLET ORAL at 05:12

## 2021-12-17 RX ADMIN — METOPROLOL TARTRATE 12.5 MG: 25 TABLET, FILM COATED ORAL at 08:12

## 2021-12-17 RX ADMIN — METOPROLOL TARTRATE 25 MG: 25 TABLET, FILM COATED ORAL at 12:12

## 2021-12-17 RX ADMIN — METOPROLOL TARTRATE 25 MG: 25 TABLET, FILM COATED ORAL at 04:12

## 2021-12-18 VITALS
TEMPERATURE: 99 F | SYSTOLIC BLOOD PRESSURE: 130 MMHG | BODY MASS INDEX: 25.52 KG/M2 | WEIGHT: 130 LBS | HEIGHT: 60 IN | RESPIRATION RATE: 18 BRPM | DIASTOLIC BLOOD PRESSURE: 60 MMHG | HEART RATE: 88 BPM | OXYGEN SATURATION: 96 %

## 2021-12-18 LAB
BASOPHILS # BLD AUTO: ABNORMAL K/UL (ref 0–0.2)
BASOPHILS NFR BLD: 1 % (ref 0–1.9)
DIFFERENTIAL METHOD: ABNORMAL
EOSINOPHIL # BLD AUTO: ABNORMAL K/UL (ref 0–0.5)
EOSINOPHIL NFR BLD: 4 % (ref 0–8)
ERYTHROCYTE [DISTWIDTH] IN BLOOD BY AUTOMATED COUNT: 15.2 % (ref 11.5–14.5)
HCT VFR BLD AUTO: 24.7 % (ref 37–48.5)
HGB BLD-MCNC: 7.7 G/DL (ref 12–16)
IMM GRANULOCYTES # BLD AUTO: ABNORMAL K/UL (ref 0–0.04)
IMM GRANULOCYTES NFR BLD AUTO: ABNORMAL % (ref 0–0.5)
LYMPHOCYTES # BLD AUTO: ABNORMAL K/UL (ref 1–4.8)
LYMPHOCYTES NFR BLD: 4 % (ref 18–48)
MCH RBC QN AUTO: 32 PG (ref 27–31)
MCHC RBC AUTO-ENTMCNC: 31.2 G/DL (ref 32–36)
MCV RBC AUTO: 103 FL (ref 82–98)
METAMYELOCYTES NFR BLD MANUAL: 3 %
MONOCYTES # BLD AUTO: ABNORMAL K/UL (ref 0.3–1)
MONOCYTES NFR BLD: 2 % (ref 4–15)
MYELOCYTES NFR BLD MANUAL: 4 %
NEUTROPHILS NFR BLD: 79 % (ref 38–73)
NEUTS BAND NFR BLD MANUAL: 3 %
NRBC BLD-RTO: 0 /100 WBC
PLATELET # BLD AUTO: 142 K/UL (ref 150–450)
PLATELET BLD QL SMEAR: ABNORMAL
PMV BLD AUTO: 10.6 FL (ref 9.2–12.9)
RBC # BLD AUTO: 2.41 M/UL (ref 4–5.4)
WBC # BLD AUTO: 7.53 K/UL (ref 3.9–12.7)

## 2021-12-18 PROCEDURE — 85027 COMPLETE CBC AUTOMATED: CPT | Performed by: HOSPITALIST

## 2021-12-18 PROCEDURE — 99239 HOSP IP/OBS DSCHRG MGMT >30: CPT | Mod: ,,, | Performed by: STUDENT IN AN ORGANIZED HEALTH CARE EDUCATION/TRAINING PROGRAM

## 2021-12-18 PROCEDURE — 85007 BL SMEAR W/DIFF WBC COUNT: CPT | Performed by: HOSPITALIST

## 2021-12-18 PROCEDURE — 36415 COLL VENOUS BLD VENIPUNCTURE: CPT | Performed by: HOSPITALIST

## 2021-12-18 PROCEDURE — 99239 PR HOSPITAL DISCHARGE DAY,>30 MIN: ICD-10-PCS | Mod: ,,, | Performed by: STUDENT IN AN ORGANIZED HEALTH CARE EDUCATION/TRAINING PROGRAM

## 2021-12-18 PROCEDURE — 25000003 PHARM REV CODE 250: Performed by: STUDENT IN AN ORGANIZED HEALTH CARE EDUCATION/TRAINING PROGRAM

## 2021-12-18 PROCEDURE — 25000003 PHARM REV CODE 250

## 2021-12-18 RX ORDER — METOPROLOL TARTRATE 25 MG/1
12.5 TABLET, FILM COATED ORAL 2 TIMES DAILY
Qty: 60 TABLET | Refills: 0 | Status: SHIPPED | OUTPATIENT
Start: 2021-12-18 | End: 2022-01-01

## 2021-12-18 RX ORDER — METOPROLOL TARTRATE 25 MG/1
12.5 TABLET ORAL 2 TIMES DAILY
Status: DISCONTINUED | OUTPATIENT
Start: 2021-12-18 | End: 2021-12-18 | Stop reason: HOSPADM

## 2021-12-18 RX ORDER — METOPROLOL TARTRATE 25 MG/1
12.5 TABLET, FILM COATED ORAL 2 TIMES DAILY
Qty: 60 TABLET | Refills: 0 | Status: SHIPPED | OUTPATIENT
Start: 2021-12-18 | End: 2021-12-18

## 2021-12-18 RX ADMIN — METOPROLOL TARTRATE 12.5 MG: 25 TABLET, FILM COATED ORAL at 09:12

## 2021-12-18 RX ADMIN — METOPROLOL TARTRATE 12.5 MG: 25 TABLET, FILM COATED ORAL at 06:12

## 2021-12-19 ENCOUNTER — TELEPHONE (OUTPATIENT)
Dept: CARDIOLOGY | Facility: CLINIC | Age: 66
End: 2021-12-19
Payer: MEDICARE

## 2021-12-19 ENCOUNTER — NURSE TRIAGE (OUTPATIENT)
Dept: ADMINISTRATIVE | Facility: CLINIC | Age: 66
End: 2021-12-19
Payer: MEDICARE

## 2021-12-20 ENCOUNTER — TELEPHONE (OUTPATIENT)
Dept: HEMATOLOGY/ONCOLOGY | Facility: CLINIC | Age: 66
End: 2021-12-20
Payer: MEDICARE

## 2021-12-20 ENCOUNTER — TELEPHONE (OUTPATIENT)
Dept: CARDIOLOGY | Facility: CLINIC | Age: 66
End: 2021-12-20
Payer: MEDICARE

## 2021-12-20 ENCOUNTER — OFFICE VISIT (OUTPATIENT)
Dept: CARDIOLOGY | Facility: CLINIC | Age: 66
End: 2021-12-20
Payer: MEDICARE

## 2021-12-20 VITALS
BODY MASS INDEX: 25.52 KG/M2 | HEIGHT: 60 IN | WEIGHT: 130 LBS | HEART RATE: 96 BPM | SYSTOLIC BLOOD PRESSURE: 118 MMHG | OXYGEN SATURATION: 100 % | DIASTOLIC BLOOD PRESSURE: 74 MMHG

## 2021-12-20 DIAGNOSIS — R07.9 CHEST PAIN, UNSPECIFIED TYPE: ICD-10-CM

## 2021-12-20 DIAGNOSIS — I48.91 ATRIAL FIBRILLATION WITH RVR: Primary | ICD-10-CM

## 2021-12-20 PROCEDURE — 93010 ELECTROCARDIOGRAM REPORT: CPT | Mod: S$PBB,,, | Performed by: INTERNAL MEDICINE

## 2021-12-20 PROCEDURE — 99214 PR OFFICE/OUTPT VISIT, EST, LEVL IV, 30-39 MIN: ICD-10-PCS | Mod: S$PBB,,, | Performed by: INTERNAL MEDICINE

## 2021-12-20 PROCEDURE — 93010 EKG 12-LEAD: ICD-10-PCS | Mod: S$PBB,,, | Performed by: INTERNAL MEDICINE

## 2021-12-20 PROCEDURE — 99214 OFFICE O/P EST MOD 30 MIN: CPT | Mod: S$PBB,,, | Performed by: INTERNAL MEDICINE

## 2021-12-20 PROCEDURE — 93005 ELECTROCARDIOGRAM TRACING: CPT | Mod: PBBFAC | Performed by: INTERNAL MEDICINE

## 2021-12-20 PROCEDURE — 99999 PR PBB SHADOW E&M-EST. PATIENT-LVL III: CPT | Mod: PBBFAC,,, | Performed by: INTERNAL MEDICINE

## 2021-12-20 PROCEDURE — 99999 PR PBB SHADOW E&M-EST. PATIENT-LVL III: ICD-10-PCS | Mod: PBBFAC,,, | Performed by: INTERNAL MEDICINE

## 2021-12-20 PROCEDURE — 99213 OFFICE O/P EST LOW 20 MIN: CPT | Mod: PBBFAC | Performed by: INTERNAL MEDICINE

## 2021-12-27 ENCOUNTER — OFFICE VISIT (OUTPATIENT)
Dept: GASTROENTEROLOGY | Facility: CLINIC | Age: 66
End: 2021-12-27
Payer: MEDICARE

## 2021-12-27 ENCOUNTER — OFFICE VISIT (OUTPATIENT)
Dept: FAMILY MEDICINE | Facility: CLINIC | Age: 66
End: 2021-12-27
Payer: MEDICARE

## 2021-12-27 ENCOUNTER — LAB VISIT (OUTPATIENT)
Dept: LAB | Facility: HOSPITAL | Age: 66
End: 2021-12-27
Attending: INTERNAL MEDICINE
Payer: MEDICARE

## 2021-12-27 ENCOUNTER — TELEPHONE (OUTPATIENT)
Dept: NEUROSURGERY | Facility: CLINIC | Age: 66
End: 2021-12-27
Payer: MEDICARE

## 2021-12-27 VITALS
SYSTOLIC BLOOD PRESSURE: 108 MMHG | BODY MASS INDEX: 25.45 KG/M2 | DIASTOLIC BLOOD PRESSURE: 66 MMHG | WEIGHT: 129.63 LBS | HEART RATE: 76 BPM | HEIGHT: 60 IN

## 2021-12-27 VITALS
WEIGHT: 130.19 LBS | BODY MASS INDEX: 25.56 KG/M2 | OXYGEN SATURATION: 99 % | HEART RATE: 68 BPM | DIASTOLIC BLOOD PRESSURE: 70 MMHG | TEMPERATURE: 98 F | SYSTOLIC BLOOD PRESSURE: 110 MMHG | HEIGHT: 60 IN

## 2021-12-27 DIAGNOSIS — K62.5 BRBPR (BRIGHT RED BLOOD PER RECTUM): Primary | ICD-10-CM

## 2021-12-27 DIAGNOSIS — F33.1 MAJOR DEPRESSIVE DISORDER, RECURRENT EPISODE, MODERATE WITH ANXIOUS DISTRESS: ICD-10-CM

## 2021-12-27 DIAGNOSIS — C77.5 SECONDARY AND UNSPECIFIED MALIGNANT NEOPLASM OF INTRAPELVIC LYMPH NODES: ICD-10-CM

## 2021-12-27 DIAGNOSIS — D62 ACUTE BLOOD LOSS ANEMIA: ICD-10-CM

## 2021-12-27 DIAGNOSIS — T45.1X5A PERIPHERAL NEUROPATHY DUE TO CHEMOTHERAPY: ICD-10-CM

## 2021-12-27 DIAGNOSIS — G62.0 PERIPHERAL NEUROPATHY DUE TO CHEMOTHERAPY: ICD-10-CM

## 2021-12-27 DIAGNOSIS — D62 ACUTE BLOOD LOSS ANEMIA: Primary | ICD-10-CM

## 2021-12-27 DIAGNOSIS — D69.6 THROMBOCYTOPENIA, UNSPECIFIED: ICD-10-CM

## 2021-12-27 DIAGNOSIS — D64.9 CHRONIC ANEMIA: ICD-10-CM

## 2021-12-27 LAB
BASOPHILS # BLD AUTO: ABNORMAL K/UL (ref 0–0.2)
BASOPHILS NFR BLD: 3 % (ref 0–1.9)
DIFFERENTIAL METHOD: ABNORMAL
EOSINOPHIL # BLD AUTO: ABNORMAL K/UL (ref 0–0.5)
EOSINOPHIL NFR BLD: 2 % (ref 0–8)
ERYTHROCYTE [DISTWIDTH] IN BLOOD BY AUTOMATED COUNT: 15.4 % (ref 11.5–14.5)
FERRITIN SERPL-MCNC: 818 NG/ML (ref 20–300)
HCT VFR BLD AUTO: 31 % (ref 37–48.5)
HGB BLD-MCNC: 9.7 G/DL (ref 12–16)
IMM GRANULOCYTES # BLD AUTO: ABNORMAL K/UL (ref 0–0.04)
IMM GRANULOCYTES NFR BLD AUTO: ABNORMAL % (ref 0–0.5)
LYMPHOCYTES # BLD AUTO: ABNORMAL K/UL (ref 1–4.8)
LYMPHOCYTES NFR BLD: 5 % (ref 18–48)
MCH RBC QN AUTO: 32.4 PG (ref 27–31)
MCHC RBC AUTO-ENTMCNC: 31.3 G/DL (ref 32–36)
MCV RBC AUTO: 104 FL (ref 82–98)
MONOCYTES # BLD AUTO: ABNORMAL K/UL (ref 0.3–1)
MONOCYTES NFR BLD: 0 % (ref 4–15)
MYELOCYTES NFR BLD MANUAL: 11 %
NEUTROPHILS NFR BLD: 72 % (ref 38–73)
NEUTS BAND NFR BLD MANUAL: 7 %
NRBC BLD-RTO: 1 /100 WBC
PLATELET # BLD AUTO: 189 K/UL (ref 150–450)
PLATELET BLD QL SMEAR: ABNORMAL
PMV BLD AUTO: 11 FL (ref 9.2–12.9)
RBC # BLD AUTO: 2.99 M/UL (ref 4–5.4)
WBC # BLD AUTO: 5.99 K/UL (ref 3.9–12.7)

## 2021-12-27 PROCEDURE — 99214 OFFICE O/P EST MOD 30 MIN: CPT | Mod: PBBFAC,27 | Performed by: FAMILY MEDICINE

## 2021-12-27 PROCEDURE — 85027 COMPLETE CBC AUTOMATED: CPT | Performed by: INTERNAL MEDICINE

## 2021-12-27 PROCEDURE — 99214 PR OFFICE/OUTPT VISIT, EST, LEVL IV, 30-39 MIN: ICD-10-PCS | Mod: S$PBB,,, | Performed by: INTERNAL MEDICINE

## 2021-12-27 PROCEDURE — 99213 OFFICE O/P EST LOW 20 MIN: CPT | Mod: S$PBB,,, | Performed by: FAMILY MEDICINE

## 2021-12-27 PROCEDURE — 99999 PR PBB SHADOW E&M-EST. PATIENT-LVL III: ICD-10-PCS | Mod: PBBFAC,,, | Performed by: INTERNAL MEDICINE

## 2021-12-27 PROCEDURE — 36415 COLL VENOUS BLD VENIPUNCTURE: CPT | Mod: PO | Performed by: INTERNAL MEDICINE

## 2021-12-27 PROCEDURE — 99213 OFFICE O/P EST LOW 20 MIN: CPT | Mod: PBBFAC,PO | Performed by: INTERNAL MEDICINE

## 2021-12-27 PROCEDURE — 99999 PR PBB SHADOW E&M-EST. PATIENT-LVL IV: ICD-10-PCS | Mod: PBBFAC,,, | Performed by: FAMILY MEDICINE

## 2021-12-27 PROCEDURE — 85007 BL SMEAR W/DIFF WBC COUNT: CPT | Performed by: INTERNAL MEDICINE

## 2021-12-27 PROCEDURE — 82728 ASSAY OF FERRITIN: CPT | Performed by: INTERNAL MEDICINE

## 2021-12-27 PROCEDURE — 99999 PR PBB SHADOW E&M-EST. PATIENT-LVL III: CPT | Mod: PBBFAC,,, | Performed by: INTERNAL MEDICINE

## 2021-12-27 PROCEDURE — 99214 OFFICE O/P EST MOD 30 MIN: CPT | Mod: S$PBB,,, | Performed by: INTERNAL MEDICINE

## 2021-12-27 PROCEDURE — 99999 PR PBB SHADOW E&M-EST. PATIENT-LVL IV: CPT | Mod: PBBFAC,,, | Performed by: FAMILY MEDICINE

## 2021-12-27 PROCEDURE — 99213 PR OFFICE/OUTPT VISIT, EST, LEVL III, 20-29 MIN: ICD-10-PCS | Mod: S$PBB,,, | Performed by: FAMILY MEDICINE

## 2022-01-01 ENCOUNTER — LAB VISIT (OUTPATIENT)
Dept: LAB | Facility: HOSPITAL | Age: 67
End: 2022-01-01
Attending: INTERNAL MEDICINE
Payer: MEDICARE

## 2022-01-01 ENCOUNTER — HOSPITAL ENCOUNTER (OUTPATIENT)
Dept: RADIOLOGY | Facility: HOSPITAL | Age: 67
Discharge: HOME | End: 2022-08-01
Attending: INTERNAL MEDICINE
Payer: MEDICARE

## 2022-01-01 ENCOUNTER — SPECIALTY PHARMACY (OUTPATIENT)
Dept: PHARMACY | Facility: CLINIC | Age: 67
End: 2022-01-01

## 2022-01-01 ENCOUNTER — HOSPITAL ENCOUNTER (OUTPATIENT)
Dept: RADIOLOGY | Facility: HOSPITAL | Age: 67
Discharge: HOME | End: 2022-08-12
Attending: INTERNAL MEDICINE
Payer: MEDICARE

## 2022-01-01 ENCOUNTER — PATIENT MESSAGE (OUTPATIENT)
Dept: HEMATOLOGY/ONCOLOGY | Facility: CLINIC | Age: 67
End: 2022-01-01
Payer: MEDICARE

## 2022-01-01 ENCOUNTER — TELEPHONE (OUTPATIENT)
Dept: HEMATOLOGY/ONCOLOGY | Facility: CLINIC | Age: 67
End: 2022-01-01
Payer: MEDICARE

## 2022-01-01 ENCOUNTER — TELEPHONE (OUTPATIENT)
Dept: CARDIOLOGY | Facility: CLINIC | Age: 67
End: 2022-01-01
Payer: MEDICARE

## 2022-01-01 ENCOUNTER — TELEPHONE (OUTPATIENT)
Dept: SURGERY | Facility: CLINIC | Age: 67
End: 2022-01-01
Payer: MEDICARE

## 2022-01-01 ENCOUNTER — TELEPHONE (OUTPATIENT)
Dept: FAMILY MEDICINE | Facility: CLINIC | Age: 67
End: 2022-01-01
Payer: MEDICARE

## 2022-01-01 ENCOUNTER — HOSPITAL ENCOUNTER (INPATIENT)
Facility: HOSPITAL | Age: 67
LOS: 4 days | Discharge: HOME | DRG: 378 | End: 2022-06-11
Attending: EMERGENCY MEDICINE | Admitting: STUDENT IN AN ORGANIZED HEALTH CARE EDUCATION/TRAINING PROGRAM
Payer: MEDICARE

## 2022-01-01 ENCOUNTER — DOCUMENTATION ONLY (OUTPATIENT)
Dept: HEMATOLOGY/ONCOLOGY | Facility: CLINIC | Age: 67
End: 2022-01-01
Payer: MEDICARE

## 2022-01-01 ENCOUNTER — OFFICE VISIT (OUTPATIENT)
Dept: PSYCHIATRY | Facility: CLINIC | Age: 67
End: 2022-01-01
Payer: MEDICARE

## 2022-01-01 ENCOUNTER — TELEPHONE (OUTPATIENT)
Dept: PALLIATIVE MEDICINE | Facility: CLINIC | Age: 67
End: 2022-01-01
Payer: MEDICARE

## 2022-01-01 ENCOUNTER — PATIENT MESSAGE (OUTPATIENT)
Dept: FAMILY MEDICINE | Facility: CLINIC | Age: 67
End: 2022-01-01
Payer: MEDICARE

## 2022-01-01 ENCOUNTER — OFFICE VISIT (OUTPATIENT)
Dept: FAMILY MEDICINE | Facility: CLINIC | Age: 67
End: 2022-01-01
Payer: MEDICARE

## 2022-01-01 ENCOUNTER — SPECIALTY PHARMACY (OUTPATIENT)
Dept: PHARMACY | Facility: CLINIC | Age: 67
End: 2022-01-01
Payer: MEDICARE

## 2022-01-01 ENCOUNTER — HOSPITAL ENCOUNTER (INPATIENT)
Facility: HOSPITAL | Age: 67
LOS: 2 days | Discharge: HOME | DRG: 812 | End: 2022-07-03
Attending: EMERGENCY MEDICINE | Admitting: STUDENT IN AN ORGANIZED HEALTH CARE EDUCATION/TRAINING PROGRAM
Payer: MEDICARE

## 2022-01-01 ENCOUNTER — HOSPITAL ENCOUNTER (OUTPATIENT)
Dept: INTERVENTIONAL RADIOLOGY/VASCULAR | Facility: HOSPITAL | Age: 67
Discharge: HOME | End: 2022-06-21
Attending: INTERNAL MEDICINE
Payer: MEDICARE

## 2022-01-01 ENCOUNTER — LAB VISIT (OUTPATIENT)
Dept: LAB | Facility: HOSPITAL | Age: 67
End: 2022-01-01
Payer: MEDICARE

## 2022-01-01 ENCOUNTER — PATIENT MESSAGE (OUTPATIENT)
Dept: FAMILY MEDICINE | Facility: CLINIC | Age: 67
End: 2022-01-01

## 2022-01-01 ENCOUNTER — OFFICE VISIT (OUTPATIENT)
Dept: CARDIOLOGY | Facility: CLINIC | Age: 67
End: 2022-01-01
Payer: MEDICARE

## 2022-01-01 ENCOUNTER — OFFICE VISIT (OUTPATIENT)
Dept: HEMATOLOGY/ONCOLOGY | Facility: CLINIC | Age: 67
End: 2022-01-01
Payer: MEDICARE

## 2022-01-01 ENCOUNTER — CLINICAL SUPPORT (OUTPATIENT)
Dept: HEMATOLOGY/ONCOLOGY | Facility: CLINIC | Age: 67
End: 2022-01-01
Payer: MEDICARE

## 2022-01-01 ENCOUNTER — TELEPHONE (OUTPATIENT)
Dept: INTERVENTIONAL RADIOLOGY/VASCULAR | Facility: HOSPITAL | Age: 67
End: 2022-01-01
Payer: MEDICARE

## 2022-01-01 ENCOUNTER — NURSE TRIAGE (OUTPATIENT)
Dept: ADMINISTRATIVE | Facility: CLINIC | Age: 67
End: 2022-01-01
Payer: MEDICARE

## 2022-01-01 ENCOUNTER — OFFICE VISIT (OUTPATIENT)
Dept: ELECTROPHYSIOLOGY | Facility: CLINIC | Age: 67
End: 2022-01-01
Payer: MEDICARE

## 2022-01-01 ENCOUNTER — ANESTHESIA EVENT (OUTPATIENT)
Dept: ENDOSCOPY | Facility: HOSPITAL | Age: 67
DRG: 378 | End: 2022-01-01
Payer: MEDICARE

## 2022-01-01 ENCOUNTER — PROCEDURE VISIT (OUTPATIENT)
Dept: HEMATOLOGY/ONCOLOGY | Facility: CLINIC | Age: 67
End: 2022-01-01
Payer: MEDICARE

## 2022-01-01 ENCOUNTER — PATIENT MESSAGE (OUTPATIENT)
Dept: GYNECOLOGIC ONCOLOGY | Facility: CLINIC | Age: 67
End: 2022-01-01
Payer: MEDICARE

## 2022-01-01 ENCOUNTER — CLINICAL SUPPORT (OUTPATIENT)
Dept: REHABILITATION | Facility: HOSPITAL | Age: 67
End: 2022-01-01
Attending: OBSTETRICS & GYNECOLOGY
Payer: MEDICARE

## 2022-01-01 ENCOUNTER — LAB VISIT (OUTPATIENT)
Dept: LAB | Facility: HOSPITAL | Age: 67
End: 2022-01-01
Attending: NURSE PRACTITIONER
Payer: MEDICARE

## 2022-01-01 ENCOUNTER — HOSPITAL ENCOUNTER (OUTPATIENT)
Dept: RADIOLOGY | Facility: HOSPITAL | Age: 67
Discharge: HOME | End: 2022-05-24
Attending: INTERNAL MEDICINE
Payer: MEDICARE

## 2022-01-01 ENCOUNTER — PATIENT OUTREACH (OUTPATIENT)
Dept: ADMINISTRATIVE | Facility: OTHER | Age: 67
End: 2022-01-01
Payer: MEDICARE

## 2022-01-01 ENCOUNTER — OFFICE VISIT (OUTPATIENT)
Dept: PAIN MEDICINE | Facility: CLINIC | Age: 67
End: 2022-01-01
Payer: MEDICARE

## 2022-01-01 ENCOUNTER — INFUSION (OUTPATIENT)
Dept: INFUSION THERAPY | Facility: HOSPITAL | Age: 67
End: 2022-01-01
Payer: MEDICARE

## 2022-01-01 ENCOUNTER — OFFICE VISIT (OUTPATIENT)
Dept: INTERVENTIONAL RADIOLOGY/VASCULAR | Facility: CLINIC | Age: 67
DRG: 378 | End: 2022-01-01
Payer: MEDICARE

## 2022-01-01 ENCOUNTER — OFFICE VISIT (OUTPATIENT)
Dept: HEMATOLOGY/ONCOLOGY | Facility: CLINIC | Age: 67
DRG: 602 | End: 2022-01-01
Payer: MEDICARE

## 2022-01-01 ENCOUNTER — OFFICE VISIT (OUTPATIENT)
Dept: FAMILY MEDICINE | Facility: CLINIC | Age: 67
DRG: 812 | End: 2022-01-01
Payer: MEDICARE

## 2022-01-01 ENCOUNTER — TELEPHONE (OUTPATIENT)
Dept: INTERVENTIONAL RADIOLOGY/VASCULAR | Facility: CLINIC | Age: 67
End: 2022-01-01

## 2022-01-01 ENCOUNTER — OFFICE VISIT (OUTPATIENT)
Dept: PALLIATIVE MEDICINE | Facility: CLINIC | Age: 67
End: 2022-01-01
Payer: MEDICARE

## 2022-01-01 ENCOUNTER — PATIENT MESSAGE (OUTPATIENT)
Dept: HEMATOLOGY/ONCOLOGY | Facility: CLINIC | Age: 67
End: 2022-01-01

## 2022-01-01 ENCOUNTER — HOSPITAL ENCOUNTER (OUTPATIENT)
Dept: RADIOLOGY | Facility: OTHER | Age: 67
Discharge: HOME | End: 2022-03-28
Attending: INTERNAL MEDICINE
Payer: MEDICARE

## 2022-01-01 ENCOUNTER — APPOINTMENT (OUTPATIENT)
Dept: RADIOLOGY | Facility: HOSPITAL | Age: 67
End: 2022-01-01
Attending: ORTHOPAEDIC SURGERY
Payer: MEDICARE

## 2022-01-01 ENCOUNTER — TELEPHONE (OUTPATIENT)
Dept: RADIOLOGY | Facility: HOSPITAL | Age: 67
End: 2022-01-01
Payer: MEDICARE

## 2022-01-01 ENCOUNTER — HOSPITAL ENCOUNTER (OUTPATIENT)
Dept: CARDIOLOGY | Facility: HOSPITAL | Age: 67
Discharge: HOME | End: 2022-04-26
Attending: INTERNAL MEDICINE
Payer: MEDICARE

## 2022-01-01 ENCOUNTER — CLINICAL SUPPORT (OUTPATIENT)
Dept: REHABILITATION | Facility: HOSPITAL | Age: 67
End: 2022-01-01
Attending: ORTHOPAEDIC SURGERY
Payer: MEDICARE

## 2022-01-01 ENCOUNTER — HOSPITAL ENCOUNTER (OUTPATIENT)
Dept: RADIOLOGY | Facility: HOSPITAL | Age: 67
Discharge: HOME | End: 2022-05-16
Attending: INTERNAL MEDICINE
Payer: MEDICARE

## 2022-01-01 ENCOUNTER — TELEPHONE (OUTPATIENT)
Dept: INFUSION THERAPY | Facility: HOSPITAL | Age: 67
End: 2022-01-01
Payer: MEDICARE

## 2022-01-01 ENCOUNTER — PATIENT OUTREACH (OUTPATIENT)
Dept: ADMINISTRATIVE | Facility: CLINIC | Age: 67
End: 2022-01-01
Payer: MEDICARE

## 2022-01-01 ENCOUNTER — INFUSION (OUTPATIENT)
Dept: INFUSION THERAPY | Facility: HOSPITAL | Age: 67
End: 2022-01-01
Attending: NURSE PRACTITIONER
Payer: MEDICARE

## 2022-01-01 ENCOUNTER — OFFICE VISIT (OUTPATIENT)
Dept: GYNECOLOGIC ONCOLOGY | Facility: CLINIC | Age: 67
End: 2022-01-01
Payer: MEDICARE

## 2022-01-01 ENCOUNTER — HOSPITAL ENCOUNTER (OUTPATIENT)
Dept: RADIOLOGY | Facility: HOSPITAL | Age: 67
Discharge: HOME | End: 2022-09-09
Attending: NURSE PRACTITIONER
Payer: MEDICARE

## 2022-01-01 ENCOUNTER — HOSPITAL ENCOUNTER (OUTPATIENT)
Dept: RADIOLOGY | Facility: OTHER | Age: 67
Discharge: HOME | End: 2022-06-02
Attending: INTERNAL MEDICINE
Payer: MEDICARE

## 2022-01-01 ENCOUNTER — OFFICE VISIT (OUTPATIENT)
Dept: ORTHOPEDICS | Facility: CLINIC | Age: 67
End: 2022-01-01
Payer: MEDICARE

## 2022-01-01 ENCOUNTER — PATIENT MESSAGE (OUTPATIENT)
Dept: CARDIOLOGY | Facility: CLINIC | Age: 67
End: 2022-01-01
Payer: MEDICARE

## 2022-01-01 ENCOUNTER — INFUSION (OUTPATIENT)
Dept: INFUSION THERAPY | Facility: HOSPITAL | Age: 67
End: 2022-01-01
Attending: INTERNAL MEDICINE
Payer: MEDICARE

## 2022-01-01 ENCOUNTER — TELEPHONE (OUTPATIENT)
Dept: ORTHOPEDICS | Facility: CLINIC | Age: 67
End: 2022-01-01
Payer: MEDICARE

## 2022-01-01 ENCOUNTER — HOSPITAL ENCOUNTER (INPATIENT)
Facility: HOSPITAL | Age: 67
LOS: 1 days | Discharge: HOME | DRG: 602 | End: 2022-11-22
Attending: STUDENT IN AN ORGANIZED HEALTH CARE EDUCATION/TRAINING PROGRAM | Admitting: STUDENT IN AN ORGANIZED HEALTH CARE EDUCATION/TRAINING PROGRAM
Payer: MEDICARE

## 2022-01-01 ENCOUNTER — PATIENT OUTREACH (OUTPATIENT)
Dept: ADMINISTRATIVE | Facility: HOSPITAL | Age: 67
End: 2022-01-01
Payer: MEDICARE

## 2022-01-01 ENCOUNTER — ANESTHESIA (OUTPATIENT)
Dept: ENDOSCOPY | Facility: HOSPITAL | Age: 67
DRG: 378 | End: 2022-01-01
Payer: MEDICARE

## 2022-01-01 ENCOUNTER — TELEPHONE (OUTPATIENT)
Dept: OBSTETRICS AND GYNECOLOGY | Facility: CLINIC | Age: 67
End: 2022-01-01
Payer: MEDICARE

## 2022-01-01 ENCOUNTER — HOSPITAL ENCOUNTER (OUTPATIENT)
Dept: CARDIOLOGY | Facility: CLINIC | Age: 67
Discharge: HOME | End: 2022-06-24
Payer: MEDICARE

## 2022-01-01 ENCOUNTER — TELEPHONE (OUTPATIENT)
Dept: INTERVENTIONAL RADIOLOGY/VASCULAR | Facility: CLINIC | Age: 67
End: 2022-01-01
Payer: MEDICARE

## 2022-01-01 VITALS
HEART RATE: 88 BPM | RESPIRATION RATE: 16 BRPM | DIASTOLIC BLOOD PRESSURE: 58 MMHG | TEMPERATURE: 98 F | HEIGHT: 60 IN | SYSTOLIC BLOOD PRESSURE: 106 MMHG | BODY MASS INDEX: 22.59 KG/M2 | WEIGHT: 115.06 LBS | OXYGEN SATURATION: 100 %

## 2022-01-01 VITALS
BODY MASS INDEX: 24.54 KG/M2 | WEIGHT: 125 LBS | SYSTOLIC BLOOD PRESSURE: 120 MMHG | OXYGEN SATURATION: 99 % | DIASTOLIC BLOOD PRESSURE: 70 MMHG | RESPIRATION RATE: 18 BRPM | HEIGHT: 60 IN | HEART RATE: 85 BPM

## 2022-01-01 VITALS
HEART RATE: 80 BPM | BODY MASS INDEX: 24.37 KG/M2 | RESPIRATION RATE: 18 BRPM | SYSTOLIC BLOOD PRESSURE: 107 MMHG | BODY MASS INDEX: 24.11 KG/M2 | DIASTOLIC BLOOD PRESSURE: 55 MMHG | WEIGHT: 124.13 LBS | OXYGEN SATURATION: 100 % | DIASTOLIC BLOOD PRESSURE: 52 MMHG | HEIGHT: 60 IN | SYSTOLIC BLOOD PRESSURE: 96 MMHG | HEIGHT: 60 IN | WEIGHT: 122.81 LBS

## 2022-01-01 VITALS
DIASTOLIC BLOOD PRESSURE: 50 MMHG | HEART RATE: 73 BPM | RESPIRATION RATE: 19 BRPM | SYSTOLIC BLOOD PRESSURE: 100 MMHG | TEMPERATURE: 98 F | OXYGEN SATURATION: 100 %

## 2022-01-01 VITALS
DIASTOLIC BLOOD PRESSURE: 62 MMHG | RESPIRATION RATE: 18 BRPM | HEIGHT: 60 IN | OXYGEN SATURATION: 98 % | BODY MASS INDEX: 22.19 KG/M2 | WEIGHT: 113 LBS | HEART RATE: 66 BPM | TEMPERATURE: 99 F | SYSTOLIC BLOOD PRESSURE: 129 MMHG

## 2022-01-01 VITALS
WEIGHT: 119.25 LBS | TEMPERATURE: 98 F | HEIGHT: 60 IN | WEIGHT: 119.63 LBS | BODY MASS INDEX: 23.41 KG/M2 | OXYGEN SATURATION: 99 % | BODY MASS INDEX: 23.49 KG/M2 | RESPIRATION RATE: 18 BRPM | DIASTOLIC BLOOD PRESSURE: 54 MMHG | HEIGHT: 60 IN | SYSTOLIC BLOOD PRESSURE: 105 MMHG | HEART RATE: 81 BPM

## 2022-01-01 VITALS
WEIGHT: 117.5 LBS | OXYGEN SATURATION: 100 % | DIASTOLIC BLOOD PRESSURE: 60 MMHG | HEART RATE: 70 BPM | RESPIRATION RATE: 15 BRPM | TEMPERATURE: 98 F | HEIGHT: 61 IN | RESPIRATION RATE: 18 BRPM | BODY MASS INDEX: 21.66 KG/M2 | BODY MASS INDEX: 22.19 KG/M2 | WEIGHT: 114.75 LBS | SYSTOLIC BLOOD PRESSURE: 118 MMHG | HEART RATE: 80 BPM | DIASTOLIC BLOOD PRESSURE: 62 MMHG | HEIGHT: 61 IN | OXYGEN SATURATION: 99 % | SYSTOLIC BLOOD PRESSURE: 127 MMHG

## 2022-01-01 VITALS
BODY MASS INDEX: 24.59 KG/M2 | HEART RATE: 90 BPM | HEIGHT: 60 IN | SYSTOLIC BLOOD PRESSURE: 108 MMHG | TEMPERATURE: 98 F | WEIGHT: 125.25 LBS | OXYGEN SATURATION: 100 % | RESPIRATION RATE: 16 BRPM | DIASTOLIC BLOOD PRESSURE: 51 MMHG

## 2022-01-01 VITALS
RESPIRATION RATE: 18 BRPM | SYSTOLIC BLOOD PRESSURE: 133 MMHG | HEART RATE: 76 BPM | DIASTOLIC BLOOD PRESSURE: 61 MMHG | HEART RATE: 119 BPM | WEIGHT: 115.19 LBS | OXYGEN SATURATION: 86 % | BODY MASS INDEX: 21.75 KG/M2 | SYSTOLIC BLOOD PRESSURE: 115 MMHG | HEIGHT: 61 IN | DIASTOLIC BLOOD PRESSURE: 55 MMHG | TEMPERATURE: 98 F

## 2022-01-01 VITALS
BODY MASS INDEX: 25.02 KG/M2 | HEIGHT: 60 IN | OXYGEN SATURATION: 99 % | HEART RATE: 88 BPM | DIASTOLIC BLOOD PRESSURE: 64 MMHG | RESPIRATION RATE: 17 BRPM | SYSTOLIC BLOOD PRESSURE: 118 MMHG | WEIGHT: 127.44 LBS

## 2022-01-01 VITALS
HEIGHT: 60 IN | DIASTOLIC BLOOD PRESSURE: 57 MMHG | HEART RATE: 81 BPM | RESPIRATION RATE: 16 BRPM | BODY MASS INDEX: 22.78 KG/M2 | WEIGHT: 116.06 LBS | SYSTOLIC BLOOD PRESSURE: 112 MMHG | OXYGEN SATURATION: 99 %

## 2022-01-01 VITALS
OXYGEN SATURATION: 100 % | HEART RATE: 81 BPM | BODY MASS INDEX: 23.77 KG/M2 | WEIGHT: 121.06 LBS | HEIGHT: 60 IN | RESPIRATION RATE: 18 BRPM | SYSTOLIC BLOOD PRESSURE: 112 MMHG | DIASTOLIC BLOOD PRESSURE: 60 MMHG

## 2022-01-01 VITALS
BODY MASS INDEX: 23.01 KG/M2 | WEIGHT: 117.19 LBS | TEMPERATURE: 98 F | HEIGHT: 60 IN | OXYGEN SATURATION: 96 % | HEART RATE: 76 BPM | SYSTOLIC BLOOD PRESSURE: 100 MMHG | DIASTOLIC BLOOD PRESSURE: 60 MMHG

## 2022-01-01 VITALS
WEIGHT: 123.88 LBS | HEIGHT: 60 IN | BODY MASS INDEX: 24.32 KG/M2 | DIASTOLIC BLOOD PRESSURE: 51 MMHG | HEART RATE: 75 BPM | SYSTOLIC BLOOD PRESSURE: 111 MMHG | OXYGEN SATURATION: 100 %

## 2022-01-01 VITALS
SYSTOLIC BLOOD PRESSURE: 125 MMHG | WEIGHT: 113.63 LBS | BODY MASS INDEX: 22.58 KG/M2 | DIASTOLIC BLOOD PRESSURE: 58 MMHG | DIASTOLIC BLOOD PRESSURE: 64 MMHG | SYSTOLIC BLOOD PRESSURE: 132 MMHG | WEIGHT: 115 LBS | TEMPERATURE: 99 F | SYSTOLIC BLOOD PRESSURE: 98 MMHG | RESPIRATION RATE: 16 BRPM | HEART RATE: 61 BPM | OXYGEN SATURATION: 99 % | HEART RATE: 68 BPM | DIASTOLIC BLOOD PRESSURE: 61 MMHG | HEART RATE: 70 BPM | TEMPERATURE: 98 F | BODY MASS INDEX: 22.31 KG/M2 | OXYGEN SATURATION: 100 % | HEIGHT: 60 IN | HEIGHT: 60 IN

## 2022-01-01 VITALS
OXYGEN SATURATION: 97 % | BODY MASS INDEX: 23.72 KG/M2 | DIASTOLIC BLOOD PRESSURE: 60 MMHG | SYSTOLIC BLOOD PRESSURE: 130 MMHG | HEIGHT: 60 IN | TEMPERATURE: 98 F | HEART RATE: 81 BPM | RESPIRATION RATE: 18 BRPM | WEIGHT: 120.81 LBS

## 2022-01-01 VITALS
HEART RATE: 81 BPM | BODY MASS INDEX: 24.94 KG/M2 | TEMPERATURE: 99 F | RESPIRATION RATE: 18 BRPM | WEIGHT: 127 LBS | HEIGHT: 60 IN | SYSTOLIC BLOOD PRESSURE: 112 MMHG | DIASTOLIC BLOOD PRESSURE: 74 MMHG | OXYGEN SATURATION: 92 %

## 2022-01-01 VITALS
BODY MASS INDEX: 23.95 KG/M2 | SYSTOLIC BLOOD PRESSURE: 101 MMHG | OXYGEN SATURATION: 98 % | DIASTOLIC BLOOD PRESSURE: 52 MMHG | TEMPERATURE: 99 F | RESPIRATION RATE: 20 BRPM | HEART RATE: 84 BPM | HEIGHT: 60 IN | WEIGHT: 122 LBS

## 2022-01-01 VITALS
SYSTOLIC BLOOD PRESSURE: 113 MMHG | OXYGEN SATURATION: 100 % | RESPIRATION RATE: 16 BRPM | HEART RATE: 74 BPM | HEIGHT: 60 IN | BODY MASS INDEX: 22.59 KG/M2 | DIASTOLIC BLOOD PRESSURE: 54 MMHG | WEIGHT: 115.06 LBS

## 2022-01-01 VITALS
DIASTOLIC BLOOD PRESSURE: 62 MMHG | WEIGHT: 115.94 LBS | OXYGEN SATURATION: 96 % | BODY MASS INDEX: 22.76 KG/M2 | SYSTOLIC BLOOD PRESSURE: 90 MMHG | HEART RATE: 76 BPM | TEMPERATURE: 98 F | HEIGHT: 60 IN

## 2022-01-01 VITALS
DIASTOLIC BLOOD PRESSURE: 59 MMHG | RESPIRATION RATE: 18 BRPM | WEIGHT: 115.06 LBS | HEIGHT: 61 IN | SYSTOLIC BLOOD PRESSURE: 125 MMHG | OXYGEN SATURATION: 100 % | TEMPERATURE: 98 F | BODY MASS INDEX: 21.72 KG/M2 | HEART RATE: 75 BPM

## 2022-01-01 VITALS
HEART RATE: 60 BPM | BODY MASS INDEX: 22.9 KG/M2 | RESPIRATION RATE: 18 BRPM | TEMPERATURE: 98 F | WEIGHT: 116.63 LBS | DIASTOLIC BLOOD PRESSURE: 58 MMHG | HEIGHT: 60 IN | SYSTOLIC BLOOD PRESSURE: 120 MMHG | OXYGEN SATURATION: 100 %

## 2022-01-01 VITALS
WEIGHT: 116.06 LBS | SYSTOLIC BLOOD PRESSURE: 123 MMHG | HEART RATE: 77 BPM | DIASTOLIC BLOOD PRESSURE: 60 MMHG | BODY MASS INDEX: 22.78 KG/M2 | HEIGHT: 60 IN | HEART RATE: 80 BPM | OXYGEN SATURATION: 100 % | RESPIRATION RATE: 16 BRPM | DIASTOLIC BLOOD PRESSURE: 57 MMHG | SYSTOLIC BLOOD PRESSURE: 118 MMHG

## 2022-01-01 VITALS
RESPIRATION RATE: 16 BRPM | TEMPERATURE: 98 F | HEART RATE: 70 BPM | BODY MASS INDEX: 24.94 KG/M2 | DIASTOLIC BLOOD PRESSURE: 58 MMHG | HEIGHT: 60 IN | OXYGEN SATURATION: 95 % | SYSTOLIC BLOOD PRESSURE: 122 MMHG | WEIGHT: 127 LBS

## 2022-01-01 VITALS
WEIGHT: 114 LBS | BODY MASS INDEX: 22.38 KG/M2 | HEIGHT: 60 IN | HEART RATE: 76 BPM | DIASTOLIC BLOOD PRESSURE: 57 MMHG | SYSTOLIC BLOOD PRESSURE: 120 MMHG

## 2022-01-01 VITALS
DIASTOLIC BLOOD PRESSURE: 56 MMHG | BODY MASS INDEX: 22.21 KG/M2 | OXYGEN SATURATION: 99 % | SYSTOLIC BLOOD PRESSURE: 100 MMHG | HEIGHT: 60 IN | HEART RATE: 70 BPM | WEIGHT: 113.13 LBS | TEMPERATURE: 98 F

## 2022-01-01 VITALS
DIASTOLIC BLOOD PRESSURE: 88 MMHG | OXYGEN SATURATION: 99 % | TEMPERATURE: 99 F | SYSTOLIC BLOOD PRESSURE: 141 MMHG | HEART RATE: 70 BPM | RESPIRATION RATE: 18 BRPM

## 2022-01-01 VITALS
TEMPERATURE: 98 F | DIASTOLIC BLOOD PRESSURE: 48 MMHG | HEART RATE: 70 BPM | SYSTOLIC BLOOD PRESSURE: 94 MMHG | RESPIRATION RATE: 18 BRPM | OXYGEN SATURATION: 95 %

## 2022-01-01 VITALS
SYSTOLIC BLOOD PRESSURE: 103 MMHG | HEART RATE: 99 BPM | BODY MASS INDEX: 23.67 KG/M2 | DIASTOLIC BLOOD PRESSURE: 51 MMHG | HEIGHT: 60 IN | WEIGHT: 120.56 LBS

## 2022-01-01 VITALS
BODY MASS INDEX: 22.48 KG/M2 | SYSTOLIC BLOOD PRESSURE: 134 MMHG | WEIGHT: 115.06 LBS | RESPIRATION RATE: 18 BRPM | HEART RATE: 65 BPM | DIASTOLIC BLOOD PRESSURE: 62 MMHG | TEMPERATURE: 99 F

## 2022-01-01 VITALS
DIASTOLIC BLOOD PRESSURE: 56 MMHG | TEMPERATURE: 98 F | RESPIRATION RATE: 18 BRPM | HEART RATE: 100 BPM | SYSTOLIC BLOOD PRESSURE: 110 MMHG | OXYGEN SATURATION: 100 %

## 2022-01-01 VITALS
SYSTOLIC BLOOD PRESSURE: 110 MMHG | DIASTOLIC BLOOD PRESSURE: 52 MMHG | WEIGHT: 121.69 LBS | TEMPERATURE: 98 F | HEIGHT: 60 IN | BODY MASS INDEX: 23.89 KG/M2 | HEART RATE: 88 BPM | OXYGEN SATURATION: 95 % | RESPIRATION RATE: 18 BRPM

## 2022-01-01 VITALS
HEART RATE: 67 BPM | OXYGEN SATURATION: 100 % | TEMPERATURE: 98 F | SYSTOLIC BLOOD PRESSURE: 116 MMHG | RESPIRATION RATE: 16 BRPM | DIASTOLIC BLOOD PRESSURE: 57 MMHG

## 2022-01-01 VITALS
WEIGHT: 125.56 LBS | DIASTOLIC BLOOD PRESSURE: 53 MMHG | HEIGHT: 60 IN | HEART RATE: 88 BPM | SYSTOLIC BLOOD PRESSURE: 106 MMHG | BODY MASS INDEX: 24.65 KG/M2

## 2022-01-01 VITALS
RESPIRATION RATE: 18 BRPM | SYSTOLIC BLOOD PRESSURE: 108 MMHG | HEART RATE: 82 BPM | DIASTOLIC BLOOD PRESSURE: 62 MMHG | TEMPERATURE: 98 F

## 2022-01-01 DIAGNOSIS — D46.9 MYELODYSPLASIA (MYELODYSPLASTIC SYNDROME): ICD-10-CM

## 2022-01-01 DIAGNOSIS — D61.818 PANCYTOPENIA: ICD-10-CM

## 2022-01-01 DIAGNOSIS — G47.00 INSOMNIA, UNSPECIFIED TYPE: ICD-10-CM

## 2022-01-01 DIAGNOSIS — F33.1 MAJOR DEPRESSIVE DISORDER, RECURRENT EPISODE, MODERATE WITH ANXIOUS DISTRESS: Primary | ICD-10-CM

## 2022-01-01 DIAGNOSIS — M25.571 RIGHT ANKLE PAIN, UNSPECIFIED CHRONICITY: Primary | ICD-10-CM

## 2022-01-01 DIAGNOSIS — Z09 HOSPITAL DISCHARGE FOLLOW-UP: Primary | ICD-10-CM

## 2022-01-01 DIAGNOSIS — C54.1 ENDOMETRIAL CANCER: ICD-10-CM

## 2022-01-01 DIAGNOSIS — I48.91 ATRIAL FIBRILLATION WITH RVR: ICD-10-CM

## 2022-01-01 DIAGNOSIS — D64.9 SYMPTOMATIC ANEMIA: Primary | ICD-10-CM

## 2022-01-01 DIAGNOSIS — R60.0 LEG EDEMA: ICD-10-CM

## 2022-01-01 DIAGNOSIS — M80.08XA AGE-RELATED OSTEOPOROSIS WITH CURRENT PATHOLOGICAL FRACTURE, VERTEBRA(E), INITIAL ENCOUNTER FOR FRACTURE: ICD-10-CM

## 2022-01-01 DIAGNOSIS — I48.0 PAROXYSMAL ATRIAL FIBRILLATION: ICD-10-CM

## 2022-01-01 DIAGNOSIS — N63.22 BREAST LUMP ON LEFT SIDE AT 11 O'CLOCK POSITION: ICD-10-CM

## 2022-01-01 DIAGNOSIS — F43.23 ADJUSTMENT DISORDER WITH MIXED ANXIETY AND DEPRESSED MOOD: ICD-10-CM

## 2022-01-01 DIAGNOSIS — Z85.3 HX OF BREAST CANCER: ICD-10-CM

## 2022-01-01 DIAGNOSIS — R19.7 DIARRHEA, UNSPECIFIED TYPE: ICD-10-CM

## 2022-01-01 DIAGNOSIS — D46.9 MYELODYSPLASTIC SYNDROME, UNSPECIFIED: ICD-10-CM

## 2022-01-01 DIAGNOSIS — M51.36 DDD (DEGENERATIVE DISC DISEASE), LUMBAR: Primary | ICD-10-CM

## 2022-01-01 DIAGNOSIS — Z85.42 HISTORY OF ENDOMETRIAL CANCER: ICD-10-CM

## 2022-01-01 DIAGNOSIS — D50.0 ANEMIA DUE TO CHRONIC BLOOD LOSS: ICD-10-CM

## 2022-01-01 DIAGNOSIS — Z85.3 PERSONAL HISTORY OF BREAST CANCER: ICD-10-CM

## 2022-01-01 DIAGNOSIS — R06.02 SOB (SHORTNESS OF BREATH): ICD-10-CM

## 2022-01-01 DIAGNOSIS — R53.0 NEOPLASTIC (MALIGNANT) RELATED FATIGUE: ICD-10-CM

## 2022-01-01 DIAGNOSIS — D64.9 ANEMIA, UNSPECIFIED TYPE: ICD-10-CM

## 2022-01-01 DIAGNOSIS — F43.29 STRESS AND ADJUSTMENT REACTION: ICD-10-CM

## 2022-01-01 DIAGNOSIS — R11.0 NAUSEA: ICD-10-CM

## 2022-01-01 DIAGNOSIS — D84.9 IMMUNOSUPPRESSION: ICD-10-CM

## 2022-01-01 DIAGNOSIS — D69.6 THROMBOCYTOPENIA: ICD-10-CM

## 2022-01-01 DIAGNOSIS — Z85.3 HISTORY OF BREAST CANCER: Primary | ICD-10-CM

## 2022-01-01 DIAGNOSIS — L03.011 CELLULITIS OF FINGER OF RIGHT HAND: ICD-10-CM

## 2022-01-01 DIAGNOSIS — M89.9 BONE LESION: Primary | ICD-10-CM

## 2022-01-01 DIAGNOSIS — S61.451A CAT BITE OF RIGHT HAND, INITIAL ENCOUNTER: ICD-10-CM

## 2022-01-01 DIAGNOSIS — R63.4 WEIGHT LOSS, UNINTENTIONAL: ICD-10-CM

## 2022-01-01 DIAGNOSIS — R06.09 DOE (DYSPNEA ON EXERTION): ICD-10-CM

## 2022-01-01 DIAGNOSIS — R97.8 OTHER ABNORMAL TUMOR MARKERS: ICD-10-CM

## 2022-01-01 DIAGNOSIS — D70.1 CHEMOTHERAPY-INDUCED NEUTROPENIA: ICD-10-CM

## 2022-01-01 DIAGNOSIS — R07.9 CHEST PAIN, UNSPECIFIED TYPE: ICD-10-CM

## 2022-01-01 DIAGNOSIS — D61.810 PANCYTOPENIA DUE TO CHEMOTHERAPY: Primary | ICD-10-CM

## 2022-01-01 DIAGNOSIS — M70.61 TROCHANTERIC BURSITIS OF RIGHT HIP: ICD-10-CM

## 2022-01-01 DIAGNOSIS — D46.9 MYELODYSPLASTIC SYNDROME: ICD-10-CM

## 2022-01-01 DIAGNOSIS — R10.9 ABDOMINAL DISCOMFORT: ICD-10-CM

## 2022-01-01 DIAGNOSIS — R53.81 PHYSICAL DECONDITIONING: Primary | ICD-10-CM

## 2022-01-01 DIAGNOSIS — D84.9 IMMUNOCOMPROMISED: Primary | ICD-10-CM

## 2022-01-01 DIAGNOSIS — D50.0 ANEMIA DUE TO CHRONIC BLOOD LOSS: Primary | ICD-10-CM

## 2022-01-01 DIAGNOSIS — D46.9 MYELODYSPLASIA (MYELODYSPLASTIC SYNDROME): Primary | ICD-10-CM

## 2022-01-01 DIAGNOSIS — D53.9 NUTRITIONAL ANEMIA, UNSPECIFIED: ICD-10-CM

## 2022-01-01 DIAGNOSIS — T45.1X5A CHEMOTHERAPY-INDUCED NEUTROPENIA: ICD-10-CM

## 2022-01-01 DIAGNOSIS — R05.3 CHRONIC COUGH: ICD-10-CM

## 2022-01-01 DIAGNOSIS — G89.29 CHRONIC PAIN OF RIGHT KNEE: ICD-10-CM

## 2022-01-01 DIAGNOSIS — D72.829 LEUKOCYTOSIS, UNSPECIFIED TYPE: ICD-10-CM

## 2022-01-01 DIAGNOSIS — D64.9 ACUTE ANEMIA: Primary | ICD-10-CM

## 2022-01-01 DIAGNOSIS — Z85.3 HISTORY OF BREAST CANCER: ICD-10-CM

## 2022-01-01 DIAGNOSIS — D64.9 SYMPTOMATIC ANEMIA: ICD-10-CM

## 2022-01-01 DIAGNOSIS — I48.91 ATRIAL FIBRILLATION, UNSPECIFIED TYPE: ICD-10-CM

## 2022-01-01 DIAGNOSIS — M25.551 HIP PAIN, RIGHT: ICD-10-CM

## 2022-01-01 DIAGNOSIS — Z79.899 MEDICAL MARIJUANA USE: ICD-10-CM

## 2022-01-01 DIAGNOSIS — M19.041 PRIMARY OSTEOARTHRITIS OF RIGHT HAND: Primary | ICD-10-CM

## 2022-01-01 DIAGNOSIS — K21.9 GASTROESOPHAGEAL REFLUX DISEASE, UNSPECIFIED WHETHER ESOPHAGITIS PRESENT: ICD-10-CM

## 2022-01-01 DIAGNOSIS — D61.811 DRUG-INDUCED PANCYTOPENIA: ICD-10-CM

## 2022-01-01 DIAGNOSIS — E78.5 DYSLIPIDEMIA: ICD-10-CM

## 2022-01-01 DIAGNOSIS — H92.02 LEFT EAR PAIN: ICD-10-CM

## 2022-01-01 DIAGNOSIS — D64.9 ANEMIA, UNSPECIFIED TYPE: Primary | ICD-10-CM

## 2022-01-01 DIAGNOSIS — R63.0 ANOREXIA: ICD-10-CM

## 2022-01-01 DIAGNOSIS — Z80.9 FAMILY HISTORY OF CANCER: ICD-10-CM

## 2022-01-01 DIAGNOSIS — R51.9 HEADACHE DISORDER: ICD-10-CM

## 2022-01-01 DIAGNOSIS — H60.392 OTHER INFECTIVE ACUTE OTITIS EXTERNA OF LEFT EAR: Primary | ICD-10-CM

## 2022-01-01 DIAGNOSIS — C54.1 ENDOMETRIAL CANCER: Primary | ICD-10-CM

## 2022-01-01 DIAGNOSIS — M79.89 LEG SWELLING: Primary | ICD-10-CM

## 2022-01-01 DIAGNOSIS — L59.8 ALLERGIC CONTACT DERMATITIS DUE TO PHOTOCONTACT OTHER THAN SUNBURN, CURRENT: ICD-10-CM

## 2022-01-01 DIAGNOSIS — K92.2 GASTROINTESTINAL HEMORRHAGE, UNSPECIFIED GASTROINTESTINAL HEMORRHAGE TYPE: Primary | ICD-10-CM

## 2022-01-01 DIAGNOSIS — R93.89 ABNORMAL MRI: ICD-10-CM

## 2022-01-01 DIAGNOSIS — K62.5 RECTAL BLEEDING: ICD-10-CM

## 2022-01-01 DIAGNOSIS — C79.51 BONE METASTASES: ICD-10-CM

## 2022-01-01 DIAGNOSIS — D46.9 MYELODYSPLASTIC SYNDROME: Primary | ICD-10-CM

## 2022-01-01 DIAGNOSIS — M25.561 CHRONIC PAIN OF RIGHT KNEE: ICD-10-CM

## 2022-01-01 DIAGNOSIS — M47.816 LUMBAR SPONDYLOSIS: ICD-10-CM

## 2022-01-01 DIAGNOSIS — M25.571 RIGHT ANKLE PAIN, UNSPECIFIED CHRONICITY: ICD-10-CM
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
## 2022-01-01 DIAGNOSIS — K59.03 DRUG-INDUCED CONSTIPATION: ICD-10-CM

## 2022-01-01 DIAGNOSIS — D49.9 NEOPLASM: ICD-10-CM

## 2022-01-01 DIAGNOSIS — R92.8 ABNORMAL FINDING ON BREAST IMAGING: ICD-10-CM

## 2022-01-01 DIAGNOSIS — M54.16 LUMBAR RADICULOPATHY: ICD-10-CM

## 2022-01-01 DIAGNOSIS — Z51.5 ENCOUNTER FOR PALLIATIVE CARE: Primary | ICD-10-CM

## 2022-01-01 DIAGNOSIS — R10.2 PELVIC AND PERINEAL PAIN: ICD-10-CM

## 2022-01-01 DIAGNOSIS — R00.0 TACHYCARDIA: ICD-10-CM

## 2022-01-01 DIAGNOSIS — K59.00 CONSTIPATION, UNSPECIFIED CONSTIPATION TYPE: ICD-10-CM

## 2022-01-01 DIAGNOSIS — I48.91 ATRIAL FIBRILLATION WITH RVR: Primary | ICD-10-CM

## 2022-01-01 DIAGNOSIS — M53.3 SACRAL LESION: ICD-10-CM

## 2022-01-01 DIAGNOSIS — D69.6 THROMBOCYTOPENIA: Primary | ICD-10-CM

## 2022-01-01 DIAGNOSIS — M25.559 HIP PAIN: Primary | ICD-10-CM

## 2022-01-01 DIAGNOSIS — M51.36 DDD (DEGENERATIVE DISC DISEASE), LUMBAR: ICD-10-CM

## 2022-01-01 DIAGNOSIS — M53.3 SACRAL LESION: Primary | ICD-10-CM
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
## 2022-01-01 DIAGNOSIS — R06.02 SHORTNESS OF BREATH: ICD-10-CM

## 2022-01-01 DIAGNOSIS — L03.113 CELLULITIS OF RIGHT UPPER EXTREMITY: Primary | ICD-10-CM

## 2022-01-01 DIAGNOSIS — Z71.89 ADVANCED CARE PLANNING/COUNSELING DISCUSSION: ICD-10-CM

## 2022-01-01 DIAGNOSIS — H60.392 OTHER INFECTIVE ACUTE OTITIS EXTERNA OF LEFT EAR: ICD-10-CM

## 2022-01-01 DIAGNOSIS — M79.89 LEG SWELLING: ICD-10-CM

## 2022-01-01 DIAGNOSIS — D50.0 BLOOD LOSS ANEMIA: ICD-10-CM

## 2022-01-01 DIAGNOSIS — F39 MOOD DISORDER: ICD-10-CM

## 2022-01-01 DIAGNOSIS — R06.09 DOE (DYSPNEA ON EXERTION): Primary | ICD-10-CM

## 2022-01-01 DIAGNOSIS — I48.91 ATRIAL FIBRILLATION, UNSPECIFIED TYPE: Primary | ICD-10-CM

## 2022-01-01 DIAGNOSIS — M46.1 SACROILIITIS: ICD-10-CM

## 2022-01-01 DIAGNOSIS — Z51.5 ENCOUNTER FOR PALLIATIVE CARE: ICD-10-CM

## 2022-01-01 DIAGNOSIS — M54.10 RADICULOPATHY OF LEG: ICD-10-CM

## 2022-01-01 DIAGNOSIS — M79.604 PAIN OF RIGHT LOWER EXTREMITY: Primary | ICD-10-CM

## 2022-01-01 DIAGNOSIS — C77.5 SECONDARY AND UNSPECIFIED MALIGNANT NEOPLASM OF INTRAPELVIC LYMPH NODES: ICD-10-CM

## 2022-01-01 DIAGNOSIS — N63.20 LEFT BREAST MASS: ICD-10-CM

## 2022-01-01 DIAGNOSIS — R07.9 CHEST PAIN: ICD-10-CM

## 2022-01-01 DIAGNOSIS — R19.8 IRREGULAR BOWEL HABITS: ICD-10-CM

## 2022-01-01 DIAGNOSIS — M47.816 LUMBAR SPONDYLOSIS: Primary | ICD-10-CM

## 2022-01-01 DIAGNOSIS — Z85.3 HX OF BREAST CANCER: Primary | ICD-10-CM
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
## 2022-01-01 DIAGNOSIS — Z71.83 ENCOUNTER FOR NONPROCREATIVE GENETIC COUNSELING: Primary | ICD-10-CM

## 2022-01-01 DIAGNOSIS — H61.22 IMPACTED CERUMEN OF LEFT EAR: ICD-10-CM

## 2022-01-01 DIAGNOSIS — Z85.3 PERSONAL HISTORY OF BREAST CANCER: Primary | ICD-10-CM

## 2022-01-01 DIAGNOSIS — Z71.3 NUTRITIONAL COUNSELING: Primary | ICD-10-CM

## 2022-01-01 DIAGNOSIS — L03.90 CELLULITIS: ICD-10-CM

## 2022-01-01 DIAGNOSIS — R31.29 MICROSCOPIC HEMATURIA: ICD-10-CM

## 2022-01-01 DIAGNOSIS — K52.0 RADIATION ENTERITIS: ICD-10-CM

## 2022-01-01 LAB
ABO + RH BLD: NORMAL
ADEQUACY: NORMAL
ALBUMIN SERPL BCP-MCNC: 2.5 G/DL (ref 3.5–5.2)
ALBUMIN SERPL BCP-MCNC: 2.6 G/DL (ref 3.5–5.2)
ALBUMIN SERPL BCP-MCNC: 2.8 G/DL (ref 3.5–5.2)
ALBUMIN SERPL BCP-MCNC: 2.8 G/DL (ref 3.5–5.2)
ALBUMIN SERPL BCP-MCNC: 2.9 G/DL (ref 3.5–5.2)
ALBUMIN SERPL BCP-MCNC: 3.3 G/DL (ref 3.5–5.2)
ALBUMIN SERPL BCP-MCNC: 3.4 G/DL (ref 3.5–5.2)
ALBUMIN SERPL BCP-MCNC: 3.5 G/DL (ref 3.5–5.2)
ALBUMIN SERPL BCP-MCNC: 3.7 G/DL (ref 3.5–5.2)
ALBUMIN SERPL BCP-MCNC: 3.7 G/DL (ref 3.5–5.2)
ALBUMIN SERPL BCP-MCNC: 3.8 G/DL (ref 3.5–5.2)
ALP SERPL-CCNC: 115 U/L (ref 55–135)
ALP SERPL-CCNC: 119 U/L (ref 55–135)
ALP SERPL-CCNC: 122 U/L (ref 55–135)
ALP SERPL-CCNC: 56 U/L (ref 55–135)
ALP SERPL-CCNC: 61 U/L (ref 55–135)
ALP SERPL-CCNC: 64 U/L (ref 55–135)
ALP SERPL-CCNC: 69 U/L (ref 55–135)
ALP SERPL-CCNC: 70 U/L (ref 55–135)
ALP SERPL-CCNC: 71 U/L (ref 55–135)
ALP SERPL-CCNC: 72 U/L (ref 55–135)
ALP SERPL-CCNC: 72 U/L (ref 55–135)
ALP SERPL-CCNC: 74 U/L (ref 55–135)
ALP SERPL-CCNC: 77 U/L (ref 55–135)
ALP SERPL-CCNC: 98 U/L (ref 55–135)
ALT SERPL W/O P-5'-P-CCNC: 10 U/L (ref 10–44)
ALT SERPL W/O P-5'-P-CCNC: 14 U/L (ref 10–44)
ALT SERPL W/O P-5'-P-CCNC: 16 U/L (ref 10–44)
ALT SERPL W/O P-5'-P-CCNC: 5 U/L (ref 10–44)
ALT SERPL W/O P-5'-P-CCNC: 5 U/L (ref 10–44)
ALT SERPL W/O P-5'-P-CCNC: 6 U/L (ref 10–44)
ALT SERPL W/O P-5'-P-CCNC: 6 U/L (ref 10–44)
ALT SERPL W/O P-5'-P-CCNC: 7 U/L (ref 10–44)
ALT SERPL W/O P-5'-P-CCNC: 8 U/L (ref 10–44)
ALT SERPL W/O P-5'-P-CCNC: 9 U/L (ref 10–44)
ANION GAP SERPL CALC-SCNC: 10 MMOL/L (ref 8–16)
ANION GAP SERPL CALC-SCNC: 11 MMOL/L (ref 8–16)
ANION GAP SERPL CALC-SCNC: 12 MMOL/L (ref 8–16)
ANION GAP SERPL CALC-SCNC: 14 MMOL/L (ref 8–16)
ANION GAP SERPL CALC-SCNC: 3 MMOL/L (ref 8–16)
ANION GAP SERPL CALC-SCNC: 4 MMOL/L (ref 8–16)
ANION GAP SERPL CALC-SCNC: 5 MMOL/L (ref 8–16)
ANION GAP SERPL CALC-SCNC: 6 MMOL/L (ref 8–16)
ANION GAP SERPL CALC-SCNC: 7 MMOL/L (ref 8–16)
ANION GAP SERPL CALC-SCNC: 7 MMOL/L (ref 8–16)
ANION GAP SERPL CALC-SCNC: 8 MMOL/L (ref 8–16)
ANION GAP SERPL CALC-SCNC: 9 MMOL/L (ref 8–16)
ANISOCYTOSIS BLD QL SMEAR: ABNORMAL
ANISOCYTOSIS BLD QL SMEAR: ABNORMAL
ANISOCYTOSIS BLD QL SMEAR: SLIGHT
ANNOTATION COMMENT IMP: NORMAL
AORTIC ROOT ANNULUS: 2.28 CM
AORTIC VALVE CUSP SEPERATION: 1.43 CM
ASCENDING AORTA: 3.02 CM
AST SERPL-CCNC: 12 U/L (ref 10–40)
AST SERPL-CCNC: 12 U/L (ref 10–40)
AST SERPL-CCNC: 13 U/L (ref 10–40)
AST SERPL-CCNC: 15 U/L (ref 10–40)
AST SERPL-CCNC: 16 U/L (ref 10–40)
AST SERPL-CCNC: 17 U/L (ref 10–40)
AST SERPL-CCNC: 18 U/L (ref 10–40)
AST SERPL-CCNC: 19 U/L (ref 10–40)
AST SERPL-CCNC: 22 U/L (ref 10–40)
AST SERPL-CCNC: 22 U/L (ref 10–40)
AST SERPL-CCNC: 9 U/L (ref 10–40)
AST SERPL-CCNC: 9 U/L (ref 10–40)
AV INDEX (PROSTH): 0.93
AV MEAN GRADIENT: 6 MMHG
AV PEAK GRADIENT: 10 MMHG
AV VALVE AREA: 3 CM2
AV VELOCITY RATIO: 0.96
BACTERIA #/AREA URNS AUTO: ABNORMAL /HPF
BACTERIA BLD CULT: NORMAL
BACTERIA SPEC AEROBE CULT: ABNORMAL
BACTERIA SPEC ANAEROBE CULT: NORMAL
BASO STIPL BLD QL SMEAR: ABNORMAL
BASO STIPL BLD QL SMEAR: ABNORMAL
BASOPHILS # BLD AUTO: 0 K/UL (ref 0–0.2)
BASOPHILS # BLD AUTO: 0.01 K/UL (ref 0–0.2)
BASOPHILS # BLD AUTO: 0.03 K/UL (ref 0–0.2)
BASOPHILS # BLD AUTO: ABNORMAL K/UL (ref 0–0.2)
BASOPHILS NFR BLD: 0 % (ref 0–1.9)
BASOPHILS NFR BLD: 1 % (ref 0–1.9)
BASOPHILS NFR BLD: 1.4 % (ref 0–1.9)
BASOPHILS NFR BLD: 2 % (ref 0–1.9)
BASOPHILS NFR BLD: 2 % (ref 0–1.9)
BASOPHILS NFR BLD: 2.5 % (ref 0–1.9)
BASOPHILS NFR BLD: 3 % (ref 0–1.9)
BASOPHILS NFR BLD: 3 % (ref 0–1.9)
BILIRUB SERPL-MCNC: 0.4 MG/DL (ref 0.1–1)
BILIRUB SERPL-MCNC: 0.5 MG/DL (ref 0.1–1)
BILIRUB SERPL-MCNC: 0.6 MG/DL (ref 0.1–1)
BILIRUB SERPL-MCNC: 0.7 MG/DL (ref 0.1–1)
BILIRUB SERPL-MCNC: 0.8 MG/DL (ref 0.1–1)
BILIRUB SERPL-MCNC: 0.8 MG/DL (ref 0.1–1)
BILIRUB SERPL-MCNC: 0.9 MG/DL (ref 0.1–1)
BILIRUB SERPL-MCNC: 1 MG/DL (ref 0.1–1)
BILIRUB SERPL-MCNC: 1 MG/DL (ref 0.1–1)
BILIRUB SERPL-MCNC: 1.1 MG/DL (ref 0.1–1)
BILIRUB SERPL-MCNC: 1.3 MG/DL (ref 0.1–1)
BILIRUB SERPL-MCNC: 1.8 MG/DL (ref 0.1–1)
BILIRUB UR QL STRIP: NEGATIVE
BLD GP AB SCN CELLS X3 SERPL QL: NORMAL
BLD PROD TYP BPU: NORMAL
BLOOD UNIT EXPIRATION DATE: NORMAL
BLOOD UNIT TYPE CODE: 5100
BLOOD UNIT TYPE CODE: 6200
BLOOD UNIT TYPE CODE: 7300
BLOOD UNIT TYPE: NORMAL
BODY SITE - BONE MARROW: NORMAL
BUN SERPL-MCNC: 10 MG/DL (ref 8–23)
BUN SERPL-MCNC: 11 MG/DL (ref 8–23)
BUN SERPL-MCNC: 11 MG/DL (ref 8–23)
BUN SERPL-MCNC: 12 MG/DL (ref 8–23)
BUN SERPL-MCNC: 12 MG/DL (ref 8–23)
BUN SERPL-MCNC: 13 MG/DL (ref 8–23)
BUN SERPL-MCNC: 14 MG/DL (ref 8–23)
BUN SERPL-MCNC: 14 MG/DL (ref 8–23)
BUN SERPL-MCNC: 16 MG/DL (ref 8–23)
BUN SERPL-MCNC: 5 MG/DL (ref 8–23)
BUN SERPL-MCNC: 7 MG/DL (ref 6–30)
BUN SERPL-MCNC: 7 MG/DL (ref 8–23)
BUN SERPL-MCNC: 7 MG/DL (ref 8–23)
BUN SERPL-MCNC: 9 MG/DL (ref 8–23)
BURR CELLS BLD QL SMEAR: ABNORMAL
CALCIUM SERPL-MCNC: 8.7 MG/DL (ref 8.7–10.5)
CALCIUM SERPL-MCNC: 8.8 MG/DL (ref 8.7–10.5)
CALCIUM SERPL-MCNC: 8.9 MG/DL (ref 8.7–10.5)
CALCIUM SERPL-MCNC: 8.9 MG/DL (ref 8.7–10.5)
CALCIUM SERPL-MCNC: 9 MG/DL (ref 8.7–10.5)
CALCIUM SERPL-MCNC: 9.1 MG/DL (ref 8.7–10.5)
CALCIUM SERPL-MCNC: 9.2 MG/DL (ref 8.7–10.5)
CALCIUM SERPL-MCNC: 9.2 MG/DL (ref 8.7–10.5)
CALCIUM SERPL-MCNC: 9.3 MG/DL (ref 8.7–10.5)
CALCIUM SERPL-MCNC: 9.3 MG/DL (ref 8.7–10.5)
CALCIUM SERPL-MCNC: 9.4 MG/DL (ref 8.7–10.5)
CALCIUM SERPL-MCNC: 9.9 MG/DL (ref 8.7–10.5)
CHLORIDE SERPL-SCNC: 101 MMOL/L (ref 95–110)
CHLORIDE SERPL-SCNC: 103 MMOL/L (ref 95–110)
CHLORIDE SERPL-SCNC: 104 MMOL/L (ref 95–110)
CHLORIDE SERPL-SCNC: 104 MMOL/L (ref 95–110)
CHLORIDE SERPL-SCNC: 105 MMOL/L (ref 95–110)
CHLORIDE SERPL-SCNC: 106 MMOL/L (ref 95–110)
CHLORIDE SERPL-SCNC: 106 MMOL/L (ref 95–110)
CHLORIDE SERPL-SCNC: 107 MMOL/L (ref 95–110)
CHLORIDE SERPL-SCNC: 108 MMOL/L (ref 95–110)
CHLORIDE SERPL-SCNC: 108 MMOL/L (ref 95–110)
CHLORIDE SERPL-SCNC: 99 MMOL/L (ref 95–110)
CHROM BANDING METHOD: NORMAL
CHROMOSOME ANALYSIS BM ADDITIONAL INFORMATION: NORMAL
CHROMOSOME ANALYSIS BM RELEASED BY: NORMAL
CHROMOSOME ANALYSIS BM RESULT SUMMARY: NORMAL
CLARITY UR REFRACT.AUTO: CLEAR
CLINICAL CYTOGENETICIST REVIEW: NORMAL
CLINICAL DIAGNOSIS - BONE MARROW: NORMAL
CO2 SERPL-SCNC: 20 MMOL/L (ref 23–29)
CO2 SERPL-SCNC: 21 MMOL/L (ref 23–29)
CO2 SERPL-SCNC: 22 MMOL/L (ref 23–29)
CO2 SERPL-SCNC: 23 MMOL/L (ref 23–29)
CO2 SERPL-SCNC: 24 MMOL/L (ref 23–29)
CO2 SERPL-SCNC: 24 MMOL/L (ref 23–29)
CO2 SERPL-SCNC: 25 MMOL/L (ref 23–29)
CO2 SERPL-SCNC: 25 MMOL/L (ref 23–29)
CO2 SERPL-SCNC: 26 MMOL/L (ref 23–29)
CO2 SERPL-SCNC: 28 MMOL/L (ref 23–29)
CODING SYSTEM: NORMAL
COLOR UR AUTO: YELLOW
COMMENT: NORMAL
COPPER SERPL-MCNC: 1415 UG/L (ref 810–1990)
CREAT SERPL-MCNC: 0.7 MG/DL (ref 0.5–1.4)
CREAT SERPL-MCNC: 0.8 MG/DL (ref 0.5–1.4)
CREAT SERPL-MCNC: 0.9 MG/DL (ref 0.5–1.4)
CRP SERPL-MCNC: 35 MG/L (ref 0–8.2)
CTP QC/QA: YES
CV ECHO LV RWT: 0.29 CM
DACRYOCYTES BLD QL SMEAR: ABNORMAL
DAT IGG-SP REAG RBC-IMP: NORMAL
DIFFERENTIAL METHOD: ABNORMAL
DISPENSE STATUS: NORMAL
DNA/RNA EXTRACT AND HOLD RESULT: NORMAL
DNA/RNA EXTRACTION: NORMAL
DOP CALC AO PEAK VEL: 1.61 M/S
DOP CALC AO VTI: 34.57 CM
DOP CALC LVOT AREA: 3.2 CM2
DOP CALC LVOT DIAMETER: 2.03 CM
DOP CALC LVOT PEAK VEL: 1.54 M/S
DOP CALC LVOT STROKE VOLUME: 103.84 CM3
DOP CALC MV VTI: 36.96 CM
DOP CALCLVOT PEAK VEL VTI: 32.1 CM
DX: NORMAL
E WAVE DECELERATION TIME: 204.4 MSEC
E/A RATIO: 1
E/E' RATIO: 13.73 M/S
ECHO LV POSTERIOR WALL: 0.72 CM (ref 0.6–1.1)
EJECTION FRACTION: 65 %
EOSINOPHIL # BLD AUTO: 0 K/UL (ref 0–0.5)
EOSINOPHIL # BLD AUTO: ABNORMAL K/UL (ref 0–0.5)
EOSINOPHIL NFR BLD: 0 % (ref 0–8)
EOSINOPHIL NFR BLD: 0.5 % (ref 0–8)
EOSINOPHIL NFR BLD: 1 % (ref 0–8)
EOSINOPHIL NFR BLD: 2 % (ref 0–8)
EOSINOPHIL NFR BLD: 3 % (ref 0–8)
EOSINOPHIL NFR BLD: 4 % (ref 0–8)
EOSINOPHIL NFR BLD: 7 % (ref 0–8)
ERYTHROCYTE [DISTWIDTH] IN BLOOD BY AUTOMATED COUNT: 15.5 % (ref 11.5–14.5)
ERYTHROCYTE [DISTWIDTH] IN BLOOD BY AUTOMATED COUNT: 15.7 % (ref 11.5–14.5)
ERYTHROCYTE [DISTWIDTH] IN BLOOD BY AUTOMATED COUNT: 15.9 % (ref 11.5–14.5)
ERYTHROCYTE [DISTWIDTH] IN BLOOD BY AUTOMATED COUNT: 16.8 % (ref 11.5–14.5)
ERYTHROCYTE [DISTWIDTH] IN BLOOD BY AUTOMATED COUNT: 17.4 % (ref 11.5–14.5)
ERYTHROCYTE [DISTWIDTH] IN BLOOD BY AUTOMATED COUNT: 17.5 % (ref 11.5–14.5)
ERYTHROCYTE [DISTWIDTH] IN BLOOD BY AUTOMATED COUNT: 17.6 % (ref 11.5–14.5)
ERYTHROCYTE [DISTWIDTH] IN BLOOD BY AUTOMATED COUNT: 17.6 % (ref 11.5–14.5)
ERYTHROCYTE [DISTWIDTH] IN BLOOD BY AUTOMATED COUNT: 18.3 % (ref 11.5–14.5)
ERYTHROCYTE [DISTWIDTH] IN BLOOD BY AUTOMATED COUNT: 18.7 % (ref 11.5–14.5)
ERYTHROCYTE [DISTWIDTH] IN BLOOD BY AUTOMATED COUNT: 19.1 % (ref 11.5–14.5)
ERYTHROCYTE [DISTWIDTH] IN BLOOD BY AUTOMATED COUNT: 19.2 % (ref 11.5–14.5)
ERYTHROCYTE [DISTWIDTH] IN BLOOD BY AUTOMATED COUNT: 19.5 % (ref 11.5–14.5)
ERYTHROCYTE [DISTWIDTH] IN BLOOD BY AUTOMATED COUNT: 19.6 % (ref 11.5–14.5)
ERYTHROCYTE [DISTWIDTH] IN BLOOD BY AUTOMATED COUNT: 19.6 % (ref 11.5–14.5)
ERYTHROCYTE [DISTWIDTH] IN BLOOD BY AUTOMATED COUNT: 19.7 % (ref 11.5–14.5)
ERYTHROCYTE [DISTWIDTH] IN BLOOD BY AUTOMATED COUNT: 19.9 % (ref 11.5–14.5)
ERYTHROCYTE [DISTWIDTH] IN BLOOD BY AUTOMATED COUNT: 20.1 % (ref 11.5–14.5)
ERYTHROCYTE [DISTWIDTH] IN BLOOD BY AUTOMATED COUNT: 20.3 % (ref 11.5–14.5)
ERYTHROCYTE [DISTWIDTH] IN BLOOD BY AUTOMATED COUNT: 20.4 % (ref 11.5–14.5)
ERYTHROCYTE [DISTWIDTH] IN BLOOD BY AUTOMATED COUNT: 20.5 % (ref 11.5–14.5)
ERYTHROCYTE [DISTWIDTH] IN BLOOD BY AUTOMATED COUNT: 20.6 % (ref 11.5–14.5)
ERYTHROCYTE [DISTWIDTH] IN BLOOD BY AUTOMATED COUNT: 20.8 % (ref 11.5–14.5)
ERYTHROCYTE [DISTWIDTH] IN BLOOD BY AUTOMATED COUNT: 20.8 % (ref 11.5–14.5)
ERYTHROCYTE [DISTWIDTH] IN BLOOD BY AUTOMATED COUNT: 21.2 % (ref 11.5–14.5)
ERYTHROCYTE [DISTWIDTH] IN BLOOD BY AUTOMATED COUNT: 21.3 % (ref 11.5–14.5)
ERYTHROCYTE [DISTWIDTH] IN BLOOD BY AUTOMATED COUNT: 23 % (ref 11.5–14.5)
ERYTHROCYTE [SEDIMENTATION RATE] IN BLOOD BY PHOTOMETRIC METHOD: 54 MM/HR (ref 0–36)
EST. GFR  (AFRICAN AMERICAN): >60 ML/MIN/1.73 M^2
EST. GFR  (NO RACE VARIABLE): >60 ML/MIN/1.73 M^2
EST. GFR  (NON AFRICAN AMERICAN): >60 ML/MIN/1.73 M^2
EXHR SPECIMEN TYPE: NORMAL
FERRITIN SERPL-MCNC: 1990 NG/ML (ref 20–300)
FINAL PATHOLOGIC DIAGNOSIS: NORMAL
FLOW CYTOMETRY ANTIBODIES ANALYZED - BONE MARROW: NORMAL
FLOW CYTOMETRY COMMENT - BONE MARROW: NORMAL
FLOW CYTOMETRY INTERPRETATION - BONE MARROW: NORMAL
FOLATE SERPL-MCNC: 8.1 NG/ML (ref 4–24)
FRACTIONAL SHORTENING: 28 % (ref 28–44)
FUNGUS SPEC CULT: NORMAL
GIANT PLATELETS BLD QL SMEAR: PRESENT
GLUCOSE SERPL-MCNC: 100 MG/DL (ref 70–110)
GLUCOSE SERPL-MCNC: 106 MG/DL (ref 70–110)
GLUCOSE SERPL-MCNC: 107 MG/DL (ref 70–110)
GLUCOSE SERPL-MCNC: 115 MG/DL (ref 70–110)
GLUCOSE SERPL-MCNC: 116 MG/DL (ref 70–110)
GLUCOSE SERPL-MCNC: 116 MG/DL (ref 70–110)
GLUCOSE SERPL-MCNC: 120 MG/DL (ref 70–110)
GLUCOSE SERPL-MCNC: 120 MG/DL (ref 70–110)
GLUCOSE SERPL-MCNC: 124 MG/DL (ref 70–110)
GLUCOSE SERPL-MCNC: 130 MG/DL (ref 70–110)
GLUCOSE SERPL-MCNC: 137 MG/DL (ref 70–110)
GLUCOSE SERPL-MCNC: 140 MG/DL (ref 70–110)
GLUCOSE SERPL-MCNC: 81 MG/DL (ref 70–110)
GLUCOSE SERPL-MCNC: 85 MG/DL (ref 70–110)
GLUCOSE SERPL-MCNC: 88 MG/DL (ref 70–110)
GLUCOSE SERPL-MCNC: 94 MG/DL (ref 70–110)
GLUCOSE SERPL-MCNC: 95 MG/DL (ref 70–110)
GLUCOSE SERPL-MCNC: 97 MG/DL (ref 70–110)
GLUCOSE SERPL-MCNC: 97 MG/DL (ref 70–110)
GLUCOSE UR QL STRIP: NEGATIVE
GRAM STN SPEC: NORMAL
GRAM STN SPEC: NORMAL
GROSS: NORMAL
HAPTOGLOB SERPL-MCNC: 379 MG/DL (ref 30–250)
HCT VFR BLD AUTO: 18.9 % (ref 37–48.5)
HCT VFR BLD AUTO: 18.9 % (ref 37–48.5)
HCT VFR BLD AUTO: 19.7 % (ref 37–48.5)
HCT VFR BLD AUTO: 20.3 % (ref 37–48.5)
HCT VFR BLD AUTO: 20.7 % (ref 37–48.5)
HCT VFR BLD AUTO: 20.8 % (ref 37–48.5)
HCT VFR BLD AUTO: 21.2 % (ref 37–48.5)
HCT VFR BLD AUTO: 21.4 % (ref 37–48.5)
HCT VFR BLD AUTO: 21.9 % (ref 37–48.5)
HCT VFR BLD AUTO: 22.6 % (ref 37–48.5)
HCT VFR BLD AUTO: 22.6 % (ref 37–48.5)
HCT VFR BLD AUTO: 22.7 % (ref 37–48.5)
HCT VFR BLD AUTO: 23 % (ref 37–48.5)
HCT VFR BLD AUTO: 24.3 % (ref 37–48.5)
HCT VFR BLD AUTO: 24.4 % (ref 37–48.5)
HCT VFR BLD AUTO: 24.5 % (ref 37–48.5)
HCT VFR BLD AUTO: 25 % (ref 37–48.5)
HCT VFR BLD AUTO: 25.1 % (ref 37–48.5)
HCT VFR BLD AUTO: 25.2 % (ref 37–48.5)
HCT VFR BLD AUTO: 25.9 % (ref 37–48.5)
HCT VFR BLD AUTO: 26 % (ref 37–48.5)
HCT VFR BLD AUTO: 26.2 % (ref 37–48.5)
HCT VFR BLD AUTO: 26.8 % (ref 37–48.5)
HCT VFR BLD AUTO: 28.3 % (ref 37–48.5)
HCT VFR BLD AUTO: 28.6 % (ref 37–48.5)
HCT VFR BLD AUTO: 32 % (ref 37–48.5)
HCT VFR BLD AUTO: 32.5 % (ref 37–48.5)
HCT VFR BLD CALC: 19 %PCV (ref 36–54)
HGB BLD-MCNC: 10.4 G/DL (ref 12–16)
HGB BLD-MCNC: 5.8 G/DL (ref 12–16)
HGB BLD-MCNC: 5.9 G/DL (ref 12–16)
HGB BLD-MCNC: 6 G/DL (ref 12–16)
HGB BLD-MCNC: 6.2 G/DL (ref 12–16)
HGB BLD-MCNC: 6.3 G/DL (ref 12–16)
HGB BLD-MCNC: 6.3 G/DL (ref 12–16)
HGB BLD-MCNC: 6.6 G/DL (ref 12–16)
HGB BLD-MCNC: 6.7 G/DL (ref 12–16)
HGB BLD-MCNC: 6.8 G/DL (ref 12–16)
HGB BLD-MCNC: 7.1 G/DL (ref 12–16)
HGB BLD-MCNC: 7.2 G/DL (ref 12–16)
HGB BLD-MCNC: 7.5 G/DL (ref 12–16)
HGB BLD-MCNC: 7.6 G/DL (ref 12–16)
HGB BLD-MCNC: 7.7 G/DL (ref 12–16)
HGB BLD-MCNC: 7.7 G/DL (ref 12–16)
HGB BLD-MCNC: 7.8 G/DL (ref 12–16)
HGB BLD-MCNC: 8 G/DL (ref 12–16)
HGB BLD-MCNC: 8.1 G/DL (ref 12–16)
HGB BLD-MCNC: 8.2 G/DL (ref 12–16)
HGB BLD-MCNC: 8.3 G/DL (ref 12–16)
HGB BLD-MCNC: 8.8 G/DL (ref 12–16)
HGB BLD-MCNC: 9 G/DL (ref 12–16)
HGB BLD-MCNC: 9.1 G/DL (ref 12–16)
HGB BLD-MCNC: 9.8 G/DL (ref 12–16)
HGB UR QL STRIP: NEGATIVE
HYPOCHROMIA BLD QL SMEAR: ABNORMAL
IMM GRANULOCYTES # BLD AUTO: 0 K/UL (ref 0–0.04)
IMM GRANULOCYTES # BLD AUTO: 0 K/UL (ref 0–0.04)
IMM GRANULOCYTES # BLD AUTO: 0.01 K/UL (ref 0–0.04)
IMM GRANULOCYTES # BLD AUTO: 0.93 K/UL (ref 0–0.04)
IMM GRANULOCYTES # BLD AUTO: ABNORMAL K/UL (ref 0–0.04)
IMM GRANULOCYTES NFR BLD AUTO: 0 % (ref 0–0.5)
IMM GRANULOCYTES NFR BLD AUTO: 0 % (ref 0–0.5)
IMM GRANULOCYTES NFR BLD AUTO: 3 % (ref 0–0.5)
IMM GRANULOCYTES NFR BLD AUTO: ABNORMAL % (ref 0–0.5)
INFLUENZA A, MOLECULAR: NEGATIVE
INFLUENZA B, MOLECULAR: NEGATIVE
INR PPP: 1.1 (ref 0.8–1.2)
INR PPP: 1.1 (ref 0.8–1.2)
INTERVENTRICULAR SEPTUM: 0.72 CM (ref 0.6–1.1)
IRON SERPL-MCNC: 29 UG/DL (ref 30–160)
IRON SERPL-MCNC: 56 UG/DL (ref 30–160)
IVRT: 131.49 MSEC
KARYOTYP MAR: NORMAL
KETONES UR QL STRIP: ABNORMAL
KETONES UR QL STRIP: ABNORMAL
KETONES UR QL STRIP: NEGATIVE
KETONES UR QL STRIP: NEGATIVE
LA MAJOR: 5.09 CM
LA MINOR: 4.71 CM
LA WIDTH: 3.93 CM
LACTATE SERPL-SCNC: 1.3 MMOL/L (ref 0.5–2.2)
LACTATE SERPL-SCNC: 1.4 MMOL/L (ref 0.5–2.2)
LDH SERPL L TO P-CCNC: 239 U/L (ref 110–260)
LEFT ATRIUM SIZE: 3.35 CM
LEFT ATRIUM VOLUME: 54.75 CM3
LEFT INTERNAL DIMENSION IN SYSTOLE: 3.54 CM (ref 2.1–4)
LEFT VENTRICLE DIASTOLIC VOLUME: 113.27 ML
LEFT VENTRICLE SYSTOLIC VOLUME: 52.32 ML
LEFT VENTRICULAR INTERNAL DIMENSION IN DIASTOLE: 4.91 CM (ref 3.5–6)
LEFT VENTRICULAR MASS: 115.15 G
LEUKOCYTE ESTERASE UR QL STRIP: ABNORMAL
LEUKOCYTE ESTERASE UR QL STRIP: NEGATIVE
LV LATERAL E/E' RATIO: 12.88 M/S
LV SEPTAL E/E' RATIO: 14.71 M/S
LYMPHOCYTES # BLD AUTO: 0.2 K/UL (ref 1–4.8)
LYMPHOCYTES # BLD AUTO: 0.3 K/UL (ref 1–4.8)
LYMPHOCYTES # BLD AUTO: 0.6 K/UL (ref 1–4.8)
LYMPHOCYTES # BLD AUTO: ABNORMAL K/UL (ref 1–4.8)
LYMPHOCYTES NFR BLD: 10 % (ref 18–48)
LYMPHOCYTES NFR BLD: 11 % (ref 18–48)
LYMPHOCYTES NFR BLD: 12 % (ref 18–48)
LYMPHOCYTES NFR BLD: 12 % (ref 18–48)
LYMPHOCYTES NFR BLD: 14 % (ref 18–48)
LYMPHOCYTES NFR BLD: 14 % (ref 18–48)
LYMPHOCYTES NFR BLD: 16 % (ref 18–48)
LYMPHOCYTES NFR BLD: 17 % (ref 18–48)
LYMPHOCYTES NFR BLD: 18 % (ref 18–48)
LYMPHOCYTES NFR BLD: 19 % (ref 18–48)
LYMPHOCYTES NFR BLD: 2 % (ref 18–48)
LYMPHOCYTES NFR BLD: 21 % (ref 18–48)
LYMPHOCYTES NFR BLD: 23 % (ref 18–48)
LYMPHOCYTES NFR BLD: 26.1 % (ref 18–48)
LYMPHOCYTES NFR BLD: 48 % (ref 18–48)
LYMPHOCYTES NFR BLD: 6 % (ref 18–48)
LYMPHOCYTES NFR BLD: 7 % (ref 18–48)
LYMPHOCYTES NFR BLD: 70 % (ref 18–48)
LYMPHOCYTES NFR BLD: 72.5 % (ref 18–48)
LYMPHOCYTES NFR BLD: 72.7 % (ref 18–48)
LYMPHOCYTES NFR BLD: 8 % (ref 18–48)
LYMPHOCYTES NFR BLD: 9 % (ref 18–48)
Lab: NORMAL
MAGNESIUM SERPL-MCNC: 1.7 MG/DL (ref 1.6–2.6)
MAGNESIUM SERPL-MCNC: 1.7 MG/DL (ref 1.6–2.6)
MAGNESIUM SERPL-MCNC: 1.8 MG/DL (ref 1.6–2.6)
MAGNESIUM SERPL-MCNC: 1.8 MG/DL (ref 1.6–2.6)
MAGNESIUM SERPL-MCNC: 1.9 MG/DL (ref 1.6–2.6)
MCH RBC QN AUTO: 27.7 PG (ref 27–31)
MCH RBC QN AUTO: 27.8 PG (ref 27–31)
MCH RBC QN AUTO: 27.9 PG (ref 27–31)
MCH RBC QN AUTO: 28 PG (ref 27–31)
MCH RBC QN AUTO: 28.3 PG (ref 27–31)
MCH RBC QN AUTO: 28.4 PG (ref 27–31)
MCH RBC QN AUTO: 28.6 PG (ref 27–31)
MCH RBC QN AUTO: 28.8 PG (ref 27–31)
MCH RBC QN AUTO: 28.8 PG (ref 27–31)
MCH RBC QN AUTO: 28.9 PG (ref 27–31)
MCH RBC QN AUTO: 29 PG (ref 27–31)
MCH RBC QN AUTO: 29.1 PG (ref 27–31)
MCH RBC QN AUTO: 29.4 PG (ref 27–31)
MCH RBC QN AUTO: 29.8 PG (ref 27–31)
MCHC RBC AUTO-ENTMCNC: 29.6 G/DL (ref 32–36)
MCHC RBC AUTO-ENTMCNC: 29.8 G/DL (ref 32–36)
MCHC RBC AUTO-ENTMCNC: 30.4 G/DL (ref 32–36)
MCHC RBC AUTO-ENTMCNC: 30.5 G/DL (ref 32–36)
MCHC RBC AUTO-ENTMCNC: 30.6 G/DL (ref 32–36)
MCHC RBC AUTO-ENTMCNC: 30.7 G/DL (ref 32–36)
MCHC RBC AUTO-ENTMCNC: 30.8 G/DL (ref 32–36)
MCHC RBC AUTO-ENTMCNC: 30.9 G/DL (ref 32–36)
MCHC RBC AUTO-ENTMCNC: 31 G/DL (ref 32–36)
MCHC RBC AUTO-ENTMCNC: 31 G/DL (ref 32–36)
MCHC RBC AUTO-ENTMCNC: 31.2 G/DL (ref 32–36)
MCHC RBC AUTO-ENTMCNC: 31.3 G/DL (ref 32–36)
MCHC RBC AUTO-ENTMCNC: 31.7 G/DL (ref 32–36)
MCHC RBC AUTO-ENTMCNC: 31.8 G/DL (ref 32–36)
MCHC RBC AUTO-ENTMCNC: 31.8 G/DL (ref 32–36)
MCHC RBC AUTO-ENTMCNC: 31.9 G/DL (ref 32–36)
MCHC RBC AUTO-ENTMCNC: 32 G/DL (ref 32–36)
MCHC RBC AUTO-ENTMCNC: 32.1 G/DL (ref 32–36)
MCHC RBC AUTO-ENTMCNC: 32.5 G/DL (ref 32–36)
MCHC RBC AUTO-ENTMCNC: 32.8 G/DL (ref 32–36)
MCHC RBC AUTO-ENTMCNC: 32.9 G/DL (ref 32–36)
MCV RBC AUTO: 85 FL (ref 82–98)
MCV RBC AUTO: 86 FL (ref 82–98)
MCV RBC AUTO: 88 FL (ref 82–98)
MCV RBC AUTO: 88 FL (ref 82–98)
MCV RBC AUTO: 89 FL (ref 82–98)
MCV RBC AUTO: 90 FL (ref 82–98)
MCV RBC AUTO: 91 FL (ref 82–98)
MCV RBC AUTO: 92 FL (ref 82–98)
MCV RBC AUTO: 93 FL (ref 82–98)
MCV RBC AUTO: 94 FL (ref 82–98)
MCV RBC AUTO: 95 FL (ref 82–98)
MCV RBC AUTO: 95 FL (ref 82–98)
MCV RBC AUTO: 96 FL (ref 82–98)
METAMYELOCYTES NFR BLD MANUAL: 1 %
METAMYELOCYTES NFR BLD MANUAL: 11 %
METAMYELOCYTES NFR BLD MANUAL: 15 %
METAMYELOCYTES NFR BLD MANUAL: 2 %
METAMYELOCYTES NFR BLD MANUAL: 28 %
METAMYELOCYTES NFR BLD MANUAL: 3 %
METAMYELOCYTES NFR BLD MANUAL: 3 %
METAMYELOCYTES NFR BLD MANUAL: 4 %
METAMYELOCYTES NFR BLD MANUAL: 5 %
METAMYELOCYTES NFR BLD MANUAL: 6 %
MICROSCOPIC COMMENT: ABNORMAL
MICROSCOPIC EXAM: NORMAL
MONOCYTES # BLD AUTO: 0 K/UL (ref 0.3–1)
MONOCYTES # BLD AUTO: 0 K/UL (ref 0.3–1)
MONOCYTES # BLD AUTO: 0.5 K/UL (ref 0.3–1)
MONOCYTES # BLD AUTO: ABNORMAL K/UL (ref 0.3–1)
MONOCYTES NFR BLD: 0 % (ref 4–15)
MONOCYTES NFR BLD: 10 % (ref 4–15)
MONOCYTES NFR BLD: 11 % (ref 4–15)
MONOCYTES NFR BLD: 11 % (ref 4–15)
MONOCYTES NFR BLD: 15 % (ref 4–15)
MONOCYTES NFR BLD: 19 % (ref 4–15)
MONOCYTES NFR BLD: 2 % (ref 4–15)
MONOCYTES NFR BLD: 2 % (ref 4–15)
MONOCYTES NFR BLD: 2.5 % (ref 4–15)
MONOCYTES NFR BLD: 20 % (ref 4–15)
MONOCYTES NFR BLD: 21.2 % (ref 4–15)
MONOCYTES NFR BLD: 3 % (ref 4–15)
MONOCYTES NFR BLD: 4 % (ref 4–15)
MONOCYTES NFR BLD: 5 % (ref 4–15)
MONOCYTES NFR BLD: 6 % (ref 4–15)
MONOCYTES NFR BLD: 7 % (ref 4–15)
MONOCYTES NFR BLD: 8 % (ref 4–15)
MONOCYTES NFR BLD: 8 % (ref 4–15)
MONOCYTES NFR BLD: 9.1 % (ref 4–15)
MV MEAN GRADIENT: 1 MMHG
MV PEAK A VEL: 1.03 M/S
MV PEAK E VEL: 1.03 M/S
MV PEAK GRADIENT: 6 MMHG
MV STENOSIS PRESSURE HALF TIME: 59.28 MS
MV VALVE AREA BY CONTINUITY EQUATION: 2.81 CM2
MV VALVE AREA P 1/2 METHOD: 3.71 CM2
MYELOCYTES NFR BLD MANUAL: 11 %
MYELOCYTES NFR BLD MANUAL: 12 %
MYELOCYTES NFR BLD MANUAL: 2 %
MYELOCYTES NFR BLD MANUAL: 3 %
MYELOCYTES NFR BLD MANUAL: 4 %
MYELOCYTES NFR BLD MANUAL: 5 %
MYELOCYTES NFR BLD MANUAL: 6 %
MYELOCYTES NFR BLD MANUAL: 9 %
MYELOCYTES NFR BLD MANUAL: 9 %
NEUTROPHILS # BLD AUTO: 0 K/UL (ref 1.8–7.7)
NEUTROPHILS # BLD AUTO: 0.1 K/UL (ref 1.8–7.7)
NEUTROPHILS # BLD AUTO: 1.1 K/UL (ref 1.8–7.7)
NEUTROPHILS # BLD AUTO: 2.4 K/UL (ref 1.8–7.7)
NEUTROPHILS # BLD AUTO: ABNORMAL K/UL (ref 1.8–7.7)
NEUTROPHILS NFR BLD: 10 % (ref 38–73)
NEUTROPHILS NFR BLD: 12.2 % (ref 38–73)
NEUTROPHILS NFR BLD: 22.5 % (ref 38–73)
NEUTROPHILS NFR BLD: 33 % (ref 38–73)
NEUTROPHILS NFR BLD: 36 % (ref 38–73)
NEUTROPHILS NFR BLD: 40 % (ref 38–73)
NEUTROPHILS NFR BLD: 42 % (ref 38–73)
NEUTROPHILS NFR BLD: 50 % (ref 38–73)
NEUTROPHILS NFR BLD: 50 % (ref 38–73)
NEUTROPHILS NFR BLD: 50.8 % (ref 38–73)
NEUTROPHILS NFR BLD: 52 % (ref 38–73)
NEUTROPHILS NFR BLD: 52 % (ref 38–73)
NEUTROPHILS NFR BLD: 53 % (ref 38–73)
NEUTROPHILS NFR BLD: 53 % (ref 38–73)
NEUTROPHILS NFR BLD: 54 % (ref 38–73)
NEUTROPHILS NFR BLD: 60 % (ref 38–73)
NEUTROPHILS NFR BLD: 62 % (ref 38–73)
NEUTROPHILS NFR BLD: 63 % (ref 38–73)
NEUTROPHILS NFR BLD: 64 % (ref 38–73)
NEUTROPHILS NFR BLD: 69 % (ref 38–73)
NEUTROPHILS NFR BLD: 74 % (ref 38–73)
NEUTROPHILS NFR BLD: 76 % (ref 38–73)
NEUTROPHILS NFR BLD: 77 % (ref 38–73)
NEUTROPHILS NFR BLD: 79 % (ref 38–73)
NEUTROPHILS NFR BLD: 80 % (ref 38–73)
NEUTROPHILS NFR BLD: 80 % (ref 38–73)
NEUTS BAND NFR BLD MANUAL: 10 %
NEUTS BAND NFR BLD MANUAL: 11 %
NEUTS BAND NFR BLD MANUAL: 12 %
NEUTS BAND NFR BLD MANUAL: 13 %
NEUTS BAND NFR BLD MANUAL: 14 %
NEUTS BAND NFR BLD MANUAL: 17 %
NEUTS BAND NFR BLD MANUAL: 2 %
NEUTS BAND NFR BLD MANUAL: 2 %
NEUTS BAND NFR BLD MANUAL: 3 %
NEUTS BAND NFR BLD MANUAL: 4 %
NEUTS BAND NFR BLD MANUAL: 5 %
NEUTS BAND NFR BLD MANUAL: 6 %
NEUTS BAND NFR BLD MANUAL: 6 %
NEUTS BAND NFR BLD MANUAL: 7 %
NEUTS BAND NFR BLD MANUAL: 8 %
NGS CLINCIAL TRIALS: NORMAL
NGS INTERPRETATION: NORMAL
NGS ONCOHEME PANEL GENE LIST: NORMAL
NGS PATHOGENIC MUTATIONS DETECTED: NORMAL
NGS REVIEWED BY:: NORMAL
NGS VARIANTS OF UNKNOWN SIGNIFICANCE: NORMAL
NGSHM RESULT, BLOOD: NORMAL
NITRITE UR QL STRIP: NEGATIVE
NRBC BLD-RTO: 0 /100 WBC
NRBC BLD-RTO: 1 /100 WBC
NRBC BLD-RTO: 2 /100 WBC
NRBC BLD-RTO: 3 /100 WBC
NRBC BLD-RTO: 4 /100 WBC
NRBC BLD-RTO: 5 /100 WBC
NRBC BLD-RTO: 6 /100 WBC
NRBC BLD-RTO: 6 /100 WBC
NRBC BLD-RTO: 7 /100 WBC
NUM UNITS TRANS PACKED RBC: NORMAL
OVALOCYTES BLD QL SMEAR: ABNORMAL
PATH REV BLD -IMP: NORMAL
PH UR STRIP: 5 [PH] (ref 5–8)
PH UR STRIP: 6 [PH] (ref 5–8)
PHOSPHATE SERPL-MCNC: 2.7 MG/DL (ref 2.7–4.5)
PHOSPHATE SERPL-MCNC: 3 MG/DL (ref 2.7–4.5)
PHOSPHATE SERPL-MCNC: 3 MG/DL (ref 2.7–4.5)
PHOSPHATE SERPL-MCNC: 3.3 MG/DL (ref 2.7–4.5)
PHOSPHATE SERPL-MCNC: 4 MG/DL (ref 2.7–4.5)
PISA TR MAX VEL: 1.82 M/S
PLATELET # BLD AUTO: 101 K/UL (ref 150–450)
PLATELET # BLD AUTO: 102 K/UL (ref 150–450)
PLATELET # BLD AUTO: 108 K/UL (ref 150–450)
PLATELET # BLD AUTO: 113 K/UL (ref 150–450)
PLATELET # BLD AUTO: 114 K/UL (ref 150–450)
PLATELET # BLD AUTO: 114 K/UL (ref 150–450)
PLATELET # BLD AUTO: 121 K/UL (ref 150–450)
PLATELET # BLD AUTO: 13 K/UL (ref 150–450)
PLATELET # BLD AUTO: 150 K/UL (ref 150–450)
PLATELET # BLD AUTO: 152 K/UL (ref 150–450)
PLATELET # BLD AUTO: 30 K/UL (ref 150–450)
PLATELET # BLD AUTO: 31 K/UL (ref 150–450)
PLATELET # BLD AUTO: 33 K/UL (ref 150–450)
PLATELET # BLD AUTO: 35 K/UL (ref 150–450)
PLATELET # BLD AUTO: 41 K/UL (ref 150–450)
PLATELET # BLD AUTO: 44 K/UL (ref 150–450)
PLATELET # BLD AUTO: 47 K/UL (ref 150–450)
PLATELET # BLD AUTO: 50 K/UL (ref 150–450)
PLATELET # BLD AUTO: 55 K/UL (ref 150–450)
PLATELET # BLD AUTO: 58 K/UL (ref 150–450)
PLATELET # BLD AUTO: 58 K/UL (ref 150–450)
PLATELET # BLD AUTO: 7 K/UL (ref 150–450)
PLATELET # BLD AUTO: 84 K/UL (ref 150–450)
PLATELET # BLD AUTO: 89 K/UL (ref 150–450)
PLATELET # BLD AUTO: 93 K/UL (ref 150–450)
PLATELET # BLD AUTO: 94 K/UL (ref 150–450)
PLATELET # BLD AUTO: 95 K/UL (ref 150–450)
PLATELET BLD QL SMEAR: ABNORMAL
PMV BLD AUTO: 10.8 FL (ref 9.2–12.9)
PMV BLD AUTO: 10.9 FL (ref 9.2–12.9)
PMV BLD AUTO: 11 FL (ref 9.2–12.9)
PMV BLD AUTO: 11 FL (ref 9.2–12.9)
PMV BLD AUTO: 11.1 FL (ref 9.2–12.9)
PMV BLD AUTO: 11.1 FL (ref 9.2–12.9)
PMV BLD AUTO: 11.4 FL (ref 9.2–12.9)
PMV BLD AUTO: 11.6 FL (ref 9.2–12.9)
PMV BLD AUTO: 12.2 FL (ref 9.2–12.9)
PMV BLD AUTO: 12.2 FL (ref 9.2–12.9)
PMV BLD AUTO: ABNORMAL FL (ref 9.2–12.9)
POC IONIZED CALCIUM: 1.14 MMOL/L (ref 1.06–1.42)
POC TCO2 (MEASURED): 24 MMOL/L (ref 23–29)
POIKILOCYTOSIS BLD QL SMEAR: SLIGHT
POLYCHROMASIA BLD QL SMEAR: ABNORMAL
POTASSIUM BLD-SCNC: 3.8 MMOL/L (ref 3.5–5.1)
POTASSIUM SERPL-SCNC: 3.6 MMOL/L (ref 3.5–5.1)
POTASSIUM SERPL-SCNC: 3.8 MMOL/L (ref 3.5–5.1)
POTASSIUM SERPL-SCNC: 3.9 MMOL/L (ref 3.5–5.1)
POTASSIUM SERPL-SCNC: 3.9 MMOL/L (ref 3.5–5.1)
POTASSIUM SERPL-SCNC: 4 MMOL/L (ref 3.5–5.1)
POTASSIUM SERPL-SCNC: 4.1 MMOL/L (ref 3.5–5.1)
POTASSIUM SERPL-SCNC: 4.1 MMOL/L (ref 3.5–5.1)
POTASSIUM SERPL-SCNC: 4.2 MMOL/L (ref 3.5–5.1)
POTASSIUM SERPL-SCNC: 4.2 MMOL/L (ref 3.5–5.1)
POTASSIUM SERPL-SCNC: 4.3 MMOL/L (ref 3.5–5.1)
POTASSIUM SERPL-SCNC: 4.5 MMOL/L (ref 3.5–5.1)
POTASSIUM SERPL-SCNC: 5.5 MMOL/L (ref 3.5–5.1)
PROCALCITONIN SERPL IA-MCNC: 0.09 NG/ML
PROCALCITONIN SERPL IA-MCNC: 0.36 NG/ML
PROMYELOCYTES NFR BLD MANUAL: 2 %
PROT SERPL-MCNC: 6.6 G/DL (ref 6–8.4)
PROT SERPL-MCNC: 6.6 G/DL (ref 6–8.4)
PROT SERPL-MCNC: 6.7 G/DL (ref 6–8.4)
PROT SERPL-MCNC: 6.8 G/DL (ref 6–8.4)
PROT SERPL-MCNC: 6.8 G/DL (ref 6–8.4)
PROT SERPL-MCNC: 7 G/DL (ref 6–8.4)
PROT SERPL-MCNC: 7 G/DL (ref 6–8.4)
PROT SERPL-MCNC: 7.1 G/DL (ref 6–8.4)
PROT SERPL-MCNC: 7.3 G/DL (ref 6–8.4)
PROT SERPL-MCNC: 7.4 G/DL (ref 6–8.4)
PROT SERPL-MCNC: 7.4 G/DL (ref 6–8.4)
PROT SERPL-MCNC: 7.5 G/DL (ref 6–8.4)
PROT SERPL-MCNC: 7.6 G/DL (ref 6–8.4)
PROT SERPL-MCNC: 7.9 G/DL (ref 6–8.4)
PROT UR QL STRIP: NEGATIVE
PROTHROMBIN TIME: 11.8 SEC (ref 9–12.5)
PROTHROMBIN TIME: 11.9 SEC (ref 9–12.5)
PULM VEIN S/D RATIO: 1.32
PV PEAK D VEL: 0.5 M/S
PV PEAK S VEL: 0.66 M/S
PV PEAK VELOCITY: 1.02 CM/S
RA MAJOR: 4.2 CM
RA PRESSURE: 8 MMHG
RA WIDTH: 2.82 CM
RBC # BLD AUTO: 2.05 M/UL (ref 4–5.4)
RBC # BLD AUTO: 2.06 M/UL (ref 4–5.4)
RBC # BLD AUTO: 2.12 M/UL (ref 4–5.4)
RBC # BLD AUTO: 2.17 M/UL (ref 4–5.4)
RBC # BLD AUTO: 2.23 M/UL (ref 4–5.4)
RBC # BLD AUTO: 2.23 M/UL (ref 4–5.4)
RBC # BLD AUTO: 2.29 M/UL (ref 4–5.4)
RBC # BLD AUTO: 2.31 M/UL (ref 4–5.4)
RBC # BLD AUTO: 2.38 M/UL (ref 4–5.4)
RBC # BLD AUTO: 2.39 M/UL (ref 4–5.4)
RBC # BLD AUTO: 2.5 M/UL (ref 4–5.4)
RBC # BLD AUTO: 2.52 M/UL (ref 4–5.4)
RBC # BLD AUTO: 2.57 M/UL (ref 4–5.4)
RBC # BLD AUTO: 2.66 M/UL (ref 4–5.4)
RBC # BLD AUTO: 2.68 M/UL (ref 4–5.4)
RBC # BLD AUTO: 2.71 M/UL (ref 4–5.4)
RBC # BLD AUTO: 2.72 M/UL (ref 4–5.4)
RBC # BLD AUTO: 2.79 M/UL (ref 4–5.4)
RBC # BLD AUTO: 2.79 M/UL (ref 4–5.4)
RBC # BLD AUTO: 2.88 M/UL (ref 4–5.4)
RBC # BLD AUTO: 2.9 M/UL (ref 4–5.4)
RBC # BLD AUTO: 2.93 M/UL (ref 4–5.4)
RBC # BLD AUTO: 3.14 M/UL (ref 4–5.4)
RBC # BLD AUTO: 3.14 M/UL (ref 4–5.4)
RBC # BLD AUTO: 3.23 M/UL (ref 4–5.4)
RBC # BLD AUTO: 3.52 M/UL (ref 4–5.4)
RBC # BLD AUTO: 3.6 M/UL (ref 4–5.4)
RBC #/AREA URNS AUTO: 1 /HPF (ref 0–4)
REASON FOR REFERRAL (NARRATIVE): NORMAL
REF LAB TEST METHOD: NORMAL
REF LAB TEST METHOD: NORMAL
RETICS/RBC NFR AUTO: 1 % (ref 0.5–2.5)
RIGHT VENTRICULAR END-DIASTOLIC DIMENSION: 3.02 CM
RV TISSUE DOPPLER FREE WALL SYSTOLIC VELOCITY 1 (APICAL 4 CHAMBER VIEW): 13.29 CM/S
SAMPLE: ABNORMAL
SARS-COV-2 RDRP RESP QL NAA+PROBE: NEGATIVE
SATURATED IRON: 11 % (ref 20–50)
SATURATED IRON: 22 % (ref 20–50)
SCHISTOCYTES BLD QL SMEAR: ABNORMAL
SCHISTOCYTES BLD QL SMEAR: PRESENT
SINUS: 2.76 CM
SODIUM BLD-SCNC: 136 MMOL/L (ref 136–145)
SODIUM SERPL-SCNC: 134 MMOL/L (ref 136–145)
SODIUM SERPL-SCNC: 134 MMOL/L (ref 136–145)
SODIUM SERPL-SCNC: 135 MMOL/L (ref 136–145)
SODIUM SERPL-SCNC: 136 MMOL/L (ref 136–145)
SODIUM SERPL-SCNC: 137 MMOL/L (ref 136–145)
SODIUM SERPL-SCNC: 137 MMOL/L (ref 136–145)
SODIUM SERPL-SCNC: 139 MMOL/L (ref 136–145)
SODIUM SERPL-SCNC: 140 MMOL/L (ref 136–145)
SP GR UR STRIP: 1.01 (ref 1–1.03)
SP GR UR STRIP: 1.01 (ref 1–1.03)
SP GR UR STRIP: 1.02 (ref 1–1.03)
SP GR UR STRIP: >1.03 (ref 1–1.03)
SPECIMEN SOURCE: NORMAL
SPECIMEN: NORMAL
SPHEROCYTES BLD QL SMEAR: ABNORMAL
SQUAMOUS #/AREA URNS AUTO: 0 /HPF
STJ: 2.5 CM
SUPPLEMENTAL DIAGNOSIS: NORMAL
TDI LATERAL: 0.08 M/S
TDI SEPTAL: 0.07 M/S
TDI: 0.08 M/S
TEST PERFORMANCE INFO SPEC: NORMAL
TOTAL IRON BINDING CAPACITY: 249 UG/DL (ref 250–450)
TOTAL IRON BINDING CAPACITY: 259 UG/DL (ref 250–450)
TOXIC GRANULES BLD QL SMEAR: PRESENT
TR MAX PG: 13 MMHG
TRANSFERRIN SERPL-MCNC: 168 MG/DL (ref 200–375)
TRANSFERRIN SERPL-MCNC: 175 MG/DL (ref 200–375)
TRICUSPID ANNULAR PLANE SYSTOLIC EXCURSION: 2.18 CM
TROPONIN I SERPL DL<=0.01 NG/ML-MCNC: <0.006 NG/ML (ref 0–0.03)
TV REST PULMONARY ARTERY PRESSURE: 21 MMHG
UNIT NUMBER: NORMAL
UNIT NUMBER: NORMAL
URN SPEC COLLECT METH UR: ABNORMAL
URN SPEC COLLECT METH UR: NORMAL
VIT B12 SERPL-MCNC: 1670 PG/ML (ref 210–950)
WBC # BLD AUTO: 0.33 K/UL (ref 3.9–12.7)
WBC # BLD AUTO: 0.35 K/UL (ref 3.9–12.7)
WBC # BLD AUTO: 0.4 K/UL (ref 3.9–12.7)
WBC # BLD AUTO: 1.19 K/UL (ref 3.9–12.7)
WBC # BLD AUTO: 1.5 K/UL (ref 3.9–12.7)
WBC # BLD AUTO: 10.01 K/UL (ref 3.9–12.7)
WBC # BLD AUTO: 10.75 K/UL (ref 3.9–12.7)
WBC # BLD AUTO: 10.76 K/UL (ref 3.9–12.7)
WBC # BLD AUTO: 10.91 K/UL (ref 3.9–12.7)
WBC # BLD AUTO: 10.93 K/UL (ref 3.9–12.7)
WBC # BLD AUTO: 11.01 K/UL (ref 3.9–12.7)
WBC # BLD AUTO: 11.09 K/UL (ref 3.9–12.7)
WBC # BLD AUTO: 11.74 K/UL (ref 3.9–12.7)
WBC # BLD AUTO: 11.87 K/UL (ref 3.9–12.7)
WBC # BLD AUTO: 13.48 K/UL (ref 3.9–12.7)
WBC # BLD AUTO: 14.26 K/UL (ref 3.9–12.7)
WBC # BLD AUTO: 17.17 K/UL (ref 3.9–12.7)
WBC # BLD AUTO: 2.22 K/UL (ref 3.9–12.7)
WBC # BLD AUTO: 20.04 K/UL (ref 3.9–12.7)
WBC # BLD AUTO: 3.8 K/UL (ref 3.9–12.7)
WBC # BLD AUTO: 4.49 K/UL (ref 3.9–12.7)
WBC # BLD AUTO: 5.11 K/UL (ref 3.9–12.7)
WBC # BLD AUTO: 5.21 K/UL (ref 3.9–12.7)
WBC # BLD AUTO: 5.54 K/UL (ref 3.9–12.7)
WBC # BLD AUTO: 7.77 K/UL (ref 3.9–12.7)
WBC # BLD AUTO: 9.92 K/UL (ref 3.9–12.7)
WBC # BLD AUTO: 9.95 K/UL (ref 3.9–12.7)
WBC #/AREA URNS AUTO: 9 /HPF (ref 0–5)
WBC OTHER NFR BLD MANUAL: 0 %
WBC OTHER NFR BLD MANUAL: 1 %
WBC OTHER NFR BLD MANUAL: 2 %
WBC OTHER NFR BLD MANUAL: 2 %

## 2022-01-01 PROCEDURE — 99214 OFFICE O/P EST MOD 30 MIN: CPT | Mod: PBBFAC | Performed by: INTERNAL MEDICINE

## 2022-01-01 PROCEDURE — 99999 PR PBB SHADOW E&M-EST. PATIENT-LVL I: ICD-10-PCS | Mod: PBBFAC,,, | Performed by: PSYCHOLOGIST

## 2022-01-01 PROCEDURE — 93005 ELECTROCARDIOGRAM TRACING: CPT

## 2022-01-01 PROCEDURE — 99239 HOSP IP/OBS DSCHRG MGMT >30: CPT | Mod: ,,, | Performed by: STUDENT IN AN ORGANIZED HEALTH CARE EDUCATION/TRAINING PROGRAM

## 2022-01-01 PROCEDURE — 99900035 HC TECH TIME PER 15 MIN (STAT)

## 2022-01-01 PROCEDURE — A9585 GADOBUTROL INJECTION: HCPCS | Performed by: INTERNAL MEDICINE

## 2022-01-01 PROCEDURE — 85060 BLOOD SMEAR INTERPRETATION: CPT | Mod: ,,, | Performed by: PATHOLOGY

## 2022-01-01 PROCEDURE — 99232 PR SUBSEQUENT HOSPITAL CARE,LEVL II: ICD-10-PCS | Mod: ,,, | Performed by: STUDENT IN AN ORGANIZED HEALTH CARE EDUCATION/TRAINING PROGRAM

## 2022-01-01 PROCEDURE — 85007 BL SMEAR W/DIFF WBC COUNT: CPT | Performed by: STUDENT IN AN ORGANIZED HEALTH CARE EDUCATION/TRAINING PROGRAM

## 2022-01-01 PROCEDURE — P9040 RBC LEUKOREDUCED IRRADIATED: HCPCS | Performed by: NURSE PRACTITIONER

## 2022-01-01 PROCEDURE — 99999 PR PBB SHADOW E&M-EST. PATIENT-LVL III: ICD-10-PCS | Mod: PBBFAC,,, | Performed by: NURSE PRACTITIONER

## 2022-01-01 PROCEDURE — 85027 COMPLETE CBC AUTOMATED: CPT | Mod: 91 | Performed by: FAMILY MEDICINE

## 2022-01-01 PROCEDURE — 25000003 PHARM REV CODE 250: Performed by: STUDENT IN AN ORGANIZED HEALTH CARE EDUCATION/TRAINING PROGRAM

## 2022-01-01 PROCEDURE — 99215 OFFICE O/P EST HI 40 MIN: CPT | Mod: S$PBB,,, | Performed by: INTERNAL MEDICINE

## 2022-01-01 PROCEDURE — 99999 PR PBB SHADOW E&M-EST. PATIENT-LVL IV: CPT | Mod: PBBFAC,,, | Performed by: NURSE PRACTITIONER

## 2022-01-01 PROCEDURE — 99214 OFFICE O/P EST MOD 30 MIN: CPT | Mod: S$PBB,,, | Performed by: PHYSICIAN ASSISTANT

## 2022-01-01 PROCEDURE — 99223 PR INITIAL HOSPITAL CARE,LEVL III: ICD-10-PCS | Mod: ,,, | Performed by: FAMILY MEDICINE

## 2022-01-01 PROCEDURE — 36415 COLL VENOUS BLD VENIPUNCTURE: CPT | Performed by: INTERNAL MEDICINE

## 2022-01-01 PROCEDURE — 93005 ELECTROCARDIOGRAM TRACING: CPT | Mod: PBBFAC | Performed by: INTERNAL MEDICINE

## 2022-01-01 PROCEDURE — 99999 PR PBB SHADOW E&M-EST. PATIENT-LVL IV: CPT | Mod: PBBFAC,,, | Performed by: PHYSICIAN ASSISTANT

## 2022-01-01 PROCEDURE — 88341 PR IHC OR ICC EACH ADD'L SINGLE ANTIBODY  STAINPR: ICD-10-PCS | Mod: 26,,, | Performed by: PATHOLOGY

## 2022-01-01 PROCEDURE — 99204 OFFICE O/P NEW MOD 45 MIN: CPT | Mod: S$PBB,,, | Performed by: PAIN MEDICINE

## 2022-01-01 PROCEDURE — 36415 COLL VENOUS BLD VENIPUNCTURE: CPT | Performed by: FAMILY MEDICINE

## 2022-01-01 PROCEDURE — 99999 PR PBB SHADOW E&M-EST. PATIENT-LVL I: CPT | Mod: PBBFAC,,, | Performed by: PSYCHOLOGIST

## 2022-01-01 PROCEDURE — 99999 PR PBB SHADOW E&M-EST. PATIENT-LVL III: ICD-10-PCS | Mod: PBBFAC,,, | Performed by: INTERNAL MEDICINE

## 2022-01-01 PROCEDURE — 99999 PR PBB SHADOW E&M-EST. PATIENT-LVL IV: CPT | Mod: PBBFAC,,, | Performed by: INTERNAL MEDICINE

## 2022-01-01 PROCEDURE — 77066 DX MAMMO INCL CAD BI: CPT | Mod: 26,,, | Performed by: RADIOLOGY

## 2022-01-01 PROCEDURE — 85007 BL SMEAR W/DIFF WBC COUNT: CPT | Performed by: INTERNAL MEDICINE

## 2022-01-01 PROCEDURE — 25000003 PHARM REV CODE 250: Performed by: NURSE PRACTITIONER

## 2022-01-01 PROCEDURE — 85027 COMPLETE CBC AUTOMATED: CPT

## 2022-01-01 PROCEDURE — 36430 TRANSFUSION BLD/BLD COMPNT: CPT

## 2022-01-01 PROCEDURE — 45378 DIAGNOSTIC COLONOSCOPY: CPT | Performed by: INTERNAL MEDICINE

## 2022-01-01 PROCEDURE — 99239 PR HOSPITAL DISCHARGE DAY,>30 MIN: ICD-10-PCS | Mod: ,,, | Performed by: STUDENT IN AN ORGANIZED HEALTH CARE EDUCATION/TRAINING PROGRAM

## 2022-01-01 PROCEDURE — 99999 PR PBB SHADOW E&M-EST. PATIENT-LVL III: CPT | Mod: PBBFAC,,, | Performed by: FAMILY MEDICINE

## 2022-01-01 PROCEDURE — 94761 N-INVAS EAR/PLS OXIMETRY MLT: CPT

## 2022-01-01 PROCEDURE — 25000003 PHARM REV CODE 250: Performed by: FAMILY MEDICINE

## 2022-01-01 PROCEDURE — 87502 INFLUENZA DNA AMP PROBE: CPT | Performed by: STUDENT IN AN ORGANIZED HEALTH CARE EDUCATION/TRAINING PROGRAM

## 2022-01-01 PROCEDURE — 84100 ASSAY OF PHOSPHORUS: CPT | Performed by: STUDENT IN AN ORGANIZED HEALTH CARE EDUCATION/TRAINING PROGRAM

## 2022-01-01 PROCEDURE — 99999 PR PBB SHADOW E&M-EST. PATIENT-LVL III: CPT | Mod: PBBFAC,,, | Performed by: INTERNAL MEDICINE

## 2022-01-01 PROCEDURE — 77062 MAMMO DIGITAL DIAGNOSTIC BILAT WITH TOMO: ICD-10-PCS | Mod: 26,,, | Performed by: RADIOLOGY

## 2022-01-01 PROCEDURE — 86920 COMPATIBILITY TEST SPIN: CPT | Performed by: PHYSICIAN ASSISTANT

## 2022-01-01 PROCEDURE — 97535 SELF CARE MNGMENT TRAINING: CPT

## 2022-01-01 PROCEDURE — 77062 BREAST TOMOSYNTHESIS BI: CPT | Mod: TC

## 2022-01-01 PROCEDURE — 70553 MRI BRAIN STEM W/O & W/DYE: CPT | Mod: 26,,, | Performed by: RADIOLOGY

## 2022-01-01 PROCEDURE — 93010 EKG 12-LEAD: ICD-10-PCS | Mod: ,,, | Performed by: INTERNAL MEDICINE

## 2022-01-01 PROCEDURE — 86850 RBC ANTIBODY SCREEN: CPT | Performed by: PHYSICIAN ASSISTANT

## 2022-01-01 PROCEDURE — 82728 ASSAY OF FERRITIN: CPT | Performed by: INTERNAL MEDICINE

## 2022-01-01 PROCEDURE — 97110 THERAPEUTIC EXERCISES: CPT

## 2022-01-01 PROCEDURE — 99204 OFFICE O/P NEW MOD 45 MIN: CPT | Mod: S$PBB,,, | Performed by: ORTHOPAEDIC SURGERY

## 2022-01-01 PROCEDURE — 85027 COMPLETE CBC AUTOMATED: CPT | Performed by: INTERNAL MEDICINE

## 2022-01-01 PROCEDURE — 88305 TISSUE EXAM BY PATHOLOGIST: ICD-10-PCS | Mod: 26,,, | Performed by: PATHOLOGY

## 2022-01-01 PROCEDURE — 73610 X-RAY EXAM OF ANKLE: CPT | Mod: TC,FY,PN,RT

## 2022-01-01 PROCEDURE — 99999 PR PBB SHADOW E&M-EST. PATIENT-LVL V: CPT | Mod: PBBFAC,,, | Performed by: PHYSICIAN ASSISTANT

## 2022-01-01 PROCEDURE — 99213 PR OFFICE/OUTPT VISIT, EST, LEVL III, 20-29 MIN: ICD-10-PCS | Mod: S$PBB,,, | Performed by: PAIN MEDICINE

## 2022-01-01 PROCEDURE — 99999 PR PBB SHADOW E&M-EST. PATIENT-LVL IV: CPT | Mod: PBBFAC,,, | Performed by: ORTHOPAEDIC SURGERY

## 2022-01-01 PROCEDURE — 80048 BASIC METABOLIC PNL TOTAL CA: CPT

## 2022-01-01 PROCEDURE — 63600175 PHARM REV CODE 636 W HCPCS: Performed by: PHYSICIAN ASSISTANT

## 2022-01-01 PROCEDURE — 99214 OFFICE O/P EST MOD 30 MIN: CPT | Mod: PBBFAC,25 | Performed by: NURSE PRACTITIONER

## 2022-01-01 PROCEDURE — 80053 COMPREHEN METABOLIC PANEL: CPT | Performed by: EMERGENCY MEDICINE

## 2022-01-01 PROCEDURE — 25000003 PHARM REV CODE 250: Performed by: INTERNAL MEDICINE

## 2022-01-01 PROCEDURE — 99239 HOSP IP/OBS DSCHRG MGMT >30: CPT | Mod: GC,,, | Performed by: STUDENT IN AN ORGANIZED HEALTH CARE EDUCATION/TRAINING PROGRAM

## 2022-01-01 PROCEDURE — 99215 OFFICE O/P EST HI 40 MIN: CPT | Mod: S$PBB,,, | Performed by: NURSE PRACTITIONER

## 2022-01-01 PROCEDURE — 88341 IMHCHEM/IMCYTCHM EA ADD ANTB: CPT | Performed by: PATHOLOGY

## 2022-01-01 PROCEDURE — 88333 PATH CONSLTJ SURG CYTO XM 1: CPT | Mod: 26,,, | Performed by: PATHOLOGY

## 2022-01-01 PROCEDURE — 99233 SBSQ HOSP IP/OBS HIGH 50: CPT | Mod: ,,, | Performed by: STUDENT IN AN ORGANIZED HEALTH CARE EDUCATION/TRAINING PROGRAM

## 2022-01-01 PROCEDURE — 87102 FUNGUS ISOLATION CULTURE: CPT | Performed by: STUDENT IN AN ORGANIZED HEALTH CARE EDUCATION/TRAINING PROGRAM

## 2022-01-01 PROCEDURE — 93010 ELECTROCARDIOGRAM REPORT: CPT | Mod: S$PBB,,, | Performed by: INTERNAL MEDICINE

## 2022-01-01 PROCEDURE — 25000003 PHARM REV CODE 250: Performed by: NURSE ANESTHETIST, CERTIFIED REGISTERED

## 2022-01-01 PROCEDURE — 99214 OFFICE O/P EST MOD 30 MIN: CPT | Mod: S$PBB,,, | Performed by: INTERNAL MEDICINE

## 2022-01-01 PROCEDURE — 63600175 PHARM REV CODE 636 W HCPCS: Performed by: FAMILY MEDICINE

## 2022-01-01 PROCEDURE — 85610 PROTHROMBIN TIME: CPT | Performed by: STUDENT IN AN ORGANIZED HEALTH CARE EDUCATION/TRAINING PROGRAM

## 2022-01-01 PROCEDURE — 99999 PR PBB SHADOW E&M-EST. PATIENT-LVL IV: ICD-10-PCS | Mod: PBBFAC,,, | Performed by: PAIN MEDICINE

## 2022-01-01 PROCEDURE — 99214 PR OFFICE/OUTPT VISIT, EST, LEVL IV, 30-39 MIN: ICD-10-PCS | Mod: S$PBB,,, | Performed by: INTERNAL MEDICINE

## 2022-01-01 PROCEDURE — 99214 OFFICE O/P EST MOD 30 MIN: CPT | Mod: PBBFAC,PN | Performed by: ORTHOPAEDIC SURGERY

## 2022-01-01 PROCEDURE — 88342 IMHCHEM/IMCYTCHM 1ST ANTB: CPT | Mod: 26,,, | Performed by: PATHOLOGY

## 2022-01-01 PROCEDURE — 88305 TISSUE EXAM BY PATHOLOGIST: CPT | Mod: 26,,, | Performed by: PATHOLOGY

## 2022-01-01 PROCEDURE — C1751 CATH, INF, PER/CENT/MIDLINE: HCPCS

## 2022-01-01 PROCEDURE — 99999 PR PBB SHADOW E&M-EST. PATIENT-LVL IV: ICD-10-PCS | Mod: PBBFAC,,, | Performed by: NURSE PRACTITIONER

## 2022-01-01 PROCEDURE — 93306 ECHO (CUPID ONLY): ICD-10-PCS | Mod: 26,,, | Performed by: INTERNAL MEDICINE

## 2022-01-01 PROCEDURE — 96374 THER/PROPH/DIAG INJ IV PUSH: CPT

## 2022-01-01 PROCEDURE — 80053 COMPREHEN METABOLIC PANEL: CPT | Performed by: INTERNAL MEDICINE

## 2022-01-01 PROCEDURE — 19083 BX BREAST 1ST LESION US IMAG: CPT | Mod: LT,,, | Performed by: RADIOLOGY

## 2022-01-01 PROCEDURE — 99214 PR OFFICE/OUTPT VISIT, EST, LEVL IV, 30-39 MIN: ICD-10-PCS | Mod: S$PBB,,, | Performed by: FAMILY MEDICINE

## 2022-01-01 PROCEDURE — 88305 TISSUE EXAM BY PATHOLOGIST: CPT | Performed by: PATHOLOGY

## 2022-01-01 PROCEDURE — 86901 BLOOD TYPING SEROLOGIC RH(D): CPT | Performed by: EMERGENCY MEDICINE

## 2022-01-01 PROCEDURE — 88313 PR  SPECIAL STAINS,GROUP II: ICD-10-PCS | Mod: 26,,, | Performed by: PATHOLOGY

## 2022-01-01 PROCEDURE — 73723 MRI JOINT LWR EXTR W/O&W/DYE: CPT | Mod: 26,RT,, | Performed by: RADIOLOGY

## 2022-01-01 PROCEDURE — 76937 US GUIDE VASCULAR ACCESS: CPT

## 2022-01-01 PROCEDURE — 70553 MRI BRAIN STEM W/O & W/DYE: CPT | Mod: TC

## 2022-01-01 PROCEDURE — 84466 ASSAY OF TRANSFERRIN: CPT | Performed by: EMERGENCY MEDICINE

## 2022-01-01 PROCEDURE — 99215 OFFICE O/P EST HI 40 MIN: CPT | Mod: S$PBB,,, | Performed by: PHYSICIAN ASSISTANT

## 2022-01-01 PROCEDURE — 90791 PR PSYCHIATRIC DIAGNOSTIC EVALUATION: ICD-10-PCS | Mod: ,,, | Performed by: PSYCHOLOGIST

## 2022-01-01 PROCEDURE — 25000003 PHARM REV CODE 250

## 2022-01-01 PROCEDURE — 19083 US BREAST BIOPSY WITH IMAGING 1ST SITE LEFT: ICD-10-PCS | Mod: LT,,, | Performed by: RADIOLOGY

## 2022-01-01 PROCEDURE — 73610 X-RAY EXAM OF ANKLE: CPT | Mod: 26,RT,, | Performed by: INTERNAL MEDICINE

## 2022-01-01 PROCEDURE — 93010 ELECTROCARDIOGRAM REPORT: CPT | Mod: ,,, | Performed by: INTERNAL MEDICINE

## 2022-01-01 PROCEDURE — 88342 IMHCHEM/IMCYTCHM 1ST ANTB: CPT | Performed by: PATHOLOGY

## 2022-01-01 PROCEDURE — 86901 BLOOD TYPING SEROLOGIC RH(D): CPT | Performed by: INTERNAL MEDICINE

## 2022-01-01 PROCEDURE — 88342 CHG IMMUNOCYTOCHEMISTRY: ICD-10-PCS | Mod: 26,,, | Performed by: PATHOLOGY

## 2022-01-01 PROCEDURE — 88307 TISSUE EXAM BY PATHOLOGIST: CPT | Performed by: PATHOLOGY

## 2022-01-01 PROCEDURE — 99215 OFFICE O/P EST HI 40 MIN: CPT | Mod: PBBFAC,25 | Performed by: PHYSICIAN ASSISTANT

## 2022-01-01 PROCEDURE — 84145 PROCALCITONIN (PCT): CPT | Performed by: STUDENT IN AN ORGANIZED HEALTH CARE EDUCATION/TRAINING PROGRAM

## 2022-01-01 PROCEDURE — 88307 TISSUE EXAM BY PATHOLOGIST: CPT | Mod: 26,,, | Performed by: PATHOLOGY

## 2022-01-01 PROCEDURE — 85060 PATHOLOGIST REVIEW: ICD-10-PCS | Mod: ,,, | Performed by: PATHOLOGY

## 2022-01-01 PROCEDURE — 97110 THERAPEUTIC EXERCISES: CPT | Mod: PN

## 2022-01-01 PROCEDURE — 38222 DX BONE MARROW BX & ASPIR: CPT | Mod: PBBFAC | Performed by: NURSE PRACTITIONER

## 2022-01-01 PROCEDURE — 88311 PR  DECALCIFY TISSUE: ICD-10-PCS | Mod: 26,,, | Performed by: PATHOLOGY

## 2022-01-01 PROCEDURE — 63600175 PHARM REV CODE 636 W HCPCS: Performed by: NURSE ANESTHETIST, CERTIFIED REGISTERED

## 2022-01-01 PROCEDURE — 12000002 HC ACUTE/MED SURGE SEMI-PRIVATE ROOM

## 2022-01-01 PROCEDURE — A4216 STERILE WATER/SALINE, 10 ML: HCPCS | Performed by: STUDENT IN AN ORGANIZED HEALTH CARE EDUCATION/TRAINING PROGRAM

## 2022-01-01 PROCEDURE — 85097 BONE MARROW INTERPRETATION: CPT | Mod: ,,, | Performed by: PATHOLOGY

## 2022-01-01 PROCEDURE — 77080 DEXA BONE DENSITY SPINE HIP: ICD-10-PCS | Mod: 26,,, | Performed by: RADIOLOGY

## 2022-01-01 PROCEDURE — 99999 PR PBB SHADOW E&M-EST. PATIENT-LVL III: CPT | Mod: PBBFAC,,, | Performed by: STUDENT IN AN ORGANIZED HEALTH CARE EDUCATION/TRAINING PROGRAM

## 2022-01-01 PROCEDURE — 86850 RBC ANTIBODY SCREEN: CPT | Performed by: NURSE PRACTITIONER

## 2022-01-01 PROCEDURE — 80053 COMPREHEN METABOLIC PANEL: CPT | Performed by: PHYSICIAN ASSISTANT

## 2022-01-01 PROCEDURE — 88311 DECALCIFY TISSUE: CPT | Performed by: PATHOLOGY

## 2022-01-01 PROCEDURE — 99999 PR PBB SHADOW E&M-EST. PATIENT-LVL IV: CPT | Mod: PBBFAC,,, | Performed by: OBSTETRICS & GYNECOLOGY

## 2022-01-01 PROCEDURE — 86850 RBC ANTIBODY SCREEN: CPT | Performed by: INTERNAL MEDICINE

## 2022-01-01 PROCEDURE — 20600001 HC STEP DOWN PRIVATE ROOM

## 2022-01-01 PROCEDURE — 99212 OFFICE O/P EST SF 10 MIN: CPT | Mod: PBBFAC | Performed by: PSYCHOLOGIST

## 2022-01-01 PROCEDURE — 85027 COMPLETE CBC AUTOMATED: CPT | Performed by: STUDENT IN AN ORGANIZED HEALTH CARE EDUCATION/TRAINING PROGRAM

## 2022-01-01 PROCEDURE — 25500020 PHARM REV CODE 255: Performed by: INTERNAL MEDICINE

## 2022-01-01 PROCEDURE — 88184 FLOWCYTOMETRY/ TC 1 MARKER: CPT | Performed by: PATHOLOGY

## 2022-01-01 PROCEDURE — 87040 BLOOD CULTURE FOR BACTERIA: CPT | Performed by: STUDENT IN AN ORGANIZED HEALTH CARE EDUCATION/TRAINING PROGRAM

## 2022-01-01 PROCEDURE — 82746 ASSAY OF FOLIC ACID SERUM: CPT | Performed by: INTERNAL MEDICINE

## 2022-01-01 PROCEDURE — 85097 PR  BONE MARROW,SMEAR INTERPRETATION: ICD-10-PCS | Mod: ,,, | Performed by: PATHOLOGY

## 2022-01-01 PROCEDURE — 85007 BL SMEAR W/DIFF WBC COUNT: CPT | Mod: 91 | Performed by: FAMILY MEDICINE

## 2022-01-01 PROCEDURE — 81003 URINALYSIS AUTO W/O SCOPE: CPT | Performed by: STUDENT IN AN ORGANIZED HEALTH CARE EDUCATION/TRAINING PROGRAM

## 2022-01-01 PROCEDURE — 77080 DXA BONE DENSITY AXIAL: CPT | Mod: 26,,, | Performed by: RADIOLOGY

## 2022-01-01 PROCEDURE — 36415 COLL VENOUS BLD VENIPUNCTURE: CPT | Performed by: STUDENT IN AN ORGANIZED HEALTH CARE EDUCATION/TRAINING PROGRAM

## 2022-01-01 PROCEDURE — 99223 1ST HOSP IP/OBS HIGH 75: CPT | Mod: AI,GC,, | Performed by: STUDENT IN AN ORGANIZED HEALTH CARE EDUCATION/TRAINING PROGRAM

## 2022-01-01 PROCEDURE — 99999 PR PBB SHADOW E&M-EST. PATIENT-LVL IV: ICD-10-PCS | Mod: PBBFAC,,, | Performed by: INTERNAL MEDICINE

## 2022-01-01 PROCEDURE — 99223 PR INITIAL HOSPITAL CARE,LEVL III: ICD-10-PCS | Mod: ,,, | Performed by: INTERNAL MEDICINE

## 2022-01-01 PROCEDURE — 99215 PR OFFICE/OUTPT VISIT, EST, LEVL V, 40-54 MIN: ICD-10-PCS | Mod: 95,,, | Performed by: NURSE PRACTITIONER

## 2022-01-01 PROCEDURE — 99999 PR PBB SHADOW E&M-EST. PATIENT-LVL III: ICD-10-PCS | Mod: PBBFAC,,, | Performed by: PAIN MEDICINE

## 2022-01-01 PROCEDURE — 85027 COMPLETE CBC AUTOMATED: CPT | Performed by: NURSE PRACTITIONER

## 2022-01-01 PROCEDURE — P9040 RBC LEUKOREDUCED IRRADIATED: HCPCS | Performed by: PHYSICIAN ASSISTANT

## 2022-01-01 PROCEDURE — D9220A PRA ANESTHESIA: ICD-10-PCS | Mod: CRNA,,, | Performed by: NURSE ANESTHETIST, CERTIFIED REGISTERED

## 2022-01-01 PROCEDURE — 88341 IMHCHEM/IMCYTCHM EA ADD ANTB: CPT | Mod: 59 | Performed by: PATHOLOGY

## 2022-01-01 PROCEDURE — 73723 MRI HIP W WO CONTRAST RIGHT: ICD-10-PCS | Mod: 26,RT,, | Performed by: RADIOLOGY

## 2022-01-01 PROCEDURE — 99999 PR PBB SHADOW E&M-EST. PATIENT-LVL III: CPT | Mod: PBBFAC,,, | Performed by: NURSE PRACTITIONER

## 2022-01-01 PROCEDURE — 85027 COMPLETE CBC AUTOMATED: CPT | Performed by: EMERGENCY MEDICINE

## 2022-01-01 PROCEDURE — 84466 ASSAY OF TRANSFERRIN: CPT | Performed by: INTERNAL MEDICINE

## 2022-01-01 PROCEDURE — 63600175 PHARM REV CODE 636 W HCPCS: Performed by: STUDENT IN AN ORGANIZED HEALTH CARE EDUCATION/TRAINING PROGRAM

## 2022-01-01 PROCEDURE — 99204 PR OFFICE/OUTPT VISIT, NEW, LEVL IV, 45-59 MIN: ICD-10-PCS | Mod: S$PBB,,, | Performed by: PAIN MEDICINE

## 2022-01-01 PROCEDURE — 99215 PR OFFICE/OUTPT VISIT, EST, LEVL V, 40-54 MIN: ICD-10-PCS | Mod: S$PBB,,, | Performed by: NURSE PRACTITIONER

## 2022-01-01 PROCEDURE — 25000003 PHARM REV CODE 250: Performed by: HOSPITALIST

## 2022-01-01 PROCEDURE — 90834 PSYTX W PT 45 MINUTES: CPT | Mod: ,,, | Performed by: PSYCHOLOGIST

## 2022-01-01 PROCEDURE — 86920 COMPATIBILITY TEST SPIN: CPT | Performed by: STUDENT IN AN ORGANIZED HEALTH CARE EDUCATION/TRAINING PROGRAM

## 2022-01-01 PROCEDURE — 99999 PR PBB SHADOW E&M-EST. PATIENT-LVL II: ICD-10-PCS | Mod: PBBFAC,,, | Performed by: PSYCHOLOGIST

## 2022-01-01 PROCEDURE — 99213 OFFICE O/P EST LOW 20 MIN: CPT | Mod: PBBFAC | Performed by: ORTHOPAEDIC SURGERY

## 2022-01-01 PROCEDURE — 99205 PR OFFICE/OUTPT VISIT, NEW, LEVL V, 60-74 MIN: ICD-10-PCS | Mod: S$PBB,,, | Performed by: INTERNAL MEDICINE

## 2022-01-01 PROCEDURE — 99205 OFFICE O/P NEW HI 60 MIN: CPT | Mod: S$PBB,,, | Performed by: STUDENT IN AN ORGANIZED HEALTH CARE EDUCATION/TRAINING PROGRAM

## 2022-01-01 PROCEDURE — 90471 IMMUNIZATION ADMIN: CPT | Performed by: STUDENT IN AN ORGANIZED HEALTH CARE EDUCATION/TRAINING PROGRAM

## 2022-01-01 PROCEDURE — 80053 COMPREHEN METABOLIC PANEL: CPT | Performed by: FAMILY MEDICINE

## 2022-01-01 PROCEDURE — 86880 COOMBS TEST DIRECT: CPT | Performed by: INTERNAL MEDICINE

## 2022-01-01 PROCEDURE — 99214 PR OFFICE/OUTPT VISIT, EST, LEVL IV, 30-39 MIN: ICD-10-PCS | Mod: S$PBB,,, | Performed by: PHYSICIAN ASSISTANT

## 2022-01-01 PROCEDURE — 99232 SBSQ HOSP IP/OBS MODERATE 35: CPT | Mod: ,,, | Performed by: STUDENT IN AN ORGANIZED HEALTH CARE EDUCATION/TRAINING PROGRAM

## 2022-01-01 PROCEDURE — 99204 PR OFFICE/OUTPT VISIT, NEW, LEVL IV, 45-59 MIN: ICD-10-PCS | Mod: S$PBB,,, | Performed by: ORTHOPAEDIC SURGERY

## 2022-01-01 PROCEDURE — 72158 MRI LUMBAR SPINE W/O & W/DYE: CPT | Mod: TC

## 2022-01-01 PROCEDURE — 37000009 HC ANESTHESIA EA ADD 15 MINS: Performed by: INTERNAL MEDICINE

## 2022-01-01 PROCEDURE — 99214 OFFICE O/P EST MOD 30 MIN: CPT | Mod: S$PBB,,, | Performed by: NURSE PRACTITIONER

## 2022-01-01 PROCEDURE — 99213 OFFICE O/P EST LOW 20 MIN: CPT | Mod: PBBFAC | Performed by: INTERNAL MEDICINE

## 2022-01-01 PROCEDURE — 99999 PR PBB SHADOW E&M-EST. PATIENT-LVL IV: ICD-10-PCS | Mod: PBBFAC,,, | Performed by: PHYSICIAN ASSISTANT

## 2022-01-01 PROCEDURE — 99153 MOD SED SAME PHYS/QHP EA: CPT | Performed by: RADIOLOGY

## 2022-01-01 PROCEDURE — D9220A PRA ANESTHESIA: ICD-10-PCS | Mod: ANES,,, | Performed by: ANESTHESIOLOGY

## 2022-01-01 PROCEDURE — 99214 PR OFFICE/OUTPT VISIT, EST, LEVL IV, 30-39 MIN: ICD-10-PCS | Mod: S$PBB,,, | Performed by: OBSTETRICS & GYNECOLOGY

## 2022-01-01 PROCEDURE — 78815 PET IMAGE W/CT SKULL-THIGH: CPT | Mod: TC

## 2022-01-01 PROCEDURE — C9113 INJ PANTOPRAZOLE SODIUM, VIA: HCPCS | Performed by: FAMILY MEDICINE

## 2022-01-01 PROCEDURE — 99205 OFFICE O/P NEW HI 60 MIN: CPT | Mod: S$PBB,,, | Performed by: INTERNAL MEDICINE

## 2022-01-01 PROCEDURE — 88341 IMHCHEM/IMCYTCHM EA ADD ANTB: CPT | Mod: 26,,, | Performed by: PATHOLOGY

## 2022-01-01 PROCEDURE — 99417 PR PROLONGED SVC, OUTPT, W/WO DIRECT PT CONTACT,  EA ADDTL 15 MIN: ICD-10-PCS | Mod: 95,,, | Performed by: NURSE PRACTITIONER

## 2022-01-01 PROCEDURE — 27000207 HC ISOLATION

## 2022-01-01 PROCEDURE — 36415 COLL VENOUS BLD VENIPUNCTURE: CPT | Performed by: PHYSICIAN ASSISTANT

## 2022-01-01 PROCEDURE — 83735 ASSAY OF MAGNESIUM: CPT | Performed by: STUDENT IN AN ORGANIZED HEALTH CARE EDUCATION/TRAINING PROGRAM

## 2022-01-01 PROCEDURE — 88313 SPECIAL STAINS GROUP 2: CPT | Mod: 26,,, | Performed by: PATHOLOGY

## 2022-01-01 PROCEDURE — 11000001 HC ACUTE MED/SURG PRIVATE ROOM

## 2022-01-01 PROCEDURE — 99214 OFFICE O/P EST MOD 30 MIN: CPT | Mod: PBBFAC,PO | Performed by: NURSE PRACTITIONER

## 2022-01-01 PROCEDURE — 99223 1ST HOSP IP/OBS HIGH 75: CPT | Mod: ,,, | Performed by: STUDENT IN AN ORGANIZED HEALTH CARE EDUCATION/TRAINING PROGRAM

## 2022-01-01 PROCEDURE — 99211 OFF/OP EST MAY X REQ PHY/QHP: CPT | Mod: PBBFAC | Performed by: DIETITIAN, REGISTERED

## 2022-01-01 PROCEDURE — 76642 ULTRASOUND BREAST LIMITED: CPT | Mod: 26,LT,, | Performed by: RADIOLOGY

## 2022-01-01 PROCEDURE — 99999 PR PBB SHADOW E&M-EST. PATIENT-LVL IV: ICD-10-PCS | Mod: PBBFAC,,, | Performed by: OBSTETRICS & GYNECOLOGY

## 2022-01-01 PROCEDURE — 99999 PR PBB SHADOW E&M-EST. PATIENT-LVL V: ICD-10-PCS | Mod: PBBFAC,,, | Performed by: PHYSICIAN ASSISTANT

## 2022-01-01 PROCEDURE — 99215 PR OFFICE/OUTPT VISIT, EST, LEVL V, 40-54 MIN: ICD-10-PCS | Mod: S$PBB,,, | Performed by: PHYSICIAN ASSISTANT

## 2022-01-01 PROCEDURE — 99285 PR EMERGENCY DEPT VISIT,LEVEL V: ICD-10-PCS | Mod: CS,,, | Performed by: PHYSICIAN ASSISTANT

## 2022-01-01 PROCEDURE — 20225 BONE BIOPSY TROCAR/NDL DEEP: CPT | Mod: ,,, | Performed by: RADIOLOGY

## 2022-01-01 PROCEDURE — 73610 XR ANKLE COMPLETE 3 VIEW RIGHT: ICD-10-PCS | Mod: 26,RT,, | Performed by: INTERNAL MEDICINE

## 2022-01-01 PROCEDURE — 99223 1ST HOSP IP/OBS HIGH 75: CPT | Mod: ,,, | Performed by: INTERNAL MEDICINE

## 2022-01-01 PROCEDURE — 99213 OFFICE O/P EST LOW 20 MIN: CPT | Mod: PBBFAC,PO | Performed by: INTERNAL MEDICINE

## 2022-01-01 PROCEDURE — 99152 PR MOD CONSCIOUS SEDATION, SAME PHYS, 5+ YRS, FIRST 15 MIN: ICD-10-PCS | Mod: ,,, | Performed by: RADIOLOGY

## 2022-01-01 PROCEDURE — 99213 OFFICE O/P EST LOW 20 MIN: CPT | Mod: PBBFAC | Performed by: NURSE PRACTITIONER

## 2022-01-01 PROCEDURE — 80048 BASIC METABOLIC PNL TOTAL CA: CPT | Performed by: STUDENT IN AN ORGANIZED HEALTH CARE EDUCATION/TRAINING PROGRAM

## 2022-01-01 PROCEDURE — 77080 DXA BONE DENSITY AXIAL: CPT | Mod: TC

## 2022-01-01 PROCEDURE — 80053 COMPREHEN METABOLIC PANEL: CPT | Performed by: STUDENT IN AN ORGANIZED HEALTH CARE EDUCATION/TRAINING PROGRAM

## 2022-01-01 PROCEDURE — 83615 LACTATE (LD) (LDH) ENZYME: CPT | Performed by: PHYSICIAN ASSISTANT

## 2022-01-01 PROCEDURE — 77012 IR BIOPSY BONE DEEP: ICD-10-PCS | Mod: 26,,, | Performed by: RADIOLOGY

## 2022-01-01 PROCEDURE — 99213 OFFICE O/P EST LOW 20 MIN: CPT | Mod: PBBFAC,27 | Performed by: INTERNAL MEDICINE

## 2022-01-01 PROCEDURE — 99999 PR PBB SHADOW E&M-EST. PATIENT-LVL III: CPT | Mod: PBBFAC,,, | Performed by: PAIN MEDICINE

## 2022-01-01 PROCEDURE — 99214 OFFICE O/P EST MOD 30 MIN: CPT | Mod: S$PBB,,, | Performed by: FAMILY MEDICINE

## 2022-01-01 PROCEDURE — 93010 RHYTHM STRIP: ICD-10-PCS | Mod: S$PBB,,, | Performed by: INTERNAL MEDICINE

## 2022-01-01 PROCEDURE — 85007 BL SMEAR W/DIFF WBC COUNT: CPT | Performed by: EMERGENCY MEDICINE

## 2022-01-01 PROCEDURE — 99999 PR PBB SHADOW E&M-EST. PATIENT-LVL IV: CPT | Mod: PBBFAC,,, | Performed by: PAIN MEDICINE

## 2022-01-01 PROCEDURE — 25500020 PHARM REV CODE 255: Performed by: NURSE PRACTITIONER

## 2022-01-01 PROCEDURE — 84484 ASSAY OF TROPONIN QUANT: CPT | Performed by: EMERGENCY MEDICINE

## 2022-01-01 PROCEDURE — 27201068 US BREAST BIOPSY WITH IMAGING 1ST SITE LEFT

## 2022-01-01 PROCEDURE — 99223 PR INITIAL HOSPITAL CARE,LEVL III: ICD-10-PCS | Mod: AI,GC,, | Performed by: STUDENT IN AN ORGANIZED HEALTH CARE EDUCATION/TRAINING PROGRAM

## 2022-01-01 PROCEDURE — 88189 FLOWCYTOMETRY/READ 16 & >: CPT | Mod: ,,, | Performed by: PATHOLOGY

## 2022-01-01 PROCEDURE — 99213 OFFICE O/P EST LOW 20 MIN: CPT | Mod: PBBFAC | Performed by: STUDENT IN AN ORGANIZED HEALTH CARE EDUCATION/TRAINING PROGRAM

## 2022-01-01 PROCEDURE — 90791 PSYCH DIAGNOSTIC EVALUATION: CPT | Mod: ,,, | Performed by: PSYCHOLOGIST

## 2022-01-01 PROCEDURE — 45378 DIAGNOSTIC COLONOSCOPY: CPT | Mod: GC,,, | Performed by: INTERNAL MEDICINE

## 2022-01-01 PROCEDURE — 99291 CRITICAL CARE FIRST HOUR: CPT | Mod: CS,,, | Performed by: NURSE PRACTITIONER

## 2022-01-01 PROCEDURE — P9040 RBC LEUKOREDUCED IRRADIATED: HCPCS | Performed by: INTERNAL MEDICINE

## 2022-01-01 PROCEDURE — 90834 PR PSYCHOTHERAPY W/PATIENT, 45 MIN: ICD-10-PCS | Mod: ,,, | Performed by: PSYCHOLOGIST

## 2022-01-01 PROCEDURE — 99211 OFF/OP EST MAY X REQ PHY/QHP: CPT | Mod: PBBFAC | Performed by: PSYCHOLOGIST

## 2022-01-01 PROCEDURE — P9040 RBC LEUKOREDUCED IRRADIATED: HCPCS | Performed by: STUDENT IN AN ORGANIZED HEALTH CARE EDUCATION/TRAINING PROGRAM

## 2022-01-01 PROCEDURE — 87635 SARS-COV-2 COVID-19 AMP PRB: CPT | Performed by: STUDENT IN AN ORGANIZED HEALTH CARE EDUCATION/TRAINING PROGRAM

## 2022-01-01 PROCEDURE — 99285 EMERGENCY DEPT VISIT HI MDM: CPT | Mod: 25,27

## 2022-01-01 PROCEDURE — 93306 TTE W/DOPPLER COMPLETE: CPT | Mod: 26,,, | Performed by: INTERNAL MEDICINE

## 2022-01-01 PROCEDURE — 86140 C-REACTIVE PROTEIN: CPT | Performed by: STUDENT IN AN ORGANIZED HEALTH CARE EDUCATION/TRAINING PROGRAM

## 2022-01-01 PROCEDURE — 80053 COMPREHEN METABOLIC PANEL: CPT | Performed by: NURSE PRACTITIONER

## 2022-01-01 PROCEDURE — 99213 OFFICE O/P EST LOW 20 MIN: CPT | Mod: PBBFAC,27,PN | Performed by: PAIN MEDICINE

## 2022-01-01 PROCEDURE — 82607 VITAMIN B-12: CPT | Performed by: INTERNAL MEDICINE

## 2022-01-01 PROCEDURE — 99999 PR PBB SHADOW E&M-EST. PATIENT-LVL II: CPT | Mod: PBBFAC,,, | Performed by: PSYCHOLOGIST

## 2022-01-01 PROCEDURE — 83605 ASSAY OF LACTIC ACID: CPT | Performed by: STUDENT IN AN ORGANIZED HEALTH CARE EDUCATION/TRAINING PROGRAM

## 2022-01-01 PROCEDURE — 45378 PR COLONOSCOPY,DIAGNOSTIC: ICD-10-PCS | Mod: GC,,, | Performed by: INTERNAL MEDICINE

## 2022-01-01 PROCEDURE — 70553 MRI BRAIN W WO CONTRAST: ICD-10-PCS | Mod: 26,,, | Performed by: RADIOLOGY

## 2022-01-01 PROCEDURE — 37000008 HC ANESTHESIA 1ST 15 MINUTES: Performed by: INTERNAL MEDICINE

## 2022-01-01 PROCEDURE — 81001 URINALYSIS AUTO W/SCOPE: CPT | Performed by: NURSE PRACTITIONER

## 2022-01-01 PROCEDURE — D9220A PRA ANESTHESIA: Mod: ANES,,, | Performed by: ANESTHESIOLOGY

## 2022-01-01 PROCEDURE — 77012 CT SCAN FOR NEEDLE BIOPSY: CPT | Mod: TC | Performed by: RADIOLOGY

## 2022-01-01 PROCEDURE — 90715 TDAP VACCINE 7 YRS/> IM: CPT | Performed by: STUDENT IN AN ORGANIZED HEALTH CARE EDUCATION/TRAINING PROGRAM

## 2022-01-01 PROCEDURE — 93306 TTE W/DOPPLER COMPLETE: CPT

## 2022-01-01 PROCEDURE — 88311 DECALCIFY TISSUE: CPT | Mod: 26,,, | Performed by: PATHOLOGY

## 2022-01-01 PROCEDURE — 87206 SMEAR FLUORESCENT/ACID STAI: CPT | Performed by: STUDENT IN AN ORGANIZED HEALTH CARE EDUCATION/TRAINING PROGRAM

## 2022-01-01 PROCEDURE — A9585 GADOBUTROL INJECTION: HCPCS | Performed by: NURSE PRACTITIONER

## 2022-01-01 PROCEDURE — 86850 RBC ANTIBODY SCREEN: CPT | Performed by: STUDENT IN AN ORGANIZED HEALTH CARE EDUCATION/TRAINING PROGRAM

## 2022-01-01 PROCEDURE — 99214 OFFICE O/P EST MOD 30 MIN: CPT | Mod: PBBFAC | Performed by: OBSTETRICS & GYNECOLOGY

## 2022-01-01 PROCEDURE — 83010 ASSAY OF HAPTOGLOBIN QUANT: CPT | Performed by: INTERNAL MEDICINE

## 2022-01-01 PROCEDURE — P9037 PLATE PHERES LEUKOREDU IRRAD: HCPCS

## 2022-01-01 PROCEDURE — 99213 OFFICE O/P EST LOW 20 MIN: CPT | Mod: S$PBB,,, | Performed by: PAIN MEDICINE

## 2022-01-01 PROCEDURE — 88313 SPECIAL STAINS GROUP 2: CPT | Mod: 59 | Performed by: PATHOLOGY

## 2022-01-01 PROCEDURE — 77065 MAMMO DIGITAL DIAGNOSTIC LEFT: ICD-10-PCS | Mod: 26,LT,, | Performed by: RADIOLOGY

## 2022-01-01 PROCEDURE — 85007 BL SMEAR W/DIFF WBC COUNT: CPT

## 2022-01-01 PROCEDURE — 99211 OFF/OP EST MAY X REQ PHY/QHP: CPT | Mod: PBBFAC,27 | Performed by: PSYCHOLOGIST

## 2022-01-01 PROCEDURE — 77065 DX MAMMO INCL CAD UNI: CPT | Mod: TC,LT

## 2022-01-01 PROCEDURE — 88307 PR  SURG PATH,LEVEL V: ICD-10-PCS | Mod: 26,,, | Performed by: PATHOLOGY

## 2022-01-01 PROCEDURE — 73723 MRI JOINT LWR EXTR W/O&W/DYE: CPT | Mod: TC,RT

## 2022-01-01 PROCEDURE — 99215 PR OFFICE/OUTPT VISIT, EST, LEVL V, 40-54 MIN: ICD-10-PCS | Mod: S$PBB,,, | Performed by: INTERNAL MEDICINE

## 2022-01-01 PROCEDURE — 87075 CULTR BACTERIA EXCEPT BLOOD: CPT | Performed by: STUDENT IN AN ORGANIZED HEALTH CARE EDUCATION/TRAINING PROGRAM

## 2022-01-01 PROCEDURE — 99284 EMERGENCY DEPT VISIT MOD MDM: CPT | Mod: ,,, | Performed by: STUDENT IN AN ORGANIZED HEALTH CARE EDUCATION/TRAINING PROGRAM

## 2022-01-01 PROCEDURE — 87070 CULTURE OTHR SPECIMN AEROBIC: CPT | Performed by: STUDENT IN AN ORGANIZED HEALTH CARE EDUCATION/TRAINING PROGRAM

## 2022-01-01 PROCEDURE — 99999 PR PBB SHADOW E&M-EST. PATIENT-LVL III: ICD-10-PCS | Mod: PBBFAC,,, | Performed by: FAMILY MEDICINE

## 2022-01-01 PROCEDURE — 99152 MOD SED SAME PHYS/QHP 5/>YRS: CPT | Mod: ,,, | Performed by: RADIOLOGY

## 2022-01-01 PROCEDURE — 36415 COLL VENOUS BLD VENIPUNCTURE: CPT | Performed by: NURSE PRACTITIONER

## 2022-01-01 PROCEDURE — 87116 MYCOBACTERIA CULTURE: CPT | Performed by: STUDENT IN AN ORGANIZED HEALTH CARE EDUCATION/TRAINING PROGRAM

## 2022-01-01 PROCEDURE — 99214 OFFICE O/P EST MOD 30 MIN: CPT | Mod: PBBFAC | Performed by: PHYSICIAN ASSISTANT

## 2022-01-01 PROCEDURE — 99291 CRITICAL CARE FIRST HOUR: CPT | Mod: 25

## 2022-01-01 PROCEDURE — 99284 PR EMERGENCY DEPT VISIT,LEVEL IV: ICD-10-PCS | Mod: ,,, | Performed by: STUDENT IN AN ORGANIZED HEALTH CARE EDUCATION/TRAINING PROGRAM

## 2022-01-01 PROCEDURE — 99417 PROLNG OP E/M EACH 15 MIN: CPT | Mod: 95,,, | Performed by: NURSE PRACTITIONER

## 2022-01-01 PROCEDURE — 88342 IMHCHEM/IMCYTCHM 1ST ANTB: CPT | Mod: 26,59,, | Performed by: PATHOLOGY

## 2022-01-01 PROCEDURE — 85025 COMPLETE CBC W/AUTO DIFF WBC: CPT | Performed by: STUDENT IN AN ORGANIZED HEALTH CARE EDUCATION/TRAINING PROGRAM

## 2022-01-01 PROCEDURE — C9113 INJ PANTOPRAZOLE SODIUM, VIA: HCPCS | Performed by: PHYSICIAN ASSISTANT

## 2022-01-01 PROCEDURE — 88342 CHG IMMUNOCYTOCHEMISTRY: ICD-10-PCS | Mod: 26,59,, | Performed by: PATHOLOGY

## 2022-01-01 PROCEDURE — 77065 DX MAMMO INCL CAD UNI: CPT | Mod: 26,LT,, | Performed by: RADIOLOGY

## 2022-01-01 PROCEDURE — 76642 US BREAST LEFT LIMITED: ICD-10-PCS | Mod: 26,LT,, | Performed by: RADIOLOGY

## 2022-01-01 PROCEDURE — 25500020 PHARM REV CODE 255: Performed by: EMERGENCY MEDICINE

## 2022-01-01 PROCEDURE — 76642 ULTRASOUND BREAST LIMITED: CPT | Mod: TC,LT

## 2022-01-01 PROCEDURE — 85045 AUTOMATED RETICULOCYTE COUNT: CPT | Performed by: INTERNAL MEDICINE

## 2022-01-01 PROCEDURE — 77066 MAMMO DIGITAL DIAGNOSTIC BILAT WITH TOMO: ICD-10-PCS | Mod: 26,,, | Performed by: RADIOLOGY

## 2022-01-01 PROCEDURE — 72158 MRI LUMBAR SPINE W/O & W/DYE: CPT | Mod: 26,,, | Performed by: RADIOLOGY

## 2022-01-01 PROCEDURE — 72158 MRI LUMBAR SPINE W WO CONTRAST: ICD-10-PCS | Mod: 26,,, | Performed by: RADIOLOGY

## 2022-01-01 PROCEDURE — 99223 PR INITIAL HOSPITAL CARE,LEVL III: ICD-10-PCS | Mod: ,,, | Performed by: STUDENT IN AN ORGANIZED HEALTH CARE EDUCATION/TRAINING PROGRAM

## 2022-01-01 PROCEDURE — 99999 PR PBB SHADOW E&M-EST. PATIENT-LVL I: CPT | Mod: PBBFAC,,, | Performed by: DIETITIAN, REGISTERED

## 2022-01-01 PROCEDURE — 99223 1ST HOSP IP/OBS HIGH 75: CPT | Mod: ,,, | Performed by: FAMILY MEDICINE

## 2022-01-01 PROCEDURE — 99999 PR PBB SHADOW E&M-EST. PATIENT-LVL III: ICD-10-PCS | Mod: PBBFAC,,, | Performed by: STUDENT IN AN ORGANIZED HEALTH CARE EDUCATION/TRAINING PROGRAM

## 2022-01-01 PROCEDURE — 85610 PROTHROMBIN TIME: CPT | Performed by: EMERGENCY MEDICINE

## 2022-01-01 PROCEDURE — C1830 POWER BONE MARROW BX NEEDLE: HCPCS

## 2022-01-01 PROCEDURE — 99205 PR OFFICE/OUTPT VISIT, NEW, LEVL V, 60-74 MIN: ICD-10-PCS | Mod: S$PBB,,, | Performed by: STUDENT IN AN ORGANIZED HEALTH CARE EDUCATION/TRAINING PROGRAM

## 2022-01-01 PROCEDURE — 99999 PR PBB SHADOW E&M-EST. PATIENT-LVL III: CPT | Mod: PBBFAC,,, | Performed by: ORTHOPAEDIC SURGERY

## 2022-01-01 PROCEDURE — A9552 F18 FDG: HCPCS

## 2022-01-01 PROCEDURE — 20225 IR BIOPSY BONE DEEP: ICD-10-PCS | Mod: ,,, | Performed by: RADIOLOGY

## 2022-01-01 PROCEDURE — 99222 PR INITIAL HOSPITAL CARE,LEVL II: ICD-10-PCS | Mod: ,,, | Performed by: INTERNAL MEDICINE

## 2022-01-01 PROCEDURE — U0002 COVID-19 LAB TEST NON-CDC: HCPCS | Performed by: EMERGENCY MEDICINE

## 2022-01-01 PROCEDURE — 99999 PR PBB SHADOW E&M-EST. PATIENT-LVL III: ICD-10-PCS | Mod: PBBFAC,,, | Performed by: ORTHOPAEDIC SURGERY

## 2022-01-01 PROCEDURE — 87040 BLOOD CULTURE FOR BACTERIA: CPT | Mod: 59 | Performed by: STUDENT IN AN ORGANIZED HEALTH CARE EDUCATION/TRAINING PROGRAM

## 2022-01-01 PROCEDURE — 85025 COMPLETE CBC W/AUTO DIFF WBC: CPT | Performed by: INTERNAL MEDICINE

## 2022-01-01 PROCEDURE — 97165 OT EVAL LOW COMPLEX 30 MIN: CPT

## 2022-01-01 PROCEDURE — 99999 PR PBB SHADOW E&M-EST. PATIENT-LVL I: ICD-10-PCS | Mod: PBBFAC,,, | Performed by: DIETITIAN, REGISTERED

## 2022-01-01 PROCEDURE — 96365 THER/PROPH/DIAG IV INF INIT: CPT

## 2022-01-01 PROCEDURE — 86920 COMPATIBILITY TEST SPIN: CPT | Performed by: NURSE PRACTITIONER

## 2022-01-01 PROCEDURE — D9220A PRA ANESTHESIA: Mod: CRNA,,, | Performed by: NURSE ANESTHETIST, CERTIFIED REGISTERED

## 2022-01-01 PROCEDURE — 97803 MED NUTRITION INDIV SUBSEQ: CPT | Mod: PBBFAC,59 | Performed by: DIETITIAN, REGISTERED

## 2022-01-01 PROCEDURE — 99233 PR SUBSEQUENT HOSPITAL CARE,LEVL III: ICD-10-PCS | Mod: ,,, | Performed by: INTERNAL MEDICINE

## 2022-01-01 PROCEDURE — 99215 OFFICE O/P EST HI 40 MIN: CPT | Mod: 95,,, | Performed by: NURSE PRACTITIONER

## 2022-01-01 PROCEDURE — 99214 PR OFFICE/OUTPT VISIT, EST, LEVL IV, 30-39 MIN: ICD-10-PCS | Mod: S$PBB,,, | Performed by: NURSE PRACTITIONER

## 2022-01-01 PROCEDURE — 10060 I&D ABSCESS SIMPLE/SINGLE: CPT

## 2022-01-01 PROCEDURE — 82525 ASSAY OF COPPER: CPT | Performed by: INTERNAL MEDICINE

## 2022-01-01 PROCEDURE — 85007 BL SMEAR W/DIFF WBC COUNT: CPT | Performed by: NURSE PRACTITIONER

## 2022-01-01 PROCEDURE — 25000003 PHARM REV CODE 250: Performed by: PHYSICIAN ASSISTANT

## 2022-01-01 PROCEDURE — 86920 COMPATIBILITY TEST SPIN: CPT | Mod: 59 | Performed by: NURSE PRACTITIONER

## 2022-01-01 PROCEDURE — 80047 BASIC METABLC PNL IONIZED CA: CPT

## 2022-01-01 PROCEDURE — 63600175 PHARM REV CODE 636 W HCPCS

## 2022-01-01 PROCEDURE — 99285 EMERGENCY DEPT VISIT HI MDM: CPT | Mod: CS,,, | Performed by: PHYSICIAN ASSISTANT

## 2022-01-01 PROCEDURE — 81003 URINALYSIS AUTO W/O SCOPE: CPT | Performed by: EMERGENCY MEDICINE

## 2022-01-01 PROCEDURE — 97162 PT EVAL MOD COMPLEX 30 MIN: CPT | Mod: PN

## 2022-01-01 PROCEDURE — 88185 FLOWCYTOMETRY/TC ADD-ON: CPT | Mod: 59 | Performed by: PATHOLOGY

## 2022-01-01 PROCEDURE — 99222 1ST HOSP IP/OBS MODERATE 55: CPT | Mod: ,,, | Performed by: INTERNAL MEDICINE

## 2022-01-01 PROCEDURE — P9037 PLATE PHERES LEUKOREDU IRRAD: HCPCS | Performed by: INTERNAL MEDICINE

## 2022-01-01 PROCEDURE — 99214 OFFICE O/P EST MOD 30 MIN: CPT | Mod: PBBFAC,PO | Performed by: INTERNAL MEDICINE

## 2022-01-01 PROCEDURE — U0002 COVID-19 LAB TEST NON-CDC: HCPCS | Performed by: PHYSICIAN ASSISTANT

## 2022-01-01 PROCEDURE — 99214 OFFICE O/P EST MOD 30 MIN: CPT | Mod: S$PBB,,, | Performed by: OBSTETRICS & GYNECOLOGY

## 2022-01-01 PROCEDURE — 99214 OFFICE O/P EST MOD 30 MIN: CPT | Mod: PBBFAC,PN | Performed by: PAIN MEDICINE

## 2022-01-01 PROCEDURE — 88333 PR  INTRAOPERATIVE CYTO PATH CONSULT, INITIAL SITE: ICD-10-PCS | Mod: 26,,, | Performed by: PATHOLOGY

## 2022-01-01 PROCEDURE — 77062 BREAST TOMOSYNTHESIS BI: CPT | Mod: 26,,, | Performed by: RADIOLOGY

## 2022-01-01 PROCEDURE — 99152 MOD SED SAME PHYS/QHP 5/>YRS: CPT | Performed by: RADIOLOGY

## 2022-01-01 PROCEDURE — 99291 PR CRITICAL CARE, E/M 30-74 MINUTES: ICD-10-PCS | Mod: CS,,, | Performed by: NURSE PRACTITIONER

## 2022-01-01 PROCEDURE — 88189 PR  FLOWCYTOMETRY/READ, 16 & > MARKERS: ICD-10-PCS | Mod: ,,, | Performed by: PATHOLOGY

## 2022-01-01 PROCEDURE — 88333 PATH CONSLTJ SURG CYTO XM 1: CPT | Performed by: PATHOLOGY

## 2022-01-01 PROCEDURE — 85652 RBC SED RATE AUTOMATED: CPT | Performed by: STUDENT IN AN ORGANIZED HEALTH CARE EDUCATION/TRAINING PROGRAM

## 2022-01-01 PROCEDURE — 78815 PET IMAGE W/CT SKULL-THIGH: CPT | Mod: 26,PS,, | Performed by: STUDENT IN AN ORGANIZED HEALTH CARE EDUCATION/TRAINING PROGRAM

## 2022-01-01 PROCEDURE — 36410 VNPNXR 3YR/> PHY/QHP DX/THER: CPT

## 2022-01-01 PROCEDURE — 93010 EKG 12-LEAD: ICD-10-PCS | Mod: S$PBB,,, | Performed by: INTERNAL MEDICINE

## 2022-01-01 PROCEDURE — 99239 PR HOSPITAL DISCHARGE DAY,>30 MIN: ICD-10-PCS | Mod: GC,,, | Performed by: STUDENT IN AN ORGANIZED HEALTH CARE EDUCATION/TRAINING PROGRAM

## 2022-01-01 PROCEDURE — 99233 SBSQ HOSP IP/OBS HIGH 50: CPT | Mod: ,,, | Performed by: INTERNAL MEDICINE

## 2022-01-01 PROCEDURE — 99233 PR SUBSEQUENT HOSPITAL CARE,LEVL III: ICD-10-PCS | Mod: ,,, | Performed by: STUDENT IN AN ORGANIZED HEALTH CARE EDUCATION/TRAINING PROGRAM

## 2022-01-01 PROCEDURE — 86920 COMPATIBILITY TEST SPIN: CPT | Performed by: INTERNAL MEDICINE

## 2022-01-01 PROCEDURE — 38222 BONE MARROW: ICD-10-PCS | Mod: S$PBB,LT,, | Performed by: NURSE PRACTITIONER

## 2022-01-01 PROCEDURE — 87205 SMEAR GRAM STAIN: CPT | Performed by: STUDENT IN AN ORGANIZED HEALTH CARE EDUCATION/TRAINING PROGRAM

## 2022-01-01 PROCEDURE — 99999 PR PBB SHADOW E&M-EST. PATIENT-LVL IV: ICD-10-PCS | Mod: PBBFAC,,, | Performed by: ORTHOPAEDIC SURGERY

## 2022-01-01 PROCEDURE — 99213 OFFICE O/P EST LOW 20 MIN: CPT | Mod: PBBFAC,25 | Performed by: FAMILY MEDICINE

## 2022-01-01 PROCEDURE — 78815 NM PET CT ROUTINE: ICD-10-PCS | Mod: 26,PS,, | Performed by: STUDENT IN AN ORGANIZED HEALTH CARE EDUCATION/TRAINING PROGRAM

## 2022-01-01 PROCEDURE — 88313 SPECIAL STAINS GROUP 2: CPT | Performed by: PATHOLOGY

## 2022-01-01 RX ORDER — OXYCODONE AND ACETAMINOPHEN 5; 325 MG/1; MG/1
1 TABLET ORAL EVERY 8 HOURS PRN
Qty: 90 TABLET | Refills: 0 | Status: SHIPPED | OUTPATIENT
Start: 2022-01-01 | End: 2022-01-01

## 2022-01-01 RX ORDER — FUROSEMIDE 20 MG/1
20 TABLET ORAL DAILY PRN
Qty: 30 TABLET | Refills: 11 | Status: SHIPPED | OUTPATIENT
Start: 2022-01-01 | End: 2022-01-01

## 2022-01-01 RX ORDER — ACETAMINOPHEN 325 MG/1
650 TABLET ORAL
Status: COMPLETED | OUTPATIENT
Start: 2022-01-01 | End: 2022-01-01

## 2022-01-01 RX ORDER — ACETAMINOPHEN 500 MG
1000 TABLET ORAL EVERY 6 HOURS PRN
Status: DISCONTINUED | OUTPATIENT
Start: 2022-01-01 | End: 2022-01-01 | Stop reason: HOSPADM

## 2022-01-01 RX ORDER — GADOBUTROL 604.72 MG/ML
6 INJECTION INTRAVENOUS
Status: COMPLETED | OUTPATIENT
Start: 2022-01-01 | End: 2022-01-01

## 2022-01-01 RX ORDER — SODIUM CHLORIDE 0.9 % (FLUSH) 0.9 %
10 SYRINGE (ML) INJECTION EVERY 12 HOURS PRN
Status: DISCONTINUED | OUTPATIENT
Start: 2022-01-01 | End: 2022-01-01 | Stop reason: HOSPADM

## 2022-01-01 RX ORDER — FLECAINIDE ACETATE 50 MG/1
100 TABLET ORAL EVERY 12 HOURS
Status: DISCONTINUED | OUTPATIENT
Start: 2022-01-01 | End: 2022-01-01 | Stop reason: HOSPADM

## 2022-01-01 RX ORDER — PHENAZOPYRIDINE HYDROCHLORIDE 100 MG/1
100 TABLET, FILM COATED ORAL
Status: DISCONTINUED | OUTPATIENT
Start: 2022-01-01 | End: 2022-01-01

## 2022-01-01 RX ORDER — MORPHINE SULFATE 15 MG/1
15 TABLET ORAL EVERY 6 HOURS PRN
Status: DISCONTINUED | OUTPATIENT
Start: 2022-01-01 | End: 2022-01-01 | Stop reason: HOSPADM

## 2022-01-01 RX ORDER — ONDANSETRON 4 MG/1
4 TABLET, FILM COATED ORAL EVERY 6 HOURS PRN
Status: DISCONTINUED | OUTPATIENT
Start: 2022-01-01 | End: 2022-01-01 | Stop reason: HOSPADM

## 2022-01-01 RX ORDER — DIPHENHYDRAMINE HCL 25 MG
25 CAPSULE ORAL
Status: DISCONTINUED | OUTPATIENT
Start: 2022-01-01 | End: 2022-01-01 | Stop reason: HOSPADM

## 2022-01-01 RX ORDER — HYDROCODONE BITARTRATE AND ACETAMINOPHEN 500; 5 MG/1; MG/1
TABLET ORAL ONCE
Status: CANCELLED | OUTPATIENT
Start: 2022-01-01 | End: 2022-01-01

## 2022-01-01 RX ORDER — METOPROLOL TARTRATE 25 MG/1
25 TABLET, FILM COATED ORAL 2 TIMES DAILY
Status: DISCONTINUED | OUTPATIENT
Start: 2022-01-01 | End: 2022-01-01 | Stop reason: HOSPADM

## 2022-01-01 RX ORDER — AMOXICILLIN AND CLAVULANATE POTASSIUM 875; 125 MG/1; MG/1
1 TABLET, FILM COATED ORAL 2 TIMES DAILY
Qty: 14 TABLET | Refills: 0 | Status: SHIPPED | OUTPATIENT
Start: 2022-01-01 | End: 2022-01-01

## 2022-01-01 RX ORDER — HYDROCODONE BITARTRATE AND ACETAMINOPHEN 500; 5 MG/1; MG/1
TABLET ORAL ONCE
Status: DISCONTINUED | OUTPATIENT
Start: 2022-01-01 | End: 2022-01-01 | Stop reason: HOSPADM

## 2022-01-01 RX ORDER — METOPROLOL TARTRATE 1 MG/ML
5 INJECTION, SOLUTION INTRAVENOUS EVERY 5 MIN PRN
Status: DISCONTINUED | OUTPATIENT
Start: 2022-01-01 | End: 2022-01-01 | Stop reason: HOSPADM

## 2022-01-01 RX ORDER — CIPROFLOXACIN 0.5 MG/.25ML
4 SOLUTION/ DROPS AURICULAR (OTIC) 2 TIMES DAILY
Qty: 20 EACH | Refills: 0 | Status: SHIPPED | OUTPATIENT
Start: 2022-01-01 | End: 2022-01-01

## 2022-01-01 RX ORDER — GADOBUTROL 604.72 MG/ML
5.5 INJECTION INTRAVENOUS
Status: COMPLETED | OUTPATIENT
Start: 2022-01-01 | End: 2022-01-01

## 2022-01-01 RX ORDER — LIDOCAINE HYDROCHLORIDE 10 MG/ML
INJECTION INFILTRATION; PERINEURAL
Status: COMPLETED
Start: 2022-01-01 | End: 2022-01-01

## 2022-01-01 RX ORDER — ACETAMINOPHEN 325 MG/1
650 TABLET ORAL
Status: CANCELLED | OUTPATIENT
Start: 2022-01-01

## 2022-01-01 RX ORDER — SODIUM CHLORIDE 0.9 % (FLUSH) 0.9 %
10 SYRINGE (ML) INJECTION EVERY 12 HOURS PRN
Status: CANCELLED | OUTPATIENT
Start: 2022-01-01

## 2022-01-01 RX ORDER — MORPHINE SULFATE 4 MG/ML
4 INJECTION, SOLUTION INTRAMUSCULAR; INTRAVENOUS
Status: COMPLETED | OUTPATIENT
Start: 2022-01-01 | End: 2022-01-01

## 2022-01-01 RX ORDER — PANTOPRAZOLE SODIUM 40 MG/10ML
80 INJECTION, POWDER, LYOPHILIZED, FOR SOLUTION INTRAVENOUS
Status: COMPLETED | OUTPATIENT
Start: 2022-01-01 | End: 2022-01-01

## 2022-01-01 RX ORDER — LIDOCAINE HYDROCHLORIDE 10 MG/ML
10 INJECTION, SOLUTION EPIDURAL; INFILTRATION; INTRACAUDAL; PERINEURAL
Status: DISCONTINUED | OUTPATIENT
Start: 2022-01-01 | End: 2022-01-01

## 2022-01-01 RX ORDER — HYDROCODONE BITARTRATE AND ACETAMINOPHEN 500; 5 MG/1; MG/1
TABLET ORAL ONCE
Status: COMPLETED | OUTPATIENT
Start: 2022-01-01 | End: 2022-01-01

## 2022-01-01 RX ORDER — FLUCONAZOLE 200 MG/1
400 TABLET ORAL DAILY
Qty: 60 TABLET | Refills: 1 | Status: SHIPPED | OUTPATIENT
Start: 2022-01-01 | End: 2023-01-01

## 2022-01-01 RX ORDER — LEVOFLOXACIN 500 MG/1
500 TABLET, FILM COATED ORAL DAILY
Qty: 30 TABLET | Refills: 1 | Status: SHIPPED | OUTPATIENT
Start: 2022-01-01 | End: 2022-01-01 | Stop reason: SDUPTHER

## 2022-01-01 RX ORDER — TALC
6 POWDER (GRAM) TOPICAL NIGHTLY PRN
Status: DISCONTINUED | OUTPATIENT
Start: 2022-01-01 | End: 2022-01-01 | Stop reason: HOSPADM

## 2022-01-01 RX ORDER — HYDROCODONE BITARTRATE AND ACETAMINOPHEN 500; 5 MG/1; MG/1
TABLET ORAL
Status: DISCONTINUED | OUTPATIENT
Start: 2022-01-01 | End: 2022-01-01

## 2022-01-01 RX ORDER — LINEZOLID 600 MG/1
600 TABLET, FILM COATED ORAL EVERY 12 HOURS
Qty: 14 TABLET | Refills: 0 | Status: SHIPPED | OUTPATIENT
Start: 2022-01-01 | End: 2022-01-01

## 2022-01-01 RX ORDER — PROPOFOL 10 MG/ML
VIAL (ML) INTRAVENOUS CONTINUOUS PRN
Status: DISCONTINUED | OUTPATIENT
Start: 2022-01-01 | End: 2022-01-01

## 2022-01-01 RX ORDER — ONDANSETRON 8 MG/1
8 TABLET, ORALLY DISINTEGRATING ORAL EVERY 8 HOURS PRN
Status: DISCONTINUED | OUTPATIENT
Start: 2022-01-01 | End: 2022-01-01 | Stop reason: HOSPADM

## 2022-01-01 RX ORDER — DIPHENHYDRAMINE HCL 25 MG
25 CAPSULE ORAL
Status: CANCELLED | OUTPATIENT
Start: 2022-01-01

## 2022-01-01 RX ORDER — MORPHINE SULFATE 4 MG/ML
4 INJECTION, SOLUTION INTRAMUSCULAR; INTRAVENOUS
Status: DISCONTINUED | OUTPATIENT
Start: 2022-01-01 | End: 2022-01-01

## 2022-01-01 RX ORDER — SODIUM CHLORIDE 0.9 % (FLUSH) 0.9 %
3 SYRINGE (ML) INJECTION
Status: DISCONTINUED | OUTPATIENT
Start: 2022-01-01 | End: 2022-01-01 | Stop reason: HOSPADM

## 2022-01-01 RX ORDER — DIPHENHYDRAMINE HCL 25 MG
25 CAPSULE ORAL
Status: COMPLETED | OUTPATIENT
Start: 2022-01-01 | End: 2022-01-01

## 2022-01-01 RX ORDER — METOPROLOL TARTRATE 25 MG/1
25 TABLET, FILM COATED ORAL 2 TIMES DAILY
Qty: 60 TABLET | Refills: 1 | Status: SHIPPED | OUTPATIENT
Start: 2022-01-01 | End: 2022-01-01 | Stop reason: SDUPTHER

## 2022-01-01 RX ORDER — OXYCODONE HYDROCHLORIDE 5 MG/1
5 TABLET ORAL EVERY 6 HOURS PRN
Status: DISCONTINUED | OUTPATIENT
Start: 2022-01-01 | End: 2022-01-01

## 2022-01-01 RX ORDER — MORPHINE SULFATE 2 MG/ML
2 INJECTION, SOLUTION INTRAMUSCULAR; INTRAVENOUS ONCE
Status: COMPLETED | OUTPATIENT
Start: 2022-01-01 | End: 2022-01-01

## 2022-01-01 RX ORDER — IBUPROFEN 200 MG
16 TABLET ORAL
Status: CANCELLED | OUTPATIENT
Start: 2022-01-01

## 2022-01-01 RX ORDER — FLUCONAZOLE 100 MG/1
100 TABLET ORAL ONCE
Qty: 1 TABLET | Refills: 0 | Status: SHIPPED | OUTPATIENT
Start: 2022-01-01 | End: 2022-01-01

## 2022-01-01 RX ORDER — PROPOFOL 10 MG/ML
VIAL (ML) INTRAVENOUS
Status: DISCONTINUED | OUTPATIENT
Start: 2022-01-01 | End: 2022-01-01

## 2022-01-01 RX ORDER — TALC
6 POWDER (GRAM) TOPICAL NIGHTLY PRN
Status: DISCONTINUED | OUTPATIENT
Start: 2022-01-01 | End: 2022-01-01

## 2022-01-01 RX ORDER — FLUCONAZOLE 200 MG/1
400 TABLET ORAL DAILY
Qty: 60 TABLET | Refills: 1 | Status: SHIPPED | OUTPATIENT
Start: 2022-01-01 | End: 2022-01-01 | Stop reason: SDUPTHER

## 2022-01-01 RX ORDER — GADOBUTROL 604.72 MG/ML
10 INJECTION INTRAVENOUS
Status: DISCONTINUED | OUTPATIENT
Start: 2022-01-01 | End: 2022-01-01 | Stop reason: SDUPTHER

## 2022-01-01 RX ORDER — ACYCLOVIR 400 MG/1
400 TABLET ORAL 2 TIMES DAILY
Qty: 60 TABLET | Refills: 1 | Status: SHIPPED | OUTPATIENT
Start: 2022-01-01 | End: 2023-01-01

## 2022-01-01 RX ORDER — FLECAINIDE ACETATE 100 MG/1
100 TABLET ORAL EVERY 12 HOURS
COMMUNITY
End: 2022-01-01

## 2022-01-01 RX ORDER — UBIDECARENONE 30 MG
30 CAPSULE ORAL 3 TIMES DAILY
COMMUNITY
End: 2022-01-01

## 2022-01-01 RX ORDER — FENTANYL CITRATE 50 UG/ML
100 INJECTION, SOLUTION INTRAMUSCULAR; INTRAVENOUS ONCE
Status: CANCELLED | OUTPATIENT
Start: 2022-01-01 | End: 2022-01-01

## 2022-01-01 RX ORDER — METOPROLOL TARTRATE 25 MG/1
12.5 TABLET ORAL ONCE
Status: COMPLETED | OUTPATIENT
Start: 2022-01-01 | End: 2022-01-01

## 2022-01-01 RX ORDER — CIPROFLOXACIN AND DEXAMETHASONE 3; 1 MG/ML; MG/ML
SUSPENSION/ DROPS AURICULAR (OTIC)
COMMUNITY
Start: 2022-01-01 | End: 2022-01-01

## 2022-01-01 RX ORDER — ACETAMINOPHEN 325 MG/1
650 TABLET ORAL EVERY 4 HOURS PRN
Status: DISCONTINUED | OUTPATIENT
Start: 2022-01-01 | End: 2022-01-01 | Stop reason: HOSPADM

## 2022-01-01 RX ORDER — PANTOPRAZOLE SODIUM 40 MG/1
40 TABLET, DELAYED RELEASE ORAL DAILY
Qty: 90 TABLET | Refills: 1 | Status: SHIPPED | OUTPATIENT
Start: 2022-01-01 | End: 2022-01-01 | Stop reason: SDUPTHER

## 2022-01-01 RX ORDER — NALOXONE HCL 0.4 MG/ML
0.02 VIAL (ML) INJECTION
Status: DISCONTINUED | OUTPATIENT
Start: 2022-01-01 | End: 2022-01-01 | Stop reason: HOSPADM

## 2022-01-01 RX ORDER — SODIUM CHLORIDE 0.9 % (FLUSH) 0.9 %
10 SYRINGE (ML) INJECTION
Status: DISCONTINUED | OUTPATIENT
Start: 2022-01-01 | End: 2022-01-01 | Stop reason: HOSPADM

## 2022-01-01 RX ORDER — HYDROCODONE BITARTRATE AND ACETAMINOPHEN 500; 5 MG/1; MG/1
TABLET ORAL
Status: DISCONTINUED | OUTPATIENT
Start: 2022-01-01 | End: 2022-01-01 | Stop reason: HOSPADM

## 2022-01-01 RX ORDER — ACETIC ACID 20.65 MG/ML
SOLUTION AURICULAR (OTIC)
COMMUNITY
Start: 2022-01-01 | End: 2022-01-01

## 2022-01-01 RX ORDER — SENNOSIDES 8.6 MG/1
1 TABLET ORAL DAILY
Qty: 30 TABLET | Refills: 11 | Status: SHIPPED | OUTPATIENT
Start: 2022-01-01 | End: 2023-10-27

## 2022-01-01 RX ORDER — ACYCLOVIR 400 MG/1
400 TABLET ORAL 2 TIMES DAILY
Qty: 60 TABLET | Refills: 1 | Status: SHIPPED | OUTPATIENT
Start: 2022-01-01 | End: 2022-01-01 | Stop reason: SDUPTHER

## 2022-01-01 RX ORDER — ACETAMINOPHEN 325 MG/1
650 TABLET ORAL EVERY 6 HOURS PRN
Status: DISCONTINUED | OUTPATIENT
Start: 2022-01-01 | End: 2022-01-01 | Stop reason: HOSPADM

## 2022-01-01 RX ORDER — METOPROLOL TARTRATE 25 MG/1
12.5 TABLET ORAL 2 TIMES DAILY
Status: DISCONTINUED | OUTPATIENT
Start: 2022-01-01 | End: 2022-01-01

## 2022-01-01 RX ORDER — LIDOCAINE HYDROCHLORIDE 10 MG/ML
1 INJECTION, SOLUTION EPIDURAL; INFILTRATION; INTRACAUDAL; PERINEURAL ONCE
Status: DISCONTINUED | OUTPATIENT
Start: 2022-01-01 | End: 2022-01-01 | Stop reason: HOSPADM

## 2022-01-01 RX ORDER — LEVOFLOXACIN 500 MG/1
500 TABLET, FILM COATED ORAL DAILY
Qty: 30 TABLET | Refills: 1 | Status: SHIPPED | OUTPATIENT
Start: 2022-01-01 | End: 2023-01-01

## 2022-01-01 RX ORDER — TRIAMCINOLONE ACETONIDE 0.25 MG/G
OINTMENT TOPICAL
COMMUNITY
Start: 2022-01-01 | End: 2022-01-01

## 2022-01-01 RX ORDER — ONDANSETRON 4 MG/1
4 TABLET, FILM COATED ORAL EVERY 6 HOURS PRN
Qty: 120 TABLET | Refills: 0 | Status: SHIPPED | OUTPATIENT
Start: 2022-01-01 | End: 2022-01-01

## 2022-01-01 RX ORDER — OXYCODONE HYDROCHLORIDE 5 MG/1
5 TABLET ORAL EVERY 6 HOURS PRN
Status: DISCONTINUED | OUTPATIENT
Start: 2022-01-01 | End: 2022-01-01 | Stop reason: HOSPADM

## 2022-01-01 RX ORDER — DOXYCYCLINE HYCLATE 100 MG
100 TABLET ORAL EVERY 12 HOURS
Qty: 26 TABLET | Refills: 0 | Status: SHIPPED | OUTPATIENT
Start: 2022-01-01 | End: 2022-01-01

## 2022-01-01 RX ORDER — IBUPROFEN 200 MG
24 TABLET ORAL
Status: CANCELLED | OUTPATIENT
Start: 2022-01-01

## 2022-01-01 RX ORDER — FLUCONAZOLE 150 MG/1
150 TABLET ORAL ONCE AS NEEDED
Qty: 1 TABLET | Refills: 0 | Status: SHIPPED | OUTPATIENT
Start: 2022-01-01 | End: 2022-01-01

## 2022-01-01 RX ORDER — MUPIROCIN 20 MG/G
OINTMENT TOPICAL 4 TIMES DAILY
Status: ON HOLD | COMMUNITY
End: 2023-01-01 | Stop reason: HOSPADM

## 2022-01-01 RX ORDER — ENOXAPARIN SODIUM 100 MG/ML
30 INJECTION SUBCUTANEOUS EVERY 24 HOURS
Status: CANCELLED | OUTPATIENT
Start: 2022-01-01

## 2022-01-01 RX ORDER — FLECAINIDE ACETATE 100 MG/1
100 TABLET ORAL EVERY 12 HOURS
Qty: 180 TABLET | Refills: 3 | Status: SHIPPED | OUTPATIENT
Start: 2022-01-01 | End: 2022-01-01

## 2022-01-01 RX ORDER — GADOBUTROL 604.72 MG/ML
7 INJECTION INTRAVENOUS
Status: COMPLETED | OUTPATIENT
Start: 2022-01-01 | End: 2022-01-01

## 2022-01-01 RX ORDER — GLUCAGON 1 MG
1 KIT INJECTION
Status: CANCELLED | OUTPATIENT
Start: 2022-01-01

## 2022-01-01 RX ORDER — POVIDONE-IODINE 10 %
SOLUTION, NON-ORAL TOPICAL
Qty: 14.8 ML | Refills: 0 | Status: SHIPPED | OUTPATIENT
Start: 2022-01-01

## 2022-01-01 RX ORDER — LIDOCAINE HYDROCHLORIDE 20 MG/ML
10 INJECTION, SOLUTION EPIDURAL; INFILTRATION; INTRACAUDAL; PERINEURAL ONCE
Status: COMPLETED | OUTPATIENT
Start: 2022-01-01 | End: 2022-01-01

## 2022-01-01 RX ORDER — METRONIDAZOLE 500 MG/1
500 TABLET ORAL 3 TIMES DAILY
Qty: 15 TABLET | Refills: 0 | Status: SHIPPED | OUTPATIENT
Start: 2022-01-01 | End: 2022-01-01

## 2022-01-01 RX ORDER — ONDANSETRON 2 MG/ML
4 INJECTION INTRAMUSCULAR; INTRAVENOUS EVERY 6 HOURS PRN
Status: DISCONTINUED | OUTPATIENT
Start: 2022-01-01 | End: 2022-01-01 | Stop reason: HOSPADM

## 2022-01-01 RX ORDER — PANTOPRAZOLE SODIUM 40 MG/1
40 TABLET, DELAYED RELEASE ORAL DAILY
Qty: 30 TABLET | Refills: 1 | Status: SHIPPED | OUTPATIENT
Start: 2022-01-01 | End: 2022-01-01 | Stop reason: SDUPTHER

## 2022-01-01 RX ORDER — CIPROFLOXACIN 500 MG/1
500 TABLET ORAL 2 TIMES DAILY
Qty: 10 TABLET | Refills: 0 | Status: SHIPPED | OUTPATIENT
Start: 2022-01-01 | End: 2022-01-01

## 2022-01-01 RX ORDER — NYSTATIN 100000 U/G
OINTMENT TOPICAL
COMMUNITY
Start: 2022-01-01 | End: 2022-01-01

## 2022-01-01 RX ORDER — DOXYCYCLINE HYCLATE 100 MG
100 TABLET ORAL EVERY 12 HOURS
Status: DISCONTINUED | OUTPATIENT
Start: 2022-01-01 | End: 2022-01-01 | Stop reason: HOSPADM

## 2022-01-01 RX ORDER — LIDOCAINE HYDROCHLORIDE 10 MG/ML
3 INJECTION, SOLUTION EPIDURAL; INFILTRATION; INTRACAUDAL; PERINEURAL ONCE
Status: COMPLETED | OUTPATIENT
Start: 2022-01-01 | End: 2022-01-01

## 2022-01-01 RX ORDER — IBUPROFEN 200 MG
200 TABLET ORAL EVERY 6 HOURS PRN
Status: ON HOLD | COMMUNITY
End: 2023-01-01 | Stop reason: HOSPADM

## 2022-01-01 RX ORDER — SODIUM CHLORIDE 9 MG/ML
INJECTION, SOLUTION INTRAVENOUS CONTINUOUS
Status: DISCONTINUED | OUTPATIENT
Start: 2022-01-01 | End: 2022-01-01 | Stop reason: HOSPADM

## 2022-01-01 RX ORDER — POLYETHYLENE GLYCOL 3350, SODIUM SULFATE ANHYDROUS, SODIUM BICARBONATE, SODIUM CHLORIDE, POTASSIUM CHLORIDE 236; 22.74; 6.74; 5.86; 2.97 G/4L; G/4L; G/4L; G/4L; G/4L
4000 POWDER, FOR SOLUTION ORAL ONCE
Status: COMPLETED | OUTPATIENT
Start: 2022-01-01 | End: 2022-01-01

## 2022-01-01 RX ORDER — CIPROFLOXACIN AND DEXAMETHASONE 3; 1 MG/ML; MG/ML
4 SUSPENSION/ DROPS AURICULAR (OTIC) 2 TIMES DAILY
Qty: 7.5 ML | Refills: 0 | Status: SHIPPED | OUTPATIENT
Start: 2022-01-01 | End: 2022-01-01

## 2022-01-01 RX ORDER — NALOXONE HCL 0.4 MG/ML
0.02 VIAL (ML) INJECTION
Status: CANCELLED | OUTPATIENT
Start: 2022-01-01

## 2022-01-01 RX ORDER — PANTOPRAZOLE SODIUM 40 MG/10ML
40 INJECTION, POWDER, LYOPHILIZED, FOR SOLUTION INTRAVENOUS 2 TIMES DAILY
Status: DISCONTINUED | OUTPATIENT
Start: 2022-01-01 | End: 2022-01-01

## 2022-01-01 RX ORDER — BISACODYL 10 MG
10 SUPPOSITORY, RECTAL RECTAL DAILY PRN
Qty: 5 SUPPOSITORY | Refills: 0 | Status: ON HOLD | OUTPATIENT
Start: 2022-01-01 | End: 2022-01-01 | Stop reason: HOSPADM

## 2022-01-01 RX ORDER — MIRTAZAPINE 15 MG/1
15 TABLET, FILM COATED ORAL NIGHTLY
Qty: 30 TABLET | Refills: 2 | Status: SHIPPED | OUTPATIENT
Start: 2022-01-01 | End: 2022-01-01

## 2022-01-01 RX ORDER — ACETAMINOPHEN 500 MG
1000 TABLET ORAL DAILY PRN
Status: ON HOLD | COMMUNITY
End: 2023-01-01 | Stop reason: HOSPADM

## 2022-01-01 RX ORDER — LIDOCAINE HYDROCHLORIDE 10 MG/ML
INJECTION, SOLUTION INTRAVENOUS
Status: DISCONTINUED | OUTPATIENT
Start: 2022-01-01 | End: 2022-01-01

## 2022-01-01 RX ORDER — ACETAMINOPHEN 325 MG/1
650 TABLET ORAL
Status: DISCONTINUED | OUTPATIENT
Start: 2022-01-01 | End: 2022-01-01 | Stop reason: HOSPADM

## 2022-01-01 RX ORDER — PANTOPRAZOLE SODIUM 40 MG/1
40 TABLET, DELAYED RELEASE ORAL DAILY
Status: DISCONTINUED | OUTPATIENT
Start: 2022-01-01 | End: 2022-01-01 | Stop reason: HOSPADM

## 2022-01-01 RX ORDER — PHENAZOPYRIDINE HYDROCHLORIDE 100 MG/1
100 TABLET, FILM COATED ORAL
Status: DISCONTINUED | OUTPATIENT
Start: 2022-01-01 | End: 2022-01-01 | Stop reason: HOSPADM

## 2022-01-01 RX ORDER — TRIAMCINOLONE ACETONIDE 1 MG/G
OINTMENT TOPICAL 2 TIMES DAILY
Qty: 80 G | Refills: 0 | Status: ON HOLD | OUTPATIENT
Start: 2022-01-01 | End: 2023-01-01 | Stop reason: HOSPADM

## 2022-01-01 RX ORDER — MIDAZOLAM HYDROCHLORIDE 1 MG/ML
2 INJECTION INTRAMUSCULAR; INTRAVENOUS ONCE
Status: CANCELLED | OUTPATIENT
Start: 2022-01-01 | End: 2022-01-01

## 2022-01-01 RX ORDER — LIDOCAINE HCL/EPINEPHRINE/PF 2%-1:200K
10 VIAL (ML) INJECTION ONCE
Status: COMPLETED | OUTPATIENT
Start: 2022-01-01 | End: 2022-01-01

## 2022-01-01 RX ADMIN — PIPERACILLIN SODIUM AND TAZOBACTAM SODIUM 4.5 G: 4; .5 INJECTION, POWDER, LYOPHILIZED, FOR SOLUTION INTRAVENOUS at 05:07

## 2022-01-01 RX ADMIN — ONDANSETRON 8 MG: 8 TABLET, ORALLY DISINTEGRATING ORAL at 08:11

## 2022-01-01 RX ADMIN — PANTOPRAZOLE SODIUM 40 MG: 40 INJECTION, POWDER, FOR SOLUTION INTRAVENOUS at 08:06

## 2022-01-01 RX ADMIN — METOROPROLOL TARTRATE 5 MG: 5 INJECTION, SOLUTION INTRAVENOUS at 12:06

## 2022-01-01 RX ADMIN — SODIUM CHLORIDE: 9 INJECTION, SOLUTION INTRAVENOUS at 10:06

## 2022-01-01 RX ADMIN — METOPROLOL TARTRATE 25 MG: 25 TABLET, FILM COATED ORAL at 10:11

## 2022-01-01 RX ADMIN — PHENAZOPYRIDINE HYDROCHLORIDE 100 MG: 100 TABLET ORAL at 01:07

## 2022-01-01 RX ADMIN — MORPHINE SULFATE 15 MG: 15 TABLET ORAL at 02:11

## 2022-01-01 RX ADMIN — SODIUM CHLORIDE: 0.9 INJECTION, SOLUTION INTRAVENOUS at 02:12

## 2022-01-01 RX ADMIN — AMPICILLIN SODIUM AND SULBACTAM SODIUM 3 G: 2; 1 INJECTION, POWDER, FOR SOLUTION INTRAMUSCULAR; INTRAVENOUS at 05:11

## 2022-01-01 RX ADMIN — ACETAMINOPHEN 650 MG: 325 TABLET ORAL at 01:09

## 2022-01-01 RX ADMIN — METOPROLOL TARTRATE 25 MG: 25 TABLET, FILM COATED ORAL at 09:07

## 2022-01-01 RX ADMIN — AMPICILLIN SODIUM AND SULBACTAM SODIUM 3 G: 2; 1 INJECTION, POWDER, FOR SOLUTION INTRAMUSCULAR; INTRAVENOUS at 04:11

## 2022-01-01 RX ADMIN — MORPHINE SULFATE 4 MG: 4 INJECTION INTRAVENOUS at 03:11

## 2022-01-01 RX ADMIN — ACETAMINOPHEN 1000 MG: 500 TABLET ORAL at 01:06

## 2022-01-01 RX ADMIN — ACETAMINOPHEN 1000 MG: 500 TABLET ORAL at 03:06

## 2022-01-01 RX ADMIN — PIPERACILLIN SODIUM AND TAZOBACTAM SODIUM 4.5 G: 4; .5 INJECTION, POWDER, LYOPHILIZED, FOR SOLUTION INTRAVENOUS at 04:07

## 2022-01-01 RX ADMIN — TETANUS TOXOID, REDUCED DIPHTHERIA TOXOID AND ACELLULAR PERTUSSIS VACCINE, ADSORBED 0.5 ML: 5; 2.5; 8; 8; 2.5 SUSPENSION INTRAMUSCULAR at 02:11

## 2022-01-01 RX ADMIN — DOXYCYCLINE HYCLATE 100 MG: 100 TABLET, COATED ORAL at 08:06

## 2022-01-01 RX ADMIN — MORPHINE SULFATE 2 MG: 2 INJECTION, SOLUTION INTRAMUSCULAR; INTRAVENOUS at 10:11

## 2022-01-01 RX ADMIN — Medication 10 ML: at 08:07

## 2022-01-01 RX ADMIN — PIPERACILLIN SODIUM AND TAZOBACTAM SODIUM 4.5 G: 4; .5 INJECTION, POWDER, LYOPHILIZED, FOR SOLUTION INTRAVENOUS at 03:07

## 2022-01-01 RX ADMIN — LIDOCAINE HYDROCHLORIDE: 10 INJECTION, SOLUTION INFILTRATION; PERINEURAL at 01:11

## 2022-01-01 RX ADMIN — PHENAZOPYRIDINE HYDROCHLORIDE 100 MG: 100 TABLET ORAL at 04:07

## 2022-01-01 RX ADMIN — METOPROLOL TARTRATE 12.5 MG: 25 TABLET, FILM COATED ORAL at 08:06

## 2022-01-01 RX ADMIN — METOPROLOL TARTRATE 25 MG: 25 TABLET, FILM COATED ORAL at 08:07

## 2022-01-01 RX ADMIN — GADOBUTROL 5.5 ML: 604.72 INJECTION INTRAVENOUS at 10:03

## 2022-01-01 RX ADMIN — PANTOPRAZOLE SODIUM 40 MG: 40 TABLET, DELAYED RELEASE ORAL at 09:06

## 2022-01-01 RX ADMIN — ACETAMINOPHEN 650 MG: 325 TABLET ORAL at 02:09

## 2022-01-01 RX ADMIN — ESMOLOL HYDROCHLORIDE 15 MG: 10 INJECTION INTRAVENOUS at 10:06

## 2022-01-01 RX ADMIN — PHENAZOPYRIDINE HYDROCHLORIDE 100 MG: 100 TABLET ORAL at 09:07

## 2022-01-01 RX ADMIN — LIDOCAINE HYDROCHLORIDE 50 MG: 10 INJECTION, SOLUTION INTRAVENOUS at 10:06

## 2022-01-01 RX ADMIN — METOPROLOL TARTRATE 12.5 MG: 25 TABLET, FILM COATED ORAL at 11:06

## 2022-01-01 RX ADMIN — PANTOPRAZOLE SODIUM 40 MG: 40 TABLET, DELAYED RELEASE ORAL at 08:07

## 2022-01-01 RX ADMIN — DOXYCYCLINE HYCLATE 100 MG: 100 TABLET, COATED ORAL at 12:06

## 2022-01-01 RX ADMIN — GADOBUTROL 6 ML: 604.72 INJECTION INTRAVENOUS at 04:09

## 2022-01-01 RX ADMIN — SODIUM CHLORIDE: 0.9 INJECTION, SOLUTION INTRAVENOUS at 03:11

## 2022-01-01 RX ADMIN — PANTOPRAZOLE SODIUM 40 MG: 40 TABLET, DELAYED RELEASE ORAL at 08:06

## 2022-01-01 RX ADMIN — LIDOCAINE HYDROCHLORIDE 3 ML: 10 INJECTION, SOLUTION EPIDURAL; INFILTRATION; INTRACAUDAL; PERINEURAL at 09:08

## 2022-01-01 RX ADMIN — PANTOPRAZOLE SODIUM 40 MG: 40 INJECTION, POWDER, FOR SOLUTION INTRAVENOUS at 09:06

## 2022-01-01 RX ADMIN — IOHEXOL 75 ML: 350 INJECTION, SOLUTION INTRAVENOUS at 01:07

## 2022-01-01 RX ADMIN — ACETAMINOPHEN 650 MG: 325 TABLET ORAL at 08:07

## 2022-01-01 RX ADMIN — FLECAINIDE ACETATE 100 MG: 100 TABLET ORAL at 09:07

## 2022-01-01 RX ADMIN — Medication 75 MG: at 10:06

## 2022-01-01 RX ADMIN — PHENAZOPYRIDINE HYDROCHLORIDE 100 MG: 100 TABLET ORAL at 08:07

## 2022-01-01 RX ADMIN — ACETAMINOPHEN 1000 MG: 500 TABLET ORAL at 04:06

## 2022-01-01 RX ADMIN — SODIUM CHLORIDE: 0.9 INJECTION, SOLUTION INTRAVENOUS at 02:09

## 2022-01-01 RX ADMIN — GADOBUTROL 7 ML: 604.72 INJECTION INTRAVENOUS at 02:05

## 2022-01-01 RX ADMIN — DIPHENHYDRAMINE HYDROCHLORIDE 25 MG: 25 CAPSULE ORAL at 02:09

## 2022-01-01 RX ADMIN — PANTOPRAZOLE SODIUM 40 MG: 40 TABLET, DELAYED RELEASE ORAL at 09:07

## 2022-01-01 RX ADMIN — METOPROLOL TARTRATE 12.5 MG: 25 TABLET, FILM COATED ORAL at 09:06

## 2022-01-01 RX ADMIN — POLYETHYLENE GLYCOL 3350, SODIUM SULFATE ANHYDROUS, SODIUM BICARBONATE, SODIUM CHLORIDE, POTASSIUM CHLORIDE 4000 ML: 236; 22.74; 6.74; 5.86; 2.97 POWDER, FOR SOLUTION ORAL at 05:06

## 2022-01-01 RX ADMIN — METOROPROLOL TARTRATE 5 MG: 5 INJECTION, SOLUTION INTRAVENOUS at 01:06

## 2022-01-01 RX ADMIN — ACETAMINOPHEN 650 MG: 325 TABLET ORAL at 12:07

## 2022-01-01 RX ADMIN — ONDANSETRON HYDROCHLORIDE 4 MG: 4 TABLET, FILM COATED ORAL at 11:06

## 2022-01-01 RX ADMIN — PIPERACILLIN SODIUM AND TAZOBACTAM SODIUM 4.5 G: 4; .5 INJECTION, POWDER, LYOPHILIZED, FOR SOLUTION INTRAVENOUS at 01:07

## 2022-01-01 RX ADMIN — PIPERACILLIN SODIUM AND TAZOBACTAM SODIUM 4.5 G: 4; .5 INJECTION, POWDER, LYOPHILIZED, FOR SOLUTION INTRAVENOUS at 09:07

## 2022-01-01 RX ADMIN — METOPROLOL TARTRATE 12.5 MG: 25 TABLET, FILM COATED ORAL at 12:06

## 2022-01-01 RX ADMIN — PANTOPRAZOLE SODIUM 80 MG: 40 INJECTION, POWDER, FOR SOLUTION INTRAVENOUS at 09:06

## 2022-01-01 RX ADMIN — ACETAMINOPHEN 650 MG: 325 TABLET ORAL at 02:11

## 2022-01-01 RX ADMIN — LIDOCAINE HYDROCHLORIDE,EPINEPHRINE BITARTRATE 10 ML: 20; .005 INJECTION, SOLUTION EPIDURAL; INFILTRATION; INTRACAUDAL; PERINEURAL at 09:08

## 2022-01-01 RX ADMIN — METOPROLOL TARTRATE 25 MG: 25 TABLET, FILM COATED ORAL at 08:11

## 2022-01-01 RX ADMIN — ACETAMINOPHEN 650 MG: 325 TABLET ORAL at 01:10

## 2022-01-01 RX ADMIN — ACETAMINOPHEN 1000 MG: 500 TABLET ORAL at 11:06

## 2022-01-01 RX ADMIN — PHENAZOPYRIDINE HYDROCHLORIDE 100 MG: 100 TABLET ORAL at 12:07

## 2022-01-01 RX ADMIN — AMPICILLIN SODIUM AND SULBACTAM SODIUM 3 G: 2; 1 INJECTION, POWDER, FOR SOLUTION INTRAMUSCULAR; INTRAVENOUS at 10:11

## 2022-01-01 RX ADMIN — PROPOFOL 100 MCG/KG/MIN: 10 INJECTION, EMULSION INTRAVENOUS at 10:06

## 2022-01-01 RX ADMIN — FLECAINIDE ACETATE 100 MG: 100 TABLET ORAL at 08:07

## 2022-01-01 RX ADMIN — LIDOCAINE HYDROCHLORIDE 8 ML: 20 INJECTION, SOLUTION EPIDURAL; INFILTRATION; INTRACAUDAL; PERINEURAL at 09:09

## 2022-01-01 RX ADMIN — AMPICILLIN SODIUM AND SULBACTAM SODIUM 3 G: 2; 1 INJECTION, POWDER, FOR SOLUTION INTRAMUSCULAR; INTRAVENOUS at 11:11

## 2022-01-06 ENCOUNTER — TELEPHONE (OUTPATIENT)
Dept: HEMATOLOGY/ONCOLOGY | Facility: CLINIC | Age: 67
End: 2022-01-06
Payer: MEDICARE

## 2022-01-06 NOTE — TELEPHONE ENCOUNTER
Spoke with patient who would like Dr. Ford to review her labs from recent hospitalization and labs her PCP ordered.  States that her ferritin is chronically elevated and she would like to know how this can be addressed.

## 2022-01-06 NOTE — TELEPHONE ENCOUNTER
Called patient back to let her know that Dr. Ford reviewed labs and states that this is not unusual with anemia and other health issues.  Patient verbalized understanding.  Scheduled follow-up appointment and labs per patient's request.

## 2022-01-11 ENCOUNTER — PATIENT MESSAGE (OUTPATIENT)
Dept: FAMILY MEDICINE | Facility: CLINIC | Age: 67
End: 2022-01-11
Payer: MEDICARE

## 2022-01-11 DIAGNOSIS — K21.9 GASTROESOPHAGEAL REFLUX DISEASE, UNSPECIFIED WHETHER ESOPHAGITIS PRESENT: Primary | ICD-10-CM

## 2022-01-12 RX ORDER — PANTOPRAZOLE SODIUM 20 MG/1
20 TABLET, DELAYED RELEASE ORAL DAILY PRN
Qty: 30 TABLET | Refills: 2 | Status: ON HOLD | OUTPATIENT
Start: 2022-01-12 | End: 2022-01-01 | Stop reason: SDUPTHER

## 2022-01-18 ENCOUNTER — LAB VISIT (OUTPATIENT)
Dept: LAB | Facility: HOSPITAL | Age: 67
End: 2022-01-18
Attending: INTERNAL MEDICINE
Payer: MEDICARE

## 2022-01-18 DIAGNOSIS — R07.9 CHEST PAIN, UNSPECIFIED TYPE: ICD-10-CM

## 2022-01-18 DIAGNOSIS — I48.91 ATRIAL FIBRILLATION WITH RVR: ICD-10-CM

## 2022-01-18 LAB
ANION GAP SERPL CALC-SCNC: 8 MMOL/L (ref 8–16)
ANISOCYTOSIS BLD QL SMEAR: SLIGHT
BASOPHILS # BLD AUTO: ABNORMAL K/UL (ref 0–0.2)
BASOPHILS NFR BLD: 0 % (ref 0–1.9)
BUN SERPL-MCNC: 11 MG/DL (ref 8–23)
CALCIUM SERPL-MCNC: 9.4 MG/DL (ref 8.7–10.5)
CHLORIDE SERPL-SCNC: 104 MMOL/L (ref 95–110)
CO2 SERPL-SCNC: 26 MMOL/L (ref 23–29)
CREAT SERPL-MCNC: 0.8 MG/DL (ref 0.5–1.4)
DACRYOCYTES BLD QL SMEAR: ABNORMAL
DIFFERENTIAL METHOD: ABNORMAL
EOSINOPHIL # BLD AUTO: ABNORMAL K/UL (ref 0–0.5)
EOSINOPHIL NFR BLD: 3 % (ref 0–8)
ERYTHROCYTE [DISTWIDTH] IN BLOOD BY AUTOMATED COUNT: 17.1 % (ref 11.5–14.5)
EST. GFR  (AFRICAN AMERICAN): >60 ML/MIN/1.73 M^2
EST. GFR  (NON AFRICAN AMERICAN): >60 ML/MIN/1.73 M^2
GLUCOSE SERPL-MCNC: 144 MG/DL (ref 70–110)
HCT VFR BLD AUTO: 29.4 % (ref 37–48.5)
HGB BLD-MCNC: 9.3 G/DL (ref 12–16)
IMM GRANULOCYTES # BLD AUTO: ABNORMAL K/UL (ref 0–0.04)
IMM GRANULOCYTES NFR BLD AUTO: ABNORMAL % (ref 0–0.5)
LYMPHOCYTES # BLD AUTO: ABNORMAL K/UL (ref 1–4.8)
LYMPHOCYTES NFR BLD: 13 % (ref 18–48)
MCH RBC QN AUTO: 32.1 PG (ref 27–31)
MCHC RBC AUTO-ENTMCNC: 31.6 G/DL (ref 32–36)
MCV RBC AUTO: 101 FL (ref 82–98)
MONOCYTES # BLD AUTO: ABNORMAL K/UL (ref 0.3–1)
MONOCYTES NFR BLD: 6 % (ref 4–15)
NEUTROPHILS NFR BLD: 69 % (ref 38–73)
NEUTS BAND NFR BLD MANUAL: 9 %
NRBC BLD-RTO: 1 /100 WBC
OVALOCYTES BLD QL SMEAR: ABNORMAL
PLATELET # BLD AUTO: 161 K/UL (ref 150–450)
PLATELET BLD QL SMEAR: ABNORMAL
PMV BLD AUTO: 9.7 FL (ref 9.2–12.9)
POIKILOCYTOSIS BLD QL SMEAR: SLIGHT
POTASSIUM SERPL-SCNC: 4.1 MMOL/L (ref 3.5–5.1)
RBC # BLD AUTO: 2.9 M/UL (ref 4–5.4)
SODIUM SERPL-SCNC: 138 MMOL/L (ref 136–145)
WBC # BLD AUTO: 5.63 K/UL (ref 3.9–12.7)

## 2022-01-18 PROCEDURE — 85027 COMPLETE CBC AUTOMATED: CPT | Performed by: INTERNAL MEDICINE

## 2022-01-18 PROCEDURE — 80048 BASIC METABOLIC PNL TOTAL CA: CPT | Performed by: INTERNAL MEDICINE

## 2022-01-18 PROCEDURE — 85007 BL SMEAR W/DIFF WBC COUNT: CPT | Performed by: INTERNAL MEDICINE

## 2022-01-18 PROCEDURE — 36415 COLL VENOUS BLD VENIPUNCTURE: CPT | Performed by: INTERNAL MEDICINE

## 2022-01-19 ENCOUNTER — OFFICE VISIT (OUTPATIENT)
Dept: CARDIOLOGY | Facility: CLINIC | Age: 67
End: 2022-01-19
Payer: MEDICARE

## 2022-01-19 VITALS
HEART RATE: 85 BPM | BODY MASS INDEX: 25.17 KG/M2 | SYSTOLIC BLOOD PRESSURE: 110 MMHG | HEIGHT: 60 IN | OXYGEN SATURATION: 98 % | WEIGHT: 128.19 LBS | DIASTOLIC BLOOD PRESSURE: 60 MMHG

## 2022-01-19 DIAGNOSIS — I48.91 ATRIAL FIBRILLATION WITH RVR: Primary | ICD-10-CM

## 2022-01-19 DIAGNOSIS — R07.9 CHEST PAIN, UNSPECIFIED TYPE: ICD-10-CM

## 2022-01-19 PROCEDURE — 99213 OFFICE O/P EST LOW 20 MIN: CPT | Mod: PBBFAC | Performed by: INTERNAL MEDICINE

## 2022-01-19 PROCEDURE — 99999 PR PBB SHADOW E&M-EST. PATIENT-LVL III: CPT | Mod: PBBFAC,,, | Performed by: INTERNAL MEDICINE

## 2022-01-19 PROCEDURE — 99999 PR PBB SHADOW E&M-EST. PATIENT-LVL III: ICD-10-PCS | Mod: PBBFAC,,, | Performed by: INTERNAL MEDICINE

## 2022-01-19 PROCEDURE — 99214 OFFICE O/P EST MOD 30 MIN: CPT | Mod: S$PBB,,, | Performed by: INTERNAL MEDICINE

## 2022-01-19 PROCEDURE — 99214 PR OFFICE/OUTPT VISIT, EST, LEVL IV, 30-39 MIN: ICD-10-PCS | Mod: S$PBB,,, | Performed by: INTERNAL MEDICINE

## 2022-01-19 NOTE — PROGRESS NOTES
Subjective:    Patient ID:  Jeanne Rodgers is a 66 y.o. female who presents for follow-up of No chief complaint on file.      HPI     PAF     Referred by Dr Hill  Jeanne Rodgers is a 66 y.o. female who presents for HBO consult. Referred by OBGYN Oncologist  for radiation vaginitis and would like to try hyperbaric oxygen therapy.  Topical treatments for this painful region have not helped and are painful. History of breast with partial mastectomy right breast in 2014 and endometrial cancer. Finished last chemo and radiation therapy in May 2020.  Reports no hormone therapy but reports nightly medical marijuana use for nausea during treatment. HBO workup started today in clinic: cxr, ekg, crp and sed rate ordered. HBO consents signed and patient educated. Verbalized understanding.      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 Her chemo/XRT was completed 5/2020.  Patient has failed medical management of this disease and has not found any relief from the pain. She is an excellent candidate for HBOT.  Insurance approval will be requested. Additional labs and diagnostic tests were ordered as noted above. The HBO treatment was explained, the consent for was explained, all questions were answered and the consent was signed.  We will proceed with HBOT when insurance approval has been confirmed. In addition, a left-sided cerumen impaction was noted on physical exam and Debrox drops were recommended.      EKG 8/16/21 sinus bradycardia 54 otherwise ok     Stress test 8/27/21    Normal myocardial perfusion scan. There is no evidence of myocardial ischemia or infarction.    The gated perfusion images showed an ejection fraction of 70% post stress.    There is normal wall motion post stress.    The EKG portion of this study is negative for ischemia.    The patient reported no chest pain during the stress test.    There were no arrhythmias  during stress.        Echo 12/17/21  · The left ventricle is normal in size with normal systolic function.  · The estimated ejection fraction is 58%.  · Normal left ventricular diastolic function.  · Normal right ventricular size with normal right ventricular systolic function.  · Mild mitral regurgitation.  · Mild tricuspid regurgitation.  · Elevated central venous pressure (15 mmHg).  · The estimated PA systolic pressure is 36 mmHg.  · Trivial posterolateral pericardial effusion. And outside RA.     CTA chest 12/13/21  No evidence of pulmonary thromboembolism other acute abnormalities in the chest.        Holter 8/27/21  · Normal sinus rhythm. Heart rates varied between 46 and 141 BPM with an average of 82 BPM.  · No PVC's  · 237 PAC's with one 4 beat SVT        8/24/21 Reports sharp left sided CP intermittently for the last 2 months. Occasional SOB  Denies prior CAD   Echo and lexiscan myoview for CP and SOB  Add lipid to next labs  Holter for bradycardia     Lipid 8/27/21  Chol 186  HDL 45       9/13/21 Still with occasional sharp CP   Denies SOB  Continue observation for atypical CP and bradycardia  Diet control for mild HLD  OV 6 months     [place] [facility] [place]21  Macrocytic Anemia  Bright Red Blood per Rectum  -Hgb 8.9 on presentation, baseline ~11. No reported melena or hematochezia but some gross blood noted on rectal exam. Pt. With prior history of anemia with EGD/colonoscopy positive only for erythematous duodenopathy on EGD 6/2020  -Iron studies, folate, and B12 ordered for further work-up of anemia  -Trend Hgb, will leave pt. On clear liquid diet. Plan for outpatient GI f/u unless pt. Develops active bleeding or has significantly downtrending Hgb  - Hb stable at 8.0  - no active bleeding at this time   -diet advanced on 12/17     Atrial Fibrillation with RVR  Patient has been having paroxsymal runs of atrial fibrillation during this admission with rates up to the 150s. She was seen by  cardiology  -TTE Stable  -chest pain improving  -Metoprolol PO started  -Cards recs as follows:   Given isolated episode of atrial fibrillation and acute illness along with anemia holding off on starting anticoagulation at this time, recommending 30 day event monitor at discharge to look for occult atrial fibrillation, follow-up with Dr. Mancia      Chest Pain- improving   -Pt. Chief complaint chest/back pain with 2 presentations to ED in last few days, ongoing for days, associated with position changes and deep breathing. -Troponin negative, EKG without new ST changes.  -CTA chest without abnormality. Pt. Also has had stress test 8/2021 negative for ischemia  -MSK origin suspected, continue symptomatic pain relief with lidocaine patch, acetaminophen, oxycodone PRN     12/20/21 Went to the ER recently with CP - PE ruled out - felt to possibly be pericarditis - started on ibuprofen which triggered a GIB. In the hospital developed A-fib RVR - no anticoagulation or CV done. Feels better since discharge. Denies further CP.   EKG NSR NSSTT changes  BP controlled by home readings  PAF - back in NSR - not on OAC due to recent GIB - likely triggered by NSAIDs  CP - unclear etiology - no MI or PE  OV 1 month with BMP, CBC - if CP returns consider [facility]      Labs 1/18/22  K 4.1  Cr 0.8  HCT 29    1/19/22 Denies CP, SOB, or palpitations      Review of Systems   Constitutional: Negative for decreased appetite.   HENT: Negative for ear discharge.    Eyes: Negative for blurred vision.   Respiratory: Negative for hemoptysis.    Endocrine: Negative for polyphagia.   Hematologic/Lymphatic: Negative for adenopathy.   Skin: Negative for color change.   Musculoskeletal: Negative for joint swelling.   Genitourinary: Negative for bladder incontinence.   Neurological: Negative for brief paralysis.   Psychiatric/Behavioral: Negative for hallucinations.   Allergic/Immunologic: Negative for hives.        Objective:    Physical  Exam  Constitutional:       Appearance: She is well-developed.   HENT:      Head: Normocephalic and atraumatic.   Eyes:      Conjunctiva/sclera: Conjunctivae normal.      Pupils: Pupils are equal, round, and reactive to light.   Cardiovascular:      Rate and Rhythm: Normal rate.      Pulses: Intact distal pulses.      Heart sounds: Normal heart sounds.   Pulmonary:      Effort: Pulmonary effort is normal.      Breath sounds: Normal breath sounds.   Abdominal:      General: Bowel sounds are normal.      Palpations: Abdomen is soft.   Musculoskeletal:         General: Normal range of motion.      Cervical back: Normal range of motion and neck supple.   Skin:     General: Skin is warm and dry.   Neurological:      Mental Status: She is alert and oriented to person, place, and time.           Assessment:       1. Atrial fibrillation with RVR    2. Chest pain, unspecified type         Plan:       No recurrence of CP or palpitations  Not interested in DOAC unless A-fib returns  OV 3 months

## 2022-02-10 ENCOUNTER — PES CALL (OUTPATIENT)
Dept: ADMINISTRATIVE | Facility: CLINIC | Age: 67
End: 2022-02-10
Payer: MEDICARE

## 2022-02-18 ENCOUNTER — TELEPHONE (OUTPATIENT)
Dept: HEMATOLOGY/ONCOLOGY | Facility: CLINIC | Age: 67
End: 2022-02-18
Payer: MEDICARE

## 2022-02-18 NOTE — TELEPHONE ENCOUNTER
Left vm for patient to inform her of time change for appointment on 3/7/22.  Provided clinic phone number to call for any questions.

## 2022-02-18 NOTE — TELEPHONE ENCOUNTER
Patient states that she would like to see Dr. Ford for her next visit and would prefer to be rescheduled to her next available appointment.

## 2022-02-18 NOTE — TELEPHONE ENCOUNTER
"----- Message from Lailadanielle Goel sent at 2/18/2022  4:41 PM CST -----  Regarding: Consult/Advisory:  Name Of Caller:  Jeanne    Contact Preference?: [phone]    What is the nature of the call?: has question in regards to lab work and also wants to know why she is scheduled with the NP instead of Dr Ford           Additional Notes:  "Thank you for all that you do for our patients'"     "

## 2022-02-21 ENCOUNTER — PATIENT MESSAGE (OUTPATIENT)
Dept: HEMATOLOGY/ONCOLOGY | Facility: CLINIC | Age: 67
End: 2022-02-21
Payer: MEDICARE

## 2022-03-08 ENCOUNTER — OFFICE VISIT (OUTPATIENT)
Dept: FAMILY MEDICINE | Facility: CLINIC | Age: 67
End: 2022-03-08
Payer: MEDICARE

## 2022-03-08 ENCOUNTER — LAB VISIT (OUTPATIENT)
Dept: LAB | Facility: HOSPITAL | Age: 67
End: 2022-03-08
Attending: INTERNAL MEDICINE
Payer: MEDICARE

## 2022-03-08 VITALS
BODY MASS INDEX: 25.3 KG/M2 | WEIGHT: 128.88 LBS | DIASTOLIC BLOOD PRESSURE: 62 MMHG | HEART RATE: 96 BPM | SYSTOLIC BLOOD PRESSURE: 108 MMHG | HEIGHT: 60 IN | TEMPERATURE: 99 F | OXYGEN SATURATION: 95 %

## 2022-03-08 DIAGNOSIS — C77.5 SECONDARY AND UNSPECIFIED MALIGNANT NEOPLASM OF INTRAPELVIC LYMPH NODES: Primary | ICD-10-CM

## 2022-03-08 DIAGNOSIS — R39.15 URINARY URGENCY: ICD-10-CM

## 2022-03-08 DIAGNOSIS — R10.84 GENERALIZED ABDOMINAL PAIN: ICD-10-CM

## 2022-03-08 DIAGNOSIS — D64.9 CHRONIC ANEMIA: ICD-10-CM

## 2022-03-08 DIAGNOSIS — G62.0 PERIPHERAL NEUROPATHY DUE TO CHEMOTHERAPY: ICD-10-CM

## 2022-03-08 DIAGNOSIS — T45.1X5A PERIPHERAL NEUROPATHY DUE TO CHEMOTHERAPY: ICD-10-CM

## 2022-03-08 DIAGNOSIS — F33.1 MAJOR DEPRESSIVE DISORDER, RECURRENT EPISODE, MODERATE WITH ANXIOUS DISTRESS: ICD-10-CM

## 2022-03-08 DIAGNOSIS — C54.1 ENDOMETRIAL CANCER: ICD-10-CM

## 2022-03-08 DIAGNOSIS — D70.2 NEUTROPENIA, DRUG-INDUCED: ICD-10-CM

## 2022-03-08 PROBLEM — D69.6 THROMBOCYTOPENIA, UNSPECIFIED: Status: RESOLVED | Noted: 2021-12-27 | Resolved: 2022-03-08

## 2022-03-08 PROBLEM — N39.0 RECURRENT UTI: Status: RESOLVED | Noted: 2018-08-16 | Resolved: 2022-03-08

## 2022-03-08 PROBLEM — D61.818 PANCYTOPENIA: Status: RESOLVED | Noted: 2020-09-04 | Resolved: 2022-03-08

## 2022-03-08 PROBLEM — R35.0 INCREASED FREQUENCY OF URINATION: Status: RESOLVED | Noted: 2018-09-08 | Resolved: 2022-03-08

## 2022-03-08 LAB
BACTERIA #/AREA URNS AUTO: NORMAL /HPF
BILIRUB UR QL STRIP: NEGATIVE
CLARITY UR REFRACT.AUTO: CLEAR
COLOR UR AUTO: ABNORMAL
GLUCOSE UR QL STRIP: NEGATIVE
HGB UR QL STRIP: NEGATIVE
KETONES UR QL STRIP: NEGATIVE
LEUKOCYTE ESTERASE UR QL STRIP: ABNORMAL
MICROSCOPIC COMMENT: NORMAL
NITRITE UR QL STRIP: NEGATIVE
PH UR STRIP: 6 [PH] (ref 5–8)
PROT UR QL STRIP: NEGATIVE
RBC #/AREA URNS AUTO: 1 /HPF (ref 0–4)
SP GR UR STRIP: 1 (ref 1–1.03)
SQUAMOUS #/AREA URNS AUTO: 0 /HPF
URN SPEC COLLECT METH UR: ABNORMAL
WBC #/AREA URNS AUTO: 5 /HPF (ref 0–5)

## 2022-03-08 PROCEDURE — 99213 OFFICE O/P EST LOW 20 MIN: CPT | Mod: PBBFAC,PO | Performed by: INTERNAL MEDICINE

## 2022-03-08 PROCEDURE — 81001 URINALYSIS AUTO W/SCOPE: CPT | Performed by: INTERNAL MEDICINE

## 2022-03-08 PROCEDURE — 99215 OFFICE O/P EST HI 40 MIN: CPT | Mod: S$PBB,,, | Performed by: INTERNAL MEDICINE

## 2022-03-08 PROCEDURE — 99215 PR OFFICE/OUTPT VISIT, EST, LEVL V, 40-54 MIN: ICD-10-PCS | Mod: S$PBB,,, | Performed by: INTERNAL MEDICINE

## 2022-03-08 PROCEDURE — 87086 URINE CULTURE/COLONY COUNT: CPT | Performed by: INTERNAL MEDICINE

## 2022-03-08 PROCEDURE — 99999 PR PBB SHADOW E&M-EST. PATIENT-LVL III: ICD-10-PCS | Mod: PBBFAC,,, | Performed by: INTERNAL MEDICINE

## 2022-03-08 PROCEDURE — 99999 PR PBB SHADOW E&M-EST. PATIENT-LVL III: CPT | Mod: PBBFAC,,, | Performed by: INTERNAL MEDICINE

## 2022-03-08 RX ORDER — OXYBUTYNIN CHLORIDE 5 MG/1
5 TABLET ORAL 2 TIMES DAILY
Qty: 60 TABLET | Refills: 0 | Status: SHIPPED | OUTPATIENT
Start: 2022-03-08 | End: 2022-01-01

## 2022-03-08 NOTE — PROGRESS NOTES
Subjective:     Chief Complaint  Chief Complaint   Patient presents with    Nausea    Emesis    bladder pressure    Leg Pain     Rt leg pain     Headache       HPI  Jeanne Rodgers is a 67 y.o. female with medical diagnoses as listed in the medical history and problem list that presents for above complaint(s).    Patient given Bactrim for symptoms of a possible UTI this past weekend. Noted blood in her urine episodically. She experienced chills, fever as well. Was noted to be improving for a day and then felt return of symptoms again. Does have urinary urgency. Did have an episode of groin pain radiating to the right upper thigh.This was also occurring in October. Headache occuring presently. She denies taking NSAIDs currently. She is noting some easy bruisability as well.       Seeing Gyn Onc/Dr Regalado on 3/14 and Oncology/Dr Ford on 3/22/22    Patient Care Team:  Gary Gonzalez MD as PCP - General (Internal Medicine)  Yenni Mccurdy RD as Dietitian (Nutrition)  Pratima Cedeno LPN as Care Coordinator      PAST MEDICAL HISTORY:  Past Medical History:   Diagnosis Date    Allergy     skin    Anxiety     Breast cancer 2014    right    Chronic back pain     Depression     Encounter for blood transfusion     Endometrial cancer 07/30/2019    Fibromyalgia     Hives     Hx of psychiatric care     Hx of radiation therapy     Itching     Lichen sclerosus     Mental disorder     PONV (postoperative nausea and vomiting)     Psychiatric problem     PTSD (post-traumatic stress disorder)     Sleep difficulties     Sore throat     Therapy     Uterine cancer 08/05/2019    UTI (urinary tract infection)        PAST SURGICAL HISTORY:  Past Surgical History:   Procedure Laterality Date    anal fissure surgery      APPENDECTOMY      BIOPSY N/A 11/19/2020    Procedure: BONE BIOPSY;  Surgeon: Dosc Diagnostic Provider;  Location: [facility] OR;  Service: Radiology;  Laterality: N/A;  RN PREOP  11/17/2020    BREAST BIOPSY Right 2014    BREAST LUMPECTOMY Right 2014    R lumpectomy [facility] w 2.4cm IDC & DCIS, neg margins, 0/6 SN, Stage II, ER/OR+, HER-2 neg Adjuvant XRT and hormonal therapy     CARPAL TUNNEL RELEASE      CHOLECYSTECTOMY      COLONOSCOPY      COLONOSCOPY N/A 6/11/2020    Procedure: COLONOSCOPY;  Surgeon: Bobby Marino MD;  Location: [facility] ENDO (2ND FLR);  Service: Endoscopy;  Laterality: N/A;    ESOPHAGOGASTRODUODENOSCOPY N/A 6/11/2020    Procedure: EGD (ESOPHAGOGASTRODUODENOSCOPY);  Surgeon: Bobby Marino MD;  Location: [facility] ENDO (2ND FLR);  Service: Endoscopy;  Laterality: N/A;    HYSTERECTOMY  08/15/2019    HYSTEROSCOPY WITH DILATION AND CURETTAGE OF UTERUS N/A 7/24/2019    Procedure: HYSTEROSCOPY, WITH DILATION AND CURETTAGE OF UTERUS;  Surgeon: Елена Kam MD;  Location: [facility];  Service: OB/GYN;  Laterality: N/A;    INSERTION OF TUNNELED CENTRAL VENOUS CATHETER (CVC) WITH SUBCUTANEOUS PORT N/A 9/26/2019    Procedure: INSERTION, PORT-A-CATH;  Surgeon: Moris Varghese MD;  Location: [facility] CATH LAB;  Service: Radiology;  Laterality: N/A;    MEDIPORT REMOVAL N/A 3/5/2020    Procedure: REMOVAL, CATHETER, CENTRAL VENOUS, TUNNELED, WITH PORT;  Surgeon: Moris Varghees MD;  Location: [facility] CATH LAB;  Service: Radiology;  Laterality: N/A;    NEEDLE LOCALIZATION N/A 11/19/2020    Procedure: NEEDLE LOCALIZATION;  Surgeon: [facility] Diagnostic Provider;  Location: [facility] OR;  Service: Radiology;  Laterality: N/A;    PARTIAL MASCECTOMY Right 2014    ROBOT-ASSISTED LAPAROSCOPIC ABDOMINAL HYSTERECTOMY USING DA RAMU XI N/A 8/15/2019    Procedure: XI ROBOTIC HYSTERECTOMY;  Surgeon: Darius Regalado MD;  Location: [facility] OR;  Service: OB/GYN;  Laterality: N/A;    ROBOT-ASSISTED LAPAROSCOPIC SALPINGO-OOPHORECTOMY USING DA RAMU XI Bilateral 8/15/2019    Procedure: XI ROBOTIC SALPINGO-OOPHORECTOMY;  Surgeon: Darius Regalado MD;  Location: [facility];  Service: OB/GYN;  Laterality:  Bilateral;    ROBOT-ASSISTED LYMPHADENECTOMY Left 8/15/2019    Procedure: ROBOTIC LYMPHADENECTOMY;  Surgeon: Darius Regalado MD;  Location: [facility];  Service: OB/GYN;  Laterality: Left;    SALPINGOOPHORECTOMY Bilateral 08/15/2019    TONSILLECTOMY      TUBAL LIGATION         SOCIAL HISTORY:  Social History     Socioeconomic History    Marital status: Single    Number of children: 0   Occupational History    Occupation: hotel accounting   Tobacco Use    Smoking status: Never Smoker    Smokeless tobacco: Never Used   Substance and Sexual Activity    Alcohol use: No     Alcohol/week: 0.0 standard drinks    Drug use: Yes     Types: Marijuana     Comment: medical marijuana- 2 dayys ago     Sexual activity: Not Currently     Partners: Male   Social History Narrative    Lives alone.        FAMILY HISTORY:  Family History   Problem Relation Age of Onset    Hypertension Mother     Arthritis Mother     Other Mother 85        pancreatic tumor felt by pt's mother's HCP to be malignant though she survived for 8 yrs after dx    Cancer Mother     Pancreatic cancer Father 84    Cancer Father     Pancreatic cancer Maternal Aunt 62    No Known Problems Sister     Hypertension Brother     Leukemia Paternal Uncle 70    Heart attack Paternal Grandmother     Heart attack Paternal Grandfather     Hypertension Brother     Hashimoto's thyroiditis Sister     Diabetes Other         strong family history per patient    Lung cancer Maternal Uncle         smoker    Cancer Paternal Aunt         unknown origin    Suicide Cousin     Alcohol abuse Cousin     Breast cancer Neg Hx     Ovarian cancer Neg Hx     Cirrhosis Neg Hx     Colon cancer Neg Hx        ALLERGIES AND MEDICATIONS: updated and reviewed.  Review of patient's allergies indicates:  No Known Allergies  Current Outpatient Medications   Medication Sig Dispense Refill    clobetasol 0.05% (TEMOVATE) 0.05 % Oint APPLY TO AFFECTED AREA(S) TWICE A DAY FOR 1  MONTH(S) then EVERY DAY FOR 1 MONTH(S) then 3 TIMES A WEEK 60 g 2    fexofenadine (ALLEGRA) 180 MG tablet 2 tablets in the morning.  May take an extra 2 tablets during the day if needed for itching. 240 tablet 5    hydrocortisone 2.5 % cream Apply to affected areas twice a day when eczema is moderate. 453.6 g 1    hydrOXYzine HCL (ATARAX) 25 MG tablet 1 to 8 tablets at bedtime.  Start with 3 tablets.  Increase or decrease as needed until itching is controlled or a maximum of 8 tablets. 240 tablet 3    methen-m.blue-s.phos-phsal-hyo (URIBEL) 118-10-40.8-36 mg Cap Take 1 capsule by mouth 3 (three) times daily as needed (bladder pain). 40 capsule 11    MILK THISTLE ORAL Take by mouth.      nystatin (MYCOSTATIN) cream Apply topically every Mon, Wed, Fri. 30 g 11    nystatin (MYCOSTATIN) powder Apply topically 4 (four) times daily. 30 g 1    ondansetron (ZOFRAN) 4 MG tablet Take 1 tablet (4 mg total) by mouth every 6 (six) hours as needed for Nausea. 12 tablet 0    pantoprazole (PROTONIX) 20 MG tablet Take 1 tablet (20 mg total) by mouth daily as needed (acid reflux). 30 tablet 2    UNABLE TO FIND [place] [employer] Mushrooms Immune Support      metoprolol tartrate (LOPRESSOR) 25 MG tablet Take 0.5 tablets (12.5 mg total) by mouth 2 (two) times daily. 60 tablet 0     No current facility-administered medications for this visit.       Review of Systems   Constitutional: Negative for fever.   Gastrointestinal: Positive for abdominal pain. Negative for diarrhea.   Genitourinary: Positive for hematuria, pelvic pain and urgency. Negative for frequency.   Neurological: Positive for headaches.       Objective:       Physical Exam  Vitals:    03/08/22 1346   BP: 108/62   BP Location: Left arm   Patient Position: Sitting   BP Method: Large (Manual)   Pulse: 96   Temp: 98.5 °F (36.9 °C)   TempSrc: Oral   SpO2: 95%   Weight: 58.4 kg (128 lb 13.7 oz)   Height: 5' (1.524 m)    Body mass index is 25.17 kg/m².  Weight: 58.4 kg (128  lb 13.7 oz)   Height: 5' (152.4 cm)   Physical Exam  Vitals reviewed.   Constitutional:       General: She is not in acute distress.     Appearance: She is well-developed.   HENT:      Head: Normocephalic and atraumatic.   Eyes:      Conjunctiva/sclera: Conjunctivae normal.      Pupils: Pupils are equal, round, and reactive to light.   Neck:      Thyroid: No thyromegaly.   Cardiovascular:      Rate and Rhythm: Normal rate.      Heart sounds: Normal heart sounds. No murmur heard.  Pulmonary:      Effort: Pulmonary effort is normal.      Breath sounds: Normal breath sounds.   Musculoskeletal:         General: No deformity.      Cervical back: Normal range of motion and neck supple.   Lymphadenopathy:      Cervical: No cervical adenopathy.   Skin:     General: Skin is warm and dry.   Neurological:      Mental Status: She is alert.      Cranial Nerves: No cranial nerve deficit.   Psychiatric:         Behavior: Behavior normal.             Assessment:     1. 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 Neutropenia, drug-induced    3. Thrombocytopenia, unspecified    4. Major depressive disorder, recurrent episode, moderate with anxious distress    5. Peripheral neuropathy due to chemotherapy      Plan:     Jeanne was seen today for nausea, emesis, bladder pressure, leg pain and headache.    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  Neutropenia, drug-induced  Patient being followed by Oncology and Gynecological Oncology  Discussed  We discussed imaging in the setting of the recurrence of her abdominal pain as noted below  She is no longer neutropenic  **prior diagnosis - unable to resolve given prior linkage to oncologic therpeutics**        Peripheral neuropathy due to chemotherapy  Stable    Chronic anemia  Repeat hematologic indices  -     CBC Auto Differential; Future  -     Iron and TIBC;  Future    Generalized abdominal pain  We discussed considerations including GI pathology versus otherwise  Patient does appear to have complex pelvic issues concomitantly  -     CT Abdomen Pelvis  Without Contrast; Future    Urinary urgency  Patient previously on Uribel  -     Urinalysis; Future  -     Urine culture; Future  -     oxybutynin (DITROPAN) 5 MG Tab; Take 1 tablet (5 mg total) by mouth 2 (two) times daily.      Major depressive disorder, recurrent episode, moderate with anxious distress  Readdress subsequent clinic encounter    Health Maintenance reviewed, addressed as per orders    F/u in 3 months    I spent 51 minutes reviewing the patient's chart, talking to family/caregiver(s), coordinating care with other providers/specialists and time spent on documentation       1. The patient indicates understanding of these issues and agrees with the plan. Brief care plan is updated and reviewed with the patient as applicable.   2. The patient is given an After Visit Summary that lists all medications with directions, allergies, orders placed during this encounter and follow-up instructions.   3. I have reviewed the patient's medical information including past medical, family, and social history sections including the medications and allergies.   4. We discussed the patient's current medications. I reconciled the patient's medication list and prepared and supplied needed refills.       Gary Gonzalez MD  Internal Medicine-Pediatrics

## 2022-03-09 ENCOUNTER — HOSPITAL ENCOUNTER (OUTPATIENT)
Dept: RADIOLOGY | Facility: HOSPITAL | Age: 67
Discharge: HOME | End: 2022-03-09
Attending: INTERNAL MEDICINE
Payer: MEDICARE

## 2022-03-09 DIAGNOSIS — R10.84 GENERALIZED ABDOMINAL PAIN: ICD-10-CM

## 2022-03-09 LAB — BACTERIA UR CULT: NORMAL

## 2022-03-09 PROCEDURE — 74176 CT ABD & PELVIS W/O CONTRAST: CPT | Mod: TC

## 2022-03-09 PROCEDURE — 74176 CT ABD & PELVIS W/O CONTRAST: CPT | Mod: 26,,, | Performed by: RADIOLOGY

## 2022-03-09 PROCEDURE — 74176 CT ABDOMEN PELVIS WITHOUT CONTRAST: ICD-10-PCS | Mod: 26,,, | Performed by: RADIOLOGY

## 2022-03-11 ENCOUNTER — TELEPHONE (OUTPATIENT)
Dept: GYNECOLOGIC ONCOLOGY | Facility: CLINIC | Age: 67
End: 2022-03-11
Payer: MEDICARE

## 2022-03-11 NOTE — TELEPHONE ENCOUNTER
----- Message from Migdalia Pink sent at 3/11/2022 11:50 AM CST -----  Regarding: lab order  Name of Who is Calling: Panfilo De Jesus           What is the request in detail: Liza is requesting to have  lab order resubmitted in the computer because she was a hard draw and it was canceled.            Can the clinic reply by [facility]: No           What [facility] to [facility] Back [facility] [phone]

## 2022-03-14 ENCOUNTER — OFFICE VISIT (OUTPATIENT)
Dept: GYNECOLOGIC ONCOLOGY | Facility: CLINIC | Age: 67
End: 2022-03-14
Payer: MEDICARE

## 2022-03-14 ENCOUNTER — LAB VISIT (OUTPATIENT)
Dept: LAB | Facility: OTHER | Age: 67
End: 2022-03-14
Payer: MEDICARE

## 2022-03-14 VITALS
SYSTOLIC BLOOD PRESSURE: 122 MMHG | HEART RATE: 86 BPM | BODY MASS INDEX: 25.4 KG/M2 | WEIGHT: 129.38 LBS | HEIGHT: 60 IN | DIASTOLIC BLOOD PRESSURE: 58 MMHG

## 2022-03-14 DIAGNOSIS — C54.1 ENDOMETRIAL CANCER: ICD-10-CM

## 2022-03-14 DIAGNOSIS — C54.1 ENDOMETRIAL CANCER: Primary | ICD-10-CM

## 2022-03-14 LAB — CANCER AG125 SERPL-ACNC: 17 U/ML (ref 0–30)

## 2022-03-14 PROCEDURE — 36415 COLL VENOUS BLD VENIPUNCTURE: CPT | Performed by: OBSTETRICS & GYNECOLOGY

## 2022-03-14 PROCEDURE — 99999 PR PBB SHADOW E&M-EST. PATIENT-LVL III: CPT | Mod: PBBFAC,,, | Performed by: OBSTETRICS & GYNECOLOGY

## 2022-03-14 PROCEDURE — 99213 OFFICE O/P EST LOW 20 MIN: CPT | Mod: PBBFAC | Performed by: OBSTETRICS & GYNECOLOGY

## 2022-03-14 PROCEDURE — 99214 OFFICE O/P EST MOD 30 MIN: CPT | Mod: S$PBB,,, | Performed by: OBSTETRICS & GYNECOLOGY

## 2022-03-14 PROCEDURE — 99214 PR OFFICE/OUTPT VISIT, EST, LEVL IV, 30-39 MIN: ICD-10-PCS | Mod: S$PBB,,, | Performed by: OBSTETRICS & GYNECOLOGY

## 2022-03-14 PROCEDURE — 86304 IMMUNOASSAY TUMOR CA 125: CPT | Performed by: OBSTETRICS & GYNECOLOGY

## 2022-03-14 PROCEDURE — 99999 PR PBB SHADOW E&M-EST. PATIENT-LVL III: ICD-10-PCS | Mod: PBBFAC,,, | Performed by: OBSTETRICS & GYNECOLOGY

## 2022-03-14 NOTE — PROGRESS NOTES
Subjective:      Patient ID: Jeanne Rodgers is a 67 y.o. female.    Chief Complaint: Follow-up      Treatment History  Stage IIIC2 UPSC  Preop PET revealing positive pelvic and PA nodes  RALH/BSO/[place] of left pelvic nodes (6/8 positive) on 8/15/19  Port placed 9/26/19  T/C started 9/27/19  WPRT + PA nodes to 45 Gy completed 1/22/20  HDR to 15 Gy in 3 fractions completed 3/3/20  Restarted T/C on 3/20/2020, s/p 5 cycles concluding 5/8/2020. Complicated by pancytopenia and anemia    HPI  Here today for f/u.  Overall has done well.  Looks great and without complaints.  Denies VB.  Having some vaginal irritation.  CA-125 in September was 15.  Pending today.  Had a CT on 3/9/22 due to flank pain and it was negative for disease.  Review of Systems   Constitutional: Negative for activity change, appetite change, chills, fatigue and fever.   HENT: Negative for hearing loss, mouth sores, nosebleeds, sore throat and tinnitus.    Eyes: Negative for visual disturbance.   Respiratory: Negative for cough, chest tightness, shortness of breath and wheezing.    Cardiovascular: Negative for chest pain and leg swelling.   Gastrointestinal: Negative for abdominal distention, abdominal pain, blood in stool, constipation, diarrhea, nausea and vomiting.   Genitourinary: Negative for dysuria, flank pain, frequency, hematuria, pelvic pain, vaginal bleeding, vaginal discharge and vaginal pain.   Musculoskeletal: Negative for arthralgias and back pain.   Skin: Negative for rash.   Neurological: Negative for dizziness, seizures, syncope, weakness and numbness.   Hematological: Does not bruise/bleed easily.   Psychiatric/Behavioral: Negative for confusion and sleep disturbance. The patient is not nervous/anxious.        Objective:   Physical Exam:   Constitutional: She appears well-developed and well-nourished. No distress.    HENT:   Head: Normocephalic and atraumatic.    Eyes: No scleral icterus.     Cardiovascular: Normal rate  and intact distal pulses.  Exam reveals no cyanosis and no edema.     Pulmonary/Chest: Effort normal. No respiratory distress. She exhibits no tenderness.        Abdominal: Soft. She exhibits no distension, no fluid wave and no mass. There is no abdominal tenderness. There is no rebound and no guarding. No hernia.     Genitourinary:    Vagina and rectum normal.      Pelvic exam was performed with patient supine.   Labial bartholins normal.There is no rash, tenderness or lesion on the right labia. There is no rash, tenderness or lesion on the left labia. Right adnexum displays no mass, no tenderness and no fullness. Left adnexum displays no mass, no tenderness and no fullness. Vaginal cuff normal.  No  no vaginal discharge, bleeding or unspecified prolapse of vaginal walls in the vagina. Cervix is absent.Uterus is absent.    Genitourinary Comments: Healing well with hyperbarics.  Tissue nearly normal                Lymphadenopathy:     She has no cervical adenopathy. No inguinal adenopathy noted on the right or left side.     Skin: No cyanosis.        Assessment:     1. Endometrial cancer        Plan:       ADONIS on exam. F/U CA-125 today.  Plan to see her back in 3 months.                  Billing Type: Third-Party Bill

## 2022-03-15 ENCOUNTER — TELEPHONE (OUTPATIENT)
Dept: GYNECOLOGIC ONCOLOGY | Facility: CLINIC | Age: 67
End: 2022-03-15
Payer: MEDICARE

## 2022-03-15 ENCOUNTER — PATIENT MESSAGE (OUTPATIENT)
Dept: HEMATOLOGY/ONCOLOGY | Facility: CLINIC | Age: 67
End: 2022-03-15
Payer: MEDICARE

## 2022-03-15 ENCOUNTER — PATIENT MESSAGE (OUTPATIENT)
Dept: GYNECOLOGIC ONCOLOGY | Facility: CLINIC | Age: 67
End: 2022-03-15
Payer: MEDICARE

## 2022-03-15 NOTE — TELEPHONE ENCOUNTER
----- Message from Darius Regalado MD sent at 3/15/2022  6:34 AM CDT -----  CA-125 normal, please let patient know

## 2022-03-21 ENCOUNTER — LAB VISIT (OUTPATIENT)
Dept: LAB | Facility: HOSPITAL | Age: 67
End: 2022-03-21
Attending: INTERNAL MEDICINE
Payer: MEDICARE

## 2022-03-21 DIAGNOSIS — Z85.3 HX OF BREAST CANCER: ICD-10-CM

## 2022-03-21 DIAGNOSIS — C54.1 ENDOMETRIAL CANCER: ICD-10-CM

## 2022-03-21 DIAGNOSIS — D64.9 ANEMIA, UNSPECIFIED TYPE: ICD-10-CM

## 2022-03-21 LAB
ALBUMIN SERPL BCP-MCNC: 3.6 G/DL (ref 3.5–5.2)
ALP SERPL-CCNC: 85 U/L (ref 55–135)
ALT SERPL W/O P-5'-P-CCNC: 6 U/L (ref 10–44)
ANION GAP SERPL CALC-SCNC: 8 MMOL/L (ref 8–16)
ANISOCYTOSIS BLD QL SMEAR: SLIGHT
AST SERPL-CCNC: 12 U/L (ref 10–40)
BASOPHILS # BLD AUTO: ABNORMAL K/UL (ref 0–0.2)
BASOPHILS NFR BLD: 0 % (ref 0–1.9)
BILIRUB SERPL-MCNC: 0.6 MG/DL (ref 0.1–1)
BUN SERPL-MCNC: 7 MG/DL (ref 8–23)
CALCIUM SERPL-MCNC: 9.2 MG/DL (ref 8.7–10.5)
CHLORIDE SERPL-SCNC: 105 MMOL/L (ref 95–110)
CO2 SERPL-SCNC: 25 MMOL/L (ref 23–29)
CREAT SERPL-MCNC: 0.8 MG/DL (ref 0.5–1.4)
DACRYOCYTES BLD QL SMEAR: ABNORMAL
DIFFERENTIAL METHOD: ABNORMAL
EOSINOPHIL # BLD AUTO: ABNORMAL K/UL (ref 0–0.5)
EOSINOPHIL NFR BLD: 5 % (ref 0–8)
ERYTHROCYTE [DISTWIDTH] IN BLOOD BY AUTOMATED COUNT: 18.1 % (ref 11.5–14.5)
EST. GFR  (AFRICAN AMERICAN): >60 ML/MIN/1.73 M^2
EST. GFR  (NON AFRICAN AMERICAN): >60 ML/MIN/1.73 M^2
GLUCOSE SERPL-MCNC: 92 MG/DL (ref 70–110)
HCT VFR BLD AUTO: 28.6 % (ref 37–48.5)
HGB BLD-MCNC: 8.7 G/DL (ref 12–16)
HYPOCHROMIA BLD QL SMEAR: ABNORMAL
IMM GRANULOCYTES # BLD AUTO: ABNORMAL K/UL (ref 0–0.04)
IMM GRANULOCYTES NFR BLD AUTO: ABNORMAL % (ref 0–0.5)
IRON SERPL-MCNC: 80 UG/DL (ref 30–160)
LYMPHOCYTES # BLD AUTO: ABNORMAL K/UL (ref 1–4.8)
LYMPHOCYTES NFR BLD: 23 % (ref 18–48)
MCH RBC QN AUTO: 30.1 PG (ref 27–31)
MCHC RBC AUTO-ENTMCNC: 30.4 G/DL (ref 32–36)
MCV RBC AUTO: 99 FL (ref 82–98)
METAMYELOCYTES NFR BLD MANUAL: 3 %
MONOCYTES # BLD AUTO: ABNORMAL K/UL (ref 0.3–1)
MONOCYTES NFR BLD: 1 % (ref 4–15)
NEUTROPHILS NFR BLD: 55 % (ref 38–73)
NEUTS BAND NFR BLD MANUAL: 13 %
NRBC BLD-RTO: 1 /100 WBC
OVALOCYTES BLD QL SMEAR: ABNORMAL
PLATELET # BLD AUTO: 145 K/UL (ref 150–450)
PLATELET BLD QL SMEAR: ABNORMAL
PMV BLD AUTO: 10.4 FL (ref 9.2–12.9)
POIKILOCYTOSIS BLD QL SMEAR: SLIGHT
POTASSIUM SERPL-SCNC: 4.4 MMOL/L (ref 3.5–5.1)
PROT SERPL-MCNC: 7.1 G/DL (ref 6–8.4)
RBC # BLD AUTO: 2.89 M/UL (ref 4–5.4)
SATURATED IRON: 29 % (ref 20–50)
SODIUM SERPL-SCNC: 138 MMOL/L (ref 136–145)
TOTAL IRON BINDING CAPACITY: 280 UG/DL (ref 250–450)
TRANSFERRIN SERPL-MCNC: 189 MG/DL (ref 200–375)
WBC # BLD AUTO: 5.58 K/UL (ref 3.9–12.7)

## 2022-03-21 PROCEDURE — 84466 ASSAY OF TRANSFERRIN: CPT | Performed by: INTERNAL MEDICINE

## 2022-03-21 PROCEDURE — 80053 COMPREHEN METABOLIC PANEL: CPT | Performed by: INTERNAL MEDICINE

## 2022-03-21 PROCEDURE — 36415 COLL VENOUS BLD VENIPUNCTURE: CPT | Performed by: INTERNAL MEDICINE

## 2022-03-21 PROCEDURE — 85027 COMPLETE CBC AUTOMATED: CPT | Performed by: INTERNAL MEDICINE

## 2022-03-21 PROCEDURE — 85007 BL SMEAR W/DIFF WBC COUNT: CPT | Performed by: INTERNAL MEDICINE

## 2022-03-22 ENCOUNTER — OFFICE VISIT (OUTPATIENT)
Dept: HEMATOLOGY/ONCOLOGY | Facility: CLINIC | Age: 67
End: 2022-03-22
Payer: MEDICARE

## 2022-03-22 VITALS
WEIGHT: 125.88 LBS | HEART RATE: 85 BPM | HEIGHT: 60 IN | OXYGEN SATURATION: 97 % | TEMPERATURE: 98 F | SYSTOLIC BLOOD PRESSURE: 120 MMHG | RESPIRATION RATE: 16 BRPM | BODY MASS INDEX: 24.71 KG/M2 | DIASTOLIC BLOOD PRESSURE: 58 MMHG

## 2022-03-22 DIAGNOSIS — D50.0 ANEMIA DUE TO CHRONIC BLOOD LOSS: ICD-10-CM

## 2022-03-22 DIAGNOSIS — M25.551 HIP PAIN, RIGHT: ICD-10-CM

## 2022-03-22 DIAGNOSIS — M79.604 PAIN OF RIGHT LOWER EXTREMITY: ICD-10-CM

## 2022-03-22 DIAGNOSIS — Z85.3 HX OF BREAST CANCER: Primary | ICD-10-CM

## 2022-03-22 DIAGNOSIS — N39.0 RECURRENT UTI: ICD-10-CM

## 2022-03-22 DIAGNOSIS — C54.1 ENDOMETRIAL CANCER: ICD-10-CM

## 2022-03-22 PROCEDURE — 99999 PR PBB SHADOW E&M-EST. PATIENT-LVL IV: ICD-10-PCS | Mod: PBBFAC,,, | Performed by: INTERNAL MEDICINE

## 2022-03-22 PROCEDURE — 99215 PR OFFICE/OUTPT VISIT, EST, LEVL V, 40-54 MIN: ICD-10-PCS | Mod: S$PBB,,, | Performed by: INTERNAL MEDICINE

## 2022-03-22 PROCEDURE — 99999 PR PBB SHADOW E&M-EST. PATIENT-LVL IV: CPT | Mod: PBBFAC,,, | Performed by: INTERNAL MEDICINE

## 2022-03-22 PROCEDURE — 99215 OFFICE O/P EST HI 40 MIN: CPT | Mod: S$PBB,,, | Performed by: INTERNAL MEDICINE

## 2022-03-22 PROCEDURE — 99214 OFFICE O/P EST MOD 30 MIN: CPT | Mod: PBBFAC | Performed by: INTERNAL MEDICINE

## 2022-03-22 NOTE — PROGRESS NOTES
Subjective:       Patient ID: Jeanne Rodgers is a 67 y.o. female.    Chief Complaint: No chief complaint on file.    HPI     Returns for follow up     Reports a couple of incidents in the interval    1 week ago right leg pain that radiated to her groin  She was standing when it occurred  Pain came and went  Went to the restroom and noted sudden urge and pressure to urinate and was worried about a UTI  Wonders if she had a kidney stone  Notes has been having some UTIs- saw Dr. Gonzalez  Reports 2 UTIs in last 2 months  She took bactrim and went to see Urology    CT scan abd/pelvis  3/9/2022:  FINDINGS:  Evaluation of the solid organs is limited due to lack of intravenous contrast.  Lower chest: Unremarkable.  URINARY COLLECTING SYSTEM: No calculi in the kidneys, ureters, or urinary bladder. No hydronephrosis or ureterectasis.  Liver: normal contour  Gallbladder and bile ducts: Unremarkable.  Pancreas: Pancreas is atrophic.  Spleen: Normal contour.  Adrenals: Normal contour.  Lymph nodes: No abdominal or pelvic lymphadenopathy.  Bowel and mesentery: Unremarkable.  Abdominal aorta: Unremarkable.  Inferior vena cava: Unremarkable.  Free fluid or free air: None.  Pelvis: Unremarkable.  Body wall: Unremarkable.  Bones: Unremarkable.  Impression:  Essentially unremarkable CT the abdomen and pelvis.  No urolithiasis or hydronephrosis.    Notes exhaustion  Fatigue is worsening  Limited activity  Sleeping more  Noted to be worse for approximately 9 months    Also, reports a heart incident  Presented with mid chest pains  She was doing hyperbaric therapy and [facility]  12/13/2021 CTA Chest:  FINDINGS:  Structures at the base of the neck demonstrate no significant abnormality.  There is no abnormal axillary or mediastinal lymph node enlargement.  Aortic arch is normal in caliber and major aortic branch vessels are patent.  Vertebral artery arises directly from the arch.  There is a common  origin of the innominate left common carotid artery.  Heart is normal in size.  No significant pericardial fluid.  There is no evidence of pulmonary thromboembolism.  Trachea and central airways are patent.  Stable fibro nodular changes at the lung apices.  There is a calcified right lung nodule likely a granuloma.  No evidence of significant consolidation, large effusion or pneumothorax.  Limited review of the upper abdomen shows no acute abnormalities.  Gallbladder is surgically absent.  Bones show no acute abnormalities.  Impression:  No evidence of pulmonary thromboembolism other acute abnormalities in the chest.  Reports planning to do hyperbaric as recommended by Dr. Regalado     After this she returned to [facility] ED 2 days later and told GI bleed  Went into a fib during admit  No scopes done as was taking Advil  And no anticoagulation as she was having a GI bleed    Hurethanane Isabel sent her into a stressful mode as well    Most recent breast imaging:  Mammogram 8/2021  Findings:  This procedure was performed using tomosynthesis. Computer-aided detection was utilized in the interpretation of this examination.  The breasts have scattered areas of fibroglandular density.   Right  There are post-surgical findings from a previous lumpectomy seen in the right breast. There has been no interval development of a suspicious mass, microcalcification, or architectural distortion.   Left  There is no evidence of suspicious masses, calcifications, or other abnormal findings in the left breast.  Impression:  Bilateral  There is no mammographic evidence of malignancy.  BI-RADS Category:   Overall: 2 - Benign  Recommendation:  Routine screening mammogram in 1 year is recommended.     MRI breast 6/15/2021:  Findings:  Scattered fibroglandular tissue. Mild asymmetric, left greater than right, background parenchymal enhancement, likely related to prior radiation therapy to the right breast.  There are changes of right breast  conservation therapy.    No suspicious mass or non-mass enhancement in either breast.  No abnormal skin or nipple enhancement.  No abnormally enlarged lymph nodes are identified in the visualized portions of either axilla.   Impression:  No suspicious abnormality in EITHER breast.  BI-RADS Category: Overall: 2 - Benign.  Recommendations:  Annual screening mammography is recommended.  The patient will be due for annual imaging in August 2021.  In addition to annual mammogram, consider annual screening with bilateral breast MRI with contrast in patients with a lifetime risk of breast cancer greater than 20%.     Thyroid US 4/20/2021:  Impression:  Normal sized thyroid gland with homogeneous echotexture.  No discrete thyroid nodules identified.     PET 3/31/2021:  Impression:  No new or other FDG PET-CT findings to suggest recurrent or metastatic disease in this patient with endometrial carcinoma status post hysterectomy and BSO.     Oncology History:  Endometrial Cancer History:  Diagnosis of endometrial cancer after presenting with vaginal bleeding  She has completed surgical debulking, chemotherapy and XRT but states that she has developed nausea and vomiting since starting XRT  She was also admitted in June of this year with melena and had a complete GI work up (see below)  Of note during chemotherapy she required several transfusions for symptomatic anemia     Breast Oncology History:  This is a 67 yo female with a history of right breast cancer  - Had been getting her mammograms at [facility] as she was uninsured at the time  - reports small mass seen in 2007 and sent to [facility]- where she was told nothing was seen on review  - Repeat situation of above the next year in 2008  - she noted right nipple inversion 2012- went to [facility]- told not likely cancer, no further testing done  - In 2014 it was recommended that [facility] see her for imaging  - Abnormal mammogram and biopsy recommended  Reports biopsy  experience was awful and so she researched other sites to be cared  - She sought out Dr. 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 Keller at [facility]  Oncotype done ? 9- chemotherapy not recommended  She was started on Arimidex  -She was on Arimidex for 4 years until stopping (see below).    Genetic testin Germline Cancer-Genetic Testing  · Test(s) performed:  [facility] Common Hereditary Cancers Panel  Number of genes analyzed:  47  Laboratory:  [facility]  Ordering provider:  ANGIE Blackman  Sample collection date:  07/15/2020  Results report date:  2020              Full report to be scanned into Epic under the Labs and/or Media tab(s).  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            COUNSELING/MEDICAL DECISION-MAKING:         Note below discussion and recommendations provided from genetic counseling visit (copied forward):  TP53 Mutation Implications  Li-Fraumeni Syndrome (LFS) is a hereditary (germline) cancer syndrome diagnosed with the identification of a germline TP53 gene mutation, which is passed from parent to child and is present for the patient's lifespan in essentially every bodily cell (GHR, 2020).  Its associated risks, including breast cancer, brain cancer, sarcoma, adrenocortical carcinoma, and colorectal cancer ([employer], 2020; NCCN, 1.2020).  It is important to note that the patient does not at this time have a diagnosis of LFS, as we do not know yet whether the patient's TP53 mutation is germline.   Somatic (acquired) TP53 mutations do not occur in the germline and occur at some other point in the lifespan (not at conception) (GHR, 2020)  in the blood due to any of several factors, including advancing age ([employer], 2020), history of chemotherapy (which applies to patient), hematologic malignancy (NCCN, 1.2020), environmental factors including ultraviolet (UV) exposure (GHR, 2020), and DNA replication errors during cell division (GHR, 2020).  Somatic mutations that develop in only a single cell early in the embryonic phase can lead to mosaicism (GHR, 2020).  With mosaicism, the gene mutation is not present in the affected individual's egg or sperm cells or in the fertilized egg but rather develops later during the embryonic phase, and, per the process of cell division, cells arising from the cell carrying the mutation will also have the mutation, but other cells will not (GHR, 2020).  Mosaicism's causing health problems is a function of both the specific mutation and the number of cells affected by the mutation (GHR, 2020).  Up to 20% of LFS cases are de fadi ([employer]ae, 2020).  De fadi (i.e. new) mutations can be germline or somatic (GHR, 2020).  Sometimes, the de fadi mutation develops in the individual's germline (egg or sperm cells) but is absent in all of the individual's other cells; other times, the de fadi mutation develops in the fertilized egg shortly post-conception, and, as the fertilized egg divides, each resultant cell also has the mutation (GHR, 2020).    It is important to distinguish germline origin versus somatic origin as the former has the potential to impact the patient's family while the latter does not.    Recommendations  Appropriate next steps include:  1. A complete blood count (CBC) and a peripheral blood smear is warranted to evaluate for hematologic neoplasia (NCCN, 1.2020).  The patient is amenable to this.  I do not suspect hematologic neoplasia as etiology of her TP53 mutation.  2. Testing of parents for identification of the patient's TP53 mutation in either of them; however, the patient's parents are unfortunately  unavailable for testing, and testing even all of the patient's siblings, if they all came back negative, would not confirm that the patient's TP53 mutation is not germline in origin.  Therefore, a fibroblast skin culture is recommended, which breast surgeon Dr. Nicci García has agreed to perform.  The patient is amenable to this.     Bone marrow biopsy done with above and cytopenias  negative for malignancy.        Review of Systems   Constitutional: Positive for fatigue. Negative for activity change, appetite change, chills, fever and unexpected weight change.   HENT: Negative for trouble swallowing.    Respiratory: Negative for cough, shortness of breath and wheezing.    Cardiovascular: Negative for chest pain, palpitations and leg swelling.   Gastrointestinal: Negative for abdominal distention, abdominal pain, blood in stool, constipation, diarrhea, nausea and vomiting.        Isolated episode of blood in stool- none further  Prior GI work up   Genitourinary: Negative for bladder incontinence, difficulty urinating, dysuria and vaginal bleeding.   Musculoskeletal: Negative for arthralgias, back pain and myalgias.   Integumentary:  Negative for pallor, rash and breast mass.   Allergic/Immunologic: Negative for immunocompromised state.   Neurological: Negative for dizziness, weakness, light-headedness, numbness and headaches.   Hematological: Negative for adenopathy. Does not bruise/bleed easily.   Psychiatric/Behavioral: Negative for dysphoric mood.   Breast: Negative for mass        Objective:      Physical Exam  Vitals and nursing note reviewed.   Constitutional:       General: She is not in acute distress.     Appearance: Normal appearance. She is well-developed and normal weight.      Comments: Presents with family  Very pleasant  ECOG=0   HENT:      Head: Normocephalic and atraumatic.   Eyes:      General: No scleral icterus.     Extraocular Movements: Extraocular movements intact.      Conjunctiva/sclera:  Conjunctivae normal.      Pupils: Pupils are equal, round, and reactive to light.   Cardiovascular:      Rate and Rhythm: Normal rate and regular rhythm.      Heart sounds: Normal heart sounds. No murmur heard.    No friction rub. No gallop.   Pulmonary:      Effort: Pulmonary effort is normal. No respiratory distress.      Breath sounds: Normal breath sounds. No wheezing, rhonchi or rales.      Comments: Breasts- no masses or lymphadenopathy  Chest:   Breasts:      Right: No mass, nipple discharge, skin change, axillary adenopathy or supraclavicular adenopathy.      Left: No mass, nipple discharge, skin change, axillary adenopathy or supraclavicular adenopathy.       Abdominal:      General: Bowel sounds are normal. There is no distension.      Palpations: Abdomen is soft. There is no mass.      Tenderness: There is no abdominal tenderness. There is no guarding or rebound.      Comments: No organomegaly   Musculoskeletal:      Cervical back: Normal range of motion.   Lymphadenopathy:      Cervical: No cervical adenopathy.      Upper Body:      Right upper body: No supraclavicular or axillary adenopathy.      Left upper body: No supraclavicular or axillary adenopathy.   Skin:     General: Skin is warm and dry.      Coloration: Skin is not jaundiced or pale.      Findings: No erythema, lesion or rash.   Neurological:      General: No focal deficit present.      Mental Status: She is alert and oriented to person, place, and time.      Sensory: No sensory deficit.      Motor: No weakness.      Coordination: Coordination normal.      Gait: Gait normal.   Psychiatric:         Mood and Affect: Mood normal.         Behavior: Behavior normal.         Thought Content: Thought content normal.         Judgment: Judgment normal.       Labs- reviewed  Assessment:       Problem List Items Addressed This Visit     Recurrent UTI    Pain of right lower extremity    Hx of breast cancer - Primary    Hip pain, right    Endometrial  cancer    Anemia due to chronic blood loss          Plan:       Breast cancer- ADONIS  Next mammogram 8/2022  She si to be alternating with MRIs- both were done 8/2021- will reset with next imaging    Endometrial cancer   Followed by Dr. Sabine LAMB    GI Referral (see recent hospitalization)  H/H has decreased  Prior negative bmbx    MRI leg- right    Cystoscope pending    Route Chart for Scheduling    Med Onc Chart Routing      Follow up with physician . GI referral asap; MRI leg asap   Follow up with [facility] .  weeks wi cbc   Labs    Imaging MRI      Pharmacy appointment    Other referrals

## 2022-03-25 ENCOUNTER — TELEPHONE (OUTPATIENT)
Dept: HEMATOLOGY/ONCOLOGY | Facility: CLINIC | Age: 67
End: 2022-03-25
Payer: MEDICARE

## 2022-03-25 PROBLEM — M79.604 PAIN OF RIGHT LOWER EXTREMITY: Status: ACTIVE | Noted: 2022-03-25

## 2022-03-25 PROBLEM — N39.0 RECURRENT UTI: Status: ACTIVE | Noted: 2022-03-25

## 2022-03-25 PROBLEM — M79.605 PAIN OF LEFT LOWER EXTREMITY: Status: ACTIVE | Noted: 2022-03-25

## 2022-04-04 NOTE — TELEPHONE ENCOUNTER
Phoned and left a vm for patient to let her know that we were able to get her in with an earlier appointment with orthopedics scheduled for 8 AM on 4/5/22 at the [place] location.  Also sent a message in patient portal.

## 2022-04-05 NOTE — PROGRESS NOTES
NEW PATIENT ORTHOPAEDIC: HIP    PRIMARY CARE PHYSICIAN: Gary Gonzalez MD   REFERRING PROVIDER: Aaareferral Self  No address on file     ASSESSMENT & PLAN:    Impression:  Right Hip trochanteric bursitis  Right Sacroilitis  Right Ankle Pain     Follow Up Plan: PRN    Non operative care:    Jeanne Rodgers has physical exam evidence of above and wishes to pursue an non-operative care. I am recommending the following:  Referral to pain management and physical therapy.  Patient has a complex medical history with multiple cancers under in remission.  She began to experience a few months of pain and weakness in her right leg.  The primary pain focus is in her ankle where she reports increased swelling and has diffuse tenderness in her ankle.  I do not have any ankle radiographs today.  Patient also has evidence of trochanteric bursitis and iliotibial band syndrome on her right leg which I think would be amenable to physical therapy.  She has sacroiliitis on MRI but this is not her primary pain generator.  She is unable to tolerate anti-inflammatories or steroid base medicines secondary to her cancer history and history of GI bleed. No recent medicine changes. She has seen Dr. Yu in the past and would like to see him again with regard to pain management strategies for her.  I think this is a reasonable approach.  She also may benefit from a rheumatologic referral in the future for consideration of fibromyalgia like medicines.  Ultimately based on her hip radiographs and hip MRI do not believe this to be an intrinsic hip joint pathology.  I will check an x-ray today of her ankle but ultimately would do physical therapy from orthopedic treatment going forward.    The patient has been ordered:  Physical Therapy    CONSULTS:   Pain Management     ACTIVE PROBLEM LIST  Patient Active Problem List   Diagnosis    Hx of breast cancer    Spondylosis without myelopathy    Hamstring tightness of both lower  extremities    Segmental and somatic dysfunction of lumbar region    Alteration in mobility associated with pain    Pelvic pain in female    Myalgia of pelvic floor    Nocturia more than twice per night    Lichen sclerosus et atrophicus    Irregular bowel habits    Hip pain, right    Chronic lower back pain    Urinary incontinence, urge    Pre-diabetes    Vaginal irritation    Postmenopausal bleeding    Endometrial cancer    History of robot-assisted laparoscopic hysterectomy    Encounter for antineoplastic chemotherapy    Thrombocytopenia due to drugs    Neutropenia, drug-induced    Anemia due to chronic blood loss    Radiation therapy complication    Major depressive disorder, recurrent episode, moderate with anxious distress    Bone pain    B12 deficiency    Radiation vaginitis    Allergic contact dermatitis due to photocontact other than sunburn, current    History of endometrial cancer    Chest pain    Liver cyst    Microscopic hematuria    Disorder of the skin, subcu related to radiation, unsp    Radiation necrosis of skin and subcutaneous    Disorder of the skin and subcutaneous tissue related to radiation, unspecified    BRBPR (bright red blood per rectum)    Macrocytic anemia    Atrial fibrillation with RVR    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 female here for evaluation and management of right leg pain. There is not a specific incident that brought about this pain. she has had progressive problems with the hip(s) starting 6 months ago and is progressing which is now interfering with activities which include: walking, functional household ADL's, participating in family activities, rising from a sitting position, standing for prolonged periods of  time, getting in and out of a car and climbing stairs     Currently the pain in the joint is moderate with activity.  The pain is constant and is located in lateral hip and thigh plus her knee and ankle.  The pain is described as aching, radiating, severe, shooting  and throbbing. Relieving factors include none.     Jeanne Rodgers has no additional complaints.     I am seeing the patient for her hip and thigh pain with extension down into her knee however primary complaint is actually of her ankle.  If she is able to take weight 1 of her pains are would be her ankle pain and swelling.  She has seen providers for this but no imaging was obtained.  She has a history of cancers there status post chemotherapy intervention.  No recent chemotherapy use her changes in her medications that could explain diffuse leg pain.    PROGRESSIVE SYMPTOMS:  Pain impacting sleep  Pain worsened by weight bearing    FUNCTIONAL STATUS:   Walk a block or two on level ground     PREVIOUS TREATMENTS:  Medical: Intolerant of NSAIDS  Physical Therapy: Activities Modified   Previous Orthopaedic Surgery: None    REVIEW OF SYSTEMS:  PAIN ASSESSMENT:  See HPI.  MUSCULOSKELETAL: See HPI.  OTHER 10 point review of systems is negative except as stated in HPI above    PAST MEDICAL HISTORY   has a past medical history of Allergy, Anxiety, Breast cancer (2014), Chronic back pain, Depression, Encounter for blood transfusion, Endometrial cancer (07/30/2019), Fibromyalgia, Hives, psychiatric care, radiation therapy, Itching, Lichen sclerosus, Mental disorder, PONV (postoperative nausea and vomiting), Psychiatric problem, PTSD (post-traumatic stress disorder), Sleep difficulties, Sore throat, Therapy, Uterine cancer (08/05/2019), and UTI (urinary tract infection).     PAST SURGICAL HISTORY   has a past surgical history that includes Appendectomy; Tubal ligation; Carpal tunnel release; Cholecystectomy; Tonsillectomy; Colonoscopy; Hysteroscopy with  dilation and curettage of uterus (N/A, 7/24/2019); Breast lumpectomy (Right, 2014); Robot-assisted laparoscopic abdominal hysterectomy using da Catrina Xi (N/A, 8/15/2019); Robot-assisted laparoscopic salpingo-oophorectomy using da Catrina Xi (Bilateral, 8/15/2019); Robot-assisted lymphadenectomy (Left, 8/15/2019); anal fissure surgery; Salpingoophorectomy (Bilateral, 08/15/2019); Hysterectomy (08/15/2019); Insertion of tunneled central venous catheter (CVC) with subcutaneous port (N/A, 9/26/2019); Mediport removal (N/A, 3/5/2020); Esophagogastroduodenoscopy (N/A, 6/11/2020); Colonoscopy (N/A, 6/11/2020); Biopsy (N/A, 11/19/2020); Needle localization (N/A, 11/19/2020); Breast biopsy (Right, 2014); and PARTIAL MASCECTOMY (Right, 2014).     FAMILY HISTORY  family history includes Alcohol abuse in her cousin; Arthritis in her mother; Cancer in her father, mother, and paternal aunt; Diabetes in her other; Hashimoto's thyroiditis in her sister; Heart attack in her paternal grandfather and paternal grandmother; Hypertension in her brother, brother, and mother; Leukemia (age of onset: 70) in her paternal uncle; Lung cancer in her maternal uncle; No Known Problems in her sister; Other (age of onset: 85) in her mother; Pancreatic cancer (age of onset: 62) in her maternal aunt; Pancreatic cancer (age of onset: 84) in her father; Suicide in her cousin.     SOCIAL HISTORY   reports that she has never smoked. She has never used smokeless tobacco. She reports current drug use. Drug: Marijuana. She reports that she does not drink alcohol.     ALLERGIES   Review of patient's allergies indicates:  No Known Allergies     MEDICATIONS   Current Outpatient Medications on File Prior to Visit   Medication Sig Dispense Refill    clobetasol 0.05% (TEMOVATE) 0.05 % Oint APPLY TO AFFECTED AREA(S) TWICE A DAY FOR 1 MONTH(S) then EVERY DAY FOR 1 MONTH(S) then 3 TIMES A WEEK 60 g 2    fexofenadine (ALLEGRA) 180 MG tablet 2 tablets in the morning.   May take an extra 2 tablets during the day if needed for itching. 240 tablet 5    hydrocortisone 2.5 % cream Apply to affected areas twice a day when eczema is moderate. 453.6 g 1    hydrOXYzine HCL (ATARAX) 25 MG tablet 1 to 8 tablets at bedtime.  Start with 3 tablets.  Increase or decrease as needed until itching is controlled or a maximum of 8 tablets. 240 tablet 3    MILK THISTLE ORAL Take by mouth.      nystatin (MYCOSTATIN) cream Apply topically every Mon, Wed, Fri. 30 g 11    nystatin (MYCOSTATIN) powder Apply topically 4 (four) times daily. 30 g 1    ondansetron (ZOFRAN) 4 MG tablet Take 1 tablet (4 mg total) by mouth every 6 (six) hours as needed for Nausea. 12 tablet 0    oxybutynin (DITROPAN) 5 MG Tab Take 1 tablet (5 mg total) by mouth 2 (two) times daily. 60 tablet 0    pantoprazole (PROTONIX) 20 MG tablet Take 1 tablet (20 mg total) by mouth daily as needed (acid reflux). 30 tablet 2    UNABLE TO FIND [place]rooms Immune Support      metoprolol tartrate (LOPRESSOR) 25 MG tablet Take 0.5 tablets (12.5 mg total) by mouth 2 (two) times daily. 60 tablet 0     No current facility-administered medications on file prior to visit.          PHYSICAL EXAM   height is 5' (1.524 m) and weight is 56.7 kg (125 lb). Her blood pressure is 120/70 and her pulse is 85. Her respiration is 18 and oxygen saturation is 99%.   Body mass index is 24.41 kg/m².      All other systems deferred.  GENERAL:  No acute distress  HABITUS: Normal  GAIT: Antalgic  SKIN: Normal     HIP EXAM:    right:   ROM:   Flexion: 120 degrees    Internal Rotation: 30 degrees    External Rotation: 30 degrees    Abduction: 45 degrees    Adduction: 20 degrees  No apparent leg length discrepancy    Palpation: yes tenderness over Greater trochanter, SI joint, IT band and Gluteal insertions   Pain is not reproduced with IR or ER of the hip  Strength: 5/5 hip flexion, abduction, knee flexion and extension   Straight leg raise: Negative    Neurovascular Status: Sensation intact to light touch in Sural, saphenous, SPN, DPN, Tibial nerve distribution  2+ pulse DP/PT, normal capillary refill, foot has normal warmth    Mild TTP over medial joint line of knee. Good ROM. No mechanical symptoms.   Diffuse non-pitting ankle edema and tenderness along PTT, AT, Peroneal tendons. No ankle instability, no deltoid or lateral ankle ligament pain, Good ROM both active and passive.     DATA:  Diagnostic tests reviewed for today's visit:     Hip radiographs appears normal. No evidence of fracture, osseous abnormalities, or significant degenerative changes.    MRI: MR images and report reviewed which demonstrate  1. No evidence for hip or pelvic fracture.  2. Bone marrow edema about the bilateral sacroiliac joints, possible osteitis related to sacroiliitis.  Alternatively, developing foci of osteonecrosis.  3. Right hip cartilage loss.  Bilateral acetabular labral fraying.  4. Small volume of pelvic ascites.

## 2022-04-22 PROBLEM — R60.0 LEG EDEMA: Status: ACTIVE | Noted: 2022-01-01

## 2022-04-22 NOTE — PROGRESS NOTES
Subjective:    Patient ID:  Jeanne Rodgers is a 67 y.o. female who presents for follow-up of Leg Swelling and Fatigue      HPI     PAF - declines DOAC due to Hx GIB, HLD     Referred by Dr Hill  Jeanne Rodgers is a 66 y.o. female who presents for HBO consult. Referred by OBGYN Oncologist  for radiation vaginitis and would like to try hyperbaric oxygen therapy.  Topical treatments for this painful region have not helped and are painful. History of breast with partial mastectomy right breast in 2014 and endometrial cancer. Finished last chemo and radiation therapy in May 2020.  Reports no hormone therapy but reports nightly medical marijuana use for nausea during treatment. HBO workup started today in clinic: cxr, ekg, crp and sed rate ordered. HBO consents signed and patient educated. Verbalized understanding.      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 Her chemo/XRT was completed 5/2020.  Patient has failed medical management of this disease and has not found any relief from the pain. She is an excellent candidate for HBOT.  Insurance approval will be requested. Additional labs and diagnostic tests were ordered as noted above. The HBO treatment was explained, the consent for was explained, all questions were answered and the consent was signed.  We will proceed with HBOT when insurance approval has been confirmed. In addition, a left-sided cerumen impaction was noted on physical exam and Debrox drops were recommended.      EKG 8/16/21 sinus bradycardia 54 otherwise ok     Stress test 8/27/21    Normal myocardial perfusion scan. There is no evidence of myocardial ischemia or infarction.    The gated perfusion images showed an ejection fraction of 70% post stress.    There is normal wall motion post stress.    The EKG portion of this study is negative for ischemia.    The patient reported no chest pain during the stress  test.    There were no arrhythmias during stress.        Echo 12/17/21  · The left ventricle is normal in size with normal systolic function.  · The estimated ejection fraction is 58%.  · Normal left ventricular diastolic function.  · Normal right ventricular size with normal right ventricular systolic function.  · Mild mitral regurgitation.  · Mild tricuspid regurgitation.  · Elevated central venous pressure (15 mmHg).  · The estimated PA systolic pressure is 36 mmHg.  · Trivial posterolateral pericardial effusion. And outside RA.     CTA chest 12/13/21  No evidence of pulmonary thromboembolism other acute abnormalities in the chest.        Holter 8/27/21  · Normal sinus rhythm. Heart rates varied between 46 and 141 BPM with an average of 82 BPM.  · No PVC's  · 237 PAC's with one 4 beat SVT        8/24/21 Reports sharp left sided CP intermittently for the last 2 months. Occasional SOB  Denies prior CAD   Echo and lexiscan myoview for CP and SOB  Add lipid to next labs  Holter for bradycardia     Lipid 8/27/21  Chol 186  HDL 45       9/13/21 Still with occasional sharp CP   Denies SOB  Continue observation for atypical CP and bradycardia  Diet control for mild HLD  OV 6 months     Admitted to [facility] 12/15/21  Macrocytic Anemia  Bright Red Blood per Rectum  -Hgb 8.9 on presentation, baseline ~11. No reported melena or hematochezia but some gross blood noted on rectal exam. Pt. With prior history of anemia with EGD/colonoscopy positive only for erythematous duodenopathy on EGD 6/2020  -Iron studies, folate, and B12 ordered for further work-up of anemia  -Trend Hgb, will leave pt. On clear liquid diet. Plan for outpatient GI f/u unless pt. Develops active bleeding or has significantly downtrending Hgb  - Hb stable at 8.0  - no active bleeding at this time   -diet advanced on 12/17     Atrial Fibrillation with RVR  Patient has been having paroxsymal runs of atrial fibrillation during this admission with rates up to  the 150s. She was seen by cardiology  -TTE Stable  -chest pain improving  -Metoprolol PO started  -Cards recs as follows:   Given isolated episode of atrial fibrillation and acute illness along with anemia holding off on starting anticoagulation at this time, recommending 30 day event monitor at discharge to look for occult atrial fibrillation, follow-up with Dr. Mancia      Chest Pain- improving   -Pt. Chief complaint chest/back pain with 2 presentations to ED in last few days, ongoing for days, associated with position changes and deep breathing. -Troponin negative, EKG without new ST changes.  -CTA chest without abnormality. Pt. Also has had stress test 8/2021 negative for ischemia  -MSK origin suspected, continue symptomatic pain relief with lidocaine patch, acetaminophen, oxycodone PRN     12/20/21 Went to the ER recently with CP - PE ruled out - felt to possibly be pericarditis - started on ibuprofen which triggered a GIB. In the hospital developed A-fib RVR - no anticoagulation or CV done. Feels better since discharge. Denies further CP.   EKG NSR NSSTT changes  BP controlled by home readings  PAF - back in NSR - not on OAC due to recent GIB - likely triggered by NSAIDs  CP - unclear etiology - no MI or PE  OV 1 month with BMP, CBC - if CP returns consider [facility]      Labs 1/18/22  K 4.1  Cr 0.8  HCT 29     1/19/22 Denies CP, SOB, or palpitations  No recurrence of CP or palpitations  Not interested in DOAC unless A-fib returns  OV 3 months    4/22/22 Reports recent LE edema and HERRING  Denies CP. Denies heart racing or palpitations  EKG NSR - ok    Review of Systems   Constitutional: Negative for decreased appetite.   HENT: Negative for ear discharge.    Eyes: Negative for blurred vision.   Respiratory: Negative for hemoptysis.    Endocrine: Negative for polyphagia.   Hematologic/Lymphatic: Negative for adenopathy.   Skin: Negative for color change.   Musculoskeletal: Negative for joint swelling.   Genitourinary:  Negative for bladder incontinence.   Neurological: Negative for brief paralysis.   Psychiatric/Behavioral: Negative for hallucinations.   Allergic/Immunologic: Negative for hives.        Objective:    Physical Exam  Constitutional:       Appearance: She is well-developed.   HENT:      Head: Normocephalic and atraumatic.   Eyes:      Conjunctiva/sclera: Conjunctivae normal.      Pupils: Pupils are equal, round, and reactive to light.   Cardiovascular:      Rate and Rhythm: Normal rate.      Pulses: Intact distal pulses.      Heart sounds: Normal heart sounds.   Pulmonary:      Effort: Pulmonary effort is normal.      Breath sounds: Normal breath sounds.   Abdominal:      General: Bowel sounds are normal.      Palpations: Abdomen is soft.   Musculoskeletal:         General: Normal range of motion.      Cervical back: Normal range of motion and neck supple.      Right lower leg: Edema present.      Left lower leg: Edema present.   Skin:     General: Skin is warm and dry.   Neurological:      Mental Status: She is alert and oriented to person, place, and time.           Assessment:       1. Leg swelling    2. Atrial fibrillation with RVR    3. Chest pain, unspecified type    4. HERRING (dyspnea on exertion)    5. Leg edema         Plan:       Echo, BNP, BMP for new onset LE edema and HERRING  PRN lasix

## 2022-05-06 PROBLEM — M47.816 LUMBAR SPONDYLOSIS: Status: ACTIVE | Noted: 2022-01-01

## 2022-05-06 PROBLEM — M54.16 LUMBAR RADICULOPATHY: Status: ACTIVE | Noted: 2022-01-01

## 2022-05-06 PROBLEM — M51.36 DDD (DEGENERATIVE DISC DISEASE), LUMBAR: Status: ACTIVE | Noted: 2022-01-01

## 2022-05-06 NOTE — PROGRESS NOTES
Subjective:     Patient ID: Jeanne Rodgers is a 67 y.o. female    Chief Complaint: Ankle Pain (Dr Holder referred for ankle pain, unspecified chronicity)      Referred by: David Holder MD      HPI:    Initial Encounter (5/6/22):  Jeanne Rodgers is a 67 y.o. female who presents today with right sided low back and lower extremity pain. This pain has been present for months. No specific inciting event or injury noted. The pain is located in the right lower lumbar/lumbosacral region and radiates to the right lower extremity into the ankle/foot. The pain is constant and worsened with certain activities. She denies any persistent numbness,tingling, weakness or b/b dysfunction.   This pain is described in detail below.    Physical Therapy: Starting soon    Non-pharmacologic Treatment: Rest helps         · TENS? No    Pain Medications:         · Currently taking: Tylenol    · Has tried in the past:  NSAIDS    · Has not tried: Opioids, Muscle relaxants, TCAs, SNRIs, anticonvulsants, topical creams    Blood thinners: None    Interventional Therapies: None    Relevant Surgeries: None    Affecting sleep? Yes    Affecting daily activities? yes    Depressive symptoms? no          · SI/HI? No    Work status: Retired    Pain Scores:    Best:       4/10  Worst:     8/10  Usually:   6/10  Today:    4/10    Review of Systems   Constitutional: Negative for activity change, appetite change, chills, fatigue, fever and unexpected weight change.   HENT: Negative for hearing loss.    Eyes: Negative for visual disturbance.   Respiratory: Negative for chest tightness and shortness of breath.    Cardiovascular: Negative for chest pain.   Gastrointestinal: Negative for abdominal pain, constipation, diarrhea, nausea and vomiting.   Genitourinary: Negative for difficulty urinating.   Musculoskeletal: Positive for back pain, gait problem and myalgias. Negative for neck pain.   Skin: Negative for rash.   Neurological:  Negative for dizziness, weakness, light-headedness, numbness and headaches.   Psychiatric/Behavioral: Positive for sleep disturbance. Negative for hallucinations and suicidal ideas. The patient is not nervous/anxious.        Past Medical History:   Diagnosis Date    Allergy     skin    Anxiety     Breast cancer 2014    right    Chronic back pain     Depression     Encounter for blood transfusion     Endometrial cancer 07/30/2019    Fibromyalgia     Hives     Hx of psychiatric care     Hx of radiation therapy     Itching     Lichen sclerosus     Mental disorder     PONV (postoperative nausea and vomiting)     Psychiatric problem     PTSD (post-traumatic stress disorder)     Sleep difficulties     Sore throat     Therapy     Uterine cancer 08/05/2019    UTI (urinary tract infection)        Past Surgical History:   Procedure Laterality Date    anal fissure surgery      APPENDECTOMY      BIOPSY N/A 11/19/2020    Procedure: BONE BIOPSY;  [facility];  Location: [facility] OR;  Service: Radiology;  Laterality: N/A;  RN PREOP 11/17/2020    BREAST BIOPSY Right 2014    BREAST LUMPECTOMY Right 2014    R lumpectomy [facility] w 2.4cm IDC & DCIS, neg margins, 0/6 SN, Stage II, ER/CA+, HER-2 neg Adjuvant XRT and hormonal therapy     CARPAL TUNNEL RELEASE      CHOLECYSTECTOMY      COLONOSCOPY      COLONOSCOPY N/A 6/11/2020    Procedure: COLONOSCOPY;  Surgeon: Bobby Marino MD;  Location: [facility] ([address] [place] [address]);  Service: Endoscopy;  Laterality: N/A;    ESOPHAGOGASTRODUODENOSCOPY N/A 6/11/2020    Procedure: EGD (ESOPHAGOGASTRODUODENOSCOPY);  Surgeon: Bobby Marino MD;  Location: [address]);  Service: Endoscopy;  Laterality: N/A;    HYSTERECTOMY  08/15/2019    HYSTEROSCOPY WITH DILATION AND CURETTAGE OF UTERUS N/A 7/24/2019    Procedure: HYSTEROSCOPY, WITH DILATION AND CURETTAGE OF UTERUS;  Surgeon: Елена Kam MD;  Location: [facility] [place] OR;  Service: OB/GYN;  Laterality:  N/A;    INSERTION OF TUNNELED CENTRAL VENOUS CATHETER (CVC) WITH SUBCUTANEOUS PORT N/A 9/26/2019    Procedure: INSERTION, PORT-A-CATH;  Surgeon: Moris Varghese MD;  Location: [facility] CATH LAB;  Service: Radiology;  Laterality: N/A;    MEDIPORT REMOVAL N/A 3/5/2020    Procedure: REMOVAL, CATHETER, CENTRAL VENOUS, TUNNELED, WITH PORT;  Surgeon: Moris Varghese MD;  Location: [facility] CATH LAB;  Service: Radiology;  Laterality: N/A;    NEEDLE LOCALIZATION N/A 11/19/2020    Procedure: NEEDLE LOCALIZATION;  Surgeon: [facility] Diagnostic Provider;  Location: [facility] OR;  Service: Radiology;  Laterality: N/A;    PARTIAL MASCECTOMY Right 2014    ROBOT-ASSISTED LAPAROSCOPIC ABDOMINAL HYSTERECTOMY USING DA RAMU XI N/A 8/15/2019    Procedure: XI ROBOTIC HYSTERECTOMY;  Surgeon: Darius Regalado MD;  Location: [facility] OR;  Service: OB/GYN;  Laterality: N/A;    ROBOT-ASSISTED LAPAROSCOPIC SALPINGO-OOPHORECTOMY USING DA RAMU XI Bilateral 8/15/2019    Procedure: XI ROBOTIC SALPINGO-OOPHORECTOMY;  Surgeon: Darius Regalado MD;  Location: [facility];  Service: OB/GYN;  Laterality: Bilateral;    ROBOT-ASSISTED LYMPHADENECTOMY Left 8/15/2019    Procedure: ROBOTIC LYMPHADENECTOMY;  Surgeon: Darius Regalado MD;  Location: [facility];  Service: OB/GYN;  Laterality: Left;    SALPINGOOPHORECTOMY Bilateral 08/15/2019    TONSILLECTOMY      TUBAL LIGATION         Social History     Socioeconomic History    Marital status: Single    Number of children: 0   Occupational History    Occupation: hotel accounting   Tobacco Use    Smoking status: Never Smoker    Smokeless tobacco: Never Used   Substance and Sexual Activity    Alcohol use: No     Alcohol/week: 0.0 standard drinks    Drug use: Yes     Types: Marijuana     Comment: medical marijuana- 2 dayys ago     Sexual activity: Not Currently     Partners: Male   Social History Narrative    Lives alone.        Review of patient's allergies indicates:  No Known Allergies    Current Outpatient Medications  on File Prior to Visit   Medication Sig Dispense Refill    clobetasol 0.05% (TEMOVATE) 0.05 % Oint APPLY TO AFFECTED AREA(S) TWICE A DAY FOR 1 MONTH(S) then EVERY DAY FOR 1 MONTH(S) then 3 TIMES A WEEK 60 g 2    fexofenadine (ALLEGRA) 180 MG tablet 2 tablets in the morning.  May take an extra 2 tablets during the day if needed for itching. 240 tablet 5    furosemide (LASIX) 20 MG tablet Take 1 tablet (20 mg total) by mouth daily as needed (leg swelling). 30 tablet 11    hydrocortisone 2.5 % cream Apply to affected areas twice a day when eczema is moderate. 453.6 g 1    hydrOXYzine HCL (ATARAX) 25 MG tablet 1 to 8 tablets at bedtime.  Start with 3 tablets.  Increase or decrease as needed until itching is controlled or a maximum of 8 tablets. 240 tablet 3    nystatin (MYCOSTATIN) cream Apply topically every Mon, Wed, Fri. 30 g 11    nystatin (MYCOSTATIN) powder Apply topically 4 (four) times daily. 30 g 1    ondansetron (ZOFRAN) 4 MG tablet Take 1 tablet (4 mg total) by mouth every 6 (six) hours as needed for Nausea. 12 tablet 0    oxybutynin (DITROPAN) 5 MG Tab Take 1 tablet (5 mg total) by mouth 2 (two) times daily. 60 tablet 0    pantoprazole (PROTONIX) 20 MG tablet Take 1 tablet (20 mg total) by mouth daily as needed (acid reflux). 30 tablet 2    UNABLE TO FIND [place] [employer] Immune Support       No current facility-administered medications on file prior to visit.       Objective:      BP (!) 96/55 (BP Location: Left arm, Patient Position: Sitting, BP Method: Medium (Manual))   Ht 5' (1.524 m)   Wt 56.3 kg (124 lb 1.6 oz)   BMI 24.24 kg/m²     Exam:  GEN:  Well developed, well nourished.  No acute distress. Normal pain behavior.  HEENT:  No trauma.  Mucous membranes moist.  Nares patent bilaterally.  PSYCH: Normal affect. Thought content appropriate.  CHEST:  Breathing symmetric.  No audible wheezing.  ABD: Soft, non-distended.  SKIN:  Warm, pink, dry.  No rash on exposed areas.    EXT:  No  cyanosis, clubbing, or edema.  No color change or changes in nail or hair growth.  NEURO/MUSCULOSKELETAL:  Fully alert, oriented, and appropriate. Speech normal rodriguez. No cranial nerve deficits.   Gait: Antalgic.  No trendelenburg sign bilaterally.   Motor Strength:  5/5 motor strength throughout lower extremities.   Sensory: No sensory deficit in the lower extremities.   Reflexes:  2+ and symmetric throughout.  Downgoing Babinski's bilaterally.  No clonus or spasticity.  L-Spine:  Limited ROM with pain on extension more than flexion. Negative pain with axial/facet loading bilaterally.  Positive SLR on the right.    Positive TTP over right lower lumbar paraspinals          Imaging:  No pertinent imaging at this time    Assessment:       Encounter Diagnoses   Name Primary?    Right ankle pain, unspecified chronicity     DDD (degenerative disc disease), lumbar Yes    Lumbar spondylosis     Lumbar radiculopathy          Plan:       Jeanne was seen today for ankle pain.    Diagnoses and all orders for this visit:    DDD (degenerative disc disease), lumbar  -     X-Ray Lumbar Spine Ap Lateral w/Flex Ext; Future    Right ankle pain, unspecified chronicity  -     Ambulatory referral/consult to Pain Clinic    Lumbar spondylosis  -     X-Ray Lumbar Spine Ap Lateral w/Flex Ext; Future    Lumbar radiculopathy  -     X-Ray Lumbar Spine Ap Lateral w/Flex Ext; Future        Jeanne Rodgers is a 67 y.o. female with right sided low back and lower extremity pain. Likely from lumbar radiculopathy    1.  Lumbar xrays  2.  Schedule lumbar MRI previously orderd by oncology  3.  Proceed with PT as planned  4.  RTC after imaging to review results. May consider BRYCE if needed.          This note was created by combination of typed  and M-Modal dictation. Transcription and phonetic errors may be present.  If there are any questions, please contact me.

## 2022-05-09 PROBLEM — M54.10 RADICULOPATHY OF LEG: Status: ACTIVE | Noted: 2022-01-01

## 2022-05-09 PROBLEM — M25.561 RIGHT KNEE PAIN: Status: ACTIVE | Noted: 2022-01-01

## 2022-05-09 NOTE — PLAN OF CARE
[facility]   Physical Therapy Initial Evaluation     Date: 2022   Name: Jeanne Rodgers  Clinic Number: [id number]    Therapy Diagnosis:   Encounter Diagnoses   Name Primary?    Right ankle pain, unspecified chronicity     Trochanteric bursitis of right hip  R knee pain  Radiculopathy of bilateral LEs      Physician: David Holder MD    Physician Orders: PT Eval and Treat   Medical Diagnosis from Referral:   Right ankle pain, unspecified chronicity  Trochanteric bursitis of right hip  Evaluation Date: 2022  Authorization Period Expiration: 2023  Plan of Care Expiration: 2022   Progress Note Due: 2022  Visit # / Visits authorized:    FOTO: 1/3    Precautions: cancer (Breast and uterine cancer) currently in remission      Time In: 0315 PM  Time Out: 4000 PM   Total Appointment Time (timed & untimed codes): 45 minutes      SUBJECTIVE     Date of onset: Chronic R leg pain for over 1 year.    History of current condition - Jeanne reports: Pain in R hip, knee and ankle for over 1 year which she believes she acquired during and after her chemotherapy treatment. Patient reports and overall achy and heavy feeling down her R LE with some radiculopathy symptoms down her R leg (Numbness/tingling and occasional toe numbing). She also has some reported numbness in L toes . Patient reports R LE pain is constant and never subsides. Pain seems to be worse with increase activity but she reports having pain when resting as well.  Patient takes Advil and medical marijuana with some relief of symptoms. Patient reports increase in pain when riding her bike.     Pain with cough/sneeze, changes in B&B -- Denies all    Falls: None    Imagin2022 X-Ray ankle complete ROsteopenia. No acute fracture or dislocation. The talar dome is intact and the ankle mortise is symmetric. Mild degenerative changes in the midfoot. Plantar calcaneal spur. No tibiotalar joint  effusion. Mild diffuse soft tissue edema.   04/05/2022 X-Ray Hips Duran 2 View The alignment is within normal limits. No displaced fractures identified. Sclerosis at the level of pubic symphysis likely osteitis pubis. Surgical clips RIGHT lower quadrant.Superolateral osteophytic spurring identified level of the acetabulum bilaterally.Soft tissues are unremarkable   03/28/2022 MRI Hip W WO Contrast   1. No evidence for hip or pelvic fracture.  2. Bone marrow edema about the bilateral sacroiliac joints, possible osteitis related to sacroiliitis.  Alternatively, developing foci of osteonecrosis.  3. Right hip cartilage loss.  Bilateral acetabular labral fraying.  4. Small volume of pelvic ascites.    Prior Therapy: Pelvic Floor Therapy  Home environment: Single floor house  DME: None  Social History: Lives alone  Physical activity: Sedentary. Used to ride bike at least 3 x a week.  Occupation: Not working  Prior Level of Function: Able to be independent w/ ADLs around her house and able to ride bike 3 times a week.  Current Level of Function: Weakness in the R leg which limits her from doing chores around house. Difficulty bending down, doing chores such as cooking, mopping, cleaning, etc  Pain:  Current 4/10, worst 6/10, best 4/10   Location: R leg with increase pain in R knee   Description: Aching  Aggravating Factors: Standing, Bending, Walking, Flexing, Lifting and Getting out of bed/chair  Easing Factors: pain medication    Patients goals: To able able to be independent with ADLs and be able to ride her bike without pain     Medical History:   Past Medical History:   Diagnosis Date    Allergy     skin    Anxiety     Breast cancer 2014    right    Chronic back pain     Depression     Encounter for blood transfusion     Endometrial cancer 07/30/2019    Fibromyalgia     Hives     Hx of psychiatric care     Hx of radiation therapy     Itching     Lichen sclerosus     Mental disorder     PONV  (postoperative nausea and vomiting)     Psychiatric problem     PTSD (post-traumatic stress disorder)     Sleep difficulties     Sore throat     Therapy     Uterine cancer 08/05/2019    UTI (urinary tract infection)        Surgical History:   Jeanne Rodgers  has a past surgical history that includes Appendectomy; Tubal ligation; Carpal tunnel release; Cholecystectomy; Tonsillectomy; Colonoscopy; Hysteroscopy with dilation and curettage of uterus (N/A, 7/24/2019); Breast lumpectomy (Right, 2014); Robot-assisted laparoscopic abdominal hysterectomy using da Catrina Xi (N/A, 8/15/2019); Robot-assisted laparoscopic salpingo-oophorectomy using da Catrina Xi (Bilateral, 8/15/2019); Robot-assisted lymphadenectomy (Left, 8/15/2019); anal fissure surgery; Salpingoophorectomy (Bilateral, 08/15/2019); Hysterectomy (08/15/2019); Insertion of tunneled central venous catheter (CVC) with subcutaneous port (N/A, 9/26/2019); Mediport removal (N/A, 3/5/2020); Esophagogastroduodenoscopy (N/A, 6/11/2020); Colonoscopy (N/A, 6/11/2020); Biopsy (N/A, 11/19/2020); Needle localization (N/A, 11/19/2020); Breast biopsy (Right, 2014); and PARTIAL MASCECTOMY (Right, 2014).    Medications:   Jeanne has a current medication list which includes the following prescription(s): clobetasol 0.05%, fexofenadine, furosemide, hydrocortisone, hydroxyzine hcl, nystatin, nystatin, ondansetron, oxybutynin, pantoprazole, and UNABLE TO FIND.    Allergies:   Review of patient's allergies indicates:  No Known Allergies       OBJECTIVE     Observation: Pleasant and cooperative    Posture: FHP and rounded shoulder posture    Hip Range of Motion:   Right AROM/PROM Left AROM/PROM   Flexion 90 P! 100 P!   Abduction NT NT   Extension NT NT   Ext. Rotation WNL P! WNL P!   Int. Rotation WNL WNL     Knee flexion: R 120 L 135    Lower Extremity Strength  Right LE  Left LE    Knee extension: 3+/5 Knee extension: 4-/5   Knee flexion: 3+/5 Knee flexion: 4-/5   Hip  flexion: 3+/5 Hip flexion: 4-/5   Hip Internal Rotation:  4-/5    Hip Internal Rotation: 4-/5      Hip External Rotation: 3+/5    Hip External Rotation: 4-/5      Hip abduction: 4-/5 Hip abduction: 4-/5   Ankle dorsiflexion: 4-/5 Ankle dorsiflexion: 4/5   Ankle plantarflexion: 4-/5 Ankle plantarflexion: 4-/5     Special Tests:  Bridge Test: Negative  DOROTHEA: Positive bilateral  Scour: Positive R    SLR test: Positive (N/T and burning) bilaterally at 35 degrees of hip flexion    Flexibility: Normal (ROM limited by pain)     Joint Mobility: Normal joint play    Palpation: Pain and sensitivity throughout entire R LE.    Sensation: Decreased R LE sensation, especially from R knee and below    Edema: None present    Radicular symptoms: N/T and burning sensation elicited by SLR test. Occasional bilateral toe numbing       Limitation/Restriction for FOTO Ankle Survey    Therapist reviewed FOTO scores for Jeanne Rodgers on 5/9/2022.   FOTO documents entered into [employer] - see Media section.    Limitation Score: 51%         TREATMENT     Total Treatment time (time-based codes) separate from Evaluation: 10 minutes      Jeanne received the treatments listed below:      therapeutic exercises to develop ROM and flexibility for 10 minutes including:    LTR x 2 minutes  Single knee to chest 3x30 sec each  Figure 4 stretches 3x30 sec each  Sciatic nerve glide 1x10 R LE  Plantar fascia stretch 2x30 sec R LE        PATIENT EDUCATION AND HOME EXERCISES     Education provided:   - HEP provided and demonstrated    Written Home Exercises Provided: yes. Exercises were reviewed and Jeanne was able to demonstrate them prior to the end of the session.  Jeanne demonstrated good  understanding of the education provided. See EMR under Patient Instructions for exercises provided during therapy sessions.    ASSESSMENT   Patient is a 67 year old female  who presents with difficulty performing house chores and ADLs due to difficulty bending over,  standing/walking for prolonged periods of time, performing house chores, and riding her bike. Activity deficits are secondary to decreased hip and knee ROM of R LE, weakness of bilateral LEs R>L, and radiculopathy symptoms bilaterally R>L. Theses impairments are all   in the presence of signs and symptoms consistent with Trochanteric bursitis of R hip, and suspected lumbar spine involvement (SLR test positive bilaterally).     Patient prognosis is Fair.   Patient will benefit from skilled outpatient Physical Therapy to address the deficits stated above and in the chart below, provide patient /family education, and to maximize patientt's level of independence.     Plan of care discussed with patient: Yes  Patient's spiritual, cultural and educational needs considered and patient is agreeable to the plan of care and goals as stated below:     Anticipated Barriers for therapy: Chronicity of symptoms. H/x of cancer    Medical Necessity is demonstrated by the following  History  Co-morbidities and personal factors that may impact the plan of care Co-morbidities:   Allergy   skin   Anxiety   Breast cancer   right   Chronic back pain   Depression   Encounter for blood transfusion   Endometrial cancer   Fibromyalgia   Hives   Hx of psychiatric care   Hx of radiation therapy   Itching   Lichen sclerosus   Mental disorder   PONV (postoperative nausea and vomiting)   Psychiatric problem   PTSD (post-traumatic stress disorder)   Sleep difficulties   Sore throat   Therapy   Uterine cancer   UTI (urinary tract infection)       Personal Factors:   no deficits     moderate   Examination  Body Structures and Functions, activity limitations and participation restrictions that may impact the plan of care Body Regions:   lower extremities    Body Systems:    ROM  strength  balance  transitions    Participation Restrictions:   ADLs, IADLs, domestic duties    Activity limitations:   Learning and applying knowledge  no deficits    General  Tasks and Commands  no deficits    Communication  no deficits    Mobility  lifting and carrying objects  walking    Self care  no deficits    Domestic Life  doing house work (cleaning house, washing dishes, laundry)    Interactions/Relationships  no deficits    Life Areas  no deficits    Community and Social Life  no deficits         low   Clinical Presentation stable and uncomplicated moderate   Decision Making/ Complexity Score: moderate     GOALS: Short Term Goals:  5 weeks  1.Report decreased LE pain  < / =  4/10  to increase tolerance for bending over, standing/walking for prolonged periods of time, performing house chores, and riding her bike  2. Increase R hip and knee ROM by 5-10 degrees  in order to perform ADLs without difficulty.  3. Increase strength by 1/3 MMT grade in LEs  to increase tolerance for ADL and work activities.  4. Pt to tolerate HEP to improve ROM and independence with ADL's    Long Term Goals: 10 weeks  1.Report decreased LE  pain < / = 2/10  to increase tolerance for bending over, standing/walking for prolonged periods of time, performing house chores, and riding her bike  2.Patient goal: Patient will be able to ride her bike at least 2 times per week with minimal to no symptoms by the end of 10 weeks.  3.Increase strength to 4+/5 in  LEs  to increase tolerance for ADL and work activities.  4.Pt to be Independent with HEP to improve ROM and independence with ADL's.        PLAN   Plan of care Certification: 5/9/2022 to 07/18/2022 .    Outpatient Physical Therapy 2 times weekly for 10 weeks to include the following interventions: Electrical Stimulation, Manual Therapy, Moist Heat/ Ice, Neuromuscular Re-ed, Patient Education, Therapeutic Activities and Therapeutic Exercise.     Stoney John, PT      I CERTIFY THE NEED FOR THESE SERVICES FURNISHED UNDER THIS PLAN OF TREATMENT AND WHILE UNDER MY CARE   Physician's comments:     Physician's Signature:  ___________________________________________________

## 2022-05-12 NOTE — PROGRESS NOTES
Subjective:    Patient ID:  Jeanne Rodgers is a 67 y.o. female who presents for follow-up of Results      HPI     PAF - declines DOAC due to Hx GIB, HLD     Referred by Dr Hill  Jeanne Rodgers is a 66 y.o. female who presents for HBO consult. Referred by OBGYN Oncologist  for radiation vaginitis and would like to try hyperbaric oxygen therapy.  Topical treatments for this painful region have not helped and are painful. History of breast with partial mastectomy right breast in 2014 and endometrial cancer. Finished last chemo and radiation therapy in May 2020.  Reports no hormone therapy but reports nightly medical marijuana use for nausea during treatment. HBO workup started today in clinic: cxr, ekg, crp and sed rate ordered. HBO consents signed and patient educated. Verbalized understanding.      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 Her chemo/XRT was completed 5/2020.  Patient has failed medical management of this disease and has not found any relief from the pain. She is an excellent candidate for HBOT.  Insurance approval will be requested. Additional labs and diagnostic tests were ordered as noted above. The HBO treatment was explained, the consent for was explained, all questions were answered and the consent was signed.  We will proceed with HBOT when insurance approval has been confirmed. In addition, a left-sided cerumen impaction was noted on physical exam and Debrox drops were recommended.      EKG 8/16/21 sinus bradycardia 54 otherwise ok     Stress test 8/27/21    Normal myocardial perfusion scan. There is no evidence of myocardial ischemia or infarction.    The gated perfusion images showed an ejection fraction of 70% post stress.    There is normal wall motion post stress.    The EKG portion of this study is negative for ischemia.    The patient reported no chest pain during the stress test.    There were no  arrhythmias during stress.      Echo 4/26/22  · The left ventricle is normal in size with normal systolic function.  · The estimated ejection fraction is 65%.  · Normal right ventricular size with normal right ventricular systolic function.  · Small anterior pericardial effusion without hemodynamic compromise.  · The estimated PA systolic pressure is 21 mmHg.     CTA chest 12/13/21  No evidence of pulmonary thromboembolism other acute abnormalities in the chest.        Holter 8/27/21  · Normal sinus rhythm. Heart rates varied between 46 and 141 BPM with an average of 82 BPM.  · No PVC's  · 237 PAC's with one 4 beat SVT        8/24/21 Reports sharp left sided CP intermittently for the last 2 months. Occasional SOB  Denies prior CAD   Echo and lexiscan myoview for CP and SOB  Add lipid to next labs  Holter for bradycardia     Lipid 8/27/21  Chol 186  HDL 45       9/13/21 Still with occasional sharp CP   Denies SOB  Continue observation for atypical CP and bradycardia  Diet control for mild HLD  OV 6 months     Admitted to [facility] 12/15/21  Macrocytic Anemia  Bright Red Blood per Rectum  -Hgb 8.9 on presentation, baseline ~11. No reported melena or hematochezia but some gross blood noted on rectal exam. Pt. With prior history of anemia with EGD/colonoscopy positive only for erythematous duodenopathy on EGD 6/2020  -Iron studies, folate, and B12 ordered for further work-up of anemia  -Trend Hgb, will leave pt. On clear liquid diet. Plan for outpatient GI f/u unless pt. Develops active bleeding or has significantly downtrending Hgb  - Hb stable at 8.0  - no active bleeding at this time   -diet advanced on 12/17     Atrial Fibrillation with RVR  Patient has been having paroxsymal runs of atrial fibrillation during this admission with rates up to the 150s. She was seen by cardiology  -TTE Stable  -chest pain improving  -Metoprolol PO started  -Cards recs as follows:   Given isolated episode of atrial fibrillation and  acute illness along with anemia holding off on starting anticoagulation at this time, recommending 30 day event monitor at discharge to look for occult atrial fibrillation, follow-up with Dr. Mancia      Chest Pain- improving   -Pt. Chief complaint chest/back pain with 2 presentations to ED in last few days, ongoing for days, associated with position changes and deep breathing. -Troponin negative, EKG without new ST changes.  -CTA chest without abnormality. Pt. Also has had stress test 8/2021 negative for ischemia  -MSK origin suspected, continue symptomatic pain relief with lidocaine patch, acetaminophen, oxycodone PRN     12/20/21 Went to the ER recently with CP - PE ruled out - felt to possibly be pericarditis - started on ibuprofen which triggered a GIB. In the hospital developed A-fib RVR - no anticoagulation or CV done. Feels better since discharge. Denies further CP.   EKG NSR NSSTT changes  BP controlled by home readings  PAF - back in NSR - not on OAC due to recent GIB - likely triggered by NSAIDs  CP - unclear etiology - no MI or PE  OV 1 month with BMP, CBC - if CP returns consider [facility]      Labs 1/18/22  K 4.1  Cr 0.8  HCT 29     1/19/22 Denies CP, SOB, or palpitations  No recurrence of CP or palpitations  Not interested in DOAC unless A-fib returns  OV 3 months     4/22/22 Reports recent LE edema and HERRING  Denies CP. Denies heart racing or palpitations  EKG NSR - ok   Echo, BNP, BMP for new onset LE edema and HERRING  PRN lasix    Labs 4/26/22  K 4.1  Cr 0.8      5/12/22 LE edema improved with lasix and medical marijuana  Denies CP or SOB    Review of Systems   Constitutional: Negative for decreased appetite.   HENT: Negative for ear discharge.    Eyes: Negative for blurred vision.   Respiratory: Negative for hemoptysis.    Endocrine: Negative for polyphagia.   Hematologic/Lymphatic: Negative for adenopathy.   Skin: Negative for color change.   Musculoskeletal: Negative for joint swelling.    Genitourinary: Negative for bladder incontinence.   Neurological: Negative for brief paralysis.   Psychiatric/Behavioral: Negative for hallucinations.   Allergic/Immunologic: Negative for hives.        Objective:    Physical Exam  Constitutional:       Appearance: She is well-developed.   HENT:      Head: Normocephalic and atraumatic.   Eyes:      Conjunctiva/sclera: Conjunctivae normal.      Pupils: Pupils are equal, round, and reactive to light.   Cardiovascular:      Rate and Rhythm: Normal rate.      Pulses: Intact distal pulses.      Heart sounds: Normal heart sounds.   Pulmonary:      Effort: Pulmonary effort is normal.      Breath sounds: Normal breath sounds.   Abdominal:      General: Bowel sounds are normal.      Palpations: Abdomen is soft.   Musculoskeletal:         General: Normal range of motion.      Cervical back: Normal range of motion and neck supple.      Right lower leg: Edema present.      Left lower leg: Edema present.   Skin:     General: Skin is warm and dry.   Neurological:      Mental Status: She is alert and oriented to person, place, and time.           Assessment:       1. HERRING (dyspnea on exertion)    2. Chest pain, unspecified type    3. Atrial fibrillation with RVR         Plan:       Echo with EF 65% and  - edema improved with prn lasix  Continue Rx for PAF, HLD  OV 6 months

## 2022-05-17 NOTE — PROGRESS NOTES
Spoke to patient -   Reports pain in right leg no worse than when she saw you. No pelvic pain. No back pain. I told her you would review this MRI and possibly discuss further radiologist. Please let her know your thoughts.   PJ

## 2022-05-19 NOTE — PROGRESS NOTES
She has seen Dr. Yu and our original MRIs were then linked and ordered after  Discussed below:    FINDINGS:  Retrospectively MRI and CT pelvis exams show evidence of nonspecific sclerotic lucent change left sacral ala which could be no suspicious for incomplete stress fracture.  Otherwise previous imaging shows no additional bony abnormality.  Marrow space infiltrated process involves the included spine particularly T9 through T11 vertebral bodies, less prominent changes remainder of lumbar spine and included S4 sacral segment.  In addition [employer] mint of included SI joints particularly upper iliac and left sacral ala.  Post-contrast exam shows no abnormal intra-axial extra-axial enhancement the spinal canal and spinal cord nerve root structures.  Partial enhancement of the marrow space metastatic disease particularly lower dorsal spine.  Lower dorsal spinal cord normal.  Minimal mild posterior disc bulge T10 and T11 posterior disc margins with mild facet joint arthropathy and mild spinal and no significant foraminal stenosis T10 and mild moderate foramen stenosis at T11.  T11 disc level through L1 disc level shows no unusual posterior disc abnormality.  L2-L3 minimal mild posterior disc bulge, mild compression anterior spinal sac, facet joint arthropathy with mild spinal and foramen stenosis.  L 3 L4 minimal mild posterior disc bulge, posterior central annular fissure, mild compression anterior spinal sac, facet joint arthropathy with mild spinal and foramen stenosis.  L 4 L5 mild posterior disc bulge spondylosis, mild compression anterior spinal sac, narrowing lateral recesses, prompt facet joint arthropathy, mild moderate foramen and spinal stenosis, hypertrophy ligamentum flavum.  L5-S1 minor posterior disc bulge, prompt facet joint arthropathy without significant spinal or foramen stenosis.  No disc prolapse or subluxation.  Impression:  1. Subtle retrospective incomplete stress fracture deformity left  sacral ala on CT and MRI studies.  2. New evidence of extensive metastatic disease from known breast carcinoma particularly lower dorsal sacrum and pelvic areas and lesser degree lumbar spine.  No new fracture subluxation or disc prolapse.  3. Degenerative disc spondylosis facet joint arthropathy with above described findings.  4.  This report was flagged in Epic as abnormal.    Denies pain in tail bone   Only pain in leg    PET scan ordered  Tumor markers ordered

## 2022-05-20 NOTE — TELEPHONE ENCOUNTER
----- Message from Nano Ford MD sent at 5/19/2022  4:02 PM CDT -----    She has seen Dr. Yu and our original MRIs were then linked and ordered after  Discussed below:    FINDINGS:  Retrospectively MRI and CT pelvis exams show evidence of nonspecific sclerotic lucent change left sacral ala which could be no suspicious for incomplete stress fracture.  Otherwise previous imaging shows no additional bony abnormality.  Marrow space infiltrated process involves the included spine particularly T9 through T11 vertebral bodies, less prominent changes remainder of lumbar spine and included S4 sacral segment.  In addition [employer] mint of included SI joints particularly upper iliac and left sacral ala.  Post-contrast exam shows no abnormal intra-axial extra-axial enhancement the spinal canal and spinal cord nerve root structures.  Partial enhancement of the marrow space metastatic disease particularly lower dorsal spine.  Lower dorsal spinal cord normal.  Minimal mild posterior disc bulge T10 and T11 posterior disc margins with mild facet joint arthropathy and mild spinal and no significant foraminal stenosis T10 and mild moderate foramen stenosis at T11.  T11 disc level through L1 disc level shows no unusual posterior disc abnormality.  L2-L3 minimal mild posterior disc bulge, mild compression anterior spinal sac, facet joint arthropathy with mild spinal and foramen stenosis.  L 3 L4 minimal mild posterior disc bulge, posterior central annular fissure, mild compression anterior spinal sac, facet joint arthropathy with mild spinal and foramen stenosis.  L 4 L5 mild posterior disc bulge spondylosis, mild compression anterior spinal sac, narrowing lateral recesses, prompt facet joint arthropathy, mild moderate foramen and spinal stenosis, hypertrophy ligamentum flavum.  L5-S1 minor posterior disc bulge, prompt facet joint arthropathy without significant spinal or foramen stenosis.  No disc prolapse or  subluxation.  Impression:  1. Subtle retrospective incomplete stress fracture deformity left sacral ala on CT and MRI studies.  2. New evidence of extensive metastatic disease from known breast carcinoma particularly lower dorsal sacrum and pelvic areas and lesser degree lumbar spine.  No new fracture subluxation or disc prolapse.  3. Degenerative disc spondylosis facet joint arthropathy with above described findings.  4.  This report was flagged in Epic as abnormal.    Denies pain in tail bone   Only pain in leg    PET scan ordered  Tumor markers ordered    Can you please schedule?

## 2022-05-27 NOTE — PROGRESS NOTES
Subjective:     Patient ID: Jeanne Rodgers is a 67 y.o. female    Chief Complaint: Low-back Pain (F/U after xray for results)      Referred by: No ref. provider found      HPI:    Interval History (5/27/22):  She returns today for follow up and MRI review.  She denies any significant changes in the quality location of her pain since last encounter.  MRI resulted uses show findings concerning for metastatic lesions of the spine and sacrum.  She is scheduled to follow-up with Hematology-Oncology today.      Initial Encounter (5/6/22):  Jeanne Rodgers is a 67 y.o. female who presents today with right sided low back and lower extremity pain. This pain has been present for months. No specific inciting event or injury noted. The pain is located in the right lower lumbar/lumbosacral region and radiates to the right lower extremity into the ankle/foot. The pain is constant and worsened with certain activities. She denies any persistent numbness,tingling, weakness or b/b dysfunction.   This pain is described in detail below.    Physical Therapy:  On hold    Non-pharmacologic Treatment: Rest helps         · TENS? No    Pain Medications:         · Currently taking: Tylenol    · Has tried in the past:  NSAIDS    · Has not tried: Opioids, Muscle relaxants, TCAs, SNRIs, anticonvulsants, topical creams    Blood thinners: None    Interventional Therapies: None    Relevant Surgeries: None    Affecting sleep? Yes    Affecting daily activities? yes    Depressive symptoms? no          · SI/HI? No    Work status: Retired    Pain Scores:    Best:       2/10  Worst:     8/10  Usually:   3/10  Today:    3/10    Review of Systems   Constitutional: Negative for activity change, appetite change, chills, fatigue, fever and unexpected weight change.   HENT: Negative for hearing loss.    Eyes: Negative for visual disturbance.   Respiratory: Negative for chest tightness and shortness of breath.    Cardiovascular: Negative for  chest pain.   Gastrointestinal: Negative for abdominal pain, constipation, diarrhea, nausea and vomiting.   Genitourinary: Negative for difficulty urinating.   Musculoskeletal: Positive for back pain, gait problem and myalgias. Negative for neck pain.   Skin: Negative for rash.   Neurological: Negative for dizziness, weakness, light-headedness, numbness and headaches.   Psychiatric/Behavioral: Positive for sleep disturbance. Negative for hallucinations and suicidal ideas. The patient is not nervous/anxious.        Past Medical History:   Diagnosis Date    Allergy     skin    Anxiety     Breast cancer 2014    right    Chronic back pain     Depression     Encounter for blood transfusion     Endometrial cancer 07/30/2019    Fibromyalgia     Hives     Hx of psychiatric care     Hx of radiation therapy     Itching     Lichen sclerosus     Mental disorder     PONV (postoperative nausea and vomiting)     Psychiatric problem     PTSD (post-traumatic stress disorder)     Sleep difficulties     Sore throat     Therapy     Uterine cancer 08/05/2019    UTI (urinary tract infection)        Past Surgical History:   Procedure Laterality Date    anal fissure surgery      APPENDECTOMY      BIOPSY N/A 11/19/2020    Procedure: BONE BIOPSY;  Surgeon: [facility] Diagnostic Provider;  Location: [facility] OR;  Service: Radiology;  Laterality: N/A;  RN PREOP 11/17/2020    BREAST BIOPSY Right 2014    BREAST LUMPECTOMY Right 2014    R lumpectomy [facility] w 2.4cm IDC & DCIS, neg margins, 0/6 SN, Stage II, ER/FL+, HER-2 neg Adjuvant XRT and hormonal therapy     CARPAL TUNNEL RELEASE      CHOLECYSTECTOMY      COLONOSCOPY      COLONOSCOPY N/A 6/11/2020    Procedure: COLONOSCOPY;  Surgeon: Bobby Marino MD;  Location: [facility] ([address] [place] [address]);  Service: Endoscopy;  Laterality: N/A;    ESOPHAGOGASTRODUODENOSCOPY N/A 6/11/2020    Procedure: EGD (ESOPHAGOGASTRODUODENOSCOPY);  Surgeon: Bobby Marino MD;  Location: [facility] ([facility]);  Service: Endoscopy;  Laterality: N/A;    HYSTERECTOMY  08/15/2019    HYSTEROSCOPY WITH DILATION AND CURETTAGE OF UTERUS N/A 7/24/2019    Procedure: HYSTEROSCOPY, WITH DILATION AND CURETTAGE OF UTERUS;  Surgeon: Елена Kam MD;  [facility];  Service: OB/GYN;  Laterality: N/A;    INSERTION OF TUNNELED CENTRAL VENOUS CATHETER (CVC) WITH SUBCUTANEOUS PORT N/A 9/26/2019    Procedure: INSERTION, PORT-A-CATH;  Surgeon: Moris Varghese MD;  Location: [facility] CATH LAB;  Service: Radiology;  Laterality: N/A;    MEDIPORT REMOVAL N/A 3/5/2020    Procedure: REMOVAL, CATHETER, CENTRAL VENOUS, TUNNELED, WITH PORT;  Surgeon: Moris Varghese MD;  Location: [facility] CATH LAB;  Service: Radiology;  Laterality: N/A;    NEEDLE LOCALIZATION N/A 11/19/2020    Procedure: NEEDLE LOCALIZATION;  Surgeon: [facility] Diagnostic Provider;  Location: [facility] OR;  Service: Radiology;  Laterality: N/A;    PARTIAL MASCECTOMY Right 2014    ROBOT-ASSISTED LAPAROSCOPIC ABDOMINAL HYSTERECTOMY USING DA RAMU XI N/A 8/15/2019    Procedure: XI ROBOTIC HYSTERECTOMY;  Surgeon: Darius Regalado MD;  [facility];  Service: OB/GYN;  Laterality: N/A;    ROBOT-ASSISTED LAPAROSCOPIC SALPINGO-OOPHORECTOMY USING DA RAMU XI Bilateral 8/15/2019    Procedure: XI ROBOTIC SALPINGO-OOPHORECTOMY;  Surgeon: Darius Regalado MD;  Location: [facility];  Service: OB/GYN;  Laterality: Bilateral;    ROBOT-ASSISTED LYMPHADENECTOMY Left 8/15/2019    Procedure: ROBOTIC LYMPHADENECTOMY;  Surgeon: Darius Regalado MD;  [facility];  Service: OB/GYN;  Laterality: Left;    SALPINGOOPHORECTOMY Bilateral 08/15/2019    TONSILLECTOMY      TUBAL LIGATION         Social History     Socioeconomic History    Marital status: Single    Number of children: 0   Occupational History    Occupation: [employer]   Tobacco Use    Smoking status: Never Smoker    Smokeless tobacco: Never Used   Substance and Sexual Activity    Alcohol use: No     Alcohol/week: 0.0  standard drinks    Drug use: Yes     Types: Marijuana     Comment: medical marijuana- 2 dayys ago     Sexual activity: Not Currently     Partners: Male   Social History Narrative    Lives alone.        Review of patient's allergies indicates:  No Known Allergies    Current Outpatient Medications on File Prior to Visit   Medication Sig Dispense Refill    clobetasol 0.05% (TEMOVATE) 0.05 % Oint APPLY TO AFFECTED AREA(S) TWICE A DAY FOR 1 MONTH(S) then EVERY DAY FOR 1 MONTH(S) then 3 TIMES A WEEK 60 g 2    co-enzyme Q-10 30 mg capsule Take 30 mg by mouth 3 (three) times daily.      fexofenadine (ALLEGRA) 180 MG tablet 2 tablets in the morning.  May take an extra 2 tablets during the day if needed for itching. 240 tablet 5    furosemide (LASIX) 20 MG tablet Take 1 tablet (20 mg total) by mouth daily as needed (leg swelling). 30 tablet 11    hydrocortisone 2.5 % cream Apply to affected areas twice a day when eczema is moderate. 453.6 g 1    hydrOXYzine HCL (ATARAX) 25 MG tablet 1 to 8 tablets at bedtime.  Start with 3 tablets.  Increase or decrease as needed until itching is controlled or a maximum of 8 tablets. 240 tablet 3    nystatin (MYCOSTATIN) cream Apply topically every Mon, Wed, Fri. 30 g 11    nystatin (MYCOSTATIN) powder Apply topically 4 (four) times daily. 30 g 1    ondansetron (ZOFRAN) 4 MG tablet Take 1 tablet (4 mg total) by mouth every 6 (six) hours as needed for Nausea. 12 tablet 0    oxybutynin (DITROPAN) 5 MG Tab Take 1 tablet (5 mg total) by mouth 2 (two) times daily. 60 tablet 0    pantoprazole (PROTONIX) 20 MG tablet Take 1 tablet (20 mg total) by mouth daily as needed (acid reflux). 30 tablet 2    UNABLE TO FIND [place] [employer] Immune Support       No current facility-administered medications on file prior to visit.       Objective:      BP (!) 111/51 (BP Location: Left arm, Patient Position: Sitting, BP Method: Medium (Automatic))   Pulse 75   Ht 5' (1.524 m)   Wt 56.2 kg  (123 lb 14.4 oz)   SpO2 100%   BMI 24.20 kg/m²     Exam:  GEN:  Well developed, well nourished.  No acute distress.   HEENT:  No trauma.  Mucous membranes moist.  Nares patent bilaterally.  PSYCH: Normal affect. Thought content appropriate.  CHEST:  Breathing symmetric.  No audible wheezing.  ABD: Soft, non-distended.  SKIN:  Warm, pink, dry.  No rash on exposed areas.    EXT:  No cyanosis, clubbing, or edema.  No color change or changes in nail or hair growth.  NEURO/MUSCULOSKELETAL:  Fully alert, oriented, and appropriate. Speech normal rodriguez. No cranial nerve deficits.   Gait:  Normal.  No focal motor deficits.           Imaging:    Narrative & Impression    EXAMINATION:  XR LUMBAR SPINE AP AND LAT WITH FLEX/EXT     CLINICAL HISTORY:  Other intervertebral disc degeneration, lumbar region     TECHNIQUE:  AP and lateral views as well as lateral flexion and extension images are performed through the lumbar spine.     COMPARISON:  Two thousand eighteen radiographs     FINDINGS:  Postop cholecystectomy, single surgical clip right lateral pelvis.  Minimal mild levoscoliosis thoracic lumbar junction.  Elevation right hemipelvis.  Mild lordosis.  Facet joint arthropathy lumbosacral junction.  Progression of degenerative disc spondylosis particularly L2-L3     Impression:     No new fracture subluxation.  Progression of degenerative disc spondylosis particularly L2-L3 level.        Electronically signed by: Francis Bassett MD  Date:                                            05/06/2022  Time:                                           11:01           Narrative & Impression    EXAMINATION:  MRI LUMBAR SPINE W WO CONTRAST     CLINICAL HISTORY:  acute low back pain;  Personal history of malignant neoplasm of breast     TECHNIQUE:  MRI lumbar spine without and with 7 cc Gadavist intravenous.     COMPARISON:  Radiographs 05/06/2022, MRI right hip and pelvis, CT scanned abdomen pelvis  03/09/2022.     FINDINGS:  Retrospectively MRI and CT pelvis exams show evidence of nonspecific sclerotic lucent change left sacral ala which could be no suspicious for incomplete stress fracture.  Otherwise previous imaging shows no additional bony abnormality.     Marrow space infiltrated process involves the included spine particularly T9 through T11 vertebral bodies, less prominent changes remainder of lumbar spine and included S4 sacral segment.  In addition [employer] mint of included SI joints particularly upper iliac and left sacral ala.     Post-contrast exam shows no abnormal intra-axial extra-axial enhancement the spinal canal and spinal cord nerve root structures.  Partial enhancement of the marrow space metastatic disease particularly lower dorsal spine.     Lower dorsal spinal cord normal.     Minimal mild posterior disc bulge T10 and T11 posterior disc margins with mild facet joint arthropathy and mild spinal and no significant foraminal stenosis T10 and mild moderate foramen stenosis at T11.  T11 disc level through L1 disc level shows no unusual posterior disc abnormality.     L2-L3 minimal mild posterior disc bulge, mild compression anterior spinal sac, facet joint arthropathy with mild spinal and foramen stenosis.     L 3 L4 minimal mild posterior disc bulge, posterior central annular fissure, mild compression anterior spinal sac, facet joint arthropathy with mild spinal and foramen stenosis.     L 4 L5 mild posterior disc bulge spondylosis, mild compression anterior spinal sac, narrowing lateral recesses, prompt facet joint arthropathy, mild moderate foramen and spinal stenosis, hypertrophy ligamentum flavum.     L5-S1 minor posterior disc bulge, prompt facet joint arthropathy without significant spinal or foramen stenosis.     No disc prolapse or subluxation.     Impression:     1. Subtle retrospective incomplete stress fracture deformity left sacral ala on CT and MRI studies.  2. New evidence of  extensive metastatic disease from known breast carcinoma particularly lower dorsal sacrum and pelvic areas and lesser degree lumbar spine.  No new fracture subluxation or disc prolapse.  3. Degenerative disc spondylosis facet joint arthropathy with above described findings.  4.  This report was flagged in Epic as abnormal.        Electronically signed by: Francis Bassett MD  Date:                                            05/16/2022  Time:                                           17:35         Assessment:       Encounter Diagnoses   Name Primary?    Lumbar spondylosis Yes    Lumbar radiculopathy     DDD (degenerative disc disease), lumbar     Hx of breast cancer     History of endometrial cancer          Plan:       Jeanne was seen today for low-back pain.    Diagnoses and all orders for this visit:    Lumbar spondylosis    Lumbar radiculopathy    DDD (degenerative disc disease), lumbar    Hx of breast cancer    History of endometrial cancer        Jeanne Rodgers is a 67 y.o. female with right sided low back and lower extremity pain.  Possibly from lumbar radiculopathy.  New findings of metastatic lesions in the lumbar spine and sacrum on lumbar MRI.    1.  Pertinent imaging studies reviewed by me. Imaging results were discussed with patient.  2.  Given concerning findings noted on recent MRI, I will defer/postpone additional treatments for her pain at this time.  We may consider interventional procedures in the future if no specific contraindications noted by Heme-Onc.  3.  Return to clinic as needed.          This note was created by combination of typed  and M-Modal dictation. Transcription and phonetic errors may be present.  If there are any questions, please contact me.

## 2022-05-27 NOTE — PROGRESS NOTES
Subjective:       Patient ID: Jeanne Rodgers is a 67 y.o. female.    Chief Complaint: Hx of breast cancer    HPI     Presents for follow up after scans  She presented with reports of continued leg pain- she had been seen for leg/hip pain in 3/2022 with below results of imaging:  - 3/9/2022 CT [place]:  FINDINGS:  Evaluation of the solid organs is limited due to lack of intravenous contrast.  Lower chest: Unremarkable.  URINARY COLLECTING SYSTEM: No calculi in the kidneys, ureters, or urinary bladder. No hydronephrosis or ureterectasis.  Liver: normal contour  Gallbladder and bile ducts: Unremarkable.  Pancreas: Pancreas is atrophic.  Spleen: Normal contour.  Adrenals: Normal contour.  Lymph nodes: No abdominal or pelvic lymphadenopathy.  Bowel and mesentery: Unremarkable.  Abdominal aorta: Unremarkable.  Inferior vena cava: Unremarkable.  Free fluid or free air: None.  Pelvis: Unremarkable.  Body wall: Unremarkable.  Bones: Unremarkable.  Impression:  Essentially unremarkable CT the abdomen and pelvis.  No urolithiasis or hydronephrosis.    - 3/28/2022 MRI Hip:  FINDINGS:  Bones: No evidence for hip or pelvic fracture.  Abnormal marrow signal throughout the pelvis, likely sequela of prior radiation therapy.  Bone marrow edema and enhancement seen about the bilateral sacroiliac joints, left greater than right.  Joints: There is thinning of right hip articular cartilage with full-thickness cartilage loss over the superior acetabulum.  There is fraying of the bilateral acetabular labrum.  There is a small right, trace left hip joint effusion.  Mild irregularity of the pubic symphysis.  No definite sacroiliac joint erosion, noting mild bilateral anterior soft tissue edema.  Muscles/tendons: Visualized muscles and tendons are unremarkable.  No evidence for iliopsoas or trochanteric bursitis.  Miscellaneous: There is a small volume of pelvic ascites.  Postsurgical changes of prior hysterectomy noted.  No definite  pelvic lymphadenopathy.  Impression:  1. No evidence for hip or pelvic fracture.  2. Bone marrow edema about the bilateral sacroiliac joints, possible osteitis related to sacroiliitis.  Alternatively, developing foci of osteonecrosis.  3. Right hip cartilage loss.  Bilateral acetabular labral fraying.  4. Small volume of pelvic ascites.    She was referred to Orthopedics and saw Dr. Holder earlier this month and additional imaging ordered:    5/16/2022 MRI L spine:  FINDINGS:  Retrospectively MRI and CT pelvis exams show evidence of nonspecific sclerotic lucent change left sacral ala which could be no suspicious for incomplete stress fracture.  Otherwise previous imaging shows no additional bony abnormality.  Marrow space infiltrated process involves the included spine particularly T9 through T11 vertebral bodies, less prominent changes remainder of lumbar spine and included S4 sacral segment.  In addition [employer] mint of included SI joints particularly upper iliac and left sacral ala.  Post-contrast exam shows no abnormal intra-axial extra-axial enhancement the spinal canal and spinal cord nerve root structures.  Partial enhancement of the marrow space metastatic disease particularly lower dorsal spine.  Lower dorsal spinal cord normal.  Minimal mild posterior disc bulge T10 and T11 posterior disc margins with mild facet joint arthropathy and mild spinal and no significant foraminal stenosis T10 and mild moderate foramen stenosis at T11.  T11 disc level through L1 disc level shows no unusual posterior disc abnormality.  L2-L3 minimal mild posterior disc bulge, mild compression anterior spinal sac, facet joint arthropathy with mild spinal and foramen stenosis.  L 3 L4 minimal mild posterior disc bulge, posterior central annular fissure, mild compression anterior spinal sac, facet joint arthropathy with mild spinal and foramen stenosis.  L 4 L5 mild posterior disc bulge spondylosis, mild compression anterior spinal  sac, narrowing lateral recesses, prompt facet joint arthropathy, mild moderate foramen and spinal stenosis, hypertrophy ligamentum flavum.  L5-S1 minor posterior disc bulge, prompt facet joint arthropathy without significant spinal or foramen stenosis.  No disc prolapse or subluxation.  Impression:  1. Subtle retrospective incomplete stress fracture deformity left sacral ala on CT and MRI studies.  2. New evidence of extensive metastatic disease from known breast carcinoma particularly lower dorsal sacrum and pelvic areas and lesser degree lumbar spine.  No new fracture subluxation or disc prolapse.  3. Degenerative disc spondylosis facet joint arthropathy with above described findings.  4.  This report was flagged in Epic as abnormal.    Our office was contacted regarding this test results and sent for below:  5/24/2022 PET scan:  COMPARISON:  MRI lumbar spine from 516/2022.  FDG PET-CT 04/13/2021, 09/21/2020, 06/02/2020  FINDINGS:  Quality of the study: Adequate.  In the head and neck, there are no hypermetabolic lesions worrisome for malignancy. There are no hypermetabolic mucosal lesions, and there are no pathologically enlarged or hypermetabolic lymph nodes.  In the chest, there are no hypermetabolic lesions worrisome for malignancy.  There are no concerning pulmonary nodules or masses, and there are no pathologically enlarged or hypermetabolic lymph nodes.  In the abdomen and pelvis, there is physiologic tracer distribution within the abdominal organs and excretion into the genitourinary system.  Postop change of cholecystectomy and hysterectomy/BSO.  In the bones, there is absent marrow uptake involving lower thoracic spine, lumbar spine, and portion of sacrum suggestive of radiation induced fatty marrow replacement when compared with recent MRI.  Mildly increased uptake/marrow hyperplasia involving remaining axial and appendicular skeleton.  There are no hypermetabolic lesions worrisome for  malignancy.  Subtle linear lucency and mild uptake within the left sacral ala compatible with incomplete stress fracture described on recent MRI.  In the extremities, there are no hypermetabolic lesions worrisome for malignancy.  Impression:  No PET CT evidence of recurrent or metastatic disease in this patient with a history of endometrial cancer and breast cancer.  Left sacral insufficiency fracture as described on recent MRI.  Additional findings as above.    Tumor markers:  = 20  CA 27-29= 49.8    Oncology History:  Endometrial Cancer History:  Diagnosis of endometrial cancer after presenting with vaginal bleeding  She has completed surgical debulking, chemotherapy and XRT but states that she has developed nausea and vomiting since starting XRT  She was also admitted in June of this year with melena and had a complete GI work up (see below)  Of note during chemotherapy she required several transfusions for symptomatic anemia     Breast Oncology History:  This is a 66 yo female with a history of right breast cancer  - Had been getting her mammograms at [facility] as she was uninsured at the time  - reports small mass seen in 2007 and sent to [facility]- where she was told nothing was seen on review  - Repeat situation of above the next year in 2008  - she noted right nipple inversion 2012- went to [facility]- told not likely cancer, no further testing done  - In 2014 it was recommended that [facility] see her for imaging  - Abnormal mammogram and biopsy recommended  Reports biopsy experience was awful and so she researched other sites to be cared  - She sought out Dr. 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 Keller at [facility]  Oncotype done ? 9- chemotherapy not recommended  She was started on  Arimidex  -She was on Arimidex for 4 years until stopping (see below).    Genetic testin [facility] Cancer-Genetic Testing  · Test(s) performed:  [facility] Common Hereditary Cancers Panel  Number of genes analyzed:  47  Laboratory:  [facility]  Ordering provider:  ANGIE Blackman  Sample collection date:  07/15/2020  Results report date:  2020              Full report to be scanned into Epic under the Labs and/or Media tab(s).  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            COUNSELING/MEDICAL DECISION-MAKING:         Note below discussion and recommendations provided from genetic counseling visit (copied forward):  TP53 Mutation Implications  Li-Fraumeni Syndrome (LFS) is a hereditary (germline) cancer syndrome diagnosed with the identification of a germline TP53 gene mutation, which is passed from parent to child and is present for the patient's lifespan in essentially every bodily cell (GHR, 2020).  Its associated risks, including breast cancer, brain cancer, sarcoma, adrenocortical carcinoma, and colorectal cancer ([employer], 2020; NCCN, 1.2020).  It is important to note that the patient does not at this time have a diagnosis of LFS, as we do not know yet whether the patient's TP53 mutation is germline.   Somatic (acquired) TP53 mutations do not occur in the germline and occur at some other point in the lifespan (not at conception) (GHR, 2020) in the blood due to any of several factors, including advancing age ([employer]ae, 2020), history of chemotherapy (which applies to patient), hematologic malignancy (NCCN, 1.2020), environmental factors including ultraviolet (UV) exposure (GHR, 2020), and DNA replication errors during cell division (GHR, 2020).  Somatic mutations that develop in only a single cell early in the embryonic phase can lead to mosaicism (GHR, 2020).  With  mosaicism, the gene mutation is not present in the affected individual's egg or sperm cells or in the fertilized egg but rather develops later during the embryonic phase, and, per the process of cell division, cells arising from the cell carrying the mutation will also have the mutation, but other cells will not (GHR, 2020).  Mosaicism's causing health problems is a function of both the specific mutation and the number of cells affected by the mutation (GHR, 2020).  Up to 20% of LFS cases are de fadi (Invitae, 2020).  De fadi (i.e. new) mutations can be germline or somatic (GHR, 2020).  Sometimes, the de fadi mutation develops in the individual's germline (egg or sperm cells) but is absent in all of the individual's other cells; other times, the de fadi mutation develops in the fertilized egg shortly post-conception, and, as the fertilized egg divides, each resultant cell also has the mutation (GHR, 2020).    It is important to distinguish germline origin versus somatic origin as the former has the potential to impact the patient's family while the latter does not.    Recommendations  Appropriate next steps include:  1. A complete blood count (CBC) and a peripheral blood smear is warranted to evaluate for hematologic neoplasia (NCCN, 1.2020).  The patient is amenable to this.  I do not suspect hematologic neoplasia as etiology of her TP53 mutation.  2. Testing of parents for identification of the patient's TP53 mutation in either of them; however, the patient's parents are unfortunately unavailable for testing, and testing even all of the patient's siblings, if they all came back negative, would not confirm that the patient's TP53 mutation is not germline in origin.  Therefore, a fibroblast skin culture is recommended, which breast surgeon Dr. Nicci García has agreed to perform.  The patient is amenable to this.     Bone marrow biopsy done with above and cytopenias  negative for malignancy.    Review of Systems    Constitutional: Positive for activity change (less active from fatigue) and fatigue. Negative for unexpected weight change.   Respiratory: Positive for cough (dry). Negative for shortness of breath and wheezing.    Cardiovascular: Negative for chest pain, palpitations and leg swelling.   Gastrointestinal: Positive for abdominal pain (see prior note), change in bowel habit (noted x 2 weeks), constipation, diarrhea and change in bowel habit (noted x 2 weeks). Negative for abdominal distention, blood in stool, nausea, rectal pain and vomiting.        Noted stools appeared orange- better after stopping vitamins   Genitourinary: Positive for urgency. Negative for difficulty urinating, dysuria and frequency.        Lichen planus impacts this area   Musculoskeletal: Positive for arthralgias (left shoulder, right leg), gait problem, leg pain (right ) and neck pain. Negative for back pain.   Neurological: Negative for weakness, light-headedness and numbness.         Objective:      Physical Exam  abnormal gait secondary to pain  No weakness or numbness in :LE  Assessment:       Problem List Items Addressed This Visit    None         Plan:         Discussed imaging results  We will review with radiology and discuss biopsy    BMD overdue and ordered    Psych referral- multiple stressors discussed    Route Chart for Scheduling    Med Onc Chart Routing      Follow up with physician 4 weeks. Virtual   Follow up with [facility]    Labs    Imaging    Pharmacy appointment    Other referrals

## 2022-05-30 PROBLEM — R93.89 ABNORMAL MRI: Status: ACTIVE | Noted: 2022-01-01

## 2022-05-30 NOTE — TELEPHONE ENCOUNTER
Is it the sacrum which needs to be biopsied?  Would you like me to place the referral and biopsy order?  Alec

## 2022-06-02 NOTE — PLAN OF CARE
Assessed pt for spiritual distress, at request of nurse.  Pt was calm and reported no distress about her port removal today - was glad to have the troubles she has been having with it addressed.  Pt then shared her cancer journey, including frustration in the length of time which transpired between the report of symptoms and care plan actions transpiring.  Pt shared deeper feelings fear regarding her possible death from her disease and longs for more consolation from her spiritual self care.  Pt asked for information about cancer support groups; possible contacts were given which she has already tried.   gave pt personal contact information and promised to continue to help her get the support she feels she needs.  Pt appreciated being led I prayer before her procedure, and her spiritual care.  Followup care was offered upon request.    [FreeTextEntry1] : Patient is a 38-year-old  2 para 2 last menstrual period May 24, 2022\par Patient presents for pelvic ultrasound after her cycle has been completed,,, since patient has history of extremely heavy periods

## 2022-06-03 NOTE — TELEPHONE ENCOUNTER
Spoke with patient who stated that she had been constipated a few days and strained when she had a bowel movement on yesterday evening.  When she wiped she noticed a few streaks of blood on the tissue.  She has not seen any blood since then.  Denies abdominal cramping, unusual fatigue, or shortness of breath.  Patient states she is prone to GI bleeds and wanted to let doctor know.  Advised patient to monitor and to call if bleeding returns.  Notified NP.

## 2022-06-07 NOTE — TELEPHONE ENCOUNTER
----- Message from Nano Ford MD sent at 6/7/2022  1:27 PM CDT -----  Called the patient- unable to reach her and message left  Advised to go to ED for likely admission- as I was unable to reach her I asked her to call us back to inform received these instructions  Nieves- please attempt to follow up with her    ----- Message -----  From: Sherrill Bautista NP  Sent: 6/7/2022   1:03 PM CDT  To: Nano Ford MD    Good Afternoon, Dr. Ford,    I met with Ms. Ana Maria today to discuss IR biopsy. We had some pre-procedure labs drawn today. Her Hb is 6.2. She admitted to having blood with a bowel movement over the weekend. She denies having seen any further bleeding since then. She also admits to fatigue. She has an appointment with Ms. Jansen tomorrow. Please let me know if any intervention is necessary, and I am happy to call patient and let her know.     Thank you,  Sherrill

## 2022-06-07 NOTE — LETTER
June 7, 2022    Jeanne Rodgers  [address] [facility] [address]     [facility]  [address]  [facility] [address]  Phone: [phone] PRE-PROCEDURE INSTRUCTIONS    Your procedure with Interventional Radiology is scheduled for 6/21/2022. Please arrive by 10:00am. Please note your appointment time in [facility] will be different.    You must check-in and receive a wristband before going to your procedure. We will call you the day before to let you know your check-in location.    DO NOT take aspirin for 5 days before your procedure.    **Do not eat or drink anything between midnight and the time of your procedure. This includes gum, mints, and candy lemon drops.    **Do not smoke or drink alcoholic beverages 24 hours prior to your procedure.    **If you wear contact lenses, dentures, hearing aids, or glasses, bring a container to put them in during the procedure and give them to a family member for safekeeping.    **If you have been diagnosed with sleep apnea please bring your CPAP machine.    **If your doctor has scheduled you for an overnight stay, bring a small overnight bag with any personal items that you may need.    **Make arrangements in advance for transportation home by a responsible adult. It is not safe to drive a vehicle during the 24 hours following the procedure.    **All [facility] facilities and properties are tobacco free. Smoking is NOT allowed.    PLEASE NOTE: The procedure schedule has many variables which affect the time of your procedure. Family members should be available if your surgery time changes.    If you have any questions about these instructions call Interventional Radiology at [phone] Monday - Friday between 8:00am and 4:00pm or [phone] (ask for interventional radiology resident) for after hours.

## 2022-06-07 NOTE — TELEPHONE ENCOUNTER
Left VM stating I was calling regarding lab results. Hemoglobin low. Explained I have messaged Dr. Ford. Recommended discussing with MsAshley Inderjit tomorrow. Please call with any questions/concerns: [phone] or [phone]

## 2022-06-07 NOTE — FIRST PROVIDER EVALUATION
" Emergency Department TeleTriage Encounter Note      CHIEF COMPLAINT    Chief Complaint   Patient presents with    Abnormal Lab     Rectal bleeding over weekend, low blood count and plts,        VITAL SIGNS   Initial Vitals [06/07/22 1716]   BP Pulse Resp Temp SpO2   123/60 102 18 98.5 °F (36.9 °C) 100 %      MAP       --            ALLERGIES    Review of patient's allergies indicates:  No Known Allergies    PROVIDER TRIAGE NOTE  This is a teletriage evaluation of a 67 y.o. female presenting to the ED complaining of rectal bleeding over the weekend and noted low H&H and plts on labs today.  Pt reports feeling "aching" all over and decreased PO intake. Also has had a dry cough.           Initial orders will be placed and care will be transferred to an alternate provider when patient is roomed for a full evaluation. Any additional orders and the final disposition will be determined by that provider.           ORDERS  Labs Reviewed   CBC W/ AUTO DIFFERENTIAL - Abnormal; Notable for the following components:       Result Value    RBC 2.23 (*)     Hemoglobin 6.3 (*)     Hematocrit 20.7 (*)     MCHC 30.4 (*)     RDW 21.2 (*)     Platelets 114 (*)     All other components within normal limits   COMPREHENSIVE METABOLIC PANEL - Abnormal; Notable for the following components:    Sodium 134 (*)     CO2 22 (*)     Albumin 3.4 (*)     ALT 9 (*)     All other components within normal limits   HEPATITIS C ANTIBODY   PROTIME-INR   URINALYSIS   TYPE & SCREEN       ED Orders (720h ago, onward)    Start Ordered     Status Ordering Provider    06/07/22 1801 06/07/22 1801  IV Saline Lock #1 (large bore)  Once         Ordered KAJAL MILLER S    06/07/22 1801 06/07/22 1801  IV Saline Lock #2 (large bore)  Once         Ordered KAJAL MILLER S    06/07/22 1801 06/07/22 1801  CBC auto differential  STAT         Preliminary result KAJAL MILLER    06/07/22 1801 06/07/22 1801  Comprehensive metabolic panel  STAT         Final result " KAJAL MILLER    06/07/22 1801 06/07/22 1801  Protime-INR  STAT         In process KAJAL MILLER    06/07/22 1801 06/07/22 1801  Type & Screen  STAT         In process KAJAL MILLER    06/07/22 1801 06/07/22 1801  EKG 12-lead  Once         Ordered KAJAL MILLER    06/07/22 1801 06/07/22 1801  Urinalysis  STAT        Comments: In and Out Cath as needed it patient unable to void      Ordered KAJAL MILLER    06/07/22 1801 06/07/22 1801  Pulse Oximetry Continuous  Continuous         Ordered KAJAL MILLER    06/07/22 1801 06/07/22 1801  Orthostatic blood pressure  Once         Ordered KAJAL MILLER    06/07/22 1801 06/07/22 1801  X-Ray Chest AP Portable  1 time imaging         Ordered KAJAL MILLER    06/07/22 1801 06/07/22 1801  Cardiac Monitoring - Adult  Continuous        Comments: Notify Physician If:    Ordered KAJAL MILLER    06/07/22 1727 06/07/22 1726  Hepatitis C Antibody  STAT         Acknowledged MIRTHA MORIN            Virtual Visit Note: The provider triage portion of this emergency department evaluation and documentation was performed via [employer], a HIPAA-compliant telemedicine application, in concert with a tele-presenter in the room. A face to face patient evaluation with one of my colleagues will occur once the patient is placed in an emergency department room.      DISCLAIMER: This note was prepared with [employer] voice recognition transcription software. Garbled syntax, mangled pronouns, and other bizarre constructions may be attributed to that software system.

## 2022-06-07 NOTE — PROGRESS NOTES
"Subjective:       Patient ID: Jeanne Rodgers is a 67 y.o. female.    Chief Complaint: Lesion    Patient referred to Interventional Radiology for evaluation of iliac bone lesions. She has a history of breast cancer. Recent imaging with PET scan obtained on 5/24/2022 noted an area of concern near the sacrum. Patient has multiple complaints including fatigue and associated decreased activity, a chronic dry cough, arthralgias, dizziness, weakness, and headaches. She is accompanied by her sister.     Review of Systems   Constitutional: Positive for activity change (decreased) and fatigue. Negative for appetite change, chills and fever.   HENT: Positive for nasal congestion. Negative for drooling, ear discharge, postnasal drip, sneezing and trouble swallowing.    Eyes: Negative for pain, discharge, redness and itching.   Respiratory: Positive for cough. Negative for shortness of breath, wheezing and stridor.    Cardiovascular: Negative for chest pain, palpitations and leg swelling.   Gastrointestinal: Positive for abdominal distention, blood in stool (x1 over the weekend; has not seen since; notified her physician), constipation, diarrhea and nausea. Negative for abdominal pain and vomiting.   Genitourinary: Negative for difficulty urinating, dysuria, frequency and urgency.        "pulling pains" in pelvis   Musculoskeletal: Positive for arthralgias (shoulders and knees). Negative for back pain, gait problem, joint swelling, myalgias and neck pain.        Has been feeling achy; but improving  "heavy legs" affecting her mobility   Integumentary:  Negative for color change, pallor and rash.   Neurological: Positive for dizziness, weakness and headaches.         Objective:      Physical Exam  Vitals reviewed.   Constitutional:       General: She is not in acute distress.     Appearance: She is well-developed. She is not diaphoretic.   HENT:      Head: Normocephalic and atraumatic.      Right Ear: External ear normal. "      Left Ear: External ear normal.      Nose: Nose normal.      Mouth/Throat:      Pharynx: No oropharyngeal exudate.   Eyes:      General: No scleral icterus.        Right eye: No discharge.         Left eye: No discharge.      Conjunctiva/sclera: Conjunctivae normal.      Pupils: Pupils are equal, round, and reactive to light.   Neck:      Thyroid: No thyromegaly.      Vascular: No JVD.      Trachea: No tracheal deviation.   Cardiovascular:      Rate and Rhythm: Normal rate and regular rhythm.      Heart sounds: Normal heart sounds. No murmur heard.    No friction rub. No gallop.   Pulmonary:      Effort: Pulmonary effort is normal. No respiratory distress.      Breath sounds: Normal breath sounds. No stridor. No wheezing or rales.   Abdominal:      General: Bowel sounds are normal. There is no distension.      Palpations: Abdomen is soft. There is no mass.      Tenderness: There is no abdominal tenderness. There is no guarding or rebound.   Musculoskeletal:      Cervical back: Neck supple.   Lymphadenopathy:      Cervical: No cervical adenopathy.   Skin:     General: Skin is warm and dry.      Nails: There is no clubbing.   Neurological:      Mental Status: She is alert and oriented to person, place, and time.      Gait: Gait normal.       PET scan 5/24/2022    Assessment:       Problem List Items Addressed This Visit    None     Visit Diagnoses     Bone lesion    -  Primary    Relevant Orders    CBC Auto Differential    Protime-INR    Neoplasm        Relevant Orders    CBC Auto Differential    Protime-INR          Plan:         Discussed case with Dr. Neal. Explained to patient can offer biopsy of either left or right iliac bones. Discussed how the procedure will be performed, risks (including, but not limited to, pain, bleeding, infection, damage to nearby structures, and the need for additional procedures), benefits, possible complications, pre-post procedure expectations, and alternatives. The patient  voices understanding and all questions have been answered.  The patient agrees to proceed as planned. Patient scheduled for 6/21/2022. Pre-procedure handout with clinic phone number provided. Patient will have pre-procedure labs drawn today.

## 2022-06-07 NOTE — Clinical Note
"Thank you for referring Ms. Rodgers to Interventional Radiology at the [facility]. Please don't hesitate to contact us if there are any questions regarding this evaluation at [phone]. If you have any other patients for whom you would like a consultation, please place an order for "[id number]", and we will be happy to review their case.  Sincerely, JALEN Duran, FNP Interventional Radiology    "

## 2022-06-07 NOTE — TELEPHONE ENCOUNTER
Called and spoke with patient.  Gave her instructions per MD to go to the ED for evaluation and possible admission.  Patient verbalized understanding and will call her sister.  Patient will come to the ED here on [address].  Notified MD that I was able to make contact with patient.

## 2022-06-08 PROBLEM — K92.2 GIB (GASTROINTESTINAL BLEEDING): Status: ACTIVE | Noted: 2021-12-15

## 2022-06-08 NOTE — NURSING
Patient has arrived on the unit. No verbalized complaints at this time. She denies any SOB or dizziness at this time. Vital signs are currently stable at this time.

## 2022-06-08 NOTE — ASSESSMENT & PLAN NOTE
- GIB Pathway initiated  - Hb 6.3 on presentation  ;  Baseline 8.5  - sp 1 unit PRBC in ED  - start protonix 80mg iv once followed by 40mg iv BID  - npo midnight  - serial CBC  - transfuse Hb<7  - GI consulted  ;  Eliseo medrano

## 2022-06-08 NOTE — ANESTHESIA PREPROCEDURE EVALUATION
[facility]  Anesthesia Pre-Operative Evaluation         Patient Name/: Jeanne Rodgers, 1955  MRN: [id number]    SUBJECTIVE:     Pre-operative evaluation for Procedure(s) (LRB):  COLONOSCOPY (N/A)     2022    Jeanne Rodgers is a 67 y.o. female w/ a significant PMHx of PONV, breast/endometrial carcinoma (s/p hysterectomy/BSO '19), now with concern for metastatic disease to bone, fibromyalgia, PTSD, historical GIB, and anxiety who presents with rectal bleeding. Now to undergo evaluation with GI via colonoscopy.     Patient now presents for the above procedure(s).    ________________________________________  Results for orders placed during the hospital encounter of 22    Echo  Interpretation Summary  · The left ventricle is normal in size with normal systolic function.  · The estimated ejection fraction is 65%.  · Normal right ventricular size with normal right ventricular systolic function.  · Small anterior pericardial effusion without hemodynamic compromise.  · The estimated PA systolic pressure is 21 mmHg.    ________________________________________    Prev airway: 2019  Method of Intubation: Direct laryngoscopy; Inserted by: CRNA; Airway Device: Endotracheal Tube; Intubated: Postinduction; Style: Cuffed; Cuff Inflation: Minimal occlusive pressure; Placement Verified By: Auscultation, Capnometry; Intubation Findings: Positive EtCO2, Bilateral breath sounds, Atraumatic/Condition of teeth unchanged; Breath Sounds: Equal Bilateral    LDA:        Peripheral IV - Single Lumen 22 1818 22 G Right Antecubital (Active)   Site Assessment Clean;Dry;Intact 22   Extremity Assessment Distal to IV No abnormal discoloration 22   Line Status Flushed;Saline locked 22   Dressing Status Clean;Dry;Intact 22   Dressing Intervention Integrity maintained 22   Dressing Change Due 22   Site Change Due  06/11/22 06/08/22 0730   Reason Not Rotated Not due 06/08/22 0730   Number of days: 0            Peripheral IV - Single Lumen 06/07/22 2235 20 G Left Upper Arm (Active)   Site Assessment Clean;Dry;Intact 06/08/22 0730   Extremity Assessment Distal to IV No abnormal discoloration 06/08/22 0730   Line Status Flushed;Saline locked 06/08/22 0730   Dressing Status Clean;Dry;Intact 06/08/22 0730   Dressing Intervention Integrity maintained 06/08/22 0730   Dressing Change Due 06/11/22 06/08/22 0730   Site Change Due 06/11/22 06/08/22 0730   Reason Not Rotated Not due 06/08/22 0730   Number of days: 0         Patient Active Problem List   Diagnosis    Hx of breast cancer    Spondylosis without myelopathy    Hamstring tightness of both lower extremities    Segmental and somatic dysfunction of lumbar region    Alteration in mobility associated with pain    Pelvic pain in female    Myalgia of pelvic floor    Nocturia more than twice per night    Lichen sclerosus et atrophicus    Irregular bowel habits    Hip pain, right    Chronic lower back pain    Urinary incontinence, urge    Pre-diabetes    Vaginal irritation    Postmenopausal bleeding    Endometrial cancer    History of robot-assisted laparoscopic hysterectomy    Encounter for antineoplastic chemotherapy    Thrombocytopenia due to drugs    Neutropenia, drug-induced    Anemia due to chronic blood loss    Radiation therapy complication    Major depressive disorder, recurrent episode, moderate with anxious distress    Bone pain    B12 deficiency    Radiation vaginitis    Allergic contact dermatitis due to photocontact other than sunburn, current    History of endometrial cancer    Chest pain    HERRING (dyspnea on exertion)    Liver cyst    Microscopic hematuria    Disorder of the skin, subcu related to radiation, unsp    Radiation necrosis of skin and subcutaneous    Disorder of the skin and subcutaneous tissue related to radiation,  unspecified    GIB (gastrointestinal bleeding)    Macrocytic anemia    Atrial fibrillation with RVR    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     Current Outpatient Medications on File Prior to Encounter   Medication Sig Dispense Refill    furosemide (LASIX) 20 MG tablet Take 1 tablet (20 mg total) by mouth daily as needed (leg swelling). 30 tablet 11    clobetasol 0.05% (TEMOVATE) 0.05 % Oint APPLY TO AFFECTED AREA(S) TWICE A DAY FOR 1 MONTH(S) then EVERY DAY FOR 1 MONTH(S) then 3 TIMES A WEEK 60 g 2    co-enzyme Q-10 30 mg capsule Take 30 mg by mouth 3 (three) times daily.      fexofenadine (ALLEGRA) 180 MG tablet 2 tablets in the morning.  May take an extra 2 tablets during the day if needed for itching. 240 tablet 5    hydrocortisone 2.5 % cream Apply to affected areas twice a day when eczema is moderate. 453.6 g 1    hydrOXYzine HCL (ATARAX) 25 MG tablet 1 to 8 tablets at bedtime.  Start with 3 tablets.  Increase or decrease as needed until itching is controlled or a maximum of 8 tablets. 240 tablet 3    meth/meblue/sod phos/psal/hyos (URIBEL ORAL) Take by mouth as needed.      nystatin (MYCOSTATIN) cream Apply topically every Mon, Wed, Fri. 30 g 11    nystatin (MYCOSTATIN) powder Apply topically 4 (four) times daily. 30 g 1    ondansetron (ZOFRAN) 4 MG tablet Take 1 tablet (4 mg total) by mouth every 6 (six) hours as needed for Nausea. 12 tablet 0    oxyCODONE-acetaminophen (PERCOCET)  5-325 mg per tablet Take 1 tablet by mouth every 8 (eight) hours as needed for Pain. 90 tablet 0    pantoprazole (PROTONIX) 20 MG tablet Take 1 tablet (20 mg total) by mouth daily as needed (acid reflux). 30 tablet 2    UNABLE TO FIND [employer] Mushrooms Immune Support      UNABLE TO FIND medication name: medical marijuana         Past Surgical History:   Procedure Laterality Date    anal fissure surgery      APPENDECTOMY      BIOPSY N/A 11/19/2020    Procedure: BONE BIOPSY;  Surgeon: [facility] Diagnostic Provider;  Location: [facility] OR;  Service: Radiology;  Laterality: N/A;  RN PREOP 11/17/2020    BREAST BIOPSY Right 2014    BREAST LUMPECTOMY Right 2014    R lumpectomy [facility] w 2.4cm IDC & DCIS, neg margins, 0/6 SN, Stage II, ER/OH+, HER-2 neg Adjuvant XRT and hormonal therapy     CARPAL TUNNEL RELEASE      CHOLECYSTECTOMY      COLONOSCOPY      COLONOSCOPY N/A 6/11/2020    Procedure: COLONOSCOPY;  Surgeon: Bobby Marino MD;  Location: [facility] (2ND FLR);  Service: Endoscopy;  Laterality: N/A;    ESOPHAGOGASTRODUODENOSCOPY N/A 6/11/2020    Procedure: EGD (ESOPHAGOGASTRODUODENOSCOPY);  Surgeon: Bobby Marino MD;  Location: [facility] (2ND FLR);  Service: Endoscopy;  Laterality: N/A;    HYSTERECTOMY  08/15/2019    HYSTEROSCOPY WITH DILATION AND CURETTAGE OF UTERUS N/A 7/24/2019    Procedure: HYSTEROSCOPY, WITH DILATION AND CURETTAGE OF UTERUS;  Surgeon: Елена Kam MD;  Location: [facility] OR;  Service: OB/GYN;  Laterality: N/A;    INSERTION OF TUNNELED CENTRAL VENOUS CATHETER (CVC) WITH SUBCUTANEOUS PORT N/A 9/26/2019    Procedure: INSERTION, PORT-A-CATH;  Surgeon: Moris Varghese MD;  Location: [facility] CATH LAB;  Service: Radiology;  Laterality: N/A;    MEDIPORT REMOVAL N/A 3/5/2020    Procedure: REMOVAL, CATHETER, CENTRAL VENOUS, TUNNELED, WITH PORT;  Surgeon: Moris Varghese MD;  Location: [facility] CATH LAB;  Service: Radiology;  Laterality: N/A;    NEEDLE LOCALIZATION N/A 11/19/2020     Procedure: NEEDLE LOCALIZATION;  Surgeon: Leisa Diagnostic Provider;  Location: [facility] OR;  Service: Radiology;  Laterality: N/A;    PARTIAL MASCECTOMY Right 2014    ROBOT-ASSISTED LAPAROSCOPIC ABDOMINAL HYSTERECTOMY USING DA RAMU XI N/A 8/15/2019    Procedure: XI ROBOTIC HYSTERECTOMY;  Surgeon: Darius Regalado MD;  Location: [facility] OR;  Service: OB/GYN;  Laterality: N/A;    ROBOT-ASSISTED LAPAROSCOPIC SALPINGO-OOPHORECTOMY USING DA RAMU XI Bilateral 8/15/2019    Procedure: XI ROBOTIC SALPINGO-OOPHORECTOMY;  Surgeon: Darius Regalado MD;  Location: [facility] OR;  Service: OB/GYN;  Laterality: Bilateral;    ROBOT-ASSISTED LYMPHADENECTOMY Left 8/15/2019    Procedure: ROBOTIC LYMPHADENECTOMY;  Surgeon: Darius Regalado MD;  Location: [facility] OR;  Service: OB/GYN;  Laterality: Left;    SALPINGOOPHORECTOMY Bilateral 08/15/2019    TONSILLECTOMY      TUBAL LIGATION         Social History:  Tobacco Use: Low Risk     Smoking Tobacco Use: Never Smoker    Smokeless Tobacco Use: Never Used       Alcohol Use: Not on file       OBJECTIVE:     Vital Signs Range:  BMI Readings from Last 1 Encounters:   06/08/22 24.80 kg/m²       Temp:  [36.6 °C (97.8 °F)-38.3 °C (101 °F)]   Pulse:  [68-98]   Resp:  [17-18]   BP: ()/(53-66)   SpO2:  [94 %-100 %]        Significant Labs:        Component Value Date/Time    WBC 10.75 06/08/2022 1229    HGB 7.5 (L) 06/08/2022 1229    HCT 24.4 (L) 06/08/2022 1229    PLT 94 (L) 06/08/2022 1229     (L) 06/08/2022 0432    K 4.3 06/08/2022 0432     06/08/2022 0432    CO2 20 (L) 06/08/2022 0432     06/08/2022 0432    BUN 12 06/08/2022 0432    CREATININE 0.7 06/08/2022 0432    CALCIUM 8.7 06/08/2022 0432    ALBUMIN 2.9 (L) 06/08/2022 0432    PROT 6.6 06/08/2022 0432    ALKPHOS 61 06/08/2022 0432    BILITOT 1.8 (H) 06/08/2022 0432    AST 15 06/08/2022 0432    ALT 8 (L) 06/08/2022 0432    INR 1.1 06/07/2022 1817    HGBA1C 5.6 06/05/2019 1430            EKG:   Results for orders placed or  performed during the hospital encounter of 06/07/22   EKG 12-lead    Collection Time: 06/07/22  8:07 PM    Narrative    Test Reason : K62.5,    Vent. Rate : 093 BPM     Atrial Rate : 093 BPM     P-R Int : 150 ms          QRS Dur : 064 ms      QT Int : 368 ms       P-R-T Axes : 060 078 070 degrees     QTc Int : 457 ms    Normal sinus rhythm  Normal ECG  When compared with ECG of 22-APR-2022 14:05,  No significant change was found    Referred By: [email or web address]   SELF           Confirmed By:              ASSESSMENT/PLAN:           Pre-op Assessment    I have reviewed the Patient Summary Reports.     I have reviewed the Nursing Notes.    I have reviewed the Medications.     Review of Systems  Anesthesia Hx:  Hx of Anesthetic complications (PONV)  Denies Family Hx of Anesthesia complications.  Personal Hx of Anesthesia complications, Post-Operative Nausea/Vomiting   Social:  Non-Smoker    Hematology/Oncology:  Hematology Normal       -- Cancer in past history:  Breast right no axillary node dissection no lymphedema   EENT/Dental:EENT/Dental Normal   Cardiovascular:  Cardiovascular Normal Exercise tolerance: good     Pulmonary:  Pulmonary Normal    Renal/:  Renal/ Normal     Musculoskeletal:   Pelvic pain Spine Disorders: lumbar Chronic Pain and Degenerative disease    Neurological:  Neurology Normal    Endocrine:  Endocrine Normal    Dermatological:  Skin Normal    Psych:  Psychiatric Normal           Physical Exam  General: Well nourished, Cooperative, Alert and Oriented    Airway:  Mallampati: II       Dental:  Intact        Anesthesia Plan  Type of Anesthesia, risks & benefits discussed:    Anesthesia Type: Gen Supraglottic Airway, Gen Natural Airway, MAC, Gen ETT  Intra-op Monitoring Plan: Standard ASA Monitors  Post Op Pain Control Plan: multimodal analgesia and IV/PO Opioids PRN  Induction:  IV  Informed Consent: Informed consent signed with the Patient and all parties understand the risks and agree with anesthesia  plan.  All questions answered.   ASA Score: 3  Day of Surgery Review of History & Physical: H&P Update referred to the surgeon/provider.    Ready For Surgery From Anesthesia Perspective.     .

## 2022-06-08 NOTE — ASSESSMENT & PLAN NOTE
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 She is hemodynamically stable currently.     Our recommendations are as follows:      Maintain IV access with at least 2 large IVs (18 gauge or larger)   Continued IVF resuscitation as tolerated with attention to active co-morbidities.    RBC transfusion as indicated if Hgb <7 g/dL.    Please correct any coagulopathy with platelets and FFP to a goal of platelets >50K and INR <2.0.    Discontinue all antiplatelet, anti-coagulation, and NSAID products.    Clear liquid diet today and keep NPO for 06/09.     Plan for colonoscopy on 06/09 for further evaluation. Bowel prep to be ordered by GI fellow.

## 2022-06-08 NOTE — PLAN OF CARE
POC reviewed with pt, verbalized understanding.     - AAOx4, VSS, pt afebrile, NSR on tele.  - Denies pain, n/v.  - Diet advanced to clears.  - Colonoscopy scheduled for tomorrow, 6/9.  - IV to RAC removed.     All needs met, no complaints offered at this time. Bed locked in lowest position, call bell within reach. Frequent rounding for safety.

## 2022-06-08 NOTE — CONSULTS
[facility]  Gastroenterology  Consult Note    Patient Name: Jeanne Rodgers  MRN: [id number]  Admission Date: 6/7/2022  Hospital Length of Stay: 1 days  Code Status: Full Code   Attending Provider: Trinh Moore MD   Consulting Provider: Bartolo Echeverria MD  Primary Care Physician: Gary Gonzalez MD  Principal Problem:GIB (gastrointestinal bleeding)    Inpatient consult to Gastroenterology  Consult performed by: Bartolo Echeverria MD  Consult ordered by: Ludy Cochran PA-C        Subjective:     HPI:  Ms. Jeanne Rodgers is a 67 year old female for whom GI is consulted with concern for rectal bleeding. 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     Patient reports experiencing one episode of rectal bleeding on 06/04/2022 associated with pain in the bilateral pelvis. Since 06/04, she reports having one black colored bowel movement and then return of brown colored stools. She denies symptom association with vomiting, dizziness. or lightheadedness. She does report use of NSAID and ASA products intermittently for relief of chronic pelvic and lower extremity pains. She reports a prior history of GI bleeding associated with black colored stools in 06/2020 that was attributed to possible radiation-induced enteritis. She reports a subsequent episode of rectal bleeding in 12/2021 associated with episode of AF with RVR, however she reports no colonoscopy was performed due to spontaneous resolution. She was seen by IR outpatient on 06/07 for possible biopsy of new bone lesions seen on imaging; labs were concerning for acute on chronic anemia (Hgb 6.2 from 8.7 in 03/2022). Patient was  instructed to report to the ED here for further management.     Hospital Course: On arrival, vital signs normal on room air. Labs notable for anemia (Hgb 6.3), thrombocytopenia (114), and normal BUN. She was admitted to hospital medicine and GI consulted.       Past Medical History:   Diagnosis Date    Allergy     skin    Anxiety     Breast cancer 2014    right    Chronic back pain     Depression     Encounter for blood transfusion     Endometrial cancer 07/30/2019    Fibromyalgia     Hives     Hx of psychiatric care     Hx of radiation therapy     Itching     Lichen sclerosus     Mental disorder     PONV (postoperative nausea and vomiting)     Psychiatric problem     PTSD (post-traumatic stress disorder)     Sleep difficulties     Sore throat     Therapy     Uterine cancer 08/05/2019    UTI (urinary tract infection)        Past Surgical History:   Procedure Laterality Date    anal fissure surgery      APPENDECTOMY      BIOPSY N/A 11/19/2020    Procedure: BONE BIOPSY;  [facility];  Location: [facility] OR;  Service: Radiology;  Laterality: N/A;  RN PREOP 11/17/2020    BREAST BIOPSY Right 2014    BREAST LUMPECTOMY Right 2014    R lumpectomy [facility] w 2.4cm IDC & DCIS, neg margins, 0/6 SN, Stage II, ER/ID+, HER-2 neg Adjuvant XRT and hormonal therapy     CARPAL TUNNEL RELEASE      CHOLECYSTECTOMY      COLONOSCOPY      COLONOSCOPY N/A 6/11/2020    Procedure: COLONOSCOPY;  Surgeon: Bobby Marino MD;  Location: [facility] ([address] [place] [address]);  Service: Endoscopy;  Laterality: N/A;    ESOPHAGOGASTRODUODENOSCOPY N/A 6/11/2020    Procedure: EGD (ESOPHAGOGASTRODUODENOSCOPY);  Surgeon: Bobby Marino MD;  Location: [facility] ([address] [place] [address]);  Service: Endoscopy;  Laterality: N/A;    HYSTERECTOMY  08/15/2019    HYSTEROSCOPY WITH DILATION AND CURETTAGE OF UTERUS N/A 7/24/2019    Procedure: HYSTEROSCOPY, WITH DILATION AND CURETTAGE OF UTERUS;  Surgeon: Елена Kam MD;  Location:  [facility] OR;  Service: OB/GYN;  Laterality: N/A;    INSERTION OF TUNNELED CENTRAL VENOUS CATHETER (CVC) WITH SUBCUTANEOUS PORT N/A 9/26/2019    Procedure: INSERTION, PORT-A-CATH;  Surgeon: Moris Varghese MD;  Location: [facility] CATH LAB;  Service: Radiology;  Laterality: N/A;    MEDIPORT REMOVAL N/A 3/5/2020    Procedure: REMOVAL, CATHETER, CENTRAL VENOUS, TUNNELED, WITH PORT;  Surgeon: Moris Varghese MD;  Location: [facility] CATH LAB;  Service: Radiology;  Laterality: N/A;    NEEDLE LOCALIZATION N/A 11/19/2020    Procedure: NEEDLE LOCALIZATION;  Surgeon: [facility] Diagnostic Provider;  Location: [facility] OR;  Service: Radiology;  Laterality: N/A;    PARTIAL MASCECTOMY Right 2014    ROBOT-ASSISTED LAPAROSCOPIC ABDOMINAL HYSTERECTOMY USING DA RAMU XI N/A 8/15/2019    Procedure: XI ROBOTIC HYSTERECTOMY;  Surgeon: Darius Regalado MD;  Location: [facility];  Service: OB/GYN;  Laterality: N/A;    ROBOT-ASSISTED LAPAROSCOPIC SALPINGO-OOPHORECTOMY USING DA RAMU XI Bilateral 8/15/2019    Procedure: XI ROBOTIC SALPINGO-OOPHORECTOMY;  Surgeon: Darius Regalado MD;  Location: [facility];  Service: OB/GYN;  Laterality: Bilateral;    ROBOT-ASSISTED LYMPHADENECTOMY Left 8/15/2019    Procedure: ROBOTIC LYMPHADENECTOMY;  Surgeon: Darius Regalado MD;  Location: [facility];  Service: OB/GYN;  Laterality: Left;    SALPINGOOPHORECTOMY Bilateral 08/15/2019    TONSILLECTOMY      TUBAL LIGATION         Review of patient's allergies indicates:  No Known Allergies  Family History       Problem Relation (Age of Onset)    Alcohol abuse Cousin    Arthritis Mother    Cancer Mother, Father, Paternal Aunt    Diabetes Other    Hashimoto's thyroiditis Sister    Heart attack Paternal Grandmother, Paternal Grandfather    Hypertension Mother, Brother, Brother    Leukemia Paternal Uncle (70)    Lung cancer Maternal Uncle    No Known Problems Sister    Other Mother (85)    Pancreatic cancer Father (84), Maternal Aunt (62)    Suicide Cousin          Tobacco Use    Smoking  status: Never Smoker    Smokeless tobacco: Never Used   Substance and Sexual Activity    Alcohol use: No     Alcohol/week: 0.0 standard drinks    Drug use: Yes     Types: Marijuana     Comment: medical marijuana- 2 dayys ago     Sexual activity: Not Currently     Partners: Male     Review of Systems   Constitutional:  Positive for fatigue. Negative for appetite change, chills and fever.   HENT:  Negative for congestion and trouble swallowing.    Eyes:  Negative for pain and redness.   Respiratory:  Negative for cough and shortness of breath.    Cardiovascular:  Negative for chest pain and palpitations.   Gastrointestinal:  Negative for abdominal pain, constipation, diarrhea, nausea, rectal pain and vomiting.   Genitourinary:  Positive for pelvic pain. Negative for difficulty urinating, dysuria and hematuria.   Musculoskeletal:  Positive for back pain. Negative for neck pain.   Skin:  Negative for rash.   Neurological:  Positive for weakness. Negative for dizziness, light-headedness and headaches.   Objective:     Vital Signs (Most Recent):  Temp: 97.8 °F (36.6 °C) (06/08/22 1128)  Pulse: 68 (06/08/22 1128)  Resp: 18 (06/08/22 1128)  BP: (!) 93/53 (06/08/22 1128)  SpO2: 99 % (06/08/22 1128)   Vital Signs (24h Range):  Temp:  [97.4 °F (36.3 °C)-101 °F (38.3 °C)] 97.8 °F (36.6 °C)  Pulse:  [] 68  Resp:  [16-21] 18  SpO2:  [94 %-100 %] 99 %  BP: ()/(53-76) 93/53     Weight: 57.6 kg (126 lb 15.8 oz) (06/08/22 0036)  Body mass index is 24.8 kg/m².      Intake/Output Summary (Last 24 hours) at 6/8/2022 1243  Last data filed at 6/8/2022 0355  Gross per 24 hour   Intake --   Output 300 ml   Net -300 ml       Lines/Drains/Airways       Peripheral Intravenous Line  Duration                  Peripheral IV - Single Lumen 06/07/22 1818 22 G Right Antecubital <1 day         Peripheral IV - Single Lumen 06/07/22 2235 20 G Left Upper Arm <1 day                    Physical Exam  Constitutional:       Appearance:  Normal appearance. She is not ill-appearing.   Eyes:      General: No scleral icterus.     Conjunctiva/sclera: Conjunctivae normal.   Cardiovascular:      Rate and Rhythm: Normal rate and regular rhythm.      Pulses: Normal pulses.      Heart sounds: Normal heart sounds.   Pulmonary:      Effort: Pulmonary effort is normal. No respiratory distress.      Breath sounds: Normal breath sounds.   Abdominal:      General: Bowel sounds are normal. There is no distension.      Palpations: Abdomen is soft.      Tenderness: There is no abdominal tenderness.   Skin:     General: Skin is warm and dry.      Findings: No bruising or rash.   Neurological:      Mental Status: She is alert and oriented to person, place, and time.       Significant Labs:  All pertinent lab results from the last 24 hours have been reviewed.    Significant Imaging:  Imaging results within the past 24 hours have been reviewed.    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 She is hemodynamically stable currently.     Our recommendations are as follows:      Maintain IV access with at least 2 large IVs (18 gauge or larger)   Continued IVF resuscitation as tolerated with attention to active co-morbidities.    RBC transfusion as indicated if Hgb <7 g/dL.    Please correct any coagulopathy with platelets and FFP to a goal of platelets >50K and INR <2.0.     Discontinue all antiplatelet, anti-coagulation, and NSAID products.    Clear liquid diet today and keep NPO for 06/09.     Plan for colonoscopy on 06/09 for further evaluation. Bowel prep to be ordered by GI fellow.             Thank you for your consult. I will follow-up with patient. Please contact us if you have any additional questions.    Bartolo Echeverria MD  Gastroenterology  [facility]

## 2022-06-08 NOTE — PLAN OF CARE
Patient experienced a temperature of 101 at the same time she complained of lower abdominal pain. She received Tylenol 1000 mg which was  moderately effective for both. She also experienced exertional dyspnea during ambulation to and from the restroom. Patient was placed on 2L of oxygen per NC. Patient stated that she felt a lot better since she has the oxygen on. He oxygen saturation are 100%. No respiratory distress noted. UA collected and sent to lab.     Problem: Fall Injury Risk  Goal: Absence of Fall and Fall-Related Injury  Outcome: Ongoing, Progressing     Problem: Pain Acute  Goal: Acceptable Pain Control and Functional Ability  Outcome: Ongoing, Progressing

## 2022-06-08 NOTE — ASSESSMENT & PLAN NOTE
- History of breast cancer and endometrial cancer with recent PET study with concern for scaral lesion  - planning for bone biopsy OP  - consider IP vs OP hematology oncology depending on clinical course and following further GIB management

## 2022-06-08 NOTE — HPI
Ms. Jeanne Rodgers is a 67 year old female for whom GI is consulted with concern for rectal bleeding. 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     Patient reports experiencing one episode of rectal bleeding on 06/04/2022 associated with pain in the bilateral pelvis. Since 06/04, she reports having one black colored bowel movement and then return of brown colored stools. She denies symptom association with vomiting, dizziness. or lightheadedness. She does report use of NSAID and ASA products intermittently for relief of chronic pelvic and lower extremity pains. She reports a prior history of GI bleeding associated with black colored stools in 06/2020 that was attributed to possible radiation-induced enteritis. She reports a subsequent episode of rectal bleeding in 12/2021 associated with episode of AF with RVR, however she reports no colonoscopy was performed due to spontaneous resolution. She was seen by IR outpatient on 06/07 for possible biopsy of new bone lesions seen on imaging; labs were concerning for acute on chronic anemia (Hgb 6.2 from 8.7 in 03/2022). Patient was instructed to report to the ED here for further management.     Hospital Course: On arrival, vital signs normal on room air. Labs notable for anemia (Hgb 6.3), thrombocytopenia (114), and normal BUN. She was admitted to hospital medicine and GI consulted.

## 2022-06-08 NOTE — SUBJECTIVE & OBJECTIVE
Past Medical History:   Diagnosis Date    Allergy     skin    Anxiety     Breast cancer 2014    right    Chronic back pain     Depression     Encounter for blood transfusion     Endometrial cancer 07/30/2019    Fibromyalgia     Hives     Hx of psychiatric care     Hx of radiation therapy     Itching     Lichen sclerosus     Mental disorder     PONV (postoperative nausea and vomiting)     Psychiatric problem     PTSD (post-traumatic stress disorder)     Sleep difficulties     Sore throat     Therapy     Uterine cancer 08/05/2019    UTI (urinary tract infection)        Past Surgical History:   Procedure Laterality Date    anal fissure surgery      APPENDECTOMY      BIOPSY N/A 11/19/2020    Procedure: BONE BIOPSY;  Surgeon: [facility] Diagnostic Provider;  Location: [facility] OR;  Service: Radiology;  Laterality: N/A;  RN PREOP 11/17/2020    BREAST BIOPSY Right 2014    BREAST LUMPECTOMY Right 2014    R lumpectomy [facility] w 2.4cm IDC & DCIS, neg margins, 0/6 SN, Stage II, ER/FL+, HER-2 neg Adjuvant XRT and hormonal therapy     CARPAL TUNNEL RELEASE      CHOLECYSTECTOMY      COLONOSCOPY      COLONOSCOPY N/A 6/11/2020    Procedure: COLONOSCOPY;  Surgeon: Bobby Marino MD;  Location: [facility] ([address] [place] [address]);  Service: Endoscopy;  Laterality: N/A;    ESOPHAGOGASTRODUODENOSCOPY N/A 6/11/2020    Procedure: EGD (ESOPHAGOGASTRODUODENOSCOPY);  Surgeon: Bobby Marino MD;  Location: [facility] ([address] [place] [address]);  Service: Endoscopy;  Laterality: N/A;    HYSTERECTOMY  08/15/2019    HYSTEROSCOPY WITH DILATION AND CURETTAGE OF UTERUS N/A 7/24/2019    Procedure: HYSTEROSCOPY, WITH DILATION AND CURETTAGE OF UTERUS;  Surgeon: Елена Kam MD;  Location: [facility] OR;  Service: OB/GYN;  Laterality: N/A;    INSERTION OF TUNNELED CENTRAL VENOUS CATHETER (CVC) WITH SUBCUTANEOUS PORT N/A 9/26/2019    Procedure: INSERTION, PORT-A-CATH;  Surgeon: Moris Varghese MD;  Location: [facility] CATH LAB;  Service: Radiology;  Laterality: N/A;    MEDIPORT REMOVAL N/A  3/5/2020    Procedure: REMOVAL, CATHETER, CENTRAL VENOUS, TUNNELED, WITH PORT;  Surgeon: Moris Varghese MD;  Location: [facility] CATH LAB;  Service: Radiology;  Laterality: N/A;    NEEDLE LOCALIZATION N/A 11/19/2020    Procedure: NEEDLE LOCALIZATION;  Surgeon: [facility] Diagnostic Provider;  Location: [facility] OR;  Service: Radiology;  Laterality: N/A;    PARTIAL MASCECTOMY Right 2014    ROBOT-ASSISTED LAPAROSCOPIC ABDOMINAL HYSTERECTOMY USING DA RAMU XI N/A 8/15/2019    Procedure: XI ROBOTIC HYSTERECTOMY;  Surgeon: Darius Regalado MD;  Location: [facility] OR;  Service: OB/GYN;  Laterality: N/A;    ROBOT-ASSISTED LAPAROSCOPIC SALPINGO-OOPHORECTOMY USING DA RAMU XI Bilateral 8/15/2019    Procedure: XI ROBOTIC SALPINGO-OOPHORECTOMY;  Surgeon: Darius Regalado MD;  Location: [facility] OR;  Service: OB/GYN;  Laterality: Bilateral;    ROBOT-ASSISTED LYMPHADENECTOMY Left 8/15/2019    Procedure: ROBOTIC LYMPHADENECTOMY;  Surgeon: Darius Regalado MD;  Location: [facility] OR;  Service: OB/GYN;  Laterality: Left;    SALPINGOOPHORECTOMY Bilateral 08/15/2019    TONSILLECTOMY      TUBAL LIGATION         Review of patient's allergies indicates:  No Known Allergies  Family History       Problem Relation (Age of Onset)    Alcohol abuse Cousin    Arthritis Mother    Cancer Mother, Father, Paternal Aunt    Diabetes Other    Hashimoto's thyroiditis Sister    Heart attack Paternal Grandmother, Paternal Grandfather    Hypertension Mother, Brother, Brother    Leukemia Paternal Uncle (70)    Lung cancer Maternal Uncle    No Known Problems Sister    Other Mother (85)    Pancreatic cancer Father (84), Maternal Aunt (62)    Suicide Cousin          Tobacco Use    Smoking status: Never Smoker    Smokeless tobacco: Never Used   Substance and Sexual Activity    Alcohol use: No     Alcohol/week: 0.0 standard drinks    Drug use: Yes     Types: Marijuana     Comment: medical marijuana- 2 dayys ago     Sexual activity: Not Currently     Partners: Male     Review of Systems    Constitutional:  Positive for fatigue. Negative for appetite change, chills and fever.   HENT:  Negative for congestion and trouble swallowing.    Eyes:  Negative for pain and redness.   Respiratory:  Negative for cough and shortness of breath.    Cardiovascular:  Negative for chest pain and palpitations.   Gastrointestinal:  Negative for abdominal pain, constipation, diarrhea, nausea, rectal pain and vomiting.   Genitourinary:  Positive for pelvic pain. Negative for difficulty urinating, dysuria and hematuria.   Musculoskeletal:  Positive for back pain. Negative for neck pain.   Skin:  Negative for rash.   Neurological:  Positive for weakness. Negative for dizziness, light-headedness and headaches.   Objective:     Vital Signs (Most Recent):  Temp: 97.8 °F (36.6 °C) (06/08/22 1128)  Pulse: 68 (06/08/22 1128)  Resp: 18 (06/08/22 1128)  BP: (!) 93/53 (06/08/22 1128)  SpO2: 99 % (06/08/22 1128)   Vital Signs (24h Range):  Temp:  [97.4 °F (36.3 °C)-101 °F (38.3 °C)] 97.8 °F (36.6 °C)  Pulse:  [] 68  Resp:  [16-21] 18  SpO2:  [94 %-100 %] 99 %  BP: ()/(53-76) 93/53     Weight: 57.6 kg (126 lb 15.8 oz) (06/08/22 0036)  Body mass index is 24.8 kg/m².      Intake/Output Summary (Last 24 hours) at 6/8/2022 1243  Last data filed at 6/8/2022 0355  Gross per 24 hour   Intake --   Output 300 ml   Net -300 ml       Lines/Drains/Airways       Peripheral Intravenous Line  Duration                  Peripheral IV - Single Lumen 06/07/22 1818 22 G Right Antecubital <1 day         Peripheral IV - Single Lumen 06/07/22 2235 20 G Left Upper Arm <1 day                    Physical Exam  Constitutional:       Appearance: Normal appearance. She is not ill-appearing.   Eyes:      General: No scleral icterus.     Conjunctiva/sclera: Conjunctivae normal.   Cardiovascular:      Rate and Rhythm: Normal rate and regular rhythm.      Pulses: Normal pulses.      Heart sounds: Normal heart sounds.   Pulmonary:      Effort: Pulmonary  effort is normal. No respiratory distress.      Breath sounds: Normal breath sounds.   Abdominal:      General: Bowel sounds are normal. There is no distension.      Palpations: Abdomen is soft.      Tenderness: There is no abdominal tenderness.   Skin:     General: Skin is warm and dry.      Findings: No bruising or rash.   Neurological:      Mental Status: She is alert and oriented to person, place, and time.       Significant Labs:  All pertinent lab results from the last 24 hours have been reviewed.    Significant Imaging:  Imaging results within the past 24 hours have been reviewed.

## 2022-06-08 NOTE — ED PROVIDER NOTES
Encounter Date: 6/7/2022       History     Chief Complaint   Patient presents with    Abnormal Lab     Rectal bleeding over weekend, low blood count and plts,      67-year-old female with past medical history of endometrial cancer, fibromyalgia, PTSD, anxiety who presents to the emergency department with chief complaint of abnormal lab value. Reports bright red blood per rectum with dark streaks that started four days ago. Had follow up appointment today. Noted to be anemic and referred to the ER for further evaluation. Endorses headache and lightheadedness. Denies chest pain, shortness of breath, abdominal pain, vomiting, diarrhea. Reports pelvic and right leg pain. She had recent imaging which was concerning for bone lesions to the pelvis and has biopsy scheduled for later in June. She denies other worsening or alleviating factors.         Review of patient's allergies indicates:  No Known Allergies  Past Medical History:   Diagnosis Date    Allergy     skin    Anxiety     Breast cancer 2014    right    Chronic back pain     Depression     Encounter for blood transfusion     Endometrial cancer 07/30/2019    Fibromyalgia     Hives     Hx of psychiatric care     Hx of radiation therapy     Itching     Lichen sclerosus     Mental disorder     PONV (postoperative nausea and vomiting)     Psychiatric problem     PTSD (post-traumatic stress disorder)     Sleep difficulties     Sore throat     Therapy     Uterine cancer 08/05/2019    UTI (urinary tract infection)      Past Surgical History:   Procedure Laterality Date    anal fissure surgery      APPENDECTOMY      BIOPSY N/A 11/19/2020    Procedure: BONE BIOPSY;  Surgeon: Leisa Diagnostic Provider;  Location: [facility] OR;  Service: Radiology;  Laterality: N/A;  RN PREOP 11/17/2020    BREAST BIOPSY Right 2014    BREAST LUMPECTOMY Right 2014    R lumpectomy [facility] w 2.4cm IDC & DCIS, neg margins, 0/6 SN, Stage II, ER/PA+, HER-2 neg Adjuvant XRT and  hormonal therapy     CARPAL TUNNEL RELEASE      CHOLECYSTECTOMY      COLONOSCOPY      COLONOSCOPY N/A 6/11/2020    Procedure: COLONOSCOPY;  Surgeon: Bobby Marino MD;  Location: [facility] ENDO (2ND FLR);  Service: Endoscopy;  Laterality: N/A;    ESOPHAGOGASTRODUODENOSCOPY N/A 6/11/2020    Procedure: EGD (ESOPHAGOGASTRODUODENOSCOPY);  Surgeon: Bobby Marino MD;  Location: [facility] ENDO (2ND FLR);  Service: Endoscopy;  Laterality: N/A;    HYSTERECTOMY  08/15/2019    HYSTEROSCOPY WITH DILATION AND CURETTAGE OF UTERUS N/A 7/24/2019    Procedure: HYSTEROSCOPY, WITH DILATION AND CURETTAGE OF UTERUS;  Surgeon: Елена Kam MD;  Location: [facility];  Service: OB/GYN;  Laterality: N/A;    INSERTION OF TUNNELED CENTRAL VENOUS CATHETER (CVC) WITH SUBCUTANEOUS PORT N/A 9/26/2019    Procedure: INSERTION, PORT-A-CATH;  Surgeon: Moris Varghese MD;  Location: [facility] CATH LAB;  Service: Radiology;  Laterality: N/A;    MEDIPORT REMOVAL N/A 3/5/2020    Procedure: REMOVAL, CATHETER, CENTRAL VENOUS, TUNNELED, WITH PORT;  Surgeon: Moris Varghese MD;  Location: [facility] CATH LAB;  Service: Radiology;  Laterality: N/A;    NEEDLE LOCALIZATION N/A 11/19/2020    Procedure: NEEDLE LOCALIZATION;  Surgeon: [facility] Diagnostic Provider;  Location: [facility] OR;  Service: Radiology;  Laterality: N/A;    PARTIAL MASCECTOMY Right 2014    ROBOT-ASSISTED LAPAROSCOPIC ABDOMINAL HYSTERECTOMY USING DA RAMU XI N/A 8/15/2019    Procedure: XI ROBOTIC HYSTERECTOMY;  Surgeon: Darius Regalado MD;  Location: [facility];  Service: OB/GYN;  Laterality: N/A;    ROBOT-ASSISTED LAPAROSCOPIC SALPINGO-OOPHORECTOMY USING DA RAMU XI Bilateral 8/15/2019    Procedure: XI ROBOTIC SALPINGO-OOPHORECTOMY;  Surgeon: Darius Regalado MD;  Location: [facility];  Service: OB/GYN;  Laterality: Bilateral;    ROBOT-ASSISTED LYMPHADENECTOMY Left 8/15/2019    Procedure: ROBOTIC LYMPHADENECTOMY;  Surgeon: Darius Regalado MD;  Location: [facility];  Service: OB/GYN;  Laterality: Left;     SALPINGOOPHORECTOMY Bilateral 08/15/2019    TONSILLECTOMY      TUBAL LIGATION       Family History   Problem Relation Age of Onset    Hypertension Mother     Arthritis Mother     Other Mother 85        pancreatic tumor felt by pt's mother's HCP to be malignant though she survived for 8 yrs after dx    Cancer Mother     Pancreatic cancer Father 84    Cancer Father     Pancreatic cancer Maternal Aunt 62    No Known Problems Sister     Hypertension Brother     Leukemia Paternal Uncle 70    Heart attack Paternal Grandmother     Heart attack Paternal Grandfather     Hypertension Brother     Hashimoto's thyroiditis Sister     Diabetes Other         strong family history per patient    Lung cancer Maternal Uncle         smoker    Cancer Paternal Aunt         unknown origin    Suicide Cousin     Alcohol abuse Cousin     Breast cancer Neg Hx     Ovarian cancer Neg Hx     Cirrhosis Neg Hx     Colon cancer Neg Hx      Social History     Tobacco Use    Smoking status: Never Smoker    Smokeless tobacco: Never Used   Substance Use Topics    Alcohol use: No     Alcohol/week: 0.0 standard drinks    Drug use: Yes     Types: Marijuana     Comment: medical marijuana- 2 dayys ago      Review of Systems   Constitutional: Positive for fatigue. Negative for fever.   HENT: Negative for sore throat.    Respiratory: Positive for cough. Negative for shortness of breath.    Cardiovascular: Negative for chest pain.   Gastrointestinal: Positive for blood in stool and nausea.   Genitourinary: Negative for dysuria.   Musculoskeletal: Negative for back pain.   Skin: Negative for rash.   Neurological: Positive for light-headedness and headaches. Negative for weakness.   Hematological: Does not bruise/bleed easily.       Physical Exam     Initial Vitals [06/07/22 1716]   BP Pulse Resp Temp SpO2   123/60 102 18 98.5 °F (36.9 °C) 100 %      MAP       --         Physical Exam    Nursing note and vitals  reviewed.  Constitutional: She appears well-developed and well-nourished. She is not diaphoretic. No distress.   HENT:   Head: Normocephalic and atraumatic.   Mouth/Throat: Oropharynx is clear and moist.   Eyes: Conjunctivae and EOM are normal. Pupils are equal, round, and reactive to light.   Neck: Neck supple.   Normal range of motion.  Cardiovascular: Normal rate, regular rhythm, normal heart sounds and intact distal pulses. Exam reveals no gallop and no friction rub.    No murmur heard.  Pulmonary/Chest: Breath sounds normal. She has no wheezes. She has no rhonchi. She has no rales.   Abdominal: Abdomen is soft. Bowel sounds are normal. There is no abdominal tenderness.   Genitourinary:    Genitourinary Comments: Unable to perform rectal exam due to patient's current location in the ED      Musculoskeletal:         General: Normal range of motion.      Cervical back: Normal range of motion and neck supple.     Neurological: She is alert and oriented to person, place, and time. She has normal strength. No cranial nerve deficit or sensory deficit. GCS score is 15. GCS eye subscore is 4. GCS verbal subscore is 5. GCS motor subscore is 6.   Skin: Skin is warm and dry. Capillary refill takes less than 2 seconds.   Psychiatric: She has a normal mood and affect. Her behavior is normal. Judgment and thought content normal.         ED Course   Procedures  Labs Reviewed   CBC W/ AUTO DIFFERENTIAL - Abnormal; Notable for the following components:       Result Value    RBC 2.23 (*)     Hemoglobin 6.3 (*)     Hematocrit 20.7 (*)     MCHC 30.4 (*)     RDW 21.2 (*)     Platelets 114 (*)     nRBC 3 (*)     Lymph % 11.0 (*)     Basophil % 3.0 (*)     Platelet Estimate Decreased (*)     All other components within normal limits   COMPREHENSIVE METABOLIC PANEL - Abnormal; Notable for the following components:    Sodium 134 (*)     CO2 22 (*)     Albumin 3.4 (*)     ALT 9 (*)     All other components within normal limits    PROTIME-INR   HEPATITIS C ANTIBODY   URINALYSIS   SARS-COV-2 RDRP GENE   TYPE & SCREEN   PREPARE RBC SOFT     EKG Readings: (Independently Interpreted)   Initial Reading: No STEMI. Rhythm: Normal Sinus Rhythm. Heart Rate: 93.     ECG Results          EKG 12-lead (In process)  Result time 06/07/22 20:33:05    In process by Interface, Lab In [facility] (06/07/22 20:33:05)                 Narrative:    Test Reason : K62.5,    Vent. Rate : 093 BPM     Atrial Rate : 093 BPM     P-R Int : 150 ms          QRS Dur : 064 ms      QT Int : 368 ms       P-R-T Axes : 060 078 070 degrees     QTc Int : 457 ms    Normal sinus rhythm  Normal ECG  When compared with ECG of 22-APR-2022 14:05,  No significant change was found    Referred By: [email or web address]   SELF           Confirmed By:                             Imaging Results    None          Medications   0.9%  NaCl infusion (for blood administration) (has no administration in time range)   pantoprazole injection 80 mg (has no administration in time range)     Medical Decision Making:   History:   Old Medical Records: I decided to obtain old medical records.  Initial Assessment:   Emergent evaluation of a 67-year-old female who presents to the emergency department with chief complaint of GI bleeding and anemia.  She reports having blood red blood per rectum that began 4 days ago.  She had lab work done this morning which was concerning for anemia.  She was sent to the ED for further evaluation.  Patient is afebrile, hemodynamically stable, nontoxic appearing.  Will order labs, EKG, chest x-ray, PRBCs, and continue to monitor.  Differential Diagnosis:   Differential diagnosis includes but is not limited to upper GI bleed, lower GI bleed, acute blood loss anemia.   Independently Interpreted Test(s):   I have ordered and independently interpreted X-rays - see prior notes.  I have ordered and independently interpreted EKG Reading(s) - see prior notes  Clinical Tests:   Lab Tests: Ordered  and Reviewed  Radiological Study: Reviewed and Ordered  Medical Tests: Reviewed and Ordered  ED Management:  Hemoglobin 6.3.  Blood consent obtained.  Will order 2 units PRBCs.  Will transfuse 1 unit for now.  Sodium 134. Kidney function baseline.  Gastroenterology consulted,   Unfortunately, patient is currently in the hallway in the emergency department.  Unable to perform rectal exam at this time. If she is placed in a private room, I will perform a rectal exam before she is transported to Hospital Medicine floor.  She will be admitted to Hospital Medicine for further workup and treatment.  Discussed with patient who is agreeable.  All questions answered.  The patient's history, physical exam, and plan of care was discussed with and agreed upon with my supervising physician.                         Clinical Impression:   Final diagnoses:  [K62.5] Rectal bleeding  [K92.2] Gastrointestinal hemorrhage, unspecified gastrointestinal hemorrhage type (Primary)  [D50.0] Blood loss anemia          ED Disposition Condition    Admit               Ludy Cochran PA-C  06/07/22 [id number]

## 2022-06-08 NOTE — HPI
67-year-old female with past medical history of endometrial cancer, breast cancer, fibromyalgia, PTSD, GIB, blood transfusion, anxiety who presents to the emergency department with chief complaint of abnormal lab value. Reports bright red blood per rectum with dark streaks that started four days ago. Had follow up appointment today. Noted to be anemic and referred to the ER for further evaluation. Endorses headache and lightheadedness. Denies chest pain, shortness of breath, abdominal pain, vomiting, diarrhea. Reports pelvic and right leg pain. She had recent imaging which was concerning for bone lesions to the pelvis and has biopsy scheduled for later in June. She denies other worsening or alleviating factors.     In the ED patient afebrile and hemodynamically stable saturating well on room air. Hb 6.3 (baseline 8.5). Patient transfused one unit PRBC in ED, started on iv protonix, and admitted to the care of medicine for further evaluation and management of GIB.

## 2022-06-08 NOTE — PLAN OF CARE
[facility] [facility] [facility]  Initial Discharge Assessment       Primary Care Provider: Gary Gonzalez MD    Admission Diagnosis: Blood loss anemia [D50.0]  Rectal bleeding [K62.5]  Gastrointestinal hemorrhage, unspecified gastrointestinal hemorrhage type [K92.2]    Admission Date: 6/7/2022  Expected Discharge Date: 6/9/2022    Discharge Barriers Identified: None    Payor: MEDICARE / Plan: MEDICARE PART A & B / Product Type: Government /     Extended Emergency Contact Information  Primary Emergency Contact: Les Stephens   [place]  Mobile Phone: [phone]  Relation: Friend  Secondary Emergency Contact: Geni Rodgers  Mobile Phone: [phone]  Relation: Sister    Discharge Plan A: Home  Discharge Plan B: Home Health      [facility] - [place], [place] - [address]  [address]  [place] [address]  Phone: [phone] Fax: [phone]    [facility] - [place], [place] - [address]  [address]  [place] [address]  Phone: [phone] Fax: [phone]    [id number] - [place], [place] - [address] [place] [address] EXPY AT [facility] & [address]  [facility] [address]  Phone: [phone] Fax: [phone]      Initial Assessment (most recent)     Adult Discharge Assessment - 06/08/22 1134        Discharge Assessment    Assessment Type Discharge Planning Assessment     Confirmed/corrected address, phone number and insurance Yes     Confirmed Demographics Correct on Facesheet     Source of Information patient     Communicated SEBASTIEN with patient/caregiver Yes     Lives With alone   sister lives in the double next door to her    Do you expect to return to your current living situation? Yes     Do you have help at home or someone to help you manage your care at home? Yes     Prior to hospitilization cognitive status: Alert/Oriented     Current cognitive status: Alert/Oriented     Walking or Climbing Stairs Difficulty none     Dressing/Bathing Difficulty none     Equipment Currently  Used at Home none     Readmission within 30 days? No     Do you currently have service(s) that help you manage your care at home? No     Do you take prescription medications? Yes     Do you have prescription coverage? Yes     Do you have any problems affording any of your prescribed medications? No     Is the patient taking medications as prescribed? yes     Who is going to help you get home at discharge? Family     Are you on dialysis? No     Do you take coumadin? No     Discharge Plan A Home     Discharge Plan B Home Health     DME Needed Upon Discharge  none     Discharge Plan discussed with: Patient     Discharge Barriers Identified None                  Zora Heard RN, CM   Ext: [phone]

## 2022-06-08 NOTE — SUBJECTIVE & OBJECTIVE
Past Medical History:   Diagnosis Date    Allergy     skin    Anxiety     Breast cancer 2014    right    Chronic back pain     Depression     Encounter for blood transfusion     Endometrial cancer 07/30/2019    Fibromyalgia     Hives     Hx of psychiatric care     Hx of radiation therapy     Itching     Lichen sclerosus     Mental disorder     PONV (postoperative nausea and vomiting)     Psychiatric problem     PTSD (post-traumatic stress disorder)     Sleep difficulties     Sore throat     Therapy     Uterine cancer 08/05/2019    UTI (urinary tract infection)        Past Surgical History:   Procedure Laterality Date    anal fissure surgery      APPENDECTOMY      BIOPSY N/A 11/19/2020    Procedure: BONE BIOPSY;  Surgeon: [facility] Diagnostic Provider;  Location: [facility] OR;  Service: Radiology;  Laterality: N/A;  RN PREOP 11/17/2020    BREAST BIOPSY Right 2014    BREAST LUMPECTOMY Right 2014    R lumpectomy [facility] w 2.4cm IDC & DCIS, neg margins, 0/6 SN, Stage II, ER/WV+, HER-2 neg Adjuvant XRT and hormonal therapy     CARPAL TUNNEL RELEASE      CHOLECYSTECTOMY      COLONOSCOPY      COLONOSCOPY N/A 6/11/2020    Procedure: COLONOSCOPY;  Surgeon: Bobby Marino MD;  Location: [facility] ([address] [place] [address]);  Service: Endoscopy;  Laterality: N/A;    ESOPHAGOGASTRODUODENOSCOPY N/A 6/11/2020    Procedure: EGD (ESOPHAGOGASTRODUODENOSCOPY);  Surgeon: Bobby Marino MD;  Location: [facility] ([address] [place] [address]);  Service: Endoscopy;  Laterality: N/A;    HYSTERECTOMY  08/15/2019    HYSTEROSCOPY WITH DILATION AND CURETTAGE OF UTERUS N/A 7/24/2019    Procedure: HYSTEROSCOPY, WITH DILATION AND CURETTAGE OF UTERUS;  Surgeon: Елена Kam MD;  Location: [facility] OR;  Service: OB/GYN;  Laterality: N/A;    INSERTION OF TUNNELED CENTRAL VENOUS CATHETER (CVC) WITH SUBCUTANEOUS PORT N/A 9/26/2019    Procedure: INSERTION, PORT-A-CATH;  Surgeon: Moris Varghese MD;  Location: [facility] CATH LAB;  Service: Radiology;  Laterality: N/A;    MEDIPORT REMOVAL N/A  3/5/2020    Procedure: REMOVAL, CATHETER, CENTRAL VENOUS, TUNNELED, WITH PORT;  Surgeon: Moris Varghese MD;  Location: [facility] CATH LAB;  Service: Radiology;  Laterality: N/A;    NEEDLE LOCALIZATION N/A 11/19/2020    Procedure: NEEDLE LOCALIZATION;  Surgeon: Leisa Diagnostic Provider;  Location: [facility] OR;  Service: Radiology;  Laterality: N/A;    PARTIAL MASCECTOMY Right 2014    ROBOT-ASSISTED LAPAROSCOPIC ABDOMINAL HYSTERECTOMY USING DA RAMU XI N/A 8/15/2019    Procedure: XI ROBOTIC HYSTERECTOMY;  Surgeon: Darius Regalado MD;  Location: [facility] OR;  Service: OB/GYN;  Laterality: N/A;    ROBOT-ASSISTED LAPAROSCOPIC SALPINGO-OOPHORECTOMY USING DA RAMU XI Bilateral 8/15/2019    Procedure: XI ROBOTIC SALPINGO-OOPHORECTOMY;  Surgeon: Darius Regalado MD;  Location: [facility] OR;  Service: OB/GYN;  Laterality: Bilateral;    ROBOT-ASSISTED LYMPHADENECTOMY Left 8/15/2019    Procedure: ROBOTIC LYMPHADENECTOMY;  Surgeon: Darius Regalado MD;  Location: [facility] OR;  Service: OB/GYN;  Laterality: Left;    SALPINGOOPHORECTOMY Bilateral 08/15/2019    TONSILLECTOMY      TUBAL LIGATION         Review of patient's allergies indicates:  No Known Allergies    No current facility-administered medications on file prior to encounter.     Current Outpatient Medications on File Prior to Encounter   Medication Sig    clobetasol 0.05% (TEMOVATE) 0.05 % Oint APPLY TO AFFECTED AREA(S) TWICE A DAY FOR 1 MONTH(S) then EVERY DAY FOR 1 MONTH(S) then 3 TIMES A WEEK    co-enzyme Q-10 30 mg capsule Take 30 mg by mouth 3 (three) times daily.    fexofenadine (ALLEGRA) 180 MG tablet 2 tablets in the morning.  May take an extra 2 tablets during the day if needed for itching.    furosemide (LASIX) 20 MG tablet Take 1 tablet (20 mg total) by mouth daily as needed (leg swelling).    hydrocortisone 2.5 % cream Apply to affected areas twice a day when eczema is moderate.    hydrOXYzine HCL (ATARAX) 25 MG tablet 1 to 8 tablets at bedtime.  Start with 3 tablets.  Increase or  decrease as needed until itching is controlled or a maximum of 8 tablets.    meth/meblue/sod phos/psal/hyos (URIBEL ORAL) Take by mouth as needed.    nystatin (MYCOSTATIN) cream Apply topically every Mon, Wed, Fri.    nystatin (MYCOSTATIN) powder Apply topically 4 (four) times daily.    ondansetron (ZOFRAN) 4 MG tablet Take 1 tablet (4 mg total) by mouth every 6 (six) hours as needed for Nausea.    oxyCODONE-acetaminophen (PERCOCET) 5-325 mg per tablet Take 1 tablet by mouth every 8 (eight) hours as needed for Pain.    pantoprazole (PROTONIX) 20 MG tablet Take 1 tablet (20 mg total) by mouth daily as needed (acid reflux).    UNABLE TO FIND [employer] Immune Support    UNABLE TO FIND medication name: medical marijuana     Family History       Problem Relation (Age of Onset)    Alcohol abuse Cousin    Arthritis Mother    Cancer Mother, Father, Paternal Aunt    Diabetes Other    Hashimoto's thyroiditis Sister    Heart attack Paternal Grandmother, Paternal Grandfather    Hypertension Mother, Brother, Brother    Leukemia Paternal Uncle (70)    Lung cancer Maternal Uncle    No Known Problems Sister    Other Mother (85)    Pancreatic cancer Father (84), Maternal Aunt (62)    Suicide Cousin          Tobacco Use    Smoking status: Never Smoker    Smokeless tobacco: Never Used   Substance and Sexual Activity    Alcohol use: No     Alcohol/week: 0.0 standard drinks    Drug use: Yes     Types: Marijuana     Comment: medical marijuana- 2 dayys ago     Sexual activity: Not Currently     Partners: Male     Review of Systems   Constitutional:  Positive for fatigue. Negative for chills and fever.   HENT:  Negative for sore throat and trouble swallowing.    Eyes:  Negative for photophobia and visual disturbance.   Respiratory:  Positive for cough. Negative for shortness of breath and wheezing.    Cardiovascular:  Negative for chest pain, palpitations and leg swelling.   Gastrointestinal:  Positive for blood in stool.  Negative for abdominal distention, abdominal pain, diarrhea, nausea and vomiting.   Genitourinary:  Negative for dysuria and hematuria.   Musculoskeletal:  Negative for neck pain and neck stiffness.   Skin:  Negative for rash and wound.   Neurological:  Positive for dizziness, weakness and light-headedness. Negative for syncope and headaches.   Psychiatric/Behavioral:  Negative for confusion and decreased concentration.    Objective:     Vital Signs (Most Recent):  Temp: 98.4 °F (36.9 °C) (06/08/22 0001)  Pulse: 90 (06/08/22 0001)  Resp: (!) 21 (06/08/22 0001)  BP: (!) 151/76 (06/08/22 0001)  SpO2: 100 % (06/08/22 0001)   Vital Signs (24h Range):  Temp:  [97.4 °F (36.3 °C)-98.5 °F (36.9 °C)] 98.4 °F (36.9 °C)  Pulse:  [] 90  Resp:  [16-21] 21  SpO2:  [100 %] 100 %  BP: (106-151)/(53-76) 151/76     Weight: 56.2 kg (124 lb)  Body mass index is 24.22 kg/m².    Physical Exam  Constitutional:       General: She is not in acute distress.     Appearance: She is not toxic-appearing or diaphoretic.   HENT:      Head: Normocephalic and atraumatic.      Nose: Nose normal.   Eyes:      General: No scleral icterus.     Extraocular Movements: Extraocular movements intact.      Pupils: Pupils are equal, round, and reactive to light.   Cardiovascular:      Rate and Rhythm: Regular rhythm. Tachycardia present.   Pulmonary:      Effort: Pulmonary effort is normal. No respiratory distress.      Breath sounds: No wheezing or rales.   Abdominal:      General: Abdomen is flat. There is no distension.      Palpations: Abdomen is soft.      Tenderness: There is no abdominal tenderness. There is no guarding.   Genitourinary:     Rectum: Guaiac result positive.   Musculoskeletal:         General: Normal range of motion.      Cervical back: Normal range of motion and neck supple. No rigidity.      Right lower leg: No edema.      Left lower leg: No edema.   Skin:     General: Skin is warm and dry.      Coloration: Skin is not jaundiced.    Neurological:      General: No focal deficit present.      Mental Status: She is alert and oriented to person, place, and time.      Cranial Nerves: No cranial nerve deficit.   Psychiatric:         Mood and Affect: Mood normal.         Behavior: Behavior normal.         CRANIAL NERVES     CN III, IV, VI   Pupils are equal, round, and reactive to light.     Significant Labs: All pertinent labs within the past 24 hours have been reviewed.  CBC:   Recent Labs   Lab 06/07/22  1033 06/07/22  1817   WBC 9.25 9.95   HGB 6.2* 6.3*   HCT 20.5* 20.7*   * 114*     CMP:   Recent Labs   Lab 06/07/22  1817   *   K 4.3      CO2 22*   GLU 94   BUN 11   CREATININE 0.8   CALCIUM 9.3   PROT 7.6   ALBUMIN 3.4*   BILITOT 0.7   ALKPHOS 69   AST 17   ALT 9*   ANIONGAP 11   EGFRNONAA >60.0       Significant Imaging: I have reviewed all pertinent imaging results/findings within the past 24 hours.

## 2022-06-08 NOTE — CARE UPDATE
S:  Patient was seen and examined this am. Reports no bloody BMs this am- spoke with GI- planning for colonoscopy 06/09. No hematemesis, melena, fevers, chills, night sweats, abd pain, diarrhea, chest pain. Does report chronic cough- has been present for the past 5wks- h/o breast cancer- concern for possible relapse? H/o radiation to chest- possibility of GERD vs. Pulmonary fibrosis vs. Other. Will need f/u with pulm at time of discharge.     O:  Physical Exam  Vitals:    06/08/22 1536   BP: (!) 140/61   Pulse: 73   Resp: 18   Temp: 96.7 °F (35.9 °C)       Gen: in NAD, appears stated age  Neuro: AAOx3, motor, sensory, and strength grossly intact BL  HEENT: NTNC, EOMI, PERRL, MMM  CVS: RRR, no m/r/g; S1/S2 auscultated with no S3 or S4; capillary refill < 2 sec  Resp: lungs CTAB, no w/r/r; no belabored breathing or accessory muscle use appreciated   Abd: BS+ in all 4 quadrants; NTND, soft to palpation; no organomegaly appreciated   Extrem: pulses full, equal, and regular over all 4 extremities; no UE or LE edema BL    Labs:  Recent Labs   Lab 06/08/22  0432   *   K 4.3      CO2 20*   BUN 12   CREATININE 0.7      Recent Labs   Lab 06/08/22  1229   WBC 10.75   RBC 2.71*   HGB 7.5*   HCT 24.4*   PLT 94*   MCV 90   MCH 27.7   MCHC 30.7*        Imaging:  Imaging Results          X-Ray Chest AP Portable (Final result)  Result time 06/07/22 21:39:46    Final result by Jr White MD (06/07/22 21:39:46)                 Impression:      Mild prominence of the pulmonary vascular with appearance of mild interstitial and alveolar infiltrate, as discussed above.      Electronically signed by: Jr White  Date:    06/07/2022  Time:    21:39             Narrative:    EXAMINATION:  XR CHEST AP PORTABLE    CLINICAL HISTORY:  rectal bleeding;    TECHNIQUE:  Single frontal view of the chest was performed.    COMPARISON:  Chest radiograph December 13, 2021    FINDINGS:  Single portable chest view is submitted.   "There is diminished depth of inspiration and minimal rotation, when accounting for these factors the appearance of the cardiomediastinal silhouette is stable.    There is accentuation of the pulmonary vascular, and there is appearance of mild interstitial and patchy ground-glass alveolar infiltrate.    There is no evidence for pleural effusion and there is no evidence for pneumothorax.  The osseous structures demonstrate chronic change, mild curvature of the spine is noted.  Surgical clips of the right upper quadrant likely relate to prior cholecystectomy.                                  A&P:  Jeanne Rodgers is a 67 y.o.F w/PMHx of endometrial cancer, breast cancer, fibromyalgia, PTSD, GIB, blood transfusion, anxiety who presented to C w/abnormal lab findings. Found to have Hg of 6.3. Transfused 1u w/good response. Concern for GIB w/h/o "dark streaks" in blood. GI consulted.     - Bowel prep today  - clear liquids  - Colonoscopy 06/09  - pulm referral at time of discharge for further evaluation   - on PPI, but could possibly benefit from trial of PPI for 4-8wks to monitor for improvement of subacute cough.           Trinh Moore MD  Department of Hospital Medicine  Department of Pediatrics  06/08/2022        "

## 2022-06-08 NOTE — ED NOTES
Jeanne Melchor Ana Maria, a 67 y.o. female presents to the ED w/ complaint of of GI bleed.  Pt had labs done and doctor told her to go to the ED as     Triage note:  Chief Complaint   Patient presents with    Abnormal Lab     Rectal bleeding over weekend, low blood count and plts,      Review of patient's allergies indicates:  No Known Allergies  Past Medical History:   Diagnosis Date    Allergy     skin    Anxiety     Breast cancer 2014    right    Chronic back pain     Depression     Encounter for blood transfusion     Endometrial cancer 07/30/2019    Fibromyalgia     Hives     Hx of psychiatric care     Hx of radiation therapy     Itching     Lichen sclerosus     Mental disorder     PONV (postoperative nausea and vomiting)     Psychiatric problem     PTSD (post-traumatic stress disorder)     Sleep difficulties     Sore throat     Therapy     Uterine cancer 08/05/2019    UTI (urinary tract infection)         walking

## 2022-06-08 NOTE — H&P
[facility] Medicine  History & Physical    Patient Name: Jeanne Rodgers  MRN: [id number]  Patient Class: IP- Inpatient  Admission Date: 6/7/2022  Attending Physician: Trinh Moore MD   Primary Care Provider: Gary Gonzalez MD         Patient information was obtained from patient, past medical records and ER records.     Subjective:     Principal Problem:GIB (gastrointestinal bleeding)    Chief Complaint:   Chief Complaint   Patient presents with    Abnormal Lab     Rectal bleeding over weekend, low blood count and plts,         HPI: 67-year-old female with past medical history of endometrial cancer, breast cancer, fibromyalgia, PTSD, GIB, blood transfusion, anxiety who presents to the emergency department with chief complaint of abnormal lab value. Reports bright red blood per rectum with dark streaks that started four days ago. Had follow up appointment today. Noted to be anemic and referred to the ER for further evaluation. Endorses headache and lightheadedness. Denies chest pain, shortness of breath, abdominal pain, vomiting, diarrhea. Reports pelvic and right leg pain. She had recent imaging which was concerning for bone lesions to the pelvis and has biopsy scheduled for later in June. She denies other worsening or alleviating factors.     In the ED patient afebrile and hemodynamically stable saturating well on room air. Hb 6.3 (baseline 8.5). Patient transfused one unit PRBC in ED, started on iv protonix, and admitted to the care of medicine for further evaluation and management of GIB.      Past Medical History:   Diagnosis Date    Allergy     skin    Anxiety     Breast cancer 2014    right    Chronic back pain     Depression     Encounter for blood transfusion     Endometrial cancer 07/30/2019    Fibromyalgia     Hives     Hx of psychiatric care     Hx of radiation therapy     Itching     Lichen sclerosus     Mental disorder     PONV (postoperative nausea and  vomiting)     Psychiatric problem     PTSD (post-traumatic stress disorder)     Sleep difficulties     Sore throat     Therapy     Uterine cancer 08/05/2019    UTI (urinary tract infection)        Past Surgical History:   Procedure Laterality Date    anal fissure surgery      APPENDECTOMY      BIOPSY N/A 11/19/2020    Procedure: BONE BIOPSY;  Surgeon: [facility] Diagnostic Provider;  Location: [facility] OR;  Service: Radiology;  Laterality: N/A;  RN PREOP 11/17/2020    BREAST BIOPSY Right 2014    BREAST LUMPECTOMY Right 2014    R lumpectomy [facility] w 2.4cm IDC & DCIS, neg margins, 0/6 SN, Stage II, ER/WV+, HER-2 neg Adjuvant XRT and hormonal therapy     CARPAL TUNNEL RELEASE      CHOLECYSTECTOMY      COLONOSCOPY      COLONOSCOPY N/A 6/11/2020    Procedure: COLONOSCOPY;  Surgeon: Bobby Marino MD;  [facility]  Service: Endoscopy;  Laterality: N/A;    ESOPHAGOGASTRODUODENOSCOPY N/A 6/11/2020    Procedure: EGD (ESOPHAGOGASTRODUODENOSCOPY);  Surgeon: Bobby Marino MD;  [facility]  Service: Endoscopy;  Laterality: N/A;    HYSTERECTOMY  08/15/2019    HYSTEROSCOPY WITH DILATION AND CURETTAGE OF UTERUS N/A 7/24/2019    Procedure: HYSTEROSCOPY, WITH DILATION AND CURETTAGE OF UTERUS;  Surgeon: Елена Kam MD;  Location: [facility];  Service: OB/GYN;  Laterality: N/A;    INSERTION OF TUNNELED CENTRAL VENOUS CATHETER (CVC) WITH SUBCUTANEOUS PORT N/A 9/26/2019    Procedure: INSERTION, PORT-A-CATH;  Surgeon: Moris Varghese MD;  Location: [facility] CATH LAB;  Service: Radiology;  Laterality: N/A;    MEDIPORT REMOVAL N/A 3/5/2020    Procedure: REMOVAL, CATHETER, CENTRAL VENOUS, TUNNELED, WITH PORT;  Surgeon: Moris Varghese MD;  Location: [facility] CATH LAB;  Service: Radiology;  Laterality: N/A;    NEEDLE LOCALIZATION N/A 11/19/2020    Procedure: NEEDLE LOCALIZATION;  Surgeon: [facility] Diagnostic Provider;  Location: [facility] OR;  Service: Radiology;  Laterality: N/A;    PARTIAL  MASCECTOMY Right 2014    ROBOT-ASSISTED LAPAROSCOPIC ABDOMINAL HYSTERECTOMY USING DA RAMU XI N/A 8/15/2019    Procedure: XI ROBOTIC HYSTERECTOMY;  Surgeon: Darius Regalado MD;  Location: [facility] OR;  Service: OB/GYN;  Laterality: N/A;    ROBOT-ASSISTED LAPAROSCOPIC SALPINGO-OOPHORECTOMY USING DA RAMU XI Bilateral 8/15/2019    Procedure: XI ROBOTIC SALPINGO-OOPHORECTOMY;  Surgeon: Darius Regalado MD;  Location: [facility] OR;  Service: OB/GYN;  Laterality: Bilateral;    ROBOT-ASSISTED LYMPHADENECTOMY Left 8/15/2019    Procedure: ROBOTIC LYMPHADENECTOMY;  Surgeon: Darius Regalado MD;  Location: [facility] OR;  Service: OB/GYN;  Laterality: Left;    SALPINGOOPHORECTOMY Bilateral 08/15/2019    TONSILLECTOMY      TUBAL LIGATION         Review of patient's allergies indicates:  No Known Allergies    No current facility-administered medications on file prior to encounter.     Current Outpatient Medications on File Prior to Encounter   Medication Sig    clobetasol 0.05% (TEMOVATE) 0.05 % Oint APPLY TO AFFECTED AREA(S) TWICE A DAY FOR 1 MONTH(S) then EVERY DAY FOR 1 MONTH(S) then 3 TIMES A WEEK    co-enzyme Q-10 30 mg capsule Take 30 mg by mouth 3 (three) times daily.    fexofenadine (ALLEGRA) 180 MG tablet 2 tablets in the morning.  May take an extra 2 tablets during the day if needed for itching.    furosemide (LASIX) 20 MG tablet Take 1 tablet (20 mg total) by mouth daily as needed (leg swelling).    hydrocortisone 2.5 % cream Apply to affected areas twice a day when eczema is moderate.    hydrOXYzine HCL (ATARAX) 25 MG tablet 1 to 8 tablets at bedtime.  Start with 3 tablets.  Increase or decrease as needed until itching is controlled or a maximum of 8 tablets.    meth/meblue/sod phos/psal/hyos (URIBEL ORAL) Take by mouth as needed.    nystatin (MYCOSTATIN) cream Apply topically every Mon, Wed, Fri.    nystatin (MYCOSTATIN) powder Apply topically 4 (four) times daily.    ondansetron (ZOFRAN) 4 MG tablet Take 1 tablet (4  mg total) by mouth every 6 (six) hours as needed for Nausea.    oxyCODONE-acetaminophen (PERCOCET) 5-325 mg per tablet Take 1 tablet by mouth every 8 (eight) hours as needed for Pain.    pantoprazole (PROTONIX) 20 MG tablet Take 1 tablet (20 mg total) by mouth daily as needed (acid reflux).    UNABLE TO FIND [employer] Immune Support    UNABLE TO FIND medication name: medical marijuana     Family History       Problem Relation (Age of Onset)    Alcohol abuse Cousin    Arthritis Mother    Cancer Mother, Father, Paternal Aunt    Diabetes Other    Hashimoto's thyroiditis Sister    Heart attack Paternal Grandmother, Paternal Grandfather    Hypertension Mother, Brother, Brother    Leukemia Paternal Uncle (70)    Lung cancer Maternal Uncle    No Known Problems Sister    Other Mother (85)    Pancreatic cancer Father (84), Maternal Aunt (62)    Suicide Cousin          Tobacco Use    Smoking status: Never Smoker    Smokeless tobacco: Never Used   Substance and Sexual Activity    Alcohol use: No     Alcohol/week: 0.0 standard drinks    Drug use: Yes     Types: Marijuana     Comment: medical marijuana- 2 dayys ago     Sexual activity: Not Currently     Partners: Male     Review of Systems   Constitutional:  Positive for fatigue. Negative for chills and fever.   HENT:  Negative for sore throat and trouble swallowing.    Eyes:  Negative for photophobia and visual disturbance.   Respiratory:  Positive for cough. Negative for shortness of breath and wheezing.    Cardiovascular:  Negative for chest pain, palpitations and leg swelling.   Gastrointestinal:  Positive for blood in stool. Negative for abdominal distention, abdominal pain, diarrhea, nausea and vomiting.   Genitourinary:  Negative for dysuria and hematuria.   Musculoskeletal:  Negative for neck pain and neck stiffness.   Skin:  Negative for rash and wound.   Neurological:  Positive for dizziness, weakness and light-headedness. Negative for syncope and  headaches.   Psychiatric/Behavioral:  Negative for confusion and decreased concentration.    Objective:     Vital Signs (Most Recent):  Temp: 98.4 °F (36.9 °C) (06/08/22 0001)  Pulse: 90 (06/08/22 0001)  Resp: (!) 21 (06/08/22 0001)  BP: (!) 151/76 (06/08/22 0001)  SpO2: 100 % (06/08/22 0001)   Vital Signs (24h Range):  Temp:  [97.4 °F (36.3 °C)-98.5 °F (36.9 °C)] 98.4 °F (36.9 °C)  Pulse:  [] 90  Resp:  [16-21] 21  SpO2:  [100 %] 100 %  BP: (106-151)/(53-76) 151/76     Weight: 56.2 kg (124 lb)  Body mass index is 24.22 kg/m².    Physical Exam  Constitutional:       General: She is not in acute distress.     Appearance: She is not toxic-appearing or diaphoretic.   HENT:      Head: Normocephalic and atraumatic.      Nose: Nose normal.   Eyes:      General: No scleral icterus.     Extraocular Movements: Extraocular movements intact.      Pupils: Pupils are equal, round, and reactive to light.   Cardiovascular:      Rate and Rhythm: Regular rhythm. Tachycardia present.   Pulmonary:      Effort: Pulmonary effort is normal. No respiratory distress.      Breath sounds: No wheezing or rales.   Abdominal:      General: Abdomen is flat. There is no distension.      Palpations: Abdomen is soft.      Tenderness: There is no abdominal tenderness. There is no guarding.   Genitourinary:     Rectum: Guaiac result positive.   Musculoskeletal:         General: Normal range of motion.      Cervical back: Normal range of motion and neck supple. No rigidity.      Right lower leg: No edema.      Left lower leg: No edema.   Skin:     General: Skin is warm and dry.      Coloration: Skin is not jaundiced.   Neurological:      General: No focal deficit present.      Mental Status: She is alert and oriented to person, place, and time.      Cranial Nerves: No cranial nerve deficit.   Psychiatric:         Mood and Affect: Mood normal.         Behavior: Behavior normal.         CRANIAL NERVES     CN III, IV, VI   Pupils are equal, round,  and reactive to light.     Significant Labs: All pertinent labs within the past 24 hours have been reviewed.  CBC:   Recent Labs   Lab 06/07/22  1033 06/07/22  1817   WBC 9.25 9.95   HGB 6.2* 6.3*   HCT 20.5* 20.7*   * 114*     CMP:   Recent Labs   Lab 06/07/22  1817   *   K 4.3      CO2 22*   GLU 94   BUN 11   CREATININE 0.8   CALCIUM 9.3   PROT 7.6   ALBUMIN 3.4*   BILITOT 0.7   ALKPHOS 69   AST 17   ALT 9*   ANIONGAP 11   EGFRNONAA >60.0       Significant Imaging: I have reviewed all pertinent imaging results/findings within the past 24 hours.    Assessment/Plan:     * GIB (gastrointestinal bleeding)  - GIB Pathway initiated  - Hb 6.3 on presentation  ;  Baseline 8.5  - sp 1 unit PRBC in ED  - start protonix 80mg iv once followed by 40mg iv BID  - npo midnight  - serial CBC  - transfuse Hb<7  - GI consulted  ;  Appreciate recs      Hx of breast cancer  - History of breast cancer and endometrial cancer with recent PET study with concern for scaral lesion  - planning for bone biopsy OP  - consider IP vs OP hematology oncology depending on clinical course and following further GIB management        VTE Risk Mitigation (From admission, onward)         Ordered     IP VTE HIGH RISK PATIENT  Once         06/07/22 2137     Place sequential compression device  Until discontinued         06/07/22 2137                   Skyler Patterson MD  Department of Hospital Medicine   [facility]

## 2022-06-09 PROBLEM — R50.9 FEVER: Status: ACTIVE | Noted: 2022-01-01

## 2022-06-09 PROBLEM — R00.0 TACHYCARDIA: Status: ACTIVE | Noted: 2022-01-01

## 2022-06-09 PROBLEM — D72.829 LEUKOCYTOSIS: Status: ACTIVE | Noted: 2022-01-01

## 2022-06-09 NOTE — ANESTHESIA POSTPROCEDURE EVALUATION
Anesthesia Post Evaluation    Patient: Jeanne Rodgers    Procedure(s) Performed: Procedure(s) (LRB):  COLONOSCOPY (N/A)    Final Anesthesia Type: general      Patient location during evaluation: PACU  Patient participation: Yes- Able to Participate  Level of consciousness: awake and alert  Post-procedure vital signs: reviewed and stable  Pain management: adequate  Airway patency: patent    PONV status at discharge: No PONV  Anesthetic complications: no      Cardiovascular status: blood pressure returned to baseline and tachycardic (a fib)  Respiratory status: unassisted  Hydration status: euvolemic  Follow-up not needed.          Vitals Value Taken Time   /79 06/09/22 1145   Temp 36.5 °C (97.7 °F) 06/09/22 1100   Pulse 126 06/09/22 1156   Resp 31 06/09/22 1156   SpO2 99 % 06/09/22 1156   Vitals shown include unvalidated device data.      No case tracking events are documented in the log.      Pain/Brandin Score: Pain Rating Prior to Med Admin: 6 (6/8/2022  3:48 AM)  Pain Rating Post Med Admin: 0 (6/8/2022  4:48 AM)  Brandin Score: 8 (6/9/2022 11:30 AM)

## 2022-06-09 NOTE — PLAN OF CARE
POC reviewed with pt, verbalized understanding.      - AAOx4, VSS, afebrile, Afib on tele.  - Denies pain, n/v.  - Diet advanced to regular.  - Colonoscopy done today.  - Flu swab collected and sent to lab.  - CXR completed, results pending.  - EKG completed.  - Urine specimen to be collected on pts next void, pt aware     All needs met, no complaints offered at this time. Bed locked in lowest position, call bell within reach. Frequent rounding for safety.

## 2022-06-09 NOTE — [employer] PATIENT INSTRUCTIONS
Discharge Summary/Instructions after an Endoscopic Procedure  Patient Name: Jeanne Rodgers  Patient MRN: [id number]  Patient YOB: 1955 Thursday, June 9, 2022  Abdoulaye Alcocer MD  Dear patient,  As a result of recent federal legislation (The Federal Cures Act), you may   receive lab or pathology results from your procedure in your [facility]   account before your physician is able to contact you. Your physician or   their representative will relay the results to you with their   recommendations at their soonest availability.  Thank you,  RESTRICTIONS:  During your procedure today, you received medications for sedation.  These   medications may affect your judgment, balance and coordination.  Therefore,   for 24 hours, you have the following restrictions:   - DO NOT drive a car, operate machinery, make legal/financial decisions,   sign important papers or drink alcohol.    ACTIVITY:  Today: no heavy lifting, straining or running due to procedural   sedation/anesthesia.  The following day: return to full activity including work.  DIET:  Eat and drink normally unless instructed otherwise.     TREATMENT FOR COMMON SIDE EFFECTS:  - Mild abdominal pain, nausea, belching, bloating or excessive gas:  rest,   eat lightly and use a heating pad.  - Sore Throat: treat with throat lozenges and/or gargle with warm salt   water.  - Because air was used during the procedure, expelling large amounts of air   from your rectum or belching is normal.  - If a bowel prep was taken, you may not have a bowel movement for 1-3 days.    This is normal.  SYMPTOMS TO WATCH FOR AND REPORT TO YOUR PHYSICIAN:  1. Abdominal pain or bloating, other than gas cramps.  2. Chest pain.  3. Back pain.  4. Signs of infection such as: chills or fever occurring within 24 hours   after the procedure.  5. Rectal bleeding, which would show as bright red, maroon, or black stools.   (A tablespoon of blood from the rectum is not serious, especially  if   hemorrhoids are present.)  6. Vomiting.  7. Weakness or dizziness.  GO DIRECTLY TO THE NEAREST EMERGENCY ROOM IF YOU HAVE ANY OF THE FOLLOWING:      Difficulty breathing              Chills and/or fever over 101 F   Persistent vomiting and/or vomiting blood   Severe abdominal pain   Severe chest pain   Black, tarry stools   Bleeding- more than one tablespoon   Any other symptom or condition that you feel may need urgent attention  Your doctor recommends these additional instructions:  If any biopsies were taken, your doctors clinic will contact you in 1 to 2   weeks with any results.  - Patient has a contact number available for emergencies.  The signs and   symptoms of potential delayed complications were discussed with the   patient.  Return to normal activities tomorrow.  Written discharge   instructions were provided to the patient.   - Return patient to hospital [facility]  - Resume previous diet.   - Continue present medications.   - Monitor clinical course  - Repeat colonoscopy at previously determined interval for surveillance as   bowel prep was suboptimal  - Discharge patient to home.  For questions, problems or results please call your physician - Abdoulaye Alcocer MD at Work:  [phone].  [facility] [place], EMERGENCY ROOM PHONE NUMBER: [phone]  IF A COMPLICATION OR EMERGENCY SITUATION ARISES AND YOU ARE UNABLE TO REACH   YOUR PHYSICIAN - GO DIRECTLY TO THE EMERGENCY ROOM.  Abdoulaye Alcocer MD  6/9/2022 11:04:20 AM  This report has been verified and signed electronically.  Dear patient,  As a result of recent federal legislation (The Federal Cures Act), you may   receive lab or pathology results from your procedure in your [facility]   account before your physician is able to contact you. Your physician or   their representative will relay the results to you with their   recommendations at their soonest availability.  Thank you,  [employer]

## 2022-06-09 NOTE — TRANSFER OF CARE
Anesthesia Transfer of Care Note    Patient: Jeanne Rodgers    Procedure(s) Performed: Procedure(s) (LRB):  COLONOSCOPY (N/A)    Patient location: PACU    Anesthesia Type: general    Transport from OR: Transported from OR on room air with adequate spontaneous ventilation    Post pain: adequate analgesia    Post assessment: no apparent anesthetic complications and tolerated procedure well    Post vital signs: stable    Level of consciousness: sedated and responds to stimulation    Nausea/Vomiting: no nausea/vomiting    Complications: none    Transfer of care protocol was followed      Last vitals:   Visit Vitals  BP 69338   Pulse 109   Temp 38   Resp 22   Ht 5' (1.524 m)   Wt 57.6 kg (126 lb 15.8 oz)   SpO2 98%   Breastfeeding No   BMI 24.80 kg/m²

## 2022-06-09 NOTE — ASSESSMENT & PLAN NOTE
Patient with fever and leukocytosis shortly following admission  Unclear etiology; initially suspected reactive in the setting of GI bleed however colonoscopy unremarkable    Infectious workup with blood cultures, chest x-ray, UA, flu  COVID negative upon admission  Given clinical stability; will hold off on antibiotics for now; low threshold to initiate  Unclear if related to potential breast cancer reoccurrence

## 2022-06-09 NOTE — ASSESSMENT & PLAN NOTE
"Onset imelda procedural; per nursing, treated for atrial fibrillation with metoprolol  Continue scheduled metoprolol  Telemetry  EKG to confirm AFib    Per chart review from May 2022:  Atrial Fibrillation with RVR  Patient has been having paroxsymal runs of atrial fibrillation during this admission with rates up to the 150s. She was seen by cardiology  -TTE Stable  -chest pain improving  -Metoprolol PO started  -Cards recs as follows:   Given isolated episode of atrial fibrillation and acute illness along with anemia holding off on starting anticoagulation at this time, recommending 30 day event monitor at discharge to look for occult atrial fibrillation, follow-up with Dr. Mancia "        "

## 2022-06-09 NOTE — PROGRESS NOTES
Opal YOUNGER notified of pt's a-fib with HR from 90s-130s in PACU.. MD stated okay and okay to transfer.

## 2022-06-09 NOTE — ASSESSMENT & PLAN NOTE
- GIB Pathway initiated  - Hb 6.3 on presentation  ;  Baseline 8.5  - sp 1 unit PRBC in ED  - s/p colonoscopy with GI; no acute findings and okay to resume diet and discontinue IV PPI  - CBC daily

## 2022-06-09 NOTE — PROGRESS NOTES
Harriet YOUNGER notified of patient sustaining in Afib with HR 130s-140s. Patient's BP lower than baseline at this time. SBP 100s. Patient remains asymptomatic. MD to come to bedside. [place].

## 2022-06-09 NOTE — TREATMENT PLAN
Post-Procedure Gastroenterology Treatment Plan:     Jeanne Rodgers is status post colonoscopy with the following findings:     - Normal colon, with friable mucosa.   - The examined portion of the ileum was normal.   - The distal rectum and anal verge are normal on retroflexion view.   - No specimens collected.    Our recommendations are as follows:     -Okay to resume diet.   -Okay to discontinue PPI IV.     No further endoscopic intervention planned. We will sign off. Please call with questions.         Bartolo Echeverria MD, PGY-IV  Gastroenterology Fellow  [facility]

## 2022-06-09 NOTE — PROGRESS NOTES
[facility] Medicine  Progress Note    Patient Name: Jeanne Rodgers  MRN: [id number]  Patient Class: IP- Inpatient   Admission Date: 6/7/2022  Length of Stay: 2 days  Attending Physician: Maddy Joseph MD  Primary Care Provider: Gary Gonzalez MD        Subjective:     Principal Problem:GIB (gastrointestinal bleeding)        HPI:  67-year-old female with past medical history of endometrial cancer, breast cancer, fibromyalgia, PTSD, GIB, blood transfusion, anxiety who presents to the emergency department with chief complaint of abnormal lab value. Reports bright red blood per rectum with dark streaks that started four days ago. Had follow up appointment today. Noted to be anemic and referred to the ER for further evaluation. Endorses headache and lightheadedness. Denies chest pain, shortness of breath, abdominal pain, vomiting, diarrhea. Reports pelvic and right leg pain. She had recent imaging which was concerning for bone lesions to the pelvis and has biopsy scheduled for later in June. She denies other worsening or alleviating factors.     In the ED patient afebrile and hemodynamically stable saturating well on room air. Hb 6.3 (baseline 8.5). Patient transfused one unit PRBC in ED, started on iv protonix, and admitted to the care of medicine for further evaluation and management of GIB.      Overview/Hospital Course:  Colonoscopy completed 6/7.  Notable for normal colon, with friable mucosa, ileum that was examined normal, distal rectal and anal verge normal on retroflexion view.      Interval History:   Patient seen examined this morning prior to endoscopy.  Patient notes no blood with bowel prep overnight; last bowel movement with dark stool yesterday.  Patient with temperature of 100.9° F overnight.  This morning, patient notes some myalgias daughter generalized in addition to sore throat.  She endorses a dry cough however denies any shortness of breath.    Per nursing, during endoscopy,  "patient "went into afib in the 130's. She is being treated in the PACU by Anesthesia. We gave a total 10mg of IV metoprolol and Anesthesia started her on 12.5mg PO BID. She will be gong back to her room shortly. She is still currently in afib in the 120's. "      Review of Systems   Constitutional:  Positive for fatigue and fever. Negative for chills and diaphoresis.   HENT:  Positive for congestion and sore throat. Negative for rhinorrhea, sinus pain and sneezing.    Eyes:  Negative for photophobia and visual disturbance.   Respiratory:  Positive for cough. Negative for shortness of breath and wheezing.    Cardiovascular:  Negative for chest pain, palpitations and leg swelling.   Gastrointestinal:  Positive for blood in stool. Negative for abdominal distention, abdominal pain, diarrhea, nausea and vomiting.   Genitourinary:  Negative for dysuria and hematuria.   Musculoskeletal:  Positive for myalgias. Negative for neck pain and neck stiffness.   Skin:  Negative for rash and wound.   Neurological:  Positive for dizziness, weakness and light-headedness. Negative for syncope and headaches.   Psychiatric/Behavioral:  Negative for confusion and decreased concentration.      Objective:     Vital Signs (Most Recent):  Temp: 98.1 °F (36.7 °C) (06/09/22 1602)  Pulse: (!) 117 (06/09/22 1602)  Resp: 18 (06/09/22 1602)  BP: 116/60 (06/09/22 1602)  SpO2: 98 % (06/09/22 1602)   Vital Signs (24h Range):  Temp:  [97.7 °F (36.5 °C)-100.9 °F (38.3 °C)] 98.1 °F (36.7 °C)  Pulse:  [] 117  Resp:  [16-43] 18  SpO2:  [85 %-100 %] 98 %  BP: ()/(54-90) 116/60     Weight: 57.6 kg (126 lb 15.8 oz)  Body mass index is 24.8 kg/m².    Intake/Output Summary (Last 24 hours) at 6/9/2022 1640  Last data filed at 6/9/2022 1051  Gross per 24 hour   Intake 300 ml   Output --   Net 300 ml      Physical Exam  Vitals and nursing note reviewed.   Constitutional:       General: She is not in acute distress.     Appearance: She is not " toxic-appearing or diaphoretic.   HENT:      Head: Normocephalic and atraumatic.      Right Ear: External ear normal.      Left Ear: External ear normal.      Nose: Nose normal.      Mouth/Throat:      Mouth: Mucous membranes are moist.      Pharynx: No oropharyngeal exudate or posterior oropharyngeal erythema.   Eyes:      General: No scleral icterus.     Extraocular Movements: Extraocular movements intact.      Pupils: Pupils are equal, round, and reactive to light.   Cardiovascular:      Rate and Rhythm: Regular rhythm. Tachycardia present.      Pulses: Normal pulses.      Heart sounds: Normal heart sounds.   Pulmonary:      Effort: Pulmonary effort is normal. No respiratory distress.      Breath sounds: No wheezing or rales.   Abdominal:      General: Abdomen is flat. There is no distension.      Palpations: Abdomen is soft.      Tenderness: There is no abdominal tenderness. There is no guarding.   Musculoskeletal:         General: Normal range of motion.      Cervical back: Normal range of motion and neck supple. No rigidity.      Right lower leg: No edema.      Left lower leg: No edema.   Skin:     General: Skin is warm and dry.      Coloration: Skin is not jaundiced.   Neurological:      General: No focal deficit present.      Mental Status: She is alert and oriented to person, place, and time.      Cranial Nerves: No cranial nerve deficit.   Psychiatric:         Mood and Affect: Mood normal.         Behavior: Behavior normal.       Significant Labs: All pertinent labs within the past 24 hours have been reviewed.    Recent Results (from the past 24 hour(s))   CBC auto differential    Collection Time: 06/08/22  6:19 PM   Result Value Ref Range    WBC 11.01 3.90 - 12.70 K/uL    RBC 2.90 (L) 4.00 - 5.40 M/uL    Hemoglobin 8.3 (L) 12.0 - 16.0 g/dL    Hematocrit 26.2 (L) 37.0 - 48.5 %    MCV 90 82 - 98 fL    MCH 28.6 27.0 - 31.0 pg    MCHC 31.7 (L) 32.0 - 36.0 g/dL    RDW 20.8 (H) 11.5 - 14.5 %    Platelets 93 (L)  150 - 450 K/uL    MPV 12.2 9.2 - 12.9 fL    Immature Granulocytes CANCELED 0.0 - 0.5 %    Immature Grans (Abs) CANCELED 0.00 - 0.04 K/uL    Lymph # CANCELED 1.0 - 4.8 K/uL    Mono # CANCELED 0.3 - 1.0 K/uL    Eos # CANCELED 0.0 - 0.5 K/uL    Baso # CANCELED 0.00 - 0.20 K/uL    nRBC 2 (A) 0 /100 WBC    Gran % 69.0 38.0 - 73.0 %    Lymph % 12.0 (L) 18.0 - 48.0 %    Mono % 6.0 4.0 - 15.0 %    Eosinophil % 3.0 0.0 - 8.0 %    Basophil % 0.0 0.0 - 1.9 %    Bands 6.0 %    Myelocytes 2.0 %    Other 2 %    Platelet Estimate Decreased (A)     Aniso Slight     Poik Slight     Poly Occasional     Hypo Occasional     Ovalocytes Occasional     Tear Drop Cells Occasional     Large/Giant Platelets Present     Differential Method Manual    Pathologist Review    Collection Time: 06/08/22  6:19 PM   Result Value Ref Range    Pathologist Review Peripheral Smear REVIEWED    CBC Auto Differential    Collection Time: 06/09/22  8:53 AM   Result Value Ref Range    WBC 13.48 (H) 3.90 - 12.70 K/uL    RBC 3.14 (L) 4.00 - 5.40 M/uL    Hemoglobin 9.1 (L) 12.0 - 16.0 g/dL    Hematocrit 28.6 (L) 37.0 - 48.5 %    MCV 91 82 - 98 fL    MCH 29.0 27.0 - 31.0 pg    MCHC 31.8 (L) 32.0 - 36.0 g/dL    RDW 20.5 (H) 11.5 - 14.5 %    Platelets 108 (L) 150 - 450 K/uL    MPV 11.1 9.2 - 12.9 fL    Immature Granulocytes CANCELED 0.0 - 0.5 %    Immature Grans (Abs) CANCELED 0.00 - 0.04 K/uL    Lymph # CANCELED 1.0 - 4.8 K/uL    Mono # CANCELED 0.3 - 1.0 K/uL    Eos # CANCELED 0.0 - 0.5 K/uL    Baso # CANCELED 0.00 - 0.20 K/uL    nRBC 1 (A) 0 /100 WBC    Gran % 79.0 (H) 38.0 - 73.0 %    Lymph % 7.0 (L) 18.0 - 48.0 %    Mono % 4.0 4.0 - 15.0 %    Eosinophil % 0.0 0.0 - 8.0 %    Basophil % 0.0 0.0 - 1.9 %    Bands 7.0 %    Metamyelocytes 1.0 %    Myelocytes 2.0 %    Poik Slight     Poly Occasional     Hypo Occasional     Ovalocytes Occasional     Tear Drop Cells Occasional     Differential Method Manual    Comprehensive Metabolic Panel    Collection Time: 06/09/22   8:53 AM   Result Value Ref Range    Sodium 136 136 - 145 mmol/L    Potassium 4.0 3.5 - 5.1 mmol/L    Chloride 101 95 - 110 mmol/L    CO2 21 (L) 23 - 29 mmol/L    Glucose 97 70 - 110 mg/dL    BUN 10 8 - 23 mg/dL    Creatinine 0.7 0.5 - 1.4 mg/dL    Calcium 9.1 8.7 - 10.5 mg/dL    Total Protein 7.4 6.0 - 8.4 g/dL    Albumin 3.5 3.5 - 5.2 g/dL    Total Bilirubin 1.3 (H) 0.1 - 1.0 mg/dL    Alkaline Phosphatase 72 55 - 135 U/L    AST 22 10 - 40 U/L    ALT 10 10 - 44 U/L    Anion Gap 14 8 - 16 mmol/L    eGFR if African American >60.0 >60 mL/min/1.73 m^2    eGFR if non African American >60.0 >60 mL/min/1.73 m^2   Magnesium    Collection Time: 06/09/22  8:53 AM   Result Value Ref Range    Magnesium 1.8 1.6 - 2.6 mg/dL   Phosphorus    Collection Time: 06/09/22  8:53 AM   Result Value Ref Range    Phosphorus 3.0 2.7 - 4.5 mg/dL   Protime-INR    Collection Time: 06/09/22  8:53 AM   Result Value Ref Range    Prothrombin Time 11.9 9.0 - 12.5 sec    INR 1.1 0.8 - 1.2         Significant Imaging: I have reviewed all pertinent imaging results/findings within the past 24 hours.    Imaging Results              X-Ray Chest AP Portable (Final result)  Result time 06/07/22 21:39:46      Final result by Jr White MD (06/07/22 21:39:46)                   Impression:      Mild prominence of the pulmonary vascular with appearance of mild interstitial and alveolar infiltrate, as discussed above.      Electronically signed by: Jr White  Date:    06/07/2022  Time:    21:39               Narrative:    EXAMINATION:  XR CHEST AP PORTABLE    CLINICAL HISTORY:  rectal bleeding;    TECHNIQUE:  Single frontal view of the chest was performed.    COMPARISON:  Chest radiograph December 13, 2021    FINDINGS:  Single portable chest view is submitted.  There is diminished depth of inspiration and minimal rotation, when accounting for these factors the appearance of the cardiomediastinal silhouette is stable.    There is accentuation of the  "pulmonary vascular, and there is appearance of mild interstitial and patchy ground-glass alveolar infiltrate.    There is no evidence for pleural effusion and there is no evidence for pneumothorax.  The osseous structures demonstrate chronic change, mild curvature of the spine is noted.  Surgical clips of the right upper quadrant likely relate to prior cholecystectomy.                                          Assessment/Plan:      * GIB (gastrointestinal bleeding)  - GIB Pathway initiated  - Hb 6.3 on presentation  ;  Baseline 8.5  - sp 1 unit PRBC in ED  - s/p colonoscopy with GI; no acute findings and okay to resume diet and discontinue IV PPI  - CBC daily    Tachycardia  Onset imelda procedural; per nursing, treated for atrial fibrillation with metoprolol  Continue scheduled metoprolol  Telemetry  EKG to confirm AFib    Per chart review from May 2022:  Atrial Fibrillation with RVR  Patient has been having paroxsymal runs of atrial fibrillation during this admission with rates up to the 150s. She was seen by cardiology  -TTE Stable  -chest pain improving  -Metoprolol PO started  -Cards recs as follows:   Given isolated episode of atrial fibrillation and acute illness along with anemia holding off on starting anticoagulation at this time, recommending 30 day event monitor at discharge to look for occult atrial fibrillation, follow-up with Dr. Mancia "          Fever  Patient with fever and leukocytosis shortly following admission  Unclear etiology; initially suspected reactive in the setting of GI bleed however colonoscopy unremarkable    Infectious workup with blood cultures, chest x-ray, UA, flu  COVID negative upon admission  Given clinical stability; will hold off on antibiotics for now; low threshold to initiate  Unclear if related to potential breast cancer reoccurrence       Leukocytosis  See fever      Hx of breast cancer  - History of breast cancer and endometrial cancer with recent PET study with concern " for scaral lesion  - planning for bone biopsy OP  - consider IP vs OP hematology oncology depending on clinical course and following further GIB management      VTE Risk Mitigation (From admission, onward)         Ordered     IP VTE HIGH RISK PATIENT  Once         06/07/22 2137     Place sequential compression device  Until discontinued         06/07/22 2137                Discharge Planning   SEBASTIEN: 6/10/2022     Code Status: Full Code   Is the patient medically ready for discharge?: No    Reason for patient still in hospital (select all that apply): Patient new problem, Patient trending condition, Laboratory test, Imaging and Pending disposition  Discharge Plan A: Mike Joseph MD  Department of Hospital Medicine   [facility]

## 2022-06-09 NOTE — SUBJECTIVE & OBJECTIVE
"Interval History:   Patient seen examined this morning prior to endoscopy.  Patient notes no blood with bowel prep overnight; last bowel movement with dark stool yesterday.  Patient with temperature of 100.9° F overnight.  This morning, patient notes some myalgias daughter generalized in addition to sore throat.  She endorses a dry cough however denies any shortness of breath.    Per nursing, during endoscopy, patient "went into afib in the 130's. She is being treated in the PACU by Anesthesia. We gave a total 10mg of IV metoprolol and Anesthesia started her on 12.5mg PO BID. She will be gong back to her room shortly. She is still currently in afib in the 120's. "      Review of Systems   Constitutional:  Positive for fatigue and fever. Negative for chills and diaphoresis.   HENT:  Positive for congestion and sore throat. Negative for rhinorrhea, sinus pain and sneezing.    Eyes:  Negative for photophobia and visual disturbance.   Respiratory:  Positive for cough. Negative for shortness of breath and wheezing.    Cardiovascular:  Negative for chest pain, palpitations and leg swelling.   Gastrointestinal:  Positive for blood in stool. Negative for abdominal distention, abdominal pain, diarrhea, nausea and vomiting.   Genitourinary:  Negative for dysuria and hematuria.   Musculoskeletal:  Positive for myalgias. Negative for neck pain and neck stiffness.   Skin:  Negative for rash and wound.   Neurological:  Positive for dizziness, weakness and light-headedness. Negative for syncope and headaches.   Psychiatric/Behavioral:  Negative for confusion and decreased concentration.      Objective:     Vital Signs (Most Recent):  Temp: 98.1 °F (36.7 °C) (06/09/22 1602)  Pulse: (!) 117 (06/09/22 1602)  Resp: 18 (06/09/22 1602)  BP: 116/60 (06/09/22 1602)  SpO2: 98 % (06/09/22 1602)   Vital Signs (24h Range):  Temp:  [97.7 °F (36.5 °C)-100.9 °F (38.3 °C)] 98.1 °F (36.7 °C)  Pulse:  [] 117  Resp:  [16-43] 18  SpO2:  [85 " %-100 %] 98 %  BP: ()/(54-90) 116/60     Weight: 57.6 kg (126 lb 15.8 oz)  Body mass index is 24.8 kg/m².    Intake/Output Summary (Last 24 hours) at 6/9/2022 1640  Last data filed at 6/9/2022 1051  Gross per 24 hour   Intake 300 ml   Output --   Net 300 ml      Physical Exam  Vitals and nursing note reviewed.   Constitutional:       General: She is not in acute distress.     Appearance: She is not toxic-appearing or diaphoretic.   HENT:      Head: Normocephalic and atraumatic.      Right Ear: External ear normal.      Left Ear: External ear normal.      Nose: Nose normal.      Mouth/Throat:      Mouth: Mucous membranes are moist.      Pharynx: No oropharyngeal exudate or posterior oropharyngeal erythema.   Eyes:      General: No scleral icterus.     Extraocular Movements: Extraocular movements intact.      Pupils: Pupils are equal, round, and reactive to light.   Cardiovascular:      Rate and Rhythm: Regular rhythm. Tachycardia present.      Pulses: Normal pulses.      Heart sounds: Normal heart sounds.   Pulmonary:      Effort: Pulmonary effort is normal. No respiratory distress.      Breath sounds: No wheezing or rales.   Abdominal:      General: Abdomen is flat. There is no distension.      Palpations: Abdomen is soft.      Tenderness: There is no abdominal tenderness. There is no guarding.   Musculoskeletal:         General: Normal range of motion.      Cervical back: Normal range of motion and neck supple. No rigidity.      Right lower leg: No edema.      Left lower leg: No edema.   Skin:     General: Skin is warm and dry.      Coloration: Skin is not jaundiced.   Neurological:      General: No focal deficit present.      Mental Status: She is alert and oriented to person, place, and time.      Cranial Nerves: No cranial nerve deficit.   Psychiatric:         Mood and Affect: Mood normal.         Behavior: Behavior normal.       Significant Labs: All pertinent labs within the past 24 hours have been  reviewed.    Recent Results (from the past 24 hour(s))   CBC auto differential    Collection Time: 06/08/22  6:19 PM   Result Value Ref Range    WBC 11.01 3.90 - 12.70 K/uL    RBC 2.90 (L) 4.00 - 5.40 M/uL    Hemoglobin 8.3 (L) 12.0 - 16.0 g/dL    Hematocrit 26.2 (L) 37.0 - 48.5 %    MCV 90 82 - 98 fL    MCH 28.6 27.0 - 31.0 pg    MCHC 31.7 (L) 32.0 - 36.0 g/dL    RDW 20.8 (H) 11.5 - 14.5 %    Platelets 93 (L) 150 - 450 K/uL    MPV 12.2 9.2 - 12.9 fL    Immature Granulocytes CANCELED 0.0 - 0.5 %    Immature Grans (Abs) CANCELED 0.00 - 0.04 K/uL    Lymph # CANCELED 1.0 - 4.8 K/uL    Mono # CANCELED 0.3 - 1.0 K/uL    Eos # CANCELED 0.0 - 0.5 K/uL    Baso # CANCELED 0.00 - 0.20 K/uL    nRBC 2 (A) 0 /100 WBC    Gran % 69.0 38.0 - 73.0 %    Lymph % 12.0 (L) 18.0 - 48.0 %    Mono % 6.0 4.0 - 15.0 %    Eosinophil % 3.0 0.0 - 8.0 %    Basophil % 0.0 0.0 - 1.9 %    Bands 6.0 %    Myelocytes 2.0 %    Other 2 %    Platelet Estimate Decreased (A)     Aniso Slight     Poik Slight     Poly Occasional     Hypo Occasional     Ovalocytes Occasional     Tear Drop Cells Occasional     Large/Giant Platelets Present     Differential Method Manual    Pathologist Review    Collection Time: 06/08/22  6:19 PM   Result Value Ref Range    Pathologist Review Peripheral Smear REVIEWED    CBC Auto Differential    Collection Time: 06/09/22  8:53 AM   Result Value Ref Range    WBC 13.48 (H) 3.90 - 12.70 K/uL    RBC 3.14 (L) 4.00 - 5.40 M/uL    Hemoglobin 9.1 (L) 12.0 - 16.0 g/dL    Hematocrit 28.6 (L) 37.0 - 48.5 %    MCV 91 82 - 98 fL    MCH 29.0 27.0 - 31.0 pg    MCHC 31.8 (L) 32.0 - 36.0 g/dL    RDW 20.5 (H) 11.5 - 14.5 %    Platelets 108 (L) 150 - 450 K/uL    MPV 11.1 9.2 - 12.9 fL    Immature Granulocytes CANCELED 0.0 - 0.5 %    Immature Grans (Abs) CANCELED 0.00 - 0.04 K/uL    Lymph # CANCELED 1.0 - 4.8 K/uL    Mono # CANCELED 0.3 - 1.0 K/uL    Eos # CANCELED 0.0 - 0.5 K/uL    Baso # CANCELED 0.00 - 0.20 K/uL    nRBC 1 (A) 0 /100 WBC    Gran  % 79.0 (H) 38.0 - 73.0 %    Lymph % 7.0 (L) 18.0 - 48.0 %    Mono % 4.0 4.0 - 15.0 %    Eosinophil % 0.0 0.0 - 8.0 %    Basophil % 0.0 0.0 - 1.9 %    Bands 7.0 %    Metamyelocytes 1.0 %    Myelocytes 2.0 %    Poik Slight     Poly Occasional     Hypo Occasional     Ovalocytes Occasional     Tear Drop Cells Occasional     Differential Method Manual    Comprehensive Metabolic Panel    Collection Time: 06/09/22  8:53 AM   Result Value Ref Range    Sodium 136 136 - 145 mmol/L    Potassium 4.0 3.5 - 5.1 mmol/L    Chloride 101 95 - 110 mmol/L    CO2 21 (L) 23 - 29 mmol/L    Glucose 97 70 - 110 mg/dL    BUN 10 8 - 23 mg/dL    Creatinine 0.7 0.5 - 1.4 mg/dL    Calcium 9.1 8.7 - 10.5 mg/dL    Total Protein 7.4 6.0 - 8.4 g/dL    Albumin 3.5 3.5 - 5.2 g/dL    Total Bilirubin 1.3 (H) 0.1 - 1.0 mg/dL    Alkaline Phosphatase 72 55 - 135 U/L    AST 22 10 - 40 U/L    ALT 10 10 - 44 U/L    Anion Gap 14 8 - 16 mmol/L    eGFR if African American >60.0 >60 mL/min/1.73 m^2    eGFR if non African American >60.0 >60 mL/min/1.73 m^2   Magnesium    Collection Time: 06/09/22  8:53 AM   Result Value Ref Range    Magnesium 1.8 1.6 - 2.6 mg/dL   Phosphorus    Collection Time: 06/09/22  8:53 AM   Result Value Ref Range    Phosphorus 3.0 2.7 - 4.5 mg/dL   Protime-INR    Collection Time: 06/09/22  8:53 AM   Result Value Ref Range    Prothrombin Time 11.9 9.0 - 12.5 sec    INR 1.1 0.8 - 1.2         Significant Imaging: I have reviewed all pertinent imaging results/findings within the past 24 hours.    Imaging Results              X-Ray Chest AP Portable (Final result)  Result time 06/07/22 21:39:46      Final result by Jr White MD (06/07/22 21:39:46)                   Impression:      Mild prominence of the pulmonary vascular with appearance of mild interstitial and alveolar infiltrate, as discussed above.      Electronically signed by: Jr White  Date:    06/07/2022  Time:    21:39               Narrative:    EXAMINATION:  XR CHEST  AP PORTABLE    CLINICAL HISTORY:  rectal bleeding;    TECHNIQUE:  Single frontal view of the chest was performed.    COMPARISON:  Chest radiograph December 13, 2021    FINDINGS:  Single portable chest view is submitted.  There is diminished depth of inspiration and minimal rotation, when accounting for these factors the appearance of the cardiomediastinal silhouette is stable.    There is accentuation of the pulmonary vascular, and there is appearance of mild interstitial and patchy ground-glass alveolar infiltrate.    There is no evidence for pleural effusion and there is no evidence for pneumothorax.  The osseous structures demonstrate chronic change, mild curvature of the spine is noted.  Surgical clips of the right upper quadrant likely relate to prior cholecystectomy.

## 2022-06-09 NOTE — PLAN OF CARE
Patient completed the entire contained of bowel prep before midnight, She has been NPO since after midnight. Her bowel movements are clear yellow. No complaints of nausea or vomiting. Respirations even and unlabored. Vital signs remain stable.     Problem: Adult Inpatient Plan of Care  Goal: Plan of Care Review  Outcome: Ongoing, Progressing     Problem: Adjustment to Illness (Gastrointestinal Bleeding)  Goal: Optimal Coping with Acute Illness  Outcome: Ongoing, Progressing

## 2022-06-09 NOTE — PLAN OF CARE
Call received from telemetry that pt is in Afib with increase HR. Informed tech that pt is off the floor having a colonoscopy. She said she would reach out and inform staff in endo.

## 2022-06-09 NOTE — NURSING TRANSFER
Nursing Transfer Note      6/9/2022     Reason patient is being transferred: post-procedure    Transfer To: 1034    Transfer via stretcher    Transfer with cardiac monitoring    Transported by transport    Medicines sent: n/a    Any special needs or follow-up needed: routine    Chart send with patient: Yes    Notified: none    Patient reassessed at: 1330, 6/9

## 2022-06-10 PROBLEM — I48.0 PAROXYSMAL ATRIAL FIBRILLATION: Status: ACTIVE | Noted: 2022-01-01

## 2022-06-10 NOTE — PLAN OF CARE
Plan of care reviewed with pt:  -AAOx4, on RA, VS stable except temperature trending up, the highest temp was 100.3- notified, pt only has facial flushing when the temp goes up, Tylenol given and temp rechecked was 98.5. On Tele, in NSR with HR 80s-100  -Had 2 BMs with liquid stool, light brown color  -denied nausea, consumed about 50% of her meals  -Denied pain   -nonblanchable redness and mild swelling at the old IV site on left forearm ( IV was removed yesterday), US done for the [facility]  -Ambulated in the room independently  -Call light in reach, [facility]

## 2022-06-10 NOTE — HPI
67-year-old female with past medical history of endometrial cancer, breast cancer, fibromyalgia, PTSD, GIB, blood transfusion, anxiety who presents to the emergency department with a GIB    Has been diagnosed with AFib in the past by Dr. Mancia. . Has not started AC 2/2 to recurrent GIBs.  Long discussion with him and the pt at that time regarding this. . She presented earlier this week and BRBR.  Was started on IV PPI and GI c/s for GIB.  S/p cscope which was negative.  Cardiology c/s for AC recommendations in setting of AFib and recurrent GIB.

## 2022-06-10 NOTE — RESPIRATORY THERAPY
RAPID RESPONSE RESPIRATORY CHART CHECK       Chart check completed, call [phone] for further concerns or assistance.

## 2022-06-10 NOTE — CONSULTS
[facility]  Cardiology  Consult Note    Patient Name: Jeanne Rodgers  MRN: [id number]  Admission Date: 6/7/2022  Hospital Length of Stay: 3 days  Code Status: Full Code   Attending Provider: Maddy Joseph MD   Consulting Provider: Manuel Orozco MD  Primary Care Physician: Gary Gonzalez MD  Principal Problem:GIB (gastrointestinal bleeding)    Patient information was obtained from patient and ER records.     Inpatient consult to Cardiology  Consult performed by: Manuel Orozco MD  Consult ordered by: Maddy Joseph MD        Subjective:     Chief Complaint:  afib     HPI:   67-year-old female with past medical history of endometrial cancer, breast cancer, fibromyalgia, PTSD, GIB, blood transfusion, anxiety who presents to the emergency department with a GIB    Has been diagnosed with AFib in the past by Dr. Mancia. . Has not started AC 2/2 to recurrent GIBs.  Long discussion with him and the pt at that time regarding this. . She presented earlier this week and BRBR.  Was started on IV PPI and GI c/s for GIB.  S/p cscope which was negative.  Cardiology c/s for AC recommendations in setting of AFib and recurrent GIB.      Past Medical History:   Diagnosis Date    Allergy     skin    Anxiety     Breast cancer 2014    right    Chronic back pain     Depression     Encounter for blood transfusion     Endometrial cancer 07/30/2019    Fibromyalgia     Hives     Hx of psychiatric care     Hx of radiation therapy     Itching     Lichen sclerosus     Mental disorder     PONV (postoperative nausea and vomiting)     Psychiatric problem     PTSD (post-traumatic stress disorder)     Sleep difficulties     Sore throat     Therapy     Uterine cancer 08/05/2019    UTI (urinary tract infection)        Past Surgical History:   Procedure Laterality Date    anal fissure surgery      APPENDECTOMY      BIOPSY N/A 11/19/2020    Procedure: BONE BIOPSY;  Surgeon: Dosc Diagnostic Provider;   Location: [facility] OR;  Service: Radiology;  Laterality: N/A;  RN PREOP 11/17/2020    BREAST BIOPSY Right 2014    BREAST LUMPECTOMY Right 2014    R lumpectomy [facility] w 2.4cm IDC & DCIS, neg margins, 0/6 SN, Stage II, ER/IN+, HER-2 neg Adjuvant XRT and hormonal therapy     CARPAL TUNNEL RELEASE      CHOLECYSTECTOMY      COLONOSCOPY      COLONOSCOPY N/A 6/11/2020    Procedure: COLONOSCOPY;  Surgeon: Bobby Marino MD;  Location: [facility] ENDO (2ND FLR);  Service: Endoscopy;  Laterality: N/A;    COLONOSCOPY N/A 6/9/2022    Procedure: COLONOSCOPY;  Surgeon: Abdoulaye Alcocer MD;  Location: [facility] ENDO (2ND FLR);  Service: Endoscopy;  Laterality: N/A;    ESOPHAGOGASTRODUODENOSCOPY N/A 6/11/2020    Procedure: EGD (ESOPHAGOGASTRODUODENOSCOPY);  Surgeon: Bobby Marino MD;  Location: [facility] (2ND FLR);  Service: Endoscopy;  Laterality: N/A;    HYSTERECTOMY  08/15/2019    HYSTEROSCOPY WITH DILATION AND CURETTAGE OF UTERUS N/A 7/24/2019    Procedure: HYSTEROSCOPY, WITH DILATION AND CURETTAGE OF UTERUS;  Surgeon: Елена Kam MD;  Location: [facility];  Service: OB/GYN;  Laterality: N/A;    INSERTION OF TUNNELED CENTRAL VENOUS CATHETER (CVC) WITH SUBCUTANEOUS PORT N/A 9/26/2019    Procedure: INSERTION, PORT-A-CATH;  Surgeon: Moris Varghese MD;  Location: [facility] CATH LAB;  Service: Radiology;  Laterality: N/A;    MEDIPORT REMOVAL N/A 3/5/2020    Procedure: REMOVAL, CATHETER, CENTRAL VENOUS, TUNNELED, WITH PORT;  Surgeon: Moris Varghese MD;  Location: [facility] CATH LAB;  Service: Radiology;  Laterality: N/A;    NEEDLE LOCALIZATION N/A 11/19/2020    Procedure: NEEDLE LOCALIZATION;  Surgeon: [facility] Diagnostic Provider;  Location: [facility] OR;  Service: Radiology;  Laterality: N/A;    PARTIAL MASCECTOMY Right 2014    ROBOT-ASSISTED LAPAROSCOPIC ABDOMINAL HYSTERECTOMY USING DA RAMU XI N/A 8/15/2019    Procedure: XI ROBOTIC HYSTERECTOMY;  Surgeon: Darius Regalado MD;  Location: [facility] OR;  Service: OB/GYN;  Laterality: N/A;     ROBOT-ASSISTED LAPAROSCOPIC SALPINGO-OOPHORECTOMY USING DA RAMU XI Bilateral 8/15/2019    Procedure: XI ROBOTIC SALPINGO-OOPHORECTOMY;  Surgeon: Darius Regalado MD;  Location: [facility] OR;  Service: OB/GYN;  Laterality: Bilateral;    ROBOT-ASSISTED LYMPHADENECTOMY Left 8/15/2019    Procedure: ROBOTIC LYMPHADENECTOMY;  Surgeon: Darius Regalado MD;  Location: [facility] OR;  Service: OB/GYN;  Laterality: Left;    SALPINGOOPHORECTOMY Bilateral 08/15/2019    TONSILLECTOMY      TUBAL LIGATION         Review of patient's allergies indicates:  No Known Allergies    No current facility-administered medications on file prior to encounter.     Current Outpatient Medications on File Prior to Encounter   Medication Sig    furosemide (LASIX) 20 MG tablet Take 1 tablet (20 mg total) by mouth daily as needed (leg swelling).    clobetasol 0.05% (TEMOVATE) 0.05 % Oint APPLY TO AFFECTED AREA(S) TWICE A DAY FOR 1 MONTH(S) then EVERY DAY FOR 1 MONTH(S) then 3 TIMES A WEEK    co-enzyme Q-10 30 mg capsule Take 30 mg by mouth 3 (three) times daily.    fexofenadine (ALLEGRA) 180 MG tablet 2 tablets in the morning.  May take an extra 2 tablets during the day if needed for itching.    hydrocortisone 2.5 % cream Apply to affected areas twice a day when eczema is moderate.    hydrOXYzine HCL (ATARAX) 25 MG tablet 1 to 8 tablets at bedtime.  Start with 3 tablets.  Increase or decrease as needed until itching is controlled or a maximum of 8 tablets.    meth/meblue/sod phos/psal/hyos (URIBEL ORAL) Take by mouth as needed.    nystatin (MYCOSTATIN) cream Apply topically every Mon, Wed, Fri.    nystatin (MYCOSTATIN) powder Apply topically 4 (four) times daily.    ondansetron (ZOFRAN) 4 MG tablet Take 1 tablet (4 mg total) by mouth every 6 (six) hours as needed for Nausea.    oxyCODONE-acetaminophen (PERCOCET) 5-325 mg per tablet Take 1 tablet by mouth every 8 (eight) hours as needed for Pain.    pantoprazole (PROTONIX) 20 MG tablet Take 1  tablet (20 mg total) by mouth daily as needed (acid reflux).    UNABLE TO FIND [place] [employer] Immune Support    UNABLE TO FIND medication name: medical marijuana     Family History       Problem Relation (Age of Onset)    Alcohol abuse Cousin    Arthritis Mother    Cancer Mother, Father, Paternal Aunt    Diabetes Other    Hashimoto's thyroiditis Sister    Heart attack Paternal Grandmother, Paternal Grandfather    Hypertension Mother, Brother, Brother    Leukemia Paternal Uncle (70)    Lung cancer Maternal Uncle    No Known Problems Sister    Other Mother (85)    Pancreatic cancer Father (84), Maternal Aunt (62)    Suicide Cousin          Tobacco Use    Smoking status: Never Smoker    Smokeless tobacco: Never Used   Substance and Sexual Activity    Alcohol use: No     Alcohol/week: 0.0 standard drinks    Drug use: Yes     Types: Marijuana     Comment: medical marijuana- 2 dayys ago     Sexual activity: Not Currently     Partners: Male     Review of Systems   Constitutional: Positive for malaise/fatigue. Negative for fever.   HENT:  Negative for congestion and sore throat.    Eyes:  Negative for blurred vision, vision loss in left eye and vision loss in right eye.   Cardiovascular:  Negative for chest pain, dyspnea on exertion, irregular heartbeat, leg swelling, near-syncope, palpitations and syncope.   Respiratory:  Negative for shortness of breath and wheezing.    Endocrine: Negative.    Hematologic/Lymphatic: Negative.    Skin: Negative.    Musculoskeletal:  Negative for arthritis, back pain, joint pain and myalgias.   Gastrointestinal:  Positive for hematochezia and melena. Negative for abdominal pain and hematemesis.   Genitourinary: Negative.    Neurological:  Negative for light-headedness and loss of balance.   Psychiatric/Behavioral: Negative.     Objective:     Vital Signs (Most Recent):  Temp: 99 °F (37.2 °C) (06/10/22 1118)  Pulse: 82 (06/10/22 1118)  Resp: 17 (06/10/22 1118)  BP: (!) 107/59  (06/10/22 1118)  SpO2: 95 % (06/10/22 1118)   Vital Signs (24h Range):  Temp:  [98.1 °F (36.7 °C)-103 °F (39.4 °C)] 99 °F (37.2 °C)  Pulse:  [] 82  Resp:  [16-20] 17  SpO2:  [85 %-98 %] 95 %  BP: (106-145)/(53-90) 107/59     Weight: 57.6 kg (126 lb 15.8 oz)  Body mass index is 24.8 kg/m².    SpO2: 95 %  O2 Device (Oxygen Therapy): room air      Intake/Output Summary (Last 24 hours) at 6/10/2022 1146  Last data filed at 6/9/2022 2322  Gross per 24 hour   Intake 240 ml   Output 250 ml   Net -10 ml       Lines/Drains/Airways       Peripheral Intravenous Line  Duration                  Peripheral IV - Single Lumen 06/09/22 1240 20 G Anterior;Left Forearm <1 day                    Physical Exam  Vitals and nursing note reviewed.   Constitutional:       Appearance: Normal appearance. She is normal weight.   HENT:      Head: Atraumatic.      Mouth/Throat:      Mouth: Mucous membranes are moist.   Eyes:      General: No scleral icterus.     Extraocular Movements: Extraocular movements intact.   Neck:      Vascular: No carotid bruit.   Cardiovascular:      Rate and Rhythm: Normal rate. Rhythm irregular.      Pulses: Normal pulses.      Heart sounds: Normal heart sounds. No murmur heard.    No gallop.   Pulmonary:      Effort: Pulmonary effort is normal. No respiratory distress.      Breath sounds: Normal breath sounds. No wheezing.   Abdominal:      General: Abdomen is flat. Bowel sounds are normal. There is no distension.      Palpations: Abdomen is soft.      Tenderness: There is no abdominal tenderness.   Musculoskeletal:      Right lower leg: No edema.      Left lower leg: No edema.   Skin:     Capillary Refill: Capillary refill takes less than 2 seconds.   Neurological:      General: No focal deficit present.      Mental Status: She is alert and oriented to person, place, and time. Mental status is at baseline.       Significant Labs: All pertinent lab results from the last 24 hours have been  reviewed.    Significant Imaging: Echocardiogram: Transthoracic echo (TTE) complete (Cupid Only):   Results for orders placed or performed during the hospital encounter of 04/26/22   Echo   Result Value Ref Range    TDI SEPTAL 0.07 m/s    LV LATERAL E/E' RATIO 12.88 m/s    LV SEPTAL E/E' RATIO 14.71 m/s    LA WIDTH 3.93 cm    AORTIC VALVE CUSP SEPERATION 1.43 cm    TDI LATERAL 0.08 m/s    PV PEAK VELOCITY 1.02 cm/s    LVIDd 4.91 3.5 - 6.0 cm    IVS 0.72 0.6 - 1.1 cm    Posterior Wall 0.72 0.6 - 1.1 cm    Ao root annulus 2.28 cm    LVIDs 3.54 2.1 - 4.0 cm    FS 28 28 - 44 %    LA volume 54.75 cm3    Sinus 2.76 cm    STJ 2.50 cm    Ascending aorta 3.02 cm    LV mass 115.15 g    LA size 3.35 cm    RVDD 3.02 cm    TAPSE 2.18 cm    RV S' 13.29 cm/s    Left Ventricle Relative Wall Thickness 0.29 cm    AV mean gradient 6 mmHg    AV valve area 3.00 cm2    AV Velocity Ratio 0.96     AV index (prosthetic) 0.93     MV mean gradient 1 mmHg    MV valve area p 1/2 method 3.71 cm2    MV valve area by continuity eq 2.81 cm2    E/A ratio 1.00     Mean e' 0.08 m/s    E wave deceleration time 204.40 msec    IVRT 131.49 msec    Pulm vein S/D ratio 1.32     LVOT diameter 2.03 cm    LVOT area 3.2 cm2    LVOT peak george 1.54 m/s    LVOT peak VTI 32.10 cm    Ao peak george 1.61 m/s    Ao VTI 34.57 cm    LVOT stroke volume 103.84 cm3    AV peak gradient 10 mmHg    MV peak gradient 6 mmHg    E/E' ratio 13.73 m/s    MV Peak E George 1.03 m/s    TR Max George 1.82 m/s    MV VTI 36.96 cm    MV stenosis pressure 1/2 time 59.28 ms    MV Peak A George 1.03 m/s    PV Peak S George 0.66 m/s    PV Peak D George 0.50 m/s    LV Systolic Volume 52.32 mL    LV Diastolic Volume 113.27 mL    RA Major Axis 4.20 cm    Left Atrium Minor Axis 4.71 cm    Left Atrium Major Axis 5.09 cm    Triscuspid Valve Regurgitation Peak Gradient 13 mmHg    RA Width 2.82 cm    Right Atrial Pressure (from IVC) 8 mmHg    EF 65 %    TV rest pulmonary artery pressure 21 mmHg    Narrative    · The  left ventricle is normal in size with normal systolic function.  · The estimated ejection fraction is 65%.  · Normal right ventricular size with normal right ventricular systolic   function.  · Small anterior pericardial effusion without hemodynamic compromise.  · The estimated PA systolic pressure is 21 mmHg.        Assessment and Plan:     Paroxysmal atrial fibrillation  Known history.  BB for rate control.  CV 2 but unable to tolerate AC 2/2 to recurrent GIB  - recommend to increase metoprolol tartrate to 25mg BID for rate control  - hold on AC at this time (no ASA either)  - recommend pt to f/u with Dr. Mancia (her cardiologist) for evaluation for potential watchman device        VTE Risk Mitigation (From admission, onward)         Ordered     IP VTE HIGH RISK PATIENT  Once         06/07/22 2137     Place sequential compression device  Until discontinued         06/07/22 2137                Thank you for your consult. I will sign off. Please contact us if you have any additional questions.    Manuel Orozco MD  Cardiology   [facility]

## 2022-06-10 NOTE — ASSESSMENT & PLAN NOTE
Onset imelda procedural; per nursing, treated for atrial fibrillation with metoprolol  EKG with AFib with RVR  Continue scheduled metoprolol  Cardiology consulted, appreciate recs   Recommend increase metoprolol tartrate to 25 mg b.i.d. for rate control, hold on AC at this time, recommend follow-up with Dr. Mancia her cardiologist for evaluation for potential Watchman device

## 2022-06-10 NOTE — SUBJECTIVE & OBJECTIVE
Interval History:   Patient seen examined at bedside this morning.  Patient knows brown stool and no further dark stool or blood in stool.  Patient notes improvement of sore throat.  She denies any cough, wheezing, shortness of breath, palpitations, myalgias, chills.  She also denies dysuria, lightheadedness, dizziness.  She does note, however, an area of redness on her left upper extremity.  She noticed this yesterday after taking off dressing which was placed after IV removed.    Review of Systems   Constitutional:  Positive for fatigue and fever. Negative for chills and diaphoresis.   HENT:  Negative for congestion, rhinorrhea, sinus pain and sneezing.    Eyes:  Negative for photophobia and visual disturbance.   Respiratory:  Negative for cough, shortness of breath and wheezing.    Cardiovascular:  Negative for chest pain, palpitations and leg swelling.   Gastrointestinal:  Negative for abdominal distention, abdominal pain, blood in stool, diarrhea, nausea and vomiting.   Genitourinary:  Negative for dysuria and hematuria.   Musculoskeletal:  Negative for myalgias, neck pain and neck stiffness.   Skin:  Negative for rash and wound.   Neurological:  Negative for dizziness, syncope, weakness, light-headedness and headaches.   Psychiatric/Behavioral:  Negative for confusion and decreased concentration.      Objective:     Vital Signs (Most Recent):  Temp: 99.1 °F (37.3 °C) (06/10/22 1300)  Pulse: 82 (06/10/22 1118)  Resp: 17 (06/10/22 1118)  BP: (!) 107/59 (06/10/22 1118)  SpO2: 95 % (06/10/22 1118)   Vital Signs (24h Range):  Temp:  [98.1 °F (36.7 °C)-103 °F (39.4 °C)] 99.1 °F (37.3 °C)  Pulse:  [] 82  Resp:  [17-18] 17  SpO2:  [95 %-98 %] 95 %  BP: (106-118)/(53-60) 107/59     Weight: 57.6 kg (126 lb 15.8 oz)  Body mass index is 24.8 kg/m².    Intake/Output Summary (Last 24 hours) at 6/10/2022 2622  Last data filed at 6/10/2022 1400  Gross per 24 hour   Intake 960 ml   Output 650 ml   Net 310 ml         Physical Exam  Vitals and nursing note reviewed.   Constitutional:       General: She is not in acute distress.     Appearance: She is not toxic-appearing or diaphoretic.   HENT:      Head: Normocephalic and atraumatic.      Right Ear: External ear normal.      Left Ear: External ear normal.      Nose: Nose normal.      Mouth/Throat:      Mouth: Mucous membranes are moist.      Pharynx: No oropharyngeal exudate or posterior oropharyngeal erythema.   Eyes:      General: No scleral icterus.     Extraocular Movements: Extraocular movements intact.      Pupils: Pupils are equal, round, and reactive to light.   Cardiovascular:      Rate and Rhythm: Tachycardia present. Rhythm irregular.      Pulses: Normal pulses.      Heart sounds: Normal heart sounds.   Pulmonary:      Effort: Pulmonary effort is normal. No respiratory distress.      Breath sounds: No wheezing or rales.   Abdominal:      General: Abdomen is flat. There is no distension.      Palpations: Abdomen is soft.      Tenderness: There is no abdominal tenderness. There is no guarding.   Musculoskeletal:         General: Normal range of motion.      Cervical back: Normal range of motion and neck supple. No rigidity.      Right lower leg: No edema.      Left lower leg: No edema.   Skin:     General: Skin is warm and dry.      Coloration: Skin is not jaundiced.      Comments: Left upper extremity with erythema, warm however patient with hot pack in place recently, nontender   Neurological:      General: No focal deficit present.      Mental Status: She is alert and oriented to person, place, and time.      Cranial Nerves: No cranial nerve deficit.   Psychiatric:         Mood and Affect: Mood normal.         Behavior: Behavior normal.           Significant Labs: All pertinent labs within the past 24 hours have been reviewed.    Recent Results (from the past 24 hour(s))   Influenza A & B by Molecular    Collection Time: 06/09/22  5:50 PM    Specimen: Nasopharyngeal  Swab   Result Value Ref Range    Influenza A, Molecular Negative Negative    Influenza B, Molecular Negative Negative    Flu A & B Source Nasal swab    Blood culture    Collection Time: 06/09/22  7:24 PM    Specimen: Blood   Result Value Ref Range    Blood Culture, Routine No Growth to date    Blood culture    Collection Time: 06/09/22  7:25 PM    Specimen: Blood   Result Value Ref Range    Blood Culture, Routine No Growth to date    Urinalysis, Reflex to Urine Culture Urine, Clean Catch    Collection Time: 06/09/22 11:10 PM    Specimen: Urine   Result Value Ref Range    Specimen UA Urine, Clean Catch     Color, UA Yellow Yellow, Straw, Celeste    Appearance, UA Clear Clear    pH, UA 5.0 5.0 - 8.0    Specific Gravity, UA 1.025 1.005 - 1.030    Protein, UA Negative Negative    Glucose, UA Negative Negative    Ketones, UA 3+ (A) Negative    Bilirubin (UA) Negative Negative    Occult Blood UA Negative Negative    Nitrite, UA Negative Negative    Leukocytes, UA Negative Negative   CBC Auto Differential    Collection Time: 06/10/22  4:39 AM   Result Value Ref Range    WBC 9.92 3.90 - 12.70 K/uL    RBC 2.66 (L) 4.00 - 5.40 M/uL    Hemoglobin 7.6 (L) 12.0 - 16.0 g/dL    Hematocrit 24.3 (L) 37.0 - 48.5 %    MCV 91 82 - 98 fL    MCH 28.6 27.0 - 31.0 pg    MCHC 31.3 (L) 32.0 - 36.0 g/dL    RDW 20.1 (H) 11.5 - 14.5 %    Platelets 89 (L) 150 - 450 K/uL    MPV 11.4 9.2 - 12.9 fL    Immature Granulocytes CANCELED 0.0 - 0.5 %    Immature Grans (Abs) CANCELED 0.00 - 0.04 K/uL    nRBC 1 (A) 0 /100 WBC    Gran % 80.0 (H) 38.0 - 73.0 %    Lymph % 9.0 (L) 18.0 - 48.0 %    Mono % 8.0 4.0 - 15.0 %    Eosinophil % 0.0 0.0 - 8.0 %    Basophil % 0.0 0.0 - 1.9 %    Myelocytes 3.0 %    Platelet Estimate Decreased (A)     Aniso Slight     Poik Slight     Poly Occasional     Hypo Occasional     Ovalocytes Occasional     Tear Drop Cells Occasional     Spherocytes Occasional     Schistocytes Present     Fragmented Cells Occasional     Differential  Method Manual    Comprehensive Metabolic Panel    Collection Time: 06/10/22  4:39 AM   Result Value Ref Range    Sodium 135 (L) 136 - 145 mmol/L    Potassium 3.6 3.5 - 5.1 mmol/L    Chloride 101 95 - 110 mmol/L    CO2 22 (L) 23 - 29 mmol/L    Glucose 116 (H) 70 - 110 mg/dL    BUN 10 8 - 23 mg/dL    Creatinine 0.7 0.5 - 1.4 mg/dL    Calcium 9.0 8.7 - 10.5 mg/dL    Total Protein 6.8 6.0 - 8.4 g/dL    Albumin 2.8 (L) 3.5 - 5.2 g/dL    Total Bilirubin 1.0 0.1 - 1.0 mg/dL    Alkaline Phosphatase 56 55 - 135 U/L    AST 15 10 - 40 U/L    ALT 6 (L) 10 - 44 U/L    Anion Gap 12 8 - 16 mmol/L    eGFR if African American >60.0 >60 mL/min/1.73 m^2    eGFR if non African American >60.0 >60 mL/min/1.73 m^2   Magnesium    Collection Time: 06/10/22  4:39 AM   Result Value Ref Range    Magnesium 1.7 1.6 - 2.6 mg/dL   Phosphorus    Collection Time: 06/10/22  4:39 AM   Result Value Ref Range    Phosphorus 3.3 2.7 - 4.5 mg/dL   Procalcitonin    Collection Time: 06/10/22  4:39 AM   Result Value Ref Range    Procalcitonin 0.36 (H) <0.25 ng/mL         Significant Imaging: I have reviewed all pertinent imaging results/findings within the past 24 hours.    Imaging Results              X-Ray Chest AP Portable (Final result)  Result time 06/07/22 21:39:46      Final result by Jr White MD (06/07/22 21:39:46)                   Impression:      Mild prominence of the pulmonary vascular with appearance of mild interstitial and alveolar infiltrate, as discussed above.      Electronically signed by: Jr White  Date:    06/07/2022  Time:    21:39               Narrative:    EXAMINATION:  XR CHEST AP PORTABLE    CLINICAL HISTORY:  rectal bleeding;    TECHNIQUE:  Single frontal view of the chest was performed.    COMPARISON:  Chest radiograph December 13, 2021    FINDINGS:  Single portable chest view is submitted.  There is diminished depth of inspiration and minimal rotation, when accounting for these factors the appearance of the  cardiomediastinal silhouette is stable.    There is accentuation of the pulmonary vascular, and there is appearance of mild interstitial and patchy ground-glass alveolar infiltrate.    There is no evidence for pleural effusion and there is no evidence for pneumothorax.  The osseous structures demonstrate chronic change, mild curvature of the spine is noted.  Surgical clips of the right upper quadrant likely relate to prior cholecystectomy.

## 2022-06-10 NOTE — PROGRESS NOTES
[facility] Medicine  Progress Note    Patient Name: Jeanne Rodgers  MRN: [id number]  Patient Class: IP- Inpatient   Admission Date: 6/7/2022  Length of Stay: 3 days  Attending Physician: Maddy Joseph MD  Primary Care Provider: Gary Gonzalez MD        Subjective:     Principal Problem:GIB (gastrointestinal bleeding)        HPI:  67-year-old female with past medical history of endometrial cancer, breast cancer, fibromyalgia, PTSD, GIB, blood transfusion, anxiety who presents to the emergency department with chief complaint of abnormal lab value. Reports bright red blood per rectum with dark streaks that started four days ago. Had follow up appointment today. Noted to be anemic and referred to the ER for further evaluation. Endorses headache and lightheadedness. Denies chest pain, shortness of breath, abdominal pain, vomiting, diarrhea. Reports pelvic and right leg pain. She had recent imaging which was concerning for bone lesions to the pelvis and has biopsy scheduled for later in June. She denies other worsening or alleviating factors.     In the ED patient afebrile and hemodynamically stable saturating well on room air. Hb 6.3 (baseline 8.5). Patient transfused one unit PRBC in ED, started on iv protonix, and admitted to the care of medicine for further evaluation and management of GIB.      Overview/Hospital Course:  Colonoscopy completed 6/7.  Notable for normal colon, with friable mucosa, ileum that was examined normal, distal rectal and anal verge normal on retroflexion view.      Interval History:   Patient seen examined at bedside this morning.  Patient knows brown stool and no further dark stool or blood in stool.  Patient notes improvement of sore throat.  She denies any cough, wheezing, shortness of breath, palpitations, myalgias, chills.  She also denies dysuria, lightheadedness, dizziness.  She does note, however, an area of redness on her left upper extremity.  She noticed this  yesterday after taking off dressing which was placed after IV removed.    Review of Systems   Constitutional:  Positive for fatigue and fever. Negative for chills and diaphoresis.   HENT:  Negative for congestion, rhinorrhea, sinus pain and sneezing.    Eyes:  Negative for photophobia and visual disturbance.   Respiratory:  Negative for cough, shortness of breath and wheezing.    Cardiovascular:  Negative for chest pain, palpitations and leg swelling.   Gastrointestinal:  Negative for abdominal distention, abdominal pain, blood in stool, diarrhea, nausea and vomiting.   Genitourinary:  Negative for dysuria and hematuria.   Musculoskeletal:  Negative for myalgias, neck pain and neck stiffness.   Skin:  Negative for rash and wound.   Neurological:  Negative for dizziness, syncope, weakness, light-headedness and headaches.   Psychiatric/Behavioral:  Negative for confusion and decreased concentration.      Objective:     Vital Signs (Most Recent):  Temp: 99.1 °F (37.3 °C) (06/10/22 1300)  Pulse: 82 (06/10/22 1118)  Resp: 17 (06/10/22 1118)  BP: (!) 107/59 (06/10/22 1118)  SpO2: 95 % (06/10/22 1118)   Vital Signs (24h Range):  Temp:  [98.1 °F (36.7 °C)-103 °F (39.4 °C)] 99.1 °F (37.3 °C)  Pulse:  [] 82  Resp:  [17-18] 17  SpO2:  [95 %-98 %] 95 %  BP: (106-118)/(53-60) 107/59     Weight: 57.6 kg (126 lb 15.8 oz)  Body mass index is 24.8 kg/m².    Intake/Output Summary (Last 24 hours) at 6/10/2022 4087  Last data filed at 6/10/2022 1400  Gross per 24 hour   Intake 960 ml   Output 650 ml   Net 310 ml        Physical Exam  Vitals and nursing note reviewed.   Constitutional:       General: She is not in acute distress.     Appearance: She is not toxic-appearing or diaphoretic.   HENT:      Head: Normocephalic and atraumatic.      Right Ear: External ear normal.      Left Ear: External ear normal.      Nose: Nose normal.      Mouth/Throat:      Mouth: Mucous membranes are moist.      Pharynx: No oropharyngeal exudate or  posterior oropharyngeal erythema.   Eyes:      General: No scleral icterus.     Extraocular Movements: Extraocular movements intact.      Pupils: Pupils are equal, round, and reactive to light.   Cardiovascular:      Rate and Rhythm: Tachycardia present. Rhythm irregular.      Pulses: Normal pulses.      Heart sounds: Normal heart sounds.   Pulmonary:      Effort: Pulmonary effort is normal. No respiratory distress.      Breath sounds: No wheezing or rales.   Abdominal:      General: Abdomen is flat. There is no distension.      Palpations: Abdomen is soft.      Tenderness: There is no abdominal tenderness. There is no guarding.   Musculoskeletal:         General: Normal range of motion.      Cervical back: Normal range of motion and neck supple. No rigidity.      Right lower leg: No edema.      Left lower leg: No edema.   Skin:     General: Skin is warm and dry.      Coloration: Skin is not jaundiced.      Comments: Left upper extremity with erythema, warm however patient with hot pack in place recently, nontender   Neurological:      General: No focal deficit present.      Mental Status: She is alert and oriented to person, place, and time.      Cranial Nerves: No cranial nerve deficit.   Psychiatric:         Mood and Affect: Mood normal.         Behavior: Behavior normal.           Significant Labs: All pertinent labs within the past 24 hours have been reviewed.    Recent Results (from the past 24 hour(s))   Influenza A & B by Molecular    Collection Time: 06/09/22  5:50 PM    Specimen: Nasopharyngeal Swab   Result Value Ref Range    Influenza A, Molecular Negative Negative    Influenza B, Molecular Negative Negative    Flu A & B Source Nasal swab    Blood culture    Collection Time: 06/09/22  7:24 PM    Specimen: Blood   Result Value Ref Range    Blood Culture, Routine No Growth to date    Blood culture    Collection Time: 06/09/22  7:25 PM    Specimen: Blood   Result Value Ref Range    Blood Culture, Routine No  Growth to date    Urinalysis, Reflex to Urine Culture Urine, Clean Catch    Collection Time: 06/09/22 11:10 PM    Specimen: Urine   Result Value Ref Range    Specimen UA Urine, Clean Catch     Color, UA Yellow Yellow, Straw, Celeste    Appearance, UA Clear Clear    pH, UA 5.0 5.0 - 8.0    Specific Gravity, UA 1.025 1.005 - 1.030    Protein, UA Negative Negative    Glucose, UA Negative Negative    Ketones, UA 3+ (A) Negative    Bilirubin (UA) Negative Negative    Occult Blood UA Negative Negative    Nitrite, UA Negative Negative    Leukocytes, UA Negative Negative   CBC Auto Differential    Collection Time: 06/10/22  4:39 AM   Result Value Ref Range    WBC 9.92 3.90 - 12.70 K/uL    RBC 2.66 (L) 4.00 - 5.40 M/uL    Hemoglobin 7.6 (L) 12.0 - 16.0 g/dL    Hematocrit 24.3 (L) 37.0 - 48.5 %    MCV 91 82 - 98 fL    MCH 28.6 27.0 - 31.0 pg    MCHC 31.3 (L) 32.0 - 36.0 g/dL    RDW 20.1 (H) 11.5 - 14.5 %    Platelets 89 (L) 150 - 450 K/uL    MPV 11.4 9.2 - 12.9 fL    Immature Granulocytes CANCELED 0.0 - 0.5 %    Immature Grans (Abs) CANCELED 0.00 - 0.04 K/uL    nRBC 1 (A) 0 /100 WBC    Gran % 80.0 (H) 38.0 - 73.0 %    Lymph % 9.0 (L) 18.0 - 48.0 %    Mono % 8.0 4.0 - 15.0 %    Eosinophil % 0.0 0.0 - 8.0 %    Basophil % 0.0 0.0 - 1.9 %    Myelocytes 3.0 %    Platelet Estimate Decreased (A)     Aniso Slight     Poik Slight     Poly Occasional     Hypo Occasional     Ovalocytes Occasional     Tear Drop Cells Occasional     Spherocytes Occasional     Schistocytes Present     Fragmented Cells Occasional     Differential Method Manual    Comprehensive Metabolic Panel    Collection Time: 06/10/22  4:39 AM   Result Value Ref Range    Sodium 135 (L) 136 - 145 mmol/L    Potassium 3.6 3.5 - 5.1 mmol/L    Chloride 101 95 - 110 mmol/L    CO2 22 (L) 23 - 29 mmol/L    Glucose 116 (H) 70 - 110 mg/dL    BUN 10 8 - 23 mg/dL    Creatinine 0.7 0.5 - 1.4 mg/dL    Calcium 9.0 8.7 - 10.5 mg/dL    Total Protein 6.8 6.0 - 8.4 g/dL    Albumin 2.8 (L)  3.5 - 5.2 g/dL    Total Bilirubin 1.0 0.1 - 1.0 mg/dL    Alkaline Phosphatase 56 55 - 135 U/L    AST 15 10 - 40 U/L    ALT 6 (L) 10 - 44 U/L    Anion Gap 12 8 - 16 mmol/L    eGFR if African American >60.0 >60 mL/min/1.73 m^2    eGFR if non African American >60.0 >60 mL/min/1.73 m^2   Magnesium    Collection Time: 06/10/22  4:39 AM   Result Value Ref Range    Magnesium 1.7 1.6 - 2.6 mg/dL   Phosphorus    Collection Time: 06/10/22  4:39 AM   Result Value Ref Range    Phosphorus 3.3 2.7 - 4.5 mg/dL   Procalcitonin    Collection Time: 06/10/22  4:39 AM   Result Value Ref Range    Procalcitonin 0.36 (H) <0.25 ng/mL         Significant Imaging: I have reviewed all pertinent imaging results/findings within the past 24 hours.    Imaging Results              X-Ray Chest AP Portable (Final result)  Result time 06/07/22 21:39:46      Final result by Jr White MD (06/07/22 21:39:46)                   Impression:      Mild prominence of the pulmonary vascular with appearance of mild interstitial and alveolar infiltrate, as discussed above.      Electronically signed by: Jr White  Date:    06/07/2022  Time:    21:39               Narrative:    EXAMINATION:  XR CHEST AP PORTABLE    CLINICAL HISTORY:  rectal bleeding;    TECHNIQUE:  Single frontal view of the chest was performed.    COMPARISON:  Chest radiograph December 13, 2021    FINDINGS:  Single portable chest view is submitted.  There is diminished depth of inspiration and minimal rotation, when accounting for these factors the appearance of the cardiomediastinal silhouette is stable.    There is accentuation of the pulmonary vascular, and there is appearance of mild interstitial and patchy ground-glass alveolar infiltrate.    There is no evidence for pleural effusion and there is no evidence for pneumothorax.  The osseous structures demonstrate chronic change, mild curvature of the spine is noted.  Surgical clips of the right upper quadrant likely relate to  prior cholecystectomy.                                          Assessment/Plan:      * GIB (gastrointestinal bleeding)  - GIB Pathway initiated  - Hb 6.3 on presentation  ;  Baseline 8.5  - sp 1 unit PRBC in ED  - s/p colonoscopy with GI; no acute findings and okay to resume diet and discontinue IV PPI  - CBC daily    Paroxysmal atrial fibrillation  See tachycardia      Tachycardia  Onset imelda procedural; per nursing, treated for atrial fibrillation with metoprolol  EKG with AFib with RVR  Continue scheduled metoprolol  Cardiology consulted, appreciate recs   Recommend increase metoprolol tartrate to 25 mg b.i.d. for rate control, hold on AC at this time, recommend follow-up with Dr. Mancia her cardiologist for evaluation for potential Watchman device    Fever  Patient with fever and leukocytosis shortly following admission  Unclear etiology; initially suspected reactive in the setting of GI bleed however colonoscopy unremarkable    Infectious workup with blood cultures (ngtd), chest x-ray (no acute process), UA (no overt infection), flu (negative)  COVID negative upon admission  Unclear if related to potential breast cancer reoccurrence  Patient with potential cellulitis of left upper extremity; will initiate doxycycline       Leukocytosis  See fever      Hx of breast cancer  - History of breast cancer and endometrial cancer with recent PET study with concern for scaral lesion  - planning for bone biopsy OP  - consider IP vs OP hematology oncology depending on clinical course and following further GIB management        VTE Risk Mitigation (From admission, onward)         Ordered     IP VTE HIGH RISK PATIENT  Once         06/07/22 2137     Place sequential compression device  Until discontinued         06/07/22 2137                Discharge Planning   SEBASTIEN: 6/12/2022     Code Status: Full Code   Is the patient medically ready for discharge?: No    Reason for patient still in hospital (select all that apply): Patient  trending condition, Laboratory test and Imaging  Discharge Plan A: Home                  Maddy Joseph MD  Department of Hospital Medicine   [facility]

## 2022-06-10 NOTE — SUBJECTIVE & OBJECTIVE
Past Medical History:   Diagnosis Date    Allergy     skin    Anxiety     Breast cancer 2014    right    Chronic back pain     Depression     Encounter for blood transfusion     Endometrial cancer 07/30/2019    Fibromyalgia     Hives     Hx of psychiatric care     Hx of radiation therapy     Itching     Lichen sclerosus     Mental disorder     PONV (postoperative nausea and vomiting)     Psychiatric problem     PTSD (post-traumatic stress disorder)     Sleep difficulties     Sore throat     Therapy     Uterine cancer 08/05/2019    UTI (urinary tract infection)        Past Surgical History:   Procedure Laterality Date    anal fissure surgery      APPENDECTOMY      BIOPSY N/A 11/19/2020    Procedure: BONE BIOPSY;  Surgeon: [facility] Diagnostic Provider;  Location: [facility] OR;  Service: Radiology;  Laterality: N/A;  RN PREOP 11/17/2020    BREAST BIOPSY Right 2014    BREAST LUMPECTOMY Right 2014    R lumpectomy [facility] w 2.4cm IDC & DCIS, neg margins, 0/6 SN, Stage II, ER/AR+, HER-2 neg Adjuvant XRT and hormonal therapy     CARPAL TUNNEL RELEASE      CHOLECYSTECTOMY      COLONOSCOPY      COLONOSCOPY N/A 6/11/2020    Procedure: COLONOSCOPY;  Surgeon: Bobby Marino MD;  Location: [facility] ([address] [place] [address]);  Service: Endoscopy;  Laterality: N/A;    COLONOSCOPY N/A 6/9/2022    Procedure: COLONOSCOPY;  Surgeon: Abdoulaye Alcocer MD;  Location: [facility] ([address] [place] [address]);  Service: Endoscopy;  Laterality: N/A;    ESOPHAGOGASTRODUODENOSCOPY N/A 6/11/2020    Procedure: EGD (ESOPHAGOGASTRODUODENOSCOPY);  Surgeon: Bobby Marino MD;  Location: [facility] ([address] [place] [address]);  Service: Endoscopy;  Laterality: N/A;    HYSTERECTOMY  08/15/2019    HYSTEROSCOPY WITH DILATION AND CURETTAGE OF UTERUS N/A 7/24/2019    Procedure: HYSTEROSCOPY, WITH DILATION AND CURETTAGE OF UTERUS;  Surgeon: Елена Kam MD;  Location: [facility] OR;  Service: OB/GYN;  Laterality: N/A;    INSERTION OF TUNNELED CENTRAL VENOUS CATHETER (CVC) WITH SUBCUTANEOUS PORT N/A 9/26/2019     Procedure: INSERTION, PORT-A-CATH;  Surgeon: Moris Varghese MD;  Location: [facility] [place], operated [facility] CATH LAB;  Service: Radiology;  Laterality: N/A;    MEDIPORT REMOVAL N/A 3/5/2020    Procedure: REMOVAL, CATHETER, CENTRAL VENOUS, TUNNELED, WITH PORT;  Surgeon: Moris Varghese MD;  Location: [facility], [place], operated [facility] CATH LAB;  Service: Radiology;  Laterality: N/A;    NEEDLE LOCALIZATION N/A 11/19/2020    Procedure: NEEDLE LOCALIZATION;  Surgeon: [facility] Diagnostic Provider;  Location: [facility] OR;  Service: Radiology;  Laterality: N/A;    PARTIAL MASCECTOMY Right 2014    ROBOT-ASSISTED LAPAROSCOPIC ABDOMINAL HYSTERECTOMY USING DA RAMU XI N/A 8/15/2019    Procedure: XI ROBOTIC HYSTERECTOMY;  Surgeon: Darius Regalado MD;  Location: [facility] [place] OR;  Service: OB/GYN;  Laterality: N/A;    ROBOT-ASSISTED LAPAROSCOPIC SALPINGO-OOPHORECTOMY USING DA RAMU XI Bilateral 8/15/2019    Procedure: XI ROBOTIC SALPINGO-OOPHORECTOMY;  Surgeon: Darius Regalado MD;  Location: [facility];  Service: OB/GYN;  Laterality: Bilateral;    ROBOT-ASSISTED LYMPHADENECTOMY Left 8/15/2019    Procedure: ROBOTIC LYMPHADENECTOMY;  Surgeon: Darius Regalado MD;  Location: [facility] [place] OR;  Service: OB/GYN;  Laterality: Left;    SALPINGOOPHORECTOMY Bilateral 08/15/2019    TONSILLECTOMY      TUBAL LIGATION         Review of patient's allergies indicates:  No Known Allergies    No current facility-administered medications on file prior to encounter.     Current Outpatient Medications on File Prior to Encounter   Medication Sig    furosemide (LASIX) 20 MG tablet Take 1 tablet (20 mg total) by mouth daily as needed (leg swelling).    clobetasol 0.05% (TEMOVATE) 0.05 % Oint APPLY TO AFFECTED AREA(S) TWICE A DAY FOR 1 MONTH(S) then EVERY DAY FOR 1 MONTH(S) then 3 TIMES A WEEK    co-enzyme Q-10 30 mg capsule Take 30 mg by mouth 3 (three) times daily.    fexofenadine (ALLEGRA) 180 MG tablet 2 tablets in the morning.  May take an extra 2 tablets during the day if needed for itching.    hydrocortisone 2.5 % cream Apply to  affected areas twice a day when eczema is moderate.    hydrOXYzine HCL (ATARAX) 25 MG tablet 1 to 8 tablets at bedtime.  Start with 3 tablets.  Increase or decrease as needed until itching is controlled or a maximum of 8 tablets.    meth/meblue/sod phos/psal/hyos (URIBEL ORAL) Take by mouth as needed.    nystatin (MYCOSTATIN) cream Apply topically every Mon, Wed, Fri.    nystatin (MYCOSTATIN) powder Apply topically 4 (four) times daily.    ondansetron (ZOFRAN) 4 MG tablet Take 1 tablet (4 mg total) by mouth every 6 (six) hours as needed for Nausea.    oxyCODONE-acetaminophen (PERCOCET) 5-325 mg per tablet Take 1 tablet by mouth every 8 (eight) hours as needed for Pain.    pantoprazole (PROTONIX) 20 MG tablet Take 1 tablet (20 mg total) by mouth daily as needed (acid reflux).    UNABLE TO FIND [place] [employer] Mushrooms Immune Support    UNABLE TO FIND medication name: medical marijuana     Family History       Problem Relation (Age of Onset)    Alcohol abuse Cousin    Arthritis Mother    Cancer Mother, Father, Paternal Aunt    Diabetes Other    Hashimoto's thyroiditis Sister    Heart attack Paternal Grandmother, Paternal Grandfather    Hypertension Mother, Brother, Brother    Leukemia Paternal Uncle (70)    Lung cancer Maternal Uncle    No Known Problems Sister    Other Mother (85)    Pancreatic cancer Father (84), Maternal Aunt (62)    Suicide Cousin          Tobacco Use    Smoking status: Never Smoker    Smokeless tobacco: Never Used   Substance and Sexual Activity    Alcohol use: No     Alcohol/week: 0.0 standard drinks    Drug use: Yes     Types: Marijuana     Comment: medical marijuana- 2 dayys ago     Sexual activity: Not Currently     Partners: Male     Review of Systems   Constitutional: Positive for malaise/fatigue. Negative for fever.   HENT:  Negative for congestion and sore throat.    Eyes:  Negative for blurred vision, vision loss in left eye and vision loss in right eye.   Cardiovascular:  Negative for  chest pain, dyspnea on exertion, irregular heartbeat, leg swelling, near-syncope, palpitations and syncope.   Respiratory:  Negative for shortness of breath and wheezing.    Endocrine: Negative.    Hematologic/Lymphatic: Negative.    Skin: Negative.    Musculoskeletal:  Negative for arthritis, back pain, joint pain and myalgias.   Gastrointestinal:  Positive for hematochezia and melena. Negative for abdominal pain and hematemesis.   Genitourinary: Negative.    Neurological:  Negative for light-headedness and loss of balance.   Psychiatric/Behavioral: Negative.     Objective:     Vital Signs (Most Recent):  Temp: 99 °F (37.2 °C) (06/10/22 1118)  Pulse: 82 (06/10/22 1118)  Resp: 17 (06/10/22 1118)  BP: (!) 107/59 (06/10/22 1118)  SpO2: 95 % (06/10/22 1118)   Vital Signs (24h Range):  Temp:  [98.1 °F (36.7 °C)-103 °F (39.4 °C)] 99 °F (37.2 °C)  Pulse:  [] 82  Resp:  [16-20] 17  SpO2:  [85 %-98 %] 95 %  BP: (106-145)/(53-90) 107/59     Weight: 57.6 kg (126 lb 15.8 oz)  Body mass index is 24.8 kg/m².    SpO2: 95 %  O2 Device (Oxygen Therapy): room air      Intake/Output Summary (Last 24 hours) at 6/10/2022 1146  Last data filed at 6/9/2022 2322  Gross per 24 hour   Intake 240 ml   Output 250 ml   Net -10 ml       Lines/Drains/Airways       Peripheral Intravenous Line  Duration                  Peripheral IV - Single Lumen 06/09/22 1240 20 G Anterior;Left Forearm <1 day                    Physical Exam  Vitals and nursing note reviewed.   Constitutional:       Appearance: Normal appearance. She is normal weight.   HENT:      Head: Atraumatic.      Mouth/Throat:      Mouth: Mucous membranes are moist.   Eyes:      General: No scleral icterus.     Extraocular Movements: Extraocular movements intact.   Neck:      Vascular: No carotid bruit.   Cardiovascular:      Rate and Rhythm: Normal rate. Rhythm irregular.      Pulses: Normal pulses.      Heart sounds: Normal heart sounds. No murmur heard.    No gallop.    Pulmonary:      Effort: Pulmonary effort is normal. No respiratory distress.      Breath sounds: Normal breath sounds. No wheezing.   Abdominal:      General: Abdomen is flat. Bowel sounds are normal. There is no distension.      Palpations: Abdomen is soft.      Tenderness: There is no abdominal tenderness.   Musculoskeletal:      Right lower leg: No edema.      Left lower leg: No edema.   Skin:     Capillary Refill: Capillary refill takes less than 2 seconds.   Neurological:      General: No focal deficit present.      Mental Status: She is alert and oriented to person, place, and time. Mental status is at baseline.       Significant Labs: All pertinent lab results from the last 24 hours have been reviewed.    Significant Imaging: Echocardiogram: Transthoracic echo (TTE) complete (Cupid Only):   Results for orders placed or performed during the hospital encounter of 04/26/22   Echo   Result Value Ref Range    TDI SEPTAL 0.07 m/s    LV LATERAL E/E' RATIO 12.88 m/s    LV SEPTAL E/E' RATIO 14.71 m/s    LA WIDTH 3.93 cm    AORTIC VALVE CUSP SEPERATION 1.43 cm    TDI LATERAL 0.08 m/s    PV PEAK VELOCITY 1.02 cm/s    LVIDd 4.91 3.5 - 6.0 cm    IVS 0.72 0.6 - 1.1 cm    Posterior Wall 0.72 0.6 - 1.1 cm    Ao root annulus 2.28 cm    LVIDs 3.54 2.1 - 4.0 cm    FS 28 28 - 44 %    LA volume 54.75 cm3    Sinus 2.76 cm    STJ 2.50 cm    Ascending aorta 3.02 cm    LV mass 115.15 g    LA size 3.35 cm    RVDD 3.02 cm    TAPSE 2.18 cm    RV S' 13.29 cm/s    Left Ventricle Relative Wall Thickness 0.29 cm    AV mean gradient 6 mmHg    AV valve area 3.00 cm2    AV Velocity Ratio 0.96     AV index (prosthetic) 0.93     MV mean gradient 1 mmHg    MV valve area p 1/2 method 3.71 cm2    MV valve area by continuity eq 2.81 cm2    E/A ratio 1.00     Mean e' 0.08 m/s    E wave deceleration time 204.40 msec    IVRT 131.49 msec    Pulm vein S/D ratio 1.32     LVOT diameter 2.03 cm    LVOT area 3.2 cm2    LVOT peak karen 1.54 m/s    LVOT peak  VTI 32.10 cm    Ao peak george 1.61 m/s    Ao VTI 34.57 cm    LVOT stroke volume 103.84 cm3    AV peak gradient 10 mmHg    MV peak gradient 6 mmHg    E/E' ratio 13.73 m/s    MV Peak E George 1.03 m/s    TR Max George 1.82 m/s    MV VTI 36.96 cm    MV stenosis pressure 1/2 time 59.28 ms    MV Peak A George 1.03 m/s    PV Peak S George 0.66 m/s    PV Peak D George 0.50 m/s    LV Systolic Volume 52.32 mL    LV Diastolic Volume 113.27 mL    RA Major Axis 4.20 cm    Left Atrium Minor Axis 4.71 cm    Left Atrium Major Axis 5.09 cm    Triscuspid Valve Regurgitation Peak Gradient 13 mmHg    RA Width 2.82 cm    Right Atrial Pressure (from IVC) 8 mmHg    EF 65 %    TV rest pulmonary artery pressure 21 mmHg    Narrative    · The left ventricle is normal in size with normal systolic function.  · The estimated ejection fraction is 65%.  · Normal right ventricular size with normal right ventricular systolic   function.  · Small anterior pericardial effusion without hemodynamic compromise.  · The estimated PA systolic pressure is 21 mmHg.

## 2022-06-10 NOTE — PLAN OF CARE
Patient did not experience and dark stools tonight. She did experience an elevated temp of 103. Tylenol was given and effective. Patient tends to run a temp at night/early morning over the past couple of days. Blood culture already obtained and results are pending. Urine culture already obtained early in this shift. Patient's urine upon collection was dark magi in color which is a difference from the color of her urine collected from her 2 days prior. She denies dysuria or lower back pain. She continues to consume plenty of water.     Problem: Adult Inpatient Plan of Care  Goal: Plan of Care Review  Outcome: Ongoing, Progressing

## 2022-06-10 NOTE — ASSESSMENT & PLAN NOTE
Known history.  BB for rate control.  CV 2 but unable to tolerate AC 2/2 to recurrent GIB  - recommend to increase metoprolol tartrate to 25mg BID for rate control  - hold on AC at this time (no ASA either)  - recommend pt to f/u with Dr. Mancia (her cardiologist) for evaluation for potential watchman device

## 2022-06-10 NOTE — ASSESSMENT & PLAN NOTE
Patient with fever and leukocytosis shortly following admission  Unclear etiology; initially suspected reactive in the setting of GI bleed however colonoscopy unremarkable    Infectious workup with blood cultures (ngtd), chest x-ray (no acute process), UA (no overt infection), flu (negative)  COVID negative upon admission  Unclear if related to potential breast cancer reoccurrence  Patient with potential cellulitis of left upper extremity; will initiate doxycycline

## 2022-06-11 PROBLEM — L03.90 CELLULITIS: Status: ACTIVE | Noted: 2022-01-01

## 2022-06-11 NOTE — ASSESSMENT & PLAN NOTE
- History of breast cancer and endometrial cancer with recent PET study with concern for scaral lesion  - planning for bone biopsy OP  -  OP hematology oncology follow-up

## 2022-06-11 NOTE — NURSING
Discharged instructions given to the pt. Meds delivered to the room, no further questions at this time

## 2022-06-11 NOTE — SUBJECTIVE & OBJECTIVE
Interval History:   Patient seen examined at bedside this morning.  Patient notes brown stool and no further dark stool or blood in stool.  Patient notes improvement of sore throat.  She denies any cough, wheezing, shortness of breath, palpitations, myalgias, chills.  She also denies dysuria, lightheadedness, dizziness.      Area of redness noted on left upper extremity yesterday improved on doxycycline.  Patient also notes general improvement of energy.  Afebrile overnight.    Review of Systems   Constitutional:  Negative for chills, diaphoresis, fatigue and fever.   HENT:  Negative for congestion, rhinorrhea, sinus pain and sneezing.    Eyes:  Negative for photophobia and visual disturbance.   Respiratory:  Negative for cough, shortness of breath and wheezing.    Cardiovascular:  Negative for chest pain, palpitations and leg swelling.   Gastrointestinal:  Negative for abdominal distention, abdominal pain, blood in stool, diarrhea, nausea and vomiting.   Genitourinary:  Negative for dysuria and hematuria.   Musculoskeletal:  Negative for myalgias, neck pain and neck stiffness.   Skin:  Negative for rash and wound.   Neurological:  Negative for dizziness, syncope, weakness, light-headedness and headaches.   Psychiatric/Behavioral:  Negative for confusion and decreased concentration.      Objective:     Vital Signs (Most Recent):  Temp: 98.9 °F (37.2 °C) (06/11/22 1209)  Pulse: 81 (06/11/22 1209)  Resp: 18 (06/11/22 1209)  BP: 112/74 (06/11/22 1209)  SpO2: (!) 92 % (06/11/22 1209)   Vital Signs (24h Range):  Temp:  [98.3 °F (36.8 °C)-100.3 °F (37.9 °C)] 98.9 °F (37.2 °C)  Pulse:  [] 81  Resp:  [16-20] 18  SpO2:  [92 %-99 %] 92 %  BP: (107-127)/(57-74) 112/74     Weight: 57.6 kg (126 lb 15.8 oz)  Body mass index is 24.8 kg/m².    Intake/Output Summary (Last 24 hours) at 6/11/2022 9183  Last data filed at 6/11/2022 1000  Gross per 24 hour   Intake --   Output 950 ml   Net -950 ml        Physical Exam  Vitals and  nursing note reviewed.   Constitutional:       General: She is not in acute distress.     Appearance: She is not toxic-appearing or diaphoretic.   HENT:      Head: Normocephalic and atraumatic.      Right Ear: External ear normal.      Left Ear: External ear normal.      Nose: Nose normal.      Mouth/Throat:      Mouth: Mucous membranes are moist.      Pharynx: No oropharyngeal exudate or posterior oropharyngeal erythema.   Eyes:      General: No scleral icterus.     Extraocular Movements: Extraocular movements intact.      Pupils: Pupils are equal, round, and reactive to light.   Cardiovascular:      Rate and Rhythm: Normal rate. Rhythm irregular.      Pulses: Normal pulses.      Heart sounds: Normal heart sounds.   Pulmonary:      Effort: Pulmonary effort is normal. No respiratory distress.      Breath sounds: No wheezing or rales.   Abdominal:      General: Abdomen is flat. There is no distension.      Palpations: Abdomen is soft.      Tenderness: There is no abdominal tenderness. There is no guarding.   Musculoskeletal:         General: Normal range of motion.      Cervical back: Normal range of motion and neck supple. No rigidity.      Right lower leg: No edema.      Left lower leg: No edema.   Skin:     General: Skin is warm and dry.      Coloration: Skin is not jaundiced.      Comments: Left upper extremity erythema and warmth improved; nontender   Neurological:      General: No focal deficit present.      Mental Status: She is alert and oriented to person, place, and time.      Cranial Nerves: No cranial nerve deficit.   Psychiatric:         Mood and Affect: Mood normal.         Behavior: Behavior normal.           Significant Labs: All pertinent labs within the past 24 hours have been reviewed.    Recent Results (from the past 24 hour(s))   CBC Auto Differential    Collection Time: 06/11/22  3:51 AM   Result Value Ref Range    WBC 7.77 3.90 - 12.70 K/uL    RBC 2.93 (L) 4.00 - 5.40 M/uL    Hemoglobin 8.2 (L)  12.0 - 16.0 g/dL    Hematocrit 25.2 (L) 37.0 - 48.5 %    MCV 86 82 - 98 fL    MCH 28.0 27.0 - 31.0 pg    MCHC 32.5 32.0 - 36.0 g/dL    RDW 19.6 (H) 11.5 - 14.5 %    Platelets 84 (L) 150 - 450 K/uL    MPV 12.2 9.2 - 12.9 fL    Immature Granulocytes CANCELED 0.0 - 0.5 %    Immature Grans (Abs) CANCELED 0.00 - 0.04 K/uL    Lymph # CANCELED 1.0 - 4.8 K/uL    Mono # CANCELED 0.3 - 1.0 K/uL    Eos # CANCELED 0.0 - 0.5 K/uL    Baso # CANCELED 0.00 - 0.20 K/uL    nRBC 1 (A) 0 /100 WBC    Gran % 63.0 38.0 - 73.0 %    Lymph % 16.0 (L) 18.0 - 48.0 %    Mono % 3.0 (L) 4.0 - 15.0 %    Eosinophil % 0.0 0.0 - 8.0 %    Basophil % 0.0 0.0 - 1.9 %    Bands 7.0 %    Metamyelocytes 4.0 %    Myelocytes 5.0 %    Promyelocytes 2.0 %    Platelet Estimate Decreased (A)     Aniso Slight     Poik Slight     Poly Occasional     Hypo Occasional     Basile Cells Occasional     Differential Method Manual    Magnesium    Collection Time: 06/11/22  3:51 AM   Result Value Ref Range    Magnesium 1.7 1.6 - 2.6 mg/dL   Phosphorus    Collection Time: 06/11/22  3:51 AM   Result Value Ref Range    Phosphorus 3.0 2.7 - 4.5 mg/dL   Basic metabolic panel    Collection Time: 06/11/22  3:51 AM   Result Value Ref Range    Sodium 136 136 - 145 mmol/L    Potassium 4.1 3.5 - 5.1 mmol/L    Chloride 105 95 - 110 mmol/L    CO2 21 (L) 23 - 29 mmol/L    Glucose 115 (H) 70 - 110 mg/dL    BUN 7 (L) 8 - 23 mg/dL    Creatinine 0.7 0.5 - 1.4 mg/dL    Calcium 9.0 8.7 - 10.5 mg/dL    Anion Gap 10 8 - 16 mmol/L    eGFR if African American >60.0 >60 mL/min/1.73 m^2    eGFR if non African American >60.0 >60 mL/min/1.73 m^2     Microbiology Results (last 7 days)       Procedure Component Value Units Date/Time    Blood culture [[id number]] Collected: 06/09/22 1924    Order Status: Completed Specimen: Blood Updated: 06/10/22 2212     Blood Culture, Routine No Growth to date      No Growth to date    Blood culture [[id number]] Collected: 06/09/22 1925    Order Status: Completed  Specimen: Blood Updated: 06/10/22 2212     Blood Culture, Routine No Growth to date      No Growth to date    Influenza A & B by Molecular [[id number]] Collected: 06/09/22 1750    Order Status: Completed Specimen: Nasopharyngeal Swab Updated: 06/09/22 1939     Influenza A, Molecular Negative     Influenza B, Molecular Negative     Flu A & B Source Nasal swab    Culture, Respiratory with Gram Stain [[id number]]     Order Status: No result Specimen: Respiratory               Significant Imaging: I have reviewed all pertinent imaging results/findings within the past 24 hours.    Imaging Results              X-Ray Chest AP Portable (Final result)  Result time 06/07/22 21:39:46      Final result by Jr White MD (06/07/22 21:39:46)                   Impression:      Mild prominence of the pulmonary vascular with appearance of mild interstitial and alveolar infiltrate, as discussed above.      Electronically signed by: Jr White  Date:    06/07/2022  Time:    21:39               Narrative:    EXAMINATION:  XR CHEST AP PORTABLE    CLINICAL HISTORY:  rectal bleeding;    TECHNIQUE:  Single frontal view of the chest was performed.    COMPARISON:  Chest radiograph December 13, 2021    FINDINGS:  Single portable chest view is submitted.  There is diminished depth of inspiration and minimal rotation, when accounting for these factors the appearance of the cardiomediastinal silhouette is stable.    There is accentuation of the pulmonary vascular, and there is appearance of mild interstitial and patchy ground-glass alveolar infiltrate.    There is no evidence for pleural effusion and there is no evidence for pneumothorax.  The osseous structures demonstrate chronic change, mild curvature of the spine is noted.  Surgical clips of the right upper quadrant likely relate to prior cholecystectomy.

## 2022-06-11 NOTE — ASSESSMENT & PLAN NOTE
- GIB Pathway initiated  - Hb 6.3 on presentation  ;  Baseline 8.5  - sp 1 unit PRBC in ED  - s/p colonoscopy with GI; no acute findings and okay to resume diet and discontinue IV PPI  - CBC daily; hemoglobin stable and no further visualization or reports of melenic or blood with stool

## 2022-06-11 NOTE — ASSESSMENT & PLAN NOTE
Onset imelda procedural; per nursing, treated for atrial fibrillation with metoprolol  EKG with AFib with RVR  Continue scheduled metoprolol  Cardiology consulted, appreciate recs   Recommend increase metoprolol tartrate to 25 mg b.i.d. for rate control, hold on AC at this time, recommend follow-up with Dr. Mancia her cardiologist for evaluation for potential Watchman device  Ambulatory referral to Cardiology

## 2022-06-11 NOTE — ASSESSMENT & PLAN NOTE
Patient with fever and leukocytosis shortly following admission  Unclear etiology; initially suspected reactive in the setting of GI bleed however colonoscopy unremarkable    Infectious workup with blood cultures (ngtdx48 hrs), chest x-ray (no acute process), UA (no overt infection), flu (negative)  COVID negative upon admission  Unclear if related to potential breast cancer reoccurrence  Patient with potential cellulitis of left upper extremity; will initiate doxycycline   Leukocytosis with improvement prior to initiation of doxycycline; fever curve with improvement  Improvement of cellulitis with doxycycline and overall patient feeling significantly more improved on 06/11; continue p.o. doxycycline to complete outpatient course

## 2022-06-11 NOTE — PLAN OF CARE
Patient has been afebrile the entire shift. Patient was started on a broad spectrum antibiotic yesterday. Blood cultures and urine cultures are still pending at this time. Patient awaken this morning stating that she even feels better. Remaining vital signs stable. Patient has been normal sinus on the monitor tonight.    Problem: Adult Inpatient Plan of Care  Goal: Plan of Care Review  Outcome: Ongoing, Progressing     Problem: Impaired Wound Healing  Goal: Optimal Wound Healing  Outcome: Ongoing, Progressing     Problem: Infection  Goal: Absence of Infection Signs and Symptoms  Outcome: Ongoing, Progressing

## 2022-06-12 NOTE — TELEPHONE ENCOUNTER
Pt calling in regards to question regarding lower body temp. States current temp is 98.2 Nurse able to answer question, and when to seek care. No further needs at this time  Reason for Disposition   Health Information question, no triage required and triager able to answer question    Protocols used: INFORMATION ONLY CALL - NO TRIAGE-A-

## 2022-06-13 NOTE — TELEPHONE ENCOUNTER
----- Message from Tamiko Ravi sent at 6/13/2022  3:33 PM CDT -----  Regarding: self  .Type:  Patient Returning Call    Who Called:  self    Who Left Message for Patient:  Dalia Perez    Does the patient know what this is regarding?  Yes     Would the patient rather a call back or a response via [facility]?  Call back     Best Call Back Number: .[phone]

## 2022-06-13 NOTE — PROGRESS NOTES
C3 nurse spoke with Jeanne Rodgers for a TCC post hospital discharge follow up call. The patient does not have a scheduled HOSFU appointment with Gary Gonzalez MD within 7 days post hospital discharge date 6/11/2022. C3 nurse was unable to schedule HOSFU appointment in [facility].    Message sent - not routed - to PCP staff requesting they contact patient and schedule follow up appointment.

## 2022-06-13 NOTE — TELEPHONE ENCOUNTER
Patient states she's not experiencing black/trry or bloody stools at this time. Patient states she is able to hold down liquid and food. Patient states she just ate some soup and is doing great so far. Tried setting up hospital follow up patient declined next availability as well as another provider. Informed patient to call us with any questions, comments, or concerns she may have.

## 2022-06-13 NOTE — TELEPHONE ENCOUNTER
I scheduled pt for the soonest available appt. I stated that if she feels the need to be seen sooner or if her symptoms worsen, then don't hesitate to go to the ER. Pt verbalized understanding.    ----- Message from Tessie Otero sent at 6/13/2022  9:38 AM CDT -----  Regarding: Pt called to schedule a hospital follow up appt on 6/14/22 and pt states that she has been throwing up and her heart rate is elevated  Appointment Access Request:    Pt called to schedule a hospital follow up appt on 6/14/22 and pt states that she vomited up and her heart rate has been elevated but is back in rhythm and is 102.    Pt can be reached at [phone]

## 2022-06-13 NOTE — TELEPHONE ENCOUNTER
Reviewed patient discharged from hospital on 6/11 (admitted for GI bleed) - please inquire regarding if patient is experiencing black/tarry or bloody stools at this time as well as if she is able to hold down any PO intake (liquids, foods).

## 2022-06-13 NOTE — TELEPHONE ENCOUNTER
Pt called for post discharge tracker day 1 symptoms. Pt c/o nausea and diarrhea. Pt denies triage at this time and is waiting for her dr to call her back. Info protocol followed and pt advised to call [phone] 24/7 for questions or concerns or worsening symptoms. Encounter routed to PCP high priority. Questions answered regarding Zofran and her other medications.    Reason for Disposition   Information only question and nurse able to answer    Protocols used: INFORMATION ONLY CALL - NO TRIAGE-A-[place]

## 2022-06-17 NOTE — PROGRESS NOTES
Subjective:    Patient ID:  Jeanne Rodgers is a 67 y.o. female who presents for follow-up of Hospital Follow Up      HPI     PAF - declines DOAC due to Hx GIB, HLD     Referred by Dr Hill  Jeanne Rodgers is a 66 y.o. female who presents for HBO consult. Referred by OBGYN Oncologist  for radiation vaginitis and would like to try hyperbaric oxygen therapy.  Topical treatments for this painful region have not helped and are painful. History of breast with partial mastectomy right breast in 2014 and endometrial cancer. Finished last chemo and radiation therapy in May 2020.  Reports no hormone therapy but reports nightly medical marijuana use for nausea during treatment. HBO workup started today in clinic: cxr, ekg, crp and sed rate ordered. HBO consents signed and patient educated. Verbalized understanding.      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 Her chemo/XRT was completed 5/2020.  Patient has failed medical management of this disease and has not found any relief from the pain. She is an excellent candidate for HBOT.  Insurance approval will be requested. Additional labs and diagnostic tests were ordered as noted above. The HBO treatment was explained, the consent for was explained, all questions were answered and the consent was signed.  We will proceed with HBOT when insurance approval has been confirmed. In addition, a left-sided cerumen impaction was noted on physical exam and Debrox drops were recommended.      EKG 8/16/21 sinus bradycardia 54 otherwise ok     Stress test 8/27/21    Normal myocardial perfusion scan. There is no evidence of myocardial ischemia or infarction.    The gated perfusion images showed an ejection fraction of 70% post stress.    There is normal wall motion post stress.    The EKG portion of this study is negative for ischemia.    The patient reported no chest pain during the stress  test.    There were no arrhythmias during stress.      Echo 4/26/22  · The left ventricle is normal in size with normal systolic function.  · The estimated ejection fraction is 65%.  · Normal right ventricular size with normal right ventricular systolic function.  · Small anterior pericardial effusion without hemodynamic compromise.  · The estimated PA systolic pressure is 21 mmHg.     CTA chest 12/13/21  No evidence of pulmonary thromboembolism other acute abnormalities in the chest.        Holter 8/27/21  · Normal sinus rhythm. Heart rates varied between 46 and 141 BPM with an average of 82 BPM.  · No PVC's  · 237 PAC's with one 4 beat SVT        8/24/21 Reports sharp left sided CP intermittently for the last 2 months. Occasional SOB  Denies prior CAD   Echo and lexiscan myoview for CP and SOB  Add lipid to next labs  Holter for bradycardia     Lipid 8/27/21  Chol 186  HDL 45       9/13/21 Still with occasional sharp CP   Denies SOB  Continue observation for atypical CP and bradycardia  Diet control for mild HLD  OV 6 months     [place] [facility] [place]  Macrocytic Anemia  Bright Red Blood per Rectum  -Hgb 8.9 on presentation, baseline ~11. No reported melena or hematochezia but some gross blood noted on rectal exam. Pt. With prior history of anemia with EGD/colonoscopy positive only for erythematous duodenopathy on EGD 6/2020  -Iron studies, folate, and B12 ordered for further work-up of anemia  -Trend Hgb, will leave pt. On clear liquid diet. Plan for outpatient GI f/u unless pt. Develops active bleeding or has significantly downtrending Hgb  - Hb stable at 8.0  - no active bleeding at this time   -diet advanced on 12/17     Atrial Fibrillation with RVR  Patient has been having paroxsymal runs of atrial fibrillation during this admission with rates up to the 150s. She was seen by cardiology  -TTE Stable  -chest pain improving  -Metoprolol PO started  -Cards recs as follows:   Given isolated episode of  atrial fibrillation and acute illness along with anemia holding off on starting anticoagulation at this time, recommending 30 day event monitor at discharge to look for occult atrial fibrillation, follow-up with Dr. Mancia      Chest Pain- improving   -Pt. Chief complaint chest/back pain with 2 presentations to ED in last few days, ongoing for days, associated with position changes and deep breathing. -Troponin negative, EKG without new ST changes.  -CTA chest without abnormality. Pt. Also has had stress test 8/2021 negative for ischemia  -MSK origin suspected, continue symptomatic pain relief with lidocaine patch, acetaminophen, oxycodone PRN     12/20/21 Went to the ER recently with CP - PE ruled out - felt to possibly be pericarditis - started on ibuprofen which triggered a GIB. In the hospital developed A-fib RVR - no anticoagulation or CV done. Feels better since discharge. Denies further CP.   EKG NSR NSSTT changes  BP controlled by home readings  PAF - back in NSR - not on OAC due to recent GIB - likely triggered by NSAIDs  CP - unclear etiology - no MI or PE  OV 1 month with BMP, CBC - if CP returns consider [facility]      Labs 1/18/22  K 4.1  Cr 0.8  HCT 29     1/19/22 Denies CP, SOB, or palpitations  No recurrence of CP or palpitations  Not interested in DOAC unless A-fib returns  OV 3 months     4/22/22 Reports recent LE edema and HERRING  Denies CP. Denies heart racing or palpitations  EKG NSR - ok   Echo, BNP, BMP for new onset LE edema and HERRING  PRN lasix     Labs 4/26/22  K 4.1  Cr 0.8       5/12/22 LE edema improved with lasix and medical marijuana  Denies CP or SOB  Echo with EF 65% and  - edema improved with prn lasix  Continue Rx for PAF, HLD  OV 6 months    Admitted [facility] [place] 6/17/22  67-year-old female with past medical history of endometrial cancer, breast cancer, fibromyalgia, PTSD, GIB, blood transfusion, anxiety who presents to the emergency department with chief complaint of abnormal lab  value. Reports bright red blood per rectum with dark streaks that started four days ago. Had follow up appointment today. Noted to be anemic and referred to the ER for further evaluation. Endorses headache and lightheadedness. Denies chest pain, shortness of breath, abdominal pain, vomiting, diarrhea. Reports pelvic and right leg pain. She had recent imaging which was concerning for bone lesions to the pelvis and has biopsy scheduled for later in June. She denies other worsening or alleviating factors.      In the ED patient afebrile and hemodynamically stable saturating well on room air. Hb 6.3 (baseline 8.5). Patient transfused one unit PRBC in ED, started on iv protonix, and admitted to the care of medicine for further evaluation and management of GIB.     6/17/22 Denies CP or SOB. Denies palpitations since discharge  BP controlled       Review of Systems   Constitutional: Negative for decreased appetite.   HENT: Negative for ear discharge.    Eyes: Negative for blurred vision.   Respiratory: Negative for hemoptysis.    Endocrine: Negative for polyphagia.   Hematologic/Lymphatic: Negative for adenopathy.   Skin: Negative for color change.   Musculoskeletal: Negative for joint swelling.   Genitourinary: Negative for bladder incontinence.   Neurological: Negative for brief paralysis.   Psychiatric/Behavioral: Negative for hallucinations.   Allergic/Immunologic: Negative for hives.        Objective:    Physical Exam  Constitutional:       Appearance: She is well-developed.   HENT:      Head: Normocephalic and atraumatic.   Eyes:      Conjunctiva/sclera: Conjunctivae normal.      Pupils: Pupils are equal, round, and reactive to light.   Cardiovascular:      Rate and Rhythm: Normal rate.      Pulses: Intact distal pulses.      Heart sounds: Normal heart sounds.   Pulmonary:      Effort: Pulmonary effort is normal.      Breath sounds: Normal breath sounds.   Abdominal:      General: Bowel sounds are normal.       Palpations: Abdomen is soft.   Musculoskeletal:         General: Normal range of motion.      Cervical back: Normal range of motion and neck supple.      Right lower leg: Edema present.      Left lower leg: Edema present.   Skin:     General: Skin is warm and dry.   Neurological:      Mental Status: She is alert and oriented to person, place, and time.           Assessment:       1. Atrial fibrillation with RVR    2. Chest pain, unspecified type    3. HERRING (dyspnea on exertion)    4. Paroxysmal atrial fibrillation         Plan:       Interested in finding out more about watchman device - will refer to EP  Continue Rx for PAF and HLD  OV 3 months

## 2022-06-21 NOTE — NURSING
Pt arrived to [facility] s/p Sacral bone biopsy.  NAD noted.  Report received from EDUARDO Franklin.  DSG to left sacrum CDI.  Will continue to monitor.

## 2022-06-21 NOTE — DISCHARGE SUMMARY
Radiology Discharge Summary      Hospital Course: No complications    Admit Date: 6/21/2022  Discharge Date: 06/21/2022     Instructions Given to Patient: Yes  Diet: Resume prior diet  Activity: activity as tolerated and no driving for today    Description of Condition on Discharge: Stable  Vital Signs (Most Recent): Temp: 97.7 °F (36.5 °C) (06/21/22 1122)  Pulse: 90 (06/21/22 1544)  Resp: (!) 23 (06/21/22 1544)  BP: (!) 94/55 (06/21/22 1544)  SpO2: 100 % (06/21/22 1544)    Discharge Disposition: Home    Discharge Diagnosis: Endometrial cancer s/p left iliac bone biopsy    Follow up: As scheduled    Amol Neal M.D.  Interventional Radiology  Department of Radiology  Pager: [phone]

## 2022-06-21 NOTE — PROGRESS NOTES
IR Bone Bx recovery is complete. Pt tolerated well. Discharge instructions and handouts provided. Pt verbalized understanding. Pt escorted to the lobby via wheelchair, pt discharged

## 2022-06-21 NOTE — PLAN OF CARE
Pt arrived to [facility] for Iliac bone bx. Pt oriented to unit and staff. Plan of care reviewed with patient, patient verbalizes understanding. Comfort measures utilized. Pt safely transferred from stretcher to procedural table. Fall risk reviewed with patient, fall risk interventions maintained. Safety strap applied, positioner pillows utilized to minimize pressure points. Blankets applied. Pt prepped and draped utilizing standard sterile technique. Patient placed on continuous monitoring, as required by sedation policy. Timeouts completed utilizing standard universal time-out, per department and facility policy. RN to remain at bedside, continuous monitoring maintained. Pt resting comfortably. Denies pain/discomfort. Will continue to monitor. See flow sheets for monitoring, medication administration, and updates.

## 2022-06-21 NOTE — H&P
Inpatient Radiology Pre-procedure Note    History of Present Illness:  Jeanne Rodgers is a 67 y.o. female with pmhx of endometrial cancer, breast cancer with recent PET-CT on 5/25/2022 demonstrate left iliac lesion. Presented today for bone lesion biopsy.    Admission H&P reviewed.    Past Medical History:   Diagnosis Date    Allergy     skin    Anxiety     Breast cancer 2014    right    Chronic back pain     Depression     Encounter for blood transfusion     Endometrial cancer 07/30/2019    Fibromyalgia     Hives     Hx of psychiatric care     Hx of radiation therapy     Itching     Lichen sclerosus     Mental disorder     PONV (postoperative nausea and vomiting)     Psychiatric problem     PTSD (post-traumatic stress disorder)     Sleep difficulties     Sore throat     Therapy     Uterine cancer 08/05/2019    UTI (urinary tract infection)      Past Surgical History:   Procedure Laterality Date    anal fissure surgery      APPENDECTOMY      BIOPSY N/A 11/19/2020    Procedure: BONE BIOPSY;  Surgeon: Leisa Diagnostic Provider;  Location: [facility];  Service: Radiology;  Laterality: N/A;  RN PREOP 11/17/2020    BREAST BIOPSY Right 2014    BREAST LUMPECTOMY Right 2014    R lumpectomy [facility] w 2.4cm IDC & DCIS, neg margins, 0/6 SN, Stage II, ER/ND+, HER-2 neg Adjuvant XRT and hormonal therapy     CARPAL TUNNEL RELEASE      CHOLECYSTECTOMY      COLONOSCOPY      COLONOSCOPY N/A 6/11/2020    Procedure: COLONOSCOPY;  Surgeon: Bobby Marino MD;  Location: [facility] ([address] [place] [address]);  Service: Endoscopy;  Laterality: N/A;    COLONOSCOPY N/A 6/9/2022    Procedure: COLONOSCOPY;  Surgeon: Abdoulaye Alcocer MD;  [facility]  Service: Endoscopy;  Laterality: N/A;    ESOPHAGOGASTRODUODENOSCOPY N/A 6/11/2020    Procedure: EGD (ESOPHAGOGASTRODUODENOSCOPY);  Surgeon: Bobby Marino MD;  [facility]  Service: Endoscopy;  Laterality: N/A;    HYSTERECTOMY  08/15/2019    HYSTEROSCOPY WITH DILATION  AND CURETTAGE OF UTERUS N/A 7/24/2019    Procedure: HYSTEROSCOPY, WITH DILATION AND CURETTAGE OF UTERUS;  Surgeon: Елена Kam MD;  Location: [facility] OR;  Service: OB/GYN;  Laterality: N/A;    INSERTION OF TUNNELED CENTRAL VENOUS CATHETER (CVC) WITH SUBCUTANEOUS PORT N/A 9/26/2019    Procedure: INSERTION, PORT-A-CATH;  Surgeon: Moris Varghese MD;  Location: [facility] CATH LAB;  Service: Radiology;  Laterality: N/A;    MEDIPORT REMOVAL N/A 3/5/2020    Procedure: REMOVAL, CATHETER, CENTRAL VENOUS, TUNNELED, WITH PORT;  Surgeon: Moris Varghese MD;  Location: [facility] CATH LAB;  Service: Radiology;  Laterality: N/A;    NEEDLE LOCALIZATION N/A 11/19/2020    Procedure: NEEDLE LOCALIZATION;  Surgeon: [facility] Diagnostic Provider;  Location: [facility] OR;  Service: Radiology;  Laterality: N/A;    PARTIAL MASCECTOMY Right 2014    ROBOT-ASSISTED LAPAROSCOPIC ABDOMINAL HYSTERECTOMY USING DA RAMU XI N/A 8/15/2019    Procedure: XI ROBOTIC HYSTERECTOMY;  Surgeon: Darius Regalado MD;  Location: [facility];  Service: OB/GYN;  Laterality: N/A;    ROBOT-ASSISTED LAPAROSCOPIC SALPINGO-OOPHORECTOMY USING DA RAMU XI Bilateral 8/15/2019    Procedure: XI ROBOTIC SALPINGO-OOPHORECTOMY;  Surgeon: Darius Regalado MD;  Location: [facility];  Service: OB/GYN;  Laterality: Bilateral;    ROBOT-ASSISTED LYMPHADENECTOMY Left 8/15/2019    Procedure: ROBOTIC LYMPHADENECTOMY;  Surgeon: Darius Regalado MD;  Location: [facility];  Service: OB/GYN;  Laterality: Left;    SALPINGOOPHORECTOMY Bilateral 08/15/2019    TONSILLECTOMY      TUBAL LIGATION         Review of Systems:   As documented in primary team H&P    Home Meds:   Prior to Admission medications    Medication Sig Start Date End Date Taking? Authorizing Provider   clobetasol 0.05% (TEMOVATE) 0.05 % Oint APPLY TO AFFECTED AREA(S) TWICE A DAY FOR 1 MONTH(S) then EVERY DAY FOR 1 MONTH(S) then 3 TIMES A WEEK 9/19/20   Елена Kam MD   co-enzyme Q-10 30 mg capsule Take 30 mg by mouth 3 (three) times daily.     Historical Provider   doxycycline (VIBRA-TABS) 100 MG tablet Take 1 tablet (100 mg total) by mouth every 12 (twelve) hours. for 13 days 6/11/22 6/24/22  Maddy Joseph MD   fexofenadine (ALLEGRA) 180 MG tablet 2 tablets in the morning.  May take an extra 2 tablets during the day if needed for itching. 10/22/20   Johana Jim MD   furosemide (LASIX) 20 MG tablet Take 1 tablet (20 mg total) by mouth daily as needed (leg swelling). 4/22/22 4/22/23  Caio Mancia MD   hydrocortisone 2.5 % cream Apply to affected areas twice a day when eczema is moderate. 10/22/20   Johana Jim MD   hydrOXYzine HCL (ATARAX) 25 MG tablet 1 to 8 tablets at bedtime.  Start with 3 tablets.  Increase or decrease as needed until itching is controlled or a maximum of 8 tablets. 10/22/20   Johana Jim MD   meth/meblue/sod phos/psal/hyos (URIBEL ORAL) Take by mouth as needed.    Historical Provider   metoprolol tartrate (LOPRESSOR) 25 MG tablet Take 1 tablet (25 mg total) by mouth 2 (two) times daily. 6/11/22   Maddy Joseph MD   nystatin (MYCOSTATIN) cream Apply topically every Mon, Wed, Fri. 1/20/20   Marley Younger MD   nystatin (MYCOSTATIN) powder Apply topically 4 (four) times daily. 1/20/20   Marley Younger MD   ondansetron (ZOFRAN) 4 MG tablet Take 1 tablet (4 mg total) by mouth every 6 (six) hours as needed for Nausea. 9/19/21   Kanu Mayo II, MD   oxyCODONE-acetaminophen (PERCOCET) 5-325 mg per tablet Take 1 tablet by mouth every 8 (eight) hours as needed for Pain. 5/31/22   Nano Ford MD   pantoprazole (PROTONIX) 40 MG tablet Take 1 tablet (40 mg total) by mouth once daily. 6/11/22   Maddy Joseph MD   UNABLE TO FIND [place] Tail Mushrooms Immune Support    Historical Provider   UNABLE TO FIND medication name: medical marijuana    Historical Provider     Scheduled Meds:   Continuous Infusions:   PRN Meds:  Anticoagulants/Antiplatelets: no anticoagulation    Allergies: Review of patient's allergies  indicates:  No Known Allergies  Sedation Hx: have not been any systemic reactions    Labs:  No results for input(s): INR in the last 168 hours.    Invalid input(s):  PT,  PTT  No results for input(s): WBC, HGB, HCT, MCV, PLT in the last 168 hours. No results for input(s): GLU, NA, K, CL, CO2, BUN, CREATININE, CALCIUM, MG, ALT, AST, ALBUMIN, BILITOT, BILIDIR in the last 168 hours.      Vitals:        Physical Exam:  ASA: II   Mallampati: II    General: no acute distress  Mental Status: alert and oriented to person, place and time  HEENT: normocephalic, atraumatic  Chest: unlabored breathing  Heart: regular heart rate  Abdomen: nondistended  Extremity: moves all extremities      Plan:  Sedation Plan: up to moderate.  Patient will undergo left iliac lesion biopsy.          Sarah Mccormack MD  Radiology Resident PGY- 2  [facility]

## 2022-06-21 NOTE — PLAN OF CARE
Patient arrived to room. PIV placed. Admit assessment completed. Plan of care discussed with patient. Sister at bedside. Nurse call bell within reach. Will monitor

## 2022-06-21 NOTE — PROCEDURES
Radiology Post-Procedure Note    Pre Op Diagnosis: Endometrial cancer    Post Op Diagnosis: Same    Procedure: Left iliac bone bx    Procedure performed by: Amol Neal MD    Written Informed Consent Obtained: Yes  Specimen Removed: YES 4 x 13 gauge cores  Estimated Blood Loss: Minimal    Findings:   Under CT guidance, 11/13 gauge biopsy system was used to sample left iliac bone. No complications. See dictation.    Patient tolerated procedure well.    Amol Neal M.D.  Interventional Radiology  Department of Radiology  Pager: [phone]

## 2022-06-21 NOTE — SEDATION DOCUMENTATION
IR  Procedure for biopsy finished.  Pt. Tolerated procedure well.   Vital signs stable.  Pt. To be transferred to ROCU for 1 hour recovery.    Specimens were given to pathology during procedure and documented appropriately.

## 2022-06-24 NOTE — PROGRESS NOTES
Subjective:    Patient ID:  Jeanne Rdogers is a 67 y.o. female who presents for evaluation of PAF    HPI   67 y.o. F  PAF. Not on a/c due to significant GI bleed (BRB) without source on scope  HL on meds  endometrial CA, s/p treatment, c/b radiation vaginitis (and proctitis?)  breast CA 2014, s/p surgery/XRT    Had PAF in hospital during GIB.  Still gets racing heart 3x/day. Palpitations.  No recent melena/BRBPR    8/21 ETT neg  4/22 echo 65%  ECG example of AF: 6/9/22.    She's here to discuss AF sx rx and CVA prophylaxis.    My interpretation of today's ECG is NSR 94 bpm    Review of Systems   Constitutional: Negative. Negative for malaise/fatigue.   HENT: Negative.  Negative for ear pain and tinnitus.    Eyes: Negative for blurred vision.   Cardiovascular: Positive for palpitations. Negative for chest pain, dyspnea on exertion, near-syncope and syncope.   Respiratory: Negative.  Negative for shortness of breath.    Endocrine: Negative.  Negative for polyuria.   Hematologic/Lymphatic: Does not bruise/bleed easily.   Skin: Negative.  Negative for rash.   Musculoskeletal: Negative.  Negative for joint pain and muscle weakness.   Gastrointestinal: Negative.  Negative for abdominal pain and change in bowel habit.   Genitourinary: Negative for frequency.   Neurological: Negative.  Negative for dizziness and weakness.   Psychiatric/Behavioral: Negative.  Negative for depression. The patient is not nervous/anxious.    Allergic/Immunologic: Negative for environmental allergies.        Objective:    Physical Exam  Vitals and nursing note reviewed.   Constitutional:       Appearance: Normal appearance. She is well-developed.   HENT:      Head: Normocephalic and atraumatic.   Eyes:      Conjunctiva/sclera: Conjunctivae normal.   Neck:      Thyroid: No thyroid mass or thyromegaly.      Trachea: No tracheal deviation.   Cardiovascular:      Rate and Rhythm: Normal rate and regular rhythm.   Pulmonary:      Effort:  "Pulmonary effort is normal. No respiratory distress.      Breath sounds: Normal breath sounds. No wheezing.   Abdominal:      General: There is no distension.   Musculoskeletal:         General: Normal range of motion.      Cervical back: Normal range of motion. No edema.   Skin:     General: Skin is warm and dry.      Findings: No rash.   Neurological:      Mental Status: She is alert and oriented to person, place, and time.      Coordination: Coordination normal.   Psychiatric:         Speech: Speech normal.         Behavior: Behavior normal. Behavior is cooperative.         Thought Content: Thought content normal.           Assessment:       1. Atrial fibrillation with RVR    2. Paroxysmal atrial fibrillation         Plan:       PAF  Discussed AF and its basic pathophysiology, including its health implications and treatment options (rate vs. rhythm control, meds vs. procedural/device treatment) as appropriate for the patient.  Would like to anticoagulate, but understand reluctance given recent significant GI bleeding.  Will ask H/Onc (Dr. Ford) re: when she'd feel OK about trying a/c (would use eliquis, likely).  Discussed Watchman device. Longterm, CVA prophylaxis with the Watchman device (GIO occlusion device) would make sense for her, but she would have to be able to be anticoagulated for a few months.  If/when Dr. Ford "clears" the patient for a/c, we'll reconsider anticoag vs./+ Watchman.    Will try to control her AF with AAD: start flecainide today. Already on BB.    f/u 6 mos or earlier if/when anticoagulation is possible/acceptable.              "

## 2022-07-01 PROBLEM — K21.9 GASTROESOPHAGEAL REFLUX DISEASE WITHOUT ESOPHAGITIS: Status: ACTIVE | Noted: 2022-01-01

## 2022-07-01 PROBLEM — D69.6 THROMBOCYTOPENIA: Status: ACTIVE | Noted: 2022-01-01

## 2022-07-01 PROBLEM — D64.9 SYMPTOMATIC ANEMIA: Status: ACTIVE | Noted: 2022-01-01

## 2022-07-01 NOTE — Clinical Note
Diagnosis: Symptomatic anemia [[id number]]   Future Attending Provider: LELO SPARKS [[id number]]   Admitting Provider:: LELO SPARKS [[id number]]

## 2022-07-01 NOTE — ASSESSMENT & PLAN NOTE
- Interval history and physical exam findings as described above  - CT abd/pelvis findings reviewed  - Symptomatic as above  - Hb 6.2 on arrival  - S/p 2u pRBC transfusion in the ED  - Monitoring H/H and will transfuse to maintain Hb>7  - Pt has known history of recurrent GIB with extensive endoscopic workup  - Denies any BRBPR, melena, hematemesis, or hematochezia  - Will consider GI consult based on clinical course

## 2022-07-01 NOTE — ASSESSMENT & PLAN NOTE
- Plts decreased on admission, chronic issue per chart review  - Will continue to monitor on daily labs

## 2022-07-01 NOTE — ACP (ADVANCE CARE PLANNING)
[facility] Code Status Documentation Note    Spoke with Ms. Rodgers regarding their end of life intentions and goals of care. Discussed code status and explained differences between full code and DNR, and answered all questions to the pt's satisfaction.     At this time, pt desires to be full code. Order has been placed in chart to reflect their wishes.    Jeff Drummond MD  Attending Physician  Department of [facility]  Epic secure chat preferred, or ext. [phone]  7/1/2022      Advance Care Planning     Date: 07/01/2022    Code Status  I engaged the the patient in a conversation about the patient's preferences for care  at the very end of life. The patient wishes to have CPR or other invasive treatments performed when her heart and/or breathing stops. I communicated to the patient that a full code order would be placed in her medical record to reflect this preference.  I spent a total of 15 minutes engaging the patient in this advance care planning discussion.

## 2022-07-01 NOTE — SUBJECTIVE & OBJECTIVE
Past Medical History:   Diagnosis Date    Allergy     skin    Anxiety     Atrial fibrillation     Breast cancer 2014    right    Chronic back pain     Depression     Encounter for blood transfusion     Endometrial cancer 07/30/2019    Fibromyalgia     Hives     Hx of psychiatric care     Hx of radiation therapy     Itching     Lichen sclerosus     Mental disorder     PONV (postoperative nausea and vomiting)     Psychiatric problem     PTSD (post-traumatic stress disorder)     Sleep difficulties     Sore throat     Therapy     Uterine cancer 08/05/2019    UTI (urinary tract infection)        Past Surgical History:   Procedure Laterality Date    anal fissure surgery      APPENDECTOMY      BIOPSY N/A 11/19/2020    Procedure: BONE BIOPSY;  Surgeon: [facility] Diagnostic Provider;  Location: [facility] OR;  Service: Radiology;  Laterality: N/A;  RN PREOP 11/17/2020    BREAST BIOPSY Right 2014    BREAST LUMPECTOMY Right 2014    R lumpectomy [facility] w 2.4cm IDC & DCIS, neg margins, 0/6 SN, Stage II, ER/AK+, HER-2 neg Adjuvant XRT and hormonal therapy     CARPAL TUNNEL RELEASE      CHOLECYSTECTOMY      COLONOSCOPY      COLONOSCOPY N/A 6/11/2020    Procedure: COLONOSCOPY;  Surgeon: Bobby Marino MD;  Location: [facility] ([address] [place] [address]);  Service: Endoscopy;  Laterality: N/A;    COLONOSCOPY N/A 6/9/2022    Procedure: COLONOSCOPY;  Surgeon: Abdoulaye Alcocer MD;  Location: [facility] ([address] [place] [address]);  Service: Endoscopy;  Laterality: N/A;    ESOPHAGOGASTRODUODENOSCOPY N/A 6/11/2020    Procedure: EGD (ESOPHAGOGASTRODUODENOSCOPY);  Surgeon: Bobby Marino MD;  Location: [facility] ([address] [place] [address]);  Service: Endoscopy;  Laterality: N/A;    HYSTERECTOMY  08/15/2019    HYSTEROSCOPY WITH DILATION AND CURETTAGE OF UTERUS N/A 7/24/2019    Procedure: HYSTEROSCOPY, WITH DILATION AND CURETTAGE OF UTERUS;  Surgeon: Елена Kam MD;  Location: [facility];  Service: OB/GYN;  Laterality: N/A;    INSERTION OF TUNNELED CENTRAL VENOUS CATHETER (CVC) WITH  SUBCUTANEOUS PORT N/A 9/26/2019    Procedure: INSERTION, PORT-A-CATH;  Surgeon: Moris Varghese MD;  Location: [facility] CATH LAB;  Service: Radiology;  Laterality: N/A;    MEDIPORT REMOVAL N/A 3/5/2020    Procedure: REMOVAL, CATHETER, CENTRAL VENOUS, TUNNELED, WITH PORT;  Surgeon: Moris Varghese MD;  Location: [facility] CATH LAB;  Service: Radiology;  Laterality: N/A;    NEEDLE LOCALIZATION N/A 11/19/2020    Procedure: NEEDLE LOCALIZATION;  Surgeon: [facility] Diagnostic Provider;  Location: [facility] OR;  Service: Radiology;  Laterality: N/A;    PARTIAL MASCECTOMY Right 2014    ROBOT-ASSISTED LAPAROSCOPIC ABDOMINAL HYSTERECTOMY USING DA RAMU XI N/A 8/15/2019    Procedure: XI ROBOTIC HYSTERECTOMY;  Surgeon: Darius Regalado MD;  Location: [facility] OR;  Service: OB/GYN;  Laterality: N/A;    ROBOT-ASSISTED LAPAROSCOPIC SALPINGO-OOPHORECTOMY USING DA RAMU XI Bilateral 8/15/2019    Procedure: XI ROBOTIC SALPINGO-OOPHORECTOMY;  Surgeon: Darius Regalado MD;  Location: [facility];  Service: OB/GYN;  Laterality: Bilateral;    ROBOT-ASSISTED LYMPHADENECTOMY Left 8/15/2019    Procedure: ROBOTIC LYMPHADENECTOMY;  Surgeon: Darius Regalado MD;  Location: [facility] OR;  Service: OB/GYN;  Laterality: Left;    SALPINGOOPHORECTOMY Bilateral 08/15/2019    TONSILLECTOMY      TUBAL LIGATION         Review of patient's allergies indicates:  No Known Allergies    No current facility-administered medications on file prior to encounter.     Current Outpatient Medications on File Prior to Encounter   Medication Sig    clobetasol 0.05% (TEMOVATE) 0.05 % Oint APPLY TO AFFECTED AREA(S) TWICE A DAY FOR 1 MONTH(S) then EVERY DAY FOR 1 MONTH(S) then 3 TIMES A WEEK    co-enzyme Q-10 30 mg capsule Take 30 mg by mouth 3 (three) times daily.    fexofenadine (ALLEGRA) 180 MG tablet 2 tablets in the morning.  May take an extra 2 tablets during the day if needed for itching.    flecainide (TAMBOCOR) 100 MG Tab Take 1 tablet (100 mg total) by mouth every 12 (twelve) hours.     furosemide (LASIX) 20 MG tablet Take 1 tablet (20 mg total) by mouth daily as needed (leg swelling).    hydrocortisone 2.5 % cream Apply to affected areas twice a day when eczema is moderate.    hydrOXYzine HCL (ATARAX) 25 MG tablet 1 to 8 tablets at bedtime.  Start with 3 tablets.  Increase or decrease as needed until itching is controlled or a maximum of 8 tablets.    meth/meblue/sod phos/psal/hyos (URIBEL ORAL) Take by mouth as needed.    metoprolol tartrate (LOPRESSOR) 25 MG tablet Take 1 tablet (25 mg total) by mouth 2 (two) times daily.    nystatin (MYCOSTATIN) cream Apply topically every Mon, Wed, Fri.    nystatin (MYCOSTATIN) powder Apply topically 4 (four) times daily.    ondansetron (ZOFRAN) 4 MG tablet Take 1 tablet (4 mg total) by mouth every 6 (six) hours as needed for Nausea.    oxyCODONE-acetaminophen (PERCOCET) 5-325 mg per tablet Take 1 tablet by mouth every 8 (eight) hours as needed for Pain.    pantoprazole (PROTONIX) 40 MG tablet Take 1 tablet (40 mg total) by mouth once daily.    UNABLE TO FIND [place] Tail Mushrooms Immune Support    UNABLE TO FIND medication name: medical marijuana    [DISCONTINUED] metoprolol tartrate (LOPRESSOR) 25 MG tablet Take 1 tablet (25 mg total) by mouth 2 (two) times daily.    [DISCONTINUED] pantoprazole (PROTONIX) 40 MG tablet Take 1 tablet (40 mg total) by mouth once daily.     Family History       Problem Relation (Age of Onset)    Alcohol abuse Cousin    Arthritis Mother    Cancer Mother, Father, Paternal Aunt    Diabetes Other    Hashimoto's thyroiditis Sister    Heart attack Paternal Grandmother, Paternal Grandfather    Hypertension Mother, Brother, Brother    Leukemia Paternal Uncle (70)    Lung cancer Maternal Uncle    No Known Problems Sister    Other Mother (85)    Pancreatic cancer Father (84), Maternal Aunt (62)    Suicide Cousin          Tobacco Use    Smoking status: Never Smoker    Smokeless tobacco: Never Used   Substance and Sexual Activity    Alcohol  use: No     Alcohol/week: 0.0 standard drinks    Drug use: Yes     Types: Marijuana     Comment: medical marijuana- 2 dayys ago     Sexual activity: Not Currently     Partners: Male       ROS  General ROS: See HPI  ENT ROS: negative for epistaxis, headaches or sinus pain  Ophthalmic ROS: negative for blurry vision, decreased vision, double vision or itchy eyes  Cardiovascular ROS: negative for chest pain or dyspnea on exertion  Respiratory ROS: negative for cough, shortness of breath, or wheezing  Gastrointestinal ROS: negative for abdominal pain, change in bowel habits, black or bloody stools, nausea, emesis, or bloating  Genito-Urinary ROS: negative for dysuria, trouble voiding, or hematuria  Neurological ROS: negative for TIA or stroke symptoms  Endocrine ROS: negative for hair pattern changes or unexpected weight changes  Musculoskeletal ROS: negative for muscle pain, weakness, joint pain or joint swelling  Skin: negative for rash, wounds, skin breakdown, or issues otherwise  Hematological and Lymphatic ROS: negative for bleeding, bruising, swollen lymph nodes  Psychological ROS: negative for anxiety or depression      Objective:     Vital Signs (Most Recent):  Temp: 98.2 °F (36.8 °C) (07/01/22 1546)  Pulse: 81 (07/01/22 1546)  Resp: 18 (07/01/22 1546)  BP: (!) 115/56 (07/01/22 1546)  SpO2: 99 % (07/01/22 1546)   Vital Signs (24h Range):  Temp:  [98.1 °F (36.7 °C)-98.3 °F (36.8 °C)] 98.2 °F (36.8 °C)  Pulse:  [76-89] 81  Resp:  [18-24] 18  SpO2:  [95 %-100 %] 99 %  BP: (104-115)/(52-57) 115/56     Weight: 54.9 kg (121 lb)  Body mass index is 23.63 kg/m².      Physical Exam  Gen: in NAD, appears stated age  Neuro: AAOx4, CN2-12 grossly intact BL; motor, sensory, and strength grossly intact BL  HEENT: NTNC, EOMI, PERRLA, MMM; no thyromegaly or lymphadenopathy; no JVD appreciated  CVS: RRR, no m/r/g; S1/S2 auscultated with no S3 or S4; capillary refill < 2 sec  Resp: lungs CTAB, no w/r/r; no belabored breathing or  accessory muscle use appreciated   Abd: BS+ in all 4 quadrants; NTND, soft to palpation; no organomegaly appreciated   Extrem: pulses full, equal, and regular over all 4 extremities; no UE or LE edema BL       Significant Labs: All pertinent labs within the past 24 hours have been reviewed.    Significant Imaging: I have reviewed all pertinent imaging results/findings within the past 24 hours.

## 2022-07-01 NOTE — ED PROVIDER NOTES
Source of History:  Patient, chart    Chief complaint:  Abnormal Lab (Pt states her hemoglobin was 6 on labs draw yesterday.   Pt had GI bleed a couple weeks ago.)      HPI:  Jeanne Rodgers is a 67 y.o. female presenting with abnormal labs.  Patient states that she has had GI bleeds in the past.  Patient had have a blood transfusion last week.  Patient was he had that on yesterday and followed up with her PCP today and found have a hemoglobin of 6.5.  Patient was recently treated with doxycycline and diflucan.  Patient states her stool is formed but no signs of blood.  Patient states she does have some mild left-sided abdominal pain.  No nausea or vomiting.  Patient states her PCP told her her spleen felt enlarged.  Patient denies any history.  Patient denies any recent fever, chills, nausea or vomiting.    This is the extent to the patients complaints today here in the emergency department.    ROS: As per HPI and below:    Constitutional: No fever.  No chills.  Positive for fatigue  Eyes: No visual changes.  ENT: No sore throat. No ear pain    Cardiovascular: No chest pain.  Respiratory: No shortness of breath.  GI: Positive for abdominal pain.  No nausea or vomiting.  Genitourinary: No abnormal urination.  Neurologic: No headache. No focal weakness.  No numbness.  MSK: no back pain.  Integument: No rashes or lesions.  Hematologic: No easy bruising.  Endocrine: No excessive thirst or urination.    Review of patient's allergies indicates:  No Known Allergies    PMH:  As per HPI and below:  Past Medical History:   Diagnosis Date    Allergy     skin    Anxiety     Atrial fibrillation     Breast cancer 2014    right    Chronic back pain     Depression     Encounter for blood transfusion     Endometrial cancer 07/30/2019    Fibromyalgia     Hives     Hx of psychiatric care     Hx of radiation therapy     Itching     Lichen sclerosus     Mental disorder     PONV (postoperative nausea and vomiting)      Psychiatric problem     PTSD (post-traumatic stress disorder)     Sleep difficulties     Sore throat     Therapy     Uterine cancer 08/05/2019    UTI (urinary tract infection)      Past Surgical History:   Procedure Laterality Date    anal fissure surgery      APPENDECTOMY      BIOPSY N/A 11/19/2020    Procedure: BONE BIOPSY;  Surgeon: Leisa Diagnostic Provider;  Location: [facility] OR;  Service: Radiology;  Laterality: N/A;  RN PREOP 11/17/2020    BREAST BIOPSY Right 2014    BREAST LUMPECTOMY Right 2014    R lumpectomy [facility] w 2.4cm IDC & DCIS, neg margins, 0/6 SN, Stage II, ER/DC+, HER-2 neg Adjuvant XRT and hormonal therapy     CARPAL TUNNEL RELEASE      CHOLECYSTECTOMY      COLONOSCOPY      COLONOSCOPY N/A 6/11/2020    Procedure: COLONOSCOPY;  Surgeon: Bobby aMrino MD;  Location: [facility] ([address] [place] [address]);  Service: Endoscopy;  Laterality: N/A;    COLONOSCOPY N/A 6/9/2022    Procedure: COLONOSCOPY;  Surgeon: Abdoulaye Alcocer MD;  [facility]  Service: Endoscopy;  Laterality: N/A;    ESOPHAGOGASTRODUODENOSCOPY N/A 6/11/2020    Procedure: EGD (ESOPHAGOGASTRODUODENOSCOPY);  Surgeon: Bobby Marino MD;  Location: [facility] ([address] [place] [address]);  Service: Endoscopy;  Laterality: N/A;    HYSTERECTOMY  08/15/2019    HYSTEROSCOPY WITH DILATION AND CURETTAGE OF UTERUS N/A 7/24/2019    Procedure: HYSTEROSCOPY, WITH DILATION AND CURETTAGE OF UTERUS;  Surgeon: Елена Kam MD;  Location[facility];  Service: OB/GYN;  Laterality: N/A;    INSERTION OF TUNNELED CENTRAL VENOUS CATHETER (CVC) WITH SUBCUTANEOUS PORT N/A 9/26/2019    Procedure: INSERTION, PORT-A-CATH;  Surgeon: Moris Varghese MD;  Location: [facility] ([facility];  Service: Radiology;  Laterality: N/A;    MEDIPORT REMOVAL N/A 3/5/2020    Procedure: REMOVAL, CATHETER, CENTRAL VENOUS, TUNNELED, WITH PORT;  Surgeon: Moris Varghese MD;  Location: [facility] ([facility];  Service: Radiology;  Laterality: N/A;    NEEDLE LOCALIZATION N/A  11/19/2020    Procedure: NEEDLE LOCALIZATION;  Surgeon: Leisa Diagnostic Provider;  Location: [facility] OR;  Service: Radiology;  Laterality: N/A;    PARTIAL MASCECTOMY Right 2014    ROBOT-ASSISTED LAPAROSCOPIC ABDOMINAL HYSTERECTOMY USING DA RAMU XI N/A 8/15/2019    Procedure: XI ROBOTIC HYSTERECTOMY;  Surgeon: Darius Regalado MD;  Location: [facility] [place] [facility];  Service: OB/GYN;  Laterality: N/A;    ROBOT-ASSISTED LAPAROSCOPIC SALPINGO-OOPHORECTOMY USING DA RAMU XI Bilateral 8/15/2019    Procedure: XI ROBOTIC SALPINGO-OOPHORECTOMY;  Surgeon: Darius Regalado MD;  Location: [facility] [place] [facility];  Service: OB/GYN;  Laterality: Bilateral;    ROBOT-ASSISTED LYMPHADENECTOMY Left 8/15/2019    Procedure: ROBOTIC LYMPHADENECTOMY;  Surgeon: Darius Regalado MD;  Location: [facility] [place] [facility];  Service: OB/GYN;  Laterality: Left;    SALPINGOOPHORECTOMY Bilateral 08/15/2019    TONSILLECTOMY      TUBAL LIGATION         Social History     Tobacco Use    Smoking status: Never Smoker    Smokeless tobacco: Never Used   Substance Use Topics    Alcohol use: No     Alcohol/week: 0.0 standard drinks    Drug use: Yes     Types: Marijuana     Comment: medical marijuana- 2 dayys ago        Physical Exam:    BP (!) 115/56   Pulse 81   Temp 98.2 °F (36.8 °C) (Oral)   Resp 18   Ht 5' (1.524 m)   Wt 54.9 kg (121 lb)   SpO2 99%   BMI 23.63 kg/m²     Nursing note and vital signs reviewed.    Constitutional: No acute distress.  Nontoxic.  Appears sickly  Eyes: No conjunctival injection.Extraocular muscles are intact.  ENT: Oropharynx clear.  Normal phonation.  Mucous membranes pale.  Cardiovascular: Regular rate and rhythm.  No murmurs. No gallops. No rubs  Respiratory: Clear to auscultation bilaterally.  Good air movement.  No wheezes.  No rhonchi. No rales. No accessory muscle use..  Abdomen: Soft.  Mild tenderness to palpation to left lower quadrant. Positive for splenomegaly   No guarding.  No rebound. Non-peritoneal.  Musculoskeletal: Good range of motion all  joints.  No deformities.  Neck supple.  No meningismus.  Skin: No rashes seen.  Poor skin turgor.  No abrasions.  No ecchymoses.  Neurologic: Motor intact.  Sensation intact.  No ataxia. No focal neurological deficits.  Psych: Appropriate, conversant    Labs/Imaging that has been ordered has been reviewed by myself.    Labs Reviewed   CBC W/ AUTO DIFFERENTIAL - Abnormal; Notable for the following components:       Result Value    WBC 17.17 (*)     RBC 2.23 (*)     Hemoglobin 6.2 (*)     Hematocrit 20.8 (*)     MCHC 29.8 (*)     RDW 20.4 (*)     Platelets 121 (*)     nRBC 4 (*)     Lymph % 6.0 (*)     Platelet Estimate Decreased (*)     All other components within normal limits    Narrative:     add on iron profile & transferrin per Jazmine L Zeevi NP order#   [id number]  [id number]  07/01/2022 @ 14:41     COMPREHENSIVE METABOLIC PANEL - Abnormal; Notable for the following components:    Sodium 135 (*)     Glucose 120 (*)     BUN 7 (*)     Albumin 2.8 (*)     All other components within normal limits    Narrative:     add on iron profile & transferrin per Jazmine L Zeevi NP order#   [id number]  [id number]  07/01/2022 @ 14:41     IRON AND TIBC - Abnormal; Notable for the following components:    Iron 29 (*)     Transferrin 175 (*)     Saturated Iron 11 (*)     All other components within normal limits    Narrative:     add on iron profile & transferrin per Jazmine L Zeevi NP order#   [id number]  [id number]  07/01/2022 @ 14:41     ISTAT PROCEDURE - Abnormal; Notable for the following components:    POC Glucose 130 (*)     POC Hematocrit 19 (*)     All other components within normal limits   SARS-COV-2 RDRP GENE - Normal   TROPONIN I    Narrative:     add on iron profile & transferrin per Jazmine L Zeevi NP order#   [id number]  [id number]  07/01/2022 @ 14:41     IRON AND TIBC   TRANSFERRIN   URINALYSIS, REFLEX TO URINE CULTURE   TYPE & SCREEN   ISTAT CHEM8   PREPARE RBC SOFT     Imaging Results          CT Abdomen Pelvis With  Contrast (In process)               I decided to obtain the patient's medical records.  Summary of Medical Records:  Recent biopsy by Heme/Onc.  Recently seen and admitted for GI bleed    Critical Care    Date/Time: 7/1/2022 3:45 PM  Performed by: Jazmine Ulloa NP  Authorized by: Jeff Drummond MD   Direct patient critical care time: 15 minutes  Additional history critical care time: 15 minutes  Ordering / reviewing critical care time: 10 minutes  Documentation critical care time: 10 minutes  Consulting other physicians critical care time: 5 minutes  Total critical care time (exclusive of procedural time) : 55 minutes  Critical care time was exclusive of separately billable procedures and treating other patients and teaching time.  Critical care was necessary to treat or prevent imminent or life-threatening deterioration of the following conditions: circulatory failure and metabolic crisis.  Critical care was time spent personally by me on the following activities: development of treatment plan with patient or surrogate, evaluation of patient's response to treatment, examination of patient, obtaining history from patient or surrogate, ordering and performing treatments and interventions, ordering and review of laboratory studies, ordering and review of radiographic studies, pulse oximetry, re-evaluation of patient's condition and review of old charts.            MDM/ Differential Dx:    Emergent evaluation of a 66 yo female presenting abnormal labs.  Pt had labs drawn yesterday and found to have a Hgb of 6.5.  Pt denies any signs of GI bleed.  Pt states that she was recently transfused due to GI bleed.  Pt states she recently had a biopsy completed but did not get her results yet.  Pt endoses some mild abdominal pain and fatigue today.  On exam pt is A&Ox3. VSS. Nonfebrile and nontoxic appearing.  Pt does appear sickly.  Mucous membranes pale on exam.  Breath sounds clear bilaterally.   Mild tenderness to  palpation to left lower quadrant. Positive for splenomegaly.  No rebound or guarding appreciated on exam.   BS WNL.  Pt speaking in full sentences.  Steady gait appreciated. Cap refill > 3 seconds.      Differential diagnoses include but are not limited to anemia, CKD, dehydration, GI bleed, chronic anemia, others.      I will get labs, consent for blood, imaging and reassess.  ED Course as of 07/01/22 1548   Fri Jul 01, 2022   1445 CBC shows leukocytosis of 17.17.  H&H 12 anemia I will a hemoglobin of 6.2 and hematocrit at 12.8.  CMP unremarkable.  iron studies show an iron 29 with a transferrin of 105.  Troponin negative.  COVID negative. [RZ]   1504 Discussed case with hospital medicine for observation admission for symptomatic anemia.  Awaiting bed assignment. [RZ]      ED Course User Index  [RZ] Jazmine Ulloa NP                  Attending Attestation:     Physician Attestation Statement for NP/PA:   I discussed this assessment and plan of this patient with the NP/PA, but I did not personally examine the patient. The face to face encounter was performed by the NP/PA.            Diagnostic Impression:    1. Symptomatic anemia    2. SOB (shortness of breath)    3. Leukocytosis, unspecified type         ED Disposition Condition    Observation                   Jazmine Ulloa NP  07/01/22 [id number]       Shelley Lindsey MD  07/01/22 8911

## 2022-07-01 NOTE — HPI
Jeanne Rodgers is a 68yo WF with a PMH of recurrent GIB, paroxysmal Afib, hx of breast and endometrial cancer, and GERD who presented to the [facility] ED on 7/1/22 with complaints of fatigue and generalized weakness. Of note, pt recently discharged from [facility] for melena and symptomatic anemia, negative endoscopic findings. Reports that after discharge, she gradually became symptomatic once more (weakness, SOB/HERRING, dizziness), and went to her PCP for follow-up, which showed decreased H/H. Denies F/C/N/V/D/C, HA, CP, palpitations, BRBPR, melena, hematochezia, hematemesis, abdominal pain, dysuria, extremity swelling, or symptoms otherwise.     In the ED, VSS. Labs showed WBC 17.17, Hb 6.2, and plt 121. CT abd/pelvis showed  interval increased overall small volume mildly complex free pelvic fluid, though was otherwise unremarkable. ED ordered 2u pRBCs, and hospital medicine was consulted for admission and further management.

## 2022-07-01 NOTE — PROVIDER PROGRESS NOTES - EMERGENCY DEPT.
Encounter Date: 7/1/2022    ED Physician Progress Notes         EKG - STEMI Decision  Initial Reading: No STEMI present.

## 2022-07-01 NOTE — PHARMACY MED REC
"Admission Medication History     The home medication history was taken by Lyla Cha.    You may go to "Admission" then "Reconcile Home Medications" tabs to review and/or act upon these items.      The home medication list has been updated by the Pharmacy department.    Please read ALL comments highlighted in yellow.    Please address this information as you see fit.     Feel free to contact us if you have any questions or require assistance.      The medications listed below were removed from the home medication list. Please reorder if appropriate:  Patient reports no longer taking the following medication(s):   CO-ENZYME Q-10 30 MG   FUROSEMIDE 20 MG   OXYCODONE-ACETAMINOPHEN 5-325 MG    Medications listed below were obtained from: Patient/family    Current Outpatient Medications on File Prior to Encounter   Medication Sig    acetaminophen (TYLENOL) 500 MG tablet Take 1,000 mg by mouth daily as needed for Pain.      clobetasol 0.05% (TEMOVATE) 0.05 % Oint APPLY TO AFFECTED AREA(S) EVERY DAY AS NEEDED FOR ITCHING      fexofenadine (ALLEGRA) 180 MG tablet   Take 1 tablet by mouth daily as needed    hydrocortisone 2.5 % cream Apply to affected areas twice a day when eczema is moderate as needed for itching      hydrOXYzine HCL (ATARAX) 25 MG tablet Take 1 tablet by mouth daily as needed for itching      meth/meblue/sod phos/psal/hyos (URIBEL ORAL)   Take by mouth as needed.    metoprolol tartrate (LOPRESSOR) 25 MG tablet   Take 1 tablet (25 mg total) by mouth 2 (two) times daily.    nystatin (MYCOSTATIN) cream Apply topically as needed      nystatin (MYCOSTATIN) powder Apply topically 4 (four) times daily as needed      ondansetron (ZOFRAN) 4 MG tablet Take 1 tablet (4 mg total) by mouth every 6 (six) hours as needed for Nausea.      pantoprazole (PROTONIX) 40 MG tablet   Take 1 tablet (40 mg total) by mouth once daily.    Medical Marijuana Tincture and Gummy   Use 1/4 dropper of tincture by mouth " and 1 gummy by mouth every night      flecainide (TAMBOCOR) 100 MG Tab Take 100 mg by mouth every 12 (twelve) hours.               Potential issues to be addressed PRIOR TO DISCHARGE  Patient reported not taking the following medications: (FLECAINIDE 100 MG). These medications remain on the home medication list. Please address accordingly.     Lyla Cha  EXT [phone]                    .

## 2022-07-01 NOTE — H&P
[facility] - Emergency Dept  [facility] Medicine  History & Physical    Patient Name: Jeanne Rodgers  MRN: [id number]  Patient Class: OP- Observation  Admission Date: 7/1/2022  Attending Physician: Jeff Drummond MD   Primary Care Provider: Gary Gonzalez MD         Patient information was obtained from patient and ER records.     Subjective:     Principal Problem:Symptomatic anemia    Chief Complaint:   Chief Complaint   Patient presents with    Abnormal Lab     Pt states her hemoglobin was 6 on labs draw yesterday.   Pt had GI bleed a couple weeks ago.        HPI: Jeanne Rodgers is a 66yo WF with a PMH of recurrent GIB, paroxysmal Afib, hx of breast and endometrial cancer, and GERD who presented to the [facility] ED on 7/1/22 with complaints of fatigue and generalized weakness. Of note, pt recently discharged from [facility] for melena and symptomatic anemia, negative endoscopic findings. Reports that after discharge, she gradually became symptomatic once more (weakness, SOB/HERRING, dizziness), and went to her PCP for follow-up, which showed decreased H/H. Denies F/C/N/V/D/C, HA, CP, palpitations, BRBPR, melena, hematochezia, hematemesis, abdominal pain, dysuria, extremity swelling, or symptoms otherwise.     In the ED, VSS. Labs showed WBC 17.17, Hb 6.2, and plt 121. CT abd/pelvis showed  interval increased overall small volume mildly complex free pelvic fluid, though was otherwise unremarkable. ED ordered 2u pRBCs, and hospital medicine was consulted for admission and further management.      Past Medical History:   Diagnosis Date    Allergy     skin    Anxiety     Atrial fibrillation     Breast cancer 2014    right    Chronic back pain     Depression     Encounter for blood transfusion     Endometrial cancer 07/30/2019    Fibromyalgia     Hives     Hx of psychiatric care     Hx of radiation therapy     Itching     Lichen sclerosus     Mental disorder     PONV (postoperative nausea and vomiting)      Psychiatric problem     PTSD (post-traumatic stress disorder)     Sleep difficulties     Sore throat     Therapy     Uterine cancer 08/05/2019    UTI (urinary tract infection)        Past Surgical History:   Procedure Laterality Date    anal fissure surgery      APPENDECTOMY      BIOPSY N/A 11/19/2020    Procedure: BONE BIOPSY;  Surgeon: Leisa Diagnostic Provider;  Location: [facility] OR;  Service: Radiology;  Laterality: N/A;  RN PREOP 11/17/2020    BREAST BIOPSY Right 2014    BREAST LUMPECTOMY Right 2014    R lumpectomy [facility] w 2.4cm IDC & DCIS, neg margins, 0/6 SN, Stage II, ER/KY+, HER-2 neg Adjuvant XRT and hormonal therapy     CARPAL TUNNEL RELEASE      CHOLECYSTECTOMY      COLONOSCOPY      COLONOSCOPY N/A 6/11/2020    Procedure: COLONOSCOPY;  Surgeon: Bobby Marino MD;  Location: [facility] ([address] [place] [address]);  Service: Endoscopy;  Laterality: N/A;    COLONOSCOPY N/A 6/9/2022    Procedure: COLONOSCOPY;  Surgeon: Abdoulaye Alcocer MD;  [facility] ([facility]  Service: Endoscopy;  Laterality: N/A;    ESOPHAGOGASTRODUODENOSCOPY N/A 6/11/2020    Procedure: EGD (ESOPHAGOGASTRODUODENOSCOPY);  Surgeon: Bobby Marino MD;  Location: [facility] ([address] [place] [address]);  Service: Endoscopy;  Laterality: N/A;    HYSTERECTOMY  08/15/2019    HYSTEROSCOPY WITH DILATION AND CURETTAGE OF UTERUS N/A 7/24/2019    Procedure: HYSTEROSCOPY, WITH DILATION AND CURETTAGE OF UTERUS;  Surgeon: Елена Kam MD;  Location: [facility] OR;  Service: OB/GYN;  Laterality: N/A;    INSERTION OF TUNNELED CENTRAL VENOUS CATHETER (CVC) WITH SUBCUTANEOUS PORT N/A 9/26/2019    Procedure: INSERTION, PORT-A-CATH;  Surgeon: Moris Varghese MD;  Location: [facility] CATH LAB;  Service: Radiology;  Laterality: N/A;    MEDIPORT REMOVAL N/A 3/5/2020    Procedure: REMOVAL, CATHETER, CENTRAL VENOUS, TUNNELED, WITH PORT;  Surgeon: Moris Varghese MD;  Location: [facility] CATH LAB;  Service: Radiology;  Laterality: N/A;    NEEDLE LOCALIZATION N/A 11/19/2020     Procedure: NEEDLE LOCALIZATION;  Surgeon: Leisa Diagnostic Provider;  Location: [facility] OR;  Service: Radiology;  Laterality: N/A;    PARTIAL MASCECTOMY Right 2014    ROBOT-ASSISTED LAPAROSCOPIC ABDOMINAL HYSTERECTOMY USING DA RAMU XI N/A 8/15/2019    Procedure: XI ROBOTIC HYSTERECTOMY;  Surgeon: Darius Regalado MD;  Location: [facility] OR;  Service: OB/GYN;  Laterality: N/A;    ROBOT-ASSISTED LAPAROSCOPIC SALPINGO-OOPHORECTOMY USING DA RAMU XI Bilateral 8/15/2019    Procedure: XI ROBOTIC SALPINGO-OOPHORECTOMY;  Surgeon: Darius Regalado MD;  Location: [facility] OR;  Service: OB/GYN;  Laterality: Bilateral;    ROBOT-ASSISTED LYMPHADENECTOMY Left 8/15/2019    Procedure: ROBOTIC LYMPHADENECTOMY;  Surgeon: Darius Regalado MD;  Location: [facility] OR;  Service: OB/GYN;  Laterality: Left;    SALPINGOOPHORECTOMY Bilateral 08/15/2019    TONSILLECTOMY      TUBAL LIGATION         Review of patient's allergies indicates:  No Known Allergies    No current facility-administered medications on file prior to encounter.     Current Outpatient Medications on File Prior to Encounter   Medication Sig    clobetasol 0.05% (TEMOVATE) 0.05 % Oint APPLY TO AFFECTED AREA(S) TWICE A DAY FOR 1 MONTH(S) then EVERY DAY FOR 1 MONTH(S) then 3 TIMES A WEEK    co-enzyme Q-10 30 mg capsule Take 30 mg by mouth 3 (three) times daily.    fexofenadine (ALLEGRA) 180 MG tablet 2 tablets in the morning.  May take an extra 2 tablets during the day if needed for itching.    flecainide (TAMBOCOR) 100 MG Tab Take 1 tablet (100 mg total) by mouth every 12 (twelve) hours.    furosemide (LASIX) 20 MG tablet Take 1 tablet (20 mg total) by mouth daily as needed (leg swelling).    hydrocortisone 2.5 % cream Apply to affected areas twice a day when eczema is moderate.    hydrOXYzine HCL (ATARAX) 25 MG tablet 1 to 8 tablets at bedtime.  Start with 3 tablets.  Increase or decrease as needed until itching is controlled or a maximum of 8 tablets.    meth/meblue/sod  phos/psal/hyos (URIBEL ORAL) Take by mouth as needed.    metoprolol tartrate (LOPRESSOR) 25 MG tablet Take 1 tablet (25 mg total) by mouth 2 (two) times daily.    nystatin (MYCOSTATIN) cream Apply topically every Mon, Wed, Fri.    nystatin (MYCOSTATIN) powder Apply topically 4 (four) times daily.    ondansetron (ZOFRAN) 4 MG tablet Take 1 tablet (4 mg total) by mouth every 6 (six) hours as needed for Nausea.    oxyCODONE-acetaminophen (PERCOCET) 5-325 mg per tablet Take 1 tablet by mouth every 8 (eight) hours as needed for Pain.    pantoprazole (PROTONIX) 40 MG tablet Take 1 tablet (40 mg total) by mouth once daily.    UNABLE TO FIND [place] [employer] Immune Support    UNABLE TO FIND medication name: medical marijuana    [DISCONTINUED] metoprolol tartrate (LOPRESSOR) 25 MG tablet Take 1 tablet (25 mg total) by mouth 2 (two) times daily.    [DISCONTINUED] pantoprazole (PROTONIX) 40 MG tablet Take 1 tablet (40 mg total) by mouth once daily.     Family History       Problem Relation (Age of Onset)    Alcohol abuse Cousin    Arthritis Mother    Cancer Mother, Father, Paternal Aunt    Diabetes Other    Hashimoto's thyroiditis Sister    Heart attack Paternal Grandmother, Paternal Grandfather    Hypertension Mother, Brother, Brother    Leukemia Paternal Uncle (70)    Lung cancer Maternal Uncle    No Known Problems Sister    Other Mother (85)    Pancreatic cancer Father (84), Maternal Aunt (62)    Suicide Cousin          Tobacco Use    Smoking status: Never Smoker    Smokeless tobacco: Never Used   Substance and Sexual Activity    Alcohol use: No     Alcohol/week: 0.0 standard drinks    Drug use: Yes     Types: Marijuana     Comment: medical marijuana- 2 dayys ago     Sexual activity: Not Currently     Partners: Male       ROS  General ROS: See HPI  ENT ROS: negative for epistaxis, headaches or sinus pain  Ophthalmic ROS: negative for blurry vision, decreased vision, double vision or itchy  eyes  Cardiovascular ROS: negative for chest pain or dyspnea on exertion  Respiratory ROS: negative for cough, shortness of breath, or wheezing  Gastrointestinal ROS: negative for abdominal pain, change in bowel habits, black or bloody stools, nausea, emesis, or bloating  Genito-Urinary ROS: negative for dysuria, trouble voiding, or hematuria  Neurological ROS: negative for TIA or stroke symptoms  Endocrine ROS: negative for hair pattern changes or unexpected weight changes  Musculoskeletal ROS: negative for muscle pain, weakness, joint pain or joint swelling  Skin: negative for rash, wounds, skin breakdown, or issues otherwise  Hematological and Lymphatic ROS: negative for bleeding, bruising, swollen lymph nodes  Psychological ROS: negative for anxiety or depression      Objective:     Vital Signs (Most Recent):  Temp: 98.2 °F (36.8 °C) (07/01/22 1546)  Pulse: 81 (07/01/22 1546)  Resp: 18 (07/01/22 1546)  BP: (!) 115/56 (07/01/22 1546)  SpO2: 99 % (07/01/22 1546)   Vital Signs (24h Range):  Temp:  [98.1 °F (36.7 °C)-98.3 °F (36.8 °C)] 98.2 °F (36.8 °C)  Pulse:  [76-89] 81  Resp:  [18-24] 18  SpO2:  [95 %-100 %] 99 %  BP: (104-115)/(52-57) 115/56     Weight: 54.9 kg (121 lb)  Body mass index is 23.63 kg/m².      Physical Exam  Gen: in NAD, appears stated age  Neuro: AAOx4, CN2-12 grossly intact BL; motor, sensory, and strength grossly intact BL  HEENT: NTNC, EOMI, PERRLA, MMM; no thyromegaly or lymphadenopathy; no JVD appreciated  CVS: RRR, no m/r/g; S1/S2 auscultated with no S3 or S4; capillary refill < 2 sec  Resp: lungs CTAB, no w/r/r; no belabored breathing or accessory muscle use appreciated   Abd: BS+ in all 4 quadrants; NTND, soft to palpation; no organomegaly appreciated   Extrem: pulses full, equal, and regular over all 4 extremities; no UE or LE edema BL       Significant Labs: All pertinent labs within the past 24 hours have been reviewed.    Significant Imaging: I have reviewed all pertinent imaging  results/findings within the past 24 hours.    Assessment/Plan:     * Symptomatic anemia  - Interval history and physical exam findings as described above  - CT abd/pelvis findings reviewed  - Symptomatic as above  - Hb 6.2 on arrival  - S/p 2u pRBC transfusion in the ED  - Monitoring H/H and will transfuse to maintain Hb>7  - Pt has known history of recurrent GIB with extensive endoscopic workup  - Denies any BRBPR, melena, hematemesis, or hematochezia  - Will consider GI consult based on clinical course    Leukocytosis  - WBCs 17.17 on admission  - Remains afebrile, otherwise asymptomatic  - BCx, UA with UCx, procal, and lactate ordered  - Start empiric zosyn  - PRN tylenol if fever  - Trending on daily labs    Thrombocytopenia  - Plts decreased on admission, chronic issue per chart review  - Will continue to monitor on daily labs    Paroxysmal atrial fibrillation  - Currently in NSR, uncomplicated  - Continue home regimen of metoprolol and flecainide    History of endometrial cancer  - Follows outpatient with heme/onc, last treatments in May 2020    Hx of breast cancer  - Follows outpatient with heme/onc, last treatments in May 2020    Gastroesophageal reflux disease without esophagitis  - Currently asymptomatic  - Continue home PPI regimen      VTE Risk Mitigation (From admission, onward)         Ordered     IP VTE HIGH RISK PATIENT  Once         07/01/22 1612     Place sequential compression device  Until discontinued         07/01/22 1612     IP VTE HIGH RISK PATIENT  Once         07/01/22 1612     Place sequential compression device  Until discontinued         07/01/22 1612                   Jeff Drummond MD  Attending Physician  Department of Hospital Medicine  Epic secure chat preferred, or ext. [phone]  7/1/2022

## 2022-07-01 NOTE — PROVIDER PROGRESS NOTES - EMERGENCY DEPT.
Encounter Date: 7/1/2022    ED Physician Progress Notes        Physician Note:   Provider in triage note:    Recent chemo coming in sent in for hemoglobin of 6.4.  Patient says she is feels weak, tired.  Some shortness of breath with occasional chest discomfort.  Also cough for 2 weeks.  She she does have a history of GI bleeds but says she currently does not have any black or bloody stools.    No acute distress in the emergency department.  Moderately pale.  Labs, type and screen, EKG and troponin given her shortness of breath chest pain.  COVID screen given her cough and shortness of breath.    Further care, evaluation and disposition per primary team.

## 2022-07-01 NOTE — PROGRESS NOTES
Subjective:     Chief Complaint  Chief Complaint   Patient presents with    Follow-up    Gingivitis    Cough    Otalgia       HPI  Jeanne Rodgers is a 67 y.o. female with medical diagnoses as listed in the medical history and problem list that presents for above complaint(s).      Patient has noted some fatigue and a cough that has been present for several weeks - N.B., Just finished a 14 day course of doxycycline on 6/24 that was started last hospitalization for symptomatic anemia and atrial fibrillation   Hgb today is 6.4 - down from 8.2 on 6/11/22  Last transfusion was 6/7/22  BMBx on 6/21 with pathology recently resulted    Does have loose stools - occurring for several   Some nausea - did have vomiting late last night   No exotic or raw foods ingested recently     Patient Care Team:  Gary Gonzalez MD as PCP - General (Internal Medicine)  Yenni Mccurdy RD as Dietitian (Nutrition)  Pratima Cedeno LPN as Care Coordinator      PAST MEDICAL HISTORY:  Past Medical History:   Diagnosis Date    Allergy     skin    Anxiety     Atrial fibrillation     Breast cancer 2014    right    Chronic back pain     Depression     Encounter for blood transfusion     Endometrial cancer 07/30/2019    Fibromyalgia     Hives     Hx of psychiatric care     Hx of radiation therapy     Itching     Lichen sclerosus     Mental disorder     PONV (postoperative nausea and vomiting)     Psychiatric problem     PTSD (post-traumatic stress disorder)     Sleep difficulties     Sore throat     Therapy     Uterine cancer 08/05/2019    UTI (urinary tract infection)        PAST SURGICAL HISTORY:  Past Surgical History:   Procedure Laterality Date    anal fissure surgery      APPENDECTOMY      BIOPSY N/A 11/19/2020    Procedure: BONE BIOPSY;  Surgeon: Dosc Diagnostic Provider;  Location: [facility] OR;  Service: Radiology;  Laterality: N/A;  RN PREOP 11/17/2020    BREAST BIOPSY Right 2014    BREAST LUMPECTOMY  Right 2014    R lumpectomy [facility] w 2.4cm IDC & DCIS, neg margins, 0/6 SN, Stage II, ER/SD+, HER-2 neg Adjuvant XRT and hormonal therapy     CARPAL TUNNEL RELEASE      CHOLECYSTECTOMY      COLONOSCOPY      COLONOSCOPY N/A 6/11/2020    Procedure: COLONOSCOPY;  Surgeon: Bobby Marino MD;  Location: [facility] (2ND FLR);  Service: Endoscopy;  Laterality: N/A;    COLONOSCOPY N/A 6/9/2022    Procedure: COLONOSCOPY;  Surgeon: Abdoulaye Alcocer MD;  Location: [facility] (2ND FLR);  Service: Endoscopy;  Laterality: N/A;    ESOPHAGOGASTRODUODENOSCOPY N/A 6/11/2020    Procedure: EGD (ESOPHAGOGASTRODUODENOSCOPY);  Surgeon: Bobby Marino MD;  Location: [facility] (2ND FLR);  Service: Endoscopy;  Laterality: N/A;    HYSTERECTOMY  08/15/2019    HYSTEROSCOPY WITH DILATION AND CURETTAGE OF UTERUS N/A 7/24/2019    Procedure: HYSTEROSCOPY, WITH DILATION AND CURETTAGE OF UTERUS;  Surgeon: Елена Kam MD;  Location: [facility];  Service: OB/GYN;  Laterality: N/A;    INSERTION OF TUNNELED CENTRAL VENOUS CATHETER (CVC) WITH SUBCUTANEOUS PORT N/A 9/26/2019    Procedure: INSERTION, PORT-A-CATH;  Surgeon: Moris Varghese MD;  Location: [facility] [place], operated by [facility] CATH LAB;  Service: Radiology;  Laterality: N/A;    MEDIPORT REMOVAL N/A 3/5/2020    Procedure: REMOVAL, CATHETER, CENTRAL VENOUS, TUNNELED, WITH PORT;  Surgeon: Moris Varghese MD;  Location: [facility], [place], operated by [facility] CATH LAB;  Service: Radiology;  Laterality: N/A;    NEEDLE LOCALIZATION N/A 11/19/2020    Procedure: NEEDLE LOCALIZATION;  Surgeon: [facility] Diagnostic Provider;  Location: [facility] OR;  Service: Radiology;  Laterality: N/A;    PARTIAL MASCECTOMY Right 2014    ROBOT-ASSISTED LAPAROSCOPIC ABDOMINAL HYSTERECTOMY USING DA RAMU XI N/A 8/15/2019    Procedure: XI ROBOTIC HYSTERECTOMY;  Surgeon: Darius Regalado MD;  Location: [facility] [place], operated [facility] OR;  Service: OB/GYN;  Laterality: N/A;    ROBOT-ASSISTED LAPAROSCOPIC SALPINGO-OOPHORECTOMY USING DA RAMU XI Bilateral 8/15/2019    Procedure: XI ROBOTIC  SALPINGO-OOPHORECTOMY;  Surgeon: Darius Regalado MD;  Location: [facility] OR;  Service: OB/GYN;  Laterality: Bilateral;    ROBOT-ASSISTED LYMPHADENECTOMY Left 8/15/2019    Procedure: ROBOTIC LYMPHADENECTOMY;  Surgeon: Darius Regalado MD;  Location: [facility] OR;  Service: OB/GYN;  Laterality: Left;    SALPINGOOPHORECTOMY Bilateral 08/15/2019    TONSILLECTOMY      TUBAL LIGATION         SOCIAL HISTORY:  Social History     Socioeconomic History    Marital status: Single    Number of children: 0   Occupational History    Occupation: NXE   Tobacco Use    Smoking status: Never Smoker    Smokeless tobacco: Never Used   Substance and Sexual Activity    Alcohol use: No     Alcohol/week: 0.0 standard drinks    Drug use: Yes     Types: Marijuana     Comment: medical marijuana- 2 dayys ago     Sexual activity: Not Currently     Partners: Male   Social History Narrative    Lives alone.        FAMILY HISTORY:  Family History   Problem Relation Age of Onset    Hypertension Mother     Arthritis Mother     Other Mother 85        pancreatic tumor felt by pt's mother's HCP to be malignant though she survived for 8 yrs after dx    Cancer Mother     Pancreatic cancer Father 84    Cancer Father     Pancreatic cancer Maternal Aunt 62    No Known Problems Sister     Hypertension Brother     Leukemia Paternal Uncle 70    Heart attack Paternal Grandmother     Heart attack Paternal Grandfather     Hypertension Brother     Hashimoto's thyroiditis Sister     Diabetes Other         strong family history per patient    Lung cancer Maternal Uncle         smoker    Cancer Paternal Aunt         unknown origin    Suicide Cousin     Alcohol abuse Cousin     Breast cancer Neg Hx     Ovarian cancer Neg Hx     Cirrhosis Neg Hx     Colon cancer Neg Hx        ALLERGIES AND MEDICATIONS: updated and reviewed.  Review of patient's allergies indicates:  No Known Allergies  Current Outpatient Medications   Medication Sig  Dispense Refill    clobetasol 0.05% (TEMOVATE) 0.05 % Oint APPLY TO AFFECTED AREA(S) TWICE A DAY FOR 1 MONTH(S) then EVERY DAY FOR 1 MONTH(S) then 3 TIMES A WEEK 60 g 2    co-enzyme Q-10 30 mg capsule Take 30 mg by mouth 3 (three) times daily.      fexofenadine (ALLEGRA) 180 MG tablet 2 tablets in the morning.  May take an extra 2 tablets during the day if needed for itching. 240 tablet 5    flecainide (TAMBOCOR) 100 MG Tab Take 1 tablet (100 mg total) by mouth every 12 (twelve) hours. 180 tablet 3    furosemide (LASIX) 20 MG tablet Take 1 tablet (20 mg total) by mouth daily as needed (leg swelling). 30 tablet 11    hydrocortisone 2.5 % cream Apply to affected areas twice a day when eczema is moderate. 453.6 g 1    hydrOXYzine HCL (ATARAX) 25 MG tablet 1 to 8 tablets at bedtime.  Start with 3 tablets.  Increase or decrease as needed until itching is controlled or a maximum of 8 tablets. 240 tablet 3    meth/meblue/sod phos/psal/hyos (URIBEL ORAL) Take by mouth as needed.      metoprolol tartrate (LOPRESSOR) 25 MG tablet Take 1 tablet (25 mg total) by mouth 2 (two) times daily. 60 tablet 1    nystatin (MYCOSTATIN) cream Apply topically every Mon, Wed, Fri. 30 g 11    nystatin (MYCOSTATIN) powder Apply topically 4 (four) times daily. 30 g 1    ondansetron (ZOFRAN) 4 MG tablet Take 1 tablet (4 mg total) by mouth every 6 (six) hours as needed for Nausea. 12 tablet 0    oxyCODONE-acetaminophen (PERCOCET) 5-325 mg per tablet Take 1 tablet by mouth every 8 (eight) hours as needed for Pain. 90 tablet 0    pantoprazole (PROTONIX) 40 MG tablet Take 1 tablet (40 mg total) by mouth once daily. 30 tablet 1    UNABLE TO FIND [place] [employer] Immune Support      UNABLE TO FIND medication name: medical marijuana       No current facility-administered medications for this visit.       Review of Systems   Constitutional: Positive for fatigue. Negative for fever.   Respiratory: Positive for cough.     Gastrointestinal: Positive for abdominal pain. Negative for diarrhea.   Genitourinary: Positive for hematuria, pelvic pain and urgency. Negative for frequency.   Neurological: Positive for headaches.       Objective:       Physical Exam  Vitals:    07/01/22 0843   BP: (!) 110/52   Pulse: 88   Resp: 18   Temp: 98.3 °F (36.8 °C)   TempSrc: Oral   SpO2: 95%   Weight: 55.2 kg (121 lb 11.1 oz)   Height: 5' (1.524 m)    Body mass index is 23.77 kg/m².  Weight: 55.2 kg (121 lb 11.1 oz)   Height: 5' (152.4 cm)   Physical Exam  Vitals reviewed.   Constitutional:       General: She is not in acute distress.     Appearance: She is well-developed.   HENT:      Head: Normocephalic and atraumatic.      Right Ear: Tympanic membrane, ear canal and external ear normal. There is no impacted cerumen.      Left Ear: External ear normal. There is impacted cerumen.   Eyes:      Conjunctiva/sclera: Conjunctivae normal.      Pupils: Pupils are equal, round, and reactive to light.   Neck:      Thyroid: No thyromegaly.   Cardiovascular:      Rate and Rhythm: Normal rate.      Heart sounds: Normal heart sounds. No murmur heard.  Pulmonary:      Effort: Pulmonary effort is normal.      Breath sounds: Normal breath sounds.   Abdominal:      Comments: +splenomegaly   Musculoskeletal:         General: No deformity.      Cervical back: Normal range of motion and neck supple.   Lymphadenopathy:      Cervical: No cervical adenopathy.   Skin:     General: Skin is warm and dry.   Neurological:      Mental Status: She is alert.      Cranial Nerves: No cranial nerve deficit.   Psychiatric:         Behavior: Behavior normal.             Assessment:     1. Acute anemia    2. Nausea    3. Gastroesophageal reflux disease, unspecified whether esophagitis present    4. Impacted cerumen of left ear    5. Paroxysmal atrial fibrillation    6. Endometrial cancer      Plan:     Jeanne was seen today for nausea, emesis, bladder pressure, leg pain and  headache.    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  Reviewed 6/30 labs showing Hgb 6.4 (down from 8.2 several weeks prior), Patient being followed by Oncology and Gynecological Oncology for endometrial cancer, recent  BMBx unrevealing  Patient with unclear cause of hematological abnormalities  Discussed current acute anemia and leukocytosis, recommend evaluation in ED presently to address/consider transfusion and look for cause   I did touch base with her oncologist, Dr Liliana Mcdermott, left  Discussed utilization of Debrox    Peripheral neuropathy due to chemotherapy  Stable    Health Maintenance reviewed, addressed as per orders    F/u in 3 months    I spent 40 minutes reviewing the patient's chart, talking to family/caregiver(s), coordinating care with other providers/specialists and time spent on documentation       1. The patient indicates understanding of these issues and agrees with the plan. Brief care plan is updated and reviewed with the patient as applicable.   2. The patient is given an After Visit Summary that lists all medications with directions, allergies, orders placed during this encounter and follow-up instructions.   3. I have reviewed the patient's medical information including past medical, family, and social history sections including the medications and allergies.   4. We discussed the patient's current medications. I reconciled the patient's medication list and prepared and supplied needed refills.       Gary Gonzalez MD  Internal Medicine-Pediatrics

## 2022-07-01 NOTE — ASSESSMENT & PLAN NOTE
- WBCs 17.17 on admission  - Remains afebrile, otherwise asymptomatic  - BCx, UA with UCx, procal, and lactate ordered  - Start empiric zosyn  - PRN tylenol if fever  - Trending on daily labs

## 2022-07-02 NOTE — HOSPITAL COURSE
Pt received 2u pRBCs and was [place] [facility]. Hb improved to 9.0 on 7/2, and pt with marked symptomatic improvement, though not fully back to baseline. WBCs noted to further increase, though she remained afebrile; zosyn continued while blood cultures with NGTD. Leukocytosis decreased into 7/3, Hb remained stable, and pt grossly asymptomatic and back to baseline. She has an appointment with heme/onc this week to further discuss her anemia, and will potentially be scheduled for regular routine outpatient blood transfusions. Abx changed to PO cipro/flagyl. Pt deemed appropriate for discharge. Plan discussed with pt, who was agreeable and amenable; medications were discussed and reviewed, outpatient follow-up scheduled, ER precautions were given, all questions were answered to the pt's satisfaction, and Ms. Rodgers was subsequently discharged.

## 2022-07-02 NOTE — PLAN OF CARE
POC discussed  Problem: Adult Inpatient Plan of Care  Goal: Plan of Care Review  Outcome: Ongoing, Progressing  Goal: Patient-Specific Goal (Individualized)  Outcome: Ongoing, Progressing  Goal: Absence of Hospital-Acquired Illness or Injury  Outcome: Ongoing, Progressing  Goal: Optimal Comfort and Wellbeing  Outcome: Ongoing, Progressing  Goal: Readiness for Transition of Care  Outcome: Ongoing, Progressing

## 2022-07-02 NOTE — ASSESSMENT & PLAN NOTE
- Interval history and physical exam findings as described above  - CT abd/pelvis findings reviewed  - Symptomatic as above, now improving  - Hb 6.2 on arrival, s/p 2u pRBC transfusion in the ED  - Hb improved to 9.0 on AM labs  - Monitoring H/H and will transfuse to maintain Hb>7  - Pt has known history of recurrent GIB with extensive endoscopic workup  - Denies any BRBPR, melena, hematemesis, or hematochezia  - Continuing to monitor

## 2022-07-02 NOTE — PLAN OF CARE
"  Problem: Adult Inpatient Plan of Care  Goal: Plan of Care Review  Outcome: Ongoing, Progressing  Goal: Patient-Specific Goal (Individualized)  Outcome: Ongoing, Progressing  Goal: Absence of Hospital-Acquired Illness or Injury  Outcome: Ongoing, Progressing  Goal: Optimal Comfort and Wellbeing  Outcome: Ongoing, Progressing  Goal: Readiness for Transition of Care  Outcome: Ongoing, Progressing     Patient aaox4, Ambulatory. Prior to unit pt received 2 units of blood. VSS baseline. Hct and hgb within normal limits. Pt received pyridium x 1 due to "feels pressure while peeing" says she has some relief. Bed lowest position with personal items within reach.   "

## 2022-07-02 NOTE — ED NOTES
Bed: [facility]  Expected date:   Expected time:   Means of arrival:   Comments:

## 2022-07-02 NOTE — ASSESSMENT & PLAN NOTE
- WBCs 17.17 on admission, increased to 20.04 on AM labs  - Remains afebrile, otherwise asymptomatic  - BCx and UCx pending, procal and lactate WNL  - Continue empiric zosyn  - PRN tylenol if fever  - Trending on daily labs

## 2022-07-02 NOTE — PROGRESS NOTES
[facility] Medicine  Progress Note    Patient Name: Jeanne Rodgers  MRN: [id number]  Patient Class: IP- Inpatient   Admission Date: 7/1/2022  Length of Stay: 1 days  Attending Physician: Jeff Drummond MD  Primary Care Provider: Gary Gonzalez MD        Subjective:     Principal Problem:Symptomatic anemia        HPI:  Jeanne Rodgers is a 68yo WF with a PMH of recurrent GIB, paroxysmal Afib, hx of breast and endometrial cancer, and GERD who presented to the [facility] [place] on 7/1/22 with complaints of fatigue and generalized weakness. Of note, pt recently discharged from [facility] for melena and symptomatic anemia, negative endoscopic findings. Reports that after discharge, she gradually became symptomatic once more (weakness, SOB/HERRING, dizziness), and went to her PCP for follow-up, which showed decreased H/H. Denies F/C/N/V/D/C, HA, CP, palpitations, BRBPR, melena, hematochezia, hematemesis, abdominal pain, dysuria, extremity swelling, or symptoms otherwise.     In the ED, VSS. Labs showed WBC 17.17, Hb 6.2, and plt 121. CT abd/pelvis showed  interval increased overall small volume mildly complex free pelvic fluid, though was otherwise unremarkable. ED ordered 2u pRBCs, and hospital medicine was consulted for admission and further management.      Overview/Hospital Course:  Pt received 2u pRBCs and was admitted to [facility]. Hb improved to 9.0 on 7/2, and pt with marked symptomatic improvement, though not fully back to baseline. WBCs noted to further increase, though she remained afebrile; zosyn continued while blood and urine cultures pending.      Interval History: Pt seen and examined this morning on rebekah. SUZAN. States she is feeling more back to her usual self this morning 2/p pRBC transfusion yesterday. Update on labs and continued need for abx while cultures pending. Care plan reviewed. Otherwise, doing well and with no further complaints at this time.      Objective:     Vital Signs (Most  Recent):  Temp: 98.2 °F (36.8 °C) (07/02/22 0807)  Pulse: 76 (07/02/22 0807)  Resp: 18 (07/02/22 0807)  BP: (!) 111/56 (07/02/22 0807)  SpO2: 95 % (07/02/22 0951)   Vital Signs (24h Range):  Temp:  [98.1 °F (36.7 °C)-99.9 °F (37.7 °C)] 98.2 °F (36.8 °C)  Pulse:  [75-89] 76  Resp:  [15-28] 18  SpO2:  [94 %-100 %] 95 %  BP: (100-149)/(50-70) 111/56     Weight: 57.6 kg (126 lb 15.8 oz)  Body mass index is 24.8 kg/m².    Intake/Output Summary (Last 24 hours) at 7/2/2022 1212  Last data filed at 7/2/2022 0230  Gross per 24 hour   Intake 1163.34 ml   Output --   Net 1163.34 ml        Physical Exam  Gen: in NAD, appears stated age  Neuro: AAOx4, CN2-12 grossly intact BL; motor, sensory, and strength grossly intact BL  HEENT: NTNC, EOMI, PERRLA, MMM; no thyromegaly or lymphadenopathy; no JVD appreciated  CVS: RRR, no m/r/g; S1/S2 auscultated with no S3 or S4; capillary refill < 2 sec  Resp: lungs CTAB, no w/r/r; no belabored breathing or accessory muscle use appreciated   Abd: BS+ in all 4 quadrants; NTND, soft to palpation; no organomegaly appreciated   Extrem: pulses full, equal, and regular over all 4 extremities; no UE or LE edema BL      Significant Labs: All pertinent labs within the past 24 hours have been reviewed.    Significant Imaging: I have reviewed all pertinent imaging results/findings within the past 24 hours.      Assessment/Plan:      * Symptomatic anemia  - Interval history and physical exam findings as described above  - CT abd/pelvis findings reviewed  - Symptomatic as above, now improving  - Hb 6.2 on arrival, s/p 2u pRBC transfusion in the ED  - Hb improved to 9.0 on AM labs  - Monitoring H/H and will transfuse to maintain Hb>7  - Pt has known history of recurrent GIB with extensive endoscopic workup  - Denies any BRBPR, melena, hematemesis, or hematochezia  - Continuing to monitor    Leukocytosis  - WBCs 17.17 on admission, increased to 20.04 on AM labs  - Remains afebrile, otherwise asymptomatic  -  BCx and UCx pending, procal and lactate WNL  - Continue empiric zosyn  - PRN tylenol if fever  - Trending on daily labs    Thrombocytopenia  - Plts decreased on admission, chronic issue per chart review  - Will continue to monitor on daily labs    Paroxysmal atrial fibrillation  - Currently in NSR, uncomplicated  - Continue home regimen of metoprolol and flecainide    History of endometrial cancer  - Follows outpatient with heme/onc, last treatments in May 2020    Hx of breast cancer  - Follows outpatient with heme/onc, last treatments in May 2020    Gastroesophageal reflux disease without esophagitis  - Currently asymptomatic  - Continue home PPI regimen      VTE Risk Mitigation (From admission, onward)         Ordered     IP VTE HIGH RISK PATIENT  Once         07/01/22 1612     IP VTE HIGH RISK PATIENT  Once         07/01/22 1612     Place sequential compression device  Until discontinued         07/01/22 1612                Discharge Planning   SEBASTIEN: 7/4/2022     Code Status: Full Code   Is the patient medically ready for discharge?: No    Reason for patient still in hospital (select all that apply): Patient trending condition             Jeff Drummond MD  Attending Physician  Department of Hospital Medicine  Epic secure chat preferred, or ext. [phone]  7/2/2022

## 2022-07-02 NOTE — NURSING
0807 - Pt in bed resting.  Easily aroused.  A&Ox4.  Vitals obtained.  Urine noted.  Urine clear, pt voided spontaneously.  Safety round completed.    0918 - Morning medications given.  Pt tolerated well.  BM noted.  No blood in bowel.  Stool solid, brown in color.    1130 - Pt in bed resting.  Had no needs at this time.  Zosyn infusing.  Safety round completed.    1310 - Pt in bed.  Zosyn infusing.  Safety round completed.    1432 - Pt's family requesting to talk to doctor.  Team notified via message per Talisha.    1644 - Pt in bed resting.  Zosyn started.  Pt calm and pleasant at this time.  Had no questions or concerns.  Safety round check completed.    1750 - Pt in bed resting.  Zosyn infusing.  Pt pleasant, calm at this time.  Safety round completed.

## 2022-07-02 NOTE — SUBJECTIVE & OBJECTIVE
Interval History: Pt seen and examined this morning on jarrell SUZAN. States she is feeling more back to her usual self this morning 2/p pRBC transfusion yesterday. Update on labs and continued need for abx while cultures pending. Care plan reviewed. Otherwise, doing well and with no further complaints at this time.      Objective:     Vital Signs (Most Recent):  Temp: 98.2 °F (36.8 °C) (07/02/22 0807)  Pulse: 76 (07/02/22 0807)  Resp: 18 (07/02/22 0807)  BP: (!) 111/56 (07/02/22 0807)  SpO2: 95 % (07/02/22 0951)   Vital Signs (24h Range):  Temp:  [98.1 °F (36.7 °C)-99.9 °F (37.7 °C)] 98.2 °F (36.8 °C)  Pulse:  [75-89] 76  Resp:  [15-28] 18  SpO2:  [94 %-100 %] 95 %  BP: (100-149)/(50-70) 111/56     Weight: 57.6 kg (126 lb 15.8 oz)  Body mass index is 24.8 kg/m².    Intake/Output Summary (Last 24 hours) at 7/2/2022 1212  Last data filed at 7/2/2022 0230  Gross per 24 hour   Intake 1163.34 ml   Output --   Net 1163.34 ml        Physical Exam  Gen: in NAD, appears stated age  Neuro: AAOx4, CN2-12 grossly intact BL; motor, sensory, and strength grossly intact BL  HEENT: NTNC, EOMI, PERRLA, MMM; no thyromegaly or lymphadenopathy; no JVD appreciated  CVS: RRR, no m/r/g; S1/S2 auscultated with no S3 or S4; capillary refill < 2 sec  Resp: lungs CTAB, no w/r/r; no belabored breathing or accessory muscle use appreciated   Abd: BS+ in all 4 quadrants; NTND, soft to palpation; no organomegaly appreciated   Extrem: pulses full, equal, and regular over all 4 extremities; no UE or LE edema BL      Significant Labs: All pertinent labs within the past 24 hours have been reviewed.    Significant Imaging: I have reviewed all pertinent imaging results/findings within the past 24 hours.

## 2022-07-02 NOTE — ED NOTES
Pt awake and alert; resting quietly on stretcher.  Pt remains on continuous cardiac and pulse ox monitoring with non-invasive blood pressure to cycle every 4hours.  VS stable; NSR noted. Pt denies pain at this time; no acute distress or discomfort reported or observed.  Pt ambulated to restroom w/o assistance; is able to reposition self on stretcher. Bed locked in lowest position; pt requested that side rail be left down so she can ambulate to restroom; call light, bedside table, and personal belongings within reach. Room assessed for safety measures and cleanliness; no action needed at this time. Plan of care discussed.  Pt instructed to alert nurse for assistance and before attempting to get out of bed; verbalizes understanding. Pt denies needs or complaints at this time; will continue to monitor.

## 2022-07-02 NOTE — ED NOTES
Delay in Blood transfusion as patient requested to wait for the private room availability before trnasfusion

## 2022-07-03 NOTE — PLAN OF CARE
Problem: Adult Inpatient Plan of Care  Goal: Plan of Care Review  Outcome: Met  Goal: Patient-Specific Goal (Individualized)  Outcome: Met  Goal: Absence of Hospital-Acquired Illness or Injury  Outcome: Met  Goal: Optimal Comfort and Wellbeing  Outcome: Met  Goal: Readiness for Transition of Care  Outcome: Met     Problem: Impaired Wound Healing  Goal: Optimal Wound Healing  Outcome: Met

## 2022-07-03 NOTE — CONSULTS
Thank you for your consult to [facility]. We have reviewed the patient chart. This patient does not meet criteria for [facility] service at this time due to [facility] service capacity limitations. Will hand back to In-house service.     Hue Woodson MD

## 2022-07-03 NOTE — DISCHARGE SUMMARY
[facility] Surg  [facility] Medicine  Discharge Summary      Patient Name: Jeanne Rodgers  MRN: [id number]  Patient Class: IP- Inpatient  Admission Date: 7/1/2022  Hospital Length of Stay: 2 days  Discharge Date and Time:  07/03/2022 10:27 AM  Attending Physician: Jeff Drummond MD   Discharging Provider: Jeff Drummond MD  Primary Care Provider: Gary Gonzalez MD  Hospital Medicine Team: [facility] HOSP MED G Jeff Drummond MD    HPI:   Jeanne Rodgers is a 66yo WF with a PMH of recurrent GIB, paroxysmal Afib, hx of breast and endometrial cancer, and GERD who presented to the [facility] ED on 7/1/22 with complaints of fatigue and generalized weakness. Of note, pt recently discharged from [facility] for melena and symptomatic anemia, negative endoscopic findings. Reports that after discharge, she gradually became symptomatic once more (weakness, SOB/HERRING, dizziness), and went to her PCP for follow-up, which showed decreased H/H. Denies F/C/N/V/D/C, HA, CP, palpitations, BRBPR, melena, hematochezia, hematemesis, abdominal pain, dysuria, extremity swelling, or symptoms otherwise.     In the ED, VSS. Labs showed WBC 17.17, Hb 6.2, and plt 121. CT abd/pelvis showed  interval increased overall small volume mildly complex free pelvic fluid, though was otherwise unremarkable. ED ordered 2u pRBCs, and hospital medicine was consulted for admission and further management.      * No surgery found *      Hospital Course:   Pt received 2u pRBCs and was admitted to [facility]. Hb improved to 9.0 on 7/2, and pt with marked symptomatic improvement, though not fully back to baseline. WBCs noted to further increase, though she remained afebrile; zosyn continued while blood cultures with NGTD. Leukocytosis decreased into 7/3, Hb remained stable, and pt grossly asymptomatic and back to baseline. She has an appointment with heme/onc this week to further discuss her anemia, and will potentially be scheduled for regular routine outpatient blood  transfusions. Abx changed to PO cipro/flagyl. Pt deemed appropriate for discharge. Plan discussed with pt, who was agreeable and amenable; medications were discussed and reviewed, outpatient follow-up scheduled, ER precautions were given, all questions were answered to the pt's satisfaction, and Ms. Rodgers was subsequently discharged.         Goals of Care Treatment Preferences:  Code Status: Full Code    Living Will: Yes              Consults:   Consults (From admission, onward)        Status Ordering Provider     Inpatient virtual consult to Hospital Medicine  Once        Provider:  (Not yet assigned)    LELO Farnsworth new Assessment & Plan notes have been filed under this hospital service since the last note was generated.  Service: Hospital Medicine    Final Active Diagnoses:    Diagnosis Date Noted POA    PRINCIPAL PROBLEM:  Symptomatic anemia [D64.9] 07/01/2022 Yes    Leukocytosis [D72.829] 06/09/2022 Yes    Thrombocytopenia [D69.6] 07/01/2022 Yes    Paroxysmal atrial fibrillation [I48.0] 06/10/2022 Yes    History of endometrial cancer [Z85.42]  Not Applicable    Hx of breast cancer [Z85.3] 08/06/2015 Not Applicable    Gastroesophageal reflux disease without esophagitis [K21.9] 07/01/2022 Yes      Problems Resolved During this Admission:       Discharged Condition: stable    Disposition: Home or Self Care    Follow Up:   Follow-up Information     Gary Gonzalez MD. Schedule an appointment as soon as possible for a visit.    Specialties: Internal Medicine, Pediatrics  Contact information:  [address] [facility] [address]  [phone]                       Patient Instructions:      Ambulatory referral/consult to Gastroenterology   Standing Status: Future   Referral Priority: Routine Referral Type: Consultation   Referral Reason: Specialty Services Required   Requested Specialty: Gastroenterology   Number of Visits Requested: 1     Diet Cardiac     Notify your  health care provider if you experience any of the following:  increased confusion or weakness     Notify your health care provider if you experience any of the following:  persistent dizziness, light-headedness, or visual disturbances     Notify your health care provider if you experience any of the following:  worsening rash     Notify your health care provider if you experience any of the following:  severe persistent headache     Notify your health care provider if you experience any of the following:  difficulty breathing or increased cough     Notify your health care provider if you experience any of the following:  severe uncontrolled pain     Notify your health care provider if you experience any of the following:  persistent nausea and vomiting or diarrhea     Notify your health care provider if you experience any of the following:  temperature >100.4     Activity as tolerated       Significant Diagnostic Studies: Labs: All labs within the past 24 hours have been reviewed    Pending Diagnostic Studies:     None         Medications:  Reconciled Home Medications:      Medication List      START taking these medications    ciprofloxacin HCl 500 MG tablet  Commonly known as: CIPRO  Take 1 tablet (500 mg total) by mouth 2 (two) times daily for 5 days     fluconazole 100 MG tablet  Commonly known as: DIFLUCAN  Take 1 tablet (100 mg total) by mouth once for 1 dose     metroNIDAZOLE 500 MG tablet  Commonly known as: FLAGYL  Take 1 tablet (500 mg total) by mouth 3 (three) times daily for 5 days        CONTINUE taking these medications    acetaminophen 500 MG tablet  Commonly known as: TYLENOL  Take 1,000 mg by mouth daily as needed for Pain.     clobetasol 0.05% 0.05 % Oint  Commonly known as: TEMOVATE  APPLY TO AFFECTED AREA(S) TWICE A DAY FOR 1 MONTH(S) then EVERY DAY FOR 1 MONTH(S) then 3 TIMES A WEEK     fexofenadine 180 MG tablet  Commonly known as: ALLEGRA  2 tablets in the morning.  May take an extra 2 tablets  during the day if needed for itching.     flecainide 100 MG Tab  Commonly known as: TAMBOCOR  Take 100 mg by mouth every 12 (twelve) hours.     hydrocortisone 2.5 % cream  Apply to affected areas twice a day when eczema is moderate.     hydrOXYzine HCL 25 MG tablet  Commonly known as: ATARAX  1 to 8 tablets at bedtime.  Start with 3 tablets.  Increase or decrease as needed until itching is controlled or a maximum of 8 tablets.     metoprolol tartrate 25 MG tablet  Commonly known as: LOPRESSOR  Take 1 tablet (25 mg total) by mouth 2 (two) times daily.     * nystatin powder  Commonly known as: MYCOSTATIN  Apply topically 4 (four) times daily.     * nystatin cream  Commonly known as: MYCOSTATIN  Apply topically every Mon, Wed, Fri.     ondansetron 4 MG tablet  Commonly known as: ZOFRAN  Take 1 tablet (4 mg total) by mouth every 6 (six) hours as needed for Nausea.     pantoprazole 40 MG tablet  Commonly known as: PROTONIX  Take 1 tablet (40 mg total) by mouth once daily.     UNABLE TO FIND  [place] [employer] Immune Support     UNABLE TO FIND  medication name: medical marijuana     URIBEL ORAL  Take by mouth as needed.         * This list has 2 medication(s) that are the same as other medications prescribed for you. Read the directions carefully, and ask your doctor or other care provider to review them with you.                Indwelling Lines/Drains at time of discharge:   Lines/Drains/Airways     None                 Time spent on the discharge of patient: 35 minutes         Jeff Drummond MD  Attending Physician  Department of Hospital Medicine  Epic secure chat preferred, or ext. [phone]  7/3/2022

## 2022-07-03 NOTE — NURSING
Patient discharged to home via wheelchair to personal transportation provided by sister. AVS given, IVs removed, and pharmacy medications provided. Education provided on blood transfusion reactions. Patient demonstrated the teach back method on how to identify signs. Education provided on meds during hospitalization and the continuing cardiac medications.

## 2022-07-03 NOTE — PLAN OF CARE
Problem: Adult Inpatient Plan of Care  Goal: Plan of Care Review  Outcome: Ongoing, Progressing  Goal: Patient-Specific Goal (Individualized)  Outcome: Ongoing, Progressing  Goal: Absence of Hospital-Acquired Illness or Injury  Outcome: Ongoing, Progressing  Goal: Optimal Comfort and Wellbeing  Outcome: Ongoing, Progressing  Goal: Readiness for Transition of Care  Outcome: Ongoing, Progressing     Problem: Impaired Wound Healing  Goal: Optimal Wound Healing  Outcome: Ongoing, Progressing    Plan of care reviewed with pt. VSS. Pt given prn pain medicine. Pt is free of falls and injuries. Bed in lowest position and call light wtihin reach. I will continue to monitor.

## 2022-07-05 NOTE — PROGRESS NOTES
C3 nurse spoke with Jeanne Rodgers for a TCC post hospital discharge follow up call. The patient does not have a scheduled HOSFU appointment with Gary Gonzalez MD within 7 days post hospital discharge date 7/3/2022. C3 nurse was unable to schedule HOSFU appointment in [facility].    Patient declined NP Home Visit.

## 2022-07-07 NOTE — PROGRESS NOTES
Subjective:       Patient ID: Jeanne Rodgers is a 67 y.o. female.    Chief Complaint: Breast Cancer    HPI     Returns for bmbx results- performed for cytopenias    - 6/21/2022 Pathology BmBx:  LEFT ILIAC BONE, CT-GUIDED BIOPSY WITH ON-SITE ADEQUACY:   -HYPERCELLULAR MARROW (%), TRILINEAGE HEMATOPOIETIC ACTIVITY.   -FOCAL GRADE 1 RETICULAR FIBROSIS.   -NO DEFINITIVE MORPHOLOGIC OR IMMUNOPHENOTYPIC EVIDENCE OF LYMPHOMA, PLASMA   CELL NEOPLASM, OR METASTATIC CARCINOMA.  SEE COMMENT       In the interval she has had 2 hospital admissions:  - admitted from 7/1- 7/3/2022 as below:  HPI:   Jeanne Rodgers is a 66yo WF with a PMH of recurrent GIB, paroxysmal Afib, hx of breast and endometrial cancer, and GERD who presented to the [facility] ED on 7/1/22 with complaints of fatigue and generalized weakness. Of note, pt recently discharged from [facility] for melena and symptomatic anemia, negative endoscopic findings. Reports that after discharge, she gradually became symptomatic once more (weakness, SOB/HERRING, dizziness), and went to her PCP for follow-up, which showed decreased H/H. Denies F/C/N/V/D/C, HA, CP, palpitations, BRBPR, melena, hematochezia, hematemesis, abdominal pain, dysuria, extremity swelling, or symptoms otherwise.   In the ED, VSS. Labs showed WBC 17.17, Hb 6.2, and plt 121. CT abd/pelvis showed  interval increased overall small volume mildly complex free pelvic fluid, though was otherwise unremarkable. ED ordered 2u pRBCs, and hospital medicine was consulted for admission and further management.  * No surgery found *    Hospital Course:   Pt received 2u pRBCs and was admitted to [facility] [place]. Hb improved to 9.0 on 7/2, and pt with marked symptomatic improvement, though not fully back to baseline. WBCs noted to further increase, though she remained afebrile; zosyn continued while blood cultures with NGTD. Leukocytosis decreased into 7/3, Hb remained stable, and pt grossly asymptomatic and back to baseline. She has  an appointment with heme/onc this week to further discuss her anemia, and will potentially be scheduled for regular routine outpatient blood transfusions. Abx changed to PO cipro/flagyl. Pt deemed appropriate for discharge. Plan discussed with pt, who was agreeable and amenable; medications were discussed and reviewed, outpatient follow-up scheduled, ER precautions were given, all questions were answered to the pt's satisfaction, and Ms. Rodgers was subsequently discharged.    - admitted 6/7- 6/11/2022 as below  HPI:   67-year-old female with past medical history of endometrial cancer, breast cancer, fibromyalgia, PTSD, GIB, blood transfusion, anxiety who presents to the emergency department with chief complaint of abnormal lab value. Reports bright red blood per rectum with dark streaks that started four days ago. Had follow up appointment today. Noted to be anemic and referred to the ER for further evaluation. Endorses headache and lightheadedness. Denies chest pain, shortness of breath, abdominal pain, vomiting, diarrhea. Reports pelvic and right leg pain. She had recent imaging which was concerning for bone lesions to the pelvis and has biopsy scheduled for later in June. She denies other worsening or alleviating factors.   In the ED patient afebrile and hemodynamically stable saturating well on room air. Hb 6.3 (baseline 8.5). Patient transfused one unit PRBC in ED, started on iv protonix, and admitted to the care of medicine for further evaluation and management of GIB.    - 6/9/2022 Colonoscopy:  Findings:        The perianal and digital rectal examinations were normal.        The entire examined ileum appeared normal.        The entire colon was normal, without inflammation ulceration or        bleeding stigmata. The mucosa was more friable than typical, with        barotrauma with mild mucosal oozing seen during withdrawal.        The retroflexed view of the distal rectum and anal verge was normal         and showed no anal or rectal abnormalities.   Impression:     - Normal colon, with mild contact bleeding in the                          entire examined colon.                          - The examined portion of the ileum was normal.                          - The distal rectum and anal verge are normal on                          retroflexion view.                          - No specimens collected.      - 7/2/2020 Video capsule endoscopy:  Findings:        Images of the esophagus, stomach and small bowel were obtained from        the swallowed capsule and reviewed.        On review of the recorded study, it was determined that the study        lasted 9-hours 30-minutes 13-seconds (total recording time). The        capsule enteroscope entered the cecum prior to the end of the        recording.        The first gastric image was captured at 0-hours 4-minutes and        19-seconds.        The first duodenal image was captured at 0-hours 28-minutes and        41-seconds.        The first cecal image was captured at 2-hours 32-minutes and        44-seconds.        Scattered hypervascularization characterized by increase vascular        mucosa pattern was found in the gastric body at 16 minutes and 44        seconds and 21 minutes and 6 seconds.        Localized hypervascularization characterized by increase vascular        mucosa pattern without active bleeding and with no stigmata of        bleeding was found in the duodenum at 0-hours 28-minutes 49-seconds.        A scattered area of red spots on the mucosa in the small bowel at        1-hour 41-minutes 36-seconds,1-hour 45-minutes 21-seconds, 1-hour        57-minutes 22-seconds        Diffuse hypervascularization characterized by increase vascular        mucosa pattern mucosa without active bleeding and with no stigmata        of bleeding was found in the ileum until the rest of the small bowel        starting at 2-hours 15-minutes 34-seconds and ending at 2-hours 32         minutes and 44-seconds.   Impression:   - mild hypervascularization characterized by                         increase vascular mucosa pattern pre-pyloric antrum.                         - mild to moderate hypervascularization                         characterized by increase vascular mucosa pattern                         duodenum.                         - hypervascularization characterized by increase                         vascular mucosa patternsmall bowel.                         - mild to moderate hypervascularization                         characterized by increase vascular mucosa pattern                         mucosa in the ileum.                         - No active bleeding these above findings are                         consistent with mild/moderate radiation enteritis.    She has also undergone multiple recent imaging scans - initially after presenting with reports of continued leg pain- she had been seen for leg/hip pain in 3/2022 with below results of imaging:  - 3/9/2022 CT Pekvis:  FINDINGS:  Evaluation of the solid organs is limited due to lack of intravenous contrast.  Lower chest: Unremarkable.  URINARY COLLECTING SYSTEM: No calculi in the kidneys, ureters, or urinary bladder. No hydronephrosis or ureterectasis.  Liver: normal contour  Gallbladder and bile ducts: Unremarkable.  Pancreas: Pancreas is atrophic.  Spleen: Normal contour.  Adrenals: Normal contour.  Lymph nodes: No abdominal or pelvic lymphadenopathy.  Bowel and mesentery: Unremarkable.  Abdominal aorta: Unremarkable.  Inferior vena cava: Unremarkable.  Free fluid or free air: None.  Pelvis: Unremarkable.  Body wall: Unremarkable.  Bones: Unremarkable.  Impression:  Essentially unremarkable CT the abdomen and pelvis.  No urolithiasis or hydronephrosis.     - 3/28/2022 MRI Hip:  FINDINGS:  Bones: No evidence for hip or pelvic fracture.  Abnormal marrow signal throughout the pelvis, likely sequela of prior radiation therapy.   Bone marrow edema and enhancement seen about the bilateral sacroiliac joints, left greater than right.  Joints: There is thinning of right hip articular cartilage with full-thickness cartilage loss over the superior acetabulum.  There is fraying of the bilateral acetabular labrum.  There is a small right, trace left hip joint effusion.  Mild irregularity of the pubic symphysis.  No definite sacroiliac joint erosion, noting mild bilateral anterior soft tissue edema.  Muscles/tendons: Visualized muscles and tendons are unremarkable.  No evidence for iliopsoas or trochanteric bursitis.  Miscellaneous: There is a small volume of pelvic ascites.  Postsurgical changes of prior hysterectomy noted.  No definite pelvic lymphadenopathy.  Impression:  1. No evidence for hip or pelvic fracture.  2. Bone marrow edema about the bilateral sacroiliac joints, possible osteitis related to sacroiliitis.  Alternatively, developing foci of osteonecrosis.  3. Right hip cartilage loss.  Bilateral acetabular labral fraying.  4. Small volume of pelvic ascites.     She was referred to Orthopedics and saw Dr. Holder earlier this month and additional imaging ordered:     5/16/2022 MRI L spine:  FINDINGS:  Retrospectively MRI and CT pelvis exams show evidence of nonspecific sclerotic lucent change left sacral ala which could be no suspicious for incomplete stress fracture.  Otherwise previous imaging shows no additional bony abnormality.  Marrow space infiltrated process involves the included spine particularly T9 through T11 vertebral bodies, less prominent changes remainder of lumbar spine and included S4 sacral segment.  In addition Volvo mint of included SI joints particularly upper iliac and left sacral ala.  Post-contrast exam shows no abnormal intra-axial extra-axial enhancement the spinal canal and spinal cord nerve root structures.  Partial enhancement of the marrow space metastatic disease particularly lower dorsal spine.  Lower  dorsal spinal cord normal.  Minimal mild posterior disc bulge T10 and T11 posterior disc margins with mild facet joint arthropathy and mild spinal and no significant foraminal stenosis T10 and mild moderate foramen stenosis at T11.  T11 disc level through L1 disc level shows no unusual posterior disc abnormality.  L2-L3 minimal mild posterior disc bulge, mild compression anterior spinal sac, facet joint arthropathy with mild spinal and foramen stenosis.  L 3 L4 minimal mild posterior disc bulge, posterior central annular fissure, mild compression anterior spinal sac, facet joint arthropathy with mild spinal and foramen stenosis.  L 4 L5 mild posterior disc bulge spondylosis, mild compression anterior spinal sac, narrowing lateral recesses, prompt facet joint arthropathy, mild moderate foramen and spinal stenosis, hypertrophy ligamentum flavum.  L5-S1 minor posterior disc bulge, prompt facet joint arthropathy without significant spinal or foramen stenosis.  No disc prolapse or subluxation.  Impression:  1. Subtle retrospective incomplete stress fracture deformity left sacral ala on CT and MRI studies.  2. New evidence of extensive metastatic disease from known breast carcinoma particularly lower dorsal sacrum and pelvic areas and lesser degree lumbar spine.  No new fracture subluxation or disc prolapse.  3. Degenerative disc spondylosis facet joint arthropathy with above described findings.  4.  This report was flagged in Epic as abnormal.     Our office was contacted regarding this test results and sent for below:  5/24/2022 PET scan:  COMPARISON:  MRI lumbar spine from 516/2022.  FDG PET-CT 04/13/2021, 09/21/2020, 06/02/2020  FINDINGS:  Quality of the study: Adequate.  In the head and neck, there are no hypermetabolic lesions worrisome for malignancy. There are no hypermetabolic mucosal lesions, and there are no pathologically enlarged or hypermetabolic lymph nodes.  In the chest, there are no hypermetabolic  lesions worrisome for malignancy.  There are no concerning pulmonary nodules or masses, and there are no pathologically enlarged or hypermetabolic lymph nodes.  In the abdomen and pelvis, there is physiologic tracer distribution within the abdominal organs and excretion into the genitourinary system.  Postop change of cholecystectomy and hysterectomy/BSO.  In the bones, there is absent marrow uptake involving lower thoracic spine, lumbar spine, and portion of sacrum suggestive of radiation induced fatty marrow replacement when compared with recent MRI.  Mildly increased uptake/marrow hyperplasia involving remaining axial and appendicular skeleton.  There are no hypermetabolic lesions worrisome for malignancy.  Subtle linear lucency and mild uptake within the left sacral ala compatible with incomplete stress fracture described on recent MRI.  In the extremities, there are no hypermetabolic lesions worrisome for malignancy.  Impression:  No PET CT evidence of recurrent or metastatic disease in this patient with a history of endometrial cancer and breast cancer.  Left sacral insufficiency fracture as described on recent MRI.  Additional findings as above.     Tumor markers:  = 20  CA 27-29= 49.8     Oncology History:  Endometrial Cancer History:  Diagnosis of endometrial cancer after presenting with vaginal bleeding  She has completed surgical debulking, chemotherapy and XRT but states that she has developed nausea and vomiting since starting XRT  She was also admitted in June of this year with melena and had a complete GI work up (see below)  Of note during chemotherapy she required several transfusions for symptomatic anemia     Breast Oncology History:  This is a 66 yo female with a history of right breast cancer  - Had been getting her mammograms at [facility] as she was uninsured at the time  - reports small mass seen in 2007 and sent to [facility]- where she was told nothing was seen on review  -  Repeat situation of above the next year in   - she noted right nipple inversion - went to [facility]- told not likely cancer, no further testing done  - In  it was recommended that [facility] see her for imaging  - Abnormal mammogram and biopsy recommended  Reports biopsy experience was awful and so she researched other sites to be cared  - She sought out Dr. Gallo Omalley at 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  - Saw Dr. Keller at [facility]  Oncotype done ? 9- chemotherapy not recommended  She was started on Arimidex  -She was on Arimidex for 4 years until stopping (see below).    Genetic testin [facility]-Genetic Testing  · Test(s) performed:  [facility] Common Hereditary Cancers Panel  Number of genes analyzed:  47  Laboratory:  [facility]  Ordering provider:  ANGIE Blackman  Sample collection date:  07/15/2020  Results report date:  2020              Full report to be scanned into Epic under the Labs and/or Media tab(s).  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            COUNSELING/MEDICAL DECISION-MAKING:         Note below discussion and recommendations provided from genetic counseling visit (copied forward):  TP53 Mutation Implications  Li-Fraumeni Syndrome (LFS) is a hereditary (germline) cancer syndrome diagnosed with the identification of a germline TP53 gene mutation, which is passed from parent to child and is present for the patient's lifespan in essentially every bodily cell (GHR, 2020).  Its associated risks, including breast cancer, brain cancer, sarcoma, adrenocortical carcinoma, and colorectal cancer ([employer], 2020; NCCN, 1.2020).  It is important to note that  the patient does not at this time have a diagnosis of LFS, as we do not know yet whether the patient's TP53 mutation is germline.   Somatic (acquired) TP53 mutations do not occur in the germline and occur at some other point in the lifespan (not at conception) (GHR, 2020) in the blood due to any of several factors, including advancing age ([employer], 2020), history of chemotherapy (which applies to patient), hematologic malignancy (NCCN, 1.2020), environmental factors including ultraviolet (UV) exposure (GHR, 2020), and DNA replication errors during cell division (GHR, 2020).  Somatic mutations that develop in only a single cell early in the embryonic phase can lead to mosaicism (GHR, 2020).  With mosaicism, the gene mutation is not present in the affected individual's egg or sperm cells or in the fertilized egg but rather develops later during the embryonic phase, and, per the process of cell division, cells arising from the cell carrying the mutation will also have the mutation, but other cells will not (GHR, 2020).  Mosaicism's causing health problems is a function of both the specific mutation and the number of cells affected by the mutation (GHR, 2020).  Up to 20% of LFS cases are de fadi ([employer], 2020).  De fadi (i.e. new) mutations can be germline or somatic (GHR, 2020).  Sometimes, the de fadi mutation develops in the individual's germline (egg or sperm cells) but is absent in all of the individual's other cells; other times, the de fadi mutation develops in the fertilized egg shortly post-conception, and, as the fertilized egg divides, each resultant cell also has the mutation (GHR, 2020).    It is important to distinguish germline origin versus somatic origin as the former has the potential to impact the patient's family while the latter does not.    Recommendations  Appropriate next steps include:  1. A complete blood count (CBC) and a peripheral blood smear is warranted to evaluate for hematologic  neoplasia (NCCN, 1.2020).  The patient is amenable to this.  I do not suspect hematologic neoplasia as etiology of her TP53 mutation.  2. Testing of parents for identification of the patient's TP53 mutation in either of them; however, the patient's parents are unfortunately unavailable for testing, and testing even all of the patient's siblings, if they all came back negative, would not confirm that the patient's TP53 mutation is not germline in origin.  Therefore, a fibroblast skin culture is recommended, which breast surgeon Dr. Nicci García has agreed to perform.  The patient is amenable to this.     Bone marrow biopsy done with above and cytopenias  negative for malignancy.    Review of Systems   Constitutional: Positive for activity change (less active from fatigue) and fatigue. Negative for unexpected weight change.   Respiratory: Negative for cough, shortness of breath and wheezing.    Cardiovascular: Negative for chest pain, palpitations and leg swelling.   Gastrointestinal: Negative for abdominal distention, abdominal pain (see prior note), blood in stool, change in bowel habit, constipation, diarrhea, nausea, rectal pain, vomiting and change in bowel habit.        Noted stools appeared orange- better after stopping vitamins   Genitourinary: Negative for difficulty urinating, dysuria, frequency and urgency.        Lichen planus impacts this area   Musculoskeletal: Positive for arthralgias (left shoulder, right leg) and gait problem. Negative for back pain, leg pain and neck pain.   Neurological: Negative for weakness, light-headedness and numbness.   Psychiatric/Behavioral: Positive for dysphoric mood. The patient is nervous/anxious.        continued fatigue continued anxiety noted  Objective:      Physical Exam  Vitals and nursing note reviewed.   Constitutional:       Comments: Presents with sister  ECOG- 0  Very pleasant       Remainder- deferred- here for test results  Assessment:       Problem List Items  Addressed This Visit     Symptomatic anemia    Hx of breast cancer    Endometrial cancer    Anemia due to chronic blood loss - Primary    Relevant Orders    CBC W/ AUTO DIFFERENTIAL (Completed)    Reticulocytes (Completed)    IRON AND TIBC (Completed)    Ferritin (Completed)    DIRECT ANTIGLOBULIN TEST (Completed)    INDIRECT ANTIGLOBULIN TEST (Completed)    HAPTOGLOBIN (Completed)          Plan:     Additional labs for anemia work up today  Will discuss with radiology and review scans and determine if additional biopsy needed  Bone marrow biopsy unrevealing    RTC 3 weeks     Route Chart for Scheduling    Med Onc Chart Routing      Follow up with physician 3 weeks.   Follow up with [facility]    Infusion scheduling note    Injection scheduling note    Labs    Imaging    Pharmacy appointment    Other referrals

## 2022-07-18 NOTE — PROGRESS NOTES
Subjective:       Chief Complaint  Chief Complaint   Patient presents with    Hospital Follow Up     Patient denies vaccinations, but will speak with physician in regards to mammogram        HPI  Jeanne Rodgers is a 67 y.o. female with multiple medical diagnoses as listed in the medical history and problem list that presents for hospital follow-up.    Admitted from 7/1- 7/3/2022 as below: Jeanne Rodgers is a 68yo WF with a PMH of recurrent GIB, paroxysmal Afib, hx of breast and endometrial cancer, and GERD who presented to the [facility] ED on 7/1/22 with complaints of fatigue and generalized weakness. Of note, pt recently discharged from [facility] for melena and symptomatic anemia, negative endoscopic findings. Reports that after discharge, she gradually became symptomatic once more (weakness, SOB/HERRING, dizziness), and went to her PCP for follow-up, which showed decreased H/H. Denies F/C/N/V/D/C, HA, CP, palpitations, BRBPR, melena, hematochezia, hematemesis, abdominal pain, dysuria, extremity swelling, or symptoms otherwise. In the ED, VSS. Labs showed WBC 17.17, Hb 6.2, and plt 121. CT abd/pelvis showed  interval increased overall small volume mildly complex free pelvic fluid, though was otherwise unremarkable. ED ordered 2u pRBCs, and hospital medicine was consulted for admission and further management.    Hospital Course:   Pt received 2u pRBCs and was admitted to [facility]. Hb improved to 9.0 on 7/2, and pt with marked symptomatic improvement, though not fully back to baseline. WBCs noted to further increase, though she remained afebrile; zosyn continued while blood cultures with NGTD. Leukocytosis decreased into 7/3, Hb remained stable, and pt grossly asymptomatic and back to baseline. She has an appointment with heme/onc this week to further discuss her anemia, and will potentially be scheduled for regular routine outpatient blood transfusions. Abx changed to PO cipro/flagyl. Pt deemed appropriate for discharge.  Plan discussed with pt, who was agreeable and amenable; medications were discussed and reviewed, outpatient follow-up scheduled, ER precautions were given, all questions were answered to the pt's satisfaction, and Ms. Rodgers was subsequently discharged.    Currently states feeling well - her Hgb had recovered s/p transfusions    Just recently noted a lump in her left breast which is small but still concerns her      Family and/or Caretaker present at visit?  Yes.  Diagnostic tests reviewed/disposition: No diagnosic tests pending after this hospitalization.  Disease/illness education: discussed  Home health/community services discussion/referrals: Patient does not have home health established from hospital visit.  They do not need home health.  If needed, we will set up home health for the patient.   Establishment or re-establishment of referral orders for community resources: No other necessary community resources.   Discussion with other health care providers: No discussion with other health care providers necessary.       Patient Care Team:  Gary Gonzalez MD as PCP - General (Internal Medicine)  Yenni Mccurdy RD as Dietitian (Nutrition)  Pratima Cedeno LPN as Care Coordinator      PAST MEDICAL HISTORY:  Past Medical History:   Diagnosis Date    Allergy     skin    Anxiety     Atrial fibrillation     Breast cancer 2014    right    Chronic back pain     Depression     Encounter for blood transfusion     Endometrial cancer 07/30/2019    Fibromyalgia     Hives     Hx of psychiatric care     Hx of radiation therapy     Itching     Lichen sclerosus     Mental disorder     PONV (postoperative nausea and vomiting)     Psychiatric problem     PTSD (post-traumatic stress disorder)     Sleep difficulties     Sore throat     Therapy     Uterine cancer 08/05/2019    UTI (urinary tract infection)        PAST SURGICAL HISTORY:  Past Surgical History:   Procedure Laterality Date    anal  fissure surgery      APPENDECTOMY      BIOPSY N/A 11/19/2020    Procedure: BONE BIOPSY;  Surgeon: [facility] Diagnostic Provider;  Location: [facility] OR;  Service: Radiology;  Laterality: N/A;  RN PREOP 11/17/2020    BREAST BIOPSY Right 2014    BREAST LUMPECTOMY Right 2014    R lumpectomy [facility] w 2.4cm IDC & DCIS, neg margins, 0/6 SN, Stage II, ER/AL+, HER-2 neg Adjuvant XRT and hormonal therapy     CARPAL TUNNEL RELEASE      CHOLECYSTECTOMY      COLONOSCOPY      COLONOSCOPY N/A 6/11/2020    Procedure: COLONOSCOPY;  Surgeon: Bobby Marino MD;  [facility]  Service: Endoscopy;  Laterality: N/A;    COLONOSCOPY N/A 6/9/2022    Procedure: COLONOSCOPY;  Surgeon: Abdoulaye Alcocer MD;  Location: [facility] (2ND FLR);  Service: Endoscopy;  Laterality: N/A;    ESOPHAGOGASTRODUODENOSCOPY N/A 6/11/2020    Procedure: EGD (ESOPHAGOGASTRODUODENOSCOPY);  Surgeon: Bobby Marino MD;  [facility]  Service: Endoscopy;  Laterality: N/A;    HYSTERECTOMY  08/15/2019    HYSTEROSCOPY WITH DILATION AND CURETTAGE OF UTERUS N/A 7/24/2019    Procedure: HYSTEROSCOPY, WITH DILATION AND CURETTAGE OF UTERUS;  Surgeon: Елена Kam MD;  Location: [facility] OR;  Service: OB/GYN;  Laterality: N/A;    INSERTION OF TUNNELED CENTRAL VENOUS CATHETER (CVC) WITH SUBCUTANEOUS PORT N/A 9/26/2019    Procedure: INSERTION, PORT-A-CATH;  Surgeon: Moris Varghese MD;  Location: [facility] CATH LAB;  Service: Radiology;  Laterality: N/A;    MEDIPORT REMOVAL N/A 3/5/2020    Procedure: REMOVAL, CATHETER, CENTRAL VENOUS, TUNNELED, WITH PORT;  Surgeon: Moris Varghese MD;  Location: [facility] CATH LAB;  Service: Radiology;  Laterality: N/A;    NEEDLE LOCALIZATION N/A 11/19/2020    Procedure: NEEDLE LOCALIZATION;  Surgeon: [facility] Diagnostic Provider;  Location: [facility] OR;  Service: Radiology;  Laterality: N/A;    PARTIAL MASCECTOMY Right 2014    ROBOT-ASSISTED LAPAROSCOPIC ABDOMINAL HYSTERECTOMY USING DA RAMU XI N/A  8/15/2019    Procedure: XI ROBOTIC HYSTERECTOMY;  Surgeon: Darius Regalado MD;  Location: [facility];  Service: OB/GYN;  Laterality: N/A;    ROBOT-ASSISTED LAPAROSCOPIC SALPINGO-OOPHORECTOMY USING DA RAMU XI Bilateral 8/15/2019    Procedure: XI ROBOTIC SALPINGO-OOPHORECTOMY;  Surgeon: Darius Regalado MD;  Location: [facility] OR;  Service: OB/GYN;  Laterality: Bilateral;    ROBOT-ASSISTED LYMPHADENECTOMY Left 8/15/2019    Procedure: ROBOTIC LYMPHADENECTOMY;  Surgeon: Darius Regalado MD;  Location: [facility];  Service: OB/GYN;  Laterality: Left;    SALPINGOOPHORECTOMY Bilateral 08/15/2019    TONSILLECTOMY      TUBAL LIGATION         SOCIAL HISTORY:  Social History     Socioeconomic History    Marital status: Single    Number of children: 0   Occupational History    Occupation: [employer]   Tobacco Use    Smoking status: Never Smoker    Smokeless tobacco: Never Used   Substance and Sexual Activity    Alcohol use: No     Alcohol/week: 0.0 standard drinks    Drug use: Yes     Types: Marijuana     Comment: medical marijuana- 2 dayys ago     Sexual activity: Not Currently     Partners: Male   Social History Narrative    Lives alone.        FAMILY HISTORY:  Family History   Problem Relation Age of Onset    Hypertension Mother     Arthritis Mother     Other Mother 85        pancreatic tumor felt by pt's mother's HCP to be malignant though she survived for 8 yrs after dx    Cancer Mother     Pancreatic cancer Father 84    Cancer Father     Pancreatic cancer Maternal Aunt 62    No Known Problems Sister     Hypertension Brother     Leukemia Paternal Uncle 70    Heart attack Paternal Grandmother     Heart attack Paternal Grandfather     Hypertension Brother     Hashimoto's thyroiditis Sister     Diabetes Other         strong family history per patient    Lung cancer Maternal Uncle         smoker    Cancer Paternal Aunt         unknown origin    Suicide Cousin     Alcohol abuse Cousin     Breast cancer  Neg Hx     Ovarian cancer Neg Hx     Cirrhosis Neg Hx     Colon cancer Neg Hx        ALLERGIES AND MEDICATIONS: updated and reviewed.  Review of patient's allergies indicates:  No Known Allergies  Current Outpatient Medications   Medication Sig Dispense Refill    acetaminophen (TYLENOL) 500 MG tablet Take 1,000 mg by mouth daily as needed for Pain.      clobetasol 0.05% (TEMOVATE) 0.05 % Oint APPLY TO AFFECTED AREA(S) TWICE A DAY FOR 1 MONTH(S) then EVERY DAY FOR 1 MONTH(S) then 3 TIMES A WEEK 60 g 2    fexofenadine (ALLEGRA) 180 MG tablet 2 tablets in the morning.  May take an extra 2 tablets during the day if needed for itching. 240 tablet 5    flecainide (TAMBOCOR) 100 MG Tab Take 100 mg by mouth every 12 (twelve) hours.      hydrocortisone 2.5 % cream Apply to affected areas twice a day when eczema is moderate. 453.6 g 1    hydrOXYzine HCL (ATARAX) 25 MG tablet 1 to 8 tablets at bedtime.  Start with 3 tablets.  Increase or decrease as needed until itching is controlled or a maximum of 8 tablets. 240 tablet 3    meth/meblue/sod phos/psal/hyos (URIBEL ORAL) Take by mouth as needed.      metoprolol tartrate (LOPRESSOR) 25 MG tablet Take 1 tablet (25 mg total) by mouth 2 (two) times daily. 180 tablet 3    nystatin (MYCOSTATIN) cream Apply topically every Mon, Wed, Fri. 30 g 11    nystatin (MYCOSTATIN) powder Apply topically 4 (four) times daily. 30 g 1    ondansetron (ZOFRAN) 4 MG tablet Take 1 tablet (4 mg total) by mouth every 6 (six) hours as needed for Nausea. 12 tablet 0    pantoprazole (PROTONIX) 40 MG tablet Take 1 tablet (40 mg total) by mouth once daily. 90 tablet 3    UNABLE TO FIND [place] [employer] Mushrooms Immune Support      UNABLE TO FIND medication name: medical marijuana      ciprofloxacin HCl 0.2 % otic solution Place 4 drops into the left ear 2 (two) times daily. for 10 days 20 each 0     No current facility-administered medications for this visit.         Review of Systems    Gastrointestinal: Positive for diarrhea.         Objective:       Physical Exam  Vitals:    07/18/22 0905   BP: 100/60   BP Location: Left arm   Patient Position: Sitting   BP Method: Medium (Manual)   Pulse: 76   Temp: 97.9 °F (36.6 °C)   TempSrc: Oral   SpO2: 96%   Weight: 53.1 kg (117 lb 2.8 oz)   Height: 5' (1.524 m)    Body mass index is 22.88 kg/m².  Weight: 53.1 kg (117 lb 2.8 oz)   Height: 5' (152.4 cm)   Physical Exam  Vitals reviewed.   Constitutional:       General: She is not in acute distress.     Appearance: She is well-developed.   HENT:      Head: Normocephalic and atraumatic.      Right Ear: Tympanic membrane normal. Tympanic membrane is not erythematous.      Left Ear: Tympanic membrane normal. Tenderness present. Tympanic membrane is not erythematous.   Eyes:      Conjunctiva/sclera: Conjunctivae normal.      Pupils: Pupils are equal, round, and reactive to light.   Neck:      Thyroid: No thyromegaly.   Cardiovascular:      Rate and Rhythm: Normal rate.      Heart sounds: Normal heart sounds. No murmur heard.  Pulmonary:      Effort: Pulmonary effort is normal.      Breath sounds: Normal breath sounds.   Chest:   Breasts:      Breasts are asymmetrical.      Left: Mass (subcentimeter non-tender mass of left breast @ 11o'clock) present.       Musculoskeletal:         General: No deformity.      Cervical back: Normal range of motion and neck supple.   Lymphadenopathy:      Cervical: No cervical adenopathy.   Skin:     General: Skin is warm and dry.   Neurological:      Mental Status: She is alert.      Cranial Nerves: No cranial nerve deficit.   Psychiatric:         Behavior: Behavior normal.             Assessment:     1. Hospital discharge follow-up    2. Symptomatic anemia    3. Breast lump on left side at 11 o'clock position    4. Other infective acute otitis externa of left ear      Plan:     Jeanne was seen today for hospital follow up.    Diagnoses and all orders for this visit:    [facility]  discharge follow-up  Symptomatic anemia  H/o breast cancer   Endometrial cancer   Reviewed - stable presently  Will discuss with Oncology whether merits repeat BMBx given recurrent anemia    Breast lump on left side at 11 o'clock position  -     Mammo Digital Diagnostic Bilat with Ra[facility]; Future    Other infective acute otitis externa of left ear  Discussed antibiotic otic drops for utilization presently         Health Maintenance reviewed, addressed as per orders    F/u in 3 months    1. The patient indicates understanding of these issues and agrees with the plan. Brief care plan is updated and reviewed with the patient as applicable.   2. The patient is given an After Visit Summary that lists all medications with directions, allergies, orders placed during this encounter and follow-up instructions.   3. I have reviewed the patient's medical information including past medical, family, and social history sections including the medications and allergies.   4. We discussed the patient's current medications. I reconciled the patient's medication list and prepared and supplied needed refills.       Gary Gonzalez MD  Internal Medicine-Pediatrics

## 2022-07-18 NOTE — TELEPHONE ENCOUNTER
----- Message from Kimi Charlton sent at 7/18/2022  1:15 PM CDT -----  Type:  Pharmacy Calling to Clarify an RX    Name of Caller:     [facility]     Prescription Name: ciprofloxacin-dexamethasone 0.3-0.1% (CIPRODEX) 0.3-0.1 % DrpS, coved by ins but 200 dollars     What do they need to clarify?  Suggest Ciloxan plain with no steroids     Can you be contacted via [facility]?    Best Call Back Number:         [facility] - [place] - [address]  [address]  [place] [address]  Phone: [phone] Fax: [phone]          Additional Information:

## 2022-07-18 NOTE — TELEPHONE ENCOUNTER
----- Message from Teresa Bowman sent at 7/18/2022 11:30 AM CDT -----  Type: Patient Call Back    Who called: self     What is the request in detail: States the ear drops that were ordered today are $300 would like to know if anything else can be prescribed.     [facility] - [place] - [address]  [address]  [place] [address]  Phone: [phone] Fax: [phone]    Can the clinic reply by [facility]? No     Would the patient rather a call back or a response via [facility]? Call back     Best call back number: [phone]

## 2022-07-18 NOTE — TELEPHONE ENCOUNTER
Please let patient know I sent CIPRODEX as an alterative to her pharmacy  Let me know if you have any other questions or concerns.

## 2022-07-18 NOTE — TELEPHONE ENCOUNTER
Sent 3rd/alternative to pharmacy (Cipro otic drops - no steroid component)  If this last alternative is still too expensive, please recommend patient continue her VOSOL drops prescribed per ENT until able to get further ENT evaluation

## 2022-07-18 NOTE — TELEPHONE ENCOUNTER
----- Message from Frances Mcdonald sent at 7/18/2022  1:51 PM CDT -----  .Type: Patient Call Back    Who called:self    What is the request in detail: The second prescription that dr ordaz called in was 250 dollars and the one before that one was 300 dollars and she wants to know if there is anything cheaper available that she can be prescribed.    Can the clinic reply by [facility]?no    Would the patient rather a call back or a response via [facility]? call    Best call back number:  [phone] (home)

## 2022-07-18 NOTE — TELEPHONE ENCOUNTER
----- Message from Julissa Rudd sent at 7/18/2022 10:06 AM CDT -----  Regarding: prior authorization  Name of Who is Calling: YON MCKEON [[id number]]      What is the request in detail:Pharmacy  the patient insurance does not cover the  neomycin-colist-HC-thonzonium (COLY-MYCIN S) 3.3-3-10-0.5 mg/mL DrpS he states you can do a prior authorization or send over a different prescription       Can the clinic reply by [facility]: no      [facility]: [phone]

## 2022-07-18 NOTE — TELEPHONE ENCOUNTER
Informed patient that message is being forwarded to Dr. Gonzalez. Patient stated she is having extreme pain in the ear.

## 2022-07-25 NOTE — TELEPHONE ENCOUNTER
----- Message from Talisha Gonzalez sent at 7/25/2022  3:12 PM CDT -----  Regarding: Referral  Contact: Patient  Patient is requesting a referral to be seen by Madison Coyne in [facility]   Please Assist     Patient can be reached at [phone]

## 2022-07-25 NOTE — TELEPHONE ENCOUNTER
Spoke with patient who requested referral to [facility] to see the Dietician.  Referral placed and messaged schedulers to assist with scheduling.

## 2022-07-26 NOTE — LETTER
"July 28, 2022        Nano Ford MD  [address] [facility] [address]             [facility] Psychiatry  [address] [facility] [address]  Phone: [phone]  Fax: [phone]   Patient: Jeanne Rodgers   MR Number: [id number]   YOB: 1955   Date of Visit: 7/26/2022       Dear Dr. Ford:    Thank you for referring Jeanne Rodgers to me for evaluation. Below are the relevant portions of my assessment and plan of care.     Jeanne Rodgers is a 67 y.o. female referred by Dr. Ford for psychological evaluation and treatment.  Ms. Rodgers appears to be coping with significant difficulty with her cancer survivorship. Her pre-existing anxiety has been significantly exacerbated. She is especially distressed by the current (patient perceived) lack of clarity about her anemia. She wishes to "have a firm treatment plan" to reduce her anxiety. She is profoundly distrustful of her body and would likely benefit from working with OT/Yoga. She is also upset by her ongoing changes in taste and appetite. She is meeting with Madison Coyne RD on 8/17/22 and was encouraged to monitor her food intake prior to that time due to difficulty self-reporting what problems she is experiencing with different foods.  She is interested in CBT to address worry and will follow up with me for that purpose. Patient was given information about the availability of a meditation class. Patient was given information about the availability of a yoga class.  She may benefit from future connection to palliative care due to her prominent concerns about "navigating the end stage of life." Despite her significant anxiety and depression symptoms, she is not currently interested in reconnecting with psychiatry for psychotropic medication management.    If you have questions, please do not hesitate to call me. I look forward to following Jeanne along with " you.    Sincerely,      Bill Ayala, PhD           CC  MD Madison Brennan RD Danielle M. Nodurft, MD

## 2022-07-27 NOTE — TELEPHONE ENCOUNTER
----- Message from Bill Ayala, PhD sent at 7/27/2022  2:53 PM CDT -----  Sanket Caballero-  Please contact patient about meditation class and yoga classes. I am also referring her to [facility] for individual PT/Yoga (not sure if you schedule for that, too.)  Thanks!  ML

## 2022-07-28 NOTE — PROGRESS NOTES
"Cancer Genetics  Hereditary and High-Risk Clinic  Department of Hematology and Oncology  [facility]    Date of Service:  22  Visit Provider:  Kenton Yoder DNP  Collaborating Physician:  Nano Ford MD    Patient Identification  Name: Jeanne Rodgers  : 1955  MRN: [id number]     Televisit Information  The patient location is: [place], [place].  The chief complaint leading to consultation is: as below.  Visit type: audiovisual.  Face-to-face time with patient: approximately 48 minutes.  Approximately 80 minutes in total were spent on this encounter, which includes face-to-face time and non-face-to-face time preparing to see the patient (e.g., review of tests), obtaining and/or reviewing separately obtained history, documenting clinical information in the electronic or other health record, independently interpreting results (not separately reported) and communicating results to the patient/family/caregiver, or care coordination (not separately reported).  Each patient to whom he or she provides medical services by telemedicine is:  (1) informed of the relationship between the physician and patient and the respective role of any other health care provider with respect to management of the patient; and (2) notified that he or she may decline to receive medical services by telemedicine and may withdraw from such care at any time.  Notes:     SUBJECTIVE      Chief Complaint: Follow-up    History of Present Illness (HPI):  Jeanne Rodgers ("Jeanne"), 67 y.o., assigned female sex at birth, is an established patient of mine.  She returns today for cancer genetics follow-up.    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  Because of this finding, a CBC with peripheral blood smear and a skin punch biopsy to obtain fibroblasts for DNA analysis were pursued.      10/01/2020:    "Pathologist interpretation of peripheral blood smear:   Red cells appear predominantly normochromic and normocytic with moderate anisocytosis.  The increased MCV is noted.  White cells show occasional atypical lymphocytes.  Occasional neutrophils show hypo lobation.  Platelets are mildly decreased in number by automated count with no   significant platelet clumping seen.  If there is no known clinical cause for this patient's pancytopenia,   consider hematology consultation and bone marrow evaluation."    11/19/2020:  Bone marrow biopsy performed; pathology indicated in part:  -- INSUFFICIENT ASPIRATE SMEAR.  -- NORMOCELLULAR MARROW (30%) WITH TRILINEAGE HEMATOPOIESIS AND ATYPICAL MEGAKARYOCYTES.  -- NO MORPHOLOGIC OR IMMUNOPHENOTYPIC EVIDENCE OF METASTATIC CARCINOMA.  -- ADEQUATE IRON STORAGE.  -- SEE COMMENT.  Comments  ...Overall, the aspirate smear is insufficient for morphologic evaluation of dysplasia. Biopsy core shows some atypical megakaryocytes. It may represent changes secondary to the underlying disease and treatment. Correlation with other laboratory results is required.  ...  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 No clonal abnormality was apparent.    Focused Medical History   Germline cancer genetic testing:  Yes  o    Genes analyzed:  APC, MIAH, AXIN2, BARD1, BMPR1A, BRCA1, BRCA2, BRIP1, CDH1, CDK4, CDKN2A (p14ARF), CDKN2A (a21KCH2l), CHEK2, CTNNA1, DICER1, EPCAM*, GREM1*, HOXB13, KIT, MEN1, MLH1, MSH2, MSH3, MSH6, " MUTYH, NBN, NF1, NTHL1, PALB2, PDGFRA, PMS2, POLD1, POLE, PTEN, RAD50, RAD51C, RAD51D, SDHA*, SDHB, SDHC, SDHD, SMAD4, SMARCA4, STK11, TP53, TSC1, TSC2, VHL    o      Cancer:  Yes, x2  o Right breast cancer, ER+SC+HER2(-), diagnosed at age 60  - Treatment:   Unilateral mastectomy   XRT   Arimidex  o Endometrial adenocarcinoma, diagnosed at age 64 (07/2019), with lymph node involvement  - IHC testing for MMR proteins:   MLH1 - Intact nuclear expression    MSH2 - Intact nuclear expression    MSH6 - Intact nuclear expression    PMS2 - Intact nuclear expression  - Treatment:   Hyst/BSO   Chemotherapy   XRT     Colon polyp:  Yes, a handful per patient  o 01/2016:  3 polyps (1-2mm in size each) -- On pathology, sections showed polypoid colonic mucosa with tubular adenomatous change    Focused Surgical History  []  Reproductive organs intact  [x]  S/p hysterectomy with cervicectomy (08/15/2019)  []  Ovaries intact  [x]  S/p salpingectomy-oophorectomy:  [x]Bilateral (08/15/2019)     []Right     []Left  [x]  Breast intact:  []Bilateral     []Right     [x]Left   [x]  S/p mastectomy:  []Bilateral     [x]Right     []Left     Family Oncologic History  ** The pedigree below was placed into this note in a size that produced a legible font.  If it is appearing small/illegible on your screen, expand this note window horizontally. **       No biological children.   Other than noted, no known history of cancer in relatives depicted in the pedigree or in:  Descendants of nieces/nephews; maternal extended family, paternal first cousins, paternal extended family.   Other than noted, no known family history of colon polyps.    Review of Systems   See HPI.       OBJECTIVE      Physical Exam  Significantly limited secondary to the inherent nature of a virtual visit.  Very pleasant patient.  Patient's sister Geni, also very pleasant, participated in the virtual visit.  Constitutional       Appearance:  She appears to be in  no distress.   Neurological     Mental Status:  She is alert and oriented.  Psychiatric        Mood and Affect:  She has a normal mood and affect.     Thought Content:  Thought content is normal.         Speech:  Speech is normal.     Behavior:  Behavior is normal.     Judgment:  Judgment is normal.   Genetics-specific     It is my assessment that the patient is ready to proceed with cancer genetic testing from a psychosocial perspective.    COUNSELING      Cancer Genetics     Germline cancer genetic testing is the testing of genes associated with cancer, known as cancer susceptibility genes.  Just as these genes are inherited from parents--one copy of each gene from each parent--mutations in these genes can be inherited, as well.  A mutation in a cancer susceptibility gene adversely affects the gene's ability to prevent cancer; therefore, carriers of cancer susceptibility gene mutations may be at increased risk for certain cancers.     Causes of Cancer    Only a small percentage of cancers are caused by an inherited cancer susceptibility gene mutation; rather, most cancers are sporadic.  Causes of sporadic cancers may include environmental risk factors, lifestyle risk factors, and non-modifiable risk factors.  It is important to note that members of a family often share not only their genetics but also risk factors including environmental and lifestyle risk factors, so cancers can be familial.     Potential Results of Genetic Testing, and Their Implications     Potential results of genetic testing include positive, negative, and variant of unknown significance (VUS).    · A positive result indicates the presence of at least one clinically significant mutation, and the patient's associated cancer risks vary depending upon the cancer susceptibility gene(s) in which there is/are a mutation(s).  With a positive result, in some cases, depending upon the specific result and the patient's clinical history, modified risk  management may be recommended, including measures for risk reduction and/or surveillance; however, modified management is not always an option.    · A negative result indicates that no clinically significant mutations were identified in the gene(s) tested.    · A VUS indicates that there is not presently enough data for the laboratory to make a determination as to whether the variant is clinically significant.  VUSs are not typically acted upon clinically.       Previous Hereditary Cancer Susceptibility Gene Testing     Li-Fraumeni Syndrome (LFS) is a hereditary cancer syndrome diagnosed with the identification of a germline TP53 gene mutation, which is passed from parent to child and is present for the patient's lifespan in essentially every bodily cell.  LFS is associated with a ~99%+ chance of developing cancer.  Typically, the variant allele frequency (VAF) of a TP53 mutation seen on germline genetic testing in an individual with LFS would be at/near 50%, but Noe was only 15%-25%, indicating that she may have inquired--not inherited--her TP53 mutation.  Somatic (acquired) TP53 mutations arise in the blood due to any of several factors, including advancing age, history of chemotherapy, hematologic malignancy, and other factors.  Prudences bone marrow biopsy shortly after her TP53 mutation was identified did not reveal a hematologic malignancy.     Genetic Mutation Inheritance     If the patient tests positive for a mutation, her first-degree relatives would each have a 50% chance of having the same mutation, and other, more distant blood relatives would also be at risk of having the same mutation.       Genetic Discrimination     The Genetic Information Nondiscrimination Act (ROGELIO) is [place] federal legislation that provides some protections against use of an individual's genetic information by their health insurer and by their employer.      Title I of ROGELIO prohibits most health insurers from utilizing an  individual's genetic information to make decisions regarding insurance eligibility or premium charges.  ROGELIO does not protect individuals from genetic discrimination toward health insurance obtained through a job with the  or through the Federal Employees Health Benefits Plan.    Title II of ROGELIO prohibits covered entities, including employers, from requesting the genetic information of employees and applicants.  ROGELIO does not protect individuals from genetic discrimination by employers with fewer than 15 employees or if employed by the .    ROGELIO does not protect individuals from genetic discrimination by any other type of policies/entities, including but not limited to life insurance, disability insurance, long-term care insurance,  benefits, and [facility] benefits.    Genetic Testing Logistics     An outside laboratory would perform the testing after a blood sample is collected here at [facility] or punch biopsy is performed here at [facility].    Discussed financial aspect of genetic testing.    Results can take several weeks - roughly 3-6 weeks, depending on specimen type.    Post-test genetic counseling can be conducted once the genetic testing results are available.     Assessment:  From a clinical standpoint, we can consider proceeding with updated cancer susceptibility gene testing for Jeanne by way of RNA analysis of genes on which DNA analysis has previously been performed, with consideration of DNA+RNA analyses on additional, untested genes, and/or punch biopsy to obtain skin fibroblasts for culturing and DNA extraction to then perform DNA analysis for further clarification as to whether Jeanne's TP53 mutation was inherited from a parent and could be present in blood relatives of hers versus acquired by Jeanne.    Plan:  I am going to reach out to [facility] regarding analysis of cultured DNA from fibroblasts, specifically regarding Jeanne, and will follow up with Jeanne on  this.    ASSESSMENT/PLAN      Jeanne, 67 y.o., presented today for cancer genetics follow-up.      1. Encounter for nonprocreative genetic counseling  We reviewed pertinent clinical history, discussed potential for updated/additional hereditary cancer susceptibility gene testing, discussed potential for DNA analysis of the TP53 gene via fibroblasts, and conducted pre-test cancer genetic counseling.  From a clinical standpoint, we can consider proceeding with updated cancer susceptibility gene testing for Jeanne by way of RNA analysis of genes on which DNA analysis has previously been performed, with consideration of DNA+RNA analyses on additional, untested genes, and/or punch biopsy to obtain skin fibroblasts for culturing and DNA extraction to then perform DNA analysis for further clarification as to whether Jeanne's TP53 mutation was inherited from a parent and could be present in blood relatives of hers versus acquired by Jeanne.  I am going to reach out to [facility] regarding analysis of cultured DNA from fibroblasts, specifically regarding Jeanne, and will follow up with Jeanne on this.    2. History of breast cancer  - As above.    3. History of endometrial cancer  - As above.    4. Family history of cancer  - As above.    Follow-up:  TBD.    Questions were encouraged and answered to the patient's satisfaction, and she verbalized understanding of information and agreement with the plan.       Kenton Yoder, DNP, APRN, FNP-BC, AOCNP  Nurse Practitioner, Hereditary and High-Risk Clinic  Department of Hematology and Oncology  [facility]    [facility]
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
## 2022-08-01 NOTE — PROGRESS NOTES
Subjective:       Patient ID: Jeanne Rodgers is a 67 y.o. female.    Chief Complaint: No chief complaint on file.    HPI     Returns for follow up- needs repeat cbc  Energy level is better  Feels good today  Does note off and on days for how she feels  Has upcoming mediation and nutrution consults  Denies pain    In the interval she reports:  - saw her PCP and mentioned a new pea sized lump in her left breast  She has a mammogram scheduled today     - she had an ear infection (treated at outside clinic) and felt to be possibly fungal  Now starting to improve  Pain and itching better  Hearing not quite better yet (was told small hole in ear drum)    She has a recent work up for anemia that included a bmbx- see below  - 6/21/2022 Pathology BmBx:  LEFT ILIAC BONE, CT-GUIDED BIOPSY WITH ON-SITE ADEQUACY:   -HYPERCELLULAR MARROW (%), TRILINEAGE HEMATOPOIETIC ACTIVITY.   -FOCAL GRADE 1 RETICULAR FIBROSIS.   -NO DEFINITIVE MORPHOLOGIC OR IMMUNOPHENOTYPIC EVIDENCE OF LYMPHOMA, PLASMA   CELL NEOPLASM, OR METASTATIC CARCINOMA.  SEE COMMENT     In the interval she has had 2 hospital admissions:  - admitted from 7/1- 7/3/2022 as below:  HPI:   Jeanne Rodgers is a 68yo WF with a PMH of recurrent GIB, paroxysmal Afib, hx of breast and endometrial cancer, and GERD who presented to the [facility] ED on 7/1/22 with complaints of fatigue and generalized weakness. Of note, pt recently discharged from [facility] for melena and symptomatic anemia, negative endoscopic findings. Reports that after discharge, she gradually became symptomatic once more (weakness, SOB/HERRING, dizziness), and went to her PCP for follow-up, which showed decreased H/H. Denies F/C/N/V/D/C, HA, CP, palpitations, BRBPR, melena, hematochezia, hematemesis, abdominal pain, dysuria, extremity swelling, or symptoms otherwise.   In the ED, VSS. Labs showed WBC 17.17, Hb 6.2, and plt 121. CT abd/pelvis showed  interval increased overall small volume mildly complex free  pelvic fluid, though was otherwise unremarkable. ED ordered 2u pRBCs, and hospital medicine was consulted for admission and further management.  * No surgery found *    Hospital Course:   Pt received 2u pRBCs and was admitted to [facility]. Hb improved to 9.0 on 7/2, and pt with marked symptomatic improvement, though not fully back to baseline. WBCs noted to further increase, though she remained afebrile; zosyn continued while blood cultures with NGTD. Leukocytosis decreased into 7/3, Hb remained stable, and pt grossly asymptomatic and back to baseline. She has an appointment with heme/onc this week to further discuss her anemia, and will potentially be scheduled for regular routine outpatient blood transfusions. Abx changed to PO cipro/flagyl. Pt deemed appropriate for discharge. Plan discussed with pt, who was agreeable and amenable; medications were discussed and reviewed, outpatient follow-up scheduled, ER precautions were given, all questions were answered to the pt's satisfaction, and Ms. Rodgers was subsequently discharged.     - admitted 6/7- 6/11/2022 as below  HPI:   67-year-old female with past medical history of endometrial cancer, breast cancer, fibromyalgia, PTSD, GIB, blood transfusion, anxiety who presents to the emergency department with chief complaint of abnormal lab value. Reports bright red blood per rectum with dark streaks that started four days ago. Had follow up appointment today. Noted to be anemic and referred to the ER for further evaluation. Endorses headache and lightheadedness. Denies chest pain, shortness of breath, abdominal pain, vomiting, diarrhea. Reports pelvic and right leg pain. She had recent imaging which was concerning for bone lesions to the pelvis and has biopsy scheduled for later in June. She denies other worsening or alleviating factors.   In the ED patient afebrile and hemodynamically stable saturating well on room air. Hb 6.3 (baseline 8.5). Patient transfused one unit  PRBC in ED, started on iv protonix, and admitted to the [facility] for further evaluation and management of GIB.     - 6/9/2022 Colonoscopy:  Findings:        The perianal and digital rectal examinations were normal.        The entire examined ileum appeared normal.        The entire colon was normal, without inflammation ulceration or        bleeding stigmata. The mucosa was more friable than typical, with        barotrauma with mild mucosal oozing seen during withdrawal.        The retroflexed view of the distal rectum and anal verge was normal        and showed no anal or rectal abnormalities.   Impression:     - Normal colon, with mild contact bleeding in the                          entire examined colon.                          - The examined portion of the ileum was normal.                          - The distal rectum and anal verge are normal on                          retroflexion view.                          - No specimens collected.        - 7/2/2020 Video capsule endoscopy:  Findings:        Images of the esophagus, stomach and small bowel were obtained from        the swallowed capsule and reviewed.        On review of the recorded study, it was determined that the study        lasted 9-hours 30-minutes 13-seconds (total recording time). The        capsule enteroscope entered the cecum prior to the end of the        recording.        The first gastric image was captured at 0-hours 4-minutes and        19-seconds.        The first duodenal image was captured at 0-hours 28-minutes and        41-seconds.        The first cecal image was captured at 2-hours 32-minutes and        44-seconds.        Scattered hypervascularization characterized by increase vascular        mucosa pattern was found in the gastric body at 16 minutes and 44        seconds and 21 minutes and 6 seconds.        Localized hypervascularization characterized by increase vascular        mucosa pattern without active bleeding  and with no stigmata of        bleeding was found in the duodenum at 0-hours 28-minutes 49-seconds.        A scattered area of red spots on the mucosa in the small bowel at        1-hour 41-minutes 36-seconds,1-hour 45-minutes 21-seconds, 1-hour        57-minutes 22-seconds        Diffuse hypervascularization characterized by increase vascular        mucosa pattern mucosa without active bleeding and with no stigmata        of bleeding was found in the ileum until the rest of the small bowel        starting at 2-hours 15-minutes 34-seconds and ending at 2-hours 32        minutes and 44-seconds.   Impression:   - mild hypervascularization characterized by                         increase vascular mucosa pattern pre-pyloric antrum.                         - mild to moderate hypervascularization                         characterized by increase vascular mucosa pattern                         duodenum.                         - hypervascularization characterized by increase                         vascular mucosa patternsmall bowel.                         - mild to moderate hypervascularization                         characterized by increase vascular mucosa pattern                         mucosa in the ileum.                         - No active bleeding these above findings are                         consistent with mild/moderate radiation enteritis.     She has also undergone multiple recent imaging scans - initially after presenting with reports of continued leg pain- she had been seen for leg/hip pain in 3/2022 with below results of imaging:  - 3/9/2022 CT Pekvis:  FINDINGS:  Evaluation of the solid organs is limited due to lack of intravenous contrast.  Lower chest: Unremarkable.  URINARY COLLECTING SYSTEM: No calculi in the kidneys, ureters, or urinary bladder. No hydronephrosis or ureterectasis.  Liver: normal contour  Gallbladder and bile ducts: Unremarkable.  Pancreas: Pancreas is atrophic.  Spleen: Normal  contour.  Adrenals: Normal contour.  Lymph nodes: No abdominal or pelvic lymphadenopathy.  Bowel and mesentery: Unremarkable.  Abdominal aorta: Unremarkable.  Inferior vena cava: Unremarkable.  Free fluid or free air: None.  Pelvis: Unremarkable.  Body wall: Unremarkable.  Bones: Unremarkable.  Impression:  Essentially unremarkable CT the abdomen and pelvis.  No urolithiasis or hydronephrosis.     - 3/28/2022 MRI Hip:  FINDINGS:  Bones: No evidence for hip or pelvic fracture.  Abnormal marrow signal throughout the pelvis, likely sequela of prior radiation therapy.  Bone marrow edema and enhancement seen about the bilateral sacroiliac joints, left greater than right.  Joints: There is thinning of right hip articular cartilage with full-thickness cartilage loss over the superior acetabulum.  There is fraying of the bilateral acetabular labrum.  There is a small right, trace left hip joint effusion.  Mild irregularity of the pubic symphysis.  No definite sacroiliac joint erosion, noting mild bilateral anterior soft tissue edema.  Muscles/tendons: Visualized muscles and tendons are unremarkable.  No evidence for iliopsoas or trochanteric bursitis.  Miscellaneous: There is a small volume of pelvic ascites.  Postsurgical changes of prior hysterectomy noted.  No definite pelvic lymphadenopathy.  Impression:  1. No evidence for hip or pelvic fracture.  2. Bone marrow edema about the bilateral sacroiliac joints, possible osteitis related to sacroiliitis.  Alternatively, developing foci of osteonecrosis.  3. Right hip cartilage loss.  Bilateral acetabular labral fraying.  4. Small volume of pelvic ascites.     She was referred to Orthopedics and saw Dr. Holder earlier this month and additional imaging ordered:     5/16/2022 MRI L spine:  FINDINGS:  Retrospectively MRI and CT pelvis exams show evidence of nonspecific sclerotic lucent change left sacral ala which could be no suspicious for incomplete stress fracture.   Otherwise previous imaging shows no additional bony abnormality.  Marrow space infiltrated process involves the included spine particularly T9 through T11 vertebral bodies, less prominent changes remainder of lumbar spine and included S4 sacral segment.  In addition Vol[employer] mint of included SI joints particularly upper iliac and left sacral ala.  Post-contrast exam shows no abnormal intra-axial extra-axial enhancement the spinal canal and spinal cord nerve root structures.  Partial enhancement of the marrow space metastatic disease particularly lower dorsal spine.  Lower dorsal spinal cord normal.  Minimal mild posterior disc bulge T10 and T11 posterior disc margins with mild facet joint arthropathy and mild spinal and no significant foraminal stenosis T10 and mild moderate foramen stenosis at T11.  T11 disc level through L1 disc level shows no unusual posterior disc abnormality.  L2-L3 minimal mild posterior disc bulge, mild compression anterior spinal sac, facet joint arthropathy with mild spinal and foramen stenosis.  L 3 L4 minimal mild posterior disc bulge, posterior central annular fissure, mild compression anterior spinal sac, facet joint arthropathy with mild spinal and foramen stenosis.  L 4 L5 mild posterior disc bulge spondylosis, mild compression anterior spinal sac, narrowing lateral recesses, prompt facet joint arthropathy, mild moderate foramen and spinal stenosis, hypertrophy ligamentum flavum.  L5-S1 minor posterior disc bulge, prompt facet joint arthropathy without significant spinal or foramen stenosis.  No disc prolapse or subluxation.  Impression:  1. Subtle retrospective incomplete stress fracture deformity left sacral ala on CT and MRI studies.  2. New evidence of extensive metastatic disease from known breast carcinoma particularly lower dorsal sacrum and pelvic areas and lesser degree lumbar spine.  No new fracture subluxation or disc prolapse.  3. Degenerative disc spondylosis facet joint  arthropathy with above described findings.  4.  This report was flagged in Epic as abnormal.     Our office was contacted regarding this test results and sent for below:  5/24/2022 PET scan:  COMPARISON:  MRI lumbar spine from 516/2022.  FDG PET-CT 04/13/2021, 09/21/2020, 06/02/2020  FINDINGS:  Quality of the study: Adequate.  In the head and neck, there are no hypermetabolic lesions worrisome for malignancy. There are no hypermetabolic mucosal lesions, and there are no pathologically enlarged or hypermetabolic lymph nodes.  In the chest, there are no hypermetabolic lesions worrisome for malignancy.  There are no concerning pulmonary nodules or masses, and there are no pathologically enlarged or hypermetabolic lymph nodes.  In the abdomen and pelvis, there is physiologic tracer distribution within the abdominal organs and excretion into the genitourinary system.  Postop change of cholecystectomy and hysterectomy/BSO.  In the bones, there is absent marrow uptake involving lower thoracic spine, lumbar spine, and portion of sacrum suggestive of radiation induced fatty marrow replacement when compared with recent MRI.  Mildly increased uptake/marrow hyperplasia involving remaining axial and appendicular skeleton.  There are no hypermetabolic lesions worrisome for malignancy.  Subtle linear lucency and mild uptake within the left sacral ala compatible with incomplete stress fracture described on recent MRI.  In the extremities, there are no hypermetabolic lesions worrisome for malignancy.  Impression:  No PET CT evidence of recurrent or metastatic disease in this patient with a history of endometrial cancer and breast cancer.  Left sacral insufficiency fracture as described on recent MRI.  Additional findings as above.     Tumor markers:  = 20  CA 27-29= 49.8     Oncology History:  Endometrial Cancer History:  Diagnosis of endometrial cancer after presenting with vaginal bleeding  She has completed surgical  debulking, chemotherapy and XRT but states that she has developed nausea and vomiting since starting XRT  She was also admitted in  of this year with melena and had a complete GI work up (see below)  Of note during chemotherapy she required several transfusions for symptomatic anemia     Breast Oncology History:  This is a 66 yo female with a history of right breast cancer  - Had been getting her mammograms at [facility] as she was uninsured at the time  - reports small mass seen in  and sent to [facility]- where she was told nothing was seen on review  - Repeat situation of above the next year in   - she noted right nipple inversion - went to [facility]- told not likely cancer, no further testing done  - In  it was recommended that [facility] see her for imaging  - Abnormal mammogram and biopsy recommended  Reports biopsy experience was awful and so she researched other sites to be cared  - She sought out Dr. 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 Keller at [facility]  Oncotype done ? 9- chemotherapy not recommended  She was started on Arimidex  -She was on Arimidex for 4 years until stopping (see below).    Genetic testin [facility] Cancer-Genetic Testing  · Test(s) performed:  BMdr Common Hereditary Cancers Panel  Number of genes analyzed:  47  Laboratory:  [facility]  Ordering provider:  ANGIE Blackman  Sample collection date:  07/15/2020  Results report date:  2020              Full report to be scanned into Epic under the Labs and/or Media tab(s).  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            COUNSELING/MEDICAL DECISION-MAKING:          Note below discussion and recommendations provided from genetic counseling visit (copied forward):  TP53 Mutation Implications  Li-Fraumeni Syndrome (LFS) is a hereditary (germline) cancer syndrome diagnosed with the identification of a germline TP53 gene mutation, which is passed from parent to child and is present for the patient's lifespan in essentially every bodily cell (GHR, 2020).  Its associated risks, including breast cancer, brain cancer, sarcoma, adrenocortical carcinoma, and colorectal cancer ([employer], 2020; NCCN, 1.2020).  It is important to note that the patient does not at this time have a diagnosis of LFS, as we do not know yet whether the patient's TP53 mutation is germline.   Somatic (acquired) TP53 mutations do not occur in the germline and occur at some other point in the lifespan (not at conception) (GHR, 2020) in the blood due to any of several factors, including advancing age ([employer], 2020), history of chemotherapy (which applies to patient), hematologic malignancy (NCCN, 1.2020), environmental factors including ultraviolet (UV) exposure (GHR, 2020), and DNA replication errors during cell division (GHR, 2020).  Somatic mutations that develop in only a single cell early in the embryonic phase can lead to mosaicism (GHR, 2020).  With mosaicism, the gene mutation is not present in the affected individual's egg or sperm cells or in the fertilized egg but rather develops later during the embryonic phase, and, per the process of cell division, cells arising from the cell carrying the mutation will also have the mutation, but other cells will not (GHR, 2020).  Mosaicism's causing health problems is a function of both the specific mutation and the number of cells affected by the mutation (GHR, 2020).  Up to 20% of LFS cases are de fadi ([employer], 2020).  De fadi (i.e. new) mutations can be germline or somatic (GHR, 2020).  Sometimes, the de fadi mutation develops in the individual's germline  (egg or sperm cells) but is absent in all of the individual's other cells; other times, the de fadi mutation develops in the fertilized egg shortly post-conception, and, as the fertilized egg divides, each resultant cell also has the mutation (GHR, 2020).    It is important to distinguish germline origin versus somatic origin as the former has the potential to impact the patient's family while the latter does not.    Recommendations  Appropriate next steps include:  1. A complete blood count (CBC) and a peripheral blood smear is warranted to evaluate for hematologic neoplasia (NCCN, 1.2020).  The patient is amenable to this.  I do not suspect hematologic neoplasia as etiology of her TP53 mutation.  2. Testing of parents for identification of the patient's TP53 mutation in either of them; however, the patient's parents are unfortunately unavailable for testing, and testing even all of the patient's siblings, if they all came back negative, would not confirm that the patient's TP53 mutation is not germline in origin.  Therefore, a fibroblast skin culture is recommended, which breast surgeon Dr. Nicci García has agreed to perform.  The patient is amenable to this.     Bone marrow biopsy done with above and cytopenias  negative for malignancy.      Review of Systems   Constitutional: Negative for activity change, appetite change, fatigue (better but off on and days), fever and unexpected weight change (stable and plans to see nutritionist).   HENT: Positive for nasal congestion, ear discharge, ear pain and hearing loss. Negative for postnasal drip.    Eyes: Negative for visual disturbance.   Cardiovascular: Negative for chest pain, palpitations and leg swelling.   Gastrointestinal: Negative for abdominal pain, change in bowel habit, constipation, diarrhea, nausea, vomiting and change in bowel habit.   Genitourinary: Negative for decreased urine volume, difficulty urinating, dysuria and urgency.   Musculoskeletal: Negative  for arthralgias, back pain, gait problem and joint deformity.   Integumentary:  Positive for breast mass. Negative for breast discharge and breast tenderness.   Neurological: Positive for dizziness (relates to ear infection). Negative for weakness, light-headedness, numbness and headaches.   Hematological: Negative for adenopathy. Does not bruise/bleed easily.   Psychiatric/Behavioral: Positive for dysphoric mood.   Breast: Positive for mass.Negative for tenderness        Objective:      Physical Exam  Vitals and nursing note reviewed.   Constitutional:       General: She is not in acute distress.     Appearance: Normal appearance. She is well-developed and normal weight.      Comments: Presents with family  Very pleasant  ECOG=0   HENT:      Head: Normocephalic and atraumatic.   Eyes:      General: No scleral icterus.     Extraocular Movements: Extraocular movements intact.      Conjunctiva/sclera: Conjunctivae normal.      Pupils: Pupils are equal, round, and reactive to light.   Cardiovascular:      Rate and Rhythm: Normal rate and regular rhythm.      Heart sounds: Normal heart sounds. No murmur heard.    No friction rub. No gallop.   Pulmonary:      Effort: Pulmonary effort is normal. No respiratory distress.      Breath sounds: Normal breath sounds. No wheezing, rhonchi or rales.      Comments: Breasts- no masses or lymphadenopathy  Chest:   Breasts:      Right: No mass, nipple discharge, skin change, axillary adenopathy or supraclavicular adenopathy.      Left: No mass, nipple discharge, skin change, axillary adenopathy or supraclavicular adenopathy.       Abdominal:      General: Bowel sounds are normal. There is no distension.      Palpations: Abdomen is soft. There is no mass.      Tenderness: There is no abdominal tenderness. There is no guarding or rebound.      Comments: No organomegaly   Musculoskeletal:      Cervical back: Normal range of motion.   Lymphadenopathy:      Cervical: No cervical  adenopathy.      Upper Body:      Right upper body: No supraclavicular or axillary adenopathy.      Left upper body: No supraclavicular or axillary adenopathy.   Skin:     General: Skin is warm and dry.      Coloration: Skin is not jaundiced or pale.      Findings: No erythema, lesion or rash.   Neurological:      General: No focal deficit present.      Mental Status: She is alert and oriented to person, place, and time.      Sensory: No sensory deficit.      Motor: No weakness.      Coordination: Coordination normal.      Gait: Gait normal.   Psychiatric:         Mood and Affect: Mood normal.         Behavior: Behavior normal.         Thought Content: Thought content normal.         Judgment: Judgment normal.       Labs- reviewed  Assessment:       Problem List Items Addressed This Visit     Thrombocytopenia    Hx of breast cancer    Endometrial cancer    Anemia due to chronic blood loss - Primary    Relevant Orders    CBC W/ AUTO DIFFERENTIAL (Completed)    Reticulocytes (Completed)          Plan:     no evidence of malignancy on tests done  XRT may have impacted marrow function- repeat 4 weeks      Route Chart for Scheduling    Med Onc Chart Routing      Follow up with physician    Follow up with [facility] 3 weeks. Cbc prior, PJ   Infusion scheduling note    Injection scheduling note    Labs    Imaging    Pharmacy appointment    Other referrals

## 2022-08-02 NOTE — TELEPHONE ENCOUNTER
Spoke with patient. Reviewed breast biopsy procedure and reviewed instructions for breast biopsy. Patient expressed understanding and all questions were answered. Provided patient with my phone number to call for any further concerns or questions.   Patient scheduled breast biopsy at the [facility] on 8/12/2022.

## 2022-08-02 NOTE — TELEPHONE ENCOUNTER
"Spoke with pt and confirmed meditation sessions.     ----- Message from Laila Goel sent at 8/2/2022 10:53 AM CDT -----  Consult/Advisory:           Name Of Caller: self    Contact Preference?: [phone]    What is the nature of the call?: requesting a call back to schedule meditation class           Additional Notes:  "Thank you for all that you do for our patients'"       "

## 2022-08-10 PROBLEM — R53.81 PHYSICAL DECONDITIONING: Status: ACTIVE | Noted: 2022-01-01

## 2022-08-10 PROBLEM — F43.29 STRESS AND ADJUSTMENT REACTION: Status: ACTIVE | Noted: 2022-01-01

## 2022-08-10 NOTE — PLAN OF CARE
[facility]   Occupational Therapy Initial Evaluation - Therapeutic Yoga    Date: 8/10/2022   Name: Jeanne Rodgers  Clinic Number: [id number]    Therapy Diagnosis:   Encounter Diagnosis   Name Primary?    History of breast cancer      Physician: Елена Kam, *    Physician Orders: Eval and Treat   Medical Diagnosis from Referral:History of breast cancer [Z85.3]     Evaluation Date: 8/10/2022  Authorization Period Expiration: 8/2/23  Plan of Care Expiration: 12/10/22  Progress Note Due: 10/10/22  Visit # / Visits authorized: 1/ 1     Precautions: Standard and cancer     Time In: 4pm  Time Out: 5pm  Total Appointment Time (timed & untimed codes): 60 minutes      SUBJECTIVE     Date of onset: breast CA in 2014 with right mastectomy and radiation.  In 2019 she was diagnosed with endometrial cancer.  She completed chemotherapy 2020 and radiation-2020.      History of current condition: Jeanne reports: she iscurrently experiencing intermittant, daily pelvic pain. She has frequent UTI's.  She has bilateral foot neuropathy and foot spasms.      Falls: none    Prior Therapy: pelic floor therapy 2019  Social History:  lives alone.  Her sister lives next door and is her main care giver.  Occupation: retired as hotel   Prior Level of Function: After breast cancer she returned to her normal activity including working full time and caring for her mother.    Current Level of Function: bilateral weak hands affecting carrying, opening containers, cutting food.  She requires assist for driving carrying groceries, bed making due to fatigue and general weakness.    Pain:  Current 4/10, worst 10/10, best 3/10   Location: bilateral lower LE's    Pelvic pain is 5/10 best 0/10 worst 5/10        Patients goals: give me relaxation; teach me how to relax  At home so I can feel better.     Medical History:   Past Medical History:   Diagnosis Date    Allergy     skin    Anxiety     Atrial  fibrillation     Breast cancer 2014    right    Chronic back pain     Depression     Encounter for blood transfusion     Endometrial cancer 07/30/2019    Fibromyalgia     Hives     Hx of psychiatric care     Hx of radiation therapy     Itching     Lichen sclerosus     Mental disorder     PONV (postoperative nausea and vomiting)     Psychiatric problem     PTSD (post-traumatic stress disorder)     Sleep difficulties     Sore throat     Therapy     Uterine cancer 08/05/2019    UTI (urinary tract infection)        Surgical History:   Jeanne Rodgers  has a past surgical history that includes Appendectomy; Tubal ligation; Carpal tunnel release; Cholecystectomy; Tonsillectomy; Colonoscopy; Hysteroscopy with dilation and curettage of uterus (N/A, 7/24/2019); Breast lumpectomy (Right, 2014); Robot-assisted laparoscopic abdominal hysterectomy using da Catrina Xi (N/A, 8/15/2019); Robot-assisted laparoscopic salpingo-oophorectomy using da Catrina Xi (Bilateral, 8/15/2019); Robot-assisted lymphadenectomy (Left, 8/15/2019); anal fissure surgery; Salpingoophorectomy (Bilateral, 08/15/2019); Hysterectomy (08/15/2019); Insertion of tunneled central venous catheter (CVC) with subcutaneous port (N/A, 9/26/2019); Mediport removal (N/A, 3/5/2020); Esophagogastroduodenoscopy (N/A, 6/11/2020); Colonoscopy (N/A, 6/11/2020); Biopsy (N/A, 11/19/2020); Needle localization (N/A, 11/19/2020); Breast biopsy (Right, 2014); PARTIAL MASCECTOMY (Right, 2014); and Colonoscopy (N/A, 6/9/2022).    Medications:   Jeanne has a current medication list which includes the following prescription(s): acetaminophen, ciprofloxacin-dexamethasone 0.3-0.1%, clobetasol 0.05%, fexofenadine, flecainide, hydrocortisone, hydroxyzine hcl, meth/meblue/sod phos/psal/hyos, metoprolol tartrate, nystatin, nystatin, nystatin, ondansetron, pantoprazole, triamcinolone acetonide 0.025%, UNABLE TO FIND, and UNABLE TO FIND.    Allergies:   Review of  patient's allergies indicates:  No Known Allergies       OBJECTIVE        Emotional Stress Response: anxious   Physical Stress Response: fatigued   Behavioral Stress Response: I do not manage my stress;     Self-Care: requires assist for some driving, grocery shopping, chopping vegetables, opening jars, making a bed    Patient Specific Functional Scale:         Activity 8/10/22       1.opening jars 5       2.carrying groceries 5       3.making a bed 8       4.        5.        6.        SCORE   6.0         Total Score = Sum of activity scores / number of activities  Minimum Detectable Change (90% CII) for average score = 2 points  Minimum Detectable Change (90% CI) for single activity score = 3 points    Lifestyle Questionnaire:      Lifestyle Response   Emotional Response to Health Status fair   Patient's Communication Skills good   Reported Eating Habits good   Reported Sleeping Patterns good   Reported Energy Level poor   Knowledge of Exercises & Fitness poor   Frequency of Exercise Sessions/Week <3       TREATMENT     Total Treatment time (time-based codes) separate from Evaluation: 0 minutes       PATIENT EDUCATION AND HOME EXERCISES     Education provided:   - - physiology of yoga/meditation and immune health    Written Home Exercises Provided: yes. Exercises were reviewed and Jeanne was able to demonstrate them prior to the end of the session.  Jeanne demonstrated good  understanding of the education provided. See EMR under Patient Instructions for exercises provided during therapy sessions.    ASSESSMENT     Jeanne is a 67 y.o. female referred to outpatient Occupational Therapy with a medical diagnosis of History of breast cancer [Z85.3]  . Patient presents with the following impairments:       Self-Care Limitations, Deconditioning, Poor Stress Coping Skills and Upper Extremity - Decreased Strength    Following medical record review, it is determined that Jeanne will benefit from skilled outpatient  Occupational Therapy to address the impairments stated above and in the chart below in order to maximize functional activities. The following goals were discussed with the patient and patient is in agreement with them as addressed in the treatment plan. The patient's rehab potential is Good.       Plan of care discussed with patient: Yes  Patient's spiritual, cultural and educational needs considered and patient is agreeable to the plan of care and goals as stated below:     Anticipated Barriers for therapy: none    Medical Necessity is demonstrated by the following    Profile and History Assessment of Occupational Performance Level of Clinical Decision Making Complexity Score   Occupational Profile:   Jeanne Rodgers is a 67 y.o. female who lives alone and is retired. Jeanne has difficulty with  ADLs and IADLs as listed previously, which  Affecting herdaily functional abilities.      Comorbidities:    has a past medical history of Allergy, Anxiety, Atrial fibrillation, Breast cancer, Chronic back pain, Depression, Encounter for blood transfusion, Endometrial cancer, Fibromyalgia, Hives, psychiatric care, radiation therapy, Itching, Lichen sclerosus, Mental disorder, PONV (postoperative nausea and vomiting), Psychiatric problem, PTSD (post-traumatic stress disorder), Sleep difficulties, Sore throat, Therapy, Uterine cancer, and UTI (urinary tract infection).    Medical and Therapy History Review:   Brief               Performance Deficits    Physical:  Joint Stability  Muscle Power/Strength  Muscle Endurance   Strength  Pinch Strength  Pain    Cognitive:  No Deficits    Psychosocial:    No Deficits     Clinical Decision Making:  low    Assessment Process:  Problem-Focused Assessments    Modification/Need for Assistance:  Not Necessary    Intervention Selection:  Multiple Treatment Options       low  Based on PMHX, co morbidities , data from assessments and functional level of assistance required with task  and clinical presentation directly impacting function.         Goals:  Short Term Goals: 6 weeks      Goal # Goal Status   1 Patient will demonstrate independence with diaphragmatic breathing in sit and supine. Progressing   2 Pt. will identify resource for audio body scan to assist with stress management/immune health    3 Patient will identify 2 new stress coping skills for stress management/immune health. Progressing   4 Patient will identify activity pacing problems.  Patient will then implement new plan for daily activity to increase endurance for ADL's. Progressing      Long Term Goals: 12 weeks      Goal # Goal Status   1 Patient will demonstrate independence with yoga Home Exercise Program to increase strength and endurance for ADL's. Progressing   2 Patient will demonstrate independence with relaxation techniques to manage stress for immune health. Progressing   3 Patient will verbalize good understanding of stress/immune health relationship.  Progressing   4                PLAN   Plan of care Certification: 8/10/2022 to 12/10/22.    Outpatient Occupational Therapy 1 times weekly for 12 weeks to include the following interventions: Patient Education, Self Care and Therapeutic Exercise.     Arnoldo Beasley, JOIE      I

## 2022-08-16 NOTE — TELEPHONE ENCOUNTER
Patient called with results of breast biopsy from 8/12/2022, no atypia/benign, all questions answered, pt advised to follow up in 6 months with repeat imaging per core biopsy protocol. Dr. Holman reviewed biopsy results and results are benign and concordant. Patient verbalized understanding.       ----- Message from Lily Holman MD sent at 8/15/2022  5:52 PM CDT -----  Concordant and benign.

## 2022-08-17 NOTE — PROGRESS NOTES
"Oncology Nutrition Assessment for Medical Nutrition Therapy  Follow-up Visit    Jeanne Rodgers   1955    Referring Provider:  No ref. provider found      Reason for Visit: Pt in for education and nutrition counseling     PMHx:   Past Medical History:   Diagnosis Date    Allergy     skin    Anxiety     Atrial fibrillation     Breast cancer 2014    right    Chronic back pain     Depression     Encounter for blood transfusion     Endometrial cancer 07/30/2019    Fibromyalgia     Hives     Hx of psychiatric care     Hx of radiation therapy     Itching     Lichen sclerosus     Mental disorder     PONV (postoperative nausea and vomiting)     Psychiatric problem     PTSD (post-traumatic stress disorder)     Sleep difficulties     Sore throat     Therapy     Uterine cancer 08/05/2019    UTI (urinary tract infection)        Nutrition Assessment    This is a 67 y.o.female with a history of endometrial cancer and breast cancer. Most recently seen in heme/onc for anemia. Also had a pea sized breast lump which was biopsied and found to be benign. She requested to meet with nutrition again due to changes in taste and appetite along with 10lb weight loss.   Today she reports significant relief regarding negative biopsy. However dealing with a bad ear infection and reports prescriptions have been very expensive. This has jak causing additional stress.   With regards to her nutrition, she reports trouble eating enough protein. Does not like red meat but does like chicken and fish. Her appetite has not been good since ear infection started and she "lives nauseated" since chemo and radiation. She eats twice per day. Small breakfast (fruit, egg, nuts, coffee with cream) then 2/3pm is main meal (tuna fish sandwich or hot meal like chicken and sweet potato). Likes fruits and vegetables. Feels she needs to supplement with protein supplements. Also wondering about additional supplements help her immune " system.     Weight:54.2 kg (119 lb 9.6 oz)  Height:5' (1.524 m)  BMI:Body mass index is 23.36 kg/m².   IBW: Ideal body weight: 45.5 kg (100 lb 4.9 oz)  Adjusted ideal body weight: 49 kg (108 lb 0.4 oz)    Usual BW: 125-129lb about 4 months ago  Weight Change: about 7-10lb loss    Allergies: Patient has no known allergies.    Current Medications:    Current Outpatient Medications:     acetaminophen (TYLENOL) 500 MG tablet, Take 1,000 mg by mouth daily as needed for Pain., Disp: , Rfl:     ciprofloxacin-dexamethasone 0.3-0.1% (CIPRODEX) 0.3-0.1 % DrpS, place 4 DROPS into BOTH EARS TWICE DAILY FOR 5 DAYS, Disp: , Rfl:     clobetasol 0.05% (TEMOVATE) 0.05 % Oint, APPLY TO AFFECTED AREA(S) TWICE A DAY FOR 1 MONTH(S) then EVERY DAY FOR 1 MONTH(S) then 3 TIMES A WEEK, Disp: 60 g, Rfl: 2    fexofenadine (ALLEGRA) 180 MG tablet, 2 tablets in the morning.  May take an extra 2 tablets during the day if needed for itching., Disp: 240 tablet, Rfl: 5    flecainide (TAMBOCOR) 100 MG Tab, Take 100 mg by mouth every 12 (twelve) hours., Disp: , Rfl:     hydrocortisone 2.5 % cream, Apply to affected areas twice a day when eczema is moderate., Disp: 453.6 g, Rfl: 1    hydrOXYzine HCL (ATARAX) 25 MG tablet, 1 to 8 tablets at bedtime.  Start with 3 tablets.  Increase or decrease as needed until itching is controlled or a maximum of 8 tablets. (Patient not taking: Reported on 8/1/2022), Disp: 240 tablet, Rfl: 3    meth/meblue/sod phos/psal/hyos (URIBEL ORAL), Take by mouth as needed., Disp: , Rfl:     metoprolol tartrate (LOPRESSOR) 25 MG tablet, Take 1 tablet (25 mg total) by mouth 2 (two) times daily., Disp: 180 tablet, Rfl: 3    nystatin (MYCOSTATIN) cream, Apply topically every Mon, Wed, Fri. (Patient not taking: Reported on 8/1/2022), Disp: 30 g, Rfl: 11    nystatin (MYCOSTATIN) ointment, APPLY SMALL AMOUNT TO LEFT EAR TWICE DAILY WITH TRIAMCINOLONE FOR 7 DAYS, Disp: , Rfl:     nystatin (MYCOSTATIN) powder, Apply  topically 4 (four) times daily. (Patient not taking: Reported on 8/1/2022), Disp: 30 g, Rfl: 1    ondansetron (ZOFRAN) 4 MG tablet, Take 1 tablet (4 mg total) by mouth every 6 (six) hours as needed for Nausea. (Patient not taking: Reported on 8/1/2022), Disp: 12 tablet, Rfl: 0    pantoprazole (PROTONIX) 40 MG tablet, Take 1 tablet (40 mg total) by mouth once daily., Disp: 90 tablet, Rfl: 3    triamcinolone acetonide 0.025% (KENALOG) 0.025 % Oint, APPLY SMALL AMOUNT TO LEFT EAR TWICE DAILY WITH NYSTATIN FOR 7 DAYS, Disp: , Rfl:     UNABLE TO FIND, Turkey Tail Mushrooms Immune Support, Disp: , Rfl:     UNABLE TO FIND, medication name: medical marijuana, Disp: , Rfl:     Vitamins/Supplements: medical marijuana, turkey tail mushroom (but not consistently)     Labs: Reviewed from 8/1    Nutrition Diagnosis    Problem: inadequate protein/energy intake  Etiology (related to): appetite loss   Signs/Symptoms (as evidenced by): weight loss    Nutrition Intervention    Nutrition Prescription   4528-2553 Kcals (25-30kcal/kg)  65 g protein (1.2g/kg)   0806-6196 mL fluid (25-30mL/kg)    Recommendations:  2 meals and 2 snacks daily   Make sure snacks include protein and fat   -nuts, perfect bars, avocado, hummus   Try smoothies with protein, soy milk, fruit, fat, and optional green vegetable   Orgain protein powder or ready to drink (can use in smoothies)   Plant based proteins like beans, lentils, edamame, black eyed peas   Fresh ginger tea (she used to make her own)   Turkey tail- take daily   Can also consider elderberry supplements for about a week at a time to help shorten colds/support immune system    Materials Provided/Reviewed smoothie recipes, plant based proteins handout     Nutrition Monitoring and Evaluation    Monitor: energy intake, diet tolerance  and weight    Goals: maintain weight or gain about 5lb    Follow up Patient provided with dietitian contact number and advised to call with questions or make future  appointment if further intervention is needed.    Communication to referring provider/care team: note available in chart     Counseling time: 45 Minutes    Madison Coyne, MPH, RD, , LDN, FAND   [phone]

## 2022-08-17 NOTE — PROGRESS NOTES
PSYCHO-ONCOLOGY NOTE/ Individual Psychotherapy     Date: 8/17/2022   Site:  [facility]        Therapeutic Intervention: Met with patient.  Outpatient - Behavior modifying psychotherapy 45 min - CPT code 93282      Patient was last seen by me on 7/26/2022    Problem list  Patient Active Problem List   Diagnosis    Hx of breast cancer    Spondylosis without myelopathy    Hamstring tightness of both lower extremities    Segmental and somatic dysfunction of lumbar region    Alteration in mobility associated with pain    Pelvic pain in female    Myalgia of pelvic floor    Nocturia more than twice per night    Lichen sclerosus et atrophicus    Irregular bowel habits    Hip pain, right    Chronic lower back pain    Urinary incontinence, urge    Pre-diabetes    Vaginal irritation    Postmenopausal bleeding    Endometrial cancer    History of robot-assisted laparoscopic hysterectomy    Encounter for antineoplastic chemotherapy    Thrombocytopenia due to drugs    Neutropenia, drug-induced    Anemia due to chronic blood loss    Radiation therapy complication    Major depressive disorder, recurrent episode, moderate with anxious distress    Bone pain    B12 deficiency    Radiation vaginitis    Allergic contact dermatitis due to photocontact other than sunburn, current    History of endometrial cancer    Chest pain    HERRING (dyspnea on exertion)    Liver cyst    Microscopic hematuria    Disorder of the skin, subcu related to radiation, unsp    Radiation necrosis of skin and subcutaneous    Disorder of the skin and subcutaneous tissue related to radiation, unspecified    GIB (gastrointestinal bleeding)    Macrocytic anemia    Atrial fibrillation with RVR    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    Peripheral neuropathy due to chemotherapy    Pain of right lower extremity    Recurrent UTI    Leg edema    Lumbar radiculopathy    Lumbar spondylosis     DDD (degenerative disc disease), lumbar    Right knee pain    Radiculopathy of leg    Abnormal MRI    Leukocytosis    Fever    Tachycardia    Paroxysmal atrial fibrillation    Cellulitis    Symptomatic anemia    Thrombocytopenia    Gastroesophageal reflux disease without esophagitis    Physical deconditioning    Stress and adjustment reaction       Chief complaint/reason for encounter: depression and anxiety   Met with patient to evaluate psychosocial adaptation to diagnosis/treatment/survivorship of endometrial cancer, anemia    Current Medications  Current Outpatient Medications   Medication    acetaminophen (TYLENOL) 500 MG tablet    ciprofloxacin-dexamethasone 0.3-0.1% (CIPRODEX) 0.3-0.1 % DrpS    clobetasol 0.05% (TEMOVATE) 0.05 % Oint    fexofenadine (ALLEGRA) 180 MG tablet    flecainide (TAMBOCOR) 100 MG Tab    hydrocortisone 2.5 % cream    hydrOXYzine HCL (ATARAX) 25 MG tablet    meth/meblue/sod phos/psal/hyos (URIBEL ORAL)    metoprolol tartrate (LOPRESSOR) 25 MG tablet    nystatin (MYCOSTATIN) cream    nystatin (MYCOSTATIN) ointment    nystatin (MYCOSTATIN) powder    ondansetron (ZOFRAN) 4 MG tablet    pantoprazole (PROTONIX) 40 MG tablet    triamcinolone acetonide 0.025% (KENALOG) 0.025 % Oint    UNABLE TO FIND    UNABLE TO FIND     No current facility-administered medications for this visit.       Objective:  Jeanne Rodgers arrived promptly for the session. Ms. Rodgers was independently ambulatory at the time of session. The patient was fully cooperative throughout the session.  Appearance: age appropriate, casually  dressed, well groomed  Behavior/Cooperation: friendly and cooperative  Speech: normal in rate, volume, and tone and appropriate quality, quantity and organization of sentences  Mood: dysthymic  Affect: dysphoric  Thought Process: goal-directed, logical  Thought Content: normal,  No delusions or paranoia; did not appear to be responding to internal  "stimuli during the session  Orientation: grossly intact  Memory: Grossly intact  Attention Span/Concentration: Attends to session without distraction; reports no difficulty  Fund of Knowledge: average  Estimate of Intelligence: average from verbal skills and history  Cognition: grossly intact  Insight: patient has awareness of illness; good insight into own behavior and behavior of others  Judgment: the patient's behavior is adequate to circumstances    Interval history and content of current session: Patient discussed events and activities since the time of last visit. Discussed current adaptation to disease and treatment status. Reports to be coping with significant difficulty. Evaluated cognitive response, paying particular attention to negative intrusive thoughts of a persistent and detrimental nature. Thoughts of this type are in evidence with moderate distress. Provided cognitive behavioral therapy to address negative cognitions.    Identified and evaluated psychosocial and environmental stressors secondary to diagnosis and treatment (struggles to renovate house, ear pain/infection, lack of treatment plan).  Examined proactive behaviors that may be implemented to minimize or ameliorate psychosocial stressors. Patient seeing outside ENT tomorrow for potential assessment/treatment of ear infection, second opinion on treatment of anemia desired.   Feels "stuck" on adherence to medications/vitamins ("al hassle no benefit" "pills mean older and sicker").    Discussed patient's history of service to others/animals, lack of focus on self-protection.      Risk parameters:   Patient reports no suicidal ideation  Patient reports no homicidal ideation  Patient reports no self-injurious behavior  Patient reports no violent behavior   Safety needs:  None at this time      Verbal deficits: None     Patient's response to intervention:The patient's response to intervention is accepting.     Progress toward goals: No Progress " "- Continue Objectives        Patient reported outcomes:      Distress thermometer:   DISTRESS SCREENING 8/1/2022 6/20/2022 5/27/2022 3/22/2022 9/24/2021 6/22/2021 3/19/2021   Distress Score 0 - No Distress 6 9 4 4 2 3   Spritual / Protestant Concerns - Yes - - - - -   Other Problems - - - - - FLOODING BY HER HOUSE -           PHQ-9=9 Initial visit: 17  PHQ ANSWERS             SHEKHAR-7=13 Initial visit:16   GAD7 7/30/2019 12/26/2017   1. Feeling nervous, anxious, or on edge? 0 0   2. Not being able to stop or control worrying? 0 0          Client Strengths: verbal, intelligent, successful, good social support, good insight, commitment to wellness      Treatment Plan:individual psychotherapy  · Target symptoms: depression, adjustment  · Why chosen therapy is appropriate versus another modality: relevant to diagnosis, patient responds to this modality, evidence based practice  · Outcome monitoring methods: self-report, checklist/rating scale  · Therapeutic intervention type: behavior modifying psychotherapy, supportive psychotherapy  · Prognosis: Good    · Goals from last visit: Met   Behavioral goals prior to next visit:    Exercise:  Continue yoga   Stress management: meditation class   Social engagement:   Nutrition: vitamins in daily pill-box, increase protein- as per RD   Smoking Cessation:   Therapy: "how can I serve differently?"    Return to clinic: 2 weeks     Length of Service (minutes direct face-to-face contact): 45    Diagnosis:     ICD-10-CM ICD-9-CM   1. Major depressive disorder, recurrent episode, moderate with anxious distress  F33.1 296.32       Bill Ayala, PhD  [place] License #[id number]  [place] License #[id number]    "

## 2022-08-19 NOTE — TELEPHONE ENCOUNTER
Spoke with patient who states that she had another ENT appointment and they're trying an old remedy to see if it clears up her ear infection.  She is hopeful that this will work.  No other complaints at this time.
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
## 2022-08-31 NOTE — TELEPHONE ENCOUNTER
----- Message from Hawk Gotti sent at 8/31/2022 11:44 AM CDT -----  Type: Patient Call Back    Who called:Self    What is the request in detail: Pt is requesting to speak with a nurse. Pt received call to reschedule appt. Pt was informed soonest appt was in December. Pt would like to be seen sooner. Please call.     Can the clinic reply by [facility]? no    Would the patient rather a call back or a response via [facility]? Call back    Best call back number: [phone] (home)       Additional Information:

## 2022-09-07 NOTE — TELEPHONE ENCOUNTER
Pt scheduled for 230 pm tomorrow 9/8 in infusion suite for 1 U PRBC for H/H 5.9/18.9.  Type and screen at 12 on 3rd floor of [facility] with previously scheduled appt at 1pm with provider and 230 transfusion.  Called pt to report time and location of lab, appt and transfusion.  Pt verbalized understanding and agrees to plan.  Denies dyspnea at time of call. Admits to fatigue.  Enc pt to go to nearest ED for signs of distress, dyspena, chest pain or other symptoms of concern, pt verbalized understanding and agreement.

## 2022-09-07 NOTE — TELEPHONE ENCOUNTER
Patient looks like CBC scheduled for today at 9A per Hematology I will review results once they return

## 2022-09-07 NOTE — TELEPHONE ENCOUNTER
CBC resulted and noted.   Called patient to triage.   Patient unable to walk far due to severe fatigue.   No CP  + SOB on exertion.   Patient does not want to go to ED or be admitted as felt last admission caused her cellulitis and ear infection.   I discussed trying to get her transfused today, however, if there is no availability in outpatient infusion, we recommend ED . I have informed her of potential risks involved with anemia including organ damage and risks for injuries.   Daphnie CLARK will be in touch. Secure chat sent.   YANG

## 2022-09-07 NOTE — TELEPHONE ENCOUNTER
----- Message from Radha Lu sent at 9/7/2022  2:37 PM CDT -----  Regarding: nurse  Contact: pt  Type: Requesting to speak with nurse    Who Called: PT  Regarding: Infusion   Would the patient rather a call back or a response via [facility]? Call back  Best Call Back Number: [phone]  Additional Information: Pt states that she's waiting for a call back to be schedule for infusion

## 2022-09-07 NOTE — TELEPHONE ENCOUNTER
Spoke to patient, confirmed she spoke to YANG about critical lab values. Patient adamant that she does not want to go to the ED. Will reach out to [facility] and the [facility] for a blood transfusion tomorrow. She knows if anything changes to go to the ED ASAP.

## 2022-09-08 NOTE — PROGRESS NOTES
Subjective:       Patient ID: Jeanne Rodgers is a 67 y.o. female.    Chief Complaint: Hx of breast cancer    HPI     Returns for follow up-   Cbc yesterday reviewed.   Anemia parameters dropped.  She refused ED and is set for transfusion today.   Reports unable to walk far due to severe fatigue.   Reports her first indication is dry cough.   Heart races at times with exertion.   No CP  + SOB on exertion.   Left ear infection. Has taken 4 antibiotics. Most recently saw Dr. Mercedes and thought to be possible fungus.   L Ear feels clogged and congestion spreading to left cheek and eye.   Now with hearing loss.   Taking FLonase.   Couple of headaches recently.   Blurred vision.   Frustrated as would like to know what is going on with her.           In the interval:  8/12/2022:  Final Pathologic Diagnosis BREAST, LEFT, MASS, BIOPSY:   - Benign breast tissue with features compatible with fat necrosis and remote   hemorrhage.   - Microcalcifications:  Not identified.   - Negative for atypia or malignancy.        She has a recent work up for anemia that included a bmbx- see below  - 6/21/2022 Pathology BmBx:  LEFT ILIAC BONE, CT-GUIDED BIOPSY WITH ON-SITE ADEQUACY:   -HYPERCELLULAR MARROW (%), TRILINEAGE HEMATOPOIETIC ACTIVITY.   -FOCAL GRADE 1 RETICULAR FIBROSIS.   -NO DEFINITIVE MORPHOLOGIC OR IMMUNOPHENOTYPIC EVIDENCE OF LYMPHOMA, PLASMA   CELL NEOPLASM, OR METASTATIC CARCINOMA.  SEE COMMENT     In the interval she has had 2 hospital admissions:  - admitted from 7/1- 7/3/2022 as below:  HPI:   Jeanne Rodgers is a 66yo WF with a PMH of recurrent GIB, paroxysmal Afib, hx of breast and endometrial cancer, and GERD who [place] [facility] [place]22 with complaints of fatigue and generalized weakness. Of note, pt recently discharged [place] [facility] for melena and symptomatic anemia, negative endoscopic findings. Reports that after discharge, she gradually became symptomatic once more (weakness, SOB/HERRING, dizziness),  and went to her PCP for follow-up, which showed decreased H/H. Denies F/C/N/V/D/C, HA, CP, palpitations, BRBPR, melena, hematochezia, hematemesis, abdominal pain, dysuria, extremity swelling, or symptoms otherwise.   In the ED, VSS. Labs showed WBC 17.17, Hb 6.2, and plt 121. CT abd/pelvis showed  interval increased overall small volume mildly complex free pelvic fluid, though was otherwise unremarkable. ED ordered 2u pRBCs, and hospital medicine was consulted for admission and further management.  * No surgery found *    Hospital Course:   Pt received 2u pRBCs and was admitted to [facility] [place] [facility]. Hb improved to 9.0 on 7/2, and pt with marked symptomatic improvement, though not fully back to baseline. WBCs noted to further increase, though she remained afebrile; zosyn continued while blood cultures with NGTD. Leukocytosis decreased into 7/3, Hb remained stable, and pt grossly asymptomatic and back to baseline. She has an appointment with heme/onc this week to further discuss her anemia, and will potentially be scheduled for regular routine outpatient blood transfusions. Abx changed to PO cipro/flagyl. Pt deemed appropriate for discharge. Plan discussed with pt, who was agreeable and amenable; medications were discussed and reviewed, outpatient follow-up scheduled, ER precautions were given, all questions were answered to the pt's satisfaction, and Ms. Rodgers was subsequently discharged.     - admitted 6/7- 6/11/2022 as below  HPI:   67-year-old female with past medical history of endometrial cancer, breast cancer, fibromyalgia, PTSD, GIB, blood transfusion, anxiety who presents to the emergency department with chief complaint of abnormal lab value. Reports bright red blood per rectum with dark streaks that started four days ago. Had follow up appointment today. Noted to be anemic and referred to the ER for further evaluation. Endorses headache and lightheadedness. Denies chest pain, shortness of breath, abdominal pain,  vomiting, diarrhea. Reports pelvic and right leg pain. She had recent imaging which was concerning for bone lesions to the pelvis and has biopsy scheduled for later in June. She denies other worsening or alleviating factors.   In the ED patient afebrile and hemodynamically stable saturating well on room air. Hb 6.3 (baseline 8.5). Patient transfused one unit PRBC in ED, started on iv protonix, and admitted to the [facility] of medicine for further evaluation and management of GIB.     - 6/9/2022 Colonoscopy:  Findings:        The perianal and digital rectal examinations were normal.        The entire examined ileum appeared normal.        The entire colon was normal, without inflammation ulceration or        bleeding stigmata. The mucosa was more friable than typical, with        barotrauma with mild mucosal oozing seen during withdrawal.        The retroflexed view of the distal rectum and anal verge was normal        and showed no anal or rectal abnormalities.   Impression:     - Normal colon, with mild contact bleeding in the                          entire examined colon.                          - The examined portion of the ileum was normal.                          - The distal rectum and anal verge are normal on                          retroflexion view.                          - No specimens collected.        - 7/2/2020 Video capsule endoscopy:  Findings:        Images of the esophagus, stomach and small bowel were obtained from        the swallowed capsule and reviewed.        On review of the recorded study, it was determined that the study        lasted 9-hours 30-minutes 13-seconds (total recording time). The        capsule enteroscope entered the cecum prior to the end of the        recording.        The first gastric image was captured at 0-hours 4-minutes and        19-seconds.        The first duodenal image was captured at 0-hours 28-minutes and        41-seconds.        The first cecal image was  captured at 2-hours 32-minutes and        44-seconds.        Scattered hypervascularization characterized by increase vascular        mucosa pattern was found in the gastric body at 16 minutes and 44        seconds and 21 minutes and 6 seconds.        Localized hypervascularization characterized by increase vascular        mucosa pattern without active bleeding and with no stigmata of        bleeding was found in the duodenum at 0-hours 28-minutes 49-seconds.        A scattered area of red spots on the mucosa in the small bowel at        1-hour 41-minutes 36-seconds,1-hour 45-minutes 21-seconds, 1-hour        57-minutes 22-seconds        Diffuse hypervascularization characterized by increase vascular        mucosa pattern mucosa without active bleeding and with no stigmata        of bleeding was found in the ileum until the rest of the small bowel        starting at 2-hours 15-minutes 34-seconds and ending at 2-hours 32        minutes and 44-seconds.   Impression:   - mild hypervascularization characterized by                         increase vascular mucosa pattern pre-pyloric antrum.                         - mild to moderate hypervascularization                         characterized by increase vascular mucosa pattern                         duodenum.                         - hypervascularization characterized by increase                         vascular mucosa patternsmall bowel.                         - mild to moderate hypervascularization                         characterized by increase vascular mucosa pattern                         mucosa in the ileum.                         - No active bleeding these above findings are                         consistent with mild/moderate radiation enteritis.     She has also undergone multiple recent imaging scans - initially after presenting with reports of continued leg pain- she had been seen for leg/hip pain in 3/2022 with below results of imaging:  - 3/9/2022 CT  Pelvis:  FINDINGS:  Evaluation of the solid organs is limited due to lack of intravenous contrast.  Lower chest: Unremarkable.  URINARY COLLECTING SYSTEM: No calculi in the kidneys, ureters, or urinary bladder. No hydronephrosis or ureterectasis.  Liver: normal contour  Gallbladder and bile ducts: Unremarkable.  Pancreas: Pancreas is atrophic.  Spleen: Normal contour.  Adrenals: Normal contour.  Lymph nodes: No abdominal or pelvic lymphadenopathy.  Bowel and mesentery: Unremarkable.  Abdominal aorta: Unremarkable.  Inferior vena cava: Unremarkable.  Free fluid or free air: None.  Pelvis: Unremarkable.  Body wall: Unremarkable.  Bones: Unremarkable.  Impression:  Essentially unremarkable CT the abdomen and pelvis.  No urolithiasis or hydronephrosis.     - 3/28/2022 MRI Hip:  FINDINGS:  Bones: No evidence for hip or pelvic fracture.  Abnormal marrow signal throughout the pelvis, likely sequela of prior radiation therapy.  Bone marrow edema and enhancement seen about the bilateral sacroiliac joints, left greater than right.  Joints: There is thinning of right hip articular cartilage with full-thickness cartilage loss over the superior acetabulum.  There is fraying of the bilateral acetabular labrum.  There is a small right, trace left hip joint effusion.  Mild irregularity of the pubic symphysis.  No definite sacroiliac joint erosion, noting mild bilateral anterior soft tissue edema.  Muscles/tendons: Visualized muscles and tendons are unremarkable.  No evidence for iliopsoas or trochanteric bursitis.  Miscellaneous: There is a small volume of pelvic ascites.  Postsurgical changes of prior hysterectomy noted.  No definite pelvic lymphadenopathy.  Impression:  1. No evidence for hip or pelvic fracture.  2. Bone marrow edema about the bilateral sacroiliac joints, possible osteitis related to sacroiliitis.  Alternatively, developing foci of osteonecrosis.  3. Right hip cartilage loss.  Bilateral acetabular labral  allyson.  4. Small volume of pelvic ascites.     She was referred to Orthopedics and saw Dr. Holder earlier this month and additional imaging ordered:     5/16/2022 MRI L spine:  FINDINGS:  Retrospectively MRI and CT pelvis exams show evidence of nonspecific sclerotic lucent change left sacral ala which could be no suspicious for incomplete stress fracture.  Otherwise previous imaging shows no additional bony abnormality.  Marrow space infiltrated process involves the included spine particularly T9 through T11 vertebral bodies, less prominent changes remainder of lumbar spine and included S4 sacral segment.  In addition Vol[employer] mint of included SI joints particularly upper iliac and left sacral ala.  Post-contrast exam shows no abnormal intra-axial extra-axial enhancement the spinal canal and spinal cord nerve root structures.  Partial enhancement of the marrow space metastatic disease particularly lower dorsal spine.  Lower dorsal spinal cord normal.  Minimal mild posterior disc bulge T10 and T11 posterior disc margins with mild facet joint arthropathy and mild spinal and no significant foraminal stenosis T10 and mild moderate foramen stenosis at T11.  T11 disc level through L1 disc level shows no unusual posterior disc abnormality.  L2-L3 minimal mild posterior disc bulge, mild compression anterior spinal sac, facet joint arthropathy with mild spinal and foramen stenosis.  L 3 L4 minimal mild posterior disc bulge, posterior central annular fissure, mild compression anterior spinal sac, facet joint arthropathy with mild spinal and foramen stenosis.  L 4 L5 mild posterior disc bulge spondylosis, mild compression anterior spinal sac, narrowing lateral recesses, prompt facet joint arthropathy, mild moderate foramen and spinal stenosis, hypertrophy ligamentum flavum.  L5-S1 minor posterior disc bulge, prompt facet joint arthropathy without significant spinal or foramen stenosis.  No disc prolapse or  subluxation.  Impression:  1. Subtle retrospective incomplete stress fracture deformity left sacral ala on CT and MRI studies.  2. New evidence of extensive metastatic disease from known breast carcinoma particularly lower dorsal sacrum and pelvic areas and lesser degree lumbar spine.  No new fracture subluxation or disc prolapse.  3. Degenerative disc spondylosis facet joint arthropathy with above described findings.  4.  This report was flagged in Epic as abnormal.     Our office was contacted regarding this test results and sent for below:  5/24/2022 PET scan:  COMPARISON:  MRI lumbar spine from 516/2022.  FDG PET-CT 04/13/2021, 09/21/2020, 06/02/2020  FINDINGS:  Quality of the study: Adequate.  In the head and neck, there are no hypermetabolic lesions worrisome for malignancy. There are no hypermetabolic mucosal lesions, and there are no pathologically enlarged or hypermetabolic lymph nodes.  In the chest, there are no hypermetabolic lesions worrisome for malignancy.  There are no concerning pulmonary nodules or masses, and there are no pathologically enlarged or hypermetabolic lymph nodes.  In the abdomen and pelvis, there is physiologic tracer distribution within the abdominal organs and excretion into the genitourinary system.  Postop change of cholecystectomy and hysterectomy/BSO.  In the bones, there is absent marrow uptake involving lower thoracic spine, lumbar spine, and portion of sacrum suggestive of radiation induced fatty marrow replacement when compared with recent MRI.  Mildly increased uptake/marrow hyperplasia involving remaining axial and appendicular skeleton.  There are no hypermetabolic lesions worrisome for malignancy.  Subtle linear lucency and mild uptake within the left sacral ala compatible with incomplete stress fracture described on recent MRI.  In the extremities, there are no hypermetabolic lesions worrisome for malignancy.  Impression:  No PET CT evidence of recurrent or metastatic  disease in this patient with a history of endometrial cancer and breast cancer.  Left sacral insufficiency fracture as described on recent MRI.  Additional findings as above.     Tumor markers:  = 20  CA 27-29= 49.8     Oncology History:  Endometrial Cancer History:  Diagnosis of endometrial cancer after presenting with vaginal bleeding  She has completed surgical debulking, chemotherapy and XRT but states that she has developed nausea and vomiting since starting XRT  She was also admitted in  of this year with melena and had a complete GI work up (see below)  Of note during chemotherapy she required several transfusions for symptomatic anemia     Breast Oncology History:  This is a 68 yo female with a history of right breast cancer  - Had been getting her mammograms at [facility] as she was uninsured at the time  - reports small mass seen in  and sent to [facility]- where she was told nothing was seen on review  - Repeat situation of above the next year in   - she noted right nipple inversion - went to [facility]- told not likely cancer, no further testing done  - In  it was recommended that [facility] see her for imaging  - Abnormal mammogram and biopsy recommended  Reports biopsy experience was awful and so she researched other sites to be cared  - She sought out Dr. 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 Keller at [facility]  Oncotype done ? 9- chemotherapy not recommended  She was started on Arimidex  -She was on Arimidex for 4 years until stopping (see below).    Genetic testin Germline Cancer-Genetic Testing  Test(s) performed:  [facility] Common Hereditary Cancers Panel  Number of genes analyzed:  47  Laboratory:  [facility]  Ordering provider:  ANGIE Blackman  Sample  collection date:  07/15/2020  Results report date:  08/14/2020              Full report to be scanned into Epic under the Labs and/or Media tab(s).  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            COUNSELING/MEDICAL DECISION-MAKING:         Note below discussion and recommendations provided from genetic counseling visit (copied forward):  TP53 Mutation Implications  Li-Fraumeni Syndrome (LFS) is a hereditary (germline) cancer syndrome diagnosed with the identification of a germline TP53 gene mutation, which is passed from parent to child and is present for the patient's lifespan in essentially every bodily cell (GHR, 2020).  Its associated risks, including breast cancer, brain cancer, sarcoma, adrenocortical carcinoma, and colorectal cancer ([employer], 2020; NCCN, 1.2020).  It is important to note that the patient does not at this time have a diagnosis of LFS, as we do not know yet whether the patient's TP53 mutation is germline.   Somatic (acquired) TP53 mutations do not occur in the germline and occur at some other point in the lifespan (not at conception) (GHR, 2020) in the blood due to any of several factors, including advancing age ([employer], 2020), history of chemotherapy (which applies to patient), hematologic malignancy (NCCN, 1.2020), environmental factors including ultraviolet (UV) exposure (GHR, 2020), and DNA replication errors during cell division (GHR, 2020).  Somatic mutations that develop in only a single cell early in the embryonic phase can lead to mosaicism (GHR, 2020).  With mosaicism, the gene mutation is not present in the affected individual's egg or sperm cells or in the fertilized egg but rather develops later during the embryonic phase, and, per the process of cell division, cells arising from the cell carrying the mutation will also have the mutation,  but other cells will not (GHR, 2020).  Mosaicism's causing health problems is a function of both the specific mutation and the number of cells affected by the mutation (GHR, 2020).  Up to 20% of LFS cases are de fadi ([employer], 2020).  De fadi (i.e. new) mutations can be germline or somatic (GHR, 2020).  Sometimes, the de fadi mutation develops in the individual's germline (egg or sperm cells) but is absent in all of the individual's other cells; other times, the de fadi mutation develops in the fertilized egg shortly post-conception, and, as the fertilized egg divides, each resultant cell also has the mutation (GHR, 2020).    It is important to distinguish germline origin versus somatic origin as the former has the potential to impact the patient's family while the latter does not.    Recommendations  Appropriate next steps include:  A complete blood count (CBC) and a peripheral blood smear is warranted to evaluate for hematologic neoplasia (NCCN, 1.2020).  The patient is amenable to this.  I do not suspect hematologic neoplasia as etiology of her TP53 mutation.  Testing of parents for identification of the patient's TP53 mutation in either of them; however, the patient's parents are unfortunately unavailable for testing, and testing even all of the patient's siblings, if they all came back negative, would not confirm that the patient's TP53 mutation is not germline in origin.  Therefore, a fibroblast skin culture is recommended, which breast surgeon Dr. Nicci García has agreed to perform.  The patient is amenable to this.     Bone marrow biopsy done with above and cytopenias  negative for malignancy.      Review of Systems   Constitutional:  Negative for activity change, appetite change, fatigue (better but off on and days), fever and unexpected weight change (stable and plans to see nutritionist).   HENT:  Positive for nasal congestion, ear discharge, ear pain and hearing loss. Negative for postnasal drip.    Eyes:   Negative for visual disturbance.   Respiratory:  Positive for shortness of breath (on exertion).    Cardiovascular:  Negative for chest pain, palpitations and leg swelling.   Gastrointestinal:  Negative for abdominal pain, change in bowel habit, constipation, diarrhea, nausea, vomiting and change in bowel habit.   Genitourinary:  Negative for decreased urine volume, difficulty urinating, dysuria and urgency.   Musculoskeletal:  Negative for arthralgias, back pain, gait problem and joint deformity.   Integumentary:  Negative for breast discharge and breast tenderness.   Neurological:  Positive for dizziness (relates to ear infection) and headaches. Negative for weakness, light-headedness and numbness.   Hematological:  Negative for adenopathy. Does not bruise/bleed easily.   Psychiatric/Behavioral:  Positive for dysphoric mood.    Breast: Negative for tenderness      Objective:      Physical Exam  Vitals and nursing note reviewed.   Constitutional:       General: She is not in acute distress.     Appearance: Normal appearance. She is well-developed and normal weight.      Comments: Presents alone  Ambulates without difficulty  NAD   HENT:      Head: Normocephalic and atraumatic.      Right Ear: Tympanic membrane normal.      Ears:      Comments: Left ear canal without erythema; white plaque noted close to TM. Unable to visualize TM.   Eyes:      General: No scleral icterus.     Extraocular Movements: Extraocular movements intact.      Pupils: Pupils are equal, round, and reactive to light.      Comments: Conjunctivae pale   Cardiovascular:      Rate and Rhythm: Normal rate and regular rhythm.      Heart sounds: Normal heart sounds. No murmur heard.    No friction rub. No gallop.   Pulmonary:      Effort: Pulmonary effort is normal. No respiratory distress.      Breath sounds: Normal breath sounds. No wheezing, rhonchi or rales.   Chest:   Breasts:     Right: No mass, nipple discharge or skin change.      Left: No  mass, nipple discharge or skin change.   Abdominal:      General: Bowel sounds are normal.      Palpations: Abdomen is soft.      Comments: No organomegaly   Musculoskeletal:      Cervical back: Normal range of motion.   Lymphadenopathy:      Cervical: No cervical adenopathy.      Upper Body:      Right upper body: No supraclavicular or axillary adenopathy.      Left upper body: No supraclavicular or axillary adenopathy.   Skin:     General: Skin is warm and dry.      Coloration: Skin is pale. Skin is not jaundiced.      Findings: No erythema, lesion or rash.   Neurological:      General: No focal deficit present.      Mental Status: She is alert and oriented to person, place, and time.      Sensory: No sensory deficit.      Motor: Weakness (generalized.) present.      Coordination: Coordination normal.      Gait: Gait normal.   Psychiatric:         Mood and Affect: Mood normal.         Behavior: Behavior normal.         Thought Content: Thought content normal.         Judgment: Judgment normal.     Labs- reviewed  Assessment:       Problem List Items Addressed This Visit          Hematology    Thrombocytopenia       Oncology    History of endometrial cancer    Relevant Orders    MRI Brain W WO Contrast     Other Visit Diagnoses       Anemia, unspecified type    -  Primary    Relevant Orders    Prepare RBC 2 Units; symptomatic anemia    Creatinine, serum    CBC Oncology    Type & Screen    Comprehensive Metabolic Panel    Personal history of breast cancer        Relevant Orders    MRI Brain W WO Contrast    Headache disorder        Relevant Orders    MRI Brain W WO Contrast    Left ear pain        Relevant Orders    MRI Brain W WO Contrast            Plan:         Labs reviewed again with patient. Drop in anemia parameters and plts.   No evidence of malignancy on tests done to date.   She refuses to go to ED or be admitted.   Type and match and transfuse 2 u PRBC's today.   MRI brain- for headaches; will ask to eval  left ear as well. If unable to see, may need mri sinus.  BMBX asap  Bleeding precautions.   See Dr. Ford with cbc, cmp, type and match Monday in [facility].        Route Chart for Scheduling    Med Onc Chart Routing  Urgent    Follow up with physician . See Dr. Ford with cbc, cmp, type and match on Monday in [facility] clinic spot   Follow up with [facility]    Infusion scheduling note    Injection scheduling note    Labs    Imaging   MRI brain and BMBX asap   Pharmacy appointment    Other referrals          Patient is in agreement with the proposed treatment plan. All questions were answered to the patient's satisfaction. Pt knows to call clinic for any new or worsening symptoms and if anything is needed before the next clinic visit. Discussed case with Dr. Ford who agrees with above.       ANGIE Dior-C  Hematology & Oncology  [address] [place] [id number]  ph. [phone]  Fax. [phone]     Face to Face time with patient: 30 minutes  60minutes of total time spent on the encounter, which includes face to face time and non-face to face time preparing to see the patient (eg, review of tests), Obtaining and/or reviewing separately obtained history, Documenting clinical information in the electronic or other health record, Independently interpreting results (not separately reported) and communicating results to the patient/family/caregiver, or Care coordination (not separately reported).

## 2022-09-09 NOTE — PLAN OF CARE
1600 Pt tolerated 1 unit PBRC's well, reviewed s/s of post transfusion reaction, knows when to contact MD, when to report to ER; AVS declined, pt verbalized understanding, aware of upcoming appts. Pt ambulatory from clinic with steady gait, no distress noted.

## 2022-09-12 NOTE — PROGRESS NOTES
Subjective:       Patient ID: Jeanne Rodgers is a 67 y.o. female.    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   Reports her first indication is dry cough.   Heart races at times with exertion.   No CP  + SOB on exertion.     Anemia work-up   She has a recent work up for anemia that included a bmbx- see below  - 6/21/2022 Pathology BmBx:  LEFT ILIAC BONE, CT-GUIDED BIOPSY WITH ON-SITE ADEQUACY:   -HYPERCELLULAR MARROW (%), TRILINEAGE HEMATOPOIETIC ACTIVITY.   -FOCAL GRADE 1 RETICULAR FIBROSIS.   -NO DEFINITIVE MORPHOLOGIC OR IMMUNOPHENOTYPIC EVIDENCE OF LYMPHOMA, PLASMA   CELL NEOPLASM, OR METASTATIC CARCINOMA.  SEE COMMENT      Presented to heme malignancy group for review and monitoring recommended as felt may be marrow impacted by radiation     Due to ear pain/infection - sent by [facility] for MRI  Reports she has lost the hearing in that ear - sees ENT regularly and has been treated with antibiotics (4 different types) followed by Ginseng Caty x 2 for a fungal infection    - 9/9/2022 MRI Brain:  FINDINGS:  There is no restricted diffusion to suggest acute infarction.  Brain parenchyma normal in contour..  Numerous scattered small sized foci of T2 FLAIR signal hyperintensity supratentorial white matter slightly greater on the left which are  nonspecific in light of history sequela chronic migraine headaches to be included in differential.  There is no evidence for acute infarction.  There is a small focus of T2 hyperintensity with in the left cerebellum with superimposed T1 shortening, punctate susceptibility and questionable superimposed partial linear enhancement.  Overall configuration concerning for probable subacute aged cerebellar infarction with petechial hemorrhage.  Please note focus of underlying metastatic disease felt much less likely however follow-up recommended to assure appropriate evolution without underlying lesion.  Ventricles normal in size without hydrocephalus.  There is no evidence for enhancing lesion elsewhere.  Partial fluid opacification left mastoid air cells.  Small air-fluid level and aerated secretions left sphenoid sinus.  This report was flagged in Epic as abnormal.  Impression:  Subcentimeter focus of T2 hyperintensity in the left cerebellum with superimposed punctate susceptibility and T1 shortening with questioned enhancement.  Overall configuration suggestive for subacute to chronic cerebellar infarct with petechial hemorrhage.  Clinical correlation and follow-up recommended to assure no underlying lesion.  Scattered T2 FLAIR signal hyperintensity elsewhere supratentorial white matter which are nonspecific and may represent sequela of chronic migraine headaches in the appropriate clinical setting.  There is no evidence for acute infarction  Partial fluid opacification left mastoid air cells with air-fluid level in the left sphenoid sinus cannot exclude acute sinusitis.    In the interval she has had 2 hospital admissions:  - admitted from 7/1- 7/3/2022 as below:  HPI:   Jeanne Rodgers is a 66yo WF with a PMH of recurrent GIB, paroxysmal Afib, hx of breast and endometrial cancer, and GERD who presented to the [facility] ED on 7/1/22 with complaints of fatigue and generalized weakness. Of note, pt recently discharged from  [facility] for melena and symptomatic anemia, negative endoscopic findings. Reports that after discharge, she gradually became symptomatic once more (weakness, SOB/HERRING, dizziness), and went to her PCP for follow-up, which showed decreased H/H. Denies F/C/N/V/D/C, HA, CP, palpitations, BRBPR, melena, hematochezia, hematemesis, abdominal pain, dysuria, extremity swelling, or symptoms otherwise.   In the ED, VSS. Labs showed WBC 17.17, Hb 6.2, and plt 121. CT abd/pelvis showed  interval increased overall small volume mildly complex free pelvic fluid, though was otherwise unremarkable. ED ordered 2u pRBCs, and hospital medicine was consulted for admission and further management.  * No surgery found *    Hospital Course:   Pt received 2u pRBCs and was admitted to [facility]. Hb improved to 9.0 on 7/2, and pt with marked symptomatic improvement, though not fully back to baseline. WBCs noted to further increase, though she remained afebrile; zosyn continued while blood cultures with NGTD. Leukocytosis decreased into 7/3, Hb remained stable, and pt grossly asymptomatic and back to baseline. She has an appointment with heme/onc this week to further discuss her anemia, and will potentially be scheduled for regular routine outpatient blood transfusions. Abx changed to PO cipro/flagyl. Pt deemed appropriate for discharge. Plan discussed with pt, who was agreeable and amenable; medications were discussed and reviewed, outpatient follow-up scheduled, ER precautions were given, all questions were answered to the pt's satisfaction, and Ms. Rodgers was subsequently discharged.     - admitted 6/7- 6/11/2022 as below  HPI:   67-year-old female with past medical history of endometrial cancer, breast cancer, fibromyalgia, PTSD, GIB, blood transfusion, anxiety who presents to the emergency department with chief complaint of abnormal lab value. Reports bright red blood per rectum with dark streaks that started four days ago. Had follow up  appointment today. Noted to be anemic and referred to the ER for further evaluation. Endorses headache and lightheadedness. Denies chest pain, shortness of breath, abdominal pain, vomiting, diarrhea. Reports pelvic and right leg pain. She had recent imaging which was concerning for bone lesions to the pelvis and has biopsy scheduled for later in June. She denies other worsening or alleviating factors.   In the ED patient afebrile and hemodynamically stable saturating well on room air. Hb 6.3 (baseline 8.5). Patient transfused one unit PRBC in ED, started on iv protonix, and admitted to the [facility] for further evaluation and management of GIB.     - 6/9/2022 Colonoscopy:  Findings:        The perianal and digital rectal examinations were normal.        The entire examined ileum appeared normal.        The entire colon was normal, without inflammation ulceration or        bleeding stigmata. The mucosa was more friable than typical, with        barotrauma with mild mucosal oozing seen during withdrawal.        The retroflexed view of the distal rectum and anal verge was normal        and showed no anal or rectal abnormalities.   Impression:     - Normal colon, with mild contact bleeding in the                          entire examined colon.                          - The examined portion of the ileum was normal.                          - The distal rectum and anal verge are normal on                          retroflexion view.                          - No specimens collected.     - 7/2/2020 Video capsule endoscopy:  Findings:        Images of the esophagus, stomach and small bowel were obtained from        the swallowed capsule and reviewed.        On review of the recorded study, it was determined that the study        lasted 9-hours 30-minutes 13-seconds (total recording time). The        capsule enteroscope entered the cecum prior to the end of the        recording.        The first gastric image was  captured at 0-hours 4-minutes and        19-seconds.        The first duodenal image was captured at 0-hours 28-minutes and        41-seconds.        The first cecal image was captured at 2-hours 32-minutes and        44-seconds.        Scattered hypervascularization characterized by increase vascular        mucosa pattern was found in the gastric body at 16 minutes and 44        seconds and 21 minutes and 6 seconds.        Localized hypervascularization characterized by increase vascular        mucosa pattern without active bleeding and with no stigmata of        bleeding was found in the duodenum at 0-hours 28-minutes 49-seconds.        A scattered area of red spots on the mucosa in the small bowel at        1-hour 41-minutes 36-seconds,1-hour 45-minutes 21-seconds, 1-hour        57-minutes 22-seconds        Diffuse hypervascularization characterized by increase vascular        mucosa pattern mucosa without active bleeding and with no stigmata        of bleeding was found in the ileum until the rest of the small bowel        starting at 2-hours 15-minutes 34-seconds and ending at 2-hours 32        minutes and 44-seconds.   Impression:   - mild hypervascularization characterized by                         increase vascular mucosa pattern pre-pyloric antrum.                         - mild to moderate hypervascularization                         characterized by increase vascular mucosa pattern                         duodenum.                         - hypervascularization characterized by increase                         vascular mucosa patternsmall bowel.                         - mild to moderate hypervascularization                         characterized by increase vascular mucosa pattern                         mucosa in the ileum.                         - No active bleeding these above findings are                         consistent with mild/moderate radiation enteritis.     She has also undergone multiple  recent imaging scans - initially after presenting with reports of continued leg pain- she had been seen for leg/hip pain in 3/2022 with below results of imaging:  - 3/9/2022 CT Pelvis:  FINDINGS:  Evaluation of the solid organs is limited due to lack of intravenous contrast.  Lower chest: Unremarkable.  URINARY COLLECTING SYSTEM: No calculi in the kidneys, ureters, or urinary bladder. No hydronephrosis or ureterectasis.  Liver: normal contour  Gallbladder and bile ducts: Unremarkable.  Pancreas: Pancreas is atrophic.  Spleen: Normal contour.  Adrenals: Normal contour.  Lymph nodes: No abdominal or pelvic lymphadenopathy.  Bowel and mesentery: Unremarkable.  Abdominal aorta: Unremarkable.  Inferior vena cava: Unremarkable.  Free fluid or free air: None.  Pelvis: Unremarkable.  Body wall: Unremarkable.  Bones: Unremarkable.  Impression:  Essentially unremarkable CT the abdomen and pelvis.  No urolithiasis or hydronephrosis.     - 3/28/2022 MRI Hip:  FINDINGS:  Bones: No evidence for hip or pelvic fracture.  Abnormal marrow signal throughout the pelvis, likely sequela of prior radiation therapy.  Bone marrow edema and enhancement seen about the bilateral sacroiliac joints, left greater than right.  Joints: There is thinning of right hip articular cartilage with full-thickness cartilage loss over the superior acetabulum.  There is fraying of the bilateral acetabular labrum.  There is a small right, trace left hip joint effusion.  Mild irregularity of the pubic symphysis.  No definite sacroiliac joint erosion, noting mild bilateral anterior soft tissue edema.  Muscles/tendons: Visualized muscles and tendons are unremarkable.  No evidence for iliopsoas or trochanteric bursitis.  Miscellaneous: There is a small volume of pelvic ascites.  Postsurgical changes of prior hysterectomy noted.  No definite pelvic lymphadenopathy.  Impression:  1. No evidence for hip or pelvic fracture.  2. Bone marrow edema about the bilateral  sacroiliac joints, possible osteitis related to sacroiliitis.  Alternatively, developing foci of osteonecrosis.  3. Right hip cartilage loss.  Bilateral acetabular labral fraying.  4. Small volume of pelvic ascites.     She was referred to Orthopedics and saw Dr. Holder earlier this month and additional imaging ordered:     5/16/2022 MRI L spine:  FINDINGS:  Retrospectively MRI and CT pelvis exams show evidence of nonspecific sclerotic lucent change left sacral ala which could be no suspicious for incomplete stress fracture.  Otherwise previous imaging shows no additional bony abnormality.  Marrow space infiltrated process involves the included spine particularly T9 through T11 vertebral bodies, less prominent changes remainder of lumbar spine and included S4 sacral segment.  In addition [employer] mint of included SI joints particularly upper iliac and left sacral ala.  Post-contrast exam shows no abnormal intra-axial extra-axial enhancement the spinal canal and spinal cord nerve root structures.  Partial enhancement of the marrow space metastatic disease particularly lower dorsal spine.  Lower dorsal spinal cord normal.  Minimal mild posterior disc bulge T10 and T11 posterior disc margins with mild facet joint arthropathy and mild spinal and no significant foraminal stenosis T10 and mild moderate foramen stenosis at T11.  T11 disc level through L1 disc level shows no unusual posterior disc abnormality.  L2-L3 minimal mild posterior disc bulge, mild compression anterior spinal sac, facet joint arthropathy with mild spinal and foramen stenosis.  L 3 L4 minimal mild posterior disc bulge, posterior central annular fissure, mild compression anterior spinal sac, facet joint arthropathy with mild spinal and foramen stenosis.  L 4 L5 mild posterior disc bulge spondylosis, mild compression anterior spinal sac, narrowing lateral recesses, prompt facet joint arthropathy, mild moderate foramen and spinal stenosis, hypertrophy  ligamentum flavum.  L5-S1 minor posterior disc bulge, prompt facet joint arthropathy without significant spinal or foramen stenosis.  No disc prolapse or subluxation.  Impression:  1. Subtle retrospective incomplete stress fracture deformity left sacral ala on CT and MRI studies.  2. New evidence of extensive metastatic disease from known breast carcinoma particularly lower dorsal sacrum and pelvic areas and lesser degree lumbar spine.  No new fracture subluxation or disc prolapse.  3. Degenerative disc spondylosis facet joint arthropathy with above described findings.  4.  This report was flagged in Epic as abnormal.     Our office was contacted regarding this test results and sent for below:  5/24/2022 PET scan:  COMPARISON:  MRI lumbar spine from 516/2022.  FDG PET-CT 04/13/2021, 09/21/2020, 06/02/2020  FINDINGS:  Quality of the study: Adequate.  In the head and neck, there are no hypermetabolic lesions worrisome for malignancy. There are no hypermetabolic mucosal lesions, and there are no pathologically enlarged or hypermetabolic lymph nodes.  In the chest, there are no hypermetabolic lesions worrisome for malignancy.  There are no concerning pulmonary nodules or masses, and there are no pathologically enlarged or hypermetabolic lymph nodes.  In the abdomen and pelvis, there is physiologic tracer distribution within the abdominal organs and excretion into the genitourinary system.  Postop change of cholecystectomy and hysterectomy/BSO.  In the bones, there is absent marrow uptake involving lower thoracic spine, lumbar spine, and portion of sacrum suggestive of radiation induced fatty marrow replacement when compared with recent MRI.  Mildly increased uptake/marrow hyperplasia involving remaining axial and appendicular skeleton.  There are no hypermetabolic lesions worrisome for malignancy.  Subtle linear lucency and mild uptake within the left sacral ala compatible with incomplete stress fracture described on  recent MRI.  In the extremities, there are no hypermetabolic lesions worrisome for malignancy.  Impression:  No PET CT evidence of recurrent or metastatic disease in this patient with a history of endometrial cancer and breast cancer.  Left sacral insufficiency fracture as described on recent MRI.  Additional findings as above.     Tumor markers:  = 20  CA 27-29= 49.8     Oncology History:  Endometrial Cancer History:  Diagnosis of endometrial cancer after presenting with vaginal bleeding  She has completed surgical debulking, chemotherapy and XRT but states that she has developed nausea and vomiting since starting XRT  She was also admitted in  of this year with melena and had a complete GI work up (see below)  Of note during chemotherapy she required several transfusions for symptomatic anemia     Breast Oncology History:  This is a 68 yo female with a history of right breast cancer  - Had been getting her mammograms at [facility] as she was uninsured at the time  - reports small mass seen in  and sent to [facility]- where she was told nothing was seen on review  - Repeat situation of above the next year in   - she noted right nipple inversion - went to [facility]- told not likely cancer, no further testing done  - In  it was recommended that [facility] see her for imaging  - Abnormal mammogram and biopsy recommended  Reports biopsy experience was awful and so she researched other sites to be cared  - She sought out Dr. Gallo Omalley at 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  - Saw Dr. Keller at [facility]  Oncotype done ? 9- chemotherapy not recommended  She was started on Arimidex  -She was on Arimidex for 4 years until stopping (see below).    Genetic testin2022:  Final Pathologic Diagnosis  BREAST, LEFT, MASS, BIOPSY:   - Benign breast tissue with features compatible with fat necrosis and remote   hemorrhage.   - Microcalcifications:  Not identified.   - Negative for atypia or malignancy.        2020 Germline Cancer-Genetic Testing  Test(s) performed:  [employer] Common Hereditary Cancers Panel  Number of genes analyzed:  47  Laboratory:  [facility]  Ordering provider:  ANGIE Blackman  Sample collection date:  07/15/2020  Results report date:  08/14/2020              Full report to be scanned into Epic under the Labs and/or Media tab(s).  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            COUNSELING/MEDICAL DECISION-MAKING:         Note below discussion and recommendations provided from genetic counseling visit (copied forward):  TP53 Mutation Implications  Li-Fraumeni Syndrome (LFS) is a hereditary (germline) cancer syndrome diagnosed with the identification of a germline TP53 gene mutation, which is passed from parent to child and is present for the patient's lifespan in essentially every bodily cell (GHR, 2020).  Its associated risks, including breast cancer, brain cancer, sarcoma, adrenocortical carcinoma, and colorectal cancer ([employer], 2020; NCCN, 1.2020).  It is important to note that the patient does not at this time have a diagnosis of LFS, as we do not know yet whether the patient's TP53 mutation is germline.   Somatic (acquired) TP53 mutations do not occur in the germline and occur at some other point in the lifespan (not at conception) (GHR, 2020) in the blood due to any of several factors, including advancing age ([employer], 2020), history of chemotherapy (which applies to patient), hematologic malignancy (NCCN, 1.2020), environmental factors including ultraviolet (UV) exposure (GHR, 2020), and DNA replication errors during cell division (GHR, 2020).  Somatic  mutations that develop in only a single cell early in the embryonic phase can lead to mosaicism (GHR, 2020).  With mosaicism, the gene mutation is not present in the affected individual's egg or sperm cells or in the fertilized egg but rather develops later during the embryonic phase, and, per the process of cell division, cells arising from the cell carrying the mutation will also have the mutation, but other cells will not (GHR, 2020).  Mosaicism's causing health problems is a function of both the specific mutation and the number of cells affected by the mutation (GHR, 2020).  Up to 20% of LFS cases are de fadi ([employer], 2020).  De fadi (i.e. new) mutations can be germline or somatic (GHR, 2020).  Sometimes, the de fadi mutation develops in the individual's germline (egg or sperm cells) but is absent in all of the individual's other cells; other times, the de fadi mutation develops in the fertilized egg shortly post-conception, and, as the fertilized egg divides, each resultant cell also has the mutation (GHR, 2020).    It is important to distinguish germline origin versus somatic origin as the former has the potential to impact the patient's family while the latter does not.    Recommendations  Appropriate next steps include:  A complete blood count (CBC) and a peripheral blood smear is warranted to evaluate for hematologic neoplasia (NCCN, 1.2020).  The patient is amenable to this.  I do not suspect hematologic neoplasia as etiology of her TP53 mutation.  Testing of parents for identification of the patient's TP53 mutation in either of them; however, the patient's parents are unfortunately unavailable for testing, and testing even all of the patient's siblings, if they all came back negative, would not confirm that the patient's TP53 mutation is not germline in origin.  Therefore, a fibroblast skin culture is recommended, which breast surgeon Dr. Nicci García has agreed to perform.  The patient is amenable to  this.     Bone marrow biopsy done with above and cytopenias  negative for malignancy.      Review of Systems   Constitutional:  Positive for activity change and fatigue (better). Negative for appetite change, fever and unexpected weight change (stable and plans to see nutritionist).   HENT:  Positive for ear discharge, ear pain and hearing loss. Negative for nasal congestion and postnasal drip.    Eyes:  Negative for visual disturbance.   Cardiovascular:  Negative for chest pain, palpitations and leg swelling.   Gastrointestinal:  Negative for abdominal pain, change in bowel habit, constipation, diarrhea, nausea, vomiting and change in bowel habit.   Genitourinary:  Negative for decreased urine volume, difficulty urinating, dysuria and urgency.   Musculoskeletal:  Positive for arthralgias. Negative for back pain, gait problem and joint deformity.   Integumentary:  Positive for breast mass. Negative for breast discharge and breast tenderness.   Neurological:  Positive for dizziness (relates to ear infection). Negative for weakness, light-headedness, numbness and headaches.   Hematological:  Negative for adenopathy. Does not bruise/bleed easily.   Psychiatric/Behavioral:  Positive for dysphoric mood.    Breast: Positive for mass.Negative for tenderness      Objective:      Physical Exam  deferred  Assessment:       Problem List Items Addressed This Visit       Thrombocytopenia    Symptomatic anemia    Hx of breast cancer    Endometrial cancer - Primary       Plan:       Reviewed labs   H/H responded to transfusion   We discussed cytopenias and differential- will do MRI to reassess marrow in pelvis and bmbx to follow    See above for full history    Route Chart for Scheduling    Med Onc Chart Routing  Urgent    Follow up with physician . MRI pelvis at [facility] only; cbc and bone marrow biopsy 1 week after MRI, EP with me 1 week after bone marrow   Follow up with [facility]    Infusion scheduling note    Injection scheduling note     Labs    Imaging    Pharmacy appointment    Other referrals

## 2022-09-15 NOTE — PROGRESS NOTES
Subjective:    Patient ID:  Jeanne Rodgers is a 67 y.o. female who presents for follow-up of No chief complaint on file.      HPI    PAF - declines DOAC due to Hx GIB, HLD     Referred by Dr Hill  Jeanne Rodgers is a 66 y.o. female who presents for HBO consult. Referred by OBGYN Oncologist  for radiation vaginitis and would like to try hyperbaric oxygen therapy.  Topical treatments for this painful region have not helped and are painful. History of breast with partial mastectomy right breast in 2014 and endometrial cancer. Finished last chemo and radiation therapy in May 2020.  Reports no hormone therapy but reports nightly medical marijuana use for nausea during treatment. HBO workup started today in clinic: cxr, ekg, crp and sed rate ordered. HBO consents signed and patient educated. Verbalized understanding.      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 Her chemo/XRT was completed 5/2020.  Patient has failed medical management of this disease and has not found any relief from the pain. She is an excellent candidate for HBOT.  Insurance approval will be requested. Additional labs and diagnostic tests were ordered as noted above. The HBO treatment was explained, the consent for was explained, all questions were answered and the consent was signed.  We will proceed with HBOT when insurance approval has been confirmed. In addition, a left-sided cerumen impaction was noted on physical exam and Debrox drops were recommended.      EKG 8/16/21 sinus bradycardia 54 otherwise ok     Stress test 8/27/21    Normal myocardial perfusion scan. There is no evidence of myocardial ischemia or infarction.    The gated perfusion images showed an ejection fraction of 70% post stress.    There is normal wall motion post stress.    The EKG portion of this study is negative for ischemia.    The patient reported no chest pain during the stress test.     There were no arrhythmias during stress.      Echo 4/26/22  The left ventricle is normal in size with normal systolic function.  The estimated ejection fraction is 65%.  Normal right ventricular size with normal right ventricular systolic function.  Small anterior pericardial effusion without hemodynamic compromise.  The estimated PA systolic pressure is 21 mmHg.     CTA chest 12/13/21  No evidence of pulmonary thromboembolism other acute abnormalities in the chest.        Holter 8/27/21  Normal sinus rhythm. Heart rates varied between 46 and 141 BPM with an average of 82 BPM.  No PVC's  237 PAC's with one 4 beat SVT        8/24/21 Reports sharp left sided CP intermittently for the last 2 months. Occasional SOB  Denies prior CAD   Echo and lexiscan myoview for CP and SOB  Add lipid to next labs  Holter for bradycardia     Lipid 8/27/21  Chol 186  HDL 45       9/13/21 Still with occasional sharp CP   Denies SOB  Continue observation for atypical CP and bradycardia  Diet control for mild HLD  OV 6 months     Admitted to [facility] [place] [place] 12/15/21  Macrocytic Anemia  Bright Red Blood per Rectum  -Hgb 8.9 on presentation, baseline ~11. No reported melena or hematochezia but some gross blood noted on rectal exam. Pt. With prior history of anemia with EGD/colonoscopy positive only for erythematous duodenopathy on EGD 6/2020  -Iron studies, folate, and B12 ordered for further work-up of anemia  -Trend Hgb, will leave pt. On clear liquid diet. Plan for outpatient GI f/u unless pt. Develops active bleeding or has significantly downtrending Hgb  - Hb stable at 8.0  - no active bleeding at this time   -diet advanced on 12/17     Atrial Fibrillation with RVR  Patient has been having paroxsymal runs of atrial fibrillation during this admission with rates up to the 150s. She was seen by cardiology  -TTE Stable  -chest pain improving  -Metoprolol PO started  -Cards recs as follows:   Given isolated episode of atrial fibrillation and  acute illness along with anemia holding off on starting anticoagulation at this time, recommending 30 day event monitor at discharge to look for occult atrial fibrillation, follow-up with Dr. Mancia      Chest Pain- improving   -Pt. Chief complaint chest/back pain with 2 presentations to ED in last few days, ongoing for days, associated with position changes and deep breathing. -Troponin negative, EKG without new ST changes.  -CTA chest without abnormality. Pt. Also has had stress test 8/2021 negative for ischemia  -MSK origin suspected, continue symptomatic pain relief with lidocaine patch, acetaminophen, oxycodone PRN     12/20/21 Went to the ER recently with CP - PE ruled out - felt to possibly be pericarditis - started on ibuprofen which triggered a GIB. In the hospital developed A-fib RVR - no anticoagulation or CV done. Feels better since discharge. Denies further CP.   EKG NSR NSSTT changes  BP controlled by home readings  PAF - back in NSR - not on OAC due to recent GIB - likely triggered by NSAIDs  CP - unclear etiology - no MI or PE  OV 1 month with BMP, CBC - if CP returns consider [facility]      Labs 1/18/22  K 4.1  Cr 0.8  HCT 29     1/19/22 Denies CP, SOB, or palpitations  No recurrence of CP or palpitations  Not interested in DOAC unless A-fib returns  OV 3 months     4/22/22 Reports recent LE edema and HERRING  Denies CP. Denies heart racing or palpitations  EKG NSR - ok   Echo, BNP, BMP for new onset LE edema and HERRING  PRN lasix     Labs 4/26/22  K 4.1  Cr 0.8       5/12/22 LE edema improved with lasix and medical marijuana  Denies CP or SOB  Echo with EF 65% and  - edema improved with prn lasix  Continue Rx for PAF, HLD  OV 6 months     Admitted [facility] [place] 6/17/22  67-year-old female with past medical history of endometrial cancer, breast cancer, fibromyalgia, PTSD, GIB, blood transfusion, anxiety who presents to the emergency department with chief complaint of abnormal lab value. Reports bright red  "blood per rectum with dark streaks that started four days ago. Had follow up appointment today. Noted to be anemic and referred to the ER for further evaluation. Endorses headache and lightheadedness. Denies chest pain, shortness of breath, abdominal pain, vomiting, diarrhea. Reports pelvic and right leg pain. She had recent imaging which was concerning for bone lesions to the pelvis and has biopsy scheduled for later in June. She denies other worsening or alleviating factors.      In the ED patient afebrile and hemodynamically stable saturating well on room air. Hb 6.3 (baseline 8.5). Patient transfused one unit PRBC in ED, started on iv protonix, and admitted to the care of medicine for further evaluation and management of GIB.      6/17/22 Denies CP or SOB. Denies palpitations since discharge  BP controlled   Interested in finding out more about watchman device - will refer to EP  Continue Rx for PAF and HLD  OV 3 months    Saw Dr Abreu EP 6/24/22  PAF  Discussed AF and its basic pathophysiology, including its health implications and treatment options (rate vs. rhythm control, meds vs. procedural/device treatment) as appropriate for the patient.  Would like to anticoagulate, but understand reluctance given recent significant GI bleeding.  Will ask H/Onc (Dr. Ford) re: when she'd feel OK about trying a/c (would use eliquis, likely).  Discussed Watchman device. Longterm, CVA prophylaxis with the Watchman device (GIO occlusion device) would make sense for her, but she would have to be able to be anticoagulated for a few months.  If/when Dr. Ford "clears" the patient for a/c, we'll reconsider anticoag vs./+ Watchman.  Will try to control her AF with AAD: start flecainide today. Already on BB.      9/15/22 She stopped flecainide due to nausea. Denies heart racing or palpitations. Still being evaluated for anemia - scheduled for BMBx. HCT 19 - improved to 32  after transfusion. PLT 44    Review of Systems "   Constitutional: Negative for decreased appetite.   HENT:  Negative for ear discharge.    Eyes:  Negative for blurred vision.   Respiratory:  Negative for hemoptysis.    Endocrine: Negative for polyphagia.   Hematologic/Lymphatic: Negative for adenopathy.   Skin:  Negative for color change.   Musculoskeletal:  Negative for joint swelling.   Genitourinary:  Negative for bladder incontinence.   Neurological:  Negative for brief paralysis.   Psychiatric/Behavioral:  Negative for hallucinations.    Allergic/Immunologic: Negative for hives.      Objective:    Physical Exam  Constitutional:       Appearance: She is well-developed.   HENT:      Head: Normocephalic and atraumatic.   Eyes:      Conjunctiva/sclera: Conjunctivae normal.      Pupils: Pupils are equal, round, and reactive to light.   Cardiovascular:      Rate and Rhythm: Normal rate.      Pulses: Intact distal pulses.      Heart sounds: Normal heart sounds.   Pulmonary:      Effort: Pulmonary effort is normal.      Breath sounds: Normal breath sounds.   Abdominal:      General: Bowel sounds are normal.      Palpations: Abdomen is soft.   Musculoskeletal:         General: Normal range of motion.      Cervical back: Normal range of motion and neck supple.      Right lower leg: Edema present.      Left lower leg: Edema present.   Skin:     General: Skin is warm and dry.   Neurological:      Mental Status: She is alert and oriented to person, place, and time.         Assessment:       1. HERRING (dyspnea on exertion)    2. Paroxysmal atrial fibrillation    3. Chest pain, unspecified type         Plan:       Continue Rx for PAF and HLD  OV 6 months

## 2022-09-16 NOTE — PROGRESS NOTES
Health Maintenance Due   Topic Date Due    COVID-19 Vaccine (1) Never done    Shingles Vaccine (1 of 2) Never done    Pneumococcal Vaccines (Age 65+) (3 - PPSV23 or PCV20) 09/18/2020

## 2022-09-16 NOTE — PROGRESS NOTES
PSYCHO-ONCOLOGY NOTE/ Individual Psychotherapy     Date: 9/14/2022   Site:  [facility]        Therapeutic Intervention: Met with patient.  Outpatient - Behavior modifying psychotherapy 45 min - CPT code 56163  The patient's last visit with me was on 8/29/2022.    Problem list  Patient Active Problem List   Diagnosis    Hx of breast cancer    Spondylosis without myelopathy    Hamstring tightness of both lower extremities    Segmental and somatic dysfunction of lumbar region    Alteration in mobility associated with pain    Pelvic pain in female    Myalgia of pelvic floor    Nocturia more than twice per night    Lichen sclerosus et atrophicus    Irregular bowel habits    Hip pain, right    Chronic lower back pain    Urinary incontinence, urge    Pre-diabetes    Vaginal irritation    Postmenopausal bleeding    Endometrial cancer    History of robot-assisted laparoscopic hysterectomy    Encounter for antineoplastic chemotherapy    Thrombocytopenia due to drugs    Neutropenia, drug-induced    Anemia due to chronic blood loss    Radiation therapy complication    Major depressive disorder, recurrent episode, moderate with anxious distress    Bone pain    B12 deficiency    Radiation vaginitis    Allergic contact dermatitis due to photocontact other than sunburn, current    History of endometrial cancer    Chest pain    HERRING (dyspnea on exertion)    Liver cyst    Microscopic hematuria    Disorder of the skin, subcu related to radiation, unsp    Radiation necrosis of skin and subcutaneous    Disorder of the skin and subcutaneous tissue related to radiation, unspecified    GIB (gastrointestinal bleeding)    Macrocytic anemia    Atrial fibrillation with RVR    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    Peripheral neuropathy due to chemotherapy    Pain of right lower extremity    Recurrent UTI    Leg edema    Lumbar radiculopathy    Lumbar spondylosis    DDD (degenerative disc disease), lumbar     Right knee pain    Radiculopathy of leg    Abnormal MRI    Leukocytosis    Fever    Tachycardia    Paroxysmal atrial fibrillation    Cellulitis    Symptomatic anemia    Thrombocytopenia    Gastroesophageal reflux disease without esophagitis    Physical deconditioning    Stress and adjustment reaction       Chief complaint/reason for encounter: depression and anxiety   Met with patient to evaluate psychosocial adaptation to diagnosis/treatment/survivorship of endometrial cancer, anemia    Current Medications  Current Outpatient Medications   Medication    acetaminophen (TYLENOL) 500 MG tablet    ciprofloxacin-dexamethasone 0.3-0.1% (CIPRODEX) 0.3-0.1 % DrpS    clobetasol 0.05% (TEMOVATE) 0.05 % Oint    fexofenadine (ALLEGRA) 180 MG tablet    hydrocortisone 2.5 % cream    hydrOXYzine HCL (ATARAX) 25 MG tablet    meth/meblue/sod phos/psal/hyos (URIBEL ORAL)    metoprolol tartrate (LOPRESSOR) 25 MG tablet    nystatin (MYCOSTATIN) cream    nystatin (MYCOSTATIN) ointment    nystatin (MYCOSTATIN) powder    ondansetron (ZOFRAN) 4 MG tablet    pantoprazole (PROTONIX) 40 MG tablet    triamcinolone acetonide 0.025% (KENALOG) 0.025 % Oint    UNABLE TO FIND    UNABLE TO FIND     No current facility-administered medications for this visit.       Objective:  Jeanne Rodgers arrived promptly for the session. Ms. Rodgers was independently ambulatory at the time of session. The patient was fully cooperative throughout the session.  Appearance: age appropriate, casually  dressed, well groomed  Behavior/Cooperation: friendly and cooperative  Speech: normal in rate, volume, and tone and appropriate quality, quantity and organization of sentences  Mood: dysthymic  Affect: dysphoric  Thought Process: goal-directed, logical  Thought Content: normal,  No delusions or paranoia; did not appear to be responding to internal stimuli during the session  Orientation: grossly intact  Memory: Grossly intact  Attention Span/Concentration:  "Attends to session without distraction; reports no difficulty  Fund of Knowledge: average  Estimate of Intelligence: average from verbal skills and history  Cognition: grossly intact  Insight: patient has awareness of illness; good insight into own behavior and behavior of others  Judgment: the patient's behavior is adequate to circumstances    Interval history and content of current session: Patient discussed events and activities since the time of last visit. Discussed current adaptation to disease and treatment status. Reports to be coping with moderate difficulty. Evaluated cognitive response, paying particular attention to negative intrusive thoughts of a persistent and detrimental nature. Thoughts of this type are in evidence with moderate distress. Provided cognitive behavioral therapy to address negative cognitions. She is working hard to replace her sister's beliefs with her own (e.g., blames her for "too much ego" causing her health problems). Patient also increasingly frustrated about "lack of a treatment plan." She is having another bone marrow biopsy next week and hopes to get diagnostic answers. (Hopes for even routine monitoring and transfusions rather than waiting for a drop to react.) Potential second opinion at [facility] (Dr. Noyola)    Identified and evaluated psychosocial and environmental stressors (legal difficulties with next door neighbor- current civil suit over harassment, water diversion, fencing).  Examined proactive behaviors that may be implemented to minimize or ameliorate psychosocial stressors. Patient has solid neighborhood support (and legal representation, and cameras for documentation)     Risk parameters:   Patient reports no suicidal ideation  Patient reports no homicidal ideation  Patient reports no self-injurious behavior  Patient reports no violent behavior   Safety needs:  None at this time      Verbal deficits: None     Patient's response to intervention:The patient's " response to intervention is accepting.     Progress toward goals: Progress as Expected        Patient reported outcomes:      Distress thermometer:   DISTRESS SCREENING 9/12/2022 9/8/2022 8/29/2022 8/1/2022 6/20/2022 5/27/2022 3/22/2022   Distress Score 0 - No Distress 0 - No Distress 4 0 - No Distress 6 9 4   Practical Problems - - None of these - - - -   Family Problems - - None of these - - - -   Emotional Problems - - Depression;Fears;Nervousness;Sadness;Worry - - - -   Spritual / Moravian Concerns - - No - Yes - -   Physical Problems - - Breathing;Changes in urination;Diarrhea;Eating;Fatigue;Feeling Swollen;Memory/Concentration;Nausea;Pain;Skin Dry/Itchy;Sleep - - - -   Other Problems - - - - - - -           PHQ-9=9 Initial visit: 17  PHQ ANSWERS             SHEKHAR-7=13 Initial visit:16   GAD7 7/30/2019 12/26/2017   1. Feeling nervous, anxious, or on edge? 0 0   2. Not being able to stop or control worrying? 0 0          Client Strengths: verbal, intelligent, successful, good social support, good insight, commitment to wellness      Treatment Plan:individual psychotherapy  Target symptoms: depression, adjustment  Why chosen therapy is appropriate versus another modality: relevant to diagnosis, patient responds to this modality, evidence based practice  Outcome monitoring methods: self-report, checklist/rating scale  Therapeutic intervention type: behavior modifying psychotherapy, supportive psychotherapy  Prognosis: Good    Goals from last visit: Met   Behavioral goals prior to next visit:    Exercise:  Continue yoga   Stress management: meditation class, coloring   Social engagement:   Nutrition:    Smoking Cessation:   Therapy: Assertive response to sister    Work on finishing house (by December)    Return to clinic: 2 weeks     Length of Service (minutes direct face-to-face contact): 45    Diagnosis:     ICD-10-CM ICD-9-CM   1. Major depressive disorder, recurrent episode, moderate with anxious distress  F33.1  296.32       Bill Ayala, PhD  [place] License #[id number]  [place] License #[id number]

## 2022-09-19 NOTE — TELEPHONE ENCOUNTER
Patient is scheduled for bone marrow bx tomorrow, was under the impression that she needed an MRI prior to BMBX,  Informed her I would follow up with Dr. Ford and reach back out.

## 2022-09-19 NOTE — TELEPHONE ENCOUNTER
"----- Message from Laila Goel sent at 9/19/2022 11:22 AM CDT -----  Consult/Advisory:           Name Of Caller: self    Contact Preference?: [phone]    What is the nature of the call?: inquiring if Dr. Ford still wants an MRI done           Additional Notes:  "Thank you for all that you do for our patients'"     "

## 2022-09-19 NOTE — TELEPHONE ENCOUNTER
Returned call to patient, informed her that I spoke with Dr. Ford and she would like her to keep her BMBX scheduled as is , she would also like her to have the MRI nut can be done after marrow, informed I would work with schedule coordinator to move up MRI to an earlier date and schedule her to see Dr Ford 10 days post BMBX,  Patient verbalized understanding of above.

## 2022-09-20 NOTE — PROCEDURES
Patient with Cytopenia, endometrial Ca, breast ca presented at BMT clinic for bone marrow biopsy. Tolerated procedure well. Not in any distress.See 's note for full history. Reviewed medications, allergies and labs.     Azul Parker NP  Hematology/Oncology/BMT

## 2022-09-20 NOTE — PROCEDURES
Bone marrow    Date/Time: 9/20/2022 8:00 AM  Performed by: Azul Parker NP  Authorized by: Nano Ford MD     Aspiration?: Yes   Biopsy?: Yes    PROCEDURE NOTE:  Date of Procedure: 09/20/2022   Bone Marrow Biopsy and Aspiration  Indication: Cytopenia, endometrial Ca  Consent: Informed consent was obtained from patient.  Timeout: Done and documented.  Position: Prone  Site: Left posterior illiac crest.  Prep: Betadine.  Needle used: 11 gauge Jamshidi needle.  Anesthetic: 2% lidocaine 8 cc.  Biopsy: The biopsy needle was introduced into the marrow cavity and an aspirate was obtained without complications and sent for flow cytometry,DNA Hold and cytogenetics. Core biopsy obtained without difficulty and sent for routine histologic examination.  Complications: None.  Disposition: Left patient with primary nurse,instructed to lay on back for 20 minutes. Advised not to take shower and keep dressing clean, dry & intact for 24 hours.  Blood loss: Minimal.     Azul Parker NP  Hematology/Oncology/BMT

## 2022-09-28 NOTE — PROGRESS NOTES
PSYCHO-ONCOLOGY NOTE/ Individual Psychotherapy     Date: 9/28/2022   Site:  [facility]        Therapeutic Intervention: Met with patient.  Outpatient - Behavior modifying psychotherapy 45 min - CPT code 21302  The patient's last visit with me was on 9/14/2022.      Problem list  Patient Active Problem List   Diagnosis    Hx of breast cancer    Spondylosis without myelopathy    Hamstring tightness of both lower extremities    Segmental and somatic dysfunction of lumbar region    Alteration in mobility associated with pain    Pelvic pain in female    Myalgia of pelvic floor    Nocturia more than twice per night    Lichen sclerosus et atrophicus    Irregular bowel habits    Hip pain, right    Chronic lower back pain    Urinary incontinence, urge    Pre-diabetes    Vaginal irritation    Postmenopausal bleeding    Endometrial cancer    History of robot-assisted laparoscopic hysterectomy    Encounter for antineoplastic chemotherapy    Thrombocytopenia due to drugs    Neutropenia, drug-induced    Anemia due to chronic blood loss    Radiation therapy complication    Major depressive disorder, recurrent episode, moderate with anxious distress    Bone pain    B12 deficiency    Radiation vaginitis    Allergic contact dermatitis due to photocontact other than sunburn, current    History of endometrial cancer    Chest pain    HERRING (dyspnea on exertion)    Liver cyst    Microscopic hematuria    Disorder of the skin, subcu related to radiation, unsp    Radiation necrosis of skin and subcutaneous    Disorder of the skin and subcutaneous tissue related to radiation, unspecified    GIB (gastrointestinal bleeding)    Macrocytic anemia    Atrial fibrillation with RVR    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    Peripheral neuropathy due to chemotherapy    Pain of right lower extremity    Recurrent UTI    Leg edema    Lumbar radiculopathy    Lumbar spondylosis    DDD (degenerative disc disease), lumbar     Right knee pain    Radiculopathy of leg    Abnormal MRI    Leukocytosis    Fever    Tachycardia    Paroxysmal atrial fibrillation    Cellulitis    Symptomatic anemia    Thrombocytopenia    Gastroesophageal reflux disease without esophagitis    Physical deconditioning    Stress and adjustment reaction       Chief complaint/reason for encounter: depression and anxiety   Met with patient to evaluate psychosocial adaptation to diagnosis/treatment/survivorship of endometrial cancer, anemia    Current Medications  Current Outpatient Medications   Medication    acetaminophen (TYLENOL) 500 MG tablet    ciprofloxacin-dexamethasone 0.3-0.1% (CIPRODEX) 0.3-0.1 % DrpS    clobetasol 0.05% (TEMOVATE) 0.05 % Oint    fexofenadine (ALLEGRA) 180 MG tablet    hydrocortisone 2.5 % cream    hydrOXYzine HCL (ATARAX) 25 MG tablet    meth/meblue/sod phos/psal/hyos (URIBEL ORAL)    metoprolol tartrate (LOPRESSOR) 25 MG tablet    nystatin (MYCOSTATIN) cream    nystatin (MYCOSTATIN) ointment    nystatin (MYCOSTATIN) powder    ondansetron (ZOFRAN) 4 MG tablet    pantoprazole (PROTONIX) 40 MG tablet    triamcinolone acetonide 0.025% (KENALOG) 0.025 % Oint    UNABLE TO FIND    UNABLE TO FIND     No current facility-administered medications for this visit.       Objective:  Jeanne Rodgers arrived promptly for the session. Ms. Rodgers was independently ambulatory at the time of session. The patient was fully cooperative throughout the session.  Appearance: age appropriate, casually  dressed, well groomed  Behavior/Cooperation: friendly and cooperative  Speech: normal in rate, volume, and tone and appropriate quality, quantity and organization of sentences  Mood: dysthymic  Affect: dysphoric  Thought Process: goal-directed, logical  Thought Content: normal,  No delusions or paranoia; did not appear to be responding to internal stimuli during the session  Orientation: grossly intact  Memory: Grossly intact  Attention Span/Concentration:  "Attends to session without distraction; reports no difficulty  Fund of Knowledge: average  Estimate of Intelligence: average from verbal skills and history  Cognition: grossly intact  Insight: patient has awareness of illness; good insight into own behavior and behavior of others  Judgment: the patient's behavior is adequate to circumstances    Interval history and content of current session: Patient discussed events and activities since the time of last visit. Discussed current adaptation to disease and treatment status. Reports to be coping with moderate difficulty. Evaluated cognitive response, paying particular attention to negative intrusive thoughts of a persistent and detrimental nature. Thoughts of this type are in evidence with moderate distress. Provided cognitive behavioral therapy to address negative cognitions. Identified and evaluated psychosocial and environmental stressors She discussed strain due to court case and finding 5 stray kittens this week. She had a difficult biopsy ("disaster" "so much pain"). Patient hopeful this biopsy will lead to a treatment plan.  Examined proactive behaviors that may be implemented to minimize or ameliorate psychosocial stressors. Patient has solid neighborhood support (and legal representation, and cameras for documentation)    Patient reports increasing difficulty with appetite and sleep (since THC products have not been available). Plans to discuss these issues with Dr. Ford next week.     Risk parameters:   Patient reports no suicidal ideation  Patient reports no homicidal ideation  Patient reports no self-injurious behavior  Patient reports no violent behavior   Safety needs:  None at this time      Verbal deficits: None     Patient's response to intervention:The patient's response to intervention is accepting.     Progress toward goals: Progress as Expected        Patient reported outcomes:      Distress thermometer:   DISTRESS SCREENING 9/28/2022 9/12/2022 " 9/8/2022 8/29/2022 8/1/2022 6/20/2022 5/27/2022   Distress Score 5 0 - No Distress 0 - No Distress 4 0 - No Distress 6 9   Practical Problems - - - None of these - - -   Family Problems - - - None of these - - -   Emotional Problems - - - Depression;Fears;Nervousness;Sadness;Worry - - -   Spiritual / Jain Concerns - - - No - Yes -   Physical Problems - - - Breathing;Changes in urination;Diarrhea;Eating;Fatigue;Feeling Swollen;Memory/Concentration;Nausea;Pain;Skin Dry/Itchy;Sleep - - -   Other Problems - - - - - - -           PHQ-9=9 Initial visit: 17  PHQ ANSWERS             SHEKHAR-7=13 Initial visit:16   GAD7 7/30/2019 12/26/2017   1. Feeling nervous, anxious, or on edge? 0 0   2. Not being able to stop or control worrying? 0 0          Client Strengths: verbal, intelligent, successful, good social support, good insight, commitment to wellness      Treatment Plan:individual psychotherapy  Target symptoms: depression, adjustment  Why chosen therapy is appropriate versus another modality: relevant to diagnosis, patient responds to this modality, evidence based practice  Outcome monitoring methods: self-report, checklist/rating scale  Therapeutic intervention type: behavior modifying psychotherapy, supportive psychotherapy  Prognosis: Good    Goals from last visit: Met   Behavioral goals prior to next visit:    Exercise:  Continue yoga   Stress management: meditation class, coloring   Social engagement:   Nutrition: Discuss appetite (and sleep difficulties) with Dr. Ford   Smoking Cessation:   Therapy: Assertive response to sister    Work on finishing house (by December)    Return to clinic: 2 weeks     Length of Service (minutes direct face-to-face contact): 45    Diagnosis:     ICD-10-CM ICD-9-CM   1. Stress and adjustment reaction  F43.29 309.89   2. Major depressive disorder, recurrent episode, moderate with anxious distress  F33.1 296.32       Bill Ayala, PhD  [place] License #[id number]  [place] License #[id number]  [id number]

## 2022-09-29 NOTE — Clinical Note
Good afternoon,   I met with Ms. Angel yesterday. She was very anxious. She is worried about the biopsy results and not able to interpret the results on her my chart. She is also frustrated about a lack of treatment plan. She is having issues with anorexia, sleep, and mood, so I started her on mirtazapine.   Thanks, Kira

## 2022-09-29 NOTE — PROGRESS NOTES
Consult Note  Palliative Care      Consult Requested By: Parris Jansen  Reason for Consult: symptom management and ACP      ASSESSMENT/PLAN:     Plan/Recommendations:  Diagnoses and all orders for this visit:    Myelodysplastic syndrome/History of breast cancer/History of endometrial cancer  - patient followed by Dr. Ford and CHHAYA Jansen as well as Dr. Regalado  - please see oncology notes for full details of breast cancer history and treatment as well as endometrial cancer and treatment   - patient reporting that she has been having difficult with losing blood for past two years and there is no specific diagnosis or treatment plan at this time  - patient recently underwent bone marrow biopsy    Encounter for Palliative Care/Advanced Care Planning  - patient decisional  - patient by herself in clinic today  - has ACP documents uploaded into EMR  - HCPOA: Geni Eubanks at [phone]  - living will also completed  - philosophy of Palliative Medicine reviewed with patient today  - new patient folder given to and reviewed with patient today  - goals: life prolonging  - patient states that she has decided that she will not undergo further chemotherapy or radiation therapy should she need it in the future. She would be open to discussion oral options or other options for care should the need arise at that time.  - will continue to monitor    Anorexia/Nausea/Abdominal discomfort  - patient initially rated her pain as 5/10 on pain scale and stated it was generally in the abdomen. Upon further discussion, this sensation is not pain but rather feeling of queasiness  - patient states that she will drink a mini can of Coke-Cola or use zofran, which completely relieves the sensation  - patient does not need to use zofran on daily basis  - patient discussed that she does not have any foods that she is tempted to eat. She will cook various dishes or pastas but then after one serving, she loses interest and no longer wants  to eat anything  - discussed eating smaller portions and using other ways to improve nutrition such as protein shakes, smoothies, etc  - will start mirtazapine for appetite, mood, and sleep  - low threshold for nutrition consult    Adjustment disorder with mixed anxiety and depressed mood  - patient reporting moderate depression and anxiety  - patient stating that the unknowns about the results of the bone marrow biopsy as well as the ongoing work up for the loss of her blood. She has increased anxiety when she checks the chart and has not been able to speak with providers  - patient also expressing frustration about lack of a treatment plan for her  - patient spoke of previous experiences with her health as well  - emotional support provided along with Pall Med SW; please see  note for full details  - patient follows with oncology-psychology already  - start remeron 15 mg for mood, appetite, and sleep today    Neoplastic (malignant) related fatigue/Insomnia  - patient reporting moderate fatigue and insomnia  - patient states she is unable to sleep at times due to concern about the results of the bone marrow biopsy  - patient states that she uses her medicinal marijuana at night, which improves her sleep and overall quality of life  - she was out of her medicinal marijuana and noted worsening of her sleep, but she has recently had it filled, so this is improving again  - discussed finding a balance between rest and activity  - discussed using energy for things she enjoys, such as rescue cats or renovating a house  - discussed use of body scan at night as well  - start mirtazapine 15 mg qhs for sleep, mood, and appetite  - tip sheet for better sleep in new patient folder for patient to review    Constipation/Diarrhea  - patient states she has fluctuation between constipation and diarrhea  - she states that she has a bowel movment about three days a week, but preivously she was going every day  - patient has not  used miralax but discussed using on more frequent basis for improved bowel movements  - patient describes her diarrhea as orange mucous, which improves/resolves with use of OTC imodium  - constipation tip sheet in new patient folder for patient to review  - continue adequate hydration for good bowel movements    Understanding of illness/Prognosis: patient has fair understanding of illness; work up still in process. Prognosis to be determined     Goals of care/Follow up: life prolonging and ~ 6 weeks    Patient's encounter and above plan of care discussed with patient's oncologist, oncology NP, and onc-psych    SUBJECTIVE:     History of Present Illness:  Patient is a 67 y.o. year old female with a. Fib, chronic back pain, depression, history of endometrial cancer, fibromyalgia, history of breast cancer, PTSD, and myelodysplastic syndrome presents to Palliative Medicine for symptom management and ACP. Please see oncology notes for full details of patient's breast cancer history/treatment, endometrial cancer history/treatment, and ongoing work up and treatment of myelodysplastic syndrome.     09/29/2022:  KYLAH CLEMONS reviewed and summarized:  09/21/2022 .thc.900.ti.cherry Disp: 30 for 30 days  08/16/2022 Medible_chew_blue_raspberry_400mgthc:0mgcbd Disp: 40 for 14 days    Patient presents today by herself. She has moderate abdominal discomfort, which is relieved with use of small coke or zofran. She is not in pain. She has moderate anxiety and depression around continued work up with unknown answers and future. She is frustrated by a lack of treatment plan. Patient having moderate to severe anorexia. She states that she does not have any foods that tempt her to eat. She is not sleeping well due to anxiety/depression and recently being out of her medicinal marijuana. Patient having fluctuation of constipation and diarrhea. Patient has already completed ACP documents.     Past Medical History:   Diagnosis Date    Allergy      skin    Anxiety     Atrial fibrillation     Breast cancer 2014    right    Chronic back pain     Depression     Encounter for blood transfusion     Endometrial cancer 07/30/2019    Fibromyalgia     Hives     Hx of psychiatric care     Hx of radiation therapy     Itching     Lichen sclerosus     Mental disorder     PONV (postoperative nausea and vomiting)     Psychiatric problem     PTSD (post-traumatic stress disorder)     Sleep difficulties     Sore throat     Therapy     Uterine cancer 08/05/2019    UTI (urinary tract infection)      Past Surgical History:   Procedure Laterality Date    anal fissure surgery      APPENDECTOMY      BIOPSY N/A 11/19/2020    Procedure: BONE BIOPSY;  Surgeon: [facility] Diagnostic Provider;  Location: [facility] OR;  Service: Radiology;  Laterality: N/A;  RN PREOP 11/17/2020    BREAST BIOPSY Right 2014    BREAST LUMPECTOMY Right 2014    R lumpectomy [facility] w 2.4cm IDC & DCIS, neg margins, 0/6 SN, Stage II, ER/VA+, HER-2 neg Adjuvant XRT and hormonal therapy     CARPAL TUNNEL RELEASE      CHOLECYSTECTOMY      COLONOSCOPY      COLONOSCOPY N/A 6/11/2020    Procedure: COLONOSCOPY;  Surgeon: Bobby Marino MD;  Location: [facility] ([address] [place] [address]);  Service: Endoscopy;  Laterality: N/A;    COLONOSCOPY N/A 6/9/2022    Procedure: COLONOSCOPY;  Surgeon: Abdoulaye Alcocer MD;  Location: [facility] ([address] [place] [address]);  Service: Endoscopy;  Laterality: N/A;    ESOPHAGOGASTRODUODENOSCOPY N/A 6/11/2020    Procedure: EGD (ESOPHAGOGASTRODUODENOSCOPY);  Surgeon: Bobby Marino MD;  Location: [address]);  Service: Endoscopy;  Laterality: N/A;    HYSTERECTOMY  08/15/2019    HYSTEROSCOPY WITH DILATION AND CURETTAGE OF UTERUS N/A 7/24/2019    Procedure: HYSTEROSCOPY, WITH DILATION AND CURETTAGE OF UTERUS;  Surgeon: лЕена Kam MD;  Location: [facility] OR;  Service: OB/GYN;  Laterality: N/A;    INSERTION OF TUNNELED CENTRAL VENOUS CATHETER (CVC) WITH SUBCUTANEOUS PORT N/A 9/26/2019    Procedure: INSERTION,  PORT-A-CATH;  Surgeon: Moris Varghese MD;  Location: [facility] CATH LAB;  Service: Radiology;  Laterality: N/A;    MEDIPORT REMOVAL N/A 3/5/2020    Procedure: REMOVAL, CATHETER, CENTRAL VENOUS, TUNNELED, WITH PORT;  Surgeon: Moris Varghese MD;  Location: [facility] CATH LAB;  Service: Radiology;  Laterality: N/A;    NEEDLE LOCALIZATION N/A 11/19/2020    Procedure: NEEDLE LOCALIZATION;  Surgeon: [facility] Diagnostic Provider;  Location: [facility] OR;  Service: Radiology;  Laterality: N/A;    PARTIAL MASCECTOMY Right 2014    ROBOT-ASSISTED LAPAROSCOPIC ABDOMINAL HYSTERECTOMY USING DA RAMU XI N/A 8/15/2019    Procedure: XI ROBOTIC HYSTERECTOMY;  Surgeon: Darius Regalado MD;  Location: [facility] OR;  Service: OB/GYN;  Laterality: N/A;    ROBOT-ASSISTED LAPAROSCOPIC SALPINGO-OOPHORECTOMY USING DA RAMU XI Bilateral 8/15/2019    Procedure: XI ROBOTIC SALPINGO-OOPHORECTOMY;  Surgeon: Darius Regalado MD;  Location: [facility];  Service: OB/GYN;  Laterality: Bilateral;    ROBOT-ASSISTED LYMPHADENECTOMY Left 8/15/2019    Procedure: ROBOTIC LYMPHADENECTOMY;  Surgeon: Darius Regalado MD;  Location: [facility] OR;  Service: OB/GYN;  Laterality: Left;    SALPINGOOPHORECTOMY Bilateral 08/15/2019    TONSILLECTOMY      TUBAL LIGATION       Family History   Problem Relation Age of Onset    Skin cancer Mother 89        squamous cell--nose & head    Rashes / Skin problems Mother 92        cutaneous horn of scalp    Hypertension Mother     Arthritis Mother     Other Mother 85        PANC mass suspected by provider to be ca, surv'd 8 yrs    Pancreatic cancer Father 85        surv'd 8 mos    Pancreatic cancer Maternal Aunt 62    Colon polyps Sister         pt thinks (a few)    Hypertension Brother     Cancer Paternal Uncle         leukemia, 70s    Heart attack Paternal Grandmother     Heart attack Paternal Grandfather     Hypertension Brother     Colon polyps Sister         Geni believes just a handful    Hashimoto's thyroiditis Sister     Diabetes Other         strong  family history per patient    Lung cancer Maternal Uncle         smoker    Cancer Paternal Aunt         origin? colon or ovarian?    Suicide Cousin     Alcohol abuse Cousin     Throat cancer Other         pt believes    Breast cancer Neg Hx     Ovarian cancer Neg Hx     Cirrhosis Neg Hx     Colon cancer Neg Hx      Review of patient's allergies indicates:  No Known Allergies    Medications:    Current Outpatient Medications:     acetaminophen (TYLENOL) 500 MG tablet, Take 1,000 mg by mouth daily as needed for Pain., Disp: , Rfl:     clobetasol 0.05% (TEMOVATE) 0.05 % Oint, APPLY TO AFFECTED AREA(S) TWICE A DAY FOR 1 MONTH(S) then EVERY DAY FOR 1 MONTH(S) then 3 TIMES A WEEK, Disp: 60 g, Rfl: 2    fexofenadine (ALLEGRA) 180 MG tablet, 2 tablets in the morning.  May take an extra 2 tablets during the day if needed for itching., Disp: 240 tablet, Rfl: 5    hydrocortisone 2.5 % cream, Apply to affected areas twice a day when eczema is moderate., Disp: 453.6 g, Rfl: 1    hydrOXYzine HCL (ATARAX) 25 MG tablet, 1 to 8 tablets at bedtime.  Start with 3 tablets.  Increase or decrease as needed until itching is controlled or a maximum of 8 tablets. (Patient taking differently: 1 to 8 tablets at bedtime.  Start with 3 tablets.  Increase or decrease as needed until itching is controlled or a maximum of 8 tablets.), Disp: 240 tablet, Rfl: 3    meth/meblue/sod phos/psal/hyos (URIBEL ORAL), Take by mouth as needed., Disp: , Rfl:     metoprolol tartrate (LOPRESSOR) 25 MG tablet, Take 1 tablet (25 mg total) by mouth 2 (two) times daily., Disp: 180 tablet, Rfl: 3    nystatin (MYCOSTATIN) cream, Apply topically every Mon, Wed, Fri., Disp: 30 g, Rfl: 11    nystatin (MYCOSTATIN) powder, Apply topically 4 (four) times daily., Disp: 30 g, Rfl: 1    ondansetron (ZOFRAN) 4 MG tablet, Take 1 tablet (4 mg total) by mouth every 6 (six) hours as needed for Nausea., Disp: 12 tablet, Rfl: 0    pantoprazole (PROTONIX) 40 MG tablet, Take 1 tablet  (40 mg total) by mouth once daily., Disp: 90 tablet, Rfl: 3    UNABLE TO FIND, Turkey Tail Mushrooms Immune Support, Disp: , Rfl:     ciprofloxacin-dexamethasone 0.3-0.1% (CIPRODEX) 0.3-0.1 % DrpS, place 4 DROPS into BOTH EARS TWICE DAILY FOR 5 DAYS, Disp: , Rfl:     mirtazapine (REMERON) 15 MG tablet, Take 1 tablet (15 mg total) by mouth every evening., Disp: 30 tablet, Rfl: 2    nystatin (MYCOSTATIN) ointment, APPLY SMALL AMOUNT TO LEFT EAR TWICE DAILY WITH TRIAMCINOLONE FOR 7 DAYS, Disp: , Rfl:     triamcinolone acetonide 0.025% (KENALOG) 0.025 % Oint, APPLY SMALL AMOUNT TO LEFT EAR TWICE DAILY WITH NYSTATIN FOR 7 DAYS, Disp: , Rfl:     UNABLE TO FIND, medication name: medical marijuana, Disp: , Rfl:     OBJECTIVE:       ROS:  Review of Systems   Constitutional:  Positive for activity change, appetite change and fatigue.   HENT: Negative.     Eyes: Negative.    Respiratory: Negative.     Cardiovascular: Negative.    Gastrointestinal:  Positive for constipation, diarrhea and nausea. Negative for abdominal pain.   Genitourinary: Negative.    Musculoskeletal: Negative.    Skin: Negative.    Neurological: Negative.    Psychiatric/Behavioral:  Positive for dysphoric mood and sleep disturbance. The patient is nervous/anxious.    All other systems reviewed and are negative.    Review of Symptoms      Symptom Assessment (ESAS 0-10 Scale)  Pain:  0  Dyspnea:  0  Anxiety:  5  Nausea:  5  Depression:  4  Anorexia:  8  Fatigue:  5  Insomnia:  5  Restlessness:  0  Agitation:  0     CAM / Delirium:  Negative  Constipation:  Positive  Diarrhea:  Positive    Anxiety:  Is nervous/anxious  Constipation:  Constipation    Bowel Management Plan (BMP):  Yes      Pain Assessment:  OME in 24 hours:  0  Location(s): none      Modified Sebastien Scale:  0    ECOG Performance Status ndGndrndanddndend:nd nd2nd Living Arrangements:  Lives alone    Psychosocial/Cultural: Patient lives alone but she has her sister next door. Enjoys taking care of stray animals  and renovating houses    Spiritual:  F - Shania and Belief:  Kirk  I - Importance:  Moderate  C - Community:  Prays nightly  A - Address in Care:  Needs met at this time    Advance Care Planning   Advance Directives:   Living Will: Yes        Copy on chart: Yes    LaPOST: No    Do Not Resuscitate Status: No    Medical Power of : Yes    Agent's Name:  Geni Eubanks   Agent's Contact Number:  [phone]    Decision Making:  Patient answered questions        Physical Exam:  Vitals: Pulse: 76 (09/29/22 1254)  BP: (!) 115/55 (09/29/22 1254)  Physical Exam  Vitals reviewed.   Constitutional:       General: She is not in acute distress.     Appearance: She is not ill-appearing or toxic-appearing.   HENT:      Head: Normocephalic and atraumatic.      Right Ear: External ear normal.      Left Ear: External ear normal.   Eyes:      General: No scleral icterus.        Right eye: No discharge.         Left eye: No discharge.   Neck:      Comments: Trachea midline  Pulmonary:      Effort: Pulmonary effort is normal. No respiratory distress.   Abdominal:      General: Abdomen is flat. There is no distension.   Musculoskeletal:         General: No signs of injury.      Cervical back: Normal range of motion.      Right lower leg: No edema.      Left lower leg: No edema.   Skin:     General: Skin is dry.      Coloration: Skin is not jaundiced.      Findings: No rash.   Neurological:      Mental Status: She is alert and oriented to person, place, and time.      Gait: Gait normal.   Psychiatric:         Mood and Affect: Mood is anxious. Affect is not tearful.         Speech: Speech normal.         Thought Content: Thought content normal.       Labs:  CBC:   WBC   Date Value Ref Range Status   09/28/2022 10.76 3.90 - 12.70 K/uL Final     Hemoglobin   Date Value Ref Range Status   09/28/2022 8.0 (L) 12.0 - 16.0 g/dL Final     POC Hematocrit   Date Value Ref Range Status   07/01/2022 19 (LL) 36 - 54 %PCV Final  "    Hematocrit   Date Value Ref Range Status   09/28/2022 25.9 (L) 37.0 - 48.5 % Final     MCV   Date Value Ref Range Status   09/28/2022 90 82 - 98 fL Final     Platelets   Date Value Ref Range Status   09/28/2022 58 (L) 150 - 450 K/uL Final       LFT:   Lab Results   Component Value Date    AST 13 09/12/2022    ALKPHOS 70 09/12/2022    BILITOT 0.7 09/12/2022       Albumin:   Albumin   Date Value Ref Range Status   09/12/2022 3.5 3.5 - 5.2 g/dL Final     Protein:   Total Protein   Date Value Ref Range Status   09/12/2022 7.9 6.0 - 8.4 g/dL Final       Radiology:I have reviewed all pertinent imaging results/findings within the past 24 hours.    09/09/2022 MRI Brain: "Subcentimeter focus of T2 hyperintensity in the left cerebellum with superimposed punctate susceptibility and T1 shortening with questioned enhancement.  Overall configuration suggestive for subacute to chronic cerebellar infarct with petechial hemorrhage.  Clinical correlation and follow-up recommended to assure no underlying lesion. Scattered T2 FLAIR signal hyperintensity elsewhere supratentorial white matter which are nonspecific and may represent sequela of chronic migraine headaches in the appropriate clinical setting. There is no evidence for acute infarction Partial fluid opacification left mastoid air cells with air-fluid level in the left sphenoid sinus cannot exclude acute sinusitis."    63 minutes of total time spent on the encounter, which includes face to face time and non-face to face time preparing to see the patient (eg, review of tests), Obtaining and/or reviewing separately obtained history, Documenting clinical information in the electronic or other health record, Independently interpreting results (not separately reported) and communicating results to the patient/family/caregiver, or Care coordination (not separately reported).    Signature: Kira Adames MD      "

## 2022-09-30 NOTE — PROGRESS NOTES
[facility] Palliative [facility]  Palliative Care   Psychosocial Assessment    Patient Name: Jeanne Rodgers  MRN: [id number]  Attending Provider: No att. providers found  Palliative Care Provider: Kira Theodore MD   Primary Care Physician: Gray Gonzalez MD  Principal Problem: Myelodysplastic syndrome    Reason for Referral:  Advanced Care Planning and Psychosocial Support       Present during Interview: patient.      Primary Language:English   Needed: no      Past Medical Situation:   PMH:   Past Medical History:   Diagnosis Date    Allergy     skin    Anxiety     Atrial fibrillation     Breast cancer 2014    right    Chronic back pain     Depression     Encounter for blood transfusion     Endometrial cancer 07/30/2019    Fibromyalgia     Hives     Hx of psychiatric care     Hx of radiation therapy     Itching     Lichen sclerosus     Mental disorder     PONV (postoperative nausea and vomiting)     Psychiatric problem     PTSD (post-traumatic stress disorder)     Sleep difficulties     Sore throat     Therapy     Uterine cancer 08/05/2019    UTI (urinary tract infection)      Mental Health/Substance Use History:Patient with documented history of PTSD, depression, and psychiatric care  Risk of Abuse, neglect or exploitation: None identified   Current or Previous Trauma and/or evidence of PTSD: Documented history of PTSD  Non-traditional Health practices: none identified     Understanding of diagnosis and prognosis:Fair   Experience/Comfort level with health care system: Good     Patients Mental Status: Alert and oriented to person, place, time and situation with anxious affect     Socio-Economic Factors/Resources:  Address: [address] [place] [address]  Phone Number: [phone] (home)     Marital Status: Single  Household composition: Alone  Children: none     Patient/Family perceptions about Caregiving Needs; availability and capacity:Patient independent with  "ADLs.      Family Dynamics/Relationships: Healthy     Patient/Family Strengths/Resilience: Patient openly expresses her concerns and advocates for her needs.   Patient/Family Coping: Patient would benefit from additional support in coping with her anxiety.     Activities of Daily Living:Independent   Support Systems-Family & Community (Home Health, HME etc): n/a     Transportation:  yes    Work/Education History: retired   Self-Care Activities/Hobbies: Renovating homes, rescuing cats      History: no    Financial Resources:Medicare      Advanced Care Planning & Legal Concerns:   Advanced Directives/Living Will: yes  LaPOST/POLST: no   Planning:  no    Power of : yes  Surrogate Decision Maker: Sister/Geni Rodgers       Spirituality, Culture & Coping Mechanisms:  F- Shania and Belief: Kirk     I - Importance: Moderate     C - Community/Culture Values: Patient prays nightly      A - Address in Care: Needs addressed at this time       Goals/Hopes/Expectations: Patient hopes for her tests to "come out clear".  Fears/Anxiety/Concerns: Patient fears the "unknown" regarding her diagnosis and treatment plan.          Complicated Bereavement Risk Assessment Tool (CBRAT)  Reference:  [facility] Clinical Practice Group (May 2016). Bereavement Risk Screening and Management Guidelines.  Retrieved from: http://www.grpcc.com.au/wp-content/uploads//SBIZR-Lgghawurnrm-Yradrjdjs-and-Management-Guideline-2016.pdf      Bereaved Client Characteristics   Under 18      no  Was a Twin   no  Young Spouse   no  Elderly Spouse    no  Isolated    no  Lacks Meaningful Social Support   no  Dissatisfied with help available during illness   no  New to Financial [place]be[place] no  New to Decision-Making   no    Illness  Inherited Disorder   no  Stigmatized Disease in the family/community   no  Lengthy/Burdensome   no     Bereaved Client's History of Loss   Cumulative " Multiple Losses   no  Previous Mental Health Illnesses   no  Current Mental Health Illness   no  Other Significant Health Issues   no   Migrant/Refugee   no Death  Sudden or Unexpected   no  Traumatic Circumstances Associated with Death   no  Significant Cultural/Social Burdens as a result of Death   yes   Relationship with   Profound Lifelong Partner   no  Highly Dependent    no  Antagonistic   no  Ambivalent   no  Deeply Connected   yes  Culturally Defined   yes   Risk Factors Scores  0-2  Low  3-5  Moderate  5+  High  All persons scoring moderate to high presume to be at risk**    (** It is acknowledged that protective factors and resilience may outweigh apparent risk factors.      Total Risk Factors Score:   Low to moderate     SW accompanied MD during initial visit with patient.  Patient presents Oriented x4 with anxious affect. Patient appears to have a fair understanding of her illness. Patient admits she regularly checks her numbers and becomes anxious regarding the results prior to consultation with her providers.  Patient reflected upon her complicated medical history in recent years and general distrust of medical providers.  Patient expresses frustration she has not been recommended a treatment plan for treatment of her disease. Validation of concerns and supportive counseling provided.  SW reviewed mindfulness exercises including meditation, body scans, and use of mantras to employ in times of distress. SW challenged patient to reframe catastrophizing cognitions and place her focus on what is known.   Patient enjoys spending time rescuing cats and renovating homes.  Patient admits her anxiety has effected her ability to complete renovation projects.  Patient would benefit from continued psychotherapy (currently under care of [facility]).     HCPOA and living will completed by patient in 2019.  Directive scanned into Epic. Patient assigned sister/Geni as decision maker.  Patient wishes to  receive life sustaining treatments for a until these treatments are deemed futile by medical team. Patient denies further psychosocial concerns. SW remains available to provide assistance as needed. SW will continue to follow.     Cici Guzmán, TOSHIA  Outpatient   Palliative Medicine

## 2022-09-30 NOTE — PROGRESS NOTES
Subjective:      Patient ID: Jeanne Rodgers is a 67 y.o. female.    Chief Complaint: Pelvic Pain and Follow-up      Treatment History  Stage IIIC2 UPSC  Preop PET revealing positive pelvic and PA nodes  RALH/BSO/Debulking of left pelvic nodes (6/8 positive) on 8/15/19  Port placed 9/26/19  T/C started 9/27/19  WPRT + PA nodes to 45 Gy completed 1/22/20  HDR to 15 Gy in 3 fractions completed 3/3/20  Restarted T/C on 3/20/2020, s/p 5 cycles concluding 5/8/2020. Complicated by pancytopenia and anemia    HPI  Here today for f/u.  Is in the middle of continued evaluation for anemia and BM insufficiency.  Having a pelvic MRI Monday to assess BM.  Also s/p BM biopsy.  She wants me to look a the site today.  Review of Systems   Constitutional:  Negative for activity change, appetite change, chills, fatigue and fever.   HENT:  Negative for hearing loss, mouth sores, nosebleeds, sore throat and tinnitus.    Eyes:  Negative for visual disturbance.   Respiratory:  Negative for cough, chest tightness, shortness of breath and wheezing.    Cardiovascular:  Negative for chest pain and leg swelling.   Gastrointestinal:  Negative for abdominal distention, abdominal pain, blood in stool, constipation, diarrhea, nausea and vomiting.   Genitourinary:  Negative for dysuria, flank pain, frequency, hematuria, pelvic pain, vaginal bleeding, vaginal discharge and vaginal pain.   Musculoskeletal:  Negative for arthralgias and back pain.   Skin:  Negative for rash.   Neurological:  Negative for dizziness, seizures, syncope, weakness and numbness.   Hematological:  Does not bruise/bleed easily.   Psychiatric/Behavioral:  Negative for confusion and sleep disturbance. The patient is not nervous/anxious.      Objective:   Physical Exam:   Constitutional: She appears well-developed and well-nourished. No distress.    HENT:   Head: Normocephalic and atraumatic.    Eyes: No scleral icterus.     Cardiovascular:  Normal rate and intact distal  pulses.      Exam reveals no cyanosis and no edema.        Pulmonary/Chest: Effort normal. No respiratory distress. She exhibits no tenderness.        Abdominal: Soft. She exhibits no distension, no fluid wave and no mass. There is no abdominal tenderness. There is no rebound and no guarding. No hernia.     Genitourinary:    Vagina and rectum normal.      Pelvic exam was performed with patient supine.   Labial bartholins normal.There is no rash, tenderness or lesion on the right labia. There is no rash, tenderness or lesion on the left labia. Right adnexum displays no mass, no tenderness and no fullness. Left adnexum displays no mass, no tenderness and no fullness. Vaginal cuff normal.  No  no vaginal discharge, bleeding or unspecified prolapse of vaginal walls in the vagina. Cervix is absent.Uterus is absent.    Genitourinary Comments: Left glut with hematoma                Lymphadenopathy:     She has no cervical adenopathy. No inguinal adenopathy noted on the right or left side.     Skin: No cyanosis.      Assessment:     1. Endometrial cancer        Plan:       ADONIS on pelvic exam.  Hematoma at BM biopsy site but resolving.  Reassurance given.  I asked her to let me know what evaluation shows.  RTC 3 months

## 2022-10-05 NOTE — PROGRESS NOTES
[facility]  Occupational Therapy Treatment Note - Therapeutic Yoga Progam    Date: 10/5/2022  Name: Jeanne Rodgers  Clinic Number: [id number]    Therapy Diagnosis:   Encounter Diagnoses   Name Primary?    Physical deconditioning Yes    Stress and adjustment reaction      Physician: Елена Kam, *    Physician Orders: eval and treat  Medical Diagnosis: Personal history of malignant neoplasm of breast [Z85.3]  Evaluation Date: 8/10/22  Insurance Authorization Period Expiration: 12/31/22  Plan of Care Expiration: 01/10/22  Progress Note Due: 11/10/22   Visit # / Visits authorized: 1 / 20    Precautions:  Standard and cancer    Time In: 1pm  Time Out: 2pm  Total Billable Time: 60 minutes    SUBJECTIVE     Pt reports: Her hands are still weak and her feet are still painful and numb.   She was compliant with home exercise program given last session.   Response to previous treatment:good  Functional change: none    Pain: 3/10  Location: bilateral feet      OBJECTIVE     Objective Measures updated at progress report unless specified.    Patient Specific Functional Scale:         Activity 8/10/22           1.opening jars 5           2.carrying groceries 5           3.making a bed 8           4.             5.             6.             SCORE   6.0              Total Score = Sum of activity scores / number of activities  Minimum Detectable Change (90% CII) for average score = 2 points  Minimum Detectable Change (90% CI) for single activity score = 3 points      Treatment     Jeanne received the treatments listed below:       Date  10/5/22        Therapeutic Yoga Exercises - 63215 Therapeutic Ex 45  minutes  minutes  minutes  minutes  minutes  minutes   Seated Yoga   chair yoga:  Cat-Cow,forward bend, back bend, twist,          Quadruped Cat cow, puppy flow,         Supine Block berween knees in chair shape  2sets of 5 breaths, knees to chest, bound angle and happy baby        Prone  Sphinx and sphinx plank        standing  Chair pose, foot wakers         Hand strengthening exercises Putty ex for intrinsics, , digit flexion and extention                 Self-Care/Home Management  -[id number]   minutes  minutes  minutes  minutes  minutes  minutes            Relaxation techniques Diaphragmatic breathing, body scan        Restorative  Supine with LE's on chair        Activity Pacing                           Stress Management/Education                       Patient Education and Home Exercises      Education provided:   - - physiology of yoga/meditation and immune health  - Progress towards goals     Written Home Exercises Provided: yes.  Exercises were reviewed and Jeanne was able to demonstrate them prior to the end of the session.  Jeanne demonstrated good  understanding of the HEP provided. See EMR under Patient Instructions for exercises provided during therapy sessions.       Assessment     Jeanne's session focused on HEP for hand strengthening, gentle yoga to strengthen and increase flexabilty of whole body.  She was also taught diaphragmatic breathing and body scan to assist with relaxaion/immune health.  She demonstrated good understanding of all treatment.t Pt would continue to benefit from skilled Occupational Therapy.     Jeanne is progressing well towards her goals and there are no updates to goals at this time. Pt prognosis is Good.     Pt will continue to benefit from skilled outpatient occupational therapy to address the deficits listed in the problem list on initial evaluation provide pt/family education and to maximize pt's level of independence in the home and community environment.     Pt's spiritual, cultural and educational needs considered and pt agreeable to plan of care and goals.    Anticipated barriers to occupational therapy: none    Goals:  Short Term Goals: 6 weeks      Goal # Goal Status   1 Patient will demonstrate independence with diaphragmatic breathing in sit and  supine. Progressing   2 Pt. will identify resource for audio body scan to assist with stress management/immune health    3 Patient will identify 2 new stress coping skills for stress management/immune health. Progressing   4 Patient will identify activity pacing problems.  Patient will then implement new plan for daily activity to increase endurance for ADL's. Progressing      Long Term Goals: 12 weeks      Goal # Goal Status   1 Patient will demonstrate independence with yoga Home Exercise Program to increase strength and endurance for ADL's. Progressing   2 Patient will demonstrate independence with relaxation techniques to manage stress for immune health. Progressing   3 Patient will verbalize good understanding of stress/immune health relationship.  Progressing   4             PLAN     Plan of care Certification: 10/5/2022 to 01/10/22.    Outpatient Occupational Therapy 1 times weekly for 12 weeks to include the following interventions: Patient Education, Self Care, and Therapeutic Exercise.     Arnoldo Beasley OT

## 2022-10-05 NOTE — PROGRESS NOTES
Subjective:       Patient ID: Jeanne Rodgers is a 67 y.o. female.    Chief Complaint: Endometrial Cancer, breast cancer,     HPI    Returns for follow up after repeat BMBx and for MRI results    - 10/3/2022 MRI pelvis:  FINDINGS:  Diffuse abnormal low T1 marrow signal throughout the visualized lower lumbar spine, pelvis, and femurs.  Findings have progressed compared to prior MRI hip and lumbar spine.  Patchy regions of preserved normal T1 marrow signal within the bilateral femoral heads, greater trochanters, and sacrum.  No focal destructive or enhancing osseous lesion identified.  Findings are nonspecific however can be seen in the setting of red marrow reconversion or other marrow replacement processes such as myelodysplastic syndrome.  No acute fracture.  Small focus of subcutaneous soft tissue distortion overlying the left posterior iliac in keeping with prior bone marrow biopsy tract.  No organized fluid collection or abscess.  Postoperative changes of hysterectomy.  Bladder is unremarkable.  Visualized loops of small and large bowel are normal caliber.  No evidence of bowel obstruction or inflammation.  Small volume pelvic free fluid.  No pathologic adenopathy.  Visualized vascular structures are unremarkable.  Impression:  Diffuse abnormal low T1 marrow signal, progressed compared to prior MRI exams.  Findings are nonspecific however can be seen in the setting of red marrow reconversion or other marrow replacement processes such as myelodysplastic syndrome or myeloproliferative neoplasm.  Recommend correlation with recent bone marrow biopsy.  No focal destructive or enhancing osseous lesion.  Small volume pelvic free fluid.  Additional findings as above.      - 9/20/2022 BMBx Pathology Results:  BONE MARROW ASPIRATE, TOUCH PREP, CLOT, AND DECALCIFIED NEEDLE CORE BIOPSY: LEFT POSTEROSUPERIOR ILIAC CREST -   NEGATIVE FOR METASTATIC CARCINOMA by AE1/AE3 immunostain   - Extremely hypercellular marrow (99%  total cellularity) with increased immunophenotypic blasts (10%) and moderate trilineage dyspoiesis with left-shifted erythroid hyperplasia, left-shifted relatively decreased granulocytes, and focally increased megakaryocytes with mildly increased numbers of micromegakaryocytes (see comments)   -  Increased stainable histiocytic iron stores (4-5+ out of 6+) - Increased reticulin fibrosis (MF-1 with focal MF-2)  Comments Dysplastic morphology in hematopoietic cells can be seen with nutritional deficiencies (folate, B12, copper, iron, vitamin D, malnutrition), infections (especially viral), toxin exposure (alcohol abuse, arsenic and zinc toxicities, hypervitaminosis A), medications/drugs (certain antibiotics, chemotherapy, growth factors), autoimmune diseases, liver cirrhosis, HIV/AIDS, inherited disorders, and myelodysplastic syndrome (MDS).   Clinical correlation and work-up to rule out non-malignant etiologies for cellular dysplasia along with chromosomal analysis (karyotype) are essential for final interpretation and NGS testing and MDS/AML FISH are also strongly recommended.     Due to this patient's reported history of chemotherapy and radiation, if all known non-neoplastic etiologies for hematopoietic cellular dyspoiesis are excluded and cytopenias remain persistent or show worsening progression, a therapy-related myelodysplastic syndrome with excess blasts (t-MDS-EB2) is a consideration.   Corresponding flow cytometry (FLW-) detects 9.8% immunophenotyipic blasts with monotypic differentiation, decreased granulocytic side scattered and increased CD10(-) granulocytes compatible with left-shift, and no clonal B-cells or aberrant T-cell antigen expression (CD4:CD8 of 1:1). INCREASED IMMUNOPHENOTYPIC BLASTS IDENTIFIED (9.8% of total cellularity) Forward scatter: Intermediate to high Side scatter: Intermediate Positive: CD45 (dim), cyMPO, CD34 (subset),  (subset), HLA-DR (subset), CD13 (variable),CD33  (85.2%), CD4 (neg to dim), CD11c (variable), CD64 (major subset), CD15 (minor subset), CD7 (minor subset), CD9 (variable), CD71 (neg to very dim) Negative: cyTdT, CD14, CD16, CD41a, CD61, GLY-A (AE802i), CD19, CD20, CD10, sIg, CD2, sCD3, cyCD3, CD5, CD8, CD56,   The following additional cell populations are identified: 57.3% Granulocytes 3.8% Monocytes 1.7% T cells, CD4:CD8 is 1:1. No loss of pan-T lymphocyte antigens detected. 0.8% Mature B cells: No light chain restriction or significant co-expression of CD5 or CD10 detected. Viability by 7-AAD: 99.1% Cytogenetic and any other additional ancillary test results will be reported in a separate supplemental report.    Biopsy: Adequate; 99% total cellularity, extremely hypercellular for age BONE MARROW ASPIRATE & TOUCH PREP: BLASTS: Increased blasts with a subset with monocytic nuclear features and without identifiable Paloma rods GRANULOPOIESIS: Mildly decreased number, left-shifted maturation, moderate dysplasia (>10%) with hyposegmentation including rare pelgeroid neutrophils and hypogranulation ERYTHROPOIESIS: Increased number, left-shifted maturation, moderate dysplasia (>10%) with megaloblastoid changes, irregular nuclear contours, and rare karyorrhexis and nuclear budding. MEGAKARYOPOIESIS: Present with hypolobated non-dwarf forms OTHER CELL LINEAGES: No significant increase in lymphocytes or plasma cells; relative monocytosis BONE MARROW ASPIRATE MANUAL DIFFERENTIAL (cell count = 200): MYELOID SERIES 9% Blasts, 1.5% Promyelocytes, 11.5% Myelocytes, 15% Metamyelocytes, 6% Band granulocytes, 12% Segmented granulocytes, 2.5% Eosinophils and precursors, 0.5% Basophils and precursors ERYTHROID SERIES --% Proerythroblasts, 7.5% Basophilic erythroblasts, 12% Polychromatophilic erythroblasts, 12.5% Orthochromatic erythroblasts OTHER SERIES 4% Monocytes, 1% Hematogones, 4% Lymphocytes, 1% Plasma cells M:E Ratio: 1.8:1 Iron Stain: Stainable histiocytic iron stores are  increased (4-5+ out of 6+). No ring sideroblasts are identified.    Supplemental:  PENDING: NGSHM was added to the DNA/RNA Extract/Hold order on 9/28/22 and results will be reported in a separate supplemental. Chromosomal Analysis, Bone Marrow Aspirate ([facility], [place], [address] [place] [address], [place] [address]; reported 9/28/22): ABNORMAL 45-50,XX,-5,add(5)(q11.2),del(7)(q22q34),-18,-21,add(21)(p11.2),+0-4r,+0-2mar[cp20]. Additional Testing Requested ORDERING PHYSICIAN(S) CLINICAL DIAGNOSIS / INFORMATION SPECIMEN S  The result is abnormal. Each metaphase had a monosomal karyotype, defined by the presence of one single autosomal monosomy (AM) in association with at least one additional AM or one structural chromosomal abnormality (excluding core-binding factor AML and APL). The abnormalities in this complex clone include monosomies 5, 18 and 21 and multiple structural abnormalities including a 7q deletion and a structural abnormality of chromosome 5 resulting in deletion of 5q. Monosomal karyotypes are most commonly observed in de fadi and therapy-related MDS and AML and are associated with an unfavorable prognosis (Kayser et al., Blood 119:551-558, 2012; NCCN Guidelines Version 2.2018, Myelodysplastic Syndromes; NCCN Guidelines Version 1.2018, Acute Myeloid Leukemia). This supplemental report represents a summary of the ancillary test results. Diagnosis is confirmed as MDS-EB2. Clinical correlation with chemotherapy and radiation history and pending NGS results are required to determine if this may represent a therapy-related neoplasm. A complete report will be available in this patient's electronic medical record.      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   Reports her first indication is dry cough.   Heart races at times with exertion.   No CP  + SOB on exertion.      Anemia work-up   She has a recent work up for anemia that included a bmbx- see below  - 6/21/2022 Pathology BmBx:  LEFT ILIAC BONE, CT-GUIDED BIOPSY WITH ON-SITE ADEQUACY:   -HYPERCELLULAR MARROW (%), TRILINEAGE HEMATOPOIETIC ACTIVITY.   -FOCAL GRADE 1 RETICULAR FIBROSIS.   -NO DEFINITIVE MORPHOLOGIC OR IMMUNOPHENOTYPIC EVIDENCE OF LYMPHOMA, PLASMA   CELL NEOPLASM, OR METASTATIC CARCINOMA.  SEE COMMENT      Presented to heme malignancy group for review and monitoring recommended as felt may be marrow impacted by radiation      Due to ear pain/infection - sent by [facility] for MRI  Reports she has lost the hearing in that ear - sees ENT regularly and has been treated with antibiotics (4 different types) followed by Ginseng Caty x 2 for a fungal infection     - 9/9/2022 MRI Brain:  FINDINGS:  There is no restricted diffusion to suggest acute infarction.  Brain parenchyma normal in contour..  Numerous scattered small sized foci of T2 FLAIR signal hyperintensity supratentorial white matter slightly greater on the left which are nonspecific in light of history sequela chronic migraine headaches to be included in differential.  There is no evidence for acute infarction.  There is a small focus of T2 hyperintensity with in the left cerebellum with superimposed T1 shortening, punctate susceptibility and questionable superimposed partial linear enhancement.  Overall configuration concerning for probable subacute aged cerebellar infarction with  petechial hemorrhage.  Please note focus of underlying metastatic disease felt much less likely however follow-up recommended to assure appropriate evolution without underlying lesion.  Ventricles normal in size without hydrocephalus.  There is no evidence for enhancing lesion elsewhere.  Partial fluid opacification left mastoid air cells.  Small air-fluid level and aerated secretions left sphenoid sinus.  This report was flagged in Epic as abnormal.  Impression:  Subcentimeter focus of T2 hyperintensity in the left cerebellum with superimposed punctate susceptibility and T1 shortening with questioned enhancement.  Overall configuration suggestive for subacute to chronic cerebellar infarct with petechial hemorrhage.  Clinical correlation and follow-up recommended to assure no underlying lesion.  Scattered T2 FLAIR signal hyperintensity elsewhere supratentorial white matter which are nonspecific and may represent sequela of chronic migraine headaches in the appropriate clinical setting.  There is no evidence for acute infarction  Partial fluid opacification left mastoid air cells with air-fluid level in the left sphenoid sinus cannot exclude acute sinusitis.     In the interval she has had 2 hospital admissions:  - admitted from 7/1- 7/3/2022 as below:  HPI:   Jeanne Rodgers is a 66yo WF with a PMH of recurrent GIB, paroxysmal Afib, hx of breast and endometrial cancer, and GERD who [place] [facility] [place] with complaints of fatigue and generalized weakness. Of note, pt recently [place] [facility] [place] and symptomatic anemia, negative endoscopic findings. Reports that after discharge, she gradually became symptomatic once more (weakness, SOB/HERRING, dizziness), and went to her PCP for follow-up, which showed decreased H/H. Denies F/C/N/V/D/C, HA, CP, palpitations, BRBPR, melena, hematochezia, hematemesis, abdominal pain, dysuria, extremity swelling, or symptoms otherwise.   In the ED, VSS. Labs showed  WBC 17.17, Hb 6.2, and plt 121. CT abd/pelvis showed  interval increased overall small volume mildly complex free pelvic fluid, though was otherwise unremarkable. ED ordered 2u pRBCs, and hospital medicine was consulted for admission and further management.  * No surgery found *    Hospital Course:   Pt received 2u pRBCs and was admitted to [facility]. Hb improved to 9.0 on 7/2, and pt with marked symptomatic improvement, though not fully back to baseline. WBCs noted to further increase, though she remained afebrile; zosyn continued while blood cultures with NGTD. Leukocytosis decreased into 7/3, Hb remained stable, and pt grossly asymptomatic and back to baseline. She has an appointment with heme/onc this week to further discuss her anemia, and will potentially be scheduled for regular routine outpatient blood transfusions. Abx changed to PO cipro/flagyl. Pt deemed appropriate for discharge. Plan discussed with pt, who was agreeable and amenable; medications were discussed and reviewed, outpatient follow-up scheduled, ER precautions were given, all questions were answered to the pt's satisfaction, and Ms. Rodgers was subsequently discharged.     - admitted 6/7- 6/11/2022 as below  HPI:   67-year-old female with past medical history of endometrial cancer, breast cancer, fibromyalgia, PTSD, GIB, blood transfusion, anxiety who presents to the emergency department with chief complaint of abnormal lab value. Reports bright red blood per rectum with dark streaks that started four days ago. Had follow up appointment today. Noted to be anemic and referred to the ER for further evaluation. Endorses headache and lightheadedness. Denies chest pain, shortness of breath, abdominal pain, vomiting, diarrhea. Reports pelvic and right leg pain. She had recent imaging which was concerning for bone lesions to the pelvis and has biopsy scheduled for later in June. She denies other worsening or alleviating factors.   In the ED patient  afebrile and hemodynamically stable saturating well on room air. Hb 6.3 (baseline 8.5). Patient transfused one unit PRBC in ED, started on iv protonix, and admitted to the [facility] of medicine for further evaluation and management of GIB.     - 6/9/2022 Colonoscopy:  Findings:        The perianal and digital rectal examinations were normal.        The entire examined ileum appeared normal.        The entire colon was normal, without inflammation ulceration or        bleeding stigmata. The mucosa was more friable than typical, with        barotrauma with mild mucosal oozing seen during withdrawal.        The retroflexed view of the distal rectum and anal verge was normal        and showed no anal or rectal abnormalities.   Impression:     - Normal colon, with mild contact bleeding in the                          entire examined colon.                          - The examined portion of the ileum was normal.                          - The distal rectum and anal verge are normal on                          retroflexion view.                          - No specimens collected.     - 7/2/2020 Video capsule endoscopy:  Findings:        Images of the esophagus, stomach and small bowel were obtained from        the swallowed capsule and reviewed.        On review of the recorded study, it was determined that the study        lasted 9-hours 30-minutes 13-seconds (total recording time). The        capsule enteroscope entered the cecum prior to the end of the        recording.        The first gastric image was captured at 0-hours 4-minutes and        19-seconds.        The first duodenal image was captured at 0-hours 28-minutes and        41-seconds.        The first cecal image was captured at 2-hours 32-minutes and        44-seconds.        Scattered hypervascularization characterized by increase vascular        mucosa pattern was found in the gastric body at 16 minutes and 44        seconds and 21 minutes and 6 seconds.         Localized hypervascularization characterized by increase vascular        mucosa pattern without active bleeding and with no stigmata of        bleeding was found in the duodenum at 0-hours 28-minutes 49-seconds.        A scattered area of red spots on the mucosa in the small bowel at        1-hour 41-minutes 36-seconds,1-hour 45-minutes 21-seconds, 1-hour        57-minutes 22-seconds        Diffuse hypervascularization characterized by increase vascular        mucosa pattern mucosa without active bleeding and with no stigmata        of bleeding was found in the ileum until the rest of the small bowel        starting at 2-hours 15-minutes 34-seconds and ending at 2-hours 32        minutes and 44-seconds.   Impression:   - mild hypervascularization characterized by                         increase vascular mucosa pattern pre-pyloric antrum.                         - mild to moderate hypervascularization                         characterized by increase vascular mucosa pattern                         duodenum.                         - hypervascularization characterized by increase                         vascular mucosa patternsmall bowel.                         - mild to moderate hypervascularization                         characterized by increase vascular mucosa pattern                         mucosa in the ileum.                         - No active bleeding these above findings are                         consistent with mild/moderate radiation enteritis.     She has also undergone multiple recent imaging scans - initially after presenting with reports of continued leg pain- she had been seen for leg/hip pain in 3/2022 with below results of imaging:  - 3/9/2022 CT Pelvis:  FINDINGS:  Evaluation of the solid organs is limited due to lack of intravenous contrast.  Lower chest: Unremarkable.  URINARY COLLECTING SYSTEM: No calculi in the kidneys, ureters, or urinary bladder. No hydronephrosis or  ureterectasis.  Liver: normal contour  Gallbladder and bile ducts: Unremarkable.  Pancreas: Pancreas is atrophic.  Spleen: Normal contour.  Adrenals: Normal contour.  Lymph nodes: No abdominal or pelvic lymphadenopathy.  Bowel and mesentery: Unremarkable.  Abdominal aorta: Unremarkable.  Inferior vena cava: Unremarkable.  Free fluid or free air: None.  Pelvis: Unremarkable.  Body wall: Unremarkable.  Bones: Unremarkable.  Impression:  Essentially unremarkable CT the abdomen and pelvis.  No urolithiasis or hydronephrosis.     - 3/28/2022 MRI Hip:  FINDINGS:  Bones: No evidence for hip or pelvic fracture.  Abnormal marrow signal throughout the pelvis, likely sequela of prior radiation therapy.  Bone marrow edema and enhancement seen about the bilateral sacroiliac joints, left greater than right.  Joints: There is thinning of right hip articular cartilage with full-thickness cartilage loss over the superior acetabulum.  There is fraying of the bilateral acetabular labrum.  There is a small right, trace left hip joint effusion.  Mild irregularity of the pubic symphysis.  No definite sacroiliac joint erosion, noting mild bilateral anterior soft tissue edema.  Muscles/tendons: Visualized muscles and tendons are unremarkable.  No evidence for iliopsoas or trochanteric bursitis.  Miscellaneous: There is a small volume of pelvic ascites.  Postsurgical changes of prior hysterectomy noted.  No definite pelvic lymphadenopathy.  Impression:  1. No evidence for hip or pelvic fracture.  2. Bone marrow edema about the bilateral sacroiliac joints, possible osteitis related to sacroiliitis.  Alternatively, developing foci of osteonecrosis.  3. Right hip cartilage loss.  Bilateral acetabular labral fraying.  4. Small volume of pelvic ascites.     She was referred to Orthopedics and saw Dr. Holder earlier this month and additional imaging ordered:     5/16/2022 MRI L spine:  FINDINGS:  Retrospectively MRI and CT pelvis exams show  evidence of nonspecific sclerotic lucent change left sacral ala which could be no suspicious for incomplete stress fracture.  Otherwise previous imaging shows no additional bony abnormality.  Marrow space infiltrated process involves the included spine particularly T9 through T11 vertebral bodies, less prominent changes remainder of lumbar spine and included S4 sacral segment.  In addition [employer] mint of included SI joints particularly upper iliac and left sacral ala.  Post-contrast exam shows no abnormal intra-axial extra-axial enhancement the spinal canal and spinal cord nerve root structures.  Partial enhancement of the marrow space metastatic disease particularly lower dorsal spine.  Lower dorsal spinal cord normal.  Minimal mild posterior disc bulge T10 and T11 posterior disc margins with mild facet joint arthropathy and mild spinal and no significant foraminal stenosis T10 and mild moderate foramen stenosis at T11.  T11 disc level through L1 disc level shows no unusual posterior disc abnormality.  L2-L3 minimal mild posterior disc bulge, mild compression anterior spinal sac, facet joint arthropathy with mild spinal and foramen stenosis.  L 3 L4 minimal mild posterior disc bulge, posterior central annular fissure, mild compression anterior spinal sac, facet joint arthropathy with mild spinal and foramen stenosis.  L 4 L5 mild posterior disc bulge spondylosis, mild compression anterior spinal sac, narrowing lateral recesses, prompt facet joint arthropathy, mild moderate foramen and spinal stenosis, hypertrophy ligamentum flavum.  L5-S1 minor posterior disc bulge, prompt facet joint arthropathy without significant spinal or foramen stenosis.  No disc prolapse or subluxation.  Impression:  1. Subtle retrospective incomplete stress fracture deformity left sacral ala on CT and MRI studies.  2. New evidence of extensive metastatic disease from known breast carcinoma particularly lower dorsal sacrum and pelvic areas  and lesser degree lumbar spine.  No new fracture subluxation or disc prolapse.  3. Degenerative disc spondylosis facet joint arthropathy with above described findings.  4.  This report was flagged in Epic as abnormal.     Our office was contacted regarding this test results and sent for below:  5/24/2022 PET scan:  COMPARISON:  MRI lumbar spine from 516/2022.  FDG PET-CT 04/13/2021, 09/21/2020, 06/02/2020  FINDINGS:  Quality of the study: Adequate.  In the head and neck, there are no hypermetabolic lesions worrisome for malignancy. There are no hypermetabolic mucosal lesions, and there are no pathologically enlarged or hypermetabolic lymph nodes.  In the chest, there are no hypermetabolic lesions worrisome for malignancy.  There are no concerning pulmonary nodules or masses, and there are no pathologically enlarged or hypermetabolic lymph nodes.  In the abdomen and pelvis, there is physiologic tracer distribution within the abdominal organs and excretion into the genitourinary system.  Postop change of cholecystectomy and hysterectomy/BSO.  In the bones, there is absent marrow uptake involving lower thoracic spine, lumbar spine, and portion of sacrum suggestive of radiation induced fatty marrow replacement when compared with recent MRI.  Mildly increased uptake/marrow hyperplasia involving remaining axial and appendicular skeleton.  There are no hypermetabolic lesions worrisome for malignancy.  Subtle linear lucency and mild uptake within the left sacral ala compatible with incomplete stress fracture described on recent MRI.  In the extremities, there are no hypermetabolic lesions worrisome for malignancy.  Impression:  No PET CT evidence of recurrent or metastatic disease in this patient with a history of endometrial cancer and breast cancer.  Left sacral insufficiency fracture as described on recent MRI.  Additional findings as above.     Tumor markers:  = 20  CA 27-29= 49.8     Oncology  History:  Endometrial Cancer History:  Diagnosis of endometrial cancer after presenting with vaginal bleeding  She has completed surgical debulking, chemotherapy and XRT but states that she has developed nausea and vomiting since starting XRT  She was also admitted in  of this year with melena and had a complete GI work up (see below)  Of note during chemotherapy she required several transfusions for symptomatic anemia     Breast Oncology History:  This is a 68 yo female with a history of right breast cancer  - Had been getting her mammograms at [facility] as she was uninsured at the time  - reports small mass seen in  and sent to [facility]- where she was told nothing was seen on review  - Repeat situation of above the next year in   - she noted right nipple inversion - went to [facility]- told not likely cancer, no further testing done  - In  it was recommended that [facility] see her for imaging  - Abnormal mammogram and biopsy recommended  Reports biopsy experience was awful and so she researched other sites to be cared  - She sought out Dr. 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 Keller at [facility]  Oncotype done ? 9- chemotherapy not recommended  She was started on Arimidex  -She was on Arimidex for 4 years until stopping (see below).    Genetic testin2022:  Final Pathologic Diagnosis BREAST, LEFT, MASS, BIOPSY:   - Benign breast tissue with features compatible with fat necrosis and remote   hemorrhage.   - Microcalcifications:  Not identified.   - Negative for atypia or malignancy.          2020 Germline Cancer-Genetic Testing  Test(s) performed:  [email or web address] Common Hereditary Cancers Panel  Number of genes analyzed:  47  Laboratory:  [email or web address]  Ordering provider:  [place]  ANGIE Yoder  Sample collection date:  07/15/2020  Results report date:  08/14/2020              Full report to be scanned into Epic under the Labs and/or Media tab(s).  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            COUNSELING/MEDICAL DECISION-MAKING:         Note below discussion and recommendations provided from genetic counseling visit (copied forward):  TP53 Mutation Implications  Li-Fraumeni Syndrome (LFS) is a hereditary (germline) cancer syndrome diagnosed with the identification of a germline TP53 gene mutation, which is passed from parent to child and is present for the patient's lifespan in essentially every bodily cell (GHR, 2020).  Its associated risks, including breast cancer, brain cancer, sarcoma, adrenocortical carcinoma, and colorectal cancer ([employer], 2020; NCCN, 1.2020).  It is important to note that the patient does not at this time have a diagnosis of LFS, as we do not know yet whether the patient's TP53 mutation is germline.   Somatic (acquired) TP53 mutations do not occur in the germline and occur at some other point in the lifespan (not at conception) (GHR, 2020) in the blood due to any of several factors, including advancing age ([employer]ae, 2020), history of chemotherapy (which applies to patient), hematologic malignancy (NCCN, 1.2020), environmental factors including ultraviolet (UV) exposure (GHR, 2020), and DNA replication errors during cell division (GHR, 2020).  Somatic mutations that develop in only a single cell early in the embryonic phase can lead to mosaicism (GHR, 2020).  With mosaicism, the gene mutation is not present in the affected individual's egg or sperm cells or in the fertilized egg but rather develops later during the embryonic phase, and, per the process of cell division, cells arising from the cell carrying the mutation will also have  the mutation, but other cells will not (GHR, 2020).  Mosaicism's causing health problems is a function of both the specific mutation and the number of cells affected by the mutation (GHR, 2020).  Up to 20% of LFS cases are de fadi (Invitae, 2020).  De fadi (i.e. new) mutations can be germline or somatic (GHR, 2020).  Sometimes, the de fadi mutation develops in the individual's germline (egg or sperm cells) but is absent in all of the individual's other cells; other times, the de fadi mutation develops in the fertilized egg shortly post-conception, and, as the fertilized egg divides, each resultant cell also has the mutation (GHR, 2020).    It is important to distinguish germline origin versus somatic origin as the former has the potential to impact the patient's family while the latter does not.    Recommendations  Appropriate next steps include:  A complete blood count (CBC) and a peripheral blood smear is warranted to evaluate for hematologic neoplasia (NCCN, 1.2020).  The patient is amenable to this.  I do not suspect hematologic neoplasia as etiology of her TP53 mutation.  Testing of parents for identification of the patient's TP53 mutation in either of them; however, the patient's parents are unfortunately unavailable for testing, and testing even all of the patient's siblings, if they all came back negative, would not confirm that the patient's TP53 mutation is not germline in origin.  Therefore, a fibroblast skin culture is recommended, which breast surgeon Dr. Nicci García has agreed to perform.  The patient is amenable to this.     Bone marrow biopsy done with above and cytopenias  negative for malignancy.      Review of Systems   Constitutional:  Negative for activity change, appetite change, fatigue (better but off on and days), fever and unexpected weight change (stable and plans to see nutritionist).   HENT:  Negative for ear discharge, ear pain, hearing loss and postnasal drip.    Eyes:  Negative for  visual disturbance.   Cardiovascular:  Negative for chest pain, palpitations and leg swelling.   Gastrointestinal:  Negative for abdominal pain, change in bowel habit, constipation, diarrhea, nausea, vomiting and change in bowel habit.   Genitourinary:  Negative for decreased urine volume, difficulty urinating, dysuria and urgency.   Musculoskeletal:  Negative for arthralgias, back pain, gait problem and joint deformity.   Neurological:  Positive for dizziness (relates to ear infection). Negative for weakness, light-headedness, numbness and headaches.   Hematological:  Negative for adenopathy. Does not bruise/bleed easily.   Psychiatric/Behavioral:  Negative for dysphoric mood. The patient is not nervous/anxious.        Objective:      Physical Exam  Vitals and nursing note reviewed.       Assessment:       Problem List Items Addressed This Visit       Symptomatic anemia    Myelodysplasia (myelodysplastic syndrome)    Hx of breast cancer    Endometrial cancer - Primary       Plan:     B12 replacement since historic low   However, we discussed MDS with 10% blasts seen and referral to Dr. Rivera  Known [facility] TP53 mutation    Route Chart for Scheduling    Med Onc Chart Routing      Follow up with physician 3 months. needs appt with Dr. Rivera next week- sent to navigator as well   Follow up with [facility]    Infusion scheduling note    Injection scheduling note    Labs    Imaging    Pharmacy appointment    Other referrals

## 2022-10-06 PROBLEM — D46.9 MYELODYSPLASIA (MYELODYSPLASTIC SYNDROME): Status: ACTIVE | Noted: 2022-01-01

## 2022-10-12 NOTE — PROGRESS NOTES
[facility]  Occupational Therapy Treatment Note - Therapeutic Yoga Progam    Date: 10/12/2022  Name: Jeanne Rodgers  Clinic Number: [id number]    Therapy Diagnosis:   Encounter Diagnoses   Name Primary?    Physical deconditioning Yes    Stress and adjustment reaction      Physician: Елена Kam, *    Physician Orders: eval and treat  Medical Diagnosis: Personal history of malignant neoplasm of breast [Z85.3]  Evaluation Date: 8/10/22  Insurance Authorization Period Expiration: 12/31/22  Plan of Care Expiration: 01/10/22  Progress Note Due: 11/10/22   Visit # / Visits authorized: 1 / 20    Precautions:  Standard and cancer    Time In: 1pm  Time Out: 2pm  Total Billable Time: 60 minutes    SUBJECTIVE     Pt reports: She is doing her hand exercise.  She also reports that she was just diagnosed with a leukemia in addition to her h/o breast and uterine cancer.  She was compliant with home exercise program given last session.   Response to previous treatment:good  Functional change: I can open my car door with less effort.    Pain: 3/10  Location: bilateral feet      OBJECTIVE     Objective Measures updated at progress report unless specified.    Patient Specific Functional Scale:         Activity 8/10/22           1.opening jars 5           2.carrying groceries 5           3.making a bed 8           4.             5.             6.             SCORE   6.0              Total Score = Sum of activity scores / number of activities  Minimum Detectable Change (90% CII) for average score = 2 points  Minimum Detectable Change (90% CI) for single activity score = 3 points      Treatment     Jeanne received the treatments listed below:       Date  10/5/22   10/12/22       Therapeutic Yoga Exercises - [id number] Therapeutic Ex 45  minutes  45 minutes  minutes  minutes  minutes  minutes   Seated Yoga   chair yoga:  Cat-Cow,forward bend, back bend, twist,   chair yoga:  Cat-Cow,forward bend,  back bend, twist,       Quadruped Cat cow, puppy flow,  Cat cow, puppy flow,       Supine Block berween knees in chair shape  2sets of 5 breaths, knees to chest, bound angle and happy baby block berween knees in chair shape  2sets of 5 breaths, knees to chest, bound angle and happy baby, belt assisted hamstring stretch, bridge with block, 3 twists         Prone Sphinx and sphinx plank        standing  Chair pose, foot wakers Chair pose, foot wakers, down dog with chair, triangle        Hand strengthening exercises Putty ex for intrinsics, , digit flexion and extention                 Self-Care/Home Management  -[id number]   15 minutes  15 minutes  minutes  minutes  minutes  minutes            Relaxation techniques Diaphragmatic breathing, body scan   4 count DB, body scan       Restorative  Supine with LE's on chair  Bolster supported bridge and bound angle       Activity Pacing                           Stress Management/Education                       Patient Education and Home Exercises      Education provided:   - - physiology of yoga/meditation and immune health  - Progress towards goals     Written Home Exercises Provided: yes.  Exercises were reviewed and Jeanne was able to demonstrate them prior to the end of the session.  Jeanne demonstrated good  understanding of the HEP provided. See EMR under Patient Instructions for exercises provided during therapy sessions.       Assessment     Jeanne's session focused on HEP for hand strengthening, gentle yoga to strengthen and increase flexabilty of whole body.  She was also taught diaphragmatic breathing and body scan to assist with relaxaion/immune health.  She demonstrated good understanding of all treatment.t Pt would continue to benefit from skilled Occupational Therapy.     Jeanne is progressing well towards her goals and there are no updates to goals at this time. Pt prognosis is Good.     Pt will continue to benefit from skilled outpatient occupational therapy to  address the deficits listed in the problem list on initial evaluation provide pt/family education and to maximize pt's level of independence in the home and community environment.     Pt's spiritual, cultural and educational needs considered and pt agreeable to plan of care and goals.    Anticipated barriers to occupational therapy: none    Goals:  Short Term Goals: 6 weeks      Goal # Goal Status   1 Patient will demonstrate independence with diaphragmatic breathing in sit and supine. Progressing   2 Pt. will identify resource for audio body scan to assist with stress management/immune health    3 Patient will identify 2 new stress coping skills for stress management/immune health. Progressing   4 Patient will identify activity pacing problems.  Patient will then implement new plan for daily activity to increase endurance for ADL's. Progressing      Long Term Goals: 12 weeks      Goal # Goal Status   1 Patient will demonstrate independence with yoga Home Exercise Program to increase strength and endurance for ADL's. Progressing   2 Patient will demonstrate independence with relaxation techniques to manage stress for immune health. Progressing   3 Patient will verbalize good understanding of stress/immune health relationship.  Progressing   4             PLAN     Plan of care Certification: 10/12/2022 to 01/10/22.    Outpatient Occupational Therapy 1 times weekly for 12 weeks to include the following interventions: Patient Education, Self Care, and Therapeutic Exercise.     Arnoldo Beasley, OT

## 2022-10-13 NOTE — PROGRESS NOTES
Route Chart for Scheduling    BMT Chart Routing      Follow up with physician 2 weeks.   Follow up with [facility]    Provider visit type    Infusion scheduling note    Injection scheduling note    Labs CBC and type and screen   Lab interval:  weekly CBC and type&screen   Imaging    Pharmacy appointment    Other referrals               Subjective:       Patient ID: Jeanne Rodgers is a 67 y.o. female.    Chief Complaint: No chief complaint on file.    HPI    67 year old woman with history of breast and endometrial carcinoma with somatic P53 mutation. Noted to have persistent pancytopenia complicated chemotherapy for her endometrial carcinoma in 2020.     Bone marrow biopsy in November 2020 was a normocellular marrow (30%) with trilineage hematpoiesis and atypical megakaryocytes. No morphologic or immunuphenotypic evidence of metastatic carcinoma. Adequate iron storage. Chromosome analysis normal X,X phenotype. Insufficient aspirate for analysis.     Bone marrow biopsy June 2022 was hypercellular marrow (%) with trilineage hematopoiesis. Grade 1 reticular fibrosis. No evidence of lymphoma, plasma cell neoplasm, or metastatic carcinoma.     Two hospital admission in summer 2022 for symptomatic anemia requiring transfusion support. Once incident related to GI bleeding. Hgb at admissions 5-6 grams with normal MCV. No GI bleeding source has been found.     Bone marrow biopsy September 2022 extremely hypercellular marrow, 99% with increased blasts (10%) and moderate trilineage dyspoiesis with left-shifted erythroid hyperplasia, left-shifted, relatively decreased granulocytes and focally increased megakaryocytes with increase number of micromegakaryocytes. Increased stainable histiocytic iron and increased reticulin fibrosis (MF-1 with focal MF-2). 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 NGS is pending.      Past Medical History:   Diagnosis Date    Allergy      skin    Anxiety     Atrial fibrillation     Breast cancer 2014    right    Chronic back pain     Depression     Encounter for blood transfusion     Endometrial cancer 07/30/2019    Fibromyalgia     Hives     Hx of psychiatric care     Hx of radiation therapy     Itching     Lichen sclerosus     Mental disorder     PONV (postoperative nausea and vomiting)     Psychiatric problem     PTSD (post-traumatic stress disorder)     Sleep difficulties     Sore throat     Therapy     Uterine cancer 08/05/2019    UTI (urinary tract infection)         Review of Systems   Constitutional: Negative.    HENT: Negative.     Eyes: Negative.    Respiratory: Negative.     Cardiovascular: Negative.    Gastrointestinal: Negative.    Endocrine: Negative.    Genitourinary: Negative.    Musculoskeletal: Negative.    Integumentary:  Negative.   Allergic/Immunologic: Negative for environmental allergies, food allergies and immunocompromised state.   Neurological: Negative.    Hematological:  Negative for adenopathy. Does not bruise/bleed easily.   Psychiatric/Behavioral: Negative.         Objective:      Physical Exam  Vitals and nursing note reviewed.   Constitutional:       Appearance: She is well-developed.   HENT:      Head: Normocephalic and atraumatic.   Eyes:      General: No scleral icterus.     Conjunctiva/sclera: Conjunctivae normal.   Cardiovascular:      Rate and Rhythm: Normal rate.   Pulmonary:      Effort: Pulmonary effort is normal. No respiratory distress.   Abdominal:      General: There is no distension.      Palpations: Abdomen is soft.      Tenderness: There is no abdominal tenderness.   Musculoskeletal:         General: Normal range of motion.      Cervical back: Normal range of motion and neck supple.   Skin:     General: Skin is warm and dry.   Neurological:      Mental Status: She is alert and oriented to person, place, and time.      Cranial Nerves: No cranial nerve deficit.   Psychiatric:          Behavior: Behavior normal.       Current Outpatient Medications   Medication Sig Dispense Refill    acetaminophen (TYLENOL) 500 MG tablet Take 1,000 mg by mouth daily as needed for Pain.      clobetasol 0.05% (TEMOVATE) 0.05 % Oint APPLY TO AFFECTED AREA(S) TWICE A DAY FOR 1 MONTH(S) then EVERY DAY FOR 1 MONTH(S) then 3 TIMES A WEEK 60 g 2    fexofenadine (ALLEGRA) 180 MG tablet 2 tablets in the morning.  May take an extra 2 tablets during the day if needed for itching. 240 tablet 5    hydrocortisone 2.5 % cream Apply to affected areas twice a day when eczema is moderate. 453.6 g 1    hydrOXYzine HCL (ATARAX) 25 MG tablet 1 to 8 tablets at bedtime.  Start with 3 tablets.  Increase or decrease as needed until itching is controlled or a maximum of 8 tablets. (Patient taking differently: 1 to 8 tablets at bedtime.  Start with 3 tablets.  Increase or decrease as needed until itching is controlled or a maximum of 8 tablets.) 240 tablet 3    meth/meblue/sod phos/psal/hyos (URIBEL ORAL) Take by mouth as needed.      metoprolol tartrate (LOPRESSOR) 25 MG tablet Take 1 tablet (25 mg total) by mouth 2 (two) times daily. 180 tablet 3    mirtazapine (REMERON) 15 MG tablet Take 1 tablet (15 mg total) by mouth every evening. 30 tablet 2    nystatin (MYCOSTATIN) cream Apply topically every Mon, Wed, Fri. 30 g 11    nystatin (MYCOSTATIN) powder Apply topically 4 (four) times daily. 30 g 1    ondansetron (ZOFRAN) 4 MG tablet Take 1 tablet (4 mg total) by mouth every 6 (six) hours as needed for Nausea. 12 tablet 0    pantoprazole (PROTONIX) 40 MG tablet Take 1 tablet (40 mg total) by mouth once daily. 90 tablet 3    UNABLE TO FIND [place] Mushrooms Immune Support      ciprofloxacin-dexamethasone 0.3-0.1% (CIPRODEX) 0.3-0.1 % DrpS place 4 DROPS into BOTH EARS TWICE DAILY FOR 5 DAYS      decitabine-cedazuridine  mg Tab Take 1 tablet by mouth once daily. For 5 days every 28 days 5 tablet 5    nystatin  (MYCOSTATIN) ointment APPLY SMALL AMOUNT TO LEFT EAR TWICE DAILY WITH TRIAMCINOLONE FOR 7 DAYS      triamcinolone acetonide 0.025% (KENALOG) 0.025 % Oint APPLY SMALL AMOUNT TO LEFT EAR TWICE DAILY WITH NYSTATIN FOR 7 DAYS      UNABLE TO FIND medication name: medical marijuana       No current facility-administered medications for this visit.      Lab Results   Component Value Date    WBC 11.87 10/13/2022    HGB 6.7 (L) 10/13/2022    HCT 22.6 (L) 10/13/2022    MCV 95 10/13/2022    PLT 55 (L) 10/13/2022        CMP  Sodium   Date Value Ref Range Status   09/12/2022 136 136 - 145 mmol/L Final     Potassium   Date Value Ref Range Status   09/12/2022 4.5 3.5 - 5.1 mmol/L Final     Chloride   Date Value Ref Range Status   09/12/2022 103 95 - 110 mmol/L Final     CO2   Date Value Ref Range Status   09/12/2022 24 23 - 29 mmol/L Final     Glucose   Date Value Ref Range Status   09/12/2022 97 70 - 110 mg/dL Final     BUN   Date Value Ref Range Status   09/12/2022 14 8 - 23 mg/dL Final     Creatinine   Date Value Ref Range Status   09/12/2022 0.7 0.5 - 1.4 mg/dL Final   09/12/2022 0.7 0.5 - 1.4 mg/dL Final     Calcium   Date Value Ref Range Status   09/12/2022 9.9 8.7 - 10.5 mg/dL Final     Total Protein   Date Value Ref Range Status   09/12/2022 7.9 6.0 - 8.4 g/dL Final     Albumin   Date Value Ref Range Status   09/12/2022 3.5 3.5 - 5.2 g/dL Final     Total Bilirubin   Date Value Ref Range Status   09/12/2022 0.7 0.1 - 1.0 mg/dL Final     Comment:     For infants and newborns, interpretation of results should be based  on gestational age, weight and in agreement with clinical  observations.    Premature Infant recommended reference ranges:  Up to 24 hours.............<8.0 mg/dL  Up to 48 hours............<12.0 mg/dL  3-5 days..................<15.0 mg/dL  6-29 days.................<15.0 mg/dL       Alkaline Phosphatase   Date Value Ref Range Status   09/12/2022 70 55 - 135 U/L Final     AST   Date Value Ref Range Status    09/12/2022 13 10 - 40 U/L Final     ALT   Date Value Ref Range Status   09/12/2022 7 (L) 10 - 44 U/L Final     Anion Gap   Date Value Ref Range Status   09/12/2022 9 8 - 16 mmol/L Final     eGFR if    Date Value Ref Range Status   07/03/2022 >60.0 >60 mL/min/1.73 m^2 Final     eGFR if non    Date Value Ref Range Status   07/03/2022 >60.0 >60 mL/min/1.73 m^2 Final     Comment:     Calculation used to obtain the estimated glomerular filtration  rate (eGFR) is the CKD-EPI equation.             Assessment:       Problem List Items Addressed This Visit          Oncology    Myelodysplasia (myelodysplastic syndrome) - Primary    Relevant Medications    decitabine-cedazuridine  mg Tab    Other Relevant Orders    VITAMIN B12 (Completed)    FOLATE (Completed)    COPPER, SERUM    CBC W/ AUTO DIFFERENTIAL (Completed)     Other Visit Diagnoses       Nutritional anemia, unspecified        Relevant Orders    VITAMIN B12 (Completed)    FOLATE (Completed)            Plan:       IPPS-R Very High Risk MDS  RAEB-2 MDS with complete cytogenetics  Likely secondary MDS from prior chemotherapy/radiation therapy  Recommend starting therapy- Inqovi 5/28 days. Rx to specialty pharmacy  Weekly labs for next 8 weeks    2. Stage IIIC2 UPSC  Diagnostic PET with pelvic and PA lymphadenopathy  RALH/BSO/ Debuliking of pelvic lymph nodes (6/8) positive on 8/15/2019.  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   T/C started 9/27/2019  WPRT to PA nodes 45Gy completed 1/22/2020  HDR to 15Gy in 3 fractions completed 3/3/2020  Restarted T/C 3/20/2020, s/p 5 cycles concluding 5/8/2020.   Complicated by pancytopenia and anemia.       3. Stage I IDC and DCIS right breast, ER+, SD+ HER 2 Zack negative   Lumpectomy 6/24/2014 at [facility].   Small mass seen on mammogram 2007 and 2008, nipple inversion 2012 at  [facility]  Referred for radiation therapy at [facility]  On Arimidex for 4 years

## 2022-10-18 NOTE — TELEPHONE ENCOUNTER
Specialty Pharmacy - Initial Clinical Assessment    Specialty Medication Orders Linked to Encounter      Flowsheet Row Most Recent Value   Medication #1 decitabine-cedazuridine  mg Tab (Order#[id number], Rx#[id number])          Patient Diagnosis   D46.9 - Myelodysplasia (myelodysplastic syndrome)    Subjective    Jeanne Rodgers is a 67 y.o. female, who is followed by the specialty pharmacy service for management and education.    Recent Encounters       Date Type Provider Description    10/18/2022 Specialty Pharmacy Teresa Daley, [place] Initial Clinical Assessment    10/14/2022 Specialty Pharmacy Charlene Trinidad, [place] Referral Authorization          Clinical call attempts since last clinical assessment   No call attempts found.     Current Outpatient Medications   Medication Sig    acetaminophen (TYLENOL) 500 MG tablet Take 1,000 mg by mouth daily as needed for Pain.    ciprofloxacin-dexamethasone 0.3-0.1% (CIPRODEX) 0.3-0.1 % DrpS place 4 DROPS into BOTH EARS TWICE DAILY FOR 5 DAYS    clobetasol 0.05% (TEMOVATE) 0.05 % Oint APPLY TO AFFECTED AREA(S) TWICE A DAY FOR 1 MONTH(S) then EVERY DAY FOR 1 MONTH(S) then 3 TIMES A WEEK    decitabine-cedazuridine  mg Tab Take 1 tablet by mouth once daily. For 5 days every 28 days    fexofenadine (ALLEGRA) 180 MG tablet 2 tablets in the morning.  May take an extra 2 tablets during the day if needed for itching.    hydrocortisone 2.5 % cream Apply to affected areas twice a day when eczema is moderate.    hydrOXYzine HCL (ATARAX) 25 MG tablet 1 to 8 tablets at bedtime.  Start with 3 tablets.  Increase or decrease as needed until itching is controlled or a maximum of 8 tablets. (Patient taking differently: 1 to 8 tablets at bedtime.  Start with 3 tablets.  Increase or decrease as needed until itching is controlled or a maximum of 8 tablets.)    meth/meblue/sod phos/psal/hyos (URIBEL ORAL) Take by mouth as needed.    metoprolol tartrate (LOPRESSOR) 25 MG  tablet Take 1 tablet (25 mg total) by mouth 2 (two) times daily.    mirtazapine (REMERON) 15 MG tablet Take 1 tablet (15 mg total) by mouth every evening.    nystatin (MYCOSTATIN) cream Apply topically every Mon, Wed, Fri.    nystatin (MYCOSTATIN) ointment APPLY SMALL AMOUNT TO LEFT EAR TWICE DAILY WITH TRIAMCINOLONE FOR 7 DAYS    nystatin (MYCOSTATIN) powder Apply topically 4 (four) times daily.    ondansetron (ZOFRAN) 4 MG tablet Take 1 tablet (4 mg total) by mouth every 6 (six) hours as needed for Nausea.    pantoprazole (PROTONIX) 40 MG tablet Take 1 tablet (40 mg total) by mouth once daily.    triamcinolone acetonide 0.025% (KENALOG) 0.025 % Oint APPLY SMALL AMOUNT TO LEFT EAR TWICE DAILY WITH NYSTATIN FOR 7 DAYS    UNABLE TO FIND [place] [employer] [employer] Immune Support    UNABLE TO FIND medication name: medical marijuana   Last reviewed on 10/14/2022  1:53 PM by Bill Ayala, PhD    Review of patient's allergies indicates:  No Known AllergiesLast reviewed on  10/14/2022 1:53 PM by Bill Ayala    Drug Interactions    Drug interactions evaluated: yes  Clinically relevant drug interactions identified: no  Provided the patient with educational material regarding drug interactions: not applicable           Assessment Questions - Documented Responses      Flowsheet Row Most Recent Value   Assessment    Medication Reconciliation completed for patient Yes   During the past 4 weeks, has patient missed any activities due to condition or medication? No   During the past 4 weeks, did patient have any of the following urgent care visits? None   Goals of Therapy Status Discussed (new start)   Status of the patients ability to self-administer: Is Able   All education points have been covered with patient? Yes, supplemental printed education provided   Welcome packet contents reviewed and discussed with patient? Yes   Assesment completed? Yes   Plan Therapy being initiated   Do you need to open a clinical  "intervention (i-vent)? No   Do you want to schedule first shipment? Yes   Medication #1 Assessment Info    Patient status New medication, New to [facility]   Is this medication appropriate for the patient? Yes   Is this medication effective? Not yet started          Refill Questions - Documented Responses      Flowsheet Row Most Recent Value   Patient Availability and HIPAA Verification    Does patient want to proceed with activity? Yes   HIPAA/medical authority confirmed? Yes   Relationship to patient of person spoken to? Self   Refill Screening Questions    When does the patient need to receive the medication? 10/19/22   Refill Delivery Questions    How will the patient receive the medication? Pickup   When does the patient need to receive the medication? 10/19/22   Expected Copay ($) 0   Is the patient able to afford the medication copay? Yes   Payment Method zero copay   Days supply of Refill 28   Supplies needed? No supplies needed   Refill activity completed? Yes   Refill activity plan Refill scheduled   Shipment/Pickup Date: 10/19/22            Objective    She has a past medical history of Allergy, Anxiety, Atrial fibrillation, Breast cancer (2014), Chronic back pain, Depression, Encounter for blood transfusion, Endometrial cancer (07/30/2019), Fibromyalgia, Hives, psychiatric care, radiation therapy, Itching, Lichen sclerosus, Mental disorder, PONV (postoperative nausea and vomiting), Psychiatric problem, PTSD (post-traumatic stress disorder), Sleep difficulties, Sore throat, Therapy, Uterine cancer (08/05/2019), and UTI (urinary tract infection).    Tried/failed medications: None     BP Readings from Last 4 Encounters:   10/13/22 123/60   10/06/22 (!) 125/59   09/30/22 (!) 120/57   09/29/22 (!) 115/55     Ht Readings from Last 4 Encounters:   10/13/22 5' (1.524 m)   10/06/22 5' 1" (1.549 m)   09/30/22 5' (1.524 m)   09/15/22 5' 1" (1.549 m)     Wt Readings from Last 4 Encounters:   10/13/22 52.7 kg (116 lb 1.2 " oz)   10/06/22 52.2 kg (115 lb 1.3 oz)   09/30/22 51.7 kg (114 lb)   09/15/22 52 kg (114 lb 12 oz)     Recent Labs   Lab Result Units 10/13/22  1410 09/28/22  1422 09/12/22  1202 09/07/22  1038   RBC M/uL 2.39 L 2.88 L 3.60 L 2.12 L   Hemoglobin g/dL 6.7 L 8.0 L 10.4 L 5.9 LL   Hematocrit % 22.6 L 25.9 L 32.5 L 18.9 LL   WBC K/uL 11.87 10.76 5.11 11.09   Gran # (ANC) K/uL  --   --  2.4  --    Gran % % 42.0 52.0  --  52.0   Platelets K/uL 55 L 58 L 44 L 47 L   Sodium mmol/L  --   --  136  --    Potassium mmol/L  --   --  4.5  --    Chloride mmol/L  --   --  103  --    Glucose mg/dL  --   --  97  --    BUN mg/dL  --   --  14  --    Creatinine mg/dL  --   --  0.7  0.7  --    Calcium mg/dL  --   --  9.9  --    Total Protein g/dL  --   --  7.9  --    Albumin g/dL  --   --  3.5  --    Total Bilirubin mg/dL  --   --  0.7  --    Alkaline Phosphatase U/L  --   --  70  --    AST U/L  --   --  13  --    ALT U/L  --   --  7 L  --      The goals of cancer treatment include:  Achieving remission of cancer, if possible  Reducing tumor size and spread of cancer, if remission is not possible  Minimizing pain and symptoms of the cancer  Preventing infection and other complications of treatment  Promoting adequate nutrition  Encouraging proper hydration  Improving or maintaining quality of life  Maintaining optimal therapy adherence  Minimizing and managing side effects    Goals of Therapy Status: Discussed (new start)    Assessment/Plan  Patient plans to start therapy on 10/19/22      Indication, dosage, appropriateness, effectiveness, safety and convenience of her specialty medication(s) were reviewed today.     Patient Education   Patient received education on the following:   Expectations and possible outcomes of therapy  Proper use, timely administration, and missed dose management  Duration of therapy  Side effects, including prevention, minimization, and management  Contraindications and safety precautions  New or changed  medications, including prescribe and over the counter medications and supplements  Reviews recommended vaccinations, as appropriate  Storage, safe handling, and disposal        Tasks added this encounter   11/9/2022 - Refill Call (Auto Added)  10/20/2022 - Pickup Reminder  4/9/2023 - Clinical - Follow Up Assesement (180 day)   Tasks due within next 3 months   No tasks due.     Teresa Daley, PharmD  [facility]  [address]  [facility] [address]  Phone: [phone]  Fax: [phone]

## 2022-10-19 NOTE — PROGRESS NOTES
[facility]  Occupational Therapy Treatment Note - Therapeutic Yoga Progam    Date: 10/19/2022  Name: Jeanne Rodgers  Clinic Number: [id number]    Therapy Diagnosis:   Encounter Diagnoses   Name Primary?    Physical deconditioning Yes    Stress and adjustment reaction      Physician: Елена Kam, *    Physician Orders: eval and treat  Medical Diagnosis: Personal history of malignant neoplasm of breast [Z85.3]  Evaluation Date: 8/10/22  Insurance Authorization Period Expiration: 12/31/22  Plan of Care Expiration: 01/10/22  Progress Note Due: 11/10/22   Visit # / Visits authorized: 3/ 20    Precautions:  Standard and cancer    Time In: 1pm  Time Out: 2pm  Total Billable Time: 60 minutes    SUBJECTIVE     Pt reports: She is doing her hand exercise.  She also reports that she was just diagnosed with a leukemia in addition to her h/o breast and uterine cancer.  She was compliant with home exercise program given last session.   Response to previous treatment:good  Functional change: I can open my car door with less effort.    Pain: 3/10  Location: bilateral feet      OBJECTIVE     Objective Measures updated at progress report unless specified.    Patient Specific Functional Scale:         Activity 8/10/22           1.opening jars 5           2.carrying groceries 5           3.making a bed 8           4.             5.             6.             SCORE   6.0              Total Score = Sum of activity scores / number of activities  Minimum Detectable Change (90% CII) for average score = 2 points  Minimum Detectable Change (90% CI) for single activity score = 3 points      Treatment     Jeanne received the treatments listed below:       Date  10/5/22   10/12/22  10/19/22      Therapeutic Yoga Exercises - [id number] Therapeutic Ex 45  minutes  45 minutes  45 minutes  minutes  minutes  minutes   Seated Yoga   chair yoga:  Cat-Cow,forward bend, back bend, twist,   chair  yoga:  Cat-Cow,forward bend, back bend, twist, hair yoga:  Cat-Cow,forward bend, back bend, twist,      Quadruped Cat cow, puppy flow,  Cat cow, puppy flow, cat cow, puppy flow,      Supine Block berween knees in chair shape  2sets of 5 breaths, knees to chest, bound angle and happy baby block berween knees in chair shape  2sets of 5 breaths, knees to chest, bound angle and happy baby, belt assisted hamstring stretch, bridge with block, 3 twists   ,block berween knees in chair shape  2sets of 5 breaths, knees to chest, bound angle and happy baby, belt assisted hamstring stretch, bridge with block, 3 twists block berween knees in chair shape  2sets of 5 breaths, knees to chest, bound angle and happy baby     Prone Sphinx and sphinx plank   sphinx     standing  Chair pose, foot wakers Chair pose, foot wakers, down dog with chair, triangle  Chair pose, volcano foot wakers, down dog with chair, triangle      Hand strengthening exercises Putty ex for intrinsics, , digit flexion and extention                 Self-Care/Home Management  -[id number]   15 minutes  15 minutes  minutes  minutes  minutes  minutes            Relaxation techniques Diaphragmatic breathing, body scan   4 count DB, body scan  4 count DB, body scan     Restorative  Supine with LE's on chair  Bolster supported bridge and bound angle  Bolster supported bridge and bound angl     Activity Pacing                           Stress Management/Education     - physiology of yoga/meditation and immune health                   Patient Education and Home Exercises      Education provided:   - - physiology of yoga/meditation and immune health  - Progress towards goals     Written Home Exercises Provided: yes.  Exercises were reviewed and Jeanne was able to demonstrate them prior to the end of the session.  Jeanne demonstrated good  understanding of the HEP provided. See EMR under Patient Instructions for exercises provided during therapy sessions.       Assessment      Jeanne's session focused HEP of gentle yoga to invrease strength and flexabilty of whole body.  We added new ex.  And she was given updated program.She was also taught diaphragmatic breathing and body scan to assist with relaxaion/immune health.  She demonstrated good understanding of all treatment.t Pt would continue to benefit from skilled Occupational Therapy.     Jeanne is progressing well towards her goals and there are no updates to goals at this time. Pt prognosis is Good.     Pt will continue to benefit from skilled outpatient occupational therapy to address the deficits listed in the problem list on initial evaluation provide pt/family education and to maximize pt's level of independence in the home and community environment.     Pt's spiritual, cultural and educational needs considered and pt agreeable to plan of care and goals.    Anticipated barriers to occupational therapy: none    Goals:  Short Term Goals: 6 weeks      Goal # Goal Status   1 Patient will demonstrate independence with diaphragmatic breathing in sit and supine. Progressing   2 Pt. will identify resource for audio body scan to assist with stress management/immune health    3 Patient will identify 2 new stress coping skills for stress management/immune health. Progressing   4 Patient will identify activity pacing problems.  Patient will then implement new plan for daily activity to increase endurance for ADL's. Progressing      Long Term Goals: 12 weeks      Goal # Goal Status   1 Patient will demonstrate independence with yoga Home Exercise Program to increase strength and endurance for ADL's. Progressing   2 Patient will demonstrate independence with relaxation techniques to manage stress for immune health. Progressing   3 Patient will verbalize good understanding of stress/immune health relationship.  Progressing   4             PLAN     Plan of care Certification: 10/19/2022 to 01/10/22.    Outpatient Occupational Therapy 1 times  weekly for 12 weeks to include the following interventions: Patient Education, Self Care, and Therapeutic Exercise.     Arnoldo Beasley OT

## 2022-10-27 NOTE — PROGRESS NOTES
Route Chart for Scheduling    BMT Chart Routing  Urgent    Follow up with physician Other. Follow up with Dr. Rivera 11/17 or 11/18   Follow up with [facility]    Provider visit type    Infusion scheduling note    Injection scheduling note    Labs CBC, CMP and type and screen   Lab interval: once a week  Please add CMP to weekly labs   Imaging None      Pharmacy appointment No pharmacy appointment needed      Other referrals No additional referrals needed              Subjective:       Patient ID: Jeanne Rodgers is a 67 y.o. female.    Chief Complaint: No chief complaint on file.    HPI    67 year old woman with history of breast and endometrial carcinoma with somatic P53 mutation. Noted to have persistent pancytopenia complicated chemotherapy for her endometrial carcinoma in 2020.     Bone marrow biopsy in November 2020 was a normocellular marrow (30%) with trilineage hematpoiesis and atypical megakaryocytes. No morphologic or immunuphenotypic evidence of metastatic carcinoma. Adequate iron storage. Chromosome analysis normal X,X phenotype. Insufficient aspirate for analysis.     Bone marrow biopsy June 2022 was hypercellular marrow (%) with trilineage hematopoiesis. Grade 1 reticular fibrosis. No evidence of lymphoma, plasma cell neoplasm, or metastatic carcinoma.     Two hospital admission in summer 2022 for symptomatic anemia requiring transfusion support. Once incident related to GI bleeding. Hgb at admissions 5-6 grams with normal MCV. No GI bleeding source has been found.     Bone marrow biopsy September 2022 extremely hypercellular marrow, 99% with increased blasts (10%) and moderate trilineage dyspoiesis with left-shifted erythroid hyperplasia, left-shifted, relatively decreased granulocytes and focally increased megakaryocytes with increase number of micromegakaryocytes. Increased stainable histiocytic iron and increased reticulin fibrosis (MF-1 with focal MF-2). Abnormal chromosome analysis with 45-50  XX, -5, add(5), del (7), -18, -21, add(21), +0-4r, +0-2mar. NGS with TP53.    Interval history:  She started taking Inqovi on 10/21/22.  She has tolerated very well.  She has had fatigue and mild nausea, which is well-controlled with Zofran.  She has had constipation secondary to Zofran.  She is concerned about hepatic and renal function while taking this medication.      Past Medical History:   Diagnosis Date    Allergy     skin    Anxiety     Atrial fibrillation     Breast cancer 2014    right    Chronic back pain     Depression     Encounter for blood transfusion     Endometrial cancer 07/30/2019    Fibromyalgia     Hives     Hx of psychiatric care     Hx of radiation therapy     Itching     Lichen sclerosus     Mental disorder     PONV (postoperative nausea and vomiting)     Psychiatric problem     PTSD (post-traumatic stress disorder)     Sleep difficulties     Sore throat     Therapy     Uterine cancer 08/05/2019    UTI (urinary tract infection)         Review of Systems   Constitutional: Negative.    HENT: Negative.     Eyes: Negative.    Respiratory: Negative.     Cardiovascular: Negative.    Gastrointestinal: Negative.    Endocrine: Negative.    Genitourinary: Negative.    Musculoskeletal: Negative.    Integumentary:  Negative.   Allergic/Immunologic: Negative for environmental allergies, food allergies and immunocompromised state.   Neurological: Negative.    Hematological:  Negative for adenopathy. Does not bruise/bleed easily.   Psychiatric/Behavioral: Negative.         Objective:      Physical Exam  Vitals and nursing note reviewed.   Constitutional:       Appearance: She is well-developed.   HENT:      Head: Normocephalic and atraumatic.   Eyes:      General: No scleral icterus.     Conjunctiva/sclera: Conjunctivae normal.   Cardiovascular:      Rate and Rhythm: Normal rate.   Pulmonary:      Effort: Pulmonary effort is normal. No respiratory distress.   Abdominal:      General: There is no distension.       Palpations: Abdomen is soft.      Tenderness: There is no abdominal tenderness.   Musculoskeletal:         General: Normal range of motion.      Cervical back: Normal range of motion and neck supple.   Skin:     General: Skin is warm and dry.   Neurological:      Mental Status: She is alert and oriented to person, place, and time.      Cranial Nerves: No cranial nerve deficit.   Psychiatric:         Behavior: Behavior normal.       Current Outpatient Medications   Medication Sig Dispense Refill    acetaminophen (TYLENOL) 500 MG tablet Take 1,000 mg by mouth daily as needed for Pain.      bisacodyL (DULCOLAX) 10 mg Supp Place 1 suppository (10 mg total) rectally daily as needed (constipation). 5 suppository 0    ciprofloxacin-dexamethasone 0.3-0.1% (CIPRODEX) 0.3-0.1 % DrpS place 4 DROPS into BOTH EARS TWICE DAILY FOR 5 DAYS      clobetasol 0.05% (TEMOVATE) 0.05 % Oint APPLY TO AFFECTED AREA(S) TWICE A DAY FOR 1 MONTH(S) then EVERY DAY FOR 1 MONTH(S) then 3 TIMES A WEEK 60 g 2    decitabine-cedazuridine  mg Tab Take 1 tablet by mouth once daily. For 5 days every 28 days 5 tablet 5    fexofenadine (ALLEGRA) 180 MG tablet 2 tablets in the morning.  May take an extra 2 tablets during the day if needed for itching. 240 tablet 5    hydrocortisone 2.5 % cream Apply to affected areas twice a day when eczema is moderate. 453.6 g 1    hydrOXYzine HCL (ATARAX) 25 MG tablet 1 to 8 tablets at bedtime.  Start with 3 tablets.  Increase or decrease as needed until itching is controlled or a maximum of 8 tablets. (Patient taking differently: 1 to 8 tablets at bedtime.  Start with 3 tablets.  Increase or decrease as needed until itching is controlled or a maximum of 8 tablets.) 240 tablet 3    meth/meblue/sod phos/psal/hyos (URIBEL ORAL) Take by mouth as needed.      metoprolol tartrate (LOPRESSOR) 25 MG tablet Take 1 tablet (25 mg total) by mouth 2 (two) times daily. 180 tablet 3    nystatin (MYCOSTATIN) cream Apply  topically every Mon, Wed, Fri. 30 g 11    nystatin (MYCOSTATIN) ointment APPLY SMALL AMOUNT TO LEFT EAR TWICE DAILY WITH TRIAMCINOLONE FOR 7 DAYS      nystatin (MYCOSTATIN) powder Apply topically 4 (four) times daily. 30 g 1    ondansetron (ZOFRAN) 4 MG tablet Take 1 tablet (4 mg total) by mouth every 6 (six) hours as needed for Nausea. 120 tablet 0    pantoprazole (PROTONIX) 40 MG tablet Take 1 tablet (40 mg total) by mouth once daily. 90 tablet 3    senna (SENOKOT) 8.6 mg tablet Take 1 tablet by mouth once daily. Can take twice daily 30 tablet 11    triamcinolone acetonide 0.025% (KENALOG) 0.025 % Oint APPLY SMALL AMOUNT TO LEFT EAR TWICE DAILY WITH NYSTATIN FOR 7 DAYS      UNABLE TO FIND [place] [employer] Immune Support      UNABLE TO FIND medication name: medical marijuana       No current facility-administered medications for this visit.      Lab Results   Component Value Date    WBC 10.93 10/24/2022    HGB 6.3 (L) 10/24/2022    HCT 20.3 (L) 10/24/2022    MCV 94 10/24/2022    PLT 41 (L) 10/24/2022        CMP  Sodium   Date Value Ref Range Status   09/12/2022 136 136 - 145 mmol/L Final     Potassium   Date Value Ref Range Status   09/12/2022 4.5 3.5 - 5.1 mmol/L Final     Chloride   Date Value Ref Range Status   09/12/2022 103 95 - 110 mmol/L Final     CO2   Date Value Ref Range Status   09/12/2022 24 23 - 29 mmol/L Final     Glucose   Date Value Ref Range Status   09/12/2022 97 70 - 110 mg/dL Final     BUN   Date Value Ref Range Status   09/12/2022 14 8 - 23 mg/dL Final     Creatinine   Date Value Ref Range Status   09/12/2022 0.7 0.5 - 1.4 mg/dL Final   09/12/2022 0.7 0.5 - 1.4 mg/dL Final     Calcium   Date Value Ref Range Status   09/12/2022 9.9 8.7 - 10.5 mg/dL Final     Total Protein   Date Value Ref Range Status   09/12/2022 7.9 6.0 - 8.4 g/dL Final     Albumin   Date Value Ref Range Status   09/12/2022 3.5 3.5 - 5.2 g/dL Final     Total Bilirubin   Date Value Ref Range Status   09/12/2022 0.7 0.1 -  1.0 mg/dL Final     Comment:     For infants and newborns, interpretation of results should be based  on gestational age, weight and in agreement with clinical  observations.    Premature Infant recommended reference ranges:  Up to 24 hours.............<8.0 mg/dL  Up to 48 hours............<12.0 mg/dL  3-5 days..................<15.0 mg/dL  6-29 days.................<15.0 mg/dL       Alkaline Phosphatase   Date Value Ref Range Status   09/12/2022 70 55 - 135 U/L Final     AST   Date Value Ref Range Status   09/12/2022 13 10 - 40 U/L Final     ALT   Date Value Ref Range Status   09/12/2022 7 (L) 10 - 44 U/L Final     Anion Gap   Date Value Ref Range Status   09/12/2022 9 8 - 16 mmol/L Final     eGFR if    Date Value Ref Range Status   07/03/2022 >60.0 >60 mL/min/1.73 m^2 Final     eGFR if non    Date Value Ref Range Status   07/03/2022 >60.0 >60 mL/min/1.73 m^2 Final     Comment:     Calculation used to obtain the estimated glomerular filtration  rate (eGFR) is the CKD-EPI equation.             Assessment:       Problem List Items Addressed This Visit          Oncology    Myelodysplasia (myelodysplastic syndrome)    Relevant Orders    Comprehensive Metabolic Panel     Other Visit Diagnoses       Drug-induced constipation    -  Primary    Relevant Medications    senna (SENOKOT) 8.6 mg tablet            Plan:       IPPS-R Very High Risk MDS  RAEB-2 MDS with complete cytogenetics  Likely secondary MDS from prior chemotherapy/radiation therapy  She started Inqovi 5 of 28 days on 10/21/22.  She is tolerating it well thus far.  Weekly labs for next 8 weeks    2. Stage IIIC2 UPSC  Diagnostic PET with pelvic and PA lymphadenopathy  RALH/BSO/ Debuliking of pelvic lymph nodes (6/8) positive on 8/15/2019.  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   T/C started 9/27/2019  WPRT to PA  nodes 45Gy completed 1/22/2020  HDR to 15Gy in 3 fractions completed 3/3/2020  Restarted T/C 3/20/2020, s/p 5 cycles concluding 5/8/2020.   Complicated by pancytopenia and anemia.       3. 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   Small mass seen on mammogram 2007 and 2008, nipple inversion 2012 at [facility]  Referred for radiation [facility] at [facility]  On Arimidex for 4 years      Abiodun Domingo MD  Hematology, Oncology, and Stem Cell Transplantation  [facility]

## 2022-10-27 NOTE — Clinical Note
Good afternoon,   I met with Ms. Angel today and wanted to share some concerns she verbalized with me. I'll say this was my first time meeting her, so I don't know her baseline but she was very anxious and frustrated/angry. Specifically, she's very upset that her renal and liver function are not being monitored weekly, she read something online that says it should be checked frequently and she requests to have those labs rechecked as soon as possible. Overall, she says her body doesn't feel normal, and she attributes this to the medication. She feels very mistrustful about her care, that something will be missed and/or that it will make her worse. I am going to explore these feelings more when she's not with so much abd discomfort. I recommended that she cont with psych and she refused at this time. I guess I'm just letting you know that she seems emotionally tenuous, I will increase support in the ways that I can and wanted to give you all heads-up in case this is new or becoming more severe.   BRENNON Chacon

## 2022-10-27 NOTE — PROGRESS NOTES
Consult Note  Palliative Care      Consult Requested By: No ref. provider found  Reason for Consult: symptom management and ACP      ASSESSMENT/PLAN:     Plan/Recommendations:  Diagnoses and all orders for this visit:    Myelodysplastic syndrome/History of breast cancer/History of endometrial cancer  - patient followed by Dr. Ford and CHHAYA Jansen as well as Dr. Regalado  - please see oncology notes for full details of breast cancer history and treatment as well as endometrial cancer and treatment   - patient reporting that she has been having difficult with losing blood for past two years and there is no specific diagnosis or treatment plan at this time  - patient recently underwent bone marrow biopsy    Encounter for Palliative Care/Advanced Care Planning  - patient decisional  - patient by herself in clinic today  - has ACP documents uploaded into EMR  - HCPOA: Geni Eubanks at [phone]  - living will also completed  - philosophy of Palliative Medicine reviewed with patient today  - new patient folder given to and reviewed with patient today  - goals: life prolonging  - patient states that she has decided that she will not undergo further chemotherapy or radiation therapy should she need it in the future. She would be open to discussion oral options or other options for care should the need arise at that time.  - will continue to monitor    Anorexia/Nausea/Abdominal discomfort  - at previous appt patient initially rated her pain as 5/10 on pain scale and stated it was generally in the abdomen. Upon further discussion, this sensation is not pain but rather feeling of queasiness  - today she reports moderate abd discomfort 2/2 constipation   - cont zofran PRN   - reports improved appetite (patient previously discussed that she does not have any foods that she is tempted to eat. She will cook various dishes or pastas but then after one serving, she loses interest and no longer wants to eat anything)  -  "previously discussed eating smaller portions and using other ways to improve nutrition such as protein shakes, smoothies, etc  - no longer taking remeron bc appetite improved   - low threshold for nutrition consult  - will continue to monitor     Adjustment disorder with mixed anxiety and depressed mood  - patient reporting moderate depression and anxiety, affect is consistent with high anxiety   - patient expresses frustration and anxiety regarding not getting weekly labs to monitor liver and renal function   - pt expresses mistrust of her medical team due to previous experiences where diagnosis were missed, and when medications/radiation made her worse  - patient states that she may be at her limit with treatment side effects  - refers to herself and her body as "depleted"   - patient previously stated that the unknowns about the results of the bone marrow biopsy as well as the ongoing work up for the loss of her blood. She has increased anxiety when she checks the chart and has not been able to speak with providers  - still finding rehana in caring for kittens   - emotional support provided, strongly recommended patient following with psych for additional emotional support    - patient says her treatment from oncology-psychology is complete, reports she cannot drive to [place] for therapy, discussed availability of virtual providers, patient declined at this time   - no longer taking remeron  - does not want to start any new medications until her constipation is controlled  - will continue to monitor     Neoplastic (malignant) related fatigue/Insomnia  - patient denies insomnia  - patient states that she continues to use her medicinal marijuana at night, which improves her sleep and overall quality of life  - discussed finding a balance between rest and activity  - discussed using energy for things she enjoys, such as rescue cats or renovating a house  - discussed use of body scan at night as well  - tip sheet " for better sleep in new patient folder for patient to review  - will continue to monitor     Constipation/Diarrhea  - patient reporting constipation discomfort   - she states that she has only had 1 very small bm in the last two days   - patient has not used miralax but discussed using on more frequent basis for improved bowel   - discussed starting senna 1 tab qhs  - discussed using dulcolax suppository for acute constipation   - constipation tip sheet in new patient folder for patient to review  - increase hydration to support good bowel movements  - will continue to monitor     Understanding of illness/Prognosis: patient has fair understanding of illness; work up still in process. Prognosis to be determined     Goals of care/Follow up: life prolonging and ~ 6 weeks    Patient's encounter and above plan of care discussed with patient's oncologist, oncology NP, and onc-psych    SUBJECTIVE:     History of Present Illness:  Patient is a 67 y.o. year old female with a. Fib, chronic back pain, depression, history of endometrial cancer, fibromyalgia, history of breast cancer, PTSD, and myelodysplastic syndrome presents to Palliative Medicine for symptom management and ACP. Please see oncology notes for full details of patient's breast cancer history/treatment, endometrial cancer history/treatment, and ongoing work up and treatment of myelodysplastic syndrome.     10/27/2022:  KYLAH CLEMONS reviewed and summarized:  No surprises    Patient presents to clinic alone today. Her biggest concern is abdominal discomfort secondary to constipation. She additionally expresses high levels of anxiety and mistrust of her medical providers in light of her past experiences. Today she is especially upset that her liver and renal function have not been monitored since September. She states that she is very worried that her medical team might miss something or make a mistake. She is also worried as she starts to feel the side-effects of the  treatment that her body is being further damaged. RTC in 5 weeks when symptom-burden is lower to explore overall goals and fears related to treatment.     09/29/2022:  KYLAH CLEMONS reviewed and summarized:  09/21/2022 .thc.900.ti.cherry Disp: 30 for 30 days  08/16/2022 Medible_chew_blue_raspberry_400mgthc:0mgcbd Disp: 40 for 14 days    Patient presents today by herself. She has moderate abdominal discomfort, which is relieved with use of small coke or zofran. She is not in pain. She has moderate anxiety and depression around continued work up with unknown answers and future. She is frustrated by a lack of treatment plan. Patient having moderate to severe anorexia. She states that she does not have any foods that tempt her to eat. She is not sleeping well due to anxiety/depression and recently being out of her medicinal marijuana. Patient having fluctuation of constipation and diarrhea. Patient has already completed ACP documents.     Past Medical History:   Diagnosis Date    Allergy     skin    Anxiety     Atrial fibrillation     Breast cancer 2014    right    Chronic back pain     Depression     Encounter for blood transfusion     Endometrial cancer 07/30/2019    Fibromyalgia     Hives     Hx of psychiatric care     Hx of radiation therapy     Itching     Lichen sclerosus     Mental disorder     PONV (postoperative nausea and vomiting)     Psychiatric problem     PTSD (post-traumatic stress disorder)     Sleep difficulties     Sore throat     Therapy     Uterine cancer 08/05/2019    UTI (urinary tract infection)      Past Surgical History:   Procedure Laterality Date    anal fissure surgery      APPENDECTOMY      BIOPSY N/A 11/19/2020    Procedure: BONE BIOPSY;  Surgeon: Leisa Diagnostic Provider;  Location: [facility] OR;  Service: Radiology;  Laterality: N/A;  RN PREOP 11/17/2020    BREAST BIOPSY Right 2014    BREAST LUMPECTOMY Right 2014    R lumpectomy [facility] w 2.4cm IDC & DCIS, neg margins, 0/6 SN, Stage II, ER/CT+, HER-2  neg Adjuvant XRT and hormonal therapy     CARPAL TUNNEL RELEASE      CHOLECYSTECTOMY      COLONOSCOPY      COLONOSCOPY N/A 6/11/2020    Procedure: COLONOSCOPY;  Surgeon: Bobby Marino MD;  Location: [facility] (2ND FLR);  Service: Endoscopy;  Laterality: N/A;    COLONOSCOPY N/A 6/9/2022    Procedure: COLONOSCOPY;  Surgeon: Abdoulaye Alcocer MD;  Location: [facility] ENDO (2ND FLR);  Service: Endoscopy;  Laterality: N/A;    ESOPHAGOGASTRODUODENOSCOPY N/A 6/11/2020    Procedure: EGD (ESOPHAGOGASTRODUODENOSCOPY);  Surgeon: Bobby Marino MD;  Location: [facility] ENDO (2ND FLR);  Service: Endoscopy;  Laterality: N/A;    HYSTERECTOMY  08/15/2019    HYSTEROSCOPY WITH DILATION AND CURETTAGE OF UTERUS N/A 7/24/2019    Procedure: HYSTEROSCOPY, WITH DILATION AND CURETTAGE OF UTERUS;  Surgeon: Елена Kam MD;  Location: [facility];  Service: OB/GYN;  Laterality: N/A;    INSERTION OF TUNNELED CENTRAL VENOUS CATHETER (CVC) WITH SUBCUTANEOUS PORT N/A 9/26/2019    Procedure: INSERTION, PORT-A-CATH;  Surgeon: Moris Varghese MD;  Location: [facility] CATH LAB;  Service: Radiology;  Laterality: N/A;    MEDIPORT REMOVAL N/A 3/5/2020    Procedure: REMOVAL, CATHETER, CENTRAL VENOUS, TUNNELED, WITH PORT;  Surgeon: Moris Varghese MD;  Location: [facility] CATH LAB;  Service: Radiology;  Laterality: N/A;    NEEDLE LOCALIZATION N/A 11/19/2020    Procedure: NEEDLE LOCALIZATION;  Surgeon: [facility] Diagnostic Provider;  Location: [facility] OR;  Service: Radiology;  Laterality: N/A;    PARTIAL MASCECTOMY Right 2014    ROBOT-ASSISTED LAPAROSCOPIC ABDOMINAL HYSTERECTOMY USING DA RAMU XI N/A 8/15/2019    Procedure: XI ROBOTIC HYSTERECTOMY;  Surgeon: Darius Regalado MD;  Location: [facility];  Service: OB/GYN;  Laterality: N/A;    ROBOT-ASSISTED LAPAROSCOPIC SALPINGO-OOPHORECTOMY USING DA RAMU XI Bilateral 8/15/2019    Procedure: XI ROBOTIC SALPINGO-OOPHORECTOMY;  Surgeon: Darius Regalado MD;  Location: [facility];  Service: OB/GYN;  Laterality: Bilateral;     ROBOT-ASSISTED LYMPHADENECTOMY Left 8/15/2019    Procedure: ROBOTIC LYMPHADENECTOMY;  Surgeon: Darius Regalado MD;  Location: [facility];  Service: OB/GYN;  Laterality: Left;    SALPINGOOPHORECTOMY Bilateral 08/15/2019    TONSILLECTOMY      TUBAL LIGATION       Family History   Problem Relation Age of Onset    Skin cancer Mother 89        squamous cell--nose & head    Rashes / Skin problems Mother 92        cutaneous horn of scalp    Hypertension Mother     Arthritis Mother     Other Mother 85        PANC mass suspected by provider to be ca, surv'd 8 yrs    Pancreatic cancer Father 85        surv'd 8 mos    Pancreatic cancer Maternal Aunt 62    Colon polyps Sister         pt thinks (a few)    Hypertension Brother     Cancer Paternal Uncle         leukemia, 70s    Heart attack Paternal Grandmother     Heart attack Paternal Grandfather     Hypertension Brother     Colon polyps Sister         Geni believes just a handful    Hashimoto's thyroiditis Sister     Diabetes Other         strong family history per patient    Lung cancer Maternal Uncle         smoker    Cancer Paternal Aunt         origin? colon or ovarian?    Suicide Cousin     Alcohol abuse Cousin     Throat cancer Other         pt believes    Breast cancer Neg Hx     Ovarian cancer Neg Hx     Cirrhosis Neg Hx     Colon cancer Neg Hx      Review of patient's allergies indicates:  No Known Allergies    Medications:    Current Outpatient Medications:     acetaminophen (TYLENOL) 500 MG tablet, Take 1,000 mg by mouth daily as needed for Pain., Disp: , Rfl:     ciprofloxacin-dexamethasone 0.3-0.1% (CIPRODEX) 0.3-0.1 % DrpS, place 4 DROPS into BOTH EARS TWICE DAILY FOR 5 DAYS, Disp: , Rfl:     clobetasol 0.05% (TEMOVATE) 0.05 % Oint, APPLY TO AFFECTED AREA(S) TWICE A DAY FOR 1 MONTH(S) then EVERY DAY FOR 1 MONTH(S) then 3 TIMES A WEEK, Disp: 60 g, Rfl: 2    decitabine-cedazuridine  mg Tab, Take 1 tablet by mouth once daily. For 5 days every 28 days, Disp: 5  tablet, Rfl: 5    fexofenadine (ALLEGRA) 180 MG tablet, 2 tablets in the morning.  May take an extra 2 tablets during the day if needed for itching., Disp: 240 tablet, Rfl: 5    hydrocortisone 2.5 % cream, Apply to affected areas twice a day when eczema is moderate., Disp: 453.6 g, Rfl: 1    hydrOXYzine HCL (ATARAX) 25 MG tablet, 1 to 8 tablets at bedtime.  Start with 3 tablets.  Increase or decrease as needed until itching is controlled or a maximum of 8 tablets. (Patient taking differently: 1 to 8 tablets at bedtime.  Start with 3 tablets.  Increase or decrease as needed until itching is controlled or a maximum of 8 tablets.), Disp: 240 tablet, Rfl: 3    meth/meblue/sod phos/psal/hyos (URIBEL ORAL), Take by mouth as needed., Disp: , Rfl:     metoprolol tartrate (LOPRESSOR) 25 MG tablet, Take 1 tablet (25 mg total) by mouth 2 (two) times daily., Disp: 180 tablet, Rfl: 3    mirtazapine (REMERON) 15 MG tablet, Take 1 tablet (15 mg total) by mouth every evening., Disp: 30 tablet, Rfl: 2    nystatin (MYCOSTATIN) cream, Apply topically every Mon, Wed, Fri., Disp: 30 g, Rfl: 11    nystatin (MYCOSTATIN) ointment, APPLY SMALL AMOUNT TO LEFT EAR TWICE DAILY WITH TRIAMCINOLONE FOR 7 DAYS, Disp: , Rfl:     nystatin (MYCOSTATIN) powder, Apply topically 4 (four) times daily., Disp: 30 g, Rfl: 1    ondansetron (ZOFRAN) 4 MG tablet, Take 1 tablet (4 mg total) by mouth every 6 (six) hours as needed for Nausea., Disp: 12 tablet, Rfl: 0    pantoprazole (PROTONIX) 40 MG tablet, Take 1 tablet (40 mg total) by mouth once daily., Disp: 90 tablet, Rfl: 3    triamcinolone acetonide 0.025% (KENALOG) 0.025 % Oint, APPLY SMALL AMOUNT TO LEFT EAR TWICE DAILY WITH NYSTATIN FOR 7 DAYS, Disp: , Rfl:     UNABLE TO FIND, Turkey Tail Mushrooms Immune Support, Disp: , Rfl:     UNABLE TO FIND, medication name: medical marijuana, Disp: , Rfl:     OBJECTIVE:       ROS:  Review of Systems   Constitutional:  Positive for activity change, appetite change  and fatigue.   HENT: Negative.     Eyes: Negative.    Respiratory: Negative.     Cardiovascular: Negative.    Gastrointestinal:  Positive for constipation, diarrhea and nausea. Negative for abdominal pain.   Genitourinary: Negative.    Musculoskeletal: Negative.    Skin: Negative.    Neurological: Negative.    Psychiatric/Behavioral:  Positive for dysphoric mood and sleep disturbance. The patient is nervous/anxious.    All other systems reviewed and are negative.    Review of Symptoms      Symptom Assessment (ESAS 0-10 Scale)  Pain:  6  Dyspnea:  3  Anxiety:  0  Nausea:  5  Depression:  7  Anorexia:  5  Fatigue:  5  Insomnia:  0  Restlessness:  0  Agitation:  0     CAM / Delirium:  Negative  Constipation:  Positive  Diarrhea:  Positive    Anxiety:  Is nervous/anxious  Constipation:  Constipation    Bowel Management Plan (BMP):  Yes      Pain Assessment:  OME in 24 hours:  0  Location(s): none      Modified Sebastien Scale:  0    ECOG Performance Status ndGndrndanddndend:nd nd2nd Living Arrangements:  Lives alone    Psychosocial/Cultural: Patient lives alone but she has her sister next door. Enjoys taking care of stray animals and renovating houses    Spiritual:  F - Shania and Belief:  Hoahaoism  I - Importance:  Moderate  C - Community:  Prays nightly  A - Address in Care:  Needs met at this time    Advance Care Planning   Advance Directives:   Living Will: Yes        Copy on chart: Yes    LaPOST: No    Do Not Resuscitate Status: No    Medical Power of : Yes    Agent's Name:  Geni Eubanks   Agent's Contact Number:  [phone]    Decision Making:  Patient answered questions  Goals of Care: The patient endorses that what is most important right now is to focus on curative/life-prolongation (regardless of treatment burdens)    Accordingly, we have decided that the best plan to meet the patient's goals includes continuing with treatment.        Physical Exam:    Vitals:    10/27/22 1324   BP: (!) 118/57   Pulse: 77       Physical Exam  Vitals reviewed.   Constitutional:       General: She is not in acute distress.     Appearance: She is not ill-appearing or toxic-appearing.   HENT:      Head: Normocephalic and atraumatic.      Right Ear: External ear normal.      Left Ear: External ear normal.   Eyes:      General: No scleral icterus.        Right eye: No discharge.         Left eye: No discharge.   Neck:      Comments: Trachea midline  Pulmonary:      Effort: Pulmonary effort is normal. No respiratory distress.   Abdominal:      General: Abdomen is flat. There is no distension.   Musculoskeletal:         General: No signs of injury.      Cervical back: Normal range of motion.      Right lower leg: No edema.      Left lower leg: No edema.   Skin:     General: Skin is dry.      Coloration: Skin is not jaundiced.      Findings: No rash.   Neurological:      Mental Status: She is alert and oriented to person, place, and time.      Gait: Gait normal.   Psychiatric:         Mood and Affect: Mood is anxious. Affect is not tearful.         Speech: Speech normal.         Thought Content: Thought content normal.       Labs:  CBC:   WBC   Date Value Ref Range Status   10/24/2022 10.93 3.90 - 12.70 K/uL Final     Hemoglobin   Date Value Ref Range Status   10/24/2022 6.3 (L) 12.0 - 16.0 g/dL Final     POC Hematocrit   Date Value Ref Range Status   07/01/2022 19 (LL) 36 - 54 %PCV Final     Hematocrit   Date Value Ref Range Status   10/24/2022 20.3 (L) 37.0 - 48.5 % Final     MCV   Date Value Ref Range Status   10/24/2022 94 82 - 98 fL Final     Platelets   Date Value Ref Range Status   10/24/2022 41 (L) 150 - 450 K/uL Final       LFT:   Lab Results   Component Value Date    AST 13 09/12/2022    ALKPHOS 70 09/12/2022    BILITOT 0.7 09/12/2022       Albumin:   Albumin   Date Value Ref Range Status   09/12/2022 3.5 3.5 - 5.2 g/dL Final     Protein:   Total Protein   Date Value Ref Range Status   09/12/2022 7.9 6.0 - 8.4 g/dL Final  "      Radiology:I have reviewed all pertinent imaging results/findings within the past 24 hours.    10/03/2022 MRI pelvis: "Diffuse abnormal low T1 marrow signal, progressed compared to prior MRI exams.  Findings are nonspecific however can be seen in the setting of red marrow reconversion or other marrow replacement processes such as myelodysplastic syndrome or myeloproliferative neoplasm.  Recommend correlation with recent bone marrow biopsy. No focal destructive or enhancing osseous lesion. Small volume pelvic free fluid."      40 minutes of total time spent on the encounter, which includes face to face time and non-face to face time preparing to see the patient (eg, review of tests), Obtaining and/or reviewing separately obtained history, Documenting clinical information in the electronic or other health record, Independently interpreting results (not separately reported) and communicating results to the patient/family/caregiver, or Care coordination (not separately reported).    Signature: Mary Conrad DNP        "

## 2022-10-28 NOTE — PATIENT INSTRUCTIONS
We suggest that eligible individuals who have an immunocompromising condition or are taking immunosuppressive agents undergo COVID-19 vaccination. Immunogenicity and effectiveness of COVID-19 vaccines appear lower in such individuals compared with the general population; nevertheless, the potential for severe COVID-19 in this population outweighs the uncertainties.

## 2022-10-28 NOTE — PROGRESS NOTES
"Chief Complaint  Chief Complaint   Patient presents with    Follow-up       HPI  Jeanne Rodgers is a 67 y.o. female with multiple medical diagnoses as listed in the medical history and problem list that presents for follow up.  Their last appointment with primary care was 7/18/2022.      Saw Hem/onc recently, feeling down recently. Feeling down most days, still enjoying hobbies "taking care of kittens she took in". Pt has family and friends support. Sleeping ok. Pt reports worry regarding medication efficacy but is hopeful. Had sore throat 3-4 days after starting.   Symptoms w/ Inqovi is constipations.   Pt is deconditioned - difficulty walking 1-2 blocks. Pt is fatigued often. Pt does not have dedicated time for walks. Does grocery shopping on her own and keeps busy around the house. Pt is in a yoga class 1x per week.   Hem Onc note: "Inqovi on 10/21/22.  She has tolerated very well.  She has had fatigue and mild nausea, which is well-controlled with Zofran.  She has had constipation secondary to Zofran. She is concerned about hepatic and renal function while taking this medication".     Nausea; Zofran is working for nausea as long as pt times it well.     Pt takes medical marijuana at night that helps her sleep.     H/o radiation enteritis GI prophylaxis: pt deneis sx of acid reflux, she denies h/o GI bleed, says they never found GI bleed.     Afib: Lopressor 25mg bid, no palpitations recently.         PAST MEDICAL HISTORY:  Past Medical History:   Diagnosis Date    Allergy     skin    Anxiety     Atrial fibrillation     Breast cancer 2014    right    Chronic back pain     Depression     Encounter for blood transfusion     Endometrial cancer 07/30/2019    Fibromyalgia     Hives     Hx of psychiatric care     Hx of radiation therapy     Itching     Lichen sclerosus     Mental disorder     PONV (postoperative nausea and vomiting)     Psychiatric problem     PTSD (post-traumatic stress disorder)     Sleep " difficulties     Sore throat     Therapy     Uterine cancer 08/05/2019    UTI (urinary tract infection)        PAST SURGICAL HISTORY:  Past Surgical History:   Procedure Laterality Date    anal fissure surgery      APPENDECTOMY      BIOPSY N/A 11/19/2020    Procedure: BONE BIOPSY;  Surgeon: [facility] Diagnostic Provider;  Location: [facility] OR;  Service: Radiology;  Laterality: N/A;  RN PREOP 11/17/2020    BREAST BIOPSY Right 2014    BREAST LUMPECTOMY Right 2014    R lumpectomy [facility] w 2.4cm IDC & DCIS, neg margins, 0/6 SN, Stage II, ER/IN+, HER-2 neg Adjuvant XRT and hormonal therapy     CARPAL TUNNEL RELEASE      CHOLECYSTECTOMY      COLONOSCOPY      COLONOSCOPY N/A 6/11/2020    Procedure: COLONOSCOPY;  Surgeon: Bobby Marino MD;  Location: [facility] (2ND FLR);  Service: Endoscopy;  Laterality: N/A;    COLONOSCOPY N/A 6/9/2022    Procedure: COLONOSCOPY;  Surgeon: Abdoulaye Alcocer MD;  Location: [facility] (2ND FLR);  Service: Endoscopy;  Laterality: N/A;    ESOPHAGOGASTRODUODENOSCOPY N/A 6/11/2020    Procedure: EGD (ESOPHAGOGASTRODUODENOSCOPY);  Surgeon: Bobby Marino MD;  [facility]  Service: Endoscopy;  Laterality: N/A;    HYSTERECTOMY  08/15/2019    HYSTEROSCOPY WITH DILATION AND CURETTAGE OF UTERUS N/A 7/24/2019    Procedure: HYSTEROSCOPY, WITH DILATION AND CURETTAGE OF UTERUS;  Surgeon: Елена Kam MD;  Location: [facility];  Service: OB/GYN;  Laterality: N/A;    INSERTION OF TUNNELED CENTRAL VENOUS CATHETER (CVC) WITH SUBCUTANEOUS PORT N/A 9/26/2019    Procedure: INSERTION, PORT-A-CATH;  Surgeon: Moris Varghese MD;  Location: [facility] CATH LAB;  Service: Radiology;  Laterality: N/A;    MEDIPORT REMOVAL N/A 3/5/2020    Procedure: REMOVAL, CATHETER, CENTRAL VENOUS, TUNNELED, WITH PORT;  Surgeon: Moris Varghese MD;  Location: [facility] CATH LAB;  Service: Radiology;  Laterality: N/A;    NEEDLE LOCALIZATION N/A 11/19/2020    Procedure: NEEDLE LOCALIZATION;  Surgeon: [facility] Diagnostic  Provider;  Location: [facility] OR;  Service: Radiology;  Laterality: N/A;    PARTIAL MASCECTOMY Right 2014    ROBOT-ASSISTED LAPAROSCOPIC ABDOMINAL HYSTERECTOMY USING DA RAMU XI N/A 8/15/2019    Procedure: XI ROBOTIC HYSTERECTOMY;  Surgeon: Darius Regalado MD;  Location: [facility] OR;  Service: OB/GYN;  Laterality: N/A;    ROBOT-ASSISTED LAPAROSCOPIC SALPINGO-OOPHORECTOMY USING DA RAMU XI Bilateral 8/15/2019    Procedure: XI ROBOTIC SALPINGO-OOPHORECTOMY;  Surgeon: Darius Regalado MD;  Location: [facility] OR;  Service: OB/GYN;  Laterality: Bilateral;    ROBOT-ASSISTED LYMPHADENECTOMY Left 8/15/2019    Procedure: ROBOTIC LYMPHADENECTOMY;  Surgeon: Darius Regalado MD;  Location: [facility] OR;  Service: OB/GYN;  Laterality: Left;    SALPINGOOPHORECTOMY Bilateral 08/15/2019    TONSILLECTOMY      TUBAL LIGATION         SOCIAL HISTORY:  Social History     Socioeconomic History    Marital status: Single    Number of children: 0   Occupational History    Occupation: [employer]   Tobacco Use    Smoking status: Never    Smokeless tobacco: Never   Substance and Sexual Activity    Alcohol use: No     Alcohol/week: 0.0 standard drinks    Drug use: Yes     Types: Marijuana     Comment: medical marijuana    Sexual activity: Not Currently     Partners: Male   Social History Narrative    Lives alone.     Never , no children    Very invested in cat rescue       FAMILY HISTORY:  Family History   Problem Relation Age of Onset    Skin cancer Mother 89        squamous cell--nose & head    Rashes / Skin problems Mother 92        cutaneous horn of scalp    Hypertension Mother     Arthritis Mother     Other Mother 85        PANC mass suspected by provider to be ca, surv'd 8 yrs    Pancreatic cancer Father 85        surv'd 8 mos    Pancreatic cancer Maternal Aunt 62    Colon polyps Sister         pt thinks (a few)    Hypertension Brother     Cancer Paternal Uncle         leukemia, 70s    Heart attack Paternal Grandmother     Heart attack Paternal  Grandfather     Hypertension Brother     Colon polyps Sister         Geni believes just a handful    Hashimoto's thyroiditis Sister     Diabetes Other         strong family history per patient    Lung cancer Maternal Uncle         smoker    Cancer Paternal Aunt         origin? colon or ovarian?    Suicide Cousin     Alcohol abuse Cousin     Throat cancer Other         pt believes    Breast cancer Neg Hx     Ovarian cancer Neg Hx     Cirrhosis Neg Hx     Colon cancer Neg Hx        ALLERGIES AND MEDICATIONS: updated and reviewed.  Review of patient's allergies indicates:  No Known Allergies  Current Outpatient Medications   Medication Sig Dispense Refill    acetaminophen (TYLENOL) 500 MG tablet Take 1,000 mg by mouth daily as needed for Pain.      bisacodyL (DULCOLAX) 10 mg Supp Place 1 suppository (10 mg total) rectally daily as needed (constipation). 5 suppository 0    clobetasol 0.05% (TEMOVATE) 0.05 % Oint APPLY TO AFFECTED AREA(S) TWICE A DAY FOR 1 MONTH(S) then EVERY DAY FOR 1 MONTH(S) then 3 TIMES A WEEK 60 g 2    decitabine-cedazuridine  mg Tab Take 1 tablet by mouth once daily. For 5 days every 28 days 5 tablet 5    fexofenadine (ALLEGRA) 180 MG tablet 2 tablets in the morning.  May take an extra 2 tablets during the day if needed for itching. 240 tablet 5    hydrocortisone 2.5 % cream Apply to affected areas twice a day when eczema is moderate. 453.6 g 1    hydrOXYzine HCL (ATARAX) 25 MG tablet 1 to 8 tablets at bedtime.  Start with 3 tablets.  Increase or decrease as needed until itching is controlled or a maximum of 8 tablets. (Patient taking differently: 1 to 8 tablets at bedtime.  Start with 3 tablets.  Increase or decrease as needed until itching is controlled or a maximum of 8 tablets.) 240 tablet 3    meth/meblue/sod phos/psal/hyos (URIBEL ORAL) Take by mouth as needed.      metoprolol tartrate (LOPRESSOR) 25 MG tablet Take 1 tablet (25 mg total) by mouth 2 (two) times daily. 180 tablet 3     nystatin (MYCOSTATIN) cream Apply topically every Mon, Wed, Fri. 30 g 11    nystatin (MYCOSTATIN) powder Apply topically 4 (four) times daily. 30 g 1    ondansetron (ZOFRAN) 4 MG tablet Take 1 tablet (4 mg total) by mouth every 6 (six) hours as needed for Nausea. 120 tablet 0    senna (SENOKOT) 8.6 mg tablet Take 1 tablet by mouth once daily. Can take twice daily 30 tablet 11    UNABLE TO FIND [employer] Mushrooms Immune Support      pantoprazole (PROTONIX) 40 MG tablet Take 1 tablet (40 mg total) by mouth once daily. (Patient not taking: Reported on 10/28/2022) 90 tablet 3     No current facility-administered medications for this visit.         ROS  Review of Systems   Constitutional:  Positive for fatigue. Negative for appetite change, chills and fever.   HENT:  Positive for hearing loss. Negative for trouble swallowing.    Respiratory:  Positive for cough (chronic dry) and shortness of breath. Negative for chest tightness.    Cardiovascular:  Negative for chest pain.   Gastrointestinal:  Positive for abdominal pain (Cramping), constipation and nausea. Negative for blood in stool.   Musculoskeletal:  Positive for arthralgias.   Neurological:  Positive for dizziness and light-headedness. Negative for seizures and syncope.   Hematological:  Bruises/bleeds easily.         Physical Exam  Vitals:    10/28/22 0837   BP: 90/62   Pulse: 76   Temp: 97.9 °F (36.6 °C)   TempSrc: Oral   SpO2: 96%   Weight: 52.6 kg (115 lb 15.4 oz)   Height: 5' (1.524 m)    Body mass index is 22.65 kg/m².  Weight: 52.6 kg (115 lb 15.4 oz)   Height: 5' (152.4 cm)   Physical Exam  Vitals reviewed.   Constitutional:       General: She is not in acute distress.     Appearance: Normal appearance. She is not ill-appearing, toxic-appearing or diaphoretic.   HENT:      Head: Normocephalic and atraumatic.      Right Ear: Tympanic membrane, ear canal and external ear normal.      Left Ear: Tympanic membrane, ear canal and external ear normal.       "Nose: Nose normal.      Mouth/Throat:      Mouth: Mucous membranes are moist.      Pharynx: Oropharynx is clear.   Eyes:      Conjunctiva/sclera: Conjunctivae normal.      Pupils: Pupils are equal, round, and reactive to light.   Neck:      Vascular: No carotid bruit.   Cardiovascular:      Rate and Rhythm: Normal rate and regular rhythm.      Pulses: Normal pulses.      Heart sounds: Normal heart sounds.   Pulmonary:      Effort: Pulmonary effort is normal.      Breath sounds: Normal breath sounds.   Abdominal:      General: Abdomen is flat. Bowel sounds are normal.      Palpations: Abdomen is soft.   Musculoskeletal:         General: Tenderness present.      Cervical back: No tenderness.      Right lower leg: No edema.      Left lower leg: No edema.   Lymphadenopathy:      Cervical: No cervical adenopathy.   Skin:     General: Skin is warm and dry.      Capillary Refill: Capillary refill takes less than 2 seconds.      Findings: Bruising present.   Neurological:      General: No focal deficit present.      Mental Status: She is alert.   Psychiatric:         Mood and Affect: Mood normal.         Behavior: Behavior normal.         Health Maintenance         Date Due Completion Date    COVID-19 Vaccine (1) Never done ---    Shingles Vaccine (1 of 2) Never done ---    Pneumococcal Vaccines (Age 65+) (3 - PPSV23 if available, else PCV20) 09/18/2020 9/10/2019    Influenza Vaccine (1) 06/30/2023 (Originally 9/1/2022) 11/10/2020    Mammogram 08/12/2023 8/12/2022    DEXA Scan 06/02/2025 6/2/2022    TETANUS VACCINE 09/18/2025 9/18/2015    Colorectal Cancer Screening 06/09/2027 6/9/2022    Lipid Panel 06/30/2027 6/30/2022        Pt declines vaccines right now "not a good thing w/ my body, O+ is a good blood to have to prevent diseases".     Assessment and Plan:    Myelodysplasia (myelodysplastic syndrome)  Pancytopenia  Shortness of breath  Irregular bowel habits/constipation  Mood disorder  History of radiation " enteritis  Paroxysmal atrial fibrillation  Medical marijuana use    Myelodysplastic syndrome  [facility].   CBC weekly from haematology, current Hb 6.3  Continue Inqovi   Continue senna, docusate, fiber supplementation, and zofran for side effect management  Discussed w/ pt the importance of vaccination, pt declined vaccines.   Discussed w/ pt the importance of exercise, pt verbalized understanding.     H/o Radiation Enteritis  Counseled patient to continue PPI for GI prophylaxis     Afib  Rate controlled on lopressor 25mg bid   Not on anticoagulation due to high risk of bleeding   In NSR today, pt reports no recent h/o palpitations.     Allergic contact dermatitis  Of forearms, trial topical steroid  -     triamcinolone acetonide 0.1% (KENALOG) 0.1 % ointment; Apply topically 2 (two) times daily. for 7 days  Dispense: 80 g; Refill: 0    Health Maintenance:   Health maintenance reviewed & updated today. Remain up to date with regular eye/dental exams and regular exercise.  Covid vaccine - Primary series never done, pt declines but states will consider given immunocompromised status  Flu vaccine - pt declines   Pneumococcal vaccine - pt declines     Sarah Caldwell, MS4    I spent 40 minutes reviewing the patient's chart, talking to family/caregiver(s), coordinating care with other providers/specialists and time spent on documentation     I hereby acknowledge that I am relying upon documentation authored by a medical student working under my supervision and further I hereby attest that I have verified the student documentation or findings by personally performing the physical exam and medical decision making activities of the Evaluation and Management service to be billed.    Gary Gonzalez MD

## 2022-10-28 NOTE — PLAN OF CARE
Patient tolerated 1 unit PRBC with no complications. VSS. Pt instructed to call MD with any problems. Pt discharged home independently.

## 2022-11-09 NOTE — TELEPHONE ENCOUNTER
Specialty Pharmacy - Refill Coordination    Specialty Medication Orders Linked to Encounter      Flowsheet Row Most Recent Value   Medication #1 decitabine-cedazuridine  mg Tab (Order#[id number], Rx#[id number])            Refill Questions - Documented Responses      Flowsheet Row Most Recent Value   Patient Availability and HIPAA Verification    Does patient want to proceed with activity? Yes   HIPAA/medical authority confirmed? Yes   Relationship to patient of person spoken to? Self   Refill Screening Questions    Changes to allergies? No   Changes to medications? No   New conditions since last clinic visit? No   Unplanned office visit, urgent care, ED, or hospital admission in the last 4 weeks? No   How does patient/caregiver feel medication is working? Good   Financial problems or insurance changes? No   How many doses of your specialty medications were missed in the last 4 weeks? 0   Would patient like to speak to a pharmacist? No   When does the patient need to receive the medication? 11/15/22   Refill Delivery Questions    How will the patient receive the medication? Pickup   When does the patient need to receive the medication? 11/15/22   Shipping Address Home   Address in [facility] confirmed and updated if neccessary? Yes   Expected Copay ($) 0   Is the patient able to afford the medication copay? Yes   Payment Method zero copay   Days supply of Refill 28   Supplies needed? No supplies needed   Refill activity completed? Yes   Refill activity plan Refill scheduled   Shipment/Pickup Date: 11/10/22            Current Outpatient Medications   Medication Sig    acetaminophen (TYLENOL) 500 MG tablet Take 1,000 mg by mouth daily as needed for Pain.    bisacodyL (DULCOLAX) 10 mg Supp Place 1 suppository (10 mg total) rectally daily as needed (constipation).    clobetasol 0.05% (TEMOVATE) 0.05 % Oint APPLY TO AFFECTED AREA(S) TWICE A DAY FOR 1 MONTH(S) then EVERY DAY FOR 1 MONTH(S) then 3 TIMES A WEEK     decitabine-cedazuridine  mg Tab Take 1 tablet by mouth once daily. For 5 days every 28 days    fexofenadine (ALLEGRA) 180 MG tablet 2 tablets in the morning.  May take an extra 2 tablets during the day if needed for itching.    hydrocortisone 2.5 % cream Apply to affected areas twice a day when eczema is moderate.    hydrOXYzine HCL (ATARAX) 25 MG tablet 1 to 8 tablets at bedtime.  Start with 3 tablets.  Increase or decrease as needed until itching is controlled or a maximum of 8 tablets. (Patient taking differently: 1 to 8 tablets at bedtime.  Start with 3 tablets.  Increase or decrease as needed until itching is controlled or a maximum of 8 tablets.)    meth/meblue/sod phos/psal/hyos (URIBEL ORAL) Take by mouth as needed.    metoprolol tartrate (LOPRESSOR) 25 MG tablet Take 1 tablet (25 mg total) by mouth 2 (two) times daily.    nystatin (MYCOSTATIN) cream Apply topically every Mon, Wed, Fri.    nystatin (MYCOSTATIN) powder Apply topically 4 (four) times daily.    ondansetron (ZOFRAN) 4 MG tablet Take 1 tablet (4 mg total) by mouth every 6 (six) hours as needed for Nausea. (Patient not taking: Reported on 11/9/2022)    pantoprazole (PROTONIX) 40 MG tablet Take 1 tablet (40 mg total) by mouth once daily.    senna (SENOKOT) 8.6 mg tablet Take 1 tablet by mouth once daily. Can take twice daily    triamcinolone acetonide 0.1% (KENALOG) 0.1 % ointment Apply topically 2 (two) times daily. for 7 days    UNABLE TO FIND [employer] Mushrooms Immune Support   Last reviewed on 10/28/2022  8:40 AM by Dang Melendez MA    Review of patient's allergies indicates:  No Known Allergies Last reviewed on  11/9/2022 2:29 PM by Romana Ward      Tasks added this encounter   12/6/2022 - Refill Call (Auto Added)  11/11/2022 - Pickup Reminder   Tasks due within next 3 months   No tasks due.     De[facility] includes the [facility]  [address] [place] [id number]  Phone: [phone]  Fax:  [phone]

## 2022-11-09 NOTE — PLAN OF CARE
Patient arrived to unit for 1u of plateles. Labs reviewed prior to administration. Vital signs stable. Patient educated to report any new or worsening symptoms during infusion. Patient reported mild itching but declined Tylenol and benadryl because she had to drive home. Patient advised to take the benadryl when she got home. Patient stayed for 20 minutes of observation. Patients vitals stable after platelet infusion. Discharged off unit in no signs of acute distress.
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
## 2022-11-21 PROBLEM — D61.811 DRUG-INDUCED PANCYTOPENIA: Status: ACTIVE | Noted: 2022-01-01

## 2022-11-21 NOTE — CONSULTS
[facility] Dept  Orthopedics  Consult Note    Patient Name: Jeanne Rodgers  MRN: [id number]  Admission Date: 11/21/2022  Hospital Length of Stay: 0 days  Attending Provider: Kenton Manzo DO  Primary Care Provider: Gary Gonzalez MD    Patient information was obtained from patient and ER records.     Inpatient consult to Orthopedic Surgery  Consult performed by: Nick Dover MD  Consult ordered by: Kenton Manzo DO        Subjective:     Principal Problem:Cat bite    Chief Complaint:   Chief Complaint   Patient presents with    Abnormal Lab     Arrives from hematology clinic, needs blood transfusion and is neutropenic, cat scratch to left hand - redness and swelling noted        HPI: Jeanne Rodgers is a 67 y.o. female with hx of endometrial cancer (on chemo), chemo induced pancytopenia, presenting to ED from hem onc clinic due to small area of right dorsal base of thumb swelling and erythema after cat bite on Friday. Pt reports it was her cat that bit her. She reports minimal pain, but a few drops of intermittent purulent drainage from the spot of the cat bite over the weekend. She has not been on abx. She has been taking ibuprofen with full relief of her pain. She has full ROM of her thumb without pain. She was sent to ED from hem on clinic for further evaluation of cat bite. She denies f,c,ns.  RHD.      Past Medical History:   Diagnosis Date    Allergy     skin    Anxiety     Atrial fibrillation     Breast cancer 2014    right    Chronic back pain     Depression     Encounter for blood transfusion     Endometrial cancer 07/30/2019    Fibromyalgia     Hives     Hx of psychiatric care     Hx of radiation therapy     Itching     Lichen sclerosus     Mental disorder     PONV (postoperative nausea and vomiting)     Psychiatric problem     PTSD (post-traumatic stress disorder)     Sleep difficulties     Sore throat     Therapy     Uterine cancer 08/05/2019     UTI (urinary tract infection)        Past Surgical History:   Procedure Laterality Date    anal fissure surgery      APPENDECTOMY      BIOPSY N/A 11/19/2020    Procedure: BONE BIOPSY;  Surgeon: [facility] Diagnostic Provider;  Location: [facility] OR;  Service: Radiology;  Laterality: N/A;  RN PREOP 11/17/2020    BREAST BIOPSY Right 2014    BREAST LUMPECTOMY Right 2014    R lumpectomy [facility] w 2.4cm IDC & DCIS, neg margins, 0/6 SN, Stage II, ER/OK+, HER-2 neg Adjuvant XRT and hormonal therapy     CARPAL TUNNEL RELEASE      CHOLECYSTECTOMY      COLONOSCOPY      COLONOSCOPY N/A 6/11/2020    Procedure: COLONOSCOPY;  Surgeon: Bobby Marino MD;  [facility]  Service: Endoscopy;  Laterality: N/A;    COLONOSCOPY N/A 6/9/2022    Procedure: COLONOSCOPY;  Surgeon: Abdoulaye Alcocer MD;  [facility]  Service: Endoscopy;  Laterality: N/A;    ESOPHAGOGASTRODUODENOSCOPY N/A 6/11/2020    Procedure: EGD (ESOPHAGOGASTRODUODENOSCOPY);  Surgeon: Bobby Marino MD;  [facility]  Service: Endoscopy;  Laterality: N/A;    HYSTERECTOMY  08/15/2019    HYSTEROSCOPY WITH DILATION AND CURETTAGE OF UTERUS N/A 7/24/2019    Procedure: HYSTEROSCOPY, WITH DILATION AND CURETTAGE OF UTERUS;  Surgeon: Елена Kam MD;  Location: [facility];  Service: OB/GYN;  Laterality: N/A;    INSERTION OF TUNNELED CENTRAL VENOUS CATHETER (CVC) WITH SUBCUTANEOUS PORT N/A 9/26/2019    Procedure: INSERTION, PORT-A-CATH;  Surgeon: Moris Varghese MD;  Location: [facility] CATH LAB;  Service: Radiology;  Laterality: N/A;    MEDIPORT REMOVAL N/A 3/5/2020    Procedure: REMOVAL, CATHETER, CENTRAL VENOUS, TUNNELED, WITH PORT;  Surgeon: Moris Varghese MD;  Location: [facility] CATH LAB;  Service: Radiology;  Laterality: N/A;    NEEDLE LOCALIZATION N/A 11/19/2020    Procedure: NEEDLE LOCALIZATION;  Surgeon: [facility] Diagnostic Provider;  Location: [facility] OR;  Service: Radiology;  Laterality: N/A;    PARTIAL  MASCECTOMY Right 2014    ROBOT-ASSISTED LAPAROSCOPIC ABDOMINAL HYSTERECTOMY USING DA RAMU XI N/A 8/15/2019    Procedure: XI ROBOTIC HYSTERECTOMY;  Surgeon: Darius Regalado MD;  Location: [facility];  Service: OB/GYN;  Laterality: N/A;    ROBOT-ASSISTED LAPAROSCOPIC SALPINGO-OOPHORECTOMY USING DA RAMU XI Bilateral 8/15/2019    Procedure: XI ROBOTIC SALPINGO-OOPHORECTOMY;  Surgeon: Darius Regalado MD;  Location: [facility] OR;  Service: OB/GYN;  Laterality: Bilateral;    ROBOT-ASSISTED LYMPHADENECTOMY Left 8/15/2019    Procedure: ROBOTIC LYMPHADENECTOMY;  Surgeon: Darius Regalado MD;  Location: [facility] OR;  Service: OB/GYN;  Laterality: Left;    SALPINGOOPHORECTOMY Bilateral 08/15/2019    TONSILLECTOMY      TUBAL LIGATION         Review of patient's allergies indicates:  No Known Allergies    Current Facility-Administered Medications   Medication    ampicillin-sulbactam 3 g in sodium chloride 0.9 % 100 mL IVPB (ready to mix system)     Current Outpatient Medications   Medication Sig    acetaminophen (TYLENOL) 500 MG tablet Take 1,000 mg by mouth daily as needed for Pain.    bisacodyL (DULCOLAX) 10 mg Supp Place 1 suppository (10 mg total) rectally daily as needed (constipation). (Patient not taking: Reported on 11/21/2022)    clobetasol 0.05% (TEMOVATE) 0.05 % Oint APPLY TO AFFECTED AREA(S) TWICE A DAY FOR 1 MONTH(S) then EVERY DAY FOR 1 MONTH(S) then 3 TIMES A WEEK (Patient not taking: Reported on 11/21/2022)    decitabine-cedazuridine  mg Tab Take 1 tablet by mouth once daily. For 5 days every 28 days    fexofenadine (ALLEGRA) 180 MG tablet 2 tablets in the morning.  May take an extra 2 tablets during the day if needed for itching. (Patient not taking: Reported on 11/21/2022)    hydrocortisone 2.5 % cream Apply to affected areas twice a day when eczema is moderate. (Patient not taking: Reported on 11/21/2022)    hydrOXYzine HCL (ATARAX) 25 MG tablet 1 to 8 tablets at bedtime.  Start with 3 tablets.  Increase  or decrease as needed until itching is controlled or a maximum of 8 tablets. (Patient not taking: Reported on 11/21/2022)    ibuprofen (IBUPROFEN IB) 200 MG tablet Take 200 mg by mouth every 6 (six) hours as needed for Pain. Pt takes 2 pills daily    meth/meblue/sod phos/psal/hyos (URIBEL ORAL) Take by mouth as needed.    metoprolol tartrate (LOPRESSOR) 25 MG tablet Take 1 tablet (25 mg total) by mouth 2 (two) times daily.    mupirocin (BACTROBAN) 2 % ointment Apply topically 4 (four) times daily.    nystatin (MYCOSTATIN) cream Apply topically every Mon, Wed, Fri. (Patient not taking: Reported on 11/21/2022)    nystatin (MYCOSTATIN) powder Apply topically 4 (four) times daily.    ondansetron (ZOFRAN) 4 MG tablet Take 1 tablet (4 mg total) by mouth every 6 (six) hours as needed for Nausea. (Patient not taking: Reported on 11/9/2022)    pantoprazole (PROTONIX) 40 MG tablet Take 1 tablet (40 mg total) by mouth once daily. (Patient not taking: Reported on 11/21/2022)    senna (SENOKOT) 8.6 mg tablet Take 1 tablet by mouth once daily. Can take twice daily (Patient not taking: Reported on 11/21/2022)    triamcinolone acetonide 0.1% (KENALOG) 0.1 % ointment Apply topically 2 (two) times daily. for 7 days    UNABLE TO FIND [place] Tail Mushrooms Immune Support     Family History       Problem Relation (Age of Onset)    Alcohol abuse Cousin    Arthritis Mother    Cancer Paternal Uncle, Paternal Aunt    Colon polyps Sister, Sister    Diabetes Other    Hashimoto's thyroiditis Sister    Heart attack Paternal Grandmother, Paternal Grandfather    Hypertension Mother, Brother, Brother    Lung cancer Maternal Uncle    Other Mother (85)    Pancreatic cancer Father (85), Maternal Aunt (62)    Rashes / Skin problems Mother (92)    Skin cancer Mother (89)    Suicide Cousin    Throat cancer Other          Tobacco Use    Smoking status: Never    Smokeless tobacco: Never   Substance and Sexual Activity    Alcohol use: No      Alcohol/week: 0.0 standard drinks    Drug use: Yes     Types: Marijuana     Comment: medical marijuana    Sexual activity: Not Currently     Partners: Male     ROS Constitutional: Denies fever/chills  Neurological: Denies numbness/tingling (any exceptions noted in orthopaedic exam)   Psychiatric/Behavioral: Denies change in normal mood  Eyes: Denies change in vision  Cardiovascular: Denies chest pain  Respiratory: Denies shortness of breath  Hematologic/Lymphatic: Denies easy bleeding/bruising   Skin: Denies new rash or skin lesions   Gastrointestinal: Denies nausea/vomitting/diarrhea, change in bowel habits, abdominal pain   Allergic/Immunologic: Denies adverse reactions to current medications  Musculoskeletal: see HPI    Objective:     Vital Signs (Most Recent):  Temp: 98.6 °F (37 °C) (11/21/22 1214)  Pulse: 86 (11/21/22 1214)  Resp: 18 (11/21/22 1214)  BP: (!) 111/57 (11/21/22 1214)  SpO2: 100 % (11/21/22 1214)   Vital Signs (24h Range):  Temp:  [98.3 °F (36.8 °C)-98.6 °F (37 °C)] 98.6 °F (37 °C)  Pulse:  [86-88] 86  Resp:  [16-18] 18  SpO2:  [100 %] 100 %  BP: (106-111)/(57-58) 111/57     Weight: 51.3 kg (113 lb)     Body mass index is 22.07 kg/m².    No intake or output data in the 24 hours ending 11/21/22 1514    Ortho/SPM Exam  RUE:  Small puncture wound with surrounding erythema and swelling over dorsal base of thumb  No purulent drainage expressible  No TTP over cat bite  FROM of thumb without pain  FROM shoulder, elbow and wrist  SILT M/U/R  Motor intact AIN/PIN/M/U/R   Cap refill < 2s  2+ RP      Significant Labs: All pertinent labs within the past 24 hours have been reviewed.    Significant Imaging: I have reviewed all pertinent imaging results/findings.  XR of R hand showing cmc arthritis, no acute abnormalities     Assessment/Plan:     * Cat bite  Jeanne Rodgers is a 67 y.o. female with hx of endometrial cancer (on chemo), chemo induced pancytopenia presenting with small area of erythema and  swelling at dorsal base of right thumb after cat bite from pts cat. Pt is neutropenic from chemo and unable to mount proper immune response. Pt has no pain with ROM of thumb and minimal TTP over area. No concern for deep infection or involvement of EPL tendon on exam. No purulent drainage noted from puncture, but decision made to I&D puncture wound to prevent worsening infection. No purulent drainage expressed from I&D, but cx sent of blood.     -Given pt is neutropenic and unable to mount immune response, agree with admission to medicine for IV unasyn  -Pt is also anemic and will require blood transfusion  -I&D performed, no packing placed  -Plan for dressing change and wound evaluation tomorrow vs Wednesday depending on how long pt will be in house  -WBAT and ROM as tolerated RUE        Procedure Note: superficial right dorsal thumb irrigation and debridement  Risks, benefits, and alternatives to treatment was explained to patient at length. Patient verbalized their understanding and gave consent to proceed. Time out was performed and patient name, , site, and procedure were confirmed. 5 cc of 1% lidocaine was used for local block. Skin was sterilely prepared with betadine. Sterile field was prepped around the surgical site. Skin incision was made over puncture wound on right dorsal thumb with scalpel. Incision was taken down bluntly with a hemostat to fluid collection. Blood was expressed. Fluid sample was obtained and sent for analysis and cultures. Incision was irrigated with 1 L of saline. Soft dressings were applied. Patient encouraged to keep extremity elevated. Patient tolerated the procedure well. Blood loss was minimal.           Thank you for your consult. I will follow-up with patient. Please contact us if you have any additional questions.    Nick Dover MD  Orthopedics  [facility] - Emergency Dept

## 2022-11-21 NOTE — ED PROVIDER NOTES
Source of History  Patient      Chief Complaint    Abnormal Lab (Arrives from hematology clinic, needs blood transfusion and is neutropenic, cat scratch to left hand - redness and swelling noted)      History of Present Illness    Jeanne Rodgers is a 67 y.o. female presenting with concerns for right hand infection.  The patient states 3 days ago she got bit by her cat.  It has been draining since then.  She had put colloidal silver and some mupirocin on it but unfortunately this did not heal it.  She was seen today in the clinic with Hematology/Oncology where she is being followed for pancytopenia, and they noted that she does have neutropenia.  She has no fevers.  The right hand is warm to touch and swollen.  She has full range of motion of all of her fingers.  Last tetanus 2015  Cat reportedly fully vaccinated  The Hematology-Oncology team did not want to proceed with chemotherapy today, and they does sent her to the emergency department for continued evaluation of the possible cellulitis versus abscess versus deeper space infection    Review of Systems    As per HPI and below:  Constitutional symptoms:  No weakness, no fever  Skin symptoms:  No rash, no bruising, erythema to wrist  Cardiovascular symptoms:  No chest pain, no lower extremity swelling  Musculoskeletal symptoms:  No back pain, joint warmth to right hand over the dorsum of the thumb extending into wrist  Neurologic symptoms:  No headache, no weakness, no numbness, no tingling  Hematologic symptoms:  No bleeding disorder  Psychiatric symptoms:  No substance abuse      Past History    As per HPI and below:  Past Medical History:   Diagnosis Date    Allergy     skin    Anxiety     Atrial fibrillation     Breast cancer 2014    right    Chronic back pain     Depression     Encounter for blood transfusion     Endometrial cancer 07/30/2019    Fibromyalgia     Hives     Hx of psychiatric care     Hx of radiation therapy     Itching     Lichen  sclerosus     Mental disorder     PONV (postoperative nausea and vomiting)     Psychiatric problem     PTSD (post-traumatic stress disorder)     Sleep difficulties     Sore throat     Therapy     Uterine cancer 08/05/2019    UTI (urinary tract infection)        Past Surgical History:   Procedure Laterality Date    anal fissure surgery      APPENDECTOMY      BIOPSY N/A 11/19/2020    Procedure: BONE BIOPSY;  [facility];  Location: [facility] OR;  Service: Radiology;  Laterality: N/A;  RN PREOP 11/17/2020    BREAST BIOPSY Right 2014    BREAST LUMPECTOMY Right 2014    R lumpectomy [facility] w 2.4cm IDC & DCIS, neg margins, 0/6 SN, Stage II, ER/HI+, HER-2 neg Adjuvant XRT and hormonal therapy     CARPAL TUNNEL RELEASE      CHOLECYSTECTOMY      COLONOSCOPY      COLONOSCOPY N/A 6/11/2020    Procedure: COLONOSCOPY;  Surgeon: Bobby Marino MD;  Location: [facility] ([address] [place] [address]);  Service: Endoscopy;  Laterality: N/A;    COLONOSCOPY N/A 6/9/2022    Procedure: COLONOSCOPY;  Surgeon: Abdoulaye Alcocer MD;  [facility]  Service: Endoscopy;  Laterality: N/A;    ESOPHAGOGASTRODUODENOSCOPY N/A 6/11/2020    Procedure: EGD (ESOPHAGOGASTRODUODENOSCOPY);  Surgeon: Bobby Marino MD;  Location: [facility] ([address] [place] [address]);  Service: Endoscopy;  Laterality: N/A;    HYSTERECTOMY  08/15/2019    HYSTEROSCOPY WITH DILATION AND CURETTAGE OF UTERUS N/A 7/24/2019    Procedure: HYSTEROSCOPY, WITH DILATION AND CURETTAGE OF UTERUS;  Surgeon: Елена Kam MD;  Location: [facility] OR;  Service: OB/GYN;  Laterality: N/A;    INSERTION OF TUNNELED CENTRAL VENOUS CATHETER (CVC) WITH SUBCUTANEOUS PORT N/A 9/26/2019    Procedure: INSERTION, PORT-A-CATH;  Surgeon: Moris Varghese MD;  Location: [facility] CATH LAB;  Service: Radiology;  Laterality: N/A;    MEDIPORT REMOVAL N/A 3/5/2020    Procedure: REMOVAL, CATHETER, CENTRAL VENOUS, TUNNELED, WITH PORT;  Surgeon: Moris Varghese MD;  Location: [facility] CATH LAB;  Service:  Radiology;  Laterality: N/A;    NEEDLE LOCALIZATION N/A 11/19/2020    Procedure: NEEDLE LOCALIZATION;  Surgeon: Leisa Diagnostic Provider;  Location: [facility] OR;  Service: Radiology;  Laterality: N/A;    PARTIAL MASCECTOMY Right 2014    ROBOT-ASSISTED LAPAROSCOPIC ABDOMINAL HYSTERECTOMY USING DA RAMU XI N/A 8/15/2019    Procedure: XI ROBOTIC HYSTERECTOMY;  Surgeon: Darius Regalado MD;  Location: [facility] OR;  Service: OB/GYN;  Laterality: N/A;    ROBOT-ASSISTED LAPAROSCOPIC SALPINGO-OOPHORECTOMY USING DA RAMU XI Bilateral 8/15/2019    Procedure: XI ROBOTIC SALPINGO-OOPHORECTOMY;  Surgeon: Darius Regalado MD;  Location: [facility] OR;  Service: OB/GYN;  Laterality: Bilateral;    ROBOT-ASSISTED LYMPHADENECTOMY Left 8/15/2019    Procedure: ROBOTIC LYMPHADENECTOMY;  Surgeon: Darius Regalado MD;  Location: [facility] OR;  Service: OB/GYN;  Laterality: Left;    SALPINGOOPHORECTOMY Bilateral 08/15/2019    TONSILLECTOMY      TUBAL LIGATION         Social History     Socioeconomic History    Marital status: Single    Number of children: 0   Occupational History    Occupation: [employer]   Tobacco Use    Smoking status: Never    Smokeless tobacco: Never   Substance and Sexual Activity    Alcohol use: No     Alcohol/week: 0.0 standard drinks    Drug use: Yes     Types: Marijuana     Comment: medical marijuana    Sexual activity: Not Currently     Partners: Male   Social History Narrative    Lives alone.     Never , no children    Very invested in cat rescue       Family History   Problem Relation Age of Onset    Skin cancer Mother 89        squamous cell--nose & head    Rashes / Skin problems Mother 92        cutaneous horn of scalp    Hypertension Mother     Arthritis Mother     Other Mother 85        PANC mass suspected by provider to be ca, surv'd 8 yrs    Pancreatic cancer Father 85        surv'd 8 mos    Pancreatic cancer Maternal Aunt 62    Colon polyps Sister         pt thinks (a few)    Hypertension Brother     Cancer Paternal  Uncle         leukemia, 70s    Heart attack Paternal Grandmother     Heart attack Paternal Grandfather     Hypertension Brother     Colon polyps Sister         Geni believes just a handful    Hashimoto's thyroiditis Sister     Diabetes Other         strong family history per patient    Lung cancer Maternal Uncle         smoker    Cancer Paternal Aunt         origin? colon or ovarian?    Suicide Cousin     Alcohol abuse Cousin     Throat cancer Other         pt believes    Breast cancer Neg Hx     Ovarian cancer Neg Hx     Cirrhosis Neg Hx     Colon cancer Neg Hx        Review of patient's allergies indicates:  No Known Allergies    No current facility-administered medications on file prior to encounter.     Current Outpatient Medications on File Prior to Encounter   Medication Sig Dispense Refill    acetaminophen (TYLENOL) 500 MG tablet Take 1,000 mg by mouth daily as needed for Pain.      bisacodyL (DULCOLAX) 10 mg Supp Place 1 suppository (10 mg total) rectally daily as needed (constipation). (Patient not taking: Reported on 11/21/2022) 5 suppository 0    clobetasol 0.05% (TEMOVATE) 0.05 % Oint APPLY TO AFFECTED AREA(S) TWICE A DAY FOR 1 MONTH(S) then EVERY DAY FOR 1 MONTH(S) then 3 TIMES A WEEK (Patient not taking: Reported on 11/21/2022) 60 g 2    decitabine-cedazuridine  mg Tab Take 1 tablet by mouth once daily. For 5 days every 28 days 5 tablet 5    fexofenadine (ALLEGRA) 180 MG tablet 2 tablets in the morning.  May take an extra 2 tablets during the day if needed for itching. (Patient not taking: Reported on 11/21/2022) 240 tablet 5    hydrocortisone 2.5 % cream Apply to affected areas twice a day when eczema is moderate. (Patient not taking: Reported on 11/21/2022) 453.6 g 1    hydrOXYzine HCL (ATARAX) 25 MG tablet 1 to 8 tablets at bedtime.  Start with 3 tablets.  Increase or decrease as needed until itching is controlled or a maximum of 8 tablets. (Patient not taking: Reported on 11/21/2022) 240  tablet 3    ibuprofen (IBUPROFEN IB) 200 MG tablet Take 200 mg by mouth every 6 (six) hours as needed for Pain. Pt takes 2 pills daily      meth/meblue/sod phos/psal/hyos (URIBEL ORAL) Take by mouth as needed.      metoprolol tartrate (LOPRESSOR) 25 MG tablet Take 1 tablet (25 mg total) by mouth 2 (two) times daily. 180 tablet 3    mupirocin (BACTROBAN) 2 % ointment Apply topically 4 (four) times daily.      nystatin (MYCOSTATIN) cream Apply topically every Mon, Wed, Fri. (Patient not taking: Reported on 11/21/2022) 30 g 11    nystatin (MYCOSTATIN) powder Apply topically 4 (four) times daily. 30 g 1    ondansetron (ZOFRAN) 4 MG tablet Take 1 tablet (4 mg total) by mouth every 6 (six) hours as needed for Nausea. (Patient not taking: Reported on 11/9/2022) 120 tablet 0    pantoprazole (PROTONIX) 40 MG tablet Take 1 tablet (40 mg total) by mouth once daily. (Patient not taking: Reported on 11/21/2022) 90 tablet 3    senna (SENOKOT) 8.6 mg tablet Take 1 tablet by mouth once daily. Can take twice daily (Patient not taking: Reported on 11/21/2022) 30 tablet 11    triamcinolone acetonide 0.1% (KENALOG) 0.1 % ointment Apply topically 2 (two) times daily. for 7 days 80 g 0    UNABLE TO FIND [employer]rooms Immune Support         Physical Exam    Reviewed nursing notes.  Vitals:    11/21/22 1214   BP: (!) 111/57   Pulse: 86   Resp: 18   Temp: 98.6 °F (37 °C)   TempSrc: Oral   SpO2: 100%   Weight: 51.3 kg (113 lb)     General:  Alert, no acute distress.    Skin:  Warm, dry, intact.  No rash.  Head:  Normocephalic, atraumatic.    Neck:  Supple.   HEENT:  Pupils are equal and round, appropriate for room, extraocular movements are intact.  Normal phonation.  Moist mucous membranes.  Cardiovascular:  Regular rate and rhythm, Normal peripheral perfusion, No edema.    Respiratory:   respirations are non-labored  Gastrointestinal:   Non distended.   Back:  Nontender. Normal gait.  Ambulatory.  Musculoskeletal:  Normal range of  motion observed.  Erythema to the dorsum of the right base of the thumb, with swelling over the hand, erythema extends across the wrist and over the dorsum of the hand, there is some drainage at 1 of the sites over the thumb  Neurological:  Alert and oriented to person, place, time, and situation.  No focal deficits observed.   Psychiatric:  Cooperative, appropriate mood & affect.       Initial MDM    67-year-old female with myelodysplastic syndrome and currently on chemotherapy who is presenting with concerns for cellulitis versus deeper space infection of the right and thumb from a cat bite that occurred about 3 days ago.  She is afebrile here, in no acute distress  The right hand appears more cellulitic, she is full range of motion.  There could be underlying abscess.  Bcx ordered  Consulted Orthopedics who did a bedside incision and drainage  They did not recommend any further advanced imaging at this time  Will admit for IV antibiotics given her neutropenia and her other medical comorbidities , may need ID evaluation  Discussed with BMT Service and plan for admission  I decided to obtain the patient's medical records.    Medications   ampicillin-sulbactam 3 g in sodium chloride 0.9 % 100 mL IVPB (ready to mix system) (has no administration in time range)   Tdap (BOOSTRIX) vaccine injection 0.5 mL (0.5 mLs Intramuscular Given 11/21/22 1413)   LIDOcaine HCL 10 mg/ml (1%) 10 mg/mL (1 %) injection (  Given by Other 11/21/22 1330)       Results and ED Course    Labs Reviewed   CBC W/ AUTO DIFFERENTIAL - Abnormal; Notable for the following components:       Result Value    WBC 0.40 (*)     RBC 2.29 (*)     Hemoglobin 6.6 (*)     Hematocrit 21.4 (*)     MCHC 30.8 (*)     RDW 17.4 (*)     Platelets 35 (*)     Gran # (ANC) 0.1 (*)     Lymph # 0.3 (*)     Mono # 0.0 (*)     Gran % 22.5 (*)     Lymph % 72.5 (*)     Mono % 2.5 (*)     Basophil % 2.5 (*)     All other components within normal limits    Narrative:      PLT   critical result(s) called and verbal readback obtained from                   NURSE TATUM by BRANDY 11/21/2022 14:35                  WBC_ acknowledged and accepted results on test(s) _ via secure chat.                   CALLED TO NURSE TATUM by BRANDY 11/21/2022 14:16   COMPREHENSIVE METABOLIC PANEL - Abnormal; Notable for the following components:    Albumin 3.4 (*)     ALT 5 (*)     All other components within normal limits   GRAM STAIN   CULTURE, BLOOD   CULTURE, BLOOD   AFB CULTURE & SMEAR   CULTURE, FUNGUS   CULTURE, ANAEROBIC   CULTURE, AEROBIC  (SPECIFY SOURCE)   LACTIC ACID, PLASMA   URINALYSIS, REFLEX TO URINE CULTURE   SEDIMENTATION RATE   C-REACTIVE PROTEIN       Imaging Results              X-Ray Hand 3 View Right (Final result)  Result time 11/21/22 15:24:14   Procedure changed from X-Ray Hand 2 View Right     Final result by Nura Clark MD (11/21/22 15:24:14)                   Impression:      No acute radiographic abnormality.  Recommend follow-up as clinically indicated.      Electronically signed by: Nura Clark  Date:    11/21/2022  Time:    15:24               Narrative:    EXAMINATION:  XR HAND COMPLETE 3 VIEW RIGHT    CLINICAL HISTORY:  CELLULITIS; Cellulitis, unspecified    TECHNIQUE:  PA, lateral, and oblique views of the right hand were performed.    COMPARISON:  None    FINDINGS:  No acute fracture, subluxation or dislocation.  No mass or foreign body.  Moderate degenerative changes of the 1st carpometacarpal joint and interphalangeal joints.                                                 Impression and Plan    67 y.o. female with concern for cat bite with cellulitis and abscess drainage that was bedside I&D by Orthopedics who has a low ANC/neutropenia without fever who received Unasyn in the emergency department.  BMT was notified and they will not admit primarily, admit to Hospital Medicine for IV antibiotics and continued care.         Final  diagnoses:  [W55.01XA] Cat bite  [L03.90] Cellulitis                 Kenton Manzo, DO  11/21/22 1539

## 2022-11-21 NOTE — PROGRESS NOTES
Section of Hematology and Stem Cell Transplantation  Follow Up Note     Visit Date: 11/21/2022    Primary Oncologic Diagnosis: Myelodysplasia (myelodysplastic syndrome) [D46.9]    History of Present Ilness:   Jeanne Rodgers (Jeanne) is a pleasant 67 y.o.female with history of breast and endometrial carcinoma with somatic P53 mutation. Noted to have persistent pancytopenia complicated chemotherapy for her endometrial carcinoma in 2020.      Bone marrow biopsy in November 2020 was a normocellular marrow (30%) with trilineage hematpoiesis and atypical megakaryocytes. No morphologic or immunuphenotypic evidence of metastatic carcinoma. Adequate iron storage. Chromosome analysis normal X,X phenotype. Insufficient aspirate for analysis.      Bone marrow biopsy June 2022 was hypercellular marrow (%) with trilineage hematopoiesis. Grade 1 reticular fibrosis. No evidence of lymphoma, plasma cell neoplasm, or metastatic carcinoma.      Two hospital admission in summer 2022 for symptomatic anemia requiring transfusion support. Once incident related to GI bleeding. Hgb at admissions 5-6 grams with normal MCV. No GI bleeding source has been found.      Bone marrow biopsy September 2022 extremely hypercellular marrow, 99% with increased blasts (10%) and moderate trilineage dyspoiesis with left-shifted erythroid hyperplasia, left-shifted, relatively decreased granulocytes and focally increased megakaryocytes with increase number of micromegakaryocytes. Increased stainable histiocytic iron and increased reticulin fibrosis (MF-1 with focal MF-2). 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 NGS with TP53.       Interval History:   Ms. Rodgers presents routinely to clinic today prior to C2 Inqovi for treatment-related, high grade MDS. She is due to start her next cycle on Wednesday, she previously started C1 on 10/21/2022. She is neutropenic today with ANC 59. Over  the weekend she was bit  by her cat on the thumb of her R hand. She had severe swelling and decreased ROM of this hand over the weekend. She has been soaking her hand in epsom salt and applying bactroban to the bite. The swelling has improved, but she's had increasingr edness and pus from the bite. Erythema appears to be extending up her hand as well. She has been afebrile. She reports some minor pain with movement of her thumb. Given location of the antonio bite and her severely neutropenic status, we discussed our concerns for cellulitis and potential for deeper involvement and the need for imaging, IV abx - thus sending patient to the ER. We discussed she will NOT start C2 Inqovi at this time.     Cat is reportedly UTD on all vaccines.         Past Medical History, Social History, and Past Family History are unchanged since last evaluation except for HPI.     CURRENT MEDICATIONS:   Current Outpatient Medications   Medication Sig    decitabine-cedazuridine  mg Tab Take 1 tablet by mouth once daily. For 5 days every 28 days    ibuprofen (IBUPROFEN IB) 200 MG tablet Take 200 mg by mouth every 6 (six) hours as needed for Pain. Pt takes 2 pills daily    metoprolol tartrate (LOPRESSOR) 25 MG tablet Take 1 tablet (25 mg total) by mouth 2 (two) times daily.    mupirocin (BACTROBAN) 2 % ointment Apply topically 4 (four) times daily.    nystatin (MYCOSTATIN) powder Apply topically 4 (four) times daily.    triamcinolone acetonide 0.1% (KENALOG) 0.1 % ointment Apply topically 2 (two) times daily. for 7 days    acetaminophen (TYLENOL) 500 MG tablet Take 1,000 mg by mouth daily as needed for Pain.    bisacodyL (DULCOLAX) 10 mg Supp Place 1 suppository (10 mg total) rectally daily as needed (constipation). (Patient not taking: Reported on 11/21/2022)    clobetasol 0.05% (TEMOVATE) 0.05 % Oint APPLY TO AFFECTED AREA(S) TWICE A DAY FOR 1 MONTH(S) then EVERY DAY FOR 1 MONTH(S) then 3 TIMES A WEEK (Patient not taking: Reported  on 11/21/2022)    fexofenadine (ALLEGRA) 180 MG tablet 2 tablets in the morning.  May take an extra 2 tablets during the day if needed for itching. (Patient not taking: Reported on 11/21/2022)    hydrocortisone 2.5 % cream Apply to affected areas twice a day when eczema is moderate. (Patient not taking: Reported on 11/21/2022)    hydrOXYzine HCL (ATARAX) 25 MG tablet 1 to 8 tablets at bedtime.  Start with 3 tablets.  Increase or decrease as needed until itching is controlled or a maximum of 8 tablets. (Patient not taking: Reported on 11/21/2022)    meth/meblue/sod phos/psal/hyos (URIBEL ORAL) Take by mouth as needed.    nystatin (MYCOSTATIN) cream Apply topically every Mon, Wed, Fri. (Patient not taking: Reported on 11/21/2022)    ondansetron (ZOFRAN) 4 MG tablet Take 1 tablet (4 mg total) by mouth every 6 (six) hours as needed for Nausea. (Patient not taking: Reported on 11/9/2022)    pantoprazole (PROTONIX) 40 MG tablet Take 1 tablet (40 mg total) by mouth once daily. (Patient not taking: Reported on 11/21/2022)    senna (SENOKOT) 8.6 mg tablet Take 1 tablet by mouth once daily. Can take twice daily (Patient not taking: Reported on 11/21/2022)    UNABLE TO FIND [place] [employer] Immune Support     No current facility-administered medications for this visit.       ALLERGIES:   Review of patient's allergies indicates:  No Known Allergies      Review of Systems:     Review of Systems   Constitutional:  Positive for malaise/fatigue. Negative for chills, diaphoresis, fever and weight loss.   HENT: Negative.     Eyes: Negative.    Respiratory: Negative.     Cardiovascular: Negative.    Gastrointestinal: Negative.    Genitourinary: Negative.    Musculoskeletal:  Positive for joint pain (R thumb pain with erythema, induration surrounding bite juan, purlenet discharge).   Neurological:  Positive for weakness.   Endo/Heme/Allergies:  Bruises/bleeds easily.   Psychiatric/Behavioral:  The patient is nervous/anxious.       Physical Exam:     Vitals:    11/21/22 1102   BP: (!) 106/58   Pulse: 88   Resp: 16   Temp: 98.3 °F (36.8 °C)       Physical Exam  Vitals reviewed.   Constitutional:       General: She is not in acute distress.     Appearance: Normal appearance.   HENT:      Head: Normocephalic.      Right Ear: External ear normal.      Left Ear: External ear normal.      Mouth/Throat:      Mouth: Mucous membranes are moist.   Eyes:      Extraocular Movements: Extraocular movements intact.      Pupils: Pupils are equal, round, and reactive to light.   Cardiovascular:      Rate and Rhythm: Normal rate and regular rhythm.   Pulmonary:      Effort: Pulmonary effort is normal.   Abdominal:      General: Abdomen is flat.      Palpations: Abdomen is soft.   Musculoskeletal:         General: Swelling (swelling of R hand), tenderness and signs of injury present. Normal range of motion.      Cervical back: Normal range of motion.   Skin:     General: Skin is warm.      Coloration: Skin is pale.      Findings: Bruising (scattered bruising of upper extremities) and erythema (erythema extending up R thumb, induration/warmth at bite juan with purulent discharge) present.   Neurological:      General: No focal deficit present.      Mental Status: She is alert. Mental status is at baseline.         ECOG Performance Status: (foot note - ECOG PS provided by Eastern Cooperative Oncology Group) 2 - Symptomatic, <50% confined to bed    Karnofsky Performance Score:  70%- Cares for Self: Unable to Carry on Normal Activity or Active Work    Labs:   Lab Results   Component Value Date    WBC 0.33 (LL) 11/21/2022    HGB 6.9 (L) 11/21/2022    HCT 21.6 (L) 11/21/2022    MCV 92 11/21/2022    PLT 31 (LL) 11/21/2022       Lab Results   Component Value Date     11/21/2022    K 4.0 11/21/2022     11/21/2022    CO2 23 11/21/2022    BUN 14 11/21/2022    CREATININE 0.7 11/21/2022    ALBUMIN 3.4 (L) 11/21/2022    BILITOT 0.7 11/21/2022    ALKPHOS 72  11/21/2022    AST 6 (L) 11/21/2022    ALT 7 (L) 11/21/2022                    Assessment and Plan:   Jeanne Rodgers (Jeanne) is a pleasant 67 y.o.female with high grade MDS       High Grade MDS  Pancytopenia  IPPS-R Very High Risk MDS  RAEB-2 MDS with complete cytogenetics  Likely secondary MDS from prior chemotherapy/radiation therapy  She started Inqovi 5 of 28 days on 10/21/22.  She is tolerating it well thus far. Due for C2 on 11/23. HOLD until infection resolves.  Will continue weekly labs outpatient.  Hgb 6.9 today, will need type and screen and 1u RBC.     Stage IIIC2 UPSC  Diagnostic PET with pelvic and PA lymphadenopathy  RALH/BSO/ Debuliking of pelvic lymph nodes (6/8) positive on 8/15/2019.  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   T/C started 9/27/2019  WPRT to PA nodes 45Gy completed 1/22/2020  HDR to 15Gy in 3 fractions completed 3/3/2020  Restarted T/C 3/20/2020, s/p 5 cycles concluding 5/8/2020.   Complicated by pancytopenia and anemia.       DCIS  Stage I IDC and DCIS right breast, ER+, AK+ HER 2 Zack negative   Lumpectomy 6/24/2014 at [facility].   Small mass seen on mammogram 2007 and 2008, nipple inversion 2012 at [facility]  Referred for radiation therapy at [facility]  On Arimidex for 4 years    Cellulitis  S/p cat bite to R thumb ~4 days ago, increasing erythema, warmth, swelling, and now purulent discharge. Afebrile.  Given neutropenic status, patient is high risk for infectious complications and we have sent to emergency room for urgent imaging to r/o tendon vs osseous involvement. Rec ID consult and initiation of IV abx  Minor pain with flexion of R thumb, recommend CT to evaluate for deeper involvement as above        Follow Up:      Patient sent to emergency room given concern for cellulitis.       Marianne Garibay PA-C  Hematology, Oncology, and Stem Cell Transplantation  [facility]          BMT Chart Routing  Urgent    Follow up with physician 2 weeks. f/u with isaiah in ~ 2weeks w/ labs. okay to book into benign heme or new pt slot.   Follow up with PRIMO    Provider visit type    Infusion scheduling note    Injection scheduling note    Labs CBC, type and screen, CMP and LDH   Lab interval:     Imaging    Pharmacy appointment    Other referrals

## 2022-11-21 NOTE — ED TRIAGE NOTES
Pt arrived to the ER from the hematology clinic for a low H&H. Also sent for IV abx s/t cat bite on right wrist. Pt has a hx of bone marrow cancer.

## 2022-11-21 NOTE — SUBJECTIVE & OBJECTIVE
Past Medical History:   Diagnosis Date    Allergy     skin    Anxiety     Atrial fibrillation     Breast cancer 2014    right    Chronic back pain     Depression     Encounter for blood transfusion     Endometrial cancer 07/30/2019    Fibromyalgia     Hives     Hx of psychiatric care     Hx of radiation therapy     Itching     Lichen sclerosus     Mental disorder     PONV (postoperative nausea and vomiting)     Psychiatric problem     PTSD (post-traumatic stress disorder)     Sleep difficulties     Sore throat     Therapy     Uterine cancer 08/05/2019    UTI (urinary tract infection)        Past Surgical History:   Procedure Laterality Date    anal fissure surgery      APPENDECTOMY      BIOPSY N/A 11/19/2020    Procedure: BONE BIOPSY;  Surgeon: [facility] Diagnostic Provider;  Location: [facility] OR;  Service: Radiology;  Laterality: N/A;  RN PREOP 11/17/2020    BREAST BIOPSY Right 2014    BREAST LUMPECTOMY Right 2014    R lumpectomy [facility] w 2.4cm IDC & DCIS, neg margins, 0/6 SN, Stage II, ER/LA+, HER-2 neg Adjuvant XRT and hormonal therapy     CARPAL TUNNEL RELEASE      CHOLECYSTECTOMY      COLONOSCOPY      COLONOSCOPY N/A 6/11/2020    Procedure: COLONOSCOPY;  Surgeon: Bobby Marino MD;  Location: [facility] ([address] [place] [address]);  Service: Endoscopy;  Laterality: N/A;    COLONOSCOPY N/A 6/9/2022    Procedure: COLONOSCOPY;  Surgeon: Abdoulaye Alcocer MD;  Location: [facility] ([address] [place] [address]);  Service: Endoscopy;  Laterality: N/A;    ESOPHAGOGASTRODUODENOSCOPY N/A 6/11/2020    Procedure: EGD (ESOPHAGOGASTRODUODENOSCOPY);  Surgeon: Bobby Marino MD;  Location: [facility] ([address] [place] [address]);  Service: Endoscopy;  Laterality: N/A;    HYSTERECTOMY  08/15/2019    HYSTEROSCOPY WITH DILATION AND CURETTAGE OF UTERUS N/A 7/24/2019    Procedure: HYSTEROSCOPY, WITH DILATION AND CURETTAGE OF UTERUS;  Surgeon: Елена Kam MD;  Location: [facility];  Service: OB/GYN;  Laterality: N/A;    INSERTION OF TUNNELED CENTRAL VENOUS CATHETER (CVC) WITH  SUBCUTANEOUS PORT N/A 9/26/2019    Procedure: INSERTION, PORT-A-CATH;  Surgeon: Moris Varghese MD;  Location: [facility] CATH LAB;  Service: Radiology;  Laterality: N/A;    MEDIPORT REMOVAL N/A 3/5/2020    Procedure: REMOVAL, CATHETER, CENTRAL VENOUS, TUNNELED, WITH PORT;  Surgeon: Moris Varghese MD;  Location: [facility] CATH LAB;  Service: Radiology;  Laterality: N/A;    NEEDLE LOCALIZATION N/A 11/19/2020    Procedure: NEEDLE LOCALIZATION;  Surgeon: [facility] Diagnostic Provider;  Location: [facility] OR;  Service: Radiology;  Laterality: N/A;    PARTIAL MASCECTOMY Right 2014    ROBOT-ASSISTED LAPAROSCOPIC ABDOMINAL HYSTERECTOMY USING DA RAMU XI N/A 8/15/2019    Procedure: XI ROBOTIC HYSTERECTOMY;  Surgeon: Darius Regalado MD;  Location: [facility] OR;  Service: OB/GYN;  Laterality: N/A;    ROBOT-ASSISTED LAPAROSCOPIC SALPINGO-OOPHORECTOMY USING DA RAMU XI Bilateral 8/15/2019    Procedure: XI ROBOTIC SALPINGO-OOPHORECTOMY;  Surgeon: Darius Regalado MD;  Location: [facility];  Service: OB/GYN;  Laterality: Bilateral;    ROBOT-ASSISTED LYMPHADENECTOMY Left 8/15/2019    Procedure: ROBOTIC LYMPHADENECTOMY;  Surgeon: Darius Regalado MD;  Location: [facility] OR;  Service: OB/GYN;  Laterality: Left;    SALPINGOOPHORECTOMY Bilateral 08/15/2019    TONSILLECTOMY      TUBAL LIGATION         Review of patient's allergies indicates:  No Known Allergies    Current Facility-Administered Medications   Medication    ampicillin-sulbactam 3 g in sodium chloride 0.9 % 100 mL IVPB (ready to mix system)     Current Outpatient Medications   Medication Sig    acetaminophen (TYLENOL) 500 MG tablet Take 1,000 mg by mouth daily as needed for Pain.    bisacodyL (DULCOLAX) 10 mg Supp Place 1 suppository (10 mg total) rectally daily as needed (constipation). (Patient not taking: Reported on 11/21/2022)    clobetasol 0.05% (TEMOVATE) 0.05 % Oint APPLY TO AFFECTED AREA(S) TWICE A DAY FOR 1 MONTH(S) then EVERY DAY FOR 1 MONTH(S) then 3 TIMES A WEEK (Patient not taking: Reported  on 11/21/2022)    decitabine-cedazuridine  mg Tab Take 1 tablet by mouth once daily. For 5 days every 28 days    fexofenadine (ALLEGRA) 180 MG tablet 2 tablets in the morning.  May take an extra 2 tablets during the day if needed for itching. (Patient not taking: Reported on 11/21/2022)    hydrocortisone 2.5 % cream Apply to affected areas twice a day when eczema is moderate. (Patient not taking: Reported on 11/21/2022)    hydrOXYzine HCL (ATARAX) 25 MG tablet 1 to 8 tablets at bedtime.  Start with 3 tablets.  Increase or decrease as needed until itching is controlled or a maximum of 8 tablets. (Patient not taking: Reported on 11/21/2022)    ibuprofen (IBUPROFEN IB) 200 MG tablet Take 200 mg by mouth every 6 (six) hours as needed for Pain. Pt takes 2 pills daily    meth/meblue/sod phos/psal/hyos (URIBEL ORAL) Take by mouth as needed.    metoprolol tartrate (LOPRESSOR) 25 MG tablet Take 1 tablet (25 mg total) by mouth 2 (two) times daily.    mupirocin (BACTROBAN) 2 % ointment Apply topically 4 (four) times daily.    nystatin (MYCOSTATIN) cream Apply topically every Mon, Wed, Fri. (Patient not taking: Reported on 11/21/2022)    nystatin (MYCOSTATIN) powder Apply topically 4 (four) times daily.    ondansetron (ZOFRAN) 4 MG tablet Take 1 tablet (4 mg total) by mouth every 6 (six) hours as needed for Nausea. (Patient not taking: Reported on 11/9/2022)    pantoprazole (PROTONIX) 40 MG tablet Take 1 tablet (40 mg total) by mouth once daily. (Patient not taking: Reported on 11/21/2022)    senna (SENOKOT) 8.6 mg tablet Take 1 tablet by mouth once daily. Can take twice daily (Patient not taking: Reported on 11/21/2022)    triamcinolone acetonide 0.1% (KENALOG) 0.1 % ointment Apply topically 2 (two) times daily. for 7 days    UNABLE TO FIND [place] Mush[employer] Immune Support     Family History       Problem Relation (Age of Onset)    Alcohol abuse Cousin    Arthritis Mother    Cancer Paternal Uncle, Paternal Aunt     Colon polyps Sister, Sister    Diabetes Other    Hashimoto's thyroiditis Sister    Heart attack Paternal Grandmother, Paternal Grandfather    Hypertension Mother, Brother, Brother    Lung cancer Maternal Uncle    Other Mother (85)    Pancreatic cancer Father (85), Maternal Aunt (62)    Rashes / Skin problems Mother (92)    Skin cancer Mother (89)    Suicide Cousin    Throat cancer Other          Tobacco Use    Smoking status: Never    Smokeless tobacco: Never   Substance and Sexual Activity    Alcohol use: No     Alcohol/week: 0.0 standard drinks    Drug use: Yes     Types: Marijuana     Comment: medical marijuana    Sexual activity: Not Currently     Partners: Male     ROS Constitutional: Denies fever/chills  Neurological: Denies numbness/tingling (any exceptions noted in orthopaedic exam)   Psychiatric/Behavioral: Denies change in normal mood  Eyes: Denies change in vision  Cardiovascular: Denies chest pain  Respiratory: Denies shortness of breath  Hematologic/Lymphatic: Denies easy bleeding/bruising   Skin: Denies new rash or skin lesions   Gastrointestinal: Denies nausea/vomitting/diarrhea, change in bowel habits, abdominal pain   Allergic/Immunologic: Denies adverse reactions to current medications  Musculoskeletal: see HPI    Objective:     Vital Signs (Most Recent):  Temp: 98.6 °F (37 °C) (11/21/22 1214)  Pulse: 86 (11/21/22 1214)  Resp: 18 (11/21/22 1214)  BP: (!) 111/57 (11/21/22 1214)  SpO2: 100 % (11/21/22 1214)   Vital Signs (24h Range):  Temp:  [98.3 °F (36.8 °C)-98.6 °F (37 °C)] 98.6 °F (37 °C)  Pulse:  [86-88] 86  Resp:  [16-18] 18  SpO2:  [100 %] 100 %  BP: (106-111)/(57-58) 111/57     Weight: 51.3 kg (113 lb)     Body mass index is 22.07 kg/m².    No intake or output data in the 24 hours ending 11/21/22 1514    Ortho/SPM Exam  RUE:  Small puncture wound with surrounding erythema and swelling over dorsal base of thumb  No purulent drainage expressible  No TTP over cat bite  FROM of thumb without  pain  FROM shoulder, elbow and wrist  SILT M/U/R  Motor intact AIN/PIN/M/U/R   Cap refill < 2s  2+ RP      Significant Labs: All pertinent labs within the past 24 hours have been reviewed.    Significant Imaging: I have reviewed all pertinent imaging results/findings.  XR of R hand showing cmc arthritis, no acute abnormalities

## 2022-11-21 NOTE — ASSESSMENT & PLAN NOTE
Jeanne Rodgers is a 67 y.o. female with hx of endometrial cancer (on chemo), chemo induced pancytopenia presenting with small area of erythema and swelling at dorsal base of right thumb after cat bite from pts cat. Pt is neutropenic from chemo and unable to mount proper immune response. Pt has no pain with ROM of thumb and minimal TTP over area. No concern for deep infection or involvement of EPL tendon on exam. No purulent drainage noted from puncture, but decision made to I&D puncture wound to prevent worsening infection. No purulent drainage expressed from I&D, but cx sent of blood.     -Given pt is neutropenic and unable to mount immune response, agree with admission to medicine for IV unasyn  -Pt is also anemic and will require blood transfusion  -I&D performed, no packing placed  -Plan for dressing change and wound evaluation tomorrow vs Wednesday depending on how long pt will be in house  -WBAT and ROM as tolerated RUE        Procedure Note: superficial right dorsal thumb irrigation and debridement  Risks, benefits, and alternatives to treatment was explained to patient at length. Patient verbalized their understanding and gave consent to proceed. Time out was performed and patient name, , site, and procedure were confirmed. 5 cc of 1% lidocaine was used for local block. Skin was sterilely prepared with betadine. Sterile field was prepped around the surgical site. Skin incision was made over puncture wound on right dorsal thumb with scalpel. Incision was taken down bluntly with a hemostat to fluid collection. Blood was expressed. Fluid sample was obtained and sent for analysis and cultures. Incision was irrigated with 1 L of saline. Soft dressings were applied. Patient encouraged to keep extremity elevated. Patient tolerated the procedure well. Blood loss was minimal.

## 2022-11-21 NOTE — HPI
Jeanne Rodgers is a 67 y.o. female with hx of endometrial cancer (on chemo), chemo induced pancytopenia, presenting to ED from [facility] onc clinic due to small area of right dorsal base of thumb swelling and erythema after cat bite on Friday. Pt reports it was her cat that bit her. She reports minimal pain, but a few drops of intermittent purulent drainage from the spot of the cat bite over the weekend. She has not been on abx. She has been taking ibuprofen with full relief of her pain. She has full ROM of her thumb without pain. She was sent to ED from [facility] on clinic for further evaluation of cat bite. She denies f,c,ns.  RHD.

## 2022-11-22 NOTE — DISCHARGE SUMMARY
[facility]  Discharge Summary      Patient Name: Jeanne Rodgers  MRN: [id number]  EMIL: [id number]  Patient Class: IP- Inpatient  Admission Date: 11/21/2022  Hospital Length of Stay: 1 days  Discharge Date and Time:  11/22/2022 2:00 PM  Attending Physician: Salbador Mcdermott MD   Discharging Provider: Zack Arias MD  Primary Care Provider: Gary Gonzalez MD  Hospital Medicine Team: [facility] MED 1 Zack Arias MD  Primary Care Team: [facility] 1    HPI:   Ms. Rodgers is a 67 y.o. female w/ PMH of breast cancer, endometrial cancer, myelodysplastic syndrome (on chemo, cycle 1 Oct 2022), pancytopenia related to recent chemo, and afib who presents with concerns for right hand infection related to recent cat bite. 3 days ago she got bit by her cat. Since then her hand has become red and swollen. She had put colloidal silver and mupirocin on it and has been soaking it in salt water without improvement. She went to her appt at Hematology/Oncology today in preparation for 2nd cycle of chemo this week, and they were concerned for infection in her hand and sent her to the ED. She denies fever, chills, decreased ROM of hand, and spreading of redness up her arm.  Her last tetanus shot was in 2015. Her cat has been fully vaccinated.    In the ED she was found to be pancytopenic with Hgb 6.6, Plt 35, WBC 0.40. Was seen by ortho who performed bedside I&D and dressed wound. Admitted to hospital medicine for further management.          * No surgery found *      Hospital Course:   Patient admitted for left hand abscess related to cat bite.  I&D and wound   dressing done by ortho. Started on unasyn. Given 1 unit pRBC and platelets. Hematology/Oncology and Infectious disease consulted. Recommendations for linezolid for 7 days w/ ID f/u in 1 week. Will start on prophylactic antiviral, antibacterial, and antifungal per heme/onc. Referred to IR for outpatient port  placement.       Goals of Care Treatment Preferences:  Code Status: Full Code    Health care agent: Geni Eubanks  [facility] care agent number: [phone]    Living Will: Yes     What is most important right now is to focus on curative/life-prolongation (regardless of treatment burdens).  Accordingly, we have decided that the best plan to meet the patient's goals includes continuing with treatment.      Consults:   Consults (From admission, onward)          Status Ordering Provider     Inpatient consult to Interventional Radiology  Once        Provider:  (Not yet assigned)    Completed ALANIS PRICE     Inpatient consult to Hematology/Oncology  Once        Provider:  (Not yet assigned)    Completed ALANIS PRICE     Inpatient consult to Infectious Diseases  Once        Provider:  (Not yet assigned)    Acknowledged ALANIS PRICE     Inpatient consult to Midline team  Once        Provider:  (Not yet assigned)    Completed RIGO WINSTON     Inpatient consult to Midline team  Once        Provider:  (Not yet assigned)    Completed RIGO WINSTON     Inpatient consult to Orthopedic Surgery  Once        Provider:  (Not yet assigned)    Completed RIGO WINSTON            No new Assessment & Plan notes have been filed under this hospital service since the last note was generated.  Service: Hospital Medicine    Final Active Diagnoses:    Diagnosis Date Noted POA    PRINCIPAL PROBLEM:  Cat bite [W55.01XA] 11/21/2022 Yes    Drug-induced pancytopenia [D61.811] 11/21/2022 Yes    Myelodysplastic syndrome [D46.9] 10/06/2022 Yes    Paroxysmal atrial fibrillation [I48.0] 06/10/2022 Yes      Problems Resolved During this Admission:       Discharged Condition: stable    Disposition:     Follow Up:   Follow-up Information       Madison Rivera MD. Schedule an appointment as soon as possible for a visit in 1 week(s).    Specialty: Hematology and Oncology  Contact information:  [id number]  [facility] [address]  [phone]                           Patient Instructions:      Ambulatory referral/consult to Interventional RAD   Standing Status: Future   Referral Priority: Routine Referral Type: Consultation   Number of Visits Requested: 1     Ambulatory referral/consult to Bone Marrow Transplant   Standing Status: Future   Referral Priority: Routine Referral Type: Consultation   Referral Reason: Specialty Services Required   Requested Specialty: Bone Marrow Transplant   Number of Visits Requested: 1     Ambulatory referral/consult to Hematology / Oncology   Standing Status: Future   Referral Priority: Routine Referral Type: Consultation   Referral Reason: Specialty Services Required   Requested Specialty: Hematology and Oncology   Number of Visits Requested: 1       Significant Diagnostic Studies: Labs:   CMP   Recent Labs   Lab 11/21/22  1008 11/21/22  1339 11/22/22  0359    137 136   K 4.0 4.2 3.9    105 105   CO2 23 23 25   * 81 100   BUN 14 12 10   CREATININE 0.7 0.7 0.7   CALCIUM 9.1 9.1 8.7   PROT 7.3 7.5  --    ALBUMIN 3.4* 3.4*  --    BILITOT 0.7 0.6  --    ALKPHOS 72 70  --    AST 6* 12  --    ALT 7* 5*  --    ANIONGAP 8 9 6*    and CBC   Recent Labs   Lab 11/21/22  1008 11/21/22  1339 11/22/22  0359   WBC 0.33* 0.40* 0.35*   HGB 6.9* 6.6* 7.2*   HCT 21.6* 21.4* 21.9*   PLT 31* 35* 30*       Pending Diagnostic Studies:       None           Vitals:    11/22/22 1123   BP: 129/62   Pulse: 69   Resp: 18   Temp: 98.7 °F (37.1 °C)     Physical Exam  Vitals and nursing note reviewed.   Constitutional:       General: She is not in acute distress.     Appearance: Normal appearance.   HENT:      Mouth/Throat:      Mouth: Mucous membranes are moist.      Pharynx: Oropharynx is clear.   Cardiovascular:      Rate and Rhythm: Normal rate and regular rhythm.      Pulses: Normal pulses.      Heart sounds: No murmur heard.  Pulmonary:      Effort: Pulmonary effort is normal.      Breath sounds:  Normal breath sounds. No wheezing or rales.   Abdominal:      General: Abdomen is flat. Bowel sounds are normal. There is no distension.      Palpations: Abdomen is soft.      Tenderness: There is no abdominal tenderness.   Musculoskeletal:         General: Swelling (distal rue swelling and erythema, bandage in place) present.      Right lower leg: No edema.      Left lower leg: No edema.   Skin:     General: Skin is warm.      Capillary Refill: Capillary refill takes less than 2 seconds.   Neurological:      General: No focal deficit present.      Mental Status: She is alert and oriented to person, place, and time.   Psychiatric:         Mood and Affect: Mood normal.         Behavior: Behavior normal.     Medications:  Reconciled Home Medications:      Medication List        START taking these medications      acyclovir 400 MG tablet  Commonly known as: ZOVIRAX  Take 1 tablet (400 mg total) by mouth 2 (two) times daily.     fluconazole 200 MG Tab  Commonly known as: DIFLUCAN  Take 2 tablets (400 mg total) by mouth once daily.     levoFLOXacin 500 MG tablet  Commonly known as: LEVAQUIN  Take 1 tablet (500 mg total) by mouth once daily.     linezolid 600 mg Tab  Commonly known as: ZYVOX  Take 1 tablet (600 mg total) by mouth every 12 (twelve) hours. for 7 days     povidone-iodine 10 % external solution  Commonly known as: BETADINE  Apply topically as needed for Wound Care. Clean R hand wound w/ betadine daily            CONTINUE taking these medications      acetaminophen 500 MG tablet  Commonly known as: TYLENOL  Take 1,000 mg by mouth daily as needed for Pain.     fexofenadine 180 MG tablet  Commonly known as: ALLEGRA  2 tablets in the morning.  May take an extra 2 tablets during the day if needed for itching.     hydrOXYzine HCL 25 MG tablet  Commonly known as: ATARAX  1 to 8 tablets at bedtime.  Start with 3 tablets.  Increase or decrease as needed until itching is controlled or a maximum of 8 tablets.      IBUPROFEN  MG tablet  Generic drug: ibuprofen  Take 200 mg by mouth every 6 (six) hours as needed for Pain. Pt takes 2 pills daily     INQOVI  mg Tab  Generic drug: decitabine-cedazuridine  Take 1 tablet by mouth once daily. For 5 days every 28 days     metoprolol tartrate 25 MG tablet  Commonly known as: LOPRESSOR  Take 1 tablet (25 mg total) by mouth 2 (two) times daily.     mupirocin 2 % ointment  Commonly known as: BACTROBAN  Apply topically 4 (four) times daily.     * nystatin powder  Commonly known as: MYCOSTATIN  Apply topically 4 (four) times daily.     * nystatin cream  Commonly known as: MYCOSTATIN  Apply topically every Mon, Wed, Fri.     ondansetron 4 MG tablet  Commonly known as: ZOFRAN  Take 1 tablet (4 mg total) by mouth every 6 (six) hours as needed for Nausea.     pantoprazole 40 MG tablet  Commonly known as: PROTONIX  Take 1 tablet (40 mg total) by mouth once daily.     senna 8.6 mg tablet  Commonly known as: SENOKOT  Take 1 tablet by mouth once daily. Can take twice daily     triamcinolone acetonide 0.1% 0.1 % ointment  Commonly known as: KENALOG  Apply topically 2 (two) times daily. for 7 days     UNABLE TO FIND  [employer] Immune Support     URIBEL ORAL  Take by mouth as needed.           * This list has 2 medication(s) that are the same as other medications prescribed for you. Read the directions carefully, and ask your doctor or other care provider to review them with you.                STOP taking these medications      bisacodyL 10 mg Supp  Commonly known as: DULCOLAX     clobetasol 0.05% 0.05 % Oint  Commonly known as: TEMOVATE     hydrocortisone 2.5 % cream              Indwelling Lines/Drains at time of discharge:   Lines/Drains/Airways       None                   Time spent on the discharge of patient: 30 minutes         Zack Arias MD  Department of Hospital Medicine  [facility] - Transplant Stepdown

## 2022-11-22 NOTE — HPI
Ms. Rodgers is a 67 y.o. female w/ PMH of breast cancer, endometrial cancer, myelodysplastic syndrome (on chemo, cycle 1 Oct 2022), pancytopenia related to recent chemo, and afib who presents with concerns for right hand infection related to recent cat bite. 3 days ago she got bit by her cat. Since then her hand has become red and swollen. She had put colloidal silver and mupirocin on it and has been soaking it in salt water without improvement. She went to her appt at Hematology/Oncology today in preparation for 2nd cycle of chemo this week, and they were concerned for infection in her hand and sent her to the ED. She denies fever, chills, decreased ROM of hand, and spreading of redness up her arm.  Her last tetanus shot was in 2015. Her cat has been fully vaccinated.    In the ED she was found to be pancytopenic with Hgb 6.6, Plt 35, WBC 0.40. Was seen by ortho who performed bedside I&D and dressed wound. Admitted to hospital medicine for further management.

## 2022-11-22 NOTE — NURSING
Latest Reference Range & Units 11/22/22 03:59   WBC 3.90 - 12.70 K/uL 0.35 (LL)   RBC 4.00 - 5.40 M/uL 2.50 (L)   Hemoglobin 12.0 - 16.0 g/dL 7.2 (L)   Hematocrit 37.0 - 48.5 % 21.9 (L)   MCV 82 - 98 fL 88   MCH 27.0 - 31.0 pg 28.8   MCHC 32.0 - 36.0 g/dL 32.9   RDW 11.5 - 14.5 % 15.5 (H)   MPV 9.2 - 12.9 fL SEE COMMENT   Sodium 136 - 145 mmol/L 136   Potassium 3.5 - 5.1 mmol/L 3.9   Chloride 95 - 110 mmol/L 105   CO2 23 - 29 mmol/L 25   Anion Gap 8 - 16 mmol/L 6 (L)   BUN 8 - 23 mg/dL 10   Creatinine 0.5 - 1.4 mg/dL 0.7   eGFR >60 mL/min/1.73 m^2 >60.0   Glucose 70 - 110 mg/dL 100   Calcium 8.7 - 10.5 mg/dL 8.7   (LL): Data is critically low  (L): Data is abnormally low  (H): Data is abnormally high    Criticals Lab Values Notified Ryan Landa MD

## 2022-11-22 NOTE — ASSESSMENT & PLAN NOTE
WBC 0.40, Hgb 6.6, Plt 35. ANC 0.1. Has been present since initiating chemo for MDS.   -transfuse 1 unit pRBCs and 1 unit platelets today  -CBC daily  -heme/onc consult for possibly prophylactic abx in setting of severe neutropenia

## 2022-11-22 NOTE — H&P
[facility] Dept  Hospital Medicine  History & Physical    Patient Name: Jeanne Rodgers  MRN: [id number]  Patient Class: IP- Inpatient  Admission Date: 11/21/2022  Attending Physician: Salbador Mcdermott MD   Primary Care Provider: Gary Gonzalez MD         Patient information was obtained from patient and ER records.     Subjective:     Principal Problem:Cat bite    Chief Complaint:   Chief Complaint   Patient presents with    Abnormal Lab     Arrives from hematology clinic, needs blood transfusion and is neutropenic, cat scratch to left hand - redness and swelling noted        HPI: Ms. Rodgers is a 67 y.o. female w/ PMH of breast cancer, endometrial cancer, myelodysplastic syndrome (on chemo, cycle 1 Oct 2022), pancytopenia related to recent chemo, and afib who presents with concerns for right hand infection related to recent cat bite. 3 days ago she got bit by her cat. Since then her hand has become red and swollen. She had put colloidal silver and mupirocin on it and has been soaking it in salt water without improvement. She went to her appt at Hematology/Oncology today in preparation for 2nd cycle of chemo this week, and they were concerned for infection in her hand and sent her to the ED. She denies fever, chills, decreased ROM of hand, and spreading of redness up her arm.  Her last tetanus shot was in 2015. Her cat has been fully vaccinated.    In the ED she was found to be pancytopenic with Hgb 6.6, Plt 35, WBC 0.40. Was seen by ortho who performed bedside I&D and dressed wound. Admitted to hospital medicine for further management.        Past Medical History:   Diagnosis Date    Allergy     skin    Anxiety     Atrial fibrillation     Breast cancer 2014    right    Chronic back pain     Depression     Encounter for blood transfusion     Endometrial cancer 07/30/2019    Fibromyalgia     Hives     Hx of psychiatric care     Hx of radiation therapy     Itching     Lichen  sclerosus     Mental disorder     PONV (postoperative nausea and vomiting)     Psychiatric problem     PTSD (post-traumatic stress disorder)     Sleep difficulties     Sore throat     Therapy     Uterine cancer 08/05/2019    UTI (urinary tract infection)        Past Surgical History:   Procedure Laterality Date    anal fissure surgery      APPENDECTOMY      BIOPSY N/A 11/19/2020    Procedure: BONE BIOPSY;  [facility];  Location: [facility] OR;  Service: Radiology;  Laterality: N/A;  RN PREOP 11/17/2020    BREAST BIOPSY Right 2014    BREAST LUMPECTOMY Right 2014    R lumpectomy [facility] w 2.4cm IDC & DCIS, neg margins, 0/6 SN, Stage II, ER/MI+, HER-2 neg Adjuvant XRT and hormonal therapy     CARPAL TUNNEL RELEASE      CHOLECYSTECTOMY      COLONOSCOPY      COLONOSCOPY N/A 6/11/2020    Procedure: COLONOSCOPY;  Surgeon: Bobby Marino MD;  Location: [facility] ([address] [place] [address]);  Service: Endoscopy;  Laterality: N/A;    COLONOSCOPY N/A 6/9/2022    Procedure: COLONOSCOPY;  Surgeon: Abdoulaye Alcocer MD;  [facility]  Service: Endoscopy;  Laterality: N/A;    ESOPHAGOGASTRODUODENOSCOPY N/A 6/11/2020    Procedure: EGD (ESOPHAGOGASTRODUODENOSCOPY);  Surgeon: Bobby Marino MD;  Location: [facility] ([address] [place] [address]);  Service: Endoscopy;  Laterality: N/A;    HYSTERECTOMY  08/15/2019    HYSTEROSCOPY WITH DILATION AND CURETTAGE OF UTERUS N/A 7/24/2019    Procedure: HYSTEROSCOPY, WITH DILATION AND CURETTAGE OF UTERUS;  Surgeon: Елена Kam MD;  Location: [facility] OR;  Service: OB/GYN;  Laterality: N/A;    INSERTION OF TUNNELED CENTRAL VENOUS CATHETER (CVC) WITH SUBCUTANEOUS PORT N/A 9/26/2019    Procedure: INSERTION, PORT-A-CATH;  Surgeon: Moris Varghese MD;  Location: [facility] CATH LAB;  Service: Radiology;  Laterality: N/A;    MEDIPORT REMOVAL N/A 3/5/2020    Procedure: REMOVAL, CATHETER, CENTRAL VENOUS, TUNNELED, WITH PORT;  Surgeon: Moris Varghese MD;  Location: [facility] CATH  LAB;  Service: Radiology;  Laterality: N/A;    NEEDLE LOCALIZATION N/A 11/19/2020    Procedure: NEEDLE LOCALIZATION;  Surgeon: Leisa Diagnostic Provider;  Location: [facility] OR;  Service: Radiology;  Laterality: N/A;    PARTIAL MASCECTOMY Right 2014    ROBOT-ASSISTED LAPAROSCOPIC ABDOMINAL HYSTERECTOMY USING DA RAMU XI N/A 8/15/2019    Procedure: XI ROBOTIC HYSTERECTOMY;  Surgeon: Darius Regalado MD;  Location: [facility] OR;  Service: OB/GYN;  Laterality: N/A;    ROBOT-ASSISTED LAPAROSCOPIC SALPINGO-OOPHORECTOMY USING DA RAMU XI Bilateral 8/15/2019    Procedure: XI ROBOTIC SALPINGO-OOPHORECTOMY;  Surgeon: Darius Regalado MD;  Location: [facility] OR;  Service: OB/GYN;  Laterality: Bilateral;    ROBOT-ASSISTED LYMPHADENECTOMY Left 8/15/2019    Procedure: ROBOTIC LYMPHADENECTOMY;  Surgeon: Darius Regalado MD;  Location: [facility] OR;  Service: OB/GYN;  Laterality: Left;    SALPINGOOPHORECTOMY Bilateral 08/15/2019    TONSILLECTOMY      TUBAL LIGATION         Review of patient's allergies indicates:   Allergen Reactions    Oxycodone Hallucinations       No current facility-administered medications on file prior to encounter.     Current Outpatient Medications on File Prior to Encounter   Medication Sig    acetaminophen (TYLENOL) 500 MG tablet Take 1,000 mg by mouth daily as needed for Pain.    bisacodyL (DULCOLAX) 10 mg Supp Place 1 suppository (10 mg total) rectally daily as needed (constipation). (Patient not taking: Reported on 11/21/2022)    clobetasol 0.05% (TEMOVATE) 0.05 % Oint APPLY TO AFFECTED AREA(S) TWICE A DAY FOR 1 MONTH(S) then EVERY DAY FOR 1 MONTH(S) then 3 TIMES A WEEK (Patient not taking: Reported on 11/21/2022)    decitabine-cedazuridine  mg Tab Take 1 tablet by mouth once daily. For 5 days every 28 days    fexofenadine (ALLEGRA) 180 MG tablet 2 tablets in the morning.  May take an extra 2 tablets during the day if needed for itching. (Patient not taking: Reported on 11/21/2022)    hydrocortisone 2.5 %  cream Apply to affected areas twice a day when eczema is moderate. (Patient not taking: Reported on 11/21/2022)    hydrOXYzine HCL (ATARAX) 25 MG tablet 1 to 8 tablets at bedtime.  Start with 3 tablets.  Increase or decrease as needed until itching is controlled or a maximum of 8 tablets. (Patient not taking: Reported on 11/21/2022)    ibuprofen (IBUPROFEN IB) 200 MG tablet Take 200 mg by mouth every 6 (six) hours as needed for Pain. Pt takes 2 pills daily    meth/meblue/sod phos/psal/hyos (URIBEL ORAL) Take by mouth as needed.    metoprolol tartrate (LOPRESSOR) 25 MG tablet Take 1 tablet (25 mg total) by mouth 2 (two) times daily.    mupirocin (BACTROBAN) 2 % ointment Apply topically 4 (four) times daily.    nystatin (MYCOSTATIN) cream Apply topically every Mon, Wed, Fri. (Patient not taking: Reported on 11/21/2022)    nystatin (MYCOSTATIN) powder Apply topically 4 (four) times daily.    ondansetron (ZOFRAN) 4 MG tablet Take 1 tablet (4 mg total) by mouth every 6 (six) hours as needed for Nausea. (Patient not taking: Reported on 11/9/2022)    pantoprazole (PROTONIX) 40 MG tablet Take 1 tablet (40 mg total) by mouth once daily. (Patient not taking: Reported on 11/21/2022)    senna (SENOKOT) 8.6 mg tablet Take 1 tablet by mouth once daily. Can take twice daily (Patient not taking: Reported on 11/21/2022)    triamcinolone acetonide 0.1% (KENALOG) 0.1 % ointment Apply topically 2 (two) times daily. for 7 days    UNABLE TO FIND [place] [employer] Immune Support     Family History       Problem Relation (Age of Onset)    Alcohol abuse Cousin    Arthritis Mother    Cancer Paternal Uncle, Paternal Aunt    Colon polyps Sister, Sister    Diabetes Other    Hashimoto's thyroiditis Sister    Heart attack Paternal Grandmother, Paternal Grandfather    Hypertension Mother, Brother, Brother    Lung cancer Maternal Uncle    Other Mother (85)    Pancreatic cancer Father (85), Maternal Aunt (62)    Rashes / Skin  problems Mother (92)    Skin cancer Mother (89)    Suicide Cousin    Throat cancer Other          Tobacco Use    Smoking status: Never    Smokeless tobacco: Never   Substance and Sexual Activity    Alcohol use: No     Alcohol/week: 0.0 standard drinks    Drug use: Yes     Types: Marijuana     Comment: medical marijuana    Sexual activity: Not Currently     Partners: Male     Review of Systems   Constitutional:  Negative for appetite change, chills, fatigue and fever.   HENT:  Negative for rhinorrhea and sore throat.    Eyes:  Negative for pain and visual disturbance.   Respiratory:  Negative for cough, chest tightness and shortness of breath.    Cardiovascular:  Negative for chest pain, palpitations and leg swelling.   Gastrointestinal:  Negative for abdominal distention, abdominal pain, constipation, diarrhea, nausea and vomiting.   Genitourinary:  Negative for difficulty urinating and dysuria.   Musculoskeletal:  Negative for back pain and joint swelling.   Skin:  Positive for color change and wound (cat bite). Negative for rash.   Neurological:  Negative for dizziness, syncope and light-headedness.   Psychiatric/Behavioral:  Negative for confusion. The patient is not nervous/anxious.    Objective:     Vital Signs (Most Recent):  Temp: 98.7 °F (37.1 °C) (11/21/22 1758)  Pulse: 78 (11/21/22 1758)  Resp: 19 (11/21/22 1758)  BP: (!) 146/65 (11/21/22 1758)  SpO2: 100 % (11/21/22 1758)   Vital Signs (24h Range):  Temp:  [98.3 °F (36.8 °C)-98.7 °F (37.1 °C)] 98.7 °F (37.1 °C)  Pulse:  [78-88] 78  Resp:  [16-20] 19  SpO2:  [100 %] 100 %  BP: (106-146)/(57-65) 146/65     Weight: 51.3 kg (113 lb)  Body mass index is 22.07 kg/m².    Physical Exam  Vitals and nursing note reviewed.   Constitutional:       General: She is not in acute distress.     Appearance: Normal appearance.   HENT:      Mouth/Throat:      Mouth: Mucous membranes are moist.      Pharynx: Oropharynx is clear.   Cardiovascular:      Rate and Rhythm:  Normal rate and regular rhythm.      Pulses: Normal pulses.      Heart sounds: No murmur heard.  Pulmonary:      Effort: Pulmonary effort is normal.      Breath sounds: Normal breath sounds. No wheezing or rales.   Abdominal:      General: Abdomen is flat. Bowel sounds are normal. There is no distension.      Palpations: Abdomen is soft.      Tenderness: There is no abdominal tenderness.   Musculoskeletal:         General: Swelling (distal rue swelling and erythema, bandage in place) present.      Right lower leg: No edema.      Left lower leg: No edema.   Skin:     General: Skin is warm.      Capillary Refill: Capillary refill takes less than 2 seconds.   Neurological:      General: No focal deficit present.      Mental Status: She is alert and oriented to person, place, and time.   Psychiatric:         Mood and Affect: Mood normal.         Behavior: Behavior normal.           Significant Labs: All pertinent labs within the past 24 hours have been reviewed.  CBC:   Recent Labs   Lab 11/21/22  1008 11/21/22  1339   WBC 0.33* 0.40*   HGB 6.9* 6.6*   HCT 21.6* 21.4*   PLT 31* 35*     CMP:   Recent Labs   Lab 11/21/22  1008 11/21/22  1339    137   K 4.0 4.2    105   CO2 23 23   * 81   BUN 14 12   CREATININE 0.7 0.7   CALCIUM 9.1 9.1   PROT 7.3 7.5   ALBUMIN 3.4* 3.4*   BILITOT 0.7 0.6   ALKPHOS 72 70   AST 6* 12   ALT 7* 5*   ANIONGAP 8 9     Lactic Acid:   Recent Labs   Lab 11/21/22  1339   LACTATE 1.3       Significant Imaging: I have reviewed all pertinent imaging results/findings within the past 24 hours.    Imaging Results              X-Ray Hand 3 View Right (Final result)  Result time 11/21/22 15:24:14   Procedure changed from X-Ray Hand 2 View Right     Final result by Nura Clark MD (11/21/22 15:24:14)                   Impression:      No acute radiographic abnormality.  Recommend follow-up as clinically indicated.      Electronically signed by: Nura  Mariana  Date:    11/21/2022  Time:    15:24               Narrative:    EXAMINATION:  XR HAND COMPLETE 3 VIEW RIGHT    CLINICAL HISTORY:  CELLULITIS; Cellulitis, unspecified    TECHNIQUE:  PA, lateral, and oblique views of the right hand were performed.    COMPARISON:  None    FINDINGS:  No acute fracture, subluxation or dislocation.  No mass or foreign body.  Moderate degenerative changes of the 1st carpometacarpal joint and interphalangeal joints.                                        Assessment/Plan:     * Cat bite  S/p bedsite I&D with ortho. Started on unasyn in ED.  -ortho following  -continue on unasyn  -ID consult for immunosuppressed patient  -Heme/onc consult in relation to severe neutopenia in setting of infection      Drug-induced pancytopenia  WBC 0.40, Hgb 6.6, Plt 35. ANC 0.1. Has been present since initiating chemo for MDS.   -transfuse 1 unit pRBCs and 1 unit platelets today  -CBC daily  -heme/onc consult for possibly prophylactic abx in setting of severe neutropenia       Myelodysplastic syndrome  Follows with oncology outpatient. Planned for cycle 2 of chemo this week.  -f/u outpatient      Paroxysmal atrial fibrillation  Patient with Paroxysmal (<7 days) atrial fibrillation which is controlled currently with Beta Blocker. Patient is currently in sinus rhythm.[id number] Score: 2. Continue home metoprolol        VTE Risk Mitigation (From admission, onward)         Ordered     IP VTE HIGH RISK PATIENT  Once         11/21/22 1803     Place sequential compression device  Until discontinued         11/21/22 1803     Place sequential compression device  Until discontinued         11/21/22 1705                   Zack Arias MD  Department of Hospital Medicine   [facility] Dept

## 2022-11-22 NOTE — PLAN OF CARE
[facility] - Transplant Stepdown  Initial Discharge Assessment       Primary Care Provider: Gary Gonzalez MD    Admission Diagnosis: Cellulitis [L03.90]  Cat bite [W55.01XA]  Immunocompromised [D84.9]    Admission Date: 11/21/2022  Expected Discharge Date: 11/22/2022    Discharge Barriers Identified: None    Payor: MEDICARE / Plan: MEDICARE PART A & B / Product Type: Government /     Extended Emergency Contact Information  Primary Emergency Contact: Les Stephens   [place]  Mobile Phone: [phone]  Relation: Friend  Secondary Emergency Contact: Geni Rodgers  Mobile Phone: [phone]  Relation: Sister    Discharge Plan A:  (TBD)  Discharge Plan B:  (TBD)      [facility] - [place], [place] - [address]  [address]  [place] [address]  Phone: [phone] Fax: [phone]    [facility] - [place], [place] - [address]  [address]  [place] [address]  Phone: [phone] Fax: [phone]    [id number] - [place], [place] - [address] [facility] [address]  [facility] [address]  Phone: [phone] Fax: [phone]      Initial Assessment (most recent)       Adult Discharge Assessment - 11/22/22 1013          Discharge Assessment    Assessment Type Discharge Planning Assessment     Confirmed/corrected address, phone number and insurance Yes     Confirmed Demographics Correct on Facesheet     Source of Information patient     When was your last doctors appointment? 10/18/22     Lives With alone     Do you expect to return to your current living situation? Yes     Do you have help at home or someone to help you manage your care at home? Yes     Who are your caregiver(s) and their phone number(s)? SisterGeni, [phone]     Prior to hospitilization cognitive status: Alert/Oriented     Current cognitive status: Alert/Oriented     Walking or Climbing Stairs Difficulty none     Dressing/Bathing Difficulty none     Home Layout  Able to live on 1st floor     Equipment Currently Used at Home none     Readmission within 30 days? No     Patient currently being followed by outpatient case management? No     Do you currently have service(s) that help you manage your care at home? No     Do you take prescription medications? Yes     Do you have prescription coverage? Yes     Do you have any problems affording any of your prescribed medications? No     Is the patient taking medications as prescribed? yes     How do you get to doctors appointments? car, drives self     Are you on dialysis? No     Do you take coumadin? No     Discharge Plan A --   TBD    Discharge Plan B --   TBD    DME Needed Upon Discharge  none     Discharge Plan discussed with: Patient     Discharge Barriers Identified None        Physical Activity    On average, how many days per week do you engage in moderate to strenuous exercise (like a brisk walk)? 0 days     On average, how many minutes do you engage in exercise at this level? 0 min        Financial Resource Strain    How hard is it for you to pay for the very basics like food, housing, medical care, and heating? Not hard at all        Housing Stability    In the last 12 months, was there a time when you were not able to pay the mortgage or rent on time? No     In the last 12 months, was there a time when you did not have a steady place to sleep or slept in a shelter (including now)? No        Transportation Needs    In the past 12 months, has lack of transportation kept you from medical appointments or from getting medications? No     In the past 12 months, has lack of transportation kept you from meetings, work, or from getting things needed for daily living? No        Food Insecurity    Within the past 12 months, you worried that your food would run out before you got the money to buy more. Never true     Within the past 12 months, the food you bought just didn't last and you didn't have money to get more. Never true         Stress    Do you feel stress - tense, restless, nervous, or anxious, or unable to sleep at night because your mind is troubled all the time - these days? Very much        Social Connections    In a typical week, how many times do you talk on the phone with family, friends, or neighbors? Three times a week     How often do you get together with friends or relatives? Three times a week     How often do you attend [employer] or Pentecostalism services? Never     Do you belong to any clubs or organizations such as [employer] groups, unions, fraternal or athletic groups, or school groups? Yes   Animal Rescue Services    How often do you attend meetings of the clubs or organizations you belong to? 1 to 4 times per year     Are you , , , , never , or living with a partner? Never         Alcohol Use    Q1: How often do you have a drink containing alcohol? Never     Q2: How many drinks containing alcohol do you have on a typical day when you are drinking? Patient does not drink     Q3: How often do you have six or more drinks on one occasion? Never                 The CM met with the patient at bedside to complete the DPA.  The CM placed name and contact information on the blackboard in the patient's room.  Use preferred pharmacy / bedside delivery for any necessary  medications at the time of discharge.The patient is independent with all ADLs.  The patient doesn't use any equipment. The patient is not on Dialysis or Coumadin. The patient will provide assistance to the patient upon discharge. The patient's sister will provide transportation upon discharge . The CM will continue to follow for course of hospitalization.

## 2022-11-22 NOTE — ASSESSMENT & PLAN NOTE
Patient with Paroxysmal (<7 days) atrial fibrillation which is controlled currently with Beta Blocker. Patient is currently in sinus rhythm.[id number] Score: 2. Continue home metoprolol

## 2022-11-22 NOTE — HPI
Patient is a 67 y.o. female w/ PMH of breast cancer, endometrial cancer, treatment related myelodysplastic syndrome (on chemo, cycle 1 Oct 2022), , and afib who presented to the ED with concerns for right hand infection related to recent cat bite. She was seen at the [facility] with complaints of right hand pain and swelling following a cat bite three days prior to her visit. She is fostering several kittens which she reported were uptodate on vaccinations. Due to her neutropenia and extent of swelling with some drainage she was sent to the ED for further evaluation. In the ED, she was vitally stable with labs showing significant pancytopenia with WBC of .35, Plt of 31K, H/H of 6.9/21.6 with PRBC and Plt transfusion ordered. Plain film x-ray showed no signs of osteomyelitis with Orthopedics consulted with I&D performed. She was admitted to hospital medicine and started on Unasyn with IF consulted. BMT consulted for management of pancytopenia. In regard to her MDS, Noted to have persistent pancytopenia complicated chemotherapy for her endometrial carcinoma in 2020. Bone marrow biopsy in November 2020 was a normocellular marrow (30%) with trilineage hematpoiesis and atypical megakaryocytes. No morphologic or immunuphenotypic evidence of metastatic carcinoma. Adequate iron storage. Chromosome analysis normal X,X phenotype. Insufficient aspirate for analysis. Bone marrow biopsy June 2022 was hypercellular marrow (%) with trilineage hematopoiesis. Grade 1 reticular fibrosis. No evidence of lymphoma, plasma cell neoplasm, or metastatic carcinoma. Two hospital admission in summer 2022 for symptomatic anemia requiring transfusion support. Once incident related to GI bleeding. Hgb at admissions 5-6 grams with normal MCV. No GI bleeding source has been found. Bone marrow biopsy September 2022 extremely hypercellular marrow, 99% with increased blasts (10%) and moderate trilineage dyspoiesis with left-shifted  erythroid hyperplasia, left-shifted, relatively decreased granulocytes and focally increased megakaryocytes with increase number of micromegakaryocytes. Increased stainable histiocytic iron and increased reticulin fibrosis (MF-1 with focal MF-2). 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 NGS with TP53. She ws started on Inqovi 10/21/22 and was scheduled to start cycle 2 prior to his admission. At the bedside, she has no acute complaints and is eager to be discharged when final anti-biotic regimen is confirmed. She was educated to hold Inqovi until follow-up with BMT and continue her lab visits and was agreeable and understanding.

## 2022-11-22 NOTE — PROGRESS NOTES
Discharge instructions and education given to pt. Pt verbalized understanding. Reviewed medication changes, new medications, future appointments and medication compliance. Pt informed to use Care Coordinator or call 24/7 [facility] on-call nurse for any further questions or concerns. Reviewed worsening signs or symptoms of infection. Peripheral IV removed, catheter intact. VISI and telemetry monitors removed. Personal items sent with pt. Pt waiting for wheelchair services to provide transport. Pharmacy came to bedside and provided medications. Vitals signs stable at time of discharge.

## 2022-11-22 NOTE — ED NOTES
Spoke with Elisha in blood bank. Per Elisha, pt's type and screen has to be redrawn since her initial type and screen was done at [facility]

## 2022-11-22 NOTE — ASSESSMENT & PLAN NOTE
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  She is tolerating it well thus far. Due for C2 on 11/23. HOLD until infection resolves.  Will continue weekly labs outpatient.      Recommendations:  Please start on Acylovir 400mg BID, Fluconazole 400mg daily, and Levaquin 500mg daily for neutropenia PPX  Patient to hold Inqovi until follow-up with BMT  Continued weekly labs with transfusions as needed  No indication for growth factor support agents at this time  No contraindication to PICC from BMT perspective if needed for hand cellulitis anti-biotics

## 2022-11-22 NOTE — NURSING
PT admitted to room [address]10, Wound noted to Right Wrist/hand unable to view due to dressing. Wound care consult placed.

## 2022-11-22 NOTE — CONSULTS
[facility] - Transplant Step[facility]  Hematology/Oncology  Consult Note    Patient Name: Jeanne Rodgers  MRN: [id number]  Admission Date: 11/21/2022  Hospital Length of Stay: 1 days  Code Status: Full Code   Attending Provider: Salbador Mcdermott MD  Consulting Provider: Zack Hoyos DO  Primary Care Physician: Gary Gonzalez MD  Principal Problem:Cat bite    Inpatient consult to Hematology/Oncology  Consult performed by: Zack Hoyos DO  Consult ordered by: Zack Arias MD        Subjective:     HPI:  Patient is a 67 y.o. female w/ PMH of breast cancer, endometrial cancer, treatment related myelodysplastic syndrome (on chemo, cycle 1 Oct 2022), , and afib who presented to the ED with concerns for right hand infection related to recent cat bite. She was seen at the [facility] with complaints of right hand pain and swelling following a cat bite three days prior to her visit. She is fostering several kittens which she reported were uptodate on vaccinations. Due to her neutropenia and extent of swelling with some drainage she was sent to the ED for further evaluation. In the ED, she was vitally stable with labs showing significant pancytopenia with WBC of .35, Plt of 31K, H/H of 6.9/21.6 with PRBC and Plt transfusion ordered. Plain film x-ray showed no signs of osteomyelitis with Orthopedics consulted with I&D performed. She was admitted to hospital medicine and started on Unasyn with IF consulted. BMT consulted for management of pancytopenia. In regard to her MDS, Noted to have persistent pancytopenia complicated chemotherapy for her endometrial carcinoma in 2020. Bone marrow biopsy in November 2020 was a normocellular marrow (30%) with trilineage hematpoiesis and atypical megakaryocytes. No morphologic or immunuphenotypic evidence of metastatic carcinoma. Adequate iron storage. Chromosome analysis normal X,X phenotype. Insufficient aspirate for analysis. Bone marrow biopsy June 2022 was hypercellular  marrow (%) with trilineage hematopoiesis. Grade 1 reticular fibrosis. No evidence of lymphoma, plasma cell neoplasm, or metastatic carcinoma. Two hospital admission in summer 2022 for symptomatic anemia requiring transfusion support. Once incident related to GI bleeding. Hgb at admissions 5-6 grams with normal MCV. No GI bleeding source has been found. Bone marrow biopsy September 2022 extremely hypercellular marrow, 99% with increased blasts (10%) and moderate trilineage dyspoiesis with left-shifted erythroid hyperplasia, left-shifted, relatively decreased granulocytes and focally increased megakaryocytes with increase number of micromegakaryocytes. Increased stainable histiocytic iron and increased reticulin fibrosis (MF-1 with focal MF-2). 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 NGS with TP53. She ws started on Inqovi 10/21/22 and was scheduled to start cycle 2 prior to his admission. At the bedside, she has no acute complaints and is eager to be discharged when final anti-biotic regimen is confirmed. She was educated to hold Inqovi until follow-up with BMT and continue her lab visits and was agreeable and understanding.           Oncology Treatment Plan:   [No matching plan found]    Medications:  Continuous Infusions:  Scheduled Meds:   ampicillin-sulbactim (UNASYN) IVPB  3 g Intravenous Q6H    metoprolol tartrate  25 mg Oral BID     PRN Meds:acetaminophen, melatonin, morphine, ondansetron, sodium chloride 0.9%, sodium chloride 0.9%     Review of patient's allergies indicates:   Allergen Reactions    Oxycodone Hallucinations        Past Medical History:   Diagnosis Date    Allergy     skin    Anxiety     Atrial fibrillation     Breast cancer 2014    right    Chronic back pain     Depression     Encounter for blood transfusion     Endometrial cancer 07/30/2019    Fibromyalgia     Hives     Hx of psychiatric care     Hx of radiation  therapy     Itching     Lichen sclerosus     Mental disorder     PONV (postoperative nausea and vomiting)     Psychiatric problem     PTSD (post-traumatic stress disorder)     Sleep difficulties     Sore throat     Therapy     Uterine cancer 08/05/2019    UTI (urinary tract infection)      Past Surgical History:   Procedure Laterality Date    anal fissure surgery      APPENDECTOMY      BIOPSY N/A 11/19/2020    Procedure: BONE BIOPSY;  [facility];  Location: [facility] OR;  Service: Radiology;  Laterality: N/A;  RN PREOP 11/17/2020    BREAST BIOPSY Right 2014    BREAST LUMPECTOMY Right 2014    R lumpectomy [facility] w 2.4cm IDC & DCIS, neg margins, 0/6 SN, Stage II, ER/KS+, HER-2 neg Adjuvant XRT and hormonal therapy     CARPAL TUNNEL RELEASE      CHOLECYSTECTOMY      COLONOSCOPY      COLONOSCOPY N/A 6/11/2020    Procedure: COLONOSCOPY;  Surgeon: Bboby Marino MD;  Location: [facility] ([address] [place] [address]);  Service: Endoscopy;  Laterality: N/A;    COLONOSCOPY N/A 6/9/2022    Procedure: COLONOSCOPY;  Surgeon: Abdoulaye Alcocer MD;  Location: [facility] ([address] [place] [address]);  Service: Endoscopy;  Laterality: N/A;    ESOPHAGOGASTRODUODENOSCOPY N/A 6/11/2020    Procedure: EGD (ESOPHAGOGASTRODUODENOSCOPY);  Surgeon: Bobby Marino MD;  Location: [facility] ([address] [place] [address]);  Service: Endoscopy;  Laterality: N/A;    HYSTERECTOMY  08/15/2019    HYSTEROSCOPY WITH DILATION AND CURETTAGE OF UTERUS N/A 7/24/2019    Procedure: HYSTEROSCOPY, WITH DILATION AND CURETTAGE OF UTERUS;  Surgeon: Елена Kam MD;  Location: [facility] OR;  Service: OB/GYN;  Laterality: N/A;    INSERTION OF TUNNELED CENTRAL VENOUS CATHETER (CVC) WITH SUBCUTANEOUS PORT N/A 9/26/2019    Procedure: INSERTION, PORT-A-CATH;  Surgeon: Moris Varghese MD;  Location: [facility] CATH LAB;  Service: Radiology;  Laterality: N/A;    MEDIPORT REMOVAL N/A 3/5/2020    Procedure: REMOVAL, CATHETER, CENTRAL VENOUS, TUNNELED, WITH PORT;  Surgeon: Moris ESCOBAR  MD Halima;  Location: [facility] CATH LAB;  Service: Radiology;  Laterality: N/A;    NEEDLE LOCALIZATION N/A 11/19/2020    Procedure: NEEDLE LOCALIZATION;  Surgeon: [facility] Diagnostic Provider;  Location: [facility] OR;  Service: Radiology;  Laterality: N/A;    PARTIAL MASCECTOMY Right 2014    ROBOT-ASSISTED LAPAROSCOPIC ABDOMINAL HYSTERECTOMY USING DA RAMU XI N/A 8/15/2019    Procedure: XI ROBOTIC HYSTERECTOMY;  Surgeon: Darius Regalado MD;  Location: [facility] OR;  Service: OB/GYN;  Laterality: N/A;    ROBOT-ASSISTED LAPAROSCOPIC SALPINGO-OOPHORECTOMY USING DA RAMU XI Bilateral 8/15/2019    Procedure: XI ROBOTIC SALPINGO-OOPHORECTOMY;  Surgeon: Darius Regalado MD;  Location: [facility] OR;  Service: OB/GYN;  Laterality: Bilateral;    ROBOT-ASSISTED LYMPHADENECTOMY Left 8/15/2019    Procedure: ROBOTIC LYMPHADENECTOMY;  Surgeon: Darius Regalado MD;  Location: [facility] OR;  Service: OB/GYN;  Laterality: Left;    SALPINGOOPHORECTOMY Bilateral 08/15/2019    TONSILLECTOMY      TUBAL LIGATION       Family History       Problem Relation (Age of Onset)    Alcohol abuse Cousin    Arthritis Mother    Cancer Paternal Uncle, Paternal Aunt    Colon polyps Sister, Sister    Diabetes Other    Hashimoto's thyroiditis Sister    Heart attack Paternal Grandmother, Paternal Grandfather    Hypertension Mother, Brother, Brother    Lung cancer Maternal Uncle    Other Mother (85)    Pancreatic cancer Father (85), Maternal Aunt (62)    Rashes / Skin problems Mother (92)    Skin cancer Mother (89)    Suicide Cousin    Throat cancer Other          Tobacco Use    Smoking status: Never    Smokeless tobacco: Never   Substance and Sexual Activity    Alcohol use: No     Alcohol/week: 0.0 standard drinks    Drug use: Yes     Types: Marijuana     Comment: medical marijuana    Sexual activity: Not Currently     Partners: Male       Review of Systems   Constitutional:  Positive for fever.   Skin:         Right hand abscess.    Objective:     Vital Signs (Most  Recent):  Temp: 98.7 °F (37.1 °C) (11/22/22 1123)  Pulse: 69 (11/22/22 1123)  Resp: 18 (11/22/22 1123)  BP: 129/62 (11/22/22 1123)  SpO2: 98 % (11/22/22 1123) Vital Signs (24h Range):  Temp:  [98.1 °F (36.7 °C)-99.2 °F (37.3 °C)] 98.7 °F (37.1 °C)  Pulse:  [] 69  Resp:  [16-20] 18  SpO2:  [98 %-100 %] 98 %  BP: (114-149)/(52-76) 129/62     Weight: 51.3 kg (113 lb)  Body mass index is 22.07 kg/m².  Body surface area is 1.47 meters squared.      Intake/Output Summary (Last 24 hours) at 11/22/2022 1321  Last data filed at 11/22/2022 0803  Gross per 24 hour   Intake 600 ml   Output 200 ml   Net 400 ml       Physical Exam  Vitals and nursing note reviewed.   Constitutional:       Appearance: Normal appearance.   Cardiovascular:      Rate and Rhythm: Normal rate and regular rhythm.      Pulses: Normal pulses.   Pulmonary:      Effort: Pulmonary effort is normal.      Breath sounds: Normal breath sounds.   Abdominal:      General: Abdomen is flat.      Palpations: Abdomen is soft.   Musculoskeletal:      Comments: Right hand abscess with associated swelling and erythema.    Neurological:      General: No focal deficit present.      Mental Status: She is oriented to person, place, and time.   Psychiatric:         Behavior: Behavior normal.       Significant Labs:   All pertinent labs from the last 24 hours have been reviewed.    Diagnostic Results:  I have reviewed all pertinent imaging results/findings within the past 24 hours.    Assessment/Plan:     * Cat bite  Presented to clinic on 11/22/22 with right hand cellulitis due to cat bite  Complicated due to neutropenia in setting of MDS  I&D performed by Orthopedics  Started on Unasyn with ID consulted    Recommendations:  Appreciate care of hospital medicine, ID and ortho in care of mutual patient  We will ensure close BMT follow-up on DC  Anti-biotics per primary team  No contraindication to PICC if needed for cellulitis    Myelodysplastic syndrome  IPPS-R Very High  Risk MDS  RAEB-2 MDS with complete cytogenetics  Likely secondary MDS from prior chemotherapy/radiation therapy  She started Inqovi 5 of 28 days on 10/21/22.  She is tolerating it well thus far. Due for C2 on 11/23. HOLD until infection resolves.  Will continue weekly labs outpatient.      Recommendations:  Please start on Acylovir 400mg BID, Fluconazole 400mg daily, and Levaquin 500mg daily for neutropenia PPX  Patient to hold Inqovi until follow-up with BMT  Continued weekly labs with transfusions as needed  No indication for growth factor support agents at this time  No contraindication to PICC from BMT perspective if needed for hand cellulitis anti-biotics        Thank you for your consult. I will follow-up with patient. Please contact us if you have any additional questions.    Zack Hoyos,   Hematology/Oncology  [facility] - Transplant Stepdown

## 2022-11-22 NOTE — PLAN OF CARE
11/22/22 0931   Post-Acute Status   Post-Acute Authorization Other   Other Status No Post-Acute Service Needs        The patient is being d/c today with no social service needs identified at this time.     2:06 PM  The SW contacted the patient's pcp to schedule a hospital follow appointment. The patient's appointment has been scheduled and placed on the AVS for review.     Pippa Franco LMSW  Case Management [facility]  Ext: [phone]

## 2022-11-22 NOTE — ACP (ADVANCE CARE PLANNING)
Advance Care Planning     Date: 11/21/2022    Code Status  In light of the patients advanced and life limiting illness,I engaged the the patient in a conversation about the patient's preferences for care  at the very end of life. The patient is ambivalent about this decision.  She would like to remain full code for now but will continue to consider this issue.  I spent a total of 10 minutes engaging the patient in this advance care planning discussion.

## 2022-11-22 NOTE — PROGRESS NOTES
Patient seen for wound care consultation.   Reviewed chart for this encounter.   See Flow Sheet for findings.    RECOMMENDATIONS:   Cleanse w/Vashe(given to pt)  Pat dry, apply Aquacel AG, cover with island border dressing    Discussed POC with patient and primary RN.   See EMR for orders & patient education    Wound Care Sign off---pt being d/c'd today    Nursing to continue care.       11/22/22 1512   WOCN Assessment   WOCN Total Time (mins) 30   Visit Date 11/22/22   Visit Time 1445   Consult Type New   WOCN Speciality Wound   Wound other   Intervention assessed;changed;applied;chart review;coordination of care;consult other service;team conference;orders   Teaching on-going        Altered Skin Integrity 11/21/22 1300 Right anterior Wrist Puncture   Date First Assessed/Time First Assessed: 11/21/22 1300   Side: Right  Orientation: anterior  Location: Wrist  Primary Wound Type: Puncture   Wound Image    Description of Altered Skin Integrity Full thickness tissue loss. Subcutaneous fat may be visible but bone, tendon or muscle are not exposed   Dressing Appearance Clean;Dry;Intact   Drainage Amount Scant   Drainage Characteristics/Odor Serosanguineous   Tissue loss description Full thickness   Red (%), Wound Tissue Color 100 %   Periwound Area Intact;Edematous;Redness;Swelling;Warm   Wound Edges Defined   Wound Length (cm) 0.6 cm   Wound Width (cm) 0.3 cm   Wound Depth (cm) 0.2 cm   Wound Volume (cm^3) 0.036 cm^3   Wound Surface Area (cm^2) 0.18 cm^2   Care Cleansed with:;Antimicrobial agent   Dressing Applied;Silver;Island/border   Dressing Change Due 11/25/22

## 2022-11-22 NOTE — CONSULTS
"67F with ednometrial cancer on chemotherapy with chemo induced MDS and pancytopenia. Here with cellulitis and worry for bacteremia. Cultures pending.    IR consulted for port placement.    Recommend awaiting culture clearance and setting up port placement as an outpatient.    Please place an Ambulatory referral to IR (order named "[id number]"), and we can arrange as an outpatient. Please send a message to FIL[facility] FRAIRE Interventional Radiology Schedulers and Sherrill Dubose. Please note in the message if biopsy may be arranged at a [facility] site ([facility], etc.).     Case discussed with IR Staff Dr. Delgado and primary team Dr. Arias.    Hussein Lancaster MD  PGY-IV, Radiology    "

## 2022-11-22 NOTE — SUBJECTIVE & OBJECTIVE
Oncology Treatment Plan:   [No matching plan found]    Medications:  Continuous Infusions:  Scheduled Meds:   ampicillin-sulbactim (UNASYN) IVPB  3 g Intravenous Q6H    metoprolol tartrate  25 mg Oral BID     PRN Meds:acetaminophen, melatonin, morphine, ondansetron, sodium chloride 0.9%, sodium chloride 0.9%     Review of patient's allergies indicates:   Allergen Reactions    Oxycodone Hallucinations        Past Medical History:   Diagnosis Date    Allergy     skin    Anxiety     Atrial fibrillation     Breast cancer 2014    right    Chronic back pain     Depression     Encounter for blood transfusion     Endometrial cancer 07/30/2019    Fibromyalgia     Hives     Hx of psychiatric care     Hx of radiation therapy     Itching     Lichen sclerosus     Mental disorder     PONV (postoperative nausea and vomiting)     Psychiatric problem     PTSD (post-traumatic stress disorder)     Sleep difficulties     Sore throat     Therapy     Uterine cancer 08/05/2019    UTI (urinary tract infection)      Past Surgical History:   Procedure Laterality Date    anal fissure surgery      APPENDECTOMY      BIOPSY N/A 11/19/2020    Procedure: BONE BIOPSY;  Surgeon: [facility] Diagnostic Provider;  Location: [facility] OR;  Service: Radiology;  Laterality: N/A;  RN PREOP 11/17/2020    BREAST BIOPSY Right 2014    BREAST LUMPECTOMY Right 2014    R lumpectomy [facility] w 2.4cm IDC & DCIS, neg margins, 0/6 SN, Stage II, ER/RI+, HER-2 neg Adjuvant XRT and hormonal therapy     CARPAL TUNNEL RELEASE      CHOLECYSTECTOMY      COLONOSCOPY      COLONOSCOPY N/A 6/11/2020    Procedure: COLONOSCOPY;  Surgeon: Bobby Marino MD;  Location: [address]);  Service: Endoscopy;  Laterality: N/A;    COLONOSCOPY N/A 6/9/2022    Procedure: COLONOSCOPY;  Surgeon: Abdoulaye Alcocer MD;  Location: [address]);  Service: Endoscopy;  Laterality: N/A;    ESOPHAGOGASTRODUODENOSCOPY N/A 6/11/2020    Procedure: EGD (ESOPHAGOGASTRODUODENOSCOPY);  Surgeon: Bobby BOND  MD Ned;  Location: [facility] ENDO (2ND FLR);  Service: Endoscopy;  Laterality: N/A;    HYSTERECTOMY  08/15/2019    HYSTEROSCOPY WITH DILATION AND CURETTAGE OF UTERUS N/A 7/24/2019    Procedure: HYSTEROSCOPY, WITH DILATION AND CURETTAGE OF UTERUS;  Surgeon: Елена Kam MD;  Location: [facility] OR;  Service: OB/GYN;  Laterality: N/A;    INSERTION OF TUNNELED CENTRAL VENOUS CATHETER (CVC) WITH SUBCUTANEOUS PORT N/A 9/26/2019    Procedure: INSERTION, PORT-A-CATH;  Surgeon: Moris Varghese MD;  Location: [facility] CATH LAB;  Service: Radiology;  Laterality: N/A;    MEDIPORT REMOVAL N/A 3/5/2020    Procedure: REMOVAL, CATHETER, CENTRAL VENOUS, TUNNELED, WITH PORT;  Surgeon: Moris Varghese MD;  Location: [facility] CATH LAB;  Service: Radiology;  Laterality: N/A;    NEEDLE LOCALIZATION N/A 11/19/2020    Procedure: NEEDLE LOCALIZATION;  Surgeon: [facility] Diagnostic Provider;  Location: [facility] OR;  Service: Radiology;  Laterality: N/A;    PARTIAL MASCECTOMY Right 2014    ROBOT-ASSISTED LAPAROSCOPIC ABDOMINAL HYSTERECTOMY USING DA RAMU XI N/A 8/15/2019    Procedure: XI ROBOTIC HYSTERECTOMY;  Surgeon: Darius Regalado MD;  Location: [facility];  Service: OB/GYN;  Laterality: N/A;    ROBOT-ASSISTED LAPAROSCOPIC SALPINGO-OOPHORECTOMY USING DA RAMU XI Bilateral 8/15/2019    Procedure: XI ROBOTIC SALPINGO-OOPHORECTOMY;  Surgeon: Darius Regalado MD;  Location: [facility];  Service: OB/GYN;  Laterality: Bilateral;    ROBOT-ASSISTED LYMPHADENECTOMY Left 8/15/2019    Procedure: ROBOTIC LYMPHADENECTOMY;  Surgeon: Darius Regalado MD;  Location: [facility];  Service: OB/GYN;  Laterality: Left;    SALPINGOOPHORECTOMY Bilateral 08/15/2019    TONSILLECTOMY      TUBAL LIGATION       Family History       Problem Relation (Age of Onset)    Alcohol abuse Cousin    Arthritis Mother    Cancer Paternal Uncle, Paternal Aunt    Colon polyps Sister, Sister    Diabetes Other    Hashimoto's thyroiditis Sister    Heart attack Paternal Grandmother, Paternal Grandfather     Hypertension Mother, Brother, Brother    Lung cancer Maternal Uncle    Other Mother (85)    Pancreatic cancer Father (85), Maternal Aunt (62)    Rashes / Skin problems Mother (92)    Skin cancer Mother (89)    Suicide Cousin    Throat cancer Other          Tobacco Use    Smoking status: Never    Smokeless tobacco: Never   Substance and Sexual Activity    Alcohol use: No     Alcohol/week: 0.0 standard drinks    Drug use: Yes     Types: Marijuana     Comment: medical marijuana    Sexual activity: Not Currently     Partners: Male       Review of Systems   Constitutional:  Positive for fever.   Skin:         Right hand abscess.    Objective:     Vital Signs (Most Recent):  Temp: 98.7 °F (37.1 °C) (11/22/22 1123)  Pulse: 69 (11/22/22 1123)  Resp: 18 (11/22/22 1123)  BP: 129/62 (11/22/22 1123)  SpO2: 98 % (11/22/22 1123) Vital Signs (24h Range):  Temp:  [98.1 °F (36.7 °C)-99.2 °F (37.3 °C)] 98.7 °F (37.1 °C)  Pulse:  [] 69  Resp:  [16-20] 18  SpO2:  [98 %-100 %] 98 %  BP: (114-149)/(52-76) 129/62     Weight: 51.3 kg (113 lb)  Body mass index is 22.07 kg/m².  Body surface area is 1.47 meters squared.      Intake/Output Summary (Last 24 hours) at 11/22/2022 1321  Last data filed at 11/22/2022 0803  Gross per 24 hour   Intake 600 ml   Output 200 ml   Net 400 ml       Physical Exam  Vitals and nursing note reviewed.   Constitutional:       Appearance: Normal appearance.   Cardiovascular:      Rate and Rhythm: Normal rate and regular rhythm.      Pulses: Normal pulses.   Pulmonary:      Effort: Pulmonary effort is normal.      Breath sounds: Normal breath sounds.   Abdominal:      General: Abdomen is flat.      Palpations: Abdomen is soft.   Musculoskeletal:      Comments: Right hand abscess with associated swelling and erythema.    Neurological:      General: No focal deficit present.      Mental Status: She is oriented to person, place, and time.   Psychiatric:         Behavior: Behavior normal.       Significant Labs:    All pertinent labs from the last 24 hours have been reviewed.    Diagnostic Results:  I have reviewed all pertinent imaging results/findings within the past 24 hours.

## 2022-11-22 NOTE — ASSESSMENT & PLAN NOTE
S/p bedsite I&D with ortho. Started on unasyn in ED.  -ortho following  -continue on unasyn  -ID consult for immunosuppressed patient  -Heme/onc consult in relation to severe neutopenia in setting of infection

## 2022-11-22 NOTE — ASSESSMENT & PLAN NOTE
Presented to clinic on 11/22/22 with right hand cellulitis due to cat bite  Complicated due to neutropenia in setting of MDS  I&D performed by Orthopedics  Started on Unasyn with ID consulted    Recommendations:  Appreciate care of hospital medicine, ID and ortho in care of mutual patient  We will ensure close BMT follow-up on DC  Anti-biotics per primary team  No contraindication to PICC if needed for cellulitis

## 2022-11-22 NOTE — PLAN OF CARE
[facility] Transplant Step[facility]  Discharge Final Note    Primary Care Provider: Gary Gonzalez MD    Expected Discharge Date: 11/22/2022    Final Discharge Note (most recent)       Final Note - 11/22/22 1048          Final Note    Assessment Type Final Discharge Note     Anticipated Discharge Disposition Home or Self Care                     Important Message from Medicare  Important Message from Medicare regarding Discharge Appeal Rights: Given to patient/caregiver, Explained to patient/caregiver, Signed/date by patient/caregiver     Date IMM was signed: 11/22/22  Time IMM was signed: 0948    Contact Info       Madison Rivera MD   Specialty: Hematology and Oncology    [address] [place] [id number]   Phone: [phone]       Next Steps: Schedule an appointment as soon as possible for a visit in 1 week(s)           Plan is to discharge Patienthome with no stated needs

## 2022-11-22 NOTE — ED NOTES
Report received from EDUARDO Darling. Care of pt assumed. Pt aaox4, neuro intact. Resp even and unlabored. Nadn. Swelling noted to right hand s/p I&d for cat bite. Awaiting blood bank to send blood. Pt aware of poc. Ccm/bp/o2 monitoring in place. Awaiting bed assignment.

## 2022-11-22 NOTE — CONSULTS
[facility]   Infectious Diseases Consult Note     Patient Name: Jeanne Rodgers   MRN:  [id number]   Admission Date:  11/21/2022  Length of Stay:  1  Attending Physician:  Salbador Mcdermott MD   Primary Care Provider:  Gary Gonzalez MD    Isolation Status: Enhanced Respiratory    Assessment/Plan:      Ms. Jeanne Rodgers is a 66yo female with history of breast and endometrial cancer s/p hysterectomy on chemo (Inqovi), now with chemo-induced high-grade MDS with pancytopenia, pre-diabetes, Afib (not on anti-coagulation). Presented from oncology clinic with right hand wound with worsening swelling, erythema and possibly purulence x3 days 2/2 domestic cat bite on Friday, 11/18 (cat up to date on vaccinations). On hospital admission labs with severe panctyopenia (), though afebrile and with normal lactic acid. XR without abnormalities. Evaluated by ortho, underwent bedside I&D without malvin purulence noted, cultures in progress. On ampicillin-sulbactam at this time.      Domestic cat bite in setting of severe neutropenia  - agree with ampicillin-sulbactam at this time for common pathogens and anaerobes, can de-escalate to amox-clav on discharge to complete total 7 day course (end date: 11/28).   - would recommend addition of MRSA and Strep coverage with linezolid 600 q12h x7 days (end date: 11/28). Patient reports previous intolerance of doxycycline.   - continue elevation of hand as able, discussed with patient   - follow up cultures   - will schedule patient for telemedicine visit with Dr. Diaz next Thursday, 12/1.         Anticipated Disposition: per primary team, OK to discharge      We will sign off at this time. Thank you for your consult. Please contact us if you have any additional questions.     Le Esparza MD  Infectious Diseases  [facility]      Subjective:      Principal Problem: Cat bite    HPI:  Ms. Jeanne Rodgers is a 66yo female with history of breast and endometrial cancer s/p hysterectomy on chemo (Inqovi), now with chemo-induced high-grade MDS with pancytopenia, pre-diabetes, Afib (not on anti-coagulation). Presented from oncology clinic with right hand wound with swelling and erythema x3 days 2/2 domestic cat bite on Friday, 11/18 (cat up to date on vaccinations). Patient reports that as soon as injury occurred she soaked wound in antibacterial soap and water, applied mupirocin and covered it with bandage. Wound worsened in appearance by the next day with some bloody discharge (denies purulence - though in oncology clinic note they mention report of pus) and erythema expanding. No change in function of hand. On hospital admission labs with severe panctyopenia (), though afebrile and with normal lactic acid. XR without abnormalities. Evaluated by ortho, underwent bedside I&D without malvin purulence noted, cultures in progress. On ampicillin-sulbactam at this time.      Interval History: Patient resting this morning, doing well at this time and inquiring about discharge. Denies fevers overnight, Tm 99.2. Unwrapped hand at bedside and patient reports great improvement in both swelling, tenderness and redness of site.      Review of Systems   Review of Systems   Constitutional:  Negative for chills, diaphoresis, fever and malaise/fatigue.   Respiratory:  Negative for cough, hemoptysis, sputum production, shortness of breath and wheezing.    Cardiovascular:  Negative for chest pain, palpitations and leg swelling.   Gastrointestinal:  Negative for abdominal pain, constipation, diarrhea, nausea and vomiting.   Genitourinary:  Negative for dysuria, hematuria and urgency.   Musculoskeletal:  Negative for myalgias.   Neurological:  Negative for dizziness, tingling, tremors, sensory change, weakness and headaches.   Endo/Heme/Allergies:  Bruises/bleeds easily.     Objective:         Vitals:     11/22/22 0758   BP: 136/62   Pulse: 70   Resp: 18   Temp: 98.1 °F (36.7 °C)        Body mass index is 22.07 kg/m².    Estimated Creatinine Clearance: 54.5 mL/min (based on SCr of 0.7 mg/dL).        Physical Exam  Physical Exam  Constitutional:       General: She is not in acute distress.     Appearance: She is ill-appearing. She is not diaphoretic.   HENT:      Head: Normocephalic and atraumatic.      Nose: Nose normal.      Mouth/Throat:      Mouth: Mucous membranes are moist.      Pharynx: Oropharynx is clear. No oropharyngeal exudate or posterior oropharyngeal erythema.   Eyes:      Extraocular Movements: Extraocular movements intact.      Conjunctiva/sclera: Conjunctivae normal.      Pupils: Pupils are equal, round, and reactive to light.   Cardiovascular:      Rate and Rhythm: Normal rate and regular rhythm.      Pulses: Normal pulses.      Heart sounds: No murmur heard.    No friction rub. No gallop.   Pulmonary:      Effort: Pulmonary effort is normal. No respiratory distress.      Breath sounds: Normal breath sounds. No wheezing.   Abdominal:      General: Abdomen is flat. Bowel sounds are normal.      Palpations: Abdomen is soft.   Musculoskeletal:         General: Normal range of motion.      Cervical back: Normal range of motion and neck supple.      Right lower leg: No edema.      Left lower leg: No edema.   Skin:     General: Skin is warm and dry.      Coloration: Skin is pale. Skin is not jaundiced.      Findings: Bruising and lesion present.      Comments: Open lesion noted to dorsal surface of right 1st digit with bloody drainage. Surrounding erythema and swelling (per patient improved), mildly tender to touch. Scant fluctuance appreciated.   Neurological:      General: No focal deficit present.      Mental Status: She is alert.      Sensory: No sensory deficit.      Motor: No weakness.                  Labs  Recent Labs   Lab 11/21/22  1008 11/21/22  1339 11/22/22  0359   WBC 0.33* 0.40* 0.35*   HGB  6.9* 6.6* 7.2*   HCT 21.6* 21.4* 21.9*   PLT 31* 35* 30*   MCV 92 93 88   RDW 17.4* 17.4* 15.5*    137 136   K 4.0 4.2 3.9    105 105   CO2 23 23 25   BUN 14 12 10   CREATININE 0.7 0.7 0.7   * 81 100   PROT 7.3 7.5  --    ALBUMIN 3.4* 3.4*  --    BILITOT 0.7 0.6  --    AST 6* 12  --    ALKPHOS 72 70  --    ALT 7* 5*  --          Microbiology:  11/21 Bcx: NGTD  11/21 Fluid aspiration gram stain (right thumb): no organisms, no WBC  11/21 Fluid aspiration culture (right thumb): in process     Significant Imaging: I have reviewed all pertinent imaging results/findings within the past 24 hours.

## 2022-11-22 NOTE — DISCHARGE INSTRUCTIONS
Starting tomorrow, begin changing your bandage every day. When changing the bandage, clean the area with betadine solution. For a bandage you can use a large band-aid. Avoid getting the area wet. If the bandage gets wet, change to a dry bandage.

## 2022-11-22 NOTE — HOSPITAL COURSE
I&D and wound bandage done by ortho. Started on unasyn. Given 1 unit pRBC and platelets. Hematology/Oncology and Infectious disease consulted. Recommendations for linezolid for 7 days w/ ID f/u in 1 week. Will start on prophylactic antiviral, antibacterial, and antifungal per heme/onc. Referred to IR for outpatient port placement.

## 2022-11-22 NOTE — PLAN OF CARE
67 year-old female admitted 11/21/22 for cat bite to right hand. Pt has hx of breast cancer, uterine cancer, thrombocytopenia, GERD  -AAOx4, ambulates independently  -Lt upper arm midline  -Ampicillin Q6 IVPB  -wound to rt hand, wound care consult  -enhanced respiratory precautions  -Tele NSR  -pt lying in bed, bed in lowest position, wheels locked, non-skid socks in place, call light within reach  -pending discharge

## 2022-11-22 NOTE — PLAN OF CARE
SSC met with patient/family at bedside. Patient experience rounding completed and reviewed the following.     Do you know your discharge plan? Yes    If yes, what is the plan? Home    If you are discharging home, do you have help at home? No    Do you think you will need help at home at discharge? No     Have you discussed your needs and preferences with your SW/CM? Yes     Assigned SW/CM notified of any patient/family needs or concerns.

## 2022-11-22 NOTE — SUBJECTIVE & OBJECTIVE
Past Medical History:   Diagnosis Date    Allergy     skin    Anxiety     Atrial fibrillation     Breast cancer 2014    right    Chronic back pain     Depression     Encounter for blood transfusion     Endometrial cancer 07/30/2019    Fibromyalgia     Hives     Hx of psychiatric care     Hx of radiation therapy     Itching     Lichen sclerosus     Mental disorder     PONV (postoperative nausea and vomiting)     Psychiatric problem     PTSD (post-traumatic stress disorder)     Sleep difficulties     Sore throat     Therapy     Uterine cancer 08/05/2019    UTI (urinary tract infection)        Past Surgical History:   Procedure Laterality Date    anal fissure surgery      APPENDECTOMY      BIOPSY N/A 11/19/2020    Procedure: BONE BIOPSY;  Surgeon: [facility] Diagnostic Provider;  Location: [facility] OR;  Service: Radiology;  Laterality: N/A;  RN PREOP 11/17/2020    BREAST BIOPSY Right 2014    BREAST LUMPECTOMY Right 2014    R lumpectomy [facility] w 2.4cm IDC & DCIS, neg margins, 0/6 SN, Stage II, ER/GA+, HER-2 neg Adjuvant XRT and hormonal therapy     CARPAL TUNNEL RELEASE      CHOLECYSTECTOMY      COLONOSCOPY      COLONOSCOPY N/A 6/11/2020    Procedure: COLONOSCOPY;  Surgeon: Bobby Marino MD;  Location: [facility] ([address] [place] [address]);  Service: Endoscopy;  Laterality: N/A;    COLONOSCOPY N/A 6/9/2022    Procedure: COLONOSCOPY;  Surgeon: Abdoulaye Alcocer MD;  Location: [facility] ([address] [place] [address]);  Service: Endoscopy;  Laterality: N/A;    ESOPHAGOGASTRODUODENOSCOPY N/A 6/11/2020    Procedure: EGD (ESOPHAGOGASTRODUODENOSCOPY);  Surgeon: Bobby Marino MD;  Location: [facility] ([address] [place] [address]);  Service: Endoscopy;  Laterality: N/A;    HYSTERECTOMY  08/15/2019    HYSTEROSCOPY WITH DILATION AND CURETTAGE OF UTERUS N/A 7/24/2019    Procedure: HYSTEROSCOPY, WITH DILATION AND CURETTAGE OF UTERUS;  Surgeon: Елена Kam MD;  Location: [facility];  Service: OB/GYN;  Laterality: N/A;    INSERTION OF TUNNELED CENTRAL VENOUS CATHETER (CVC) WITH  SUBCUTANEOUS PORT N/A 9/26/2019    Procedure: INSERTION, PORT-A-CATH;  Surgeon: Moris Varghese MD;  Location: [facility] CATH LAB;  Service: Radiology;  Laterality: N/A;    MEDIPORT REMOVAL N/A 3/5/2020    Procedure: REMOVAL, CATHETER, CENTRAL VENOUS, TUNNELED, WITH PORT;  Surgeon: Moris Varghese MD;  Location: [facility] CATH LAB;  Service: Radiology;  Laterality: N/A;    NEEDLE LOCALIZATION N/A 11/19/2020    Procedure: NEEDLE LOCALIZATION;  Surgeon: [facility] Diagnostic Provider;  Location: [facility] OR;  Service: Radiology;  Laterality: N/A;    PARTIAL MASCECTOMY Right 2014    ROBOT-ASSISTED LAPAROSCOPIC ABDOMINAL HYSTERECTOMY USING DA RAMU XI N/A 8/15/2019    Procedure: XI ROBOTIC HYSTERECTOMY;  Surgeon: Darius Regalado MD;  Location: [facility] OR;  Service: OB/GYN;  Laterality: N/A;    ROBOT-ASSISTED LAPAROSCOPIC SALPINGO-OOPHORECTOMY USING DA RAMU XI Bilateral 8/15/2019    Procedure: XI ROBOTIC SALPINGO-OOPHORECTOMY;  Surgeon: Darius Regalado MD;  Location: [facility];  Service: OB/GYN;  Laterality: Bilateral;    ROBOT-ASSISTED LYMPHADENECTOMY Left 8/15/2019    Procedure: ROBOTIC LYMPHADENECTOMY;  Surgeon: Darius Regalado MD;  Location: [facility] OR;  Service: OB/GYN;  Laterality: Left;    SALPINGOOPHORECTOMY Bilateral 08/15/2019    TONSILLECTOMY      TUBAL LIGATION         Review of patient's allergies indicates:   Allergen Reactions    Oxycodone Hallucinations       No current facility-administered medications on file prior to encounter.     Current Outpatient Medications on File Prior to Encounter   Medication Sig    acetaminophen (TYLENOL) 500 MG tablet Take 1,000 mg by mouth daily as needed for Pain.    bisacodyL (DULCOLAX) 10 mg Supp Place 1 suppository (10 mg total) rectally daily as needed (constipation). (Patient not taking: Reported on 11/21/2022)    clobetasol 0.05% (TEMOVATE) 0.05 % Oint APPLY TO AFFECTED AREA(S) TWICE A DAY FOR 1 MONTH(S) then EVERY DAY FOR 1 MONTH(S) then 3 TIMES A WEEK (Patient not taking: Reported on  11/21/2022)    decitabine-cedazuridine  mg Tab Take 1 tablet by mouth once daily. For 5 days every 28 days    fexofenadine (ALLEGRA) 180 MG tablet 2 tablets in the morning.  May take an extra 2 tablets during the day if needed for itching. (Patient not taking: Reported on 11/21/2022)    hydrocortisone 2.5 % cream Apply to affected areas twice a day when eczema is moderate. (Patient not taking: Reported on 11/21/2022)    hydrOXYzine HCL (ATARAX) 25 MG tablet 1 to 8 tablets at bedtime.  Start with 3 tablets.  Increase or decrease as needed until itching is controlled or a maximum of 8 tablets. (Patient not taking: Reported on 11/21/2022)    ibuprofen (IBUPROFEN IB) 200 MG tablet Take 200 mg by mouth every 6 (six) hours as needed for Pain. Pt takes 2 pills daily    meth/meblue/sod phos/psal/hyos (URIBEL ORAL) Take by mouth as needed.    metoprolol tartrate (LOPRESSOR) 25 MG tablet Take 1 tablet (25 mg total) by mouth 2 (two) times daily.    mupirocin (BACTROBAN) 2 % ointment Apply topically 4 (four) times daily.    nystatin (MYCOSTATIN) cream Apply topically every Mon, Wed, Fri. (Patient not taking: Reported on 11/21/2022)    nystatin (MYCOSTATIN) powder Apply topically 4 (four) times daily.    ondansetron (ZOFRAN) 4 MG tablet Take 1 tablet (4 mg total) by mouth every 6 (six) hours as needed for Nausea. (Patient not taking: Reported on 11/9/2022)    pantoprazole (PROTONIX) 40 MG tablet Take 1 tablet (40 mg total) by mouth once daily. (Patient not taking: Reported on 11/21/2022)    senna (SENOKOT) 8.6 mg tablet Take 1 tablet by mouth once daily. Can take twice daily (Patient not taking: Reported on 11/21/2022)    triamcinolone acetonide 0.1% (KENALOG) 0.1 % ointment Apply topically 2 (two) times daily. for 7 days    UNABLE TO FIND [place] Mushrooms Immune Support     Family History       Problem Relation (Age of Onset)    Alcohol abuse Cousin    Arthritis Mother    Cancer Paternal Uncle, Paternal Aunt    Colon  polyps Sister, Sister    Diabetes Other    Hashimoto's thyroiditis Sister    Heart attack Paternal Grandmother, Paternal Grandfather    Hypertension Mother, Brother, Brother    Lung cancer Maternal Uncle    Other Mother (85)    Pancreatic cancer Father (85), Maternal Aunt (62)    Rashes / Skin problems Mother (92)    Skin cancer Mother (89)    Suicide Cousin    Throat cancer Other          Tobacco Use    Smoking status: Never    Smokeless tobacco: Never   Substance and Sexual Activity    Alcohol use: No     Alcohol/week: 0.0 standard drinks    Drug use: Yes     Types: Marijuana     Comment: medical marijuana    Sexual activity: Not Currently     Partners: Male     Review of Systems   Constitutional:  Negative for appetite change, chills, fatigue and fever.   HENT:  Negative for rhinorrhea and sore throat.    Eyes:  Negative for pain and visual disturbance.   Respiratory:  Negative for cough, chest tightness and shortness of breath.    Cardiovascular:  Negative for chest pain, palpitations and leg swelling.   Gastrointestinal:  Negative for abdominal distention, abdominal pain, constipation, diarrhea, nausea and vomiting.   Genitourinary:  Negative for difficulty urinating and dysuria.   Musculoskeletal:  Negative for back pain and joint swelling.   Skin:  Positive for color change and wound (cat bite). Negative for rash.   Neurological:  Negative for dizziness, syncope and light-headedness.   Psychiatric/Behavioral:  Negative for confusion. The patient is not nervous/anxious.    Objective:     Vital Signs (Most Recent):  Temp: 98.7 °F (37.1 °C) (11/21/22 1758)  Pulse: 78 (11/21/22 1758)  Resp: 19 (11/21/22 1758)  BP: (!) 146/65 (11/21/22 1758)  SpO2: 100 % (11/21/22 1758)   Vital Signs (24h Range):  Temp:  [98.3 °F (36.8 °C)-98.7 °F (37.1 °C)] 98.7 °F (37.1 °C)  Pulse:  [78-88] 78  Resp:  [16-20] 19  SpO2:  [100 %] 100 %  BP: (106-146)/(57-65) 146/65     Weight: 51.3 kg (113 lb)  Body mass index is 22.07  kg/m².    Physical Exam  Vitals and nursing note reviewed.   Constitutional:       General: She is not in acute distress.     Appearance: Normal appearance.   HENT:      Mouth/Throat:      Mouth: Mucous membranes are moist.      Pharynx: Oropharynx is clear.   Cardiovascular:      Rate and Rhythm: Normal rate and regular rhythm.      Pulses: Normal pulses.      Heart sounds: No murmur heard.  Pulmonary:      Effort: Pulmonary effort is normal.      Breath sounds: Normal breath sounds. No wheezing or rales.   Abdominal:      General: Abdomen is flat. Bowel sounds are normal. There is no distension.      Palpations: Abdomen is soft.      Tenderness: There is no abdominal tenderness.   Musculoskeletal:         General: Swelling (distal rue swelling and erythema, bandage in place) present.      Right lower leg: No edema.      Left lower leg: No edema.   Skin:     General: Skin is warm.      Capillary Refill: Capillary refill takes less than 2 seconds.   Neurological:      General: No focal deficit present.      Mental Status: She is alert and oriented to person, place, and time.   Psychiatric:         Mood and Affect: Mood normal.         Behavior: Behavior normal.           Significant Labs: All pertinent labs within the past 24 hours have been reviewed.  CBC:   Recent Labs   Lab 11/21/22  1008 11/21/22  1339   WBC 0.33* 0.40*   HGB 6.9* 6.6*   HCT 21.6* 21.4*   PLT 31* 35*     CMP:   Recent Labs   Lab 11/21/22  1008 11/21/22  1339    137   K 4.0 4.2    105   CO2 23 23   * 81   BUN 14 12   CREATININE 0.7 0.7   CALCIUM 9.1 9.1   PROT 7.3 7.5   ALBUMIN 3.4* 3.4*   BILITOT 0.7 0.6   ALKPHOS 72 70   AST 6* 12   ALT 7* 5*   ANIONGAP 8 9     Lactic Acid:   Recent Labs   Lab 11/21/22  1339   LACTATE 1.3       Significant Imaging: I have reviewed all pertinent imaging results/findings within the past 24 hours.    Imaging Results              X-Ray Hand 3 View Right (Final result)  Result time 11/21/22  15:24:14   Procedure changed from X-Ray Hand 2 View Right     Final result by Nura Clark MD (11/21/22 15:24:14)                   Impression:      No acute radiographic abnormality.  Recommend follow-up as clinically indicated.      Electronically signed by: Nura Clark  Date:    11/21/2022  Time:    15:24               Narrative:    EXAMINATION:  XR HAND COMPLETE 3 VIEW RIGHT    CLINICAL HISTORY:  CELLULITIS; Cellulitis, unspecified    TECHNIQUE:  PA, lateral, and oblique views of the right hand were performed.    COMPARISON:  None    FINDINGS:  No acute fracture, subluxation or dislocation.  No mass or foreign body.  Moderate degenerative changes of the 1st carpometacarpal joint and interphalangeal joints.

## 2022-11-23 NOTE — TELEPHONE ENCOUNTER
Called and spoke with the patient. Informed her about the appointment was scheduled on 11/28/22 @ 2.45 pm @ [facility] location.Pt verbalized understanding and was thankful.

## 2022-11-25 NOTE — TELEPHONE ENCOUNTER
SW attempted to follow up with patient. Call went unanswered with no voicemail available. SW to join DNP at next scheduled [facility] visit.  SW will continue to follow.    Cici Guzmán, SIRIW  Outpatient   Palliative Medicine

## 2022-11-27 NOTE — TELEPHONE ENCOUNTER
----- Message from Karla Farrell sent at 7/2/2019 10:24 AM CDT -----  Contact: pt  Type:  Patient Returning Call    Who Called: Jeanne    Who Left Message for Patient: Bladimir    Does the patient know what this is regarding?:yes    Would the patient rather a call back or a response via [facility]? Call Back    Best Call Back Number:[phone]    Additional Information: Please call back as soon as possible    
Spoke to pt, FU appointment scheduled 7/11 at 3:30p with [facility] at the [facility] location, pt is aware and verbalizes understanding.  
English

## 2022-11-28 NOTE — PROGRESS NOTES
[facility]  History & Physical  Orthopedics    SUBJECTIVE:      COVID-19 attestation:  This patient was treated during the COVID-19 pandemic.  This was discussed with the patient, they are aware of our current policies and procedures, were given the option of delaying their visit and or switching to a virtual visit, delaying their surgery when applicable, and they elect to proceed.    Chief Complaint:   Chief Complaint   Patient presents with    Right Hand - Pain, Injury       Referring Provider: Pranav Moya MD     History of Present Illness:  Patient is a 67 y.o. right hand dominant female who presents today with complaints of right hand cat bite on 11/19/2022. She has a past medical history of breast cancer, endometrial cancer, myelodysplastic syndrome (on chemo, cycle 1 Oct 2022), pancytopenia related to recent chemo. She was seen in the ED on 11/22/2022 for worsening pain and swelling in the hand, she received IV abx and was discharged on levaquin.  She states that the pain and swelling have improved since then.  She denies numbness and tingling     The patient rescue the animals.    The patient rates their pain as a 0/10.    Attempted treatment(s) and/or interventions include rest, activity modification, and antibiotic therapy.     The patient denies any fevers, chills, N/V, D/C and presents for evaluation.       Past Medical History:   Diagnosis Date    Allergy     skin    Anxiety     Atrial fibrillation     Breast cancer 2014    right    Chronic back pain     Depression     Encounter for blood transfusion     Endometrial cancer 07/30/2019    Fibromyalgia     Hives     Hx of psychiatric care     Hx of radiation therapy     Itching     Lichen sclerosus     Mental disorder     PONV (postoperative nausea and vomiting)     Psychiatric problem     PTSD (post-traumatic stress disorder)     Sleep difficulties     Sore throat     Therapy     Uterine cancer 08/05/2019    UTI (urinary tract  infection)      Past Surgical History:   Procedure Laterality Date    anal fissure surgery      APPENDECTOMY      BIOPSY N/A 11/19/2020    Procedure: BONE BIOPSY;  Surgeon: [facility];  Location: [facility] OR;  Service: Radiology;  Laterality: N/A;  RN PREOP 11/17/2020    BREAST BIOPSY Right 2014    BREAST LUMPECTOMY Right 2014    R lumpectomy [facility] w 2.4cm IDC & DCIS, neg margins, 0/6 SN, Stage II, ER/MT+, HER-2 neg Adjuvant XRT and hormonal therapy     CARPAL TUNNEL RELEASE      CHOLECYSTECTOMY      COLONOSCOPY      COLONOSCOPY N/A 6/11/2020    Procedure: COLONOSCOPY;  Surgeon: Bobby Marino MD;  Location: [facility] ENDO (2ND FLR);  Service: Endoscopy;  Laterality: N/A;    COLONOSCOPY N/A 6/9/2022    Procedure: COLONOSCOPY;  Surgeon: Abdoulaye Alcocer MD;  Location: [facility] ENDO (2ND FLR);  Service: Endoscopy;  Laterality: N/A;    ESOPHAGOGASTRODUODENOSCOPY N/A 6/11/2020    Procedure: EGD (ESOPHAGOGASTRODUODENOSCOPY);  Surgeon: Bobby Marino MD;  [facility]  Service: Endoscopy;  Laterality: N/A;    HYSTERECTOMY  08/15/2019    HYSTEROSCOPY WITH DILATION AND CURETTAGE OF UTERUS N/A 7/24/2019    Procedure: HYSTEROSCOPY, WITH DILATION AND CURETTAGE OF UTERUS;  Surgeon: Елена Kam MD;  Location: [facility];  Service: OB/GYN;  Laterality: N/A;    INSERTION OF TUNNELED CENTRAL VENOUS CATHETER (CVC) WITH SUBCUTANEOUS PORT N/A 9/26/2019    Procedure: INSERTION, PORT-A-CATH;  Surgeon: Moris Varghese MD;  Location: [facility] CATH LAB;  Service: Radiology;  Laterality: N/A;    MEDIPORT REMOVAL N/A 3/5/2020    Procedure: REMOVAL, CATHETER, CENTRAL VENOUS, TUNNELED, WITH PORT;  Surgeon: Moris Varghese MD;  Location: [facility] CATH LAB;  Service: Radiology;  Laterality: N/A;    NEEDLE LOCALIZATION N/A 11/19/2020    Procedure: NEEDLE LOCALIZATION;  Surgeon: [facility] Provider;  Location: [facility] OR;  Service: Radiology;  Laterality: N/A;    PARTIAL MASCECTOMY Right 2014    ROBOT-ASSISTED  LAPAROSCOPIC ABDOMINAL HYSTERECTOMY USING DA RAMU XI N/A 8/15/2019    Procedure: XI ROBOTIC HYSTERECTOMY;  Surgeon: Darius Regalado MD;  Location: [facility] OR;  Service: OB/GYN;  Laterality: N/A;    ROBOT-ASSISTED LAPAROSCOPIC SALPINGO-OOPHORECTOMY USING DA RAMU XI Bilateral 8/15/2019    Procedure: XI ROBOTIC SALPINGO-OOPHORECTOMY;  Surgeon: Darius Regalado MD;  Location: [facility] OR;  Service: OB/GYN;  Laterality: Bilateral;    ROBOT-ASSISTED LYMPHADENECTOMY Left 8/15/2019    Procedure: ROBOTIC LYMPHADENECTOMY;  Surgeon: Darius Regalado MD;  Location: [facility] OR;  Service: OB/GYN;  Laterality: Left;    SALPINGOOPHORECTOMY Bilateral 08/15/2019    TONSILLECTOMY      TUBAL LIGATION       Review of patient's allergies indicates:   Allergen Reactions    Oxycodone Hallucinations     Social History     Social History Narrative    Lives alone.     Never , no children    Very invested in cat rescue     Family History   Problem Relation Age of Onset    Skin cancer Mother 89        squamous cell--nose & head    Rashes / Skin problems Mother 92        cutaneous horn of scalp    Hypertension Mother     Arthritis Mother     Other Mother 85        PANC mass suspected by provider to be ca, surv'd 8 yrs    Pancreatic cancer Father 85        surv'd 8 mos    Pancreatic cancer Maternal Aunt 62    Colon polyps Sister         pt thinks (a few)    Hypertension Brother     Cancer Paternal Uncle         leukemia, 70s    Heart attack Paternal Grandmother     Heart attack Paternal Grandfather     Hypertension Brother     Colon polyps Sister         Geni believes just a handful    Hashimoto's thyroiditis Sister     Diabetes Other         strong family history per patient    Lung cancer Maternal Uncle         smoker    Cancer Paternal Aunt         origin? colon or ovarian?    Suicide Cousin     Alcohol abuse Cousin     Throat cancer Other         pt believes    Breast cancer Neg Hx     Ovarian cancer Neg Hx     Cirrhosis Neg Hx     Colon cancer  Neg Hx          Current Outpatient Medications:     acetaminophen (TYLENOL) 500 MG tablet, Take 1,000 mg by mouth daily as needed for Pain., Disp: , Rfl:     acyclovir (ZOVIRAX) 400 MG tablet, Take 1 tablet (400 mg total) by mouth 2 (two) times daily., Disp: 60 tablet, Rfl: 1    amoxicillin-clavulanate 875-125mg (AUGMENTIN) 875-125 mg per tablet, Take 1 tablet by mouth 2 (two) times daily. for 7 days, Disp: 14 tablet, Rfl: 0    decitabine-cedazuridine  mg Tab, Take 1 tablet by mouth once daily. For 5 days every 28 days, Disp: 5 tablet, Rfl: 5    fexofenadine (ALLEGRA) 180 MG tablet, 2 tablets in the morning.  May take an extra 2 tablets during the day if needed for itching., Disp: 240 tablet, Rfl: 5    fluconazole (DIFLUCAN) 200 MG Tab, Take 2 tablets (400 mg total) by mouth once daily., Disp: 60 tablet, Rfl: 1    hydrOXYzine HCL (ATARAX) 25 MG tablet, 1 to 8 tablets at bedtime.  Start with 3 tablets.  Increase or decrease as needed until itching is controlled or a maximum of 8 tablets., Disp: 240 tablet, Rfl: 3    ibuprofen (IBUPROFEN IB) 200 MG tablet, Take 200 mg by mouth every 6 (six) hours as needed for Pain. Pt takes 2 pills daily, Disp: , Rfl:     levoFLOXacin (LEVAQUIN) 500 MG tablet, Take 1 tablet (500 mg total) by mouth once daily., Disp: 30 tablet, Rfl: 1    linezolid (ZYVOX) 600 mg Tab, Take 1 tablet (600 mg total) by mouth every 12 (twelve) hours. for 7 days, Disp: 14 tablet, Rfl: 0    meth/meblue/sod phos/psal/hyos (URIBEL ORAL), Take by mouth as needed., Disp: , Rfl:     metoprolol tartrate (LOPRESSOR) 25 MG tablet, Take 1 tablet (25 mg total) by mouth 2 (two) times daily., Disp: 180 tablet, Rfl: 3    mupirocin (BACTROBAN) 2 % ointment, Apply topically 4 (four) times daily., Disp: , Rfl:     nystatin (MYCOSTATIN) cream, Apply topically every Mon, Wed, Fri., Disp: 30 g, Rfl: 11    nystatin (MYCOSTATIN) powder, Apply topically 4 (four) times daily., Disp: 30 g, Rfl: 1    pantoprazole (PROTONIX) 40  MG tablet, Take 1 tablet (40 mg total) by mouth once daily., Disp: 90 tablet, Rfl: 3    povidone-iodine (BETADINE) 10 % external solution, Apply topically as needed for Wound Care. Clean R hand wound w/ betadine daily, Disp: 14.8 mL, Rfl: 0    senna (SENOKOT) 8.6 mg tablet, Take 1 tablet by mouth once daily. Can take twice daily, Disp: 30 tablet, Rfl: 11    triamcinolone acetonide 0.1% (KENALOG) 0.1 % ointment, Apply topically 2 (two) times daily. for 7 days, Disp: 80 g, Rfl: 0    UNABLE TO FIND, Turkey Tail Mushrooms Immune Support, Disp: , Rfl:     ROS    Review of Systems:  Constitutional: no fever or chills  Eyes: no visual changes  ENT: no nasal congestion or sore throat  Respiratory: no cough or shortness of breath  Cardiovascular: no chest pain  Gastrointestinal: no nausea or vomiting, tolerating diet  Musculoskeletal: pain, soreness, and wound    OBJECTIVE:      Vital Signs (Most Recent):  There were no vitals filed for this visit.  There is no height or weight on file to calculate BMI.    Physical Exam    Physical Exam:  Constitutional: The patient appears well-developed and well-nourished. No distress.   Head: Normocephalic and atraumatic.   Nose: Nose normal.   Eyes: Conjunctivae and EOM are normal.   Neck: No tracheal deviation present.   Cardiovascular: Normal rate and intact distal pulses.    Pulmonary/Chest: Effort normal. No respiratory distress.   Abdominal: There is no guarding.   Lymphatic: Negative for adenopathy   Neurological: The patient is alert.   Psychiatric: The patient has a normal mood and affect.     Bilateral Hand/Wrist Examination:    Observation/Inspection:  Swelling  Right dorsal hand    Deformity  none  Discoloration  none     Scars   none    Atrophy  none    HAND/WRIST EXAMINATION:    Right hand cat bite with dorsal scab, no fluctuance, no tenosynovitis 1st dorsal compartment tendons, nontender to thumb CMC joint, full thumb and finger ROM    Neurovascular Exam:  Digits WWP, brisk  CR < 3s throughout  NVI motor/LTS to M/R/U nerves, radial pulse 2+    Diagnostic Results:       X-rays AP, lateral and oblique right hand taken 11/21/2022 are independently reviewed by me and show Eaton stage II basilar thumb arthritis as well as IP joint arthritis.         ASSESSMENT/PLAN:      67 y.o. yo female with   Encounter Diagnoses   Name Primary?    Primary osteoarthritis of right hand Yes    Cat bite of right hand, initial encounter       Plan:  We have discussed the natural history of cat bites including treatment options such as antibiotic treatment and debridement surgery. I have given bilateral thumb 3D braces for comfort.    Follow up in one week for wound check as needed for any worsening of symptoms.    The patient's pathophysiology was explained in detail with reference to x-rays, models, other visual aids as appropriate.  Treatment options were discussed in detail.  Questions were invited and answered to the patient's satisfaction. I reviewed Primary care , and other specialty's notes to better coordinate patient's care.          Noelle Pinto MD    Please be aware that this note has been generated with the assistance of MModal voice-to-text.  Please excuse any spelling or grammatical errors.

## 2022-11-29 NOTE — PROGRESS NOTES
Chief Complaint  Chief Complaint   Patient presents with    Hospital Follow Up         HPI    HPI   Ms. Jeanne Rodgers is a 67 y.o. female with medical problems as listed below. The patient presents to clinic for hospital follow up.     Hospital course as follows:    Patient Name: Jeanne Rodgers  MRN: [id number]  EMIL: [id number]  Patient Class: IP- Inpatient  Admission Date: 11/21/2022  Hospital Length of Stay: 1 days  Discharge Date and Time:  11/22/2022 2:00 PM  Attending Physician: Salbador Mcdermott MD   Discharging Provider: Zack Arias MD  Primary Care Provider: Gary Gonzalez MD  Hospital Medicine Team: [facility] 1 Zack Arias MD  Primary Care Team: [facility] MED 1     HPI:   Ms. Rodgers is a 67 y.o. female w/ PMH of breast cancer, endometrial cancer, myelodysplastic syndrome (on chemo, cycle 1 Oct 2022), pancytopenia related to recent chemo, and afib who presents with concerns for right hand infection related to recent cat bite. 3 days ago she got bit by her cat. Since then her hand has become red and swollen. She had put colloidal silver and mupirocin on it and has been soaking it in salt water without improvement. She went to her appt at Hematology/Oncology today in preparation for 2nd cycle of chemo this week, and they were concerned for infection in her hand and sent her to the ED. She denies fever, chills, decreased ROM of hand, and spreading of redness up her arm.  Her last tetanus shot was in 2015. Her cat has been fully vaccinated.     In the ED she was found to be pancytopenic with Hgb 6.6, Plt 35, WBC 0.40. Was seen by ortho who performed bedside I&D and dressed wound. Admitted to hospital medicine for further management.    Hospital Course:   I&D and wound bandage done by ortho. Started on unasyn. Given 1 unit pRBC and platelets. Hematology/Oncology and Infectious disease consulted. Recommendations for linezolid for 7 days w/ ID f/u in 1 week. Will start on  prophylactic antiviral, antibacterial, and antifungal per heme/onc. Referred to IR for outpatient port placement.    Went to orthopedics for further evaluation yesterday.    Assessment/plan as follows:   Primary osteoarthritis of right hand Yes    Cat bite of right hand, initial encounter        Plan:  We have discussed the natural history of cat bites including treatment options such as antibiotic treatment and debridement surgery. I have given bilateral thumb 3D braces for comfort.     Follow up in one week for wound check as needed for any worsening of symptoms.     The patient's pathophysiology was explained in detail with reference to x-rays, models, other visual aids as appropriate.  Treatment options were discussed in detail.  Questions were invited and answered to the patient's satisfaction. I reviewed Primary care , and other specialty's notes to better coordinate patient's care.    Since visit:  Has hem/onc is on Monday  The swelling in her hand has been getting better. There is no concerns.  Canceled the ID appointment.   Hs not been to IR due to   Gets blood work done weekly since on chemo pill. Did not need transfusion     Transitional Care Note    Family and/or Caretaker present at visit?  No.  Diagnostic tests reviewed/disposition: No diagnosic tests pending after this hospitalization.  Disease/illness education: cat bite  Home health/community services discussion/referrals: Patient does not have home health established from hospital visit.  They do not need home health.  If needed, we will set up home health for the patient.   Establishment or re-establishment of referral orders for community resources: No other necessary community resources.   Discussion with other health care providers: No discussion with other health care providers necessary.          PAST MEDICAL HISTORY:  Past Medical History:   Diagnosis Date    Allergy     skin    Anxiety     Atrial fibrillation     Breast cancer 2014    right     Chronic back pain     Depression     Encounter for blood transfusion     Endometrial cancer 07/30/2019    Fibromyalgia     Hives     Hx of psychiatric care     Hx of radiation therapy     Itching     Lichen sclerosus     Mental disorder     PONV (postoperative nausea and vomiting)     Psychiatric problem     PTSD (post-traumatic stress disorder)     Sleep difficulties     Sore throat     Therapy     Uterine cancer 08/05/2019    UTI (urinary tract infection)        PAST SURGICAL HISTORY:  Past Surgical History:   Procedure Laterality Date    anal fissure surgery      APPENDECTOMY      BIOPSY N/A 11/19/2020    Procedure: BONE BIOPSY;  Surgeon: [facility];  Location: [facility] OR;  Service: Radiology;  Laterality: N/A;  RN PREOP 11/17/2020    BREAST BIOPSY Right 2014    BREAST LUMPECTOMY Right 2014    R lumpectomy [facility] w 2.4cm IDC & DCIS, neg margins, 0/6 SN, Stage II, ER/NH+, HER-2 neg Adjuvant XRT and hormonal therapy     CARPAL TUNNEL RELEASE      CHOLECYSTECTOMY      COLONOSCOPY      COLONOSCOPY N/A 6/11/2020    Procedure: COLONOSCOPY;  Surgeon: Bobby Marino MD;  Location: [address]);  Service: Endoscopy;  Laterality: N/A;    COLONOSCOPY N/A 6/9/2022    Procedure: COLONOSCOPY;  Surgeon: Abdoulaye Alcocer MD;  Location: [address]);  Service: Endoscopy;  Laterality: N/A;    ESOPHAGOGASTRODUODENOSCOPY N/A 6/11/2020    Procedure: EGD (ESOPHAGOGASTRODUODENOSCOPY);  Surgeon: Bobby Marino MD;  Location: [address]);  Service: Endoscopy;  Laterality: N/A;    HYSTERECTOMY  08/15/2019    HYSTEROSCOPY WITH DILATION AND CURETTAGE OF UTERUS N/A 7/24/2019    Procedure: HYSTEROSCOPY, WITH DILATION AND CURETTAGE OF UTERUS;  Surgeon: Елена Kam MD;  Location: [facility] OR;  Service: OB/GYN;  Laterality: N/A;    INSERTION OF TUNNELED CENTRAL VENOUS CATHETER (CVC) WITH SUBCUTANEOUS PORT N/A 9/26/2019    Procedure: INSERTION, PORT-A-CATH;  Surgeon: Moris Varghese MD;  Location:  [facility] CATH LAB;  Service: Radiology;  Laterality: N/A;    MEDIPORT REMOVAL N/A 3/5/2020    Procedure: REMOVAL, CATHETER, CENTRAL VENOUS, TUNNELED, WITH PORT;  Surgeon: Moris Varghese MD;  Location: [facility] CATH LAB;  Service: Radiology;  Laterality: N/A;    NEEDLE LOCALIZATION N/A 11/19/2020    Procedure: NEEDLE LOCALIZATION;  Surgeon: [facility] Provider;  Location: [facility] OR;  Service: Radiology;  Laterality: N/A;    PARTIAL MASCECTOMY Right 2014    ROBOT-ASSISTED LAPAROSCOPIC ABDOMINAL HYSTERECTOMY USING DA RAMU XI N/A 8/15/2019    Procedure: XI ROBOTIC HYSTERECTOMY;  Surgeon: Darius Regalado MD;  Location: [facility];  Service: OB/GYN;  Laterality: N/A;    ROBOT-ASSISTED LAPAROSCOPIC SALPINGO-OOPHORECTOMY USING DA RAMU XI Bilateral 8/15/2019    Procedure: XI ROBOTIC SALPINGO-OOPHORECTOMY;  Surgeon: Darius Regalado MD;  Location: [facility];  Service: OB/GYN;  Laterality: Bilateral;    ROBOT-ASSISTED LYMPHADENECTOMY Left 8/15/2019    Procedure: ROBOTIC LYMPHADENECTOMY;  Surgeon: Darius Regalado MD;  Location: [facility];  Service: OB/GYN;  Laterality: Left;    SALPINGOOPHORECTOMY Bilateral 08/15/2019    TONSILLECTOMY      TUBAL LIGATION         SOCIAL HISTORY:  Social History     Socioeconomic History    Marital status: Single    Number of children: 0   Occupational History    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     Never , no children    Very invested in cat rescue     Social Determinants of Health     Financial Resource Strain: Low Risk     Difficulty of Paying Living Expenses: Not hard at all   Food Insecurity: No Food Insecurity    Worried About Running Out of Food in the Last Year: Never true    Ran  Out of Food in the Last Year: Never true   Transportation Needs: No Transportation Needs    Lack of Transportation (Medical): No    Lack of Transportation (Non-Medical): No   Physical Activity: Inactive    Days of Exercise per Week: 0 days    Minutes of Exercise per Session: 0 min   Stress: Stress Concern Present    Feeling of Stress : Very much   Social Connections: Moderately Isolated    Frequency of Communication with Friends and Family: Three times a week    Frequency of Social Gatherings with Friends and Family: Three times a week    Attends Oriental orthodox Services: Never    Active Member of Clubs or Organizations: Yes    Attends Club or Organization Meetings: 1 to 4 times per year    Marital Status: Never    Housing Stability: Unknown    Unable to Pay for Housing in the Last Year: No    Unstable Housing in the Last Year: No       FAMILY HISTORY:  Family History   Problem Relation Age of Onset    Skin cancer Mother 89        squamous cell--nose & head    Rashes / Skin problems Mother 92        cutaneous horn of scalp    Hypertension Mother     Arthritis Mother     Other Mother 85        PANC mass suspected by provider to be ca, surv'd 8 yrs    Pancreatic cancer Father 85        surv'd 8 mos    Pancreatic cancer Maternal Aunt 62    Colon polyps Sister         pt thinks (a few)    Hypertension Brother     Cancer Paternal Uncle         leukemia, 70s    Heart attack Paternal Grandmother     Heart attack Paternal Grandfather     Hypertension Brother     Colon polyps Sister         Geni believes just a handful    Hashimoto's thyroiditis Sister     Diabetes Other         strong family history per patient    Lung cancer Maternal Uncle         smoker    Cancer Paternal Aunt         origin? colon or ovarian?    Suicide Cousin     Alcohol abuse Cousin     Throat cancer Other         pt believes    Breast cancer Neg Hx     Ovarian cancer Neg Hx     Cirrhosis Neg Hx     Colon cancer Neg Hx        ALLERGIES AND  MEDICATIONS: updated and reviewed.  Review of patient's allergies indicates:   Allergen Reactions    Oxycodone Hallucinations     Current Outpatient Medications   Medication Sig Dispense Refill    acetaminophen (TYLENOL) 500 MG tablet Take 1,000 mg by mouth daily as needed for Pain.      acyclovir (ZOVIRAX) 400 MG tablet Take 1 tablet (400 mg total) by mouth 2 (two) times daily. 60 tablet 1    amoxicillin-clavulanate 875-125mg (AUGMENTIN) 875-125 mg per tablet Take 1 tablet by mouth 2 (two) times daily. for 7 days 14 tablet 0    decitabine-cedazuridine  mg Tab Take 1 tablet by mouth once daily. For 5 days every 28 days 5 tablet 5    fexofenadine (ALLEGRA) 180 MG tablet 2 tablets in the morning.  May take an extra 2 tablets during the day if needed for itching. 240 tablet 5    fluconazole (DIFLUCAN) 200 MG Tab Take 2 tablets (400 mg total) by mouth once daily. 60 tablet 1    hydrOXYzine HCL (ATARAX) 25 MG tablet 1 to 8 tablets at bedtime.  Start with 3 tablets.  Increase or decrease as needed until itching is controlled or a maximum of 8 tablets. 240 tablet 3    ibuprofen (ADVIL,MOTRIN) 200 MG tablet Take 200 mg by mouth every 6 (six) hours as needed for Pain. Pt takes 2 pills daily      levoFLOXacin (LEVAQUIN) 500 MG tablet Take 1 tablet (500 mg total) by mouth once daily. 30 tablet 1    linezolid (ZYVOX) 600 mg Tab Take 1 tablet (600 mg total) by mouth every 12 (twelve) hours. for 7 days 14 tablet 0    meth/meblue/sod phos/psal/hyos (URIBEL ORAL) Take by mouth as needed.      metoprolol tartrate (LOPRESSOR) 25 MG tablet Take 1 tablet (25 mg total) by mouth 2 (two) times daily. 180 tablet 3    mupirocin (BACTROBAN) 2 % ointment Apply topically 4 (four) times daily.      nystatin (MYCOSTATIN) cream Apply topically every Mon, Wed, Fri. 30 g 11    nystatin (MYCOSTATIN) powder Apply topically 4 (four) times daily. 30 g 1    pantoprazole (PROTONIX) 40 MG tablet Take 1 tablet (40 mg total) by mouth once daily. 90  tablet 3    povidone-iodine (BETADINE) 10 % external solution Apply topically as needed for Wound Care. Clean R hand wound w/ betadine daily 14.8 mL 0    senna (SENOKOT) 8.6 mg tablet Take 1 tablet by mouth once daily. Can take twice daily 30 tablet 11    UNABLE TO FIND [place] [employer] Mushrooms Immune Support      triamcinolone acetonide 0.1% (KENALOG) 0.1 % ointment Apply topically 2 (two) times daily. for 7 days 80 g 0     No current facility-administered medications for this visit.       Patient Care Team:  Gary Gonzalez MD as PCP - General (Internal Medicine)  Bill Ayala, PhD as PCP - Behavioral Health (Psychology)  Yenni Mccurdy RD as Dietitian (Nutrition)  Pratima Cedeno LPN as Care Coordinator    ROS  Review of Systems   Constitutional:  Negative for chills, fatigue, fever and unexpected weight change.   HENT:  Negative for congestion, ear discharge, ear pain and postnasal drip.    Eyes:  Negative for photophobia, pain and visual disturbance.   Respiratory:  Negative for cough, shortness of breath and wheezing.    Cardiovascular:  Negative for chest pain, palpitations and leg swelling.   Gastrointestinal:  Negative for abdominal pain, constipation, diarrhea, nausea and vomiting.   Genitourinary:  Negative for dysuria, frequency, urgency and vaginal discharge.   Musculoskeletal:  Positive for arthralgias (RIGHT hand pain). Negative for back pain, joint swelling and neck stiffness.   Skin:  Negative for rash.   Neurological:  Negative for weakness and headaches.   Psychiatric/Behavioral:  Negative for dysphoric mood and sleep disturbance. The patient is not nervous/anxious.          Physical Exam  Vitals:    11/29/22 0938   BP: (!) 100/56   Pulse: 70   Temp: 98 °F (36.7 °C)   TempSrc: Oral   SpO2: 99%   Weight: 51.3 kg (113 lb 1.5 oz)   Height: 5' (1.524 m)    Body mass index is 22.09 kg/m².  Weight: 51.3 kg (113 lb 1.5 oz)   Height: 5' (152.4 cm)     Physical Exam  Constitutional:        Appearance: She is well-developed.   HENT:      Head: Normocephalic.      Salivary Glands: Right salivary gland is not diffusely enlarged or tender. Left salivary gland is not diffusely enlarged or tender.      Right Ear: There is impacted cerumen.      Left Ear: There is impacted cerumen.      Nose: Nose normal.   Eyes:      Extraocular Movements: Extraocular movements intact.   Cardiovascular:      Rate and Rhythm: Normal rate.   Pulmonary:      Effort: Pulmonary effort is normal.   Abdominal:      Palpations: There is no hepatomegaly or splenomegaly.   Musculoskeletal:         General: Normal range of motion.      Cervical back: Normal range of motion.   Skin:     General: Skin is warm and dry.      Findings: Wound present.             Comments: Mild redness and swelling to the RIGHT thumb. No puss. Mild tenderness.   Neurological:      Mental Status: She is alert and oriented to person, place, and time.   Psychiatric:         Behavior: Behavior normal.       Health Maintenance         Date Due Completion Date    COVID-19 Vaccine (1) Never done ---    Shingles Vaccine (1 of 2) Never done ---    Pneumococcal Vaccines (Age 65+) (3 - PPSV23 if available, else PCV20) 09/18/2020 9/10/2019    Influenza Vaccine (1) 06/30/2023 (Originally 9/1/2022) 11/10/2020    Hemoglobin A1c (Prediabetes) 12/17/2022 12/17/2021    Mammogram 08/12/2023 8/12/2022    DEXA Scan 06/02/2025 6/2/2022    Colorectal Cancer Screening 06/09/2027 6/9/2022    Lipid Panel 06/30/2027 6/30/2022    TETANUS VACCINE 11/21/2032 11/21/2022          Health maintenance reviewed at this time.    Assessment & Plan  Hospital discharge follow-up/Cat bite, subsequent encounter  Has been doing well.  Has already followed up with orthopedics  Has appointment with hematology/oncology on Monday    Drug-induced pancytopenia/Myelodysplastic syndrome  Followed by hematology/oncology    Paroxysmal atrial fibrillation  The current medical regimen is effective;  continue  present plan and medications.  Rate controlled  No on anticoagulation       Follow-up: Follow up in about 4 weeks (around 12/27/2022) for for f/u with PCP.

## 2022-12-01 NOTE — PROGRESS NOTES
"[facility]- Palliative Medicine [facility]  Palliative Care   Social Work Visit      Patient Name: Jeanne Rodgers  MRN: [id number]  Palliative Care Provider: Mary Conrad DNP  Primary Care Physician: Gary Gonzalez MD  Principal Problem: Myelodysplastic Syndrome    SW accompanied DNP during follow up visit with patient. Patient presents AAOx4 with stable mood.  Patient reflected upon a recent cat bite which lead to a brief hospitalization and course of antibiotics.  Patient reports her infection appears to have improved and she is hoping for a discontinuation of antibiotics.  Patient encouraged to consult with Oncology at next scheduled visit.  Patient adds she's hoping her Oncologist will begin treatment following next visit.  Patient reports her physical symptoms and anxiety have improved as she has not required a transfusion or "being stuck with needles". Patient admits the discomfort of needles exacerbates feelings of anxiety.  SW questioned patient if she would decline any such treatments that would prolong her life if these would include frequent sticks. Patient reports her goal is life prolongation and she would "deal" with the sticks if this would extent her life.     Patient expressed excitement regarding her efforts in placing all of her stray cats.  Patient spoke about her love of animals and her calling to protect and advocate for their needs.  Team commended patient for this work and provided patient with a safe environment to express her feelings and frustrations regarding local animal advocate agencies.     Patient denies psychosocial concerns. SW remains available to provide emotional support and psychosocial assistance. SW will continue to follow.    Cici Guzmán [facility]  Outpatient   Palliative Medicine          "

## 2022-12-01 NOTE — PROGRESS NOTES
Consult Note  Palliative Care      Consult Requested By: No ref. provider found  Reason for Consult: symptom management and ACP      ASSESSMENT/PLAN:     Plan/Recommendations:  Diagnoses and all orders for this visit:    Myelodysplastic syndrome/History of breast cancer/History of endometrial cancer  - patient followed by Dr. Ford and CHHAYA Jansen as well as Dr. Regalado  - please see oncology notes for full details of breast cancer history and treatment as well as endometrial cancer and treatment     Encounter for Palliative Care/Advanced Care Planning  - patient decisional  - patient by herself in clinic today  - has ACP documents uploaded into EMR  - HCPOA: Geni Vaughn Raimundo at [phone]  - living will also completed  - philosophy of Palliative Medicine reviewed with patient today  - new patient folder given to and reviewed with patient today  - goals: life prolonging  - patient previously stated that she has decided that she will not undergo further chemotherapy or radiation therapy should she need it in the future. She would be open to discussion oral options or other options for care should the need arise at that time.  - will continue to monitor    Anorexia/Nausea/Abdominal discomfort  - at previous appt patient initially rated her pain as 5/10 on pain scale and stated it was generally in the abdomen. Upon further discussion, this sensation is not pain but rather feeling of queasiness  - today she reports 0/10 pain  - nausea continues to be a chronic problem, says she has tried everything including jose and probiotics and nothing other than zofran has helped  - cont zofran PRN   - reports stable appetite, mostly eating soups, lately avoiding meat as it sounds unappetizing   - previously discussed eating smaller portions and using other ways to improve nutrition such as protein shakes, smoothies, etc  - no longer taking remeron bc appetite improved   - low threshold for nutrition consult  - will continue  to monitor     Adjustment disorder with mixed anxiety and depressed mood  - patient appears to be in a much better mental and emotional state as compared to our last visit  - she reports that not needing a blood transfusion this week really improved her mood  - reports that needle sticks are her biggest source of anxiety, during her recent hospitalization they placed a midline which she says worked very well   - previously expressed mistrust of her medical team due to h/o experiences where diagnosis were missed, and when medications/radiation made her worse  - today states her goal is to live as long as possible, previously stated that she may be at her limit with treatment side effects  - 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  - emotional support provided  - pall med SW joined visit today   - no longer taking remeron  - does not want to start any new medications at this time  - will continue to monitor     Neoplastic (malignant) related fatigue/Insomnia  - patient denies insomnia  - patient states that she continues to use her medicinal marijuana at night, which improves her sleep and overall quality of life  - discussed finding a balance between rest and activity  - discussed using energy for things she enjoys, such as rescue cats or renovating a house  - discussed use of body scan at night as well  - tip sheet for better sleep in new patient folder for patient to review  - will continue to monitor     Constipation/Diarrhea  - patient has diarrhea 2/2 abx regimen   - she is worried about becoming constipated again when she resumes treatment   - discussed using senna, starting with 1 tab qhs  - discussed using dulcolax suppository for acute constipation   - constipation tip sheet in new patient folder for patient to review  - increase hydration to support good bowel movements  - will continue to monitor     Understanding of illness/Prognosis: patient has fair understanding of illness; work up still in process.  "Prognosis to be determined     Goals of care/Follow up: life prolonging/10 weeks     Patient's encounter and above plan of care discussed with patient's oncologist, oncology NP    SUBJECTIVE:     History of Present Illness:  Patient is a 67 y.o. year old female with a. Fib, chronic back pain, depression, history of endometrial cancer, fibromyalgia, history of breast cancer, PTSD, and myelodysplastic syndrome presents to Palliative Medicine for symptom management and ACP. Please see oncology notes for full details of patient's breast cancer history/treatment, endometrial cancer history/treatment, and ongoing work up and treatment of myelodysplastic syndrome.     12/01/2022:   KYLAH CLEMONS reviewed and summarized:  No surprises    Patient presents alone in clinic today. She reports that she is doing well, overall. She was recently hospitalized for a cat bite requiring antibiotics,the medication is giving her some diarrhea but she anticipates becoming constipated again when treatment is resumed. She is going to request tx resumption Monday. She is far less anxious and depressed as compared to our last visit. She discloses that pain from needle sticks are her biggest source of anxiety, however she must endure this to achieve her goal to "live as long as possible." All other symptoms stable, no changes made today.     10/27/2022:  KYLAH CLEMONS reviewed and summarized:  No surprises    Patient presents to clinic alone today. Her biggest concern is abdominal discomfort secondary to constipation. She additionally expresses high levels of anxiety and mistrust of her medical providers in light of her past experiences. Today she is especially upset that her liver and renal function have not been monitored since September. She states that she is very worried that her medical team might miss something or make a mistake. She is also worried as she starts to feel the side-effects of the treatment that her body is being further damaged. RTC in " 5 weeks when symptom-burden is lower to explore overall goals and fears related to treatment.     09/29/2022:  LA  reviewed and summarized:  09/21/2022 Wc.[id number].ti.[id number] Disp: 30 for 30 days  08/16/2022 Medible_chew_blue_raspberry_400mgthc:0mgcbd Disp: 40 for 14 days    Patient presents today by herself. She has moderate abdominal discomfort, which is relieved with use of small coke or zofran. She is not in pain. She has moderate anxiety and depression around continued work up with unknown answers and future. She is frustrated by a lack of treatment plan. Patient having moderate to severe anorexia. She states that she does not have any foods that tempt her to eat. She is not sleeping well due to anxiety/depression and recently being out of her medicinal marijuana. Patient having fluctuation of constipation and diarrhea. Patient has already completed ACP documents.     Past Medical History:   Diagnosis Date    Allergy     skin    Anxiety     Atrial fibrillation     Breast cancer 2014    right    Chronic back pain     Depression     Encounter for blood transfusion     Endometrial cancer 07/30/2019    Fibromyalgia     Hives     Hx of psychiatric care     Hx of radiation therapy     Itching     Lichen sclerosus     Mental disorder     PONV (postoperative nausea and vomiting)     Psychiatric problem     PTSD (post-traumatic stress disorder)     Sleep difficulties     Sore throat     Therapy     Uterine cancer 08/05/2019    UTI (urinary tract infection)      Past Surgical History:   Procedure Laterality Date    anal fissure surgery      APPENDECTOMY      BIOPSY N/A 11/19/2020    Procedure: BONE BIOPSY;  Surgeon: Leisa Diagnostic Provider;  Location: [facility] OR;  Service: Radiology;  Laterality: N/A;  RN PREOP 11/17/2020    BREAST BIOPSY Right 2014    BREAST LUMPECTOMY Right 2014    R lumpectomy [facility] w 2.4cm IDC & DCIS, neg margins, 0/6 SN, Stage II, ER/OR+, HER-2 neg Adjuvant XRT and hormonal therapy     CARPAL TUNNEL  RELEASE      CHOLECYSTECTOMY      COLONOSCOPY      COLONOSCOPY N/A 6/11/2020    Procedure: COLONOSCOPY;  Surgeon: Bobby Marino MD;  Location: [facility] ENDO (2ND FLR);  Service: Endoscopy;  Laterality: N/A;    COLONOSCOPY N/A 6/9/2022    Procedure: COLONOSCOPY;  Surgeon: Abdoulaye Alcocer MD;  Location: [facility] ENDO (2ND FLR);  Service: Endoscopy;  Laterality: N/A;    ESOPHAGOGASTRODUODENOSCOPY N/A 6/11/2020    Procedure: EGD (ESOPHAGOGASTRODUODENOSCOPY);  Surgeon: Bobby Marino MD;  Location: [facility] ENDO (2ND FLR);  Service: Endoscopy;  Laterality: N/A;    HYSTERECTOMY  08/15/2019    HYSTEROSCOPY WITH DILATION AND CURETTAGE OF UTERUS N/A 7/24/2019    Procedure: HYSTEROSCOPY, WITH DILATION AND CURETTAGE OF UTERUS;  Surgeon: Елена Kam MD;  Location: [facility];  Service: OB/GYN;  Laterality: N/A;    INSERTION OF TUNNELED CENTRAL VENOUS CATHETER (CVC) WITH SUBCUTANEOUS PORT N/A 9/26/2019    Procedure: INSERTION, PORT-A-CATH;  Surgeon: Moris Varghese MD;  Location: [facility] CATH LAB;  Service: Radiology;  Laterality: N/A;    MEDIPORT REMOVAL N/A 3/5/2020    Procedure: REMOVAL, CATHETER, CENTRAL VENOUS, TUNNELED, WITH PORT;  Surgeon: Moris Varghese MD;  Location: [facility] CATH LAB;  Service: Radiology;  Laterality: N/A;    NEEDLE LOCALIZATION N/A 11/19/2020    Procedure: NEEDLE LOCALIZATION;  Surgeon: [facility] Diagnostic Provider;  Location: [facility] OR;  Service: Radiology;  Laterality: N/A;    PARTIAL MASCECTOMY Right 2014    ROBOT-ASSISTED LAPAROSCOPIC ABDOMINAL HYSTERECTOMY USING DA RAMU XI N/A 8/15/2019    Procedure: XI ROBOTIC HYSTERECTOMY;  Surgeon: Darius Regalado MD;  Location: [facility];  Service: OB/GYN;  Laterality: N/A;    ROBOT-ASSISTED LAPAROSCOPIC SALPINGO-OOPHORECTOMY USING DA RAMU XI Bilateral 8/15/2019    Procedure: XI ROBOTIC SALPINGO-OOPHORECTOMY;  Surgeon: Darius Regalado MD;  Location: [facility];  Service: OB/GYN;  Laterality: Bilateral;    ROBOT-ASSISTED LYMPHADENECTOMY Left 8/15/2019    Procedure:  ROBOTIC LYMPHADENECTOMY;  Surgeon: Darius Regalado MD;  Location: [facility];  Service: OB/GYN;  Laterality: Left;    SALPINGOOPHORECTOMY Bilateral 08/15/2019    TONSILLECTOMY      TUBAL LIGATION       Family History   Problem Relation Age of Onset    Skin cancer Mother 89        squamous cell--nose & head    Rashes / Skin problems Mother 92        cutaneous horn of scalp    Hypertension Mother     Arthritis Mother     Other Mother 85        PANC mass suspected by provider to be ca, surv'd 8 yrs    Pancreatic cancer Father 85        surv'd 8 mos    Pancreatic cancer Maternal Aunt 62    Colon polyps Sister         pt thinks (a few)    Hypertension Brother     Cancer Paternal Uncle         leukemia, 70s    Heart attack Paternal Grandmother     Heart attack Paternal Grandfather     Hypertension Brother     Colon polyps Sister         Geni believes just a handful    Hashimoto's thyroiditis Sister     Diabetes Other         strong family history per patient    Lung cancer Maternal Uncle         smoker    Cancer Paternal Aunt         origin? colon or ovarian?    Suicide Cousin     Alcohol abuse Cousin     Throat cancer Other         pt believes    Breast cancer Neg Hx     Ovarian cancer Neg Hx     Cirrhosis Neg Hx     Colon cancer Neg Hx      Review of patient's allergies indicates:   Allergen Reactions    Oxycodone Hallucinations       Medications:    Current Outpatient Medications:     acetaminophen (TYLENOL) 500 MG tablet, Take 1,000 mg by mouth daily as needed for Pain., Disp: , Rfl:     acyclovir (ZOVIRAX) 400 MG tablet, Take 1 tablet (400 mg total) by mouth 2 (two) times daily., Disp: 60 tablet, Rfl: 1    decitabine-cedazuridine  mg Tab, Take 1 tablet by mouth once daily. For 5 days every 28 days, Disp: 5 tablet, Rfl: 5    fexofenadine (ALLEGRA) 180 MG tablet, 2 tablets in the morning.  May take an extra 2 tablets during the day if needed for itching., Disp: 240 tablet, Rfl: 5    fluconazole (DIFLUCAN) 200 MG  Tab, Take 2 tablets (400 mg total) by mouth once daily., Disp: 60 tablet, Rfl: 1    hydrOXYzine HCL (ATARAX) 25 MG tablet, 1 to 8 tablets at bedtime.  Start with 3 tablets.  Increase or decrease as needed until itching is controlled or a maximum of 8 tablets., Disp: 240 tablet, Rfl: 3    ibuprofen (ADVIL,MOTRIN) 200 MG tablet, Take 200 mg by mouth every 6 (six) hours as needed for Pain. Pt takes 2 pills daily, Disp: , Rfl:     levoFLOXacin (LEVAQUIN) 500 MG tablet, Take 1 tablet (500 mg total) by mouth once daily., Disp: 30 tablet, Rfl: 1    meth/meblue/sod phos/psal/hyos (URIBEL ORAL), Take by mouth as needed., Disp: , Rfl:     metoprolol tartrate (LOPRESSOR) 25 MG tablet, Take 1 tablet (25 mg total) by mouth 2 (two) times daily., Disp: 180 tablet, Rfl: 3    mupirocin (BACTROBAN) 2 % ointment, Apply topically 4 (four) times daily., Disp: , Rfl:     nystatin (MYCOSTATIN) cream, Apply topically every Mon, Wed, Fri., Disp: 30 g, Rfl: 11    nystatin (MYCOSTATIN) powder, Apply topically 4 (four) times daily., Disp: 30 g, Rfl: 1    pantoprazole (PROTONIX) 40 MG tablet, Take 1 tablet (40 mg total) by mouth once daily., Disp: 90 tablet, Rfl: 3    povidone-iodine (BETADINE) 10 % external solution, Apply topically as needed for Wound Care. Clean R hand wound w/ betadine daily, Disp: 14.8 mL, Rfl: 0    senna (SENOKOT) 8.6 mg tablet, Take 1 tablet by mouth once daily. Can take twice daily, Disp: 30 tablet, Rfl: 11    UNABLE TO FIND, Turkey Tail Mushrooms Immune Support, Disp: , Rfl:     triamcinolone acetonide 0.1% (KENALOG) 0.1 % ointment, Apply topically 2 (two) times daily. for 7 days, Disp: 80 g, Rfl: 0    OBJECTIVE:       ROS:  Review of Systems   Constitutional:  Positive for activity change, appetite change and fatigue.   HENT: Negative.     Eyes: Negative.    Respiratory: Negative.     Cardiovascular: Negative.    Gastrointestinal:  Positive for constipation, diarrhea and nausea. Negative for abdominal pain.    Genitourinary: Negative.    Musculoskeletal: Negative.    Skin: Negative.    Neurological: Negative.    Psychiatric/Behavioral:  Positive for dysphoric mood and sleep disturbance. The patient is nervous/anxious.    All other systems reviewed and are negative.    Review of Symptoms      Symptom Assessment (ESAS 0-10 Scale)  Pain:  0  Dyspnea:  2  Anxiety:  0  Nausea:  5  Depression:  4  Anorexia:  0  Fatigue:  4  Insomnia:  0  Restlessness:  0  Agitation:  0     CAM / Delirium:  Negative  Constipation:  Positive  Diarrhea:  Positive    Anxiety:  Is nervous/anxious  Constipation:  Constipation    Bowel Management Plan (BMP):  Yes      Pain Assessment:  OME in 24 hours:  0  Location(s): none      Modified Sebastien Scale:  0    ECOG Performance Status ndGndrndanddndend:nd nd2nd Living Arrangements:  Lives alone    Psychosocial/Cultural: Patient lives alone but she has her sister next door. Enjoys taking care of stray animals and renovating houses    Spiritual:  F - Shania and Belief:  Catholic  I - Importance:  Moderate  C - Community:  Prays nightly  A - Address in Care:  Needs met at this time    Advance Care Planning   Advance Directives:   Living Will: Yes        Copy on chart: Yes    LaPOST: No    Do Not Resuscitate Status: No    Medical Power of : Yes    Agent's Name:  Geni Eubanks   Agent's Contact Number:  [phone]    Decision Making:  Patient answered questions  Goals of Care: What is most important right now is to focus on curative/life-prolongation (regardless of treatment burdens). Accordingly, we have decided that the best plan to meet the patient's goals includes continuing with treatment.        Physical Exam: Pulse: 70 (12/01/22 1111)  BP: (!) 125/58 (12/01/22 1111)  Vitals:    12/01/22 1111   BP: (!) 125/58   Pulse: 70      Physical Exam  Vitals reviewed.   Constitutional:       General: She is not in acute distress.     Appearance: She is not ill-appearing or toxic-appearing.   HENT:      Head:  Normocephalic and atraumatic.      Right Ear: External ear normal.      Left Ear: External ear normal.   Eyes:      General: No scleral icterus.        Right eye: No discharge.         Left eye: No discharge.   Neck:      Comments: Trachea midline  Pulmonary:      Effort: Pulmonary effort is normal. No respiratory distress.   Abdominal:      General: Abdomen is flat. There is no distension.   Musculoskeletal:         General: No signs of injury.      Cervical back: Normal range of motion.      Right lower leg: No edema.      Left lower leg: No edema.   Skin:     General: Skin is dry.      Coloration: Skin is not jaundiced.      Findings: Wound (animal bite, right thumb) present. No rash.   Neurological:      Mental Status: She is alert and oriented to person, place, and time.      Gait: Gait normal.   Psychiatric:         Mood and Affect: Mood is anxious. Affect is not tearful.         Speech: Speech normal.         Thought Content: Thought content normal.       Labs:  CBC:   WBC   Date Value Ref Range Status   11/28/2022 0.33 (LL) 3.90 - 12.70 K/uL Final     Comment:     WBC critical result(s) called and verbal readback obtained from   CORNELIO WRIGHT RN. by TRC1 11/28/2022 10:49       Hemoglobin   Date Value Ref Range Status   11/28/2022 7.2 (L) 12.0 - 16.0 g/dL Final     POC Hematocrit   Date Value Ref Range Status   07/01/2022 19 (LL) 36 - 54 %PCV Final     Hematocrit   Date Value Ref Range Status   11/28/2022 22.6 (L) 37.0 - 48.5 % Final     MCV   Date Value Ref Range Status   11/28/2022 90 82 - 98 fL Final     Platelets   Date Value Ref Range Status   11/28/2022 50 (L) 150 - 450 K/uL Final       LFT:   Lab Results   Component Value Date    AST 9 (L) 11/28/2022    ALKPHOS 115 11/28/2022    BILITOT 0.6 11/28/2022       Albumin:   Albumin   Date Value Ref Range Status   11/28/2022 3.3 (L) 3.5 - 5.2 g/dL Final     Protein:   Total Protein   Date Value Ref Range Status   11/28/2022 7.0 6.0 - 8.4 g/dL Final  "      Radiology:I have reviewed all pertinent imaging results/findings within the past 24 hours.    10/03/2022 MRI pelvis: "Diffuse abnormal low T1 marrow signal, progressed compared to prior MRI exams.  Findings are nonspecific however can be seen in the setting of red marrow reconversion or other marrow replacement processes such as myelodysplastic syndrome or myeloproliferative neoplasm.  Recommend correlation with recent bone marrow biopsy. No focal destructive or enhancing osseous lesion. Small volume pelvic free fluid."      Signature: Mary Conrad DNP    "

## 2022-12-05 NOTE — PLAN OF CARE
1440 pt here for 1 unit PRBC, labs, hx, meds, allergies reviewed, pt darryl tyler chair, continue to monitor

## 2022-12-05 NOTE — PLAN OF CARE
1621 pt tolerated 1 unit PRBC without issue, pt has no upcoming appts at this time, no distress noted upon d/c to home

## 2022-12-05 NOTE — PROGRESS NOTES
Section of Hematology and Stem Cell Transplantation  Follow Up Note     Visit Date: 12/05/2022    Primary Oncologic Diagnosis: Myelodysplastic syndrome [D46.9]    History of Present Ilness:   Jeanne Rodgers (Jeanne) is a pleasant 67 y.o.female with history of breast and endometrial carcinoma with somatic P53 mutation. Noted to have persistent pancytopenia complicated chemotherapy for her endometrial carcinoma in 2020.      Bone marrow biopsy in November 2020 was a normocellular marrow (30%) with trilineage hematpoiesis and atypical megakaryocytes. No morphologic or immunuphenotypic evidence of metastatic carcinoma. Adequate iron storage. Chromosome analysis normal X,X phenotype. Insufficient aspirate for analysis.      Bone marrow biopsy June 2022 was hypercellular marrow (%) with trilineage hematopoiesis. Grade 1 reticular fibrosis. No evidence of lymphoma, plasma cell neoplasm, or metastatic carcinoma.      Two hospital admission in summer 2022 for symptomatic anemia requiring transfusion support. Once incident related to GI bleeding. Hgb at admissions 5-6 grams with normal MCV. No GI bleeding source has been found.      Bone marrow biopsy September 2022 extremely hypercellular marrow, 99% with increased blasts (10%) and moderate trilineage dyspoiesis with left-shifted erythroid hyperplasia, left-shifted, relatively decreased granulocytes and focally increased megakaryocytes with increase number of micromegakaryocytes. Increased stainable histiocytic iron and increased reticulin fibrosis (MF-1 with focal MF-2). 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 NGS with TP53.       Interval History:   Ms. Rodgers presents routinely to clinic today for for treatment-related, high grade MDS. She was recently seen and briefly hospitalized due to a cat bite with subsequent cellulitis. This required I&D by ortho, brief IV abx, and course of Levaquin which  she has now completed. She fortunately had no deep involvement. Hand is healing nicely without any concerns for active infection. She has continued on prophylactic abx and will refill today. No fevers, chills. She feels slightly more fatigued and dyspneic on exertion today, Hgb 6.4 and will proceed with 1u RBC. We discussed holding Inqovi additional week to see if her counts will recover further. Will f/u next week.    She is aware should she have any fevers or other concerning sx should report to ER.      Past Medical History, Social History, and Past Family History are unchanged since last evaluation except for HPI.     CURRENT MEDICATIONS:   Current Outpatient Medications   Medication Sig    acetaminophen (TYLENOL) 500 MG tablet Take 1,000 mg by mouth daily as needed for Pain.    acyclovir (ZOVIRAX) 400 MG tablet Take 1 tablet (400 mg total) by mouth 2 (two) times daily.    amoxicillin-clavulanate 875-125mg (AUGMENTIN) 875-125 mg per tablet Take 1 tablet by mouth 2 (two) times daily. for 7 days    decitabine-cedazuridine  mg Tab Take 1 tablet by mouth once daily. For 5 days every 28 days    fexofenadine (ALLEGRA) 180 MG tablet 2 tablets in the morning.  May take an extra 2 tablets during the day if needed for itching.    fluconazole (DIFLUCAN) 200 MG Tab Take 2 tablets (400 mg total) by mouth once daily.    hydrOXYzine HCL (ATARAX) 25 MG tablet 1 to 8 tablets at bedtime.  Start with 3 tablets.  Increase or decrease as needed until itching is controlled or a maximum of 8 tablets.    ibuprofen (ADVIL,MOTRIN) 200 MG tablet Take 200 mg by mouth every 6 (six) hours as needed for Pain. Pt takes 2 pills daily    levoFLOXacin (LEVAQUIN) 500 MG tablet Take 1 tablet (500 mg total) by mouth once daily.    meth/meblue/sod phos/psal/hyos (URIBEL ORAL) Take by mouth as needed.    metoprolol tartrate (LOPRESSOR) 25 MG tablet Take 1 tablet (25 mg total) by mouth 2 (two) times daily.    mupirocin (BACTROBAN) 2 %  ointment Apply topically 4 (four) times daily.    nystatin (MYCOSTATIN) cream Apply topically every Mon, Wed, Fri.    nystatin (MYCOSTATIN) powder Apply topically 4 (four) times daily.    pantoprazole (PROTONIX) 40 MG tablet Take 1 tablet (40 mg total) by mouth once daily.    povidone-iodine (BETADINE) 10 % external solution Apply topically as needed for Wound Care. Clean R hand wound w/ betadine daily    senna (SENOKOT) 8.6 mg tablet Take 1 tablet by mouth once daily. Can take twice daily    UNABLE TO FIND [place] Tail Mushrooms Immune Support    triamcinolone acetonide 0.1% (KENALOG) 0.1 % ointment Apply topically 2 (two) times daily. for 7 days     No current facility-administered medications for this visit.     Facility-Administered Medications Ordered in Other Visits   Medication    0.9%  NaCl infusion (for blood administration)    acetaminophen tablet 650 mg    diphenhydrAMINE capsule 25 mg       ALLERGIES:   Review of patient's allergies indicates:   Allergen Reactions    Oxycodone Hallucinations         Review of Systems:     Review of Systems   Constitutional:  Positive for malaise/fatigue. Negative for chills, diaphoresis, fever and weight loss.   HENT: Negative.     Eyes: Negative.    Respiratory: Negative.     Cardiovascular: Negative.    Gastrointestinal: Negative.    Genitourinary: Negative.    Musculoskeletal:  Negative for joint pain (R thumb pain with erythema, induration surrounding bite juan, purlenet discharge).   Neurological:  Positive for weakness.   Endo/Heme/Allergies:  Bruises/bleeds easily.   Psychiatric/Behavioral:  The patient is nervous/anxious.      Physical Exam:     Vitals:    12/05/22 1352   BP: (!) 113/54   Pulse: 74   Resp: 16       Physical Exam  Vitals reviewed.   Constitutional:       General: She is not in acute distress.     Appearance: Normal appearance.   HENT:      Head: Normocephalic.      Right Ear: External ear normal.      Left Ear: External ear normal.       Mouth/Throat:      Mouth: Mucous membranes are moist.   Eyes:      Extraocular Movements: Extraocular movements intact.      Pupils: Pupils are equal, round, and reactive to light.   Cardiovascular:      Rate and Rhythm: Normal rate and regular rhythm.   Pulmonary:      Effort: Pulmonary effort is normal.   Abdominal:      General: Abdomen is flat.      Palpations: Abdomen is soft.   Musculoskeletal:         General: No swelling, tenderness or signs of injury. Normal range of motion.      Cervical back: Normal range of motion.      Comments: Previous cat bite healing nicely, see attached photo   Skin:     General: Skin is warm.      Coloration: Skin is pale.      Findings: Bruising (scattered bruising of upper extremities) present. No erythema.   Neurological:      General: No focal deficit present.      Mental Status: She is alert. Mental status is at baseline.         ECOG Performance Status: (foot note - ECOG PS provided by Eastern Cooperative Oncology Group) 2 - Symptomatic, <50% confined to bed    Karnofsky Performance Score:  70%- Cares for Self: Unable to Carry on Normal Activity or Active Work    Labs:   Lab Results   Component Value Date    WBC 0.70 (LL) 12/05/2022    HGB 6.4 (L) 12/05/2022    HCT 20.7 (L) 12/05/2022    MCV 90 12/05/2022    PLT 28 (LL) 12/05/2022       Lab Results   Component Value Date     12/05/2022    K 4.3 12/05/2022     12/05/2022    CO2 24 12/05/2022    BUN 5 (L) 12/05/2022    CREATININE 0.7 12/05/2022    ALBUMIN 3.5 12/05/2022    BILITOT 0.3 12/05/2022    ALKPHOS 136 (H) 12/05/2022    AST 12 12/05/2022    ALT 9 (L) 12/05/2022                   Assessment and Plan:   Jeanne Rodgers (Jeanne) is a pleasant 67 y.o.female with high grade MDS       High Grade MDS  Pancytopenia  IPPS-R Very High Risk MDS  RAEB-2 MDS with complete cytogenetics  Likely secondary MDS from prior chemotherapy/radiation therapy  She started Inqovi 5 of 28 days on 10/21/22.  She is tolerating it  well thus far.  Was due for C2 on 11/23. Delayed d/t cellulitis. Now with delayed count recovery following infection, IV abx. We will hold off on resuming Inqovi and repeat CBC next week, consider reduction in tx days  Will continue weekly labs outpatient.  Hgb 6.4 today, will need type and screen and 1u RBC.   Will continue prophylactic antimicrobials for ANC <1,000  She remains concerned regarding poor access, unfortunately unable to have port placed with severe thrombocytopenia, will continue to discuss and monitor counts     Stage IIIC2 UPSC  Diagnostic PET with pelvic and PA lymphadenopathy  RALH/BSO/ Debuliking of pelvic lymph nodes (6/8) positive on 8/15/2019.  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   T/C started 9/27/2019  WPRT to PA nodes 45Gy completed 1/22/2020  HDR to 15Gy in 3 fractions completed 3/3/2020  Restarted T/C 3/20/2020, s/p 5 cycles concluding 5/8/2020.   Complicated by pancytopenia and anemia.       DCIS  Stage I IDC and DCIS right breast, ER+, HI+ HER 2 Zack negative   Lumpectomy 6/24/2014 at [facility].   Small mass seen on mammogram 2007 and 2008, nipple inversion 2012 at [facility]  Referred for radiation therapy at [facility]  On Arimidex for 4 years    Cellulitis, Cat Bite  Pt completed Levaquin abx, healing nicey w/o concerns for active infection       Follow Up:             Marianne Garibay PA-C  Hematology, Oncology, and Stem Cell Transplantation  [facility] [place]          BMT Chart Routing  Urgent    Follow up with physician    Follow up with [facility] 1 week. f/u w/ marianne w/ labs on 12/12   Provider visit type    Infusion scheduling note    Injection scheduling note    Labs CBC, CMP and type and screen   Lab interval:     Imaging    Pharmacy appointment    Other referrals

## 2022-12-06 NOTE — TELEPHONE ENCOUNTER
Outgoing call to patient for refill of Inqovi. Pt stated she is holding off on starting her next cycle because she had cellulitis infection and the dr wants her counts to recover before starting. Confirmed in chart note. Pt stated she may know by 12/12. Pt agreeable to follow up 12/12.

## 2022-12-09 NOTE — PROGRESS NOTES
Subjective:       Chief Complaint  Chief Complaint   Patient presents with    Hospital Follow Up       HPI  Jeanne Rodgers is a 67 y.o. female with multiple medical diagnoses as listed in the medical history and problem list that presents for hospital follow-up.      Patient with hospital admission on November 21, 2022 for symptoms of infected cat bite/cellulitis involving her hand  She required aggressive antibiotic treatment given her immunocompromised status  Patient pending second cycle of chemo (started 1st cycle in Oct 2022)  Next labs on Tuesday - s/pp pRBC transfusion on 12/5 for Hgb of 6.4  On prophylactic dosages of antifungal, antiviral  Some mild SOB noted occasionally  She had hospital follow-up visit on 11/29/2022    Patient Care Team:  Gary Gonzalez MD as PCP - General (Internal Medicine)  Bill Ayala, PhD as PCP - Behavioral Health (Psychology)  Yenni Mccurdy RD as Dietitian (Nutrition)  Pratima Cedeno LPN as Care Coordinator      PAST MEDICAL HISTORY:  Past Medical History:   Diagnosis Date    Allergy     skin    Anxiety     Atrial fibrillation     Breast cancer 2014    right    Chronic back pain     Depression     Encounter for blood transfusion     Endometrial cancer 07/30/2019    Fibromyalgia     Hives     Hx of psychiatric care     Hx of radiation therapy     Itching     Lichen sclerosus     Mental disorder     PONV (postoperative nausea and vomiting)     Psychiatric problem     PTSD (post-traumatic stress disorder)     Sleep difficulties     Sore throat     Therapy     Uterine cancer 08/05/2019    UTI (urinary tract infection)        PAST SURGICAL HISTORY:  Past Surgical History:   Procedure Laterality Date    anal fissure surgery      APPENDECTOMY      BIOPSY N/A 11/19/2020    Procedure: BONE BIOPSY;  Surgeon: Leisa Diagnostic Provider;  Location: [facility] OR;  Service: Radiology;  Laterality: N/A;  RN PREOP 11/17/2020    BREAST BIOPSY Right 2014    BREAST LUMPECTOMY  Right 2014    R lumpectomy [facility] w 2.4cm IDC & DCIS, neg margins, 0/6 SN, Stage II, ER/IN+, HER-2 neg Adjuvant XRT and hormonal therapy     CARPAL TUNNEL RELEASE      CHOLECYSTECTOMY      COLONOSCOPY      COLONOSCOPY N/A 6/11/2020    Procedure: COLONOSCOPY;  Surgeon: Bobby Marino MD;  Location: [facility] (2ND FLR);  Service: Endoscopy;  Laterality: N/A;    COLONOSCOPY N/A 6/9/2022    Procedure: COLONOSCOPY;  Surgeon: Abdoulaye Alcocer MD;  Location: [facility] (2ND FLR);  Service: Endoscopy;  Laterality: N/A;    ESOPHAGOGASTRODUODENOSCOPY N/A 6/11/2020    Procedure: EGD (ESOPHAGOGASTRODUODENOSCOPY);  Surgeon: Bobby Marino MD;  Location: [facility] (2ND FLR);  Service: Endoscopy;  Laterality: N/A;    HYSTERECTOMY  08/15/2019    HYSTEROSCOPY WITH DILATION AND CURETTAGE OF UTERUS N/A 7/24/2019    Procedure: HYSTEROSCOPY, WITH DILATION AND CURETTAGE OF UTERUS;  Surgeon: Елена Kam MD;  Location: [facility];  Service: OB/GYN;  Laterality: N/A;    INSERTION OF TUNNELED CENTRAL VENOUS CATHETER (CVC) WITH SUBCUTANEOUS PORT N/A 9/26/2019    Procedure: INSERTION, PORT-A-CATH;  Surgeon: Moris Varghese MD;  Location: [facility] CATH LAB;  Service: Radiology;  Laterality: N/A;    MEDIPORT REMOVAL N/A 3/5/2020    Procedure: REMOVAL, CATHETER, CENTRAL VENOUS, TUNNELED, WITH PORT;  Surgeon: Moris Varghese MD;  Location: [facility] CATH LAB;  Service: Radiology;  Laterality: N/A;    NEEDLE LOCALIZATION N/A 11/19/2020    Procedure: NEEDLE LOCALIZATION;  Surgeon: [facility] Diagnostic Provider;  Location: [facility] OR;  Service: Radiology;  Laterality: N/A;    PARTIAL MASCECTOMY Right 2014    ROBOT-ASSISTED LAPAROSCOPIC ABDOMINAL HYSTERECTOMY USING DA RAMU XI N/A 8/15/2019    Procedure: XI ROBOTIC HYSTERECTOMY;  Surgeon: Darius Regalado MD;  Location: [facility] OR;  Service: OB/GYN;  Laterality: N/A;    ROBOT-ASSISTED LAPAROSCOPIC SALPINGO-OOPHORECTOMY USING DA RAMU XI Bilateral 8/15/2019    Procedure: XI ROBOTIC SALPINGO-OOPHORECTOMY;   Surgeon: Darius Regalado MD;  Location: [facility] OR;  Service: OB/GYN;  Laterality: Bilateral;    ROBOT-ASSISTED LYMPHADENECTOMY Left 8/15/2019    Procedure: ROBOTIC LYMPHADENECTOMY;  Surgeon: Darius Regalado MD;  Location: [facility] OR;  Service: OB/GYN;  Laterality: Left;    SALPINGOOPHORECTOMY Bilateral 08/15/2019    TONSILLECTOMY      TUBAL LIGATION         SOCIAL HISTORY:  Social History     Socioeconomic History    Marital status: Single    Number of children: 0   Occupational History    Occupation: hotel accounting   Tobacco Use    Smoking status: Never    Smokeless tobacco: Never   Substance and Sexual Activity    Alcohol use: No     Alcohol/week: 0.0 standard drinks    Drug use: Yes     Types: Marijuana     Comment: medical marijuana    Sexual activity: Not Currently     Partners: Male   Social History Narrative    Lives alone.     Never , no children    Very invested in cat rescue     Social Determinants of Health     Financial Resource Strain: Low Risk     Difficulty of Paying Living Expenses: Not hard at all   Food Insecurity: No Food Insecurity    Worried About Running Out of Food in the Last Year: Never true    Ran Out of Food in the Last Year: Never true   Transportation Needs: No Transportation Needs    Lack of Transportation (Medical): No    Lack of Transportation (Non-Medical): No   Physical Activity: Inactive    Days of Exercise per Week: 0 days    Minutes of Exercise per Session: 0 min   Stress: Stress Concern Present    Feeling of Stress : Very much   Social Connections: Moderately Isolated    Frequency of Communication with Friends and Family: Three times a week    Frequency of Social Gatherings with Friends and Family: Three times a week    Attends Oriental orthodox Services: Never    Active Member of Clubs or Organizations: Yes    Attends Club or Organization Meetings: 1 to 4 times per year    Marital Status: Never    Housing Stability: Unknown    Unable to Pay for Housing in the Last Year: No     Unstable Housing in the Last Year: No       FAMILY HISTORY:  Family History   Problem Relation Age of Onset    Skin cancer Mother 89        squamous cell--nose & head    Rashes / Skin problems Mother 92        cutaneous horn of scalp    Hypertension Mother     Arthritis Mother     Other Mother 85        PANC mass suspected by provider to be ca, surv'd 8 yrs    Pancreatic cancer Father 85        surv'd 8 mos    Pancreatic cancer Maternal Aunt 62    Colon polyps Sister         pt thinks (a few)    Hypertension Brother     Cancer Paternal Uncle         leukemia, 70s    Heart attack Paternal Grandmother     Heart attack Paternal Grandfather     Hypertension Brother     Colon polyps Sister         Geni believes just a handful    Hashimoto's thyroiditis Sister     Diabetes Other         strong family history per patient    Lung cancer Maternal Uncle         smoker    Cancer Paternal Aunt         origin? colon or ovarian?    Suicide Cousin     Alcohol abuse Cousin     Throat cancer Other         pt believes    Breast cancer Neg Hx     Ovarian cancer Neg Hx     Cirrhosis Neg Hx     Colon cancer Neg Hx        ALLERGIES AND MEDICATIONS: updated and reviewed.  Review of patient's allergies indicates:   Allergen Reactions    Oxycodone Hallucinations     Current Outpatient Medications   Medication Sig Dispense Refill    acetaminophen (TYLENOL) 500 MG tablet Take 1,000 mg by mouth daily as needed for Pain.      acyclovir (ZOVIRAX) 400 MG tablet Take 1 tablet (400 mg total) by mouth 2 (two) times daily. 60 tablet 1    fexofenadine (ALLEGRA) 180 MG tablet 2 tablets in the morning.  May take an extra 2 tablets during the day if needed for itching. 240 tablet 5    fluconazole (DIFLUCAN) 200 MG Tab Take 2 tablets (400 mg total) by mouth once daily. 60 tablet 1    ibuprofen (ADVIL,MOTRIN) 200 MG tablet Take 200 mg by mouth every 6 (six) hours as needed for Pain. Pt takes 2 pills daily      levoFLOXacin (LEVAQUIN) 500 MG tablet Take 1  tablet (500 mg total) by mouth once daily. 30 tablet 1    meth/meblue/sod phos/psal/hyos (URIBEL ORAL) Take by mouth as needed.      metoprolol tartrate (LOPRESSOR) 25 MG tablet Take 1 tablet (25 mg total) by mouth 2 (two) times daily. 180 tablet 3    mupirocin (BACTROBAN) 2 % ointment Apply topically 4 (four) times daily.      nystatin (MYCOSTATIN) cream Apply topically every Mon, Wed, Fri. 30 g 11    nystatin (MYCOSTATIN) powder Apply topically 4 (four) times daily. 30 g 1    pantoprazole (PROTONIX) 40 MG tablet Take 1 tablet (40 mg total) by mouth once daily. 90 tablet 3    povidone-iodine (BETADINE) 10 % external solution Apply topically as needed for Wound Care. Clean R hand wound w/ betadine daily 14.8 mL 0    senna (SENOKOT) 8.6 mg tablet Take 1 tablet by mouth once daily. Can take twice daily 30 tablet 11    UNABLE TO FIND [place] Tail Mushrooms Immune Support      decitabine-cedazuridine  mg Tab Take 1 tablet by mouth once daily. For 5 days every 28 days 5 tablet 5    hydrOXYzine HCL (ATARAX) 25 MG tablet 1 to 8 tablets at bedtime.  Start with 3 tablets.  Increase or decrease as needed until itching is controlled or a maximum of 8 tablets. (Patient not taking: Reported on 12/9/2022) 240 tablet 3    triamcinolone acetonide 0.1% (KENALOG) 0.1 % ointment Apply topically 2 (two) times daily. for 7 days 80 g 0     No current facility-administered medications for this visit.         Review of Systems   Constitutional:  Positive for fatigue. Negative for appetite change, chills, fever and unexpected weight change.   Respiratory:  Positive for shortness of breath. Negative for cough.    Cardiovascular:  Negative for chest pain, palpitations and leg swelling.   Gastrointestinal:  Negative for abdominal pain, constipation, diarrhea, nausea and vomiting.   Musculoskeletal: Negative.    Skin: Negative.    Neurological:  Negative for dizziness, light-headedness and headaches.   Hematological:  Bruises/bleeds  easily.   Psychiatric/Behavioral:  Negative for dysphoric mood. The patient is not nervous/anxious.        Objective:       Physical Exam  Vitals:    12/09/22 1121   BP: 98/64   Pulse: 68   Temp: 98.9 °F (37.2 °C)   TempSrc: Oral   SpO2: 99%   Weight: 52.2 kg (114 lb 15.5 oz)   Height: 5' (1.524 m)    Body mass index is 22.45 kg/m².  Weight: 52.2 kg (114 lb 15.5 oz)   Height: 5' (152.4 cm)   Physical Exam  Vitals reviewed.   Constitutional:       General: She is not in acute distress.     Appearance: Normal appearance. She is well-developed. She is not ill-appearing.   HENT:      Head: Normocephalic and atraumatic.   Eyes:      Conjunctiva/sclera: Conjunctivae normal.      Pupils: Pupils are equal, round, and reactive to light.   Neck:      Thyroid: No thyromegaly.   Cardiovascular:      Rate and Rhythm: Normal rate.      Heart sounds: Normal heart sounds. No murmur heard.  Pulmonary:      Effort: Pulmonary effort is normal. No respiratory distress.      Breath sounds: Normal breath sounds.   Musculoskeletal:         General: No deformity.      Cervical back: Normal range of motion and neck supple.   Lymphadenopathy:      Cervical: No cervical adenopathy.   Skin:     General: Skin is warm and dry.      Findings: No bruising.      Comments: Healing wound of right dorsal hand   Neurological:      Mental Status: She is alert.      Cranial Nerves: No cranial nerve deficit.   Psychiatric:         Behavior: Behavior normal.           Assessment:     1. Cellulitis of right upper extremity    2. Myelodysplastic syndrome    3. Endometrial cancer    4. Paroxysmal atrial fibrillation      Plan:     Jeanne was seen today for hospital follow up.    Diagnoses and all orders for this visit:    Cellulitis of right upper extremity  improved    Myelodysplastic syndrome  Endometrial cancer  Following with Oncology, pending restart of current chemotherapy regimen planned  Onc f/u 12/13/22  Gyn Onc f/u April 2023    PAF  Would like to  defer evaluation for Watchman device presently in the setting of her other chronic conditions    Health Maintenance reviewed, addressed as per orders    F/u in 3 months    1. The patient indicates understanding of these issues and agrees with the plan. Brief care plan is updated and reviewed with the patient as applicable.   2. The patient is given an After Visit Summary that lists all medications with directions, allergies, orders placed during this encounter and follow-up instructions.   3. I have reviewed the patient's medical information including past medical, family, and social history sections including the medications and allergies.   4. We discussed the patient's current medications. I reconciled the patient's medication list and prepared and supplied needed refills.       Gary Gonzalez MD  Internal Medicine-Pediatrics

## 2022-12-12 NOTE — TELEPHONE ENCOUNTER
Outgoing call to pt to see about Inqovi. Stated she has apt 12/13, but has a full month on hand. Will follow up with pt in 3 weeks for refill.

## 2022-12-13 NOTE — PROGRESS NOTES
Section of Hematology and Stem Cell Transplantation  Follow Up Note     Visit Date: 12/13/2022    Primary Oncologic Diagnosis: Myelodysplastic syndrome [D46.9]    History of Present Ilness:   Jeanne Rodgers (Jeanne) is a pleasant 67 y.o.female with history of breast and endometrial carcinoma with somatic P53 mutation. Noted to have persistent pancytopenia complicated chemotherapy for her endometrial carcinoma in 2020.      Bone marrow biopsy in November 2020 was a normocellular marrow (30%) with trilineage hematpoiesis and atypical megakaryocytes. No morphologic or immunuphenotypic evidence of metastatic carcinoma. Adequate iron storage. Chromosome analysis normal X,X phenotype. Insufficient aspirate for analysis.      Bone marrow biopsy June 2022 was hypercellular marrow (%) with trilineage hematopoiesis. Grade 1 reticular fibrosis. No evidence of lymphoma, plasma cell neoplasm, or metastatic carcinoma.      Two hospital admission in summer 2022 for symptomatic anemia requiring transfusion support. Once incident related to GI bleeding. Hgb at admissions 5-6 grams with normal MCV. No GI bleeding source has been found.      Bone marrow biopsy September 2022 extremely hypercellular marrow, 99% with increased blasts (10%) and moderate trilineage dyspoiesis with left-shifted erythroid hyperplasia, left-shifted, relatively decreased granulocytes and focally increased megakaryocytes with increase number of micromegakaryocytes. Increased stainable histiocytic iron and increased reticulin fibrosis (MF-1 with focal MF-2). 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 NGS with TP53.       Interval History:   Ms. Rodgers presents routinely to clinic today for for treatment-related, high grade MDS. We followed up last week after she had been d/c'd from [facility] d/t cat bite/cellulitis. She was severely pancytopenic last week and we held her Inqovi. Today her  counts have much improved, though ANC remains decrased at 392. She continues on prophylactic antimicrobials. She is feeling better, has more energy. No CP/SOB. Hand has healed. No bleeding/bruising/ No fevers/infections sx.     We will again continue to hold therapy. We will f/u and repeat labs next week to discuss potential of resuming therapy, though anticipate dose reductions.     She is aware should she have any fevers or other concerning sx should report to ER.      Past Medical History, Social History, and Past Family History are unchanged since last evaluation except for HPI.     CURRENT MEDICATIONS:   Current Outpatient Medications   Medication Sig    acetaminophen (TYLENOL) 500 MG tablet Take 1,000 mg by mouth daily as needed for Pain.    acyclovir (ZOVIRAX) 400 MG tablet Take 1 tablet (400 mg total) by mouth 2 (two) times daily.    decitabine-cedazuridine  mg Tab Take 1 tablet by mouth once daily. For 5 days every 28 days    fexofenadine (ALLEGRA) 180 MG tablet 2 tablets in the morning.  May take an extra 2 tablets during the day if needed for itching.    fluconazole (DIFLUCAN) 200 MG Tab Take 2 tablets (400 mg total) by mouth once daily.    hydrOXYzine HCL (ATARAX) 25 MG tablet 1 to 8 tablets at bedtime.  Start with 3 tablets.  Increase or decrease as needed until itching is controlled or a maximum of 8 tablets.    ibuprofen (ADVIL,MOTRIN) 200 MG tablet Take 200 mg by mouth every 6 (six) hours as needed for Pain. Pt takes 2 pills daily    levoFLOXacin (LEVAQUIN) 500 MG tablet Take 1 tablet (500 mg total) by mouth once daily.    meth/meblue/sod phos/psal/hyos (URIBEL ORAL) Take by mouth as needed.    metoprolol tartrate (LOPRESSOR) 25 MG tablet Take 1 tablet (25 mg total) by mouth 2 (two) times daily.    mupirocin (BACTROBAN) 2 % ointment Apply topically 4 (four) times daily.    nystatin (MYCOSTATIN) cream Apply topically every Mon, Wed, Fri.    nystatin (MYCOSTATIN) powder Apply topically 4 (four)  times daily.    pantoprazole (PROTONIX) 40 MG tablet Take 1 tablet (40 mg total) by mouth once daily.    povidone-iodine (BETADINE) 10 % external solution Apply topically as needed for Wound Care. Clean R hand wound w/ betadine daily    senna (SENOKOT) 8.6 mg tablet Take 1 tablet by mouth once daily. Can take twice daily    UNABLE TO FIND [place] Mushrooms Immune Support    triamcinolone acetonide 0.1% (KENALOG) 0.1 % ointment Apply topically 2 (two) times daily. for 7 days     No current facility-administered medications for this visit.       ALLERGIES:   Review of patient's allergies indicates:   Allergen Reactions    Oxycodone Hallucinations         Review of Systems:     Review of Systems   Constitutional:  Positive for malaise/fatigue. Negative for chills, diaphoresis, fever and weight loss.   HENT: Negative.     Eyes: Negative.    Respiratory: Negative.     Cardiovascular: Negative.    Gastrointestinal: Negative.    Genitourinary: Negative.    Musculoskeletal:  Negative for joint pain (R thumb pain with erythema, induration surrounding bite juan, purlenet discharge).   Neurological:  Positive for weakness.   Endo/Heme/Allergies:  Bruises/bleeds easily.   Psychiatric/Behavioral:  The patient is nervous/anxious.      Physical Exam:     Vitals:    12/13/22 1226   BP: (!) 120/58   Pulse: 60   Resp: 18   Temp: 97.6 °F (36.4 °C)       Physical Exam  Vitals reviewed.   Constitutional:       General: She is not in acute distress.     Appearance: Normal appearance.   HENT:      Head: Normocephalic.      Right Ear: External ear normal.      Left Ear: External ear normal.      Mouth/Throat:      Mouth: Mucous membranes are moist.   Eyes:      Extraocular Movements: Extraocular movements intact.      Pupils: Pupils are equal, round, and reactive to light.   Cardiovascular:      Rate and Rhythm: Normal rate and regular rhythm.   Pulmonary:      Effort: Pulmonary effort is normal.   Abdominal:      General: Abdomen is  flat.      Palpations: Abdomen is soft.   Musculoskeletal:         General: No swelling, tenderness or signs of injury. Normal range of motion.      Cervical back: Normal range of motion.      Comments: Previous cat bite healing nicely, see attached photo   Skin:     General: Skin is warm.      Coloration: Skin is pale.      Findings: Bruising (scattered bruising of upper extremities) present. No erythema.   Neurological:      General: No focal deficit present.      Mental Status: She is alert. Mental status is at baseline.         ECOG Performance Status: (foot note - ECOG PS provided by Eastern Cooperative Oncology Group) 2 - Symptomatic, <50% confined to bed    Karnofsky Performance Score:  70%- Cares for Self: Unable to Carry on Normal Activity or Active Work    Labs:   Lab Results   Component Value Date    WBC 1.19 (LL) 12/13/2022    HGB 7.7 (L) 12/13/2022    HCT 24.5 (L) 12/13/2022    MCV 91 12/13/2022    PLT 58 (L) 12/13/2022       Lab Results   Component Value Date     (L) 12/13/2022    K 4.5 12/13/2022     12/13/2022    CO2 24 12/13/2022    BUN 13 12/13/2022    CREATININE 0.7 12/13/2022    ALBUMIN 3.5 12/13/2022    BILITOT 0.8 12/13/2022    ALKPHOS 119 12/13/2022    AST 13 12/13/2022    ALT 7 (L) 12/13/2022               Assessment and Plan:   Jeanne Rodgers (Jeanne) is a pleasant 67 y.o.female with high grade MDS       High Grade MDS  Pancytopenia  IPPS-R Very High Risk MDS  RAEB-2 MDS with complete cytogenetics  Likely secondary MDS from prior chemotherapy/radiation therapy  She started Inqovi 5 of 28 days on 10/21/22.  She is tolerating it well thus far.  Was due for C2 on 11/23. Delayed d/t cellulitis. Now with delayed count recovery following infection, IV abx. We will hold off on resuming Inqovi and repeat CBC next week, consider reduction in tx days  Will continue weekly labs outpatient, with transfusions for Hgb <7.  Will continue prophylactic antimicrobials for ANC <1,000  She  remains concerned regarding poor access, unfortunately unable to have port placed with severe thrombocytopenia, will continue to discuss and monitor counts     Stage IIIC2 UPSC  Diagnostic PET with pelvic and PA lymphadenopathy  RALH/BSO/ Debuliking of pelvic lymph nodes (6/8) positive on 8/15/2019.  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   T/C started 9/27/2019  WPRT to PA nodes 45Gy completed 1/22/2020  HDR to 15Gy in 3 fractions completed 3/3/2020  Restarted T/C 3/20/2020, s/p 5 cycles concluding 5/8/2020.   Complicated by pancytopenia and anemia.       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   Small mass seen on mammogram 2007 and 2008, nipple inversion 2012 at [facility]  Referred for radiation therapy at [facility]  On Arimidex for 4 years    Cellulitis, Cat Bite  Pt completed Levaquin abx, healing nicey w/o concerns for active infection       Follow Up:             Marianne Garibay PA-C  Hematology, Oncology, and Stem Cell Transplantation  [facility]          BMT Chart Routing  Urgent    Follow up with physician    Follow up with [facility] . Schedule laurel finley at 11:30 on 12/19 (labs 1 hr before)   Provider visit type    Infusion scheduling note    Injection scheduling note    Labs CBC, CMP and type and screen   Lab interval:  change labs on 12/19 from [facility] to main campus before [facility] appt   Imaging    Pharmacy appointment    Other referrals

## 2022-12-19 NOTE — PROGRESS NOTES
Section of Hematology and Stem Cell Transplantation  Follow Up Note     Visit Date: 12/19/2022    Primary Oncologic Diagnosis: Pancytopenia due to chemotherapy [D61.810]    History of Present Ilness:   Jeanne Rodgers (Jeanne) is a pleasant 67 y.o.female with history of breast and endometrial carcinoma with somatic P53 mutation. Noted to have persistent pancytopenia complicated chemotherapy for her endometrial carcinoma in 2020.      Bone marrow biopsy in November 2020 was a normocellular marrow (30%) with trilineage hematpoiesis and atypical megakaryocytes. No morphologic or immunuphenotypic evidence of metastatic carcinoma. Adequate iron storage. Chromosome analysis normal X,X phenotype. Insufficient aspirate for analysis.      Bone marrow biopsy June 2022 was hypercellular marrow (%) with trilineage hematopoiesis. Grade 1 reticular fibrosis. No evidence of lymphoma, plasma cell neoplasm, or metastatic carcinoma.      Two hospital admission in summer 2022 for symptomatic anemia requiring transfusion support. Once incident related to GI bleeding. Hgb at admissions 5-6 grams with normal MCV. No GI bleeding source has been found.      Bone marrow biopsy September 2022 extremely hypercellular marrow, 99% with increased blasts (10%) and moderate trilineage dyspoiesis with left-shifted erythroid hyperplasia, left-shifted, relatively decreased granulocytes and focally increased megakaryocytes with increase number of micromegakaryocytes. Increased stainable histiocytic iron and increased reticulin fibrosis (MF-1 with focal MF-2). 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 NGS with TP53.       Interval History:   Ms. Rodgers presents routinely to clinic today for for treatment-related, high grade MDS.  Count recheck. Cytopenias improving. Will hold Inqovi for one more week and resume next Monday, with decreased days -- 3 of 28. Patient without any other  concerns. No infectious sx. Continues prophylactic antimicrobials.     Past Medical History, Social History, and Past Family History are unchanged since last evaluation except for HPI.     CURRENT MEDICATIONS:   Current Outpatient Medications   Medication Sig    acetaminophen (TYLENOL) 500 MG tablet Take 1,000 mg by mouth daily as needed for Pain.    acyclovir (ZOVIRAX) 400 MG tablet Take 1 tablet (400 mg total) by mouth 2 (two) times daily.    decitabine-cedazuridine  mg Tab Take 1 tablet by mouth once daily. For 5 days every 28 days    fexofenadine (ALLEGRA) 180 MG tablet 2 tablets in the morning.  May take an extra 2 tablets during the day if needed for itching.    fluconazole (DIFLUCAN) 200 MG Tab Take 2 tablets (400 mg total) by mouth once daily.    hydrOXYzine HCL (ATARAX) 25 MG tablet 1 to 8 tablets at bedtime.  Start with 3 tablets.  Increase or decrease as needed until itching is controlled or a maximum of 8 tablets.    ibuprofen (ADVIL,MOTRIN) 200 MG tablet Take 200 mg by mouth every 6 (six) hours as needed for Pain. Pt takes 2 pills daily    levoFLOXacin (LEVAQUIN) 500 MG tablet Take 1 tablet (500 mg total) by mouth once daily.    meth/meblue/sod phos/psal/hyos (URIBEL ORAL) Take by mouth as needed.    metoprolol tartrate (LOPRESSOR) 25 MG tablet Take 1 tablet (25 mg total) by mouth 2 (two) times daily.    mupirocin (BACTROBAN) 2 % ointment Apply topically 4 (four) times daily.    nystatin (MYCOSTATIN) cream Apply topically every Mon, Wed, Fri.    nystatin (MYCOSTATIN) powder Apply topically 4 (four) times daily.    pantoprazole (PROTONIX) 40 MG tablet Take 1 tablet (40 mg total) by mouth once daily.    povidone-iodine (BETADINE) 10 % external solution Apply topically as needed for Wound Care. Clean R hand wound w/ betadine daily    senna (SENOKOT) 8.6 mg tablet Take 1 tablet by mouth once daily. Can take twice daily    UNABLE TO FIND [place] Mushrooms Immune Support    triamcinolone acetonide  0.1% (KENALOG) 0.1 % ointment Apply topically 2 (two) times daily. for 7 days     No current facility-administered medications for this visit.       ALLERGIES:   Review of patient's allergies indicates:   Allergen Reactions    Oxycodone Hallucinations         Review of Systems:     Review of Systems   Constitutional:  Positive for malaise/fatigue. Negative for chills, diaphoresis, fever and weight loss.   HENT: Negative.     Eyes: Negative.    Respiratory: Negative.     Cardiovascular: Negative.    Gastrointestinal: Negative.    Genitourinary: Negative.    Musculoskeletal:  Negative for joint pain.   Neurological:  Negative for weakness.   Endo/Heme/Allergies:  Bruises/bleeds easily.   Psychiatric/Behavioral:  The patient is nervous/anxious.      Physical Exam:     Vitals:    12/19/22 1128   BP: 132/61   Pulse: 61   Resp: 16   Temp: 97.5 °F (36.4 °C)       Physical Exam  Vitals reviewed.   Constitutional:       General: She is not in acute distress.     Appearance: Normal appearance.   HENT:      Head: Normocephalic.      Right Ear: External ear normal.      Left Ear: External ear normal.      Mouth/Throat:      Mouth: Mucous membranes are moist.   Eyes:      Extraocular Movements: Extraocular movements intact.      Pupils: Pupils are equal, round, and reactive to light.   Cardiovascular:      Rate and Rhythm: Normal rate and regular rhythm.   Pulmonary:      Effort: Pulmonary effort is normal.   Abdominal:      General: Abdomen is flat.      Palpations: Abdomen is soft.   Musculoskeletal:         General: No swelling, tenderness or signs of injury. Normal range of motion.      Cervical back: Normal range of motion.      Comments: Previous cat bite healing nicely, see attached photo   Skin:     General: Skin is warm.      Coloration: Skin is pale.      Findings: Bruising (scattered bruising of upper extremities) present. No erythema.   Neurological:      General: No focal deficit present.      Mental Status: She is  alert. Mental status is at baseline.         ECOG Performance Status: (foot note - ECOG PS provided by Eastern Cooperative Oncology Group) 2 - Symptomatic, <50% confined to bed    Karnofsky Performance Score:  70%- Cares for Self: Unable to Carry on Normal Activity or Active Work    Labs:   Lab Results   Component Value Date    WBC 2.22 (L) 12/19/2022    HGB 7.7 (L) 12/19/2022    HCT 25.1 (L) 12/19/2022    MCV 92 12/19/2022     12/19/2022       Lab Results   Component Value Date     12/19/2022    K 4.2 12/19/2022     12/19/2022    CO2 22 (L) 12/19/2022    BUN 10 12/19/2022    CREATININE 0.7 12/19/2022    ALBUMIN 3.8 12/19/2022    BILITOT 0.6 12/19/2022    ALKPHOS 122 12/19/2022    AST 15 12/19/2022    ALT 7 (L) 12/19/2022               Assessment and Plan:   Jeanne Novaiovanni (Jeanne) is a pleasant 67 y.o.female with high grade MDS       High Grade MDS  Pancytopenia  IPPS-R Very High Risk MDS  RAEB-2 MDS with complete cytogenetics  Likely secondary MDS from prior chemotherapy/radiation therapy  She started Inqovi 5 of 28 days on 10/21/22.  She is tolerating it well thus far.   Was due for C2 on 11/23. Delayed d/t cellulitis. Now with delayed count recovery following infection, IV abx. Counts continue to improve, ANC 1.1 today We will delay therapy by one additional week, then resume on 3 of 28 days on 12/26.   Will continue weekly labs outpatient, with transfusions for Hgb <7.  We will continue prophylactic antimicrobials at present    Stage IIIC2 UPSC  Diagnostic PET with pelvic and PA lymphadenopathy  RALH/BSO/ Debuliking of pelvic lymph nodes (6/8) positive on 8/15/2019.  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   T/C started 9/27/2019  WPRT to PA nodes 45Gy completed 1/22/2020  HDR to 15Gy in 3 fractions completed 3/3/2020  Restarted T/C 3/20/2020, s/p 5 cycles concluding  5/8/2020.   Complicated by pancytopenia and anemia.       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   Small mass seen on mammogram 2007 and 2008, nipple inversion 2012 at [facility]  Referred for radiation therapy at [facility]  On Arimidex for 4 years    Cellulitis, Cat Bite  Pt completed Levaquin abx, healing nicey w/o concerns for active infection       Follow Up:           Marianne Garibay PA-C  Hematology, Oncology, and Stem Cell Transplantation  [facility]          BMT Chart Routing  Urgent    Follow up with physician . Schedule with Dr. Rivera on 1/23 at 2:30pm (labs 1 hour before)   Follow up with [facility]    Provider visit type Malignant hem   Infusion scheduling note    Injection scheduling note    Labs CBC and CMP   Lab interval:  schedule weeky labs at [facility] (except for on days with provider appt, then schedule labs here)   Imaging    Pharmacy appointment    Other referrals

## 2023-01-01 ENCOUNTER — LAB VISIT (OUTPATIENT)
Dept: LAB | Facility: HOSPITAL | Age: 68
End: 2023-01-01
Payer: MEDICARE

## 2023-01-01 ENCOUNTER — INFUSION (OUTPATIENT)
Dept: INFUSION THERAPY | Facility: HOSPITAL | Age: 68
End: 2023-01-01
Attending: INTERNAL MEDICINE
Payer: MEDICARE

## 2023-01-01 ENCOUNTER — LAB VISIT (OUTPATIENT)
Dept: LAB | Facility: HOSPITAL | Age: 68
End: 2023-01-01
Attending: INTERNAL MEDICINE
Payer: MEDICARE

## 2023-01-01 ENCOUNTER — PATIENT MESSAGE (OUTPATIENT)
Dept: HEMATOLOGY/ONCOLOGY | Facility: CLINIC | Age: 68
End: 2023-01-01
Payer: MEDICARE

## 2023-01-01 ENCOUNTER — OFFICE VISIT (OUTPATIENT)
Dept: HEMATOLOGY/ONCOLOGY | Facility: CLINIC | Age: 68
End: 2023-01-01
Payer: MEDICARE

## 2023-01-01 ENCOUNTER — PATIENT MESSAGE (OUTPATIENT)
Dept: HEMATOLOGY/ONCOLOGY | Facility: CLINIC | Age: 68
End: 2023-01-01

## 2023-01-01 ENCOUNTER — TELEPHONE (OUTPATIENT)
Dept: HEMATOLOGY/ONCOLOGY | Facility: CLINIC | Age: 68
End: 2023-01-01
Payer: MEDICARE

## 2023-01-01 ENCOUNTER — HOSPITAL ENCOUNTER (INPATIENT)
Facility: HOSPITAL | Age: 68
LOS: 1 days | Discharge: HOME | DRG: 948 | End: 2023-03-16
Attending: EMERGENCY MEDICINE | Admitting: INTERNAL MEDICINE
Payer: MEDICARE

## 2023-01-01 ENCOUNTER — INFUSION (OUTPATIENT)
Dept: INFUSION THERAPY | Facility: HOSPITAL | Age: 68
End: 2023-01-01
Payer: MEDICARE

## 2023-01-01 ENCOUNTER — SPECIALTY PHARMACY (OUTPATIENT)
Dept: PHARMACY | Facility: CLINIC | Age: 68
End: 2023-01-01
Payer: MEDICARE

## 2023-01-01 ENCOUNTER — DOCUMENTATION ONLY (OUTPATIENT)
Dept: HEMATOLOGY/ONCOLOGY | Facility: CLINIC | Age: 68
End: 2023-01-01
Payer: MEDICARE

## 2023-01-01 ENCOUNTER — PATIENT MESSAGE (OUTPATIENT)
Dept: FAMILY MEDICINE | Facility: CLINIC | Age: 68
End: 2023-01-01
Payer: MEDICARE

## 2023-01-01 ENCOUNTER — TELEPHONE (OUTPATIENT)
Dept: FAMILY MEDICINE | Facility: CLINIC | Age: 68
End: 2023-01-01
Payer: MEDICARE

## 2023-01-01 ENCOUNTER — OFFICE VISIT (OUTPATIENT)
Dept: PALLIATIVE MEDICINE | Facility: CLINIC | Age: 68
End: 2023-01-01
Payer: MEDICARE

## 2023-01-01 ENCOUNTER — TELEPHONE (OUTPATIENT)
Dept: HEMATOLOGY/ONCOLOGY | Facility: HOSPITAL | Age: 68
End: 2023-01-01
Payer: MEDICARE

## 2023-01-01 ENCOUNTER — OFFICE VISIT (OUTPATIENT)
Dept: URGENT CARE | Facility: CLINIC | Age: 68
End: 2023-01-01
Payer: MEDICARE

## 2023-01-01 ENCOUNTER — LAB VISIT (OUTPATIENT)
Dept: LAB | Facility: HOSPITAL | Age: 68
End: 2023-01-01
Attending: PHYSICIAN ASSISTANT
Payer: MEDICARE

## 2023-01-01 ENCOUNTER — PROCEDURE VISIT (OUTPATIENT)
Dept: HEMATOLOGY/ONCOLOGY | Facility: CLINIC | Age: 68
End: 2023-01-01
Payer: MEDICARE

## 2023-01-01 ENCOUNTER — OFFICE VISIT (OUTPATIENT)
Dept: FAMILY MEDICINE | Facility: CLINIC | Age: 68
End: 2023-01-01
Payer: MEDICARE

## 2023-01-01 VITALS
HEART RATE: 77 BPM | OXYGEN SATURATION: 100 % | SYSTOLIC BLOOD PRESSURE: 102 MMHG | DIASTOLIC BLOOD PRESSURE: 52 MMHG | TEMPERATURE: 98 F | RESPIRATION RATE: 16 BRPM

## 2023-01-01 VITALS
TEMPERATURE: 99 F | DIASTOLIC BLOOD PRESSURE: 59 MMHG | HEART RATE: 69 BPM | SYSTOLIC BLOOD PRESSURE: 121 MMHG | OXYGEN SATURATION: 100 % | RESPIRATION RATE: 16 BRPM

## 2023-01-01 VITALS
TEMPERATURE: 99 F | WEIGHT: 110.81 LBS | HEIGHT: 60 IN | OXYGEN SATURATION: 99 % | HEART RATE: 87 BPM | RESPIRATION RATE: 16 BRPM | DIASTOLIC BLOOD PRESSURE: 52 MMHG | SYSTOLIC BLOOD PRESSURE: 87 MMHG | BODY MASS INDEX: 21.75 KG/M2

## 2023-01-01 VITALS
OXYGEN SATURATION: 100 % | RESPIRATION RATE: 18 BRPM | HEART RATE: 63 BPM | DIASTOLIC BLOOD PRESSURE: 52 MMHG | SYSTOLIC BLOOD PRESSURE: 108 MMHG

## 2023-01-01 VITALS
DIASTOLIC BLOOD PRESSURE: 77 MMHG | HEART RATE: 73 BPM | SYSTOLIC BLOOD PRESSURE: 113 MMHG | HEART RATE: 82 BPM | RESPIRATION RATE: 16 BRPM | OXYGEN SATURATION: 100 % | OXYGEN SATURATION: 95 % | TEMPERATURE: 98 F | TEMPERATURE: 98 F | RESPIRATION RATE: 18 BRPM | SYSTOLIC BLOOD PRESSURE: 152 MMHG | DIASTOLIC BLOOD PRESSURE: 59 MMHG

## 2023-01-01 VITALS
SYSTOLIC BLOOD PRESSURE: 116 MMHG | BODY MASS INDEX: 21.79 KG/M2 | DIASTOLIC BLOOD PRESSURE: 51 MMHG | HEART RATE: 71 BPM | OXYGEN SATURATION: 98 % | WEIGHT: 111 LBS | HEIGHT: 60 IN | TEMPERATURE: 99 F | RESPIRATION RATE: 18 BRPM

## 2023-01-01 VITALS
RESPIRATION RATE: 18 BRPM | TEMPERATURE: 98 F | HEART RATE: 67 BPM | SYSTOLIC BLOOD PRESSURE: 95 MMHG | DIASTOLIC BLOOD PRESSURE: 48 MMHG | OXYGEN SATURATION: 100 %

## 2023-01-01 VITALS
HEIGHT: 60 IN | TEMPERATURE: 98 F | SYSTOLIC BLOOD PRESSURE: 97 MMHG | HEART RATE: 75 BPM | HEIGHT: 60 IN | BODY MASS INDEX: 23.12 KG/M2 | SYSTOLIC BLOOD PRESSURE: 113 MMHG | TEMPERATURE: 100 F | DIASTOLIC BLOOD PRESSURE: 72 MMHG | DIASTOLIC BLOOD PRESSURE: 56 MMHG | BODY MASS INDEX: 22.99 KG/M2 | OXYGEN SATURATION: 97 % | HEART RATE: 90 BPM | WEIGHT: 117.75 LBS | OXYGEN SATURATION: 93 % | RESPIRATION RATE: 18 BRPM

## 2023-01-01 VITALS
BODY MASS INDEX: 21.85 KG/M2 | OXYGEN SATURATION: 97 % | DIASTOLIC BLOOD PRESSURE: 56 MMHG | HEART RATE: 90 BPM | WEIGHT: 111.31 LBS | HEIGHT: 60 IN | SYSTOLIC BLOOD PRESSURE: 110 MMHG | TEMPERATURE: 98 F

## 2023-01-01 VITALS
HEART RATE: 57 BPM | BODY MASS INDEX: 21.62 KG/M2 | HEIGHT: 60 IN | OXYGEN SATURATION: 100 % | RESPIRATION RATE: 18 BRPM | TEMPERATURE: 98 F | SYSTOLIC BLOOD PRESSURE: 131 MMHG | WEIGHT: 110.13 LBS | DIASTOLIC BLOOD PRESSURE: 60 MMHG

## 2023-01-01 VITALS
OXYGEN SATURATION: 99 % | HEIGHT: 60 IN | DIASTOLIC BLOOD PRESSURE: 55 MMHG | SYSTOLIC BLOOD PRESSURE: 112 MMHG | WEIGHT: 112.31 LBS | TEMPERATURE: 99 F | HEART RATE: 74 BPM | RESPIRATION RATE: 18 BRPM | BODY MASS INDEX: 22.05 KG/M2

## 2023-01-01 DIAGNOSIS — D46.9 MYELODYSPLASTIC SYNDROME: ICD-10-CM

## 2023-01-01 DIAGNOSIS — D64.9 ANEMIA, UNSPECIFIED TYPE: ICD-10-CM

## 2023-01-01 DIAGNOSIS — C92.00 ACUTE MYELOID LEUKEMIA NOT HAVING ACHIEVED REMISSION: ICD-10-CM

## 2023-01-01 DIAGNOSIS — S49.91XA INJURY OF RIGHT UPPER EXTREMITY, INITIAL ENCOUNTER: ICD-10-CM

## 2023-01-01 DIAGNOSIS — D64.9 ANEMIA, UNSPECIFIED TYPE: Primary | ICD-10-CM

## 2023-01-01 DIAGNOSIS — M79.604 PAIN OF RIGHT LOWER EXTREMITY: Primary | ICD-10-CM

## 2023-01-01 DIAGNOSIS — E87.1 HYPONATREMIA: ICD-10-CM

## 2023-01-01 DIAGNOSIS — R06.02 SOB (SHORTNESS OF BREATH): ICD-10-CM

## 2023-01-01 DIAGNOSIS — R22.41 LOCALIZED SWELLING OF RIGHT FOOT: ICD-10-CM

## 2023-01-01 DIAGNOSIS — R53.0 NEOPLASTIC (MALIGNANT) RELATED FATIGUE: ICD-10-CM

## 2023-01-01 DIAGNOSIS — R11.0 NAUSEA: ICD-10-CM

## 2023-01-01 DIAGNOSIS — T45.1X5A PERIPHERAL NEUROPATHY DUE TO CHEMOTHERAPY: ICD-10-CM

## 2023-01-01 DIAGNOSIS — G47.00 INSOMNIA, UNSPECIFIED TYPE: ICD-10-CM

## 2023-01-01 DIAGNOSIS — R63.0 ANOREXIA: ICD-10-CM

## 2023-01-01 DIAGNOSIS — C92.00 ACUTE MYELOID LEUKEMIA NOT HAVING ACHIEVED REMISSION: Primary | ICD-10-CM

## 2023-01-01 DIAGNOSIS — I95.0 IDIOPATHIC HYPOTENSION: ICD-10-CM

## 2023-01-01 DIAGNOSIS — R52 UNCONTROLLED PAIN: ICD-10-CM

## 2023-01-01 DIAGNOSIS — Z51.5 ENCOUNTER FOR PALLIATIVE CARE: ICD-10-CM

## 2023-01-01 DIAGNOSIS — R07.9 CHEST PAIN: ICD-10-CM

## 2023-01-01 DIAGNOSIS — D46.9 MYELODYSPLASIA (MYELODYSPLASTIC SYNDROME): Primary | ICD-10-CM

## 2023-01-01 DIAGNOSIS — I70.0 AORTIC ATHEROSCLEROSIS: ICD-10-CM

## 2023-01-01 DIAGNOSIS — D46.9 MYELODYSPLASTIC SYNDROME: Primary | ICD-10-CM

## 2023-01-01 DIAGNOSIS — K59.00 CONSTIPATION, UNSPECIFIED CONSTIPATION TYPE: ICD-10-CM

## 2023-01-01 DIAGNOSIS — D69.6 THROMBOCYTOPENIA: ICD-10-CM

## 2023-01-01 DIAGNOSIS — G62.0 PERIPHERAL NEUROPATHY DUE TO CHEMOTHERAPY: ICD-10-CM

## 2023-01-01 DIAGNOSIS — F43.23 ADJUSTMENT DISORDER WITH MIXED ANXIETY AND DEPRESSED MOOD: ICD-10-CM

## 2023-01-01 DIAGNOSIS — F39 MOOD DISORDER: ICD-10-CM

## 2023-01-01 DIAGNOSIS — D46.9 MYELODYSPLASIA (MYELODYSPLASTIC SYNDROME): ICD-10-CM

## 2023-01-01 DIAGNOSIS — R30.0 DYSURIA: ICD-10-CM

## 2023-01-01 DIAGNOSIS — L08.9 LOCAL SKIN INFECTION: Primary | ICD-10-CM

## 2023-01-01 DIAGNOSIS — D69.6 THROMBOCYTOPENIA: Primary | ICD-10-CM

## 2023-01-01 DIAGNOSIS — R19.7 DIARRHEA, UNSPECIFIED TYPE: ICD-10-CM

## 2023-01-01 LAB
ABO + RH BLD: NORMAL
ACID FAST MOD KINY STN SPEC: NORMAL
ALBUMIN SERPL BCP-MCNC: 2.6 G/DL (ref 3.5–5.2)
ALBUMIN SERPL BCP-MCNC: 2.6 G/DL (ref 3.5–5.2)
ALBUMIN SERPL BCP-MCNC: 2.7 G/DL (ref 3.5–5.2)
ALBUMIN SERPL BCP-MCNC: 3 G/DL (ref 3.5–5.2)
ALBUMIN SERPL BCP-MCNC: 3 G/DL (ref 3.5–5.2)
ALBUMIN SERPL BCP-MCNC: 3.3 G/DL (ref 3.5–5.2)
ALBUMIN SERPL BCP-MCNC: 3.3 G/DL (ref 3.5–5.2)
ALBUMIN SERPL BCP-MCNC: 3.4 G/DL (ref 3.5–5.2)
ALBUMIN SERPL BCP-MCNC: 3.4 G/DL (ref 3.5–5.2)
ALBUMIN SERPL BCP-MCNC: 3.6 G/DL (ref 3.5–5.2)
ALBUMIN SERPL BCP-MCNC: 3.7 G/DL (ref 3.5–5.2)
ALP SERPL-CCNC: 125 U/L (ref 55–135)
ALP SERPL-CCNC: 132 U/L (ref 55–135)
ALP SERPL-CCNC: 141 U/L (ref 55–135)
ALP SERPL-CCNC: 146 U/L (ref 55–135)
ALP SERPL-CCNC: 165 U/L (ref 55–135)
ALP SERPL-CCNC: 62 U/L (ref 55–135)
ALP SERPL-CCNC: 69 U/L (ref 55–135)
ALP SERPL-CCNC: 74 U/L (ref 55–135)
ALP SERPL-CCNC: 81 U/L (ref 55–135)
ALP SERPL-CCNC: 85 U/L (ref 55–135)
ALP SERPL-CCNC: 85 U/L (ref 55–135)
ALP SERPL-CCNC: 86 U/L (ref 55–135)
ALP SERPL-CCNC: 90 U/L (ref 55–135)
ALP SERPL-CCNC: 96 U/L (ref 55–135)
ALT SERPL W/O P-5'-P-CCNC: 15 U/L (ref 10–44)
ALT SERPL W/O P-5'-P-CCNC: 5 U/L (ref 10–44)
ALT SERPL W/O P-5'-P-CCNC: 5 U/L (ref 10–44)
ALT SERPL W/O P-5'-P-CCNC: 6 U/L (ref 10–44)
ALT SERPL W/O P-5'-P-CCNC: 6 U/L (ref 10–44)
ALT SERPL W/O P-5'-P-CCNC: 7 U/L (ref 10–44)
ALT SERPL W/O P-5'-P-CCNC: 8 U/L (ref 10–44)
ALT SERPL W/O P-5'-P-CCNC: 9 U/L (ref 10–44)
ALT SERPL W/O P-5'-P-CCNC: <5 U/L (ref 10–44)
ANION GAP SERPL CALC-SCNC: 10 MMOL/L (ref 8–16)
ANION GAP SERPL CALC-SCNC: 11 MMOL/L (ref 8–16)
ANION GAP SERPL CALC-SCNC: 11 MMOL/L (ref 8–16)
ANION GAP SERPL CALC-SCNC: 12 MMOL/L (ref 8–16)
ANION GAP SERPL CALC-SCNC: 5 MMOL/L (ref 8–16)
ANION GAP SERPL CALC-SCNC: 6 MMOL/L (ref 8–16)
ANION GAP SERPL CALC-SCNC: 7 MMOL/L (ref 8–16)
ANION GAP SERPL CALC-SCNC: 8 MMOL/L (ref 8–16)
ANION GAP SERPL CALC-SCNC: 9 MMOL/L (ref 8–16)
ANISOCYTOSIS BLD QL SMEAR: ABNORMAL
ANISOCYTOSIS BLD QL SMEAR: SLIGHT
APTT BLDCRRT: 29.1 SEC (ref 21–32)
AST SERPL-CCNC: 10 U/L (ref 10–40)
AST SERPL-CCNC: 10 U/L (ref 10–40)
AST SERPL-CCNC: 11 U/L (ref 10–40)
AST SERPL-CCNC: 12 U/L (ref 10–40)
AST SERPL-CCNC: 13 U/L (ref 10–40)
AST SERPL-CCNC: 14 U/L (ref 10–40)
AST SERPL-CCNC: 18 U/L (ref 10–40)
AST SERPL-CCNC: 19 U/L (ref 10–40)
AST SERPL-CCNC: 42 U/L (ref 10–40)
AST SERPL-CCNC: 9 U/L (ref 10–40)
BASOPHILS # BLD AUTO: 0.15 K/UL (ref 0–0.2)
BASOPHILS # BLD AUTO: ABNORMAL K/UL (ref 0–0.2)
BASOPHILS NFR BLD: 0 % (ref 0–1.9)
BASOPHILS NFR BLD: 1.1 % (ref 0–1.9)
BILIRUB SERPL-MCNC: 0.4 MG/DL (ref 0.1–1)
BILIRUB SERPL-MCNC: 0.6 MG/DL (ref 0.1–1)
BILIRUB SERPL-MCNC: 0.7 MG/DL (ref 0.1–1)
BILIRUB SERPL-MCNC: 0.8 MG/DL (ref 0.1–1)
BILIRUB SERPL-MCNC: 0.9 MG/DL (ref 0.1–1)
BILIRUB UR QL STRIP: NEGATIVE
BLASTS NFR BLD MANUAL: 12 %
BLASTS NFR BLD MANUAL: 6 %
BLD GP AB SCN CELLS X3 SERPL QL: NORMAL
BLD PROD TYP BPU: NORMAL
BLOOD UNIT EXPIRATION DATE: NORMAL
BLOOD UNIT TYPE CODE: 5100
BLOOD UNIT TYPE CODE: 6200
BLOOD UNIT TYPE: NORMAL
BNP SERPL-MCNC: 176 PG/ML (ref 0–99)
BODY SITE - BONE MARROW: NORMAL
BUN SERPL-MCNC: 10 MG/DL (ref 8–23)
BUN SERPL-MCNC: 10 MG/DL (ref 8–23)
BUN SERPL-MCNC: 11 MG/DL (ref 8–23)
BUN SERPL-MCNC: 12 MG/DL (ref 8–23)
BUN SERPL-MCNC: 12 MG/DL (ref 8–23)
BUN SERPL-MCNC: 13 MG/DL (ref 8–23)
BUN SERPL-MCNC: 13 MG/DL (ref 8–23)
BUN SERPL-MCNC: 14 MG/DL (ref 8–23)
BUN SERPL-MCNC: 14 MG/DL (ref 8–23)
BUN SERPL-MCNC: 16 MG/DL (ref 8–23)
BUN SERPL-MCNC: 22 MG/DL (ref 8–23)
BUN SERPL-MCNC: 9 MG/DL (ref 8–23)
CALCIUM SERPL-MCNC: 10 MG/DL (ref 8.7–10.5)
CALCIUM SERPL-MCNC: 8.5 MG/DL (ref 8.7–10.5)
CALCIUM SERPL-MCNC: 8.6 MG/DL (ref 8.7–10.5)
CALCIUM SERPL-MCNC: 8.7 MG/DL (ref 8.7–10.5)
CALCIUM SERPL-MCNC: 8.7 MG/DL (ref 8.7–10.5)
CALCIUM SERPL-MCNC: 8.8 MG/DL (ref 8.7–10.5)
CALCIUM SERPL-MCNC: 8.8 MG/DL (ref 8.7–10.5)
CALCIUM SERPL-MCNC: 9 MG/DL (ref 8.7–10.5)
CALCIUM SERPL-MCNC: 9.4 MG/DL (ref 8.7–10.5)
CALCIUM SERPL-MCNC: 9.4 MG/DL (ref 8.7–10.5)
CALCIUM SERPL-MCNC: 9.5 MG/DL (ref 8.7–10.5)
CHLORIDE SERPL-SCNC: 101 MMOL/L (ref 95–110)
CHLORIDE SERPL-SCNC: 102 MMOL/L (ref 95–110)
CHLORIDE SERPL-SCNC: 102 MMOL/L (ref 95–110)
CHLORIDE SERPL-SCNC: 103 MMOL/L (ref 95–110)
CHLORIDE SERPL-SCNC: 103 MMOL/L (ref 95–110)
CHLORIDE SERPL-SCNC: 104 MMOL/L (ref 95–110)
CHLORIDE SERPL-SCNC: 105 MMOL/L (ref 95–110)
CHLORIDE SERPL-SCNC: 105 MMOL/L (ref 95–110)
CHLORIDE SERPL-SCNC: 91 MMOL/L (ref 95–110)
CHLORIDE SERPL-SCNC: 96 MMOL/L (ref 95–110)
CHLORIDE SERPL-SCNC: 97 MMOL/L (ref 95–110)
CHLORIDE SERPL-SCNC: 99 MMOL/L (ref 95–110)
CHROM BANDING METHOD: NORMAL
CHROMOSOME ANALYSIS BM ADDITIONAL INFORMATION: NORMAL
CHROMOSOME ANALYSIS BM RELEASED BY: NORMAL
CHROMOSOME ANALYSIS BM RESULT SUMMARY: NORMAL
CLARITY UR REFRACT.AUTO: CLEAR
CLINICAL CYTOGENETICIST REVIEW: NORMAL
CLINICAL DIAGNOSIS - BONE MARROW: NORMAL
CO2 SERPL-SCNC: 22 MMOL/L (ref 23–29)
CO2 SERPL-SCNC: 22 MMOL/L (ref 23–29)
CO2 SERPL-SCNC: 23 MMOL/L (ref 23–29)
CO2 SERPL-SCNC: 23 MMOL/L (ref 23–29)
CO2 SERPL-SCNC: 24 MMOL/L (ref 23–29)
CO2 SERPL-SCNC: 24 MMOL/L (ref 23–29)
CO2 SERPL-SCNC: 25 MMOL/L (ref 23–29)
CO2 SERPL-SCNC: 26 MMOL/L (ref 23–29)
CO2 SERPL-SCNC: 26 MMOL/L (ref 23–29)
CO2 SERPL-SCNC: 27 MMOL/L (ref 23–29)
CO2 SERPL-SCNC: 27 MMOL/L (ref 23–29)
CODING SYSTEM: NORMAL
COLOR UR AUTO: YELLOW
COMMENT: NORMAL
CREAT SERPL-MCNC: 0.6 MG/DL (ref 0.5–1.4)
CREAT SERPL-MCNC: 0.7 MG/DL (ref 0.5–1.4)
CREAT SERPL-MCNC: 0.8 MG/DL (ref 0.5–1.4)
CREAT SERPL-MCNC: 1.1 MG/DL (ref 0.5–1.4)
CROSSMATCH INTERPRETATION: NORMAL
CTP QC/QA: YES
CTP QC/QA: YES
DACRYOCYTES BLD QL SMEAR: ABNORMAL
DIFFERENTIAL METHOD: ABNORMAL
DISPENSE STATUS: NORMAL
EOSINOPHIL # BLD AUTO: 0 K/UL (ref 0–0.5)
EOSINOPHIL # BLD AUTO: ABNORMAL K/UL (ref 0–0.5)
EOSINOPHIL NFR BLD: 0 % (ref 0–8)
EOSINOPHIL NFR BLD: 0.2 % (ref 0–8)
EOSINOPHIL NFR BLD: 1 % (ref 0–8)
EOSINOPHIL NFR BLD: 1 % (ref 0–8)
EOSINOPHIL NFR BLD: 10 % (ref 0–8)
EOSINOPHIL NFR BLD: 2 % (ref 0–8)
EOSINOPHIL NFR BLD: 2 % (ref 0–8)
EOSINOPHIL NFR BLD: 5 % (ref 0–8)
ERYTHROCYTE [DISTWIDTH] IN BLOOD BY AUTOMATED COUNT: 15.5 % (ref 11.5–14.5)
ERYTHROCYTE [DISTWIDTH] IN BLOOD BY AUTOMATED COUNT: 15.7 % (ref 11.5–14.5)
ERYTHROCYTE [DISTWIDTH] IN BLOOD BY AUTOMATED COUNT: 15.9 % (ref 11.5–14.5)
ERYTHROCYTE [DISTWIDTH] IN BLOOD BY AUTOMATED COUNT: 16.3 % (ref 11.5–14.5)
ERYTHROCYTE [DISTWIDTH] IN BLOOD BY AUTOMATED COUNT: 16.5 % (ref 11.5–14.5)
ERYTHROCYTE [DISTWIDTH] IN BLOOD BY AUTOMATED COUNT: 16.5 % (ref 11.5–14.5)
ERYTHROCYTE [DISTWIDTH] IN BLOOD BY AUTOMATED COUNT: 16.6 % (ref 11.5–14.5)
ERYTHROCYTE [DISTWIDTH] IN BLOOD BY AUTOMATED COUNT: 16.8 % (ref 11.5–14.5)
ERYTHROCYTE [DISTWIDTH] IN BLOOD BY AUTOMATED COUNT: 16.9 % (ref 11.5–14.5)
ERYTHROCYTE [DISTWIDTH] IN BLOOD BY AUTOMATED COUNT: 17.8 % (ref 11.5–14.5)
ERYTHROCYTE [DISTWIDTH] IN BLOOD BY AUTOMATED COUNT: 18.6 % (ref 11.5–14.5)
ERYTHROCYTE [DISTWIDTH] IN BLOOD BY AUTOMATED COUNT: 19.1 % (ref 11.5–14.5)
EST. GFR  (NO RACE VARIABLE): 54 ML/MIN/1.73 M^2
EST. GFR  (NO RACE VARIABLE): >60 ML/MIN/1.73 M^2
FIBRINOGEN PPP-MCNC: 562 MG/DL (ref 182–400)
FINAL PATHOLOGIC DIAGNOSIS: NORMAL
FLOW CYTOMETRY ANTIBODIES ANALYZED - BONE MARROW: NORMAL
FLOW CYTOMETRY COMMENT - BONE MARROW: NORMAL
FLOW CYTOMETRY INTERPRETATION - BONE MARROW: NORMAL
GIANT PLATELETS BLD QL SMEAR: PRESENT
GIANT PLATELETS BLD QL SMEAR: PRESENT
GLUCOSE SERPL-MCNC: 103 MG/DL (ref 70–110)
GLUCOSE SERPL-MCNC: 109 MG/DL (ref 70–110)
GLUCOSE SERPL-MCNC: 112 MG/DL (ref 70–110)
GLUCOSE SERPL-MCNC: 113 MG/DL (ref 70–110)
GLUCOSE SERPL-MCNC: 115 MG/DL (ref 70–110)
GLUCOSE SERPL-MCNC: 120 MG/DL (ref 70–110)
GLUCOSE SERPL-MCNC: 134 MG/DL (ref 70–110)
GLUCOSE SERPL-MCNC: 141 MG/DL (ref 70–110)
GLUCOSE SERPL-MCNC: 87 MG/DL (ref 70–110)
GLUCOSE SERPL-MCNC: 90 MG/DL (ref 70–110)
GLUCOSE SERPL-MCNC: 91 MG/DL (ref 70–110)
GLUCOSE SERPL-MCNC: 94 MG/DL (ref 70–110)
GLUCOSE SERPL-MCNC: 96 MG/DL (ref 70–110)
GLUCOSE SERPL-MCNC: 97 MG/DL (ref 70–110)
GLUCOSE UR QL STRIP: NEGATIVE
GROSS: NORMAL
HBV CORE AB SERPL QL IA: NORMAL
HBV SURFACE AG SERPL QL IA: NORMAL
HCT VFR BLD AUTO: 18.6 % (ref 37–48.5)
HCT VFR BLD AUTO: 20.7 % (ref 37–48.5)
HCT VFR BLD AUTO: 21.4 % (ref 37–48.5)
HCT VFR BLD AUTO: 21.5 % (ref 37–48.5)
HCT VFR BLD AUTO: 21.6 % (ref 37–48.5)
HCT VFR BLD AUTO: 21.8 % (ref 37–48.5)
HCT VFR BLD AUTO: 22.3 % (ref 37–48.5)
HCT VFR BLD AUTO: 22.6 % (ref 37–48.5)
HCT VFR BLD AUTO: 23.7 % (ref 37–48.5)
HCT VFR BLD AUTO: 23.8 % (ref 37–48.5)
HCT VFR BLD AUTO: 26.1 % (ref 37–48.5)
HCT VFR BLD AUTO: 26.4 % (ref 37–48.5)
HCT VFR BLD AUTO: 29.3 % (ref 37–48.5)
HCT VFR BLD AUTO: 32.2 % (ref 37–48.5)
HGB BLD-MCNC: 10.5 G/DL (ref 12–16)
HGB BLD-MCNC: 5.9 G/DL (ref 12–16)
HGB BLD-MCNC: 6.6 G/DL (ref 12–16)
HGB BLD-MCNC: 6.8 G/DL (ref 12–16)
HGB BLD-MCNC: 6.8 G/DL (ref 12–16)
HGB BLD-MCNC: 6.9 G/DL (ref 12–16)
HGB BLD-MCNC: 7.1 G/DL (ref 12–16)
HGB BLD-MCNC: 7.1 G/DL (ref 12–16)
HGB BLD-MCNC: 7.2 G/DL (ref 12–16)
HGB BLD-MCNC: 7.7 G/DL (ref 12–16)
HGB BLD-MCNC: 7.7 G/DL (ref 12–16)
HGB BLD-MCNC: 8.4 G/DL (ref 12–16)
HGB BLD-MCNC: 8.7 G/DL (ref 12–16)
HGB BLD-MCNC: 9.5 G/DL (ref 12–16)
HGB UR QL STRIP: NEGATIVE
HYPOCHROMIA BLD QL SMEAR: ABNORMAL
IMM GRANULOCYTES # BLD AUTO: 3.22 K/UL (ref 0–0.04)
IMM GRANULOCYTES # BLD AUTO: ABNORMAL K/UL (ref 0–0.04)
IMM GRANULOCYTES NFR BLD AUTO: 23.3 % (ref 0–0.5)
IMM GRANULOCYTES NFR BLD AUTO: ABNORMAL % (ref 0–0.5)
INR PPP: 1.1 (ref 0.8–1.2)
KARYOTYP MAR: NORMAL
KETONES UR QL STRIP: NEGATIVE
LDH SERPL L TO P-CCNC: 1099 U/L (ref 110–260)
LEUKOCYTE ESTERASE UR QL STRIP: NEGATIVE
LYMPHOCYTES # BLD AUTO: 0.9 K/UL (ref 1–4.8)
LYMPHOCYTES # BLD AUTO: ABNORMAL K/UL (ref 1–4.8)
LYMPHOCYTES NFR BLD: 12 % (ref 18–48)
LYMPHOCYTES NFR BLD: 13 % (ref 18–48)
LYMPHOCYTES NFR BLD: 16 % (ref 18–48)
LYMPHOCYTES NFR BLD: 17 % (ref 18–48)
LYMPHOCYTES NFR BLD: 17 % (ref 18–48)
LYMPHOCYTES NFR BLD: 19 % (ref 18–48)
LYMPHOCYTES NFR BLD: 24 % (ref 18–48)
LYMPHOCYTES NFR BLD: 29 % (ref 18–48)
LYMPHOCYTES NFR BLD: 40 % (ref 18–48)
LYMPHOCYTES NFR BLD: 47.5 % (ref 18–48)
LYMPHOCYTES NFR BLD: 59 % (ref 18–48)
LYMPHOCYTES NFR BLD: 6 % (ref 18–48)
LYMPHOCYTES NFR BLD: 6.2 % (ref 18–48)
LYMPHOCYTES NFR BLD: 9 % (ref 18–48)
Lab: NORMAL
MAGNESIUM SERPL-MCNC: 1.6 MG/DL (ref 1.6–2.6)
MAGNESIUM SERPL-MCNC: 1.7 MG/DL (ref 1.6–2.6)
MCH RBC QN AUTO: 26 PG (ref 27–31)
MCH RBC QN AUTO: 26.6 PG (ref 27–31)
MCH RBC QN AUTO: 26.8 PG (ref 27–31)
MCH RBC QN AUTO: 26.9 PG (ref 27–31)
MCH RBC QN AUTO: 27 PG (ref 27–31)
MCH RBC QN AUTO: 27.1 PG (ref 27–31)
MCH RBC QN AUTO: 27.2 PG (ref 27–31)
MCH RBC QN AUTO: 27.7 PG (ref 27–31)
MCH RBC QN AUTO: 27.9 PG (ref 27–31)
MCH RBC QN AUTO: 28 PG (ref 27–31)
MCH RBC QN AUTO: 28.1 PG (ref 27–31)
MCH RBC QN AUTO: 28.2 PG (ref 27–31)
MCH RBC QN AUTO: 28.4 PG (ref 27–31)
MCH RBC QN AUTO: 28.8 PG (ref 27–31)
MCHC RBC AUTO-ENTMCNC: 30.5 G/DL (ref 32–36)
MCHC RBC AUTO-ENTMCNC: 31.5 G/DL (ref 32–36)
MCHC RBC AUTO-ENTMCNC: 31.7 G/DL (ref 32–36)
MCHC RBC AUTO-ENTMCNC: 31.8 G/DL (ref 32–36)
MCHC RBC AUTO-ENTMCNC: 31.9 G/DL (ref 32–36)
MCHC RBC AUTO-ENTMCNC: 32.2 G/DL (ref 32–36)
MCHC RBC AUTO-ENTMCNC: 32.3 G/DL (ref 32–36)
MCHC RBC AUTO-ENTMCNC: 32.4 G/DL (ref 32–36)
MCHC RBC AUTO-ENTMCNC: 32.4 G/DL (ref 32–36)
MCHC RBC AUTO-ENTMCNC: 32.5 G/DL (ref 32–36)
MCHC RBC AUTO-ENTMCNC: 32.6 G/DL (ref 32–36)
MCHC RBC AUTO-ENTMCNC: 32.6 G/DL (ref 32–36)
MCHC RBC AUTO-ENTMCNC: 33 G/DL (ref 32–36)
MCHC RBC AUTO-ENTMCNC: 33 G/DL (ref 32–36)
MCV RBC AUTO: 82 FL (ref 82–98)
MCV RBC AUTO: 83 FL (ref 82–98)
MCV RBC AUTO: 84 FL (ref 82–98)
MCV RBC AUTO: 85 FL (ref 82–98)
MCV RBC AUTO: 85 FL (ref 82–98)
MCV RBC AUTO: 86 FL (ref 82–98)
MCV RBC AUTO: 87 FL (ref 82–98)
MCV RBC AUTO: 87 FL (ref 82–98)
MCV RBC AUTO: 91 FL (ref 82–98)
METAMYELOCYTES NFR BLD MANUAL: 10 %
METAMYELOCYTES NFR BLD MANUAL: 12 %
METAMYELOCYTES NFR BLD MANUAL: 17 %
METAMYELOCYTES NFR BLD MANUAL: 2 %
METAMYELOCYTES NFR BLD MANUAL: 2.5 %
METAMYELOCYTES NFR BLD MANUAL: 3 %
METAMYELOCYTES NFR BLD MANUAL: 9 %
MICROSCOPIC EXAM: NORMAL
MONOCYTES # BLD AUTO: 2.7 K/UL (ref 0.3–1)
MONOCYTES # BLD AUTO: ABNORMAL K/UL (ref 0.3–1)
MONOCYTES NFR BLD: 0 % (ref 4–15)
MONOCYTES NFR BLD: 1 % (ref 4–15)
MONOCYTES NFR BLD: 1 % (ref 4–15)
MONOCYTES NFR BLD: 10 % (ref 4–15)
MONOCYTES NFR BLD: 14 % (ref 4–15)
MONOCYTES NFR BLD: 19 % (ref 4–15)
MONOCYTES NFR BLD: 19 % (ref 4–15)
MONOCYTES NFR BLD: 19.3 % (ref 4–15)
MONOCYTES NFR BLD: 2 % (ref 4–15)
MONOCYTES NFR BLD: 20 % (ref 4–15)
MONOCYTES NFR BLD: 21 % (ref 4–15)
MONOCYTES NFR BLD: 25 % (ref 4–15)
MONOCYTES NFR BLD: 3 % (ref 4–15)
MONOCYTES NFR BLD: 5 % (ref 4–15)
MYCOBACTERIUM SPEC QL CULT: NORMAL
MYELOCYTES NFR BLD MANUAL: 12 %
MYELOCYTES NFR BLD MANUAL: 12 %
MYELOCYTES NFR BLD MANUAL: 17 %
MYELOCYTES NFR BLD MANUAL: 2.5 %
MYELOCYTES NFR BLD MANUAL: 3 %
MYELOCYTES NFR BLD MANUAL: 4 %
MYELOCYTES NFR BLD MANUAL: 4 %
MYELOCYTES NFR BLD MANUAL: 5 %
MYELOCYTES NFR BLD MANUAL: 5 %
MYELOCYTES NFR BLD MANUAL: 8 %
MYELOCYTES NFR BLD MANUAL: 9 %
NEUTROPHILS # BLD AUTO: 6.9 K/UL (ref 1.8–7.7)
NEUTROPHILS # BLD AUTO: ABNORMAL K/UL (ref 1.8–7.7)
NEUTROPHILS NFR BLD: 15 % (ref 38–73)
NEUTROPHILS NFR BLD: 15 % (ref 38–73)
NEUTROPHILS NFR BLD: 28 % (ref 38–73)
NEUTROPHILS NFR BLD: 30 % (ref 38–73)
NEUTROPHILS NFR BLD: 35 % (ref 38–73)
NEUTROPHILS NFR BLD: 37 % (ref 38–73)
NEUTROPHILS NFR BLD: 43 % (ref 38–73)
NEUTROPHILS NFR BLD: 45 % (ref 38–73)
NEUTROPHILS NFR BLD: 45 % (ref 38–73)
NEUTROPHILS NFR BLD: 47 % (ref 38–73)
NEUTROPHILS NFR BLD: 49.9 % (ref 38–73)
NEUTROPHILS NFR BLD: 5 % (ref 38–73)
NEUTROPHILS NFR BLD: 51 % (ref 38–73)
NEUTROPHILS NFR BLD: 54 % (ref 38–73)
NEUTS BAND NFR BLD MANUAL: 11 %
NEUTS BAND NFR BLD MANUAL: 13 %
NEUTS BAND NFR BLD MANUAL: 14 %
NEUTS BAND NFR BLD MANUAL: 15 %
NEUTS BAND NFR BLD MANUAL: 18 %
NEUTS BAND NFR BLD MANUAL: 2 %
NEUTS BAND NFR BLD MANUAL: 20 %
NEUTS BAND NFR BLD MANUAL: 23 %
NEUTS BAND NFR BLD MANUAL: 27.5 %
NEUTS BAND NFR BLD MANUAL: 4 %
NEUTS BAND NFR BLD MANUAL: 7 %
NEUTS BAND NFR BLD MANUAL: 9 %
NEUTS BAND NFR BLD MANUAL: 9 %
NITRITE UR QL STRIP: NEGATIVE
NRBC BLD-RTO: 0 /100 WBC
NRBC BLD-RTO: 1 /100 WBC
NRBC BLD-RTO: 1 /100 WBC
NRBC BLD-RTO: 2 /100 WBC
NRBC BLD-RTO: 3 /100 WBC
NRBC BLD-RTO: 4 /100 WBC
NUM UNITS TRANS PACKED RBC: NORMAL
OVALOCYTES BLD QL SMEAR: ABNORMAL
PATH REV BLD -IMP: NORMAL
PH UR STRIP: 6 [PH] (ref 5–8)
PHOSPHATE SERPL-MCNC: 3.5 MG/DL (ref 2.7–4.5)
PHOSPHATE SERPL-MCNC: 3.5 MG/DL (ref 2.7–4.5)
PLATELET # BLD AUTO: 10 K/UL (ref 150–450)
PLATELET # BLD AUTO: 111 K/UL (ref 150–450)
PLATELET # BLD AUTO: 121 K/UL (ref 150–450)
PLATELET # BLD AUTO: 139 K/UL (ref 150–450)
PLATELET # BLD AUTO: 24 K/UL (ref 150–450)
PLATELET # BLD AUTO: 24 K/UL (ref 150–450)
PLATELET # BLD AUTO: 40 K/UL (ref 150–450)
PLATELET # BLD AUTO: 40 K/UL (ref 150–450)
PLATELET # BLD AUTO: 44 K/UL (ref 150–450)
PLATELET # BLD AUTO: 45 K/UL (ref 150–450)
PLATELET # BLD AUTO: 53 K/UL (ref 150–450)
PLATELET # BLD AUTO: 77 K/UL (ref 150–450)
PLATELET # BLD AUTO: 83 K/UL (ref 150–450)
PLATELET # BLD AUTO: 84 K/UL (ref 150–450)
PLATELET BLD QL SMEAR: ABNORMAL
PMV BLD AUTO: ABNORMAL FL (ref 9.2–12.9)
POC MOLECULAR INFLUENZA A AGN: NEGATIVE
POC MOLECULAR INFLUENZA B AGN: NEGATIVE
POIKILOCYTOSIS BLD QL SMEAR: SLIGHT
POLYCHROMASIA BLD QL SMEAR: ABNORMAL
POLYCHROMASIA BLD QL SMEAR: ABNORMAL
POTASSIUM SERPL-SCNC: 3.7 MMOL/L (ref 3.5–5.1)
POTASSIUM SERPL-SCNC: 4 MMOL/L (ref 3.5–5.1)
POTASSIUM SERPL-SCNC: 4.1 MMOL/L (ref 3.5–5.1)
POTASSIUM SERPL-SCNC: 4.2 MMOL/L (ref 3.5–5.1)
POTASSIUM SERPL-SCNC: 4.4 MMOL/L (ref 3.5–5.1)
POTASSIUM SERPL-SCNC: 4.5 MMOL/L (ref 3.5–5.1)
POTASSIUM SERPL-SCNC: 4.6 MMOL/L (ref 3.5–5.1)
POTASSIUM SERPL-SCNC: 4.9 MMOL/L (ref 3.5–5.1)
POTASSIUM SERPL-SCNC: 5.2 MMOL/L (ref 3.5–5.1)
PROMYELOCYTES NFR BLD MANUAL: 1 %
PROMYELOCYTES NFR BLD MANUAL: 2 %
PROMYELOCYTES NFR BLD MANUAL: 2 %
PROT SERPL-MCNC: 6.6 G/DL (ref 6–8.4)
PROT SERPL-MCNC: 7 G/DL (ref 6–8.4)
PROT SERPL-MCNC: 7 G/DL (ref 6–8.4)
PROT SERPL-MCNC: 7.2 G/DL (ref 6–8.4)
PROT SERPL-MCNC: 7.4 G/DL (ref 6–8.4)
PROT SERPL-MCNC: 7.4 G/DL (ref 6–8.4)
PROT SERPL-MCNC: 7.5 G/DL (ref 6–8.4)
PROT SERPL-MCNC: 7.5 G/DL (ref 6–8.4)
PROT SERPL-MCNC: 7.7 G/DL (ref 6–8.4)
PROT SERPL-MCNC: 7.7 G/DL (ref 6–8.4)
PROT SERPL-MCNC: 7.8 G/DL (ref 6–8.4)
PROT SERPL-MCNC: 7.9 G/DL (ref 6–8.4)
PROT SERPL-MCNC: 8 G/DL (ref 6–8.4)
PROT SERPL-MCNC: 8.3 G/DL (ref 6–8.4)
PROT UR QL STRIP: NEGATIVE
PROTHROMBIN TIME: 11.9 SEC (ref 9–12.5)
RBC # BLD AUTO: 2.13 M/UL (ref 4–5.4)
RBC # BLD AUTO: 2.36 M/UL (ref 4–5.4)
RBC # BLD AUTO: 2.46 M/UL (ref 4–5.4)
RBC # BLD AUTO: 2.5 M/UL (ref 4–5.4)
RBC # BLD AUTO: 2.56 M/UL (ref 4–5.4)
RBC # BLD AUTO: 2.61 M/UL (ref 4–5.4)
RBC # BLD AUTO: 2.65 M/UL (ref 4–5.4)
RBC # BLD AUTO: 2.68 M/UL (ref 4–5.4)
RBC # BLD AUTO: 2.76 M/UL (ref 4–5.4)
RBC # BLD AUTO: 2.84 M/UL (ref 4–5.4)
RBC # BLD AUTO: 3.09 M/UL (ref 4–5.4)
RBC # BLD AUTO: 3.11 M/UL (ref 4–5.4)
RBC # BLD AUTO: 3.39 M/UL (ref 4–5.4)
RBC # BLD AUTO: 3.74 M/UL (ref 4–5.4)
REASON FOR REFERRAL (NARRATIVE): NORMAL
REF LAB TEST METHOD: NORMAL
SARS-COV-2 RDRP RESP QL NAA+PROBE: NEGATIVE
SCHISTOCYTES BLD QL SMEAR: ABNORMAL
SCHISTOCYTES BLD QL SMEAR: PRESENT
SMUDGE CELLS BLD QL SMEAR: PRESENT
SMUDGE CELLS BLD QL SMEAR: PRESENT
SODIUM SERPL-SCNC: 125 MMOL/L (ref 136–145)
SODIUM SERPL-SCNC: 131 MMOL/L (ref 136–145)
SODIUM SERPL-SCNC: 132 MMOL/L (ref 136–145)
SODIUM SERPL-SCNC: 133 MMOL/L (ref 136–145)
SODIUM SERPL-SCNC: 134 MMOL/L (ref 136–145)
SODIUM SERPL-SCNC: 134 MMOL/L (ref 136–145)
SODIUM SERPL-SCNC: 135 MMOL/L (ref 136–145)
SODIUM SERPL-SCNC: 136 MMOL/L (ref 136–145)
SODIUM SERPL-SCNC: 136 MMOL/L (ref 136–145)
SODIUM SERPL-SCNC: 137 MMOL/L (ref 136–145)
SODIUM SERPL-SCNC: 138 MMOL/L (ref 136–145)
SODIUM SERPL-SCNC: 139 MMOL/L (ref 136–145)
SP GR UR STRIP: 1.01 (ref 1–1.03)
SPECIMEN SOURCE: NORMAL
SPECIMEN: NORMAL
SPHEROCYTES BLD QL SMEAR: ABNORMAL
SUPPLEMENTAL DIAGNOSIS: NORMAL
TROPONIN I SERPL DL<=0.01 NG/ML-MCNC: <0.006 NG/ML (ref 0–0.03)
TROPONIN I SERPL DL<=0.01 NG/ML-MCNC: <0.006 NG/ML (ref 0–0.03)
UNIT NUMBER: NORMAL
URATE SERPL-MCNC: 4.5 MG/DL (ref 2.4–5.7)
URATE SERPL-MCNC: 5.6 MG/DL (ref 2.4–5.7)
URN SPEC COLLECT METH UR: NORMAL
WBC # BLD AUTO: 1.01 K/UL (ref 3.9–12.7)
WBC # BLD AUTO: 1.73 K/UL (ref 3.9–12.7)
WBC # BLD AUTO: 13.17 K/UL (ref 3.9–12.7)
WBC # BLD AUTO: 13.49 K/UL (ref 3.9–12.7)
WBC # BLD AUTO: 13.82 K/UL (ref 3.9–12.7)
WBC # BLD AUTO: 16.6 K/UL (ref 3.9–12.7)
WBC # BLD AUTO: 2.33 K/UL (ref 3.9–12.7)
WBC # BLD AUTO: 4.34 K/UL (ref 3.9–12.7)
WBC # BLD AUTO: 4.35 K/UL (ref 3.9–12.7)
WBC # BLD AUTO: 4.35 K/UL (ref 3.9–12.7)
WBC # BLD AUTO: 4.6 K/UL (ref 3.9–12.7)
WBC # BLD AUTO: 4.93 K/UL (ref 3.9–12.7)
WBC # BLD AUTO: 6.45 K/UL (ref 3.9–12.7)
WBC # BLD AUTO: 9.93 K/UL (ref 3.9–12.7)
WBC OTHER NFR BLD MANUAL: 13 %
WBC OTHER NFR BLD MANUAL: 7 %
WBC OTHER NFR BLD MANUAL: 8 %

## 2023-01-01 PROCEDURE — 83735 ASSAY OF MAGNESIUM: CPT | Performed by: NURSE PRACTITIONER

## 2023-01-01 PROCEDURE — P9037 PLATE PHERES LEUKOREDU IRRAD: HCPCS | Performed by: INTERNAL MEDICINE

## 2023-01-01 PROCEDURE — P9040 RBC LEUKOREDUCED IRRADIATED: HCPCS | Performed by: NURSE PRACTITIONER

## 2023-01-01 PROCEDURE — 36415 COLL VENOUS BLD VENIPUNCTURE: CPT | Performed by: PHYSICIAN ASSISTANT

## 2023-01-01 PROCEDURE — 73090 XR FOREARM RIGHT: ICD-10-PCS | Mod: RT,S$GLB,, | Performed by: RADIOLOGY

## 2023-01-01 PROCEDURE — 25000003 PHARM REV CODE 250: Performed by: INTERNAL MEDICINE

## 2023-01-01 PROCEDURE — 86900 BLOOD TYPING SEROLOGIC ABO: CPT | Performed by: PHYSICIAN ASSISTANT

## 2023-01-01 PROCEDURE — 86704 HEP B CORE ANTIBODY TOTAL: CPT | Performed by: NURSE PRACTITIONER

## 2023-01-01 PROCEDURE — P9040 RBC LEUKOREDUCED IRRADIATED: HCPCS | Performed by: PHYSICIAN ASSISTANT

## 2023-01-01 PROCEDURE — 88184 FLOWCYTOMETRY/ TC 1 MARKER: CPT | Performed by: PATHOLOGY

## 2023-01-01 PROCEDURE — 87340 HEPATITIS B SURFACE AG IA: CPT | Performed by: NURSE PRACTITIONER

## 2023-01-01 PROCEDURE — 86920 COMPATIBILITY TEST SPIN: CPT | Performed by: PHYSICIAN ASSISTANT

## 2023-01-01 PROCEDURE — 99215 OFFICE O/P EST HI 40 MIN: CPT | Mod: S$PBB,,, | Performed by: INTERNAL MEDICINE

## 2023-01-01 PROCEDURE — 99223 PR INITIAL HOSPITAL CARE,LEVL III: ICD-10-PCS | Mod: AI,,, | Performed by: INTERNAL MEDICINE

## 2023-01-01 PROCEDURE — 36430 TRANSFUSION BLD/BLD COMPNT: CPT

## 2023-01-01 PROCEDURE — 85027 COMPLETE CBC AUTOMATED: CPT | Performed by: PHYSICIAN ASSISTANT

## 2023-01-01 PROCEDURE — 99999 PR PBB SHADOW E&M-EST. PATIENT-LVL V: CPT | Mod: PBBFAC,,, | Performed by: INTERNAL MEDICINE

## 2023-01-01 PROCEDURE — 99212 OFFICE O/P EST SF 10 MIN: CPT | Mod: PBBFAC | Performed by: STUDENT IN AN ORGANIZED HEALTH CARE EDUCATION/TRAINING PROGRAM

## 2023-01-01 PROCEDURE — 99285 EMERGENCY DEPT VISIT HI MDM: CPT | Mod: 25

## 2023-01-01 PROCEDURE — 88311 DECALCIFY TISSUE: CPT | Mod: 26,,, | Performed by: PATHOLOGY

## 2023-01-01 PROCEDURE — 88185 FLOWCYTOMETRY/TC ADD-ON: CPT | Performed by: PATHOLOGY

## 2023-01-01 PROCEDURE — 85007 BL SMEAR W/DIFF WBC COUNT: CPT | Performed by: PHYSICIAN ASSISTANT

## 2023-01-01 PROCEDURE — 86920 COMPATIBILITY TEST SPIN: CPT | Performed by: INTERNAL MEDICINE

## 2023-01-01 PROCEDURE — 36415 COLL VENOUS BLD VENIPUNCTURE: CPT | Performed by: INTERNAL MEDICINE

## 2023-01-01 PROCEDURE — 80053 COMPREHEN METABOLIC PANEL: CPT | Performed by: PHYSICIAN ASSISTANT

## 2023-01-01 PROCEDURE — P9040 RBC LEUKOREDUCED IRRADIATED: HCPCS | Performed by: INTERNAL MEDICINE

## 2023-01-01 PROCEDURE — P9038 RBC IRRADIATED: HCPCS | Performed by: INTERNAL MEDICINE

## 2023-01-01 PROCEDURE — 93005 ELECTROCARDIOGRAM TRACING: CPT

## 2023-01-01 PROCEDURE — 88342 IMHCHEM/IMCYTCHM 1ST ANTB: CPT | Performed by: PATHOLOGY

## 2023-01-01 PROCEDURE — 99238 PR HOSPITAL DISCHARGE DAY,<30 MIN: ICD-10-PCS | Mod: ,,, | Performed by: INTERNAL MEDICINE

## 2023-01-01 PROCEDURE — 25000003 PHARM REV CODE 250: Performed by: NURSE PRACTITIONER

## 2023-01-01 PROCEDURE — 85060 PATHOLOGIST REVIEW: ICD-10-PCS | Mod: ,,, | Performed by: PATHOLOGY

## 2023-01-01 PROCEDURE — 96374 THER/PROPH/DIAG INJ IV PUSH: CPT

## 2023-01-01 PROCEDURE — 80053 COMPREHEN METABOLIC PANEL: CPT | Performed by: INTERNAL MEDICINE

## 2023-01-01 PROCEDURE — 99999 PR PBB SHADOW E&M-EST. PATIENT-LVL IV: ICD-10-PCS | Mod: PBBFAC,,, | Performed by: INTERNAL MEDICINE

## 2023-01-01 PROCEDURE — 84100 ASSAY OF PHOSPHORUS: CPT | Performed by: NURSE PRACTITIONER

## 2023-01-01 PROCEDURE — 85060 BLOOD SMEAR INTERPRETATION: CPT | Mod: ,,, | Performed by: PATHOLOGY

## 2023-01-01 PROCEDURE — 80053 COMPREHEN METABOLIC PANEL: CPT | Performed by: NURSE PRACTITIONER

## 2023-01-01 PROCEDURE — 88189 FLOWCYTOMETRY/READ 16 & >: CPT | Mod: ,,, | Performed by: PATHOLOGY

## 2023-01-01 PROCEDURE — 99999 PR PBB SHADOW E&M-EST. PATIENT-LVL II: ICD-10-PCS | Mod: PBBFAC,,, | Performed by: STUDENT IN AN ORGANIZED HEALTH CARE EDUCATION/TRAINING PROGRAM

## 2023-01-01 PROCEDURE — 99999 PR PBB SHADOW E&M-EST. PATIENT-LVL IV: CPT | Mod: PBBFAC,,, | Performed by: INTERNAL MEDICINE

## 2023-01-01 PROCEDURE — 85007 BL SMEAR W/DIFF WBC COUNT: CPT | Performed by: INTERNAL MEDICINE

## 2023-01-01 PROCEDURE — 85097 PR  BONE MARROW,SMEAR INTERPRETATION: ICD-10-PCS | Mod: ,,, | Performed by: PATHOLOGY

## 2023-01-01 PROCEDURE — 84484 ASSAY OF TROPONIN QUANT: CPT | Mod: 91 | Performed by: NURSE PRACTITIONER

## 2023-01-01 PROCEDURE — 88305 TISSUE EXAM BY PATHOLOGIST: CPT | Mod: 26,,, | Performed by: PATHOLOGY

## 2023-01-01 PROCEDURE — 73090 X-RAY EXAM OF FOREARM: CPT | Mod: RT,S$GLB,, | Performed by: RADIOLOGY

## 2023-01-01 PROCEDURE — 99417 PROLNG OP E/M EACH 15 MIN: CPT | Mod: S$PBB,,, | Performed by: STUDENT IN AN ORGANIZED HEALTH CARE EDUCATION/TRAINING PROGRAM

## 2023-01-01 PROCEDURE — 85384 FIBRINOGEN ACTIVITY: CPT | Performed by: NURSE PRACTITIONER

## 2023-01-01 PROCEDURE — 88189 PR  FLOWCYTOMETRY/READ, 16 & > MARKERS: ICD-10-PCS | Mod: ,,, | Performed by: PATHOLOGY

## 2023-01-01 PROCEDURE — 25000003 PHARM REV CODE 250: Performed by: PHYSICIAN ASSISTANT

## 2023-01-01 PROCEDURE — 88305 TISSUE EXAM BY PATHOLOGIST: ICD-10-PCS | Mod: 26,,, | Performed by: PATHOLOGY

## 2023-01-01 PROCEDURE — 85027 COMPLETE CBC AUTOMATED: CPT | Performed by: NURSE PRACTITIONER

## 2023-01-01 PROCEDURE — 73110 XR WRIST COMPLETE 3 VIEWS RIGHT: ICD-10-PCS | Mod: RT,S$GLB,, | Performed by: RADIOLOGY

## 2023-01-01 PROCEDURE — 85097 BONE MARROW INTERPRETATION: CPT | Mod: ,,, | Performed by: PATHOLOGY

## 2023-01-01 PROCEDURE — 88313 PR  SPECIAL STAINS,GROUP II: ICD-10-PCS | Mod: 26,,, | Performed by: PATHOLOGY

## 2023-01-01 PROCEDURE — 84550 ASSAY OF BLOOD/URIC ACID: CPT | Performed by: NURSE PRACTITIONER

## 2023-01-01 PROCEDURE — 63600175 PHARM REV CODE 636 W HCPCS: Performed by: EMERGENCY MEDICINE

## 2023-01-01 PROCEDURE — 99999 PR PBB SHADOW E&M-EST. PATIENT-LVL V: ICD-10-PCS | Mod: PBBFAC,,, | Performed by: INTERNAL MEDICINE

## 2023-01-01 PROCEDURE — 93010 ELECTROCARDIOGRAM REPORT: CPT | Mod: ,,, | Performed by: INTERNAL MEDICINE

## 2023-01-01 PROCEDURE — 96376 TX/PRO/DX INJ SAME DRUG ADON: CPT

## 2023-01-01 PROCEDURE — 36415 COLL VENOUS BLD VENIPUNCTURE: CPT | Performed by: NURSE PRACTITIONER

## 2023-01-01 PROCEDURE — 99223 1ST HOSP IP/OBS HIGH 75: CPT | Mod: AI,,, | Performed by: INTERNAL MEDICINE

## 2023-01-01 PROCEDURE — 99417 PR PROLONGED SVC, OUTPT, W/WO DIRECT PT CONTACT,  EA ADDTL 15 MIN: ICD-10-PCS | Mod: S$PBB,,, | Performed by: STUDENT IN AN ORGANIZED HEALTH CARE EDUCATION/TRAINING PROGRAM

## 2023-01-01 PROCEDURE — 99215 PR OFFICE/OUTPT VISIT, EST, LEVL V, 40-54 MIN: ICD-10-PCS | Mod: S$PBB,,, | Performed by: INTERNAL MEDICINE

## 2023-01-01 PROCEDURE — 83880 ASSAY OF NATRIURETIC PEPTIDE: CPT | Performed by: NURSE PRACTITIONER

## 2023-01-01 PROCEDURE — 86900 BLOOD TYPING SEROLOGIC ABO: CPT | Performed by: NURSE PRACTITIONER

## 2023-01-01 PROCEDURE — 85027 COMPLETE CBC AUTOMATED: CPT | Performed by: INTERNAL MEDICINE

## 2023-01-01 PROCEDURE — 99214 OFFICE O/P EST MOD 30 MIN: CPT | Mod: S$PBB,,, | Performed by: INTERNAL MEDICINE

## 2023-01-01 PROCEDURE — 88341 IMHCHEM/IMCYTCHM EA ADD ANTB: CPT | Performed by: PATHOLOGY

## 2023-01-01 PROCEDURE — 73110 X-RAY EXAM OF WRIST: CPT | Mod: RT,S$GLB,, | Performed by: RADIOLOGY

## 2023-01-01 PROCEDURE — 99999 PR PBB SHADOW E&M-EST. PATIENT-LVL II: CPT | Mod: PBBFAC,,, | Performed by: STUDENT IN AN ORGANIZED HEALTH CARE EDUCATION/TRAINING PROGRAM

## 2023-01-01 PROCEDURE — 25500020 PHARM REV CODE 255: Performed by: INTERNAL MEDICINE

## 2023-01-01 PROCEDURE — 36415 COLL VENOUS BLD VENIPUNCTURE: CPT | Mod: PO | Performed by: INTERNAL MEDICINE

## 2023-01-01 PROCEDURE — 88313 SPECIAL STAINS GROUP 2: CPT | Mod: 26,,, | Performed by: PATHOLOGY

## 2023-01-01 PROCEDURE — 93010 EKG 12-LEAD: ICD-10-PCS | Mod: ,,, | Performed by: INTERNAL MEDICINE

## 2023-01-01 PROCEDURE — 99215 OFFICE O/P EST HI 40 MIN: CPT | Mod: PBBFAC,PO | Performed by: INTERNAL MEDICINE

## 2023-01-01 PROCEDURE — 99214 PR OFFICE/OUTPT VISIT, EST, LEVL IV, 30-39 MIN: ICD-10-PCS | Mod: S$PBB,,, | Performed by: INTERNAL MEDICINE

## 2023-01-01 PROCEDURE — 99215 PR OFFICE/OUTPT VISIT, EST, LEVL V, 40-54 MIN: ICD-10-PCS | Mod: S$PBB,,, | Performed by: STUDENT IN AN ORGANIZED HEALTH CARE EDUCATION/TRAINING PROGRAM

## 2023-01-01 PROCEDURE — 88341 PR IHC OR ICC EACH ADD'L SINGLE ANTIBODY  STAINPR: ICD-10-PCS | Mod: 26,,, | Performed by: PATHOLOGY

## 2023-01-01 PROCEDURE — 88341 IMHCHEM/IMCYTCHM EA ADD ANTB: CPT | Mod: 26,,, | Performed by: PATHOLOGY

## 2023-01-01 PROCEDURE — 96375 TX/PRO/DX INJ NEW DRUG ADDON: CPT

## 2023-01-01 PROCEDURE — 99214 OFFICE O/P EST MOD 30 MIN: CPT | Mod: S$GLB,,,

## 2023-01-01 PROCEDURE — 20600001 HC STEP DOWN PRIVATE ROOM

## 2023-01-01 PROCEDURE — 81003 URINALYSIS AUTO W/O SCOPE: CPT | Performed by: INTERNAL MEDICINE

## 2023-01-01 PROCEDURE — 85007 BL SMEAR W/DIFF WBC COUNT: CPT | Performed by: NURSE PRACTITIONER

## 2023-01-01 PROCEDURE — 85027 COMPLETE CBC AUTOMATED: CPT | Performed by: EMERGENCY MEDICINE

## 2023-01-01 PROCEDURE — 27201040 HC RC 50 FILTER: Performed by: INTERNAL MEDICINE

## 2023-01-01 PROCEDURE — 86900 BLOOD TYPING SEROLOGIC ABO: CPT | Performed by: INTERNAL MEDICINE

## 2023-01-01 PROCEDURE — 80053 COMPREHEN METABOLIC PANEL: CPT | Performed by: EMERGENCY MEDICINE

## 2023-01-01 PROCEDURE — 88342 IMHCHEM/IMCYTCHM 1ST ANTB: CPT | Mod: 26,59,, | Performed by: PATHOLOGY

## 2023-01-01 PROCEDURE — 85007 BL SMEAR W/DIFF WBC COUNT: CPT | Performed by: EMERGENCY MEDICINE

## 2023-01-01 PROCEDURE — 88313 SPECIAL STAINS GROUP 2: CPT | Performed by: PATHOLOGY

## 2023-01-01 PROCEDURE — 83615 LACTATE (LD) (LDH) ENZYME: CPT | Performed by: NURSE PRACTITIONER

## 2023-01-01 PROCEDURE — 73130 XR HAND COMPLETE 3 VIEW RIGHT: ICD-10-PCS | Mod: RT,S$GLB,, | Performed by: RADIOLOGY

## 2023-01-01 PROCEDURE — 73130 X-RAY EXAM OF HAND: CPT | Mod: RT,S$GLB,, | Performed by: RADIOLOGY

## 2023-01-01 PROCEDURE — 86920 COMPATIBILITY TEST SPIN: CPT | Performed by: NURSE PRACTITIONER

## 2023-01-01 PROCEDURE — 99214 PR OFFICE/OUTPT VISIT, EST, LEVL IV, 30-39 MIN: ICD-10-PCS | Mod: S$GLB,,,

## 2023-01-01 PROCEDURE — 99214 OFFICE O/P EST MOD 30 MIN: CPT | Mod: PBBFAC | Performed by: INTERNAL MEDICINE

## 2023-01-01 PROCEDURE — 96361 HYDRATE IV INFUSION ADD-ON: CPT

## 2023-01-01 PROCEDURE — 88311 PR  DECALCIFY TISSUE: ICD-10-PCS | Mod: 26,,, | Performed by: PATHOLOGY

## 2023-01-01 PROCEDURE — 96360 HYDRATION IV INFUSION INIT: CPT

## 2023-01-01 PROCEDURE — 88311 DECALCIFY TISSUE: CPT | Performed by: PATHOLOGY

## 2023-01-01 PROCEDURE — 99215 OFFICE O/P EST HI 40 MIN: CPT | Mod: S$PBB,,, | Performed by: STUDENT IN AN ORGANIZED HEALTH CARE EDUCATION/TRAINING PROGRAM

## 2023-01-01 PROCEDURE — 85730 THROMBOPLASTIN TIME PARTIAL: CPT | Performed by: NURSE PRACTITIONER

## 2023-01-01 PROCEDURE — 88305 TISSUE EXAM BY PATHOLOGIST: CPT | Performed by: PATHOLOGY

## 2023-01-01 PROCEDURE — 84550 ASSAY OF BLOOD/URIC ACID: CPT | Performed by: INTERNAL MEDICINE

## 2023-01-01 PROCEDURE — 99214 OFFICE O/P EST MOD 30 MIN: CPT | Mod: PBBFAC,25 | Performed by: INTERNAL MEDICINE

## 2023-01-01 PROCEDURE — 85610 PROTHROMBIN TIME: CPT | Performed by: NURSE PRACTITIONER

## 2023-01-01 PROCEDURE — 88342 CHG IMMUNOCYTOCHEMISTRY: ICD-10-PCS | Mod: 26,59,, | Performed by: PATHOLOGY

## 2023-01-01 PROCEDURE — 99238 HOSP IP/OBS DSCHRG MGMT 30/<: CPT | Mod: ,,, | Performed by: INTERNAL MEDICINE

## 2023-01-01 RX ORDER — DIPHENHYDRAMINE HCL 25 MG
25 CAPSULE ORAL
Status: CANCELLED | OUTPATIENT
Start: 2023-01-01

## 2023-01-01 RX ORDER — MORPHINE SULFATE 15 MG/1
15 TABLET ORAL EVERY 4 HOURS PRN
Status: DISCONTINUED | OUTPATIENT
Start: 2023-01-01 | End: 2023-01-01 | Stop reason: HOSPADM

## 2023-01-01 RX ORDER — HYDROCODONE BITARTRATE AND ACETAMINOPHEN 500; 5 MG/1; MG/1
TABLET ORAL ONCE
Status: CANCELLED | OUTPATIENT
Start: 2023-01-01 | End: 2023-01-01

## 2023-01-01 RX ORDER — ACETAMINOPHEN 325 MG/1
650 TABLET ORAL
Status: CANCELLED | OUTPATIENT
Start: 2023-01-01

## 2023-01-01 RX ORDER — ACETAMINOPHEN 325 MG/1
650 TABLET ORAL
Status: DISCONTINUED | OUTPATIENT
Start: 2023-01-01 | End: 2023-01-01 | Stop reason: HOSPADM

## 2023-01-01 RX ORDER — DEXTROSE 40 %
15 GEL (GRAM) ORAL
Status: DISCONTINUED | OUTPATIENT
Start: 2023-01-01 | End: 2023-01-01 | Stop reason: HOSPADM

## 2023-01-01 RX ORDER — FLUCONAZOLE 200 MG/1
400 TABLET ORAL DAILY
Qty: 60 TABLET | Refills: 5 | Status: SHIPPED | OUTPATIENT
Start: 2023-01-01

## 2023-01-01 RX ORDER — HEPARIN 100 UNIT/ML
500 SYRINGE INTRAVENOUS
Status: CANCELLED | OUTPATIENT
Start: 2023-01-01

## 2023-01-01 RX ORDER — MORPHINE SULFATE 2 MG/ML
2 INJECTION, SOLUTION INTRAMUSCULAR; INTRAVENOUS
Status: COMPLETED | OUTPATIENT
Start: 2023-01-01 | End: 2023-01-01

## 2023-01-01 RX ORDER — ACETAMINOPHEN 325 MG/1
650 TABLET ORAL
Status: COMPLETED | OUTPATIENT
Start: 2023-01-01 | End: 2023-01-01

## 2023-01-01 RX ORDER — MORPHINE SULFATE 4 MG/ML
4 INJECTION, SOLUTION INTRAMUSCULAR; INTRAVENOUS
Status: COMPLETED | OUTPATIENT
Start: 2023-01-01 | End: 2023-01-01

## 2023-01-01 RX ORDER — HYDROCODONE BITARTRATE AND ACETAMINOPHEN 500; 5 MG/1; MG/1
TABLET ORAL ONCE
Status: DISCONTINUED | OUTPATIENT
Start: 2023-01-01 | End: 2023-01-01 | Stop reason: HOSPADM

## 2023-01-01 RX ORDER — DIAZEPAM 10 MG/1
10 TABLET ORAL ONCE AS NEEDED
Qty: 2 TABLET | Refills: 0 | Status: SHIPPED | OUTPATIENT
Start: 2023-01-01 | End: 2023-01-01

## 2023-01-01 RX ORDER — DIPHENHYDRAMINE HCL 25 MG
25 CAPSULE ORAL
Status: DISCONTINUED | OUTPATIENT
Start: 2023-01-01 | End: 2023-01-01 | Stop reason: HOSPADM

## 2023-01-01 RX ORDER — PANTOPRAZOLE SODIUM 40 MG/1
40 TABLET, DELAYED RELEASE ORAL DAILY
Status: DISCONTINUED | OUTPATIENT
Start: 2023-01-01 | End: 2023-01-01 | Stop reason: HOSPADM

## 2023-01-01 RX ORDER — DIAZEPAM 5 MG/1
10 TABLET ORAL ONCE AS NEEDED
Status: DISCONTINUED | OUTPATIENT
Start: 2023-01-01 | End: 2023-01-01 | Stop reason: HOSPADM

## 2023-01-01 RX ORDER — SODIUM CHLORIDE 0.9 % (FLUSH) 0.9 %
10 SYRINGE (ML) INJECTION EVERY 12 HOURS PRN
Status: DISCONTINUED | OUTPATIENT
Start: 2023-01-01 | End: 2023-01-01 | Stop reason: HOSPADM

## 2023-01-01 RX ORDER — HEPARIN 100 UNIT/ML
500 SYRINGE INTRAVENOUS
Status: DISCONTINUED | OUTPATIENT
Start: 2023-01-01 | End: 2023-01-01 | Stop reason: HOSPADM

## 2023-01-01 RX ORDER — CYCLOBENZAPRINE HCL 5 MG
5 TABLET ORAL 3 TIMES DAILY PRN
Qty: 20 TABLET | Refills: 2 | Status: SHIPPED | OUTPATIENT
Start: 2023-01-01

## 2023-01-01 RX ORDER — LEVOFLOXACIN 500 MG/1
500 TABLET, FILM COATED ORAL DAILY
Qty: 30 TABLET | Refills: 5 | Status: SHIPPED | OUTPATIENT
Start: 2023-01-01

## 2023-01-01 RX ORDER — MORPHINE SULFATE 15 MG/1
15 TABLET ORAL EVERY 4 HOURS PRN
Status: DISCONTINUED | OUTPATIENT
Start: 2023-01-01 | End: 2023-01-01

## 2023-01-01 RX ORDER — MORPHINE SULFATE 15 MG/1
15 TABLET ORAL EVERY 4 HOURS PRN
Qty: 30 TABLET | Refills: 0 | Status: SHIPPED | OUTPATIENT
Start: 2023-01-01

## 2023-01-01 RX ORDER — CEFADROXIL 500 MG/1
1000 CAPSULE ORAL EVERY 12 HOURS
Qty: 20 CAPSULE | Refills: 0 | Status: SHIPPED | OUTPATIENT
Start: 2023-01-01 | End: 2023-01-01

## 2023-01-01 RX ORDER — GLUCAGON 1 MG
1 KIT INJECTION
Status: DISCONTINUED | OUTPATIENT
Start: 2023-01-01 | End: 2023-01-01 | Stop reason: HOSPADM

## 2023-01-01 RX ORDER — NALOXONE HCL 0.4 MG/ML
0.02 VIAL (ML) INJECTION
Status: DISCONTINUED | OUTPATIENT
Start: 2023-01-01 | End: 2023-01-01 | Stop reason: HOSPADM

## 2023-01-01 RX ORDER — ACYCLOVIR 200 MG/1
400 CAPSULE ORAL 2 TIMES DAILY
Status: DISCONTINUED | OUTPATIENT
Start: 2023-01-01 | End: 2023-01-01 | Stop reason: HOSPADM

## 2023-01-01 RX ORDER — DEXTROSE 40 %
30 GEL (GRAM) ORAL
Status: DISCONTINUED | OUTPATIENT
Start: 2023-01-01 | End: 2023-01-01 | Stop reason: HOSPADM

## 2023-01-01 RX ORDER — METOPROLOL TARTRATE 25 MG/1
25 TABLET, FILM COATED ORAL 2 TIMES DAILY
Status: DISCONTINUED | OUTPATIENT
Start: 2023-01-01 | End: 2023-01-01 | Stop reason: HOSPADM

## 2023-01-01 RX ORDER — ONDANSETRON 2 MG/ML
4 INJECTION INTRAMUSCULAR; INTRAVENOUS
Status: COMPLETED | OUTPATIENT
Start: 2023-01-01 | End: 2023-01-01

## 2023-01-01 RX ORDER — SODIUM CHLORIDE 0.9 % (FLUSH) 0.9 %
10 SYRINGE (ML) INJECTION
Status: CANCELLED | OUTPATIENT
Start: 2023-01-01

## 2023-01-01 RX ORDER — ASPIRIN 325 MG
325 TABLET ORAL
Status: ACTIVE | OUTPATIENT
Start: 2023-01-01 | End: 2023-01-01

## 2023-01-01 RX ORDER — CEFADROXIL 500 MG/1
1000 CAPSULE ORAL DAILY
Qty: 10 CAPSULE | Refills: 0 | Status: SHIPPED | OUTPATIENT
Start: 2023-01-01 | End: 2023-01-01 | Stop reason: CLARIF

## 2023-01-01 RX ORDER — DIPHENHYDRAMINE HCL 25 MG
25 CAPSULE ORAL
Status: COMPLETED | OUTPATIENT
Start: 2023-01-01 | End: 2023-01-01

## 2023-01-01 RX ORDER — SODIUM CHLORIDE 0.9 % (FLUSH) 0.9 %
10 SYRINGE (ML) INJECTION
Status: DISCONTINUED | OUTPATIENT
Start: 2023-01-01 | End: 2023-01-01 | Stop reason: HOSPADM

## 2023-01-01 RX ORDER — MUPIROCIN 20 MG/G
OINTMENT TOPICAL 3 TIMES DAILY
Qty: 22 G | Refills: 0 | Status: SHIPPED | OUTPATIENT
Start: 2023-01-01

## 2023-01-01 RX ADMIN — MORPHINE SULFATE 15 MG: 15 TABLET ORAL at 08:03

## 2023-01-01 RX ADMIN — MORPHINE SULFATE 4 MG: 4 INJECTION, SOLUTION INTRAMUSCULAR; INTRAVENOUS at 02:03

## 2023-01-01 RX ADMIN — ACETAMINOPHEN 650 MG: 325 TABLET ORAL at 01:01

## 2023-01-01 RX ADMIN — SODIUM CHLORIDE 1000 ML: 9 INJECTION, SOLUTION INTRAVENOUS at 02:03

## 2023-01-01 RX ADMIN — ACYCLOVIR 400 MG: 200 CAPSULE ORAL at 09:03

## 2023-01-01 RX ADMIN — PANTOPRAZOLE SODIUM 40 MG: 40 TABLET, DELAYED RELEASE ORAL at 09:03

## 2023-01-01 RX ADMIN — MORPHINE SULFATE 15 MG: 15 TABLET ORAL at 05:03

## 2023-01-01 RX ADMIN — ACETAMINOPHEN 650 MG: 325 TABLET ORAL at 10:02

## 2023-01-01 RX ADMIN — MORPHINE SULFATE 2 MG: 2 INJECTION, SOLUTION INTRAMUSCULAR; INTRAVENOUS at 12:03

## 2023-01-01 RX ADMIN — MORPHINE SULFATE 15 MG: 15 TABLET ORAL at 01:03

## 2023-01-01 RX ADMIN — MORPHINE SULFATE 2 MG: 2 INJECTION, SOLUTION INTRAMUSCULAR; INTRAVENOUS at 02:03

## 2023-01-01 RX ADMIN — IOHEXOL 100 ML: 350 INJECTION, SOLUTION INTRAVENOUS at 06:03

## 2023-01-01 RX ADMIN — METOPROLOL TARTRATE 25 MG: 25 TABLET, FILM COATED ORAL at 09:03

## 2023-01-01 RX ADMIN — ACYCLOVIR 400 MG: 200 CAPSULE ORAL at 08:03

## 2023-01-01 RX ADMIN — METOPROLOL TARTRATE 25 MG: 25 TABLET, FILM COATED ORAL at 08:03

## 2023-01-01 RX ADMIN — DIPHENHYDRAMINE HYDROCHLORIDE 25 MG: 25 CAPSULE ORAL at 10:02

## 2023-01-01 RX ADMIN — MORPHINE SULFATE 4 MG: 4 INJECTION, SOLUTION INTRAMUSCULAR; INTRAVENOUS at 08:03

## 2023-01-01 RX ADMIN — ONDANSETRON 4 MG: 2 INJECTION INTRAMUSCULAR; INTRAVENOUS at 09:03

## 2023-01-01 RX ADMIN — ONDANSETRON 4 MG: 2 INJECTION INTRAMUSCULAR; INTRAVENOUS at 12:03

## 2023-01-03 NOTE — TELEPHONE ENCOUNTER
Outgoing call to patient. Pt reports last cycle started 12/26, and was shortened to 1 tablet for 4 days of 28 day cycle. Pt next cycle starts at the end of January. Pt Ok for call back in 2 weeks. Will message provider for new prescription.

## 2023-01-04 NOTE — PLAN OF CARE
Patient arrived to unit for 1u pRBC transfusion. Pt has had transfusions in the past. Blood consent dated 12/5/22 1440 in media. Pt reports fatigue, mild SOB, lightheadedness and pressure HA. Explained to pt signs and symptoms of anemia and dehydration. Reviewed signs and symptoms of transfusion reaction and pt verbalized understanding. Plan of care reviewed, patient agreeable to plan. Premeds of Tylenol administered prior to blood product. Pt declined Benadryl due to pt driving when visit completed. Pt with BBID on her person. Patient tolerated transfusion well. Pt reported resolution of HA following infusion completion.VSS. Discharge instructions reviewed, patient has future appts. Patient ambulated off unit unassisted by self. Patient in NAD at time of discharge.

## 2023-01-09 NOTE — TELEPHONE ENCOUNTER
----- Message from Tamiko Ravi sent at 1/9/2023  1:46 PM CST -----  Regarding: self [phone]  .Type: Patient Call Back    Who called: self  What is the request in detail: Requesting to know if she needs to bring in a certificate for the Dr to sign for handicap sticket     Can the clinic reply by [facility]? Call back    Would the patient rather a call back or a response via [facility]? Call back    Best call back number: .[phone]

## 2023-01-09 NOTE — TELEPHONE ENCOUNTER
Spoke with patient and informed her that she can drop the [employer] handicap form off to us or we can utilized the handicap form that we have. Patient prefer to use our handicap form and pick it up when ready. Patient was informed to allow 5-6 days.

## 2023-01-17 NOTE — TELEPHONE ENCOUNTER
Specialty Pharmacy - Refill Coordination    Specialty Medication Orders Linked to Encounter      Flowsheet Row Most Recent Value   Medication #1 decitabine-cedazuridine  mg Tab (Order#[id number], Rx#[id number])            Refill Questions - Documented Responses      Flowsheet Row Most Recent Value   Patient Availability and HIPAA Verification    Does patient want to proceed with activity? Yes   HIPAA/medical authority confirmed? Yes   Relationship to patient of person spoken to? Self   Refill Screening Questions    Changes to allergies? No   Changes to medications? No   New conditions since last clinic visit? No   Unplanned office visit, urgent care, ED, or hospital admission in the last 4 weeks? No   How does patient/caregiver feel medication is working? Good   Financial problems or insurance changes? No   How many doses of your specialty medications were missed in the last 4 weeks? 0   Would patient like to speak to a pharmacist? No   When does the patient need to receive the medication? 01/26/23   Refill Delivery Questions    How will the patient receive the medication? [facility]   When does the patient need to receive the medication? 01/26/23   Shipping Address Home   Address in [facility] confirmed and updated if neccessary? Yes   Expected Copay ($) 0   Is the patient able to afford the medication copay? Yes   Payment Method zero copay   Days supply of Refill 28   Supplies needed? No supplies needed   Refill activity completed? Yes   Refill activity plan Refill scheduled   Shipment/Pickup Date: 01/20/23            Current Outpatient Medications   Medication Sig    acetaminophen (TYLENOL) 500 MG tablet Take 1,000 mg by mouth daily as needed for Pain.    acyclovir (ZOVIRAX) 400 MG tablet Take 1 tablet (400 mg total) by mouth 2 (two) times daily.    decitabine-cedazuridine  mg Tab Take 1 tablet by mouth once daily. For days 1-3 of a 28 day cycle    fexofenadine (ALLEGRA) 180 MG tablet 2 tablets in  the morning.  May take an extra 2 tablets during the day if needed for itching.    fluconazole (DIFLUCAN) 200 MG Tab Take 2 tablets (400 mg total) by mouth once daily.    hydrOXYzine HCL (ATARAX) 25 MG tablet 1 to 8 tablets at bedtime.  Start with 3 tablets.  Increase or decrease as needed until itching is controlled or a maximum of 8 tablets.    ibuprofen (ADVIL,MOTRIN) 200 MG tablet Take 200 mg by mouth every 6 (six) hours as needed for Pain. Pt takes 2 pills daily    levoFLOXacin (LEVAQUIN) 500 MG tablet Take 1 tablet (500 mg total) by mouth once daily.    meth/meblue/sod phos/psal/hyos (URIBEL ORAL) Take by mouth as needed.    metoprolol tartrate (LOPRESSOR) 25 MG tablet Take 1 tablet (25 mg total) by mouth 2 (two) times daily.    mupirocin (BACTROBAN) 2 % ointment Apply topically 4 (four) times daily.    nystatin (MYCOSTATIN) cream Apply topically every Mon, Wed, Fri.    nystatin (MYCOSTATIN) powder Apply topically 4 (four) times daily.    pantoprazole (PROTONIX) 40 MG tablet Take 1 tablet (40 mg total) by mouth once daily.    povidone-iodine (BETADINE) 10 % external solution Apply topically as needed for Wound Care. Clean R hand wound w/ betadine daily    senna (SENOKOT) 8.6 mg tablet Take 1 tablet by mouth once daily. Can take twice daily    triamcinolone acetonide 0.1% (KENALOG) 0.1 % ointment Apply topically 2 (two) times daily. for 7 days    UNABLE TO FIND [place] [employer] Immune Support   Last reviewed on 1/17/2023 10:43 AM by Marianne Garibay PA-C    Review of patient's allergies indicates:   Allergen Reactions    Oxycodone Hallucinations    Last reviewed on  1/17/2023 10:43 AM by Marianne Garibay      Tasks added this encounter   2/16/2023 - Refill Call (Auto Added)   Tasks due within next 3 months   4/9/2023 - Clinical - Follow Up Assesement (180 day)     Teresa Daley, PharmD  [facility]  [address] [place] [id number]  Phone: [phone]  Fax:  [phone]

## 2023-01-18 NOTE — TELEPHONE ENCOUNTER
Spoke with patient and informed her that DMV form is ready to be . Patient verbalized understanding and stated that she will pick it up.

## 2023-01-23 NOTE — PROGRESS NOTES
Section of Hematology and Stem Cell Transplantation  Follow Up Note     Visit Date: 01/23/2023    Primary Oncologic Diagnosis: No primary diagnosis found.    History of Present Ilness:   Jeanne Rodgers (Jeanne) is a pleasant 67 y.o.female with history of breast and endometrial carcinoma with somatic P53 mutation. Noted to have persistent pancytopenia complicated chemotherapy for her endometrial carcinoma in 2020.      Bone marrow biopsy in November 2020 was a normocellular marrow (30%) with trilineage hematpoiesis and atypical megakaryocytes. No morphologic or immunuphenotypic evidence of metastatic carcinoma. Adequate iron storage. Chromosome analysis normal X,X phenotype. Insufficient aspirate for analysis.      Bone marrow biopsy June 2022 was hypercellular marrow (%) with trilineage hematopoiesis. Grade 1 reticular fibrosis. No evidence of lymphoma, plasma cell neoplasm, or metastatic carcinoma.      Two hospital admission in summer 2022 for symptomatic anemia requiring transfusion support. Once incident related to GI bleeding. Hgb at admissions 5-6 grams with normal MCV. No GI bleeding source has been found.      Bone marrow biopsy September 2022 extremely hypercellular marrow, 99% with increased blasts (10%) and moderate trilineage dyspoiesis with left-shifted erythroid hyperplasia, left-shifted, relatively decreased granulocytes and focally increased megakaryocytes with increase number of micromegakaryocytes. Increased stainable histiocytic iron and increased reticulin fibrosis (MF-1 with focal MF-2). 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 NGS with TP53.       Interval History:   Ms. Rodgers presents routinely to clinic today for for treatment-related, high grade MDS.  Count recheck. Cytopenias  waxing/waning. Next cycle Inqovi due 1/26- will hold until repeat CBC next week due to platelet count of 24K and eye doctor visit last week  with retinal hemorrhage    Past Medical History, Social History, and Past Family History are unchanged since last evaluation except for HPI.     CURRENT MEDICATIONS:   Current Outpatient Medications   Medication Sig    acetaminophen (TYLENOL) 500 MG tablet Take 1,000 mg by mouth daily as needed for Pain.    acyclovir (ZOVIRAX) 400 MG tablet Take 1 tablet (400 mg total) by mouth 2 (two) times daily.    decitabine-cedazuridine  mg Tab Take 1 tablet by mouth once daily. For days 1-3 of a 28 day cycle    fexofenadine (ALLEGRA) 180 MG tablet 2 tablets in the morning.  May take an extra 2 tablets during the day if needed for itching.    fluconazole (DIFLUCAN) 200 MG Tab Take 2 tablets (400 mg total) by mouth once daily.    hydrOXYzine HCL (ATARAX) 25 MG tablet 1 to 8 tablets at bedtime.  Start with 3 tablets.  Increase or decrease as needed until itching is controlled or a maximum of 8 tablets.    ibuprofen (ADVIL,MOTRIN) 200 MG tablet Take 200 mg by mouth every 6 (six) hours as needed for Pain. Pt takes 2 pills daily    levoFLOXacin (LEVAQUIN) 500 MG tablet Take 1 tablet (500 mg total) by mouth once daily.    meth/meblue/sod phos/psal/hyos (URIBEL ORAL) Take by mouth as needed.    metoprolol tartrate (LOPRESSOR) 25 MG tablet Take 1 tablet (25 mg total) by mouth 2 (two) times daily.    mupirocin (BACTROBAN) 2 % ointment Apply topically 4 (four) times daily.    nystatin (MYCOSTATIN) cream Apply topically every Mon, Wed, Fri.    nystatin (MYCOSTATIN) powder Apply topically 4 (four) times daily.    pantoprazole (PROTONIX) 40 MG tablet Take 1 tablet (40 mg total) by mouth once daily.    povidone-iodine (BETADINE) 10 % external solution Apply topically as needed for Wound Care. Clean R hand wound w/ betadine daily    senna (SENOKOT) 8.6 mg tablet Take 1 tablet by mouth once daily. Can take twice daily    UNABLE TO FIND [place] [employer] Immune Support    triamcinolone acetonide 0.1% (KENALOG) 0.1 % ointment Apply  topically 2 (two) times daily. for 7 days     No current facility-administered medications for this visit.     Facility-Administered Medications Ordered in Other Visits   Medication    0.9%  NaCl infusion (for blood administration)    diphenhydrAMINE capsule 25 mg       ALLERGIES:   Review of patient's allergies indicates:   Allergen Reactions    Oxycodone Hallucinations         Review of Systems:     Review of Systems   Constitutional:  Positive for malaise/fatigue. Negative for chills, diaphoresis, fever and weight loss.   HENT: Negative.     Eyes: Negative.    Respiratory: Negative.     Cardiovascular: Negative.    Gastrointestinal: Negative.    Genitourinary: Negative.    Musculoskeletal:  Negative for joint pain.   Neurological:  Negative for weakness.   Endo/Heme/Allergies:  Bruises/bleeds easily.   Psychiatric/Behavioral:  The patient is nervous/anxious.      Physical Exam:     Vitals:    01/23/23 1437   BP: 131/60   Pulse: (!) 57   Resp: 18   Temp: 97.7 °F (36.5 °C)       Physical Exam  Vitals reviewed.   Constitutional:       General: She is not in acute distress.     Appearance: Normal appearance.   HENT:      Head: Normocephalic.      Right Ear: External ear normal.      Left Ear: External ear normal.      Mouth/Throat:      Mouth: Mucous membranes are moist.   Eyes:      Extraocular Movements: Extraocular movements intact.      Pupils: Pupils are equal, round, and reactive to light.   Cardiovascular:      Rate and Rhythm: Normal rate and regular rhythm.   Pulmonary:      Effort: Pulmonary effort is normal.   Abdominal:      General: Abdomen is flat.      Palpations: Abdomen is soft.   Musculoskeletal:         General: No swelling, tenderness or signs of injury. Normal range of motion.      Cervical back: Normal range of motion.      Comments: Previous cat bite healing nicely, see attached photo   Skin:     General: Skin is warm.      Coloration: Skin is pale.      Findings: Bruising (scattered bruising  of upper extremities) present. No erythema.   Neurological:      General: No focal deficit present.      Mental Status: She is alert. Mental status is at baseline.         ECOG Performance Status: (foot note - ECOG PS provided by Eastern Cooperative Oncology Group) 2 - Symptomatic, <50% confined to bed    Karnofsky Performance Score:  70%- Cares for Self: Unable to Carry on Normal Activity or Active Work    Labs:   Lab Results   Component Value Date    WBC 1.73 (LL) 01/23/2023    HGB 10.5 (L) 01/23/2023    HCT 32.2 (L) 01/23/2023    MCV 86 01/23/2023    PLT 24 (LL) 01/23/2023       Lab Results   Component Value Date     (L) 01/23/2023    K 5.2 (H) 01/23/2023     01/23/2023    CO2 25 01/23/2023    BUN 14 01/23/2023    CREATININE 0.7 01/23/2023    ALBUMIN 3.7 01/23/2023    BILITOT 0.8 01/23/2023    ALKPHOS 90 01/23/2023    AST 12 01/23/2023    ALT 9 (L) 01/23/2023               Assessment and Plan:   Jeanne Rodgers (Jeanne) is a pleasant 67 y.o.female with high grade MDS       High Grade MDS  Pancytopenia  IPPS-R Very High Risk MDS  RAEB-2 MDS with complete cytogenetics  Likely secondary MDS from prior chemotherapy/radiation therapy  She started Inqovi 5 of 28 days on 10/21/22.  She is tolerating it well thus far.   Was due for C4 on 1/26. Will delay due to thrombocytopenia and ocular bleed.  Repeat CBC in 1 week. Return visit in 5 weeks, before planned cycle 5  Will continue weekly labs outpatient, with transfusions for Hgb <7.  We will continue prophylactic antimicrobials at present    Stage IIIC2 UPSC  Diagnostic PET with pelvic and PA lymphadenopathy  RALH/BSO/ Debuliking of pelvic lymph nodes (6/8) positive on 8/15/2019.  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   T/C started 9/27/2019  WPRT to PA nodes 45Gy completed 1/22/2020  HDR to 15Gy in 3 fractions completed  3/3/2020  Restarted T/C 3/20/2020, s/p 5 cycles concluding 5/8/2020.   Complicated by pancytopenia and anemia.       DCIS  Stage I IDC and DCIS right breast, ER+, TX+ HER 2 Zack negative   Lumpectomy 6/24/2014 at [facility].   Small mass seen on mammogram 2007 and 2008, nipple inversion 2012 at \[facility]\""  Referred for radiation therapy at [facility]  On Arimidex for 4 years    Cellulitis, Cat Bite  Pt completed Levaquin abx, healing nicey w/o concerns for active infection       Follow Up:       A total of 20 minutes was spent in pre-visit chart review, personal interpretation of labs and imaging, and medication review. Total visit time 30 minutes, >50 % counseling.     BMT Chart Routing  Urgent    Follow up with physician . 5 weeks   Follow up with [facility]    Provider visit type    Infusion scheduling note    Injection scheduling note    Labs CBC, CMP and type and screen   Lab interval:  cbc and type and screen in 1 week (1/29/); CBC, CMP and type and screen in 5 weeks with return visit   Imaging    Pharmacy appointment    Other referrals

## 2023-02-09 NOTE — PROGRESS NOTES
Consult Note  Palliative Care      Consult Requested By: No ref. provider found  Reason for Consult: symptom management and ACP      ASSESSMENT/PLAN:     Plan/Recommendations:  Diagnoses and all orders for this visit:    Myelodysplastic syndrome/History of breast cancer/History of endometrial cancer  - patient followed by Dr. Ford and NP Inderjit as well as Dr. Regalado  - please see oncology notes for full details of breast cancer history and treatment as well as endometrial cancer and treatment     Encounter for Palliative Care/Advanced Care Planning  - patient decisional  - patient by herself in clinic today  - has ACP documents uploaded into EMR  - HCPOA: Geni Eubanks [phone] (sister)  - living will also completed  - philosophy of Palliative Medicine reviewed with patient at first visit  - new patient folder given to and reviewed with patient at first visit  - goals: life prolonging  - remains supportive of going through cancer-directed therapies for purpose of life prolongation  - will continue to monitor    Anorexia/Nausea/Abdominal discomfort  - at prior appt patient initially rated her pain as 5/10 on pain scale and stated it was generally in the abdomen. Upon further discussion, this sensation is not pain but rather feeling of queasiness  - today she reports 0/10 pain  - nausea continues to be a chronic problem, says she has tried everything including jose and probiotics and nothing other than zofran has helped  - cont zofran PRN   - reports stable appetite, mostly eating soups, lately avoiding meat as it sounds unappetizing   - previously discussed eating smaller portions and using other ways to improve nutrition such as protein shakes, smoothies, etc  - no longer taking remeron bc appetite improved   - low threshold for nutrition consult  - will continue to monitor     Adjustment disorder with mixed anxiety and depressed mood  - patient appears to be in a much better mental and emotional  state as compared to our last visit  - she reports that not needing a blood transfusion in 3 weeks, which has improved her mood  - reports that needle sticks are her biggest source of anxiety, during her recent hospitalization they placed a midline which she says worked very well   - previously expressed mistrust of her medical team due to h/o experiences where diagnosis were missed, and when medications/radiation made her worse  - today states her goal is to live as long as possible, believes she gathers her strength from the way her mother lived her life before she  from heart failure  - 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, recently rescued a litter of kittens who ended up getting adopted to loving families  - finds purpose in rescuing and helping animals and people as her mother did before her  - emotional support provided  - pall med SW joined visit in prior visit   - no longer taking remeron  - does not want to start any new medications at this time  - will continue to monitor     Neoplastic (malignant) related fatigue/Insomnia  - patient denies insomnia  - patient states that she continues to use her medicinal marijuana at night, which improves her sleep and overall quality of life  - discussed finding a balance between rest and activity  - discussed using energy for things she enjoys, such as rescue cats or renovating a house  - discussed use of body scan at night as well  - tip sheet for better sleep in new patient folder for patient to review  - will continue to monitor     Constipation/Diarrhea  - patient has diarrhea 2/2 abx regimen   - she is worried about becoming constipated again when she resumes treatment   - discussed using senna, starting with 1 tab qhs  - discussed using dulcolax suppository for acute constipation   - constipation tip sheet in new patient folder for patient to review  - increase hydration to support good bowel movements  - will continue to monitor     Understanding of  "illness/Prognosis: patient has fair understanding of illness; work up still in process. Prognosis to be determined     Goals of care: life prolonging    Follow up: 12 weeks    Patient's encounter and above plan of care discussed with patient's oncology team    SUBJECTIVE:     History of Present Illness:  Patient is a 68 y.o. year old female with a. Fib, chronic back pain, depression, history of endometrial cancer, fibromyalgia, history of breast cancer, PTSD, and myelodysplastic syndrome presents to Palliative Medicine for symptom management and ACP. Please see oncology notes for full details of patient's breast cancer history/treatment, endometrial cancer history/treatment, and ongoing work up and treatment of myelodysplastic syndrome.     02/09/2023:  KYLAH CLEMONS reviewed and summarized:  No controlled prescriptions filled since 8-    Patient presents alone in clinic today. She reports that it has been 3 weeks since needing a blood transfusion, grateful that this is the case. She wakes up with random bruises despite no known injuries other than needle sticks for blood draws. She is grateful for every day she gets to live, but will sometimes get comments from friends who tell her that she "looks too good to be sick" when inside her body, she does feel sick. She looks at herself in the mirror and is surprised to see how aged she appears compared to her age, which is an example of the burdens she bears from her cancer treatments. She shares with me pictures of kittens that she rescued and eventually got them all adopted into loving families. She shares that she has meaning in life in rescuing and helping both animals and people, something her mother did before her throughout her life. She recalls the long seven days of her mother's active dying process from congestive heart failure, and how she held on, trying to fight back death and eventually allowed death to come by the support of her and her sister. She remains " "close with her baby sister Geni, but not with the remainder of family. She is anticipating a need for a tooth to be extracted in order to proceed with chemotherapy.    12/01/2022:   KYLAH CLEMONS reviewed and summarized:  No surprises    Patient presents alone in clinic today. She reports that she is doing well, overall. She was recently hospitalized for a cat bite requiring antibiotics,the medication is giving her some diarrhea but she anticipates becoming constipated again when treatment is resumed. She is going to request tx resumption Monday. She is far less anxious and depressed as compared to our last visit. She discloses that pain from needle sticks are her biggest source of anxiety, however she must endure this to achieve her goal to "live as long as possible." All other symptoms stable, no changes made today.     10/27/2022:  KYLAH CLEMONS reviewed and summarized:  No surprises    Patient presents to clinic alone today. Her biggest concern is abdominal discomfort secondary to constipation. She additionally expresses high levels of anxiety and mistrust of her medical providers in light of her past experiences. Today she is especially upset that her liver and renal function have not been monitored since September. She states that she is very worried that her medical team might miss something or make a mistake. She is also worried as she starts to feel the side-effects of the treatment that her body is being further damaged. RTC in 5 weeks when symptom-burden is lower to explore overall goals and fears related to treatment.     09/29/2022:  KYLAH CLEMONS reviewed and summarized:  09/21/2022 .thc.900.ti.cherry Disp: 30 for 30 days  08/16/2022 Medible_chew_blue_raspberry_400mgthc:0mgcbd Disp: 40 for 14 days    Patient presents today by herself. She has moderate abdominal discomfort, which is relieved with use of small coke or zofran. She is not in pain. She has moderate anxiety and depression around continued work up with unknown " answers and future. She is frustrated by a lack of treatment plan. Patient having moderate to severe anorexia. She states that she does not have any foods that tempt her to eat. She is not sleeping well due to anxiety/depression and recently being out of her medicinal marijuana. Patient having fluctuation of constipation and diarrhea. Patient has already completed ACP documents.     Past Medical History:   Diagnosis Date    Allergy     skin    Anxiety     Atrial fibrillation     Breast cancer 2014    right    Chronic back pain     Depression     Encounter for blood transfusion     Endometrial cancer 07/30/2019    Fibromyalgia     Hives     Hx of psychiatric care     Hx of radiation therapy     Itching     Lichen sclerosus     Mental disorder     PONV (postoperative nausea and vomiting)     Psychiatric problem     PTSD (post-traumatic stress disorder)     Sleep difficulties     Sore throat     Therapy     Uterine cancer 08/05/2019    UTI (urinary tract infection)      Past Surgical History:   Procedure Laterality Date    anal fissure surgery      APPENDECTOMY      BIOPSY N/A 11/19/2020    Procedure: BONE BIOPSY;  Surgeon: Leisa Diagnostic Provider;  Location: [facility];  Service: Radiology;  Laterality: N/A;  RN PREOP 11/17/2020    BREAST BIOPSY Right 2014    BREAST LUMPECTOMY Right 2014    R lumpectomy [facility] w 2.4cm IDC & DCIS, neg margins, 0/6 SN, Stage II, ER/MI+, HER-2 neg Adjuvant XRT and hormonal therapy     CARPAL TUNNEL RELEASE      CHOLECYSTECTOMY      COLONOSCOPY      COLONOSCOPY N/A 6/11/2020    Procedure: COLONOSCOPY;  Surgeon: Bobby Marino MD;  Location: [address]);  Service: Endoscopy;  Laterality: N/A;    COLONOSCOPY N/A 6/9/2022    Procedure: COLONOSCOPY;  Surgeon: Abdoulaye Alcocer MD;  Location: [address]);  Service: Endoscopy;  Laterality: N/A;    ESOPHAGOGASTRODUODENOSCOPY N/A 6/11/2020    Procedure: EGD (ESOPHAGOGASTRODUODENOSCOPY);  Surgeon: Bobby Marino MD;  [facility]:  [facility] (2ND FLR);  Service: Endoscopy;  Laterality: N/A;    HYSTERECTOMY  08/15/2019    HYSTEROSCOPY WITH DILATION AND CURETTAGE OF UTERUS N/A 7/24/2019    Procedure: HYSTEROSCOPY, WITH DILATION AND CURETTAGE OF UTERUS;  Surgeon: Елена Kam MD;  Location: [facility] OR;  Service: OB/GYN;  Laterality: N/A;    INSERTION OF TUNNELED CENTRAL VENOUS CATHETER (CVC) WITH SUBCUTANEOUS PORT N/A 9/26/2019    Procedure: INSERTION, PORT-A-CATH;  Surgeon: Moris Varghese MD;  Location: [facility] CATH LAB;  Service: Radiology;  Laterality: N/A;    MEDIPORT REMOVAL N/A 3/5/2020    Procedure: REMOVAL, CATHETER, CENTRAL VENOUS, TUNNELED, WITH PORT;  Surgeon: Moris Varghese MD;  Location: [facility] CATH LAB;  Service: Radiology;  Laterality: N/A;    NEEDLE LOCALIZATION N/A 11/19/2020    Procedure: NEEDLE LOCALIZATION;  Surgeon: [facility] Diagnostic Provider;  Location: [facility] OR;  Service: Radiology;  Laterality: N/A;    PARTIAL MASCECTOMY Right 2014    ROBOT-ASSISTED LAPAROSCOPIC ABDOMINAL HYSTERECTOMY USING DA RAMU XI N/A 8/15/2019    Procedure: XI ROBOTIC HYSTERECTOMY;  Surgeon: Darius Regalado MD;  Location: [facility] OR;  Service: OB/GYN;  Laterality: N/A;    ROBOT-ASSISTED LAPAROSCOPIC SALPINGO-OOPHORECTOMY USING DA RAMU XI Bilateral 8/15/2019    Procedure: XI ROBOTIC SALPINGO-OOPHORECTOMY;  Surgeon: Darius Regalado MD;  Location: [facility] OR;  Service: OB/GYN;  Laterality: Bilateral;    ROBOT-ASSISTED LYMPHADENECTOMY Left 8/15/2019    Procedure: ROBOTIC LYMPHADENECTOMY;  Surgeon: Darius Regalado MD;  Location: [facility];  Service: OB/GYN;  Laterality: Left;    SALPINGOOPHORECTOMY Bilateral 08/15/2019    TONSILLECTOMY      TUBAL LIGATION       Family History   Problem Relation Age of Onset    Skin cancer Mother 89        squamous cell--nose & head    Rashes / Skin problems Mother 92        cutaneous horn of scalp    Hypertension Mother     Arthritis Mother     Other Mother 85        PANC mass suspected by provider to be ca, surv'd 8 yrs     Pancreatic cancer Father 85        surv'd 8 mos    Pancreatic cancer Maternal Aunt 62    Colon polyps Sister         pt thinks (a few)    Hypertension Brother     Cancer Paternal Uncle         leukemia, 70s    Heart attack Paternal Grandmother     Heart attack Paternal Grandfather     Hypertension Brother     Colon polyps Sister         Geni believes just a handful    Hashimoto's thyroiditis Sister     Diabetes Other         strong family history per patient    Lung cancer Maternal Uncle         smoker    Cancer Paternal Aunt         origin? colon or ovarian?    Suicide Cousin     Alcohol abuse Cousin     Throat cancer Other         pt believes    Breast cancer Neg Hx     Ovarian cancer Neg Hx     Cirrhosis Neg Hx     Colon cancer Neg Hx      Review of patient's allergies indicates:   Allergen Reactions    Oxycodone Hallucinations       Medications:    Current Outpatient Medications:     acetaminophen (TYLENOL) 500 MG tablet, Take 1,000 mg by mouth daily as needed for Pain., Disp: , Rfl:     acyclovir (ZOVIRAX) 400 MG tablet, Take 1 tablet (400 mg total) by mouth 2 (two) times daily., Disp: 60 tablet, Rfl: 1    decitabine-cedazuridine  mg Tab, Take 1 tablet by mouth once daily. For days 1-3 of a 28 day cycle, Disp: 5 tablet, Rfl: 5    fexofenadine (ALLEGRA) 180 MG tablet, 2 tablets in the morning.  May take an extra 2 tablets during the day if needed for itching., Disp: 240 tablet, Rfl: 5    hydrOXYzine HCL (ATARAX) 25 MG tablet, 1 to 8 tablets at bedtime.  Start with 3 tablets.  Increase or decrease as needed until itching is controlled or a maximum of 8 tablets., Disp: 240 tablet, Rfl: 3    ibuprofen (ADVIL,MOTRIN) 200 MG tablet, Take 200 mg by mouth every 6 (six) hours as needed for Pain. Pt takes 2 pills daily, Disp: , Rfl:     meth/meblue/sod phos/psal/hyos (URIBEL ORAL), Take by mouth as needed., Disp: , Rfl:     metoprolol tartrate (LOPRESSOR) 25 MG tablet, Take 1 tablet (25 mg total) by mouth 2 (two)  times daily., Disp: 180 tablet, Rfl: 3    mupirocin (BACTROBAN) 2 % ointment, Apply topically 4 (four) times daily., Disp: , Rfl:     nystatin (MYCOSTATIN) cream, Apply topically every Mon, Wed, Fri., Disp: 30 g, Rfl: 11    nystatin (MYCOSTATIN) powder, Apply topically 4 (four) times daily., Disp: 30 g, Rfl: 1    pantoprazole (PROTONIX) 40 MG tablet, Take 1 tablet (40 mg total) by mouth once daily., Disp: 90 tablet, Rfl: 3    povidone-iodine (BETADINE) 10 % external solution, Apply topically as needed for Wound Care. Clean R hand wound w/ betadine daily, Disp: 14.8 mL, Rfl: 0    senna (SENOKOT) 8.6 mg tablet, Take 1 tablet by mouth once daily. Can take twice daily, Disp: 30 tablet, Rfl: 11    triamcinolone acetonide 0.1% (KENALOG) 0.1 % ointment, Apply topically 2 (two) times daily. for 7 days, Disp: 80 g, Rfl: 0    UNABLE TO FIND, Turkey Tail Mushrooms Immune Support, Disp: , Rfl:     OBJECTIVE:       ROS:  Review of Systems   Constitutional:  Positive for activity change, appetite change and fatigue.   HENT: Negative.     Eyes: Negative.    Respiratory: Negative.     Cardiovascular: Negative.    Gastrointestinal:  Positive for constipation, diarrhea and nausea. Negative for abdominal pain.   Genitourinary: Negative.    Musculoskeletal: Negative.    Skin: Negative.    Neurological: Negative.    Psychiatric/Behavioral:  Positive for dysphoric mood and sleep disturbance. The patient is nervous/anxious.    All other systems reviewed and are negative.    Review of Symptoms      Symptom Assessment (ESAS 0-10 Scale)  Pain:  0  Dyspnea:  0  Anxiety:  3  Nausea:  0  Depression:  3  Anorexia:  0  Fatigue:  4  Insomnia:  0  Restlessness:  0  Agitation:  0     CAM / Delirium:  Negative  Constipation:  Positive  Diarrhea:  Positive    Anxiety:  Is nervous/anxious  Constipation:  Constipation    Bowel Management Plan (BMP):  Yes      Pain Assessment:  OME in 24 hours:  0  Location(s): none      Modified Sebastien Scale:  0    ECOG  Performance Status ndGndrndanddndend:nd nd2nd Living Arrangements:  Lives alone    Psychosocial/Cultural:   See Palliative Psychosocial Note: Yes  Patient lives alone but she has her sister next door. Enjoys taking care of stray animals and renovating houses. Finds purpose in rescuing and helping animals and people. Her  mother is her role model for this generous purpose.  **Primary  to Follow**  Palliative Care  Consult: No    Spiritual:  F - Shania and Belief:  Shinto  I - Importance:  Moderate  C - Community:  Prays nightly  A - Address in Care:  Needs met at this time    Advance Care Planning   Advance Directives:   Living Will: Yes        Copy on chart: Yes    LaPOST: No    Do Not Resuscitate Status: No    Medical Power of : Yes    Agent's Name:  Geni Eubanks   Agent's Contact Number:  [phone]    Decision Making:  Patient answered questions  Goals of Care: What is most important right now is to focus on curative/life-prolongation (regardless of treatment burdens). Accordingly, we have decided that the best plan to meet the patient's goals includes continuing with treatment.        Physical Exam:    There were no vitals filed for this visit.     Physical Exam  Vitals reviewed.   Constitutional:       General: She is not in acute distress.     Appearance: She is not ill-appearing or toxic-appearing.   HENT:      Head: Normocephalic and atraumatic.      Right Ear: External ear normal.      Left Ear: External ear normal.   Eyes:      General: No scleral icterus.        Right eye: No discharge.         Left eye: No discharge.   Neck:      Comments: Trachea midline  Pulmonary:      Effort: Pulmonary effort is normal. No respiratory distress.   Abdominal:      General: Abdomen is flat. There is no distension.   Musculoskeletal:         General: No signs of injury.      Cervical back: Normal range of motion.      Right lower leg: No edema.      Left lower leg: No edema.  "  Skin:     General: Skin is dry.      Coloration: Skin is pale. Skin is not jaundiced.      Findings: Bruising and wound (animal bite, right thumb) present. No rash.   Neurological:      Mental Status: She is alert and oriented to person, place, and time.      Gait: Gait normal.   Psychiatric:         Mood and Affect: Mood is anxious. Affect is not tearful.         Speech: Speech normal.         Thought Content: Thought content normal.       Labs:  CBC:   WBC   Date Value Ref Range Status   02/07/2023 2.33 (L) 3.90 - 12.70 K/uL Final     Hemoglobin   Date Value Ref Range Status   02/07/2023 8.7 (L) 12.0 - 16.0 g/dL Final     POC Hematocrit   Date Value Ref Range Status   07/01/2022 19 (LL) 36 - 54 %PCV Final     Hematocrit   Date Value Ref Range Status   02/07/2023 26.4 (L) 37.0 - 48.5 % Final     MCV   Date Value Ref Range Status   02/07/2023 85 82 - 98 fL Final     Platelets   Date Value Ref Range Status   02/07/2023 83 (L) 150 - 450 K/uL Final       LFT:   Lab Results   Component Value Date    AST 12 02/07/2023    ALKPHOS 141 (H) 02/07/2023    BILITOT 0.7 02/07/2023       Albumin:   Albumin   Date Value Ref Range Status   02/07/2023 3.7 3.5 - 5.2 g/dL Final     Protein:   Total Protein   Date Value Ref Range Status   02/07/2023 7.8 6.0 - 8.4 g/dL Final       Radiology:I have reviewed all pertinent imaging results/findings within the past 24 hours.    10/03/2022 MRI pelvis: "Diffuse abnormal low T1 marrow signal, progressed compared to prior MRI exams.  Findings are nonspecific however can be seen in the setting of red marrow reconversion or other marrow replacement processes such as myelodysplastic syndrome or myeloproliferative neoplasm.  Recommend correlation with recent bone marrow biopsy. No focal destructive or enhancing osseous lesion. Small volume pelvic free fluid."    I spent a total of 145 minutes on the day of the visit. This includes face to face time in discussion of goals of care, symptom " assessment, coordination of care and emotional support. This also includes non-face to face time preparing to see the patient (eg, review of tests/imaging), obtaining and/or reviewing separately obtained history, documenting clinical information in the electronic or other health record, independently interpreting results and communicating results to the patient/family/caregiver, or care coordinator.      Signature: Phyllis Pike MD

## 2023-02-16 NOTE — TELEPHONE ENCOUNTER
Ms. Rodgers declined refill of Inqovi because she states she is holding Inqovi until her provider instructs her to restart.     She has a blister pack of 5 tablet on hand. I will follow up with her after her 2/22 lab appointment.

## 2023-02-17 NOTE — PATIENT INSTRUCTIONS
Your x-rays are negative for fractures or dislocations.  Take Duricef antibiotics once daily for 5 days.  Wash wound with soap and water daily.      What care is needed at home?   Call your regular doctor to let them know you were in the ED. Make a follow-up appointment if you were told to.  The doctor may want you to keep your wound covered as it heals. You may want to use a thin layer of antibiotic ointment to help keep the wound moist. This will also keep the dressing from sticking to the wound.  After 24 hours, you can gently wash the wound with soap and water. Pat dry and put on a clean dressing.  Change your dressing once a day or every other day.  Always wash your hands before and after touching the wound.  Each time you change the dressing, look closely at the wound to be sure it is healing the right way. The wound may have a yellowish discharge and this is normal.  Avoid picking the scab or scratching the site which may cause more irritation.  Do not soak in water or swim with an open wound.  When do I need to get emergency help?   Return to the ED if:   The pain in and around the area gets much worse.  There is a bad smell or pus (thick yellow, green, or gray fluid) coming from your wound.  You develop a fever of 102.2 F (39 C) or higher or chills.  You notice a crunchy feeling or blisters in the skin around the wound.  The redness around your wound gets bigger or is spreading up your arm or leg.    - Rest.    - Drink plenty of fluids.    - Acetaminophen (tylenol) or Ibuprofen (advil,motrin) as directed as needed for fever/pain. Avoid tylenol if you have a history of liver disease. Do not take ibuprofen if you have a history of GI bleeding, kidney disease, or if you take blood thinners.     - Follow up with your PCP or specialty clinic as directed in the next 1-2 weeks if not improved or as needed.  You can call [phone] to schedule an appointment with the appropriate provider.    - Go to the ER or  seek medical attention immediately if you develop new or worsening symptoms.         - You must understand that you have received an Urgent Care treatment only and that you may be released before all of your medical problems are known or treated.   - You, the patient, will arrange for follow up care as instructed.   - If your condition worsens or fails to improve we recommend that you receive another evaluation at the ER immediately or contact your PCP to discuss your concerns or return here.

## 2023-02-17 NOTE — PROGRESS NOTES
"Subjective:       Patient ID: Jeanne Rodgers is a 68 y.o. female.    Vitals:  height is 5' (1.524 m) and weight is 50.3 kg (111 lb). Her tympanic temperature is 99 °F (37.2 °C). Her blood pressure is 116/51 (abnormal) and her pulse is 71. Her respiration is 18 and oxygen saturation is 98%.     Chief Complaint: Arm Pain    68-year-old female reports of right hand injury that started 5 days ago when a brick scraped the top of her right hand.  Patient reports swelling and pain which radiates from dorsal of right hand to the forearm x5 days.  Patient reports swelling has subsided significantly after soaking in Epsom salt daily as well as applying Bactroban ointment daily.  Patient reports "shooting pain" from hand to elbow with internal rotation of right arm.  Patient is instructed to follow-up with orthopedic as needed regarding her shooting pain over her arm if pain does not resolve.          Arm Pain   The incident occurred 5 to 7 days ago. The incident occurred at home. The injury mechanism was a direct blow. The pain is present in the right hand. The quality of the pain is described as aching. The pain radiates to the right arm. The pain is at a severity of 8/10. The pain is severe. The pain has been Constant since the incident. The symptoms are aggravated by movement. She has tried nothing for the symptoms.     Constitution: Negative for activity change and appetite change.   HENT:  Negative for ear pain.    Neck: Negative for neck pain and neck stiffness.   Cardiovascular:  Negative for chest trauma.   Eyes:  Negative for eye trauma.   Respiratory:  Negative for sleep apnea.    Gastrointestinal:  Negative for abdominal trauma.   Endocrine: hair loss.   Genitourinary:  Negative for dysuria.   Musculoskeletal:  Positive for pain.   Skin:  Positive for wound and erythema. Negative for color change and pale.   Allergic/Immunologic: Negative for environmental allergies.   Neurological:  Negative for dizziness, " history of vertigo, disorientation and altered mental status.   Psychiatric/Behavioral:  Negative for altered mental status and disorientation.      Objective:      Physical Exam   Constitutional: She is oriented to person, place, and time. She appears well-developed.   HENT:   Head: Normocephalic and atraumatic. Head is without abrasion, without contusion and without laceration.   Ears:   Right Ear: External ear normal.   Left Ear: External ear normal.   Nose: Nose normal.   Mouth/Throat: Oropharynx is clear and moist and mucous membranes are normal.   Eyes: Conjunctivae, EOM and lids are normal. Pupils are equal, round, and reactive to light.   Neck: Trachea normal and phonation normal. Neck supple.   Cardiovascular: Normal rate, regular rhythm and normal heart sounds.   Pulmonary/Chest: Effort normal and breath sounds normal. No stridor. No respiratory distress.   Musculoskeletal: Normal range of motion.         General: Swelling present. Normal range of motion.   Neurological: She is alert and oriented to person, place, and time.   Skin: Skin is dry, intact and no rash. Capillary refill takes less than 2 seconds. erythema No abrasion, No burn, No bruising and No ecchymosis   Psychiatric: Her speech is normal and behavior is normal. Judgment and thought content normal.   Nursing note and vitals reviewed.      Assessment:       1. Local skin infection    2. Injury of right upper extremity, initial encounter          Plan:    XR FOREARM RIGHT    Result Date: 2/17/2023  EXAMINATION: XR FOREARM RIGHT; XR WRIST COMPLETE 3 VIEWS RIGHT; XR HAND COMPLETE 3 VIEW RIGHT CLINICAL HISTORY: Unspecified injury of right shoulder and upper arm, initial encounter TECHNIQUE: AP and lateral views of the right forearm, hand and wrist were performed. COMPARISON: None FINDINGS: No fracture no dislocation.  Degenerative changes are present at the base of the thumb and the STT joint.  Degenerative changes are also present at the distal  interphalangeal joints diffusely and the proximal interphalangeal joint of the index finger.  No soft tissue abnormality.     No fracture of the right hand, wrist or forearm Electronically signed by: Cici Rivera Date:    02/17/2023 Time:    16:30    X-Ray Wrist Complete 3 views Right    Result Date: 2/17/2023  EXAMINATION: XR FOREARM RIGHT; XR WRIST COMPLETE 3 VIEWS RIGHT; XR HAND COMPLETE 3 VIEW RIGHT CLINICAL HISTORY: Unspecified injury of right shoulder and upper arm, initial encounter TECHNIQUE: AP and lateral views of the right forearm, hand and wrist were performed. COMPARISON: None FINDINGS: No fracture no dislocation.  Degenerative changes are present at the base of the thumb and the STT joint.  Degenerative changes are also present at the distal interphalangeal joints diffusely and the proximal interphalangeal joint of the index finger.  No soft tissue abnormality.     No fracture of the right hand, wrist or forearm Electronically signed by: Cici Rivera Date:    02/17/2023 Time:    16:30    X-Ray Hand 3 View Right    Result Date: 2/17/2023  EXAMINATION: XR FOREARM RIGHT; XR WRIST COMPLETE 3 VIEWS RIGHT; XR HAND COMPLETE 3 VIEW RIGHT CLINICAL HISTORY: Unspecified injury of right shoulder and upper arm, initial encounter TECHNIQUE: AP and lateral views of the right forearm, hand and wrist were performed. COMPARISON: None FINDINGS: No fracture no dislocation.  Degenerative changes are present at the base of the thumb and the STT joint.  Degenerative changes are also present at the distal interphalangeal joints diffusely and the proximal interphalangeal joint of the index finger.  No soft tissue abnormality.     No fracture of the right hand, wrist or forearm Electronically signed by: Cici Rivera Date:    02/17/2023 Time:    16:30      Patient Instructions   Your x-rays are negative for fractures or dislocations.  Take Duricef antibiotics once daily for 5 days.  Wash wound with soap and water daily.       What care is needed at home?   Call your regular doctor to let them know you were in the ED. Make a follow-up appointment if you were told to.  The doctor may want you to keep your wound covered as it heals. You may want to use a thin layer of antibiotic ointment to help keep the wound moist. This will also keep the dressing from sticking to the wound.  After 24 hours, you can gently wash the wound with soap and water. Pat dry and put on a clean dressing.  Change your dressing once a day or every other day.  Always wash your hands before and after touching the wound.  Each time you change the dressing, look closely at the wound to be sure it is healing the right way. The wound may have a yellowish discharge and this is normal.  Avoid picking the scab or scratching the site which may cause more irritation.  Do not soak in water or swim with an open wound.  When do I need to get emergency help?   Return to the ED if:   The pain in and around the area gets much worse.  There is a bad smell or pus (thick yellow, green, or gray fluid) coming from your wound.  You develop a fever of 102.2 F (39 C) or higher or chills.  You notice a crunchy feeling or blisters in the skin around the wound.  The redness around your wound gets bigger or is spreading up your arm or leg.    - Rest.    - Drink plenty of fluids.    - Acetaminophen (tylenol) or Ibuprofen (advil,motrin) as directed as needed for fever/pain. Avoid tylenol if you have a history of liver disease. Do not take ibuprofen if you have a history of GI bleeding, kidney disease, or if you take blood thinners.     - Follow up with your PCP or specialty clinic as directed in the next 1-2 weeks if not improved or as needed.  You can call [phone] to schedule an appointment with the appropriate provider.    - Go to the ER or seek medical attention immediately if you develop new or worsening symptoms.         - You must understand that you have received an Urgent Care  treatment only and that you may be released before all of your medical problems are known or treated.   - You, the patient, will arrange for follow up care as instructed.   - If your condition worsens or fails to improve we recommend that you receive another evaluation at the ER immediately or contact your PCP to discuss your concerns or return here.               Local skin infection  -     Discontinue: cefadroxil (DURICEF) 500 MG Cap; Take 2 capsules (1,000 mg total) by mouth once daily. for 5 days  Dispense: 10 capsule; Refill: 0  -     mupirocin (BACTROBAN) 2 % ointment; Apply topically 3 (three) times daily.  Dispense: 22 g; Refill: 0  -     cefadroxil (DURICEF) 500 MG Cap; Take 2 capsules (1,000 mg total) by mouth every 12 (twelve) hours. for 5 days  Dispense: 20 capsule; Refill: 0    Injury of right upper extremity, initial encounter  -     X-Ray Hand 3 View Right  -     X-Ray Wrist Complete 3 views Right  -     XR FOREARM RIGHT  -     Ambulatory referral/consult to Orthopedics

## 2023-02-17 NOTE — TELEPHONE ENCOUNTER
Ms. Angel reached out with concerns of her right arm. she hurt her arm earlier this week and had a scrape and bruise. she has been soaking the arm and applying ointment to the cut. since then she has had increasing pain radiating from her wrist to elbow. denies swelling. denies redness, warmth, or discharge from the cut. She said the pain makes to where she is not able to turn the key in her cars ignition. she wanted to inquire about being seen today or at least getting x-rays. she stated this is her breast cancer arm. She informed that she had 6 or 7 lymph nodes removed historically with mild issues of lymphedema. Reported to care teams. NP advised to proceed to urgent care or ER. Patient agreeable to urgent care on the [facility]. Called [facility] urgent care [facility]- they have an x-ray tech. Provided update and they are expecting patient.

## 2023-02-23 NOTE — TELEPHONE ENCOUNTER
Ms. Rodgers is still holding inqovi. She has a blood transfusion scheduled on tomorrow. She has enough inqovi on hand to complete a full cycle. Informed pt that we will follow up on restart date after her 3/6 appt.

## 2023-03-06 NOTE — PROGRESS NOTES
Section of Hematology and Stem Cell Transplantation  Follow Up Note     Visit Date: 03/06/2023    Primary Oncologic Diagnosis: No primary diagnosis found.    History of Present Ilness:   Jeanne Rodgers (Jeanne) is a pleasant 68 y.o.female with history of breast and endometrial carcinoma with somatic P53 mutation. Noted to have persistent pancytopenia complicated chemotherapy for her endometrial carcinoma in 2020.      Bone marrow biopsy in November 2020 was a normocellular marrow (30%) with trilineage hematpoiesis and atypical megakaryocytes. No morphologic or immunuphenotypic evidence of metastatic carcinoma. Adequate iron storage. Chromosome analysis normal X,X phenotype. Insufficient aspirate for analysis.      Bone marrow biopsy June 2022 was hypercellular marrow (%) with trilineage hematopoiesis. Grade 1 reticular fibrosis. No evidence of lymphoma, plasma cell neoplasm, or metastatic carcinoma.      Two hospital admission in summer 2022 for symptomatic anemia requiring transfusion support. Once incident related to GI bleeding. Hgb at admissions 5-6 grams with normal MCV. No GI bleeding source has been found.      Bone marrow biopsy September 2022 extremely hypercellular marrow, 99% with increased blasts (10%) and moderate trilineage dyspoiesis with left-shifted erythroid hyperplasia, left-shifted, relatively decreased granulocytes and focally increased megakaryocytes with increase number of micromegakaryocytes. Increased stainable histiocytic iron and increased reticulin fibrosis (MF-1 with focal MF-2). 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 NGS with TP53.       Interval History:   Ms. Rodgers presents routinely to clinic today for for treatment-related, high grade MDS.  Count recheck. Cytopenias  waxing/waning. Last cycle of Inqovi: was before January. Plan to restart therapy 3/9/2020.  Today she reports feeling unwell. Subjective  fever to 98.6. Nausea. Extreme fatigue. Urinary incontinence with foul odor. Chills. Getting UA today.  Hgb 6.9- needs PRBC scheduled for [facility] main infusion at 1pm 3/7/2023      Past Medical History, Social History, and Past Family History are unchanged since last evaluation except for HPI.     CURRENT MEDICATIONS:   Current Outpatient Medications   Medication Sig    acetaminophen (TYLENOL) 500 MG tablet Take 1,000 mg by mouth daily as needed for Pain.    acyclovir (ZOVIRAX) 400 MG tablet Take 1 tablet (400 mg total) by mouth 2 (two) times daily.    decitabine-cedazuridine  mg Tab Take 1 tablet by mouth once daily. For days 1-3 of a 28 day cycle    fexofenadine (ALLEGRA) 180 MG tablet 2 tablets in the morning.  May take an extra 2 tablets during the day if needed for itching.    hydrOXYzine HCL (ATARAX) 25 MG tablet 1 to 8 tablets at bedtime.  Start with 3 tablets.  Increase or decrease as needed until itching is controlled or a maximum of 8 tablets.    ibuprofen (ADVIL,MOTRIN) 200 MG tablet Take 200 mg by mouth every 6 (six) hours as needed for Pain. Pt takes 2 pills daily    meth/meblue/sod phos/psal/hyos (URIBEL ORAL) Take by mouth as needed.    metoprolol tartrate (LOPRESSOR) 25 MG tablet Take 1 tablet (25 mg total) by mouth 2 (two) times daily.    mupirocin (BACTROBAN) 2 % ointment Apply topically 4 (four) times daily.    mupirocin (BACTROBAN) 2 % ointment Apply topically 3 (three) times daily.    nystatin (MYCOSTATIN) cream Apply topically every Mon, Wed, Fri. (Patient not taking: Reported on 2/9/2023)    nystatin (MYCOSTATIN) powder Apply topically 4 (four) times daily. (Patient not taking: Reported on 2/9/2023)    pantoprazole (PROTONIX) 40 MG tablet Take 1 tablet (40 mg total) by mouth once daily. (Patient not taking: Reported on 2/9/2023)    povidone-iodine (BETADINE) 10 % external solution Apply topically as needed for Wound Care. Clean R hand wound w/ betadine daily (Patient not taking: Reported on  2/9/2023)    senna (SENOKOT) 8.6 mg tablet Take 1 tablet by mouth once daily. Can take twice daily    triamcinolone acetonide 0.1% (KENALOG) 0.1 % ointment Apply topically 2 (two) times daily. for 7 days    UNABLE TO FIND [place] [employer] Immune Support     No current facility-administered medications for this visit.       ALLERGIES:   Review of patient's allergies indicates:   Allergen Reactions    Oxycodone Hallucinations         Review of Systems:     Review of Systems   Constitutional:  Positive for malaise/fatigue. Negative for chills, diaphoresis, fever and weight loss.   HENT: Negative.     Eyes: Negative.    Respiratory: Negative.     Cardiovascular: Negative.    Gastrointestinal: Negative.    Genitourinary: Negative.    Musculoskeletal:  Negative for joint pain.   Neurological:  Negative for weakness.   Endo/Heme/Allergies:  Bruises/bleeds easily.   Psychiatric/Behavioral:  The patient is nervous/anxious.      Physical Exam:     There were no vitals filed for this visit.      Physical Exam  Vitals reviewed.   Constitutional:       General: She is not in acute distress.     Appearance: Normal appearance.   HENT:      Head: Normocephalic.      Right Ear: External ear normal.      Left Ear: External ear normal.      Mouth/Throat:      Mouth: Mucous membranes are moist.   Eyes:      Extraocular Movements: Extraocular movements intact.      Pupils: Pupils are equal, round, and reactive to light.   Cardiovascular:      Rate and Rhythm: Normal rate and regular rhythm.   Pulmonary:      Effort: Pulmonary effort is normal.   Abdominal:      General: Abdomen is flat.      Palpations: Abdomen is soft.   Musculoskeletal:         General: No swelling, tenderness or signs of injury. Normal range of motion.      Cervical back: Normal range of motion.      Comments: Previous cat bite healing nicely, see attached photo   Skin:     General: Skin is warm.      Coloration: Skin is pale.      Findings: Bruising (scattered  bruising of upper extremities) present. No erythema.   Neurological:      General: No focal deficit present.      Mental Status: She is alert. Mental status is at baseline.         ECOG Performance Status: (foot note - ECOG PS provided by Eastern Cooperative Oncology Group) 2 - Symptomatic, <50% confined to bed    Karnofsky Performance Score:  70%- Cares for Self: Unable to Carry on Normal Activity or Active Work    Labs:   Lab Results   Component Value Date    WBC 4.93 02/28/2023    HGB 7.2 (L) 02/28/2023    HCT 22.3 (L) 02/28/2023    MCV 83 02/28/2023    PLT 84 (L) 02/28/2023       Lab Results   Component Value Date     02/28/2023    K 4.1 02/28/2023     02/28/2023    CO2 25 02/28/2023    BUN 10 02/28/2023    CREATININE 0.8 02/28/2023    ALBUMIN 3.3 (L) 02/28/2023    BILITOT 0.6 02/28/2023    ALKPHOS 85 02/28/2023    AST 9 (L) 02/28/2023    ALT <5 (L) 02/28/2023               Assessment and Plan:   Jeanne Rodgers (Jeanne) is a pleasant 68 y.o.female with high grade MDS       High Grade MDS  Pancytopenia  IPPS-R Very High Risk MDS  RAEB-2 MDS with complete cytogenetics  Likely secondary MDS from prior chemotherapy/radiation therapy  She started Inqovi 5 of 28 days on 10/21/22.  She is tolerating it well thus far.   Was due for C4 on 1/26. Delayed due to thrombocytopenia and ocular bleed.  Plan for cycle 5 to start 3/9/2023 with 3/5 tabs.  Will continue weekly labs outpatient, with transfusions for Hgb <7.  We will continue prophylactic antimicrobials at present    Stage IIIC2 UPSC  Diagnostic PET with pelvic and PA lymphadenopathy  RALH/BSO/ Debuliking of pelvic lymph nodes (6/8) positive on 8/15/2019.  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   T/C started 9/27/2019  WPRT to PA nodes 45Gy completed 1/22/2020  HDR to 15Gy in 3 fractions completed 3/3/2020  Restarted T/C 3/20/2020,  s/p 5 cycles concluding 5/8/2020.   Complicated by pancytopenia and anemia.       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   Small mass seen on mammogram 2007 and 2008, nipple inversion 2012 at [facility]  Referred for radiation therapy at [facility]  On Arimidex for 4 years    Cellulitis, Cat Bite  Pt completed Levaquin abx, healing nicey w/o concerns for active infection     6. Suspected UTI  - UA with reflex culture 3/6/2023      Follow Up:       A total of 20 minutes was spent in pre-visit chart review, personal interpretation of labs and imaging, and medication review. Total visit time 30 minutes, >50 % counseling.     BMT Chart Routing  Urgent    Follow up with physician 4 weeks.   Follow up with [facility]    Provider visit type    Infusion scheduling note    Injection scheduling note    Labs CBC, CMP and type and screen   Lab interval:  weekly CBC and Type & Screen, CBC, CMP, type and screen   Imaging    Pharmacy appointment    Other referrals

## 2023-03-07 NOTE — PLAN OF CARE
1315 Patient here for 1U PRBC. Labs, meds and hx reviewed. VSS and blood released from blood bank. PIV inserted and NS started at 25ml/hr. [place] and snack provided.

## 2023-03-07 NOTE — PLAN OF CARE
1415 Patient tolerated 1U PRBC with no s/s of reaction. PIV removed and catheter tip intact. She declined janny AVS. Instructed to call MD office for any concerns. Ambulated out independently.

## 2023-03-07 NOTE — PROGRESS NOTES
CBC differential resulted with 13% others, pathologist interpretation with 12% blasts on smear. Dr. Rivera notified. Dr. Rivera called to discuss results with patients. We will schedule BMBx this Friday at 8am.     Additionally, patient coming for 1u RBC transfusion today.     Marianne Garibay PA-C  Malignant Hematology & Bone Marrow Transplant

## 2023-03-07 NOTE — TELEPHONE ENCOUNTER
Ms. Novawilliams confirmed she will start her next cycle on 3/9 taking 1 tablet on days 1-3 of a 28 day cycle. She has enough on hand. I will pend her refill call to 3/30

## 2023-03-13 NOTE — TELEPHONE ENCOUNTER
----- Message from Cheryle Brown sent at 3/13/2023  8:26 AM CDT -----  Regarding: Consult/Advisory      Name Of Caller:  Jeanne       Contact Preference:  [phone]      Nature of call:  patient is calling because she is still in extreme pain and unable to walk after having her procedure on Friday.  Would like to know if this is ilya.

## 2023-03-13 NOTE — TELEPHONE ENCOUNTER
Pain in groin area of right leg to the knee cap, has had pain in knee before. Denies swelling, trouble getting in and out of bed and issues sitting. Has taken Advil, does not help much. Right leg feels warm, low grade temp 99.8.    Spoke to Dr. Rivera, would like repeat labs today, patient states she is unable to go, her sister can bring her tomorrow as scheduled. Let her know if anything changes between now and then such as the pain worsens or intensifies she should go to the emergency room.

## 2023-03-14 NOTE — ED TRIAGE NOTES
Pt presents to the ED w/ c/o L sided chest pain that started upon awakening this AM. Pt states pain is sharp and is intermittent. Pt endorses bone marrow biopsy Friday and has generalized weakness and subjective fever since. Pt denies SOB.

## 2023-03-14 NOTE — TELEPHONE ENCOUNTER
Spoke with patient sister who is at the ER with patient. They are at the [facility] ER. Notified care team .

## 2023-03-14 NOTE — TELEPHONE ENCOUNTER
----- Message from Shivani Rudd sent at 3/14/2023 10:33 AM CDT -----  Regarding: Consult/Advisory  Name Of Caller: Self      Contact Preference:[phone]    Nature of call: Pt is calling because she states that since having her bone marrow biopsy she has been having complications. Pt states it feels like like her ribs is broke due to having a hard time breathing. Pt is going to ER.

## 2023-03-14 NOTE — ED PROVIDER NOTES
SCRIBE #1 NOTE: IMildred, am scribing for, and in the presence of,  Joselo Mao DO.       Source of History:  The patient. The patient's sister.    Chief complaint:  Chest Pain (Pt reports waking up this AM with left chest pain. S/p bone marrow biopsy Friday. Reports weakness after procedure, chills, low grade fever 99.8. +SOB. On chemo since Nov. Started 3rd round on Thursday. Denies fall/injury. +cough)      HPI:  Jeanne Rodgers is a 68 y.o. female with a PMHx of myelodysplastic syndrome (MDS), breast cancer, and uterine cancer presenting with intermittent, sharp left chest pain under her breast that began this morning. The patient notes that she had an extensive right hip bone marrow biopsy done four days ago as part of her MDS treatment. She states that she began to experience weakness, chills, and a fever of 99.8 F following the procedure. In addition, she reports that she has been unable to walk due to severe right groin pain. The patient's sister states this was the patient's fourth bone biopsy, and she has not had this severe of symptoms in the past. The patient is allergic to Oxycodone but tried taking Tramadol four days ago for her symptoms. She states that she experienced nausea and vomiting after taking the medication and has not taken it since.     The patient's chest pain began this morning and each episode lasts about one minute. The patient denies chest pain in the past. She states the onset appears to be more correlated to breath or speech rather than movement. She notes shortness of breath and cough that occurs with the pain. The patient adds that she has experienced shortness of breath and cough while undergoing her blood transfusions recently as well. Of note, she experienced a fever after her second to most recent transfusion and received her most recent transfusion six days ago. She also began her third round of chemo five days ago. The patient sees Dr. Rivera for her  MDS. She states she is not vaccinated for COVID or flu.    This is the extent to the patients complaints today here in the emergency department.    ROS:   See HPI.    Review of patient's allergies indicates:   Allergen Reactions    Oxycodone Hallucinations       PMH:  As per HPI and below:  Past Medical History:   Diagnosis Date    Allergy     skin    Anxiety     Atrial fibrillation     Breast cancer 2014    right    Chronic back pain     Depression     Encounter for blood transfusion     Endometrial cancer 07/30/2019    Fibromyalgia     Hives     Hx of psychiatric care     Hx of radiation therapy     Itching     Lichen sclerosus     Mental disorder     PONV (postoperative nausea and vomiting)     Psychiatric problem     PTSD (post-traumatic stress disorder)     Sleep difficulties     Sore throat     Therapy     Uterine cancer 08/05/2019    UTI (urinary tract infection)      Past Surgical History:   Procedure Laterality Date    anal fissure surgery      APPENDECTOMY      BIOPSY N/A 11/19/2020    Procedure: BONE BIOPSY;  Surgeon: Leisa Diagnostic Provider;  Location: [facility];  Service: Radiology;  Laterality: N/A;  RN PREOP 11/17/2020    BREAST BIOPSY Right 2014    BREAST LUMPECTOMY Right 2014    [facility] w 2.4[facility] IDC & DCIS, neg margins, 0/6 SN, Stage II, ER/AZ+, HER-2 neg Adjuvant XRT and hormonal therapy     CARPAL TUNNEL RELEASE      CHOLECYSTECTOMY      COLONOSCOPY      COLONOSCOPY N/A 6/11/2020    Procedure: COLONOSCOPY;  Surgeon: Bobby Marino MD;  Location: [facility] ([address] [place] [address]);  Service: Endoscopy;  Laterality: N/A;    COLONOSCOPY N/A 6/9/2022    Procedure: COLONOSCOPY;  Surgeon: Abdoulaye Alcocer MD;  Location: [facility] ([address] [place] [address]);  Service: Endoscopy;  Laterality: N/A;    ESOPHAGOGASTRODUODENOSCOPY N/A 6/11/2020    Procedure: EGD (ESOPHAGOGASTRODUODENOSCOPY);  Surgeon: Bobby Marino MD;  Location: [facility] ([address] [place] [address]);  Service: Endoscopy;  Laterality: N/A;    HYSTERECTOMY  08/15/2019     HYSTEROSCOPY WITH DILATION AND CURETTAGE OF UTERUS N/A 7/24/2019    Procedure: HYSTEROSCOPY, WITH DILATION AND CURETTAGE OF UTERUS;  Surgeon: Елена Kam MD;  Location: [facility];  Service: OB/GYN;  Laterality: N/A;    INSERTION OF TUNNELED CENTRAL VENOUS CATHETER (CVC) WITH SUBCUTANEOUS PORT N/A 9/26/2019    Procedure: INSERTION, PORT-A-CATH;  Surgeon: Moris Varghese MD;  Location: [facility] CATH LAB;  Service: Radiology;  Laterality: N/A;    MEDIPORT REMOVAL N/A 3/5/2020    Procedure: REMOVAL, CATHETER, CENTRAL VENOUS, TUNNELED, WITH PORT;  Surgeon: Moris Varghese MD;  Location: [facility] CATH LAB;  Service: Radiology;  Laterality: N/A;    NEEDLE LOCALIZATION N/A 11/19/2020    Procedure: NEEDLE LOCALIZATION;  Surgeon: [facility] Diagnostic Provider;  Location: [facility] OR;  Service: Radiology;  Laterality: N/A;    PARTIAL MASCECTOMY Right 2014    ROBOT-ASSISTED LAPAROSCOPIC ABDOMINAL HYSTERECTOMY USING DA RAMU XI N/A 8/15/2019    Procedure: XI ROBOTIC HYSTERECTOMY;  Surgeon: Darius Regalado MD;  Location: [facility];  Service: OB/GYN;  Laterality: N/A;    ROBOT-ASSISTED LAPAROSCOPIC SALPINGO-OOPHORECTOMY USING DA RAMU XI Bilateral 8/15/2019    Procedure: XI ROBOTIC SALPINGO-OOPHORECTOMY;  Surgeon: Darius Regalado MD;  Location: [facility];  Service: OB/GYN;  Laterality: Bilateral;    ROBOT-ASSISTED LYMPHADENECTOMY Left 8/15/2019    Procedure: ROBOTIC LYMPHADENECTOMY;  Surgeon: Darius Regalado MD;  Location: [facility];  Service: OB/GYN;  Laterality: Left;    SALPINGOOPHORECTOMY Bilateral 08/15/2019    TONSILLECTOMY      TUBAL LIGATION         Social History     Tobacco Use    Smoking status: Never    Smokeless tobacco: Never   Substance Use Topics    Alcohol use: No     Alcohol/week: 0.0 standard drinks    Drug use: Yes     Types: Marijuana     Comment: medical marijuana       Physical Exam:    BP (!) 107/53   Pulse 78   Temp 98.2 °F (36.8 °C) (Oral)   Resp 18   Ht 5' (1.524 m)   Wt 49.9 kg (110 lb)   LMP  (LMP Unknown)  Comment: age 53  SpO2 97%   Breastfeeding No   BMI 21.48 kg/m²   Nursing note and vital signs reviewed.  Constitutional: No acute distress.  Nontoxic. Uncomfortable.  Cardiovascular:  Regular rate and rhythm no murmurs gallops or rubs.   Chest/ Respiratory:  Clear to auscultation bilaterally without wheezing rhonchi or rales  Abdomen: Soft.  Not distended.  Nontender.  No guarding.  No rebound. Non-peritoneal.  Extremities: No pitting edema  Musculoskeletal: Tenderness to palpation of right groin but full passive range of motion of hip, knee, and ankle with no pain with flexion, extension, internal or external rotation, and axial loading.  Neuro: Alert. No focal deficits.  Skin: no rash noted    I decided to obtain the patient's medical records.  Summary of Medical Records:  03/10/2023 bone marrow biopsy done at [facility] for myelodysplastic syndrome.  No complications noted.    MDM/ Differential Dx:  68-year-old female presents with right hip and groin pain that has been going on since she had a bone marrow biopsy on 03/10/2023.  She is had multiple biopsies and this 1 seems to be the worse.  Also yesterday had some intermittent chest pain with no radiation.  No nausea vomiting.  Stated she did have a temperature of 99.8° 2 days ago but none since.  Denies any other symptoms to suggest sepsis etiology.  Will get a chest x-ray to rule out pneumonia.  Will get blood work to evaluate for any evidence of metabolic derangement.  I have considered but have a low suspicion for acute coronary syndrome, pulmonary embolus or pneumothorax and no evidence of dissection.  Will discuss with her bone marrow transplant team after labs.    Social Concerns:      ED Course as of 03/14/23 1954   Tue Mar 14, 2023   1212 X-Ray Chest AP Portable  Chest x-ray independently interpreted by myself shows normal heart size, increased interstitial markings appear to be consistent with likely atelectasis or chronic fibrotic changes.   No focal consolidation or pneumothorax or bony abnormalities. [SM]   1212 EKG 12-lead  EKG independently interpreted by myself shows normal sinus rhythm at a rate of 88, normal intervals, narrow QRS, no acute ST T wave abnormalities.  Overall impression normal EKG.  Compared to an EKG on July 1, 2022 shows no change. [SM]   1239 WBC(!): 13.17 [SM]   1239 Hemoglobin(!): 7.1  Baseline   [SM]   1324 Sodium(!): 131  Baseline 133-136 [SM]   1324 Troponin I: <0.006 [SM]   1324 BNP(!): 176 [SM]   1432 I spoke with Dr. Welch with bone marrow transplant.  Discussed the case.  He requested that we transfer to [facility] where they will manage the pain control as well as are concerned with recent bone marrow findings of worsening CML.  I discussed this with the patient who agrees with the plan of care. [SM]   1432 POC Molecular Influenza A Ag: Negative [SM]   1432 POC Molecular Influenza B Ag: Negative [SM]   1432 SARS-CoV-2 RNA, Amplification, Qual: Negative [SM]      ED Course User Index  [] Joselo Mao DO              Scribe Attestation:   Scribe #1: I performed the above scribed service and the documentation accurately describes the services I performed. I attest to the accuracy of the note.    Physician Attestation for Scribe: I, Dr Joselo Mao, reviewed documentation as scribed in my presence, which is both accurate and complete.    Diagnostic Impression:    1. Pain of right lower extremity    2. Chest pain    3. SOB (shortness of breath)         ED Disposition Condition    Transfer to Another Facility Stable                  Joselo Mao DO  03/14/23 1954

## 2023-03-14 NOTE — FIRST PROVIDER EVALUATION
Emergency Department TeleTriage Encounter Note      CHIEF COMPLAINT    Chief Complaint   Patient presents with    Chest Pain     Pt reports waking up this AM with left chest pain. S/p bone marrow biopsy Friday. Reports weakness after procedure, chills, low grade fever 99.8. +SOB. On chemo since Nov. Started 3rd round on Thursday. Denies fall/injury. +cough       VITAL SIGNS   Initial Vitals [03/14/23 1045]   BP Pulse Resp Temp SpO2   (!) 122/58 89 20 98.2 °F (36.8 °C) 98 %      MAP       --            ALLERGIES    Review of patient's allergies indicates:   Allergen Reactions    Oxycodone Hallucinations       PROVIDER TRIAGE NOTE  TeleTriage Note: Jeanne Rodgers, a nontoxic/well appearing, 68 y.o. female, presented to the ED with c/o bone marrow aspiration to right hip on Friday. Left rib pain that began yesterday. Hurts to breath.    All ED beds are full at present; patient notified of this status.  Patient seen and medically screened by Nurse Practitioner via teletriage. Orders initiated at triage to expedite care.  Patient is stable to return to the waiting room and will be placed in an ED bed when available.  Care will be transferred to an alternate provider when patient has been placed in an Exam Room from the [facility] for physical exam, additional orders, and disposition.  11:08 AM Kary Rawls DNP, FNP-C        ORDERS  Labs Reviewed - No data to display    ED Orders (720h ago, onward)      Start Ordered     Status Ordering Provider    03/14/23 1410 03/14/23 1110  Troponin I #2  Once Timed         Ordered KARY RAWLS    03/14/23 1115 03/14/23 1110  aspirin tablet 325 mg  ED 1 Time         Ordered KARY RAWLS    03/14/23 1111 03/14/23 1110  X-Ray Chest AP Portable  1 time imaging         Ordered KARY RAWLS    03/14/23 1110 03/14/23 1110  Vital signs  Every 15 min         Ordered KARY RAWLS    03/14/23 1110 03/14/23 1110  Cardiac Monitoring - Adult  Continuous         Comments: Notify Physician If:    Ordered RANDALL, CORNELIO MONSALVE.    03/14/23 1110 03/14/23 1110  Pulse Oximetry Continuous  Continuous         Ordered RANDALL, CORNELIO MONSALVE.    03/14/23 1110 03/14/23 1110  Diet NPO  Diet effective now         Ordered RANDALL, CORNELIO MONSALVE.    03/14/23 1110 03/14/23 1110  Saline lock IV  Once         Ordered RANDALL, CORNELIO MONSALVE.    03/14/23 1110 03/14/23 1110  EKG 12-lead  Once        Comments: Do not perform if previously done during this visit/ triage    Ordered RANDALL, CORNELIO MONSALVE.    03/14/23 1110 03/14/23 1110  Troponin I #1  STAT         Ordered RANDALL, CORNELIO MONSALVEAshley    03/14/23 1110 03/14/23 1110  B-Type natriuretic peptide (BNP)  STAT         Ordered RANDALL, CORNELIO MONSALVEAshley    03/14/23 1048 03/14/23 1048  EKG 12-lead  Once         Completed by AFSHIN TIDWELL on 3/14/2023 at 10:58 AM BIANCA RICKS              Virtual Visit Note: The provider triage portion of this emergency department evaluation and documentation was performed via [employer], a HIPAA-compliant telemedicine application, in concert with a tele-presenter in the room. A face to face patient evaluation with one of my colleagues will occur once the patient is placed in an emergency department room.      DISCLAIMER: This note was prepared with [employer] voice recognition transcription software. Garbled syntax, mangled pronouns, and other bizarre constructions may be attributed to that software system.

## 2023-03-15 PROBLEM — T50.905A THROMBOCYTOPENIA DUE TO DRUGS: Status: RESOLVED | Noted: 2020-04-08 | Resolved: 2023-01-01

## 2023-03-15 PROBLEM — Z51.11 ENCOUNTER FOR ANTINEOPLASTIC CHEMOTHERAPY: Status: RESOLVED | Noted: 2019-09-16 | Resolved: 2023-01-01
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
## 2023-03-15 PROBLEM — D70.2 NEUTROPENIA, DRUG-INDUCED: Status: RESOLVED | Noted: 2020-04-08 | Resolved: 2023-01-01

## 2023-03-15 PROBLEM — R53.81 PHYSICAL DECONDITIONING: Status: RESOLVED | Noted: 2022-01-01 | Resolved: 2023-01-01

## 2023-03-15 PROBLEM — F43.29 STRESS AND ADJUSTMENT REACTION: Status: RESOLVED | Noted: 2022-01-01 | Resolved: 2023-01-01

## 2023-03-15 PROBLEM — D69.59 THROMBOCYTOPENIA DUE TO DRUGS: Status: RESOLVED | Noted: 2020-04-08 | Resolved: 2023-01-01

## 2023-03-15 PROBLEM — R31.29 MICROSCOPIC HEMATURIA: Status: RESOLVED | Noted: 2021-09-24 | Resolved: 2023-01-01

## 2023-03-15 PROBLEM — R00.0 TACHYCARDIA: Status: RESOLVED | Noted: 2022-01-01 | Resolved: 2023-01-01

## 2023-03-15 PROBLEM — R60.0 LEG EDEMA: Status: RESOLVED | Noted: 2022-01-01 | Resolved: 2023-01-01

## 2023-03-15 PROBLEM — C92.00 ACUTE MYELOID LEUKEMIA NOT HAVING ACHIEVED REMISSION: Status: ACTIVE | Noted: 2023-01-01

## 2023-03-15 PROBLEM — R07.81 RIB PAIN ON LEFT SIDE: Status: ACTIVE | Noted: 2018-05-21

## 2023-03-15 PROBLEM — M89.8X9 BONE PAIN: Status: RESOLVED | Noted: 2020-09-04 | Resolved: 2023-01-01

## 2023-03-15 PROBLEM — D64.9 SYMPTOMATIC ANEMIA: Status: RESOLVED | Noted: 2022-01-01 | Resolved: 2023-01-01

## 2023-03-15 PROBLEM — T66.XXXA RADIATION THERAPY COMPLICATION: Status: RESOLVED | Noted: 2020-06-10 | Resolved: 2023-01-01

## 2023-03-15 PROBLEM — R50.9 FEVER: Status: RESOLVED | Noted: 2022-01-01 | Resolved: 2023-01-01

## 2023-03-15 PROBLEM — D61.811 DRUG-INDUCED PANCYTOPENIA: Status: RESOLVED | Noted: 2022-01-01 | Resolved: 2023-01-01

## 2023-03-15 PROBLEM — M79.604 PAIN OF RIGHT LOWER EXTREMITY: Status: RESOLVED | Noted: 2022-03-25 | Resolved: 2023-01-01

## 2023-03-15 PROBLEM — D63.0 ANEMIA IN NEOPLASTIC DISEASE: Status: ACTIVE | Noted: 2020-06-10

## 2023-03-15 PROBLEM — D53.9 MACROCYTIC ANEMIA: Status: RESOLVED | Noted: 2021-12-15 | Resolved: 2023-01-01

## 2023-03-15 NOTE — ED NOTES
Pt has been updated on the plan of care. She is now reporting pain in her leg. MD has been made aware

## 2023-03-15 NOTE — ED NOTES
Transfer center called with room assignment . Transport will be set up at this time. Patient to be admitted to room 803. Accepting physician Dr Welch. # for report [phone]

## 2023-03-15 NOTE — ASSESSMENT & PLAN NOTE
- daily CBC while inpatient  - transfuse for Plt <10k or bleeding  - hold anticoagulation as platelets <50K

## 2023-03-15 NOTE — ASSESSMENT & PLAN NOTE
- patient of Dr. 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   Small mass seen on mammogram 2007 and 2008, nipple inversion 2012 at [facility]""  Referred for radiation therapy at [facility]  On Arimidex for 4 years

## 2023-03-15 NOTE — ASSESSMENT & PLAN NOTE
- Patient of Dr. 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 NGS with TP53.  - Likely secondary MDS from prior chemotherapy/radiation therapy  - She started Inqovi 5 of 28 days on 10/21/22.  She is tolerating it well thus far.  - C4 delayed due to thrombocytopenia and ocular bleed.  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 Rivera for outpatient aza/nimesh once stable for discharge

## 2023-03-15 NOTE — ASSESSMENT & PLAN NOTE
- patient of Dr. Regalado  Stage IIIC2 UPSC  Diagnostic PET with pelvic and PA lymphadenopathy  RALH/BSO/ Debuliking of pelvic lymph nodes (6/8) positive on 8/15/2019.  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   T/C started 9/27/2019  WPRT to PA nodes 45Gy completed 1/22/2020  HDR to 15Gy in 3 fractions completed 3/3/2020  Restarted T/C 3/20/2020, s/p 5 cycles concluding 5/8/2020.   Complicated by pancytopenia and anemia  See AML

## 2023-03-15 NOTE — SUBJECTIVE & OBJECTIVE
Patient information was obtained from patient and past medical records.     Oncology History:  - Patient of Dr. 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 NGS with TP53.  Likely secondary MDS from prior chemotherapy/radiation therapy  She started Inqovi 5 of 28 days on 10/21/22.  She is tolerating it well thus far.  C4 delayed due to thrombocytopenia and ocular bleed.  Plan for cycle 5 to start 3/9/2023 with 3/5 tabs.     - patient of Dr. Regalado  Stage IIIC2 UPSC  Diagnostic PET with pelvic and PA lymphadenopathy  RALH/BSO/ Debuliking of pelvic lymph nodes (6/8) positive on 8/15/2019.  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   T/C started 9/27/2019  WPRT to PA nodes 45Gy completed 1/22/2020  HDR to 15Gy in 3 fractions completed 3/3/2020  Restarted T/C 3/20/2020, s/p 5 cycles concluding 5/8/2020.   Complicated by pancytopenia and anemia.        - patient of Dr. 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.   Small mass seen on mammogram 2007 and 2008, nipple inversion 2012 at \[facility]\""  Referred for radiation therapy at [facility]  On Arimidex for 4 years    Medications Prior to Admission   Medication Sig Dispense Refill Last Dose    acetaminophen (TYLENOL) 500 MG tablet Take 1,000 mg by mouth daily as needed for Pain.       decitabine-cedazuridine  mg Tab Take 1 tablet by mouth once daily. For days 1-3 of a 28 day cycle 5 tablet 5 Past Week    metoprolol tartrate (LOPRESSOR) 25 MG tablet Take 1 tablet (25 mg total) by mouth 2 (two) times daily. 180 tablet 3 3/13/2023    pantoprazole (PROTONIX) 40 MG tablet Take 1 tablet (40 mg total) by mouth once daily. 90 tablet 3  3/13/2023    acyclovir (ZOVIRAX) 400 MG tablet Take 1 tablet (400 mg total) by mouth 2 (two) times daily. 60 tablet 1     diazePAM (VALIUM) 10 MG Tab Take 1 tablet (10 mg total) by mouth once as needed. Take 1 tablet (10mg total) by mouth once as needed 30 minutes prior to bone marrow procedure and one the night before, if needed for anxiety. 2 tablet 0     fexofenadine (ALLEGRA) 180 MG tablet 2 tablets in the morning.  May take an extra 2 tablets during the day if needed for itching. 240 tablet 5     hydrOXYzine HCL (ATARAX) 25 MG tablet 1 to 8 tablets at bedtime.  Start with 3 tablets.  Increase or decrease as needed until itching is controlled or a maximum of 8 tablets. 240 tablet 3     ibuprofen (ADVIL,MOTRIN) 200 MG tablet Take 200 mg by mouth every 6 (six) hours as needed for Pain. Pt takes 2 pills daily       meth/meblue/sod phos/psal/hyos (URIBEL ORAL) Take by mouth as needed.       mupirocin (BACTROBAN) 2 % ointment Apply topically 4 (four) times daily.       mupirocin (BACTROBAN) 2 % ointment Apply topically 3 (three) times daily. 22 g 0     nystatin (MYCOSTATIN) cream Apply topically every Mon, Wed, Fri. 30 g 11     nystatin (MYCOSTATIN) powder Apply topically 4 (four) times daily. 30 g 1     povidone-iodine (BETADINE) 10 % external solution Apply topically as needed for Wound Care. Clean R hand wound w/ betadine daily 14.8 mL 0     senna (SENOKOT) 8.6 mg tablet Take 1 tablet by mouth once daily. Can take twice daily 30 tablet 11     triamcinolone acetonide 0.1% (KENALOG) 0.1 % ointment Apply topically 2 (two) times daily. for 7 days 80 g 0     UNABLE TO FIND [place] [employer] Mushrooms Immune Support          Oxycodone     Past Medical History:   Diagnosis Date    Allergy     skin    Anxiety     Atrial fibrillation     Breast cancer 2014    right    Chronic back pain     Depression     Encounter for blood transfusion     Endometrial cancer 07/30/2019    Fibromyalgia     Hives     Hx of psychiatric care     Hx  of radiation therapy     Itching     Lichen sclerosus     Mental disorder     PONV (postoperative nausea and vomiting)     Psychiatric problem     PTSD (post-traumatic stress disorder)     Sleep difficulties     Sore throat     Therapy     Uterine cancer 08/05/2019    UTI (urinary tract infection)      Past Surgical History:   Procedure Laterality Date    anal fissure surgery      APPENDECTOMY      BIOPSY N/A 11/19/2020    Procedure: BONE BIOPSY;  Surgeon: Leisa Diagnostic Provider;  Location: [facility] OR;  Service: Radiology;  Laterality: N/A;  RN PREOP 11/17/2020    BREAST BIOPSY Right 2014    BREAST LUMPECTOMY Right 2014    R lumpectomy [facility] w 2.4cm IDC & DCIS, neg margins, 0/6 SN, Stage II, ER/NE+, HER-2 neg Adjuvant XRT and hormonal therapy     CARPAL TUNNEL RELEASE      CHOLECYSTECTOMY      COLONOSCOPY      COLONOSCOPY N/A 6/11/2020    Procedure: COLONOSCOPY;  Surgeon: Bobby Marino MD;  Location: [facility] ([address] [place] [address]);  Service: Endoscopy;  Laterality: N/A;    COLONOSCOPY N/A 6/9/2022    Procedure: COLONOSCOPY;  Surgeon: Abdoulaye Alcocer MD;  Location: [facility] ([address] [place] [address]);  Service: Endoscopy;  Laterality: N/A;    ESOPHAGOGASTRODUODENOSCOPY N/A 6/11/2020    Procedure: EGD (ESOPHAGOGASTRODUODENOSCOPY);  Surgeon: Bobby Marino MD;  Location: [facility] ([address] [place] [address]);  Service: Endoscopy;  Laterality: N/A;    HYSTERECTOMY  08/15/2019    HYSTEROSCOPY WITH DILATION AND CURETTAGE OF UTERUS N/A 7/24/2019    Procedure: HYSTEROSCOPY, WITH DILATION AND CURETTAGE OF UTERUS;  Surgeon: Елена Kam MD;  Location: [facility] OR;  Service: OB/GYN;  Laterality: N/A;    INSERTION OF TUNNELED CENTRAL VENOUS CATHETER (CVC) WITH SUBCUTANEOUS PORT N/A 9/26/2019    Procedure: INSERTION, PORT-A-CATH;  Surgeon: Moris Varghese MD;  Location: [facility] CATH LAB;  Service: Radiology;  Laterality: N/A;    MEDIPORT REMOVAL N/A 3/5/2020    Procedure: REMOVAL, CATHETER, CENTRAL VENOUS, TUNNELED, WITH PORT;  Surgeon: Moris Varghese MD;   Location: [facility] CATH LAB;  Service: Radiology;  Laterality: N/A;    NEEDLE LOCALIZATION N/A 11/19/2020    Procedure: NEEDLE LOCALIZATION;  Surgeon: Leisa Diagnostic Provider;  Location: [facility] OR;  Service: Radiology;  Laterality: N/A;    PARTIAL MASCECTOMY Right 2014    ROBOT-ASSISTED LAPAROSCOPIC ABDOMINAL HYSTERECTOMY USING DA RAMU XI N/A 8/15/2019    Procedure: XI ROBOTIC HYSTERECTOMY;  Surgeon: Darius Regalado MD;  Location: [facility] OR;  Service: OB/GYN;  Laterality: N/A;    ROBOT-ASSISTED LAPAROSCOPIC SALPINGO-OOPHORECTOMY USING DA RAMU XI Bilateral 8/15/2019    Procedure: XI ROBOTIC SALPINGO-OOPHORECTOMY;  Surgeon: Darius Regalado MD;  Location: [facility] OR;  Service: OB/GYN;  Laterality: Bilateral;    ROBOT-ASSISTED LYMPHADENECTOMY Left 8/15/2019    Procedure: ROBOTIC LYMPHADENECTOMY;  Surgeon: Darius Regalado MD;  Location: [facility] OR;  Service: OB/GYN;  Laterality: Left;    SALPINGOOPHORECTOMY Bilateral 08/15/2019    TONSILLECTOMY      TUBAL LIGATION       Family History       Problem Relation (Age of Onset)    Alcohol abuse Cousin    Arthritis Mother    Cancer Paternal Uncle, Paternal Aunt    Colon polyps Sister, Sister    Diabetes Other    Hashimoto's thyroiditis Sister    Heart attack Paternal Grandmother, Paternal Grandfather    Hypertension Mother, Brother, Brother    Lung cancer Maternal Uncle    Other Mother (85)    Pancreatic cancer Father (85), Maternal Aunt (62)    Rashes / Skin problems Mother (92)    Skin cancer Mother (89)    Suicide Cousin    Throat cancer Other          Tobacco Use    Smoking status: Never    Smokeless tobacco: Never   Substance and Sexual Activity    Alcohol use: No     Alcohol/week: 0.0 standard drinks    Drug use: Yes     Types: Marijuana     Comment: medical marijuana    Sexual activity: Not Currently     Partners: Male       Review of Systems   Constitutional:  Positive for fatigue. Negative for chills, diaphoresis and fever.   HENT:  Negative for congestion, ear pain, mouth sores,  nosebleeds, postnasal drip, rhinorrhea, sinus pressure, sinus pain, sore throat and trouble swallowing.    Eyes:  Negative for pain, discharge and redness.   Respiratory:  Positive for shortness of breath. Negative for apnea, cough and chest tightness.    Cardiovascular:  Negative for chest pain, palpitations and leg swelling.   Gastrointestinal:  Positive for abdominal pain. Negative for abdominal distention, blood in stool, constipation, diarrhea, nausea and vomiting.   Genitourinary:  Negative for dysuria and hematuria.   Musculoskeletal:  Positive for arthralgias (right flank). Negative for back pain, gait problem and joint swelling.   Skin:  Negative for rash.   Neurological:  Negative for dizziness, tremors, syncope, numbness and headaches.   Psychiatric/Behavioral:  Negative for behavioral problems and confusion. The patient is nervous/anxious.    Objective:     Vital Signs (Most Recent):  Temp: 98 °F (36.7 °C) (03/15/23 1325)  Pulse: 88 (03/15/23 1325)  Resp: 18 (03/15/23 1325)  BP: 132/62 (03/15/23 1325)  SpO2: 97 % (03/15/23 1325) Vital Signs (24h Range):  Temp:  [97.9 °F (36.6 °C)-99.2 °F (37.3 °C)] 98 °F (36.7 °C)  Pulse:  [74-95] 88  Resp:  [16-22] 18  SpO2:  [94 %-97 %] 97 %  BP: (100-132)/(50-77) 132/62     Weight: 50.2 kg (110 lb 10.7 oz)  Body mass index is 21.61 kg/m².  Body surface area is 1.46 meters squared.    Lines/Drains/Airways       Peripheral Intravenous Line  Duration                  Peripheral IV - Single Lumen 03/14/23 1638 20 G Anterior;Left Forearm <1 day                    Physical Exam  Vitals reviewed.   Constitutional:       Appearance: Normal appearance. She is well-developed. She is not ill-appearing or toxic-appearing.   HENT:      Head: Normocephalic and atraumatic.      Right Ear: External ear normal.      Left Ear: External ear normal.      Mouth/Throat:      Pharynx: No posterior oropharyngeal erythema.   Eyes:      Extraocular Movements: Extraocular movements intact.       Conjunctiva/sclera: Conjunctivae normal.   Cardiovascular:      Rate and Rhythm: Normal rate and regular rhythm.      Pulses: Normal pulses.      Heart sounds: Normal heart sounds. No murmur heard.  Pulmonary:      Effort: Pulmonary effort is normal. No respiratory distress.      Breath sounds: Normal breath sounds. No stridor. No wheezing or rales.   Abdominal:      General: Bowel sounds are normal. There is no distension.      Palpations: Abdomen is soft.      Tenderness: There is abdominal tenderness (LUQ).   Musculoskeletal:         General: Normal range of motion.      Cervical back: Normal range of motion.      Right lower leg: No edema.      Left lower leg: No edema.   Skin:     General: Skin is warm and dry.      Findings: No rash.   Neurological:      General: No focal deficit present.      Mental Status: She is alert and oriented to person, place, and time.   Psychiatric:         Mood and Affect: Mood normal.         Behavior: Behavior normal.         Thought Content: Thought content normal.         Judgment: Judgment normal.       Significant Labs:   CBC:   Recent Labs   Lab 03/14/23  1008 03/14/23  1216 03/15/23  1056   WBC 14.29* 13.17* 13.49*   HGB 7.6* 7.1* 6.8*   HCT 23.1* 21.5* 21.6*   PLT 66* 53* 44*    and CMP:   Recent Labs   Lab 03/14/23  1008 03/14/23  1216 03/15/23  0905   * 131* 134*   K 3.9 3.7 4.1   CL 97 97 99   CO2 24 26 24   * 109 90   BUN 15 16 13   CREATININE 0.8 0.7 0.6   CALCIUM 9.1 8.7 8.8   PROT 7.6 7.4 7.0   ALBUMIN 2.9* 3.0* 2.7*   BILITOT 0.6 0.6 0.8   ALKPHOS 81 74 165*   AST 13 14 19   ALT 6* 8* 8*   ANIONGAP 12 8 11       Diagnostic Results:  I have reviewed all pertinent imaging results/findings within the past 24 hours.

## 2023-03-15 NOTE — PLAN OF CARE
Plan of care reviewed with pt at bedside. Pt remianed alert and oriented x4, on room air, standby assist to restroom and complaints of pain treated with PRN medication. Pt did receive 1 unit or PRBC today and leaving for CT right now.     EDUARDO Ulloa     Problem: Adult Inpatient Plan of Care  Goal: Plan of Care Review  Outcome: Ongoing, Progressing  Goal: Patient-Specific Goal (Individualized)  Outcome: Ongoing, Progressing  Goal: Absence of Hospital-Acquired Illness or Injury  Outcome: Ongoing, Progressing  Goal: Optimal Comfort and Wellbeing  Outcome: Ongoing, Progressing  Goal: Readiness for Transition of Care  Outcome: Ongoing, Progressing     Problem: Impaired Wound Healing  Goal: Optimal Wound Healing  Outcome: Ongoing, Progressing     Problem: Skin Injury Risk Increased  Goal: Skin Health and Integrity  Outcome: Ongoing, Progressing

## 2023-03-15 NOTE — ED NOTES
Assisted patient to bathroom at this time. No changes to status noted. Patient denies further needs at this time.

## 2023-03-15 NOTE — ASSESSMENT & PLAN NOTE
- new, acute left rib pain noted ~2 days  - denies injury to site  - CXR does not show any rib abnormalities; exam is clear  - will get CT c/a/p to rule out other underlying causes  - PO morphine IR 15mg q4h for pain control; can increase dose or frequency as needed to control pain

## 2023-03-15 NOTE — ED NOTES
Report received. Pt sleeping, easily arousal. Updated pt on poc. Pt denies any needs at this time. Side rails upx2, call bell within reach. Will continue to monitor.

## 2023-03-15 NOTE — H&P
Anselmo Vega - Oncology ([facility])  Hematology  Bone Marrow Transplant  H&P    Subjective:     Principal Problem: Rib pain on left side    HPI: Ms. Rodgers is a 68 y.o. female, patient of Dr. 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 Reports pain ~ last 2 days to left flank/lower rib cage. She had bone marrow biopsy done in BMT clinic on 3/10 that shows progression of disease from MDS to AML (22% blasts). CXR negative at [facility], no further imaging or workup done. She denies any recent fevers, chills, chest pain, sob, n/v/d/c. No evidence of TLS on lab work. Will admit to inpatient BMT service for further workup of acute pain      Patient information was obtained from patient and past medical records.     Oncology History:  - Patient of Dr. 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 NGS with TP53.  Likely secondary MDS from prior chemotherapy/radiation therapy  She started Inqovi 5 of 28 days on 10/21/22.  She is tolerating it well thus far.  C4 delayed due to thrombocytopenia and ocular bleed.  Plan for cycle 5 to start 3/9/2023 with 3/5 tabs.     - patient of Dr. Regalado  Stage IIIC2 UPSC  Diagnostic PET with pelvic and PA lymphadenopathy  RALH/BSO/ Debuliking of pelvic lymph nodes (6/8) positive on 8/15/2019.  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   T/C started 9/27/2019  WPRT to PA nodes 45Gy completed 1/22/2020  HDR to 15Gy in 3 fractions completed 3/3/2020  Restarted T/C 3/20/2020, s/p 5 cycles concluding 5/8/2020.   Complicated by pancytopenia and anemia.         - patient of Dr. Ford  DCIS  Stage I IDC and DCIS right breast, ER+, [place]+ HER 2 Zack negative   Lumpectomy 6/24/2014 at [facility].   Small mass seen on mammogram 2007 and 2008, [facility]  [facility]  On Arimidex for 4 years    Medications Prior to Admission   Medication Sig Dispense Refill Last Dose    acetaminophen (TYLENOL) 500 MG tablet Take 1,000 mg by mouth daily as needed for Pain.       decitabine-cedazuridine  mg Tab Take 1 tablet by mouth once daily. For days 1-3 of a 28 day cycle 5 tablet 5 Past Week    metoprolol tartrate (LOPRESSOR) 25 MG tablet Take 1 tablet (25 mg total) by mouth 2 (two) times daily. 180 tablet 3 3/13/2023    pantoprazole (PROTONIX) 40 MG tablet Take 1 tablet (40 mg total) by mouth once daily. 90 tablet 3 3/13/2023    acyclovir (ZOVIRAX) 400 MG tablet Take 1 tablet (400 mg total) by mouth 2 (two) times daily. 60 tablet 1     diazePAM (VALIUM) 10 MG Tab Take 1 tablet (10 mg total) by mouth once as needed. Take 1 tablet (10mg total) by mouth once as needed 30 minutes prior to bone marrow procedure and one the night before, if needed for anxiety. 2 tablet 0     fexofenadine (ALLEGRA) 180 MG tablet 2 tablets in the morning.  May take an extra 2 tablets during the day if needed for itching. 240 tablet 5     hydrOXYzine HCL (ATARAX) 25 MG tablet 1 to 8 tablets at bedtime.  Start with 3 tablets.  Increase or decrease as needed until itching is controlled or a maximum of 8 tablets. 240 tablet 3     ibuprofen (ADVIL,MOTRIN) 200 MG tablet Take 200 mg by mouth every 6 (six) hours as needed for Pain. Pt takes 2 pills daily       meth/meblue/sod phos/psal/hyos (URIBEL ORAL) Take by mouth as needed.       mupirocin (BACTROBAN) 2 % ointment Apply topically 4 (four) times daily.       mupirocin (BACTROBAN) 2 % ointment Apply topically 3 (three) times daily. 22 g 0     nystatin (MYCOSTATIN) cream Apply topically every Mon, Wed,  Fri. 30 g 11     nystatin (MYCOSTATIN) powder Apply topically 4 (four) times daily. 30 g 1     povidone-iodine (BETADINE) 10 % external solution Apply topically as needed for Wound Care. Clean R hand wound w/ betadine daily 14.8 mL 0     senna (SENOKOT) 8.6 mg tablet Take 1 tablet by mouth once daily. Can take twice daily 30 tablet 11     triamcinolone acetonide 0.1% (KENALOG) 0.1 % ointment Apply topically 2 (two) times daily. for 7 days 80 g 0     UNABLE TO FIND [place] [employer] Mushrooms Immune Support          Oxycodone     Past Medical History:   Diagnosis Date    Allergy     skin    Anxiety     Atrial fibrillation     Breast cancer 2014    right    Chronic back pain     Depression     Encounter for blood transfusion     Endometrial cancer 07/30/2019    Fibromyalgia     Hives     Hx of psychiatric care     Hx of radiation therapy     Itching     Lichen sclerosus     Mental disorder     PONV (postoperative nausea and vomiting)     Psychiatric problem     PTSD (post-traumatic stress disorder)     Sleep difficulties     Sore throat     Therapy     Uterine cancer 08/05/2019    UTI (urinary tract infection)      Past Surgical History:   Procedure Laterality Date    anal fissure surgery      APPENDECTOMY      BIOPSY N/A 11/19/2020    Procedure: BONE BIOPSY;  Surgeon: [facility] Diagnostic Provider;  Location: [facility] OR;  Service: Radiology;  Laterality: N/A;  RN PREOP 11/17/2020    BREAST BIOPSY Right 2014    BREAST LUMPECTOMY Right 2014    R lumpectomy [facility] w 2.4cm IDC & DCIS, neg margins, 0/6 SN, Stage II, ER/WI+, HER-2 neg Adjuvant XRT and hormonal therapy     CARPAL TUNNEL RELEASE      CHOLECYSTECTOMY      COLONOSCOPY      COLONOSCOPY N/A 6/11/2020    Procedure: COLONOSCOPY;  Surgeon: Bobby Marino MD;  Location: [facility] (2ND FLR);  Service: Endoscopy;  Laterality: N/A;    COLONOSCOPY N/A 6/9/2022    Procedure: COLONOSCOPY;  Surgeon: Abdoulaye Alcocer MD;  Location: [facility] (2ND  FLR);  Service: Endoscopy;  Laterality: N/A;    ESOPHAGOGASTRODUODENOSCOPY N/A 6/11/2020    Procedure: EGD (ESOPHAGOGASTRODUODENOSCOPY);  Surgeon: Bobby Marino MD;  Location: [address]);  Service: Endoscopy;  Laterality: N/A;    HYSTERECTOMY  08/15/2019    HYSTEROSCOPY WITH DILATION AND CURETTAGE OF UTERUS N/A 7/24/2019    Procedure: HYSTEROSCOPY, WITH DILATION AND CURETTAGE OF UTERUS;  Surgeon: Елена Kam MD;  Location: [facility];  Service: OB/GYN;  Laterality: N/A;    INSERTION OF TUNNELED CENTRAL VENOUS CATHETER (CVC) WITH SUBCUTANEOUS PORT N/A 9/26/2019    Procedure: INSERTION, PORT-A-CATH;  Surgeon: Moris Varghese MD;  Location: [facility] CATH LAB;  Service: Radiology;  Laterality: N/A;    MEDIPORT REMOVAL N/A 3/5/2020    Procedure: REMOVAL, CATHETER, CENTRAL VENOUS, TUNNELED, WITH PORT;  Surgeon: Moris Varghese MD;  Location: [facility] CATH LAB;  Service: Radiology;  Laterality: N/A;    NEEDLE LOCALIZATION N/A 11/19/2020    Procedure: NEEDLE LOCALIZATION;  Surgeon: [facility] Diagnostic Provider;  Location: [facility] OR;  Service: Radiology;  Laterality: N/A;    PARTIAL MASCECTOMY Right 2014    ROBOT-ASSISTED LAPAROSCOPIC ABDOMINAL HYSTERECTOMY USING DA RAMU XI N/A 8/15/2019    Procedure: XI ROBOTIC HYSTERECTOMY;  Surgeon: Darius Regalado MD;  Location: [facility];  Service: OB/GYN;  Laterality: N/A;    ROBOT-ASSISTED LAPAROSCOPIC SALPINGO-OOPHORECTOMY USING DA RAMU XI Bilateral 8/15/2019    Procedure: XI ROBOTIC SALPINGO-OOPHORECTOMY;  Surgeon: Darius Regalado MD;  Location: [facility];  Service: OB/GYN;  Laterality: Bilateral;    ROBOT-ASSISTED LYMPHADENECTOMY Left 8/15/2019    Procedure: ROBOTIC LYMPHADENECTOMY;  Surgeon: Darius Regalado MD;  Location: [facility];  Service: OB/GYN;  Laterality: Left;    SALPINGOOPHORECTOMY Bilateral 08/15/2019    TONSILLECTOMY      TUBAL LIGATION       Family History       Problem Relation (Age of Onset)    Alcohol abuse Cousin    Arthritis Mother    Cancer  Paternal Uncle, Paternal Aunt    Colon polyps Sister, Sister    Diabetes Other    Hashimoto's thyroiditis Sister    Heart attack Paternal Grandmother, Paternal Grandfather    Hypertension Mother, Brother, Brother    Lung cancer Maternal Uncle    Other Mother (85)    Pancreatic cancer Father (85), Maternal Aunt (62)    Rashes / Skin problems Mother (92)    Skin cancer Mother (89)    Suicide Cousin    Throat cancer Other          Tobacco Use    Smoking status: Never    Smokeless tobacco: Never   Substance and Sexual Activity    Alcohol use: No     Alcohol/week: 0.0 standard drinks    Drug use: Yes     Types: Marijuana     Comment: medical marijuana    Sexual activity: Not Currently     Partners: Male       Review of Systems   Constitutional:  Positive for fatigue. Negative for chills, diaphoresis and fever.   HENT:  Negative for congestion, ear pain, mouth sores, nosebleeds, postnasal drip, rhinorrhea, sinus pressure, sinus pain, sore throat and trouble swallowing.    Eyes:  Negative for pain, discharge and redness.   Respiratory:  Positive for shortness of breath. Negative for apnea, cough and chest tightness.    Cardiovascular:  Negative for chest pain, palpitations and leg swelling.   Gastrointestinal:  Positive for abdominal pain. Negative for abdominal distention, blood in stool, constipation, diarrhea, nausea and vomiting.   Genitourinary:  Negative for dysuria and hematuria.   Musculoskeletal:  Positive for arthralgias (right flank). Negative for back pain, gait problem and joint swelling.   Skin:  Negative for rash.   Neurological:  Negative for dizziness, tremors, syncope, numbness and headaches.   Psychiatric/Behavioral:  Negative for behavioral problems and confusion. The patient is nervous/anxious.    Objective:     Vital Signs (Most Recent):  Temp: 98 °F (36.7 °C) (03/15/23 1325)  Pulse: 88 (03/15/23 1325)  Resp: 18 (03/15/23 1325)  BP: 132/62 (03/15/23 1325)  SpO2: 97 % (03/15/23 1325) Vital Signs  (24h Range):  Temp:  [97.9 °F (36.6 °C)-99.2 °F (37.3 °C)] 98 °F (36.7 °C)  Pulse:  [74-95] 88  Resp:  [16-22] 18  SpO2:  [94 %-97 %] 97 %  BP: (100-132)/(50-77) 132/62     Weight: 50.2 kg (110 lb 10.7 oz)  Body mass index is 21.61 kg/m².  Body surface area is 1.46 meters squared.    Lines/Drains/Airways       Peripheral Intravenous Line  Duration                  Peripheral IV - Single Lumen 03/14/23 1638 20 G Anterior;Left Forearm <1 day                    Physical Exam  Vitals reviewed.   Constitutional:       Appearance: Normal appearance. She is well-developed. She is not ill-appearing or toxic-appearing.   HENT:      Head: Normocephalic and atraumatic.      Right Ear: External ear normal.      Left Ear: External ear normal.      Mouth/Throat:      Pharynx: No posterior oropharyngeal erythema.   Eyes:      Extraocular Movements: Extraocular movements intact.      Conjunctiva/sclera: Conjunctivae normal.   Cardiovascular:      Rate and Rhythm: Normal rate and regular rhythm.      Pulses: Normal pulses.      Heart sounds: Normal heart sounds. No murmur heard.  Pulmonary:      Effort: Pulmonary effort is normal. No respiratory distress.      Breath sounds: Normal breath sounds. No stridor. No wheezing or rales.   Abdominal:      General: Bowel sounds are normal. There is no distension.      Palpations: Abdomen is soft.      Tenderness: There is abdominal tenderness (LUQ).   Musculoskeletal:         General: Normal range of motion.      Cervical back: Normal range of motion.      Right lower leg: No edema.      Left lower leg: No edema.   Skin:     General: Skin is warm and dry.      Findings: No rash.   Neurological:      General: No focal deficit present.      Mental Status: She is alert and oriented to person, place, and time.   Psychiatric:         Mood and Affect: Mood normal.         Behavior: Behavior normal.         Thought Content: Thought content normal.         Judgment: Judgment normal.        Significant Labs:   CBC:   Recent Labs   Lab 03/14/23  1008 03/14/23  1216 03/15/23  1056   WBC 14.29* 13.17* 13.49*   HGB 7.6* 7.1* 6.8*   HCT 23.1* 21.5* 21.6*   PLT 66* 53* 44*    and CMP:   Recent Labs   Lab 03/14/23  1008 03/14/23  1216 03/15/23  0905   * 131* 134*   K 3.9 3.7 4.1   CL 97 97 99   CO2 24 26 24   * 109 90   BUN 15 16 13   CREATININE 0.8 0.7 0.6   CALCIUM 9.1 8.7 8.8   PROT 7.6 7.4 7.0   ALBUMIN 2.9* 3.0* 2.7*   BILITOT 0.6 0.6 0.8   ALKPHOS 81 74 165*   AST 13 14 19   ALT 6* 8* 8*   ANIONGAP 12 8 11       Diagnostic Results:  I have reviewed all pertinent imaging results/findings within the past 24 hours.    Assessment/Plan:     Pulmonary  * Rib pain on left side  - new, acute left rib pain noted ~2 days  - denies injury to site  - CXR does not show any rib abnormalities; exam is clear  - will get CT c/a/p to rule out other underlying causes  - PO morphine IR 15mg q4h for pain control; can increase dose or frequency as needed to control pain    Cardiac/Vascular  Atrial fibrillation with RVR  - continue home metoprolol    Hematology  Thrombocytopenia  - daily CBC while inpatient  - transfuse for Plt <10k or bleeding  - hold anticoagulation as platelets <50K    Oncology  Myelodysplastic syndrome  - see AML    Leukocytosis  - daily CBC  - no current need for hydroxyurea; no signs of leukostasis    History of endometrial cancer  - patient of Dr. Regalado  Stage IIIC2 UPSC  Diagnostic PET with pelvic and PA lymphadenopathy  RALH/BSO/ Debuliking of pelvic lymph nodes (6/8) positive on 8/15/2019.  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   T/C started 9/27/2019  WPRT to PA nodes 45Gy completed 1/22/2020  HDR to 15Gy in 3 fractions completed 3/3/2020  Restarted T/C 3/20/2020, s/p 5 cycles concluding 5/8/2020.   Complicated by pancytopenia and anemia  See AML    Anemia in  neoplastic disease  - daily CBC  - transfuse for Hgb <7  - will receive 1unit PRBC today, blood consent obtained    Hx of breast cancer  - patient of Dr. Ford  DCIS  Stage I IDC and DCIS right breast, ER+, [place]+ HER 2 Zack negative   Lumpectomy 6/24/2014 at [facility].   Small mass seen on mammogram 2007 and 2008, nipple inversion 2012 at [facility]  Referred for radiation therapy at [facility]  On Arimidex for 4 years    Acute myeloid leukemia not having achieved remission  - Patient of Dr. 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 NGS with TP53.  - Likely secondary MDS from prior chemotherapy/radiation therapy  - She started Inqovi 5 of 28 days on 10/21/22.  She is tolerating it well thus far.  - C4 delayed due to thrombocytopenia and ocular bleed.  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 Rivera for outpatient aza/nimesh once stable for discharge    GI  Gastroesophageal reflux disease without esophagitis  - continue home protonix        VTE Risk Mitigation (From admission, onward)         Ordered     Reason for No Pharmacological VTE Prophylaxis  Once        Question:  Reasons:  Answer:  Thrombocytopenia    03/15/23 0853     IP VTE HIGH RISK PATIENT  Once         03/15/23 0853     Place sequential compression device  Until discontinued         03/15/23 0853                Disposition: Admit to BMT service for workup of acute onset left rib pain and newly diagnosed/transformed AML    Kary Kilpatrick NP  Bone Marrow Transplant  Hematology  [facility] - Oncology ([facility])

## 2023-03-15 NOTE — HPI
Ms. Rodgers is a 68 y.o. female, patient of Dr. 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 Reports pain ~ last 2 days to left flank/lower rib cage. She had bone marrow biopsy done in BMT clinic on 3/10 that shows progression of disease from MDS to AML (22% blasts). CXR negative at [facility], no further imaging or workup done. She denies any recent fevers, chills, chest pain, sob, n/v/d/c. No evidence of TLS on lab work. Will admit to inpatient BMT service for further workup of acute pain

## 2023-03-16 NOTE — PROGRESS NOTES
Admit Assessment    Patient Identification  Jeanne Rodgers   :  1955  Admit Date:  3/14/2023  Attending Provider:  Guanako Welch MD              Referral:   Pt was admitted to  with a diagnosis of Rib pain on left side, and was admitted this hospital stay due to SOB (shortness of breath) [R06.02]  Chest pain [R07.9]  Pain of right lower extremity [M79.604]  Uncontrolled pain [R52].   is involved was referred to the Social Work Department via routine referral.  Patient presents as a 68 y.o. year old single female. Pt resides with her significan other, Les Stephens. Pt also listed her sister, Geni Rodgers, as her emergency contact.     Assessment was completed at bedside with pt only.    Living Situation:      Resides at [address] [place] [address], phone: [phone] (home).      (RETIRED) Functional Status Prior  Ambulation Prior: 0-->independent  Transferrin-->independent  Toiletin-->independent  Bathin-->independent  Dressin-->independent  Eatin-->independent  Communication: understands/communicates without difficulty  Swallowing: swallows foods/liquids without difficulty    Current or Past Agencies and Description of Services/Supplies    DME  Pt has no DME. Pt has no preference regarding agency if DME is ordered.     Home Health  Pt has no prior use of HH services. Pt has no preference regarding agency if HH if ordered.     IV Infusion  Pt has no prior use of home IV services. Pt has no preference regarding agency if home infusion is ordered.     Nutrition:   Regular diet- PO    Outpatient Pharmacy:     [facility] - [place] - [address]  [address]  [facility] [address]  Phone: [phone] Fax: [phone]    [facility] - [place] - [address]  [address]  [place] [address]  Phone: [phone] Fax: [phone]    [facility][id number] [address]  [place] [facility]  [address] [facility] [place] [address]  Phone: [phone] Fax: [phone]    [facility]  [address]  [place] [address]  Phone: [phone] Fax: [phone]      Patient Preference of agencies include   Pt has no preference regarding agencies.    Patient informed of right to choose providers or agencies.  Patient provides permission to release any necessary information to [facility] and to Non-[facility] agencies as needed to facilitate patient care, treatment planning, and patient discharge planning.  Written and verbal resources provided.    Coping  Coping well with support of family.      Adjustment to Diagnosis and Treatment  Adequate    Emotional/Behavioral/Cognitive Issues  None identified at this time.    History/Current Symptoms of Anxiety/Depression: No  History/Current Substance Use: No  Social History     Tobacco Use    Smoking status: Never    Smokeless tobacco: Never   Substance and Sexual Activity    Alcohol use: No     Alcohol/week: 0.0 standard drinks    Drug use: Yes     Types: Marijuana     Comment: medical marijuana    Sexual activity: Not Currently     Partners: Male       Indications of Abuse/Neglect: No  Abuse Screen (yes response referral indicated)  Feels Unsafe at Home or Work/School: no  Feels Threatened by Someone: no  Does anyone try to keep you from having contact with others or doing things outside your home?: no  Physical Signs of Abuse Present: no    Financial:  Payer/Plan Subscr  Sex Relation Sub. Ins. ID Effective Group Num   1. [place] - [place]* LUÍS MCKEONTY* 1955 Female Self [id number] 16                                    [address]   2. [facility]* MIKEYON* 1955 Female Self [id number]                                    [address]     Other identified concerns/needs:  No needs or concerns identified    Plan:    Interventions/Referrals: No interventions/referrals recommended  at this time.     Patient engaged in treatment planning process.    When medically cleared pt will return home via private vehicle in the care of family. Oncology LCSW provided contact information and instructed to call if any needs or concerns arise.  Will continue to follow and assist as needed.       providing psychosocial and supportive counseling, resources, education, assistance and discharge planning as appropriate.  Patient state understanding of  available resources,  following, remains available.    Namrata Vang, hospitalsDAQUAN  Oncology Social Worker License # [id number]  [facility]  [email or web address]  P. [phone]; F. [phone]

## 2023-03-16 NOTE — PLAN OF CARE
Patient discharged home via personal ride; escorted out of hospital via wheelchair by transport staff. PIV removed prior. Vital signs stable. Planned for additional testing/procedures to be completed outpatient. Prescriptions delivered to bedside, AVS reviewed, and discharge education completed. All needs addressed and questions answered. Instructed to follow-up as scheduled and PRN.

## 2023-03-16 NOTE — DISCHARGE SUMMARY
[facility] - Oncology ([facility])  Hematology  Bone Marrow Transplant  Discharge Summary      Patient Name: Jeanne Rodgers  MRN: [id number]  Admission Date: 3/14/2023  Hospital Length of Stay: 1 days  Discharge Date and Time:  03/16/2023 11:12 AM  Attending Physician: Guanako Welch MD   Discharging Provider: Kary Kilpatrick NP  Primary Care Provider: Gary Gonzalez MD    HPI: Ms. Rodgers is a 68 y.o. female, patient of Dr. 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 Reports pain ~ last 2 days to left flank/lower rib cage. She had bone marrow biopsy done in BMT clinic on 3/10 that shows progression of disease from MDS to AML (22% blasts). CXR negative at [facility], no further imaging or workup done. She denies any recent fevers, chills, chest pain, sob, n/v/d/c. No evidence of TLS on lab work. Will admit to inpatient BMT service for further workup of acute pain      * No surgery found *     Hospital Course: Transferred from [facility] ED on 3/15/23 after presentation of acute left rib/flank pain for ~2 days. No fevers, imaging done found to be all benign. Pain controlled with PO morphine. Patient had previous bone marrow biopsy done on 3/10 that showed progression of known MDS to AML. No evidence of DIC or TLS on blood work. She was discharge home on 3/16 in stable condition with pain medication, flexeril, and has follow up in BMT clinic with Dr. Rivera to discuss plan of care on Tuesday 3/21    Goals of Care Treatment Preferences:  Code Status: Full Code    Health care agent: Geni Rodgers  Health care agent number: [phone]    Living Will: Yes     What is most important right now is to focus on curative/life-prolongation (regardless of treatment burdens).  Accordingly, we have decided that the best plan to meet the patient's  goals includes continuing with treatment.          Significant Diagnostic Studies: Labs:   CMP   Recent Labs   Lab 03/14/23  1216 03/15/23  0905 03/16/23  0556   * 134* 132*   K 3.7 4.1 4.1   CL 97 99 96   CO2 26 24 24    90 97   BUN 16 13 9   CREATININE 0.7 0.6 0.7   CALCIUM 8.7 8.8 8.8   PROT 7.4 7.0 6.6   ALBUMIN 3.0* 2.7* 2.6*   BILITOT 0.6 0.8 0.8   ALKPHOS 74 165* 146*   AST 14 19 18   ALT 8* 8* 7*   ANIONGAP 8 11 12    and CBC   Recent Labs   Lab 03/14/23  1216 03/15/23  1056 03/16/23  0556   WBC 13.17* 13.49* 16.60*   HGB 7.1* 6.8* 8.4*   HCT 21.5* 21.6* 26.1*   PLT 53* 44* 40*       Pending Diagnostic Studies:     None        Final Active Diagnoses:    Diagnosis Date Noted POA    PRINCIPAL PROBLEM:  Rib pain on left side [R07.81] 05/21/2018 Yes    Acute myeloid leukemia not having achieved remission [C92.00] 03/15/2023 Yes    Myelodysplastic syndrome [D46.9] 10/06/2022 Yes    Gastroesophageal reflux disease without esophagitis [K21.9] 07/01/2022 Yes    Thrombocytopenia [D69.6] 07/01/2022 Yes    Leukocytosis [D72.829] 06/09/2022 Yes    Atrial fibrillation with RVR [I48.91] 12/17/2021 Yes    History of endometrial cancer [Z85.42]  Not Applicable    Anemia in neoplastic disease [D63.0] 06/10/2020 Yes    Hx of breast cancer [Z85.3] 08/06/2015 Not Applicable      Problems Resolved During this Admission:      Discharged Condition: good    Disposition: Home or Self Care    Follow Up:   Future Appointments   Date Time Provider Department Center   3/20/2023 10:40 AM Gary Gonzalez MD [facility] [place]   3/21/2023  9:50 AM [facility] LAB BMT [facility] LABBMT [facility]   3/21/2023 11:00 AM Madison Rivera MD [facility] BENHEM [facility] [place]   3/28/2023 10:05 AM LAB, [facility] LAB [facility]   4/3/2023  1:00 PM Darius Regalado MD [facility] GYN ONC [facility]   4/4/2023  2:00 PM [facility] LAB [facility] LABBMT Jim [facility]   4/4/2023  3:00 PM Madison Rivera MD [facility] BMT [place] [facility]   4/24/2023  2:00 PM [facility] LAB HO Jim Handy   4/24/2023  3:00 PM Nano Ford MD [facility] HEMONC3 [facility]   5/9/2023 10:00 AM Mary Conrad DNP [facility] PLANAYELI [facility]       Patient Instructions:      Diet Adult Regular     Notify your health care provider if you experience any of the following:  temperature >100.4     Notify your health care provider if you experience any of the following:  persistent nausea and vomiting or diarrhea     Notify your health care provider if you experience any of the following:  severe uncontrolled pain     Notify your health care provider if you experience any of the following:  redness, tenderness, or signs of infection (pain, swelling, redness, odor or green/yellow discharge around incision site)     Notify your health care provider if you experience any of the following:  difficulty breathing or increased cough     Notify your health care provider if you experience any of the following:  severe persistent headache     Notify your health care provider if you experience any of the following:  worsening rash     Notify your health care provider if you experience any of the following:  persistent dizziness, light-headedness, or visual disturbances     Notify your health care provider if you experience any of the following:  increased confusion or weakness     Activity as tolerated     Medications:  Reconciled Home Medications:      Medication List      START taking these medications    cyclobenzaprine 5 MG tablet  Commonly known as: FLEXERIL  Take 1 tablet (5 mg total) by mouth 3 (three) times daily as needed for Muscle spasms.     fluconazole 200 MG Tab  Commonly known as: DIFLUCAN  Take 2 tablets (400 mg total) by mouth once daily.     levoFLOXacin 500 MG tablet  Commonly known as: LEVAQUIN  Take 1 tablet (500 mg total) by mouth once daily.     morphine 15 MG tablet  Commonly known as: MSIR  Take 1 tablet (15 mg total) by mouth every 4 (four) hours as needed for Pain.         CHANGE how you take these medications    mupirocin 2 % ointment  Commonly known as: BACTROBAN  Apply topically 3 (three) times daily.  What changed: Another medication with the same name was removed. Continue taking this medication, and follow the directions you see here.        CONTINUE taking these medications    acyclovir 400 MG tablet  Commonly known as: ZOVIRAX  Take 1 tablet (400 mg total) by mouth 2 (two) times daily.     diazePAM 10 MG Tab  Commonly known as: VALIUM  Take 1 tablet (10 mg total) by mouth once as needed. Take 1 tablet (10mg total) by mouth once as needed 30 minutes prior to bone marrow procedure and one the night before, if needed for anxiety.     fexofenadine 180 MG tablet  Commonly known as: ALLEGRA  2 tablets in the morning.  May take an extra 2 tablets during the day if needed for itching.     hydrOXYzine HCL 25 MG tablet  Commonly known as: ATARAX  1 to 8 tablets at bedtime.  Start with 3 tablets.  Increase or decrease as needed until itching is controlled or a maximum of 8 tablets.     metoprolol tartrate 25 MG tablet  Commonly known as: LOPRESSOR  Take 1 tablet (25 mg total) by mouth 2 (two) times daily.     pantoprazole 40 MG tablet  Commonly known as: PROTONIX  Take 1 tablet (40 mg total) by mouth once daily.     povidone-iodine 10 % external solution  Commonly known as: BETADINE  Apply topically as needed for Wound Care. Clean R hand wound w/ betadine daily     senna 8.6 mg tablet  Commonly known as: SENOKOT  Take 1 tablet by mouth once daily. Can take twice daily     URIBEL ORAL  Take by mouth as needed.        STOP taking these medications    acetaminophen 500 MG tablet  Commonly known as: TYLENOL     ibuprofen 200 MG tablet  Commonly known as: ADVIL,MOTRIN     INQOVI  mg Tab  Generic drug: decitabine-cedazuridine     nystatin cream  Commonly known as: MYCOSTATIN     nystatin powder  Commonly known as: MYCOSTATIN     triamcinolone acetonide 0.1% 0.1 % ointment  Commonly  known as: KENALOG     UNABLE TO FIND            Kary Kilpatrick NP  Bone Marrow Transplant  [facility] - Oncology ([facility])

## 2023-03-20 PROBLEM — D72.829 LEUKOCYTOSIS: Status: RESOLVED | Noted: 2022-01-01 | Resolved: 2023-01-01

## 2023-03-20 PROBLEM — L03.90 CELLULITIS: Status: RESOLVED | Noted: 2022-01-01 | Resolved: 2023-01-01

## 2023-03-20 PROBLEM — N39.0 RECURRENT UTI: Status: RESOLVED | Noted: 2022-03-25 | Resolved: 2023-01-01

## 2023-03-20 PROBLEM — I70.0 AORTIC ATHEROSCLEROSIS: Status: ACTIVE | Noted: 2023-01-01

## 2023-03-20 NOTE — TELEPHONE ENCOUNTER
Writer called about critical lab plt of 10.  Pt called and offered ED to get plt transfusion.  Pt denied signs or symptoms of bleeding and risks were explained of having plts this low.  Pt stated she understood and would like to wait for her heme apt in AM.  Bleeding and ED precautions given.      Will continue to monitor      Rhianna Najera/Onc

## 2023-03-20 NOTE — PROGRESS NOTES
Subjective:       Chief Complaint  Chief Complaint   Patient presents with    Follow-up       HPI  Jeanne Rodgers is a 68 y.o. female with multiple medical diagnoses as listed in the medical history and problem list that presents for hospital follow-up.      Patient with hospital admission 3/14 - 3/15 for rib pain history of MDS per discharge summary: Transferred from [facility] ED on 3/15/23 after presentation of acute left rib/flank pain for ~2 days. No fevers, imaging done found to be all benign. Pain controlled with PO morphine. Patient had previous bone marrow biopsy done on 3/10 that showed progression of known MDS to AML. No evidence of DIC or TLS on blood work. She was discharge home on 3/16 in stable condition with pain medication, flexeril, and has follow up in BMT clinic with Dr. Rivera to discuss plan of care on Tuesday 3/21    Currently patient with development of right ankle/foot swelling/redness/pain which start approximately 3 days prior. Area of concern has been exquisitely sensitive to touch. Has tried to utilize ice/cold packs for alleviation of pain. Also notes some diminished stooling in the setting of poor appetite (secondary to nausea). Last bowel movement was 3/16 (4 days prior).     Per lab review: Hgb 8.4, Na 132 3/16    Patient Care Team:  Gary Gonzalez MD as PCP - General (Internal Medicine)  Bill Ayala, PhD as PCP - Behavioral Health (Psychology)  Yenni Mccurdy RD as Dietitian (Nutrition)  Pratima Cedeno LPN as Care Coordinator      PAST MEDICAL HISTORY:  Past Medical History:   Diagnosis Date    Allergy     skin    Anxiety     Atrial fibrillation     Breast cancer 2014    right    Chronic back pain     Depression     Encounter for blood transfusion     Endometrial cancer 07/30/2019    Fibromyalgia     Hives     Hx of psychiatric care     Hx of radiation therapy     Itching     Lichen sclerosus     Mental disorder     PONV (postoperative nausea and vomiting)      Psychiatric problem     PTSD (post-traumatic stress disorder)     Sleep difficulties     Sore throat     Therapy     Uterine cancer 08/05/2019    UTI (urinary tract infection)        PAST SURGICAL HISTORY:  Past Surgical History:   Procedure Laterality Date    anal fissure surgery      APPENDECTOMY      BIOPSY N/A 11/19/2020    Procedure: BONE BIOPSY;  Surgeon: Leisa Diagnostic Provider;  Location: [facility] OR;  Service: Radiology;  Laterality: N/A;  RN PREOP 11/17/2020    BREAST BIOPSY Right 2014    BREAST LUMPECTOMY Right 2014    R lumpectomy [facility] w 2.4cm IDC & DCIS, neg margins, 0/6 SN, Stage II, ER/NY+, HER-2 neg Adjuvant XRT and hormonal therapy     CARPAL TUNNEL RELEASE      CHOLECYSTECTOMY      COLONOSCOPY      COLONOSCOPY N/A 6/11/2020    Procedure: COLONOSCOPY;  Surgeon: Bobby Marino MD;  Location: [facility] ([address] [place] [address]);  Service: Endoscopy;  Laterality: N/A;    COLONOSCOPY N/A 6/9/2022    Procedure: COLONOSCOPY;  Surgeon: Abdoulaye Alcocer MD;  [facility]  Service: Endoscopy;  Laterality: N/A;    ESOPHAGOGASTRODUODENOSCOPY N/A 6/11/2020    Procedure: EGD (ESOPHAGOGASTRODUODENOSCOPY);  Surgeon: Bobby Marino MD;  Location: [facility] ([address] [place] [address]);  Service: Endoscopy;  Laterality: N/A;    HYSTERECTOMY  08/15/2019    HYSTEROSCOPY WITH DILATION AND CURETTAGE OF UTERUS N/A 7/24/2019    Procedure: HYSTEROSCOPY, WITH DILATION AND CURETTAGE OF UTERUS;  Surgeon: Елена Kam MD;  Location: [facility] OR;  Service: OB/GYN;  Laterality: N/A;    INSERTION OF TUNNELED CENTRAL VENOUS CATHETER (CVC) WITH SUBCUTANEOUS PORT N/A 9/26/2019    Procedure: INSERTION, PORT-A-CATH;  Surgeon: Moris Varghese MD;  Location: [facility] CATH LAB;  Service: Radiology;  Laterality: N/A;    MEDIPORT REMOVAL N/A 3/5/2020    Procedure: REMOVAL, CATHETER, CENTRAL VENOUS, TUNNELED, WITH PORT;  Surgeon: Moris Varghese MD;  Location: [facility] CATH LAB;  Service: Radiology;  Laterality: N/A;    NEEDLE LOCALIZATION N/A  11/19/2020    Procedure: NEEDLE LOCALIZATION;  Surgeon: Leisa Diagnostic Provider;  Location: [facility] OR;  Service: Radiology;  Laterality: N/A;    PARTIAL MASCECTOMY Right 2014    ROBOT-ASSISTED LAPAROSCOPIC ABDOMINAL HYSTERECTOMY USING DA RAMU XI N/A 8/15/2019    Procedure: XI ROBOTIC HYSTERECTOMY;  Surgeon: Darius Regalado MD;  Location: [facility] OR;  Service: OB/GYN;  Laterality: N/A;    ROBOT-ASSISTED LAPAROSCOPIC SALPINGO-OOPHORECTOMY USING DA RAMU XI Bilateral 8/15/2019    Procedure: XI ROBOTIC SALPINGO-OOPHORECTOMY;  Surgeon: Darius Regalado MD;  Location: [facility] OR;  Service: OB/GYN;  Laterality: Bilateral;    ROBOT-ASSISTED LYMPHADENECTOMY Left 8/15/2019    Procedure: ROBOTIC LYMPHADENECTOMY;  Surgeon: Darius Regalado MD;  Location: [facility] OR;  Service: OB/GYN;  Laterality: Left;    SALPINGOOPHORECTOMY Bilateral 08/15/2019    TONSILLECTOMY      TUBAL LIGATION         SOCIAL HISTORY:  Social History     Socioeconomic History    Marital status: Single    Number of children: 0   Occupational History    Occupation: [employer]   Tobacco Use    Smoking status: Never    Smokeless tobacco: Never   Substance and Sexual Activity    Alcohol use: No     Alcohol/week: 0.0 standard drinks    Drug use: Yes     Types: Marijuana     Comment: medical marijuana    Sexual activity: Not Currently     Partners: Male   Social History Narrative    Lives alone.     Never , no children    Very invested in cat rescue     Social Determinants of Health     Financial Resource Strain: Low Risk     Difficulty of Paying Living Expenses: Not hard at all   Food Insecurity: No Food Insecurity    Worried About Running Out of Food in the Last Year: Never true    Ran Out of Food in the Last Year: Never true   Transportation Needs: No Transportation Needs    Lack of Transportation (Medical): No    Lack of Transportation (Non-Medical): No   Physical Activity: Inactive    Days of Exercise per Week: 0 days    Minutes of Exercise per Session: 0 min    Stress: Stress Concern Present    Feeling of Stress : Very much   Social Connections: Moderately Isolated    Frequency of Communication with Friends and Family: Three times a week    Frequency of Social Gatherings with Friends and Family: Three times a week    Attends [employer] Services: Never    Active Member of Clubs or Organizations: Yes    Attends Club or Organization Meetings: 1 to 4 times per year    Marital Status: Never    Housing Stability: Unknown    Unable to Pay for Housing in the Last Year: No    Unstable Housing in the Last Year: No       FAMILY HISTORY:  Family History   Problem Relation Age of Onset    Skin cancer Mother 89        squamous cell--nose & head    Rashes / Skin problems Mother 92        cutaneous horn of scalp    Hypertension Mother     Arthritis Mother     Other Mother 85        PANC mass suspected by provider to be ca, surv'd 8 yrs    Pancreatic cancer Father 85        surv'd 8 mos    Pancreatic cancer Maternal Aunt 62    Colon polyps Sister         pt thinks (a few)    Hypertension Brother     Cancer Paternal Uncle         leukemia, 70s    Heart attack Paternal Grandmother     Heart attack Paternal Grandfather     Hypertension Brother     Colon polyps Sister         Geni believes just a handful    Hashimoto's thyroiditis Sister     Diabetes Other         strong family history per patient    Lung cancer Maternal Uncle         smoker    Cancer Paternal Aunt         origin? colon or ovarian?    Suicide Cousin     Alcohol abuse Cousin     Throat cancer Other         pt believes    Breast cancer Neg Hx     Ovarian cancer Neg Hx     Cirrhosis Neg Hx     Colon cancer Neg Hx        ALLERGIES AND MEDICATIONS: updated and reviewed.  Review of patient's allergies indicates:   Allergen Reactions    Oxycodone Hallucinations     Current Outpatient Medications   Medication Sig Dispense Refill    cyclobenzaprine (FLEXERIL) 5 MG tablet Take 1 tablet (5 mg total) by mouth 3 (three) times  daily as needed for Muscle spasms. 20 tablet 2    fexofenadine (ALLEGRA) 180 MG tablet 2 tablets in the morning.  May take an extra 2 tablets during the day if needed for itching. 240 tablet 5    fluconazole (DIFLUCAN) 200 MG Tab Take 2 tablets (400 mg total) by mouth once daily. 60 tablet 5    levoFLOXacin (LEVAQUIN) 500 MG tablet Take 1 tablet (500 mg total) by mouth once daily. 30 tablet 5    metoprolol tartrate (LOPRESSOR) 25 MG tablet Take 1 tablet (25 mg total) by mouth 2 (two) times daily. 180 tablet 3    morphine (MSIR) 15 MG tablet Take 1 tablet (15 mg total) by mouth every 4 (four) hours as needed for Pain. 30 tablet 0    senna (SENOKOT) 8.6 mg tablet Take 1 tablet by mouth once daily. Can take twice daily 30 tablet 11    acyclovir (ZOVIRAX) 400 MG tablet Take 1 tablet (400 mg total) by mouth 2 (two) times daily. 60 tablet 1    diazePAM (VALIUM) 10 MG Tab Take 1 tablet (10 mg total) by mouth once as needed. Take 1 tablet (10mg total) by mouth once as needed 30 minutes prior to bone marrow procedure and one the night before, if needed for anxiety. 2 tablet 0    hydrOXYzine HCL (ATARAX) 25 MG tablet 1 to 8 tablets at bedtime.  Start with 3 tablets.  Increase or decrease as needed until itching is controlled or a maximum of 8 tablets. 240 tablet 3    meth/meblue/sod phos/psal/hyos (URIBEL ORAL) Take by mouth as needed.      mupirocin (BACTROBAN) 2 % ointment Apply topically 3 (three) times daily. (Patient not taking: Reported on 3/20/2023) 22 g 0    pantoprazole (PROTONIX) 40 MG tablet Take 1 tablet (40 mg total) by mouth once daily. 90 tablet 3    povidone-iodine (BETADINE) 10 % external solution Apply topically as needed for Wound Care. Clean R hand wound w/ betadine daily 14.8 mL 0     No current facility-administered medications for this visit.         Review of Systems   Constitutional:  Positive for fatigue. Negative for appetite change, chills, fever and unexpected weight change.   Respiratory:   Positive for shortness of breath. Negative for cough.    Cardiovascular:  Negative for chest pain, palpitations and leg swelling.   Gastrointestinal:  Negative for abdominal pain, constipation, diarrhea, nausea and vomiting.   Musculoskeletal: Negative.    Skin: Negative.    Neurological:  Negative for dizziness, light-headedness and headaches.   Hematological:  Bruises/bleeds easily.   Psychiatric/Behavioral:  Negative for dysphoric mood. The patient is not nervous/anxious.        Objective:       Physical Exam  Vitals:    03/20/23 1047 03/20/23 1108   BP: (!) 82/62 97/72   Pulse: 90    Temp: 100 °F (37.8 °C)    TempSrc: Oral    SpO2: 97%    Height: 5' (1.524 m)     Body mass index is 22.99 kg/m².      Height: 5' (152.4 cm)   Physical Exam  Vitals reviewed.   Constitutional:       General: She is not in acute distress.     Appearance: Normal appearance. She is well-developed. She is not ill-appearing.   HENT:      Head: Normocephalic and atraumatic.   Eyes:      Conjunctiva/sclera: Conjunctivae normal.      Pupils: Pupils are equal, round, and reactive to light.   Neck:      Thyroid: No thyromegaly.   Cardiovascular:      Rate and Rhythm: Normal rate.      Heart sounds: Normal heart sounds. No murmur heard.  Pulmonary:      Effort: Pulmonary effort is normal. No respiratory distress.      Breath sounds: Normal breath sounds.   Musculoskeletal:         General: No deformity.      Cervical back: Normal range of motion and neck supple.      Comments: Swelling/warmth of right ankle with limited ROM with plantarflexion/dorsiflexion of R foot   Lymphadenopathy:      Cervical: No cervical adenopathy.   Skin:     General: Skin is warm and dry.      Findings: No bruising.      Comments: Healing wound of right dorsal hand   Neurological:      Mental Status: She is alert.      Cranial Nerves: No cranial nerve deficit.   Psychiatric:         Behavior: Behavior normal.           Assessment:     1. Acute myeloid leukemia not  having achieved remission    2. Myelodysplastic syndrome    3. Localized swelling of right foot    4. Nausea    5. Thrombocytopenia    6. Aortic atherosclerosis    7. Mood disorder    8. Peripheral neuropathy due to chemotherapy        Plan:     Jeanne was seen today for hospital follow up.    Diagnoses and all orders for this visit:    AML  Myelodysplastic syndrome  Thrombocytopenia  Following with Oncology, recent transformation of MDS to AML discovered   Onc f/u on 3/21 discussed   Patient given PO morphine and utilizing sparingly sister (Geni) discussed consideration of stopping in favor of medical marijuana given patient's poor appetite and other symptoms     History of endometrial cancer  Gyn Onc f/u April 2023 - Dr Regalado    Swelling of right foot/ankle  Discussed considerations include acute gouty arthritis flare however no prior episodes noted and no prior diagnosis/obtaining crystal analysis challenging in setting of current cytopenia. I repeated a uric acid level to see if this could lend clarity to condition and level is not elevated/nondiagnostic of acute gout. Will recommend Orthopedics evaluation at this time    PAF  Would like to defer evaluation for Watchman device presently in the setting of her other chronic conditions    Health Maintenance reviewed, addressed as per orders    F/u in 3 months    I spent 45 minutes reviewing the patient's chart, talking to family/caregiver(s), coordinating care with other providers/specialists and time spent on documentation       1. The patient indicates understanding of these issues and agrees with the plan. Brief care plan is updated and reviewed with the patient as applicable.   2. The patient is given an After Visit Summary that lists all medications with directions, allergies, orders placed during this encounter and follow-up instructions.   3. I have reviewed the patient's medical information including past medical, family, and social history sections  including the medications and allergies.   4. We discussed the patient's current medications. I reconciled the patient's medication list and prepared and supplied needed refills.       Gary Gonzalez MD  Internal Medicine-Pediatrics

## 2023-03-21 PROBLEM — E87.1 HYPONATREMIA: Status: ACTIVE | Noted: 2023-01-01

## 2023-03-21 PROBLEM — I95.0 IDIOPATHIC HYPOTENSION: Status: ACTIVE | Noted: 2023-01-01

## 2023-03-21 NOTE — TELEPHONE ENCOUNTER
Call was placed to patient and voicemail left for return call the clinic in regards to relaying PCP message with result of uric acid with not gout findings and making patient aware of ortho referral. Awaiting call back.

## 2023-03-21 NOTE — PROGRESS NOTES
Received request to assist with hospice.  Met with patient and sister Geni ([phone]) who are comfortable with hospice after good experiences with family members in [place], but unfamiliar with agencies in [place].  They expressed no preference and would prefer not to meet with multiple agencies.  Pt. Complains of some shortness of breath, but will await agency recommendations before deciding on supplemental oxygen, as she has recently had dry mouth.  She identifies her sister as the best family contact, as she stays next door.  They will receive fluids in the clinic today before returning home and awaiting coordination with the agency.  Provided with contact information for following An[facility] Vang for any additional needs or changes in service.  Discussed referral with Kyara at [facility] ([phone]); she will reach out to sister Geni this afternoon to coordinate assessment and start of service.  Will update following Aniceto Vang on plan.

## 2023-03-21 NOTE — TELEPHONE ENCOUNTER
----- Message from Gary Gonzalez MD sent at 3/20/2023  4:08 PM CDT -----  Please call the patient regarding the following results:    Patient's blood test (uric acid level) does not help suggest/support a diagnosis of gout at this time. Recommend Orthopedic Surgery evaluation to determine cause of right ankle/foot swelling. Referral placed to desired specialty - patient may contact our Referrals Coordinator at [phone] if  they have not received a call within 2 business days to check on status of referral

## 2023-03-21 NOTE — PROGRESS NOTES
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 Noted to have persistent pancytopenia complicated chemotherapy for her endometrial carcinoma in 2020.      Bone marrow biopsy in November 2020 was a normocellular marrow (30%) with trilineage hematpoiesis and atypical megakaryocytes. No morphologic or immunuphenotypic evidence of metastatic carcinoma. Adequate iron storage. Chromosome analysis normal X,X phenotype. Insufficient aspirate for analysis.      Bone marrow biopsy June 2022 was hypercellular marrow (%) with trilineage hematopoiesis. Grade 1 reticular fibrosis. No evidence of lymphoma, plasma cell neoplasm, or metastatic carcinoma.      Two hospital admission in summer 2022 for symptomatic anemia requiring transfusion support. Once incident related to GI bleeding. Hgb at admissions 5-6 grams with normal MCV. No GI bleeding source has been found.      Bone marrow biopsy September 2022 extremely hypercellular marrow, 99% with increased blasts (10%) and moderate trilineage dyspoiesis with left-shifted erythroid hyperplasia, left-shifted, relatively decreased granulocytes and focally increased megakaryocytes with increase number of micromegakaryocytes. Increased stainable histiocytic iron and increased reticulin fibrosis (MF-1 with focal MF-2). 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 NGS with TP53.       Interval History:   Ms. Rodgers presents today for hospital discharge visit. Since last visit for secondary MDS, bone marrow biopsy completed showing transformation to secondary AML with p53 mutation.   Clinical decline since last visit- extreme  fatigue, not eating, to weak to ambulate, BP 80/50, hyponatremia on labs. Pain in back, ribs, and ankle likely related to AML. Platelet count decline from 40-60 to 10 with epistaxis.     Past Medical History, Social History, and Past Family History are unchanged since last evaluation except for HPI.     CURRENT MEDICATIONS:   Current Outpatient Medications   Medication Sig    cyclobenzaprine (FLEXERIL) 5 MG tablet Take 1 tablet (5 mg total) by mouth 3 (three) times daily as needed for Muscle spasms.    fexofenadine (ALLEGRA) 180 MG tablet 2 tablets in the morning.  May take an extra 2 tablets during the day if needed for itching.    fluconazole (DIFLUCAN) 200 MG Tab Take 2 tablets (400 mg total) by mouth once daily.    hydrOXYzine HCL (ATARAX) 25 MG tablet 1 to 8 tablets at bedtime.  Start with 3 tablets.  Increase or decrease as needed until itching is controlled or a maximum of 8 tablets.    levoFLOXacin (LEVAQUIN) 500 MG tablet Take 1 tablet (500 mg total) by mouth once daily.    meth/meblue/sod phos/psal/hyos (URIBEL ORAL) Take by mouth as needed.    metoprolol tartrate (LOPRESSOR) 25 MG tablet Take 1 tablet (25 mg total) by mouth 2 (two) times daily.    morphine (MSIR) 15 MG tablet Take 1 tablet (15 mg total) by mouth every 4 (four) hours as needed for Pain.    pantoprazole (PROTONIX) 40 MG tablet Take 1 tablet (40 mg total) by mouth once daily.    povidone-iodine (BETADINE) 10 % external solution Apply topically as needed for Wound Care. Clean R hand wound w/ betadine daily    senna (SENOKOT) 8.6 mg tablet Take 1 tablet by mouth once daily. Can take twice daily    acyclovir (ZOVIRAX) 400 MG tablet Take 1 tablet (400 mg total) by mouth 2 (two) times daily.    diazePAM (VALIUM) 10 MG Tab Take 1 tablet (10 mg total) by mouth once as needed. Take 1 tablet (10mg total) by mouth once as needed 30 minutes prior to bone marrow procedure and one the night before, if needed for anxiety.    mupirocin (BACTROBAN) 2 %  ointment Apply topically 3 (three) times daily. (Patient not taking: Reported on 3/20/2023)     No current facility-administered medications for this visit.       ALLERGIES:   Review of patient's allergies indicates:   Allergen Reactions    Oxycodone Hallucinations         Review of Systems:     Review of Systems   Constitutional:  Positive for malaise/fatigue. Negative for chills, diaphoresis, fever and weight loss.   HENT: Negative.     Eyes: Negative.    Respiratory: Negative.     Cardiovascular: Negative.    Gastrointestinal: Negative.    Genitourinary: Negative.    Musculoskeletal:  Negative for joint pain.   Neurological:  Negative for weakness.   Endo/Heme/Allergies:  Bruises/bleeds easily.   Psychiatric/Behavioral:  The patient is nervous/anxious.      Physical Exam:     Vitals:    03/21/23 1044   BP: (!) 87/52   Pulse: 87   Resp: 16   Temp: 98.5 °F (36.9 °C)         Physical Exam  Vitals reviewed.   Constitutional:       General: She is not in acute distress.     Appearance: Normal appearance.   HENT:      Head: Normocephalic.      Right Ear: External ear normal.      Left Ear: External ear normal.      Mouth/Throat:      Mouth: Mucous membranes are moist.   Eyes:      Extraocular Movements: Extraocular movements intact.      Pupils: Pupils are equal, round, and reactive to light.   Cardiovascular:      Rate and Rhythm: Normal rate and regular rhythm.   Pulmonary:      Effort: Pulmonary effort is normal.   Abdominal:      General: Abdomen is flat.      Palpations: Abdomen is soft.   Musculoskeletal:         General: No swelling, tenderness or signs of injury. Normal range of motion.      Cervical back: Normal range of motion.      Comments: Previous cat bite healing nicely, see attached photo   Skin:     General: Skin is warm.      Coloration: Skin is pale.      Findings: Bruising (scattered bruising of upper extremities) present. No erythema.   Neurological:      General: No focal deficit present.       Mental Status: She is alert. Mental status is at baseline.         ECOG Performance Status: (foot note - ECOG PS provided by Eastern Cooperative Oncology Group) 2 - Symptomatic, <50% confined to bed    Karnofsky Performance Score:  70%- Cares for Self: Unable to Carry on Normal Activity or Active Work    Labs:   Lab Results   Component Value Date    WBC 13.82 (H) 03/20/2023    HGB 7.7 (L) 03/20/2023    HCT 23.8 (L) 03/20/2023    MCV 84 03/20/2023    PLT 10 (LL) 03/20/2023       Lab Results   Component Value Date     (L) 03/20/2023    K 4.9 03/20/2023    CL 91 (L) 03/20/2023    CO2 23 03/20/2023    BUN 22 03/20/2023    CREATININE 1.1 03/20/2023    ALBUMIN 2.6 (L) 03/20/2023    BILITOT 0.7 03/20/2023    ALKPHOS 81 03/20/2023    AST 42 (H) 03/20/2023    ALT 15 03/20/2023               Assessment and Plan:   Jeanne Rodgers (Jeanne) is a pleasant 68 y.o.female with high grade MDS       Secondary MDS, 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   Discussed progression of disease is now faster than ability to treat  Not a candidate for intense or non-intense therapy  Plan for supportive care today with IVF for hypotension and platelet transfusion support  SW consulted for hospice referral    Advance Care Planning     Date: 03/21/2023    [facility]  I engaged the patient  and her sister in a conversation about what the goals of care would be moving forward, in light of the patient's change in clinical status, specifically AML without treatment options.  We did specifically address the patient's likely prognosis, which is poor, likely measured in weeks.  We explored the patient's values and preferences for future care.  Goal of care is now to be comfortable at end of life.    Accordingly, we have decided that the best plan to meet the patient's goals includes enrolling in hospice care    I did explain the role for hospice care at this stage of the patient's illness, including its  ability to help the patient live with the best quality of life possible.  We will be making a hospice referral.    I spent a total of 20 minutes engaging the patient in this advance care planning discussion.           Stage IIIC2 UPSC  Diagnostic PET with pelvic and PA lymphadenopathy  RALH/BSO/ Debuliking of pelvic lymph nodes (6/8) positive on 8/15/2019.  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   T/C started 9/27/2019  WPRT to PA nodes 45Gy completed 1/22/2020  HDR to 15Gy in 3 fractions completed 3/3/2020  Restarted T/C 3/20/2020, s/p 5 cycles concluding 5/8/2020.   Complicated by pancytopenia and anemia.       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   Small mass seen on mammogram 2007 and 2008, nipple inversion 2012 at [facility]  Referred for radiation therapy at [facility]  On Arimidex for 4 years      Follow Up:       A total of 20 minutes was spent in pre-visit chart review, personal interpretation of labs and imaging, and medication review. Total visit time 40 minutes, >50 % counseling.   BMT Route Chart for Scheduling

## 2023-03-21 NOTE — PLAN OF CARE
1635 Patient tolerated 1L NS and 1U of platelets with improvement seen in BP. PIV removed and catheter tip intact. AVS given to patient and she was taken out via wheelchair with her sister.

## 2023-03-21 NOTE — PHYSICIAN QUERY
PT Name: Jeanne Rodgers  MR #: [id number]     DOCUMENTATION CLARIFICATION      CDS: Josseline Sandoval RN       Contact information:[email or web address] or (cell) [phone]   This form is a permanent document in the medical record.     Query Date: March 21, 2023    By submitting this query, we are merely seeking further clarification of documentation.  Please utilize your independent clinical judgment when addressing the question(s) below.     The Medical Record contains the following:    Clinical Information Location in Medical Record   Pulmonary  * Rib pain on left side  - new, acute left rib pain noted ~2 days  - denies injury to site  - CXR does not show any rib abnormalities; exam is clear  - will get CT c/a/p to rule out other underlying causes  - PO morphine IR 15mg q4h for pain control; can increase dose or frequency as needed to control pain    Acute myeloid leukemia not having achieved remission  - Patient of Dr. 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 NGS with TP53.  - Likely secondary MDS from prior chemotherapy/radiation therapy  - She started Inqovi 5 of 28 days on 10/21/22.  She is tolerating it well thus far.  - C4 delayed due to thrombocytopenia and ocular bleed.  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 Rivera for outpatient aza/nimesh once stable for discharge     H&P 3/15     For coding purposes, please document your best medical opinion regarding the etiology of the patient's rib pain?       [  x ] AML     [   ] Other etiology (please specify):___________________     [  ] Clinically Undetermined

## 2023-03-30 ENCOUNTER — SPECIALTY PHARMACY (OUTPATIENT)
Dept: PHARMACY | Facility: CLINIC | Age: 68
End: 2023-03-30
Payer: MEDICARE

## 2023-03-30 NOTE — TELEPHONE ENCOUNTER
Notes indicate patient is enrolled in hospice. Outgoing call to patient's sister to discuss disposal of Inqovi. Patient's sister states Ms. Rodgers passed on Tuesday. I offered my condolences.

## 2023-04-04 LAB
ANNOTATION COMMENT IMP: NORMAL
NGS CLINCIAL TRIALS: NORMAL
NGS INDICATION OF TEST: NORMAL
NGS INTERPRETATION: NORMAL
NGS ONCOHEME PANEL GENE LIST: NORMAL
NGS PATHOGENIC MUTATIONS DETECTED: NORMAL
NGS REVIEWED BY:: NORMAL
NGS VARIANTS OF UNKNOWN SIGNIFICANCE: NORMAL
NGSHM RESULT, BONE MARROW: NORMAL
REF LAB TEST METHOD: NORMAL
SPECIMEN SOURCE: NORMAL
TEST PERFORMANCE INFO SPEC: NORMAL

## 2024-04-11 NOTE — DISCHARGE SUMMARY
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 female with an SLP diagnosis of Cognitive-Linguistic Impairment.     History:     Past Medical History:   Diagnosis Date    Acid reflux     Graves disease     diffuse Toxic Goiter    Hypertension     IBS (irritable bowel syndrome)     Lichen sclerosus     Migraine     Stroke        Past Surgical History:   Procedure Laterality Date    EYE SURGERY      HYSTERECTOMY      CINDI for ?? cervical cancer, had normal paps    LEFT HEART CATHETERIZATION N/A 10/20/2020    Procedure: Left heart cath;  Surgeon: Bony Schultz MD;  Location: [facility] CATH LAB/EP;  Service: Cardiology;  Laterality: N/A;    REVERSE TOTAL SHOULDER ARTHROPLASTY Right 8/19/2022    Procedure: ARTHROPLASTY, SHOULDER, TOTAL, REVERSE;  Surgeon: Bradley Finnegan Jr., MD;  Location: [facility] OR;  Service: Orthopedics;  Laterality: Right;  SONIA linda CONFIRMED/8/18/Shaista Finnegan- hemosorb AT      History of Present Illness:  Denise Berrios is a 74 y.o. female with PMHx hypertension  who is being evaluated by the Vascular Neurology service after developing worsening aphasia/dysarthria, gait and difficulty swallowing. Pt was LKN at approximately 2pm today      On 4/2, patient with unsteady gait and speech changed, while shopping with . Syptoms improved, but persisted. She presented to PCP on  [facility] Medicine  Discharge Summary      Patient Name: Jeanne Rodgers  MRN: [id number]  Patient Class: IP- Inpatient  Admission Date: 6/7/2022  Hospital Length of Stay: 4 days  Discharge Date and Time:  06/11/2022 2:35 PM  Attending Physician: No att. providers found   Discharging Provider: Maddy Joseph MD  Primary Care Provider: Gary Gonzalez MD  [facility] Medicine Team: [facility] [place] Maddy Joseph MD    HPI:   67-year-old female with past medical history of endometrial cancer, breast cancer, fibromyalgia, PTSD, GIB, blood transfusion, anxiety who presents to the emergency department with chief complaint of abnormal lab value. Reports bright red blood per rectum with dark streaks that started four days ago. Had follow up appointment today. Noted to be anemic and referred to the ER for further evaluation. Endorses headache and lightheadedness. Denies chest pain, shortness of breath, abdominal pain, vomiting, diarrhea. Reports pelvic and right leg pain. She had recent imaging which was concerning for bone lesions to the pelvis and has biopsy scheduled for later in June. She denies other worsening or alleviating factors.     In the ED patient afebrile and hemodynamically stable saturating well on room air. Hb 6.3 (baseline 8.5). Patient transfused one unit PRBC in ED, started on iv protonix, and admitted to the care of medicine for further evaluation and management of GIB.      Procedure(s) (LRB):  COLONOSCOPY (N/A)           Interval History 6/11/21:   Patient seen examined at bedside this morning.  Patient notes brown stool and no further dark stool or blood in stool.  Patient notes improvement of sore throat.  She denies any cough, wheezing, shortness of breath, palpitations, myalgias, chills.  She also denies dysuria, lightheadedness, dizziness.      Area of redness noted on left upper extremity yesterday improved on doxycycline.  Patient also notes general improvement of energy.   "4/4, CT head was ordered. CT head with new ovoid focus of hypoattenuation in the left corona radiata. Patient was prescribed DAPT and statin. Today patient experienced worsening symptoms and new right facial droop prompting her to present to ED for eval. Stroke code was called on arrival      Pt denies personal hx blood clots and denies hx cardiac arrhythmia.      Prior Intubation HX:  none during this admission    Modified Barium Swallow: none on file    Prior diet: currently NPO; failed Vidales on 4/10    Subjective     "If they ever get a bed. I don't know when that's gonna be." Pt's response when asked if she knew whether she was being admitted or not.    Pain/Comfort:  Pain Rating 1: 0/10    Respiratory Status: Room air    Objective:     Cognitive Status:    Pt was O x 4. She immediately recalled series of 3, 4, and 5 digits. After a >2 minute delay, pt recalled  3/3 unrelated words given min cues.  Pt answered problem solving q's appropriately on 3/3 trials. Reasoning and mental manipulation to be assessed.      Receptive Language:   Comprehension:   Pt was able to answer complex yes/no q's and followed 1-, 2, and 3-step commands with 100% accuracy.     Pragmatics:    WFL    Expressive Language:  Verbal:    Pt able to express basic and complex information, but did display occasional phonemic paraphasias which she was able to self-correct.  Fluency of speech somewhat slowed possibly due to increased time needed for word finding and thought organization.  Pt named 5/5 objects in the room correctly. Word fluency to be assessed.       Motor Speech:  No dysarthria appreciated, but rate of speech was somewhat slowed which SLP attributed to increased time needed for word finding and thought organization.       Voice:   WFL    Visual-Spatial:  tba    Reading:   tba      Written Expression:   tba    Oral Musculature Evaluation  Oral Musculature: WFL  Dentition: present and adequate  Secretion Management: " Afebrile overnight.    Review of Systems   Constitutional:  Negative for chills, diaphoresis, fatigue and fever.   HENT:  Negative for congestion, rhinorrhea, sinus pain and sneezing.    Eyes:  Negative for photophobia and visual disturbance.   Respiratory:  Negative for cough, shortness of breath and wheezing.    Cardiovascular:  Negative for chest pain, palpitations and leg swelling.   Gastrointestinal:  Negative for abdominal distention, abdominal pain, blood in stool, diarrhea, nausea and vomiting.   Genitourinary:  Negative for dysuria and hematuria.   Musculoskeletal:  Negative for myalgias, neck pain and neck stiffness.   Skin:  Negative for rash and wound.   Neurological:  Negative for dizziness, syncope, weakness, light-headedness and headaches.   Psychiatric/Behavioral:  Negative for confusion and decreased concentration.      Objective:     Vital Signs (Most Recent):  Temp: 98.9 °F (37.2 °C) (06/11/22 1209)  Pulse: 81 (06/11/22 1209)  Resp: 18 (06/11/22 1209)  BP: 112/74 (06/11/22 1209)  SpO2: (!) 92 % (06/11/22 1209)   Vital Signs (24h Range):  Temp:  [98.3 °F (36.8 °C)-100.3 °F (37.9 °C)] 98.9 °F (37.2 °C)  Pulse:  [] 81  Resp:  [16-20] 18  SpO2:  [92 %-99 %] 92 %  BP: (107-127)/(57-74) 112/74     Weight: 57.6 kg (126 lb 15.8 oz)  Body mass index is 24.8 kg/m².    Intake/Output Summary (Last 24 hours) at 6/11/2022 1433  Last data filed at 6/11/2022 1000  Gross per 24 hour   Intake --   Output 950 ml   Net -950 ml        Physical Exam  Vitals and nursing note reviewed.   Constitutional:       General: She is not in acute distress.     Appearance: She is not toxic-appearing or diaphoretic.   HENT:      Head: Normocephalic and atraumatic.      Right Ear: External ear normal.      Left Ear: External ear normal.      Nose: Nose normal.      Mouth/Throat:      Mouth: Mucous membranes are moist.      Pharynx: No oropharyngeal exudate or posterior oropharyngeal erythema.   Eyes:      General: No scleral  adequate  Mucosal Quality: adequate  Mandibular Strength and Mobility: WFL  Oral Labial Strength and Mobility: WFL  Lingual Strength and Mobility: WFL  Velar Elevation: WFL  Buccal Strength and Mobility: WFL  Volitional Cough: strong  Volitional Swallow: elicited  Voice Prior to PO Intake: dry, clear    Bedside Swallow Eval:   Consistencies Assessed:  Thin liquids ice chip x 1, tsp x 1, cup sip x 1, multiple straw sips  Solids 1/4 cracker x 2      Oral Phase:   WNL    Pharyngeal Phase:   no overt clinical signs/symptoms of aspiration  no overt clinical signs/symptoms of pharyngeal dysphagia    Compensatory Strategies  None    Treatment: Education provided to pt regarding role of SLP, purpose of swallowing and speech/language/cognitive evaluations, diet recommendations, and recommendations to continue cognitive-linguistic tx.  Understanding was expressed.     Goals:   Multidisciplinary Problems       SLP Goals          Problem: SLP    Goal Priority Disciplines Outcome   SLP Goal     SLP    Description: Speech Language Pathology Goals  Goals expected to be met by 4/18:  1. Pt will recall 3/3 novel items after a 2 minute delay given supervision.   2. Pt will generate sentences with adequate thought organization and word finding abilities.   3. Pt participate in ongoing cognitive-linguistic assessment, including assessment of word fluency, reasoning, and categorization skills.   4. Pt will participate in assessment of reading, writing, and visual spatial abilities.                                  Plan:     Patient to be seen:  4 x/week   Plan of Care expires:  05/10/24  Plan of Care reviewed with:  patient   SLP Follow-Up:  Yes       Discharge recommendations:  Therapy Intensity Recommendations at Discharge: Low Intensity Therapy     Time Tracking:     SLP Treatment Date:   04/11/24  Speech Start Time:  0838  Speech Stop Time:  0856     Speech Total Time (min):  18 min    Billable Minutes: Eval 10  and Eval Swallow and  icterus.     Extraocular Movements: Extraocular movements intact.      Pupils: Pupils are equal, round, and reactive to light.   Cardiovascular:      Rate and Rhythm: Normal rate. Rhythm irregular.      Pulses: Normal pulses.      Heart sounds: Normal heart sounds.   Pulmonary:      Effort: Pulmonary effort is normal. No respiratory distress.      Breath sounds: No wheezing or rales.   Abdominal:      General: Abdomen is flat. There is no distension.      Palpations: Abdomen is soft.      Tenderness: There is no abdominal tenderness. There is no guarding.   Musculoskeletal:         General: Normal range of motion.      Cervical back: Normal range of motion and neck supple. No rigidity.      Right lower leg: No edema.      Left lower leg: No edema.   Skin:     General: Skin is warm and dry.      Coloration: Skin is not jaundiced.      Comments: Left upper extremity erythema and warmth improved; nontender   Neurological:      General: No focal deficit present.      Mental Status: She is alert and oriented to person, place, and time.      Cranial Nerves: No cranial nerve deficit.   Psychiatric:         Mood and Affect: Mood normal.         Behavior: Behavior normal.         Significant Labs: All pertinent labs within the past 24 hours have been reviewed.    Recent Results (from the past 24 hour(s))   CBC Auto Differential    Collection Time: 06/11/22  3:51 AM   Result Value Ref Range    WBC 7.77 3.90 - 12.70 K/uL    RBC 2.93 (L) 4.00 - 5.40 M/uL    Hemoglobin 8.2 (L) 12.0 - 16.0 g/dL    Hematocrit 25.2 (L) 37.0 - 48.5 %    MCV 86 82 - 98 fL    MCH 28.0 27.0 - 31.0 pg    MCHC 32.5 32.0 - 36.0 g/dL    RDW 19.6 (H) 11.5 - 14.5 %    Platelets 84 (L) 150 - 450 K/uL    MPV 12.2 9.2 - 12.9 fL    Immature Granulocytes CANCELED 0.0 - 0.5 %    Immature Grans (Abs) CANCELED 0.00 - 0.04 K/uL    Lymph # CANCELED 1.0 - 4.8 K/uL    Mono # CANCELED 0.3 - 1.0 K/uL    Eos # CANCELED 0.0 - 0.5 K/uL    Baso # CANCELED 0.00 - 0.20 K/uL     Oral Function 8    04/11/2024        nRBC 1 (A) 0 /100 WBC    Gran % 63.0 38.0 - 73.0 %    Lymph % 16.0 (L) 18.0 - 48.0 %    Mono % 3.0 (L) 4.0 - 15.0 %    Eosinophil % 0.0 0.0 - 8.0 %    Basophil % 0.0 0.0 - 1.9 %    Bands 7.0 %    Metamyelocytes 4.0 %    Myelocytes 5.0 %    Promyelocytes 2.0 %    Platelet Estimate Decreased (A)     Aniso Slight     Poik Slight     Poly Occasional     Hypo Occasional     Fermín Cells Occasional     Differential Method Manual    Magnesium    Collection Time: 06/11/22  3:51 AM   Result Value Ref Range    Magnesium 1.7 1.6 - 2.6 mg/dL   Phosphorus    Collection Time: 06/11/22  3:51 AM   Result Value Ref Range    Phosphorus 3.0 2.7 - 4.5 mg/dL   Basic metabolic panel    Collection Time: 06/11/22  3:51 AM   Result Value Ref Range    Sodium 136 136 - 145 mmol/L    Potassium 4.1 3.5 - 5.1 mmol/L    Chloride 105 95 - 110 mmol/L    CO2 21 (L) 23 - 29 mmol/L    Glucose 115 (H) 70 - 110 mg/dL    BUN 7 (L) 8 - 23 mg/dL    Creatinine 0.7 0.5 - 1.4 mg/dL    Calcium 9.0 8.7 - 10.5 mg/dL    Anion Gap 10 8 - 16 mmol/L    eGFR if African American >60.0 >60 mL/min/1.73 m^2    eGFR if non African American >60.0 >60 mL/min/1.73 m^2     Microbiology Results (last 7 days)       Procedure Component Value Units Date/Time    Blood culture [[id number]] Collected: 06/09/22 1924    Order Status: Completed Specimen: Blood Updated: 06/10/22 2212     Blood Culture, Routine No Growth to date      No Growth to date    Blood culture [[id number]] Collected: 06/09/22 1925    Order Status: Completed Specimen: Blood Updated: 06/10/22 2212     Blood Culture, Routine No Growth to date      No Growth to date    Influenza A & B by Molecular [[id number]] Collected: 06/09/22 1750    Order Status: Completed Specimen: Nasopharyngeal Swab Updated: 06/09/22 1939     Influenza A, Molecular Negative     Influenza B, Molecular Negative     Flu A & B Source Nasal swab    Culture, Respiratory with Gram Stain [[id number]]     Order Status: No result Specimen: Respiratory                Significant Imaging: I have reviewed all pertinent imaging results/findings within the past 24 hours.    Imaging Results              X-Ray Chest AP Portable (Final result)  Result time 06/07/22 21:39:46      Final result by Jr White MD (06/07/22 21:39:46)                   Impression:      Mild prominence of the pulmonary vascular with appearance of mild interstitial and alveolar infiltrate, as discussed above.      Electronically signed by: Jr White  Date:    06/07/2022  Time:    21:39               Narrative:    EXAMINATION:  XR CHEST AP PORTABLE    CLINICAL HISTORY:  rectal bleeding;    TECHNIQUE:  Single frontal view of the chest was performed.    COMPARISON:  Chest radiograph December 13, 2021    FINDINGS:  Single portable chest view is submitted.  There is diminished depth of inspiration and minimal rotation, when accounting for these factors the appearance of the cardiomediastinal silhouette is stable.    There is accentuation of the pulmonary vascular, and there is appearance of mild interstitial and patchy ground-glass alveolar infiltrate.    There is no evidence for pleural effusion and there is no evidence for pneumothorax.  The osseous structures demonstrate chronic change, mild curvature of the spine is noted.  Surgical clips of the right upper quadrant likely relate to prior cholecystectomy.                                      Goals of Care Treatment Preferences:  Code Status: Full Code    Living Will: Yes              Consults:   Consults (From admission, onward)        Status Ordering Provider     Inpatient consult to Cardiology  Once        Provider:  (Not yet assigned)    Completed ARIELA BEACH     Inpatient consult to Gastroenterology  Once        Provider:  (Not yet assigned)    Completed RONALD GOMEZ        Hospital Course:     * GIB (gastrointestinal bleeding)  - GIB Pathway initiated  - Hb 6.3 on presentation  ;  Baseline 8.5  - sp 1 unit PRBC in ED  - s/p  colonoscopy with GI; no acute findings and okay to resume diet and discontinue IV PPI  - CBC daily; hemoglobin stable and no further visualization or reports of melenic or blood with stool    Cellulitis  See fever      Paroxysmal atrial fibrillation  See tachycardia      Tachycardia  Onset imelda procedural; per nursing, treated for atrial fibrillation with metoprolol  EKG with AFib with RVR  Continue scheduled metoprolol  Cardiology consulted, appreciate recs   Recommend increase metoprolol tartrate to 25 mg b.i.d. for rate control, hold on AC at this time, recommend follow-up with Dr. Mancia her cardiologist for evaluation for potential Watchman device  Ambulatory referral to Cardiology    Fever  Patient with fever and leukocytosis shortly following admission  Unclear etiology; initially suspected reactive in the setting of GI bleed however colonoscopy unremarkable    Infectious workup with blood cultures (ngtdx48 hrs), chest x-ray (no acute process), UA (no overt infection), flu (negative)  COVID negative upon admission  Unclear if related to potential breast cancer reoccurrence  Patient with potential cellulitis of left upper extremity; will initiate doxycycline   Leukocytosis with improvement prior to initiation of doxycycline; fever curve with improvement  Improvement of cellulitis with doxycycline and overall patient feeling significantly more improved on 06/11; continue p.o. doxycycline to complete outpatient course      Leukocytosis  See fever      Hx of breast cancer  - History of breast cancer and endometrial cancer with recent PET study with concern for scaral lesion  - planning for bone biopsy OP  -  OP hematology oncology follow-up        Final Active Diagnoses:    Diagnosis Date Noted POA    PRINCIPAL PROBLEM:  GIB (gastrointestinal bleeding) [K92.2] 12/15/2021 Yes    Cellulitis [L03.90] 06/11/2022 Yes    Paroxysmal atrial fibrillation [I48.0] 06/10/2022 Yes    Leukocytosis [D72.829] 06/09/2022 Yes     Fever [R50.9] 06/09/2022 Yes    Tachycardia [R00.0] 06/09/2022 Yes    Hx of breast cancer [Z85.3] 08/06/2015 Not Applicable      Problems Resolved During this Admission:       Discharged Condition: stable    Disposition: Home or Self Care    Follow Up:    Patient Instructions:      Ambulatory referral/consult to Cardiology   Standing Status: Future   Referral Priority: Urgent Referral Type: Consultation   Referral Reason: Specialty Services Required   Requested Specialty: Cardiology     Ambulatory referral/consult to Pulmonology   Standing Status: Future   Referral Priority: Urgent Referral Type: Consultation   Referral Reason: Specialty Services Required   Requested Specialty: Pulmonary Disease       Significant Diagnostic Studies: Labs:   CMP   Recent Labs   Lab 06/10/22  0439 06/11/22  0351   * 136   K 3.6 4.1    105   CO2 22* 21*   * 115*   BUN 10 7*   CREATININE 0.7 0.7   CALCIUM 9.0 9.0   PROT 6.8  --    ALBUMIN 2.8*  --    BILITOT 1.0  --    ALKPHOS 56  --    AST 15  --    ALT 6*  --    ANIONGAP 12 10   ESTGFRAFRICA >60.0 >60.0   EGFRNONAA >60.0 >60.0    and CBC   Recent Labs   Lab 06/10/22  0439 06/11/22  0351   WBC 9.92 7.77   HGB 7.6* 8.2*   HCT 24.3* 25.2*   PLT 89* 84*     Microbiology:   Blood Culture   Lab Results   Component Value Date    LABBLOO No Growth to date 06/09/2022    LABBLOO No Growth to date 06/09/2022    and Urine Culture    Lab Results   Component Value Date    LABURIN No significant growth 03/08/2022       Pending Diagnostic Studies:     Procedure Component Value Units Date/Time    Hepatitis C Antibody [[id number]]     Order Status: Sent Lab Status: No result     Specimen: Blood          Medications:  Reconciled Home Medications:      Medication List      START taking these medications    doxycycline 100 MG tablet  Commonly known as: VIBRA-TABS  Take 1 tablet (100 mg total) by mouth every 12 (twelve) hours. for 13 days     fluconazole 150 MG Tab  Commonly known  as: DIFLUCAN  Take 1 tablet (150 mg total) by mouth once as needed (Vulvovaginal candidiasis).     metoprolol tartrate 25 MG tablet  Commonly known as: LOPRESSOR  Take 1 tablet (25 mg total) by mouth 2 (two) times daily.        CHANGE how you take these medications    pantoprazole 40 MG tablet  Commonly known as: PROTONIX  Take 1 tablet (40 mg total) by mouth once daily.  What changed:   · medication strength  · how much to take  · when to take this  · reasons to take this        CONTINUE taking these medications    clobetasol 0.05% 0.05 % Oint  Commonly known as: TEMOVATE  APPLY TO AFFECTED AREA(S) TWICE A DAY FOR 1 MONTH(S) then EVERY DAY FOR 1 MONTH(S) then 3 TIMES A WEEK     co-enzyme Q-10 30 mg capsule  Take 30 mg by mouth 3 (three) times daily.     fexofenadine 180 MG tablet  Commonly known as: ALLEGRA  2 tablets in the morning.  May take an extra 2 tablets during the day if needed for itching.     furosemide 20 MG tablet  Commonly known as: LASIX  Take 1 tablet (20 mg total) by mouth daily as needed (leg swelling).     hydrocortisone 2.5 % cream  Apply to affected areas twice a day when eczema is moderate.     hydrOXYzine HCL 25 MG tablet  Commonly known as: ATARAX  1 to 8 tablets at bedtime.  Start with 3 tablets.  Increase or decrease as needed until itching is controlled or a maximum of 8 tablets.     * nystatin powder  Commonly known as: MYCOSTATIN  Apply topically 4 (four) times daily.     * nystatin cream  Commonly known as: MYCOSTATIN  Apply topically every Mon, Wed, Fri.     ondansetron 4 MG tablet  Commonly known as: ZOFRAN  Take 1 tablet (4 mg total) by mouth every 6 (six) hours as needed for Nausea.     oxyCODONE-acetaminophen 5-325 mg per tablet  Commonly known as: PERCOCET  Take 1 tablet by mouth every 8 (eight) hours as needed for Pain.     UNABLE TO FIND  Ulysses Tail Mushrooms Immune Support     UNABLE TO FIND  medication name: medical marijuana     URIBEL ORAL  Take by mouth as needed.          * This list has 2 medication(s) that are the same as other medications prescribed for you. Read the directions carefully, and ask your doctor or other care provider to review them with you.            STOP taking these medications    oxybutynin 5 MG Tab  Commonly known as: DITROPAN            Indwelling Lines/Drains at time of discharge:   Lines/Drains/Airways     None                 Time spent on the discharge of patient: 35 minutes         Maddy Joseph MD  Department of Hospital Medicine  [facility]

## 2024-10-30 NOTE — PROGRESS NOTES
Subjective:      Patient ID: Jeanne Rodgers is a 65 y.o. female.    Chief Complaint: Endometrial Cancer      Treatment History  Stage IIIC2 UPSC  Preop PET revealing positive pelvic and PA nodes  RALH/BSO/Debulking of left pelvic nodes (6/8 positive) on 8/15/19  Port placed 9/26/19  T/C started 9/27/19  WPRT + PA nodes to 45 Gy completed 1/22/20  HDR to 15 Gy in 3 fractions completed 3/3/20  Restarted T/C on 3/20/2020    HPI  Here today for f/u.  Has had persistent anemia after treatment, and transfused her an additional 1 u PRBC last week.  CBC yesterday revealed a Hgb 7.2.  Plt still less than 100 but slowly recovering.  ANC is ok.  She reports recent tarry stools.  PET on 6/2/2020 did not show any abnormalities.  Also having N/V  Review of Systems   Constitutional: Negative for activity change, appetite change, chills, fatigue and fever.   HENT: Negative for hearing loss, mouth sores, nosebleeds, sore throat and tinnitus.    Eyes: Negative for visual disturbance.   Respiratory: Negative for cough, chest tightness, shortness of breath and wheezing.    Cardiovascular: Negative for chest pain and leg swelling.   Gastrointestinal: Negative for abdominal distention, abdominal pain, blood in stool, constipation, diarrhea, nausea and vomiting.   Genitourinary: Negative for dysuria, flank pain, frequency, hematuria, pelvic pain, vaginal bleeding, vaginal discharge and vaginal pain.   Musculoskeletal: Negative for arthralgias and back pain.   Skin: Negative for rash.   Neurological: Negative for dizziness, seizures, syncope, weakness and numbness.   Hematological: Does not bruise/bleed easily.   Psychiatric/Behavioral: Negative for confusion and sleep disturbance. The patient is not nervous/anxious.        Objective:   Physical Exam:   Constitutional: She appears well-developed and well-nourished. No distress.    HENT:   Head: Normocephalic and atraumatic.    Eyes: No scleral icterus.    Neck: Normal range of  motion. Neck supple.    Cardiovascular: Normal rate and intact distal pulses.  Exam reveals no cyanosis and no edema.     Pulmonary/Chest: Effort normal. No respiratory distress. She exhibits no tenderness.        Abdominal: Soft. She exhibits no distension, no fluid wave, no ascites and no mass. There is no tenderness. There is no rebound and no guarding. No hernia.                Lymphadenopathy:     She has no cervical adenopathy.     Skin: No cyanosis.        Assessment:     1. Endometrial cancer    2. Thrombocytopenia due to drugs    3. Loose stools    4. Anemia due to chronic blood loss        Plan:       Given persistnet symptoms and anemia, as well as decrease in PS, will admit.  IVF, 1U PRBC, and GI to eval for enteritis/proctitis.  Patient in agreement.     Yes

## (undated) DEVICE — NDL INSUF ULTRA VERESS 120MM

## (undated) DEVICE — GLOVE BIOGEL SKINSENSE PI 7.0

## (undated) DEVICE — TRAY PORT IR PLACEMENT REMOVAL

## (undated) DEVICE — GLOVE BIOGEL SKINSENSE PI 8.0

## (undated) DEVICE — SOL STRL WATER INJ 1000ML BG

## (undated) DEVICE — SUT PDS II O CT-2 VIL MONO

## (undated) DEVICE — NDL HYPO REG 25G X 1 1/2

## (undated) DEVICE — PORT ACCESS 8MM W/120MM LOW

## (undated) DEVICE — UNDERGLOVES BIOGEL PI SZ 7 LF

## (undated) DEVICE — IRRIGATOR ENDOSCOPY DISP.

## (undated) DEVICE — ADHESIVE DERMABOND ADVANCED

## (undated) DEVICE — GLOVE BIOGEL SKINSENSE PI 8.5

## (undated) DEVICE — DRAPE THYROID WITH ARMBOARD

## (undated) DEVICE — SYR 10CC LUER LOCK

## (undated) DEVICE — SEE MEDLINE ITEM 156918

## (undated) DEVICE — SET TRI-LUMEN FILTERED TUBE

## (undated) DEVICE — UNDERGLOVE BIOGEL PI SZ 6.5 LF

## (undated) DEVICE — DRESSING TRANS 2X2 TEGADERM

## (undated) DEVICE — GLOVE BIOGEL SKINSENSE PI 6.5

## (undated) DEVICE — SEAL UNIVERSAL 5MM-8MM XI

## (undated) DEVICE — CLOSURE SKIN STERI STRIP 1/2X4

## (undated) DEVICE — SUT 3-0 VICRYL / SH (J416)

## (undated) DEVICE — GLOVE PROTEXIS NEOPRENE 7.5

## (undated) DEVICE — BELLOW CANN HEMOBLAST 1.65GR

## (undated) DEVICE — OBTURATOR BLADELESS 8MM XI CLR

## (undated) DEVICE — SOL ELECTROLUBE ANTI-STIC

## (undated) DEVICE — PILLOW TROOP ELEVATION DISP

## (undated) DEVICE — SOL CLEARIFY VISUALIZATION LAP

## (undated) DEVICE — DRAPE ARM DAVINCI XI

## (undated) DEVICE — SOL NS 1000CC

## (undated) DEVICE — GLOVE SURGEON SYN PF SZ 9

## (undated) DEVICE — SCALPEL #15 BLADE STRL DISP.

## (undated) DEVICE — DRAPE COLUMN DAVINCI XI

## (undated) DEVICE — SUT MCRYL PLUS 4-0 PS2 27IN

## (undated) DEVICE — SEE MEDLINE ITEM 146313

## (undated) DEVICE — BANDAGE ADHESIVE

## (undated) DEVICE — ELECTRODE REM PLYHSV RETURN 9

## (undated) DEVICE — SOL IRR NACL .9% 3000ML

## (undated) DEVICE — GLOVE BIOGEL SKINSENSE PI 6.0

## (undated) DEVICE — MANIPULATOR VCARE PLUS 32MM SM

## (undated) DEVICE — DEVICE ANC SW STAT FOLEY 6-24

## (undated) DEVICE — SOL WATER STRL IRR 1000ML

## (undated) DEVICE — SET BASIN 48X48IN 6000ML RING

## (undated) DEVICE — WIRE BENTSON 035/180

## (undated) DEVICE — KIT WING PAD POSITIONING

## (undated) DEVICE — SEE MEDLINE ITEM 157110

## (undated) DEVICE — SOL 9P NACL IRR PIC IL

## (undated) DEVICE — SEE MEDLINE ITEM 156923

## (undated) DEVICE — GOWN SMART IMP BREATHABLE XXLG

## (undated) DEVICE — POSITIONER HEAD DONUT 9IN FOAM

## (undated) DEVICE — APPLICATOR CHLORAPREP ORN 26ML

## (undated) DEVICE — SET INFLOW TUBE HYSTER

## (undated) DEVICE — COVER TIP CURVED SCISSORS XI

## (undated) DEVICE — Device

## (undated) DEVICE — INSERT CUSHIONPRONE VIEW LARGE

## (undated) DEVICE — SUT CHROMIC 2-0 SH 27IN BRN

## (undated) DEVICE — SEE MEDLINE ITEM 152622

## (undated) DEVICE — SET MICPUNC ACC STIFF CANNULA

## (undated) DEVICE — POSITIONER HEAD ADULT

## (undated) DEVICE — JELLY SURGILUBE 5GR

## (undated) DEVICE — APPLICATOR HEMOBLAST LAPSCP

## (undated) DEVICE — PAD PERI POST REPLACEMNT

## (undated) DEVICE — KIT PROBE COVER WITH GEL